# Patient Record
Sex: FEMALE | Race: BLACK OR AFRICAN AMERICAN | NOT HISPANIC OR LATINO | Employment: UNEMPLOYED | URBAN - METROPOLITAN AREA
[De-identification: names, ages, dates, MRNs, and addresses within clinical notes are randomized per-mention and may not be internally consistent; named-entity substitution may affect disease eponyms.]

---

## 2017-01-05 ENCOUNTER — DOCUMENTATION ONLY (OUTPATIENT)
Dept: TRANSPLANT | Facility: CLINIC | Age: 32
End: 2017-01-05

## 2017-01-05 NOTE — PROGRESS NOTES
Call from Jelly. Is ready to start with a new referral and get back on the waiting list. Things are going better with her life. I asked if she missed dialysis ever and she said this does happen when there are problems with her baby. Not often. Still at same unit and seeing Dr. Varma. I said I would pass along the referral to our administrative staff and they would contact her next week.

## 2017-01-06 ENCOUNTER — TELEPHONE (OUTPATIENT)
Dept: TRANSPLANT | Facility: CLINIC | Age: 32
End: 2017-01-06

## 2017-01-06 DIAGNOSIS — I25.10 CARDIOVASCULAR DISEASE: ICD-10-CM

## 2017-01-06 DIAGNOSIS — I10 ESSENTIAL HYPERTENSION: ICD-10-CM

## 2017-01-06 DIAGNOSIS — Z87.891 HISTORY OF TOBACCO USE: ICD-10-CM

## 2017-01-06 DIAGNOSIS — Z76.82 ORGAN TRANSPLANT CANDIDATE: ICD-10-CM

## 2017-01-06 DIAGNOSIS — N02.B9 IGA NEPHROPATHY: ICD-10-CM

## 2017-01-06 DIAGNOSIS — N18.6 END STAGE RENAL DISEASE (H): ICD-10-CM

## 2017-01-06 DIAGNOSIS — T86.91 TRANSPLANT FAILURE DUE TO REJECTION: ICD-10-CM

## 2017-01-06 NOTE — LETTER
February 28, 2017    Keith Dietz  [address]    [facility] [address]      Dear Ms. Dietz,    Attached is your Pre Kidney Evaluation schedule on March 8, 2017 and returning on May 31, 2017. Please feel free to contact me at [phone], if you have any questions.       Sincerely,   Carol LEMUS    CC:Jeanette HOOPER                                                [facility] [address]  [address]        EVALUATION SCHEDULE: KIDNEY TRANSPLANT SLOT 4 EVAL      Patient:   KEITH DIETZ   MR#:    [id number]  Coordinator:   Jeanette HOOPER     [phone]  :   Carol LEMUS     [phone]  Location[facility]   [facility]  Date:    March 8, 2017 & May 31, 2017      This is your evaluation schedule, please follow dates and times.  You  will receive reminder phone calls for other tests, but please follow this  schedule only!  If you have any questions about dates and times,  please call us on number listed above.  Thank you, Transplant Clinic.     NO FOOD or DRINK AFTER MIDNIGHT  (You may only have small amounts of water)      Day/Date:    Wednesday, March 8, 2017  Time Location Activity   6:30a.m. [facility]  Imaging and Lab Testing  1st floor Blood tests (fasting, PLEASE)   7:00a.m. [facility]  Transplant Services  3rd floor; Clinic 3A Check in & go thru evaluation schedule for the day, get vitals & medication records   8:00 - 9:00a.m. [facility]  Transplant Services  3rd floor; Clinic 3A Pre-transplant class   9:00a.m. [facility]  Transplant Services  3rd floor; Clinic 3A; Consult Room Appointment with either Micaela or Makenzie,  Transplant    10:00a.m. [facility]  Transplant Services  3rd floor; Clinic 3A Appointment with Dr. Younger,   Transplant Surgeon   10:30a.m.  [facility]  Transplant Services  3rd floor; Clinic 3A Meet with Jeanette HOOPER  Transplant Coordinator   11:15a.m. [facility]  Transplant Services  3rd floor; Clinic 3B Appointment with Dr. Anglin,   Transplant Nephrologist   12:00Noon-  12:30p.m. [facility]  Transplant Services  3rd floor; Clinic 3A; Consult Room Appointment with Marya Roland  Registered Dietitian   12:30-  1:30p.m. LUNCH    1:45p.m. [facility]  Imaging and Lab Testing  1st floor 2nd ABO blood draw - confirmation of 1st draw   2:00p.m. [facility]  Pulmonary Function Lab  3rd floor Pulmonary Function Tests   3:00p.m. [facility]  Imaging and Lab Testing  1st floor Chest X-ray    3:30p.m. [facility]  Cardiology/Heart Care Clinic  3rd floor; Clinic 3L Echocardiogram   4:30p.m. [facility]  Cardiology/Heart Care Clinic  3rd floor; Clinic 3L EKG       Day/Date:   Wednesday, May 31, 2017   Time Location Activity   10:00a.m. [facility]  Cardiology/Heart Care Clinic  3rd floor; Clinic 3L Appointment with Dr. Deras,   Cardiology

## 2017-01-06 NOTE — Clinical Note
January 17, 2017      Jelly Dietz  [address]   [facility] [address]      Dear Jelly,       You have recently expressed interest in our Solid Organ Transplant Program and have requested to begin the evaluation process.  We have made several attempts to get in touch with you but have been unable to reach you.    If you are still interested and/or have any questions, please contact us at  [phone] or [phone]   Monday - Friday, between the hours of 8:30am and 5:00pm central time.    We look forward to hearing from you.      Regards,     Solid Organ Transplant Intake   [facility]'s [address]  [address], [facility] [address]  [place] [address]

## 2017-01-17 NOTE — TELEPHONE ENCOUNTER
"I attempted to contact Jelly again and her phone has been disconnected. I sent a \"Trying To Reach You\" letter  "

## 2017-01-24 ENCOUNTER — APPOINTMENT (OUTPATIENT)
Dept: GENERAL RADIOLOGY | Facility: CLINIC | Age: 32
End: 2017-01-24
Attending: EMERGENCY MEDICINE
Payer: MEDICARE

## 2017-01-24 ENCOUNTER — HOSPITAL ENCOUNTER (OUTPATIENT)
Facility: CLINIC | Age: 32
Setting detail: OBSERVATION
Discharge: HOME | End: 2017-01-25
Attending: EMERGENCY MEDICINE | Admitting: INTERNAL MEDICINE
Payer: MEDICARE

## 2017-01-24 DIAGNOSIS — N18.6 ESRD (END STAGE RENAL DISEASE) ON DIALYSIS (H): ICD-10-CM

## 2017-01-24 DIAGNOSIS — Z99.2 ESRD (END STAGE RENAL DISEASE) ON DIALYSIS (H): ICD-10-CM

## 2017-01-24 DIAGNOSIS — R06.00 DYSPNEA, UNSPECIFIED TYPE: ICD-10-CM

## 2017-01-24 DIAGNOSIS — F41.9 ANXIETY: ICD-10-CM

## 2017-01-24 DIAGNOSIS — I15.9 SECONDARY HYPERTENSION: ICD-10-CM

## 2017-01-24 DIAGNOSIS — H66.91 RIGHT OTITIS MEDIA, UNSPECIFIED CHRONICITY, UNSPECIFIED OTITIS MEDIA TYPE: Primary | ICD-10-CM

## 2017-01-24 LAB
ANION GAP SERPL CALCULATED.3IONS-SCNC: 14 MMOL/L (ref 3–14)
BASOPHILS # BLD AUTO: 0 10E9/L (ref 0–0.2)
BASOPHILS NFR BLD AUTO: 1.4 %
BUN SERPL-MCNC: 89 MG/DL (ref 7–30)
CALCIUM SERPL-MCNC: 8.6 MG/DL (ref 8.5–10.1)
CHLORIDE SERPL-SCNC: 100 MMOL/L (ref 94–109)
CO2 SERPL-SCNC: 21 MMOL/L (ref 20–32)
CREAT SERPL-MCNC: 13.3 MG/DL (ref 0.52–1.04)
DIFFERENTIAL METHOD BLD: ABNORMAL
EOSINOPHIL # BLD AUTO: 0.2 10E9/L (ref 0–0.7)
EOSINOPHIL NFR BLD AUTO: 6.2 %
ERYTHROCYTE [DISTWIDTH] IN BLOOD BY AUTOMATED COUNT: 12.5 % (ref 10–15)
GFR SERPL CREATININE-BSD FRML MDRD: 3 ML/MIN/1.7M2
GLUCOSE SERPL-MCNC: 107 MG/DL (ref 70–99)
HCT VFR BLD AUTO: 24.8 % (ref 35–47)
HGB BLD-MCNC: 8.1 G/DL (ref 11.7–15.7)
IMM GRANULOCYTES # BLD: 0 10E9/L (ref 0–0.4)
IMM GRANULOCYTES NFR BLD: 0.3 %
LYMPHOCYTES # BLD AUTO: 0.7 10E9/L (ref 0.8–5.3)
LYMPHOCYTES NFR BLD AUTO: 25.2 %
MCH RBC QN AUTO: 32.7 PG (ref 26.5–33)
MCHC RBC AUTO-ENTMCNC: 32.7 G/DL (ref 31.5–36.5)
MCV RBC AUTO: 100 FL (ref 78–100)
MONOCYTES # BLD AUTO: 0.1 10E9/L (ref 0–1.3)
MONOCYTES NFR BLD AUTO: 4.8 %
NEUTROPHILS # BLD AUTO: 1.8 10E9/L (ref 1.6–8.3)
NEUTROPHILS NFR BLD AUTO: 62.1 %
NRBC # BLD AUTO: 0 10*3/UL
NRBC BLD AUTO-RTO: 0 /100
NT-PROBNP SERPL-MCNC: ABNORMAL PG/ML (ref 0–450)
PHOSPHATE SERPL-MCNC: 8 MG/DL (ref 2.5–4.5)
PLATELET # BLD AUTO: 131 10E9/L (ref 150–450)
POTASSIUM SERPL-SCNC: 5.3 MMOL/L (ref 3.4–5.3)
RBC # BLD AUTO: 2.48 10E12/L (ref 3.8–5.2)
SODIUM SERPL-SCNC: 135 MMOL/L (ref 133–144)
TROPONIN I SERPL-MCNC: NORMAL UG/L (ref 0–0.04)
WBC # BLD AUTO: 2.9 10E9/L (ref 4–11)

## 2017-01-24 PROCEDURE — 80048 BASIC METABOLIC PNL TOTAL CA: CPT | Performed by: EMERGENCY MEDICINE

## 2017-01-24 PROCEDURE — 83880 ASSAY OF NATRIURETIC PEPTIDE: CPT | Performed by: EMERGENCY MEDICINE

## 2017-01-24 PROCEDURE — 84100 ASSAY OF PHOSPHORUS: CPT | Performed by: EMERGENCY MEDICINE

## 2017-01-24 PROCEDURE — 84484 ASSAY OF TROPONIN QUANT: CPT | Performed by: EMERGENCY MEDICINE

## 2017-01-24 PROCEDURE — 85025 COMPLETE CBC W/AUTO DIFF WBC: CPT | Performed by: EMERGENCY MEDICINE

## 2017-01-24 PROCEDURE — 71020 XR CHEST 2 VW: CPT

## 2017-01-24 PROCEDURE — 93005 ELECTROCARDIOGRAM TRACING: CPT

## 2017-01-24 PROCEDURE — 99285 EMERGENCY DEPT VISIT HI MDM: CPT | Mod: 25

## 2017-01-24 RX ORDER — LIDOCAINE 40 MG/G
CREAM TOPICAL
Status: DISCONTINUED | OUTPATIENT
Start: 2017-01-24 | End: 2017-01-25

## 2017-01-24 ASSESSMENT — ENCOUNTER SYMPTOMS
SHORTNESS OF BREATH: 1
FREQUENCY: 1

## 2017-01-24 NOTE — IP AVS SNAPSHOT
MRN:[id number]                      After Visit Summary   1/24/2017    Jelly iDetz    MRN: [id number]           Thank you!     Thank you for choosing [facility] for your care. Our goal is always to provide you with excellent care. Hearing back from our patients is one way we can continue to improve our services. Please take a few minutes to complete the written survey that you may receive in the mail after you visit. If you would like to speak to someone directly about your visit please contact Patient Relations at [phone]. Thank you!          Patient Information     Date Of Birth          1985        About your hospital stay     You were admitted on:  January 25, 2017 You last received care in the:  [place] [address] [facility]    You were discharged on:  January 25, 2017        Reason for your hospital stay       SOB related to dialysis need, elevated BP                  Who to Call     For medical emergencies, please call 911.  For non-urgent questions about your medical care, please call your primary care provider or clinic, [phone]          Attending Provider     Provider    Parker Resendez MD Parpia, Abdul K, MD       Primary Care Provider Office Phone # Fax #    Sailaja Oh [phone]       [facility] [address] [address] [place] [address]         Notify       Call if questions.  Notify provider if new medical concerns.                  After Care Instructions     Activity       Your activity upon discharge: activity as tolerated            Diet       Follow this diet upon discharge: dialysis                  Follow-up Appointments     Follow-up and recommended labs and tests        Follow-up with dialysis on your normal schedule                  Pending Results     No orders found for last 2 day(s).            Statement of Approval     Ordered          01/25/17 1815  I have reviewed and agree with all the recommendations  "and orders detailed in this document.   EFFECTIVE NOW     Approved and electronically signed by:  Dakota Bailey DO             Admission Information        Department Dept Phone    2017 Rh [facility] [phone]      Your Vitals Were     Blood Pressure Pulse Temperature Respirations Weight Pulse Oximetry    127/79 mmHg 83 98.2  F (36.8  C) (Oral) 16 53.797 kg (118 lb 9.6 oz) 98%      MyChart Information     [employer] lets you send messages to your doctor, view your test results, renew your prescriptions, schedule appointments and more. To sign up, go to [email or web address] . Click on \"Log in\" on the left side of the screen, which will take you to the Welcome page. Then click on \"Sign up Now\" on the right side of the page.     You will be asked to enter the access code listed below, as well as some personal information. Please follow the directions to create your username and password.     Your access code is: [email or web address]  Expires: 2017  1:30 AM     Your access code will  in 90 days. If you need help or a new code, please call your [place] clinic or [phone].        Care EveryWhere ID     This is your Care EveryWhere ID. This could be used by other organizations to access your [place] medical records  [id number]           Review of your medicines      START taking        Dose / Directions    cefdinir 300 MG capsule   Commonly known as:  OMNICEF   Used for:  Right otitis media, unspecified chronicity, unspecified otitis media type        Dose:  300 mg   Take 1 capsule (300 mg) by mouth every other day for 10 days (take after dialysis on dialysis days)   Quantity:  5 capsule   Refills:  0       diazepam 2 MG tablet   Commonly known as:  VALIUM   Used for:  Anxiety        Dose:  2 mg   Take 1 tablet (2 mg) by mouth nightly as needed for anxiety or sleep   Quantity:  7 tablet   Refills:  0         CONTINUE these medicines which have NOT CHANGED        Dose / Directions    " acetaminophen 325 MG tablet   Commonly known as:  TYLENOL   Used for:   delivery delivered        Dose:  325-650 mg   Take 1-2 tablets by mouth every 4 hours as needed.   Quantity:  60 tablet   Refills:  0       amLODIPine 10 MG tablet   Commonly known as:  NORVASC   Used for:  ESRD (end stage renal disease) on dialysis (H), Malignant essential hypertension        Dose:  10 mg   Take 1 tablet (10 mg) by mouth daily   Quantity:  30 tablet   Refills:  1       carvedilol 25 MG tablet   Commonly known as:  COREG   Used for:  Benign essential hypertension, SOB (shortness of breath)        Dose:  25 mg   Take 1 tablet (25 mg) by mouth 2 times daily (with meals)   Quantity:  60 tablet   Refills:  11       cloNIDine 0.1 MG tablet   Commonly known as:  CATAPRES   Used for:  Benign essential hypertension, SOB (shortness of breath)        Dose:  0.1 mg   Take 1 tablet (0.1 mg) by mouth 3 times daily as needed   Quantity:  60 tablet   Refills:  3       HYDROcodone-acetaminophen 5-325 MG per tablet   Commonly known as:  NORCO        Dose:  1 tablet   Take 1 tablet by mouth every 6 hours as needed for moderate to severe pain   Quantity:  7 tablet   Refills:  0       oxyCODONE 5 MG IR tablet   Commonly known as:  ROXICODONE        Dose:  5 mg   Take 1 tablet (5 mg) by mouth every 6 hours as needed for pain   Quantity:  15 tablet   Refills:  0         STOP taking     azithromycin 250 MG tablet   Commonly known as:  ZITHROMAX Z-HERMINIA                Where to get your medicines      These medications were sent to [facility] [address] [place] [address] AT [facility] [address]  [address] [place] [address], [facility] [address]     Phone:  [phone]    - cefdinir 300 MG capsule      Some of these will need a paper prescription and others can be bought over the counter. Ask your nurse if you have questions.     Bring a paper prescription for each of these medications    - diazepam 2 MG tablet              Protect others around you: Learn how to safely use, store and throw away your medicines at www.disposemymeds.org.             Medication List: This is a list of all your medications and when to take them. Check marks below indicate your daily home schedule. Keep this list as a reference.      Medications           Morning Afternoon Evening Bedtime As Needed    acetaminophen 325 MG tablet   Commonly known as:  TYLENOL   Take 1-2 tablets by mouth every 4 hours as needed.                                   amLODIPine 10 MG tablet   Commonly known as:  NORVASC   Take 1 tablet (10 mg) by mouth daily   Last time this was given:  10 mg on 1/25/2017  8:11 AM                                   carvedilol 25 MG tablet   Commonly known as:  COREG   Take 1 tablet (25 mg) by mouth 2 times daily (with meals)   Last time this was given:  25 mg on 1/25/2017  5:25 PM            With breakfast           With dinner               cefdinir 300 MG capsule   Commonly known as:  OMNICEF   Take 1 capsule (300 mg) by mouth every other day for 10 days (take after dialysis on dialysis days)                    Start TONIGHT                 cloNIDine 0.1 MG tablet   Commonly known as:  CATAPRES   Take 1 tablet (0.1 mg) by mouth 3 times daily as needed   Last time this was given:  0.1 mg on 1/25/2017  5:25 PM                                   diazepam 2 MG tablet   Commonly known as:  VALIUM   Take 1 tablet (2 mg) by mouth nightly as needed for anxiety or sleep   Last time this was given:  10 mg on 1/25/2017 12:53 AM                                   HYDROcodone-acetaminophen 5-325 MG per tablet   Commonly known as:  NORCO   Take 1 tablet by mouth every 6 hours as needed for moderate to severe pain                                   oxyCODONE 5 MG IR tablet   Commonly known as:  ROXICODONE   Take 1 tablet (5 mg) by mouth every 6 hours as needed for pain

## 2017-01-24 NOTE — IP AVS SNAPSHOT
[place] [address] [facility]    [address]    [facility] [address]    Phone:  [phone]    Fax:  [phone]                                       After Visit Summary   1/24/2017    Jelly Dietz    MRN: [id number]           After Visit Summary Signature Page     I have received my discharge instructions, and my questions have been answered. I have discussed any challenges I see with this plan with the nurse or doctor.    ..........................................................................................................................................  Patient/Patient Representative Signature      ..........................................................................................................................................  Patient Representative Print Name and Relationship to Patient    ..................................................               ................................................  Date                                            Time    ..........................................................................................................................................  Reviewed by Signature/Title    ...................................................              ..............................................  Date                                                            Time

## 2017-01-25 VITALS
RESPIRATION RATE: 16 BRPM | HEART RATE: 83 BPM | BODY MASS INDEX: 19.15 KG/M2 | WEIGHT: 118.6 LBS | DIASTOLIC BLOOD PRESSURE: 79 MMHG | OXYGEN SATURATION: 98 % | TEMPERATURE: 98.2 F | SYSTOLIC BLOOD PRESSURE: 127 MMHG

## 2017-01-25 PROBLEM — Z99.2 ESRD (END STAGE RENAL DISEASE) ON DIALYSIS (H): Status: ACTIVE | Noted: 2017-01-25

## 2017-01-25 PROBLEM — N18.6 ESRD (END STAGE RENAL DISEASE) ON DIALYSIS (H): Status: ACTIVE | Noted: 2017-01-25

## 2017-01-25 LAB
ALBUMIN UR-MCNC: 30 MG/DL
APPEARANCE UR: ABNORMAL
BACTERIA #/AREA URNS HPF: ABNORMAL /HPF
BILIRUB UR QL STRIP: NEGATIVE
COLOR UR AUTO: ABNORMAL
GLUCOSE UR STRIP-MCNC: 70 MG/DL
HGB UR QL STRIP: NEGATIVE
INTERPRETATION ECG - MUSE: NORMAL
KETONES UR STRIP-MCNC: NEGATIVE MG/DL
LEUKOCYTE ESTERASE UR QL STRIP: NEGATIVE
MUCOUS THREADS #/AREA URNS LPF: PRESENT /LPF
NITRATE UR QL: NEGATIVE
PH UR STRIP: 7.5 PH (ref 5–7)
POTASSIUM SERPL-SCNC: 5.7 MMOL/L (ref 3.4–5.3)
RBC #/AREA URNS AUTO: 1 /HPF (ref 0–2)
SP GR UR STRIP: 1.01 (ref 1–1.03)
SQUAMOUS #/AREA URNS AUTO: 31 /HPF (ref 0–1)
URN SPEC COLLECT METH UR: ABNORMAL
UROBILINOGEN UR STRIP-MCNC: NORMAL MG/DL (ref 0–2)
WBC #/AREA URNS AUTO: 2 /HPF (ref 0–2)

## 2017-01-25 PROCEDURE — G0378 HOSPITAL OBSERVATION PER HR: HCPCS

## 2017-01-25 PROCEDURE — 81001 URINALYSIS AUTO W/SCOPE: CPT | Performed by: INTERNAL MEDICINE

## 2017-01-25 PROCEDURE — 25000125 ZZHC RX 250: Performed by: INTERNAL MEDICINE

## 2017-01-25 PROCEDURE — 90937 HEMODIALYSIS REPEATED EVAL: CPT

## 2017-01-25 PROCEDURE — 96375 TX/PRO/DX INJ NEW DRUG ADDON: CPT

## 2017-01-25 PROCEDURE — A9270 NON-COVERED ITEM OR SERVICE: HCPCS | Mod: GY | Performed by: INTERNAL MEDICINE

## 2017-01-25 PROCEDURE — 99235 HOSP IP/OBS SAME DATE MOD 70: CPT | Performed by: INTERNAL MEDICINE

## 2017-01-25 PROCEDURE — 96376 TX/PRO/DX INJ SAME DRUG ADON: CPT

## 2017-01-25 PROCEDURE — 25000132 ZZH RX MED GY IP 250 OP 250 PS 637: Mod: GY | Performed by: INTERNAL MEDICINE

## 2017-01-25 PROCEDURE — G0257 UNSCHED DIALYSIS ESRD PT HOS: HCPCS

## 2017-01-25 PROCEDURE — 36415 COLL VENOUS BLD VENIPUNCTURE: CPT | Performed by: INTERNAL MEDICINE

## 2017-01-25 PROCEDURE — 96374 THER/PROPH/DIAG INJ IV PUSH: CPT

## 2017-01-25 PROCEDURE — A9270 NON-COVERED ITEM OR SERVICE: HCPCS | Mod: GY | Performed by: EMERGENCY MEDICINE

## 2017-01-25 PROCEDURE — 25000132 ZZH RX MED GY IP 250 OP 250 PS 637: Mod: GY | Performed by: EMERGENCY MEDICINE

## 2017-01-25 PROCEDURE — 84132 ASSAY OF SERUM POTASSIUM: CPT | Performed by: INTERNAL MEDICINE

## 2017-01-25 RX ORDER — DIAZEPAM 2 MG
2 TABLET ORAL
Qty: 7 TABLET | Refills: 0 | Status: SHIPPED | OUTPATIENT
Start: 2017-01-25 | End: 2017-04-30

## 2017-01-25 RX ORDER — CLONIDINE HYDROCHLORIDE 0.1 MG/1
0.3 TABLET ORAL ONCE
Status: COMPLETED | OUTPATIENT
Start: 2017-01-25 | End: 2017-01-25

## 2017-01-25 RX ORDER — HYDRALAZINE HYDROCHLORIDE 20 MG/ML
10 INJECTION INTRAMUSCULAR; INTRAVENOUS EVERY 4 HOURS PRN
Status: DISCONTINUED | OUTPATIENT
Start: 2017-01-25 | End: 2017-01-25

## 2017-01-25 RX ORDER — HYDRALAZINE HYDROCHLORIDE 20 MG/ML
10 INJECTION INTRAMUSCULAR; INTRAVENOUS EVERY 4 HOURS PRN
Status: DISCONTINUED | OUTPATIENT
Start: 2017-01-25 | End: 2017-01-25 | Stop reason: HOSPADM

## 2017-01-25 RX ORDER — HYDROMORPHONE HYDROCHLORIDE 1 MG/ML
.3-.5 INJECTION, SOLUTION INTRAMUSCULAR; INTRAVENOUS; SUBCUTANEOUS
Status: DISCONTINUED | OUTPATIENT
Start: 2017-01-25 | End: 2017-01-25 | Stop reason: HOSPADM

## 2017-01-25 RX ORDER — HEPARIN SODIUM 1000 [USP'U]/ML
500 INJECTION, SOLUTION INTRAVENOUS; SUBCUTANEOUS CONTINUOUS
Status: DISCONTINUED | OUTPATIENT
Start: 2017-01-25 | End: 2017-01-25

## 2017-01-25 RX ORDER — CARVEDILOL 25 MG/1
25 TABLET ORAL 2 TIMES DAILY WITH MEALS
Status: DISCONTINUED | OUTPATIENT
Start: 2017-01-25 | End: 2017-01-25 | Stop reason: HOSPADM

## 2017-01-25 RX ORDER — ONDANSETRON 4 MG/1
4 TABLET, ORALLY DISINTEGRATING ORAL EVERY 6 HOURS PRN
Status: DISCONTINUED | OUTPATIENT
Start: 2017-01-25 | End: 2017-01-25 | Stop reason: HOSPADM

## 2017-01-25 RX ORDER — ONDANSETRON 2 MG/ML
4 INJECTION INTRAMUSCULAR; INTRAVENOUS EVERY 6 HOURS PRN
Status: DISCONTINUED | OUTPATIENT
Start: 2017-01-25 | End: 2017-01-25 | Stop reason: HOSPADM

## 2017-01-25 RX ORDER — ALBUMIN, HUMAN INJ 5% 5 %
250 SOLUTION INTRAVENOUS
Status: DISCONTINUED | OUTPATIENT
Start: 2017-01-25 | End: 2017-01-25

## 2017-01-25 RX ORDER — AMOXICILLIN 500 MG/1
1000 CAPSULE ORAL ONCE
Status: COMPLETED | OUTPATIENT
Start: 2017-01-25 | End: 2017-01-25

## 2017-01-25 RX ORDER — NALOXONE HYDROCHLORIDE 0.4 MG/ML
.1-.4 INJECTION, SOLUTION INTRAMUSCULAR; INTRAVENOUS; SUBCUTANEOUS
Status: DISCONTINUED | OUTPATIENT
Start: 2017-01-25 | End: 2017-01-25 | Stop reason: HOSPADM

## 2017-01-25 RX ORDER — CARVEDILOL 25 MG/1
25 TABLET ORAL 2 TIMES DAILY WITH MEALS
Status: DISCONTINUED | OUTPATIENT
Start: 2017-01-25 | End: 2017-01-25

## 2017-01-25 RX ORDER — DIAZEPAM 5 MG
10 TABLET ORAL ONCE
Status: COMPLETED | OUTPATIENT
Start: 2017-01-25 | End: 2017-01-25

## 2017-01-25 RX ORDER — SODIUM POLYSTYRENE SULFONATE 15 G/60ML
15 SUSPENSION ORAL; RECTAL ONCE
Status: COMPLETED | OUTPATIENT
Start: 2017-01-25 | End: 2017-01-25

## 2017-01-25 RX ORDER — CLONIDINE HYDROCHLORIDE 0.1 MG/1
0.1 TABLET ORAL 3 TIMES DAILY
Status: DISCONTINUED | OUTPATIENT
Start: 2017-01-25 | End: 2017-01-25 | Stop reason: HOSPADM

## 2017-01-25 RX ORDER — AMLODIPINE BESYLATE 10 MG/1
10 TABLET ORAL DAILY
Status: DISCONTINUED | OUTPATIENT
Start: 2017-01-25 | End: 2017-01-25 | Stop reason: HOSPADM

## 2017-01-25 RX ORDER — AMOXICILLIN AND CLAVULANATE POTASSIUM 250; 125 MG/1; MG/1
1 TABLET, FILM COATED ORAL
Status: DISCONTINUED | OUTPATIENT
Start: 2017-01-25 | End: 2017-01-25 | Stop reason: HOSPADM

## 2017-01-25 RX ORDER — ACETAMINOPHEN 325 MG/1
650 TABLET ORAL EVERY 4 HOURS PRN
Status: DISCONTINUED | OUTPATIENT
Start: 2017-01-25 | End: 2017-01-25 | Stop reason: HOSPADM

## 2017-01-25 RX ORDER — HEPARIN SODIUM 1000 [USP'U]/ML
500 INJECTION, SOLUTION INTRAVENOUS; SUBCUTANEOUS
Status: DISCONTINUED | OUTPATIENT
Start: 2017-01-25 | End: 2017-01-25

## 2017-01-25 RX ORDER — CEFDINIR 300 MG/1
300 CAPSULE ORAL EVERY OTHER DAY
Qty: 5 CAPSULE | Refills: 0 | Status: SHIPPED | OUTPATIENT
Start: 2017-01-25 | End: 2017-02-04

## 2017-01-25 RX ORDER — ALBUMIN (HUMAN) 12.5 G/50ML
50 SOLUTION INTRAVENOUS
Status: DISCONTINUED | OUTPATIENT
Start: 2017-01-25 | End: 2017-01-25

## 2017-01-25 RX ORDER — CLONIDINE HYDROCHLORIDE 0.1 MG/1
0.1 TABLET ORAL 3 TIMES DAILY
Status: DISCONTINUED | OUTPATIENT
Start: 2017-01-25 | End: 2017-01-25

## 2017-01-25 RX ADMIN — AMOXICILLIN 1000 MG: 500 CAPSULE ORAL at 01:43

## 2017-01-25 RX ADMIN — SODIUM POLYSTYRENE SULFONATE 15 G: 15 SUSPENSION ORAL; RECTAL at 06:57

## 2017-01-25 RX ADMIN — CLONIDINE HYDROCHLORIDE 0.1 MG: 0.1 TABLET ORAL at 17:25

## 2017-01-25 RX ADMIN — HYDROMORPHONE HYDROCHLORIDE 0.3 MG: 10 INJECTION, SOLUTION INTRAMUSCULAR; INTRAVENOUS; SUBCUTANEOUS at 17:31

## 2017-01-25 RX ADMIN — CLONIDINE HYDROCHLORIDE 0.1 MG: 0.1 TABLET ORAL at 08:11

## 2017-01-25 RX ADMIN — CARVEDILOL 25 MG: 25 TABLET, FILM COATED ORAL at 08:11

## 2017-01-25 RX ADMIN — DIAZEPAM 10 MG: 5 TABLET ORAL at 00:53

## 2017-01-25 RX ADMIN — HYDRALAZINE HYDROCHLORIDE 10 MG: 20 INJECTION INTRAMUSCULAR; INTRAVENOUS at 09:23

## 2017-01-25 RX ADMIN — HYDROMORPHONE HYDROCHLORIDE 0.3 MG: 10 INJECTION, SOLUTION INTRAMUSCULAR; INTRAVENOUS; SUBCUTANEOUS at 13:36

## 2017-01-25 RX ADMIN — AMLODIPINE BESYLATE 10 MG: 10 TABLET ORAL at 08:11

## 2017-01-25 RX ADMIN — CARVEDILOL 25 MG: 25 TABLET, FILM COATED ORAL at 17:25

## 2017-01-25 RX ADMIN — HYDROMORPHONE HYDROCHLORIDE 0.3 MG: 10 INJECTION, SOLUTION INTRAMUSCULAR; INTRAVENOUS; SUBCUTANEOUS at 09:24

## 2017-01-25 RX ADMIN — CLONIDINE HYDROCHLORIDE 0.3 MG: 0.1 TABLET ORAL at 00:23

## 2017-01-25 RX ADMIN — ONDANSETRON 4 MG: 4 TABLET, ORALLY DISINTEGRATING ORAL at 17:43

## 2017-01-25 RX ADMIN — ONDANSETRON 4 MG: 4 TABLET, ORALLY DISINTEGRATING ORAL at 09:58

## 2017-01-25 ASSESSMENT — ENCOUNTER SYMPTOMS: FEVER: 0

## 2017-01-25 NOTE — PROGRESS NOTES
I discussed pt's current status with Dr. Resendez.  Though she feels short of breath, she is not hypoxic and has a clear CXR.  Electrolytes are satisfactory.  She does not need urgent dialysis tonight.  She has a history of difficult to control HBP and is hypertensive tonight.      I cannot access her current dialysis orders because she does not receive Nephrology care from my group at [facility].  I have recommended admission now and anti-hypertensive medication tonight.  I will request early AM dialysis.  The dialysis team should secure pt's dialysis orders from the [place] unit when they arrive in the morning.  I have left a voice message for the [place] unit staff regarding the pt's hospital admission.    Song Renteria MD  Nephrology; [facility]  [phone]

## 2017-01-25 NOTE — ED NOTES
Patient presents today with complaints of SOB. She states that she was supposed to have dialysis today but did not go. She called her dialysis nurse who told her to come to ED for dialysis.

## 2017-01-25 NOTE — PROVIDER NOTIFICATION
"Paged Dr. Bailey at 0743 \"c/o headache, refusing tylenol and req. IV pain medication. Please consider ordering PO and IV. K 5.7 Kayexalate given at 0700, would you like K recheck? Please advise. Thanks!\"    Spoke with Dr. Bailey, he would like for pt to receive AM pills now and give PRN dilaudid for severe pain while being dialyzed. No need for K recheck as pt will have dialysis today.     Pt at dialysis, no improvement to BP with AM medications, PRN hydralazine given for /119, improved to 164/93. Dilaudid 0.3mg given for 10/10 back pain. Pt reports she gets this back pain when she sleeps in hospital beds.     Paged Dr. Bailey at 1010 \"At dialysis, emesis x1, Zofran given. C/o n/v from being NPO & taking AM pills. Would like diet ordered. Please advise, thank you!\"    Received order for dialysis diet.     Pt returned from Dialysis at 1300, VSS. Up with SBA. Admission profile completed. Complains of headache and back pain, IV dilaudid 0.3mg given with good relief. IV is saline locked. On tele, SR. L arm fistula CDI. Plan to d/c this afternoon and have regular dialysis run tomorrow. Bell Landis RN    "

## 2017-01-25 NOTE — PROGRESS NOTES
Infection Prevention:    Patient requires Contact Precautions because of MRSA: In[place], 2012. Please contact Infection Prevention with any questions/concerns at *[phone].    Celia Bowman, ICP

## 2017-01-25 NOTE — PLAN OF CARE
Problem: Goal Outcome Summary  Goal: Goal Outcome Summary  Outcome: No Change  BP elevated.  Hydralazine ordered for systolic greater than 180.  Has been sleeping for much of the time since arriving on the floor.  Plan is for Nephrology consult and dialysis today.  Continue with POC.

## 2017-01-25 NOTE — CONSULTS
"[facility]    RENAL CONSULTATION NOTE    REFERRING MD:  Dr. Pastrana    REASON FOR CONSULTATION:  ESKD, HTN, sob/cp    DATE OF CONSULTATION: 01/25/2017    SHORTHAND KEY FOR MY NOTES:  c = with, s = without, p = after, a = before, x = except, asx = asymptomatic, tx = transplant or treatment, sx = symptoms or symptomatic, cx = canceled or culture, rxn = reaction, yday = yesterday, nl = normal, abx = antibiotics, fxn = function, dx = diagnosis, dz = disease, m/h = melena/hematochezia, c/d/l/ha = cramping/dizziness/lightheadedness/headache, d/c = discharge or diarrhea/constipation, f/c/n/v = fevers/chills/nausea/vomiting, cp/sob = chest pain/shortness of breath.    HPI: Jelly Dietz is a 32 year old female c ESKD 2 IgA s/p failed tx c tx nephrectomy who dialyses TRS via a LFAF at the [facility] under the care of Dr. Varma and was admitted on 1/24/2017 c sob/HTN.     Pt has been sick lately and has been having URI sx.  She missed a run last wk and then again on Tuesday bc of childcare issues.  As a result, she has been above her EDW and was feeling quite sob c cp, so she presented to the ER for further eval. In the ER, she had a CXR that didn't show any pulm edema, her chemistries were ok, but her BP was elevated.  She was tx c clonidine.    Today, the pt is dialysing and feels better.  She was feeling \"heavy\" in her legs, chest and abd, but that has improved.  She hasn't walked yet to see if she feels better since she's been on HD.  No f/c now, but she did have n/v at the beginning of the run when she rec'd dilaudid.      She has to go home today p HD bc of childcare issues. Her dtr is quite sick.  She is currently being cared for by the pt's mother.     ROS:  A complete review of systems was performed and is negative x as noted above.    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 History " of 1B kidney rejection      Hypertension      resolved after transplant     Anemia      MRSA (methicillin resistant Staphylococcus aureus)      IgA nephropathy      Heart murmur      ACS (acute coronary syndrome) (H) 2014     Chest pain 2014     [facility]     Allergic state      seasonal allergies     PSH:    Past Surgical History   Procedure Laterality Date     Transplant kidney recipient living unrelated  Dec 2007     (Adult male in [place])      section  2012     Procedure:  SECTION;;  Surgeon: Chelsea Cross MD;  Location: [facility] L+D     Sergio/dialysis catheter       Nephrectomy  3/29/13     Transplant nephrectomy      Explant transplanted kidney  3/29/2013     Procedure: EXPLANT TRANSPLANTED KIDNEY;  Explant Transplanted right Kidney (from patients right iliac fossa);  Surgeon: Nory Morgan MD;  Location: [facility] OR     Cesearean section       Cerclage cervical N/A 2015     Procedure: CERCLAGE CERVICAL;  Surgeon: Norbert Chopra MD;  Location: [facility] L+D      section N/A 2015     Procedure:  SECTION;  Surgeon: Chelsea Cross MD;  Location: [facility] OR     MEDICATIONS:      amLODIPine  10 mg Oral Daily     amoxicillin-clavulanate  1 tablet Oral Q24H BING     cloNIDine  0.1 mg Oral TID     carvedilol  25 mg Oral BID w/meals     ALLERGIES:    Allergies as of 2017     (No Known Allergies)     FH:    Family History   Problem Relation Age of Onset     DIABETES Paternal Grandfather      Breast Cancer Maternal Grandmother 55     CANCER No family hx of      No known family hx of skin cancer     SH:    Social History     Social History     Marital Status: Single     Spouse Name: N/A     Number of Children: N/A     Years of Education: N/A     Occupational History     Not on file.     Social History Main Topics     Smoking status: Never Smoker      Smokeless tobacco: Never  Used     Alcohol Use: No     Drug Use: No     Sexual Activity:     Partners: Male     Other Topics Concern     Blood Transfusions Yes     Multiple in [place] none in [place]     Caffeine Concern No     1s/d     Occupational Exposure No     Hobby Hazards No     Sleep Concern No     Stress Concern No     Weight Concern No     Special Diet No     Back Care No     Exercise No     walk 20' daily     Bike Helmet No     Seat Belt No     Social History Narrative    Currently unemployed.  Lives in [place].   with one son.       PHYSICAL EXAM:    /83 mmHg  Pulse 83  Temp(Src) 97.8  F (36.6  C) (Oral)  Resp 16  Wt 53.797 kg (118 lb 9.6 oz)  SpO2 98%    GENERAL: awake, alert, NAD, tearful earlier  HEENT:  Normocephalic. No gross abnormalities.  MMM.  Dentition is ok.  Pupils equal.  EOMI.  No scleral icterus.  CV: RRR c 3/6 murmurs, no clicks, gallops, or rubs, tr ble edema.  RESP: Clear bilaterally with good efforts  GI: Abdomen s/nt/nd, BS present. No masses, organomegaly  MUSCULOSKELETAL: extremities nl - no gross deformities noted  SKIN: no suspicious lesions or rashes, dry to touch  NEURO:  Strength normal and symmetric.   PSYCH: mood good, affect appropriate  LYMPH: No palpable ant/post cervical and supraclavicular adenopathy  OTHER:  Access - LFAF    Pt seen on HD at end.  Stable run.  - 3.5L uf goal.  Good BFR via LFAF.    LABS:      CBC RESULTS:     Recent Labs  Lab 01/24/17  2245   WBC 2.9*   RBC 2.48*   HGB 8.1*   HCT 24.8*   *     BMP RESULTS:    Recent Labs  Lab 01/25/17  0510 01/24/17  2245   NA  --  135   POTASSIUM 5.7* 5.3   CHLORIDE  --  100   CO2  --  21   BUN  --  89*   CR  --  13.30*   GLC  --  107*   CASIE  --  8.6     INRNo lab results found in last 7 days.   DIAGNOSTICS:  Personally reviewed  CXR - clear lungs, cardiomegaly    A/P:  Jelly Dietz is a 32 year old female c ESKD who has sob, cp and HTN.    1.  ESKD c hyperkalemia.  Pt feels volume up and is definitely above her EDW.  " K is high bc she missed HD.  She is dialysing now and this should improve.  She is prob not going to get to EDW today, but is due to run tmrw at [facility].  A.  HD today and then again tmrw at [place].  B.  Will push to see if we can get her to the EDW tmrw.    2.  SOB/CP.  This has improved since taking off fluid.  Her CXR was clear of pulm edema.  A.  Follow clinically.    3.  HTN.  Pt's BP was high bc she is volume up.  Her BPs are quite variable based on adherence to her HD tx and meds.  She rec'd all of her meds today and she pulled 3.5L.  A.  Continue same meds/doses.  B.  Will keep pushing EDW.    4.  Anemia.  Pt's hb is in the low 8s.  No signs of bleeding.  She gets EPO c HD.  A.  Will check fe studies and follow hb as outpt.  B.  Continue EPO c HD as outpt.  She is on obs, so can't receive it here today.    5.  Back pain.  Pt has a lot of back pain that is \"worse in the hospital bed.\"  She is on dilaudid now.  She states that she has \"been on hard core meds for so long that others don't work\" when aceta was suggested.  A.  Pain control prn.    6.  Dispo.  Ok to dc today if she eats and does ok.    Thank you for this consultation. We will follow c you.  Please call if any questions.    Attestation:   I have reviewed today's relevant vital signs, notes, medications, labs and imaging.    Kobe Mckeon MD  [facility] - Nephrology  [phone]    "

## 2017-01-25 NOTE — H&P
CHIEF COMPLAINT:  Shortness of breath.      HISTORY OF PRESENT ILLNESS:  Jelly Dietz is a 32-year-old [place] female with a history of IgA nephropathy, with end-stage renal disease, on hemodialysis ,  and .  The patient missed her dialysis yesterday, and missed it once last week, as she has been taking care of her children and has not been able to get a care sitter.  She also complains of chills which have been going on for some time now.  She complains of URI symptoms which have been going on for the past month, as well as rhinorrhea which is green in color.  She endorses mild shortness of breath.  However, she denies any productive cough.  She does have some burning pain in the suprapubic region on urination.      Over here in the ER, the patient was seen by Dr. Parker Resendez.  A chest x-ray did not show any acute abnormality.  I am asked to admit her.  They did talk to Dr. Renteria who recommended that the patient be admitted and that he will arrange for dialysis in the morning.      PAST MEDICAL HISTORY:   1.  Kidney transplant, which was rejected in the past, in .  It was a living donor kidney transplant.   2.  Hypertension.   3.  Anemia.   4.  IgA nephropathy.   6.  Acute coronary syndrome.   7.  Allergies.      PAST SURGICAL HISTORY:   1.  Prior renal transplant.   2.  .   3.  Nephrectomy.      FAMILY HISTORY:  Significant for diabetes in her paternal grandfather.      SOCIAL HISTORY:  She does not smoke or drink alcohol.      ALLERGIES:  No known drug allergies.      HOME MEDICATIONS:  Awaiting reconciliation, but include Coreg, Catapres, Norvasc, Tylenol.      REVIEW OF SYSTEMS:  As mentioned in the HPI.  She complains of some right earache as well.  She has had some chills.  She denies any productive cough.  She denies any nausea or vomiting or productive cough.  All other systems were extensively reviewed and deemed unremarkable and negative.      PHYSICAL  EXAMINATION:   VITAL SIGNS:  Temperature is 97.7.  Pulse 72.  Blood pressure is 183/128.  Respiratory rate 20.  O2 sat is 100% on room air.   GENERAL:  She is alert, awake, oriented, coherent, nontoxic, in no acute distress.   HEENT:  Pupils are equal, round, reactive to light.  Pharynx has no exudate noted.  She has a bulging tympanic membrane on the right, with evidence of otitis media.   NECK:  There is no tender anterior cervical lymphadenopathy.   LUNGS:  Clear to auscultation bilaterally.   HEART:  Regular rate, S1-S2 normal.  She has a 2/6 systolic murmur.   ABDOMEN:  Soft, nontender.  Mild suprapubic tenderness.  No guarding, no rigidity.  She has good bowel sounds.   EXTREMITIES:  There is no edema.   SKIN:  There is no rash.  She has an AV fistula on the left upper extremity which has a good thrill.      LABS:  Lab work obtained here shows the following:  Sodium is 135, potassium 5.3, chloride 100, bicarbonate 21, BUN 89, creatinine 13.3, GFR of 3, calcium 8.6, anion gap 14, phosphorus 8.0.  Her BNP is 12,445.  Troponin is undetectable.  Glucose level is 107.  On CBC, her white cell count is 2.9, hemoglobin 8.1, hematocrit 24.8, platelet count 131,000; her differential is grossly unremarkable.      She had a chest x-ray here which showed no acute abnormalities.      An EKG obtained over here shows normal sinus rhythm at 67 beats per minute with LVH.  She does have somewhat peaked T waves in her precordial leads.      ASSESSMENT AND PLAN:   1.  End-stage renal disease, on hemodialysis, with missed dialysis:  We will admit her under observation status.  We will keep her n.p.o. for now.  We will consult Nephrology for dialysis in the morning.  She missed dialysis once last week and this Tuesday.   2.  Hypertension:  We will place her on the clonidine that she normally takes.   3.  Otitis media:  She has been initiated on amoxicillin over here which I will continue.   4.  CODE STATUS:  FULL CODE.   5.  She  will be admitted under observation status.         ANGEL RICH MD             D: 2017 00:49   T: 2017 02:02   MT: OPAL#[id number]      Name:     KEITH COLE   MRN:      [id number]        Account:      [id number]   :      1985           Admitted:     [id number]      Document: [id number]       cc: Song Landin MD

## 2017-01-25 NOTE — PROGRESS NOTES
POTASSIUM   Date Value Ref Range Status   01/25/2017 5.7* 3.4 - 5.3 mmol/L Final     HGB      8.1   1/24/2017  Weight: 53.797 kg (118 lb 9.6 oz) (standing scale)  POST WT50.7 kg  DIALYSIS PROCEDURE NOTE    Patient dialyzed for 4 hrs. on a 2 K bath with a net fluid removal of  3.5 L.  A BFR of 350 ml/min was obtained via a left lower arm AV fistula using 16 gauge needles.    The patient was seen by Dr. Mckeon during the treatment.  Total heparin received during the treatment: 2100 units. Sites held x 6 min then  pressure drsgs applied.  Meds  Given: None. Complications: None.  Procedure and ESRD teaching done and questions answered. See flowsheet in EPIC for further details and post assessment.  Machine water alarm in place and functioning.  Consent for dialysis verified.  Pt returned via wheelchair.  Vascular Access: Aseptic prep done for both on/off.  Report received from:  Report given to:  HEPATITIS B SURFACE ANTIGEN: unknown  HEPATITIS B SURFACE ANTIBODY: immune DATE: 4/7/16  Chlorine/Chloramine water system checked every 4 hours.  Outpatient Dialysis at [facility] T. . Ethan Garner, RN  [facility]

## 2017-01-25 NOTE — ED PROVIDER NOTES
History     Chief Complaint:  Shortness of Breath      HPI   Jelly Dietz is a 32 year old female with history of HTN, Iga Nephropathy, and ESRD status post kidney transplant in  followed by nephrectomy of the transplanted kidney in  who dialyzes Tuesday, Thursday, Saturday at [facility] who presents with shortness of breath.  The patient reports she last dialyzed on Saturday, , and skipped her dialysis today because she was unable to get anyone to babysit her children.  She states today around 1 PM she began to feel short of breath which progressively worsened throughout the day.  She reports she usually feels short off breath every Tuesday prior to dialysis but resolves after dialyzing.  She reports  she called the [facility] clinic telling them she was unable to dialyze today and was subsequently told she could come to the ED to dialyze.  The patient reports she still makes a very small amount of urine and over the past few days has been urinating minimal amounts frequently.  She denies leg swelling.  Of note, she reports her kids are currently with her neighbor and her mother will watch them tomorrow.     Additionally, the patient reports she has had a cold with congestion for the last month and has recently had right ear pain.   She is afebrile.     Allergies:  NKDA      Medications:    Roxicodone  Coreg  Catapres  [place]  Z-Leroy  Norvasc  Tylenol      Past Medical History:    Status post Kidney Transplant  Kidney Transplant Nephrectomy   HTN  Acute Kidney Injury  End Stage Renal Disease  Hypermagnesemia  Pyelonephritis  Cervical Insufficiency   Preeclampsia  Bacteremia   Anemia  MRSA  Iga Nephropathy  Heart Murmur  ACS  Chest Pain  Spontaneous  x 2    Past Surgical History:    Kidney Transplant Recipient (Living Unrelated, adult male in [place]) 2007  Explant Transplanted Kidney - 2013    Sergio/Dialysis Catheter  Cerclage Cervical     Family History:  Grandfather:  Diabetes  Grandmother: Breast Cancer    Social History:  Relationship status: Single  The patient denies smoking.   The patient denies alcohol use.   The patient presents alone.  The patient is followed by [facility]     Review of Systems   Constitutional: Negative for fever.   HENT: Positive for congestion and ear pain.    Respiratory: Positive for shortness of breath.    Cardiovascular: Negative for leg swelling.   Genitourinary: Positive for frequency.   All other systems reviewed and are negative.      Physical Exam     Patient Vitals for the past 24 hrs:   BP Temp Pulse Heart Rate Resp SpO2   01/25/17 0111 (!) 178/113 mmHg - - - - -   01/24/17 2300 - - - 72 - 100 %   01/24/17 2240 - - - 67 - 100 %   01/24/17 2239 (!) 183/128 mmHg - - - - -   01/24/17 2211 (!) 195/118 mmHg 97.7  F (36.5  C) 67 67 20 95 %       Physical Exam  Constitutional:  Appears well-developed and well-nourished. Alert. Conversant. Non toxic.  HENT:   Head: Atraumatic.   Nose: Nose normal.  Mouth/Throat: Oral mucosa is clear and moist. no trismus. Pharynx normal. Tonsils symmetric. No tonsillar enlargement, erythema, or exudate.  Eyes: Conjunctivae normal. EOM normal. Pupils equal, round, and reactive to light. No scleral icterus.   Neck: Normal range of motion. Neck supple. No tracheal deviation present.   Cardiovascular: Normal rate, regular rhythm. No gallop. No friction rub. Soft systolic murmur heard. Symmetric radial artery pulses . Good thrill in fistula L forearm  Pulmonary/Chest: Effort normal. No stridor. No respiratory distress. No wheezes. Scant bibasilar rales and bilaterally diminished. No rhonchi . No tenderness.   Abdominal: Soft. Bowel sounds normal. No distension. No mass. No tenderness. No rebound. No guarding.   Musculoskeletal: Normal range of motion. No edema. No tenderness. No deformity   Lymph: No cervical adenopathy.   Neurological: Alert and oriented to person, place, and  time. Normal strength. CN II-VII intact. No sensory deficit. GCS eye subscore is 4. GCS verbal subscore is 5. GCS motor subscore is 6. Normal coordination   Skin: Skin is warm and dry. No rash noted. No pallor. Normal capillary refill.  Psychiatric:  Normal mood. Normal affect.       Emergency Department Course     ECG @ 2156  Indication: Shortness of Breath  Rate 67 bpm.   KY interval 200 ms.   QRS duration 98 ms.   QT/QTc 406/429 ms.   P-R-T axes 55.  Notes: Normal sinus rhythm.  Minimal voltage criteria for LVH, may be normal variant.  New peaked T-waves in V3-V6 compared to ECG dated 1/24/15.  Time read 2314    Imaging:  Radiographic findings were communicated with the patient who voiced understanding of the findings.    Chest XR per radiology:   IMPRESSION: No acute abnormality.    Laboratory:  CBC: WBC 2.9 (L) HGB 8.1 (L)  (L)   BMP: Cr 13.30 (H) Glucose 107 (H) BUN 89 (H) GFR 4 (L) Rest WNL  BNP: 27890 (H)  Phosphorus: 8.0 (H)   2245: Troponin I: <0.015 (WNL)    Interventions:  0023 Clonidine, 0.3 mg, PO  0053 Valium, 10 mg, PO  0143 Amoxicillin, 1000 mg, PO    ED Course:  Nursing notes and past medical history reviewed.   I performed a physical examination of the patient as documented above.  I explained the plan with the patient who consents to this.   The patient underwent the workup as described above.   0015 Discussed the patient with Dr. Renteria from Nephrology.  0029 Recheck and update.   0035 Discussed the patient with Dr. Pastrana from the Hospitalist Service here in the ED.  I personally reviewed the laboratory and imaging results with the Patient and answered all related questions prior to admission.   Findings and plan explained to the Patient who consents to admission. Discussed the patient with Dr. Pastrana, who will admit the patient for further monitoring, evaluation, and treatment with dialysis in the morning.      Impression & Plan      Medical Decision Making:  Jelly Dietz is a 32  year old female with IgA nephropathy and ESRD.  She normally dialyzes Tuesday, Thursday, and Saturday and had her last scheduled run on dialysis on Saturday.  She missed her run on dialysis yesterday, Tuesday 1/24, because of family problems and lack of supervision for her children.  She began to feel short of breath this evening and came to the ED on the advice of her phone triage nurse to get dialyzed through the ED.  At this point, although she feels short of breath, she is not hypoxic.  Her chest XR is negative for pulmonary edema.  Her potassium level is normal at 5.3.  Clearly her creatinine level is elevated as is her BNP, likely reflecting her ESRD and need for routine dialysis.  I discussed the patient with Dr. Renteria from Nephrology.  He does not identify an indication for emergent dialysis tonight.     She is quite hypertensive, so we treated her with extra doses of her home blood pressure medications here.  She will be admitted to the Hospitalist Service for blood pressure management and monitoring overnight with plans to dialyze in about 6-7 hours first thing this morning.       She also complained of ear pain and does have evidence for an otitis media in that right ear but no evidence for mastoiditis, otitis externa, foreign body, or perforation.  She also has a small amount of fluid but no otitis of the left ear.  We will start her on amoxicillin for the otitis and the first dose was given here in the ED.  Her antibiotics will have to be dosed as per her dialysis.       She was also complaining of body aches from sitting in our uncomfortable ED bed and requested a muscle relaxer for that, so this was given.     Diagnosis:    ICD-10-CM   1. Dyspnea, unspecified type R06.00   2. ESRD (end stage renal disease) on dialysis (H) N18.6    Z99.2   3. Secondary hypertension I15.9         Disposition:   Admit under the care of Dr. Pastrana.       YASH, Donald Geller, am serving as a scribe on 1/24/2017 at 10:37 PM  to personally document services performed by Parker Resendez MD, based on my observations and the provider's statements to me.        Parker Resendez MD  01/27/17 8925

## 2017-01-26 ENCOUNTER — TELEPHONE (OUTPATIENT)
Dept: FAMILY MEDICINE | Facility: CLINIC | Age: 32
End: 2017-01-26

## 2017-01-26 NOTE — DISCHARGE SUMMARY
[facility]    Discharge Summary  Hospitalist    Date of Admission:  1/24/2017  Date of Discharge:  1/25/2017  7:51 PM  Provider:  Dakota Bailey DO, FHM  Date of Service (when I last saw the patient): 01/25/2017    Discharge Diagnoses  1.  Shortness of breath and hypertensive urgency related to volume overload in the setting of ESRD on HD  2.  Right otitis media    Other medical issues:  Past Medical History   Diagnosis Date     S/P kidney transplant      ESKD 2/2 IgA nephropathy s/p LDKT in 12/2007 in [place]. History of 1B kidney rejection      Hypertension      resolved after transplant     Anemia      MRSA (methicillin resistant Staphylococcus aureus)      IgA nephropathy      Heart murmur      ACS (acute coronary syndrome) (H) 11/11/2014     Chest pain 11/11/2014     Patient is [facility] in the [facility] 7/16/2015     Allergic state      seasonal allergies       History of Present Illness  Jelly Dietz is an 32 year old female who presented with shortness of breath.  Please see the admission history and physical for full details.    Hospital Course  Jelly Dietz was admitted on 1/24/2017.  The following problems were addressed during her hospitalization:    The patient presented with shortness of breath.  She also had a markedly elevated blood pressure with a headache and nausea..  This was felt related to volume overload in the setting of her end stage renal disease.  She was given some additional blood pressure medication and setup for dialysis this AM.   Following dialysis her blood pressure markedly improved and she felt much better.  She was tolerating oral intake well prior to discharge.  She did not have any new focal neurologic change during her stay.   In the ER she was noted to have a right otitis media.  Initially she was given some augmentin but given her hemodialysis I changed her treatment to a somewhat easier to dose cephalosporin at  discharge.  She should follow-up with nephrology for her usual dialysis schedule after discharge.  If her ear worsens or continues to bother her following the treatment she should see her primary care provider.  Recently with the ear pain she has also had trouble sleeping.  She received some valium last PM which really helped but she felt the dose was too much for her.  She requested a small supply at discharge and I provided her with a lower PRN bedtime 2 mg dose.  She strongly desired discharge and felt much better.       Significant Results and Procedures  See below    Pending Results    Unresulted Labs Ordered in the Past 30 Days of this Admission     No orders found for last 60 day(s).          Code Status  Full Code       Primary Care Physician  ASH WARNER    Blood pressure 127/79, pulse 83, temperature 98.2  F (36.8  C), temperature source Oral, resp. rate 16, weight 53.797 kg (118 lb 9.6 oz), SpO2 98 %, not currently breastfeeding.  Heart regular with soft murmur, lungs clear, abdomen non-tender.    Discharge Disposition  Discharged to home    Consultations This Hospital Stay  NEPHROLOGY IP CONSULT    Time Spent on This Encounter  I, Dakota Bailey, personally saw the patient today and spent greater than 30 minutes discharging this patient.    Discharge Orders  No discharge procedures on file.  Discharge Medications  Current Discharge Medication List      START taking these medications    Details   cefdinir (OMNICEF) 300 MG capsule Take 1 capsule (300 mg) by mouth every other day for 10 days (take after dialysis on dialysis days)  Qty: 5 capsule, Refills: 0    Associated Diagnoses: Right otitis media, unspecified chronicity, unspecified otitis media type      diazepam (VALIUM) 2 MG tablet Take 1 tablet (2 mg) by mouth nightly as needed for anxiety or sleep  Qty: 7 tablet, Refills: 0    Associated Diagnoses: Anxiety         CONTINUE these medications which have NOT CHANGED    Details   oxyCODONE (ROXICODONE) 5  MG immediate release tablet Take 1 tablet (5 mg) by mouth every 6 hours as needed for pain  Qty: 15 tablet, Refills: 0      carvedilol (COREG) 25 MG tablet Take 1 tablet (25 mg) by mouth 2 times daily (with meals)  Qty: 60 tablet, Refills: 11    Associated Diagnoses: Benign essential hypertension; SOB (shortness of breath)      cloNIDine (CATAPRES) 0.1 MG tablet Take 1 tablet (0.1 mg) by mouth 3 times daily as needed  Qty: 60 tablet, Refills: 3    Comments: For BP >170/100  Associated Diagnoses: Benign essential hypertension; SOB (shortness of breath)      HYDROcodone-acetaminophen (NORCO) 5-325 MG per tablet Take 1 tablet by mouth every 6 hours as needed for moderate to severe pain  Qty: 7 tablet, Refills: 0      amLODIPine (NORVASC) 10 MG tablet Take 1 tablet (10 mg) by mouth daily  Qty: 30 tablet, Refills: 1    Associated Diagnoses: ESRD (end stage renal disease) on dialysis (H); Malignant essential hypertension      acetaminophen (TYLENOL) 325 MG tablet Take 1-2 tablets by mouth every 4 hours as needed.  Qty: 60 tablet, Refills: 0    Associated Diagnoses:  delivery delivered         STOP taking these medications       azithromycin (ZITHROMAX Z-HERMINIA) 250 MG tablet Comments:   Reason for Stopping:             Allergies  No Known Allergies  Data    Recent Labs  Lab 17  2245   WBC 2.9*   HGB 8.1*   HCT 24.8*      *       Recent Labs  Lab 17  0510 17  2245   NA  --  135   POTASSIUM 5.7* 5.3   CHLORIDE  --  100   CO2  --  21   ANIONGAP  --  14   GLC  --  107*   BUN  --  89*   CR  --  13.30*   GFRESTIMATED  --  3*   GFRESTBLACK  --  4*   CASIE  --  8.6   PHOS  --  8.0*     Results for orders placed or performed during the hospital encounter of 17   XR Chest 2 Views    Narrative    XR CHEST 2 VW  2017 11:28 PM      HISTORY: Dyspnea, missed dialysis.     COMPARISON: 2015.    FINDINGS: The heart is enlarged without pulmonary edema. The lungs are  clear. No pneumothorax  or pleural effusion.      Impression    IMPRESSION: No acute abnormality.    TEJ NEWTON MD

## 2017-01-26 NOTE — PLAN OF CARE
Problem: Discharge Planning  Goal: Discharge Planning (Adult, OB, Behavioral, Peds)  Outcome: Adequate for Discharge Date Met:  01/25/17  IV and tele removed. Went over new meds, original valium Rx given to pt, will take antibiotic every other day on PMs. Verbalized understanding. Calling for an UBER d/t recent IV dilaudid. Pt did have an emesis tonight after dilaudid and some food. Still wants to go home d/t daughter with tube feed and mother unable to care for her. Refused WC out. Sending urine sample tonight.    OBSERVATION patient END time: 1925

## 2017-01-27 NOTE — TELEPHONE ENCOUNTER
ED / Discharge Outreach Protocol    Patient Contact    Attempt # 1    Was call answered?  No.  Left message on voicemail with information to call me back.    Leonardo Stephen RN

## 2017-01-31 NOTE — TELEPHONE ENCOUNTER
Telephone call for IP outreach. Unable to leave message, phone not in service.    Salome Aguilar RN  [facility]

## 2017-02-03 ENCOUNTER — TELEPHONE (OUTPATIENT)
Dept: TRANSPLANT | Facility: CLINIC | Age: 32
End: 2017-02-03

## 2017-02-10 ENCOUNTER — DOCUMENTATION ONLY (OUTPATIENT)
Dept: TRANSPLANT | Facility: CLINIC | Age: 32
End: 2017-02-10

## 2017-02-17 NOTE — TELEPHONE ENCOUNTER
"Intake Progress Note  Organ:  kidney  Referral Came Via (Fax from/phone call from):  self    Referring Physician:  Kojo    Assigned Coordinator:  Jeanette Valdovinos    Reported Diagnosis that caused the kidney failure ( not CKD)  Graft failure    Best time patient can be reached:  Early afternoon    Type of packet sent:  kidney    Records:  "[employer]" requested from: [facility], [facility]     Insurance information:  Current in EPIC    History of diabetes:  No            History of a kidney biopsy:  Yes         If Yes,when and where:  [facility]    Past Medical and Surgical History (updated in Epic medical / surgical):             History of  cancer personally:  No,                              History of cardiac events:  Yes              When and where was it was it treated? [facility]             History abdominal surgeries: Yes What type? Kidney transplant and 2               Where and when? 15 months ago, [facility]              History of previous transplant: Yes             If Yes where and estimated date:   in [place], but has had post transplant care here             Listed or in eval at another transplant center?  No             History of hospitalization in last 12 months:  Yes               If Yes:  [facility]                History of blood transfusion:  Yes               Smoking history:  No     Current smoker:  No        On dialysis: Yes  Where: [facility]                 Type of Dialysis: hemo   Start date: 2012       Dialysis Days:  [facility] @ Mid day       If NYOD, estimated GFR:  4  Height:  66\"  Weight:  110lbs  BMI:  18    Health Maintenance:  PAP:  2 years ago  Mammo:  never  Colon:  never    Dental: 4 months ago, [facility]  Vaccines up to date  Special Needs (ie wheelchair, assistance, guardian, interpretor):  no      Informed patient on the need to arrange age appropriate cancer screening, vaccines up to date and dental clearance    Reviewed evaluation process and reminded patient to " complete questionnaire, complete medical records release, and review packet prior to evaluation visit     Informed patient that coordinator will review their chart and insurance coverage and if no concerns they will receive a call from a  to schedule evaluation

## 2017-02-17 NOTE — TELEPHONE ENCOUNTER
Reviewed patient's medical records to date in EPIC. 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 On HD since 11/30/2012. Had received a kidney transplant in 2007 in [place]. Has transplant nephrectomy in 3/2013. History also includes HTN, anemia, ACS with flash pulmonary edema. Has received blood transfusions. Past surgeries also include 2 Csections. Did have severe preeclampia and child was born prematurely. She was listed her and then delisted because of inability to manage the appointments and commitments with transplant and the needs of her premature child. She states her life is now settling down and she would like to be reconsidered for transplant. I did check with her dialysis unit and she still has a few issues of not staying the entire run because of her child but she is making a huge effort and they are seeing improvements. Jelly says she is trying really hard and does faithfully take her medications. She is making an appointment for a physical next week at [facility] and will get a PAP done. Dental is UTD. Has not seen dermatology for a number of years but she is no longer on immunosuppressants. She did admit to smoking hooka so will get pft's. All records acceptable to proceed with pre kidney evaluation.    Reviewed the 2 day evaluation process. Asking to come in fasting for labs and would be allowed to eat once labs are done. Encouraged to bring food or money to purchase. Reviewed the list of providers she will see and their roles. Encouraged her to bring someone with her. Next contact will be from scheduling and she has the phone number. She also has my contact information.    Smart set orders placed in [facility] and routed to scheduling.

## 2017-02-21 ENCOUNTER — MEDICAL CORRESPONDENCE (OUTPATIENT)
Dept: TRANSPLANT | Facility: CLINIC | Age: 32
End: 2017-02-21

## 2017-02-27 ENCOUNTER — MEDICAL CORRESPONDENCE (OUTPATIENT)
Dept: TRANSPLANT | Facility: CLINIC | Age: 32
End: 2017-02-27

## 2017-03-08 ENCOUNTER — ALLIED HEALTH/NURSE VISIT (OUTPATIENT)
Dept: TRANSPLANT | Facility: CLINIC | Age: 32
End: 2017-03-08
Attending: INTERNAL MEDICINE
Payer: MEDICARE

## 2017-03-08 ENCOUNTER — ALLIED HEALTH/NURSE VISIT (OUTPATIENT)
Dept: TRANSPLANT | Facility: CLINIC | Age: 32
End: 2017-03-08
Attending: DIETITIAN, REGISTERED
Payer: MEDICARE

## 2017-03-08 ENCOUNTER — RESULTS ONLY (OUTPATIENT)
Dept: OTHER | Facility: CLINIC | Age: 32
End: 2017-03-08

## 2017-03-08 ENCOUNTER — RADIANT APPOINTMENT (OUTPATIENT)
Dept: CARDIOLOGY | Facility: CLINIC | Age: 32
End: 2017-03-08
Attending: PHYSICIAN ASSISTANT

## 2017-03-08 VITALS
TEMPERATURE: 98.8 F | HEART RATE: 74 BPM | SYSTOLIC BLOOD PRESSURE: 180 MMHG | HEIGHT: 67 IN | DIASTOLIC BLOOD PRESSURE: 130 MMHG | BODY MASS INDEX: 17.42 KG/M2 | WEIGHT: 111 LBS | OXYGEN SATURATION: 99 %

## 2017-03-08 VITALS
BODY MASS INDEX: 17.42 KG/M2 | DIASTOLIC BLOOD PRESSURE: 130 MMHG | SYSTOLIC BLOOD PRESSURE: 180 MMHG | HEART RATE: 74 BPM | WEIGHT: 111 LBS | OXYGEN SATURATION: 99 % | TEMPERATURE: 98.8 F | HEIGHT: 67 IN

## 2017-03-08 DIAGNOSIS — Z76.82 ORGAN TRANSPLANT CANDIDATE: Primary | ICD-10-CM

## 2017-03-08 DIAGNOSIS — N18.6 END STAGE RENAL DISEASE (H): ICD-10-CM

## 2017-03-08 DIAGNOSIS — Z76.82 ORGAN TRANSPLANT CANDIDATE: ICD-10-CM

## 2017-03-08 DIAGNOSIS — N02.B9 IGA NEPHROPATHY: ICD-10-CM

## 2017-03-08 DIAGNOSIS — Z01.818 PRE-TRANSPLANT EVALUATION FOR KIDNEY TRANSPLANT: Primary | ICD-10-CM

## 2017-03-08 DIAGNOSIS — T86.91 TRANSPLANT FAILURE DUE TO REJECTION: ICD-10-CM

## 2017-03-08 DIAGNOSIS — I25.10 CARDIOVASCULAR DISEASE: ICD-10-CM

## 2017-03-08 DIAGNOSIS — N18.6 ESRD (END STAGE RENAL DISEASE) (H): Primary | ICD-10-CM

## 2017-03-08 DIAGNOSIS — Z87.891 HISTORY OF TOBACCO USE: ICD-10-CM

## 2017-03-08 DIAGNOSIS — T86.11 CHRONIC RENAL TRANSPLANT REJECTION: ICD-10-CM

## 2017-03-08 DIAGNOSIS — I10 ESSENTIAL HYPERTENSION: ICD-10-CM

## 2017-03-08 DIAGNOSIS — Z76.82 AWAITING ORGAN TRANSPLANT: Primary | ICD-10-CM

## 2017-03-08 DIAGNOSIS — Z94.0 S/P KIDNEY TRANSPLANT: Primary | ICD-10-CM

## 2017-03-08 DIAGNOSIS — N18.6 END STAGE KIDNEY DISEASE (H): ICD-10-CM

## 2017-03-08 DIAGNOSIS — Z76.82 AWAITING ORGAN TRANSPLANT STATUS: Primary | ICD-10-CM

## 2017-03-08 LAB
ABO + RH BLD: NORMAL
ALBUMIN SERPL-MCNC: 4.1 G/DL (ref 3.4–5)
ALBUMIN UR-MCNC: 100 MG/DL
ALP SERPL-CCNC: 43 U/L (ref 40–150)
ALT SERPL W P-5'-P-CCNC: 12 U/L (ref 0–50)
ANION GAP SERPL CALCULATED.3IONS-SCNC: 12 MMOL/L (ref 3–14)
APPEARANCE UR: ABNORMAL
APTT PPP: 30 SEC (ref 22–37)
AST SERPL W P-5'-P-CCNC: 14 U/L (ref 0–45)
BACTERIA #/AREA URNS HPF: ABNORMAL /HPF
BASOPHILS # BLD AUTO: 0.1 10E9/L (ref 0–0.2)
BASOPHILS NFR BLD AUTO: 1.2 %
BILIRUB SERPL-MCNC: 0.7 MG/DL (ref 0.2–1.3)
BILIRUB UR QL STRIP: NEGATIVE
BLD GP AB SCN SERPL QL: NORMAL
BLD GP AB SCN TITR SERPL: NORMAL {TITER}
BLOOD BANK CMNT PATIENT-IMP: NORMAL
BLOOD BANK CMNT PATIENT-IMP: NORMAL
BUN SERPL-MCNC: 37 MG/DL (ref 7–30)
CALCIUM SERPL-MCNC: 9.1 MG/DL (ref 8.5–10.1)
CARDIOLIPIN ANTIBODY IGG: NORMAL GPL-U/ML (ref 0–19.9)
CARDIOLIPIN ANTIBODY IGM: NORMAL MPL-U/ML (ref 0–19.9)
CHLORIDE SERPL-SCNC: 99 MMOL/L (ref 94–109)
CHOLEST SERPL-MCNC: 152 MG/DL
CMV IGG SERPL QL IA: ABNORMAL AI (ref 0–0.8)
CO2 SERPL-SCNC: 26 MMOL/L (ref 20–32)
COLOR UR AUTO: ABNORMAL
CREAT SERPL-MCNC: 7.59 MG/DL (ref 0.52–1.04)
DIFFERENTIAL METHOD BLD: ABNORMAL
EBV VCA IGG SER QL IA: ABNORMAL AI (ref 0–0.8)
EOSINOPHIL # BLD AUTO: 0.2 10E9/L (ref 0–0.7)
EOSINOPHIL NFR BLD AUTO: 3 %
ERYTHROCYTE [DISTWIDTH] IN BLOOD BY AUTOMATED COUNT: 14.3 % (ref 10–15)
GFR SERPL CREATININE-BSD FRML MDRD: 6 ML/MIN/1.7M2
GLUCOSE SERPL-MCNC: 89 MG/DL (ref 70–99)
GLUCOSE UR STRIP-MCNC: 250 MG/DL
HBV CORE AB SERPL QL IA: NONREACTIVE
HBV SURFACE AB SERPL IA-ACNC: ABNORMAL M[IU]/ML
HBV SURFACE AG SERPL QL IA: NONREACTIVE
HCG SERPL QL: NEGATIVE
HCT VFR BLD AUTO: 36 % (ref 35–47)
HCV AB SERPL QL IA: NORMAL
HDLC SERPL-MCNC: 72 MG/DL
HGB BLD-MCNC: 11.7 G/DL (ref 11.7–15.7)
HGB UR QL STRIP: ABNORMAL
HIV 1+2 AB+HIV1 P24 AG SERPL QL IA: NORMAL
IMM GRANULOCYTES # BLD: 0 10E9/L (ref 0–0.4)
IMM GRANULOCYTES NFR BLD: 0.6 %
INR PPP: 1.2 (ref 0.86–1.14)
KETONES UR STRIP-MCNC: NEGATIVE MG/DL
LDLC SERPL CALC-MCNC: 71 MG/DL
LEUKOCYTE ESTERASE UR QL STRIP: ABNORMAL
LYMPHOCYTES # BLD AUTO: 0.8 10E9/L (ref 0.8–5.3)
LYMPHOCYTES NFR BLD AUTO: 16.9 %
MCH RBC QN AUTO: 33 PG (ref 26.5–33)
MCHC RBC AUTO-ENTMCNC: 32.5 G/DL (ref 31.5–36.5)
MCV RBC AUTO: 101 FL (ref 78–100)
MONOCYTES # BLD AUTO: 0.3 10E9/L (ref 0–1.3)
MONOCYTES NFR BLD AUTO: 5.8 %
NEUTROPHILS # BLD AUTO: 3.6 10E9/L (ref 1.6–8.3)
NEUTROPHILS NFR BLD AUTO: 72.5 %
NITRATE UR QL: NEGATIVE
NONHDLC SERPL-MCNC: 80 MG/DL
NRBC # BLD AUTO: 0 10*3/UL
NRBC BLD AUTO-RTO: 0 /100
PH UR STRIP: 8.5 PH (ref 5–7)
PLATELET # BLD AUTO: 218 10E9/L (ref 150–450)
POTASSIUM SERPL-SCNC: 4.2 MMOL/L (ref 3.4–5.3)
PROT SERPL-MCNC: 7.4 G/DL (ref 6.8–8.8)
RBC # BLD AUTO: 3.55 10E12/L (ref 3.8–5.2)
RBC #/AREA URNS AUTO: 3 /HPF (ref 0–2)
SODIUM SERPL-SCNC: 137 MMOL/L (ref 133–144)
SP GR UR STRIP: 1.01 (ref 1–1.03)
SPECIMEN EXP DATE BLD: NORMAL
SPECIMEN EXP DATE BLD: NORMAL
SQUAMOUS #/AREA URNS AUTO: >50 /HPF (ref 0–1)
T PALLIDUM IGG+IGM SER QL: NEGATIVE
THROMBIN TIME: 17.2 SEC (ref 13–19)
TRIGL SERPL-MCNC: 48 MG/DL
URN SPEC COLLECT METH UR: ABNORMAL
UROBILINOGEN UR STRIP-MCNC: 0.2 MG/DL (ref 0–2)
VZV IGG SER QL IA: 2.4 AI (ref 0–0.8)
WBC # BLD AUTO: 5 10E9/L (ref 4–11)
WBC #/AREA URNS AUTO: 4 /HPF (ref 0–2)

## 2017-03-08 PROCEDURE — 40000866 ZZHCL STATISTIC HIV 1/2 ANTIGEN/ANTIBODY PRETRANSPLANT ONLY: Performed by: PHYSICIAN ASSISTANT

## 2017-03-08 PROCEDURE — 80053 COMPREHEN METABOLIC PANEL: CPT | Performed by: PHYSICIAN ASSISTANT

## 2017-03-08 PROCEDURE — 86803 HEPATITIS C AB TEST: CPT | Performed by: PHYSICIAN ASSISTANT

## 2017-03-08 PROCEDURE — 86704 HEP B CORE ANTIBODY TOTAL: CPT | Performed by: PHYSICIAN ASSISTANT

## 2017-03-08 PROCEDURE — 84703 CHORIONIC GONADOTROPIN ASSAY: CPT | Performed by: PHYSICIAN ASSISTANT

## 2017-03-08 PROCEDURE — 86147 CARDIOLIPIN ANTIBODY EA IG: CPT | Performed by: PHYSICIAN ASSISTANT

## 2017-03-08 PROCEDURE — 85730 THROMBOPLASTIN TIME PARTIAL: CPT | Mod: 91 | Performed by: PHYSICIAN ASSISTANT

## 2017-03-08 PROCEDURE — 85730 THROMBOPLASTIN TIME PARTIAL: CPT | Performed by: PHYSICIAN ASSISTANT

## 2017-03-08 PROCEDURE — 86706 HEP B SURFACE ANTIBODY: CPT | Performed by: PHYSICIAN ASSISTANT

## 2017-03-08 PROCEDURE — 86832 HLA CLASS I HIGH DEFIN QUAL: CPT | Performed by: PHYSICIAN ASSISTANT

## 2017-03-08 PROCEDURE — 86665 EPSTEIN-BARR CAPSID VCA: CPT | Performed by: PHYSICIAN ASSISTANT

## 2017-03-08 PROCEDURE — 85613 RUSSELL VIPER VENOM DILUTED: CPT | Performed by: PHYSICIAN ASSISTANT

## 2017-03-08 PROCEDURE — 86644 CMV ANTIBODY: CPT | Performed by: PHYSICIAN ASSISTANT

## 2017-03-08 PROCEDURE — 86787 VARICELLA-ZOSTER ANTIBODY: CPT | Performed by: PHYSICIAN ASSISTANT

## 2017-03-08 PROCEDURE — 86905 BLOOD TYPING RBC ANTIGENS: CPT | Performed by: PHYSICIAN ASSISTANT

## 2017-03-08 PROCEDURE — 81001 URINALYSIS AUTO W/SCOPE: CPT | Performed by: PHYSICIAN ASSISTANT

## 2017-03-08 PROCEDURE — 87799 DETECT AGENT NOS DNA QUANT: CPT | Performed by: PHYSICIAN ASSISTANT

## 2017-03-08 PROCEDURE — 86780 TREPONEMA PALLIDUM: CPT | Performed by: PHYSICIAN ASSISTANT

## 2017-03-08 PROCEDURE — 85025 COMPLETE CBC W/AUTO DIFF WBC: CPT | Performed by: PHYSICIAN ASSISTANT

## 2017-03-08 PROCEDURE — 87340 HEPATITIS B SURFACE AG IA: CPT | Performed by: PHYSICIAN ASSISTANT

## 2017-03-08 PROCEDURE — 80061 LIPID PANEL: CPT | Performed by: PHYSICIAN ASSISTANT

## 2017-03-08 PROCEDURE — 86901 BLOOD TYPING SEROLOGIC RH(D): CPT | Performed by: PHYSICIAN ASSISTANT

## 2017-03-08 PROCEDURE — 86833 HLA CLASS II HIGH DEFIN QUAL: CPT | Performed by: PHYSICIAN ASSISTANT

## 2017-03-08 PROCEDURE — 36415 COLL VENOUS BLD VENIPUNCTURE: CPT | Performed by: PHYSICIAN ASSISTANT

## 2017-03-08 PROCEDURE — 86480 TB TEST CELL IMMUN MEASURE: CPT | Performed by: PHYSICIAN ASSISTANT

## 2017-03-08 PROCEDURE — 86886 COOMBS TEST INDIRECT TITER: CPT | Performed by: PHYSICIAN ASSISTANT

## 2017-03-08 PROCEDURE — 85670 THROMBIN TIME PLASMA: CPT | Performed by: PHYSICIAN ASSISTANT

## 2017-03-08 PROCEDURE — 86900 BLOOD TYPING SEROLOGIC ABO: CPT | Performed by: PHYSICIAN ASSISTANT

## 2017-03-08 PROCEDURE — 86850 RBC ANTIBODY SCREEN: CPT | Performed by: PHYSICIAN ASSISTANT

## 2017-03-08 PROCEDURE — 85610 PROTHROMBIN TIME: CPT | Performed by: PHYSICIAN ASSISTANT

## 2017-03-08 RX ORDER — CALCIUM CARBONATE 500 MG/1
1 TABLET, CHEWABLE ORAL DAILY
COMMUNITY
End: 2017-04-30

## 2017-03-08 ASSESSMENT — PAIN SCALES - GENERAL
PAINLEVEL: NO PAIN (0)
PAINLEVEL: NO PAIN (0)

## 2017-03-08 NOTE — LETTER
"3/8/2017       RE: Jelly Dietz  [address]    [facility] [address]     Dear Colleague,    Thank you for referring your patient, Jelly Dietz, to the [facility] SOLID ORGAN TRANSPLANT at [facility]. Please see a copy of my visit note below.             RE: Jelly Dietz    [facility]# [id number]        I saw your patient, Jelly Dietz, in consultation in our pretransplant clinic.  She was at clinic to discuss care for her end-stage renal disease. Prior to clinic, she attended our pretransplant class.  Of note, she had a previous transplant that was doing well until a critical episode during her first delivery.      We talked about the differences in immunosuppression since her first transplant. We also talked about the fact that she would be on prednisone-free immunosuppression (her first transplant was done in [place] and she was treated with high-dose steroids and remembers the \"moon facies\").     We talked about the pros and cons of transplantation vs. dialysis.  We discussed the fact that it was important that she think about the pros and cons of each treatment option and make an active decision.  We also discussed the fact that the two were interconnected and she may need to go on dialysis before transplant (if she chose to have a transplant) and that if the transplant failed, she might need dialysis before another transplant.       We also discussed the fact that if she chose to have a transplant, she would need to decide between going on the wait list for a  donor transplant vs. having a living donor transplant.  We talked about the pros and cons of each option.  Although I didn't express an opinion regarding transplantation or dialysis, I suggested that if she chose to have a transplant, a living donor transplant would be preferable in that the surgery is the same, the immunosuppressive drugs and the risks are the same, but the transplant " could be done sooner and the results are better.  I told her that the wait for  donor kidney was approximately five years for patients who are newly put on the waiting list.  I also discussed the new ( donor) kidney (KDPI) scoring system with her. In addition, we talked about the fact that the disadvantage of a living donor transplant was the risk to the donor.       Because she was interested in living donation, we spent some time discussing donor risks, including the risks of mortality, morbidity, and long-term risks with a single kidney. She says she has a brother who is potentially interested in donating. I provided her with our donor video to share with her family members.      I attempted to answer any remaining questions.  I also told her that should she have any questions, she should feel free to contact us.  We would be glad to answer any questions either over the phone or at another clinic visit.  Her transplant coordinator is Jeanette Valdovinos and may be reached at [phone].  Thank you for the opportunity to see her.     I spent 30 minutes with this patient.  Over 90% of that time was spent in counseling and coordination of care.             Yours truly,               Leonid Younger MD         Professor of Surgery         ([phone])    INGA/

## 2017-03-08 NOTE — LETTER
3/8/2017       RE: Jelly Dietz  [address]    [facility] [address]     Dear Colleague,    Thank you for referring your patient, Jelly Dietz, to the [facility] SOLID ORGAN TRANSPLANT at [facility]. Please see a copy of my visit note below.    Patient attended the Pre Kidney/Pancreas Teaching Class today alone. Patient very attentive and engaged throughout the class and asked few questions. I introduced the [facility] website and encouraged them to access it for additional education.  In addition to the stard video content, I reviewed living donation, paired exchange, KDPI, typical length of stay and the need to stay locally post transplant discharge.    Again, thank you for allowing me to participate in the care of your patient.      Sincerely,    Transplant Class

## 2017-03-08 NOTE — MR AVS SNAPSHOT
After Visit Summary   3/8/2017    Jelly Dietz    MRN: [id number]           Patient Information     Date Of Birth          1985        Visit Information        Provider Department      3/8/2017 10:30 AM Yasemin Valdovinos RN [facility] Solid Organ Transplant        Today's Diagnoses     Awaiting organ transplant    -  1       Follow-ups after your visit        Your next 10 appointments already scheduled     Mar 08, 2017  1:45 PM CST   Lab with  LAB   [facility] Lab ([facility])    [address] [place] [address] [phone]            Mar 08, 2017  2:00 PM CST   FULL PULMONARY FUNCTION with  PFL D   [facility] Pulmonary Function Testing ([facility])    [address] [place] [address] [phone]            Mar 08, 2017  3:00 PM CST   (Arrive by 2:45 PM)   XR CHEST 2 VIEWS with XR1   [facility] Xray ([facility])    [address] [place] [address] [phone]           Please bring a list of your current medicines to your exam. (Include vitamins, minerals and over-thecounter medicines.) Leave your valuables at home.  Tell your doctor if there is a chance you may be pregnant.  You do not need to do anything special for this exam.            Mar 08, 2017  3:30 PM CST   Ech Complete with ECHCR1    [facility] ([facility])    [address] [place] [address] [phone]           1.  Please bring or wear a comfortable two-piece outfit. 2.  You may eat, drink and take your normal medicines. 3.  For any questions that cannot be answered, please contact the ordering physician            Mar 08, 2017  4:30 PM CST   (Arrive by 4:15 PM)   ecg with  CV EKG   [facility] Heart Care ([facility])    [address] [place] [address]  [address]                "May 31, 2017 10:00 AM CDT   (Arrive by 9:45 AM)   NEW PANCREAS/KIDNEY TRANSPLANT WORK-UP with Ashutosh Deras MD   [facility] ([facility])    [address] [address] [phone]              Who to contact     If you have questions or need follow up information about today's clinic visit or your schedule please contact [facility] SOLID ORGAN TRANSPLANT directly at [phone].  Normal or non-critical lab and imaging results will be communicated to you by MyChart, letter or phone within 4 business days after the clinic has received the results. If you do not hear from us within 7 days, please contact the clinic through Atlas5Dhart or phone. If you have a critical or abnormal lab result, we will notify you by phone as soon as possible.  Submit refill requests through 3[employer] Planet or call your pharmacy and they will forward the refill request to us. Please allow 3 business days for your refill to be completed.          Additional Information About Your Visit        3rd Planet Information     3rd Planet lets you send messages to your doctor, view your test results, renew your prescriptions, schedule appointments and more. To sign up, go to [email or web address] . Click on \"Log in\" on the left side of the screen, which will take you to the Welcome page. Then click on \"Sign up Now\" on the right side of the page.     You will be asked to enter the access code listed below, as well as some personal information. Please follow the directions to create your username and password.     Your access code is: [email or web address]  Expires: 2017  6:30 AM     Your access code will  in 90 days. If you need help or a new code, please call your [place] clinic or [phone].        Care EveryWhere ID     This is your Care EveryWhere ID. This could be used by other organizations to access your [place] medical records  [id number]         Blood Pressure from Last 3 " Encounters:   03/08/17 (!) 180/130   03/08/17 (!) 180/130   01/25/17 127/79    Weight from Last 3 Encounters:   03/08/17 50.3 kg (111 lb)   03/08/17 50.3 kg (111 lb)   01/25/17 53.8 kg (118 lb 9.6 oz)              Today, you had the following     No orders found for display       Primary Care Provider Office Phone # Fax #    Sailaja Oh [phone]       [facility] [address] [place] [address]        Thank you!     Thank you for choosing [facility] SOLID ORGAN TRANSPLANT  for your care. Our goal is always to provide you with excellent care. Hearing back from our patients is one way we can continue to improve our services. Please take a few minutes to complete the written survey that you may receive in the mail after your visit with us. Thank you!             Your Updated Medication List - Protect others around you: Learn how to safely use, store and throw away your medicines at www.disposemymeds.org.          This list is accurate as of: 3/8/17 12:09 PM.  Always use your most recent med list.                   Brand Name Dispense Instructions for use    acetaminophen 325 MG tablet    TYLENOL    60 tablet    Take 1-2 tablets by mouth every 4 hours as needed.       amLODIPine 10 MG tablet    NORVASC    30 tablet    Take 1 tablet (10 mg) by mouth daily       calcium carbonate 500 MG chewable tablet    TUMS     Take 1 chew tab by mouth daily       carvedilol 25 MG tablet    COREG    60 tablet    Take 1 tablet (25 mg) by mouth 2 times daily (with meals)       cloNIDine 0.1 MG tablet    CATAPRES    60 tablet    Take 1 tablet (0.1 mg) by mouth 3 times daily as needed       diazepam 2 MG tablet    VALIUM    7 tablet    Take 1 tablet (2 mg) by mouth nightly as needed for anxiety or sleep       HYDROcodone-acetaminophen 5-325 MG per tablet    NORCO    7 tablet    Take 1 tablet by mouth every 6 hours as needed for moderate to severe pain       oxyCODONE 5 MG IR tablet    ROXICODONE    15 tablet     Take 1 tablet (5 mg) by mouth every 6 hours as needed for pain

## 2017-03-08 NOTE — MR AVS SNAPSHOT
After Visit Summary   3/8/2017    Jelly Dietz    MRN: [id number]           Patient Information     Date Of Birth          1985        Visit Information        Provider Department      3/8/2017 12:00 PM Nathalia Roland RD [facility] Solid Organ Transplant        Today's Diagnoses     Organ transplant candidate    -  1       Follow-ups after your visit        Your next 10 appointments already scheduled     Mar 08, 2017 12:00 PM CST   NUTRITION VISIT with Nathalia Roland RD   [facility] Solid Organ Transplant ([facility])    [address] [place] [id number] [phone]            Mar 08, 2017  1:45 PM CST   Lab with  LAB   [facility] ([facility])    [address] [place] [id number] [phone]            Mar 08, 2017  2:00 PM CST   FULL PULMONARY FUNCTION with  PFL D   [facility] Pulmonary Function Testing ([facility])    [address] [place] [id number] [phone]            Mar 08, 2017  3:00 PM CST   (Arrive by 2:45 PM)   XR CHEST 2 VIEWS with XR1   [facility] Xray ([facility])    [address] [place] [id number] [phone]           Please bring a list of your current medicines to your exam. (Include vitamins, minerals and over-thecounter medicines.) Leave your valuables at home.  Tell your doctor if there is a chance you may be pregnant.  You do not need to do anything special for this exam.            Mar 08, 2017  3:30 PM CST   Ech Complete with ECHCR1    [facility] ([facility])    [address] [place] [id number] [phone]           1.  Please bring or wear a comfortable two-piece outfit. 2.  You may eat, drink and take your normal medicines. 3.  For any questions that cannot be answered,  "please contact the ordering physician            Mar 08, 2017  4:30 PM CST   (Arrive by 4:15 PM)   ecg with  CV EKG   [facility] ([facility])    [address]  [address] [facility]  [address]               May 31, 2017 10:00 AM CDT   (Arrive by 9:45 AM)   NEW PANCREAS/KIDNEY TRANSPLANT WORK-UP with Ashutosh Deras MD   [facility] ([facility])    [address]  3rd [address]  [facility] [address] [phone]              Who to contact     If you have questions or need follow up information about today's clinic visit or your schedule please contact [facility] SOLID ORGAN TRANSPLANT directly at [phone].  Normal or non-critical lab and imaging results will be communicated to you by Smartpics Mediahart, letter or phone within 4 business days after the clinic has received the results. If you do not hear from us within 7 days, please contact the clinic through Smartp[employer] Mediahart or phone. If you have a critical or abnormal lab result, we will notify you by phone as soon as possible.  Submit refill requests through [employer] or call your pharmacy and they will forward the refill request to us. Please allow 3 business days for your refill to be completed.          Additional Information About Your Visit        [employer] Information     [employer] lets you send messages to your doctor, view your test results, renew your prescriptions, schedule appointments and more. To sign up, go to [email or web address] . Click on \"Log in\" on the left side of the screen, which will take you to the Welcome page. Then click on \"Sign up Now\" on the right side of the page.     You will be asked to enter the access code listed below, as well as some personal information. Please follow the directions to create your username and password.     Your access code is: [email or web address]  Expires: 2017  6:30 AM     Your access code will  in 90 days. If you need help or a new code, please call " your [place] clinic or [phone].        Care EveryWhere ID     This is your Care EveryWhere ID. This could be used by other organizations to access your [place] medical records  [id number]         Blood Pressure from Last 3 Encounters:   03/08/17 (!) 180/130   03/08/17 (!) 180/130   01/25/17 127/79    Weight from Last 3 Encounters:   03/08/17 50.3 kg (111 lb)   03/08/17 50.3 kg (111 lb)   01/25/17 53.8 kg (118 lb 9.6 oz)              Today, you had the following     No orders found for display       Primary Care Provider Office Phone # Fax #    Sailaja Oh [phone]       [facility] [address] [place] [address]        Thank you!     Thank you for choosing [facility] SOLID ORGAN TRANSPLANT  for your care. Our goal is always to provide you with excellent care. Hearing back from our patients is one way we can continue to improve our services. Please take a few minutes to complete the written survey that you may receive in the mail after your visit with us. Thank you!             Your Updated Medication List - Protect others around you: Learn how to safely use, store and throw away your medicines at www.disposemymeds.org.          This list is accurate as of: 3/8/17 11:31 AM.  Always use your most recent med list.                   Brand Name Dispense Instructions for use    acetaminophen 325 MG tablet    TYLENOL    60 tablet    Take 1-2 tablets by mouth every 4 hours as needed.       amLODIPine 10 MG tablet    NORVASC    30 tablet    Take 1 tablet (10 mg) by mouth daily       calcium carbonate 500 MG chewable tablet    TUMS     Take 1 chew tab by mouth daily       carvedilol 25 MG tablet    COREG    60 tablet    Take 1 tablet (25 mg) by mouth 2 times daily (with meals)       cloNIDine 0.1 MG tablet    CATAPRES    60 tablet    Take 1 tablet (0.1 mg) by mouth 3 times daily as needed       diazepam 2 MG tablet    VALIUM    7 tablet    Take 1 tablet (2 mg) by mouth nightly as needed  for anxiety or sleep       HYDROcodone-acetaminophen 5-325 MG per tablet    NORCO    7 tablet    Take 1 tablet by mouth every 6 hours as needed for moderate to severe pain       oxyCODONE 5 MG IR tablet    ROXICODONE    15 tablet    Take 1 tablet (5 mg) by mouth every 6 hours as needed for pain

## 2017-03-08 NOTE — MR AVS SNAPSHOT
"              After Visit Summary   3/8/2017    Jelly Dietz    MRN: [id number]           Patient Information     Date Of Birth          1985        Visit Information        Provider Department      3/8/2017 10:00 AM [facility] PATIENT 4 [facility] Solid Organ Transplant        Today's Diagnoses     ESRD (end stage renal disease) (H)    -  1       Follow-ups after your visit        Your next 10 appointments already scheduled     May 31, 2017 10:00 AM CDT   (Arrive by 9:45 AM)   NEW PANCREAS/KIDNEY TRANSPLANT WORK-UP with Ashutosh Deras MD   [facility] ([facility])    [address] [place] [address]  [address] [facility] [address] [phone]              Who to contact     If you have questions or need follow up information about today's clinic visit or your schedule please contact [facility] SOLID ORGAN TRANSPLANT directly at [phone].  Normal or non-critical lab and imaging results will be communicated to you by AirSense Wirelesshart, letter or phone within 4 business days after the clinic has received the results. If you do not hear from us within 7 days, please contact the clinic through AirSense Wirelesshart or phone. If you have a critical or abnormal lab result, we will notify you by phone as soon as possible.  Submit refill requests through [employer] or call your pharmacy and they will forward the refill request to us. Please allow 3 business days for your refill to be completed.          Additional Information About Your Visit        MyChart Information     Ridango lets you send messages to your doctor, view your test results, renew your prescriptions, schedule appointments and more. To sign up, go to [email or web address] . Click on \"Log in\" on the left side of the screen, which will take you to the Welcome page. Then click on \"Sign up Now\" on the right side of the page.     You will be asked to enter the access code listed below, as well as some personal information. Please follow the " "directions to create your username and password.     Your access code is: [email or web address]  Expires: 2017  7:30 AM     Your access code will  in 90 days. If you need help or a new code, please call your [place] clinic or [phone].        Care EveryWhere ID     This is your Care EveryWhere ID. This could be used by other organizations to access your [place] medical records  [id number]        Your Vitals Were     Pulse Temperature Height Pulse Oximetry BMI (Body Mass Index)       74 98.8  F (37.1  C) (Oral) 1.689 m (5' 6.5\") 99% 17.65 kg/m2        Blood Pressure from Last 3 Encounters:   No data found for BP    Weight from Last 3 Encounters:   No data found for Wt              Today, you had the following     No orders found for display       Primary Care Provider Office Phone # Fax #    Sailaja Landin [phone]       [facility] [address] [place] [address]        Thank you!     Thank you for choosing [facility] SOLID ORGAN TRANSPLANT  for your care. Our goal is always to provide you with excellent care. Hearing back from our patients is one way we can continue to improve our services. Please take a few minutes to complete the written survey that you may receive in the mail after your visit with us. Thank you!             Your Updated Medication List - Protect others around you: Learn how to safely use, store and throw away your medicines at www.disposemymeds.org.          This list is accurate as of: 3/8/17 11:59 PM.  Always use your most recent med list.                   Brand Name Dispense Instructions for use    acetaminophen 325 MG tablet    TYLENOL    60 tablet    Take 1-2 tablets by mouth every 4 hours as needed.       amLODIPine 10 MG tablet    NORVASC    30 tablet    Take 1 tablet (10 mg) by mouth daily       calcium carbonate 500 MG chewable tablet    TUMS     Take 1 chew tab by mouth daily       carvedilol 25 MG tablet    COREG    60 tablet    Take 1 tablet (25 mg) " by mouth 2 times daily (with meals)       cloNIDine 0.1 MG tablet    CATAPRES    60 tablet    Take 1 tablet (0.1 mg) by mouth 3 times daily as needed       diazepam 2 MG tablet    VALIUM    7 tablet    Take 1 tablet (2 mg) by mouth nightly as needed for anxiety or sleep       HYDROcodone-acetaminophen 5-325 MG per tablet    NORCO    7 tablet    Take 1 tablet by mouth every 6 hours as needed for moderate to severe pain       oxyCODONE 5 MG IR tablet    ROXICODONE    15 tablet    Take 1 tablet (5 mg) by mouth every 6 hours as needed for pain

## 2017-03-08 NOTE — LETTER
3/8/2017     RE: Jelly Dietz  [address]    [facility] [id number]     Dear Colleague,    Thank you for referring your patient, Jelly Dietz, to the [facility] SOLID ORGAN TRANSPLANT at [facility]. Please see a copy of my visit note below.      Assessment and Plan:  1. Kidney transplant evaluation - patient is a good candidate overall. Benefits of a living donor transplant were discussed.  2. 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 After her transplant she had acute cellular rejection (1/2010) secondary to non-compliance and AMR (11/2012) with her first pregnancy in the setting of non-compliance. She required hospitalization for the last 11 weeks of pregnancy and delivered at 30 weeks in the settings of accelerated hypertension and proteinuria thought to be secondary to pre-eclampsia. A few months later she required hemodialysis (11/2012). She then had a graft nephrectomy in 3/2013 after presenting with gross hematuria. Would benefit from a kidney transplant.   3. Cardiac Risk - history of acute coronary syndrome. She was admitted in 2014 with atypical chest pain with significant hypertension. Her troponin was mildly elevated, but nuclear stress test was negative for ischemia. She is currently taking Carvedilol, Clonidine, Amlodipine for her HTN. She checks her BPs at home and they usually range 160s-190s/100s-200s. Will need cardiology evaluation due to multiple risk factors.   4. History of Non-compliance - patient states this was related to the stress of her divorce. She now has a 4 year-old and a 2 year old. She states she is dedicated to following post transplant compliance for herself and for her children. Will need 6 month compliance contract. Would appreciate social work input.   5. History of panic attacks/anxiety - patient states her anxiety is  under control. Not currently on any medications. Would appreciate social work input.   6. Anemia - Hgb stable. Receiving EPO at dialysis. Will continue to monitor.   7. Health Maintenance - last pap negative (3/9/2012), will need an up-to-date pap.     Discussed the risks and benefits of a transplant, including the risk of surgery and immunosuppression medications.  Patients overall evaluation will be discussed in the Transplant Program's regular meeting with a final recommendation on the patients suitability for transplant to be made at that time.  Patient was seen in conjunction with Mahesh Armando as part of a shared visit.      Evaluation:  Jelly Dietz was seen in consultation at the request of Dr. Leonid Younger for evaluation as a potential kidney transplant recipient.    Reason for Visit:  Jelly Dietz is a 32 year old female with ESKD from rejection of LDKT (primary disease: IgA), who presents for kidney transplant evaluation.    HPI:  Ms. 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 She was diagnosed with IgA at age 18 and required dialysis in 2007 (age 22). After her transplant she had acute cellular rejection (1/2010) secondary to non-compliance and AMR (11/2012) with her first pregnancy in the setting of non-compliance. She required hospitalization for the last 11 weeks of pregnancy and delivered at 30 weeks in the settings of accelerated hypertension and proteinuria thought to be secondary to pre-eclampsia. A few months later she required hemodialysis (11/2012). She had a graft nephrectomy in 3/2013 after presenting with gross hematuria. She had another pregnancy in 2015 and delivered early at 28 weeks in the setting of pre-eclampsia. She states her history of non-compliance was related to stress of her divorce.  She now has a 4 year-old and a 2 year old. She states she is dedicated to following post transplant compliance for  herself and for her children. She is currently being followed by Dr. Varma. Her brother is a potential donor.     Other significant history includes acute coronary syndrome. She was admitted in 2014 with atypical chest pain with significant hypertension. Her troponin was mildly elevated, but nuclear stress test was negative for ischemia. She is currently taking Carvedilol, Clonidine, Amlodipine for her HTN. She checks her BPs at home and they usually range 160s-190s/100s-200s. Her ECHO today was normal with an EF of 60-65%. Other PMH includes MRSA, bacteremia, anxiety/panic attacks, thrombocytopenia, and anemia secondary to kidney disease. She does not get regular exercise outside of taking care of her children. She states she can shop at the mall for hours without chest pain, shortness of breath or claudication symptoms. She complains of poor appetite and fatigue, but denies nausea, vomiting, and  diarrhea. She is voiding about half a cup of urine per day. She denies dsyuria, hematuria or trouble emptying her bladder. She denies fevers, chills or sweats.          Kidney Disease Hx:        Kidney Disease Dx:  rejection of LDKT (primary disease: IgA)       Biopsy Proven: Yes;          On Dialysis: Yes, Date initiated: 11/2012       Primary Nephrologist: Dr. ISAAC            Medical Hx:       h/o HTN: Yes         h/o DM:  No       h/o Protein in Urine: Yes        h/o Blood in Urine:  Yes        h/o Kidney Stones:  No       h/o UTI: No       h/o Chronic NSAID Use: No         Previous Transplant Hx:        Yes; LDKT 2007         Transplant Sensitization Hx:       Previous Tx: Yes       Blood Transfusion: Yes       Pregnancy: Yes         Uremic Symptoms:       Fatigue: Yes; Poor Appetite: Yes;         Cardiovascular Hx:       h/o Cardiac Issues: Yes; ACS       Exercise Tolerance: no chest pain or shortness of breath with exertion.         Health Maintenance:       PAP: Not up to date         Potential Donor(s):  Yes; brother    ROS:  A comprehensive review of systems was obtained and negative, except as noted in the HPI or PMH.    PMH:   Medical record was reviewed and PMH was discussed with patient and noted below.  Past Medical History   Diagnosis Date     ACS (acute coronary syndrome) (H) 2014     Allergic state      seasonal allergies     Anemia in chronic renal disease      Cervical cerclage suture present in second trimester      Chest pain 2014     Chronic renal transplant rejection      End stage kidney disease (H)      Heart murmur      History of  delivery ,      30 wks, 28 wks      Hypertension      resolved after transplant     IgA nephropathy      MRSA (methicillin resistant Staphylococcus aureus)      [facility]     Pre-eclampsia      Renal failure affecting pregnancy in second trimester      S/P kidney transplant      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 History of 1B kidney rejection      SAB (spontaneous )      2nd trimester        PSH:   Past Surgical History   Procedure Laterality Date     Transplant kidney recipient living unrelated  Dec 2007     (Adult male in [place])      section  2012     Procedure:  SECTION;;  Surgeon: Chelsea Cross MD;  Location: [facility] L[facility]     Sergio/dialysis catheter       Nephrectomy  3/29/13     Transplant nephrectomy      Explant transplanted kidney  3/29/2013     Procedure: EXPLANT TRANSPLANTED KIDNEY;  Explant Transplanted right Kidney (from patients right iliac fossa);  Surgeon: Nory Morgan MD;  Location: [facility] OR     Cesearean section       Cerclage cervical N/A 2015     Procedure: CERCLAGE CERVICAL;  Surgeon: Norbert Chopra MD;  Location: [facility]      section N/A 2015     Procedure:  SECTION;  Surgeon: Chelsea Cross MD;  Location: [facility] OR     Personal or family history of  bleeding or anesthesia problems: No    Family Hx:  Family History   Problem Relation Age of Onset     DIABETES Paternal Grandfather      Breast Cancer Maternal Grandmother 55     CANCER No family hx of      No known family hx of skin cancer       Personal Hx:   Social History     Social History     Marital status: Single     Spouse name: N/A     Number of children: N/A     Years of education: N/A     Occupational History     Not on file.     Social History Main Topics     Smoking status: Never Smoker     Smokeless tobacco: Never Used     Alcohol use No     Drug use: No     Sexual activity: Yes     Partners: Male     Other Topics Concern     Blood Transfusions Yes     Multiple in [place] none in [place]     Caffeine Concern No     1s/d     Occupational Exposure No     Hobby Hazards No     Sleep Concern No     Stress Concern No     Weight Concern No     Special Diet No     Back Care No     Exercise No     walk 20' daily     Bike Helmet No     Seat Belt No     Social History Narrative    Currently unemployed.  Lives in [place].   with one son.         Allergies:  No Known Allergies    Medications:  Prior to Admission medications    Medication Sig Start Date End Date Taking? Authorizing Provider   calcium carbonate (TUMS) 500 MG chewable tablet Take 1 chew tab by mouth daily   Yes Reported, Patient   carvedilol (COREG) 25 MG tablet Take 1 tablet (25 mg) by mouth 2 times daily (with meals) 10/27/16  Yes Katherine Varma MD   cloNIDine (CATAPRES) 0.1 MG tablet Take 1 tablet (0.1 mg) by mouth 3 times daily as needed 10/27/16  Yes Katherine Varma MD   amLODIPine (NORVASC) 10 MG tablet Take 1 tablet (10 mg) by mouth daily 7/5/16  Yes Katherine Varma MD   acetaminophen (TYLENOL) 325 MG tablet Take 1-2 tablets by mouth every 4 hours as needed. 7/20/12  Yes Marni Arteaga DO   diazepam (VALIUM) 2 MG tablet Take 1 tablet (2 mg) by mouth nightly as needed for anxiety or sleep  Patient not  "taking: Reported on 3/8/2017 1/25/17   Dakota Bailey DO   oxyCODONE (ROXICODONE) 5 MG immediate release tablet Take 1 tablet (5 mg) by mouth every 6 hours as needed for pain  Patient not taking: Reported on 3/8/2017 11/4/16   Adela Roger MD   HYDROcodone-acetaminophen (NORCO) 5-325 MG per tablet Take 1 tablet by mouth every 6 hours as needed for moderate to severe pain  Patient not taking: Reported on 3/8/2017 9/27/16   Kota Leon MD       Vitals:  BP (!) 180/130 (BP Location: Right arm, Patient Position: Chair, Cuff Size: Adult Small)  Pulse 74  Temp 98.8  F (37.1  C)  Ht 1.689 m (5' 6.5\")  Wt 50.3 kg (111 lb)  SpO2 99%  BMI 17.65 kg/m2    Exam:  GENERAL APPEARANCE: alert and no distress  HENT: mouth without ulcers or lesions. Good dentition.   LYMPHATICS: no cervical or supraclavicular nodes  RESP: lungs clear to auscultation - no rales, rhonchi or wheezes  CV: regular rhythm, normal rate, no rub, no murmur  FEMORAL PULSES: +2 bilaterally   EDEMA: no LE edema bilaterally  ABDOMEN: soft, nondistended, nontender, bowel sounds normal  MS: extremities normal - no gross deformities noted, no evidence of inflammation in joints, no muscle tenderness  SKIN: no rash    Results:   Recent Results (from the past 336 hour(s))   ABO/Rh type and screen [MEV447]    Collection Time: 03/08/17  7:51 AM   Result Value Ref Range    ABO A     RH(D)  Pos     Antibody Screen Neg     Test Valid Only At       [facility]    Specimen Expires 03/11/2017    Antibody titer red cell [[id number]]    Collection Time: 03/08/17  7:52 AM   Result Value Ref Range    Antibody Titer ANTI B: IgM 64, IgG 128    ABO Subtyping [[id number]]    Collection Time: 03/08/17  7:52 AM   Result Value Ref Range    Antigen Type A1 Positive    M Tuberculosis by Quantiferon [[id number]]    Collection Time: 03/08/17  7:52 AM   Result Value Ref Range    M Tuberculosis Result Negative NEG    M Tuberculosis Antigen " Value 0.00 IU/mL   PRA Single Antigen IgG Antibody    Collection Time: 03/08/17  7:53 AM   Result Value Ref Range    PRA Single Antigen IgG Antibody       Specimen received - Immunology report to follow upon completion.   Lipid Profile [LAB18]    Collection Time: 03/08/17  7:53 AM   Result Value Ref Range    Cholesterol 152 <200 mg/dL    Triglycerides 48 <150 mg/dL    HDL Cholesterol 72 >49 mg/dL    LDL Cholesterol Calculated 71 <100 mg/dL    Non HDL Cholesterol 80 <130 mg/dL   Comprehensive metabolic panel [LAB17]    Collection Time: 03/08/17  7:53 AM   Result Value Ref Range    Sodium 137 133 - 144 mmol/L    Potassium 4.2 3.4 - 5.3 mmol/L    Chloride 99 94 - 109 mmol/L    Carbon Dioxide 26 20 - 32 mmol/L    Anion Gap 12 3 - 14 mmol/L    Glucose 89 70 - 99 mg/dL    Urea Nitrogen 37 (H) 7 - 30 mg/dL    Creatinine 7.59 (H) 0.52 - 1.04 mg/dL    GFR Estimate 6 (L) >60 mL/min/1.7m2    GFR Estimate If Black 8 (L) >60 mL/min/1.7m2    Calcium 9.1 8.5 - 10.1 mg/dL    Bilirubin Total 0.7 0.2 - 1.3 mg/dL    Albumin 4.1 3.4 - 5.0 g/dL    Protein Total 7.4 6.8 - 8.8 g/dL    Alkaline Phosphatase 43 40 - 150 U/L    ALT 12 0 - 50 U/L    AST 14 0 - 45 U/L   Cardiolipin Jocelyn IgG and IgM [[id number]]    Collection Time: 03/08/17  7:53 AM   Result Value Ref Range    Cardiolipin Antibody IgG  0.0 - 19.9 GPL-U/mL     <1.6  Negative   New method in use November 28, 2016.      Cardiolipin Antibody IgM  0.0 - 19.9 MPL-U/mL     <0.2  Negative   New method in use November 28, 2016.     HCG qualitative [[id number]]    Collection Time: 03/08/17  7:53 AM   Result Value Ref Range    HCG Qualitative Serum Negative NEG   INR [[id number]]    Collection Time: 03/08/17  7:53 AM   Result Value Ref Range    INR 1.20 (H) 0.86 - 1.14   Partial thromboplastin time [LAB56]    Collection Time: 03/08/17  7:53 AM   Result Value Ref Range    PTT 30 22 - 37 sec   Thrombin time [[id number]]    Collection Time: 03/08/17  7:53 AM   Result Value Ref Range    Thrombin Time 17.2  13.0 - 19.0 sec   Lupus panel [[id number]]    Collection Time: 03/08/17  7:53 AM   Result Value Ref Range    Lupus Result  NEG     Negative  (Note)  COMMENTS:  The INR is elevated.  APTT ratio is normal.  DRVVT Screen ratio is normal.  Thrombin time is normal.  NEGATIVE TEST; A LUPUS ANTICOAGULANT WAS NOT DETECTED IN THIS  SPECIMEN WITHIN THE LIMITS OF THE TESTING REPERTOIRE.  If the clinical picture is strongly suggestive of an antiphospholipid  syndrome, recommend anticardiolipin and beta-2-glycoprotein (IgG and  IgM) antibody tests.  Aleksander Thomas M.D. [phone]  3/9/2017    APTT:       Ratio  Patient  =  1.03  1:2 Mix  =  N/A  Reference:  Negative: Less than or equal to 1.16  Positive: Greater than or equal to 1.17     DILUTE DERECK VIPER VENOM TEST:  Screen Ratio = 0.87   Normal is less than 1.21       CBC with platelets differential [BLA500]    Collection Time: 03/08/17  7:53 AM   Result Value Ref Range    WBC 5.0 4.0 - 11.0 10e9/L    RBC Count 3.55 (L) 3.8 - 5.2 10e12/L    Hemoglobin 11.7 11.7 - 15.7 g/dL    Hematocrit 36.0 35.0 - 47.0 %     (H) 78 - 100 fl    MCH 33.0 26.5 - 33.0 pg    MCHC 32.5 31.5 - 36.5 g/dL    RDW 14.3 10.0 - 15.0 %    Platelet Count 218 150 - 450 10e9/L    Diff Method Automated Method     % Neutrophils 72.5 %    % Lymphocytes 16.9 %    % Monocytes 5.8 %    % Eosinophils 3.0 %    % Basophils 1.2 %    % Immature Granulocytes 0.6 %    Nucleated RBCs 0 0 /100    Absolute Neutrophil 3.6 1.6 - 8.3 10e9/L    Absolute Lymphocytes 0.8 0.8 - 5.3 10e9/L    Absolute Monocytes 0.3 0.0 - 1.3 10e9/L    Absolute Eosinophils 0.2 0.0 - 0.7 10e9/L    Absolute Basophils 0.1 0.0 - 0.2 10e9/L    Abs Immature Granulocytes 0.0 0 - 0.4 10e9/L    Absolute Nucleated RBC 0.0    HLA Typing Complete SOT Recipient    Collection Time: 03/08/17  7:53 AM   Result Value Ref Range    HLA Typing Complete SOT Recipient Duplicate request    BK virus PCR quantitative [[id number]]    Collection Time: 03/08/17  7:53 AM    Result Value Ref Range    BK Virus Specimen Plasma     BK Virus Result BK Virus DNA Not Detected BKNEG copies/mL    BK Virus Log  <2.7 Log copies/mL     Not Calculated   The Real-Time quantitative BK Virus assay was developed and its performance   characteristics determined by the [facility] at   the [facility] in [place], [place]. The   primers and probes for each analyte are Analyte Specific Reagents (ASRs)   manufactured by [employer].   ASRs are used in many laboratory tests necessary for standard medical care and   generally do not require U.S. Food and Drug Administration approval. The FDA   has determined that such clearance or approval is not necessary.   This test is used for clinical purposes. It should not be regarded as   investigational or for research. This laboratory is certified under the   Clinical Laboratory Improvement Amendments of 1988 (CLIA-88) as qualified to   perform high complexity clinical laboratory testing.     CMV Antibody IgG [[id number]]    Collection Time: 03/08/17  7:53 AM   Result Value Ref Range    CMV Antibody IgG (H) 0.0 - 0.8 AI     >8.0  Positive   Antibody index (AI) values reflect qualitative changes in antibody   concentration that cannot be directly associated with clinical condition or   disease state.     EBV Capsid Antibody IgG [[id number]]    Collection Time: 03/08/17  7:53 AM   Result Value Ref Range    EBV Capsid Antibody IgG (H) 0.0 - 0.8 AI     >8.0  Positive, suggests recent or past exposure   Antibody index (AI) values reflect qualitative changes in antibody   concentration that cannot be directly associated with clinical condition or   disease state.     Hepatitis B core antibody [[id number]]    Collection Time: 03/08/17  7:53 AM   Result Value Ref Range    Hepatitis B Core Jocelyn Nonreactive NR   Hepatitis B Surface Antibody [[id number]]    Collection Time: 03/08/17  7:53 AM   Result Value Ref Range    Hepatitis B  Surface Antibody (H) <8.00 m[IU]/mL     >1000.00  Reactive, Patient is considered to be immune to infection with hepatitis B when   the value is greater than or equal to 12.0 m[IU]/mL.     Hepatitis B surface antigen [[id number]]    Collection Time: 03/08/17  7:53 AM   Result Value Ref Range    Hep B Surface Agn Nonreactive NR   Hepatitis C antibody [[id number]]    Collection Time: 03/08/17  7:53 AM   Result Value Ref Range    Hepatitis C Antibody  NR     Nonreactive   Assay performance characteristics have not been established for newborns,   infants, and children     HIV Antigen Antibody Combo Pretransplant    Collection Time: 03/08/17  7:53 AM   Result Value Ref Range    HIV Antigen Antibody Combo Pretransplant  NR     Nonreactive   HIV-1 p24 Ag & HIV-1/HIV-2 Ab Not Detected     Anti Treponema [[id number]]    Collection Time: 03/08/17  7:53 AM   Result Value Ref Range    Treponema pallidum Antibody Negative NEG   Varicella Zoster Virus Antibody IgG [[id number]]    Collection Time: 03/08/17  7:53 AM   Result Value Ref Range    Varicella Zoster Virus Antibody IgG 2.4 (H) 0.0 - 0.8 AI   Antibody titer red cell [[id number]]    Collection Time: 03/08/17  1:47 PM   Result Value Ref Range    Antibody Titer CANCELLED, DUPLICATE REQUEST    ABO type [[id number]]    Collection Time: 03/08/17  1:47 PM   Result Value Ref Range    ABO A     RH(D)  Pos     Specimen Expires 03/11/2017    General PFT Lab (Please always keep checked)    Collection Time: 03/08/17  2:01 PM   Result Value Ref Range    FVC-Pred 3.75 L    FVC-Pre 2.40 L    FVC-%Pred-Pre 64 %    FEV1-Pre 2.20 L    FEV1-%Pred-Pre 69 %    FEV1FVC-Pred 86 %    FEV1FVC-Pre 92 %    FEFMax-Pred 7.31 L/sec    FEFMax-Pre 5.25 L/sec    FEFMax-%Pred-Pre 71 %    FEF2575-Pred 3.54 L/sec    FEF2575-Pre 3.25 L/sec    AWL5434-%Pred-Pre 91 %    ExpTime-Pre 7.01 sec    FIFMax-Pre 4.05 L/sec    MVV-Pred 113 L/min    MVV-Pre 80 L/min    MVV-%Pred-Pre 70 %    VC-Pred 3.75 L    VC-Pre 2.28 L    VC-%Pred-Pre 60  %    IC-Pred 2.55 L    IC-Pre 1.64 L    IC-%Pred-Pre 64 %    ERV-Pred 1.20 L    ERV-Pre 0.64 L    ERV-%Pred-Pre 53 %    FEV1FEV6-Pred 85 %    FEV1FEV6-Pre 92 %    DLCOunc-Pred 25.75 ml/min/mmHg    DLCOunc-Pre 17.69 ml/min/mmHg    DLCOunc-%Pred-Pre 68 %    DLCOcor-Pre 18.74 ml/min/mmHg    DLCOcor-%Pred-Pre 72 %    VA-Pre 2.99 L    VA-%Pred-Pre 55 %    FEV1SVC-Pred 85 %    FEV1SVC-Pre 96 %   Routine UA with microscopic [[id number]]    Collection Time: 03/08/17  4:32 PM   Result Value Ref Range    Color Urine Straw     Appearance Urine Slightly Cloudy     Glucose Urine 250 (A) NEG mg/dL    Bilirubin Urine Negative NEG    Ketones Urine Negative NEG mg/dL    Specific Gravity Urine 1.010 1.003 - 1.035    Blood Urine Small (A) NEG    pH Urine 8.5 (H) 5.0 - 7.0 pH    Protein Albumin Urine 100 (A) NEG mg/dL    Urobilinogen mg/dL 0.2 0.0 - 2.0 mg/dL    Nitrite Urine Negative NEG    Leukocyte Esterase Urine Small (A) NEG    Source Midstream Urine     WBC Urine 4 (H) 0 - 2 /HPF    RBC Urine 3 (H) 0 - 2 /HPF    Bacteria Urine Few (A) NEG /HPF    Squamous Epithelial /HPF Urine >50 (H) 0 - 1 /HPF   EKG 12-lead, tracing only [EKG1]    Collection Time: 03/08/17  4:34 PM   Result Value Ref Range    Interpretation ECG Click View Image link to view waveform and result        Patient was seen by myself, Dr. Mahesh Armando, in conjunction with Jayden Sheikh NP as part of a shared visit.    I personally reviewed past medical and surgical history, vital signs, medications and labs.  Present and past medical history, along with significant physical exam findings were all reviewed with CONNIE.    My bueno findings:  Jelly Dietz is 32 year old, who presents for Kidney transplant evaluation.    Key management decisions made by me and discussed with CONNIE:  Reasonable candidate pending the resolution of the above     Again, thank you for allowing me to participate in the care of your patient.      Sincerely,    JORDYN

## 2017-03-08 NOTE — PROGRESS NOTES
Psychosocial Assessment  Patient Name/ Age: Jelly Dietz/32 year old   Medical Record #: [id number]  Duration of Interview:  30  min  Process:   Face-to-Face Interview                (counseling < 50%)   Present at Appointment: Jelly        : MARYJANE Morris, LICSW Date:  March 8, 2017        Type of transplant: Kidney    Donor type:   Jelly indicated her brother has expressed interest in being a donor.   Cadaver   Prior Transplants:    Yes    2007 LUT Kidney Status of Transplant:  2007 Kidney started to fail in 2012       Current Living Situation    Location:   [address] [place] [address]  With Whom: her two children: Isabella (4) and Angel (1 1/2)       Family/ Social Support:    Jelly's mother lives in [place], [place] and her father in [place], [place].  She has three sisters ([place], [place], [place], [place] and [place], [place]) and three brothers (2-[place], [place] and 1-incarcerated - will be released on 4/19/17).   Available, helpful   Committed relationship:  Jelly's indicated she is  and does consider her ex- to be somewhat a part of her support system.   Available, occasionally   Other supports:   Friends Available, helpful       Activities/ Functional Ability    Current level: Ambulatory and independent with ADL's     Transportation Drives self       Vocational/Employment/Financial     Employment  [employer]   Part time and disabled   Job Description  [employer]      Income   Salary/wages and SSDI   Insurance  Medicare Parts A and B, [employer] through [place] and Part D through [employer].  Will be changing to [facility] in April.    At this time, patient can afford medication costs:  Yes  Medicare and [place]       Medical Status    Current mode of treatment for ESRD Dialysis   Complications - Non diabetic        Behavioral    Tobacco Use Yes  Chemical Dependency No   Jelly indicated she uses Hooka four times a month and is working on quitting.         Psychiatric Impairment No    Reading ability Good  Education level: One year of college Recent Legal History No    Coping Style/Strategies: Jelly indicated when under stress she will be with her children, go for a walk, watch a movie or talk to a friend/mother.   Ability to Adhere to Complex Medical Regime: Yes     Adherence History:  Jelly indicated she will usually follow her physician's recommendations.        Education  _X_ Medicare  _X_ Rehabilitation  _X_ Donor issues  _X_ Community resources  _X_ Post discharge housing  _X_ Financial resources  _X_ Medical insurance options  _X_ Psych adjustment  _X_ Family adjustment  _X_ Health Care Directive - Provided a copy and explained the purpose of the Health Care Directive. Psychosocial Risks of Transplant Reviewed and Discussed:  _X_ Increased stress related to emotional,            family, social, employment or financial           situation  _X_ Affect on work and/or disability benefits  _X_ Affect on future life insurance  _X_ Transplant outcome expectations may           not be met  _X_ Mental Health Risks: anxiety,           depression, PTSD, guilt, grief and           chronic fatigue     Notable Items:   Jelly indicated her sister will be moving in with her to assist with Jelly's children.        Final Evaluation/Assessment   Patient seemed to process information well. Appeared well informed, motivated and able to follow post transplant requirements. Behavior was appropriate during interview. Has adequate income and insurance coverage. Adequate social support. No major contraindications noted for transplant.  At this time patient appears to understand the risks and benefits of transplant.      Recommendation  Acceptable    Selection Criteria Met:  Plan for support Yes   Chemical Dependence Yes   Smoking Yes   Mental Health Yes   Adequate Finances Yes    Signature: MARYJANE Morris, [facility] HospitalSW   Title: Clinical

## 2017-03-08 NOTE — PROGRESS NOTES
Pre Abdominal Organ Transplant Coordinator Evaluation Note:    MD present: Dr. Younger    Attendees: self    Type of transplant: kidney    Required Topic(s) Discussed: Evaluation notification document, SRTR data, Multiple wait list brochure, KDPI, Evaluation/approval process, Selection committee process, Wait list process and Living donor process    Teaching: Instruct patient on living donor process/provided contact info, Provided my business card and To call me with any questions    Assessment/Plan: I was not present for the provider visits. Met with Jelly alone today. We discussed the overall evaluation/approval process, living donor, PEP and wait listing. All of her evaluation results will be compiled and discussed next week at our Multidisciplinary Selection Team Meeting on 3/15/2017 and she will be notified by me as to the outcome. She has a potential living donor that is exploring the possibility, although living donation is not a common occurrence in her culture. She stated that the [place]Jewish community just does not understand the whole idea of donating a kidney. She has had a previous living non related donor kidney transplant in [place] so her family is more familiar with kidney donation than most other [place] families.She signed the KDPI>85% form as not wishing to hear of offers >85% and the Receipt of Information for Organ Transplant Recipient. Documents sent to scanning.She has never had a mammogram and will schedule a PAP soon. Her dental is UTD 10/2016. She has never had a thorough skin assessment by a Dermatologist. She would prefer to be seen here. She admitted she was quite tired today as she had a total deep body massage yesterday and is still feeling the effects. She apologized for her tiredness. She had no further questions at this point.    Time spent with patient: 20 minutes

## 2017-03-08 NOTE — MR AVS SNAPSHOT
"              After Visit Summary   3/8/2017    Jelly Dietz    MRN: [id number]           Patient Information     Date Of Birth          1985        Visit Information        Provider Department      3/8/2017 9:00 AM Marianne Gutierrez MSW [facility] Solid Organ Transplant        Today's Diagnoses     Awaiting organ transplant status    -  1       Follow-ups after your visit        Your next 10 appointments already scheduled     May 31, 2017 10:00 AM CDT   (Arrive by 9:45 AM)   NEW PANCREAS/KIDNEY TRANSPLANT WORK-UP with Ashutosh Deras MD   [facility] ([facility])    [address] [place] [address]  3rd [facility] [address] [phone]              Who to contact     If you have questions or need follow up information about today's clinic visit or your schedule please contact [facility] SOLID ORGAN TRANSPLANT directly at [phone].  Normal or non-critical lab and imaging results will be communicated to you by Taquahart, letter or phone within 4 business days after the clinic has received the results. If you do not hear from us within 7 days, please contact the clinic through Taquahart or phone. If you have a critical or abnormal lab result, we will notify you by phone as soon as possible.  Submit refill requests through [employer] or call your pharmacy and they will forward the refill request to us. Please allow 3 business days for your refill to be completed.          Additional Information About Your Visit        MyChart Information     [employer] lets you send messages to your doctor, view your test results, renew your prescriptions, schedule appointments and more. To sign up, go to [email or web address] . Click on \"Log in\" on the left side of the screen, which will take you to the Welcome page. Then click on \"Sign up Now\" on the right side of the page.     You will be asked to enter the access code listed below, as well as some personal information. Please follow " the directions to create your username and password.     Your access code is: [email or web address]  Expires: 2017  7:30 AM     Your access code will  in 90 days. If you need help or a new code, please call your [place] clinic or [phone].        Care EveryWhere ID     This is your Care EveryWhere ID. This could be used by other organizations to access your [place] medical records  [id number]         Blood Pressure from Last 3 Encounters:   17 (!) 180/130   17 (!) 180/130   17 127/79    Weight from Last 3 Encounters:   17 50.3 kg (111 lb)   17 50.3 kg (111 lb)   17 53.8 kg (118 lb 9.6 oz)              Today, you had the following     No orders found for display       Primary Care Provider Office Phone # Fax #    Sailaja Oh [phone]       [facility] [address] [place] [address]        Thank you!     Thank you for choosing [facility] SOLID ORGAN TRANSPLANT  for your care. Our goal is always to provide you with excellent care. Hearing back from our patients is one way we can continue to improve our services. Please take a few minutes to complete the written survey that you may receive in the mail after your visit with us. Thank you!             Your Updated Medication List - Protect others around you: Learn how to safely use, store and throw away your medicines at www.disposemymeds.org.          This list is accurate as of: 3/8/17 11:59 PM.  Always use your most recent med list.                   Brand Name Dispense Instructions for use    acetaminophen 325 MG tablet    TYLENOL    60 tablet    Take 1-2 tablets by mouth every 4 hours as needed.       amLODIPine 10 MG tablet    NORVASC    30 tablet    Take 1 tablet (10 mg) by mouth daily       calcium carbonate 500 MG chewable tablet    TUMS     Take 1 chew tab by mouth daily       carvedilol 25 MG tablet    COREG    60 tablet    Take 1 tablet (25 mg) by mouth 2 times daily (with meals)        cloNIDine 0.1 MG tablet    CATAPRES    60 tablet    Take 1 tablet (0.1 mg) by mouth 3 times daily as needed       diazepam 2 MG tablet    VALIUM    7 tablet    Take 1 tablet (2 mg) by mouth nightly as needed for anxiety or sleep       HYDROcodone-acetaminophen 5-325 MG per tablet    NORCO    7 tablet    Take 1 tablet by mouth every 6 hours as needed for moderate to severe pain       oxyCODONE 5 MG IR tablet    ROXICODONE    15 tablet    Take 1 tablet (5 mg) by mouth every 6 hours as needed for pain

## 2017-03-08 NOTE — MR AVS SNAPSHOT
After Visit Summary   3/8/2017    Jelly Dietz    MRN: [id number]           Patient Information     Date Of Birth          1985        Visit Information        Provider Department      3/8/2017 8:00 AM UC TRANSPLANT CLASS [facility] Solid Organ Transplant        Today's Diagnoses     Pre-transplant evaluation for kidney transplant    -  1       Follow-ups after your visit        Your next 10 appointments already scheduled     Mar 08, 2017 10:00 AM CST   (Arrive by 9:45 AM)   Surgery Consult with  PKE PATIENT 4   [facility] Solid Organ Transplant ([facility])    [address] [place] [address]   [phone]            Mar 08, 2017 10:30 AM CST   (Arrive by 10:15 AM)   SOT CARE COORDINATOR EVAL with Yasemin Valdovinos RN   [facility] Solid Organ Transplant ([facility])    [address] [place] [address]   [phone]            Mar 08, 2017 11:15 AM CST   Nephrology Consult with  PKE PATIENT 4   [facility] Solid Organ Transplant ([facility])    [address] [place] [address]   [phone]            Mar 08, 2017 12:00 PM CST   NUTRITION VISIT with Nathalia Roland RD   [facility] Solid Organ Transplant ([facility])    [address] [place] [address]   [phone]            Mar 08, 2017  1:45 PM CST   Lab with  LAB   [facility] Lab ([facility])    [address] [place] [address]   [phone]            Mar 08, 2017  2:00 PM CST   FULL PULMONARY FUNCTION with  PFL D   [facility] Pulmonary Function Testing ([facility])    [address] [place] [address]   [phone]            Mar 08, 2017  3:00 PM CST   (Arrive by 2:45 PM)   XR CHEST 2 VIEWS with [email or web address]   [facility] [place] [facility] (  [facility])    [address] [place] [address] [phone]           Please bring a list of your current medicines to your exam. (Include vitamins, minerals and over-thecounter medicines.) Leave your valuables at home.  Tell your doctor if there is a chance you may be pregnant.  You do not need to do anything special for this exam.            Mar 08, 2017  3:30 PM CST   Ech Complete with [email or web address]   [facility] Echo ([facility])    [address] [place] [address] [phone]           1.  Please bring or wear a comfortable two-piece outfit. 2.  You may eat, drink and take your normal medicines. 3.  For any questions that cannot be answered, please contact the ordering physician            Mar 08, 2017  4:30 PM CST   (Arrive by 4:15 PM)   ecg with  CV EKG   [facility] ([facility])    [address] [place] [address]  [phone]               May 31, 2017 10:00 AM CDT   (Arrive by 9:45 AM)   NEW PANCREAS/KIDNEY TRANSPLANT WORK-UP with Ashutosh Deras MD   [facility] ([facility])    [address] [place] [address] [phone]              Who to contact     If you have questions or need follow up information about today's clinic visit or your schedule please contact [facility] SOLID ORGAN TRANSPLANT directly at [phone].  Normal or non-critical lab and imaging results will be communicated to you by MyChart, letter or phone within 4 business days after the clinic has received the results. If you do not hear from us within 7 days, please contact the clinic through MyChart or phone. If you have a critical or abnormal lab result, we will notify you by phone as soon as possible.  Submit refill requests through [facility] [employer] or call your pharmacy and they will forward the refill request to us. Please allow 3 business days for  "your refill to be completed.          Additional Information About Your Visit        [employer] Information     [employer] lets you send messages to your doctor, view your test results, renew your prescriptions, schedule appointments and more. To sign up, go to [email or web address] . Click on \"Log in\" on the left side of the screen, which will take you to the Welcome page. Then click on \"Sign up Now\" on the right side of the page.     You will be asked to enter the access code listed below, as well as some personal information. Please follow the directions to create your username and password.     Your access code is: [email or web address]  Expires: 2017  6:30 AM     Your access code will  in 90 days. If you need help or a new code, please call your [place] clinic or [phone].        Care EveryWhere ID     This is your Care EveryWhere ID. This could be used by other organizations to access your [place] medical records  [id number]         Blood Pressure from Last 3 Encounters:   17 127/79   16 140/86   16 (!) 157/97    Weight from Last 3 Encounters:   17 53.8 kg (118 lb 9.6 oz)   16 47.6 kg (105 lb)   16 49 kg (108 lb 0.4 oz)              Today, you had the following     No orders found for display       Primary Care Provider Office Phone # Fax #    Sailaja Oh [phone]       [facility] [address] [place] [address]        Thank you!     Thank you for choosing [facility] SOLID ORGAN TRANSPLANT  for your care. Our goal is always to provide you with excellent care. Hearing back from our patients is one way we can continue to improve our services. Please take a few minutes to complete the written survey that you may receive in the mail after your visit with us. Thank you!             Your Updated Medication List - Protect others around you: Learn how to safely use, store and throw away your medicines at www.disposemymeds.org.          This list is " accurate as of: 3/8/17  9:05 AM.  Always use your most recent med list.                   Brand Name Dispense Instructions for use    acetaminophen 325 MG tablet    TYLENOL    60 tablet    Take 1-2 tablets by mouth every 4 hours as needed.       amLODIPine 10 MG tablet    NORVASC    30 tablet    Take 1 tablet (10 mg) by mouth daily       calcium carbonate 500 MG chewable tablet    TUMS     Take 1 chew tab by mouth daily       carvedilol 25 MG tablet    COREG    60 tablet    Take 1 tablet (25 mg) by mouth 2 times daily (with meals)       cloNIDine 0.1 MG tablet    CATAPRES    60 tablet    Take 1 tablet (0.1 mg) by mouth 3 times daily as needed       diazepam 2 MG tablet    VALIUM    7 tablet    Take 1 tablet (2 mg) by mouth nightly as needed for anxiety or sleep       HYDROcodone-acetaminophen 5-325 MG per tablet    NORCO    7 tablet    Take 1 tablet by mouth every 6 hours as needed for moderate to severe pain       oxyCODONE 5 MG IR tablet    ROXICODONE    15 tablet    Take 1 tablet (5 mg) by mouth every 6 hours as needed for pain

## 2017-03-08 NOTE — MR AVS SNAPSHOT
"              After Visit Summary   3/8/2017    Jelly Dietz    MRN: [id number]           Patient Information     Date Of Birth          1985        Visit Information        Provider Department      3/8/2017 11:15 AM EDDA PKE PATIENT 4 [facility] Solid Organ Transplant        Today's Diagnoses     Pre-transplant evaluation for kidney transplant    -  1    IgA nephropathy        Chronic renal transplant rejection        End stage kidney disease (H)           Follow-ups after your visit        Your next 10 appointments already scheduled     May 31, 2017 10:00 AM CDT   (Arrive by 9:45 AM)   NEW PANCREAS/KIDNEY TRANSPLANT WORK-UP with Ashutosh Deras MD   [facility] ([facility])    [address] [facility]  3rd Floor  [facility] [address] [phone]              Who to contact     If you have questions or need follow up information about today's clinic visit or your schedule please contact [facility] SOLID ORGAN TRANSPLANT directly at [phone].  Normal or non-critical lab and imaging results will be communicated to you by [employer] Appshart, letter or phone within 4 business days after the clinic has received the results. If you do not hear from us within 7 days, please contact the clinic through [employer] App[employer] or phone. If you have a critical or abnormal lab result, we will notify you by phone as soon as possible.  Submit refill requests through [employer] or call your pharmacy and they will forward the refill request to us. Please allow 3 business days for your refill to be completed.          Additional Information About Your Visit        [employer] Information     [employer] lets you send messages to your doctor, view your test results, renew your prescriptions, schedule appointments and more. To sign up, go to [email or web address] . Click on \"Log in\" on the left side of the screen, which will take you to the Welcome page. Then click on \"Sign up Now\" on the right side of the page.     You " "will be asked to enter the access code listed below, as well as some personal information. Please follow the directions to create your username and password.     Your access code is: [email or web address]  Expires: 2017  7:30 AM     Your access code will  in 90 days. If you need help or a new code, please call your [place] clinic or [phone].        Care EveryWhere ID     This is your Care EveryWhere ID. This could be used by other organizations to access your [place] medical records  [id number]        Your Vitals Were     Pulse Temperature Height Pulse Oximetry BMI (Body Mass Index)       74 98.8  F (37.1  C) 1.689 m (5' 6.5\") 99% 17.65 kg/m2        Blood Pressure from Last 3 Encounters:   17 (!) 180/130   17 (!) 180/130   17 127/79    Weight from Last 3 Encounters:   17 50.3 kg (111 lb)   17 50.3 kg (111 lb)   17 53.8 kg (118 lb 9.6 oz)              Today, you had the following     No orders found for display       Primary Care Provider Office Phone # Fax #    Sailaja Oh [phone]       [facility] [address] [place] [address]        Thank you!     Thank you for choosing [facility] SOLID ORGAN TRANSPLANT  for your care. Our goal is always to provide you with excellent care. Hearing back from our patients is one way we can continue to improve our services. Please take a few minutes to complete the written survey that you may receive in the mail after your visit with us. Thank you!             Your Updated Medication List - Protect others around you: Learn how to safely use, store and throw away your medicines at www.disposemymeds.org.          This list is accurate as of: 3/8/17 11:59 PM.  Always use your most recent med list.                   Brand Name Dispense Instructions for use    acetaminophen 325 MG tablet    TYLENOL    60 tablet    Take 1-2 tablets by mouth every 4 hours as needed.       amLODIPine 10 MG tablet    NORVASC    30 " tablet    Take 1 tablet (10 mg) by mouth daily       calcium carbonate 500 MG chewable tablet    TUMS     Take 1 chew tab by mouth daily       carvedilol 25 MG tablet    COREG    60 tablet    Take 1 tablet (25 mg) by mouth 2 times daily (with meals)       cloNIDine 0.1 MG tablet    CATAPRES    60 tablet    Take 1 tablet (0.1 mg) by mouth 3 times daily as needed       diazepam 2 MG tablet    VALIUM    7 tablet    Take 1 tablet (2 mg) by mouth nightly as needed for anxiety or sleep       HYDROcodone-acetaminophen 5-325 MG per tablet    NORCO    7 tablet    Take 1 tablet by mouth every 6 hours as needed for moderate to severe pain       oxyCODONE 5 MG IR tablet    ROXICODONE    15 tablet    Take 1 tablet (5 mg) by mouth every 6 hours as needed for pain

## 2017-03-08 NOTE — PROGRESS NOTES
Patient attended the Pre Kidney/Pancreas Teaching Class today alone. Patient very attentive and engaged throughout the class and asked few questions. I introduced the [facility] website and encouraged them to access it for additional education.  In addition to the stard video content, I reviewed living donation, paired exchange, KDPI, typical length of stay and the need to stay locally post transplant discharge.

## 2017-03-08 NOTE — PROGRESS NOTES
"         RE: Jelly Dietz    [facility]# [id number]        I saw your patient, Jelly Dietz, in consultation in our pretransplant clinic.  She was at clinic to discuss care for her end-stage renal disease. Prior to clinic, she attended our pretransplant class.  Of note, she had a previous transplant that was doing well until a critical episode during her first delivery.      We talked about the differences in immunosuppression since her first transplant. We also talked about the fact that she would be on prednisone-free immunosuppression (her first transplant was done in [place] and she was treated with high-dose steroids and remembers the \"moon facies\").     We talked about the pros and cons of transplantation vs. dialysis.  We discussed the fact that it was important that she think about the pros and cons of each treatment option and make an active decision.  We also discussed the fact that the two were interconnected and she may need to go on dialysis before transplant (if she chose to have a transplant) and that if the transplant failed, she might need dialysis before another transplant.       We also discussed the fact that if she chose to have a transplant, she would need to decide between going on the wait list for a  donor transplant vs. having a living donor transplant.  We talked about the pros and cons of each option.  Although I didn't express an opinion regarding transplantation or dialysis, I suggested that if she chose to have a transplant, a living donor transplant would be preferable in that the surgery is the same, the immunosuppressive drugs and the risks are the same, but the transplant could be done sooner and the results are better.  I told her that the wait for  donor kidney was approximately five years for patients who are newly put on the waiting list.  I also discussed the new ( donor) kidney (KDPI) scoring system with her. In addition, we talked about the fact that " the disadvantage of a living donor transplant was the risk to the donor.       Because she was interested in living donation, we spent some time discussing donor risks, including the risks of mortality, morbidity, and long-term risks with a single kidney. She says she has a brother who is potentially interested in donating. I provided her with our donor video to share with her family members.      I attempted to answer any remaining questions.  I also told her that should she have any questions, she should feel free to contact us.  We would be glad to answer any questions either over the phone or at another clinic visit.  Her transplant coordinator is Jeanette Valdovinos and may be reached at [phone].  Thank you for the opportunity to see her.     I spent 30 minutes with this patient.  Over 90% of that time was spent in counseling and coordination of care.             Yours truly,               Leonid Younger MD         Professor of Surgery         ([phone])    ING[facility]/

## 2017-03-08 NOTE — NURSING NOTE
Elevated BP of 180/130 reported to GERMAN Taylor in nephrology. Text page regarding findings sent to steve Mota nephrologist. Will retake BP in 1/2- 1 hour. Patient is asymptomatic and states that this BP is normal for her. Will continue to monitor.     Fawad Su RN

## 2017-03-09 LAB
BLD GP AB SCN TITR SERPL: NORMAL {TITER}
HLA TYPING COMPLETE SOT RECIPIENT: NORMAL
INTERPRETATION ECG - MUSE: NORMAL
M TB TUBERC IFN-G BLD QL: NEGATIVE
M TB TUBERC IFN-G/MITOGEN IGNF BLD: 0 IU/ML
PRA SINGLE ANTIGEN IGG ANTIBODY: NORMAL

## 2017-03-10 LAB
BKV DNA # SPEC NAA+PROBE: NORMAL COPIES/ML
BKV DNA SPEC NAA+PROBE-LOG#: NORMAL LOG COPIES/ML
LA PPP-IMP: NORMAL
SPECIMEN SOURCE: NORMAL

## 2017-03-10 NOTE — PROGRESS NOTES
Assessment and Plan:  1. Kidney transplant evaluation - patient is a good candidate overall. Benefits of a living donor transplant were discussed.  2. 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 After her transplant she had acute cellular rejection (1/2010) secondary to non-compliance and AMR (11/2012) with her first pregnancy in the setting of non-compliance. She required hospitalization for the last 11 weeks of pregnancy and delivered at 30 weeks in the settings of accelerated hypertension and proteinuria thought to be secondary to pre-eclampsia. A few months later she required hemodialysis (11/2012). She then had a graft nephrectomy in 3/2013 after presenting with gross hematuria. Would benefit from a kidney transplant.   3. Cardiac Risk - history of acute coronary syndrome. She was admitted in 2014 with atypical chest pain with significant hypertension. Her troponin was mildly elevated, but nuclear stress test was negative for ischemia. She is currently taking Carvedilol, Clonidine, Amlodipine for her HTN. She checks her BPs at home and they usually range 160s-190s/100s-200s. Will need cardiology evaluation due to multiple risk factors.   4. History of Non-compliance - patient states this was related to the stress of her divorce. She now has a 4 year-old and a 2 year old. She states she is dedicated to following post transplant compliance for herself and for her children. Will need 6 month compliance contract. Would appreciate social work input.   5. History of panic attacks/anxiety - patient states her anxiety is under control. Not currently on any medications. Would appreciate social work input.   6. Anemia - Hgb stable. Receiving EPO at dialysis. Will continue to monitor.   7. Health Maintenance - last pap negative (3/9/2012), will need an up-to-date pap.     Discussed the risks and benefits of a  transplant, including the risk of surgery and immunosuppression medications.  Patients overall evaluation will be discussed in the Transplant Program's regular meeting with a final recommendation on the patients suitability for transplant to be made at that time.  Patient was seen in conjunction with Mahesh Armando as part of a shared visit.      Evaluation:  Jelly Dietz was seen in consultation at the request of Dr. Leonid Younger for evaluation as a potential kidney transplant recipient.    Reason for Visit:  Jelly Dietz is a 32 year old female with ESKD from rejection of LDKT (primary disease: IgA), who presents for kidney transplant evaluation.    HPI:  Ms. 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 She was diagnosed with IgA at age 18 and required dialysis in 2007 (age 22). After her transplant she had acute cellular rejection (1/2010) secondary to non-compliance and AMR (11/2012) with her first pregnancy in the setting of non-compliance. She required hospitalization for the last 11 weeks of pregnancy and delivered at 30 weeks in the settings of accelerated hypertension and proteinuria thought to be secondary to pre-eclampsia. A few months later she required hemodialysis (11/2012). She had a graft nephrectomy in 3/2013 after presenting with gross hematuria. She had another pregnancy in 2015 and delivered early at 28 weeks in the setting of pre-eclampsia. She states her history of non-compliance was related to stress of her divorce.  She now has a 4 year-old and a 2 year old. She states she is dedicated to following post transplant compliance for herself and for her children. She is currently being followed by Dr. Varma. Her brother is a potential donor.     Other significant history includes acute coronary syndrome. She was admitted in 2014 with atypical chest pain with significant hypertension. Her troponin was mildly elevated, but  nuclear stress test was negative for ischemia. She is currently taking Carvedilol, Clonidine, Amlodipine for her HTN. She checks her BPs at home and they usually range 160s-190s/100s-200s. Her ECHO today was normal with an EF of 60-65%. Other PMH includes MRSA, bacteremia, anxiety/panic attacks, thrombocytopenia, and anemia secondary to kidney disease. She does not get regular exercise outside of taking care of her children. She states she can shop at the mall for hours without chest pain, shortness of breath or claudication symptoms. She complains of poor appetite and fatigue, but denies nausea, vomiting, and  diarrhea. She is voiding about half a cup of urine per day. She denies dsyuria, hematuria or trouble emptying her bladder. She denies fevers, chills or sweats.          Kidney Disease Hx:        Kidney Disease Dx:  rejection of LDKT (primary disease: IgA)       Biopsy Proven: Yes;          On Dialysis: Yes, Date initiated: 11/2012       Primary Nephrologist: Dr. ISAAC            [facility] Hx:       h/o HTN: Yes         h/o DM:  No       h/o Protein in Urine: Yes        h/o Blood in Urine:  Yes        h/o Kidney Stones:  No       h/o UTI: No       h/o Chronic NSAID Use: No         Previous Transplant Hx:        Yes; LDKT 2007         Transplant Sensitization Hx:       Previous Tx: Yes       Blood Transfusion: Yes       Pregnancy: Yes         Uremic Symptoms:       Fatigue: Yes; Poor Appetite: Yes;         Cardiovascular Hx:       h/o Cardiac Issues: Yes; ACS       Exercise Tolerance: no chest pain or shortness of breath with exertion.         Health Maintenance:       PAP: Not up to date         Potential Donor(s): Yes; brother    ROS:  A comprehensive review of systems was obtained and negative, except as noted in the HPI or PMH.    PMH:   Medical record was reviewed and PMH was discussed with patient and noted below.  Past Medical History   Diagnosis Date     ACS (acute coronary syndrome) (H) 11/11/2014      Allergic state      seasonal allergies     Anemia in chronic renal disease      Cervical cerclage suture present in second trimester      Chest pain 2014     Chronic renal transplant rejection      End stage kidney disease (H)      Heart murmur      History of  delivery 2015     30 wks, 28 wks      Hypertension      resolved after transplant     IgA nephropathy      MRSA (methicillin resistant Staphylococcus aureus)      [facility]     Pre-eclampsia      Renal failure affecting pregnancy in second trimester      S/P kidney transplant      ESKD 2/2 IgA nephropathy s/p LDKT in 2007 in [facility]. History of 1B kidney rejection      SAB (spontaneous )      2nd trimester        PSH:   Past Surgical History   Procedure Laterality Date     Transplant kidney recipient living unrelated  Dec 2007     (Adult male in [place])      section  2012     Procedure:  SECTION;;  Surgeon: Chelsea Cross MD;  Location: [facility]     Sergio/dialysis catheter       Nephrectomy  3/29/13     Transplant nephrectomy      Explant transplanted kidney  3/29/2013     Procedure: EXPLANT TRANSPLANTED KIDNEY;  Explant Transplanted right Kidney (from patients right iliac fossa);  Surgeon: Nory Morgan MD;  Location: [facility] OR     Cesearean section       Cerclage cervical N/A 2015     Procedure: CERCLAGE CERVICAL;  Surgeon: Norbert Chopra MD;  Location: [facility]      section N/A 2015     Procedure:  SECTION;  Surgeon: Chelsea Cross MD;  Location: [facility] OR     Personal or family history of bleeding or anesthesia problems: No    Family Hx:  Family History   Problem Relation Age of Onset     DIABETES Paternal Grandfather      Breast Cancer Maternal Grandmother 55     CANCER No family hx of      No known family hx of skin cancer       Personal Hx:   Social History     Social History      Marital status: Single     Spouse name: N/A     Number of children: N/A     Years of education: N/A     Occupational History     Not on file.     Social History Main Topics     Smoking status: Never Smoker     Smokeless tobacco: Never Used     Alcohol use No     Drug use: No     Sexual activity: Yes     Partners: Male     Other Topics Concern     Blood Transfusions Yes     Multiple in [place] none in [place]     Caffeine Concern No     1s/d     Occupational Exposure No     Hobby Hazards No     Sleep Concern No     Stress Concern No     Weight Concern No     Special Diet No     Back Care No     Exercise No     walk 20' daily     Bike Helmet No     Seat Belt No     Social History Narrative    Currently unemployed.  Lives in [place].   with one son.         Allergies:  No Known Allergies    Medications:  Prior to Admission medications    Medication Sig Start Date End Date Taking? Authorizing Provider   calcium carbonate (TUMS) 500 MG chewable tablet Take 1 chew tab by mouth daily   Yes Reported, Patient   carvedilol (COREG) 25 MG tablet Take 1 tablet (25 mg) by mouth 2 times daily (with meals) 10/27/16  Yes Katherine Varma MD   cloNIDine (CATAPRES) 0.1 MG tablet Take 1 tablet (0.1 mg) by mouth 3 times daily as needed 10/27/16  Yes Katherine Varma MD   amLODIPine (NORVASC) 10 MG tablet Take 1 tablet (10 mg) by mouth daily 7/5/16  Yes Katherine Varma MD   acetaminophen (TYLENOL) 325 MG tablet Take 1-2 tablets by mouth every 4 hours as needed. 7/20/12  Yes Marni Arteaga DO   diazepam (VALIUM) 2 MG tablet Take 1 tablet (2 mg) by mouth nightly as needed for anxiety or sleep  Patient not taking: Reported on 3/8/2017 1/25/17   Dakota Bailey DO   oxyCODONE (ROXICODONE) 5 MG immediate release tablet Take 1 tablet (5 mg) by mouth every 6 hours as needed for pain  Patient not taking: Reported on 3/8/2017 11/4/16   Adela Roger MD   HYDROcodone-acetaminophen (NORCO) 5-325 MG per tablet  "Take 1 tablet by mouth every 6 hours as needed for moderate to severe pain  Patient not taking: Reported on 3/8/2017 9/27/16   Kota Leon MD       Vitals:  BP (!) 180/130 (BP Location: Right arm, Patient Position: Chair, Cuff Size: Adult Small)  Pulse 74  Temp 98.8  F (37.1  C)  Ht 1.689 m (5' 6.5\")  Wt 50.3 kg (111 lb)  SpO2 99%  BMI 17.65 kg/m2    Exam:  GENERAL APPEARANCE: alert and no distress  HENT: mouth without ulcers or lesions. Good dentition.   LYMPHATICS: no cervical or supraclavicular nodes  RESP: lungs clear to auscultation - no rales, rhonchi or wheezes  CV: regular rhythm, normal rate, no rub, no murmur  FEMORAL PULSES: +2 bilaterally   EDEMA: no LE edema bilaterally  ABDOMEN: soft, nondistended, nontender, bowel sounds normal  MS: extremities normal - no gross deformities noted, no evidence of inflammation in joints, no muscle tenderness  SKIN: no rash    Results:   Recent Results (from the past 336 hour(s))   ABO/Rh type and screen [DOQ886]    Collection Time: 03/08/17  7:51 AM   Result Value Ref Range    ABO A     RH(D)  Pos     Antibody Screen Neg     Test Valid Only At       [facility]    Specimen Expires 03/11/2017    Antibody titer red cell [[id number]]    Collection Time: 03/08/17  7:52 AM   Result Value Ref Range    Antibody Titer ANTI B: IgM 64, IgG 128    ABO Subtyping [[id number]]    Collection Time: 03/08/17  7:52 AM   Result Value Ref Range    Antigen Type A1 Positive    M Tuberculosis by Quantiferon [[id number]]    Collection Time: 03/08/17  7:52 AM   Result Value Ref Range    M Tuberculosis Result Negative NEG    M Tuberculosis Antigen Value 0.00 IU/mL   PRA Single Antigen IgG Antibody    Collection Time: 03/08/17  7:53 AM   Result Value Ref Range    PRA Single Antigen IgG Antibody       Specimen received - Immunology report to follow upon completion.   Lipid Profile [LAB18]    Collection Time: 03/08/17  7:53 AM   Result Value Ref Range "    Cholesterol 152 <200 mg/dL    Triglycerides 48 <150 mg/dL    HDL Cholesterol 72 >49 mg/dL    LDL Cholesterol Calculated 71 <100 mg/dL    Non HDL Cholesterol 80 <130 mg/dL   Comprehensive metabolic panel [LAB17]    Collection Time: 03/08/17  7:53 AM   Result Value Ref Range    Sodium 137 133 - 144 mmol/L    Potassium 4.2 3.4 - 5.3 mmol/L    Chloride 99 94 - 109 mmol/L    Carbon Dioxide 26 20 - 32 mmol/L    Anion Gap 12 3 - 14 mmol/L    Glucose 89 70 - 99 mg/dL    Urea Nitrogen 37 (H) 7 - 30 mg/dL    Creatinine 7.59 (H) 0.52 - 1.04 mg/dL    GFR Estimate 6 (L) >60 mL/min/1.7m2    GFR Estimate If Black 8 (L) >60 mL/min/1.7m2    Calcium 9.1 8.5 - 10.1 mg/dL    Bilirubin Total 0.7 0.2 - 1.3 mg/dL    Albumin 4.1 3.4 - 5.0 g/dL    Protein Total 7.4 6.8 - 8.8 g/dL    Alkaline Phosphatase 43 40 - 150 U/L    ALT 12 0 - 50 U/L    AST 14 0 - 45 U/L   Cardiolipin Jocelyn IgG and IgM [[id number]]    Collection Time: 03/08/17  7:53 AM   Result Value Ref Range    Cardiolipin Antibody IgG  0.0 - 19.9 GPL-U/mL     <1.6  Negative   New method in use November 28, 2016.      Cardiolipin Antibody IgM  0.0 - 19.9 MPL-U/mL     <0.2  Negative   New method in use November 28, 2016.     HCG qualitative [[id number]]    Collection Time: 03/08/17  7:53 AM   Result Value Ref Range    HCG Qualitative Serum Negative NEG   INR [[id number]]    Collection Time: 03/08/17  7:53 AM   Result Value Ref Range    INR 1.20 (H) 0.86 - 1.14   Partial thromboplastin time [LAB56]    Collection Time: 03/08/17  7:53 AM   Result Value Ref Range    PTT 30 22 - 37 sec   Thrombin time [WWA019]    Collection Time: 03/08/17  7:53 AM   Result Value Ref Range    Thrombin Time 17.2 13.0 - 19.0 sec   Lupus panel [[id number]]    Collection Time: 03/08/17  7:53 AM   Result Value Ref Range    Lupus Result  NEG     Negative  (Note)  COMMENTS:  The INR is elevated.  APTT ratio is normal.  DRVVT Screen ratio is normal.  Thrombin time is normal.  NEGATIVE TEST; A LUPUS ANTICOAGULANT WAS NOT  DETECTED IN THIS  SPECIMEN WITHIN THE LIMITS OF THE TESTING REPERTOIRE.  If the clinical picture is strongly suggestive of an antiphospholipid  syndrome, recommend anticardiolipin and beta-2-glycoprotein (IgG and  IgM) antibody tests.  Aleksander Thomas M.D. [phone]  3/9/2017    APTT:       Ratio  Patient  =  1.03  1:2 Mix  =  N/A  Reference:  Negative: Less than or equal to 1.16  Positive: Greater than or equal to 1.17     DILUTE DERECK VIPER VENOM TEST:  Screen Ratio = 0.87   Normal is less than 1.21       CBC with platelets differential [[id number]]    Collection Time: 03/08/17  7:53 AM   Result Value Ref Range    WBC 5.0 4.0 - 11.0 10e9/L    RBC Count 3.55 (L) 3.8 - 5.2 10e12/L    Hemoglobin 11.7 11.7 - 15.7 g/dL    Hematocrit 36.0 35.0 - 47.0 %     (H) 78 - 100 fl    MCH 33.0 26.5 - 33.0 pg    MCHC 32.5 31.5 - 36.5 g/dL    RDW 14.3 10.0 - 15.0 %    Platelet Count 218 150 - 450 10e9/L    Diff Method Automated Method     % Neutrophils 72.5 %    % Lymphocytes 16.9 %    % Monocytes 5.8 %    % Eosinophils 3.0 %    % Basophils 1.2 %    % Immature Granulocytes 0.6 %    Nucleated RBCs 0 0 /100    Absolute Neutrophil 3.6 1.6 - 8.3 10e9/L    Absolute Lymphocytes 0.8 0.8 - 5.3 10e9/L    Absolute Monocytes 0.3 0.0 - 1.3 10e9/L    Absolute Eosinophils 0.2 0.0 - 0.7 10e9/L    Absolute Basophils 0.1 0.0 - 0.2 10e9/L    Abs Immature Granulocytes 0.0 0 - 0.4 10e9/L    Absolute Nucleated RBC 0.0    HLA Typing Complete SOT Recipient    Collection Time: 03/08/17  7:53 AM   Result Value Ref Range    HLA Typing Complete SOT Recipient Duplicate request    BK virus PCR quantitative [[id number]]    Collection Time: 03/08/17  7:53 AM   Result Value Ref Range    BK Virus Specimen Plasma     BK Virus Result BK Virus DNA Not Detected BKNEG copies/mL    BK Virus Log  <2.7 Log copies/mL     Not Calculated   The Real-Time quantitative BK Virus assay was developed and its performance   characteristics determined by the Infectious Diseases  Diagnostic Laboratory at   the [facility] in [place], [place]. The   primers and probes for each analyte are Analyte Specific Reagents (ASRs)   manufactured by [employer].   ASRs are used in many laboratory tests necessary for standard medical care and   generally do not require U.S. Food and Drug Administration approval. The FDA   has determined that such clearance or approval is not necessary.   This test is used for clinical purposes. It should not be regarded as   investigational or for research. This laboratory is certified under the   Clinical Laboratory Improvement Amendments of 1988 (CLIA-88) as qualified to   perform high complexity clinical laboratory testing.     CMV Antibody IgG [[id number]]    Collection Time: 03/08/17  7:53 AM   Result Value Ref Range    CMV Antibody IgG (H) 0.0 - 0.8 AI     >8.0  Positive   Antibody index (AI) values reflect qualitative changes in antibody   concentration that cannot be directly associated with clinical condition or   disease state.     EBV Capsid Antibody IgG [[id number]]    Collection Time: 03/08/17  7:53 AM   Result Value Ref Range    EBV Capsid Antibody IgG (H) 0.0 - 0.8 AI     >8.0  Positive, suggests recent or past exposure   Antibody index (AI) values reflect qualitative changes in antibody   concentration that cannot be directly associated with clinical condition or   disease state.     Hepatitis B core antibody [[id number]]    Collection Time: 03/08/17  7:53 AM   Result Value Ref Range    Hepatitis B Core Jocelyn Nonreactive NR   Hepatitis B Surface Antibody [[id number]]    Collection Time: 03/08/17  7:53 AM   Result Value Ref Range    Hepatitis B Surface Antibody (H) <8.00 m[IU]/mL     >1000.00  Reactive, Patient is considered to be immune to infection with hepatitis B when   the value is greater than or equal to 12.0 m[IU]/mL.     Hepatitis B surface antigen [RZR887]    Collection Time: 03/08/17  7:53 AM   Result Value Ref Range    Hep B  Surface Agn Nonreactive NR   Hepatitis C antibody [HBI524]    Collection Time: 03/08/17  7:53 AM   Result Value Ref Range    Hepatitis C Antibody  NR     Nonreactive   Assay performance characteristics have not been established for newborns,   infants, and children     HIV Antigen Antibody Combo Pretransplant    Collection Time: 03/08/17  7:53 AM   Result Value Ref Range    HIV Antigen Antibody Combo Pretransplant  NR     Nonreactive   HIV-1 p24 Ag & HIV-1/HIV-2 Ab Not Detected     Anti Treponema [[id number]]    Collection Time: 03/08/17  7:53 AM   Result Value Ref Range    Treponema pallidum Antibody Negative NEG   Varicella Zoster Virus Antibody IgG [[id number]]    Collection Time: 03/08/17  7:53 AM   Result Value Ref Range    Varicella Zoster Virus Antibody IgG 2.4 (H) 0.0 - 0.8 AI   Antibody titer red cell [[id number]]    Collection Time: 03/08/17  1:47 PM   Result Value Ref Range    Antibody Titer CANCELLED, DUPLICATE REQUEST    ABO type [[id number]]    Collection Time: 03/08/17  1:47 PM   Result Value Ref Range    ABO A     RH(D)  Pos     Specimen Expires 03/11/2017    General PFT Lab (Please always keep checked)    Collection Time: 03/08/17  2:01 PM   Result Value Ref Range    FVC-Pred 3.75 L    FVC-Pre 2.40 L    FVC-%Pred-Pre 64 %    FEV1-Pre 2.20 L    FEV1-%Pred-Pre 69 %    FEV1FVC-Pred 86 %    FEV1FVC-Pre 92 %    FEFMax-Pred 7.31 L/sec    FEFMax-Pre 5.25 L/sec    FEFMax-%Pred-Pre 71 %    FEF2575-Pred 3.54 L/sec    FEF2575-Pre 3.25 L/sec    EXD0852-%Pred-Pre 91 %    ExpTime-Pre 7.01 sec    FIFMax-Pre 4.05 L/sec    MVV-Pred 113 L/min    MVV-Pre 80 L/min    MVV-%Pred-Pre 70 %    VC-Pred 3.75 L    VC-Pre 2.28 L    VC-%Pred-Pre 60 %    IC-Pred 2.55 L    IC-Pre 1.64 L    IC-%Pred-Pre 64 %    ERV-Pred 1.20 L    ERV-Pre 0.64 L    ERV-%Pred-Pre 53 %    FEV1FEV6-Pred 85 %    FEV1FEV6-Pre 92 %    DLCOunc-Pred 25.75 ml/min/mmHg    DLCOunc-Pre 17.69 ml/min/mmHg    DLCOunc-%Pred-Pre 68 %    DLCOcor-Pre 18.74 ml/min/mmHg     DLCOcor-%Pred-Pre 72 %    VA-Pre 2.99 L    VA-%Pred-Pre 55 %    FEV1SVC-Pred 85 %    FEV1SVC-Pre 96 %   Routine UA with microscopic [[id number]]    Collection Time: 03/08/17  4:32 PM   Result Value Ref Range    Color Urine Straw     Appearance Urine Slightly Cloudy     Glucose Urine 250 (A) NEG mg/dL    Bilirubin Urine Negative NEG    Ketones Urine Negative NEG mg/dL    Specific Gravity Urine 1.010 1.003 - 1.035    Blood Urine Small (A) NEG    pH Urine 8.5 (H) 5.0 - 7.0 pH    Protein Albumin Urine 100 (A) NEG mg/dL    Urobilinogen mg/dL 0.2 0.0 - 2.0 mg/dL    Nitrite Urine Negative NEG    Leukocyte Esterase Urine Small (A) NEG    Source Midstream Urine     WBC Urine 4 (H) 0 - 2 /HPF    RBC Urine 3 (H) 0 - 2 /HPF    Bacteria Urine Few (A) NEG /HPF    Squamous Epithelial /HPF Urine >50 (H) 0 - 1 /HPF   EKG 12-lead, tracing only [EKG1]    Collection Time: 03/08/17  4:34 PM   Result Value Ref Range    Interpretation ECG Click View Image link to view waveform and result        Patient was seen by myself, Dr. Mahesh Armando, in conjunction with Jayden Sheikh NP as part of a shared visit.    I personally reviewed past medical and surgical history, vital signs, medications and labs.  Present and past medical history, along with significant physical exam findings were all reviewed with CONNIE.    My bueno findings:  Jelly Dietz is 32 year old, who presents for Kidney transplant evaluation.    Key management decisions made by me and discussed with CONNIE:  Reasonable candidate pending the resolution of the above

## 2017-03-14 LAB — COPATH REPORT: NORMAL

## 2017-03-15 ENCOUNTER — COMMITTEE REVIEW (OUTPATIENT)
Dept: TRANSPLANT | Facility: CLINIC | Age: 32
End: 2017-03-15

## 2017-03-15 ENCOUNTER — TELEPHONE (OUTPATIENT)
Dept: TRANSPLANT | Facility: CLINIC | Age: 32
End: 2017-03-15

## 2017-03-15 LAB — UNOS CPRA: 81

## 2017-03-15 NOTE — LETTER
March 15, 2017    Jelly PALACIOS Mayemike  [address]    [place] [place] [place] [place] [address]      Dear Jelly,    It was a pleasure to see you here recently for consideration of a kidney transplant. You began your evaluation on March 8,2017. Your evaluation results were reviewed and discussed at our Multidisciplinary Selection Team Meeting on March 15,2017. You have been approved as a kidney transplant candidate pending the successful completion of your evaluation. The Committee is recommending several items to be completed before we can list you.    1. Compliance contract for a period of 6 months. This compliance contract will be sent to you to be signed and returned in the postage paid envelope provided.  We are asking you to attend all  dialysis sessions and not cut any runs short. Keep all medical appointments and take all prescribed medications. Your nephrologist will need to submit a note to our office at the end of the 6 month time frame documenting your compliance and his/her opinion of you being able to be compliant post transplant,  2. Have a PAP smear.Please call me when complete.    When you have completed the above you can be added to the [facility] wait list and your waiting time will start from the start time of your dialysis. If you have any living donors please have them register online at [email or web address] or call the living donor line at [phone] to begin the evaluation process.    Please feel free to call me with any questions at [phone].        Sincerely,     Jeanette Valdovinos RN BSN Pre Kidney and Pancreas Transplant Coordinator    CC:Sailaja Landin, Katherine Varma, [facility]

## 2017-03-15 NOTE — COMMITTEE REVIEW
Abdominal Committee Review Note     Evaluation Date: 3/8/2017  Committee Review Date: 3/15/2017    Organ being evaluated for: Kidney    Transplant Phase: Evaluation  Transplant Status: Active    Transplant Coordinator: Yasemin Valdovinos  Transplant Surgeon:       Referring Physician:     Primary Diagnosis: IgA Nephropathy  Secondary Diagnosis:     Committee Review Members:  Chandan, Mimi Alegre RD   Nurse Practitioner Jayden Sheikh, APRN CNP, Mahesh Armando MD   Pharmacist Nika Silverman    - Clinical Makenzie Desai, MSW, Marianne Gutierrez, MSW   Transplant Amanda Caballero, PERI, Keesha Lang LPN, Michelle Murray, PERI, Mulu Cardenas, PERI, Kieran Anglin MD, Iglesia Mayer MD       Transplant Eligibility: Irreversible chronic kidney disease treated w/dialysis or expected need for dialysis    Committee Review Decision: Approved    Relative Contraindications: Other, history of noncompliance    Absolute Contraindications: None    Committee Chair Iglesia Mayer MD verbally attested to the committee's decision.    Committee Discussion Details: Reviewed patient's medical records and evaluation results. Patient is on dialysis. She is approved as a candidate pending the successful completion of a 6 month compliance contract of not missing or cutting dialysis runs short and keeping all medical appointments and taking all of her medications. She has a potential donor in a brother that is currently in long term. Once he is released and  that is sorted out she needs to visit with a transplant surgeon to have a halina discussion centering around absolute compliance with post transplant expectations. At that visit, it will be decided if she does need to see cardiology. She does not need to see dermatology at this point as a previous transplant patient. She does need a PAP. Patient will be called, compliance contract will be sent as well as UNOS letter.

## 2017-03-15 NOTE — TELEPHONE ENCOUNTER
Spoke to Jelly today to let her know the outcome of the Multidisciplinary Selection Team Meeting. She is approved as a candidate but will not be listed until she completes her health maintenance and a 6 month compliance contract. She is due to have a PAP. When we discussed her compliance contract she had a few comments, She said she would sign it after she moves. She is planning on moving closer to the [place] and has 60 days to find another apartment. She has 2 special needs children and she is a single parent. Her former partner does not help in anyway with the children, She does have family in [facility] but they are not available to help her during the week as they have jobs and are in school. She felt she could not sign a compliance contract until she moves and is settled. She felt her story was incorrect. She said she did have an episode of rejection in 2009 that was successfully treated. She gave birth to her son 2012 and had no problem. She was given the go ahead to another child by her nephrologist and delivered a premature baby by emergency C section. She tells me of an overdose of magnesium and benadryl and ended up on emergent dialysis and lost her kidney. She is asking that this information be re presented to the Committee as she is sincerely trying the best she can to take care of her children and herself. She desperately wants a transplant. Her brother is a potential donor and currently is due to get out of residential on April 24,2017 and will register online. I told Jelly that I must follow the guidelines from the Committee but I will ask at next week's meeting to hear of her concerns. She understood and had no further questions. She feels once she has moved she will be better able to get to dialysis and complete the runs. She states she does take all of her medications.

## 2017-03-16 ENCOUNTER — DOCUMENTATION ONLY (OUTPATIENT)
Dept: TRANSPLANT | Facility: CLINIC | Age: 32
End: 2017-03-16

## 2017-03-16 LAB
SA1 CELL: NORMAL
SA1 COMMENTS: NORMAL
SA1 HI RISK ABY: NORMAL
SA1 MOD RISK ABY: NORMAL
SA1 TEST METHOD: NORMAL
SA2 CELL: NORMAL
SA2 COMMENTS: NORMAL
SA2 HI RISK ABY UA: NORMAL
SA2 MOD RISK ABY: NORMAL
SA2 TEST METHOD: NORMAL

## 2017-03-25 PROBLEM — Z01.818 PRE-TRANSPLANT EVALUATION FOR KIDNEY TRANSPLANT: Status: ACTIVE | Noted: 2017-03-25

## 2017-04-03 LAB
DLCOCOR-%PRED-PRE: 72 %
DLCOCOR-PRE: 18.74 ML/MIN/MMHG
DLCOUNC-%PRED-PRE: 68 %
DLCOUNC-PRE: 17.69 ML/MIN/MMHG
DLCOUNC-PRED: 25.75 ML/MIN/MMHG
ERV-%PRED-PRE: 53 %
ERV-PRE: 0.64 L
ERV-PRED: 1.2 L
EXPTIME-PRE: 7.01 SEC
FEF2575-%PRED-PRE: 91 %
FEF2575-PRE: 3.25 L/SEC
FEF2575-PRED: 3.54 L/SEC
FEFMAX-%PRED-PRE: 71 %
FEFMAX-PRE: 5.25 L/SEC
FEFMAX-PRED: 7.31 L/SEC
FEV1-%PRED-PRE: 69 %
FEV1-PRE: 2.2 L
FEV1FEV6-PRE: 92 %
FEV1FEV6-PRED: 85 %
FEV1FVC-PRE: 92 %
FEV1FVC-PRED: 86 %
FEV1SVC-PRE: 96 %
FEV1SVC-PRED: 85 %
FIFMAX-PRE: 4.05 L/SEC
FVC-%PRED-PRE: 64 %
FVC-PRE: 2.4 L
FVC-PRED: 3.75 L
IC-%PRED-PRE: 64 %
IC-PRE: 1.64 L
IC-PRED: 2.55 L
MVV-%PRED-PRE: 70 %
MVV-PRE: 80 L/MIN
MVV-PRED: 113 L/MIN
VA-%PRED-PRE: 55 %
VA-PRE: 2.99 L
VC-%PRED-PRE: 60 %
VC-PRE: 2.28 L
VC-PRED: 3.75 L

## 2017-04-30 ENCOUNTER — TELEPHONE (OUTPATIENT)
Dept: OTHER | Facility: CLINIC | Age: 32
End: 2017-04-30

## 2017-04-30 ENCOUNTER — TELEPHONE (OUTPATIENT)
Dept: NURSING | Facility: CLINIC | Age: 32
End: 2017-04-30

## 2017-04-30 ENCOUNTER — HOSPITAL ENCOUNTER (OUTPATIENT)
Facility: CLINIC | Age: 32
Setting detail: OBSERVATION
Discharge: HOME | End: 2017-04-30
Attending: FAMILY MEDICINE | Admitting: ORTHOPAEDIC SURGERY
Payer: MEDICARE

## 2017-04-30 VITALS
DIASTOLIC BLOOD PRESSURE: 100 MMHG | SYSTOLIC BLOOD PRESSURE: 149 MMHG | RESPIRATION RATE: 16 BRPM | OXYGEN SATURATION: 100 % | HEART RATE: 72 BPM | TEMPERATURE: 98.2 F

## 2017-04-30 DIAGNOSIS — Z99.2 ESRD (END STAGE RENAL DISEASE) ON DIALYSIS (H): ICD-10-CM

## 2017-04-30 DIAGNOSIS — R06.02 SOB (SHORTNESS OF BREATH): ICD-10-CM

## 2017-04-30 DIAGNOSIS — E87.79 OTHER HYPERVOLEMIA: ICD-10-CM

## 2017-04-30 DIAGNOSIS — E87.5 HYPERKALEMIA: ICD-10-CM

## 2017-04-30 DIAGNOSIS — N18.6 ESRD (END STAGE RENAL DISEASE) ON DIALYSIS (H): ICD-10-CM

## 2017-04-30 LAB
ANION GAP SERPL CALCULATED.3IONS-SCNC: 13 MMOL/L (ref 3–14)
BASOPHILS # BLD AUTO: 0 10E9/L (ref 0–0.2)
BASOPHILS NFR BLD AUTO: 0.4 %
BUN SERPL-MCNC: 87 MG/DL (ref 7–30)
CALCIUM SERPL-MCNC: 9.4 MG/DL (ref 8.5–10.1)
CHLORIDE SERPL-SCNC: 100 MMOL/L (ref 94–109)
CO2 SERPL-SCNC: 23 MMOL/L (ref 20–32)
CREAT SERPL-MCNC: 13.9 MG/DL (ref 0.52–1.04)
DIFFERENTIAL METHOD BLD: ABNORMAL
EOSINOPHIL # BLD AUTO: 0.2 10E9/L (ref 0–0.7)
EOSINOPHIL NFR BLD AUTO: 3.9 %
ERYTHROCYTE [DISTWIDTH] IN BLOOD BY AUTOMATED COUNT: 14.8 % (ref 10–15)
GFR SERPL CREATININE-BSD FRML MDRD: 3 ML/MIN/1.7M2
GLUCOSE SERPL-MCNC: 105 MG/DL (ref 70–99)
HCT VFR BLD AUTO: 33.8 % (ref 35–47)
HGB BLD-MCNC: 10.8 G/DL (ref 11.7–15.7)
IMM GRANULOCYTES # BLD: 0 10E9/L (ref 0–0.4)
IMM GRANULOCYTES NFR BLD: 0.4 %
LYMPHOCYTES # BLD AUTO: 1.1 10E9/L (ref 0.8–5.3)
LYMPHOCYTES NFR BLD AUTO: 21.2 %
MCH RBC QN AUTO: 32 PG (ref 26.5–33)
MCHC RBC AUTO-ENTMCNC: 32 G/DL (ref 31.5–36.5)
MCV RBC AUTO: 100 FL (ref 78–100)
MONOCYTES # BLD AUTO: 0.2 10E9/L (ref 0–1.3)
MONOCYTES NFR BLD AUTO: 3.5 %
NEUTROPHILS # BLD AUTO: 3.7 10E9/L (ref 1.6–8.3)
NEUTROPHILS NFR BLD AUTO: 70.6 %
NRBC # BLD AUTO: 0 10*3/UL
NRBC BLD AUTO-RTO: 0 /100
PLATELET # BLD AUTO: 117 10E9/L (ref 150–450)
POTASSIUM SERPL-SCNC: 5.6 MMOL/L (ref 3.4–5.3)
RBC # BLD AUTO: 3.38 10E12/L (ref 3.8–5.2)
SODIUM SERPL-SCNC: 136 MMOL/L (ref 133–144)
WBC # BLD AUTO: 5.2 10E9/L (ref 4–11)

## 2017-04-30 PROCEDURE — 25000128 H RX IP 250 OP 636: Performed by: HOSPITALIST

## 2017-04-30 PROCEDURE — 36415 COLL VENOUS BLD VENIPUNCTURE: CPT | Performed by: FAMILY MEDICINE

## 2017-04-30 PROCEDURE — 99207 ZZC APP CREDIT; MD BILLING SHARED VISIT: CPT | Performed by: PHYSICIAN ASSISTANT

## 2017-04-30 PROCEDURE — 99285 EMERGENCY DEPT VISIT HI MDM: CPT | Mod: 25 | Performed by: FAMILY MEDICINE

## 2017-04-30 PROCEDURE — 90937 HEMODIALYSIS REPEATED EVAL: CPT

## 2017-04-30 PROCEDURE — 80048 BASIC METABOLIC PNL TOTAL CA: CPT | Performed by: FAMILY MEDICINE

## 2017-04-30 PROCEDURE — 99236 HOSP IP/OBS SAME DATE HI 85: CPT | Performed by: ORTHOPAEDIC SURGERY

## 2017-04-30 PROCEDURE — 99283 EMERGENCY DEPT VISIT LOW MDM: CPT | Mod: 25 | Performed by: FAMILY MEDICINE

## 2017-04-30 PROCEDURE — 93005 ELECTROCARDIOGRAM TRACING: CPT | Performed by: FAMILY MEDICINE

## 2017-04-30 PROCEDURE — 85025 COMPLETE CBC W/AUTO DIFF WBC: CPT | Performed by: FAMILY MEDICINE

## 2017-04-30 PROCEDURE — 93010 ELECTROCARDIOGRAM REPORT: CPT | Mod: Z6 | Performed by: FAMILY MEDICINE

## 2017-04-30 RX ORDER — ONDANSETRON 4 MG/1
4 TABLET, ORALLY DISINTEGRATING ORAL EVERY 6 HOURS PRN
Status: DISCONTINUED | OUTPATIENT
Start: 2017-04-30 | End: 2017-04-30 | Stop reason: HOSPADM

## 2017-04-30 RX ORDER — AMLODIPINE BESYLATE 10 MG/1
10 TABLET ORAL DAILY
Status: DISCONTINUED | OUTPATIENT
Start: 2017-05-01 | End: 2017-04-30 | Stop reason: HOSPADM

## 2017-04-30 RX ORDER — ACETAMINOPHEN 325 MG/1
325-650 TABLET ORAL EVERY 6 HOURS PRN
Status: DISCONTINUED | OUTPATIENT
Start: 2017-04-30 | End: 2017-04-30 | Stop reason: HOSPADM

## 2017-04-30 RX ORDER — CALCIUM CARBONATE 500 MG/1
1 TABLET, CHEWABLE ORAL 3 TIMES DAILY PRN
Status: ON HOLD | COMMUNITY
End: 2018-08-24

## 2017-04-30 RX ORDER — NALOXONE HYDROCHLORIDE 0.4 MG/ML
.1-.4 INJECTION, SOLUTION INTRAMUSCULAR; INTRAVENOUS; SUBCUTANEOUS
Status: DISCONTINUED | OUTPATIENT
Start: 2017-04-30 | End: 2017-04-30 | Stop reason: HOSPADM

## 2017-04-30 RX ORDER — LIDOCAINE 40 MG/G
CREAM TOPICAL
Status: DISCONTINUED | OUTPATIENT
Start: 2017-04-30 | End: 2017-04-30 | Stop reason: HOSPADM

## 2017-04-30 RX ORDER — POLYETHYLENE GLYCOL 3350 17 G/17G
17 POWDER, FOR SOLUTION ORAL DAILY PRN
Status: DISCONTINUED | OUTPATIENT
Start: 2017-04-30 | End: 2017-04-30 | Stop reason: HOSPADM

## 2017-04-30 RX ORDER — CLONIDINE HYDROCHLORIDE 0.1 MG/1
0.1 TABLET ORAL 3 TIMES DAILY PRN
Status: DISCONTINUED | OUTPATIENT
Start: 2017-04-30 | End: 2017-04-30 | Stop reason: HOSPADM

## 2017-04-30 RX ORDER — CETIRIZINE HYDROCHLORIDE 10 MG/1
10 TABLET ORAL AT BEDTIME
Status: DISCONTINUED | OUTPATIENT
Start: 2017-04-30 | End: 2017-04-30 | Stop reason: HOSPADM

## 2017-04-30 RX ORDER — CARVEDILOL 25 MG/1
25 TABLET ORAL 2 TIMES DAILY WITH MEALS
Status: DISCONTINUED | OUTPATIENT
Start: 2017-04-30 | End: 2017-04-30 | Stop reason: HOSPADM

## 2017-04-30 RX ORDER — ONDANSETRON 2 MG/ML
4 INJECTION INTRAMUSCULAR; INTRAVENOUS EVERY 6 HOURS PRN
Status: DISCONTINUED | OUTPATIENT
Start: 2017-04-30 | End: 2017-04-30 | Stop reason: HOSPADM

## 2017-04-30 RX ORDER — CETIRIZINE HYDROCHLORIDE 10 MG/1
10 TABLET ORAL
COMMUNITY
End: 2020-08-07

## 2017-04-30 RX ADMIN — SODIUM CHLORIDE 1000 ML: 9 INJECTION, SOLUTION INTRAVENOUS at 14:45

## 2017-04-30 RX ADMIN — SODIUM CHLORIDE 250 ML: 9 INJECTION, SOLUTION INTRAVENOUS at 14:45

## 2017-04-30 ASSESSMENT — ENCOUNTER SYMPTOMS
EYE REDNESS: 0
DIFFICULTY URINATING: 0
FEVER: 0
CONFUSION: 0
ABDOMINAL PAIN: 0
NECK STIFFNESS: 0
ARTHRALGIAS: 0
COLOR CHANGE: 0
HEADACHES: 0

## 2017-04-30 NOTE — TELEPHONE ENCOUNTER
Call Type: Triage Call    Presenting Problem: Diaylsis Patient , due for it yesterday   but  missed it , and wants to speak to on-call provider about getting  diaylasis today  . [facility] Transplant Patient -DR Eric Varma  .  Page alma العلي   to armando Sanchez transplant coordinator to  Patient cell phone [phone].  Triage Note:  Guideline Title: No Guideline - Advice Per Reference (Adult)  Recommended Disposition: Call Provider Immediately  Original Inclination: Did not know what to do  Override Disposition:  Intended Action: Call PCP/HCP  Physician Contacted: No  CALL PROVIDER IMMEDIATELY ?  YES  SEE ED IMMEDIATELY ? NO  ACTIVATE  ? NO  Physician Instructions:  Care Advice:

## 2017-04-30 NOTE — CONSULTS
Nephrology Initial Consult  April 30, 2017      Jelly Dietz MRN:[id number] YOB: 1985  Date of Admission:4/30/2017  Primary care provider: Sailaja Landin  Requesting physician: Katlin att. 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     Recommendations :-    - ESRD on HD . Received dialysis at [facility] under care of Dr Varma. Follows TThS schedule. Missed HD yesterday.   - HD today for 3 Hrs with 3-4 L as BP tolerates maintaining MAP >65.  - Serum K 5.6. HD with 2K bath.   - access LUE AVF.   - Patient to resume HD at her outpatient dialysis unit as scheduled upon discharge.     To be staffed in AM if patient still inpatient.     Recommendations were communicated to primary team via note.       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      Patient reported SOB. Lower extremities swollen. Patient missed HD yesterday. No nausea, vomiting , diarrhea or other complaints.     PAST MEDICAL HISTORY:  Reviewed with patient on 04/30/2017     Past Medical History:   Diagnosis Date     ACS (acute coronary syndrome) (H) 11/11/2014     Allergic state     seasonal allergies     Anemia in chronic renal disease      Cervical cerclage suture present in second trimester      Chest pain  2014     Chronic renal transplant rejection      End stage kidney disease (H)      Heart murmur      History of  delivery ,     30 wks, 28 wks      Hypertension     resolved after transplant     IgA nephropathy      MRSA (methicillin resistant Staphylococcus aureus)      [facility]     Pre-eclampsia      Renal failure affecting pregnancy in second trimester      S/P kidney transplant     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 History of 1B kidney rejection      SAB (spontaneous )     2nd trimester        Past Surgical History:   Procedure Laterality Date     CERCLAGE CERVICAL N/A 2015    Procedure: CERCLAGE CERVICAL;  Surgeon: Norbert Chopra MD;  [facility]      SECTION  2012    Procedure:  SECTION;;  Surgeon: Chelsea Cross MD;  [facility] L[facility]      SECTION N/A 2015    Procedure:  SECTION;  Surgeon: Chelsea Cross MD;  Location: [facility] OR     cesearean section       EXPLANT TRANSPLANTED KIDNEY  3/29/2013    Procedure: EXPLANT TRANSPLANTED KIDNEY;  Explant Transplanted right Kidney (from patients right iliac fossa);  Surgeon: Nory Morgan MD;  [facility] OR     NEPHRECTOMY  3/29/13    Transplant nephrectomy      WILIAN/DIALYSIS CATHETER       TRANSPLANT KIDNEY RECIPIENT LIVING UNRELATED  Dec 2007    (Adult male in [place])        MEDICATIONS:  PTA Meds  Prior to Admission medications    Medication Sig Last Dose Taking? Auth Provider   cetirizine (ZYRTEC) 10 MG tablet Take 10 mg by mouth At Bedtime 2017 Yes Unknown, Entered By History   calcium carbonate (TUMS) 500 MG chewable tablet Take 1 chew tab by mouth 3 times daily as needed for heartburn  Yes Unknown, Entered By History   carvedilol (COREG) 25 MG tablet Take 1 tablet (25 mg) by mouth 2 times daily (with meals) 2017 Yes Katherine Varma MD    cloNIDine (CATAPRES) 0.1 MG tablet Take 1 tablet (0.1 mg) by mouth 3 times daily as needed 2017 Yes Katherine Varma MD   amLODIPine (NORVASC) 10 MG tablet Take 1 tablet (10 mg) by mouth daily 2017 Yes Katherine Varma MD   acetaminophen (TYLENOL) 325 MG tablet Take 1-2 tablets by mouth every 4 hours as needed.  Yes Marni Arteaga, DO      Current Meds    gelatin absorbable  1 each Topical During Hemodialysis (from stock)     - MEDICATION INSTRUCTIONS -   Does not apply Once            ALLERGIES:    No Known Allergies    REVIEW OF SYSTEMS:  A 10 point review of systems was negative except as noted above.    SOCIAL HISTORY:   Social History     Social History     Marital status: Single     Spouse name: N/A     Number of children: N/A     Years of education: N/A     Occupational History     Not on file.     Social History Main Topics     Smoking status: Never Smoker     Smokeless tobacco: Never Used     Alcohol use No     Drug use: No     Sexual activity: Yes     Partners: Male     Other Topics Concern     Blood Transfusions Yes     Multiple in [place] none in [place]     Caffeine Concern No     1s/d     Occupational Exposure No     Hobby Hazards No     Sleep Concern No     Stress Concern No     Weight Concern No     Special Diet No     Back Care No     Exercise No     walk 20' daily     Bike Helmet No     Seat Belt No     Social History Narrative    Currently unemployed.  Lives in [place].   with one son.       Reviewed with patient   FAMILY MEDICAL HISTORY:   Family History   Problem Relation Age of Onset     DIABETES Paternal Grandfather      Breast Cancer Maternal Grandmother 55     CANCER No family hx of      No known family hx of skin cancer     Reviewed with patient     PHYSICAL EXAM:   Temp  Av.2  F (36.8  C)  Min: 98  F (36.7  C)  Max: 98.4  F (36.9  C)      Pulse  Av.3  Min: 88  Max: 90 Resp  Av.3  Min: 16  Max: 18  SpO2  Av %  Min: 98 %  Max: 100 %        BP (!) 152/112  Pulse 90  Temp 98.4  F (36.9  C) (Oral)  Resp 16  LMP 03/14/2017  SpO2 98%       Admit       GENERAL APPEARANCE: no acute  distress,  awake  EYES: no  scleral icterus, pupils equal  HENT: NC/AT,  mouth  without ulcers or lesions  Lymphatics: no cervical or supraclavicular LAD  Pulmonary: lungs clear to auscultation with equal breath sounds bilaterally, no clubbing  CV: regular rhythm, normal rate, no rub    - Edema 2+  GI: soft, nontender, normal bowel sounds, no HSM   MS: no evidence of inflammation in joints, no muscle tenderness  : no Bauman  SKIN: no rash, warm, dry, no cyanosis  NEURO: mentation intact and speech normal    LABS:   CMP  Recent Labs  Lab 04/30/17  1214      POTASSIUM 5.6*   CHLORIDE 100   CO2 23   ANIONGAP 13   *   BUN 87*   CR 13.90*   GFRESTIMATED 3*   GFRESTBLACK 4*   CASIE 9.4     CBC  Recent Labs  Lab 04/30/17  1214   HGB 10.8*   WBC 5.2   RBC 3.38*   HCT 33.8*      MCH 32.0   MCHC 32.0   RDW 14.8   *       URINE STUDIES  Recent Labs   Lab Test  03/08/17   1632  01/25/17   1920  10/19/15   0550  07/07/15   1815   COLOR  Straw  Straw  Yellow  Light Yellow   APPEARANCE  Slightly Cloudy  Slightly Cloudy  Slightly Cloudy  Slightly Cloudy   URINEGLC  250*  70*  Negative  70*   URINEBILI  Negative  Negative  Negative  Negative   URINEKETONE  Negative  Negative  Negative  Negative   SG  1.010  1.006  1.005  1.006   UBLD  Small*  Negative  Moderate*  Negative   URINEPH  8.5*  7.5*  8.5*  8.5*   PROTEIN  100*  30*  300*  30*   NITRITE  Negative  Negative  Negative  Negative   LEUKEST  Small*  Negative  Small*  Negative   RBCU  3*  1  2  3*   WBCU  4*  2  12*  3*     Recent Labs   Lab Test  07/16/12   1400  07/02/12   1130  06/25/12   0615  06/17/12   0930  06/13/12   1120  06/07/12   0643  06/01/12   0950  05/25/12   1830  05/25/12   1110  05/22/12   1536  02/20/12   0800  04/29/10   1736  01/28/10   1903   UTPG  1.53*  0.86*  0.69*  0.82*  0.80*   0.88*  0.58*  0.47*  0.45*  0.56*  0.51*  0.42*  0.10  0.48*     PTH  Recent Labs   Lab Test  11/30/12   2030  11/30/12   1030  03/09/12   1304  04/29/10   1749   PTHI  500*  794*  198*  211*     IRON STUDIES  Recent Labs   Lab Test  11/30/12   1950  03/09/12   1304  04/29/10   1749  01/28/10   1824   IRON  125  33*  96  23*   FEB  155*  196*  207*  211*   IRONSAT  81*  17  46  11*   SHAHBAZ  401*  87  823*  129*       IMAGING:  Reviewed.     Kati Oconnell MD

## 2017-04-30 NOTE — PROGRESS NOTES
Pt received full HD run, removing 4 kg. VSS, remains hypertensive but improved since admission; MD aware. MD DC'd pt to home. Security notified of transportation needs to [place] where pt's car is.  DC summary reviewed and signed with pt, no further questions at this time. Pt accompanied with staff to lobby.

## 2017-04-30 NOTE — PHARMACY-ADMISSION MEDICATION HISTORY
Admission Medication History status for the 4/30/2017 admission is complete.  See EPIC admission navigator for Prior to Admission medications.    Medication history sources:  Patient    Medication history source reliability: Good; the patient knew her medications and doses well    Medication adherence:  Good; the patient reports she takes her medications regularly    Changes made to PTA medication list (reason)  Added:  - Cetirizine 10 mg po qHS per patient  - Calcium carbonate 500 mg po TID PRN heartburn per patient  Deleted: None  Changed: None    Additional medication history information (including reliability of information, actions taken by pharmacist): None    Time spent in this activity: 10 minutes    Medication history completed by:  Mellisa Linares, PharmD    Prior to Admission medications    Medication Sig Last Dose Taking? Auth Provider   cetirizine (ZYRTEC) 10 MG tablet Take 10 mg by mouth At Bedtime 4/29/2017 Yes Unknown, Entered By History   calcium carbonate (TUMS) 500 MG chewable tablet Take 1 chew tab by mouth 3 times daily as needed for heartburn  Yes Unknown, Entered By History   carvedilol (COREG) 25 MG tablet Take 1 tablet (25 mg) by mouth 2 times daily (with meals) 4/29/2017 Yes Katherine Varma MD   cloNIDine (CATAPRES) 0.1 MG tablet Take 1 tablet (0.1 mg) by mouth 3 times daily as needed 4/29/2017 Yes Katherine Varma MD   amLODIPine (NORVASC) 10 MG tablet Take 1 tablet (10 mg) by mouth daily 4/29/2017 Yes Katherine Varma MD   acetaminophen (TYLENOL) 325 MG tablet Take 1-2 tablets by mouth every 4 hours as needed.  Yes Marni Arteaga, DO

## 2017-04-30 NOTE — IP AVS SNAPSHOT
[address] [address]    Phone:  [phone]                                       After Visit Summary   4/30/2017    Jelly Dietz    MRN: [id number]           After Visit Summary Signature Page     I have received my discharge instructions, and my questions have been answered. I have discussed any challenges I see with this plan with the nurse or doctor.    ..........................................................................................................................................  Patient/Patient Representative Signature      ..........................................................................................................................................  Patient Representative Print Name and Relationship to Patient    ..................................................               ................................................  Date                                            Time    ..........................................................................................................................................  Reviewed by Signature/Title    ...................................................              ..............................................  Date                                                            Time

## 2017-04-30 NOTE — H&P
[facility] [place]    Internal Medicine History and Physical - Gold Service       Date of Admission:  4/30/2017    Assessment & Plan   Jelly Dietz is a 32 year old female with a past medical history of HTN, anemia, anxiety, IgA nephropathy s/p LDKT c/b acute cellular rejection, kidney explanted 2013 and now w/ ESKD on HD TuThSat admitted on 4/30/2017 for HTN and hyperkalemia after missed dialysis run.     #ESKD, hyperkalemia: 2/2 IgA nephropathy s/p LDKT in 2007 w/ rejection. Dialyzes TuThSat, missed run yesterday as  canceled. K 5.6, BUN 87, CO2 23 w/ normal AG. Hypertensive w/ BP in 140s/100s.  -Nephrology consulted  -HD run per Nephrology  -Will need to FU w/ [facility] as scheduled on Tuesday    #HTN: On coreg 25 mg BID, amlodipine 10 mg qday, clonidine 0.1 mg TID PRN. BP elevated.  -Continue PTA medications  -Volume removal per HD  -Monitor BP    #Normocytic anemia: Hemoglobin of 10.8 w/ MCV of 100.   -Epo and venofer per nephrology    #Thrombocytopenia: Platelet count of 117, intermittently low since 11/2015.  -Recheck in AM  -FU w/ nephrology and PCP as OP for further work up    #GERD: On Tums PRN. Continue.   #Seasonal allergies: On Zyrtec qhs. Continue.     Diet:  Renal diet  Fluids: None, PO  DVT Prophylaxis: Low Risk/Ambulatory with no VTE prophylaxis indicated  Code Status: Prior    Disposition Plan   Expected discharge: Today vs. Tomorrow; recommended to prior living arrangement once medically stable after HD.     Entered: Claudia Dominguez 04/30/2017, 3:12 PM   Information in the above section will display in the discharge planner report.    The patient's care was discussed with the Attending Physician, Dr. Borges.    GEMRAN Talley  Internal Medicine [facility] Hospitalist Service  [facility]  Pager: [phone]  Please see sticky note for cross cover information    10 point ROS is negative except as mentioned in the HPI  PMH, PSH, medications,  "SH, and FMH were reviewed and confirmed by myself    Labs and VS reviewed    BP (!) 149/100  Pulse 72  Temp 98.2  F (36.8  C) (Oral)  Resp 16  LMP 2017  SpO2 100%  Well appearing, on dialysis    I saw and evaluated this patient with GERMAN Orellana. I agree with his/her assessment of Jelly. Plan for today is to run her on dalysis, plan for discharge afterwards.     Rob    ______________________________________________________________________    Chief Complaint   \"I feel fine\"    History is obtained from the patient    History of Present Illness   Jelly Dietz is a 32 year old female with a past medical history of HTN, anemia, anxiety, IgA nephropathy s/p LDKT c/b acute cellular rejection and now w/ ESKD on HD TuThSat admitted on 2017 for HTN and hyperkalemia after missed dialysis run.     The patient states that currently she feels fine. She notes she was a bit short of breath earlier, but now that she is dialyzing this has resolved. She denies any cough, chest pain, abdominal pain, nausea/vomiting, diarrhea. She is eager to go home to her children after HD if possible.     Review of Systems   The 10 point Review of Systems is negative other than noted in the HPI or here.     Past Medical History    I have reviewed this patient's medical history and updated it with pertinent information if needed.   Past Medical History:   Diagnosis Date     ACS (acute coronary syndrome) (H) 2014     Allergic state     seasonal allergies     Anemia in chronic renal disease      Cervical cerclage suture present in second trimester      Chest pain 2014     Chronic renal transplant rejection      End stage kidney disease (H)      Heart murmur      History of  delivery ,     30 wks, 28 wks      Hypertension     resolved after transplant     IgA nephropathy      MRSA (methicillin resistant Staphylococcus aureus)      [facility]     Pre-eclampsia      Renal failure affecting pregnancy in second trimester      S/P kidney transplant     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 History of 1B kidney rejection      SAB (spontaneous )     2nd trimester       Past Surgical History   I have reviewed this patient's surgical history and updated it with pertinent information if needed.  Past Surgical History:   Procedure Laterality Date     CERCLAGE CERVICAL N/A 2015    Procedure: CERCLAGE CERVICAL;  Surgeon: Norbert Chopra MD;  [facility]      SECTION  2012    Procedure:  SECTION;;  Surgeon: Chelsea Cross MD;  [facility]      SECTION N/A 2015    Procedure:  SECTION;  Surgeon: Chelsea Corss MD;  [facility]     cesearean section       EXPLANT TRANSPLANTED KIDNEY  3/29/2013    Procedure: EXPLANT TRANSPLANTED KIDNEY;  Explant Transplanted right Kidney (from patients right iliac fossa);  Surgeon: Nory Morgan MD;  [facility]     NEPHRECTOMY  3/29/13    Transplant nephrectomy      WILIAN/DIALYSIS CATHETER       TRANSPLANT KIDNEY RECIPIENT LIVING UNRELATED  Dec 2007    (Adult male in [place])       Social History   Social History   Substance Use Topics     Smoking status: Never Smoker     Smokeless tobacco: Never Used     Alcohol use No   Lives in a home w/ her 2 children ages 4.5 and 1.5.    Family History   I have reviewed this patient's family history and updated it with pertinent information if needed.   Family History   Problem Relation Age of Onset     DIABETES Paternal Grandfather      Breast Cancer Maternal Grandmother 55     CANCER No family hx of      No known family hx of skin cancer       Prior to Admission Medications   Prior to Admission Medications   Prescriptions Last Dose Informant Patient Reported? Taking?   acetaminophen (TYLENOL) 325 MG tablet  Self No Yes   Sig: Take 1-2 tablets by mouth every 4 hours as  needed.   amLODIPine (NORVASC) 10 MG tablet 4/29/2017 Self No Yes   Sig: Take 1 tablet (10 mg) by mouth daily   calcium carbonate (TUMS) 500 MG chewable tablet  Self Yes Yes   Sig: Take 1 chew tab by mouth 3 times daily as needed for heartburn   carvedilol (COREG) 25 MG tablet 4/29/2017 Self No Yes   Sig: Take 1 tablet (25 mg) by mouth 2 times daily (with meals)   cetirizine (ZYRTEC) 10 MG tablet 4/29/2017 Self Yes Yes   Sig: Take 10 mg by mouth At Bedtime   cloNIDine (CATAPRES) 0.1 MG tablet 4/29/2017 Self No Yes   Sig: Take 1 tablet (0.1 mg) by mouth 3 times daily as needed      Facility-Administered Medications: None     Allergies   No Known Allergies    Physical Exam   Vital Signs: Temp: 98.4  F (36.9  C) Temp src: Oral BP: (!) 152/112 Pulse: 90   Resp: 16 SpO2: 98 % O2 Device: None (Room air)    Weight: 0 lbs 0 oz  GENERAL: Alert and oriented x 3. Sitting comfortably in bed running on HD.  HEENT: Anicteric sclera. Mucous membranes moist and without lesions.   CV: RRR. S1, S2. 3/6 systolic murmurs appreciated.   RESPIRATORY: Effort normal on RA. Lungs with crackles in bases, with no wheezing, rhonchi.   GI: Abdomen soft and non distended, bowel sounds present. No tenderness, rebound, guarding.   MUSCULOSKELETAL: No joint swelling or tenderness. Moves all extremities.   NEUROLOGICAL: No focal deficits.   EXTREMITIES: No peripheral edema. Intact bilateral pedal pulses.   SKIN: No jaundice. No rashes. Francy tattoo on bilateral le.     Data   Data reviewed today: I reviewed all medications, new labs and imaging results over the last 24 hours. I personally reviewed no images or EKG's today.    Reviewed BMP, glucose, CBC

## 2017-04-30 NOTE — ED NOTES
Patient has dialysis three days a weeks (Tue/Thu/ Sat). Unable to have dialysis yesterday as her  was involved in MVA. Called claude CAM who advised her to come to the emergency room in order to get dialysis.

## 2017-04-30 NOTE — DISCHARGE SUMMARY
[facility], [place]    Internal Medicine Discharge Summary- Gold Service    Date of Admission:  4/30/2017  Date of Discharge:  4/30/2017  Discharging Provider: GERMAN Talley and Dr. Borges  Discharge Team: Gold Night    Discharge Diagnoses    #ESKD, hyperkalemia  #HTN  #Normocytic anemia  #Thrombocytopenia  #GERD  #Seasonal allergies    Follow-ups Needed After Discharge   Patient should follow up with her [facility] as scheduled on Tuesday with blood pressure monitoring and for further hemodialysis runs. She should follow up with her Nephrologist within 1 month.     Patient should follow up with Transplant team for further consideration of renal transplant.    Hospital Course   Jelly Dietz is a 32 year old female with a past medical history of HTN, anemia, anxiety, IgA nephropathy s/p LDKT c/b acute cellular rejection, kidney explanted 2013 and now w/ ESKD on HD TuTh[facility] admitted on 4/30/2017 for HTN and hyperkalemia after missed dialysis run. 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 Dialyzes TuThSat, missed run yesterday as  canceled. K 5.6, BUN 87, CO2 23 w/ normal AG. Hypertensive w/ BP in 150s/100s. Nephrology consulted, ran on K2 bath w/ 4 kg of volume removed. BP elevated after run w/ systolics in 150s, diastolics in low 100s. She should follow up in [facility] dialysis center as scheduled Tuesday for next HD run, BP monitoring and see nephrology w/ in 1 month.      #HTN: On coreg 25 mg BID, amlodipine 10 mg qday, clonidine 0.1 mg TID PRN. BP elevated on initial presentation, improved but still elevated post run. Instructed to take BP when arrives home, if still elevated use PRN clonidine as instructed. She should notify her nephrologist if BP is consistently >140/90.      #Normocytic anemia: Hemoglobin of 10.8 w/ MCV of 100. Epo and venofer per nephrology as  OP.     #Thrombocytopenia: Platelet count of 117, intermittently low since 11/2015. Patient should FU w/ nephrology and PCP as OP for further work up.     #GERD: On Tums PRN. Continue on discharge.  #Seasonal allergies: On Zyrtec qhs. Continue on discharge.     Consultations This Hospital Stay   MEDICATION HISTORY IP PHARMACY CONSULT  NEPHROLOGY ESRD ADULT IP CONSULT     Code Status   Full Code    Time Spent on this Encounter   I, Claudia Dominguez, personally saw the patient today and spent less than or equal to 30 minutes discharging this patient.       Claudia Dominguez  Internal Medicine Staff Hospitalist Service  [facility]  Pager: [phone]    I saw and evaluated this patient with Claudia Dominguez. I agree with the above assessment and plan. Patient admitted and discharged same day for missed dialysis session    Rob    ______________________________________________________________________    Physical Exam   Vital Signs: Temp: 98.4  F (36.9  C) Temp src: Oral BP: (!) 149/100 Pulse: 90   Resp: 16 SpO2: 98 % O2 Device: None (Room air)    Weight: 0 lbs 0 oz    GENERAL: Alert and oriented x 3. Sitting comfortably in bed running on HD via LUE fistula graft.  HEENT: Anicteric sclera. Mucous membranes moist and without lesions.   CV: RRR. S1, S2. 3/6 systolic murmurs appreciated.   RESPIRATORY: Effort normal on RA. Lungs with crackles in bases, with no wheezing, rhonchi.   GI: Abdomen soft and non distended, bowel sounds present. No tenderness, rebound, guarding.   MUSCULOSKELETAL: No joint swelling or tenderness. Moves all extremities.   NEUROLOGICAL: No focal deficits.   EXTREMITIES: No peripheral edema. Intact bilateral pedal pulses.   SKIN: No jaundice. No rashes. Francy tattoo on bilateral le.     Significant Results and Procedures   K 5.6  Creat 13.90  BUN 87  CO2 23  AG 13  Platelet count 117  Hemoglobin 10.8    Pending Results   These results will be followed up by Transplant, Nephrology or PCP (not  ordered during this admission)  Unresulted Labs Ordered in the Past 30 Days of this Admission     Date and Time Order Name Status Description    3/8/2017 0706 FACTOR 5 LEIDEN MUTATION ANALYSIS In process     3/8/2017 0706 FACTOR 2 PROTHROMBIN 61886H MUT ANAL In process              Primary Care Physician   ASH WARNER    Discharge Disposition   Discharged to home  Condition at discharge: Stable    Discharge Orders     Reason for your hospital stay   You were admitted for high blood pressure and high potassium after missing dialysis. You had a dialysis run to treat this.     Adult [facility] Follow-up and recommended labs and tests   Follow up with [facility] in [place] as scheduled for dialysis on Tuesday.  Follow up with primary care provider, ASH WARNER, within 1 month for routine follow up.  Follow up with nephrology w/ in 1 month for continued management of hypertension and end stage renal disease.   Follow up with Dr. Deras, at (location with clinic name or city) [facility] Cardiology as scheduled 05/31/2017 for follow up of cardiology issues.     Appointments on [place] and/or [facility] (with [facility] or [facility] provider or service). Call [phone] if you haven't heard regarding these appointments within 7 days of discharge.     Activity   Your activity upon discharge: activity as tolerated     Monitor and record   blood pressure Tuesday, Thursday, Saturday w/ HD. Have RN call nephrology if BP is >140/90  fluid intake and output daily  Limit intake to 2 L per day     When to contact your care team   Call your primary doctor if you have any of the following: temperature greater than 100.4 or less than 96,  increased shortness of breath, increased swelling or increased pain.     Full Code     Diet   Follow this diet upon discharge: Orders Placed This Encounter     Renal diet       Discharge Medications   Current Discharge Medication List      CONTINUE these medications which have NOT CHANGED    Details   cetirizine  (ZYRTEC) 10 MG tablet Take 10 mg by mouth At Bedtime      calcium carbonate (TUMS) 500 MG chewable tablet Take 1 chew tab by mouth 3 times daily as needed for heartburn      carvedilol (COREG) 25 MG tablet Take 1 tablet (25 mg) by mouth 2 times daily (with meals)  Qty: 60 tablet, Refills: 11    Associated Diagnoses: Benign essential hypertension; SOB (shortness of breath)      cloNIDine (CATAPRES) 0.1 MG tablet Take 1 tablet (0.1 mg) by mouth 3 times daily as needed  Qty: 60 tablet, Refills: 3    Comments: For BP >170/100  Associated Diagnoses: Benign essential hypertension; SOB (shortness of breath)      amLODIPine (NORVASC) 10 MG tablet Take 1 tablet (10 mg) by mouth daily  Qty: 30 tablet, Refills: 1    Associated Diagnoses: ESRD (end stage renal disease) on dialysis (H); Malignant essential hypertension      acetaminophen (TYLENOL) 325 MG tablet Take 1-2 tablets by mouth every 4 hours as needed.  Qty: 60 tablet, Refills: 0    Associated Diagnoses:  delivery delivered           Allergies   No Known Allergies
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
## 2017-04-30 NOTE — PROGRESS NOTES
Pt arrived from [place] ED with EMS transport at ~1430. Pt alert & oriented, independent with cares. Hemodynamically stable, 100% on RA. Only complaint is having more edema than usual due to missing HD run yesterday. Will get HD run and transfer to the Observation Unit after run.

## 2017-04-30 NOTE — IP AVS SNAPSHOT
MRN:[id number]                      After Visit Summary   4/30/2017    Jelly Dietz    MRN: [id number]           Thank you!     Thank you for choosing [facility] for your care. Our goal is always to provide you with excellent care. Hearing back from our patients is one way we can continue to improve our services. Please take a few minutes to complete the written survey that you may receive in the mail after you visit with us. Thank you!        Patient Information     Date Of Birth          1985        About your hospital stay     You were admitted on:  April 30, 2017 You last received care in the:  [address] [facility]    You were discharged on:  April 30, 2017        Reason for your hospital stay       You were admitted for high blood pressure and high potassium after missing dialysis. You had a dialysis run to treat this.                  Who to Call     For medical emergencies, please call 911.  For non-urgent questions about your medical care, please call your primary care provider or clinic, [phone]          Attending Provider     Provider Specialty    Fox Andrade MD Emergency Medicine    [facility], [place] David MD Pediatrics       Primary Care Provider Office Phone # Fax #    Sailaja Oh [phone]       [facility] [address] [address] [place] [address]         When to contact your care team       Call your primary doctor if you have any of the following: temperature greater than 100.4 or less than 96,  increased shortness of breath, increased swelling or increased pain.                  After Care Instructions     Activity       Your activity upon discharge: activity as tolerated            Diet       Follow this diet upon discharge: Orders Placed This Encounter      Renal diet            Monitor and record       blood pressure Tuesday, Thursday, Saturday w/ HD. Have RN call nephrology if BP is >140/90  fluid intake and output daily  Limit intake  "to 2 L per day                  Follow-up Appointments     Adult [email or web address] Follow-up and recommended labs and tests       Follow up with [facility] in [place] as scheduled for dialysis on Tuesday.  Follow up with primary care provider, ASH WARNER, within 1 month for routine follow up.  Follow up with nephrology w/ in 1 month for continued management of hypertension and end stage renal disease.   Follow up with Dr. Deras, at (location with clinic name or city) [facility] [[email or web address] Cardiology as scheduled 05/31/2017 for follow up of cardiology issues.     Appointments on [place] and/or [facility] (with [facility] [[email or web address]] or [facility] provider or service). Call [phone] if you haven't heard regarding these appointments within 7 days of discharge.                  Your next 10 appointments already scheduled     May 31, 2017 10:00 AM CDT   (Arrive by 9:45 AM)   NEW PANCREAS/KIDNEY TRANSPLANT WORK-UP with Ashutosh Deras MD   [facility] ([facility] [place])    [address] [place] [address]  3rd [facility] [address] [phone]              Pending Results     No orders found from 4/28/2017 to 5/1/2017.            Statement of Approval     Ordered          04/30/17 1831  I have reviewed and agree with all the recommendations and orders detailed in this document.  EFFECTIVE NOW     Approved and electronically signed by:  Claudia Dominguez PA-C             Admission Information     Date & Time Provider Department Dept. Phone    4/30/2017 Mendoza Borges MD [address] [facility] [phone]      Your Vitals Were     Blood Pressure Pulse Temperature Respirations Last Period Pulse Oximetry    149/100 72 98.2  F (36.8  C) (Oral) 16 03/14/2017 100%      MyChart Information     [employer] lets you send messages to your doctor, view your test results, renew your prescriptions, schedule appointments and more. To sign up, go to [email or web address] . Click on \"Log in\" on the left side of the screen, which will " "take you to the Welcome page. Then click on \"Sign up Now\" on the right side of the page.     You will be asked to enter the access code listed below, as well as some personal information. Please follow the directions to create your username and password.     Your access code is: [email or web address]  Expires: 2017  7:30 AM     Your access code will  in 90 days. If you need help or a new code, please call your [facility] clinic or [phone].        Care EveryWhere ID     This is your Care EveryWhere ID. This could be used by other organizations to access your [facility] medical records  [id number]           Review of your medicines      CONTINUE these medicines which have NOT CHANGED        Dose / Directions    acetaminophen 325 MG tablet   Commonly known as:  TYLENOL   Used for:   delivery delivered        Dose:  325-650 mg   Take 1-2 tablets by mouth every 4 hours as needed.   Quantity:  60 tablet   Refills:  0       amLODIPine 10 MG tablet   Commonly known as:  NORVASC   Used for:  ESRD (end stage renal disease) on dialysis (H), Malignant essential hypertension        Dose:  10 mg   Take 1 tablet (10 mg) by mouth daily   Quantity:  30 tablet   Refills:  1       calcium carbonate 500 MG chewable tablet   Commonly known as:  TUMS        Dose:  1 chew tab   Take 1 chew tab by mouth 3 times daily as needed for heartburn   Refills:  0       carvedilol 25 MG tablet   Commonly known as:  COREG   Used for:  Benign essential hypertension, SOB (shortness of breath)        Dose:  25 mg   Take 1 tablet (25 mg) by mouth 2 times daily (with meals)   Quantity:  60 tablet   Refills:  11       cetirizine 10 MG tablet   Commonly known as:  zyrTEC        Dose:  10 mg   Take 10 mg by mouth At Bedtime   Refills:  0       cloNIDine 0.1 MG tablet   Commonly known as:  CATAPRES   Used for:  Benign essential hypertension, SOB (shortness of breath)        Dose:  0.1 mg   Take 1 tablet (0.1 mg) by mouth 3 times daily as needed "   Quantity:  60 tablet   Refills:  3                Protect others around you: Learn how to safely use, store and throw away your medicines at www.disposemymeds.org.             Medication List: This is a list of all your medications and when to take them. Check marks below indicate your daily home schedule. Keep this list as a reference.      Medications           Morning Afternoon Evening Bedtime As Needed    acetaminophen 325 MG tablet   Commonly known as:  TYLENOL   Take 1-2 tablets by mouth every 4 hours as needed.                                amLODIPine 10 MG tablet   Commonly known as:  NORVASC   Take 1 tablet (10 mg) by mouth daily                                calcium carbonate 500 MG chewable tablet   Commonly known as:  TUMS   Take 1 chew tab by mouth 3 times daily as needed for heartburn                                carvedilol 25 MG tablet   Commonly known as:  COREG   Take 1 tablet (25 mg) by mouth 2 times daily (with meals)                                cetirizine 10 MG tablet   Commonly known as:  zyrTEC   Take 10 mg by mouth At Bedtime                                cloNIDine 0.1 MG tablet   Commonly known as:  CATAPRES   Take 1 tablet (0.1 mg) by mouth 3 times daily as needed

## 2017-04-30 NOTE — PROGRESS NOTES
HEMODIALYSIS TREATMENT NOTE    Date: 4/30/2017  Time: 6:22 PM    Data:   Pre Wt:   51 kg   Desired Wt:  47 kg   Post Wt:   Approximate (Net) Weight Removed: 4 Kg   Vascular Access Status: Access/lines visible and secure, Prescribed BFR achieved   Dialyzer Rinse: Light, Streaked   Total Blood Volume Processed:  66.6 L   Total Dialysis (Treatment) Time: 3 hrs    Assessment/Interventions:  Patient came from ED on [facility] with SOB and increasing edema after missing her regularly scheduled dialysis Saturday. 3 hours of HD via left forearm AVF. 15 gauge needles. 400 BFR. 4 kg removed without complications. HTN throughout treatment - patient took BP medications from home mid-run. Otherwise VS stable. See MAR for medication details. Report given to RN on 4C.         Plan:    Patient to be discharged today and to run Tuesday at [facility] in [place] per her regular schedule.

## 2017-05-01 ASSESSMENT — ENCOUNTER SYMPTOMS
ACTIVITY CHANGE: 1
HALLUCINATIONS: 0
NAUSEA: 0
DECREASED CONCENTRATION: 1
FATIGUE: 1
VOMITING: 0
DYSPHORIC MOOD: 1
SHORTNESS OF BREATH: 1

## 2017-05-02 LAB — INTERPRETATION ECG - MUSE: NORMAL

## 2017-05-30 ENCOUNTER — PRE VISIT (OUTPATIENT)
Dept: CARDIOLOGY | Facility: CLINIC | Age: 32
End: 2017-05-30

## 2017-05-31 ENCOUNTER — TRANSFERRED RECORDS (OUTPATIENT)
Dept: HEALTH INFORMATION MANAGEMENT | Facility: CLINIC | Age: 32
End: 2017-05-31

## 2017-06-01 ENCOUNTER — TRANSFERRED RECORDS (OUTPATIENT)
Dept: HEALTH INFORMATION MANAGEMENT | Facility: CLINIC | Age: 32
End: 2017-06-01

## 2017-06-01 ENCOUNTER — APPOINTMENT (OUTPATIENT)
Dept: GENERAL RADIOLOGY | Facility: CLINIC | Age: 32
End: 2017-06-01
Attending: EMERGENCY MEDICINE
Payer: MEDICARE

## 2017-06-01 ENCOUNTER — HOSPITAL ENCOUNTER (EMERGENCY)
Facility: CLINIC | Age: 32
Discharge: HOME | End: 2017-06-02
Attending: EMERGENCY MEDICINE | Admitting: EMERGENCY MEDICINE
Payer: MEDICARE

## 2017-06-01 DIAGNOSIS — I10 ESSENTIAL HYPERTENSION: ICD-10-CM

## 2017-06-01 DIAGNOSIS — M54.6 ACUTE THORACIC BACK PAIN, UNSPECIFIED BACK PAIN LATERALITY: ICD-10-CM

## 2017-06-01 LAB
ALBUMIN SERPL-MCNC: 3.8 G/DL (ref 3.4–5)
ALP SERPL-CCNC: 49 U/L (ref 40–150)
ALT SERPL W P-5'-P-CCNC: 14 U/L (ref 0–50)
ANION GAP SERPL CALCULATED.3IONS-SCNC: 6 MMOL/L (ref 3–14)
AST SERPL W P-5'-P-CCNC: 11 U/L (ref 0–45)
BASOPHILS # BLD AUTO: 0 10E9/L (ref 0–0.2)
BASOPHILS NFR BLD AUTO: 1 %
BILIRUB SERPL-MCNC: 0.6 MG/DL (ref 0.2–1.3)
BUN SERPL-MCNC: 23 MG/DL (ref 7–30)
CALCIUM SERPL-MCNC: 8.7 MG/DL (ref 8.5–10.1)
CHLORIDE SERPL-SCNC: 97 MMOL/L (ref 94–109)
CO2 SERPL-SCNC: 30 MMOL/L (ref 20–32)
CREAT SERPL-MCNC: 5.37 MG/DL (ref 0.52–1.04)
DIFFERENTIAL METHOD BLD: ABNORMAL
EOSINOPHIL # BLD AUTO: 0.2 10E9/L (ref 0–0.7)
EOSINOPHIL NFR BLD AUTO: 5.4 %
ERYTHROCYTE [DISTWIDTH] IN BLOOD BY AUTOMATED COUNT: 13.6 % (ref 10–15)
GFR SERPL CREATININE-BSD FRML MDRD: 9 ML/MIN/1.7M2
GLUCOSE SERPL-MCNC: 141 MG/DL (ref 70–99)
HCT VFR BLD AUTO: 31.7 % (ref 35–47)
HGB BLD-MCNC: 10.2 G/DL (ref 11.7–15.7)
IMM GRANULOCYTES # BLD: 0 10E9/L (ref 0–0.4)
IMM GRANULOCYTES NFR BLD: 0.3 %
LYMPHOCYTES # BLD AUTO: 0.7 10E9/L (ref 0.8–5.3)
LYMPHOCYTES NFR BLD AUTO: 23.6 %
MCH RBC QN AUTO: 31.4 PG (ref 26.5–33)
MCHC RBC AUTO-ENTMCNC: 32.2 G/DL (ref 31.5–36.5)
MCV RBC AUTO: 98 FL (ref 78–100)
MONOCYTES # BLD AUTO: 0.2 10E9/L (ref 0–1.3)
MONOCYTES NFR BLD AUTO: 6.4 %
NEUTROPHILS # BLD AUTO: 2 10E9/L (ref 1.6–8.3)
NEUTROPHILS NFR BLD AUTO: 63.3 %
NRBC # BLD AUTO: 0 10*3/UL
NRBC BLD AUTO-RTO: 0 /100
PLATELET # BLD AUTO: 102 10E9/L (ref 150–450)
POTASSIUM SERPL-SCNC: 3.5 MMOL/L (ref 3.4–5.3)
PROT SERPL-MCNC: 7.4 G/DL (ref 6.8–8.8)
RBC # BLD AUTO: 3.25 10E12/L (ref 3.8–5.2)
SODIUM SERPL-SCNC: 133 MMOL/L (ref 133–144)
TROPONIN I SERPL-MCNC: NORMAL UG/L (ref 0–0.04)
WBC # BLD AUTO: 3.1 10E9/L (ref 4–11)

## 2017-06-01 PROCEDURE — 96375 TX/PRO/DX INJ NEW DRUG ADDON: CPT

## 2017-06-01 PROCEDURE — 80053 COMPREHEN METABOLIC PANEL: CPT | Performed by: EMERGENCY MEDICINE

## 2017-06-01 PROCEDURE — 99285 EMERGENCY DEPT VISIT HI MDM: CPT | Mod: 25

## 2017-06-01 PROCEDURE — 71020 XR CHEST 2 VW: CPT

## 2017-06-01 PROCEDURE — 25000132 ZZH RX MED GY IP 250 OP 250 PS 637: Performed by: EMERGENCY MEDICINE

## 2017-06-01 PROCEDURE — 25000128 H RX IP 250 OP 636: Performed by: EMERGENCY MEDICINE

## 2017-06-01 PROCEDURE — 25000125 ZZHC RX 250: Performed by: EMERGENCY MEDICINE

## 2017-06-01 PROCEDURE — 96374 THER/PROPH/DIAG INJ IV PUSH: CPT

## 2017-06-01 PROCEDURE — 25000132 ZZH RX MED GY IP 250 OP 250 PS 637: Mod: GY | Performed by: EMERGENCY MEDICINE

## 2017-06-01 PROCEDURE — 93005 ELECTROCARDIOGRAM TRACING: CPT

## 2017-06-01 PROCEDURE — 85025 COMPLETE CBC W/AUTO DIFF WBC: CPT | Performed by: EMERGENCY MEDICINE

## 2017-06-01 PROCEDURE — A9270 NON-COVERED ITEM OR SERVICE: HCPCS | Mod: GY | Performed by: EMERGENCY MEDICINE

## 2017-06-01 PROCEDURE — 84484 ASSAY OF TROPONIN QUANT: CPT | Performed by: EMERGENCY MEDICINE

## 2017-06-01 RX ORDER — NITROGLYCERIN 0.4 MG/1
0.4 TABLET SUBLINGUAL EVERY 5 MIN PRN
Status: DISCONTINUED | OUTPATIENT
Start: 2017-06-01 | End: 2017-06-01

## 2017-06-01 RX ORDER — NITROGLYCERIN 0.4 MG/1
0.4 TABLET SUBLINGUAL ONCE
Status: DISCONTINUED | OUTPATIENT
Start: 2017-06-01 | End: 2017-06-01

## 2017-06-01 RX ORDER — HYDRALAZINE HYDROCHLORIDE 20 MG/ML
10 INJECTION INTRAMUSCULAR; INTRAVENOUS ONCE
Status: COMPLETED | OUTPATIENT
Start: 2017-06-01 | End: 2017-06-01

## 2017-06-01 RX ORDER — TRAMADOL HYDROCHLORIDE 50 MG/1
50 TABLET ORAL ONCE
Status: COMPLETED | OUTPATIENT
Start: 2017-06-01 | End: 2017-06-01

## 2017-06-01 RX ORDER — DIAZEPAM 10 MG/2ML
5 INJECTION, SOLUTION INTRAMUSCULAR; INTRAVENOUS ONCE
Status: COMPLETED | OUTPATIENT
Start: 2017-06-01 | End: 2017-06-01

## 2017-06-01 RX ORDER — ACETAMINOPHEN 325 MG/1
975 TABLET ORAL ONCE
Status: COMPLETED | OUTPATIENT
Start: 2017-06-01 | End: 2017-06-01

## 2017-06-01 RX ADMIN — TRAMADOL HYDROCHLORIDE 50 MG: 50 TABLET, COATED ORAL at 23:38

## 2017-06-01 RX ADMIN — HYDRALAZINE HYDROCHLORIDE 10 MG: 20 INJECTION INTRAMUSCULAR; INTRAVENOUS at 22:29

## 2017-06-01 RX ADMIN — ACETAMINOPHEN 975 MG: 325 TABLET, FILM COATED ORAL at 21:56

## 2017-06-01 RX ADMIN — DIAZEPAM 5 MG: 5 INJECTION, SOLUTION INTRAMUSCULAR; INTRAVENOUS at 23:39

## 2017-06-01 NOTE — ED AVS SNAPSHOT
[facility]    [address] [place] [address]    Phone:  [phone]    Fax:  [phone]                                       Jelly Dietz   MRN: [id number]    Department:  [facility]   Date of Visit:  6/1/2017           Patient Information     Date Of Birth          1985        Your diagnoses for this visit were:     Essential hypertension        You were seen by Ky Morejon DO.      Follow-up Information     Follow up with [facility], [place]. Call in 2 days.    Specialty:  Family Practice    Why:  As needed    Contact information:    [address]  [place] [address]  [phone]          Follow up with [facility].    Specialty:  EMERGENCY MEDICINE    Why:  If symptoms worsen    Contact information:    [address] [place] [address]  [phone]        Discharge Instructions          * HEADACHE [unspecified]    The cause of your headache today is not clear, but it does not appear to be the sign of any serious illness.  Under stress, some people tense the muscles of their shoulder, neck and scalp without knowing it. If this condition lasts long enough, a TENSION HEADACHE can occur.  A MIGRAINE HEADACHE is caused by changes in blood flow to the brain. It can be mild or severe. A migraine attack may be triggered by emotional stress, hormone changes during the menstrual cycle, oral contraceptives, alcohol use, certain foods containing tyramine, eye strain, weather changes, missing meals, lack of sleep or oversleeping.  Other causes of headache include a viral illness, sinus, ear or throat infection, dental pain and TMJ (jaw joint) pain.  HOME CARE:    If you were given pain medicine for this headache, do not drive yourself home. Arrange for a ride, instead. When you get home, try to sleep. You should feel much better when you wake up.    If  you are having nausea or vomiting, follow a light diet until your headache is relieved.    If you have a migraine type headache, use sunglasses when in the daylight or around bright indoor lighting until symptoms improve. Bright glaring light can worsen this kind of headache.  FOLLOW UP with your doctor if the headache is not better within the next 24 hours. If you have frequent headaches you should discuss a treatment plan with your primary care doctor. By being aware of the earliest signs of headache, and starting treatment right away, you may be able to stop the pain yourself.  GET PROMPT MEDICAL ATTENTION if any of the following occur:    Worsening of your head pain or no improvement within 24 hours    Repeated vomiting (unable to keep liquids down)    Fever over 101 F (38.3 C)    Stiff neck    Extreme drowsiness, confusion or fainting    Weakness of an arm or leg or one side of the face    Difficulty with speech or vision    7927-3670 [facility], [address] [place] [address]. All rights reserved. This information is not intended as a substitute for professional medical care. Always follow your healthcare professional's instructions.    Discharge Instructions for High Blood Pressure (Hypertension)  You have been diagnosed with high blood pressure (also called hypertension). This means the force of blood against your artery walls is too strong. It also means your heart is working hard to move blood. High blood pressure usually has no symptoms, but over time, it can damage your heart, blood vessels, eyes, kidneys, and other organs. With help from your doctor, you can manage your blood pressure and protect your health.  Taking medications    Learn to take your own blood pressure. Keep a record of your results. Ask your doctor which readings mean that you need medical attention.    Take your blood pressure medication exactly as directed. Don t skip doses. Missing doses can cause your blood pressure  to get out of control.    Avoid medications that contain heart stimulants, including over-the-counter drugs. Check for warnings about high blood pressure on the label.    Check with your doctor before taking a decongestant. Some decongestants can worsen high blood pressure.  Lifestyle changes    Maintain a healthy weight. Get help to lose any extra pounds.    Cut back on salt.    Limit canned, dried, packaged, and fast foods.    Don t add salt to your food at the table.    Season foods with herbs instead of salt when you cook.    Follow the DASH (Dietary Approaches to Stop Hypertension) eating plan. This plan recommends vegetables, fruits, whole gains, and other heart healthy foods.    Begin an exercise program. Ask your doctor how to get started. The American Heart Association recommends aerobic exercise 3 to 4 times a week for an average of 40 minutes at a time, with your doctor's approval. Simple activities like walking or gardening can help.    Break the smoking habit. Enroll in a stop-smoking program to improve your chances of success. Ask your health care provider about programs and medications to help you stop smoking.    Limit drinks that contain caffeine (coffee, black or green tea, cola) to 2 per day.    Never take stimulants such as amphetamines or cocaine; these drugs can be deadly for someone with high blood pressure.    Control your stress. Learn stress-management techniques.    Limit alcohol to no more than 1 drink a day for women and 2 drinks a day for men.  Follow-up care  Make a follow-up appointment as directed by our staff.     When to seek medical care  Call your doctor immediately if you have any of the following:    Chest pain or shortness of breath (call 911)    Moderate to severe headache    Weakness in the muscles of your face, arms, or legs    Trouble speaking    Extreme drowsiness    Confusion    Fainting or dizziness    Pulsating or rushing sound in your  ears    Unexplained nosebleed    Weakness, tingling, or numbness of your face, arms, or legs    Change in vision    Blood pressure measured at home that is greater than 180/110     6627-9980 [employer]. [address] [place] [address], [place] [address]. All rights reserved. This information is not intended as a substitute for professional medical care. Always follow your healthcare professional's instructions.          24 Hour Appointment Hotline       To make an appointment at any [facility], call [phone] ([phone]). If you don't have a family doctor or clinic, we will help you find one. [facility] clinics are conveniently located to serve the needs of you and your family.             Review of your medicines      Our records show that you are taking the medicines listed below. If these are incorrect, please call your family doctor or clinic.        Dose / Directions Last dose taken    acetaminophen 325 MG tablet   Commonly known as:  TYLENOL   Dose:  325-650 mg   Quantity:  60 tablet        Take 1-2 tablets by mouth every 4 hours as needed.   Refills:  0        amLODIPine 10 MG tablet   Commonly known as:  NORVASC   Dose:  10 mg   Quantity:  30 tablet        Take 1 tablet (10 mg) by mouth daily   Refills:  1        calcium carbonate 500 MG chewable tablet   Commonly known as:  TUMS   Dose:  1 chew tab        Take 1 chew tab by mouth 3 times daily as needed for heartburn   Refills:  0        carvedilol 25 MG tablet   Commonly known as:  COREG   Dose:  25 mg   Quantity:  60 tablet        Take 1 tablet (25 mg) by mouth 2 times daily (with meals)   Refills:  11        cetirizine 10 MG tablet   Commonly known as:  zyrTEC   Dose:  10 mg        Take 10 mg by mouth At Bedtime   Refills:  0        cloNIDine 0.1 MG tablet   Commonly known as:  CATAPRES   Dose:  0.1 mg   Quantity:  60 tablet        Take 1 tablet (0.1 mg) by mouth 3 times daily as needed   Refills:  3                Procedures and tests  performed during your visit     CBC + differential    Comprehensive metabolic panel    EKG 12-lead, tracing only    Head CT w/o contrast    Troponin I    XR Chest 2 Views      Orders Needing Specimen Collection     None      Pending Results     Date and Time Order Name Status Description    6/1/2017 2215 EKG 12-lead, tracing only Preliminary             Pending Culture Results     No orders found for last 3 day(s).            Pending Results Instructions     If you had any lab results that were not finalized at the time of your Discharge, you can call the ED Lab Result RN at [phone]. You will be contacted by this team for any positive Lab results or changes in treatment. The nurses are available 7 days a week from 10A to 6:30P.  You can leave a message 24 hours per day and they will return your call.        Test Results From Your Hospital Stay        6/1/2017 10:13 PM      Component Results     Component Value Ref Range & Units Status    WBC 3.1 (L) 4.0 - 11.0 10e9/L Final    RBC Count 3.25 (L) 3.8 - 5.2 10e12/L Final    Hemoglobin 10.2 (L) 11.7 - 15.7 g/dL Final    Hematocrit 31.7 (L) 35.0 - 47.0 % Final    MCV 98 78 - 100 fl Final    MCH 31.4 26.5 - 33.0 pg Final    MCHC 32.2 31.5 - 36.5 g/dL Final    RDW 13.6 10.0 - 15.0 % Final    Platelet Count 102 (L) 150 - 450 10e9/L Final    Diff Method Automated Method  Final    % Neutrophils 63.3 % Final    % Lymphocytes 23.6 % Final    % Monocytes 6.4 % Final    % Eosinophils 5.4 % Final    % Basophils 1.0 % Final    % Immature Granulocytes 0.3 % Final    Nucleated RBCs 0 0 /100 Final    Absolute Neutrophil 2.0 1.6 - 8.3 10e9/L Final    Absolute Lymphocytes 0.7 (L) 0.8 - 5.3 10e9/L Final    Absolute Monocytes 0.2 0.0 - 1.3 10e9/L Final    Absolute Eosinophils 0.2 0.0 - 0.7 10e9/L Final    Absolute Basophils 0.0 0.0 - 0.2 10e9/L Final    Abs Immature Granulocytes 0.0 0 - 0.4 10e9/L Final    Absolute Nucleated RBC 0.0  Final         6/1/2017 10:33 PM      Component  Results     Component Value Ref Range & Units Status    Sodium 133 133 - 144 mmol/L Final    Potassium 3.5 3.4 - 5.3 mmol/L Final    Reviewed, acceptable    Chloride 97 94 - 109 mmol/L Final    Carbon Dioxide 30 20 - 32 mmol/L Final    Anion Gap 6 3 - 14 mmol/L Final    Glucose 141 (H) 70 - 99 mg/dL Final    Urea Nitrogen 23 7 - 30 mg/dL Final    Reviewed, acceptable    Creatinine 5.37 (H) 0.52 - 1.04 mg/dL Final    GFR Estimate 9 (L) >60 mL/min/1.7m2 Final    Non  GFR Calc    GFR Estimate If Black 11 (L) >60 mL/min/1.7m2 Final    African American GFR Calc    Calcium 8.7 8.5 - 10.1 mg/dL Final    Bilirubin Total 0.6 0.2 - 1.3 mg/dL Final    Albumin 3.8 3.4 - 5.0 g/dL Final    Protein Total 7.4 6.8 - 8.8 g/dL Final    Alkaline Phosphatase 49 40 - 150 U/L Final    ALT 14 0 - 50 U/L Final    AST 11 0 - 45 U/L Final         6/2/2017 12:34 AM      Narrative     XR CHEST 2 VW   6/1/2017 11:00 PM     INDICATION: Hypertension. Lightheadedness..    COMPARISON: 3/8/2017.        Impression     IMPRESSION: No acute infiltrates or significant change. Heart size is  near the upper limits of normal, stable.    ANN RIVERA MD         6/1/2017 10:48 PM      Component Results     Component Value Ref Range & Units Status    Troponin I ES  0.000 - 0.045 ug/L Final    <0.015  The 99th percentile for upper reference range is 0.045 ug/L.  Troponin values in   the range of 0.045 - 0.120 ug/L may be associated with risks of adverse   clinical events.           6/2/2017 12:49 AM      Narrative     CT HEAD W/O CONTRAST  6/2/2017 12:45 AM      HISTORY: Headache.    TECHNIQUE: Routine noncontrast head CT. Radiation dose for this scan  was reduced using automated exposure control, adjustment of the mA  and/or kV according to patient size, or iterative reconstruction  technique.    COMPARISON: 11/18/2015.    FINDINGS: Brain volume is normal for age. No mass, mass effect or  intracranial hemorrhage. No evidence of acute  infarct. The visualized  paranasal sinuses are clear.        Impression     IMPRESSION: No acute abnormality.    TEJ NEWTON MD                Clinical Quality Measure: Blood Pressure Screening     Your blood pressure was checked while you were in the emergency department today. The last reading we obtained was  BP: (!) 137/97 . Please read the guidelines below about what these numbers mean and what you should do about them.  If your systolic blood pressure (the top number) is less than 120 and your diastolic blood pressure (the bottom number) is less than 80, then your blood pressure is normal. There is nothing more that you need to do about it.  If your systolic blood pressure (the top number) is 120-139 or your diastolic blood pressure (the bottom number) is 80-89, your blood pressure may be higher than it should be. You should have your blood pressure rechecked within a year by a primary care provider.  If your systolic blood pressure (the top number) is 140 or greater or your diastolic blood pressure (the bottom number) is 90 or greater, you may have high blood pressure. High blood pressure is treatable, but if left untreated over time it can put you at risk for heart attack, stroke, or kidney failure. You should have your blood pressure rechecked by a primary care provider within the next 4 weeks.  If your provider in the emergency department today gave you specific instructions to follow-up with your doctor or provider even sooner than that, you should follow that instruction and not wait for up to 4 weeks for your follow-up visit.        Thank you for choosing [facility]       Thank you for choosing [facility] for your care. Our goal is always to provide you with excellent care. Hearing back from our patients is one way we can continue to improve our services. Please take a few minutes to complete the written survey that you may receive in the mail after you visit with us. Thank you!        [facility]  "Information     [employer] lets you send messages to your doctor, view your test results, renew your prescriptions, schedule appointments and more. To sign up, go to [email or web address] . Click on \"Log in\" on the left side of the screen, which will take you to the Welcome page. Then click on \"Sign up Now\" on the right side of the page.     You will be asked to enter the access code listed below, as well as some personal information. Please follow the directions to create your username and password.     Your access code is: [email or web address]  Expires: 2017  2:33 AM     Your access code will  in 90 days. If you need help or a new code, please call your [facility] clinic or [phone].        Care EveryWhere ID     This is your Care EveryWhere ID. This could be used by other organizations to access your [facility] medical records  [id number]        After Visit Summary       This is your record. Keep this with you and show to your community pharmacist(s) and doctor(s) at your next visit.                  "

## 2017-06-01 NOTE — ED AVS SNAPSHOT
[facility]    [address]    [facility] [address]    Phone:  [phone]    Fax:  [phone]                                       Jelly Dietz   MRN: [id number]    Department:  [facility]   Date of Visit:  6/1/2017           After Visit Summary Signature Page     I have received my discharge instructions, and my questions have been answered. I have discussed any challenges I see with this plan with the nurse or doctor.    ..........................................................................................................................................  Patient/Patient Representative Signature      ..........................................................................................................................................  Patient Representative Print Name and Relationship to Patient    ..................................................               ................................................  Date                                            Time    ..........................................................................................................................................  Reviewed by Signature/Title    ...................................................              ..............................................  Date                                                            Time

## 2017-06-02 ENCOUNTER — APPOINTMENT (OUTPATIENT)
Dept: CT IMAGING | Facility: CLINIC | Age: 32
End: 2017-06-02
Attending: EMERGENCY MEDICINE
Payer: MEDICARE

## 2017-06-02 VITALS
HEIGHT: 66 IN | RESPIRATION RATE: 16 BRPM | OXYGEN SATURATION: 96 % | HEART RATE: 65 BPM | BODY MASS INDEX: 18.78 KG/M2 | TEMPERATURE: 98.7 F | DIASTOLIC BLOOD PRESSURE: 97 MMHG | SYSTOLIC BLOOD PRESSURE: 137 MMHG | WEIGHT: 116.84 LBS

## 2017-06-02 LAB — INTERPRETATION ECG - MUSE: NORMAL

## 2017-06-02 PROCEDURE — 25000132 ZZH RX MED GY IP 250 OP 250 PS 637: Mod: GY | Performed by: EMERGENCY MEDICINE

## 2017-06-02 PROCEDURE — 25000128 H RX IP 250 OP 636: Performed by: EMERGENCY MEDICINE

## 2017-06-02 PROCEDURE — 70450 CT HEAD/BRAIN W/O DYE: CPT

## 2017-06-02 PROCEDURE — A9270 NON-COVERED ITEM OR SERVICE: HCPCS | Mod: GY | Performed by: EMERGENCY MEDICINE

## 2017-06-02 RX ORDER — METOCLOPRAMIDE HYDROCHLORIDE 5 MG/ML
5 INJECTION INTRAMUSCULAR; INTRAVENOUS ONCE
Status: COMPLETED | OUTPATIENT
Start: 2017-06-02 | End: 2017-06-02

## 2017-06-02 RX ORDER — METHOCARBAMOL 750 MG/1
TABLET, FILM COATED ORAL
Status: DISCONTINUED
Start: 2017-06-02 | End: 2017-06-02 | Stop reason: HOSPADM

## 2017-06-02 RX ORDER — DIPHENHYDRAMINE HYDROCHLORIDE 50 MG/ML
25 INJECTION INTRAMUSCULAR; INTRAVENOUS ONCE
Status: COMPLETED | OUTPATIENT
Start: 2017-06-02 | End: 2017-06-02

## 2017-06-02 RX ORDER — NITROGLYCERIN 0.4 MG/1
0.4 TABLET SUBLINGUAL ONCE
Status: COMPLETED | OUTPATIENT
Start: 2017-06-02 | End: 2017-06-02

## 2017-06-02 RX ORDER — METHOCARBAMOL 750 MG/1
750 TABLET, FILM COATED ORAL 3 TIMES DAILY PRN
Qty: 15 TABLET | Refills: 0 | Status: SHIPPED | OUTPATIENT
Start: 2017-06-02 | End: 2017-06-06

## 2017-06-02 RX ORDER — METHOCARBAMOL 750 MG/1
750 TABLET, FILM COATED ORAL ONCE
Status: COMPLETED | OUTPATIENT
Start: 2017-06-02 | End: 2017-06-02

## 2017-06-02 RX ADMIN — METOCLOPRAMIDE 5 MG: 5 INJECTION, SOLUTION INTRAMUSCULAR; INTRAVENOUS at 00:21

## 2017-06-02 RX ADMIN — NITROGLYCERIN 0.4 MG: 0.4 TABLET SUBLINGUAL at 02:01

## 2017-06-02 RX ADMIN — DIPHENHYDRAMINE HYDROCHLORIDE 25 MG: 50 INJECTION, SOLUTION INTRAMUSCULAR; INTRAVENOUS at 00:20

## 2017-06-02 RX ADMIN — METHOCARBAMOL 750 MG: 750 TABLET ORAL at 02:53

## 2017-06-02 ASSESSMENT — ENCOUNTER SYMPTOMS
BACK PAIN: 1
CHILLS: 0
SHORTNESS OF BREATH: 0
FEVER: 0
HEADACHES: 1
LIGHT-HEADEDNESS: 1

## 2017-06-02 NOTE — ED NOTES
Patient states she has felt ill and tired all day.  Dialyzed today and BP wouldn't come down.     ABCs intact.  Alert and oriented x 3.

## 2017-06-02 NOTE — ED PROVIDER NOTES
History     Chief Complaint:  Hypertension    The history is provided by the patient.      Jelly Dietz is a 32 year old female who presents with a chief complaint of hypertension.  The patient states she is a  dialysis patient and occasionally does get episodes where her blood pressure was difficult to control.  She noted that she was a little lightheaded today during dialysis. She also noted that her back was sore which she thinks is from the dialysis chairs.       She has a history of IgA nephropathy leading to end-stage renal disease.  She states that in  she did have a renal transplant and did well however when she had her son for years later she had what sounds like preeclampsia and additional stressors from social issues leading to not taking her rejection medicines appropriately.  This was eventually straightened out and the patient did get back on those medicines, but around the birth of her son she was apparently given a very high dose of magnesium which then let her back into end-stage renal disease needing dialysis.    Allergies:  The patient has no known drug allergies.     Medications:    Zyrtec  Coreg  Catapres  Norvasc  Tylenol    Past Medical History:    ACS  Anemia  Chronic renal disease  ESRD  Heart murmur  MRSA  HTN  Pre-eclampsia    Past Surgical History:    Cervical cerclage    Kidney transplant    Family History:    DM  Breast cancer    Social History:  Relationship status: Single  Tobacco use: Negative  Alcohol use: Negative     Review of Systems   Constitutional: Negative for chills and fever.   Respiratory: Negative for shortness of breath.    Cardiovascular: Negative for chest pain.   Musculoskeletal: Positive for back pain (spasms).   Neurological: Positive for light-headedness and headaches.   All other systems reviewed and are negative.    Physical Exam   First Vitals:  BP: (!) 209/129  Pulse: 65  Temp: 98.7  F (37.1  C)  Resp: 16  Height:  "167.6 cm (5' 6\")  Weight: 53 kg (116 lb 13.5 oz)  SpO2: 91 %  (During the rest of the ED stay she was % on RA)    Physical Exam  Constitutional: Patient appears well-developed and well-nourished. There is mild distress.   Head: No external signs of trauma noted.  Neck: No JVD noted  Eyes: Pupils are equal, round, and reactive to light.   Cardiovascular: Normal rate, regular rhythm and normal heart sounds.  Exam reveals no gallop and no friction rub.  No murmur heard. Normal peripheral pulses.  Pulmonary/Chest: Effort normal and breath sounds normal. No respiratory distress. Patient has no wheezes. Patient has no rales.   Abdominal: Soft. There is no tenderness.   Extremities: No edema noted. The AV fistula on the left distal forearm has a good thrill.  Back: Mild thoracic muscular soreness.  Neurological: Patient is alert and oriented to person, place, and time.   Skin: Skin is warm and dry. There is no diaphoresis noted. No erythema, warmth, or rash over the AV fistula site. No palpable nodules.      Emergency Department Course   ECG:  Sinus Bradycardia  Voltage Criteria for LVH  Abnormal EKG  Rate: 59. OK: 182. QRS: 94. QTc: 421.  No change noted from 4/30/2017  Interpreted by me at 2227 on 6/1/2017    Imaging:  Radiographic findings were communicated with the patient who voiced understanding of the findings.    Head CT, without contrast, per radiology:   No acute abnormality.    Chest XR, per radiology:  No acute infiltrates or significant change. Heart size is near the upper limits of normal, stable.     Laboratory:  CBC: WBC 3.1 (L), HGB 10.2 (L),  (L)  CMP: Glucose 141 (H), GFR 9 (L), Creatinine 5.37 (H) o/w WNL  2142: Troponin I: <0.015     Interventions:  2156: Tylenol, 975 mg, PO  2229: Apresoline, 10 mg,IV  77650: Ultram, 50 mg, PO  86343: Valium, 5 mg, IV  0020: Benadryl, 25 mg, IV injection  0021: Reglan, 5 mg, IV injection   0201: Nitroglycerin 0.4 mg SL  0253: Robaxin 750 mg " PO    Emergency Department Course:  The patient was brought to the ED and had the above history and physical performed.  She had blood work drawn and laboratory studies ordered.  She had radiological imaging ordered as well.  The results of all these studies were communicated to the patient.    2335: D/W Dr. Basilio (nephrology) - If HTN controlled, can DC    The patient's had fluctuating blood pressures during her ED stay but they ultimately did decrease after medication administration.      Impression & Plan      Medical Decision Making:  This 32-year-old female patient presented to the ER from dialysis secondary to hypertension.  She is a Tuesday, Thursday, Saturday dialysis patient secondary to IgA nephropathy and failed renal transplant.  She also complained of a little bit of lightheadedness and headache.  There is no associated numbness, paresthesia or confusion and I doubt stroke or CNS tumor. The patient was treated symptomatically and pain has improved with medication interventions. The patient received neuroimaging that did not demonstrate a mass, aneurysm, or stroke. She has a normal neurological exam.  She also had some muscular upper back pain that improved with Valium.  She should follow-up in the outpatient setting with her primary care physician as well as her other specialists.  Anticipatory guidance given prior to discharge.    Diagnosis:    ICD-10-CM    1. Essential hypertension I10    2. Acute thoracic back pain, unspecified back pain laterality M54.6        Disposition:  discharged to home    Discharge Medications:  Michi Morejon  6/1/2017   [facility] EMERGENCY DEPARTMENT       Ky Morejon DO  06/02/17 0605

## 2017-06-02 NOTE — DISCHARGE INSTRUCTIONS
* HEADACHE [unspecified]    The cause of your headache today is not clear, but it does not appear to be the sign of any serious illness.  Under stress, some people tense the muscles of their shoulder, neck and scalp without knowing it. If this condition lasts long enough, a TENSION HEADACHE can occur.  A MIGRAINE HEADACHE is caused by changes in blood flow to the brain. It can be mild or severe. A migraine attack may be triggered by emotional stress, hormone changes during the menstrual cycle, oral contraceptives, alcohol use, certain foods containing tyramine, eye strain, weather changes, missing meals, lack of sleep or oversleeping.  Other causes of headache include a viral illness, sinus, ear or throat infection, dental pain and TMJ (jaw joint) pain.  HOME CARE:    If you were given pain medicine for this headache, do not drive yourself home. Arrange for a ride, instead. When you get home, try to sleep. You should feel much better when you wake up.    If you are having nausea or vomiting, follow a light diet until your headache is relieved.    If you have a migraine type headache, use sunglasses when in the daylight or around bright indoor lighting until symptoms improve. Bright glaring light can worsen this kind of headache.  FOLLOW UP with your doctor if the headache is not better within the next 24 hours. If you have frequent headaches you should discuss a treatment plan with your primary care doctor. By being aware of the earliest signs of headache, and starting treatment right away, you may be able to stop the pain yourself.  GET PROMPT MEDICAL ATTENTION if any of the following occur:    Worsening of your head pain or no improvement within 24 hours    Repeated vomiting (unable to keep liquids down)    Fever over 101 F (38.3 C)    Stiff neck    Extreme drowsiness, confusion or fainting    Weakness of an arm or leg or one side of the face    Difficulty with speech or vision    [id number] Eliel Sylvester, [address]  [place] [address]. All rights reserved. This information is not intended as a substitute for professional medical care. Always follow your healthcare professional's instructions.    Discharge Instructions for High Blood Pressure (Hypertension)  You have been diagnosed with high blood pressure (also called hypertension). This means the force of blood against your artery walls is too strong. It also means your heart is working hard to move blood. High blood pressure usually has no symptoms, but over time, it can damage your heart, blood vessels, eyes, kidneys, and other organs. With help from your doctor, you can manage your blood pressure and protect your health.  Taking medications    Learn to take your own blood pressure. Keep a record of your results. Ask your doctor which readings mean that you need medical attention.    Take your blood pressure medication exactly as directed. Don t skip doses. Missing doses can cause your blood pressure to get out of control.    Avoid medications that contain heart stimulants, including over-the-counter drugs. Check for warnings about high blood pressure on the label.    Check with your doctor before taking a decongestant. Some decongestants can worsen high blood pressure.  Lifestyle changes    Maintain a healthy weight. Get help to lose any extra pounds.    Cut back on salt.    Limit canned, dried, packaged, and fast foods.    Don t add salt to your food at the table.    Season foods with herbs instead of salt when you cook.    Follow the DASH (Dietary Approaches to Stop Hypertension) eating plan. This plan recommends vegetables, fruits, whole gains, and other heart healthy foods.    Begin an exercise program. Ask your doctor how to get started. The American Heart Association recommends aerobic exercise 3 to 4 times a week for an average of 40 minutes at a time, with your doctor's approval. Simple activities like walking or gardening can help.    Break the  smoking habit. Enroll in a stop-smoking program to improve your chances of success. Ask your health care provider about programs and medications to help you stop smoking.    Limit drinks that contain caffeine (coffee, black or green tea, cola) to 2 per day.    Never take stimulants such as amphetamines or cocaine; these drugs can be deadly for someone with high blood pressure.    Control your stress. Learn stress-management techniques.    Limit alcohol to no more than 1 drink a day for women and 2 drinks a day for men.  Follow-up care  Make a follow-up appointment as directed by our staff.     When to seek medical care  Call your doctor immediately if you have any of the following:    Chest pain or shortness of breath (call 911)    Moderate to severe headache    Weakness in the muscles of your face, arms, or legs    Trouble speaking    Extreme drowsiness    Confusion    Fainting or dizziness    Pulsating or rushing sound in your ears    Unexplained nosebleed    Weakness, tingling, or numbness of your face, arms, or legs    Change in vision    Blood pressure measured at home that is greater than 180/110     [id number] [employer]. [address] [place] [address], [place] [address]. All rights reserved. This information is not intended as a substitute for professional medical care. Always follow your healthcare professional's instructions.

## 2017-06-06 ENCOUNTER — APPOINTMENT (OUTPATIENT)
Dept: MRI IMAGING | Facility: CLINIC | Age: 32
End: 2017-06-06
Attending: PHYSICIAN ASSISTANT
Payer: MEDICARE

## 2017-06-06 ENCOUNTER — HOSPITAL ENCOUNTER (EMERGENCY)
Facility: CLINIC | Age: 32
Discharge: HOME | End: 2017-06-07
Attending: PHYSICIAN ASSISTANT | Admitting: PHYSICIAN ASSISTANT
Payer: MEDICARE

## 2017-06-06 VITALS
OXYGEN SATURATION: 100 % | TEMPERATURE: 99 F | DIASTOLIC BLOOD PRESSURE: 91 MMHG | HEIGHT: 66 IN | BODY MASS INDEX: 18.78 KG/M2 | RESPIRATION RATE: 18 BRPM | HEART RATE: 74 BPM | SYSTOLIC BLOOD PRESSURE: 137 MMHG | WEIGHT: 116.84 LBS

## 2017-06-06 DIAGNOSIS — I10 BENIGN ESSENTIAL HYPERTENSION: ICD-10-CM

## 2017-06-06 DIAGNOSIS — R51.9 NONINTRACTABLE HEADACHE, UNSPECIFIED CHRONICITY PATTERN, UNSPECIFIED HEADACHE TYPE: ICD-10-CM

## 2017-06-06 DIAGNOSIS — D64.9 ANEMIA, UNSPECIFIED TYPE: ICD-10-CM

## 2017-06-06 DIAGNOSIS — N18.5 CHRONIC KIDNEY DISEASE, STAGE V (H): ICD-10-CM

## 2017-06-06 LAB
ALBUMIN SERPL-MCNC: 3.2 G/DL (ref 3.4–5)
ALP SERPL-CCNC: 40 U/L (ref 40–150)
ALT SERPL W P-5'-P-CCNC: 16 U/L (ref 0–50)
ANION GAP SERPL CALCULATED.3IONS-SCNC: 10 MMOL/L (ref 3–14)
AST SERPL W P-5'-P-CCNC: 30 U/L (ref 0–45)
BASOPHILS # BLD AUTO: 0 10E9/L (ref 0–0.2)
BASOPHILS NFR BLD AUTO: 0.7 %
BILIRUB SERPL-MCNC: 0.5 MG/DL (ref 0.2–1.3)
BUN SERPL-MCNC: 37 MG/DL (ref 7–30)
CALCIUM SERPL-MCNC: 8.1 MG/DL (ref 8.5–10.1)
CHLORIDE SERPL-SCNC: 98 MMOL/L (ref 94–109)
CO2 SERPL-SCNC: 23 MMOL/L (ref 20–32)
CREAT SERPL-MCNC: 7.35 MG/DL (ref 0.52–1.04)
DIFFERENTIAL METHOD BLD: ABNORMAL
EOSINOPHIL # BLD AUTO: 0.1 10E9/L (ref 0–0.7)
EOSINOPHIL NFR BLD AUTO: 4.3 %
ERYTHROCYTE [DISTWIDTH] IN BLOOD BY AUTOMATED COUNT: 13.7 % (ref 10–15)
GFR SERPL CREATININE-BSD FRML MDRD: 7 ML/MIN/1.7M2
GLUCOSE SERPL-MCNC: 90 MG/DL (ref 70–99)
HCT VFR BLD AUTO: 25.4 % (ref 35–47)
HGB BLD-MCNC: 8.2 G/DL (ref 11.7–15.7)
IMM GRANULOCYTES # BLD: 0 10E9/L (ref 0–0.4)
IMM GRANULOCYTES NFR BLD: 0.4 %
LYMPHOCYTES # BLD AUTO: 0.6 10E9/L (ref 0.8–5.3)
LYMPHOCYTES NFR BLD AUTO: 22.4 %
MCH RBC QN AUTO: 31.7 PG (ref 26.5–33)
MCHC RBC AUTO-ENTMCNC: 32.3 G/DL (ref 31.5–36.5)
MCV RBC AUTO: 98 FL (ref 78–100)
MONOCYTES # BLD AUTO: 0.2 10E9/L (ref 0–1.3)
MONOCYTES NFR BLD AUTO: 6.1 %
NEUTROPHILS # BLD AUTO: 1.8 10E9/L (ref 1.6–8.3)
NEUTROPHILS NFR BLD AUTO: 66.1 %
NRBC # BLD AUTO: 0 10*3/UL
NRBC BLD AUTO-RTO: 0 /100
PLATELET # BLD AUTO: 103 10E9/L (ref 150–450)
POTASSIUM SERPL-SCNC: 4.2 MMOL/L (ref 3.4–5.3)
PROT SERPL-MCNC: 6.5 G/DL (ref 6.8–8.8)
RBC # BLD AUTO: 2.59 10E12/L (ref 3.8–5.2)
SODIUM SERPL-SCNC: 131 MMOL/L (ref 133–144)
TROPONIN I SERPL-MCNC: NORMAL UG/L (ref 0–0.04)
WBC # BLD AUTO: 2.8 10E9/L (ref 4–11)

## 2017-06-06 PROCEDURE — 25000128 H RX IP 250 OP 636: Performed by: PHYSICIAN ASSISTANT

## 2017-06-06 PROCEDURE — 70544 MR ANGIOGRAPHY HEAD W/O DYE: CPT

## 2017-06-06 PROCEDURE — 25000125 ZZHC RX 250: Performed by: PHYSICIAN ASSISTANT

## 2017-06-06 PROCEDURE — 99285 EMERGENCY DEPT VISIT HI MDM: CPT | Mod: 25

## 2017-06-06 PROCEDURE — 93005 ELECTROCARDIOGRAM TRACING: CPT

## 2017-06-06 PROCEDURE — 96375 TX/PRO/DX INJ NEW DRUG ADDON: CPT

## 2017-06-06 PROCEDURE — 96374 THER/PROPH/DIAG INJ IV PUSH: CPT

## 2017-06-06 PROCEDURE — 80053 COMPREHEN METABOLIC PANEL: CPT | Performed by: PHYSICIAN ASSISTANT

## 2017-06-06 PROCEDURE — 85025 COMPLETE CBC W/AUTO DIFF WBC: CPT | Performed by: PHYSICIAN ASSISTANT

## 2017-06-06 PROCEDURE — 84484 ASSAY OF TROPONIN QUANT: CPT | Performed by: PHYSICIAN ASSISTANT

## 2017-06-06 PROCEDURE — 25000132 ZZH RX MED GY IP 250 OP 250 PS 637: Mod: GY | Performed by: PHYSICIAN ASSISTANT

## 2017-06-06 PROCEDURE — 70547 MR ANGIOGRAPHY NECK W/O DYE: CPT

## 2017-06-06 PROCEDURE — A9270 NON-COVERED ITEM OR SERVICE: HCPCS | Mod: GY | Performed by: PHYSICIAN ASSISTANT

## 2017-06-06 PROCEDURE — 70551 MRI BRAIN STEM W/O DYE: CPT

## 2017-06-06 RX ORDER — MORPHINE SULFATE 2 MG/ML
2 INJECTION, SOLUTION INTRAMUSCULAR; INTRAVENOUS ONCE
Status: COMPLETED | OUTPATIENT
Start: 2017-06-06 | End: 2017-06-06

## 2017-06-06 RX ORDER — HYDRALAZINE HYDROCHLORIDE 20 MG/ML
10 INJECTION INTRAMUSCULAR; INTRAVENOUS ONCE
Status: COMPLETED | OUTPATIENT
Start: 2017-06-06 | End: 2017-06-06

## 2017-06-06 RX ORDER — ACETAMINOPHEN 500 MG
1000 TABLET ORAL EVERY 4 HOURS PRN
Status: DISCONTINUED | OUTPATIENT
Start: 2017-06-06 | End: 2017-06-07 | Stop reason: HOSPADM

## 2017-06-06 RX ORDER — METHOCARBAMOL 750 MG/1
750 TABLET, FILM COATED ORAL ONCE
Status: COMPLETED | OUTPATIENT
Start: 2017-06-06 | End: 2017-06-06

## 2017-06-06 RX ADMIN — MORPHINE SULFATE 2 MG: 2 INJECTION, SOLUTION INTRAMUSCULAR; INTRAVENOUS at 21:38

## 2017-06-06 RX ADMIN — METHOCARBAMOL 750 MG: 750 TABLET ORAL at 23:18

## 2017-06-06 RX ADMIN — ACETAMINOPHEN 1000 MG: 500 TABLET, FILM COATED ORAL at 23:24

## 2017-06-06 RX ADMIN — HYDRALAZINE HYDROCHLORIDE 10 MG: 20 INJECTION INTRAMUSCULAR; INTRAVENOUS at 21:38

## 2017-06-06 ASSESSMENT — ENCOUNTER SYMPTOMS
NAUSEA: 1
CHILLS: 0
NECK PAIN: 1
SPEECH DIFFICULTY: 0
FEVER: 0
ABDOMINAL PAIN: 0
HEADACHES: 1
NUMBNESS: 0
WEAKNESS: 0
SHORTNESS OF BREATH: 0
VOMITING: 0

## 2017-06-06 NOTE — ED AVS SNAPSHOT
[facility] Emergency Department    [address]    [facility] [address]    Phone:  [phone]    Fax:  [phone]                                       Jelly Dietz   MRN: [id number]    Department:  [facility] Emergency Department   Date of Visit:  6/6/2017           Patient Information     Date Of Birth          1985        Your diagnoses for this visit were:     Benign essential hypertension     Nonintractable headache, unspecified chronicity pattern, unspecified headache type     Chronic kidney disease, stage V (H) on dialysis     Anemia, unspecified type        You were seen by Cara Hightower PA-C.      Follow-up Information     Follow up with [facility] Emergency Department.    Specialty:  EMERGENCY MEDICINE    Why:  If symptoms worsen    Contact information:    [address]  [facility] [id number] [phone]        Follow up with dialysis In 2 days.        Follow up with Katherine Varma MD In 2 days.    Specialty:  Nephrology    Contact information:     [facility]  [address] [facility] [place] [address]  [facility] [address]  [phone]          Discharge Instructions           Discharge Instructions  Headache    You were seen today for a headache. Headaches may be caused by many different things such as muscle tension, sinus inflammation, anxiety and stress, having too little sleep, too much alcohol, some medical conditions or injury. You may have a migraine, which is caused by changes in the blood vessels in your head.  At this time your doctor does not find that your headache is a sign of anything dangerous or life-threatening.  However, sometimes the signs of serious illness do not show up right away.  If you have new or worse symptoms, you may need to be seen again in the emergency department or by your primary doctor.      Return to the Emergency Department if:    You get a fever of 101 F or higher.    Your  headache gets much worse.    You get a stiff neck with your headache.    You get a new headache that is different or worse than headaches you have had before.    You are vomiting and can t keep food or water down.    You have blurry or double vision or other problems with your eyes.    You have a new weakness on one side of your body.    You have difficulty with balance which is new.    You or your family thinks you are confused.    You have a seizure or convulsion.    What can I do to help myself?    Pain medications - You may take a pain medication such as Tylenol  (acetaminophen), Advil , Nuprin  (ibuprofen) or Aleve  (naproxen).  If you have been given a narcotic such as Vicodin  (hydrocodone with acetaminophen), Percocet  (oxycodone with acetaminophen), codeine, or a muscle relaxant such as Flexeril  (cyclobenzaprine) or Soma  (carisoprodol), do not drive for four hours after you have taken it. If the narcotic contains Tylenol  (acetaminophen), do not take Tylenol  with it. All narcotics will cause constipation, so eat a high fiber diet.        Take a pain reliever as soon as you notice symptoms.  Starting medications as soon as you start to have symptoms may lessen the amount of pain you have.    Relaxing in a quiet, dark room may help.    Get enough sleep and eat meals regularly.    Schedule an appointment with your primary physician as instructed, or at least within 1 week.    You may need to watch for certain foods or other things which may trigger your headaches.  Keeping a journal of your headaches and possible triggers may help you and your primary doctor to identify things which you should avoid which may be causing your headaches.  If you were given a prescription for medicine here today, be sure to read all of the information (including the package insert) that comes with your prescription.  This will include important information about the medicine, its side effects, and any warnings that you need to  know about.  The pharmacist who fills the prescription can provide more information and answer questions you may have about the medicine.  If you have questions or concerns that the pharmacist cannot address, please call or return to the Emergency Department.     Remember that you can always come back to the Emergency Department if you are not able to see your regular doctor in the amount of time listed above, if you get any new symptoms, or if there is anything that worries you.    Discharge Instructions  Hypertension - High Blood Pressure    During you visit to the Emergency Department, your blood pressure was higher than the recommended blood pressure.  This may be related to stress, pain, medication or other temporary conditions. In these cases, your blood pressure may return to normal on its own. If you have a history of high blood pressure, you may need to have your doctor adjust your medications. Sometimes, your high measurement here may indicate that you have developed high blood pressure that will stay high unless it is treated. Sudden very high blood pressure can cause problems, but usually high blood pressure causes problems over months to years.      Blood pressure is almost never lowered in the Emergency Department, because studies have shown that lowering blood pressure too quickly is much more dangerous than leaving it alone.    You need to follow up with your doctor in 1-3 days to get your blood pressure rechecked.     Return to the Emergency Department if you start to have:    A severe headache.    Chest pain.    Shortness of breath.    Weakness or numbness that affects one part of the body.    Confusion.    Vision changes.    Significant swelling of legs and/or eyes.    A reaction to any medication started in the Emergency Department.    What can I do to help myself?    Avoid alcohol.    Take any blood pressure medicine that you are prescribed.    Get a good night s sleep.    Lower your salt  intake.    Exercise.    Lose weight.    Manage stress.    If blood pressure medication was started in the Emergency Department:    The medicine may not have an immediate effect. The body and brain determine what blood pressure you have. The medicine s job is to retrain the body s  thermostat  to a lower blood pressure.    You will need to follow up with your doctor to see how this medicine is working for you.  If you were given a prescription for medicine here today, be sure to read all of the information (including the package insert) that comes with your prescription.  This will include important information about the medicine, its side effects, and any warnings that you need to know about.  The pharmacist who fills the prescription can provide more information and answer questions you may have about the medicine.  If you have questions or concerns that the pharmacist cannot address, please call or return to the Emergency Department.   Remember that you can always come back to the Emergency Department if you are not able to see your regular doctor in the amount of time listed above, if you get any new symptoms, or if there is anything that worries you.          24 Hour Appointment Hotline       To make an appointment at any [facility], call [phone] ([phone]). If you don't have a family doctor or clinic, we will help you find one. [facility] clinics are conveniently located to serve the needs of you and your family.             Review of your medicines      Our records show that you are taking the medicines listed below. If these are incorrect, please call your family doctor or clinic.        Dose / Directions Last dose taken    acetaminophen 325 MG tablet   Commonly known as:  TYLENOL   Dose:  325-650 mg   Quantity:  60 tablet        Take 1-2 tablets by mouth every 4 hours as needed.   Refills:  0        amLODIPine 10 MG tablet   Commonly known as:  NORVASC   Dose:  10 mg   Quantity:  30 tablet         Take 1 tablet (10 mg) by mouth daily   Refills:  1        calcium carbonate 500 MG chewable tablet   Commonly known as:  TUMS   Dose:  1 chew tab        Take 1 chew tab by mouth 3 times daily as needed for heartburn   Refills:  0        carvedilol 25 MG tablet   Commonly known as:  COREG   Dose:  25 mg   Quantity:  60 tablet        Take 1 tablet (25 mg) by mouth 2 times daily (with meals)   Refills:  11        cetirizine 10 MG tablet   Commonly known as:  zyrTEC   Dose:  10 mg        Take 10 mg by mouth At Bedtime   Refills:  0        cloNIDine 0.1 MG tablet   Commonly known as:  CATAPRES   Dose:  0.1 mg   Quantity:  60 tablet        Take 1 tablet (0.1 mg) by mouth 3 times daily as needed   Refills:  3                Procedures and tests performed during your visit     CBC + differential    Comprehensive metabolic panel    EKG 12 lead    MR Brain w/o Contrast    MR Head w/o Contrast Angiogram    MRA Angiogram Neck w/o Contrast    Troponin I (now)      Orders Needing Specimen Collection     None      Pending Results     Date and Time Order Name Status Description    6/6/2017 2052 EKG 12 lead Preliminary             Pending Culture Results     No orders found from 6/4/2017 to 6/7/2017.            Pending Results Instructions     If you had any lab results that were not finalized at the time of your Discharge, you can call the ED Lab Result RN at [phone]. You will be contacted by this team for any positive Lab results or changes in treatment. The nurses are available 7 days a week from 10A to 6:30P.  You can leave a message 24 hours per day and they will return your call.        Test Results From Your Hospital Stay        6/6/2017  9:29 PM      Component Results     Component Value Ref Range & Units Status    WBC 2.8 (L) 4.0 - 11.0 10e9/L Final    RBC Count 2.59 (L) 3.8 - 5.2 10e12/L Final    Hemoglobin 8.2 (L) 11.7 - 15.7 g/dL Final    Hematocrit 25.4 (L) 35.0 - 47.0 % Final    MCV 98 78 - 100 fl Final    MCH  31.7 26.5 - 33.0 pg Final    MCHC 32.3 31.5 - 36.5 g/dL Final    RDW 13.7 10.0 - 15.0 % Final    Platelet Count 103 (L) 150 - 450 10e9/L Final    Diff Method Automated Method  Final    % Neutrophils 66.1 % Final    % Lymphocytes 22.4 % Final    % Monocytes 6.1 % Final    % Eosinophils 4.3 % Final    % Basophils 0.7 % Final    % Immature Granulocytes 0.4 % Final    Nucleated RBCs 0 0 /100 Final    Absolute Neutrophil 1.8 1.6 - 8.3 10e9/L Final    Absolute Lymphocytes 0.6 (L) 0.8 - 5.3 10e9/L Final    Absolute Monocytes 0.2 0.0 - 1.3 10e9/L Final    Absolute Eosinophils 0.1 0.0 - 0.7 10e9/L Final    Absolute Basophils 0.0 0.0 - 0.2 10e9/L Final    Abs Immature Granulocytes 0.0 0 - 0.4 10e9/L Final    Absolute Nucleated RBC 0.0  Final         6/6/2017 10:01 PM      Component Results     Component Value Ref Range & Units Status    Sodium 131 (L) 133 - 144 mmol/L Final    Potassium 4.2 3.4 - 5.3 mmol/L Final    Specimen slightly hemolyzed, potassium may be falsely elevated    Chloride 98 94 - 109 mmol/L Final    Carbon Dioxide 23 20 - 32 mmol/L Final    Anion Gap 10 3 - 14 mmol/L Final    Glucose 90 70 - 99 mg/dL Final    Urea Nitrogen 37 (H) 7 - 30 mg/dL Final    Creatinine 7.35 (H) 0.52 - 1.04 mg/dL Final    GFR Estimate 7 (L) >60 mL/min/1.7m2 Final    Non  GFR Calc    GFR Estimate If Black 8 (L) >60 mL/min/1.7m2 Final    African American GFR Calc    Calcium 8.1 (L) 8.5 - 10.1 mg/dL Final    Bilirubin Total 0.5 0.2 - 1.3 mg/dL Final    Albumin 3.2 (L) 3.4 - 5.0 g/dL Final    Protein Total 6.5 (L) 6.8 - 8.8 g/dL Final    Alkaline Phosphatase 40 40 - 150 U/L Final    ALT 16 0 - 50 U/L Final    AST 30 0 - 45 U/L Final    Specimen is hemolyzed which can falsely elevate AST. Analysis of a   non-hemolyzed   specimen may result in a lower value.           6/6/2017 10:48 PM      Narrative     MRA ANGIOGRAM HEAD W/O CONTRAST   6/6/2017 10:33 PM     HISTORY:  Headache, right sided neck pain    TECHNIQUE:  3D  time-of-flight MR angiogram of the head without  contrast.    COMPARISON: None.    FINDINGS: The visualized portions of the distal internal carotid and  vertebral arteries, the basilar artery, [place], and the  proximal anterior, middle and posterior cerebral arteries all appear  normal. There is no evidence of aneurysm or vascular stenosis or  occlusion.        Impression     IMPRESSION:  Negative MR angiogram of the head.    RACHEL ACEVEDO MD         6/6/2017 10:01 PM      Component Results     Component Value Ref Range & Units Status    Troponin I ES  0.000 - 0.045 ug/L Final    <0.015  The 99th percentile for upper reference range is 0.045 ug/L.  Troponin values in   the range of 0.045 - 0.120 ug/L may be associated with risks of adverse   clinical events.           6/6/2017 10:48 PM      Narrative     MRA ANGIOGRAM NECK W/O CONTRAST 6/6/2017 10:33 PM     HISTORY:  Headache, right sided neck pain    TECHNIQUE:  Sequential axial images of the neck were obtained using  2-dimensional time-of-flight before contrast and 3-dimensional without  contrast.    COMPARISON:  None.    FINDINGS: Stenosis is relative to the distal internal carotid  diameter.    Right Carotid:  No significant stenosis is seen at the bifurcation  relative to the distal internal carotid diameter.    Left Carotid:  No significant stenosis is seen at the bifurcation  relative to the distal internal carotid diameter.    Vertebrals: Antegrade flow is seen in both vertebral arteries.    No arterial dissection is identified.        Impression     IMPRESSION: Negative MR angiogram of the neck. No stenosis is seen at  either carotid bifurcation. No arterial dissection is identified.    RACHEL ACEVEDO MD         6/6/2017 10:48 PM      Narrative     MR BRAIN WITHOUT CONTRAST  6/6/2017 10:43 PM     HISTORY:  Elevated blood pressure, right-sided neck pain. History of  kidney transplant.    TECHNIQUE: Multiplanar, multisequence MRI of the brain  without  gadolinium IV contrast material.    COMPARISON: None.    FINDINGS:  The brain parenchyma, ventricles and subarachnoid spaces  appear normal for age.  There is no evidence of hemorrhage, mass,  acute infarct, or anomaly. The facial structures appear normal.  The  arteries at the base of the brain and the dural venous sinuses appear  patent.        Impression     IMPRESSION:   1. No acute pathology. No bleed, mass, or infarcts.  2. There are nonspecific hyperintensities in the white matter of both  hemispheres. These could be due to gliosis secondary to previous  infectious or inflammatory process. Hypertensive patients or diabetic  patients are also more likely to have these type of changes. Patient's  with migraine-type headaches can also have these type of changes.    RACHEL ACEVEDO MD                Clinical Quality Measure: Blood Pressure Screening     Your blood pressure was checked while you were in the emergency department today. The last reading we obtained was  BP: 133/89 . Please read the guidelines below about what these numbers mean and what you should do about them.  If your systolic blood pressure (the top number) is less than 120 and your diastolic blood pressure (the bottom number) is less than 80, then your blood pressure is normal. There is nothing more that you need to do about it.  If your systolic blood pressure (the top number) is 120-139 or your diastolic blood pressure (the bottom number) is 80-89, your blood pressure may be higher than it should be. You should have your blood pressure rechecked within a year by a primary care provider.  If your systolic blood pressure (the top number) is 140 or greater or your diastolic blood pressure (the bottom number) is 90 or greater, you may have high blood pressure. High blood pressure is treatable, but if left untreated over time it can put you at risk for heart attack, stroke, or kidney failure. You should have your blood pressure rechecked by a  "primary care provider within the next 4 weeks.  If your provider in the emergency department today gave you specific instructions to follow-up with your doctor or provider even sooner than that, you should follow that instruction and not wait for up to 4 weeks for your follow-up visit.        Thank you for choosing [facility]       Thank you for choosing [facility] for your care. Our goal is always to provide you with excellent care. Hearing back from our patients is one way we can continue to improve our services. Please take a few minutes to complete the written survey that you may receive in the mail after you visit with us. Thank you!        [employer] Information     [employer] lets you send messages to your doctor, view your test results, renew your prescriptions, schedule appointments and more. To sign up, go to [email or web address] . Click on \"Log in\" on the left side of the screen, which will take you to the Welcome page. Then click on \"Sign up Now\" on the right side of the page.     You will be asked to enter the access code listed below, as well as some personal information. Please follow the directions to create your username and password.     Your access code is: [email or web address]  Expires: 2017  2:33 AM     Your access code will  in 90 days. If you need help or a new code, please call your [facility] clinic or [phone].        Care EveryWhere ID     This is your Care EveryWhere ID. This could be used by other organizations to access your [facility] medical records  [id number]        After Visit Summary       This is your record. Keep this with you and show to your community pharmacist(s) and doctor(s) at your next visit.                  "

## 2017-06-06 NOTE — ED AVS SNAPSHOT
[facility] Emergency Department    [address]    [facility] [address]    Phone:  [phone]    Fax:  [phone]                                       Jelly Dietz   MRN: [id number]    Department:  [facility] Emergency Department   Date of Visit:  6/6/2017           After Visit Summary Signature Page     I have received my discharge instructions, and my questions have been answered. I have discussed any challenges I see with this plan with the nurse or doctor.    ..........................................................................................................................................  Patient/Patient Representative Signature      ..........................................................................................................................................  Patient Representative Print Name and Relationship to Patient    ..................................................               ................................................  Date                                            Time    ..........................................................................................................................................  Reviewed by Signature/Title    ...................................................              ..............................................  Date                                                            Time

## 2017-06-07 LAB — INTERPRETATION ECG - MUSE: NORMAL

## 2017-06-07 NOTE — ED PROVIDER NOTES
History     Chief Complaint:  Hypertension, Neck Pain    HPI   Jelly Dietz is a 32 year old female with a complex medical history including end stage renal disease due to IGA nephropathy s/p kidney transplant, on dialysis ([facility], Sat) who presents to the ED for evaluation of elevated blood pressure and neck pain.  The patient states during her dialysis session they noted her blood pressure to be elevated.  She took two doses of clonidine however her blood pressure remained elevated and she subsequently developed a gradual onset headache that progressively worsened.  She reports associated right-sided neck pain.  She also notes she initially had blurred vision in both eyes however this is since resolved.  The patient denies any chest pain, shortness of breath, leg swelling or calf pain.  She denies any focal numbness, weakness or speech changes.  She has not had any fevers.    Of note, she was recently seen here in the ED a few days on  ago due to elevated blood pressure in addition to a headache.  At that time she had a negative head CT and was discharged to home after blood pressure was controlled.     Allergies:  NKDA     Medications:   cloNIDine (CATAPRES) 0.1 MG tablet   amLODIPine (NORVASC) 10 MG tablet   acetaminophen (TYLENOL) 325 MG tablet   cetirizine (ZYRTEC) 10 MG tablet   calcium carbonate (TUMS) 500 MG chewable tablet   carvedilol (COREG) 25 MG tablet      Past Medical History:    ACS (acute coronary syndrome) (H) 2014   Allergic state    Anemia in chronic renal disease    Cervical cerclage suture present in second trimester    Chest pain 2014   Chronic renal transplant rejection    End stage kidney disease (H)    Heart murmur    History of  delivery ,    Hypertension    IgA nephropathy    MRSA (methicillin resistant Staphylococcus aureus)    [facility]   Pre-eclampsia    Renal failure  affecting pregnancy in second trimester    S/P kidney transplant    SAB (spontaneous )      Past Surgical History:    CERCLAGE CERVICAL N/A 2015   Procedure: CERCLAGE CERVICAL;  Surgeon: Norbert Chopra MD;  Location: [facility] L[facility]    SECTION  2012   Procedure:  SECTION;;  Surgeon: Chelsea Cross MD;  Location: [facility] L[facility]    SECTION N/A 2015   Procedure:  SECTION;  Surgeon: Chelsea Cross MD;  Location: [facility] OR   cesearean section     EXPLANT TRANSPLANTED KIDNEY  3/29/2013   Procedure: EXPLANT TRANSPLANTED KIDNEY;  Explant Transplanted right Kidney (from patients right iliac fossa);  Surgeon: Nory Morgan MD;  Location: [facility] OR   NEPHRECTOMY  3/29/13   Transplant nephrectomy    WILIAN/DIALYSIS CATHETER     TRANSPLANT KIDNEY RECIPIENT LIVING UNRELATED  Dec 2007   (Adult male in [place])     Family History:    DIABETES Paternal Grandfather    Breast Cancer Maternal Grandmother 55   CANCER No family hx of     No known family hx of skin cancer     Social History:  Marital Status:  Single [1]  Never smoker.   No alcohol use.        Review of Systems   Constitutional: Negative for chills and fever.   Eyes: Positive for visual disturbance (resolved).   Respiratory: Negative for shortness of breath.    Cardiovascular: Negative for chest pain.   Gastrointestinal: Positive for nausea. Negative for abdominal pain and vomiting.   Musculoskeletal: Positive for neck pain (right sided).   Neurological: Positive for headaches. Negative for syncope, speech difficulty, weakness and numbness.   All other systems reviewed and are negative.      Physical Exam     Patient Vitals for the past 24 hrs:   BP Temp Temp src Pulse Heart Rate Resp SpO2 Height Weight   17 2345 (!) 137/91 - - - - - - - -   17 2330 137/90 - - - - - 100 % - -   17 2315 (!) 136/92 - - - 84 18 99 % - -   17 2145 (!) 165/112 - - - - - 100 % - -   17 2138 (!) 203/130 - - - - -  "- - -   06/06/17 2130 - - - - - - 100 % - -   06/06/17 2115 - - - - - - 100 % - -   06/06/17 2100 (!) 206/130 - - - - - 99 % - -   06/06/17 2051 - - - - - - 100 % - -   06/06/17 2050 (!) 216/133 - - - - - - - -   06/06/17 2033 (!) 220/136 99  F (37.2  C) Temporal 74 - 16 100 % 1.676 m (5' 6\") 53 kg (116 lb 13.5 oz)      Physical Exam  Nursing note and vitals reviewed.     GENERAL: Alert, mild distress, non toxic appearing.    HEENT:   Head: No facial asymmetry.   Eyes: Normal conjunctiva. No scleral icterus. PERRLA. EOMI. No nystagmus.    NECK: Supple, no nuchal rigidity. Tenderness over right side of neck.   CARDIAC: Normal rate and regular rhythm. Normal heart sounds. No murmurs, rubs, or gallops appreciated. Intact distal radial pulses 2+. Cap refill < 2 seconds.    PULMONARY: CTA bilaterally. Normal breath sounds. No wheezing, crackles, or rhonchi appreciated.   ABDOMEN: Soft, non-tender, non-distended. No rebound or guarding.  NEURO: Alert and oriented. GCS 15. Normal speech. CN II-XII intact. Negative pronator drift. Finger to nose intact bilaterally. Sensation intact throughout. Normal strength of bilateral upper and lower extremities.   MUSCULOSKELETAL: Normal range of motion. No peripheral edema. Fistula in left distal forearm.   SKIN: Skin is warm and dry. No rashes. No pallor or jaundice.   PSYCH: Normal affect and mood.        Emergency Department Course   ECG:  Indication: Hypertension  Completed at 2102.  Read at 2104.   Rate 69 bpm. CA interval 194. QRS duration 94. QT/QTc 412/441. P-R-T axes 53 53 67.   Normal sinus rhythm.   Moderate voltage criteria for LVH, may be normal variant.   Borderline ECG.   No significant change from prior on 6/1/17.     Imaging:  Radiographic findings were communicated with the patient who voiced understanding of the findings.    MRI Brain w/o contrast:   IMPRESSION:   1. No acute pathology. No bleed, mass, or infarcts.  2. There are nonspecific hyperintensities in the " white matter of both  hemispheres. These could be due to gliosis secondary to previous  infectious or inflammatory process. Hypertensive patients or diabetic  patients are also more likely to have these type of changes. Patient's  with migraine-type headaches can also have these type of changes.  Per radiology.     MRA Angiogram Neck w/o contrast:  IMPRESSION: Negative MR angiogram of the neck. No stenosis is seen at  either carotid bifurcation. No arterial dissection is identified.  Per radiology.     MR Head w/o contrast angiogram:   Negative.   Per radiology.     Laboratory:  CBC: WBC 2.87 (L), HGB 8.2 (L),  (L), o/w WNL   CMP: Na 131 (L), Ca 8.1 (L), BUN 37 (H), Cr 7.35 (H), Albumin 3.2 (L), GFR 8 (L), o/w WNL   Troponin: <0.015     Interventions:  Hydralazine 10 mg IV   Morphine 2 mg IV   Robaxin 750 mg PO     Emergency Department Course:  The patient arrived in triage where vitals were measured and recorded.   The patient was then escorted back to the emergency department.   The patient's medical records were reviewed.  Nursing notes and vitals were reviewed.    I performed an exam of the patient as documented above. The patient is in agreement with my plan of care.     Blood drawn. This was sent to the lab for further testing, results above.     The patient was taken for imaging studies as above, see results above.      I spoke with Dr. Hill of nephrology in regards to patient.     Patient felt improved following above interventions.    Patient was discharged in satisfactory condition with all questions answered. Discharge instructions provided.     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  On arrival patient was noted to be quite  hypertensive at 220/136.  She was otherwise hemodynamically stable.  No fever and nontoxic-appearing.  She has a complete normal neurologic exam without any focal findings however given her reported neck pain in conjunction with a headache I did feel further advanced imaging was indicated.  MR of the brain revealed some nonspecific hyperintensities in the white matter of both hemispheres that could be due to gliosis secondary to a prior infectious or inflammatory process however can also be seen in hypertensive or diabetic patient's.  This is not an acute finding and no evidence of any acute bleed, mass or infarct.  MRA of the neck and head were negative for any signs of significant stenosis, vertebral dissection, aneurysm, or any other acute abnormalities.  Laboratory workup revealed a slightly decreased hemoglobin at 8.2 from 10 during previous ED visit.  No clinical history or signs on exam of acute blood loss.  Sodium slightly decreased at 131.  Creatinine elevated at 7.35 consistent with someone with end-stage renal disease.  Potassium within normal limits.  No other acute metabolic derangement.  EKG shows no signs of acute ischemic changes and troponin is unremarkable.  Given this and no chest pain or shortness of breath I have low suspicion for acute coronary syndrome.  I considered subarachnoid hemorrhage however she has no meningeal signs on exam and her onset of headache is not clinically consistent with this.  Given this and the fact that she has had a negative MRI for aneurysm or bleeding within six hours of onset of headache I have very low suspicion for subarachnoid hemorrhage and do not feel lumbar puncture is indicated.  No fevers concerning for meningitis.  Her blood pressure significantly improved after a dose of IV hydralazine here.  She has had recurrent issues with elevated blood pressure and was actually recently seen here on 6/1 for hypertension.  At that time she had a negative head CT and  was discharged home after this improved.    I spoke with Dr. Hill of nephrology who felt given blood pressure is controlled and no changes in potassium, no indication for emergent dialysis particularly given she had most of her session today (reports only doing two versus the full three hours).  She had improvement of her symptoms following above interventions and given that her blood pressure has normalized and her lab work is reassuring I do feel she can safely discharged home.  Actually scheduled to see her nephrologist in two days during her dialysis session.  Closely reviewed reasons to return to the ED, including worsening headache, vomiting, numbness or focal weakness, speech changes, vision loss, chest pain, shortness of breath or any other new concerns. The patient was in agreement with plan and discharged in satisfactory condition with all questions answered.      Diagnosis:    ICD-10-CM    1. Benign essential hypertension I10    2. Nonintractable headache, unspecified chronicity pattern, unspecified headache type R51    3. Chronic kidney disease, stage V (H) on dialysis N18.5    4. Anemia, unspecified type D64.9      Disposition:  Discharge to home        Cara Hightower  6/6/2017   [facility] EMERGENCY DEPARTMENT       Cara Hightower PA-C  06/07/17 0235

## 2017-06-07 NOTE — ED NOTES
Pt finished dialysis only did 2 1/2 d/t sitter problems  Noted high b/p in run and was given to doses of clonidine for pressure  Still high   Pt feeling awful  Nauseated neck and heak pain weak  Rates pain 20/10  Abc intact

## 2017-06-07 NOTE — DISCHARGE INSTRUCTIONS
Discharge Instructions  Headache    You were seen today for a headache. Headaches may be caused by many different things such as muscle tension, sinus inflammation, anxiety and stress, having too little sleep, too much alcohol, some medical conditions or injury. You may have a migraine, which is caused by changes in the blood vessels in your head.  At this time your doctor does not find that your headache is a sign of anything dangerous or life-threatening.  However, sometimes the signs of serious illness do not show up right away.  If you have new or worse symptoms, you may need to be seen again in the emergency department or by your primary doctor.      Return to the Emergency Department if:    You get a fever of 101 F or higher.    Your headache gets much worse.    You get a stiff neck with your headache.    You get a new headache that is different or worse than headaches you have had before.    You are vomiting and can t keep food or water down.    You have blurry or double vision or other problems with your eyes.    You have a new weakness on one side of your body.    You have difficulty with balance which is new.    You or your family thinks you are confused.    You have a seizure or convulsion.    What can I do to help myself?    Pain medications - You may take a pain medication such as Tylenol  (acetaminophen), Advil , Nuprin  (ibuprofen) or Aleve  (naproxen).  If you have been given a narcotic such as Vicodin  (hydrocodone with acetaminophen), Percocet  (oxycodone with acetaminophen), codeine, or a muscle relaxant such as Flexeril  (cyclobenzaprine) or Soma  (carisoprodol), do not drive for four hours after you have taken it. If the narcotic contains Tylenol  (acetaminophen), do not take Tylenol  with it. All narcotics will cause constipation, so eat a high fiber diet.        Take a pain reliever as soon as you notice symptoms.  Starting medications as soon as you start to have symptoms may lessen the  amount of pain you have.    Relaxing in a quiet, dark room may help.    Get enough sleep and eat meals regularly.    Schedule an appointment with your primary physician as instructed, or at least within 1 week.    You may need to watch for certain foods or other things which may trigger your headaches.  Keeping a journal of your headaches and possible triggers may help you and your primary doctor to identify things which you should avoid which may be causing your headaches.  If you were given a prescription for medicine here today, be sure to read all of the information (including the package insert) that comes with your prescription.  This will include important information about the medicine, its side effects, and any warnings that you need to know about.  The pharmacist who fills the prescription can provide more information and answer questions you may have about the medicine.  If you have questions or concerns that the pharmacist cannot address, please call or return to the Emergency Department.     Remember that you can always come back to the Emergency Department if you are not able to see your regular doctor in the amount of time listed above, if you get any new symptoms, or if there is anything that worries you.    Discharge Instructions  Hypertension - High Blood Pressure    During you visit to the Emergency Department, your blood pressure was higher than the recommended blood pressure.  This may be related to stress, pain, medication or other temporary conditions. In these cases, your blood pressure may return to normal on its own. If you have a history of high blood pressure, you may need to have your doctor adjust your medications. Sometimes, your high measurement here may indicate that you have developed high blood pressure that will stay high unless it is treated. Sudden very high blood pressure can cause problems, but usually high blood pressure causes problems over months to years.      Blood  pressure is almost never lowered in the Emergency Department, because studies have shown that lowering blood pressure too quickly is much more dangerous than leaving it alone.    You need to follow up with your doctor in 1-3 days to get your blood pressure rechecked.     Return to the Emergency Department if you start to have:    A severe headache.    Chest pain.    Shortness of breath.    Weakness or numbness that affects one part of the body.    Confusion.    Vision changes.    Significant swelling of legs and/or eyes.    A reaction to any medication started in the Emergency Department.    What can I do to help myself?    Avoid alcohol.    Take any blood pressure medicine that you are prescribed.    Get a good night s sleep.    Lower your salt intake.    Exercise.    Lose weight.    Manage stress.    If blood pressure medication was started in the Emergency Department:    The medicine may not have an immediate effect. The body and brain determine what blood pressure you have. The medicine s job is to retrain the body s  thermostat  to a lower blood pressure.    You will need to follow up with your doctor to see how this medicine is working for you.  If you were given a prescription for medicine here today, be sure to read all of the information (including the package insert) that comes with your prescription.  This will include important information about the medicine, its side effects, and any warnings that you need to know about.  The pharmacist who fills the prescription can provide more information and answer questions you may have about the medicine.  If you have questions or concerns that the pharmacist cannot address, please call or return to the Emergency Department.   Remember that you can always come back to the Emergency Department if you are not able to see your regular doctor in the amount of time listed above, if you get any new symptoms, or if there is anything that worries you.

## 2017-06-07 NOTE — ED NOTES
"Pt requesting \"muscle relaxer\" that was prescribed by ED but she does not have with her at this time. Provider advised  "

## 2017-06-20 ENCOUNTER — TELEPHONE (OUTPATIENT)
Dept: TRANSPLANT | Facility: CLINIC | Age: 32
End: 2017-06-20

## 2017-06-20 DIAGNOSIS — F51.01 PRIMARY INSOMNIA: Primary | ICD-10-CM

## 2017-06-20 NOTE — TELEPHONE ENCOUNTER
Called Jelly to see how she is doing. She was unable to move closer to the [place] due to financial issues with Section 8 housing. She was reminded she needs to send her compliance contract back to me. She did discuss the contract with Dr Varma that she is doing the best she can as a single parent on dialysis with 2 special needs kids and little social support. Her brother has been released from alf and claims he will be her living donor. He has not registered yet. I did provide her with the donor phone number. She was reminded to get her PAP up to date. She did miss a cardiology appointment on 5/31/2017. This will be readdressed when she comes back for a discussion on compliance with Dr Mayer once she completes the compliance contract. She can get her missed PFT's done at that time as well. Asked Jelly to keep me up to date and she agreed.

## 2017-07-26 ENCOUNTER — HOSPITAL ENCOUNTER (EMERGENCY)
Facility: CLINIC | Age: 32
Discharge: HOME | End: 2017-07-26
Attending: PHYSICIAN ASSISTANT | Admitting: PHYSICIAN ASSISTANT
Payer: MEDICARE

## 2017-07-26 VITALS
TEMPERATURE: 98.5 F | DIASTOLIC BLOOD PRESSURE: 97 MMHG | SYSTOLIC BLOOD PRESSURE: 145 MMHG | WEIGHT: 112.43 LBS | BODY MASS INDEX: 18.07 KG/M2 | OXYGEN SATURATION: 99 % | HEIGHT: 66 IN | RESPIRATION RATE: 16 BRPM | HEART RATE: 72 BPM

## 2017-07-26 DIAGNOSIS — M54.9 UPPER BACK PAIN ON LEFT SIDE: ICD-10-CM

## 2017-07-26 DIAGNOSIS — R03.0 ELEVATED BLOOD PRESSURE READING: ICD-10-CM

## 2017-07-26 LAB
ALBUMIN SERPL-MCNC: 3.7 G/DL (ref 3.4–5)
ALP SERPL-CCNC: 49 U/L (ref 40–150)
ALT SERPL W P-5'-P-CCNC: 12 U/L (ref 0–50)
ANION GAP SERPL CALCULATED.3IONS-SCNC: 10 MMOL/L (ref 3–14)
AST SERPL W P-5'-P-CCNC: 10 U/L (ref 0–45)
BASOPHILS # BLD AUTO: 0.1 10E9/L (ref 0–0.2)
BASOPHILS NFR BLD AUTO: 1.3 %
BILIRUB SERPL-MCNC: 0.5 MG/DL (ref 0.2–1.3)
BUN SERPL-MCNC: 12 MG/DL (ref 7–30)
CALCIUM SERPL-MCNC: 8.5 MG/DL (ref 8.5–10.1)
CHLORIDE SERPL-SCNC: 99 MMOL/L (ref 94–109)
CO2 SERPL-SCNC: 27 MMOL/L (ref 20–32)
CREAT SERPL-MCNC: 4.16 MG/DL (ref 0.52–1.04)
DIFFERENTIAL METHOD BLD: ABNORMAL
EOSINOPHIL # BLD AUTO: 0.2 10E9/L (ref 0–0.7)
EOSINOPHIL NFR BLD AUTO: 5 %
ERYTHROCYTE [DISTWIDTH] IN BLOOD BY AUTOMATED COUNT: 14.3 % (ref 10–15)
GFR SERPL CREATININE-BSD FRML MDRD: 13 ML/MIN/1.7M2
GLUCOSE SERPL-MCNC: 88 MG/DL (ref 70–99)
HCT VFR BLD AUTO: 30.3 % (ref 35–47)
HGB BLD-MCNC: 9.9 G/DL (ref 11.7–15.7)
IMM GRANULOCYTES # BLD: 0 10E9/L (ref 0–0.4)
IMM GRANULOCYTES NFR BLD: 0.5 %
LYMPHOCYTES # BLD AUTO: 0.5 10E9/L (ref 0.8–5.3)
LYMPHOCYTES NFR BLD AUTO: 13.5 %
MCH RBC QN AUTO: 32.1 PG (ref 26.5–33)
MCHC RBC AUTO-ENTMCNC: 32.7 G/DL (ref 31.5–36.5)
MCV RBC AUTO: 98 FL (ref 78–100)
MONOCYTES # BLD AUTO: 0.3 10E9/L (ref 0–1.3)
MONOCYTES NFR BLD AUTO: 7.7 %
NEUTROPHILS # BLD AUTO: 2.7 10E9/L (ref 1.6–8.3)
NEUTROPHILS NFR BLD AUTO: 72 %
NRBC # BLD AUTO: 0 10*3/UL
NRBC BLD AUTO-RTO: 0 /100
PLATELET # BLD AUTO: 111 10E9/L (ref 150–450)
POTASSIUM SERPL-SCNC: 3.5 MMOL/L (ref 3.4–5.3)
PROT SERPL-MCNC: 7 G/DL (ref 6.8–8.8)
RBC # BLD AUTO: 3.08 10E12/L (ref 3.8–5.2)
SODIUM SERPL-SCNC: 136 MMOL/L (ref 133–144)
WBC # BLD AUTO: 3.8 10E9/L (ref 4–11)

## 2017-07-26 PROCEDURE — 80053 COMPREHEN METABOLIC PANEL: CPT | Performed by: PHYSICIAN ASSISTANT

## 2017-07-26 PROCEDURE — 25000132 ZZH RX MED GY IP 250 OP 250 PS 637: Mod: GY | Performed by: PHYSICIAN ASSISTANT

## 2017-07-26 PROCEDURE — 85025 COMPLETE CBC W/AUTO DIFF WBC: CPT | Performed by: PHYSICIAN ASSISTANT

## 2017-07-26 PROCEDURE — 96374 THER/PROPH/DIAG INJ IV PUSH: CPT

## 2017-07-26 PROCEDURE — 96375 TX/PRO/DX INJ NEW DRUG ADDON: CPT

## 2017-07-26 PROCEDURE — A9270 NON-COVERED ITEM OR SERVICE: HCPCS | Mod: GY | Performed by: PHYSICIAN ASSISTANT

## 2017-07-26 PROCEDURE — 25000128 H RX IP 250 OP 636: Performed by: PHYSICIAN ASSISTANT

## 2017-07-26 PROCEDURE — 93005 ELECTROCARDIOGRAM TRACING: CPT

## 2017-07-26 PROCEDURE — 99285 EMERGENCY DEPT VISIT HI MDM: CPT | Mod: 25

## 2017-07-26 RX ORDER — METHOCARBAMOL 500 MG/1
1000 TABLET, FILM COATED ORAL 3 TIMES DAILY PRN
Qty: 20 TABLET | Refills: 0 | Status: SHIPPED | OUTPATIENT
Start: 2017-07-26 | End: 2018-01-02

## 2017-07-26 RX ORDER — METHOCARBAMOL 750 MG/1
750 TABLET, FILM COATED ORAL ONCE
Status: COMPLETED | OUTPATIENT
Start: 2017-07-26 | End: 2017-07-26

## 2017-07-26 RX ORDER — MORPHINE SULFATE 4 MG/ML
4 INJECTION, SOLUTION INTRAMUSCULAR; INTRAVENOUS ONCE
Status: COMPLETED | OUTPATIENT
Start: 2017-07-26 | End: 2017-07-26

## 2017-07-26 RX ORDER — HYDRALAZINE HYDROCHLORIDE 20 MG/ML
10 INJECTION INTRAMUSCULAR; INTRAVENOUS ONCE
Status: COMPLETED | OUTPATIENT
Start: 2017-07-26 | End: 2017-07-26

## 2017-07-26 RX ORDER — CYCLOBENZAPRINE HCL 10 MG
10 TABLET ORAL 3 TIMES DAILY PRN
Qty: 20 TABLET | Refills: 0 | Status: SHIPPED | OUTPATIENT
Start: 2017-07-26 | End: 2017-07-26

## 2017-07-26 RX ORDER — OXYCODONE HYDROCHLORIDE 5 MG/1
5 TABLET ORAL EVERY 6 HOURS PRN
Qty: 8 TABLET | Refills: 0 | Status: SHIPPED | OUTPATIENT
Start: 2017-07-26 | End: 2018-01-02

## 2017-07-26 RX ADMIN — METHOCARBAMOL 750 MG: 750 TABLET ORAL at 19:38

## 2017-07-26 RX ADMIN — HYDRALAZINE HYDROCHLORIDE 10 MG: 20 INJECTION INTRAMUSCULAR; INTRAVENOUS at 19:39

## 2017-07-26 RX ADMIN — MORPHINE SULFATE 4 MG: 4 INJECTION, SOLUTION INTRAMUSCULAR; INTRAVENOUS at 19:12

## 2017-07-26 ASSESSMENT — ENCOUNTER SYMPTOMS
NUMBNESS: 0
MYALGIAS: 1
NECK PAIN: 1
HEADACHES: 1
FEVER: 0

## 2017-07-26 NOTE — ED NOTES
"Pt c/o left shoulder pain for \"a while\". States recently had a \"cupping\" procedure which took her pain away. Now yesterday and today has increased pain, unable to sleep, describing it as a \"knot\". Took Tylenol without relief.    ABCs intact. AOx4  "

## 2017-07-26 NOTE — DISCHARGE INSTRUCTIONS
Rest, ice or heat, tylenol for pain, oxycodone for severe pain.   You can try flexeril for muscle stiffness.   Follow up with primary for recheck to ensure improving, also have blood pressure rechecked.   Return if worse.

## 2017-07-26 NOTE — ED PROVIDER NOTES
History     Chief Complaint:  Upper back pain    HPI   Jelly Dietz is a 32 year old female who presents to the ED for evaluation of left upper back pain. The patient reports left upper back pain radiating into her shoulder and neck. She has had similar pain in the past. She has tried cupping about a week ago with significant relief, however her pain was recurrent over the past few days. The patient was unable to sleep last night, prompting the visit to the ED. The patient took Tylenol last night, but this did not alleviate the pain. The patient also notes having a headache from her dialysis today which she typically has. The patient denies fevers, nausea, vomiting or abdominal pain as well as any chest pain, shortness of breath, numbness, weakness or vision changes.     Allergies:  No known drug allergies    Medications:    melatonin 1 MG CAPS  cetirizine (ZYRTEC) 10 MG tablet  calcium carbonate (TUMS) 500 MG chewable tablet  carvedilol (COREG) 25 MG tablet  cloNIDine (CATAPRES) 0.1 MG tablet  amLODIPine (NORVASC) 10 MG tablet  acetaminophen (TYLENOL) 325 MG tablet    Past Medical History:    Panic attacks  Hyperkalemia  ACS  Anemia in chronic renal disease  Chronic renal transplant rejection  End stage kidney disease  Heart murmur  Hypertension  IgA nephropathy  MRSA  Pre-eclampsia    Past Surgical History:    S/P kidney transplant  Cerclage cervical    Nephrectomy  Sergio/dialysis catheter    Family History:    History reviewed. No pertinent family history.     Social History:  Smoking status: Never smoker  Alcohol use: No  Marital Status:  Single [1]     Review of Systems   Constitutional: Negative for fever.   Musculoskeletal: Positive for myalgias and neck pain.   Neurological: Positive for headaches. Negative for numbness.   All other systems reviewed and are negative.    Physical Exam     Patient Vitals for the past 24 hrs:   BP Temp Temp src Pulse Heart Rate Resp SpO2 Height Weight   17  "1955 (!) 145/97 - - 72 72 16 99 % - -   07/26/17 1950 (!) 147/103 - - - - - - - -   07/26/17 1930 (!) 202/123 - - - - - - - -   07/26/17 1915 (!) 195/133 - - - - - - - -   07/26/17 1900 - - - 71 71 16 99 % - -   07/26/17 1842 (!) 157/117 98.5  F (36.9  C) Oral - 69 16 99 % - -   07/26/17 1640 - - - - - - - 1.676 m (5' 6\") 51 kg (112 lb 7 oz)     Physical Exam  Nursing note and vitals reviewed.     GENERAL: Alert, mild distress, non toxic appearing.   HEENT: Normal conjunctiva. No scleral icterus. MMM.   NECK: Supple.  CARDIAC: Normal rate and regular rhythm. Normal heart sounds. No murmurs, rubs, or gallops appreciated. Intact distal radial pulses 2+. Cap refill < 2 seconds.   PULMONARY: CTA bilaterally. Normal breath sounds. No wheezing, crackles, or rhonchi appreciated.  ABDOMEN: Soft, non distended abdomen. Non-tender. No rebound or guarding.   NEURO: Alert and oriented. Non-focal. CN II-XII intact. Sensation intact throughout. Normal and symmetric strength of bilateral upper extremities.   MUSCULOSKELETAL: Normal range of motion. No peripheral edema. Tenderness over left cervical and thoracic paraspinous musculature.   SKIN: Skin is warm and dry. No rashes. No pallor or jaundice.   PSYCH: Normal affect and mood.     Emergency Department Course     ECG (19:18:20):  Rate 72 bpm. OR interval 188. QRS duration 98. QT/QTc 410/448. P-R-T axes 48 47 69. Normal sinus rhythm. Possible left atrial enlargement. Left ventricular hypertrophy. Abnormal ECG.   No significant change compared to EKG dated 6/6/2017. Interpreted at 1920.     Laboratory:  CBC: WBC 3.8(L), HGB 9.9(L), (L)  CMP: Creatinine 4.16 (H), GFR Estimate 15 (L)    Interventions:  1912: Morphine 4mg IV  1938: Robaxin 750mg Oral  1939: Apresoline 10mg IV    Emergency Department Course:  Past medical records, nursing notes, and vitals reviewed.  1845: I performed an exam of the patient and obtained history, as documented above.   IV inserted and blood " drawn.    1903: I rechecked the patient. Findings and plan explained to the Patient. Patient discharged home with instructions regarding supportive care, medications, and reasons to return. The importance of close follow-up was reviewed.     Impression & Plan      Medical Decision Making:  Jelly Dietz is a 32 year old female with a history of chronic renal disease on dialysis and hypertension who presented to the ED with left upper back and neck pain. She has had similar pain in the past intermittently over the past year. No new injuries thus doubt acute fracture. She is neurovascularly intact on exam. She has reproducible tenderness in this area and given this is similar to pain she has had in the past, very low suspicion for any more serious life threatening process. ECG shows no acute ischemic changes and no chest pain concerning for acute coronary syndrome. She had some improvement following above interventions.     She was hypertensive here and has known history of hypertension. Creatinine elevated at 4.16 consistent with her chronic disease. Potassium normal. No signs of any other acute electrolyte abnormality. No focal findings on neuro exam concerning for acute intracranial process. Hemoglobin low at 9.9, but stable from prior actually improved. Was given a dose of IV hydralazine and blood pressure improved. Advised her to have rechecked with PCP.     Will send home with pain medication and muscle relaxer. RICE treatment reviewed. Recommended follow up with PCP if not improving. Reviewed reasons to return to ED, including worsening symptoms or any new concerns. The patient was in agreement with plan and discharged in satisfactory condition with all questions answered.      Diagnosis:    ICD-10-CM    1. Upper back pain on left side M54.9 CBC + differential     Comprehensive metabolic panel   2. Elevated blood pressure reading R03.0      Disposition: Patient discharged to home    Discharge Medications:    Details   oxyCODONE (ROXICODONE) 5 MG IR tablet Take 1 tablet (5 mg) by mouth every 6 hours as needed for pain, Disp-8 tablet, R-0, Local Print      cyclobenzaprine (FLEXERIL) 10 MG tablet Take 1 tablet (10 mg) by mouth 3 times daily as needed for muscle spasms, Disp-20 tablet, R-0, Local Print     Mary Licona  7/26/2017   [facility] EMERGENCY DEPARTMENT    I, Mary Licona, am serving as a scribe at 6:45 PM on 7/26/2017 to document services personally performed by Cara Hightower PA-C based on my observations and the provider's statements to me.        Cara Hightower PA-C  07/28/17 0847

## 2017-07-26 NOTE — ED AVS SNAPSHOT
[facility] Emergency Department    [address]    [facility] [address]    Phone:  [phone]    Fax:  [phone]                                       Jelly Dietz   MRN: [id number]    Department:  [facility] Emergency Department   Date of Visit:  7/26/2017           After Visit Summary Signature Page     I have received my discharge instructions, and my questions have been answered. I have discussed any challenges I see with this plan with the nurse or doctor.    ..........................................................................................................................................  Patient/Patient Representative Signature      ..........................................................................................................................................  Patient Representative Print Name and Relationship to Patient    ..................................................               ................................................  Date                                            Time    ..........................................................................................................................................  Reviewed by Signature/Title    ...................................................              ..............................................  Date                                                            Time

## 2017-07-26 NOTE — ED AVS SNAPSHOT
[facility] Emergency Department    [address] [place] [address]    Phone:  [phone]    Fax:  [phone]                                       Jelly Dietz   MRN: [id number]    Department:  [facility] Emergency Department   Date of Visit:  7/26/2017           Patient Information     Date Of Birth          1985        Your diagnoses for this visit were:     Upper back pain on left side     Elevated blood pressure reading        You were seen by Cara Hightower PA-C.      Follow-up Information     Follow up with [facility] Emergency Department.    Specialty:  EMERGENCY MEDICINE    Why:  If symptoms worsen    Contact information:    [address] [place] [address] [phone]        Follow up with [facility] [place] In 2 days.    Specialty:  Family Practice    Contact information:    [address] [place] [address]  [phone]          Discharge Instructions       Rest, ice or heat, tylenol for pain, oxycodone for severe pain.   You can try flexeril for muscle stiffness.   Follow up with primary for recheck to ensure improving, also have blood pressure rechecked.   Return if worse.         Discharge References/Attachments     BACK SPRAIN/STRAIN (ENGLISH)      24 Hour Appointment Hotline       To make an appointment at any [facility], call [phone] ([phone]). If you don't have a family doctor or clinic, we will help you find one. [facility] clinics are conveniently located to serve the needs of you and your family.             Review of your medicines      START taking        Dose / Directions Last dose taken    cyclobenzaprine 10 MG tablet   Commonly known as:  FLEXERIL   Dose:  10 mg   Quantity:  20 tablet        Take 1 tablet (10 mg) by mouth 3 times daily as needed for muscle spasms   Refills:  0        oxyCODONE 5 MG IR tablet   Commonly known as:  ROXICODONE   Dose:  5 mg    Quantity:  8 tablet        Take 1 tablet (5 mg) by mouth every 6 hours as needed for pain   Refills:  0          Our records show that you are taking the medicines listed below. If these are incorrect, please call your family doctor or clinic.        Dose / Directions Last dose taken    acetaminophen 325 MG tablet   Commonly known as:  TYLENOL   Dose:  325-650 mg   Quantity:  60 tablet        Take 1-2 tablets by mouth every 4 hours as needed.   Refills:  0        amLODIPine 10 MG tablet   Commonly known as:  NORVASC   Dose:  10 mg   Quantity:  30 tablet        Take 1 tablet (10 mg) by mouth daily   Refills:  1        calcium carbonate 500 MG chewable tablet   Commonly known as:  TUMS   Dose:  1 chew tab        Take 1 chew tab by mouth 3 times daily as needed for heartburn   Refills:  0        carvedilol 25 MG tablet   Commonly known as:  COREG   Dose:  25 mg   Quantity:  60 tablet        Take 1 tablet (25 mg) by mouth 2 times daily (with meals)   Refills:  11        cetirizine 10 MG tablet   Commonly known as:  zyrTEC   Dose:  10 mg        Take 10 mg by mouth At Bedtime   Refills:  0        cloNIDine 0.1 MG tablet   Commonly known as:  CATAPRES   Dose:  0.1 mg   Quantity:  60 tablet        Take 1 tablet (0.1 mg) by mouth 3 times daily as needed   Refills:  3        melatonin 1 MG Caps   Dose:  1 mg   Quantity:  30 capsule        Take 1 mg by mouth nightly as needed   Refills:  11                Prescriptions were sent or printed at these locations (2 Prescriptions)                   Other Prescriptions                Printed at Department/Unit printer (2 of 2)         cyclobenzaprine (FLEXERIL) 10 MG tablet               oxyCODONE (ROXICODONE) 5 MG IR tablet                Orders Needing Specimen Collection     None      Pending Results     No orders found from 7/24/2017 to 7/27/2017.            Pending Culture Results     No orders found from 7/24/2017 to 7/27/2017.            Pending Results Instructions     If  you had any lab results that were not finalized at the time of your Discharge, you can call the ED Lab Result RN at [phone]. You will be contacted by this team for any positive Lab results or changes in treatment. The nurses are available 7 days a week from 10A to 6:30P.  You can leave a message 24 hours per day and they will return your call.        Test Results From Your Hospital Stay               Clinical Quality Measure: Blood Pressure Screening     Your blood pressure was checked while you were in the emergency department today. The last reading we obtained was  BP: (!) 157/117 . Please read the guidelines below about what these numbers mean and what you should do about them.  If your systolic blood pressure (the top number) is less than 120 and your diastolic blood pressure (the bottom number) is less than 80, then your blood pressure is normal. There is nothing more that you need to do about it.  If your systolic blood pressure (the top number) is 120-139 or your diastolic blood pressure (the bottom number) is 80-89, your blood pressure may be higher than it should be. You should have your blood pressure rechecked within a year by a primary care provider.  If your systolic blood pressure (the top number) is 140 or greater or your diastolic blood pressure (the bottom number) is 90 or greater, you may have high blood pressure. High blood pressure is treatable, but if left untreated over time it can put you at risk for heart attack, stroke, or kidney failure. You should have your blood pressure rechecked by a primary care provider within the next 4 weeks.  If your provider in the emergency department today gave you specific instructions to follow-up with your doctor or provider even sooner than that, you should follow that instruction and not wait for up to 4 weeks for your follow-up visit.        Thank you for choosing [facility]       Thank you for choosing [facility] for your care. Our goal is always to  "provide you with excellent care. Hearing back from our patients is one way we can continue to improve our services. Please take a few minutes to complete the written survey that you may receive in the mail after you visit with us. Thank you!        [employer] Information     [employer] lets you send messages to your doctor, view your test results, renew your prescriptions, schedule appointments and more. To sign up, go to [email or web address] . Click on \"Log in\" on the left side of the screen, which will take you to the Welcome page. Then click on \"Sign up Now\" on the right side of the page.     You will be asked to enter the access code listed below, as well as some personal information. Please follow the directions to create your username and password.     Your access code is: [email or web address]  Expires: 2017  2:33 AM     Your access code will  in 90 days. If you need help or a new code, please call your [facility] clinic or [phone].        Care EveryWhere ID     This is your Care EveryWhere ID. This could be used by other organizations to access your [facility] medical records  [id number]        Equal Access to Services     SKY GREGORY AH: Hadii wil Guo, wamargi bianchi, qameagan kaalbeth vickers, lazara bernstein. So [facility] [phone].    ATENCIÓN: Si habla español, tiene a kan disposición servicios gratuitos de asistencia lingüística. Mian al [phone].    We comply with applicable federal civil rights laws and [place] laws. We do not discriminate on the basis of race, color, national origin, age, disability sex, sexual orientation or gender identity.            After Visit Summary       This is your record. Keep this with you and show to your community pharmacist(s) and doctor(s) at your next visit.                  "

## 2017-07-27 LAB — INTERPRETATION ECG - MUSE: NORMAL

## 2017-08-08 DIAGNOSIS — N18.6 ESRD (END STAGE RENAL DISEASE) ON DIALYSIS (H): Primary | ICD-10-CM

## 2017-08-08 DIAGNOSIS — I15.9 SECONDARY HYPERTENSION: ICD-10-CM

## 2017-08-08 DIAGNOSIS — Z99.2 ESRD (END STAGE RENAL DISEASE) ON DIALYSIS (H): Primary | ICD-10-CM

## 2017-08-08 DIAGNOSIS — I10 MALIGNANT ESSENTIAL HYPERTENSION: ICD-10-CM

## 2017-08-08 RX ORDER — AMLODIPINE BESYLATE 10 MG/1
10 TABLET ORAL DAILY
Qty: 30 TABLET | Refills: 3 | Status: SHIPPED | OUTPATIENT
Start: 2017-08-08 | End: 2017-12-12

## 2017-08-21 ENCOUNTER — NURSE TRIAGE (OUTPATIENT)
Dept: NURSING | Facility: CLINIC | Age: 32
End: 2017-08-21

## 2017-08-21 NOTE — TELEPHONE ENCOUNTER
"\"I have gained too much weight and was told by my dialysis clinic that my doctor said I should go to the ED so they can dialyze me there.  The clinic is closing soon.\"  Advised patient to follow the instructions of her provider and go to the ED now.     Nu Harman RN/FNA    "

## 2017-12-06 ENCOUNTER — MEDICAL CORRESPONDENCE (OUTPATIENT)
Dept: HEALTH INFORMATION MANAGEMENT | Facility: CLINIC | Age: 32
End: 2017-12-06

## 2017-12-08 ENCOUNTER — TELEPHONE (OUTPATIENT)
Dept: TRANSPLANT | Facility: CLINIC | Age: 32
End: 2017-12-08

## 2017-12-08 NOTE — TELEPHONE ENCOUNTER
Called Jelly to see where she is at. She was to sign and return the compliance contract I mailed her last March. She states she has but I do not have a copy of it. I asked her if I were to check with her dialysis unit what would they say? She said she has not always gone to dialysis. I asked her if that would mean she failed the compliance contract and she said yes. I asked her if she had her PAP done and she has not. She said she has realized she cannot do this herself especially with 2 special needs kids. She is planning on moving home with her parents February 28,2018 for more help with her kids and so she can concentrate on her health. She is asking for a new compliance contract starting December 1, 2017 through June 1, 2018 to demonstrate her seriousness about transplant. I told her another good step forward would be to get her PAP done. She said she would call me by next week Friday. I told her I would re-discuss at the Selection Committee next week.

## 2017-12-12 DIAGNOSIS — N18.6 ESRD (END STAGE RENAL DISEASE) ON DIALYSIS (H): ICD-10-CM

## 2017-12-12 DIAGNOSIS — I10 MALIGNANT ESSENTIAL HYPERTENSION: ICD-10-CM

## 2017-12-12 DIAGNOSIS — N25.81 SECONDARY RENAL HYPERPARATHYROIDISM (H): Primary | ICD-10-CM

## 2017-12-12 DIAGNOSIS — Z99.2 ESRD (END STAGE RENAL DISEASE) ON DIALYSIS (H): ICD-10-CM

## 2017-12-12 DIAGNOSIS — E83.39 HYPERPHOSPHATEMIA: ICD-10-CM

## 2017-12-12 RX ORDER — ASCORBIC ACID, THIAMINE, RIBOFLAVIN, NIACINAMIDE, PYRIDOXINE, FOLIC ACID, COBALAMIN, BIOTIN, PANTOTHENIC ACID 100; 1.5; 1.7; 20; 10; 1; 6; 300; 1 MG/1; MG/1; MG/1; MG/1; MG/1; MG/1; UG/1; UG/1; MG/1
TABLET, COATED ORAL
Qty: 30 TABLET | Refills: 11 | Status: ON HOLD | OUTPATIENT
Start: 2017-12-12 | End: 2020-06-19

## 2017-12-12 RX ORDER — AMLODIPINE BESYLATE 10 MG/1
10 TABLET ORAL DAILY
Qty: 30 TABLET | Refills: 11 | Status: ON HOLD | OUTPATIENT
Start: 2017-12-12 | End: 2020-06-24

## 2017-12-12 RX ORDER — CALCIUM CARBONATE 500 MG/1
2 TABLET, CHEWABLE ORAL 3 TIMES DAILY
Qty: 180 TABLET | Refills: 11 | Status: ON HOLD | OUTPATIENT
Start: 2017-12-12 | End: 2018-08-24

## 2017-12-13 ENCOUNTER — COMMITTEE REVIEW (OUTPATIENT)
Dept: TRANSPLANT | Facility: CLINIC | Age: 32
End: 2017-12-13

## 2017-12-13 NOTE — LETTER
2017    Jelly PALACIOS Mayemike  [address]   [facility] [address]   1985    Dear Jelly,    The purpose of this letter is to let you know your case was re-discussed at our Multidisciplinary Selection Committee on . The discussion was in regards to your request to have another chance with a compliance contract. The Committee was happy to hear that you are planning to move closer to your family for assistance and that you are focusing on your health. We are approving an additional 6 months to your compliance contract. The contract basically is asking you to attend all dialysis runs and not cut any short, to attend all medical appointments and to follow all medical advise. There will be periodic checking on your progress. The contract is in effect from 2017 through 2018. The contract is enclosed with a pre-addressed postage paid envelope. We are asking for your signature and to return the contract.    Also, you need to have an up to date PAP. Please let me know when this is complete so I can request those records.    If you have any questions please call me at [phone].      Sincerely,       Jeanette Valdovinos, RN, BSN Transplant Coordinator  [facility] [address] [address]  [address] [place] [address], [address] [address]  Phone [phone]  Fax [phone]  [email or web address]    CC:[facility] [facility]

## 2017-12-13 NOTE — COMMITTEE REVIEW
Abdominal Committee Review Note     Evaluation Date: 3/8/2017  Committee Review Date: 12/13/2017    Organ being evaluated for: Kidney    Transplant Phase: Evaluation  Transplant Status: Active    Transplant Coordinator: Yasemin Valdovinos  Transplant Surgeon:       Referring Physician:     Primary Diagnosis: IgA Nephropathy  Secondary Diagnosis:     Committee Review Members:  Nephrology Don Peraza MD, Jayden Sheikh, APRN CNP, Mahesh Armando MD   Nutrition Nathalia Roland,    Pharmacy Keisha Nielson, [facility]    - Clinical Makenzie Desai, [facility], Marianne Gutierrez, [facility]   Transplant Claudia Angela PA-C, Amanda Caballero, PERI, Gino Altman MD, Keesha Lang LPN, Michelle Murray, PERI, Mulu Cardenas, RN, Nory Morgan MD, Kieran Anglin MD, Yasemin Valdovinos, RN       Transplant Eligibility: Irreversible chronic kidney disease treated w/dialysis or expected need for dialysis    Committee Review Decision: Needs Re-presentation    Relative Contraindications: Other, compliance contract     Absolute Contraindications: None    Committee Chair Nory Morgan MD verbally attested to the committee's decision.    Committee Discussion Details: Reviewed medical records and evaluation results to date. Jelly has not been successful in her initial compliance contract. She is now realizing she cannot take care of her 2 special needs children and focus on her health and attend dialysis faithfully. She is moving home to her parents for more assistance so she can focus on her health and being accepted as a transplant candidate. She is approved for a compliance contract extension starting December 1, 2017 through June 1, 2018. If she is successful she will be asked to return to see a surgeon and have a cardiology evaluation. Patient will be called, compliance contract and transplant summary letter will be sent.

## 2017-12-14 ENCOUNTER — TELEPHONE (OUTPATIENT)
Dept: TRANSPLANT | Facility: CLINIC | Age: 32
End: 2017-12-14

## 2017-12-14 NOTE — TELEPHONE ENCOUNTER
Called Jelly to discuss the outcome of the Selection Committee yesterday. Was only able to leave a message asking her to return my call.

## 2017-12-15 ENCOUNTER — TELEPHONE (OUTPATIENT)
Dept: TRANSPLANT | Facility: CLINIC | Age: 32
End: 2017-12-15

## 2017-12-15 NOTE — TELEPHONE ENCOUNTER
Talked to Jelly to let her know the Committee approved her 6 month extension to her compliance contract. I wanted to make sure I had her correct address since she is planning on moving closer to her parents. She will be actually moving the end of February and will call me with her new address. She was reminded she needs a PAP.

## 2017-12-22 ENCOUNTER — HOSPITAL ENCOUNTER (OUTPATIENT)
Dept: CARDIOLOGY | Facility: CLINIC | Age: 32
Discharge: HOME | End: 2017-12-22
Attending: INTERNAL MEDICINE | Admitting: INTERNAL MEDICINE
Payer: MEDICARE

## 2017-12-22 DIAGNOSIS — R01.1 MURMUR: ICD-10-CM

## 2017-12-22 PROCEDURE — 93306 TTE W/DOPPLER COMPLETE: CPT

## 2017-12-22 PROCEDURE — 93306 TTE W/DOPPLER COMPLETE: CPT | Mod: 26 | Performed by: INTERNAL MEDICINE

## 2018-01-02 ENCOUNTER — HOSPITAL ENCOUNTER (EMERGENCY)
Facility: CLINIC | Age: 33
Discharge: HOME | End: 2018-01-02
Attending: EMERGENCY MEDICINE | Admitting: EMERGENCY MEDICINE
Payer: COMMERCIAL

## 2018-01-02 ENCOUNTER — APPOINTMENT (OUTPATIENT)
Dept: GENERAL RADIOLOGY | Facility: CLINIC | Age: 33
End: 2018-01-02
Attending: EMERGENCY MEDICINE
Payer: COMMERCIAL

## 2018-01-02 VITALS
BODY MASS INDEX: 19.37 KG/M2 | HEART RATE: 75 BPM | TEMPERATURE: 98 F | SYSTOLIC BLOOD PRESSURE: 183 MMHG | WEIGHT: 120 LBS | RESPIRATION RATE: 18 BRPM | OXYGEN SATURATION: 99 % | DIASTOLIC BLOOD PRESSURE: 127 MMHG

## 2018-01-02 DIAGNOSIS — M54.50 ACUTE BILATERAL LOW BACK PAIN WITHOUT SCIATICA: ICD-10-CM

## 2018-01-02 DIAGNOSIS — S83.502A SPRAIN OF CRUCIATE LIGAMENT OF LEFT KNEE, INITIAL ENCOUNTER: ICD-10-CM

## 2018-01-02 DIAGNOSIS — S13.9XXA NECK SPRAIN, INITIAL ENCOUNTER: ICD-10-CM

## 2018-01-02 LAB
ALBUMIN UR-MCNC: 30 MG/DL
APPEARANCE UR: ABNORMAL
BACTERIA #/AREA URNS HPF: ABNORMAL /HPF
BILIRUB UR QL STRIP: NEGATIVE
COLOR UR AUTO: YELLOW
GLUCOSE UR STRIP-MCNC: 150 MG/DL
HCG UR QL: NEGATIVE
HGB UR QL STRIP: ABNORMAL
KETONES UR STRIP-MCNC: NEGATIVE MG/DL
LEUKOCYTE ESTERASE UR QL STRIP: ABNORMAL
MUCOUS THREADS #/AREA URNS LPF: PRESENT /LPF
NITRATE UR QL: NEGATIVE
PH UR STRIP: 8 PH (ref 5–7)
RBC #/AREA URNS AUTO: 3 /HPF (ref 0–2)
SOURCE: ABNORMAL
SP GR UR STRIP: 1.01 (ref 1–1.03)
SQUAMOUS #/AREA URNS AUTO: 86 /HPF (ref 0–1)
TRANS CELLS #/AREA URNS HPF: 4 /HPF (ref 0–1)
UROBILINOGEN UR STRIP-MCNC: 0 MG/DL (ref 0–2)
WBC #/AREA URNS AUTO: 8 /HPF (ref 0–2)

## 2018-01-02 PROCEDURE — 72100 X-RAY EXAM L-S SPINE 2/3 VWS: CPT

## 2018-01-02 PROCEDURE — 81001 URINALYSIS AUTO W/SCOPE: CPT | Performed by: EMERGENCY MEDICINE

## 2018-01-02 PROCEDURE — 81025 URINE PREGNANCY TEST: CPT | Performed by: EMERGENCY MEDICINE

## 2018-01-02 PROCEDURE — 99285 EMERGENCY DEPT VISIT HI MDM: CPT

## 2018-01-02 PROCEDURE — 73562 X-RAY EXAM OF KNEE 3: CPT | Mod: LT

## 2018-01-02 RX ORDER — HYDROCODONE BITARTRATE AND ACETAMINOPHEN 5; 325 MG/1; MG/1
1-2 TABLET ORAL EVERY 6 HOURS PRN
Qty: 15 TABLET | Refills: 0 | Status: ON HOLD | OUTPATIENT
Start: 2018-01-02 | End: 2018-08-24

## 2018-01-02 ASSESSMENT — ENCOUNTER SYMPTOMS
BACK PAIN: 1
MYALGIAS: 1
ARTHRALGIAS: 1
NECK PAIN: 1
HEADACHES: 1

## 2018-01-02 NOTE — ED AVS SNAPSHOT
[facility] Emergency Department    [address]    [facility] [address]    Phone:  [phone]    Fax:  [phone]                                       Jelly Dietz   MRN: [id number]    Department:  [facility] Emergency Department   Date of Visit:  1/2/2018           After Visit Summary Signature Page     I have received my discharge instructions, and my questions have been answered. I have discussed any challenges I see with this plan with the nurse or doctor.    ..........................................................................................................................................  Patient/Patient Representative Signature      ..........................................................................................................................................  Patient Representative Print Name and Relationship to Patient    ..................................................               ................................................  Date                                            Time    ..........................................................................................................................................  Reviewed by Signature/Title    ...................................................              ..............................................  Date                                                            Time

## 2018-01-02 NOTE — LETTER
[facility] EMERGENCY DEPARTMENT  [address]  [facility] [address]  [phone]    Jelly Dietz  [address]   [facility] [address]  [phone] (home) none (work)    : 1985      To Whom it may concern:    Jelly Dietz was seen in our Emergency Department today, 2018 for an injury.  Off work the next 1-2 days.    Sincerely,    Judi Flores MD
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
## 2018-01-10 ENCOUNTER — HOSPITAL ENCOUNTER (EMERGENCY)
Facility: CLINIC | Age: 33
Discharge: HOME | End: 2018-01-10
Attending: EMERGENCY MEDICINE | Admitting: EMERGENCY MEDICINE
Payer: COMMERCIAL

## 2018-01-10 VITALS
DIASTOLIC BLOOD PRESSURE: 105 MMHG | TEMPERATURE: 99 F | OXYGEN SATURATION: 99 % | SYSTOLIC BLOOD PRESSURE: 172 MMHG | RESPIRATION RATE: 16 BRPM | HEART RATE: 76 BPM

## 2018-01-10 DIAGNOSIS — S39.012A STRAIN OF LUMBAR REGION, INITIAL ENCOUNTER: ICD-10-CM

## 2018-01-10 PROCEDURE — 25000132 ZZH RX MED GY IP 250 OP 250 PS 637: Performed by: EMERGENCY MEDICINE

## 2018-01-10 PROCEDURE — 99283 EMERGENCY DEPT VISIT LOW MDM: CPT

## 2018-01-10 RX ORDER — CYCLOBENZAPRINE HCL 5 MG
5 TABLET ORAL
Qty: 15 TABLET | Refills: 0 | Status: SHIPPED | OUTPATIENT
Start: 2018-01-10 | End: 2018-10-30

## 2018-01-10 RX ORDER — CYCLOBENZAPRINE HCL 10 MG
10 TABLET ORAL ONCE
Status: COMPLETED | OUTPATIENT
Start: 2018-01-10 | End: 2018-01-10

## 2018-01-10 RX ORDER — CYCLOBENZAPRINE HCL 10 MG
5-10 TABLET ORAL 3 TIMES DAILY PRN
Qty: 30 TABLET | Refills: 1 | Status: SHIPPED | OUTPATIENT
Start: 2018-01-10 | End: 2018-01-10

## 2018-01-10 RX ADMIN — CYCLOBENZAPRINE HYDROCHLORIDE 10 MG: 10 TABLET, FILM COATED ORAL at 22:28

## 2018-01-10 NOTE — ED AVS SNAPSHOT
[facility] Emergency Department    [address] [place] [address]    Phone:  [phone]    Fax:  [phone]                                       Jelly Dietz   MRN: [id number]    Department:  [facility] Emergency Department   Date of Visit:  1/10/2018           Patient Information     Date Of Birth          1985        Your diagnoses for this visit were:     Strain of lumbar region, initial encounter        You were seen by Maegan Wagner MD.      Follow-up Information     Follow up with [facility], [place] In 1 week.    Specialty:  Family Practice    Why:  for recheck     Contact information:    [address] [facility]   [place] [address]  [phone]          Follow up with [facility] Emergency Department.    Specialty:  EMERGENCY MEDICINE    Why:  If symptoms worsen    Contact information:    [address] [place] [address]  [phone]        Discharge Instructions       Discharge Instructions  Back Pain  You were seen today for back pain. Back pain can have many causes, but most will get better without surgery or other specific treatment. Sometimes there is a herniated ( slipped ) disc. We do not usually do MRI scans to look for these right away, since most herniated discs will get better on their own with time.  Today, we did not find any evidence that your back pain was caused by a serious condition. However, sometimes symptoms develop over time and cannot be found during an emergency visit, so it is very important that you follow up with your primary provider.  Generally, every Emergency Department visit should have a follow-up clinic visit with either a primary or a specialty clinic/provider. Please follow-up as instructed by your emergency provider today.    Return to the Emergency Department if:    You develop a fever with your back pain.     You have weakness or change in sensation in one or both  legs.    You lose control of your bowels or bladder, or cannot empty your bladder (cannot pee).    Your pain gets much worse.     Follow-up with your provider:    Unless your pain has completely gone away, please make an appointment with your provider within one week. Most of the routine care for back pain is available in a clinic and not the Emergency Department. You may need further management of your back pain, such as more pain medication, imaging such as an X-ray or MRI, or physical therapy.    What can I do to help myself?    Remain Active -- People are often afraid that they will hurt their back further or delay recovery by remaining active, but this is one of the best things you can do for your back. In fact, staying in bed for a long time to rest is not recommended. Studies have shown that people with low back pain recover faster when they remain active. Movement helps to bring blood flow to the muscles and relieve muscle spasms as well as preventing loss of muscle strength.    Heat -- Using a heating pad can help with low back pain during the first few weeks. Do not sleep with a heating pad, as you can be burned.     Pain medications - You may take a pain medication such as Tylenol  (acetaminophen), Advil , Motrin  (ibuprofen) or Aleve  (naproxen).  If you were given a prescription for medicine here today, be sure to read all of the information (including the package insert) that comes with your prescription.  This will include important information about the medicine, its side effects, and any warnings that you need to know about.  The pharmacist who fills the prescription can provide more information and answer questions you may have about the medicine.  If you have questions or concerns that the pharmacist cannot address, please call or return to the Emergency Department.   Remember that you can always come back to the Emergency Department if you are not able to see your regular provider in the amount  of time listed above, if you get any new symptoms, or if there is anything that worries you.      24 Hour Appointment Hotline       To make an appointment at any [facility] clinic, call [phone] ([phone]). If you don't have a family doctor or clinic, we will help you find one. [facility] clinics are conveniently located to serve the needs of you and your family.             Review of your medicines      START taking        Dose / Directions Last dose taken    cyclobenzaprine 5 MG tablet   Commonly known as:  FLEXERIL   Dose:  5 mg   Quantity:  15 tablet        Take 1 tablet (5 mg) by mouth nightly as needed for muscle spasms   Refills:  0          Our records show that you are taking the medicines listed below. If these are incorrect, please call your family doctor or clinic.        Dose / Directions Last dose taken    acetaminophen 325 MG tablet   Commonly known as:  TYLENOL   Dose:  325-650 mg   Quantity:  60 tablet        Take 1-2 tablets by mouth every 4 hours as needed.   Refills:  0        amLODIPine 10 MG tablet   Commonly known as:  NORVASC   Dose:  10 mg   Quantity:  30 tablet        Take 1 tablet (10 mg) by mouth daily   Refills:  11        * calcium carbonate 500 MG chewable tablet   Commonly known as:  TUMS   Dose:  1 chew tab        Take 1 chew tab by mouth 3 times daily as needed for heartburn   Refills:  0        * calcium carbonate 500 MG chewable tablet   Commonly known as:  TUMS   Dose:  2 chew tab   Quantity:  180 tablet        Take 2 tablets (1,000 mg) by mouth 3 times daily   Refills:  11        carvedilol 25 MG tablet   Commonly known as:  COREG   Dose:  25 mg   Quantity:  60 tablet        Take 1 tablet (25 mg) by mouth 2 times daily (with meals)   Refills:  11        cetirizine 10 MG tablet   Commonly known as:  zyrTEC   Dose:  10 mg        Take 10 mg by mouth At Bedtime   Refills:  0        cloNIDine 0.1 MG tablet   Commonly known as:  CATAPRES   Dose:  0.1 mg   Quantity:  60 tablet         Take 1 tablet (0.1 mg) by mouth 3 times daily as needed   Refills:  3        DIALYVITE Tabs   Quantity:  30 tablet        1 tablet by mouth daily   Refills:  11        HYDROcodone-acetaminophen 5-325 MG per tablet   Commonly known as:  NORCO   Dose:  1-2 tablet   Quantity:  15 tablet        Take 1-2 tablets by mouth every 6 hours as needed for moderate to severe pain   Refills:  0        melatonin 1 MG Caps   Dose:  1 mg   Quantity:  30 capsule        Take 1 mg by mouth nightly as needed   Refills:  11        * Notice:  This list has 2 medication(s) that are the same as other medications prescribed for you. Read the directions carefully, and ask your doctor or other care provider to review them with you.            Prescriptions were sent or printed at these locations (1 Prescription)                   Other Prescriptions                Printed at Department/Unit printer (1 of 1)         cyclobenzaprine (FLEXERIL) 5 MG tablet                Orders Needing Specimen Collection     None      Pending Results     No orders found from 1/8/2018 to 1/11/2018.            Pending Culture Results     No orders found from 1/8/2018 to 1/11/2018.            Pending Results Instructions     If you had any lab results that were not finalized at the time of your Discharge, you can call the ED Lab Result RN at [phone]. You will be contacted by this team for any positive Lab results or changes in treatment. The nurses are available 7 days a week from 10A to 6:30P.  You can leave a message 24 hours per day and they will return your call.        Test Results From Your Hospital Stay               Clinical Quality Measure: Blood Pressure Screening     Your blood pressure was checked while you were in the emergency department today. The last reading we obtained was  BP: (!) 162/107 . Please read the guidelines below about what these numbers mean and what you should do about them.  If your systolic blood pressure (the top  "number) is less than 120 and your diastolic blood pressure (the bottom number) is less than 80, then your blood pressure is normal. There is nothing more that you need to do about it.  If your systolic blood pressure (the top number) is 120-139 or your diastolic blood pressure (the bottom number) is 80-89, your blood pressure may be higher than it should be. You should have your blood pressure rechecked within a year by a primary care provider.  If your systolic blood pressure (the top number) is 140 or greater or your diastolic blood pressure (the bottom number) is 90 or greater, you may have high blood pressure. High blood pressure is treatable, but if left untreated over time it can put you at risk for heart attack, stroke, or kidney failure. You should have your blood pressure rechecked by a primary care provider within the next 4 weeks.  If your provider in the emergency department today gave you specific instructions to follow-up with your doctor or provider even sooner than that, you should follow that instruction and not wait for up to 4 weeks for your follow-up visit.        Thank you for choosing [facility]       Thank you for choosing [facility] for your care. Our goal is always to provide you with excellent care. Hearing back from our patients is one way we can continue to improve our services. Please take a few minutes to complete the written survey that you may receive in the mail after you visit with us. Thank you!        [employer] Information     [employer] lets you send messages to your doctor, view your test results, renew your prescriptions, schedule appointments and more. To sign up, go to [email or web address] . Click on \"Log in\" on the left side of the screen, which will take you to the Welcome page. Then click on \"Sign up Now\" on the right side of the page.     You will be asked to enter the access code listed below, as well as some personal information. Please follow the directions to create your " username and password.     Your access code is: [email or web address]  Expires: 2018  9:54 PM     Your access code will  in 90 days. If you need help or a new code, please call your [facility] clinic or [phone].        Care EveryWhere ID     This is your Care EveryWhere ID. This could be used by other organizations to access your [facility] medical records  [id number]        Equal Access to Services     [facility] CenterFERNANDA : Hadii wil jackson hadasho Soomaali, waaxda luqadaha, qaybta kaalmada adeegyada, lazara shen . So [facility] [phone].    ATENCIÓN: Si habla español, tiene a kan disposición servicios gratuitos de asistencia lingüística. Llame al [phone].    We comply with applicable federal civil rights laws and [place] laws. We do not discriminate on the basis of race, color, national origin, age, disability, sex, sexual orientation, or gender identity.            After Visit Summary       This is your record. Keep this with you and show to your community pharmacist(s) and doctor(s) at your next visit.

## 2018-01-10 NOTE — ED AVS SNAPSHOT
[facility] Emergency Department    [address]    [facility] [address]    Phone:  [phone]    Fax:  [phone]                                       Jelly Dietz   MRN: [id number]    Department:  [facility] Emergency Department   Date of Visit:  1/10/2018           After Visit Summary Signature Page     I have received my discharge instructions, and my questions have been answered. I have discussed any challenges I see with this plan with the nurse or doctor.    ..........................................................................................................................................  Patient/Patient Representative Signature      ..........................................................................................................................................  Patient Representative Print Name and Relationship to Patient    ..................................................               ................................................  Date                                            Time    ..........................................................................................................................................  Reviewed by Signature/Title    ...................................................              ..............................................  Date                                                            Time

## 2018-01-11 ASSESSMENT — ENCOUNTER SYMPTOMS
SHORTNESS OF BREATH: 0
BACK PAIN: 1
NEUROLOGICAL NEGATIVE: 1
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
## 2018-02-06 ENCOUNTER — HOSPITAL ENCOUNTER (EMERGENCY)
Facility: CLINIC | Age: 33
Discharge: HOME | End: 2018-02-06
Attending: EMERGENCY MEDICINE | Admitting: EMERGENCY MEDICINE
Payer: MEDICARE

## 2018-02-06 VITALS
TEMPERATURE: 98.3 F | RESPIRATION RATE: 18 BRPM | SYSTOLIC BLOOD PRESSURE: 166 MMHG | DIASTOLIC BLOOD PRESSURE: 113 MMHG | OXYGEN SATURATION: 98 %

## 2018-02-06 DIAGNOSIS — G44.219 EPISODIC TENSION-TYPE HEADACHE, NOT INTRACTABLE: ICD-10-CM

## 2018-02-06 DIAGNOSIS — I10 ESSENTIAL HYPERTENSION: ICD-10-CM

## 2018-02-06 LAB
ANION GAP SERPL CALCULATED.3IONS-SCNC: 11 MMOL/L (ref 3–14)
BASOPHILS # BLD AUTO: 0 10E9/L (ref 0–0.2)
BASOPHILS NFR BLD AUTO: 1 %
BUN SERPL-MCNC: 57 MG/DL (ref 7–30)
CALCIUM SERPL-MCNC: 8 MG/DL (ref 8.5–10.1)
CHLORIDE SERPL-SCNC: 97 MMOL/L (ref 94–109)
CO2 SERPL-SCNC: 25 MMOL/L (ref 20–32)
CREAT SERPL-MCNC: 12.5 MG/DL (ref 0.52–1.04)
DIFFERENTIAL METHOD BLD: ABNORMAL
EOSINOPHIL # BLD AUTO: 0.1 10E9/L (ref 0–0.7)
EOSINOPHIL NFR BLD AUTO: 3.2 %
ERYTHROCYTE [DISTWIDTH] IN BLOOD BY AUTOMATED COUNT: 14.5 % (ref 10–15)
GFR SERPL CREATININE-BSD FRML MDRD: 4 ML/MIN/1.7M2
GLUCOSE SERPL-MCNC: 160 MG/DL (ref 70–99)
HCT VFR BLD AUTO: 34.1 % (ref 35–47)
HGB BLD-MCNC: 11.1 G/DL (ref 11.7–15.7)
IMM GRANULOCYTES # BLD: 0 10E9/L (ref 0–0.4)
IMM GRANULOCYTES NFR BLD: 0.2 %
LYMPHOCYTES # BLD AUTO: 0.8 10E9/L (ref 0.8–5.3)
LYMPHOCYTES NFR BLD AUTO: 19.7 %
MCH RBC QN AUTO: 32.6 PG (ref 26.5–33)
MCHC RBC AUTO-ENTMCNC: 32.6 G/DL (ref 31.5–36.5)
MCV RBC AUTO: 100 FL (ref 78–100)
MONOCYTES # BLD AUTO: 0.2 10E9/L (ref 0–1.3)
MONOCYTES NFR BLD AUTO: 5.7 %
NEUTROPHILS # BLD AUTO: 2.9 10E9/L (ref 1.6–8.3)
NEUTROPHILS NFR BLD AUTO: 70.2 %
NRBC # BLD AUTO: 0 10*3/UL
NRBC BLD AUTO-RTO: 0 /100
PLATELET # BLD AUTO: 122 10E9/L (ref 150–450)
POTASSIUM SERPL-SCNC: 4 MMOL/L (ref 3.4–5.3)
RBC # BLD AUTO: 3.41 10E12/L (ref 3.8–5.2)
SODIUM SERPL-SCNC: 133 MMOL/L (ref 133–144)
TROPONIN I SERPL-MCNC: <0.015 UG/L (ref 0–0.04)
WBC # BLD AUTO: 4.1 10E9/L (ref 4–11)

## 2018-02-06 PROCEDURE — 96374 THER/PROPH/DIAG INJ IV PUSH: CPT

## 2018-02-06 PROCEDURE — 85025 COMPLETE CBC W/AUTO DIFF WBC: CPT | Performed by: EMERGENCY MEDICINE

## 2018-02-06 PROCEDURE — 25000132 ZZH RX MED GY IP 250 OP 250 PS 637: Performed by: EMERGENCY MEDICINE

## 2018-02-06 PROCEDURE — 96375 TX/PRO/DX INJ NEW DRUG ADDON: CPT

## 2018-02-06 PROCEDURE — 25000128 H RX IP 250 OP 636: Performed by: EMERGENCY MEDICINE

## 2018-02-06 PROCEDURE — 93005 ELECTROCARDIOGRAM TRACING: CPT

## 2018-02-06 PROCEDURE — 99284 EMERGENCY DEPT VISIT MOD MDM: CPT | Mod: 25

## 2018-02-06 PROCEDURE — 80048 BASIC METABOLIC PNL TOTAL CA: CPT | Performed by: EMERGENCY MEDICINE

## 2018-02-06 PROCEDURE — 84484 ASSAY OF TROPONIN QUANT: CPT | Performed by: EMERGENCY MEDICINE

## 2018-02-06 RX ORDER — DIPHENHYDRAMINE HYDROCHLORIDE 50 MG/ML
25 INJECTION INTRAMUSCULAR; INTRAVENOUS ONCE
Status: COMPLETED | OUTPATIENT
Start: 2018-02-06 | End: 2018-02-06

## 2018-02-06 RX ORDER — HYDRALAZINE HYDROCHLORIDE 20 MG/ML
10 INJECTION INTRAMUSCULAR; INTRAVENOUS ONCE
Status: COMPLETED | OUTPATIENT
Start: 2018-02-06 | End: 2018-02-06

## 2018-02-06 RX ORDER — CLONIDINE HYDROCHLORIDE 0.1 MG/1
0.1 TABLET ORAL ONCE
Status: COMPLETED | OUTPATIENT
Start: 2018-02-06 | End: 2018-02-06

## 2018-02-06 RX ORDER — METOCLOPRAMIDE HYDROCHLORIDE 5 MG/ML
2.5 INJECTION INTRAMUSCULAR; INTRAVENOUS ONCE
Status: COMPLETED | OUTPATIENT
Start: 2018-02-06 | End: 2018-02-06

## 2018-02-06 RX ADMIN — DIPHENHYDRAMINE HYDROCHLORIDE 25 MG: 50 INJECTION INTRAMUSCULAR; INTRAVENOUS at 21:42

## 2018-02-06 RX ADMIN — CLONIDINE HYDROCHLORIDE 0.1 MG: 0.1 TABLET ORAL at 20:27

## 2018-02-06 RX ADMIN — HYDRALAZINE HYDROCHLORIDE 10 MG: 20 INJECTION INTRAMUSCULAR; INTRAVENOUS at 20:26

## 2018-02-06 RX ADMIN — METOCLOPRAMIDE 2.5 MG: 5 INJECTION, SOLUTION INTRAMUSCULAR; INTRAVENOUS at 21:43

## 2018-02-06 ASSESSMENT — ENCOUNTER SYMPTOMS
WEAKNESS: 0
HEADACHES: 1
NUMBNESS: 0

## 2018-02-06 NOTE — ED AVS SNAPSHOT
[facility] Emergency Department    [address]    [facility] [address]    Phone:  [phone]    Fax:  [phone]                                       Jelly Dietz   MRN: [id number]    Department:  [facility] Emergency Department   Date of Visit:  2/6/2018           After Visit Summary Signature Page     I have received my discharge instructions, and my questions have been answered. I have discussed any challenges I see with this plan with the nurse or doctor.    ..........................................................................................................................................  Patient/Patient Representative Signature      ..........................................................................................................................................  Patient Representative Print Name and Relationship to Patient    ..................................................               ................................................  Date                                            Time    ..........................................................................................................................................  Reviewed by Signature/Title    ...................................................              ..............................................  Date                                                            Time

## 2018-02-06 NOTE — ED AVS SNAPSHOT
[facility] Emergency Department    [address]    [place] [address]    Phone:  [phone]    Fax:  [phone]                                       Jelly Dietz   MRN: [id number]    Department:  [facility] Emergency Department   Date of Visit:  2/6/2018           Patient Information     Date Of Birth          1985        Your diagnoses for this visit were:     Essential hypertension     Episodic tension-type headache, not intractable        You were seen by Kota Leon MD.      Follow-up Information     Follow up with [facility], [place] In 3 days.    Specialty:  Family Practice    Why:  to discuss whether     Contact information:    [address] [place] [address]  [phone]          Discharge Instructions       Discharge Instructions  Hypertension - High Blood Pressure    During you visit to the Emergency Department, your blood pressure was higher than the recommended blood pressure.  This may be related to stress, pain, medication or other temporary conditions. In these cases, your blood pressure may return to normal on its own. If you have a history of high blood pressure, you may need to have your provider adjust your medications. Sometimes, your high measurement here may indicate that you have developed high blood pressure that will stay high unless it is treated. As a general rule, high blood pressure causes problems over years rather than days, weeks, or months. So, while it is important to treat blood pressure, it is rarely important to treat blood pressure immediately. Occasionally we will begin a medication in the Emergency Department; more often we will recommend close follow-up for medications with a primary doctor/clinic.    Generally, every Emergency Department visit should have a follow-up clinic visit with either a primary or a specialty clinic/provider. Please follow-up as instructed by your emergency provider  today.    Return to the Emergency Department if you start to have:    A severe headache.    Chest pain.    Shortness of breath.    Weakness or numbness that affects one part of the body.    Confusion.    Vision changes.    Significant swelling of legs and/or eyes.    A reaction to any medication started in the Emergency Department.    What can I do to help myself?    Avoid alcohol.    Take any blood pressure medicine that you are prescribed.    Get a good night s sleep.    Lower your salt intake.    Exercise.    Lose weight.    Manage stress.    See your doctor regularly    If blood pressure medication was started in the Emergency Department:    The medicine may not have an immediate effect. The body and brain determine what blood pressure you have. The medicine s job is to retrain the body s  thermostat  to a lower blood pressure.    You will need to follow up with your provider to see how this medicine is working for you.  If you were given a prescription for medicine here today, be sure to read all of the information (including the package insert) that comes with your prescription.  This will include important information about the medicine, its side effects, and any warnings that you need to know about.  The pharmacist who fills the prescription can provide more information and answer questions you may have about the medicine.  If you have questions or concerns that the pharmacist cannot address, please call or return to the Emergency Department.   Remember that you can always come back to the Emergency Department if you are not able to see your regular provider in the amount of time listed above, if you get any new symptoms, or if there is anything that worries you.      24 Hour Appointment Hotline       To make an appointment at any [facility], call [phone] ([phone]). If you don't have a family doctor or clinic, we will help you find one. [facility] are conveniently located to serve  the needs of you and your family.             Review of your medicines      Our records show that you are taking the medicines listed below. If these are incorrect, please call your family doctor or clinic.        Dose / Directions Last dose taken    acetaminophen 325 MG tablet   Commonly known as:  TYLENOL   Dose:  325-650 mg   Quantity:  60 tablet        Take 1-2 tablets by mouth every 4 hours as needed.   Refills:  0        amLODIPine 10 MG tablet   Commonly known as:  NORVASC   Dose:  10 mg   Quantity:  30 tablet        Take 1 tablet (10 mg) by mouth daily   Refills:  11        * calcium carbonate 500 MG chewable tablet   Commonly known as:  TUMS   Dose:  1 chew tab        Take 1 chew tab by mouth 3 times daily as needed for heartburn   Refills:  0        * calcium carbonate 500 MG chewable tablet   Commonly known as:  TUMS   Dose:  2 chew tab   Quantity:  180 tablet        Take 2 tablets (1,000 mg) by mouth 3 times daily   Refills:  11        carvedilol 25 MG tablet   Commonly known as:  COREG   Dose:  25 mg   Quantity:  60 tablet        Take 1 tablet (25 mg) by mouth 2 times daily (with meals)   Refills:  11        cetirizine 10 MG tablet   Commonly known as:  zyrTEC   Dose:  10 mg        Take 10 mg by mouth At Bedtime   Refills:  0        cloNIDine 0.1 MG tablet   Commonly known as:  CATAPRES   Dose:  0.1 mg   Quantity:  60 tablet        Take 1 tablet (0.1 mg) by mouth 3 times daily as needed   Refills:  3        cyclobenzaprine 5 MG tablet   Commonly known as:  FLEXERIL   Dose:  5 mg   Quantity:  15 tablet        Take 1 tablet (5 mg) by mouth nightly as needed for muscle spasms   Refills:  0        DIALYVITE Tabs   Quantity:  30 tablet        1 tablet by mouth daily   Refills:  11        HYDROcodone-acetaminophen 5-325 MG per tablet   Commonly known as:  NORCO   Dose:  1-2 tablet   Quantity:  15 tablet        Take 1-2 tablets by mouth every 6 hours as needed for moderate to severe pain   Refills:  0         melatonin 1 MG Caps   Dose:  1 mg   Quantity:  30 capsule        Take 1 mg by mouth nightly as needed   Refills:  11        * Notice:  This list has 2 medication(s) that are the same as other medications prescribed for you. Read the directions carefully, and ask your doctor or other care provider to review them with you.            Procedures and tests performed during your visit     Basic metabolic panel    CBC with platelets differential    EKG 12 lead    Peripheral IV catheter    Troponin I      Orders Needing Specimen Collection     None      Pending Results     Date and Time Order Name Status Description    2/6/2018 1937 EKG 12 lead Preliminary             Pending Culture Results     No orders found from 2/4/2018 to 2/7/2018.            Pending Results Instructions     If you had any lab results that were not finalized at the time of your Discharge, you can call the ED Lab Result RN at [phone]. You will be contacted by this team for any positive Lab results or changes in treatment. The nurses are available 7 days a week from 10A to 6:30P.  You can leave a message 24 hours per day and they will return your call.        Test Results From Your Hospital Stay        2/6/2018  8:36 PM      Component Results     Component Value Ref Range & Units Status    WBC 4.1 4.0 - 11.0 10e9/L Final    RBC Count 3.41 (L) 3.8 - 5.2 10e12/L Final    Hemoglobin 11.1 (L) 11.7 - 15.7 g/dL Final    Hematocrit 34.1 (L) 35.0 - 47.0 % Final     78 - 100 fl Final    MCH 32.6 26.5 - 33.0 pg Final    MCHC 32.6 31.5 - 36.5 g/dL Final    RDW 14.5 10.0 - 15.0 % Final    Platelet Count 122 (L) 150 - 450 10e9/L Final    Diff Method Automated Method  Final    % Neutrophils 70.2 % Final    % Lymphocytes 19.7 % Final    % Monocytes 5.7 % Final    % Eosinophils 3.2 % Final    % Basophils 1.0 % Final    % Immature Granulocytes 0.2 % Final    Nucleated RBCs 0 0 /100 Final    Absolute Neutrophil 2.9 1.6 - 8.3 10e9/L Final    Absolute  Lymphocytes 0.8 0.8 - 5.3 10e9/L Final    Absolute Monocytes 0.2 0.0 - 1.3 10e9/L Final    Absolute Eosinophils 0.1 0.0 - 0.7 10e9/L Final    Absolute Basophils 0.0 0.0 - 0.2 10e9/L Final    Abs Immature Granulocytes 0.0 0 - 0.4 10e9/L Final    Absolute Nucleated RBC 0.0  Final         2/6/2018  8:51 PM      Component Results     Component Value Ref Range & Units Status    Sodium 133 133 - 144 mmol/L Final    Potassium 4.0 3.4 - 5.3 mmol/L Final    Chloride 97 94 - 109 mmol/L Final    Carbon Dioxide 25 20 - 32 mmol/L Final    Anion Gap 11 3 - 14 mmol/L Final    Glucose 160 (H) 70 - 99 mg/dL Final    Urea Nitrogen 57 (H) 7 - 30 mg/dL Final    Creatinine 12.50 (H) 0.52 - 1.04 mg/dL Final    GFR Estimate 4 (L) >60 mL/min/1.7m2 Final    Non  GFR Calc    GFR Estimate If Black 4 (L) >60 mL/min/1.7m2 Final    African American GFR Calc    Calcium 8.0 (L) 8.5 - 10.1 mg/dL Final         2/6/2018  8:51 PM      Component Results     Component Value Ref Range & Units Status    Troponin I ES <0.015 0.000 - 0.045 ug/L Final    The 99th percentile for upper reference range is 0.045 ug/L.  Troponin values   in the range of 0.045 - 0.120 ug/L may be associated with risks of adverse   clinical events.                  Clinical Quality Measure: Blood Pressure Screening     Your blood pressure was checked while you were in the emergency department today. The last reading we obtained was  BP: (!) 166/113 . Please read the guidelines below about what these numbers mean and what you should do about them.  If your systolic blood pressure (the top number) is less than 120 and your diastolic blood pressure (the bottom number) is less than 80, then your blood pressure is normal. There is nothing more that you need to do about it.  If your systolic blood pressure (the top number) is 120-139 or your diastolic blood pressure (the bottom number) is 80-89, your blood pressure may be higher than it should be. You should have your  "blood pressure rechecked within a year by a primary care provider.  If your systolic blood pressure (the top number) is 140 or greater or your diastolic blood pressure (the bottom number) is 90 or greater, you may have high blood pressure. High blood pressure is treatable, but if left untreated over time it can put you at risk for heart attack, stroke, or kidney failure. You should have your blood pressure rechecked by a primary care provider within the next 4 weeks.  If your provider in the emergency department today gave you specific instructions to follow-up with your doctor or provider even sooner than that, you should follow that instruction and not wait for up to 4 weeks for your follow-up visit.        Thank you for choosing [facility]       Thank you for choosing [facility] for your care. Our goal is always to provide you with excellent care. Hearing back from our patients is one way we can continue to improve our services. Please take a few minutes to complete the written survey that you may receive in the mail after you visit with us. Thank you!        [employer] Information     [employer] lets you send messages to your doctor, view your test results, renew your prescriptions, schedule appointments and more. To sign up, go to [email or web address] . Click on \"Log in\" on the left side of the screen, which will take you to the Welcome page. Then click on \"Sign up Now\" on the right side of the page.     You will be asked to enter the access code listed below, as well as some personal information. Please follow the directions to create your username and password.     Your access code is: [email or web address]  Expires: 2018  9:54 PM     Your access code will  in 90 days. If you need help or a new code, please call your [facility] clinic or [phone].        Care EveryWhere ID     This is your Care EveryWhere ID. This could be used by other organizations to access your [facility] medical records  [id number]      "   Equal Access to Services     SKY GREGORY : Genesis Guo, gabriella bianchi, lazara adams. So [facility] [phone].    ATENCIÓN: Si habla español, tiene a kan disposición servicios gratuitos de asistencia lingüística. Llame al [phone].    We comply with applicable federal civil rights laws and [place] laws. We do not discriminate on the basis of race, color, national origin, age, disability, sex, sexual orientation, or gender identity.            After Visit Summary       This is your record. Keep this with you and show to your community pharmacist(s) and doctor(s) at your next visit.

## 2018-02-07 LAB — INTERPRETATION ECG - MUSE: NORMAL

## 2018-02-07 NOTE — ED NOTES
Patient presents to ER for high blood pressure all day with head pain. Patient had chest pressure and reports got better with oxygen. Patient had dialysis today. ABC's intact.

## 2018-02-07 NOTE — DISCHARGE INSTRUCTIONS
Discharge Instructions  Hypertension - High Blood Pressure    During you visit to the Emergency Department, your blood pressure was higher than the recommended blood pressure.  This may be related to stress, pain, medication or other temporary conditions. In these cases, your blood pressure may return to normal on its own. If you have a history of high blood pressure, you may need to have your provider adjust your medications. Sometimes, your high measurement here may indicate that you have developed high blood pressure that will stay high unless it is treated. As a general rule, high blood pressure causes problems over years rather than days, weeks, or months. So, while it is important to treat blood pressure, it is rarely important to treat blood pressure immediately. Occasionally we will begin a medication in the Emergency Department; more often we will recommend close follow-up for medications with a primary doctor/clinic.    Generally, every Emergency Department visit should have a follow-up clinic visit with either a primary or a specialty clinic/provider. Please follow-up as instructed by your emergency provider today.    Return to the Emergency Department if you start to have:    A severe headache.    Chest pain.    Shortness of breath.    Weakness or numbness that affects one part of the body.    Confusion.    Vision changes.    Significant swelling of legs and/or eyes.    A reaction to any medication started in the Emergency Department.    What can I do to help myself?    Avoid alcohol.    Take any blood pressure medicine that you are prescribed.    Get a good night s sleep.    Lower your salt intake.    Exercise.    Lose weight.    Manage stress.    See your doctor regularly    If blood pressure medication was started in the Emergency Department:    The medicine may not have an immediate effect. The body and brain determine what blood pressure you have. The medicine s job is to retrain the body s   thermostat  to a lower blood pressure.    You will need to follow up with your provider to see how this medicine is working for you.  If you were given a prescription for medicine here today, be sure to read all of the information (including the package insert) that comes with your prescription.  This will include important information about the medicine, its side effects, and any warnings that you need to know about.  The pharmacist who fills the prescription can provide more information and answer questions you may have about the medicine.  If you have questions or concerns that the pharmacist cannot address, please call or return to the Emergency Department.   Remember that you can always come back to the Emergency Department if you are not able to see your regular provider in the amount of time listed above, if you get any new symptoms, or if there is anything that worries you.

## 2018-02-07 NOTE — ED PROVIDER NOTES
History     Chief Complaint:  Hypertension    The history is provided by the patient.      Jelly Dietz is a 33 year old female with ESKD on dialysis (T/TH/SAT) who presents with hypertension. Patient has been hypertensive with headache all day today. She took an extra 0.1 mg Clonidine at 1730 while she was at dialysis this evening. Also notes oxygen provided there helped to relieve feeling of chest pressure that had occurred around 1850. Here in emergency department patient only has headache which is typical when her blood pressure is elevated like this. Patient did a 2.5 hour run at dialysis today. She denies numbness/tingling or weakness or other complaint.      Allergies:  No known drug allergies     Medications:    Dialyvite   Tums  Zyrtec  Coreg  Clonidine   Amlodipine     Past Medical History:    ACS  Allergic state  Anemia in chronic renal disease  Chronic renal transplant rejection  End stage kidney disease on dialysis   Heart murmur   HTN  IgA nephropathy   MRSA  S/p kidney transplant     Past Surgical History:    Cerclage cervical   x2  Explant transplanted kidney  Nephrectomy   Sergio/dialysis catheter   Transplant kidney recipient living unrelated (adult male in [place])    Family History:    Diabetes   Breast cancer     Social History:  Presents alone   Tobacco use: Never  Alcohol use: Negative   PCP: [place] [facility]    Marital Status:  Single    Review of Systems   Cardiovascular: Positive for chest pain (resolved).        (+) hypertension   Neurological: Positive for headaches. Negative for weakness and numbness.   All other systems reviewed and are negative.    Physical Exam     Patient Vitals for the past 24 hrs:   BP Temp Temp src Heart Rate Resp SpO2   18 2230 (!) 166/113 - - - - 98 %   18 2215 (!) 170/116 - - - - 98 %   18 2200 (!) 169/116 - - - - 98 %   18 2130 (!) 154/102 - - - - 99 %   18 2115 (!) 148/104 - - - - 99 %   18 2100 (!)  145/96 - - - - 98 %   02/06/18 2045 (!) 147/98 - - - - 98 %   02/06/18 2030 (!) 182/113 - - - - 99 %   02/06/18 2015 (!) 202/129 - - - - 100 %   02/06/18 2000 (!) 204/125 - - - - 100 %   02/06/18 1932 (!) 210/117 98.3  F (36.8  C) Oral 68 18 100 %      Physical Exam  Nursing note and vitals reviewed.  Constitutional: Cooperative.   HENT:   Mouth/Throat: Moist mucous membranes.   Eyes:  nonicteric sclera  Cardiovascular: Normal rate, regular rhythm, no murmurs, rubs, or gallops  Pulmonary/Chest: Effort normal and breath sounds normal. No respiratory distress. No wheezes. No rales.   Abdominal: Soft. Nontender, nondistended, no guarding or rigidity. BS present.   Musculoskeletal: Normal range of motion.   Neurological: Alert. Moves all extremities spontaneously.     CN's II-XII intact. 5/5 BUE and BLE strength. PERRL    EOMI without nystagmus.      Sensation intact to light touch. Negative pronator drift.    Normal gait.   Skin: Skin is warm and dry. No rash noted.   Psychiatric: Normal mood and affect.       Emergency Department Course   ECG (19:33:36):  Rate 64 bpm. WI interval 182. QRS duration 96. QT/QTc 426/439. P-R-T axes 37 52 70. Normal sinus rhythm. Moderate voltage criteria for LVH, may be normal variant. Borderline ECG. Agree with computer interpretation. No significant change when compared to EKG dated 7/26/17. Interpreted at 2200 by Kota Leon MD.     Laboratory:  CBC: HGB 11.1 (L),  (L) ow WNL (WBC 4.1)   BMP: glucose 160 (H), BUN 57 (H), Creatinine 12.50 (H), GFR 4 (L), Calcium 8.0 (L) ow WNL   1905: Troponin: <0.015      Interventions:  2026: Hydralazine 10 mg IV   2027: Clonidine 0.1 mg PO    2142: Benadryl 25 mg IV   2143: Reglan 2.5 mg IV     Emergency Department Course:  Past medical records, nursing notes, and vitals reviewed.  1959: I performed an exam of the patient and obtained history, as documented above.  IV inserted and blood drawn. The patient was placed on continuous  cardiac monitoring and pulse oximetry.   Above interventions provided.   I rechecked the patient. Explained findings to patient. Continues to endorse headache.  Additional interventions provided.   I personally reviewed the laboratory results with the Patient and answered all related questions prior to discharge.    2236: I rechecked the patient. Findings and plan explained to the Patient. Patient discharged home with instructions regarding supportive care, medications, and reasons to return. The importance of close follow-up was reviewed.      Impression & Plan    Medical Decision Making:  Jelly Dietz is a 33 year old female presenting with chief complaint of high blood pressure and headache. She notes that she has had this happen to her multiple times in the past which has necessitated ER evaluation. She also notes that she had some mild chest pain earlier in the day but that has since resolved. EKG/troponin nonconcerning. Blood pressure is elevated above 200 here. After a single dose of hydralazine it did come down 20% which is appropriate treatment for high blood pressure. She was still having a mild headache therefore she asked for something for this therefore she was given Reglan and Benadryl with complete resolution of her symptoms. Patient has had negative head CT's and MRI's with similar symptoms in the past therefore I would not repeat any imaging at this time given normal neurologic exam. Instead, I think she can be followed clinically on an outpatient basis. I encouraged her to follow up with her doctor to discuss any changes to her hypertension regimen as she is frequently hypertensive in the emergency department. She is in stable condition at the time of discharge, indications for return to the ED were discussed as well as follow up. All questions were answered and she is in agreement with the plan.     Diagnosis:    ICD-10-CM    1. Essential hypertension I10    2. Episodic tension-type headache,  not intractable G44.219        Disposition:  Discharged to home with plan as outlined.      I, Antonio Evans, am serving as a scribe at 7:59 PM on 2/6/2018 to document services personally performed by Kota Leon MD based on my observations and the provider's statements to me.    2/6/2018   [facility] EMERGENCY DEPARTMENT       Kota Leon MD  02/08/18 0832

## 2018-02-13 DIAGNOSIS — F51.01 PRIMARY INSOMNIA: ICD-10-CM

## 2018-04-13 ENCOUNTER — NURSE TRIAGE (OUTPATIENT)
Dept: NURSING | Facility: CLINIC | Age: 33
End: 2018-04-13

## 2018-04-13 NOTE — TELEPHONE ENCOUNTER
Caller requests confidential health information  Was advised per  Previous knowledge and then advised to see PCP  .[email or web address]    Additional Information    Health Information question, no triage required and triager able to answer question    Protocols used: INFORMATION ONLY CALL-ADULT-AH

## 2018-05-18 ENCOUNTER — TRANSFERRED RECORDS (OUTPATIENT)
Dept: HEALTH INFORMATION MANAGEMENT | Facility: CLINIC | Age: 33
End: 2018-05-18

## 2018-05-18 LAB
HPV ABSTRACT: NORMAL
PAP-ABSTRACT: NORMAL

## 2018-07-06 ENCOUNTER — TELEPHONE (OUTPATIENT)
Dept: TRANSPLANT | Facility: CLINIC | Age: 33
End: 2018-07-06

## 2018-07-06 NOTE — TELEPHONE ENCOUNTER
Called [facility] [place] [facility] to check on Jelly's compliance. I was told she moved about 6 months ago. I called Jelly and she indeed moved to [facility]. I got her new address and the new dialysis unit information from her. I called the [facility] dialysis unit to check on Jelly's compliance and the  was not available so I left my name and phone number. I did tell Jelly I would be checking on her compliance and she said she is doing so much better now that she is living close to her family and they can help her. She did tell me she had an abnormal PAP recently at [facility] and has to schedule a follow up with them. I told her once I have confirmation about her compliance then she would be asked to return here for a visit with a surgeon and cardiology. She thanked me for my patience with her struggles.

## 2018-07-09 ENCOUNTER — TELEPHONE (OUTPATIENT)
Dept: TRANSPLANT | Facility: CLINIC | Age: 33
End: 2018-07-09

## 2018-07-09 ENCOUNTER — DOCUMENTATION ONLY (OUTPATIENT)
Dept: TRANSPLANT | Facility: CLINIC | Age: 33
End: 2018-07-09

## 2018-07-09 NOTE — PROGRESS NOTES
Spoke to Armando the  from Jelly's dialysis unit. She happily reported that Jelly has been working very hard to be compliant. She shared with her new dialysis unit that she was on a second compliance contract and was determined to succeed. Jelly moved closer to her parents so there is more help with her 2 small children. She is a single parent. She has been faithful in attendance at dialysis. There were 2 occasions that she had to reschedule due to weather and never cuts her runs short. She has been working with her dietician and always has her binders with her if she snacks at dialysis. Has had no hospitalizations for HTN and appears to be taking her medications. Will call Jelly and offer her appointments with a surgeon and cardiology to complete her evaluation.

## 2018-07-09 NOTE — TELEPHONE ENCOUNTER
Left message for Jelly and asked for a return call. I got a very positive report from her dialysis unit in reference to her compliance. She will need to see a surgeon and cardiology to complete her evaluation.

## 2018-07-13 ENCOUNTER — TELEPHONE (OUTPATIENT)
Dept: TRANSPLANT | Facility: CLINIC | Age: 33
End: 2018-07-13

## 2018-07-13 NOTE — TELEPHONE ENCOUNTER
Tried to reach Jelly but her mail box is full. Wanting to talk to her about coming in to see transplant surgeon and cardiology to complete her evaluation.

## 2018-07-17 ENCOUNTER — TELEPHONE (OUTPATIENT)
Dept: TRANSPLANT | Facility: CLINIC | Age: 33
End: 2018-07-17

## 2018-07-17 DIAGNOSIS — Z91.199 PERSONAL HISTORY OF NONCOMPLIANCE WITH MEDICAL TREATMENT, PRESENTING HAZARDS TO HEALTH: ICD-10-CM

## 2018-07-17 DIAGNOSIS — T86.91 TRANSPLANT FAILURE DUE TO REJECTION: ICD-10-CM

## 2018-07-17 DIAGNOSIS — N02.B9 IGA NEPHROPATHY: ICD-10-CM

## 2018-07-17 DIAGNOSIS — Z76.82 ORGAN TRANSPLANT CANDIDATE: ICD-10-CM

## 2018-07-17 DIAGNOSIS — I10 ESSENTIAL HYPERTENSION: ICD-10-CM

## 2018-07-17 DIAGNOSIS — N18.6 END STAGE RENAL DISEASE (H): ICD-10-CM

## 2018-07-17 NOTE — Clinical Note
See smart set orders. Only need a waitlist appointment for surgeon to talk to her about compliance but she needs a regular cardiology visit. Thanks

## 2018-07-17 NOTE — TELEPHONE ENCOUNTER
Called Jelly today to let her know we need to have her see a transplant surgeon and cardiologist to complete her evaluation. She is in agreement. Scheduling will call her.

## 2018-08-23 ENCOUNTER — NURSE TRIAGE (OUTPATIENT)
Dept: NURSING | Facility: CLINIC | Age: 33
End: 2018-08-23

## 2018-08-23 NOTE — TELEPHONE ENCOUNTER
"  Additional Information    Health Information question, no triage required and triager able to answer question     \"I am a transplant patient and I am over due for dialysis. I was told to go to the ER to get it, I've done it before. So today I went to [place] ER, they do not do it there. So if I go to the [place] or [facility] ER [facility] can they do it. I do not want to drive 2 hours if they can't.\" I gave patient ER numbers for both hospitals, to speak with someone directly in the ER.    Protocols used: INFORMATION ONLY CALL-ADULT-    "

## 2018-08-24 ENCOUNTER — HOSPITAL ENCOUNTER (INPATIENT)
Facility: CLINIC | Age: 33
LOS: 1 days | Discharge: HOME | DRG: 640 | End: 2018-08-25
Attending: EMERGENCY MEDICINE | Admitting: INTERNAL MEDICINE
Payer: MEDICARE

## 2018-08-24 DIAGNOSIS — M25.512 CHRONIC LEFT SHOULDER PAIN: Primary | ICD-10-CM

## 2018-08-24 DIAGNOSIS — Z99.2 DEPENDENCE ON HEMODIALYSIS (H): ICD-10-CM

## 2018-08-24 DIAGNOSIS — Z94.0 KIDNEY REPLACED BY TRANSPLANT: ICD-10-CM

## 2018-08-24 DIAGNOSIS — I12.0 BENIGN HYPERTENSION WITH END-STAGE RENAL DISEASE (H): ICD-10-CM

## 2018-08-24 DIAGNOSIS — G89.29 CHRONIC LEFT SHOULDER PAIN: Primary | ICD-10-CM

## 2018-08-24 DIAGNOSIS — E87.5 HYPERKALEMIA: ICD-10-CM

## 2018-08-24 DIAGNOSIS — N18.6 BENIGN HYPERTENSION WITH END-STAGE RENAL DISEASE (H): ICD-10-CM

## 2018-08-24 LAB
ANION GAP SERPL CALCULATED.3IONS-SCNC: 16 MMOL/L (ref 3–14)
BASE DEFICIT BLDV-SCNC: 7.3 MMOL/L
BASOPHILS # BLD AUTO: 0.1 10E9/L (ref 0–0.2)
BASOPHILS NFR BLD AUTO: 2.2 %
BUN SERPL-MCNC: 110 MG/DL (ref 7–30)
CALCIUM SERPL-MCNC: 8.3 MG/DL (ref 8.5–10.1)
CHLORIDE SERPL-SCNC: 96 MMOL/L (ref 94–109)
CO2 SERPL-SCNC: 19 MMOL/L (ref 20–32)
CREAT SERPL-MCNC: 19.8 MG/DL (ref 0.52–1.04)
DIFFERENTIAL METHOD BLD: ABNORMAL
EOSINOPHIL # BLD AUTO: 0.3 10E9/L (ref 0–0.7)
EOSINOPHIL NFR BLD AUTO: 12.6 %
ERYTHROCYTE [DISTWIDTH] IN BLOOD BY AUTOMATED COUNT: 13.7 % (ref 10–15)
GFR SERPL CREATININE-BSD FRML MDRD: 2 ML/MIN/1.7M2
GLUCOSE BLDC GLUCOMTR-MCNC: 115 MG/DL (ref 70–99)
GLUCOSE BLDC GLUCOMTR-MCNC: 127 MG/DL (ref 70–99)
GLUCOSE BLDC GLUCOMTR-MCNC: 142 MG/DL (ref 70–99)
GLUCOSE BLDC GLUCOMTR-MCNC: 143 MG/DL (ref 70–99)
GLUCOSE BLDC GLUCOMTR-MCNC: 145 MG/DL (ref 70–99)
GLUCOSE BLDC GLUCOMTR-MCNC: 153 MG/DL (ref 70–99)
GLUCOSE BLDC GLUCOMTR-MCNC: 73 MG/DL (ref 70–99)
GLUCOSE BLDC GLUCOMTR-MCNC: 92 MG/DL (ref 70–99)
GLUCOSE SERPL-MCNC: 95 MG/DL (ref 70–99)
HBV SURFACE AB SERPL IA-ACNC: >1000 M[IU]/ML
HBV SURFACE AG SERPL QL IA: NONREACTIVE
HCO3 BLDV-SCNC: 18 MMOL/L (ref 21–28)
HCT VFR BLD AUTO: 36.9 % (ref 35–47)
HGB BLD-MCNC: 12.4 G/DL (ref 11.7–15.7)
IMM GRANULOCYTES # BLD: 0 10E9/L (ref 0–0.4)
IMM GRANULOCYTES NFR BLD: 0 %
INTERPRETATION ECG - MUSE: NORMAL
LYMPHOCYTES # BLD AUTO: 0.9 10E9/L (ref 0.8–5.3)
LYMPHOCYTES NFR BLD AUTO: 37.7 %
MCH RBC QN AUTO: 33.3 PG (ref 26.5–33)
MCHC RBC AUTO-ENTMCNC: 33.6 G/DL (ref 31.5–36.5)
MCV RBC AUTO: 99 FL (ref 78–100)
MONOCYTES # BLD AUTO: 0.1 10E9/L (ref 0–1.3)
MONOCYTES NFR BLD AUTO: 4.8 %
NEUTROPHILS # BLD AUTO: 1 10E9/L (ref 1.6–8.3)
NEUTROPHILS NFR BLD AUTO: 42.7 %
NRBC # BLD AUTO: 0 10*3/UL
NRBC BLD AUTO-RTO: 0 /100
O2/TOTAL GAS SETTING VFR VENT: 21 %
PCO2 BLDV: 37 MM HG (ref 40–50)
PH BLDV: 7.31 PH (ref 7.32–7.43)
PLATELET # BLD AUTO: 173 10E9/L (ref 150–450)
PO2 BLDV: 41 MM HG (ref 25–47)
POTASSIUM SERPL-SCNC: 3.9 MMOL/L (ref 3.4–5.3)
POTASSIUM SERPL-SCNC: 5.9 MMOL/L (ref 3.4–5.3)
POTASSIUM SERPL-SCNC: 7.3 MMOL/L (ref 3.4–5.3)
RBC # BLD AUTO: 3.72 10E12/L (ref 3.8–5.2)
SODIUM SERPL-SCNC: 131 MMOL/L (ref 133–144)
WBC # BLD AUTO: 2.3 10E9/L (ref 4–11)

## 2018-08-24 PROCEDURE — 96366 THER/PROPH/DIAG IV INF ADDON: CPT | Performed by: EMERGENCY MEDICINE

## 2018-08-24 PROCEDURE — 99223 1ST HOSP IP/OBS HIGH 75: CPT | Mod: AI | Performed by: INTERNAL MEDICINE

## 2018-08-24 PROCEDURE — 99285 EMERGENCY DEPT VISIT HI MDM: CPT | Mod: 25 | Performed by: EMERGENCY MEDICINE

## 2018-08-24 PROCEDURE — 96365 THER/PROPH/DIAG IV INF INIT: CPT | Performed by: EMERGENCY MEDICINE

## 2018-08-24 PROCEDURE — 85025 COMPLETE CBC W/AUTO DIFF WBC: CPT | Performed by: EMERGENCY MEDICINE

## 2018-08-24 PROCEDURE — 00000146 ZZHCL STATISTIC GLUCOSE BY METER IP

## 2018-08-24 PROCEDURE — 90937 HEMODIALYSIS REPEATED EVAL: CPT

## 2018-08-24 PROCEDURE — 25000132 ZZH RX MED GY IP 250 OP 250 PS 637: Mod: GY | Performed by: STUDENT IN AN ORGANIZED HEALTH CARE EDUCATION/TRAINING PROGRAM

## 2018-08-24 PROCEDURE — 12000008 ZZH R&B INTERMEDIATE UMMC

## 2018-08-24 PROCEDURE — 25000128 H RX IP 250 OP 636: Performed by: INTERNAL MEDICINE

## 2018-08-24 PROCEDURE — 86706 HEP B SURFACE ANTIBODY: CPT | Performed by: INTERNAL MEDICINE

## 2018-08-24 PROCEDURE — 25000132 ZZH RX MED GY IP 250 OP 250 PS 637: Mod: GY | Performed by: EMERGENCY MEDICINE

## 2018-08-24 PROCEDURE — 93005 ELECTROCARDIOGRAM TRACING: CPT | Performed by: EMERGENCY MEDICINE

## 2018-08-24 PROCEDURE — 96376 TX/PRO/DX INJ SAME DRUG ADON: CPT | Performed by: EMERGENCY MEDICINE

## 2018-08-24 PROCEDURE — 80048 BASIC METABOLIC PNL TOTAL CA: CPT | Performed by: EMERGENCY MEDICINE

## 2018-08-24 PROCEDURE — 36415 COLL VENOUS BLD VENIPUNCTURE: CPT | Performed by: INTERNAL MEDICINE

## 2018-08-24 PROCEDURE — 84132 ASSAY OF SERUM POTASSIUM: CPT | Performed by: INTERNAL MEDICINE

## 2018-08-24 PROCEDURE — 99284 EMERGENCY DEPT VISIT MOD MDM: CPT | Mod: 25 | Performed by: EMERGENCY MEDICINE

## 2018-08-24 PROCEDURE — 96375 TX/PRO/DX INJ NEW DRUG ADDON: CPT | Performed by: EMERGENCY MEDICINE

## 2018-08-24 PROCEDURE — 5A1D70Z PERFORMANCE OF URINARY FILTRATION, INTERMITTENT, LESS THAN 6 HOURS PER DAY: ICD-10-PCS | Performed by: INTERNAL MEDICINE

## 2018-08-24 PROCEDURE — 25800025 ZZH RX 258: Performed by: EMERGENCY MEDICINE

## 2018-08-24 PROCEDURE — 82803 BLOOD GASES ANY COMBINATION: CPT | Performed by: EMERGENCY MEDICINE

## 2018-08-24 PROCEDURE — 94640 AIRWAY INHALATION TREATMENT: CPT | Performed by: EMERGENCY MEDICINE

## 2018-08-24 PROCEDURE — A9270 NON-COVERED ITEM OR SERVICE: HCPCS | Mod: GY | Performed by: STUDENT IN AN ORGANIZED HEALTH CARE EDUCATION/TRAINING PROGRAM

## 2018-08-24 PROCEDURE — 25000125 ZZHC RX 250: Performed by: EMERGENCY MEDICINE

## 2018-08-24 PROCEDURE — 96367 TX/PROPH/DG ADDL SEQ IV INF: CPT | Performed by: EMERGENCY MEDICINE

## 2018-08-24 PROCEDURE — 93010 ELECTROCARDIOGRAM REPORT: CPT | Mod: Z6 | Performed by: EMERGENCY MEDICINE

## 2018-08-24 PROCEDURE — 84132 ASSAY OF SERUM POTASSIUM: CPT | Performed by: EMERGENCY MEDICINE

## 2018-08-24 PROCEDURE — 25000128 H RX IP 250 OP 636: Performed by: EMERGENCY MEDICINE

## 2018-08-24 PROCEDURE — A9270 NON-COVERED ITEM OR SERVICE: HCPCS | Mod: GY | Performed by: EMERGENCY MEDICINE

## 2018-08-24 PROCEDURE — G0499 HEPB SCREEN HIGH RISK INDIV: HCPCS | Performed by: INTERNAL MEDICINE

## 2018-08-24 RX ORDER — DOXYCYCLINE HYCLATE 100 MG
1 TABLET ORAL 2 TIMES DAILY WITH MEALS
COMMUNITY
Start: 2018-06-27 | End: 2018-10-30

## 2018-08-24 RX ORDER — AMOXICILLIN 250 MG
2 CAPSULE ORAL 2 TIMES DAILY
Status: DISCONTINUED | OUTPATIENT
Start: 2018-08-24 | End: 2018-08-25 | Stop reason: HOSPADM

## 2018-08-24 RX ORDER — DIPHENHYDRAMINE HCL 50 MG
50 CAPSULE ORAL ONCE
Status: COMPLETED | OUTPATIENT
Start: 2018-08-24 | End: 2018-08-24

## 2018-08-24 RX ORDER — DEXTROSE MONOHYDRATE 25 G/50ML
25 INJECTION, SOLUTION INTRAVENOUS ONCE
Status: COMPLETED | OUTPATIENT
Start: 2018-08-24 | End: 2018-08-24

## 2018-08-24 RX ORDER — ONDANSETRON 2 MG/ML
4 INJECTION INTRAMUSCULAR; INTRAVENOUS EVERY 30 MIN PRN
Status: DISCONTINUED | OUTPATIENT
Start: 2018-08-24 | End: 2018-08-25 | Stop reason: HOSPADM

## 2018-08-24 RX ORDER — LIDOCAINE 4 G/G
1 PATCH TOPICAL
Status: DISCONTINUED | OUTPATIENT
Start: 2018-08-24 | End: 2018-08-25 | Stop reason: HOSPADM

## 2018-08-24 RX ORDER — ACETAMINOPHEN 325 MG/1
325-650 TABLET ORAL EVERY 4 HOURS PRN
Status: DISCONTINUED | OUTPATIENT
Start: 2018-08-24 | End: 2018-08-25 | Stop reason: HOSPADM

## 2018-08-24 RX ORDER — HYDROXYZINE HYDROCHLORIDE 25 MG/1
25 TABLET, FILM COATED ORAL ONCE
Status: DISCONTINUED | OUTPATIENT
Start: 2018-08-24 | End: 2018-08-25 | Stop reason: HOSPADM

## 2018-08-24 RX ORDER — CLONIDINE HYDROCHLORIDE 0.1 MG/1
0.1 TABLET ORAL 3 TIMES DAILY PRN
Status: DISCONTINUED | OUTPATIENT
Start: 2018-08-24 | End: 2018-08-25 | Stop reason: HOSPADM

## 2018-08-24 RX ORDER — DIPHENHYDRAMINE HCL 25 MG
25 CAPSULE ORAL ONCE
Status: COMPLETED | OUTPATIENT
Start: 2018-08-24 | End: 2018-08-24

## 2018-08-24 RX ORDER — CALCIUM CARBONATE 500 MG/1
500 TABLET, CHEWABLE ORAL 3 TIMES DAILY PRN
Status: DISCONTINUED | OUTPATIENT
Start: 2018-08-24 | End: 2018-08-25 | Stop reason: HOSPADM

## 2018-08-24 RX ORDER — SODIUM POLYSTYRENE SULFONATE 15 G/60ML
30 SUSPENSION ORAL; RECTAL ONCE
Status: COMPLETED | OUTPATIENT
Start: 2018-08-24 | End: 2018-08-24

## 2018-08-24 RX ORDER — AMOXICILLIN 250 MG
1 CAPSULE ORAL 2 TIMES DAILY
Status: DISCONTINUED | OUTPATIENT
Start: 2018-08-24 | End: 2018-08-25 | Stop reason: HOSPADM

## 2018-08-24 RX ORDER — NALOXONE HYDROCHLORIDE 0.4 MG/ML
.1-.4 INJECTION, SOLUTION INTRAMUSCULAR; INTRAVENOUS; SUBCUTANEOUS
Status: DISCONTINUED | OUTPATIENT
Start: 2018-08-24 | End: 2018-08-25 | Stop reason: HOSPADM

## 2018-08-24 RX ORDER — CYCLOBENZAPRINE HCL 5 MG
5 TABLET ORAL
Status: DISCONTINUED | OUTPATIENT
Start: 2018-08-24 | End: 2018-08-25 | Stop reason: HOSPADM

## 2018-08-24 RX ORDER — CARVEDILOL 25 MG/1
25 TABLET ORAL 2 TIMES DAILY WITH MEALS
Status: DISCONTINUED | OUTPATIENT
Start: 2018-08-24 | End: 2018-08-25 | Stop reason: HOSPADM

## 2018-08-24 RX ORDER — CALCIUM CARBONATE 500 MG/1
1000 TABLET, CHEWABLE ORAL 3 TIMES DAILY
Status: DISCONTINUED | OUTPATIENT
Start: 2018-08-24 | End: 2018-08-25 | Stop reason: HOSPADM

## 2018-08-24 RX ORDER — DEXTROSE MONOHYDRATE 25 G/50ML
50 INJECTION, SOLUTION INTRAVENOUS ONCE
Status: COMPLETED | OUTPATIENT
Start: 2018-08-24 | End: 2018-08-24

## 2018-08-24 RX ORDER — CETIRIZINE HYDROCHLORIDE 10 MG/1
10 TABLET ORAL AT BEDTIME
Status: DISCONTINUED | OUTPATIENT
Start: 2018-08-24 | End: 2018-08-25 | Stop reason: HOSPADM

## 2018-08-24 RX ORDER — DEXTROSE MONOHYDRATE 100 MG/ML
INJECTION, SOLUTION INTRAVENOUS CONTINUOUS
Status: DISPENSED | OUTPATIENT
Start: 2018-08-24 | End: 2018-08-24

## 2018-08-24 RX ORDER — ACETAMINOPHEN 325 MG/1
975 TABLET ORAL ONCE
Status: COMPLETED | OUTPATIENT
Start: 2018-08-24 | End: 2018-08-24

## 2018-08-24 RX ORDER — SEVELAMER CARBONATE 800 MG/1
1 TABLET, FILM COATED ORAL
Status: ON HOLD | COMMUNITY
Start: 2018-04-20 | End: 2020-06-19

## 2018-08-24 RX ORDER — DIPHENHYDRAMINE HYDROCHLORIDE 50 MG/ML
25 INJECTION INTRAMUSCULAR; INTRAVENOUS ONCE
Status: COMPLETED | OUTPATIENT
Start: 2018-08-24 | End: 2018-08-24

## 2018-08-24 RX ORDER — ALBUTEROL SULFATE 5 MG/ML
10 SOLUTION RESPIRATORY (INHALATION) ONCE
Status: COMPLETED | OUTPATIENT
Start: 2018-08-24 | End: 2018-08-24

## 2018-08-24 RX ORDER — AMLODIPINE BESYLATE 10 MG/1
10 TABLET ORAL DAILY
Status: DISCONTINUED | OUTPATIENT
Start: 2018-08-24 | End: 2018-08-25 | Stop reason: HOSPADM

## 2018-08-24 RX ADMIN — Medication 1 MG: at 15:26

## 2018-08-24 RX ADMIN — CALCIUM CARBONATE (ANTACID) CHEW TAB 500 MG 1000 MG: 500 CHEW TAB at 20:12

## 2018-08-24 RX ADMIN — DEXTROSE MONOHYDRATE: 100 INJECTION, SOLUTION INTRAVENOUS at 05:53

## 2018-08-24 RX ADMIN — Medication: at 08:01

## 2018-08-24 RX ADMIN — ACETAMINOPHEN 975 MG: 325 TABLET, FILM COATED ORAL at 04:06

## 2018-08-24 RX ADMIN — ONDANSETRON 4 MG: 2 INJECTION INTRAMUSCULAR; INTRAVENOUS at 05:37

## 2018-08-24 RX ADMIN — SODIUM CHLORIDE 300 ML: 9 INJECTION, SOLUTION INTRAVENOUS at 08:00

## 2018-08-24 RX ADMIN — CALCIUM CARBONATE (ANTACID) CHEW TAB 500 MG 1000 MG: 500 CHEW TAB at 15:27

## 2018-08-24 RX ADMIN — SODIUM BICARBONATE 50 MEQ: 84 INJECTION, SOLUTION INTRAVENOUS at 05:38

## 2018-08-24 RX ADMIN — ACETAMINOPHEN 650 MG: 325 TABLET, FILM COATED ORAL at 20:19

## 2018-08-24 RX ADMIN — HUMAN INSULIN 5 UNITS: 100 INJECTION, SOLUTION SUBCUTANEOUS at 05:49

## 2018-08-24 RX ADMIN — DIPHENHYDRAMINE HYDROCHLORIDE 25 MG: 25 CAPSULE ORAL at 21:50

## 2018-08-24 RX ADMIN — CALCIUM GLUCONATE 2 G: 98 INJECTION, SOLUTION INTRAVENOUS at 05:07

## 2018-08-24 RX ADMIN — DIPHENHYDRAMINE HYDROCHLORIDE 50 MG: 50 CAPSULE ORAL at 04:06

## 2018-08-24 RX ADMIN — Medication 1 MG: at 23:25

## 2018-08-24 RX ADMIN — SODIUM CHLORIDE 250 ML: 9 INJECTION, SOLUTION INTRAVENOUS at 08:00

## 2018-08-24 RX ADMIN — ALBUTEROL SULFATE 10 MG: 2.5 SOLUTION RESPIRATORY (INHALATION) at 05:06

## 2018-08-24 RX ADMIN — AMLODIPINE BESYLATE 10 MG: 10 TABLET ORAL at 15:27

## 2018-08-24 RX ADMIN — SODIUM POLYSTYRENE SULFONATE 30 G: 15 SUSPENSION ORAL; RECTAL at 05:12

## 2018-08-24 RX ADMIN — DIPHENHYDRAMINE HYDROCHLORIDE 25 MG: 50 INJECTION, SOLUTION INTRAMUSCULAR; INTRAVENOUS at 05:53

## 2018-08-24 RX ADMIN — CETIRIZINE HYDROCHLORIDE 10 MG: 10 TABLET, FILM COATED ORAL at 21:50

## 2018-08-24 RX ADMIN — DEXTROSE MONOHYDRATE 50 ML: 25 INJECTION, SOLUTION INTRAVENOUS at 06:54

## 2018-08-24 RX ADMIN — CARVEDILOL 25 MG: 25 TABLET, FILM COATED ORAL at 17:56

## 2018-08-24 RX ADMIN — DEXTROSE MONOHYDRATE 25 G: 25 INJECTION, SOLUTION INTRAVENOUS at 05:45

## 2018-08-24 RX ADMIN — LIDOCAINE 1 PATCH: 560 PATCH PERCUTANEOUS; TOPICAL; TRANSDERMAL at 20:12

## 2018-08-24 ASSESSMENT — ENCOUNTER SYMPTOMS
DIAPHORESIS: 0
DIFFICULTY URINATING: 0
CHILLS: 0
SHORTNESS OF BREATH: 1
DIZZINESS: 0
DIARRHEA: 0
NAUSEA: 0
ABDOMINAL PAIN: 0
FEVER: 0
DYSURIA: 0
VOMITING: 0
PALPITATIONS: 0
COUGH: 0
CHEST TIGHTNESS: 0

## 2018-08-24 ASSESSMENT — ACTIVITIES OF DAILY LIVING (ADL)
BATHING: 0-->INDEPENDENT
RETIRED_COMMUNICATION: 0-->UNDERSTANDS/COMMUNICATES WITHOUT DIFFICULTY
ADLS_ACUITY_SCORE: 11
TOILETING: 0-->INDEPENDENT
FALL_HISTORY_WITHIN_LAST_SIX_MONTHS: NO
SWALLOWING: 0-->SWALLOWS FOODS/LIQUIDS WITHOUT DIFFICULTY
DRESS: 0-->INDEPENDENT
AMBULATION: 0-->INDEPENDENT
TRANSFERRING: 0-->INDEPENDENT
RETIRED_EATING: 0-->INDEPENDENT
COGNITION: 0 - NO COGNITION ISSUES REPORTED
ADLS_ACUITY_SCORE: 11

## 2018-08-24 ASSESSMENT — PAIN DESCRIPTION - DESCRIPTORS: DESCRIPTORS: HEADACHE

## 2018-08-24 NOTE — PLAN OF CARE
Admission          8/24/2018  2:49 AM  -----------------------------------------------------------  Reason for admission:  Primary team notified of pt arrival.  Admitted from: ED/Dialysis  Via: stretcher  Accompanied by: Self  Belongings: Placed in closet; valuables sent home with family  Admission Profile: complete  Teaching: orientation to unit and call light- call light within reach, call don't fall, use of console, meal times, when to call for the RN, and enforced importance of safety   Access: PIV  Telemetry:Placed on pt  Ht./Wt.: complete  2 RN Skin Assessment Completed By: Domenica BAI   Pt status:    Temp:  [97.9  F (36.6  C)-98.8  F (37.1  C)] 98.8  F (37.1  C)  Pulse:  [70] 70  Heart Rate:  [66-90] 74  Resp:  [12-26] 18  BP: (119-167)/() 155/100  SpO2:  [91 %-100 %] 99 %

## 2018-08-24 NOTE — PROGRESS NOTES
HEMODIALYSIS TREATMENT NOTE    Date: 8/24/2018  Time: 1215    Data:  Pre Wt:   53 kg  Desired Wt: 52 kg   Post Wt:    Weight gain:   kg   Weight change:   kg  Ultrafiltration - Post Run Net Total Removed (mL): 1000 mL  Ultrafiltration - Post Run Net Total Gain (mL): 0 mL  Vascular Access Status: Yes, secured and visible  Dialyzer Rinse: Light  Total Blood Volume Processed: 88.1 L  Total Dialysis (Treatment) Time:  3.5 Hours    Lab: Hep B    Assessment/Interventions: 3.5 hour HD run.  Blood flow 450 mL/min. 1 kg removed.  Glucose level stable: pt on D10 @ 25 mL/hr.     Plan:  Continue with plan of care.  Next run per Renal Team.

## 2018-08-24 NOTE — IP AVS SNAPSHOT
MRN:[id number]                      After Visit Summary   8/24/2018    Jelly Dietz    MRN: [id number]           Thank you!     Thank you for choosing [facility] for your care. Our goal is always to provide you with excellent care. Hearing back from our patients is one way we can continue to improve our services. Please take a few minutes to complete the written survey that you may receive in the mail after you visit with us. Thank you!        Patient Information     Date Of Birth          1985        Designated Caregiver       Most Recent Value    Caregiver    Will someone help with your care after discharge? no      About your hospital stay     You were admitted on:  August 24, 2018 You last received care in the:  [address] [facility]    You were discharged on:  August 25, 2018        Reason for your hospital stay       Missing dialysis led to high potassium levels which is life threatening.                  Who to Call     For medical emergencies, please call 911.  For non-urgent questions about your medical care, please call your primary care provider or clinic, [phone]          Attending Provider     Provider Specialty    Erik Samano,  Emergency Medicine    Lynn Bethea MD Internal Medicine    Erik Landeros MD Internal Medicine    Alba Bradshaw DO Internal Medicine       Primary Care Provider Office Phone # Fax #    [facility] [phone]      After Care Instructions     Activity       Your activity upon discharge: activity as tolerated            Diet       Follow this diet upon discharge: Orders Placed This Encounter      Combination Diet Regular Diet Adult; Dialysis Diet            Discharge Instructions       Do NOT miss your regularly scheduled dialysis sessions.                  Your next 10 appointments already scheduled     Sep 10, 2018  2:00 PM CDT   (Arrive by 1:45 PM)   Pre-Transplant Kidney Evaluation with Nory  "MD Eddie   [facility] Solid Organ Transplant ([facility])    [address]  [address]  [facility] [place] [address] [phone]            Sep 19, 2018 12:30 PM CDT   (Arrive by 12:15 PM)   Return Visit with Ashutosh Deras MD   [facility] Heart Care ([facility])    [address]  [address]  [place] [id number] [phone]              Pending Results     No orders found for last 3 day(s).            Statement of Approval     Ordered          18 1208  I have reviewed and agree with all the recommendations and orders detailed in this document.  EFFECTIVE NOW     Approved and electronically signed by:  Alba Bradshaw DO             Admission Information     Date & Time Provider Department Dept. Phone    2018 Alba Bradshaw DO [address] [facility] [place] [phone]      Your Vitals Were     Blood Pressure Pulse Temperature Respirations Height Weight    145/92 70 98.7  F (37.1  C) (Oral) 16 1.676 m (5' 6\") 58.7 kg (129 lb 8 oz)    Pulse Oximetry BMI (Body Mass Index)                99% 20.9 kg/m2          MyChart Information     [facility] lets you send messages to your doctor, view your test results, renew your prescriptions, schedule appointments and more. To sign up, go to [email or web address] . Click on \"Log in\" on the left side of the screen, which will take you to the Welcome page. Then click on \"Sign up Now\" on the right side of the page.     You will be asked to enter the access code listed below, as well as some personal information. Please follow the directions to create your username and password.     Your access code is: [email or web address]  Expires: 2018 12:38 PM     Your access code will  in 90 days. If you need help or a new code, please call your [place] clinic or [phone].        Care EveryWhere ID     This is your Care EveryWhere ID. This could be used by other organizations to access your [place] medical " records  [id number]        Equal Access to Services     CASEYROBER COLT : Hadii aad ku hadjooestefani Guo, waaxda luqadaha, qaybta galinaevayohannes vickers, lazara reecenellielisette bernstein. So [facility] [phone].    ATENCIÓN: Si habla español, tiene a kan disposición servicios gratuitos de asistencia lingüística. Llame al [phone].    We comply with applicable federal civil rights laws and [place] laws. We do not discriminate on the basis of race, color, national origin, age, disability, sex, sexual orientation, or gender identity.               Review of your medicines      START taking        Dose / Directions    Lidocaine 4 % Patch   Commonly known as:  LIDOCARE   Used for:  Chronic left shoulder pain        Dose:  1 patch   Place 1 patch onto the skin every 24 hours   Quantity:  30 patch   Refills:  1         CONTINUE these medicines which have NOT CHANGED        Dose / Directions    acetaminophen 325 MG tablet   Commonly known as:  TYLENOL   Used for:   delivery delivered        Dose:  325-650 mg   Take 1-2 tablets by mouth every 4 hours as needed.   Quantity:  60 tablet   Refills:  0       amLODIPine 10 MG tablet   Commonly known as:  NORVASC   Used for:  ESRD (end stage renal disease) on dialysis (H), Malignant essential hypertension        Dose:  10 mg   Take 1 tablet (10 mg) by mouth daily   Quantity:  30 tablet   Refills:  11       carvedilol 25 MG tablet   Commonly known as:  COREG   Used for:  Benign essential hypertension, SOB (shortness of breath)        Dose:  25 mg   Take 1 tablet (25 mg) by mouth 2 times daily (with meals)   Quantity:  60 tablet   Refills:  11       cetirizine 10 MG tablet   Commonly known as:  zyrTEC        Dose:  10 mg   Take 10 mg by mouth At Bedtime   Refills:  0       cloNIDine 0.1 MG tablet   Commonly known as:  CATAPRES   Used for:  Benign essential hypertension, SOB (shortness of breath)        Dose:  0.1 mg   Take 1 tablet (0.1 mg) by mouth 3 times daily as  needed   Quantity:  60 tablet   Refills:  3       cyclobenzaprine 5 MG tablet   Commonly known as:  FLEXERIL        Dose:  5 mg   Take 1 tablet (5 mg) by mouth nightly as needed for muscle spasms   Quantity:  15 tablet   Refills:  0       DIALYVITE Tabs   Used for:  Secondary renal hyperparathyroidism (H), Hyperphosphatemia, ESRD (end stage renal disease) on dialysis (H)        1 tablet by mouth daily   Quantity:  30 tablet   Refills:  11       doxycycline 100 MG tablet   Commonly known as:  VIBRA-TABS        Dose:  1 tablet   Take 1 tablet by mouth 2 times daily (with meals)   Refills:  0       RENVELA 800 MG tablet   Generic drug:  sevelamer        Dose:  1 tablet   Take 1 tablet by mouth 3 times daily (with meals)   Refills:  0            Where to get your medicines      These medications were sent to [facility] [address] [facility] [place] - [address] AT [facility] [address]  [address], [facility] [address]     Phone:  [phone]     Lidocaine 4 % Patch                Protect others around you: Learn how to safely use, store and throw away your medicines at www.disposemymeds.org.        ANTIBIOTIC INSTRUCTION     You've Been Prescribed an Antibiotic - Now What?  Your healthcare team thinks that you or your loved one might have an infection. Some infections can be treated with antibiotics, which are powerful, life-saving drugs. Like all medications, antibiotics have side effects and should only be used when necessary. There are some important things you should know about your antibiotic treatment.      Your healthcare team may run tests before you start taking an antibiotic.    Your team may take samples (e.g., from your blood, urine or other areas) to run tests to look for bacteria. These test can be important to determine if you need an antibiotic at all and, if you do, which antibiotic will work best.      Within a few days, your healthcare team might change or even stop your  antibiotic.    Your team may start you on an antibiotic while they are working to find out what is making you sick.    Your team might change your antibiotic because test results show that a different antibiotic would be better to treat your infection.    In some cases, once your team has more information, they learn that you do not need an antibiotic at all. They may find out that you don't have an infection, or that the antibiotic you're taking won't work against your infection. For example, an infection caused by a virus can't be treated with antibiotics. Staying on an antibiotic when you don't need it is more likely to be harmful than helpful.      You may experience side effects from your antibiotic.    Like all medications, antibiotics have side effects. Some of these can be serious.    Let you healthcare team know if you have any known allergies when you are admitted to the hospital.    One significant side effect of nearly all antibiotics is the risk of severe and sometimes deadly diarrhea caused by Clostridium difficile (C. Difficile). This occurs when a person takes antibiotics because some good germs are destroyed. Antibiotic use allows C. diificile to take over, putting patients at high risk for this serious infection.    As a patient or caregiver, it is important to understand your or your loved one's antibiotic treatment. It is especially important for caregivers to speak up when patients can't speak for themselves. Here are some important questions to ask your healthcare team.    What infection is this antibiotic treating and how do you know I have that infection?    What side effects might occur from this antibiotic?    How long will I need to take this antibiotic?    Is it safe to take this antibiotic with other medications or supplements (e.g., vitamins) that I am taking?     Are there any special directions I need to know about taking this antibiotic? For example, should I take it with  food?    How will I be monitored to know whether my infection is responding to the antibiotic?    What tests may help to make sure the right antibiotic is prescribed for me?      Information provided by:  www.cdc.gov/getsmart  U.S. Department of Health and Human Services  Centers for disease Control and Prevention  National Center for Emerging and Zoonotic Infectious Diseases  Division of Healthcare Quality Promotion             Medication List: This is a list of all your medications and when to take them. Check marks below indicate your daily home schedule. Keep this list as a reference.      Medications           Morning Afternoon Evening Bedtime As Needed    acetaminophen 325 MG tablet   Commonly known as:  TYLENOL   Take 1-2 tablets by mouth every 4 hours as needed.   Last time this was given:  650 mg on 8/24/2018  8:19 PM                                amLODIPine 10 MG tablet   Commonly known as:  NORVASC   Take 1 tablet (10 mg) by mouth daily   Last time this was given:  10 mg on 8/25/2018 11:00 AM                                carvedilol 25 MG tablet   Commonly known as:  COREG   Take 1 tablet (25 mg) by mouth 2 times daily (with meals)   Last time this was given:  25 mg on 8/25/2018 11:00 AM                                cetirizine 10 MG tablet   Commonly known as:  zyrTEC   Take 10 mg by mouth At Bedtime   Last time this was given:  10 mg on 8/24/2018  9:50 PM                                cloNIDine 0.1 MG tablet   Commonly known as:  CATAPRES   Take 1 tablet (0.1 mg) by mouth 3 times daily as needed                                cyclobenzaprine 5 MG tablet   Commonly known as:  FLEXERIL   Take 1 tablet (5 mg) by mouth nightly as needed for muscle spasms   Last time this was given:  5 mg on 8/25/2018  5:51 AM                                DIALYVITE Tabs   1 tablet by mouth daily                                doxycycline 100 MG tablet   Commonly known as:  VIBRA-TABS   Take 1 tablet by mouth 2 times  daily (with meals)                                Lidocaine 4 % Patch   Commonly known as:  LIDOCARE   Place 1 patch onto the skin every 24 hours   Last time this was given:  1 patch on 8/25/2018 10:56 AM                                RENVELA 800 MG tablet   Take 1 tablet by mouth 3 times daily (with meals)   Generic drug:  sevelamer

## 2018-08-24 NOTE — ED TRIAGE NOTES
Hx of kidney disease and receives dialysis. Missed a couple of dialysis appointments and feels like she needs one now.

## 2018-08-24 NOTE — H&P
"    Medicine H&P   Jelly Dietz ([id number]) admitted on 2018  Primary care provider: [facility], [place]         Chief Complaint:     Hyperkalemia from missing HD         History of Present Illness     Jelly Dietz is a 33 year old female w/ ESRD 2/2 IgA nephropathy on HD (//Sat), HTN, anemia who presents shortness of breath and feeling she needs dialysis.  She missed her last 2 dialysis appointments because of a holiday and a  she had to go to.  She makes a minimal amount of urine.  She has her kidneys explanted in  due to IgA nephropathy.     She usually has \"swellings here and there but never bad\". Missed the previous /u HD and now has numbness and tingling in her fingers. She had a full run of HD last Saturday.   Today, She c/o chest pain in the center of the chest, worse when taking deep breath, no pain radiating to jaw/arm. Also c/o SOB, which feels similar to the past when she has missed HD. Denies vomiting/nausea/constipation/abd pain. Had some diarrhea. C/o swollen arms. Notes metallic taste in mouth. She is concerned about her high BP, which is what usually happens when she misses her HD.      ED course : Calcium gluconate 2g, kayexalate, insulin + dextrose    Nephrology called, plan to due run of HD this AM     ROS (+) SOB, pleuritic chest pain, numbness/tingling in fingers, swelling in arms  ROS (-) GRAYSON, fever, chills, night sweats, lumps, bumps, rashes   Abdominal pain, Nausea, vomitting, constipation   Remainder of 12pt-ROS negative except mentioned above         Other Hx   PMHx  Patient Active Problem List   Diagnosis     CARDIOVASCULAR SCREENING; LDL GOAL LESS THAN 160     Panic attacks     S/P kidney transplant     Hypertension     Chronic renal transplant rejection     ACS (acute coronary syndrome) (H)     Anemia in chronic renal disease     End stage kidney disease (H)     IgA nephropathy     Pre-transplant evaluation for kidney transplant     Hyperkalemia "       PSx  Past Surgical History:   Procedure Laterality Date     CERCLAGE CERVICAL N/A 2015    Procedure: CERCLAGE CERVICAL;  Surgeon: Norbert Chopra MD;  Location: [facility] L[facility]      SECTION  2012    Procedure:  SECTION;;  Surgeon: Chelsea Cross MD;  Location: [facility] L[facility]      SECTION N/A 2015    Procedure:  SECTION;  Surgeon: Chelsea Cross MD;  Location: [facility] OR     cesearean section       EXPLANT TRANSPLANTED KIDNEY  3/29/2013    Procedure: EXPLANT TRANSPLANTED KIDNEY;  Explant Transplanted right Kidney (from patients right iliac fossa);  Surgeon: Nory Morgan MD;  Location: [facility] OR     NEPHRECTOMY  3/29/13    Transplant nephrectomy      WILIAN/DIALYSIS CATHETER       TRANSPLANT KIDNEY RECIPIENT LIVING UNRELATED  Dec 2007    (Adult male in [place])       Medications    No current facility-administered medications on file prior to encounter.   Current Outpatient Prescriptions on File Prior to Encounter:  acetaminophen (TYLENOL) 325 MG tablet Take 1-2 tablets by mouth every 4 hours as needed.   amLODIPine (NORVASC) 10 MG tablet Take 1 tablet (10 mg) by mouth daily   B Complex-C-Folic Acid (DIALYVITE) TABS 1 tablet by mouth daily   calcium carbonate (TUMS) 500 MG chewable tablet Take 2 tablets (1,000 mg) by mouth 3 times daily   calcium carbonate (TUMS) 500 MG chewable tablet Take 1 chew tab by mouth 3 times daily as needed for heartburn   carvedilol (COREG) 25 MG tablet Take 1 tablet (25 mg) by mouth 2 times daily (with meals)   cetirizine (ZYRTEC) 10 MG tablet Take 10 mg by mouth At Bedtime   cloNIDine (CATAPRES) 0.1 MG tablet Take 1 tablet (0.1 mg) by mouth 3 times daily as needed   cyclobenzaprine (FLEXERIL) 5 MG tablet Take 1 tablet (5 mg) by mouth nightly as needed for muscle spasms   HYDROcodone-acetaminophen (NORCO) 5-325 MG per tablet Take 1-2 tablets by mouth every 6 hours as needed for moderate to severe pain   melatonin 1 MG CAPS Take 1 mg by  "mouth nightly as needed       Allergy  No Known Allergies    Family Hx  Family History   Problem Relation Age of Onset     Diabetes Paternal Grandfather      Breast Cancer Maternal Grandmother 55     Cancer No family hx of      No known family hx of skin cancer       Social Hx  Social History     Social History     Marital status: Single     Spouse name: N/A     Number of children: N/A     Years of education: N/A     Occupational History     Not on file.     Social History Main Topics     Smoking status: Never Smoker     Smokeless tobacco: Never Used     Alcohol use No     Drug use: No     Sexual activity: Yes     Partners: Male     Other Topics Concern     Blood Transfusions Yes     Multiple in [place] none in [place]     Caffeine Concern No     1s/d     Occupational Exposure No     Hobby Hazards No     Sleep Concern No     Stress Concern No     Weight Concern No     Special Diet No     Back Care No     Exercise No     walk 20' daily     Bike Helmet No     Seat Belt No     Social History Narrative    Currently unemployed.  Lives in [place].   with one son.         Lives: [facility] with family  Smoke: none  Alcohol: none  Drug of abuse: none         Vitals and Exam/ Objectives     Physical exam:  BP (!) 129/93  Temp 98  F (36.7  C) (Oral)  Resp 24  Ht 1.676 m (5' 6\")  Wt 53 kg (116 lb 13.5 oz)  SpO2 100%  BMI 18.86 kg/m2  Wt Readings from Last 2 Encounters:   08/24/18 53 kg (116 lb 13.5 oz)   01/02/18 54.4 kg (120 lb)     No intake or output data in the 24 hours ending 08/24/18 0518    General: AAOx3, no clubbing/cyanosis, no edema,  JVD to jaw   HEENT:  PERRL, EOMI, MMM with out pharyngeal erythema.   Cardiac: RRR. + rub, no murmurs. Normal S1, S2. DP2+ bilaterally, fistula to right forearm with palpable thrill   Pulm: CTAB, no wheezes,  No crackles.  Abd: +BS, soft, non distended, non tender. No hepatosplenomegaly.  Skin: No rash  MSK: No deformities, no joint swelling  Neuro: CN grossly intact, no " focal deficits  Psych: pleasant mood, full affect    In dwelling lines at admission: none, fistula to right forearm     Imaging/procedure results:  No results found for this or any previous visit (from the past 48 hour(s)).    EKG: sinus rhythm, first-degree AV block, peaked T waves (similar to previous EKG)             Assessment and Plans     Jelly Dietz is a 32 year old female with a past medical history of HTN, anemia, anxiety, IgA nephropathy s/p LDKT c/b acute cellular rejection, kidney explanted  and now w/ ESKD on HD //Sat admitted with hyperkalemia.      #s/p renal transplant in , now failed 2/2 rejection   # Hyperkalemia  2/2 IgA nephropathy s/p LDKT in  w/ rejection. Dialyzes TuThSat, missed last 2 runs due to Dania and . K 7.3, , CO2 19, AG (16).   -Nephrology consulted  -HD run per Nephrology thisAM  -s/p calcium gluconate, kayexalate, insulin/dextrose in ED   -patient no longer on immunosuppressants      #HTN  On coreg 25 mg BID, amlodipine 10 mg qday, clonidine 0.1 mg TID PRN.  -Continue PTA medications  -Volume removal per HD  -Monitor BP     #Normocytic anemia  Hemoglobin of 10.8 w/ MCV of 100.   -Epo and venofer per nephrology     #Thrombocytopenia  Platelet count of 117, intermittently low since 2015.  -Recheck in AM  -FU w/ nephrology and PCP as OP for further work up     Chronic Conditions:   #GERD: On Tums PRN. Continue.   #Seasonal allergies: On Zyrtec qhs. Continue.      Diet:  Renal diet  Fluids: None, PO  DVT Prophylaxis: Low Risk/Ambulatory with no VTE prophylaxis indicated  Code Status: Full    Will be staffed in am  Sheila Resendez MD   Internal Medicine, PGY2  P: [id number]            Other Labs   Data   Data     Recent Labs  Lab 18  0347   WBC 2.3*   HGB 12.4   MCV 99      *   POTASSIUM 7.3*   CHLORIDE 96   CO2 19*   *   CR 19.80*   ANIONGAP 16*   CASIE 8.3*   GLC 95

## 2018-08-24 NOTE — CONSULTS
Nephrology Initial Consult  August 24, 2018      Jelly Dietz MRN:[id number] YOB: 1985  Date of Admission:8/24/2018  Primary care provider: [facility], [place]  Requesting physician: Alba Bradshaw DO    ASSESSMENT AND RECOMMENDATIONS:   33-year-old female with IgA nephropathy, currently on hemodialysis, admitted with hyperkalemia.    ESRD on HD (IgA Neph)   -s/p Failed Kidney Transplant  Unit: [facility]  Neph: Sees [facility] for Eval - unsure of [facility]  Access: LLE Fistula  At this point in time, patient with 2 missed dialysis runs, has not run since this past Saturday.  Given hyperkalemia, will run emergently with standard orders for hyperkalemia.  Will follow up with her rounding report which I will received today to get a sense of her chronic regimen.    PLAN  Emergent hemodialysis this morning  Will evaluate tomorrow for consideration for dialysis run again to get her back on TTS schedule    BP/Volume  Her BP is elevated but her weight is documented at her dry weight.  Doubt she is at her dry weight given she has not run since this past Saturday.  Although she does not drink much fluid and does not have significant edema on exam.  Shoot for 1 L off for today.  During her blood pressure, it is quite elevated.  Speaking with her previous nephrologist, she has a history of nonadherence.  She does say that she has missed several doses of her blood pressure medication. We will see if her blood pressure responds once she is back on her medication regimen    Electrolytes  Severe hyperkalemia.  Most likely related to missed dialysis. Dialysis emergently.      Acid-base  Bicarbonate low at 19.  In chronic patients the goal is at least 23 or higher.  Should improve with hemodialysis.    Anemia  Hemoglobin is normal at 12.4.  Not a candidate for EPO at this time.    Recommendations were communicated to primary team via this phone.    Seen and discussed with   Kojo.    Lobito Gillespie MD   [phone]    REASON FOR CONSULT: Hyperkalemia    HISTORY OF PRESENT ILLNESS:  Jelly Dietz is a 33 year old woman with past medical history significant for IgA nephropathy, failed kidney transplant, who is currently been on hemodialysis for the past 6 years.  Transplant failed during pregnancy possibly due to rejection/eclampsia.  She presents due to missed dialysis.  She states that this past Tuesday she was unable to find a  for her children and thus missed her regular Tuesday dialysis.  She then had a  to attend on Thursday and thus missed her Thursday dialysis.  At this point time, her chief complaint is itching.  She states otherwise she is not have any fevers or chills.  She denies any chest pain.  She does not have any shortness of breath.  He does not feel swollen, she says she does not drink very much in general.  She states that she is for the most part adherent with her medications, but does occasionally miss her blood pressure pills.  Note, she recently change dialysis units.  She currently now dialyzes at [facility].    PAST MEDICAL HISTORY:  Reviewed with patient on 2018   Past Medical History:   Diagnosis Date     ACS (acute coronary syndrome) (H) 2014     Allergic state     seasonal allergies     Anemia in chronic renal disease      Cervical cerclage suture present in second trimester      Chest pain 2014     Chronic renal transplant rejection      End stage kidney disease (H)      Heart murmur      History of  delivery ,     30 wks, 28 wks      Hypertension     resolved after transplant     IgA nephropathy      MRSA (methicillin resistant Staphylococcus aureus)      [facility]     Pre-eclampsia      Renal failure affecting pregnancy in second trimester      S/P kidney transplant     ESKD 2/2 IgA nephropathy s/p LDKT in 2007 in  [place]. History of 1B kidney rejection      SAB (spontaneous )     2nd trimester        Past Surgical History:   Procedure Laterality Date     CERCLAGE CERVICAL N/A 2015    Procedure: CERCLAGE CERVICAL;  Surgeon: Norbert Chopra MD;  Location: [facility] L[facility]D      SECTION  2012    Procedure:  SECTION;;  Surgeon: Chelsea Cross MD;  Location: [facility] L+D      SECTION N/A 2015    Procedure:  SECTION;  Surgeon: Chelsea Cross MD;  Location: [facility] OR     cesearean section       EXPLANT TRANSPLANTED KIDNEY  3/29/2013    Procedure: EXPLANT TRANSPLANTED KIDNEY;  Explant Transplanted right Kidney (from patients right iliac fossa);  Surgeon: Nory Morgan MD;  Location: [facility] OR     NEPHRECTOMY  3/29/13    Transplant nephrectomy      WILIAN/DIALYSIS CATHETER       TRANSPLANT KIDNEY RECIPIENT LIVING UNRELATED  Dec 2007    (Adult male in [place])        MEDICATIONS:  PTA Meds  Prior to Admission medications    Medication Sig Last Dose Taking? Auth Provider   acetaminophen (TYLENOL) 325 MG tablet Take 1-2 tablets by mouth every 4 hours as needed. 2018 Yes Marni Arteaga,    amLODIPine (NORVASC) 10 MG tablet Take 1 tablet (10 mg) by mouth daily 2018 Yes Katherine Varma MD   B Complex-C-Folic Acid (DIALYVITE) TABS 1 tablet by mouth daily 2018 Yes Katherine Varma MD   calcium carbonate (TUMS) 500 MG chewable tablet Take 2 tablets (1,000 mg) by mouth 3 times daily 2018 Yes Katherine Varma MD   calcium carbonate (TUMS) 500 MG chewable tablet Take 1 chew tab by mouth 3 times daily as needed for heartburn 2018 Yes Unknown, Entered By History   carvedilol (COREG) 25 MG tablet Take 1 tablet (25 mg) by mouth 2 times daily (with meals) 2018 Yes Katherine Varma MD   cetirizine (ZYRTEC) 10 MG tablet Take 10 mg by mouth At Bedtime 2018 Yes Unknown, Entered By History   cloNIDine (CATAPRES) 0.1 MG tablet Take  1 tablet (0.1 mg) by mouth 3 times daily as needed 2018 at Unknown time Yes Katherine Varma MD   cyclobenzaprine (FLEXERIL) 5 MG tablet Take 1 tablet (5 mg) by mouth nightly as needed for muscle spasms 2018 Yes Maegan Wagner MD   HYDROcodone-acetaminophen (NORCO) 5-325 MG per tablet Take 1-2 tablets by mouth every 6 hours as needed for moderate to severe pain 2018 Yes Judi Flores MD   melatonin 1 MG CAPS Take 1 mg by mouth nightly as needed 2018 Yes Katherine Varma MD      Current Meds    gelatin absorbable  1 each Topical During Hemodialysis (from stock)     hydrOXYzine  25 mg Oral Once     Infusion Meds    dextrose 75 mL/hr at 18 0553     - MEDICATION INSTRUCTIONS -         ALLERGIES:    No Known Allergies    REVIEW OF SYSTEMS:  A comprehensive of systems was negative except as noted above.    SOCIAL HISTORY:   Social History     Social History     Marital status: Single     Spouse name: N/A     Number of children: N/A     Years of education: N/A     Occupational History     Not on file.     Social History Main Topics     Smoking status: Never Smoker     Smokeless tobacco: Never Used     Alcohol use No     Drug use: No     Sexual activity: Yes     Partners: Male     Other Topics Concern     Blood Transfusions Yes     Multiple in [place] none in [place]     Caffeine Concern No     1s/d     Occupational Exposure No     Hobby Hazards No     Sleep Concern No     Stress Concern No     Weight Concern No     Special Diet No     Back Care No     Exercise No     walk 20' daily     Bike Helmet No     Seat Belt No     Social History Narrative    Currently unemployed.  Lives in [place].   with one son.       FAMILY MEDICAL HISTORY:   Family History   Problem Relation Age of Onset     Diabetes Paternal Grandfather      Breast Cancer Maternal Grandmother 55     Cancer No family hx of      No known family hx of skin cancer     PHYSICAL EXAM:   Temp  Av  F  "(36.7  C)  Min: 98  F (36.7  C)  Max: 98  F (36.7  C)      No Data Recorded Resp  Av.7  Min: 17  Max: 26  SpO2  Av.3 %  Min: 91 %  Max: 100 %       /87  Temp 98  F (36.7  C) (Oral)  Resp 19  Ht 1.676 m (5' 6\")  Wt 53 kg (116 lb 13.5 oz)  SpO2 (P) 99%  BMI 18.86 kg/m2       Admit Weight: 53 kg (116 lb 13.5 oz)     GENERAL APPEARANCE: No distress, alert and awake  EYES: no scleral icterus, pupils equal  Endo: no goiter  Lymphatics: no cervical  LAD  Pulmonary: lungs clear to auscultation with equal breath sounds bilaterally  CV: regular rhythm, normal rate, no rub   - Edema - Trace peripheral  GI: soft, nontender, normal bowel sounds  MS: no evidence of inflammation in joints, LLE Fistula w palpable thrill  : No polanco  SKIN: no rash, warm, dry, no cyanosis  NEURO: AO x 3, negative asterixis     LABS:   CMP  Recent Labs  Lab 18  0719 18  0347   NA  --  131*   POTASSIUM 5.9* 7.3*   CHLORIDE  --  96   CO2  --  19*   ANIONGAP  --  16*   GLC  --  95   BUN  --  110*   CR  --  19.80*   GFRESTIMATED  --  2*   GFRESTBLACK  --  2*   CASIE  --  8.3*     CBC  Recent Labs  Lab 18  0347   HGB 12.4   WBC 2.3*   RBC 3.72*   HCT 36.9   MCV 99   MCH 33.3*   MCHC 33.6   RDW 13.7        INRNo lab results found in last 7 days.  ABG  Recent Labs  Lab 18  0347   O2PER 21.0      URINE STUDIES  Recent Labs   Lab Test  18   2048  17   1632  17   1920  10/19/15   0550   COLOR  Yellow  Straw  Straw  Yellow   APPEARANCE  Cloudy  Slightly Cloudy  Slightly Cloudy  Slightly Cloudy   URINEGLC  150*  250*  70*  Negative   URINEBILI  Negative  Negative  Negative  Negative   URINEKETONE  Negative  Negative  Negative  Negative   SG  1.008  1.010  1.006  1.005   UBLD  Small*  Small*  Negative  Moderate*   URINEPH  8.0*  8.5*  7.5*  8.5*   PROTEIN  30*  100*  30*  300*   NITRITE  Negative  Negative  Negative  Negative   LEUKEST  Trace*  Small*  Negative  Small*   RBCU  3*  3*  1  2 "   WBCU  8*  4*  2  12*     Recent Labs   Lab Test  07/16/12   1400  07/02/12   1130  06/25/12   0615  06/17/12   0930  06/13/12   1120  06/07/12   0643  06/01/12   0950  05/25/12   1830  05/25/12   1110  05/22/12   1536  02/20/12   0800   UTPG  1.53*  0.86*  0.69*  0.82*  0.80*  0.88*  0.58*  0.47*  0.45*  0.56*  0.51*  0.42*     PTH  Recent Labs   Lab Test  11/30/12   2030  11/30/12   1030  03/09/12   1304   PTHI  500*  794*  198*     IRON STUDIES  Recent Labs   Lab Test  11/30/12   1950  03/09/12   1304   IRON  125  33*   FEB  155*  196*   IRONSAT  81*  17   SHAHBAZ  401*  87     IMAGING:  None    Lobito Gillespie MD    I was present with the fellow during the history and exam.  I discussed the case with the fellow and agree with the findings as documented in the assessment and plan.  Katherine Varma

## 2018-08-24 NOTE — ED NOTES
[facility], [place]   ED Nurse to Floor Handoff     Jelly Dietz is a 33 year old female who speaks English and lives with family members,  in a home  They arrived in the ED by car from home    ED Chief Complaint: Shortness of Breath    ED Dx;   Final diagnoses:   Hyperkalemia         Needed?: No    Allergies: No Known Allergies.  Past Medical Hx:   Past Medical History:   Diagnosis Date     ACS (acute coronary syndrome) (H) 2014     Allergic state     seasonal allergies     Anemia in chronic renal disease      Cervical cerclage suture present in second trimester      Chest pain 2014     Chronic renal transplant rejection      End stage kidney disease (H)      Heart murmur      History of  delivery ,     30 wks, 28 wks      Hypertension     resolved after transplant     IgA nephropathy      MRSA (methicillin resistant Staphylococcus aureus)      [facility]     Pre-eclampsia      Renal failure affecting pregnancy in second trimester      S/P kidney transplant     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 History of 1B kidney rejection      SAB (spontaneous )     2nd trimester       Baseline Mental status: WDL  Current Mental Status changes: at basesline    Infection present or suspected this encounter: yes skin/wound/contact  Sepsis suspected: No  Isolation type: Contact     Activity level - Baseline/Home:  Independent  Activity Level - Current:   Independent    Bariatric equipment needed?: No    In the ED these meds were given:   Medications   dextrose 10% infusion ( Intravenous New Bag 18 0959)   ondansetron (ZOFRAN) injection 4 mg (4 mg Intravenous Given 18 6507)   acetaminophen (TYLENOL) tablet 975 mg (975 mg Oral Given 18 0936)   diphenhydrAMINE (BENADRYL) capsule 50 mg (50 mg Oral Given 18 3476)   calcium gluconate 2 g in  D5W 100 mL intermittent infusion (0 g Intravenous Stopped 8/24/18 0540)   sodium bicarbonate 8.4 % injection 50 mEq (50 mEq Intravenous Given 8/24/18 0538)   dextrose 50 % injection 25 g (25 g Intravenous Given 8/24/18 0545)   insulin (regular) (HumuLIN R/NovoLIN R) injection 5 Units (5 Units Intravenous Given 8/24/18 0549)   albuterol (PROVENTIL) neb solution 10 mg (10 mg Nebulization Given 8/24/18 0506)   sodium polystyrene (KAYEXALATE) suspension 30 g (30 g Oral Given 8/24/18 0512)   diphenhydrAMINE (BENADRYL) injection 25 mg (25 mg Intravenous Given 8/24/18 0553)       Drips running?  No    Home pump  No    Current LDAs  Peripheral IV 08/24/18 Right Upper forearm (Active)   Site Assessment WDL 8/24/2018  6:28 AM   Line Status Infusing 8/24/2018  6:28 AM   Phlebitis Scale 0-->no symptoms 8/24/2018  6:28 AM   Number of days:0       Hemodialysis Vascular Access Arteriovenous fistula Left Wrist (Active)   Number of days:       Labs results:   Labs Ordered and Resulted from Time of ED Arrival Up to the Time of Departure from the ED   BASIC METABOLIC PANEL - Abnormal; Notable for the following:        Result Value    Sodium 131 (*)     Potassium 7.3 (*)     Carbon Dioxide 19 (*)     Anion Gap 16 (*)     Urea Nitrogen 110 (*)     Creatinine 19.80 (*)     GFR Estimate 2 (*)     GFR Estimate If Black 2 (*)     Calcium 8.3 (*)     All other components within normal limits   CBC WITH PLATELETS DIFFERENTIAL - Abnormal; Notable for the following:     WBC 2.3 (*)     RBC Count 3.72 (*)     MCH 33.3 (*)     Absolute Neutrophil 1.0 (*)     All other components within normal limits   BLOOD GAS VENOUS - Abnormal; Notable for the following:     Ph Venous 7.31 (*)     PCO2 Venous 37 (*)     Bicarbonate Venous 18 (*)     All other components within normal limits   GLUCOSE BY METER - Abnormal; Notable for the following:     Glucose 143 (*)     All other components within normal limits   GLUCOSE BY METER   POTASSIUM   CARDIAC  "CONTINUOUS MONITORING       Imaging Studies: No results found for this or any previous visit (from the past 24 hour(s)).    Recent vital signs:   BP (!) 136/94  Temp 98  F (36.7  C) (Oral)  Resp 17  Ht 1.676 m (5' 6\")  Wt 53 kg (116 lb 13.5 oz)  SpO2 95%  BMI 18.86 kg/m2    Cardiac Rhythm: Normal Sinus  Pt needs tele? Yes  Skin/wound Issues: None    Code Status: Full Code    Pain control: pt had none    Nausea control: good    Abnormal labs/tests/findings requiring intervention: K and Creatinine     Family present during ED course? No   Family Comments/Social Situation comments: Dialysis T/Th/Sat, missed T/Th this week.     Tasks needing completion: None    Suleman Sauer, RN  [phone] [facility] ED      "

## 2018-08-24 NOTE — PLAN OF CARE
"Problem: Patient Care Overview  Goal: Plan of Care/Patient Progress Review  Outcome: Improving  BP (!) 155/100 (BP Location: Right arm)  Pulse 70  Temp 98.8  F (37.1  C) (Oral)  Resp 18  Ht 1.676 m (5' 6\")  Wt 53 kg (116 lb 13.5 oz)  SpO2 99%  BMI 18.86 kg/m2    Neuro: A&Ox4. Drowsy  Cardiac: SR. VSS.   Respiratory: Sating 100% on RA.  GI/: Pt oliguric, baseline.   Diet/appetite: Tolerating Renal diet. Eating well.  Activity:  Up independently in room   Pain: At acceptable level on current regimen. Denies  Skin: No new deficits noted.  LDA's: PIV saline locked.     Plan: Continue with POC. Potassium WNL following dialysis this morning. Will continue to monitor and follow plan of care. Notify primary team with changes.        "

## 2018-08-24 NOTE — IP AVS SNAPSHOT
[address] [address]    Phone:  [phone]                                       After Visit Summary   8/24/2018    Jelly Dietz    MRN: [id number]           After Visit Summary Signature Page     I have received my discharge instructions, and my questions have been answered. I have discussed any challenges I see with this plan with the nurse or doctor.    ..........................................................................................................................................  Patient/Patient Representative Signature      ..........................................................................................................................................  Patient Representative Print Name and Relationship to Patient    ..................................................               ................................................  Date                                            Time    ..........................................................................................................................................  Reviewed by Signature/Title    ...................................................              ..............................................  Date                                                            Time          22EPIC Rev 08/18

## 2018-08-24 NOTE — PHARMACY-ADMISSION MEDICATION HISTORY
Admission medication history interview status for the 8/24/2018 admission is complete. See Epic admission navigator for allergy information, pharmacy, prior to admission medications and immunization status.     Medication history interview sources:  Patient, [employer]    Changes made to PTA medication list (reason)  Added: Doxycycline, Sevelamer (Pt taking per [employer], confirmed with pt)  Deleted: Norco, melatonin, calcium carbonate (Pt reports not taking)  Changed: None    Additional medication history information (including reliability of information, actions taken by pharmacist):  -Pt reports stopping taking tums and taking sevelamer instead  -Pt reports taking sevelamer per directions below. Per [employer], she is prescribed to take three 800mg tabs three times daily with meals but is currently taking one 800mg tab three times daily with meals  -Per [facility], pt got a 60 day supply of sevelamer (quantity 765 tabs) on 4/20  -Per [facility], pt got a 60 day supply of doxycyline on 6/27, and got a 30 day supply before than on 5/24 though it is unclear what pt is taking it for (unable to find in pt's profile in epic via chart review or care everywhere or search). Pt reports taking it last night and taking it regularly, likely for acne      Prior to Admission medications    Medication Sig Last Dose Taking? Auth Provider   amLODIPine (NORVASC) 10 MG tablet Take 1 tablet (10 mg) by mouth daily 8/23/2018 at Unknown time Yes Katherine Varma MD   B Complex-C-Folic Acid (DIALYVITE) TABS 1 tablet by mouth daily Past Month at Unknown time Yes Katherine Varma MD   carvedilol (COREG) 25 MG tablet Take 1 tablet (25 mg) by mouth 2 times daily (with meals) 8/23/2018 at Unknown time Yes Katherine Varma MD   cetirizine (ZYRTEC) 10 MG tablet Take 10 mg by mouth At Bedtime Past Week at Unknown time Yes Unknown, Entered By History   cloNIDine (CATAPRES) 0.1 MG tablet Take 1 tablet (0.1 mg) by  mouth 3 times daily as needed 8/23/2018 at Unknown time Yes Katherine Varma MD   cyclobenzaprine (FLEXERIL) 5 MG tablet Take 1 tablet (5 mg) by mouth nightly as needed for muscle spasms 8/23/2018 at Unknown time Yes Maegan Wagner MD   doxycycline (VIBRA-TABS) 100 MG tablet Take 1 tablet by mouth 2 times daily (with meals) 8/23/2018 at AM Yes Unknown, Entered By History   RENVELA 800 MG tablet Take 1 tablet by mouth 3 times daily (with meals) 8/23/2018 at PM Yes Unknown, Entered By History   acetaminophen (TYLENOL) 325 MG tablet Take 1-2 tablets by mouth every 4 hours as needed. More than a month at Unknown time  Marni Arteaga,          Medication history completed by:   Colton Gonzalez  Pharmacy Student  [employer]

## 2018-08-24 NOTE — PROGRESS NOTES
[facility], [place]    Internal Medicine Progress Note - [facility] Service    Main Plans for Today   -HD on  and   -lidocaine patch PRN  -trend K    Assessment & Plan   Jelly Dietz is a 33 year old female admitted on 2018. She has past medical history of HTN, anemia, anxiety, IgA nephropathy s/p LDKT c/b acute cellular rejection, kidney explanted  and now w/ ESRD on HD //Sat admitted with hyperkalemia.       #s/p renal transplant in , now failed 2/2 rejection   # Hyperkalemia  2/2 IgA nephropathy s/p LDKT in  w/ rejection. Dialyzes TuThSat, missed last 2 runs due to Dania and . K 7.3, , CO2 19, AG (16).   -Nephrology consult  -HD on  and   -s/p calcium gluconate, kayexalate, insulin/dextrose in ED   -patient no longer on immunosuppressants       #HTN  On coreg 25 mg BID, amlodipine 10 mg qday, clonidine 0.1 mg TID PRN.  -Continue PTA coreg,   -Volume removal per HD  -Monitor BP    #Normocytic anemia  Hemoglobin of 10.8 w/ MCV of 100.   -Epo and venofer per nephrology      #Thrombocytopenia  Platelet count of 117, intermittently low since 2015.  -Recheck in AM  -FU w/ nephrology and PCP as OP for further work up      #chronic back pain  -lidocaine patch PRN    Chronic Conditions:   #GERD: On Tums PRN. Continue.   #Seasonal allergies: On Zyrtec qhs. Continue.     # Pain Assessment:  Current Pain Score 2018   Patient currently in pain? denies   Pain score (0-10) -   Pain location -   Pain descriptors -   Jelly ho pain level was assessed and she currently denies pain.      Diet: Combination Diet Regular Diet Adult; Dialysis Diet  Fluids: PO  DVT Prophylaxis: Low Risk/Ambulatory with no VTE prophylaxis indicated  Code Status: Full Code    Disposition Plan   Expected discharge: Tomorrow, recommended to prior living arrangement once normal potassium.     Entered: Uyen Brown 2018, 8:25 AM   Information in the above section  will display in the discharge planner report.      The patient's care was discussed with the Attending Physician, Dr. Bradshaw.    Uyen Brown  [facility]  Maroon: 4  Pager: [phone]  Please see sticky note for cross cover information    Interval History   No acute events overnight.  She didn't sleep well and is tired today.  Dialysis planned for today and tomorrow.  She denies any CP, palpitations, SOB, nausea, vomiting, or diarrhea.  Patient was told how serious it is to skip dialysis and that she could have  with a potassium as high as she had.      Physical Exam   Vital Signs: Temp: 97.9  F (36.6  C) Temp src: Oral BP: 123/88   Heart Rate: 70 Resp: 18 SpO2: 99 % O2 Device: Nasal cannula    Weight: 116 lbs 13.5 oz  General Appearance: Sleeping in bed, no acute distress  Respiratory: CTAB; no increased WOB  Cardiovascular: RRR: no murmur appreciated  GI: soft, nontender, nondistended; normal bowel sounds  Skin: no rash, jessica on hand  Other: Alert and oriented      Data   Data     Recent Labs  Lab 18  1544 18  0719 18  0347   WBC  --   --  2.3*   HGB  --   --  12.4   MCV  --   --  99   PLT  --   --  173   NA  --   --  131*   POTASSIUM 3.9 5.9* 7.3*   CHLORIDE  --   --  96   CO2  --   --  19*   BUN  --   --  110*   CR  --   --  19.80*   ANIONGAP  --   --  16*   CASIE  --   --  8.3*   GLC  --   --  95     No results found for this or any previous visit (from the past 24 hour(s)).

## 2018-08-25 VITALS
DIASTOLIC BLOOD PRESSURE: 92 MMHG | HEIGHT: 66 IN | OXYGEN SATURATION: 99 % | HEART RATE: 70 BPM | WEIGHT: 129.5 LBS | TEMPERATURE: 98.7 F | BODY MASS INDEX: 20.81 KG/M2 | SYSTOLIC BLOOD PRESSURE: 145 MMHG | RESPIRATION RATE: 16 BRPM

## 2018-08-25 LAB
ANION GAP SERPL CALCULATED.3IONS-SCNC: 13 MMOL/L (ref 3–14)
BUN SERPL-MCNC: 30 MG/DL (ref 7–30)
CALCIUM SERPL-MCNC: 8.2 MG/DL (ref 8.5–10.1)
CHLORIDE SERPL-SCNC: 99 MMOL/L (ref 94–109)
CO2 SERPL-SCNC: 27 MMOL/L (ref 20–32)
CREAT SERPL-MCNC: 9.9 MG/DL (ref 0.52–1.04)
ERYTHROCYTE [DISTWIDTH] IN BLOOD BY AUTOMATED COUNT: 13.5 % (ref 10–15)
GFR SERPL CREATININE-BSD FRML MDRD: 5 ML/MIN/1.7M2
GLUCOSE SERPL-MCNC: 113 MG/DL (ref 70–99)
HCT VFR BLD AUTO: 36.3 % (ref 35–47)
HGB BLD-MCNC: 11.8 G/DL (ref 11.7–15.7)
MCH RBC QN AUTO: 32.6 PG (ref 26.5–33)
MCHC RBC AUTO-ENTMCNC: 32.5 G/DL (ref 31.5–36.5)
MCV RBC AUTO: 100 FL (ref 78–100)
PLATELET # BLD AUTO: 152 10E9/L (ref 150–450)
POTASSIUM SERPL-SCNC: 4.1 MMOL/L (ref 3.4–5.3)
RBC # BLD AUTO: 3.62 10E12/L (ref 3.8–5.2)
SODIUM SERPL-SCNC: 140 MMOL/L (ref 133–144)
WBC # BLD AUTO: 1.9 10E9/L (ref 4–11)

## 2018-08-25 PROCEDURE — 25000128 H RX IP 250 OP 636: Performed by: INTERNAL MEDICINE

## 2018-08-25 PROCEDURE — 85027 COMPLETE CBC AUTOMATED: CPT | Performed by: STUDENT IN AN ORGANIZED HEALTH CARE EDUCATION/TRAINING PROGRAM

## 2018-08-25 PROCEDURE — 25000132 ZZH RX MED GY IP 250 OP 250 PS 637: Mod: GY | Performed by: STUDENT IN AN ORGANIZED HEALTH CARE EDUCATION/TRAINING PROGRAM

## 2018-08-25 PROCEDURE — 80048 BASIC METABOLIC PNL TOTAL CA: CPT | Performed by: STUDENT IN AN ORGANIZED HEALTH CARE EDUCATION/TRAINING PROGRAM

## 2018-08-25 PROCEDURE — 36415 COLL VENOUS BLD VENIPUNCTURE: CPT | Performed by: STUDENT IN AN ORGANIZED HEALTH CARE EDUCATION/TRAINING PROGRAM

## 2018-08-25 PROCEDURE — A9270 NON-COVERED ITEM OR SERVICE: HCPCS | Mod: GY | Performed by: STUDENT IN AN ORGANIZED HEALTH CARE EDUCATION/TRAINING PROGRAM

## 2018-08-25 PROCEDURE — 90937 HEMODIALYSIS REPEATED EVAL: CPT

## 2018-08-25 PROCEDURE — 99238 HOSP IP/OBS DSCHRG MGMT 30/<: CPT | Mod: GC | Performed by: INTERNAL MEDICINE

## 2018-08-25 RX ORDER — LIDOCAINE 4 G/G
1 PATCH TOPICAL EVERY 24 HOURS
Qty: 30 PATCH | Refills: 1 | Status: SHIPPED | OUTPATIENT
Start: 2018-08-25 | End: 2018-10-30

## 2018-08-25 RX ADMIN — CYCLOBENZAPRINE HYDROCHLORIDE 5 MG: 5 TABLET, FILM COATED ORAL at 05:51

## 2018-08-25 RX ADMIN — LIDOCAINE 1 PATCH: 560 PATCH PERCUTANEOUS; TOPICAL; TRANSDERMAL at 10:56

## 2018-08-25 RX ADMIN — Medication: at 09:23

## 2018-08-25 RX ADMIN — CARVEDILOL 25 MG: 25 TABLET, FILM COATED ORAL at 11:00

## 2018-08-25 RX ADMIN — SODIUM CHLORIDE 250 ML: 9 INJECTION, SOLUTION INTRAVENOUS at 09:22

## 2018-08-25 RX ADMIN — AMLODIPINE BESYLATE 10 MG: 10 TABLET ORAL at 11:00

## 2018-08-25 RX ADMIN — SODIUM CHLORIDE 300 ML: 9 INJECTION, SOLUTION INTRAVENOUS at 09:22

## 2018-08-25 ASSESSMENT — ACTIVITIES OF DAILY LIVING (ADL)
ADLS_ACUITY_SCORE: 11

## 2018-08-25 NOTE — PLAN OF CARE
Problem: Patient Care Overview  Goal: Plan of Care/Patient Progress Review  Outcome: Improving  Pt transferred to unit 5B from 6B. Arrived 2140 via WC. Denied pain. Ambulates independently. PIV S.L. AV fistula with dressing C/d/I. Telemetry NSR. VSS on RA. Pt reports feeling tired but having difficulty sleeping. Continue to monitor. Follow K level.  Pt reports possible discharge tomorrow.

## 2018-08-25 NOTE — PROGRESS NOTES
"  Nephrology Progress Note  08/25/2018         Assessment & Recommendations:   33-year-old female with IgA nephropathy, currently on hemodialysis, admitted with hyperkalemia.     ESRD on HD (IgA Neph)                          -s/p Failed Kidney Transplant  Unit: [facility] - \[facility] [place] [facility]\""  Neph: Sees [facility] for Eval - unsure of [place] Neph  Access: LLE Fistula  Tolerated HD yesterday. Emphasized importance of adherence to HD. She acknowledged understanding. Will run dialysis per chronic schedule today, pull 3L UF.  PLAN  HD today per TTS schedule  Okay to discharge after HD     BP/Volume  BP improved yesterday with HD and meds. She is vol up today. Plan to pull 3L and continue home BP meds.      Electrolytes  Normal Na and Potassium.     Acid-base  Bicarbonate now normal after HD.      Anemia  Hemoglobin stable 11.8.  Not a candidate for EPO at this time.     Recommendations were communicated to primary team via this phone.     Seen and discussed with Dr. Varma.     Lobito Gillespie MD   [phone]    Interval History :   NAEON. Feeling better this AM. Less itchy. No CP or SOB. Taking PO. No emesis. No diarrhea.    Review of Systems:   I reviewed the following systems:  GI: Good appetite.   Neuro:  No confusion  Constitutional:  No fever or chills  CV: No dyspnea or edema.     Physical Exam:   I/O last 3 completed shifts:  In: 0   Out: 1000 [Other:1000]   BP (!) 136/92 (BP Location: Right arm)  Pulse 70  Temp 98  F (36.7  C) (Oral)  Resp 18  Ht 1.676 m (5' 6\")  Wt 58.7 kg (129 lb 8 oz)  SpO2 99%  BMI 20.9 kg/m2     GENERAL APPEARANCE: No distress, alert and awake  EYES: no scleral icterus, pupils equal  Lymphatics: no cervical  LAD  Pulmonary: lungs clear to auscultation with equal breath sounds bilaterally  CV: regular rhythm, normal rate, no rub   - Edema - None  GI: soft, nontender, normal bowel sounds  MS: LLE Fistula w palpable thrill  SKIN: no rash, warm, dry, no cyanosis  NEURO: AO x 3, negative " [place]     Labs:   All labs reviewed by me  Electrolytes/Renal -   Recent Labs   Lab Test  08/25/18   0655  08/24/18   1544  08/24/18   0719  08/24/18 0347 02/06/18   1905   01/24/17   2245   10/19/15   0414  10/01/15   0550  09/30/15   1400   09/22/15   0746  09/20/15   0625   NA  140   --    --   131*  133   < >  135   < >  136  137   --    < >  136  135   POTASSIUM  4.1  3.9  5.9*  7.3*  4.0   < >  5.3   < >  4.5  4.2   --    < >  3.7  4.3   CHLORIDE  99   --    --   96  97   < >  100   < >  103  102   --    < >  98  100   CO2  27   --    --   19*  25   < >  21   < >  24  34*   --    < >  33*  30   BUN  30   --    --   110*  57*   < >  89*   < >  29  10   --    < >  12  8   CR  9.90*   --    --   19.80*  12.50*   < >  13.30*   < >  7.93*  3.45*   --    < >  3.51*  3.10*   GLC  113*   --    --   95  160*   < >  107*   < >  93  102*   --    < >  82  78   CASIE  8.2*   --    --   8.3*  8.0*   < >  8.6   < >  8.6  7.7*   --    < >  8.0*  7.9*   MAG   --    --    --    --    --    --    --    --   1.5*  2.0  2.4*   < >   --   1.4*   PHOS   --    --    --    --    --    --   8.0*   --    --    --    --    --   2.2*  2.0*    < > = values in this interval not displayed.       CBC -   Recent Labs   Lab Test  08/25/18 0655 08/24/18 0347 02/06/18 1905   WBC  1.9*  2.3*  4.1   HGB  11.8  12.4  11.1*   PLT  152  173  122*       LFTs -   Recent Labs   Lab Test  07/26/17   1915  06/06/17   2111  06/01/17   2142   ALKPHOS  49  40  49   BILITOTAL  0.5  0.5  0.6   ALT  12  16  14   AST  10  30  11   PROTTOTAL  7.0  6.5*  7.4   ALBUMIN  3.7  3.2*  3.8       Iron Panel -   Recent Labs   Lab Test  11/30/12   1950  03/09/12   1304   IRON  125  33*   IRONSAT  81*  17   SHAHBAZ  401*  87       Imaging:  No new    Current Medications:    sodium chloride 0.9%  250 mL Intravenous Once in dialysis     sodium chloride 0.9%  300 mL Hemodialysis Machine Once     amLODIPine  10 mg Oral Daily     calcium carbonate  1,000 mg Oral TID      carvedilol  25 mg Oral BID w/meals     cetirizine  10 mg Oral At Bedtime     gelatin absorbable  1 each Topical During Hemodialysis (from stock)     hydrOXYzine  25 mg Oral Once     lidocaine  1 patch Transdermal Q24h    And     lidocaine   Transdermal Q24H    And     lidocaine   Transdermal Q8H     NEPHROCAPS  1 mg Oral Daily     - MEDICATION INSTRUCTIONS -   Does not apply Once     senna-docusate  1 tablet Oral BID    Or     senna-docusate  2 tablet Oral BID       - MEDICATION INSTRUCTIONS -       Lobito Gillespie MD     I was present with the fellow during the history and exam.  I discussed the case with the fellow and agree with the findings as documented in the assessment and plan.  Katherine Varma

## 2018-08-25 NOTE — PROGRESS NOTES
Transfer  Transferred to: 5B  Via: wheelchair  Reason for transfer:Pt no longer appropriate for 6B- improved patient condition  Family: Pt to notify family  Belongings: Packed and sent with pt  Chart: Delivered with pt to next unit  Medications: No medications to send  Report given to: Simran WHITT  Pt status: A/O x4. VSS, SR. Up independently in room.

## 2018-08-25 NOTE — PLAN OF CARE
Problem: Patient Care Overview  Goal: Plan of Care/Patient Progress Review  Patient A/O, VSS. No acute incidences during shift, pt awake much of shift stating she was having difficulty falling asleep. Pt had requested from previous nurse and IV dose of benadryl in addition to PO benadryl, but pt willing to try melatonin. Pt requested flexeril approx 0600 for shoulder/joint pain/spasms. No further requests made, will continue to monitor and alert MDs to any changes.

## 2018-08-25 NOTE — PLAN OF CARE
Problem: Patient Care Overview  Goal: Plan of Care/Patient Progress Review  Outcome: Improving  D: Patient left unit with all belongings discharged to home. She is aware of dialysis to be done Tues and has spoken to the staff at her home dialysis. She is also aware of prescription to be picked up in pharmacy.

## 2018-08-25 NOTE — PLAN OF CARE
Problem: Patient Care Overview  Goal: Plan of Care/Patient Progress Review  Outcome: Improving  D: Potassium level stable, returned from hemodialysis stable, up herself in room. IV out, review of discharge plans done with patient, M.D. Has spoken with patient also.Discharge soon.

## 2018-08-25 NOTE — PROGRESS NOTES
HEMODIALYSIS TREATMENT NOTE    Date: 8/25/2018  Time: 1145    Data:  Pre Wt:   58.7 kg  Desired Wt: 55.7 kg   Post Wt:    Weight gain:     Weight change:     Ultrafiltration - Post Run Net Total Removed (mL): 3000 mL  Ultrafiltration - Post Run Net Total Gain (mL): 0 mL  Vascular Access Status: Yes, secured and visible  Dialyzer Rinse: Light  Total Blood Volume Processed: 57.2 L  Total Dialysis (Treatment) Time:  3 hours    Lab: No    Assessment/Interventions: 3 hour HD run.  Blood flow 300-350 mL/min.  3 kg removed.  BP elevated through out run; improved when given scheduled BP meds at end of run.  Report called.       Plan:  Continue with plan of care.  Next run per Renal Team.

## 2018-08-25 NOTE — DISCHARGE SUMMARY
"  [facility] - Internal Medicine Discharge Summary   Date of Service: 8/25/2018    Jelly Dietz MRN# [id number]   YOB: 1985 Age: 33 year old      Date of Admission:  8/24/2018  Date of Discharge:  8/25/2018  3:46 PM  Admitting Physician:  Erik Landeros MD  Discharge Physician:  Alba Bradshaw DO  Discharging Service:  Internal Medicine,  4     Primary Provider: [facility], [place]         Reason for Admission:   Hyperkalemia  Hypertension          Discharge Diagnosis:   Hyperkalemia  Hypertension            Consultations:   Nephrology         Hospital Course by Problem:      Hyperkalemia  Hypertension  ESRD on HD   Patient admitted with hyperkalemia and hypertension after missing 2 dialysis runs outpatient.  She was dialyzed two days in a row with improvement in her potassium level and BP.  Discharged with instruction to not miss outpatient dialysis sessions as this could be life threatening.      Physical Exam on day of Discharge:  Blood pressure (!) 145/92, pulse 70, temperature 98.7  F (37.1  C), temperature source Oral, resp. rate 16, height 1.676 m (5' 6\"), weight 58.7 kg (129 lb 8 oz), SpO2 99 %, not currently breastfeeding.    Constitutional:   Well nourished, well developed, resting comfortably   Head: Normocephalic and atraumatic.    Oral:  , mucous membranes are moist  Cardiovascular: Regular rate and rhythm without murmurs or gallops  Pulmonary/Chest: Clear to auscultation bilaterally, with no wheezes or retractions. No respiratory distress.  GI: Soft with good bowel sounds.  Non-tender, non-distended   Musculoskeletal:  No edema   Neurological: Alert and oriented to person, place, and time.   Psychiatric:  Normal mood and affect.    Lines/Tubes/Drains:   Peripheral IV 08/24/18 Right Upper forearm (Active)   Site Assessment WDL 8/25/2018  7:30 AM   Line Status Saline locked 8/25/2018  7:30 AM   Phlebitis Scale 0-->no symptoms 8/25/2018  7:30 AM   Infiltration Scale 0 " 8/25/2018  7:30 AM   Infiltration Site Treatment Method  None 8/25/2018  7:30 AM   Extravasation? No 8/25/2018  7:30 AM   Number of days:1              Pending Results:   None         Discharge Medications:     Discharge Medication List as of 8/25/2018 12:40 PM      START taking these medications    Details   Lidocaine (LIDOCARE) 4 % Patch Place 1 patch onto the skin every 24 hoursDisp-30 patch, [id number]         CONTINUE these medications which have NOT CHANGED    Details   amLODIPine (NORVASC) 10 MG tablet Take 1 tablet (10 mg) by mouth daily, Disp-30 tablet, R-11, E-Prescribe      B Complex-C-Folic Acid (DIALYVITE) TABS 1 tablet by mouth daily, Disp-30 tablet, R-11, E-Prescribe      carvedilol (COREG) 25 MG tablet Take 1 tablet (25 mg) by mouth 2 times daily (with meals), Disp-60 tablet, R-11, E-Prescribe      cetirizine (ZYRTEC) 10 MG tablet Take 10 mg by mouth At Bedtime, Historical      cloNIDine (CATAPRES) 0.1 MG tablet Take 1 tablet (0.1 mg) by mouth 3 times daily as needed, Disp-60 tablet, R-3, E-PrescribeFor BP >170/100      cyclobenzaprine (FLEXERIL) 5 MG tablet Take 1 tablet (5 mg) by mouth nightly as needed for muscle spasms, Disp-15 tablet, R-0, Local Print      doxycycline (VIBRA-TABS) 100 MG tablet Take 1 tablet by mouth 2 times daily (with meals), Historical      RENVELA 800 MG tablet Take 1 tablet by mouth 3 times daily (with meals), [place], Historical      acetaminophen (TYLENOL) 325 MG tablet Take 1-2 tablets by mouth every 4 hours as needed., Disp-60 tablet, R-0, E-Prescribe         STOP taking these medications       calcium carbonate (TUMS) 500 MG chewable tablet Comments:   Reason for Stopping:         calcium carbonate (TUMS) 500 MG chewable tablet Comments:   Reason for Stopping:                    Discharge Instructions and Follow-Up:     Discharge Procedure Orders  Reason for your hospital stay   Order Comments: Missing dialysis led to high potassium levels which is life threatening.      Activity   Order Comments: Your activity upon discharge: activity as tolerated   Order Specific Question Answer Comments   Is discharge order? Yes      Discharge Instructions   Order Comments: Do NOT miss your regularly scheduled dialysis sessions.     Full Code     Diet   Order Comments: Follow this diet upon discharge: Orders Placed This Encounter     Combination Diet Regular Diet Adult; Dialysis Diet   Order Specific Question Answer Comments   Is discharge order? Yes                  Discharge Disposition:   Home         Condition on Discharge:   Discharge condition: Stable   Code status on discharge: Full Code        Date of service: 8/25/2018     < 30 minutes spent in discharge, including >50% in counseling and coordination of care, medication review and plan of care recommended on follow up. Questions were answered.    Alba Bradshaw  Internal Medicine Hospitalist & Staff Physician  [facility]

## 2018-08-26 ENCOUNTER — PATIENT OUTREACH (OUTPATIENT)
Dept: CARE COORDINATION | Facility: CLINIC | Age: 33
End: 2018-08-26

## 2018-08-27 NOTE — PROGRESS NOTES
[facility]: Post-Discharge Note  SITUATION                                                      Admission:    Admission Date: 08/24/18   Reason for Admission: High potassium levels  Discharge:    Discharge Date: 08/25/18   Discharge Diagnosis: High potassium levels   Discharge Service: Internal Medicine    Discharge Plan: Continue dialysis     BACKGROUND                                                      Jelly Dietz is a 33 year old female admitted on 8/24/2018. 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     ASSESSMENT      Patient reports symptoms are: Unchanged  Does the patient have all of their medications?: Yes  Does patient know what their new medications are for?: Yes  Does paient have a follow-up appointment scheduled?: Yes  Does patient have any other questions or concerns?: No       PLAN                                                      Future Appointments  Date Time Provider Department Center   9/10/2018 2:00 PM Nory Morgan MD [facility]   9/19/2018 12:30 PM Ashutosh Deras MD [facility]           Lillie Roth, [facility]

## 2018-09-10 ENCOUNTER — OFFICE VISIT (OUTPATIENT)
Dept: TRANSPLANT | Facility: CLINIC | Age: 33
End: 2018-09-10
Attending: SURGERY
Payer: MEDICARE

## 2018-09-10 VITALS
TEMPERATURE: 98.1 F | HEIGHT: 67 IN | SYSTOLIC BLOOD PRESSURE: 146 MMHG | HEART RATE: 83 BPM | WEIGHT: 127.1 LBS | OXYGEN SATURATION: 97 % | DIASTOLIC BLOOD PRESSURE: 101 MMHG | BODY MASS INDEX: 19.95 KG/M2

## 2018-09-10 DIAGNOSIS — Z76.82 ORGAN TRANSPLANT CANDIDATE: Primary | ICD-10-CM

## 2018-09-10 PROCEDURE — G0463 HOSPITAL OUTPT CLINIC VISIT: HCPCS | Mod: ZF

## 2018-09-10 ASSESSMENT — PAIN SCALES - GENERAL: PAINLEVEL: NO PAIN (0)

## 2018-09-10 NOTE — LETTER
9/10/2018      RE: Jelly Dietz  [address]  [address]  [facility] [address]       Transplant Surgery H&P:                           HPI:      Ms. Dietz is a 33 year old Female who comes to clinic today for assessment for potential kidney transplant. She has a complex transplant history. She was diagnosed with IgA nephropathy at age 12, and started on hemodialysis at age 22. She is originally from [facility]. She underwent living donor kidney transplantation at age 27 (2007) in [place]. She had good graft function for five years, but, in 2012, she had issues with noncompliance surrounding her first pregancy. Her course at that time was further complicated by what sounds like pre-eclampsia. She was able to birth her first child successfully with renal function. She did lose her graft function following this and had it explanted in 2013. She has been in dialysis via left forearm AV fistula since that time. She did have a second successful pregnancy while on dialysis.     She has accrued six years of waiting time and presents for evaluation for re-listing. cPRA 81. She had previously removed herself from the transplant waitlist due to ongoing social issues with the health of her children and with divorce. These issues have stabilized and she would like consideration of transplant.     She does have two brothers who would be potential living donor options, though they are somewhat uncertain. They are both young and in good health.     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 She is doing well on dialysis but does have fatigue following sessions. She did have issues with significant hypertension and hypercalcemia but is controlled on sensipar, amlodipine, coreg, and clonidine. She has good exercise tolerance as she is primary caregiver for her children (now ages 6 and 3) and spends much of each day chasing them around without chest pain, anginal symptoms, or fatigue.       The patient is on  dialysis.      If on dialysis, modality: HD, via L forearm AVF at [facility]  Dialysis days: Monday, Wednesday, Friday    Chronic anticoagulation  [x]      [] Indication:   Recurrent infections  [x]      []  Type:                  Bladder dysfunction  [x]      [] Cause:  Claudication   [x]      [] Distance:    Previous Amputation  [x]      [] Cause:       Health events since transplant evaluation: recently hospitalized for hyperkalemia. No other events.     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 History of 1B kidney rejection        Hypertension      Priority: Medium     resolved after transplant       Panic attacks 2001     Priority: Medium     CARDIOVASCULAR SCREENING; LDL GOAL LESS THAN 160 10/31/2010     Priority: Low      Past Medical History:   Diagnosis Date     ACS (acute coronary syndrome) (H) 2014     Allergic state     seasonal allergies     Anemia in chronic renal disease      Cervical cerclage suture present in second trimester      Chest pain 2014     Chronic renal transplant rejection      End stage kidney disease (H)      Heart murmur      History of  delivery ,     30 wks, 28 wks      Hypertension     resolved after transplant     IgA nephropathy      MRSA (methicillin resistant Staphylococcus aureus)      [facility]     Pre-eclampsia      Renal failure affecting pregnancy in second trimester      S/P kidney transplant     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 History of 1B kidney rejection      SAB (spontaneous )     2nd trimester      Past Surgical History:   Procedure Laterality Date     CERCLAGE CERVICAL N/A 2015    Procedure: CERCLAGE CERVICAL;  Surgeon: Norbert Chopra MD;  [facility]      SECTION  2012    Procedure:  SECTION;;  Surgeon: Chelsea Cross MD;  [facility]      SECTION N/A 2015    Procedure:  SECTION;  Surgeon: Chelsea Cross MD;  Location: [facility] OR     cesearean section       EXPLANT TRANSPLANTED KIDNEY  3/29/2013    Procedure: EXPLANT TRANSPLANTED KIDNEY;  Explant Transplanted right Kidney (from patients right iliac fossa);  Surgeon: Nory Morgan MD;  [facility]     NEPHRECTOMY  3/29/13    Transplant nephrectomy      WILIAN/DIALYSIS CATHETER       TRANSPLANT KIDNEY RECIPIENT LIVING UNRELATED  Dec 2007    (Adult male in [place])     Current Outpatient Prescriptions   Medication Sig Dispense Refill     acetaminophen (TYLENOL) 325 MG tablet Take 1-2 tablets by mouth every 4 hours as needed. 60 tablet 0     amLODIPine (NORVASC) 10 MG tablet Take 1 tablet (10 mg) by mouth daily 30 tablet 11     B Complex-C-Folic Acid (DIALYVITE) TABS 1 tablet by mouth daily 30 tablet 11     carvedilol (COREG) 25 MG tablet Take 1 tablet (25 mg) by mouth 2 times daily (with meals) 60 tablet 11     cetirizine (ZYRTEC) 10 MG tablet Take 10 mg by mouth At Bedtime       cloNIDine (CATAPRES) 0.1 MG tablet Take 1 tablet (0.1 mg) by mouth 3 times daily as needed 60 tablet 3     cyclobenzaprine (FLEXERIL) 5 MG tablet Take 1 tablet (5 mg) by mouth nightly as needed for muscle spasms 15 tablet 0     doxycycline (VIBRA-TABS) 100 MG tablet Take 1 tablet by mouth 2 times daily (with meals)        Lidocaine (LIDOCARE) 4 % Patch Place 1 patch onto the skin every 24 hours 30 patch 1     RENVELA 800 MG tablet Take 1 tablet by mouth 3 times daily (with meals)       OTC products: None, except as noted above  No Known Allergies   Social History   Substance Use Topics     Smoking status: Never Smoker     Smokeless tobacco: Never Used     Alcohol use No     OR  Social History     Social History     Marital status: Single     Spouse name: N/A     Number of children: N/A     Years of education: N/A     Occupational History     Not on file.     Social History Main Topics     Smoking status: Never Smoker     Smokeless tobacco: Never Used     Alcohol use No     Drug use: No     Sexual activity: Yes     Partners: Male     Other Topics Concern     Blood Transfusions Yes     Multiple in [place] none in [place]     Caffeine Concern No     1s/d     Occupational Exposure No     Hobby Hazards No     Sleep Concern No     Stress Concern No     Weight Concern No     Special Diet No     Back Care No     Exercise No     walk 20' daily     Bike Helmet No     Seat Belt No     Social History Narrative    Currently unemployed.  Lives in [place].   with one son.       History   Drug Use No     Family History   Problem Relation Age of Onset     Diabetes Paternal Grandfather      Breast Cancer Maternal Grandmother 55     Cancer No family hx of      No known family hx of skin cancer       REVIEW OF SYSTEMS:        CONSTITUTIONAL: NEGATIVE for fever, chills, change in weight  INTEGUMENTARY/SKIN: NEGATIVE for worrisome rashes, moles or lesions  EYES: NEGATIVE for vision changes or irritation  ENT/MOUTH: NEGATIVE for ear, mouth and throat problems  RESP: NEGATIVE for significant cough or SOB  BREAST: NEGATIVE for masses, tenderness or discharge  CV: NEGATIVE for chest pain, palpitations or peripheral edema  GI: NEGATIVE for nausea, abdominal pain, heartburn, or change in bowel habits  : NEGATIVE for frequency, dysuria, or  hematuria  MUSCULOSKELETAL: NEGATIVE for significant arthralgias or myalgia  NEURO: NEGATIVE for weakness, dizziness or paresthesias  ENDOCRINE: NEGATIVE for temperature intolerance, skin/hair changes  HEME: NEGATIVE for bleeding problems  PSYCHIATRIC: NEGATIVE for changes in mood or affect    EXAM:                       Temp:  [98.1  F (36.7  C)] 98.1  F (36.7  C)  Pulse:  [83] 83  BP: (146)/(101) 146/101  SpO2:  [97 %] 97 %    GENERAL APPEARANCE: healthy, alert and no distress     EYES: EOMI, PERRL     HENT: ear canals and TM's normal and nose and mouth without ulcers or lesions     NECK: no adenopathy, no asymmetry, masses, or scars and thyroid normal to palpation     RESP: lungs clear to auscultation - no rales, rhonchi or wheezes     CV: regular rates and rhythm, normal S1 S2, no S3 or S4 and no murmur, click or rub     ABDOMEN:  soft, nontender, no HSM or masses and bowel sounds normal. RLQ transplant incision examined. Well-healed. No evidence of hernia. Palpable femoral pulses bilaterally.      MS: extremities normal- no gross deformities noted, no evidence of inflammation in joints, FROM in all extremities.     SKIN: no suspicious lesions or rashes     NEURO: Normal strength and tone, sensory exam grossly normal, mentation intact and speech normal     PSYCH: mentation appears normal. and affect normal/bright     LYMPHATICS: No cervical adenopathy      DIAGNOSTICS:                Cardiac workup completed at [facility]- pending records. Per report, had echo, EKG, stress test  Recent Labs   Lab Test  08/25/18   0655  08/24/18   1544   08/24/18   0347   03/08/17   0753   09/15/14   2107   07/15/12   0517   HGB  11.8   --    --   12.4   < >  11.7   < >  11.1*   < >  7.4*   PLT  152   --    --   173   < >  218   < >  205   < >  183   INR   --    --    --    --    --   1.20*   --   1.20*   < >   --    NA  140   --    --   131*   < >  137   < >  137   < >  133   POTASSIUM  4.1  3.9   < >  7.3*   < >  4.2   < >   4.5   < >  4.5   CR  9.90*   --    --   19.80*   < >  7.59*   < >  6.15*   < >  2.30*   A1C   --    --    --    --    --    --    --    --    --   6.3*    < > = values in this interval not displayed.     [facility] cPRA   Date Value Ref Range Status   03/15/2017 81  Final     A    ASSESSMENT:                 Ms. Dietz is a 33 year old Female who is appropriate for renal transplantation with the following issues:    1. Labs reviewed. Findings requiring additional evaluation before surgery: No  2. EKG from recent hospitalization reviewed, no acute concerns  3. Will need to obtain cardiac clearance records from cardiology eval in [place], [place]. Also has another appointment upcoming for PAP smear- will f/u these records  4. ABO= A  5. Paired Exchange case: No  6. Outstanding issues: f/u cardiology records    PLAN:                 1. Good kidney transplant candidate. Pending review of cardiology records, anticipate activation in near future. Will list for  donor kidney but have also advised to begin discussion with her brothers for possible live donation. Have counseled the patient that, due to her redo status and her young age and overall health, she should not accept  donor offers unless they are considered excellent in quality, and that a live donor would be a preferred choice for her if available due to improved graft survival.     Signed Electronically by: Anibal Bartlett    I have reviewed history, examined patient and discussed plan with the fellow/resident/CONNIE.  I concur with the findings in this note.    Risks of the surgical procedure including but not limited to the rare risk of mortality discussed in detail. Patient verbalized good understanding and had several pertinent questions which were answered satisfactorily.     Immunosuppressive regimen, management and long term risks discussed in detail.     Total time: 45 min  Counseling time: 25 min

## 2018-09-10 NOTE — PROGRESS NOTES
Transplant Surgery H&P:                           HPI:      Ms. Dietz is a 33 year old Female who comes to clinic today for assessment for potential kidney transplant. She has a complex transplant history. She was diagnosed with IgA nephropathy at age 12, and started on hemodialysis at age 22. She is originally from [facility]. She underwent living donor kidney transplantation at age 27 (2007) in [place]. She had good graft function for five years, but, in 2012, she had issues with noncompliance surrounding her first pregancy. Her course at that time was further complicated by what sounds like pre-eclampsia. She was able to birth her first child successfully with renal function. She did lose her graft function following this and had it explanted in 2013. She has been in dialysis via left forearm AV fistula since that time. She did have a second successful pregnancy while on dialysis.     She has accrued six years of waiting time and presents for evaluation for re-listing. cPRA 81. She had previously removed herself from the transplant waitlist due to ongoing social issues with the health of her children and with divorce. These issues have stabilized and she would like consideration of transplant.     She does have two brothers who would be potential living donor options, though they are somewhat uncertain. They are both young and in good health.     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 She is doing well on dialysis but does have fatigue following sessions. She did have issues with significant hypertension and hypercalcemia but is controlled on sensipar, amlodipine, coreg, and clonidine. She has good exercise tolerance as she is primary caregiver for her children (now ages 6 and 3) and spends much of each day chasing them around without chest pain, anginal symptoms, or fatigue.       The patient is on dialysis.      If on dialysis, modality: HD, via L forearm AVF at [facility]  Dialysis days:  Monday, Wednesday, Friday    Chronic anticoagulation  [x]      [] Indication:   Recurrent infections  [x]      []  Type:                  Bladder dysfunction  [x]      [] Cause:  Claudication   [x]      [] Distance:    Previous Amputation  [x]      [] Cause:       Health events since transplant evaluation: recently hospitalized for hyperkalemia. No other events.     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 History of 1B kidney rejection        Hypertension      Priority: Medium     resolved after transplant       Panic attacks 2001     Priority: Medium     CARDIOVASCULAR SCREENING; LDL GOAL LESS THAN 160 10/31/2010     Priority: Low      Past Medical History:   Diagnosis Date     ACS (acute coronary syndrome) (H) 2014     Allergic state     seasonal allergies     Anemia in chronic renal disease      Cervical cerclage suture present in second trimester      Chest pain 2014     Chronic renal transplant rejection      End stage kidney disease (H)      Heart murmur      History of  delivery ,     30 wks, 28 wks      Hypertension     resolved after transplant     IgA nephropathy      MRSA (methicillin resistant Staphylococcus aureus)      [facility]     Pre-eclampsia      Renal failure  affecting pregnancy in second trimester      S/P kidney transplant     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 History of 1B kidney rejection      SAB (spontaneous )     2nd trimester      Past Surgical History:   Procedure Laterality Date     CERCLAGE CERVICAL N/A 2015    Procedure: CERCLAGE CERVICAL;  Surgeon: Norbert Chopra MD;  [facility]      SECTION  2012    Procedure:  SECTION;;  Surgeon: Chelsea Cross MD;  [facility]      SECTION N/A 2015    Procedure:  SECTION;  Surgeon: Chelsea Cross MD;  Location: [facility] OR     cesearean section       EXPLANT TRANSPLANTED KIDNEY  3/29/2013    Procedure: EXPLANT TRANSPLANTED KIDNEY;  Explant Transplanted right Kidney (from patients right iliac fossa);  Surgeon: Nory Morgan MD;  Location: [facility] OR     NEPHRECTOMY  3/29/13    Transplant nephrectomy      WILIAN/DIALYSIS CATHETER       TRANSPLANT KIDNEY RECIPIENT LIVING UNRELATED  Dec 2007    (Adult male in [place])     Current Outpatient Prescriptions   Medication Sig Dispense Refill     acetaminophen (TYLENOL) 325 MG tablet Take 1-2 tablets by mouth every 4 hours as needed. 60 tablet 0     amLODIPine (NORVASC) 10 MG tablet Take 1 tablet (10 mg) by mouth daily 30 tablet 11     B Complex-C-Folic Acid (DIALYVITE) TABS 1 tablet by mouth daily 30 tablet 11     carvedilol (COREG) 25 MG tablet Take 1 tablet (25 mg) by mouth 2 times daily (with meals) 60 tablet 11     cetirizine (ZYRTEC) 10 MG tablet Take 10 mg by mouth At Bedtime       cloNIDine (CATAPRES) 0.1 MG tablet Take 1 tablet (0.1 mg) by mouth 3 times daily as needed 60 tablet 3     cyclobenzaprine (FLEXERIL) 5 MG tablet Take 1 tablet (5 mg) by mouth nightly as needed for muscle spasms 15 tablet 0     doxycycline (VIBRA-TABS) 100 MG tablet Take 1 tablet by mouth 2 times daily (with meals)       Lidocaine (LIDOCARE) 4 % Patch Place 1 patch onto the skin every 24 hours 30  patch 1     RENVELA 800 MG tablet Take 1 tablet by mouth 3 times daily (with meals)       OTC products: None, except as noted above  No Known Allergies   Social History   Substance Use Topics     Smoking status: Never Smoker     Smokeless tobacco: Never Used     Alcohol use No     OR  Social History     Social History     Marital status: Single     Spouse name: N/A     Number of children: N/A     Years of education: N/A     Occupational History     Not on file.     Social History Main Topics     Smoking status: Never Smoker     Smokeless tobacco: Never Used     Alcohol use No     Drug use: No     Sexual activity: Yes     Partners: Male     Other Topics Concern     Blood Transfusions Yes     [place] in [place] none in [place]     Caffeine Concern No     1s/d     Occupational Exposure No     Hobby Hazards No     Sleep Concern No     Stress Concern No     Weight Concern No     Special Diet No     Back Care No     Exercise No     walk 20' daily     Bike Helmet No     Seat Belt No     Social History Narrative    Currently unemployed.  Lives in [place].   with one son.       History   Drug Use No     Family History   Problem Relation Age of Onset     Diabetes Paternal Grandfather      Breast Cancer Maternal Grandmother 55     Cancer No family hx of      No known family hx of skin cancer       REVIEW OF SYSTEMS:        CONSTITUTIONAL: NEGATIVE for fever, chills, change in weight  INTEGUMENTARY/SKIN: NEGATIVE for worrisome rashes, moles or lesions  EYES: NEGATIVE for vision changes or irritation  ENT/MOUTH: NEGATIVE for ear, mouth and throat problems  RESP: NEGATIVE for significant cough or SOB  BREAST: NEGATIVE for masses, tenderness or discharge  CV: NEGATIVE for chest pain, palpitations or peripheral edema  GI: NEGATIVE for nausea, abdominal pain, heartburn, or change in bowel habits  : NEGATIVE for frequency, dysuria, or hematuria  MUSCULOSKELETAL: NEGATIVE for significant arthralgias or myalgia  NEURO:  NEGATIVE for weakness, dizziness or paresthesias  ENDOCRINE: NEGATIVE for temperature intolerance, skin/hair changes  HEME: NEGATIVE for bleeding problems  PSYCHIATRIC: NEGATIVE for changes in mood or affect    EXAM:                       Temp:  [98.1  F (36.7  C)] 98.1  F (36.7  C)  Pulse:  [83] 83  BP: (146)/(101) 146/101  SpO2:  [97 %] 97 %    GENERAL APPEARANCE: healthy, alert and no distress     EYES: EOMI, PERRL     HENT: ear canals and TM's normal and nose and mouth without ulcers or lesions     NECK: no adenopathy, no asymmetry, masses, or scars and thyroid normal to palpation     RESP: lungs clear to auscultation - no rales, rhonchi or wheezes     CV: regular rates and rhythm, normal S1 S2, no S3 or S4 and no murmur, click or rub     ABDOMEN:  soft, nontender, no HSM or masses and bowel sounds normal. RLQ transplant incision examined. Well-healed. No evidence of hernia. Palpable femoral pulses bilaterally.      MS: extremities normal- no gross deformities noted, no evidence of inflammation in joints, FROM in all extremities.     SKIN: no suspicious lesions or rashes     NEURO: Normal strength and tone, sensory exam grossly normal, mentation intact and speech normal     PSYCH: mentation appears normal. and affect normal/bright     LYMPHATICS: No cervical adenopathy      DIAGNOSTICS:                Cardiac workup completed at [facility]- pending records. Per report, had echo, EKG, stress test  Recent Labs   Lab Test  08/25/18   0655  08/24/18   1544   08/24/18   0347   03/08/17   0753   09/15/14   2107   07/15/12   0517   HGB  11.8   --    --   12.4   < >  11.7   < >  11.1*   < >  7.4*   PLT  152   --    --   173   < >  218   < >  205   < >  183   INR   --    --    --    --    --   1.20*   --   1.20*   < >   --    NA  140   --    --   131*   < >  137   < >  137   < >  133   POTASSIUM  4.1  3.9   < >  7.3*   < >  4.2   < >  4.5   < >  4.5   CR  9.90*   --    --   19.80*   < >  7.59*   < >  6.15*   < >   2.30*   A1C   --    --    --    --    --    --    --    --    --   6.3*    < > = values in this interval not displayed.     [facility] cPRA   Date Value Ref Range Status   03/15/2017 81  Final     A    ASSESSMENT:                 Ms. Dietz is a 33 year old Female who is appropriate for renal transplantation with the following issues:    1. Labs reviewed. Findings requiring additional evaluation before surgery: No  2. EKG from recent hospitalization reviewed, no acute concerns  3. Will need to obtain cardiac clearance records from cardiology [facility] in [place], [place]. Also has another appointment upcoming for PAP smear- will f/u these records  4. ABO= A  5. Paired Exchange case: No  6. Outstanding issues: f/u cardiology records    PLAN:                 1. Good kidney transplant candidate. Pending review of cardiology records, anticipate activation in near future. Will list for  donor kidney but have also advised to begin discussion with her brothers for possible live donation. Have counseled the patient that, due to her redo status and her young age and overall health, she should not accept  donor offers unless they are considered excellent in quality, and that a live donor would be a preferred choice for her if available due to improved graft survival.     Signed Electronically by: Anibal Bartlett    I have reviewed history, examined patient and discussed plan with the fellow/resident/CONNIE.  I concur with the findings in this note.    Risks of the surgical procedure including but not limited to the rare risk of mortality discussed in detail. Patient verbalized good understanding and had several pertinent questions which were answered satisfactorily.     Immunosuppressive regimen, management and long term risks discussed in detail.     Total time: 45 min  Counseling time: 25 min

## 2018-09-10 NOTE — NURSING NOTE
"Chief Complaint   Patient presents with     Transplant Evaluation     Blood pressure (!) 146/101, pulse 83, temperature 98.1  F (36.7  C), height 1.689 m (5' 6.5\"), weight 57.7 kg (127 lb 1.6 oz), SpO2 97 %, not currently breastfeeding.    Daniel Wheeler CMA    "

## 2018-09-10 NOTE — MR AVS SNAPSHOT
"              After Visit Summary   9/10/2018    Jelly Dietz    MRN: [id number]           Patient Information     Date Of Birth          1985        Visit Information        Provider Department      9/10/2018 2:00 PM Nory Morgan MD [facility] Solid Organ Transplant        Today's Diagnoses     Organ transplant candidate    -  1       Follow-ups after your visit        Your next 10 appointments already scheduled     Sep 19, 2018 12:30 PM CDT   (Arrive by 12:15 PM)   Return Visit with Ashutosh Deras MD   [facility] ([facility])    [address] [place] [address]  [address] [place] [id number] [phone]              Who to contact     If you have questions or need follow up information about today's clinic visit or your schedule please contact [facility] ORGAN TRANSPL[facility] directly at [phone].  Normal or non-critical lab and imaging results will be communicated to you by MyChart, letter or phone within 4 business days after the clinic has received the results. If you do not hear from us within 7 days, please contact the clinic through MyChart or phone. If you have a critical or abnormal lab result, we will notify you by phone as soon as possible.  Submit refill requests through Mahalo or call your pharmacy and they will forward the refill request to us. Please allow 3 business days for your refill to be completed.          Additional Information About Your Visit        MyChart Information     Mahalo lets you send messages to your doctor, view your test results, renew your prescriptions, schedule appointments and more. To sign up, go to [email or web address] . Click on \"Log in\" on the left side of the screen, which will take you to the Welcome page. Then click on \"Sign up Now\" on the right side of the page.     You will be asked to enter the access code listed below, as well as some personal information. Please follow the directions to create your username " "and password.     Your access code is: [email or web address]  Expires: 2018 12:38 PM     Your access code will  in 90 days. If you need help or a new code, please call your [place] clinic or [phone].        Care EveryWhere ID     This is your Care EveryWhere ID. This could be used by other organizations to access your [place] medical records  [id number]        Your Vitals Were     Pulse Temperature Height Pulse Oximetry BMI (Body Mass Index)       83 98.1  F (36.7  C) 1.689 m (5' 6.5\") 97% 20.21 kg/m2        Blood Pressure from Last 3 Encounters:   09/10/18 (!) 146/101   18 (!) 145/92   18 (!) 166/113    Weight from Last 3 Encounters:   09/10/18 57.7 kg (127 lb 1.6 oz)   18 58.7 kg (129 lb 8 oz)   18 54.4 kg (120 lb)              Today, you had the following     No orders found for display       Primary Care Provider Office Phone # Fax #    Megha Reynallet [phone] [address] [place] [address]        Equal Access to Services     SKY GREGORY AH: Hadii wil ku hadasho Soomaali, waaxda luqadaha, qaybta kaalmada adeegyada, waxay idiin haymemen nhan elizabeth laselene bernstein. So [facility] [phone].    ATENCIÓN: Si habla español, tiene a kan disposición servicios gratuitos de asistencia lingüística. Llame al [phone].    We comply with applicable federal civil rights laws and [place] laws. We do not discriminate on the basis of race, color, national origin, age, disability, sex, sexual orientation, or gender identity.            Thank you!     Thank you for choosing [facility] SOLID ORGAN TRANSPLANT  for your care. Our goal is always to provide you with excellent care. Hearing back from our patients is one way we can continue to improve our services. Please take a few minutes to complete the written survey that you may receive in the mail after your visit with us. Thank you!             Your Updated Medication List - Protect others around you: Learn how to " safely use, store and throw away your medicines at www.disposemymeds.org.          This list is accurate as of 9/10/18 11:59 PM.  Always use your most recent med list.                   Brand Name Dispense Instructions for use Diagnosis    acetaminophen 325 MG tablet    TYLENOL    60 tablet    Take 1-2 tablets by mouth every 4 hours as needed.     delivery delivered       amLODIPine 10 MG tablet    NORVASC    30 tablet    Take 1 tablet (10 mg) by mouth daily    ESRD (end stage renal disease) on dialysis (H), Malignant essential hypertension       carvedilol 25 MG tablet    COREG    60 tablet    Take 1 tablet (25 mg) by mouth 2 times daily (with meals)    Benign essential hypertension, SOB (shortness of breath)       cetirizine 10 MG tablet    zyrTEC     Take 10 mg by mouth At Bedtime        cloNIDine 0.1 MG tablet    CATAPRES    60 tablet    Take 1 tablet (0.1 mg) by mouth 3 times daily as needed    Benign essential hypertension, SOB (shortness of breath)       cyclobenzaprine 5 MG tablet    FLEXERIL    15 tablet    Take 1 tablet (5 mg) by mouth nightly as needed for muscle spasms        DIALYVITE Tabs     30 tablet    1 tablet by mouth daily    Secondary renal hyperparathyroidism (H), Hyperphosphatemia, ESRD (end stage renal disease) on dialysis (H)       doxycycline 100 MG tablet    VIBRA-TABS     Take 1 tablet by mouth 2 times daily (with meals)        Lidocaine 4 % Patch    LIDOCARE    30 patch    Place 1 patch onto the skin every 24 hours    Chronic left shoulder pain       RENVELA 800 MG tablet   Generic drug:  sevelamer      Take 1 tablet by mouth 3 times daily (with meals)

## 2018-09-26 ENCOUNTER — OFFICE VISIT (OUTPATIENT)
Dept: CARDIOLOGY | Facility: CLINIC | Age: 33
End: 2018-09-26
Attending: INTERNAL MEDICINE
Payer: MEDICARE

## 2018-09-26 VITALS
HEIGHT: 67 IN | BODY MASS INDEX: 20.09 KG/M2 | OXYGEN SATURATION: 100 % | WEIGHT: 128 LBS | HEART RATE: 68 BPM | SYSTOLIC BLOOD PRESSURE: 142 MMHG | DIASTOLIC BLOOD PRESSURE: 99 MMHG

## 2018-09-26 DIAGNOSIS — R06.09 DOE (DYSPNEA ON EXERTION): ICD-10-CM

## 2018-09-26 DIAGNOSIS — I77.819 AORTIC DILATATION (H): Primary | ICD-10-CM

## 2018-09-26 PROCEDURE — 99214 OFFICE O/P EST MOD 30 MIN: CPT | Mod: ZP | Performed by: INTERNAL MEDICINE

## 2018-09-26 PROCEDURE — G0463 HOSPITAL OUTPT CLINIC VISIT: HCPCS | Mod: ZF

## 2018-09-26 ASSESSMENT — PAIN SCALES - GENERAL: PAINLEVEL: NO PAIN (0)

## 2018-09-26 NOTE — LETTER
9/26/2018      RE: Jelly Dietz  [address]  [address]  [place] [address]       Dear Colleague,    Thank you for the opportunity to participate in the care of your patient, Jelly Dietz, at the [facility] HEART [facility]. Please see a copy of my visit note below.    [id number]      Please do not hesitate to contact me if you have any questions/concerns.     Sincerely,     Ashutosh Dersa MD

## 2018-09-26 NOTE — MR AVS SNAPSHOT
After Visit Summary   9/26/2018    Jelly Dietz    MRN: [id number]           Patient Information     Date Of Birth          1985        Visit Information        Provider Department      9/26/2018 3:30 PM [facility] [place] [facility]        Today's Diagnoses     Aortic dilatation (H)    -  1    LORENZO (dyspnea on exertion)          Care Instructions    Patient Instructions:  It was a pleasure to see you in the cardiology clinic today.      If you have any questions, you can reach my nurse, Myla Posey LPN, at [phone].  Press Option #1 for the [facility], and then press Option #3 for nursing.    We are encouraging the use of [facility] to communicate with your HealthCare Provider    Medication Changes: None.    Studies Ordered: Echocardiogram and Nuclear Medicine Lexiscan Stress Test within 1-2 weeks at your convenience.    Prep for millie-nuc stress test: Report to the  [facility] Waiting Room at the [facility]. The hospital is located at [address] [place] [address] on the [place] of the [place].    This test can take up to 3 hours.    NPO 3 hours prior, Water is ok and encouraged  NO alcohol, smoking, caffeine, or decaf products 12 hours prior  TAKE ALL medications as prescribed, hold the following medications if they pertain to you:   If you take Aggrenox or dipyridamole (Persantine, Permole), stop taking it 48 hours before your test.   If you take Viagra, Cialis or Levitra, stop taking it 48 hours before your test.   If you take theophylline or aminophylline, stop taking it 12 hours before your test.   For patients with diabetes:If you take insulin, call your diabetes care team. Ask if you should take a 1/2 dose the morning of your test.   If you take diabetes medicine by mouth, don t take it on the morning of your test. Bring it with you to take after the test. (If you have questions, call your diabetes care team.)      Do not take nitrates on the day of your  "test. Do not wear your Nitro-Patch    If you have questions regarding your appointment date and time, please contact scheduling at [phone].  Any other questions regarding testing can be referred to Myla.    The results from today include: None.    Please follow up: With Dr. Deras as needed.    Sincerely,    Ashutosh Deras MD     If you have an urgent need after hours (8:00 am to 4:30 pm) please call [phone] and ask for the cardiology fellow on call.                    Follow-ups after your visit        Future tests that were ordered for you today     Open Future Orders        Priority Expected Expires Ordered    Echo Complete Routine 10/3/2018 9/26/2019 9/26/2018    NM Lexiscan stress test (nuc card) Routine 10/3/2018 9/26/2019 9/26/2018            Who to contact     If you have questions or need follow up information about today's clinic visit or your schedule please contact [facility] directly at [phone].  Normal or non-critical lab and imaging results will be communicated to you by [email or web address], letter or phone within 4 business days after the clinic has received the results. If you do not hear from us within 7 days, please contact the clinic through [email or web address] or phone. If you have a critical or abnormal lab result, we will notify you by phone as soon as possible.  Submit refill requests through Sofar Sounds or call your pharmacy and they will forward the refill request to us. Please allow 3 business days for your refill to be completed.          Additional Information About Your Visit        Sofar Sounds Information     Sofar Sounds lets you send messages to your doctor, view your test results, renew your prescriptions, schedule appointments and more. To sign up, go to [email or web address] . Click on \"Log in\" on the left side of the screen, which will take you to the Welcome page. Then click on \"Sign up Now\" on the right side of the page.     You will be asked to enter the access code listed below, as well " "as some personal information. Please follow the directions to create your username and password.     Your access code is: [email or web address]  Expires: 2018 12:38 PM     Your access code will  in 90 days. If you need help or a new code, please call your [place] clinic or [phone].        Care EveryWhere ID     This is your Care EveryWhere ID. This could be used by other organizations to access your [place] medical records  [id number]        Your Vitals Were     Pulse Height Pulse Oximetry BMI (Body Mass Index)          68 1.689 m (5' 6.5\") 100% 20.35 kg/m2         Blood Pressure from Last 3 Encounters:   18 (!) 142/99   09/10/18 (!) 146/101   18 (!) 145/92    Weight from Last 3 Encounters:   18 58.1 kg (128 lb)   09/10/18 57.7 kg (127 lb 1.6 oz)   18 58.7 kg (129 lb 8 oz)               Primary Care Provider Office Phone # Fax #    Megha [facility] [phone] [address] [place] [address]        Equal Access to Services     SKY GREGORY AH: Hadii wil jackson hadasho Soomaali, waaxda luqadaha, qaybta kaalmada adeegyada, lazara bernstein. So [facility] [phone].    ATENCIÓN: Si habla español, tiene a kan disposición servicios gratuitos de asistencia lingüística. Llame al [phone].    We comply with applicable federal civil rights laws and [place] laws. We do not discriminate on the basis of race, color, national origin, age, disability, sex, sexual orientation, or gender identity.            Thank you!     Thank you for choosing [facility]  for your care. Our goal is always to provide you with excellent care. Hearing back from our patients is one way we can continue to improve our services. Please take a few minutes to complete the written survey that you may receive in the mail after your visit with us. Thank you!             Your Updated Medication List - Protect others around you: Learn how to safely use, store " and throw away your medicines at www.disposemymeds.org.          This list is accurate as of 18  3:45 PM.  Always use your most recent med list.                   Brand Name Dispense Instructions for use Diagnosis    acetaminophen 325 MG tablet    TYLENOL    60 tablet    Take 1-2 tablets by mouth every 4 hours as needed.     delivery delivered       amLODIPine 10 MG tablet    NORVASC    30 tablet    Take 1 tablet (10 mg) by mouth daily    ESRD (end stage renal disease) on dialysis (H), Malignant essential hypertension       carvedilol 25 MG tablet    COREG    60 tablet    Take 1 tablet (25 mg) by mouth 2 times daily (with meals)    Benign essential hypertension, SOB (shortness of breath)       cetirizine 10 MG tablet    zyrTEC     Take 10 mg by mouth At Bedtime        cloNIDine 0.1 MG tablet    CATAPRES    60 tablet    Take 1 tablet (0.1 mg) by mouth 3 times daily as needed    Benign essential hypertension, SOB (shortness of breath)       cyclobenzaprine 5 MG tablet    FLEXERIL    15 tablet    Take 1 tablet (5 mg) by mouth nightly as needed for muscle spasms        DIALYVITE Tabs     30 tablet    1 tablet by mouth daily    Secondary renal hyperparathyroidism (H), Hyperphosphatemia, ESRD (end stage renal disease) on dialysis (H)       doxycycline 100 MG tablet    VIBRA-TABS     Take 1 tablet by mouth 2 times daily (with meals)        Lidocaine 4 % Patch    LIDOCARE    30 patch    Place 1 patch onto the skin every 24 hours    Chronic left shoulder pain       melatonin 1 MG Tabs tablet      Take 1 mg by mouth        RENVELA 800 MG tablet   Generic drug:  sevelamer      Take 1 tablet by mouth 3 times daily (with meals)

## 2018-09-26 NOTE — NURSING NOTE
Chief Complaint   Patient presents with     Follow Up For     RTN Pt Visit kidney clearance      Medications reviewed and vitals performed.   Catrina Silva CMA

## 2018-09-26 NOTE — PATIENT INSTRUCTIONS
Patient Instructions:  It was a pleasure to see you in the cardiology clinic today.      If you have any questions, you can reach my nurse, Myla Posey LPN, at [phone].  Press Option #1 for the [facility], and then press Option #3 for nursing.    We are encouraging the use of Veoh to communicate with your HealthCare Provider    Medication Changes: None.    Studies Ordered: Echocardiogram and Nuclear Medicine Lexiscan Stress Test within 1-2 weeks at your convenience.    Prep for millie-nuc stress test: Report to the  [facility] Waiting Room at the [facility]. The hospital is located at [address] [place] [address] on the [place] the [place].    This test can take up to 3 hours.    NPO 3 hours prior, Water is ok and encouraged  NO alcohol, smoking, caffeine, or decaf products 12 hours prior  TAKE ALL medications as prescribed, hold the following medications if they pertain to you:   If you take Aggrenox or dipyridamole (Persantine, Permole), stop taking it 48 hours before your test.   If you take Viagra, Cialis or Levitra, stop taking it 48 hours before your test.   If you take theophylline or aminophylline, stop taking it 12 hours before your test.   For patients with diabetes:If you take insulin, call your diabetes care team. Ask if you should take a 1/2 dose the morning of your test.   If you take diabetes medicine by mouth, don t take it on the morning of your test. Bring it with you to take after the test. (If you have questions, call your diabetes care team.)      Do not take nitrates on the day of your test. Do not wear your Nitro-Patch    If you have questions regarding your appointment date and time, please contact scheduling at [phone].  Any other questions regarding testing can be referred to Myla.    The results from today include: None.    Please follow up: With Dr. Deras as needed.    Sincerely,    Ashutosh Deras MD     If you have an urgent need after hours (8:00 am to  4:30 pm) please call [phone] and ask for the cardiology fellow on call.

## 2018-09-26 NOTE — LETTER
"2018      RE: Jelly Dietz  [address]  [address]  [place] [address]       [id number]      Service Date: 2018      RE: Jelly Dietz   MRN: [id number]   : 1985      To Whom It May Concern:        It was a pleasure participating in the care of your patient Ms. Jelly Dietz.  As you know, she is a 33-year-old lady whom I see today in preoperative evaluation prior to a second kidney transplant.      PAST MEDICAL HISTORY:  Significant for the followin.  Hypertension.   2.  Chronic renal insufficiency secondary to IgA nephropathy, status post kidney transplant in  with subsequent preeclampsia and failure of the transplant kidney.  The transplant kidney was explanted in 2012, and she has been on dialysis since then.   3.  Panic attacks.   4.  MRSA positive.      She denies having a significant history of cardiac disease.      In terms of her present symptom complex, she has 2 children at home, one who is autistic mildly and the other who is 3 years old, and she is busy running around and taking care of them.  She can climb up and down the stairs at home without gross symptoms, but if she runs up the stairs, she will get quite short of breath.  She otherwise denies PND, orthopnea, edema, palpitations, syncope or near-syncope.  She denies halina chest pain.      In terms of her cardiac risk factors, no history of diabetes.  She does have a history of hypertension.  No history of smoking, no history of drinking.  She tried hookah more recently.  She denies a family history of heart disease.  She says her cholesterol is \"okay.\"        She works for a [employer].      CURRENT MEDICATIONS:   1.  Amlodipine 10 mg a day.   2.  Carvedilol 25 twice daily.   3.  Clonidine 0.1 three times a day.      PHYSICAL EXAMINATION:   VITAL SIGNS:  Her blood pressure without her medicines is 140/90 with a pulse of 68.  Her weight is 128 pounds.   NECK:  Jugular venous pressure is approximately 8 cm of water " without gross hepatojugular reflux.   LUNGS:  Clear to auscultation.  Respiratory effort is normal.   CARDIAC:  Regular rate and rhythm.  There is a 2-3/6 systolic ejection murmur present.  No obvious diastolic murmur.   ABDOMEN:  Belly is soft and nontender.   EXTREMITIES:  Without gross edema.      IMAGING:  Echocardiogram 2017:  Ejection fraction 55%-60% with mild to moderate MR.  Trace to mild AI.  The aorta is mildly dilated.  No precise measurement given.       LABORATORY:  On 2018, potassium 4.1, GFR 5, hemoglobin 11.8.      IMPRESSION:  Jelly is a 33-year-old lady without a prior documented history of cardiac disease who presents for a preoperative evaluation prior to kidney transplant.      She gets exertional dyspnea when she runs up a flight of stairs.  It is unclear if this is simple deconditioning versus underlying cardiac pathology.  She does have risk factors for underlying coronary artery disease and has been on dialysis for quite some time and has had a transplanted kidney in the past as well along with hypertension.  Further noninvasive evaluation would be indicated.      PLAN:   1.  Echocardiogram to recheck aortic dimensions.   2.  Pharmacologic nuclear stress test in order to rule out significant inducible ischemia as a cause for symptoms.      If these 2 tests are unremarkable, then she would be approved for her procedure at reasonable perioperative risk for event; otherwise, further updates to follow.      Once again, it was a pleasure participating in the care of your patient Ms. Jelly Cole.  Please feel free to contact me at any time if you have any questions regarding her care in the future.       Sincerely,      MD DULCE Marquez MD             D: 2018   T: 2018   MT: theresa      Name:     JELLY COLE   MRN:      [id number]        Account:      [id number]   :      1985           Service Date: 2018      Document: [id number]

## 2018-09-27 NOTE — PROGRESS NOTES
"Service Date: 2018      RE: Jelly Dietz   MRN: [id number]   : 1985      To Whom It May Concern:        It was a pleasure participating in the care of your patient Ms. Jelly Dietz.  As you know, she is a 33-year-old lady whom I see today in preoperative evaluation prior to a second kidney transplant.      PAST MEDICAL HISTORY:      1.  Hypertension.   2.  Chronic renal insufficiency secondary to IgA nephropathy, status post kidney transplant in  with subsequent preeclampsia and failure of the transplant kidney.  The transplant kidney was explanted in 2012, and she has been on dialysis since then.   3.  Panic attacks.   4.  MRSA positive.      She denies having a significant history of cardiac disease.      In terms of her present symptom complex, she has 2 children at home, one who is autistic mildly and the other who is 3 years old, and she is busy running around and taking care of them.  She can climb up and down the stairs at home without gross symptoms, but if she runs up the stairs, she will get quite short of breath.  She otherwise denies PND, orthopnea, edema, palpitations, syncope or near-syncope.  She denies halina chest pain.      In terms of her cardiac risk factors, no history of diabetes.  She does have a history of hypertension.  No history of smoking, no history of drinking.  She tried hookah more recently.  She denies a family history of heart disease.  She says her cholesterol is \"okay.\"        She works for a [employer].      CURRENT MEDICATIONS:     1.  Amlodipine 10 mg a day.   2.  Carvedilol 25 twice daily.   3.  Clonidine 0.1 three times a day.      PHYSICAL EXAMINATION:     VITAL SIGNS:  Her blood pressure without her medicines is 140/90 with a pulse of 68.  Her weight is 128 pounds.   NECK:  Jugular venous pressure is approximately 8 cm of water without gross hepatojugular reflux.   LUNGS:  Clear to auscultation.  Respiratory effort is normal.   CARDIAC:  Regular rate " "and rhythm.  There is a 2-3/6 systolic ejection murmur present.  No obvious diastolic murmur.   ABDOMEN:  Belly is soft and nontender.   EXTREMITIES:  Without gross edema.      IMAGING:  Echocardiogram 12/22/2017:  Ejection fraction 55%-60% with mild to moderate MR.  Trace to mild AI.  The aorta is mildly dilated.  No precise measurement given.       LABORATORY:  On 08/25/2018, potassium 4.1, GFR 5, hemoglobin 11.8.        IMPRESSION:      Jelly is a 33-year-old lady without a prior documented history of cardiac disease who presents for a preoperative evaluation prior to kidney transplant.      She gets exertional dyspnea when she runs up a flight of stairs.  It is unclear if this is simple deconditioning versus underlying cardiac pathology.  She does have risk factors for underlying coronary artery disease and has been on dialysis for quite some time and has had a transplanted kidney in the past as well along with hypertension.  Further noninvasive evaluation would be indicated.        PLAN:     1.  Echocardiogram to recheck aortic dimensions.     2.  Pharmacologic nuclear stress test in order to rule out significant inducible ischemia as a cause for symptoms.      If these 2 tests are unremarkable, then she would be approved for her procedure at reasonable perioperative risk for event; otherwise, further updates to follow.      Once again, it was a pleasure participating in the care of your patient Ms. Jelly Dietz.  Please feel free to contact me at any time if you have any questions regarding her care in the future.       Sincerely,      Ashutosh Deras MD      Addendum 3/31/19:    Pharm nuc stress reveals no evidence for stress induced ischemia.  Report mentions \"intermittent RA abnormality\"    Echo reveals normal LV systolic function, no significant valvular pathology.  No abnormalities noted  In right atrium    Patient is approved for her procedure at reasonable perioperative risk for event.           D: " 2018   T: 2018   MT: theresa      Name:     KEITH COLE   MRN:      [id number]        Account:      [id number]   :      1985           Service Date: 2018      Document: [id number]

## 2018-10-28 ENCOUNTER — APPOINTMENT (OUTPATIENT)
Dept: GENERAL RADIOLOGY | Facility: CLINIC | Age: 33
End: 2018-10-28
Attending: EMERGENCY MEDICINE
Payer: MEDICARE

## 2018-10-28 ENCOUNTER — HOSPITAL ENCOUNTER (EMERGENCY)
Facility: CLINIC | Age: 33
Discharge: HOME | End: 2018-10-29
Attending: EMERGENCY MEDICINE | Admitting: EMERGENCY MEDICINE
Payer: MEDICARE

## 2018-10-28 DIAGNOSIS — Z13.9 ENCOUNTER FOR MEDICAL SCREENING EXAMINATION: ICD-10-CM

## 2018-10-28 LAB
ANION GAP SERPL CALCULATED.3IONS-SCNC: 16 MMOL/L (ref 3–14)
ANION GAP SERPL CALCULATED.3IONS-SCNC: 17 MMOL/L (ref 3–14)
BASOPHILS # BLD AUTO: 0 10E9/L (ref 0–0.2)
BASOPHILS NFR BLD AUTO: 0.6 %
BUN SERPL-MCNC: 85 MG/DL (ref 7–30)
BUN SERPL-MCNC: 91 MG/DL (ref 7–30)
CA-I BLD-SCNC: 3.6 MG/DL (ref 4.4–5.2)
CALCIUM SERPL-MCNC: 6.9 MG/DL (ref 8.5–10.1)
CALCIUM SERPL-MCNC: 7.2 MG/DL (ref 8.5–10.1)
CHLORIDE SERPL-SCNC: 100 MMOL/L (ref 94–109)
CHLORIDE SERPL-SCNC: 101 MMOL/L (ref 94–109)
CO2 BLDCOV-SCNC: 18 MMOL/L (ref 21–28)
CO2 SERPL-SCNC: 18 MMOL/L (ref 20–32)
CO2 SERPL-SCNC: 18 MMOL/L (ref 20–32)
CREAT SERPL-MCNC: 18.2 MG/DL (ref 0.52–1.04)
CREAT SERPL-MCNC: 18.9 MG/DL (ref 0.52–1.04)
DIFFERENTIAL METHOD BLD: ABNORMAL
EOSINOPHIL # BLD AUTO: 0.2 10E9/L (ref 0–0.7)
EOSINOPHIL NFR BLD AUTO: 4.5 %
ERYTHROCYTE [DISTWIDTH] IN BLOOD BY AUTOMATED COUNT: 13.8 % (ref 10–15)
GFR SERPL CREATININE-BSD FRML MDRD: 2 ML/MIN/1.7M2
GFR SERPL CREATININE-BSD FRML MDRD: 2 ML/MIN/1.7M2
GLUCOSE BLD-MCNC: 98 MG/DL (ref 70–99)
GLUCOSE SERPL-MCNC: 102 MG/DL (ref 70–99)
GLUCOSE SERPL-MCNC: 87 MG/DL (ref 70–99)
HCT VFR BLD AUTO: 26.1 % (ref 35–47)
HCT VFR BLD CALC: 23 %PCV (ref 35–47)
HGB BLD CALC-MCNC: 7.8 G/DL (ref 11.7–15.7)
HGB BLD-MCNC: 8.3 G/DL (ref 11.7–15.7)
IMM GRANULOCYTES # BLD: 0 10E9/L (ref 0–0.4)
IMM GRANULOCYTES NFR BLD: 0 %
LYMPHOCYTES # BLD AUTO: 0.8 10E9/L (ref 0.8–5.3)
LYMPHOCYTES NFR BLD AUTO: 22.9 %
MCH RBC QN AUTO: 32.3 PG (ref 26.5–33)
MCHC RBC AUTO-ENTMCNC: 31.8 G/DL (ref 31.5–36.5)
MCV RBC AUTO: 102 FL (ref 78–100)
MONOCYTES # BLD AUTO: 0.1 10E9/L (ref 0–1.3)
MONOCYTES NFR BLD AUTO: 3.6 %
NEUTROPHILS # BLD AUTO: 2.5 10E9/L (ref 1.6–8.3)
NEUTROPHILS NFR BLD AUTO: 68.4 %
NRBC # BLD AUTO: 0 10*3/UL
NRBC BLD AUTO-RTO: 0 /100
PCO2 BLDV: 33 MM HG (ref 40–50)
PH BLDV: 7.35 PH (ref 7.32–7.43)
PLATELET # BLD AUTO: 166 10E9/L (ref 150–450)
PO2 BLDV: 42 MM HG (ref 25–47)
POTASSIUM BLD-SCNC: 4.4 MMOL/L (ref 3.4–5.3)
POTASSIUM SERPL-SCNC: 4.5 MMOL/L (ref 3.4–5.3)
POTASSIUM SERPL-SCNC: 5 MMOL/L (ref 3.4–5.3)
RBC # BLD AUTO: 2.57 10E12/L (ref 3.8–5.2)
SAO2 % BLDV FROM PO2: 75 %
SODIUM BLD-SCNC: 136 MMOL/L (ref 133–144)
SODIUM SERPL-SCNC: 134 MMOL/L (ref 133–144)
SODIUM SERPL-SCNC: 136 MMOL/L (ref 133–144)
WBC # BLD AUTO: 3.6 10E9/L (ref 4–11)

## 2018-10-28 PROCEDURE — 93005 ELECTROCARDIOGRAM TRACING: CPT | Performed by: EMERGENCY MEDICINE

## 2018-10-28 PROCEDURE — 82330 ASSAY OF CALCIUM: CPT

## 2018-10-28 PROCEDURE — 71046 X-RAY EXAM CHEST 2 VIEWS: CPT

## 2018-10-28 PROCEDURE — 40000502 ZZHCL STATISTIC GLUCOSE ED POCT

## 2018-10-28 PROCEDURE — 82803 BLOOD GASES ANY COMBINATION: CPT

## 2018-10-28 PROCEDURE — 80048 BASIC METABOLIC PNL TOTAL CA: CPT | Performed by: EMERGENCY MEDICINE

## 2018-10-28 PROCEDURE — 99285 EMERGENCY DEPT VISIT HI MDM: CPT | Mod: 25 | Performed by: EMERGENCY MEDICINE

## 2018-10-28 PROCEDURE — 99284 EMERGENCY DEPT VISIT MOD MDM: CPT | Mod: Z6 | Performed by: EMERGENCY MEDICINE

## 2018-10-28 PROCEDURE — 40000501 ZZHCL STATISTIC HEMATOCRIT ED POCT

## 2018-10-28 PROCEDURE — 85025 COMPLETE CBC W/AUTO DIFF WBC: CPT | Performed by: EMERGENCY MEDICINE

## 2018-10-28 PROCEDURE — 40000498 ZZHCL STATISTIC POTASSIUM ED POCT

## 2018-10-28 PROCEDURE — 40000497 ZZHCL STATISTIC SODIUM ED POCT

## 2018-10-28 RX ORDER — CYCLOBENZAPRINE HCL 10 MG
5 TABLET ORAL 2 TIMES DAILY PRN
Qty: 20 TABLET | Refills: 0 | Status: SHIPPED | OUTPATIENT
Start: 2018-10-28 | End: 2018-11-03

## 2018-10-28 ASSESSMENT — ENCOUNTER SYMPTOMS: ABDOMINAL DISTENTION: 1

## 2018-10-28 NOTE — ED AVS SNAPSHOT
[facility], Emergency Department    [address] [address]    Phone:  [phone]                                       Jelly Dietz   MRN: [id number]    Department:  [facility], Emergency Department   Date of Visit:  10/28/2018           Patient Information     Date Of Birth          1985        Your diagnoses for this visit were:     Encounter for medical screening examination        You were seen by Wilber Beltran MD.        Discharge Instructions       Call your Nephrologist tomorrow and ask if you should go to dialysis asap.    24 Hour Appointment Hotline       To make an appointment at any [facility], call [phone] ([phone]). If you don't have a family doctor or clinic, we will help you find one. [facility] clinics are conveniently located to serve the needs of you and your family.             Review of your medicines      CONTINUE these medicines which may have CHANGED, or have new prescriptions. If we are uncertain of the size of tablets/capsules you have at home, strength may be listed as something that might have changed.        Dose / Directions Last dose taken    * cyclobenzaprine 5 MG tablet   Commonly known as:  FLEXERIL   Dose:  5 mg   What changed:  Another medication with the same name was added. Make sure you understand how and when to take each.   Quantity:  15 tablet        Take 1 tablet (5 mg) by mouth nightly as needed for muscle spasms   Refills:  0        * cyclobenzaprine 10 MG tablet   Commonly known as:  FLEXERIL   Dose:  5 mg   What changed:  You were already taking a medication with the same name, and this prescription was added. Make sure you understand how and when to take each.   Quantity:  20 tablet        Take 0.5 tablets (5 mg) by mouth 2 times daily as needed for muscle spasms   Refills:  0        * Notice:  This list has 2 medication(s) that are the same as other medications prescribed for you. Read the directions carefully, and ask  your doctor or other care provider to review them with you.      Our records show that you are taking the medicines listed below. If these are incorrect, please call your family doctor or clinic.        Dose / Directions Last dose taken    acetaminophen 325 MG tablet   Commonly known as:  TYLENOL   Dose:  325-650 mg   Quantity:  60 tablet        Take 1-2 tablets by mouth every 4 hours as needed.   Refills:  0        amLODIPine 10 MG tablet   Commonly known as:  NORVASC   Dose:  10 mg   Quantity:  30 tablet        Take 1 tablet (10 mg) by mouth daily   Refills:  11        carvedilol 25 MG tablet   Commonly known as:  COREG   Dose:  25 mg   Quantity:  60 tablet        Take 1 tablet (25 mg) by mouth 2 times daily (with meals)   Refills:  11        cetirizine 10 MG tablet   Commonly known as:  zyrTEC   Dose:  10 mg        Take 10 mg by mouth At Bedtime   Refills:  0        cloNIDine 0.1 MG tablet   Commonly known as:  CATAPRES   Dose:  0.1 mg   Quantity:  60 tablet        Take 1 tablet (0.1 mg) by mouth 3 times daily as needed   Refills:  3        DIALYVITE Tabs   Quantity:  30 tablet        1 tablet by mouth daily   Refills:  11        doxycycline 100 MG tablet   Commonly known as:  VIBRA-TABS   Dose:  1 tablet        Take 1 tablet by mouth 2 times daily (with meals)   Refills:  0        Lidocaine 4 % Patch   Commonly known as:  LIDOCARE   Dose:  1 patch   Quantity:  30 patch        Place 1 patch onto the skin every 24 hours   Refills:  1        melatonin 1 MG Tabs tablet   Dose:  1 mg        Take 1 mg by mouth   Refills:  0        RENVELA 800 MG tablet   Dose:  1 tablet   Generic drug:  sevelamer        Take 1 tablet by mouth 3 times daily (with meals)   Refills:  0                Prescriptions were sent or printed at these locations (1 Prescription)                   Other Prescriptions                Printed at Department/Unit printer (1 of 1)         cyclobenzaprine (FLEXERIL) 10 MG tablet                Procedures  "and tests performed during your visit     Procedure/Test Number of Times Performed    Basic metabolic panel 2    CBC with platelets differential 1    EKG 12 lead 1    EKG 12-lead, tracing only 1    ISTAT CG8 gas elec iCA gluc sheila nurse POCT 1    ISTAT gases elec ica gluc sheila POCT 1    XR Chest 2 Views 1      Orders Needing Specimen Collection     None      Pending Results     Date and Time Order Name Status Description    10/28/2018 2151 XR Chest 2 Views Preliminary     10/28/2018 2144 EKG 12 lead Preliminary             Pending Culture Results     No orders found from 10/26/2018 to 10/29/2018.            Pending Results Instructions     If you had any lab results that were not finalized at the time of your Discharge, you can call the ED Lab Result RN at [phone]. You will be contacted by this team for any positive Lab results or changes in treatment. The nurses are available 7 days a week from 10A to 6:30P.  You can leave a message 24 hours per day and they will return your call.        Thank you for choosing [facility]       Thank you for choosing [facility] for your care. Our goal is always to provide you with excellent care. Hearing back from our patients is one way we can continue to improve our services. Please take a few minutes to complete the written survey that you may receive in the mail after you visit with us. Thank you!        [employer] Information     [employer] lets you send messages to your doctor, view your test results, renew your prescriptions, schedule appointments and more. To sign up, go to [email or web address] . Click on \"Log in\" on the left side of the screen, which will take you to the Welcome page. Then click on \"Sign up Now\" on the right side of the page.     You will be asked to enter the access code listed below, as well as some personal information. Please follow the directions to create your username and password.     Your access code is: [email or web address]  Expires: 11/23/2018 12:38 PM   "   Your access code will  in 90 days. If you need help or a new code, please call your [facility] clinic or [phone].        Care EveryWhere ID     This is your Care EveryWhere ID. This could be used by other organizations to access your [facility] medical records  [id number]        Equal Access to Services     SKY GREGORY : Genesis henriquezo [place], waaxda luqadaha, qaybta kaalmayohannes vickers, lazara bernstein. So [facility] [phone].    ATENCIÓN: Si habla español, tiene a kan disposición servicios gratuitos de asistencia lingüística. Llame al [phone].    We comply with applicable federal civil rights laws and [place] laws. We do not discriminate on the basis of race, color, national origin, age, disability, sex, sexual orientation, or gender identity.            After Visit Summary       This is your record. Keep this with you and show to your community pharmacist(s) and doctor(s) at your next visit.

## 2018-10-28 NOTE — ED AVS SNAPSHOT
[facility], [place], Emergency Department    [address] [place] [address]    Phone:  [phone]                                       Jelly Dietz   MRN: [id number]    Department:  [facility], Emergency Department   Date of Visit:  10/28/2018           After Visit Summary Signature Page     I have received my discharge instructions, and my questions have been answered. I have discussed any challenges I see with this plan with the nurse or doctor.    ..........................................................................................................................................  Patient/Patient Representative Signature      ..........................................................................................................................................  Patient Representative Print Name and Relationship to Patient    ..................................................               ................................................  Date                                   Time    ..........................................................................................................................................  Reviewed by Signature/Title    ...................................................              ..............................................  Date                                               Time          22EPIC Rev 08/18

## 2018-10-29 ENCOUNTER — HOSPITAL ENCOUNTER (OUTPATIENT)
Facility: CLINIC | Age: 33
Setting detail: OBSERVATION
Discharge: HOME | End: 2018-10-30
Attending: EMERGENCY MEDICINE | Admitting: FAMILY MEDICINE
Payer: MEDICARE

## 2018-10-29 VITALS
HEART RATE: 79 BPM | BODY MASS INDEX: 21.74 KG/M2 | OXYGEN SATURATION: 100 % | HEIGHT: 67 IN | SYSTOLIC BLOOD PRESSURE: 149 MMHG | RESPIRATION RATE: 16 BRPM | TEMPERATURE: 98.7 F | WEIGHT: 138.5 LBS | DIASTOLIC BLOOD PRESSURE: 106 MMHG

## 2018-10-29 DIAGNOSIS — Z99.2 DEPENDENCE ON HEMODIALYSIS (H): ICD-10-CM

## 2018-10-29 DIAGNOSIS — I10 ESSENTIAL HYPERTENSION, MALIGNANT: ICD-10-CM

## 2018-10-29 DIAGNOSIS — E87.70 HYPERVOLEMIA, UNSPECIFIED HYPERVOLEMIA TYPE: ICD-10-CM

## 2018-10-29 DIAGNOSIS — Z91.158 NONCOMPLIANCE WITH RENAL DIALYSIS (H): ICD-10-CM

## 2018-10-29 DIAGNOSIS — N18.6 ESRD (END STAGE RENAL DISEASE) ON DIALYSIS (H): ICD-10-CM

## 2018-10-29 DIAGNOSIS — E83.51 HYPOCALCEMIA: ICD-10-CM

## 2018-10-29 DIAGNOSIS — Z99.2 ESRD (END STAGE RENAL DISEASE) ON DIALYSIS (H): ICD-10-CM

## 2018-10-29 DIAGNOSIS — N18.6 END STAGE RENAL DISEASE (H): ICD-10-CM

## 2018-10-29 LAB
BASOPHILS # BLD AUTO: 0 10E9/L (ref 0–0.2)
BASOPHILS NFR BLD AUTO: 1 %
CA-I BLD-SCNC: 3.5 MG/DL (ref 4.4–5.2)
CO2 BLDCOV-SCNC: 18 MMOL/L (ref 21–28)
DIFFERENTIAL METHOD BLD: ABNORMAL
EOSINOPHIL # BLD AUTO: 0.2 10E9/L (ref 0–0.7)
EOSINOPHIL NFR BLD AUTO: 6 %
ERYTHROCYTE [DISTWIDTH] IN BLOOD BY AUTOMATED COUNT: 13.5 % (ref 10–15)
GLUCOSE BLD-MCNC: 92 MG/DL (ref 70–99)
HCT VFR BLD AUTO: 25.7 % (ref 35–47)
HCT VFR BLD CALC: 30 %PCV (ref 35–47)
HGB BLD CALC-MCNC: 10.2 G/DL (ref 11.7–15.7)
HGB BLD-MCNC: 8.4 G/DL (ref 11.7–15.7)
IMM GRANULOCYTES # BLD: 0 10E9/L (ref 0–0.4)
IMM GRANULOCYTES NFR BLD: 0 %
INTERPRETATION ECG - MUSE: NORMAL
LYMPHOCYTES # BLD AUTO: 1 10E9/L (ref 0.8–5.3)
LYMPHOCYTES NFR BLD AUTO: 25.7 %
MCH RBC QN AUTO: 32.8 PG (ref 26.5–33)
MCHC RBC AUTO-ENTMCNC: 32.7 G/DL (ref 31.5–36.5)
MCV RBC AUTO: 100 FL (ref 78–100)
MONOCYTES # BLD AUTO: 0.1 10E9/L (ref 0–1.3)
MONOCYTES NFR BLD AUTO: 2.1 %
NEUTROPHILS # BLD AUTO: 2.5 10E9/L (ref 1.6–8.3)
NEUTROPHILS NFR BLD AUTO: 65.2 %
NRBC # BLD AUTO: 0 10*3/UL
NRBC BLD AUTO-RTO: 0 /100
PCO2 BLDV: 36 MM HG (ref 40–50)
PH BLDV: 7.31 PH (ref 7.32–7.43)
PLATELET # BLD AUTO: 173 10E9/L (ref 150–450)
PO2 BLDV: 42 MM HG (ref 25–47)
POTASSIUM BLD-SCNC: 4.6 MMOL/L (ref 3.4–5.3)
RBC # BLD AUTO: 2.56 10E12/L (ref 3.8–5.2)
SAO2 % BLDV FROM PO2: 73 %
SODIUM BLD-SCNC: 137 MMOL/L (ref 133–144)
WBC # BLD AUTO: 3.9 10E9/L (ref 4–11)

## 2018-10-29 PROCEDURE — 82330 ASSAY OF CALCIUM: CPT

## 2018-10-29 PROCEDURE — 93010 ELECTROCARDIOGRAM REPORT: CPT | Mod: Z6 | Performed by: EMERGENCY MEDICINE

## 2018-10-29 PROCEDURE — 40000497 ZZHCL STATISTIC SODIUM ED POCT

## 2018-10-29 PROCEDURE — 80048 BASIC METABOLIC PNL TOTAL CA: CPT | Performed by: EMERGENCY MEDICINE

## 2018-10-29 PROCEDURE — 85025 COMPLETE CBC W/AUTO DIFF WBC: CPT | Performed by: EMERGENCY MEDICINE

## 2018-10-29 PROCEDURE — 40000502 ZZHCL STATISTIC GLUCOSE ED POCT

## 2018-10-29 PROCEDURE — 40000501 ZZHCL STATISTIC HEMATOCRIT ED POCT

## 2018-10-29 PROCEDURE — 93005 ELECTROCARDIOGRAM TRACING: CPT | Performed by: EMERGENCY MEDICINE

## 2018-10-29 PROCEDURE — 99285 EMERGENCY DEPT VISIT HI MDM: CPT | Mod: 25 | Performed by: EMERGENCY MEDICINE

## 2018-10-29 PROCEDURE — 82803 BLOOD GASES ANY COMBINATION: CPT

## 2018-10-29 PROCEDURE — 40000498 ZZHCL STATISTIC POTASSIUM ED POCT

## 2018-10-29 PROCEDURE — 84703 CHORIONIC GONADOTROPIN ASSAY: CPT | Performed by: EMERGENCY MEDICINE

## 2018-10-29 ASSESSMENT — ENCOUNTER SYMPTOMS
VOMITING: 0
FEVER: 0
ANAL BLEEDING: 0
BLOOD IN STOOL: 0
SHORTNESS OF BREATH: 1

## 2018-10-29 NOTE — IP AVS SNAPSHOT
"` `     UNIT 6D OBSERVATION [facility]: [phone]                                              INTERAGENCY TRANSFER FORM - NURSING   10/29/2018                    Hospital Admission Date: 10/29/2018  KEITH COLE   : 1985  Sex: Female        Attending Provider: (none)    Allergies:  No Known Allergies    Infection:  MRSA   Service:  EMERGENCY ME    Ht:  1.689 m (5' 6.5\")   Wt:  63.5 kg (139 lb 14.4 oz)   Admission Wt:  63.6 kg (140 lb 3.2 oz)    BMI:  22.24 kg/m 2   BSA:  1.73 m 2            Patient PCP Information     Provider PCP Type    [facility]      Current Code Status     Date Active Code Status Order ID Comments User Context       10/30/2018  1:47 AM Full Code [id number]  Eva Early PA ED       Questions for Current Code Status     Question Answer Comment    Code status determined by: Discussion with patient/legal decision maker       Code Status History     Date Active Date Inactive Code Status Order ID Comments User Context    2018  8:36 AM 10/30/2018  1:47 AM Full Code [id number]  Alba Bradshaw DO Outpatient    2018  1:23 PM 2018  8:36 AM Full Code [id number]  Sheila Resendez MD Inpatient    2017  4:02 PM 2018  1:23 PM Full Code [id number]  Claudia Dominguez PA-C Outpatient    2017  3:57 PM 2017  4:02 PM Full Code [id number]  Claudia Dominguez PA-C Inpatient    2017  6:15 PM 2017  3:57 PM Full Code [id number]  Dakota Bailey DO Outpatient    2017  2:44 AM 2017  6:15 PM Full Code [id number]  Michael Pastrana MD Inpatient    2015  2:08 PM 2017  2:44 AM Full Code [id number]  Martín Saeed PA Outpatient    2015 12:11 AM 2015  2:08 PM Full Code [id number]  Mendoza Ridley DO ED    2015 11:49 AM 2015 12:11 AM Full Code [id number]  Ulises Prescott MD [facility]    2015  9:21 PM 2015 11:49 AM Full Code [id number]  Jessica Han " ROCAEL Krishnan Inpatient    11/15/2015 11:33 AM 11/18/2015  9:21 PM Full Code [id number]  Kaycee Meyers MD Outpatient    11/14/2015 10:14 AM 11/15/2015 11:33 AM Full Code [id number]  Kaycee Meyers MD Inpatient    10/1/2015 11:28 AM 11/14/2015 10:14 AM Full Code [id number]  Raji Webb MD Outpatient    9/29/2015  3:09 PM 10/1/2015 11:28 AM Full Code [id number]  Raji Webb MD Inpatient    9/22/2015 11:11 AM 9/29/2015  3:09 PM Full Code [id number]  Isabelle Poon MD Outpatient    9/17/2015  8:50 PM 9/22/2015 11:11 AM Full Code [id number]  Winston Arguello MD Inpatient    7/8/2015 11:25 AM 9/17/2015  8:50 PM Full Code [id number]  Rae Shoemaker MD Outpatient    11/12/2014 12:50 PM 7/8/2015 11:25 AM Full Code [id number]  Michael Pastrana MD Outpatient    11/11/2014  8:18 AM 11/12/2014 12:50 PM Full Code [id number]  Carlos Ross DO Inpatient    6/24/2014 10:46 AM 11/11/2014  8:18 AM Full Code [id number]  Vance Reynaga MD Outpatient    6/24/2014  5:22 AM 6/24/2014 10:46 AM Full Code [id number]  Lisandra Callaway MD Inpatient    3/29/2013  1:46 PM 3/29/2013  5:14 PM Full Code [id number]  Myla Hopkins RN Inpatient    3/21/2013  2:39 PM 3/29/2013  1:46 PM Full Code [id number]  Alba Ribera PA-C Outpatient    3/20/2013  6:15 PM 3/21/2013  2:39 PM Full Code [id number]  Theresa Prince PA-C Inpatient    1/19/2013 10:05 AM 3/20/2013  6:15 PM Full Code [id number]  Nika Ram MD Outpatient    1/17/2013 12:15 AM 1/19/2013 10:05 AM Full Code [id number]  Nathalia Funez MD Inpatient    12/3/2012 11:21 AM 1/17/2013 12:15 AM Full Code [id number]  Rome Cardenas MD Outpatient    11/30/2012 12:36 AM 12/3/2012 11:21 AM Full Code [id number]  Song Ricardo MD Inpatient    7/14/2012  8:28 PM 7/22/2012  5:55 PM Full Code [id number]  Lew Reed MD Inpatient      Advance Directives        Scanned docmt in ACP Activity?           No scanned doc         Hospital Problems as of 10/30/2018              Priority Class Noted POA    ESRD (end stage renal disease) (H) Medium  10/30/2018 Yes      Non-Hospital Problems as of 10/30/2018              Priority Class Noted    Panic attacks Medium  6/13/2001    CARDIOVASCULAR SCREENING; LDL GOAL LESS THAN 160 Low  10/31/2010    S/P kidney transplant Medium  Unknown    Hypertension Medium  Unknown    Chronic renal transplant rejection Medium  3/29/2013    ACS (acute coronary syndrome) (H) Medium  11/11/2014    Anemia in chronic renal disease Medium  Unknown    End stage kidney disease (H) Medium  Unknown    IgA nephropathy Medium  Unknown    Pre-transplant evaluation for kidney transplant Medium  3/25/2017    Hyperkalemia Medium  4/30/2017      Immunizations     Name Date      Influenza (IIV3) PF 10/15/12     Influenza Vaccine IM 3yrs+ 4 Valent IIV4 10/01/15     Mantoux Tuberculin Skin Test 11/30/12     Pneumococcal 23 valent 10/01/15          END      ASSESSMENT     Discharge Profile Flowsheet     EXPECTED DISCHARGE     Patient's communication style  spoken language (English or Bilingual) 10/30/18 0235    Expected Discharge Date  01/26/17 (DRG 1.0> OBS> admit 1/25> single/apt) 01/25/17 0829   FINAL RESOURCES      DISCHARGE NEEDS ASSESSMENT     Resources List  Outpatient Dialysis 10/30/18 0752    Patient/family verbalizes understanding of discharge plan recommendations?  Yes 01/25/17 1922   Outpatient Dialysis  -- ([facility]-) 10/30/18 0752    Medical Team notified of plan?  yes 01/25/17 1922   SKIN      Transportation Available  taxi 01/25/17 1922   Inspection of bony prominences  Full except (identify areas not inspected) 10/30/18 0942    # of Referrals Placed by CTS  Scheduled Follow-up appointments 10/01/15 1051   Full except areas not inspected   Hip, left;Hip, right;Buttock, left;Buttock, right;Sacrum;Coccyx 10/30/18 0942    Does Patient Need a Referral for Clinic CC  No 10/01/15 1051   Skin  "Color/Characteristics  without discoloration 10/30/18 0942    Primary Care Clinic Name  [facility] 10/01/15 1051   Skin Temperature  warm 10/30/18 0942    Primary Care MD Name  Dr. Sailaja WARNER 10/01/15 1051   Skin Moisture  dry 10/30/18 0942    GASTROINTESTINAL (ADULT,PEDIATRIC,OB)     Skin Elasticity  quick return to original state 10/30/18 0942    GI WDL  WDL 10/30/18 1023   SAFETY      Last Bowel Movement  10/29/18 (At home) 10/30/18 0642   Safety WDL  WDL 10/30/18 1023    Passing flatus  yes 10/30/18 0642   All Alarms  alarm(s) activated and audible 10/30/18 1023    COMMUNICATION ASSESSMENT                        Assessment WDL (Within Defined Limits) Definitions           Safety WDL     Effective: 09/28/15    Row Information: <b>WDL Definition:</b> Bed in low position, wheels locked; call light in reach; upper side rails up x 2; ID band on<br> <font color=\"gray\"><i>Item=AS safety wdl>>List=AS safety wdl>>Version=F14</i></font>      Skin WDL     Effective: 09/28/15    Row Information: <b>WDL Definition:</b> Warm; dry; intact; elastic; without discoloration; pressure points without redness<br> <font color=\"gray\"><i>Item=AS skin wdl>>List=AS skin wdl>>Version=F14</i></font>      Vitals     Vital Signs Flowsheet     VITAL SIGNS     Height Method  Actual 10/30/18 0734    Temp  98.7  F (37.1  C) 10/30/18 1504   Weight  63.5 kg (139 lb 14.4 oz) 10/30/18 0734    Temp src  Oral 10/30/18 1504   Weight Method  Standing scale 10/30/18 0734    Resp  20 10/30/18 1504   POSITIONING      Pulse  85 10/30/18 0127   Body Position  independently positioning 10/30/18 1023    Heart Rate  98 10/30/18 1504   Head of Bed (HOB)  HOB at 30-45 degrees 10/30/18 1023    Pulse/Heart Rate Source  Monitor 10/30/18 0734   Chair  Upright in chair 08/25/18 0934    BP  (!)  151/103 10/30/18 1504   DAILY CARE      BP Location  Right arm 10/30/18 0734   Activity Management  activity adjusted per tolerance 10/30/18 0942    Pain Score  0 (None) (no chest pain or " "SOB ) 09/26/18 1506   Activity Assistance Provided  independent 10/30/18 0942    OXYGEN THERAPY     ECG      SpO2  98 % 10/30/18 1504   ECG Rhythm  Sinus rhythm 10/30/18 1441    O2 Device  None (Room air) 10/30/18 1504   Ectopy  None 10/30/18 1002    PAIN/COMFORT     EKG MONITORING      Patient Currently in Pain  denies 10/30/18 1431   Cardiac Regularity  Regular 08/24/18 0723    Preferred Pain Scale  CAPA (Clinically Aligned Pain Assessment) ([facility] Adults Only) 10/30/18 1002   Cardiac Rhythm  NSR 08/24/18 0723    Pain Intervention(s)  Medication (See eMAR);Repositioned 08/25/18 1228   ANALGESIA SIDE EFFECTS MONITORING      Response to Interventions  Decrease in pain 08/24/18 2142   Side Effects Monitoring: Respiratory Quality  R 08/24/18 2142    CLINICALLY ALIGNED PAIN ASSESSMENT (CAPA) ([facility] ADULTS ONLY)     Side Effects Monitoring: Respiratory Depth  N 08/24/18 2142    Comfort  comfortably manageable 10/29/18 2345   Side Effects Monitoring: Sedation Level  1 08/24/18 2142    Change in Pain  about the same 10/30/18 0137   LATONIA COMA SCALE      Pain Control  partially effective 08/24/18 2142   Best Eye Response  4-->(E4) spontaneous 10/30/18 0642    Functioning  can do everything I need to 08/24/18 2142   Best Motor Response  6-->(M6) obeys commands 10/30/18 0642    Sleep  normal sleep 10/30/18 0349   Best Verbal Response  5-->(V5) oriented 10/30/18 0642    HEIGHT AND WEIGHT     [place] Coma Scale Score  15 10/30/18 0642    Height  1.689 m (5' 6.5\") 10/28/18 2140                 Patient Lines/Drains/Airways Status    Active LINES/DRAINS/AIRWAYS     Name: Placement date: Placement time: Site: Days: Last dressing change:    Hemodialysis Vascular Access Arteriovenous graft Left Arm       Arm        Peripheral IV 10/29/18 Right Lower forearm 10/29/18   2336   Lower forearm   less than 1             Patient Lines/Drains/Airways Status    Active PICC/CVC     None          "   Intake/Output Detail Report     Date Intake   Output Net    Shift Other IV Piggyback Total Other Total       Leticia 10/29/18 0700 - 10/29/18 1459 -- -- -- -- -- 0    Noc 10/29/18 1500 - 10/29/18 2359 -- -- -- -- -- 0    Day 10/30/18 0000 - 10/30/18 0659 -- -- -- -- -- 0    Leticia 10/30/18 0700 - 10/30/18 1459 0 -- -- 4000 4000 -4000    Noc 10/30/18 1500 - 10/30/18 2359 -- -- -- -- -- 0      Last Void/BM       Most Recent Value    Urine Occurrence 1 at 10/30/2018 0917    Stool Occurrence       Case Management/Discharge Planning     Case Management/Discharge Planning Flowsheet     REFERRAL INFORMATION     Patient/family verbalizes understanding of discharge plan recommendations?  Yes 01/25/17 1922    Arrived From  home or self-care 01/25/17 1414   Medical Team notified of plan?  yes 01/25/17 1922    # of Referrals Placed by CTS  Scheduled Follow-up appointments 10/01/15 1051   Transportation Available  taxi 01/25/17 1922    Primary Care Clinic Name  [facility] 10/01/15 1051   Does Patient Need a Referral for Clinic CC  No 10/01/15 1051    Primary Care MD Name  Dr. Sailaja WARNER 10/01/15 1051   FINAL RESOURCES      LIVING ENVIRONMENT     Resources List  Outpatient Dialysis 10/30/18 0752    Lives With  child(césar), dependent 10/30/18 0235   Outpatient Dialysis  -- ([facility]- 507-) 10/30/18 0752    Living Arrangements  apartment 09/09/15 1605   ABUSE RISK SCREEN      Provides Primary Care For  child(césar) (son and dtr at home) 01/25/17 1414   QUESTION TO PATIENT:  Has a member of your family or a partner(now or in the past) intimidated, hurt, manipulated, or controlled you in any way?  no 10/30/18 0235    COPING/STRESS     QUESTION TO PATIENT: Do you feel safe going back to the place where you are living?  yes 10/30/18 0235    Major Change/Loss/Stressor  none 08/24/18 1331   OBSERVATION: Is there reason to believe there has been maltreatment of a vulnerable adult (ie. Physical/Sexual/Emotional abuse, self neglect, lack of adequate  food, shelter, medical care, or financial exploitation)?  no 10/30/18 0235    Patient Personal Strengths  expressive of needs;positive attitude;strong support system;successful coping history 09/09/15 1604   OTHER      EXPECTED DISCHARGE     Are you depressed or being treated for depression?  No 10/30/18 0235    Expected Discharge Date  01/26/17 (DRG 1.0> OBS> admit 1/25> single/apt) 01/25/17 0829   HOMICIDE RISK      DISCHARGE PLANNING     Feels Like Hurting Others  no 10/30/18 0235

## 2018-10-29 NOTE — IP AVS SNAPSHOT
MRN:[id number]                      After Visit Summary   10/29/2018    Jelly Dietz    MRN: [id number]           Thank you!     Thank you for choosing [facility] for your care. Our goal is always to provide you with excellent care. Hearing back from our patients is one way we can continue to improve our services. Please take a few minutes to complete the written survey that you may receive in the mail after you visit with us. Thank you!        Patient Information     Date Of Birth          1985        Designated Caregiver       Most Recent Value    Caregiver    Will someone help with your care after discharge? yes    Name of designated caregiver [place]    Phone number of caregiver [phone]    Caregiver address [address]      About your hospital stay     You were admitted on:  October 30, 2018 You last received care in the:  Unit 6D Observation [facility]    You were discharged on:  October 30, 2018        Reason for your hospital stay       dialysis                  Who to Call     For medical emergencies, please call 911.  For non-urgent questions about your medical care, please call your primary care provider or clinic, [phone]          Attending Provider     Provider Specialty    Edvin Hillman MD Emergency Medicine    [facility]       Primary Care Provider Office Phone # Fax #    [facility] [phone]       When to contact your care team       after treatment:    Chest pain    Bleeding from the needle site    Shortness of breath    Fever or chills    Headache or lightheadedness    Nausea or vomiting    Itching    Muscle cramps    Pain, warm or redness at your access site    Inability to feel your blood flow (called a thrill) in your AV fistula or graft                  After Care Instructions     Activity       Your activity upon discharge: As tolerated            Diet       Follow this diet upon  discharge: Dialysis diet                  Follow-up Appointments     Adult [facility] Follow-up and recommended labs and tests       dialyze at [facility] Wed 10/31 and [place] Thursday 11/1.     Appointments on [place] and/or [facility] [place] [facility] (with [facility] provider or service). Call [phone] if you haven't heard regarding these appointments within 7 days of discharge.                  Further instructions from your care team       Dcing RN please fax clinicals, AVS, HD run info to [facility] at the time of Discharge-  [place] [address] [address]  Phone: [phone]  Fax: [phone]    [facility]- [place] [address] [place] [address]  Phone: [phone]  Fax: [phone]   You are able to have a dialysis run tomorrow at the [facility] that you have visited in the past  We have arranged a arrival time and chair time for you,  Please arrive @ 0930 with chair time for 10 am 10/31/18 .  They are unable to accommodate a run for Thursday nov 1st, therefor the [place] unit is still expecting you for your usual run.      Pending Results     No orders found for last 3 day(s).            Statement of Approval     Ordered          10/30/18 4179  I have reviewed and agree with all the recommendations and orders detailed in this document.  EFFECTIVE NOW     Approved and electronically signed by:  Beti Kendall APRN CNP             Admission Information     Date & Time Department Dept. Phone    10/29/2018 Unit 6D Observation [facility] [place] [phone]      Your Vitals Were     Blood Pressure Pulse Temperature Respirations Weight Last Period    151/103 85 98.7  F (37.1  C) (Oral) 20 63.5 kg (139 lb 14.4 oz) 10/29/2018 (Exact Date)    Pulse Oximetry BMI (Body Mass Index)                98% 22.24 kg/m2          MyChart Information     [email or web address] lets you send messages to your doctor, view your test results, renew your  "prescriptions, schedule appointments and more. To sign up, go to [email or web address] . Click on \"Log in\" on the left side of the screen, which will take you to the Welcome page. Then click on \"Sign up Now\" on the right side of the page.     You will be asked to enter the access code listed below, as well as some personal information. Please follow the directions to create your username and password.     Your access code is: [email or web address]  Expires: 2018 12:38 PM     Your access code will  in 90 days. If you need help or a new code, please call your [facility] clinic or [phone].        Care EveryWhere ID     This is your Care EveryWhere ID. This could be used by other organizations to access your [facility] medical records  [id number]        Equal Access to Services     SKY GREGORY : Genesis Guo, gabriella bianchi, mary jo vickers, lazara shen . So [place] [phone].    ATENCIÓN: Si habla español, tiene a kan disposición servicios gratuitos de asistencia lingüística. Mian al [phone].    We comply with applicable federal civil rights laws and [place] laws. We do not discriminate on the basis of race, color, national origin, age, disability, sex, sexual orientation, or gender identity.               Review of your medicines      CONTINUE these medicines which may have CHANGED, or have new prescriptions. If we are uncertain of the size of tablets/capsules you have at home, strength may be listed as something that might have changed.        Dose / Directions    cyclobenzaprine 10 MG tablet   Commonly known as:  FLEXERIL   This may have changed:  how much to take        Dose:  5 mg   Take 0.5 tablets (5 mg) by mouth 2 times daily as needed for muscle spasms   Quantity:  20 tablet   Refills:  0         CONTINUE these medicines which have NOT CHANGED        Dose / Directions    acetaminophen 325 MG tablet   Commonly known as:  TYLENOL   Used " for:   delivery delivered        Dose:  325-650 mg   Take 1-2 tablets by mouth every 4 hours as needed.   Quantity:  60 tablet   Refills:  0       amLODIPine 10 MG tablet   Commonly known as:  NORVASC   Used for:  ESRD (end stage renal disease) on dialysis (H), Malignant essential hypertension        Dose:  10 mg   Take 1 tablet (10 mg) by mouth daily   Quantity:  30 tablet   Refills:  11       carvedilol 25 MG tablet   Commonly known as:  COREG   Used for:  Benign essential hypertension, SOB (shortness of breath)        Dose:  25 mg   Take 1 tablet (25 mg) by mouth 2 times daily (with meals)   Quantity:  60 tablet   Refills:  11       cetirizine 10 MG tablet   Commonly known as:  zyrTEC        Dose:  10 mg   Take 10 mg by mouth At Bedtime   Refills:  0       cloNIDine 0.1 MG tablet   Commonly known as:  CATAPRES   Used for:  Benign essential hypertension, SOB (shortness of breath)        Dose:  0.1 mg   Take 1 tablet (0.1 mg) by mouth 3 times daily as needed   Quantity:  60 tablet   Refills:  3       DIALYVITE Tabs   Used for:  Secondary renal hyperparathyroidism (H), Hyperphosphatemia, ESRD (end stage renal disease) on dialysis (H)        1 tablet by mouth daily   Quantity:  30 tablet   Refills:  11       RENVELA 800 MG tablet   Generic drug:  sevelamer        Dose:  1 tablet   Take 1 tablet by mouth 3 times daily (with meals)   Refills:  0                Protect others around you: Learn how to safely use, store and throw away your medicines at www.disposemymeds.org.             Medication List: This is a list of all your medications and when to take them. Check marks below indicate your daily home schedule. Keep this list as a reference.      Medications           Morning Afternoon Evening Bedtime As Needed    acetaminophen 325 MG tablet   Commonly known as:  TYLENOL   Take 1-2 tablets by mouth every 4 hours as needed.                                amLODIPine 10 MG tablet   Commonly known as:  NORVASC    Take 1 tablet (10 mg) by mouth daily   Last time this was given:  10 mg on 10/30/2018  9:15 AM                                carvedilol 25 MG tablet   Commonly known as:  COREG   Take 1 tablet (25 mg) by mouth 2 times daily (with meals)   Last time this was given:  25 mg on 10/30/2018  9:16 AM                                cetirizine 10 MG tablet   Commonly known as:  zyrTEC   Take 10 mg by mouth At Bedtime   Last time this was given:  10 mg on 10/30/2018  2:44 AM                                cloNIDine 0.1 MG tablet   Commonly known as:  CATAPRES   Take 1 tablet (0.1 mg) by mouth 3 times daily as needed                                cyclobenzaprine 10 MG tablet   Commonly known as:  FLEXERIL   Take 0.5 tablets (5 mg) by mouth 2 times daily as needed for muscle spasms   Last time this was given:  5 mg on 10/30/2018  2:44 AM                                DIALYVITE Tabs   1 tablet by mouth daily                                RENVELA 800 MG tablet   Take 1 tablet by mouth 3 times daily (with meals)   Last time this was given:  800 mg on 10/30/2018  9:16 AM   Generic drug:  sevelamer

## 2018-10-29 NOTE — IP AVS SNAPSHOT
"    UNIT 6D OBSERVATION [facility]: [phone]                                              INTERAGENCY TRANSFER FORM - PHYSICIAN ORDERS   10/29/2018                    Hospital Admission Date: 10/29/2018  KEITH COLE   : 1985  Sex: Female        Attending Provider: (none)    Allergies:  No Known Allergies    Infection:  MRSA   Service:  EMERGENCY ME    Ht:  1.689 m (5' 6.5\")   Wt:  63.5 kg (139 lb 14.4 oz)   Admission Wt:  63.6 kg (140 lb 3.2 oz)    BMI:  22.24 kg/m 2   BSA:  1.73 m 2            Patient PCP Information     Provider PCP Type    [facility]      ED Clinical Impression     Diagnosis Description Comment Added By Time Added    ESRD (end stage renal disease) on dialysis (H) [N18.6, Z99.2] ESRD (end stage renal disease) on dialysis (H) [N18.6, Z99.2] with mild fluid overload Edvin Hillman MD 10/30/2018 12:33 AM      Hospital Problems as of 10/30/2018              Priority Class Noted POA    ESRD (end stage renal disease) (H) Medium  10/30/2018 Yes      Non-Hospital Problems as of 10/30/2018              Priority Class Noted    Panic attacks Medium  2001    CARDIOVASCULAR SCREENING; LDL GOAL LESS THAN 160 Low  10/31/2010    S/P kidney transplant Medium  Unknown    Hypertension Medium  Unknown    Chronic renal transplant rejection Medium  3/29/2013    ACS (acute coronary syndrome) (H) Medium  2014    Anemia in chronic renal disease Medium  Unknown    End stage kidney disease (H) Medium  Unknown    IgA nephropathy Medium  Unknown    Pre-transplant evaluation for kidney transplant Medium  3/25/2017    Hyperkalemia Medium  2017      Code Status History     Date Active Date Inactive Code Status Order ID Comments User Context    2018  8:36 AM 10/30/2018  1:47 AM Full Code [id number]  Alba Bradshaw DO Outpatient    2018  1:23 PM 2018  8:36 AM Full Code [id number]  Sheila Resendez MD Inpatient    2017  4:02 PM 2018  " 1:23 PM Full Code [id number]  Claudia Dominguez PA-C Outpatient    4/30/2017  3:57 PM 4/30/2017  4:02 PM Full Code [id number]  Claudia Dominguez PA-C Inpatient    1/25/2017  6:15 PM 4/30/2017  3:57 PM Full Code [id number]  Dakota Bailey DO Outpatient    1/25/2017  2:44 AM 1/25/2017  6:15 PM Full Code [id number]  Michael Pastrana MD Inpatient    11/24/2015  2:08 PM 1/25/2017  2:44 AM Full Code [id number]  Martín Saeed PA Outpatient    11/24/2015 12:11 AM 11/24/2015  2:08 PM Full Code [id number]  Mendoza Ridley,  ED    11/19/2015 11:49 AM 11/24/2015 12:11 AM Full Code [id number]  Ulises Prescott MD Outpatient    11/18/2015  9:21 PM 11/19/2015 11:49 AM Full Code [id number]  Jessica Han PA-C Inpatient    11/15/2015 11:33 AM 11/18/2015  9:21 PM Full Code [id number]  Kaycee Meyers MD Outpatient    11/14/2015 10:14 AM 11/15/2015 11:33 AM Full Code [id number]  Kaycee Meyers MD Inpatient    10/1/2015 11:28 AM 11/14/2015 10:14 AM Full Code [id number]  Raji Webb MD Outpatient    9/29/2015  3:09 PM 10/1/2015 11:28 AM Full Code [id number]  Raji Webb MD Inpatient    9/22/2015 11:11 AM 9/29/2015  3:09 PM Full Code [id number]  Isabelle Poon MD Outpatient    9/17/2015  8:50 PM 9/22/2015 11:11 AM Full Code [id number]  Winston Arguello MD Inpatient    7/8/2015 11:25 AM 9/17/2015  8:50 PM Full Code [id number]  Rae Shoemaker MD Outpatient    11/12/2014 12:50 PM 7/8/2015 11:25 AM Full Code [id number]  Michael Pastrana MD Outpatient    11/11/2014  8:18 AM 11/12/2014 12:50 PM Full Code [id number]  Carlos Ross DO Inpatient    6/24/2014 10:46 AM 11/11/2014  8:18 AM Full Code [id number]  Vance Reynaga MD Outpatient    6/24/2014  5:22 AM 6/24/2014 10:46 AM Full Code [id number]  Lisandra Callaway MD Inpatient    3/29/2013  1:46 PM 3/29/2013  5:14 PM Full Code [id number]  Myla Hopkins RN Inpatient    3/21/2013  2:39 PM 3/29/2013  1:46 PM  Full Code [id number]  Alba Ribera PA-C Outpatient    3/20/2013  6:15 PM 3/21/2013  2:39 PM Full Code [id number]  Theresa Prince PA-C Inpatient    2013 10:05 AM 3/20/2013  6:15 PM Full Code [id number]  Nika Ram MD Outpatient    2013 12:15 AM 2013 10:05 AM Full Code [id number]  Nathalia Funez MD Inpatient    12/3/2012 11:21 AM 2013 12:15 AM Full Code [id number]  Rome Cardenas MD Outpatient    2012 12:36 AM 12/3/2012 11:21 AM Full Code [id number]  Song Ricardo MD Inpatient    2012  8:28 PM 2012  5:55 PM Full Code [id number]  Lew Reed MD Inpatient         Medication Review      CONTINUE these medications which may have CHANGED, or have new prescriptions. If we are uncertain of the size of tablets/capsules you have at home, strength may be listed as something that might have changed.        Dose / Directions Comments    cyclobenzaprine 10 MG tablet   Commonly known as:  FLEXERIL   This may have changed:  how much to take        Dose:  5 mg   Take 0.5 tablets (5 mg) by mouth 2 times daily as needed for muscle spasms   Quantity:  20 tablet   Refills:  0          CONTINUE these medications which have NOT CHANGED        Dose / Directions Comments    acetaminophen 325 MG tablet   Commonly known as:  TYLENOL   Used for:   delivery delivered        Dose:  325-650 mg   Take 1-2 tablets by mouth every 4 hours as needed.   Quantity:  60 tablet   Refills:  0        amLODIPine 10 MG tablet   Commonly known as:  NORVASC   Used for:  ESRD (end stage renal disease) on dialysis (H), Malignant essential hypertension        Dose:  10 mg   Take 1 tablet (10 mg) by mouth daily   Quantity:  30 tablet   Refills:  11        carvedilol 25 MG tablet   Commonly known as:  COREG   Used for:  Benign essential hypertension, SOB (shortness of breath)        Dose:  25 mg   Take 1 tablet (25 mg) by mouth 2 times daily (with meals)   Quantity:  60 tablet   Refills:   11        cetirizine 10 MG tablet   Commonly known as:  zyrTEC        Dose:  10 mg   Take 10 mg by mouth At Bedtime   Refills:  0        cloNIDine 0.1 MG tablet   Commonly known as:  CATAPRES   Used for:  Benign essential hypertension, SOB (shortness of breath)        Dose:  0.1 mg   Take 1 tablet (0.1 mg) by mouth 3 times daily as needed   Quantity:  60 tablet   Refills:  3    For BP >170/100       DIALYVITE Tabs   Used for:  Secondary renal hyperparathyroidism (H), Hyperphosphatemia, ESRD (end stage renal disease) on dialysis (H)        1 tablet by mouth daily   Quantity:  30 tablet   Refills:  11        RENVELA 800 MG tablet   Generic drug:  sevelamer        Dose:  1 tablet   Take 1 tablet by mouth 3 times daily (with meals)   Refills:  0                  Further instructions from your care team       Dcing RN please fax clinicals, AVS, HD run info to [facility] at the time of Discharge-  [address] [address]  Phone: [phone]  Fax: [phone]    [facility] [address] [place] [address]  Phone: [phone]  Fax: [phone]   You are able to have a dialysis run tomorrow at the [facility] that you have visited in the past  We have arranged a arrival time and chair time for you,  Please arrive @ 0930 with chair time for 10 am 10/31/18 .  They are unable to accommodate a run for Thursday nov 1st, therefor the [facility] is still expecting you for your usual run.      Summary of Visit     Reason for your hospital stay       dialysis             After Care     Activity       Your activity upon discharge: As tolerated       Diet       Follow this diet upon discharge: Dialysis diet             Follow-Up Appointment Instructions     Future Labs/Procedures    Adult [facility] Follow-up and recommended labs and tests     Comments:    dialyze at [facility] Wed 10/31 and [place] Thursday 11/1.      Appointments on [place] and[facility]or [facility] (with [facility] or [facility] provider or service). Call [phone] if you haven't heard regarding these appointments within 7 days of discharge.      Follow-Up Appointment Instructions     Adult [facility] Follow-up and recommended labs and tests       dialyze at [facility] Wed 10/31 and [place] Thursday 11/1.     Appointments on [place] and/or [facility] (with [facility] or [facility] provider or service). Call [phone] if you haven't heard regarding these appointments within 7 days of discharge.             Statement of Approval     Ordered          10/30/18 0830  I have reviewed and agree with all the recommendations and orders detailed in this document.  EFFECTIVE NOW     Approved and electronically signed by:  Beti Kendall APRN CNP

## 2018-10-29 NOTE — IP AVS SNAPSHOT
` `           UNIT 6D OBSERVATION [facility]: [phone]                 INTERAGENCY TRANSFER FORM - NOTES (H&P, Discharge Summary, Consults, Procedures, Therapies)   10/29/2018                    Hospital Admission Date: 10/29/2018  KEITH DIETZ   : 1985  Sex: Female        Patient PCP Information     Provider PCP Type    [facility]         History & Physicals      H&P by Eva Early PA at 10/30/2018  1:30 AM     Author:  Eva Early PA Service:  Observation Author Type:  Physician Assistant - C    Filed:  10/30/2018  6:23 AM Date of Service:  10/30/2018  1:30 AM Creation Time:  10/30/2018  1:18 AM    Status:  Attested :  Eva Early PA (Physician Assistant - C)    Cosigner:  Lito Rivero MD at 10/30/2018  8:12 AM        Attestation signed by Lito Rivero MD at 10/30/2018  8:12 AM        Attestation:  Physician Attestation   I, Lito Rivero, saw and evaluated Keith Dietz as part of a shared visit.  I have reviewed and discussed with the advanced practice provider their history, physical and plan.    I personally reviewed the vital signs, medications, labs and imaging.    My key history or physical exam findings: /89 (BP Location: Right arm)  Pulse 85  Temp 98.3  F (36.8  C) (Oral)  Resp 16  Wt 63.5 kg (139 lb 14.4 oz)  LMP 10/29/2018 (Exact Date)  SpO2 100%  BMI 22.24 kg/m2     EXAM:  HEENT: Normal.  Oropharynx clear and moist.  Neck: Supple, trachea midline, normal voice  Chest:  No respiratory distress, speaks in complete sentences, chest wall nontender, lungs clear in all fields  CV: Regular rate and rhythm, 2 out of 6 systolic murmur (patient informs me she has a baseline heart murmur), normal pulse, no jugular venous distention  Abdomen: Nondistended, soft nontender.  No hepatosplenomegaly.  Extremities: Trace bilateral edema at the ankles, no tenderness      Key management decisions made by me: Patient with a  "history of IgA nephropathy who is maintained on dialysis 3 times per week.  She missed her last 2 dialysis runs because she was trying \"an herbal remedy\", also she is from [place] where she normally receives her dialysis but is currently staying in the [facility].  This morning she states she feels \"bloated\" but denies other symptoms.  Chest x-ray obtained yesterday in the ED suggest possible interstitial inflammation, however the patient has no clinical signs of pneumonia or atypical infection.  Also notably, her chest x-ray the day previous was interpreted as normal.  Clinically my suspicion is that these chest x-ray findings are due to increased volume overload due to missed dialysis run.  She was admitted for dialysis.  We will consult nephrology, presumably she can be dialyzed here and then discharged with plans to continue with her outpatient dialysis regimen.    Lito Rivero  Date of Service (when I saw the patient): 10/30/18                               [facility] ED Observation Admission Note    Chief Complaint   Patient presents with     Shortness of Breath     Missed dialysis for approximately two sessions       Assessment[SB1.1]:[SB1.2] Jelly Dietz is a 33 year old female with a history of IgA Nephropathy s/p LDKT (2007) c/b acute cellular rejection (2012), kidney explanted 2013 and now with ESRD on HD (T/Th/Sa at [facility][SB1.1] [place][SB1.3]), ACD who presented to the ED due to missng her 2 last dialysis runs[SB1.1] as she is in the [facility] visiting[SB1.4].     Plan:  1. ESRD[SB1.1]: O[SB1.4]n HD[SB1.1] on[SB1.4] T/Th/Sa at [facility][SB1.1] [place][SB1.3]. Per patient transplant listed. Missed[SB1.4] 2 last dialysis runs[SB1.1]. L[SB1.4]ast dialyzed 7 days ago[SB1.1] on 10/23/18[SB1.4]. Reports generalized malaise,[SB1.1] chest pain,[SB1.4] SOB,[SB1.1] headache,[SB1.4] increased weight[SB1.1], increased abdominal girth and LE edema in the last 1-2 days.[SB1.4] In ED, HR 70's, 's-140's/80's-100's, " SaO2 % on RA, Temp 98.4. Labs show BMP with BUN/Cr 108/20.3, GFR 2, Ca 6.8, iCa 3.5 otherwise normal. VBG with pH 7.31, pCO2 36, bicarb 18. CBC with WBC 3.9, H/H 8.4/25.7, RBC 2.56. CXR negative.[SB1.1] Nephrology was consulted with plan to dialyze her in the morning.   - Nephrology consult  - Continue Renvela[SB1.4]    2.[SB1.1] Hypocalcemia:[SB1.4] Ca 6.8, iCa 3.5[SB1.1]  - Calcium Gluconate 2g IV x 1 now  - Repeat labs in AM[SB1.4]    3.[SB1.1] HTN: - Continue with PTA Norvasc, Coreg, Norvasc.   - Continue with Clonidine prn.[SB1.4]     HPI:    Jelly Dietz is a 33 year old female with a history of IgA Nephropathy s/p LDKT (2007) c/b acute cellular rejection (2012), kidney explanted 2013 and now with ESRD on HD (T/Th/Sa at [facility][SB1.1] [place][SB1.3]), ACD who presented to the ED due to missng her 2 last dialysis runs[SB1.1] as [place]SB1.4]. She states she was last dialyzed 7 days ago[SB1.1] on 10/23/18[SB1.4]. Reports generalized malaise,[SB1.1] chest pain,[SB1.4] SOB,[SB1.1] headache,[SB1.4] increased weight[SB1.1], increased abdominal girth and LE edema in the last 1-2 days. Generally reports medication adherance. Per chart review she was seen in the ED here on 10/28/18 as well for concerns of hyperkalemia and similar complaints as this time. Her labs seem ok and she was discharged.[SB1.4]    In the ED, HR 70's, 's-140's/80's-100's, SaO2 % on RA, Temp 98.4. Labs show BMP with BUN/Cr 108/20.3, GFR 2, Ca 6.8, iCa 3.5 otherwise normal. VBG with pH 7.31, pCO2 36, bicarb 18. CBC with WBC 3.9, H/H 8.4/25.7, RBC 2.56. CXR negative.[SB1.1] Nephrology was consulted with plan to dialyze her in the morning.[SB1.4]     On admission to the observation unit the patient was stable[SB1.1].[SB1.4]     History:    Past Medical History:   Diagnosis Date     ACS (acute coronary syndrome) (H) 11/11/2014     Allergic state     seasonal allergies     Anemia in chronic renal disease       Cervical cerclage suture present in second trimester      Chest pain 2014     Chronic renal transplant rejection      End stage kidney disease (H)      Heart murmur      History of  delivery ,     30 wks, 28 wks      Hypertension     resolved after transplant     IgA nephropathy      MRSA (methicillin resistant Staphylococcus aureus)      [facility]     Pre-eclampsia      Renal failure affecting pregnancy in second trimester      S/P kidney transplant     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 History of 1B kidney rejection      SAB (spontaneous )     2nd trimester        Past Surgical History:   Procedure Laterality Date     CERCLAGE CERVICAL N/A 2015    Procedure: CERCLAGE CERVICAL;  Surgeon: Norbert Chopra MD;  [facility]: [facility] L+D      SECTION  2012    Procedure:  SECTION;;  Surgeon: Chelsea Cross MD;  Location: [facility] L+[facility]      SECTION N/A 2015    Procedure:  SECTION;  Surgeon: Chelsea Cross MD;  Location: [facility] OR     cesearean section       EXPLANT TRANSPLANTED KIDNEY  3/29/2013    Procedure: EXPLANT TRANSPLANTED KIDNEY;  Explant Transplanted right Kidney (from patients right iliac fossa);  Surgeon: Nory Morgan MD;  Location: [facility] OR     NEPHRECTOMY  3/29/13    Transplant nephrectomy      WILIAN/DIALYSIS CATHETER       TRANSPLANT KIDNEY RECIPIENT LIVING UNRELATED  Dec 2007    (Adult male in [place])       Family History   Problem Relation Age of Onset     Diabetes Paternal Grandfather      Breast Cancer Maternal Grandmother 55     Cancer No family hx of      No known family hx of skin cancer       Social History     Social History     Marital status: Single     Spouse name: N/A     Number of children: N/A     Years of education: N/A     Occupational History     Not on file.     Social History Main Topics     Smoking status:  Never Smoker     Smokeless tobacco: Never Used     Alcohol use No     Drug use: No     Sexual activity: Yes     Partners: Male     Other Topics Concern     Blood Transfusions Yes     Multiple in [place] none in [place]     Caffeine Concern No     1s/d     Occupational Exposure No     Hobby Hazards No     Sleep Concern No     Stress Concern No     Weight Concern No     Special Diet No     Back Care No     Exercise No     walk 20' daily     Bike Helmet No     Seat Belt No     Social History Narrative    Currently unemployed.  Lives in [place].   with one son.           No current facility-administered medications on file prior to encounter.   Current Outpatient Prescriptions on File Prior to Encounter:  acetaminophen (TYLENOL) 325 MG tablet Take 1-2 tablets by mouth every 4 hours as needed.   amLODIPine (NORVASC) 10 MG tablet Take 1 tablet (10 mg) by mouth daily   B Complex-C-Folic Acid (DIALYVITE) TABS 1 tablet by mouth daily   carvedilol (COREG) 25 MG tablet Take 1 tablet (25 mg) by mouth 2 times daily (with meals)   cetirizine (ZYRTEC) 10 MG tablet Take 10 mg by mouth At Bedtime   cloNIDine (CATAPRES) 0.1 MG tablet Take 1 tablet (0.1 mg) by mouth 3 times daily as needed   cyclobenzaprine (FLEXERIL) 10 MG tablet Take 0.5 tablets (5 mg) by mouth 2 times daily as needed for muscle spasms   cyclobenzaprine (FLEXERIL) 5 MG tablet Take 1 tablet (5 mg) by mouth nightly as needed for muscle spasms (Patient not taking: Reported on 9/26/2018)   doxycycline (VIBRA-TABS) 100 MG tablet Take 1 tablet by mouth 2 times daily (with meals)   Lidocaine (LIDOCARE) 4 % Patch Place 1 patch onto the skin every 24 hours (Patient not taking: Reported on 9/26/2018)   melatonin 1 MG TABS tablet Take 1 mg by mouth   RENVELA 800 MG tablet Take 1 tablet by mouth 3 times daily (with meals)       Data:    Results for orders placed or performed during the hospital encounter of 10/29/18   Chest XR,  PA & LAT    Narrative    XR CHEST 2 VW  10/30/2018 12:10 AM    Comparison: 10/28/2018    History: SOB;     Findings: PA and lateral views of the chest. Cardiac silhouette is  within normal limits. Pulmonary vasculature is distinct. No pleural  effusion. No pneumothorax. No acute airspace disease. The upper  abdomen is unremarkable.      Impression    Impression: No acute airspace disease.   CBC with platelets differential   Result Value Ref Range    WBC 3.9 (L) 4.0 - 11.0 10e9/L    RBC Count 2.56 (L) 3.8 - 5.2 10e12/L    Hemoglobin 8.4 (L) 11.7 - 15.7 g/dL    Hematocrit 25.7 (L) 35.0 - 47.0 %     78 - 100 fl    MCH 32.8 26.5 - 33.0 pg    MCHC 32.7 31.5 - 36.5 g/dL    RDW 13.5 10.0 - 15.0 %    Platelet Count 173 150 - 450 10e9/L    Diff Method Automated Method     % Neutrophils 65.2 %    % Lymphocytes 25.7 %    % Monocytes 2.1 %    % Eosinophils 6.0 %    % Basophils 1.0 %    % Immature Granulocytes 0.0 %    Nucleated RBCs 0 0 /100    Absolute Neutrophil 2.5 1.6 - 8.3 10e9/L    Absolute Lymphocytes 1.0 0.8 - 5.3 10e9/L    Absolute Monocytes 0.1 0.0 - 1.3 10e9/L    Absolute Eosinophils 0.2 0.0 - 0.7 10e9/L    Absolute Basophils 0.0 0.0 - 0.2 10e9/L    Abs Immature Granulocytes 0.0 0 - 0.4 10e9/L    Absolute Nucleated RBC 0.0    Basic metabolic panel   Result Value Ref Range    Sodium 137 133 - 144 mmol/L    Potassium 4.6 3.4 - 5.3 mmol/L    Chloride 101 94 - 109 mmol/L    Carbon Dioxide 17 (L) 20 - 32 mmol/L    Anion Gap 18 (H) 3 - 14 mmol/L    Glucose 96 70 - 99 mg/dL    Urea Nitrogen 108 (H) 7 - 30 mg/dL    Creatinine 20.30 (H) 0.52 - 1.04 mg/dL    GFR Estimate 2 (L) >60 mL/min/1.7m2    GFR Estimate If Black 2 (L) >60 mL/min/1.7m2    Calcium 6.8 (L) 8.5 - 10.1 mg/dL   HCG qualitative pregnancy (blood)   Result Value Ref Range    HCG Qualitative Serum Negative NEG^Negative   EKG 12 lead   Result Value Ref Range    Interpretation ECG Click View Image link to view waveform and result    ISTAT gases elec ica gluc sheila POCT   Result Value Ref Range    Ph  Venous 7.31 (L) 7.32 - 7.43 pH    PCO2 Venous 36 (L) 40 - 50 mm Hg    PO2 Venous 42 25 - 47 mm Hg    Bicarbonate Venous 18 (L) 21 - 28 mmol/L    O2 Sat Venous 73 %    Sodium 137 133 - 144 mmol/L    Potassium 4.6 3.4 - 5.3 mmol/L    Glucose 92 70 - 99 mg/dL    Calcium Ionized 3.5 (L) 4.4 - 5.2 mg/dL    Hemoglobin 10.2 (L) 11.7 - 15.7 g/dL    Hematocrit - POCT 30 (L) 35.0 - 47.0 %PCV             EKG Interpretation:      EKG Number:[SB1.1] 1[SB1.4]  Interpreted by Eva Early PA-C   Symptoms at time of EKG:[SB1.1] chest pain, SOB[SB1.4]   Rhythm:[SB1.1] Normal sinus[SB1.4]   Rate:[SB1.1] Normal[SB1.4]  Axis:[SB1.1] Normal[SB1.4]  Ectopy:[SB1.1] None[SB1.4]  Conduction:[SB1.1] Normal[SB1.4]  ST Segments/ T Waves:[SB1.1] No ST-T wave changes and No acute ischemic changes[SB1.4]  Q Waves:[SB1.1] None[SB1.4]  Comparison to prior:[SB1.1] Unchanged[SB1.4]    Clinical Impression:[SB1.1] no acute changes[SB1.4]    ROS:    Review Of Systems  Skin:[SB1.1] negative[SB1.4]  Eyes:[SB1.1] negative[SB1.4]  Ears/Nose/Throat:[SB1.1] negative[SB1.4]  Respiratory:[SB1.1] Shortness of breath-, No cough and No hemoptysis[SB1.4]  Cardiovascular:[SB1.1] positive for chest pain and lower extremity edema, negative for, palpitations and exertional chest pain or pressure[SB1.4]  Gastrointestinal:[SB1.1] negative for, nausea, vomiting, heartburn, melena, hematochezia, constipation and diarrhea[SB1.4]  Genitourinary:[SB1.1] positive for low urine output, negative for, dysuria, frequency, urgency and hematuria[SB1.4]  Musculoskeletal:[SB1.1] generalized body aches[SB1.4]  Neurologic:[SB1.1] headaches[SB1.4]  Psychiatric:[SB1.1] negative[SB1.4]  Hematologic/Lymphatic/Immunologic:[SB1.1] negative[SB1.4]  Endocrine:[SB1.1] negative[SB1.4]  PCP:[SB1.1] [facility][SB1.4]    10 point ROS negative other than the symptoms noted above.      Exam:  Vitals:  B/P: 117/85, T: 98.4, P: 72, R: Data Unavailable    Constitutional:[SB1.1] healthy,  alert, no distress and cooperative[SB1.4]  Head:[SB1.1] Normocephalic. No masses, lesions, tenderness or abnormalities[SB1.4]  Neck:[SB1.1] Neck supple. No adenopathy. Thyroid symmetric, normal size,, Carotids without bruits.[SB1.4]  ENT:[SB1.1] ENT exam normal, no neck nodes or sinus tenderness[SB1.4]  Cardiovascular:[SB1.1]  PMI normal. No lifts, heaves, or thrills. RRR. No murmurs, clicks gallops or rub[SB1.4]  Respiratory:[SB1.1]  Lungs clear[SB1.4]  Gastrointestinal:[SB1.1] Abdomen soft, non-tender, slightly distended. BS normal. No masses, organomegaly[SB1.4]  :[SB1.1] Deferred[SB1.4]  Musculoskeletal:[SB1.1] extremities normal- no gross deformities noted, gait normal and normal muscle tone  Ext: mild BLE edema[SB1.4]  Skin:[SB1.1] no suspicious lesions or rashes[SB1.4]  Neurologic:[SB1.1] Gait normal. Reflexes normal and symmetric. Sensation grossly WNL.[SB1.4]  Psychiatric:[SB1.1] mentation appears normal and affect normal/bright[SB1.4]  Hematologic/Lymphatic/Immunologic:[SB1.1] normal ant/post cervical, axillary, supraclavicular and inguinal nodes[SB1.4]    Consults: Nephrology  FEN: Dialysis diet  DVT prophylaxis: encourage ambulation; expect short stay  GI prophylaxis:[SB1.1] none[SB1.5]  BM prophylaxis:[SB1.1] none[SB1.5]  Code Status:[SB1.1] full code[SB1.5]  Disposition:[SB1.1] home after dialysis completed with stable vitals, nephrology consultation completed with dispo plan.[SB1.5]     Signed:  Eva Early PA-C   October 30, 2018 at 1:18 AM[SB1.1]       Revision History        User Key Date/Time User Provider Type Action    > SB1.2 10/30/2018  6:23 AM Eva Early PA Physician Assistant - C Sign     SB1.4 10/30/2018  2:21 AM Eva Early, PA Physician Assistant - C      SB1.5 10/30/2018  1:48 AM Eva Ealry, PA Physician Assistant - C      SB1.3 10/30/2018  1:38 AM Eva Early PA Physician Assistant - C      SB1.1 10/30/2018  1:18 AM Eva Early  "GERMAN Banegas Physician Assistant - C             H&P by Sheila Resendez MD at 2018  5:18 AM     Author:  Sheila Resendez MD Service:  General Medicine Author Type:  Resident    Filed:  2018  5:48 AM Date of Service:  2018  5:18 AM Creation Time:  2018  5:18 AM    Status:  Attested :  Sheila Resendez MD (Resident)    Cosigner:  Alba Bradshaw DO at 2018  1:17 PM        Attestation signed by Alba Bradshaw DO at 2018  1:17 PM        Attestation:  Physician Attestation   I, Alba Bradshaw, saw this patient with the resident and agree with the resident/fellow's findings and plan of care as documented in the note.      I personally reviewed vital signs, medications and labs.    ESRD   Hyperkalemia  - secondary to missed dialysis.  Already shifted and given kayexalate.  Will get urgent dialysis this morning.    Alba Bradshaw DO  Date of Service (when I saw the patient): 18                                   Medicine H&P   Jelly Dietz ([id number]) admitted on 2018  Primary care provider: [facility], [place]         Chief Complaint:     Hyperkalemia from missing HD         History of Present Illness     Jelly Dietz is a 33 year old female w/ ESRD 2/2 IgA nephropathy on HD (//Sat), HTN, anemia who presents shortness of breath and feeling she needs dialysis.  She missed her last 2 dialysis appointments because of a holiday and a  she had to go to.  She makes a minimal amount of urine.  She has her kidneys explanted in  due to[RF1.1] IgA nephropathy.     She usually has \"swellings here and there but never bad\". Missed the previous T/Thu HD and now has numbness and tingling in her fingers. She had a full run of HD last Saturday.   Today, She c/o chest pain in the center of the chest, worse when taking deep breath, no pain radiating to jaw/arm. Also c/o SOB, which feels similar to the past when she has missed HD. Denies " vomiting/nausea/constipation/abd pain. Had some diarrhea. C/o swollen arms.[RF1.2] Notes[AP1.1] metallic taste in mouth. She is concerned about her high BP, which is what usually happens when she misses her HD.[RF1.2]      ED course : Calcium gluconate 2[RF1.1]g[RF1.2], kayexalate, insulin + dextrose    Nephrology called, plan to due run of HD this AM[AP1.1]     ROS (+)[RF1.1] SOB, pleuritic chest pain, numbness/tingling in fingers, swelling in arms[RF1.2]  ROS (-) HA, fever, chills, night sweats, lumps, bumps, rashes   Abdominal pain, Nausea, vomitting[RF1.1],[RF1.2] constipation   Remainder of 12pt-ROS negative except mentioned above         Other Hx   PMHx  Patient Active Problem List   Diagnosis     CARDIOVASCULAR SCREENING; LDL GOAL LESS THAN 160     Panic attacks     S/P kidney transplant     Hypertension     Chronic renal transplant rejection     ACS (acute coronary syndrome) (H)     Anemia in chronic renal disease     End stage kidney disease (H)     IgA nephropathy     Pre-transplant evaluation for kidney transplant     Hyperkalemia       PSx  Past Surgical History:   Procedure Laterality Date     CERCLAGE CERVICAL N/A 2015    Procedure: CERCLAGE CERVICAL;  Surgeon: Norbert Chopra MD;  [facility]      SECTION  2012    Procedure:  SECTION;;  Surgeon: Chelsea Cross MD;  [facility]      SECTION N/A 2015    Procedure:  SECTION;  Surgeon: Chelsea Cross MD;  Location: [facility] OR     cesearean section       EXPLANT TRANSPLANTED KIDNEY  3/29/2013    Procedure: EXPLANT TRANSPLANTED KIDNEY;  Explant Transplanted right Kidney (from patients right iliac fossa);  Surgeon: Nory Morgan MD;  Location[facility] OR     NEPHRECTOMY  3/29/13    Transplant nephrectomy      WILIAN/DIALYSIS CATHETER       TRANSPLANT KIDNEY RECIPIENT LIVING UNRELATED  Dec 2007    (Adult male in [place])       Medications    No current facility-administered medications on  file prior to encounter.   Current Outpatient Prescriptions on File Prior to Encounter:  acetaminophen (TYLENOL) 325 MG tablet Take 1-2 tablets by mouth every 4 hours as needed.   amLODIPine (NORVASC) 10 MG tablet Take 1 tablet (10 mg) by mouth daily   B Complex-C-Folic Acid (DIALYVITE) TABS 1 tablet by mouth daily   calcium carbonate (TUMS) 500 MG chewable tablet Take 2 tablets (1,000 mg) by mouth 3 times daily   calcium carbonate (TUMS) 500 MG chewable tablet Take 1 chew tab by mouth 3 times daily as needed for heartburn   carvedilol (COREG) 25 MG tablet Take 1 tablet (25 mg) by mouth 2 times daily (with meals)   cetirizine (ZYRTEC) 10 MG tablet Take 10 mg by mouth At Bedtime   cloNIDine (CATAPRES) 0.1 MG tablet Take 1 tablet (0.1 mg) by mouth 3 times daily as needed   cyclobenzaprine (FLEXERIL) 5 MG tablet Take 1 tablet (5 mg) by mouth nightly as needed for muscle spasms   HYDROcodone-acetaminophen (NORCO) 5-325 MG per tablet Take 1-2 tablets by mouth every 6 hours as needed for moderate to severe pain   melatonin 1 MG CAPS Take 1 mg by mouth nightly as needed       Allergy  No Known Allergies    Family Hx  Family History   Problem Relation Age of Onset     Diabetes Paternal Grandfather      Breast Cancer Maternal Grandmother 55     Cancer No family hx of      No known family hx of skin cancer       Social Hx  Social History     Social History     Marital status: Single     Spouse name: N/A     Number of children: N/A     Years of education: N/A     Occupational History     Not on file.     Social History Main Topics     Smoking status: Never Smoker     Smokeless tobacco: Never Used     Alcohol use No     Drug use: No     Sexual activity: Yes     Partners: Male     Other Topics Concern     Blood Transfusions Yes     Multiple in [place] none in [place]     Caffeine Concern No     1s/d     Occupational Exposure No     Hobby Hazards No     Sleep Concern No     Stress Concern No     Weight Concern No     Special Diet No  "    Back Care No     Exercise No     walk 20' daily     Bike Helmet No     Seat Belt No     Social History Narrative    Currently unemployed.  Lives in [place].   with one son.         Lives:[RF1.1] [place] with family[RF1.2]  Smoke:[RF1.1] none[RF1.2]  Alcohol:[RF1.1] none[RF1.2]  Drug of abuse:[RF1.1] none[RF1.2]         Vitals and Exam/ Objectives     Physical exam:  BP (!) 129/93  Temp 98  F (36.7  C) (Oral)  Resp 24  Ht 1.676 m (5' 6\")  Wt 53 kg (116 lb 13.5 oz)  SpO2 100%  BMI 18.86 kg/m2  Wt Readings from Last 2 Encounters:   08/24/18 53 kg (116 lb 13.5 oz)   01/02/18 54.4 kg (120 lb)     No intake or output data in the 24 hours ending 08/24/18 0518    General: AAOx3, no clubbing/cyanosis, no edema,  JVD[RF1.1] to jaw[AP1.1]   HEENT:  PERRL, EOMI, MMM with out pharyngeal erythema.   Cardiac: RRR.[RF1.1] + rub, no murmurs[AP1.1]. Normal S1, S2. DP2+ bilaterally[RF1.1], fistula to right forearm with palpable thrill[AP1.1]   Pulm: CTAB,[RF1.1] no[AP1.1] wheezes,[RF1.1]  No[AP1.1] crackles.  Abd: +BS, soft, non distended, non tender. No hepatosplenomegaly.  Skin: No rash  MSK: No deformities, no joint swelling  Neuro: CN grossly intact, no focal deficits  Psych:[RF1.1] pleasant[AP1.1] mood, full affect    In dwelling lines at admission: none[RF1.1], fistula to right forearm[AP1.1]     Imaging/procedure results:  No results found for this or any previous visit (from the past 48 hour(s)).    EKG: sinus rhythm, first-degree AV block, peaked T waves[RF1.1] (similar to previous EKG)[AP1.1]             Assessment and Plans     Jelly Dietz is a 32 year old female with a past medical history of HTN, anemia, anxiety, IgA nephropathy s/p LDKT c/b acute cellular rejection, kidney explanted 2013 and now w/ ESKD on HD Tu/Th/Sat admitted[RF1.1] with hyperkalemia.[AP1.1]    [RF1.1]  #s/p renal transplant in 2007, now failed 2/2 rejection[AP1.1]   # Hyperkalemia  2/2 IgA nephropathy s/p LDKT in 2007 w/ " rejection. Dialyzes TuThSat, missed[RF1.1] last 2 runs due to [place]ia and [AP1.1]. K[RF1.1] 7.3[RF1.2], BUN[RF1.1] 110[AP1.1], CO2[RF1.1] 19[AP1.1], AG ([RF1.2]16[AP1.1]).[RF1.2]   -Nephrology consulted  -HD run per Nephrology[RF1.1] thisAM[AP1.1]  -[RF1.1]s/p calcium gluconate, kayexalate, insulin/dextrose in ED   -patient no longer on immunosuppressants[AP1.1]      #HTN  On coreg 25 mg BID, amlodipine 10 mg qday, clonidine 0.1 mg TID PRN.  -Continue PTA medications  -Volume removal per HD  -Monitor BP     #Normocytic anemia  Hemoglobin of 10.8 w/ MCV of 100.   -Epo and venofer per nephrology     #Thrombocytopenia  Platelet count of 117, intermittently low since 2015.  -Recheck in AM  -FU w/ nephrology and PCP as OP for further work up     Chronic Conditions:   #GERD: On Tums PRN. Continue.   #Seasonal allergies: On Zyrtec qhs. Continue.      Diet:  Renal diet  Fluids: None, PO  DVT Prophylaxis: Low Risk/Ambulatory with no VTE prophylaxis indicated  Code Status:[RF1.1] Full[RF1.2]    Will be staffed in am[RF1.1]  Sheila Resendez MD   Internal Medicine, PGY2  P: [id number][AP1.1]            Other Labs   Data[RF1.1]   Data     Recent Labs  Lab 18  0347   WBC 2.3*   HGB 12.4   MCV 99      *   POTASSIUM 7.3*   CHLORIDE 96   CO2 19*   *   CR 19.80*   ANIONGAP 16*   CASIE 8.3*   GLC 95[RF1.3]          Revision History        User Key Date/Time User Provider Type Action    > AP1.1 2018  5:48 AM Sheila Resendez MD Resident Sign     RF1.2 2018  5:35 AM Aly Tomlinson MD Resident Share     RF1.3 2018  5:25 AM Aly Tomlinson MD Resident Share     RF1.1 2018  5:18 AM Aly Tomlinson MD Resident             H&P by Mendoza Borges MD at 2017  3:11 PM     Author:  Mendoza Borges MD Service:  Hospitalist Author Type:  Physician    Filed:  2017  8:16 PM Date of Service:  2017  3:11 PM Creation Time:  2017  3:11 PM    Status:  Signed  :  Mendoza Borges MD (Physician)         [facility], [place]    Internal Medicine History and Physical - Gold Service       Date of Admission:  4/30/2017[AB1.1]    Assessment & Plan[AB1.2]   Jelly Dietz is a 32 year old female with a past medical history of HTN, anemia, anxiety, IgA nephropathy s/p LDKT c/b acute cellular rejection[AB1.1], kidney explanted 2013 and n[AB1.3]ow w/ ESKD on HD TuThSat admitted on 4/30/2017 for HTN and hyperkalemia after missed dialysis run.     #ESKD, hyperkalemia: 2/2 IgA nephropathy s/p LDKT in 2007 w/ rejection. Dialyzes TuThSat, missed run yesterday as  canceled. K 5.6, BUN 87, CO2 23 w/ normal AG. Hypertensive[AB1.1] w/ BP in 140s/100s.[AB1.3]  -Nephrology consulted  -HD run per Nephrology[AB1.1]  -Will need to FU w/ [facility] as scheduled on Tuesday[AB1.3]    #HTN: On coreg 25 mg BID, amlodipine 10 mg qday, clonidine 0.1 mg TID PRN.[AB1.1] BP elevated.[AB1.3]  -Continue PTA medications  -Volume removal per HD  -Monitor BP    #Normocytic anemia: Hemoglobin of 10.8 w/ MCV of 100.   -Epo and venofer per nephrology    #Thrombocytopenia: Platelet count of 117, intermittently low since 11/2015.  -Recheck in AM  -FU w/ nephrology and PCP as OP for further work up    #GERD: On Tums PRN. Continue.   #Seasonal allergies: On Zyrtec qhs. Continue.     Diet:[AB1.1]  Renal diet[AB1.3]  Fluids: None, PO  DVT Prophylaxis: Low Risk/Ambulatory with no VTE prophylaxis indicated  Code Status:[AB1.1] Prior    Disposition Plan[AB1.2]   Expected discharge:[AB1.1] Today vs.[AB1.3] Tomorrow; recommended to prior living arrangement once medically stable after HD.     Entered:[AB1.1] Claudia Dominguez 04/30/2017[AB1.2],[AB1.1] 3:12 PM[AB1.2]   Information in the above section will display in the discharge planner report.    The patient's care was discussed with the Attending Physician, Dr. Borges.[AB1.1]    Claudia Dominguez[AB1.2], PA  Internal  "Medicine CONNIE Hospitalist Service  [facility]  Pager: [phone]  Please see sticky note for cross cover information[AB1.1]    10 point ROS is negative except as mentioned in the HPI  PMH, PSH, medications, SH, and FMH were reviewed and confirmed by myself    Labs and VS reviewed    BP (!) 149/100  Pulse 72  Temp 98.2  F (36.8  C) (Oral)  Resp 16  LMP 03/14/2017  SpO2 100%  Well appearing, on dialysis    I saw and evaluated this patient with PA Allision. I agree with his/her assessment of Jelly. Plan for today is to run her on dalysis, plan for discharge afterwards.     Rob[ZK1.1]    ______________________________________________________________________[AB1.1]    Chief Complaint[AB1.2]   \"I feel fine\"[AB1.3]    History is obtained from the patient[AB1.1]    History of Present Illness[AB1.2]   Jelly Dietz is a 32 year old female with a past medical history of HTN, anemia, anxiety, IgA nephropathy s/p LDKT c/b acute cellular rejection and now w/ ESKD on HD TuThSat admitted on 4/30/2017 for HTN and hyperkalemia after missed dialysis run.[AB1.1]     The patient states that currently she feels fine. She notes she was a bit short of breath earlier, but now that she is dialyzing this has resolved. She denies any cough, chest pain, abdominal pain, nausea/vomiting, diarrhea. She is eager to go home to her children after HD if possible.[AB1.3]     Review of Systems[AB1.2]   The 10 point Review of Systems is negative other than noted in the HPI or here.[AB1.1]     Past Medical History[AB1.2]    I have reviewed this patient's medical history and updated it with pertinent information if needed.[AB1.1]   Past Medical History:   Diagnosis Date     ACS (acute coronary syndrome) (H) 11/11/2014     Allergic state     seasonal allergies     Anemia in chronic renal disease      Cervical cerclage suture present in second trimester      Chest pain 11/11/2014     Chronic renal transplant rejection      End stage " kidney disease (H)      Heart murmur      History of  delivery ,     30 wks, 28 wks      Hypertension     resolved after transplant     IgA nephropathy      MRSA (methicillin resistant Staphylococcus aureus)      [facility]     Pre-eclampsia      Renal failure affecting pregnancy in second trimester      S/P kidney transplant     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 History of 1B kidney rejection      SAB (spontaneous )     2nd trimester[AB1.4]       Past Surgical History[AB1.2]   I have reviewed this patient's surgical history and updated it with pertinent information if needed.[AB1.1]  Past Surgical History:   Procedure Laterality Date     CERCLAGE CERVICAL N/A 2015    Procedure: CERCLAGE CERVICAL;  Surgeon: Norbert Chopra MD;  Location: [facility] L[facility]D      SECTION  2012    Procedure:  SECTION;;  Surgeon: Chelsea Cross MD;  Location: [facility] L+D      SECTION N/A 2015    Procedure:  SECTION;  Surgeon: Chelsea Cross MD;  Location: [facility] OR     cesearean section       EXPLANT TRANSPLANTED KIDNEY  3/29/2013    Procedure: EXPLANT TRANSPLANTED KIDNEY;  Explant Transplanted right Kidney (from patients right iliac fossa);  Surgeon: Nory Morgan MD;  Location: [facility] OR     NEPHRECTOMY  3/29/13    Transplant nephrectomy      WILIAN/DIALYSIS CATHETER       TRANSPLANT KIDNEY RECIPIENT LIVING UNRELATED  Dec 2007    (Adult male in [place])[AB1.4]       Social History[AB1.2]   Social History   Substance Use Topics     Smoking status: Never Smoker     Smokeless tobacco: Never Used     Alcohol use No[AB1.4]   Lives in a home w/ her 2 children ages 4.5 and 1.5.[AB1.3]    Family History[AB1.2]   I have reviewed this patient's family history and updated it with pertinent information if needed.[AB1.1]   Family History   Problem Relation Age of Onset      DIABETES Paternal Grandfather      Breast Cancer Maternal Grandmother 55     CANCER No family hx of      No known family hx of skin cancer[AB1.5]       Prior to Admission Medications   Prior to Admission Medications   Prescriptions Last Dose Informant Patient Reported? Taking?   acetaminophen (TYLENOL) 325 MG tablet  Self No Yes   Sig: Take 1-2 tablets by mouth every 4 hours as needed.   amLODIPine (NORVASC) 10 MG tablet 4/29/2017 Self No Yes   Sig: Take 1 tablet (10 mg) by mouth daily   calcium carbonate (TUMS) 500 MG chewable tablet  Self Yes Yes   Sig: Take 1 chew tab by mouth 3 times daily as needed for heartburn   carvedilol (COREG) 25 MG tablet 4/29/2017 Self No Yes   Sig: Take 1 tablet (25 mg) by mouth 2 times daily (with meals)   cetirizine (ZYRTEC) 10 MG tablet 4/29/2017 Self Yes Yes   Sig: Take 10 mg by mouth At Bedtime   cloNIDine (CATAPRES) 0.1 MG tablet 4/29/2017 Self No Yes   Sig: Take 1 tablet (0.1 mg) by mouth 3 times daily as needed      Facility-Administered Medications: None     Allergies   No Known Allergies    Physical Exam[AB1.2]   Vital Signs:[AB1.1] Temp: 98.4  F (36.9  C) Temp src: Oral[AB1.2] BP: (!) 152/112[AB1.6] Pulse: 90   Resp: 16 SpO2: 98 % O2 Device: None (Room air)[AB1.2]    Weight:[AB1.1] 0 lbs 0 oz[AB1.2]  GENERAL: Alert and oriented x 3.[AB1.1] Sitting comfortably in bed running on HD.[AB1.3]  HEENT: Anicteric sclera. Mucous membranes moist and without lesions.   CV: RRR. S1, S2.[AB1.1] 3/6 systolic[AB1.3] murmurs appreciated.   RESPIRATORY: Effort normal on[AB1.1] RA[AB1.3]. Lungs[AB1.1] with crackles in bases,[AB1.3] with no wheezing, rhonchi.   GI: Abdomen soft and non distended, bowel sounds present. No tenderness, rebound, guarding.   MUSCULOSKELETAL: No joint swelling or tenderness. Moves all extremities.   NEUROLOGICAL: No focal deficits.   EXTREMITIES: No peripheral edema. Intact bilateral pedal pulses.   SKIN: No jaundice. No rashes.[AB1.1] Francy tattoo on bilateral  le.[AB1.3]     Data[AB1.2]   Data reviewed today: I reviewed all medications, new labs and imaging results over the last 24 hours. I personally reviewed no images or EKG's today.    Reviewed BMP, glucose, CBC[AB1.1]       Revision History        User Key Date/Time User Provider Type Action    > ZK1.1 4/30/2017  8:16 PM Mendoza Borges MD Physician Sign     AB1.6 4/30/2017  4:07 PM Claudia Dominguez PA-C Physician Assistant Sign     AB1.3 4/30/2017  4:03 PM Claudia Dominguez PA-C Physician Assistant      AB1.5 4/30/2017  3:25 PM Claudia Dominguez PA-C Physician Assistant      AB1.4 4/30/2017  3:24 PM Claudia Dominguez PA-C Physician Assistant      AB1.2 4/30/2017  3:12 PM Claudia Dominguez PA-C Physician Assistant      AB1.1 4/30/2017  3:11 PM Claudia Dominguez PA-C Physician Assistant             H&P signed by Michael Pastrana MD at 1/26/2017 12:12 AM      Author:  Michael Pastrana MD Service:  Hospitalist Author Type:  Physician    Filed:  1/26/2017 12:12 AM Note Time:  1/25/2017 12:49 AM Creation Time:  1/25/2017  2:03 AM    Status:  Signed :  Michael Pastrnaa MD (Physician)         CHIEF COMPLAINT:  Shortness of breath.      HISTORY OF PRESENT ILLNESS:  Jelly Dietz is a 32-year-old [place] female with a history of IgA nephropathy, with end-stage renal disease, on hemodialysis Tuesdays, Thursdays and Saturdays.  The patient missed her dialysis yesterday, and missed it once last week, as she has been taking care of her children and has not been able to get a care sitter.  She also complains of chills which have been going on for some time now.  She complains of URI symptoms which have been going on for the past month, as well as rhinorrhea which is green in color.  She endorses mild shortness of breath.  However, she denies any productive cough.  She does have some burning pain in the suprapubic region on urination.      Over here in the ER, the patient was seen by Dr. Parker Resendez.  A  chest x-ray did not show any acute abnormality.  I am asked to admit her.  They did talk to Dr. Renteria who recommended that the patient be admitted and that he will arrange for dialysis in the morning.      PAST MEDICAL HISTORY:   1.  Kidney transplant, which was rejected in the past, in .  It was a living donor kidney transplant.   2.  Hypertension.   3.  Anemia.   4.  IgA nephropathy.   6.  Acute coronary syndrome.   7.  Allergies.      PAST SURGICAL HISTORY:   1.  Prior renal transplant.   2.  .   3.  Nephrectomy.      FAMILY HISTORY:  Significant for diabetes in her paternal grandfather.      SOCIAL HISTORY:  She does not smoke or drink alcohol.      ALLERGIES:  No known drug allergies.      HOME MEDICATIONS:  Awaiting reconciliation, but include Coreg, Catapres, Norvasc, Tylenol.      REVIEW OF SYSTEMS:  As mentioned in the HPI.  She complains of some right earache as well.  She has had some chills.  She denies any productive cough.  She denies any nausea or vomiting or productive cough.  All other systems were extensively reviewed and deemed unremarkable and negative.      PHYSICAL EXAMINATION:   VITAL SIGNS:  Temperature is 97.7.  Pulse 72.  Blood pressure is 183/128.  Respiratory rate 20.  O2 sat is 100% on room air.   GENERAL:  She is alert, awake, oriented, coherent, nontoxic, in no acute distress.   HEENT:  Pupils are equal, round, reactive to light.  Pharynx has no exudate noted.  She has a bulging tympanic membrane on the right, with evidence of otitis media.   NECK:  There is no tender anterior cervical lymphadenopathy.   LUNGS:  Clear to auscultation bilaterally.   HEART:  Regular rate, S1-S2 normal.  She has a 2/6 systolic murmur.   ABDOMEN:  Soft, nontender.  Mild suprapubic tenderness.  No guarding, no rigidity.  She has good bowel sounds.   EXTREMITIES:  There is no edema.   SKIN:  There is no rash.  She has an AV fistula on the left upper extremity which has a good thrill.       LABS:  Lab work obtained here shows the following:  Sodium is 135, potassium 5.3, chloride 100, bicarbonate 21, BUN 89, creatinine 13.3, GFR of 3, calcium 8.6, anion gap 14, phosphorus 8.0.  Her BNP is 12,445.  Troponin is undetectable.  Glucose level is 107.  On CBC, her white cell count is 2.9, hemoglobin 8.1, hematocrit 24.8, platelet count 131,000; her differential is grossly unremarkable.      She had a chest x-ray here which showed no acute abnormalities.      An EKG obtained over here shows normal sinus rhythm at 67 beats per minute with LVH.  She does have somewhat peaked T waves in her precordial leads.      ASSESSMENT AND PLAN:   1.  End-stage renal disease, on hemodialysis, with missed dialysis:  We will admit her under observation status.  We will keep her n.p.o. for now.  We will consult Nephrology for dialysis in the morning.  She missed dialysis once last week and this Tuesday.   2.  Hypertension:  We will place her on the clonidine that she normally takes.   3.  Otitis media:  She has been initiated on amoxicillin over here which I will continue.   4.  CODE STATUS:  FULL CODE.   5.  She will be admitted under observation status.         ANGEL PASTRANA MD             D: 2017 00:49   T: 2017 02:02   MT: #[id number]      Name:     KEITH COLE   MRN:      [id number]        Account:      [id number]   :      1985           Admitted:     [id number]      Document: [id number]       cc: Song Landin MD      Revision History        Date/Time User Provider Type Action    > 2017 12:12 AM Angel Pastrana MD Physician Sign    Attribution information within the note text is not available.                     Discharge Summaries      Discharge Summaries by Beti Kendall APRN CNP at 10/30/2018  4:57 PM     Author:  Beti Kendall APRN CNP Service:  General Medicine Author Type:  Nurse Practitioner    Filed:  10/30/2018  5:07 PM Date of Service:  10/30/2018   4:57 PM Creation Time:  10/30/2018  4:57 PM    Status:  Cosign Needed :  Beti Kendall APRN CNP (Nurse Practitioner)    Cosign Required:  Yes             Discharge Summary    Jelly Dietz MRN# [id number]   YOB: 1985 Age: 33 year old     Date of Admission:  10/29/2018  Date of Discharge:  10/30/2018  Admitting Physician:  Lito Rivero MD  Discharge Physician:  Lito Rivero MD  Discharging Service:  Emergency Department Observation Unit     Primary Provider: [facility], [place]          Discharge Diagnosis:     ESRD (end stage renal disease) (H)    * No resolved hospital problems. *               Discharge Disposition:   Discharged to home           Condition on Discharge:   Discharge condition: Stable               Procedures:   Dialysis, Chest xray.          Discharge Medications:     Current Discharge Medication List      CONTINUE these medications which have NOT CHANGED    Details   cyclobenzaprine (FLEXERIL) 10 MG tablet Take 0.5 tablets (5 mg) by mouth 2 times daily as needed for muscle spasms  Qty: 20 tablet, Refills: 0      acetaminophen (TYLENOL) 325 MG tablet Take 1-2 tablets by mouth every 4 hours as needed.  Qty: 60 tablet, Refills: 0    Associated Diagnoses:  delivery delivered      amLODIPine (NORVASC) 10 MG tablet Take 1 tablet (10 mg) by mouth daily  Qty: 30 tablet, Refills: 11    Associated Diagnoses: ESRD (end stage renal disease) on dialysis (H); Malignant essential hypertension      B Complex-C-Folic Acid (DIALYVITE) TABS 1 tablet by mouth daily  Qty: 30 tablet, Refills: 11    Associated Diagnoses: Secondary renal hyperparathyroidism (H); Hyperphosphatemia; ESRD (end stage renal disease) on dialysis (H)      carvedilol (COREG) 25 MG tablet Take 1 tablet (25 mg) by mouth 2 times daily (with meals)  Qty: 60 tablet, Refills: 11    Associated Diagnoses: Benign essential hypertension; SOB (shortness of breath)      cetirizine (ZYRTEC) 10 MG tablet Take 10 mg  by mouth At Bedtime      cloNIDine (CATAPRES) 0.1 MG tablet Take 1 tablet (0.1 mg) by mouth 3 times daily as needed  Qty: 60 tablet, Refills: 3    Comments: For BP >170/100  Associated Diagnoses: Benign essential hypertension; SOB (shortness of breath)      RENVELA 800 MG tablet Take 1 tablet by mouth 3 times daily (with meals)                   Consultations:   Consultation during this admission received from nephrology             Brief History of Illness:   Jelly Dietz is a 33 yr old female with PMH of ESRD 2/2 IgA nephropathy s/p LDKT (2007 in [place]) with subsequent failure and dialysis dependence since 11/2012, admitted with shortness of breath, volume overload, and metabolic acidosis in the setting of ~ one week of missed dialysis.          Hospital Course:   ESKD: due to IgA nephropathy s/p failed LDKT and dialysis dep since 11/2012, dialyzes TTS at [facility] (ph [phone], fax [phone]) under the care of Dr. Whalen, EDW: 55.2 kg. Access: LUE AVF. Run time: 3.5 hrs. Last outpt HD run was 10/23. She is active on the transplant list.  - Note: pt had wanted to try some type of herbal supplement that she was told would heal her kidney; nephrologist had long discussion and she agrees that it hasn't worked and she will be fully compliant with dialysis. She very much wants another transplant.  - Dialysis today 10/30; will dialyze at [facility] Wed 10/31 and [facility] Thursday 11/1.       Acid/base: metabolic acidosis in setting of missed dialysis  - Will correct with bicarb via dialysate       Volume: EDW 55.2 kg (came off at 56.7 kg on 10/23, today's weight 63.5 kg), appears mildly hypervolemic, CXR with mild pulm edema. O2 99% on RA; minimal UOP  - UF goal 3-4 kg today      BP: 130-150's       Anemia: 10.2 g/dL; recent labs with ferritin 650, IS 34, hgb 10's. On epogen 2000 units per HD         BMD: iCa 3.6, phos 10.4; recent , phos 9's; PTA sensipar 30 mg 5x/week, renvela 3 tabs tid  "WM, hectorol 1.5 mcg per HD   - Consider holding sensipar until hypocalcemia improves ( patient will discuss with primary nephrologist  - On sevelamer (would consider switching to Ca based binder such as Phoslo but will defer to primary neph for this)               Final Day of Progress before Discharge:       Physical Exam:  Blood pressure (!) 151/103, pulse 85, temperature 98.7  F (37.1  C), temperature source Oral, resp. rate 20, weight 63.5 kg (139 lb 14.4 oz), last menstrual period 10/29/2018, SpO2 98 %, not currently breastfeeding.    EXAM:  Constitutional: healthy, alert, no distress and cooperative  Head: Normocephalic. No masses, lesions, tenderness or abnormalities  Neck: Neck supple. No adenopathy. Thyroid symmetric, normal size,, Carotids without bruits.  ENT: ENT exam normal, no neck nodes or sinus tenderness  Cardiovascular:  PMI normal. No lifts, heaves, or thrills. RRR. No murmurs, clicks gallops or rub  Respiratory:  Lungs clear  Gastrointestinal: Abdomen soft, non-tender, slightly distended. BS normal. No masses, organomegaly  : Deferred  Musculoskeletal: extremities normal- no gross deformities noted, gait normal and normal muscle tone  Ext: mild BLE edema  Skin: no suspicious lesions or rashes  Neurologic: Gait normal. Reflexes normal and symmetric. Sensation grossly WNL.  Psychiatric: mentation appears normal and affect normal/bright         Data:  All laboratory data reviewed             Significant Results:   None[SB1.1]  Results for orders placed or performed during the hospital encounter of 10/29/18   Chest XR,  PA & LAT   Result Value Ref Range    Radiologist flags (Urgent)      Consider noncontrast CT limited for determination of    Narrative    XR CHEST 2 VW 10/30/2018 12:10 AM    Comparison: 10/28/2018    History: SOB;     Findings: PA and lateral views of the chest. Cardiac silhouette is  within normal limits. The lungs have a subtle increase \"dirtiness\" to  them which in an " immunosuppressed patient could possibly represent  subtle inflammatory disease. Pulmonary vasculature is distinct. No  pleural effusion. No pneumothorax.  The upper abdomen is unremarkable.      Impression    Impression: Subtle increased interstitial markings of the lungs,  although this could be a normal finding in an immunosuppressed patient  is any chance of the could be an atypical infection considered limited  noncontrast CT of the chest for further evaluation.      [Access Center: Consider noncontrast CT limited for determination of  the interstitial pattern within the lung for possible atypical  infection.]    This report will be copied to the [facility] to ensure a  provider acknowledges the finding. Access Center is available Monday  through Friday 8am-3:30 pm.     I have personally reviewed the examination and initial interpretation  and I agree with the findings.    MICHELLE PATEL MD   CBC with platelets differential   Result Value Ref Range    WBC 3.9 (L) 4.0 - 11.0 10e9/L    RBC Count 2.56 (L) 3.8 - 5.2 10e12/L    Hemoglobin 8.4 (L) 11.7 - 15.7 g/dL    Hematocrit 25.7 (L) 35.0 - 47.0 %     78 - 100 fl    MCH 32.8 26.5 - 33.0 pg    MCHC 32.7 31.5 - 36.5 g/dL    RDW 13.5 10.0 - 15.0 %    Platelet Count 173 150 - 450 10e9/L    Diff Method Automated Method     % Neutrophils 65.2 %    % Lymphocytes 25.7 %    % Monocytes 2.1 %    % Eosinophils 6.0 %    % Basophils 1.0 %    % Immature Granulocytes 0.0 %    Nucleated RBCs 0 0 /100    Absolute Neutrophil 2.5 1.6 - 8.3 10e9/L    Absolute Lymphocytes 1.0 0.8 - 5.3 10e9/L    Absolute Monocytes 0.1 0.0 - 1.3 10e9/L    Absolute Eosinophils 0.2 0.0 - 0.7 10e9/L    Absolute Basophils 0.0 0.0 - 0.2 10e9/L    Abs Immature Granulocytes 0.0 0 - 0.4 10e9/L    Absolute Nucleated RBC 0.0    Basic metabolic panel   Result Value Ref Range    Sodium 137 133 - 144 mmol/L    Potassium 4.6 3.4 - 5.3 mmol/L    Chloride 101 94 - 109 mmol/L    Carbon Dioxide 17 (L)  20 - 32 mmol/L    Anion Gap 18 (H) 3 - 14 mmol/L    Glucose 96 70 - 99 mg/dL    Urea Nitrogen 108 (H) 7 - 30 mg/dL    Creatinine 20.30 (H) 0.52 - 1.04 mg/dL    GFR Estimate 2 (L) >60 mL/min/1.7m2    GFR Estimate If Black 2 (L) >60 mL/min/1.7m2    Calcium 6.8 (L) 8.5 - 10.1 mg/dL   HCG qualitative pregnancy (blood)   Result Value Ref Range    HCG Qualitative Serum Negative NEG^Negative   Basic metabolic panel   Result Value Ref Range    Sodium 135 133 - 144 mmol/L    Potassium 4.8 3.4 - 5.3 mmol/L    Chloride 102 94 - 109 mmol/L    Carbon Dioxide 16 (L) 20 - 32 mmol/L    Anion Gap 16 (H) 3 - 14 mmol/L    Glucose 93 70 - 99 mg/dL    Urea Nitrogen 108 (H) 7 - 30 mg/dL    Creatinine 20.50 (H) 0.52 - 1.04 mg/dL    GFR Estimate 2 (L) >60 mL/min/1.7m2    GFR Estimate If Black 2 (L) >60 mL/min/1.7m2    Calcium 6.8 (L) 8.5 - 10.1 mg/dL   Magnesium   Result Value Ref Range    Magnesium 1.9 1.6 - 2.3 mg/dL   Phosphorus   Result Value Ref Range    Phosphorus 10.4 (H) 2.5 - 4.5 mg/dL   Calcium ionized   Result Value Ref Range    Calcium Ionized 3.6 (L) 4.4 - 5.2 mg/dL   EKG 12 lead   Result Value Ref Range    Interpretation ECG Click View Image link to view waveform and result    Nephrology ESRD Adult IP Consult: needs dialysis non-urgently; Consultant may enter orders: Yes    Narrative    Nina Mcwilliams PA     10/30/2018 10:30 AM    Nephrology Initial Consult  October 30, 2018      Jelly Dietz MRN:[id number] YOB: 1985  Date of Admission:10/29/2018  Primary care provider: [facility], [place]  Requesting physician: Katlin att. providers found    ASSESSMENT AND RECOMMENDATIONS:   Jelly Dietz is a 33 yr old female with PMH of ESRD 2/2 IgA   nephropathy s/p LDKT (2007 in [place]) with subsequent failure   and dialysis dependence since 11/2012, admitted with shortness of   breath, volume overload, and metabolic acidosis in the setting of   ~ one week of missed dialysis.    ESKD: due to IgA nephropathy s/p  failed LDKT and dialysis dep   since 11/2012, dialyzes TTS at [facility] (ph [phone],   fax [phone]) under the care of Dr. Whalen, EDW: 55.2 kg.   Access: LUE AVF. Run time: 3.5 hrs. Last outpt HD run was 10/23.   She is active on the transplant list.  - Note: pt had wanted to try some type of herbal supplement that   she was told would heal her kidney; had long discussion and she   agrees that it hasn't worked and she will be fully compliant with   dialysis. She very much wants another transplant.  - Dialysis today as well as tomorrow and Thursday (whether here   or at outpatient unit, outpt can be arranged at [facility]).    Acid/base: metabolic acidosis in setting of missed dialysis  - Will correct with bicarb via dialysate today    Volume: EDW 55.2 kg (came off at 56.7 kg on 10/23, today's weight   63.5 kg), appears mildly hypervolemic, CXR with mild pulm edema.   O2 99% on RA; minimal UOP  - Daily weights  - UF goal 3-4 kg today    BP: 130-150's, PTA meds per dialysis rounding report is clonidine   0.1 mg tid  - Currently on amlodipine 10 mg qday, coreg 25 mg bid    Anemia: 10.2 g/dL; recent labs with ferritin 650, IS 34, hgb   10's. On epogen 2000 units per HD  - Hold ENZO, hgb > 10.0    BMD: iCa 3.6, phos 10.4; recent , phos 9's; PTA sensipar   30 mg 5x/week, renvela 3 tabs tid WM, hectorol 1.5 mcg per HD  - Dialyzing on high Ca bath today  - Would hold sensipar for now given hypocalcemia  - Will increase hectorol to 4 mcg via HD  - On sevelamer (would consider switching to Ca based binder such   as Phoslo but will defer to primary neph for this)    Recommendations were communicated to primary team via this note.      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 Per discussion with Haley Gonzalez, she   frequently misses dialysis runs due to wanting to move to   [place] but not able to move until May 2019 due to a housing   situation. Currently, she was told by a [employer] person that she   may not need dialysis if she takes a herbal supplement. However   after long discussion, she now understand that she needs to stop   the supplement and continue with dialysis and not miss by coming   to the [facility]. She has a 3 yr old daughter at home and very much   hopes to get another transplant. Outpatient dialysis records were   obtained and reviewed. She currently denies CP, n/v, chills. She   is short of breath.    PAST MEDICAL HISTORY:  Reviewed with patient on 10/30/2018     Past Medical History:   Diagnosis Date     ACS (acute coronary syndrome) (H) 2014     Allergic state     seasonal allergies     Anemia in chronic renal disease      Cervical cerclage suture present in second trimester      Chest pain 2014     Chronic renal transplant rejection      End stage kidney disease (H)      Heart murmur      History of  delivery ,     30 wks, 28 wks      Hypertension     resolved after transplant     IgA nephropathy      MRSA (methicillin resistant Staphylococcus aureus)      [facility]     Pre-eclampsia      Renal failure affecting pregnancy in second trimester      S/P kidney transplant     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   History of 1B kidney rejection      SAB (spontaneous )     2nd trimester        Past Surgical History:   Procedure Laterality Date     CERCLAGE CERVICAL N/A 2015    Procedure: CERCLAGE CERVICAL;  Surgeon: Norbert Chopra MD;    Location:  L+D       SECTION  2012    Procedure:  SECTION;;  Surgeon: Chelsea Cross MD;    Location: [facility] L+D      SECTION N/A 2015    Procedure:  SECTION;  Surgeon: Chelsea Cross MD;    Location: [facility] OR     cesearean section       EXPLANT TRANSPLANTED KIDNEY  3/29/2013    Procedure: EXPLANT TRANSPLANTED KIDNEY;  Explant Transplanted   right Kidney (from patients right iliac fossa);  Surgeon:   Nory Morgan MD;  Location: [facility] OR     NEPHRECTOMY  3/29/13    Transplant nephrectomy      WILIAN/DIALYSIS CATHETER       TRANSPLANT KIDNEY RECIPIENT LIVING UNRELATED  Dec 2007    (Adult male in [place])        MEDICATIONS:  PTA Meds  Prior to Admission medications    Medication Sig Last Dose Taking? Auth Provider   cyclobenzaprine (FLEXERIL) 10 MG tablet Take 0.5 tablets (5 mg)   by mouth 2 times daily as needed for muscle spasms  Patient taking differently: Take 5-10 mg by mouth 2 times daily   as needed for muscle spasms  10/30/2018 at Unknown time Yes   Wilber Beltran MD   acetaminophen (TYLENOL) 325 MG tablet Take 1-2 tablets by mouth   every 4 hours as needed.   Marni Arteaga, DO   amLODIPine (NORVASC) 10 MG tablet Take 1 tablet (10 mg) by mouth   daily   Katherine Varma MD   B Complex-C-Folic Acid (DIALYVITE) TABS 1 tablet by mouth daily     Katherine Varma MD   carvedilol (COREG) 25 MG tablet Take 1 tablet (25 mg) by mouth 2   times daily (with meals)   Katherine Varma MD   cetirizine (ZYRTEC) 10 MG tablet Take 10 mg by mouth At Bedtime     Unknown, Entered By History   cloNIDine (CATAPRES) 0.1 MG tablet Take 1 tablet (0.1 mg) by   mouth 3 times daily as needed   Katherine Varma MD   RENVELA 800 MG tablet Take 1 tablet by mouth 3 times daily (with   meals)   Unknown, Entered By History      Current Meds    sodium chloride 0.9%  250 mL Intravenous Once in dialysis     sodium chloride 0.9%  300 mL Hemodialysis Machine Once     amLODIPine   10 mg Oral Daily     carvedilol  25 mg Oral BID w/meals     cetirizine  10 mg Oral At Bedtime     gelatin absorbable  1 each Topical During Hemodialysis (from   stock)     - MEDICATION INSTRUCTIONS -   Does not apply Once     sevelamer  800 mg Oral TID w/meals     sodium chloride (PF)  3 mL Intracatheter Q8H     Infusion Meds    - MEDICATION INSTRUCTIONS -         ALLERGIES:    No Known Allergies    REVIEW OF SYSTEMS:  A comprehensive of systems was negative except as noted above.    SOCIAL HISTORY:   Social History     Social History     Marital status: Single     Spouse name: N/A     Number of children: N/A     Years of education: N/A     Occupational History     Not on file.     Social History Main Topics     Smoking status: Never Smoker     Smokeless tobacco: Never Used     Alcohol use No     Drug use: No     Sexual activity: Yes     Partners: Male     Other Topics Concern     Blood Transfusions Yes     Multiple in [place] none in [place]     Caffeine Concern No     1s/d     Occupational Exposure No     Hobby Hazards No     Sleep Concern No     Stress Concern No     Weight Concern No     Special Diet No     Back Care No     Exercise No     walk 20' daily     Bike Helmet No     Seat Belt No     Social History Narrative    Currently unemployed.  Lives in [place].   with one   son.     She currently lives in [place] but is wanting to move to the [facility].  Reviewed with patient     FAMILY MEDICAL HISTORY:   Family History   Problem Relation Age of Onset     Diabetes Paternal Grandfather      Breast Cancer Maternal Grandmother 55     Cancer No family hx of      No known family hx of skin cancer     Reviewed with patient     PHYSICAL EXAM:   Temp  Av.2  F (36.8  C)  Min: 97.2  F (36.2  C)  Max: 99  F   (37.2  C)      Pulse  Av.8  Min: 72  Max: 85 Resp  Av  Min: 16  Max:   16  SpO2  Av.3 %  Min: 98 %  Max: 100 %       /89 (BP Location: Right arm)  Pulse 85  Temp 98.3  F   (36.8   C) (Oral)  Resp 16  Wt 63.5 kg (139 lb 14.4 oz)  LMP   10/29/2018 (Exact Date)  SpO2 100%  BMI 22.24 kg/m2       Admit Weight: 63.6 kg (140 lb 3.2 oz)     GENERAL APPEARANCE: alert, NAD  EYES: no scleral icterus, pupils equal  Pulmonary: crackles in bases  CV: regular rhythm, normal rate   - Edema trace LE, does have facial edema  GI: soft, nontender, normal bowel sounds  MS: no evidence of inflammation in joints, no muscle tenderness  : no polanco  SKIN: no rash, warm, dry, no cyanosis  NEURO: speech and mentation intact   Access: [facility]    LABS:   CMP  Recent Labs  Lab 10/30/18  0706 10/29/18  2346 10/29/18  2341 10/28/18  2244 10/28/18  2242 10/28/18  2200    137 137 136 136 134   POTASSIUM 4.8 4.6 4.6 4.4 4.5 5.0   CHLORIDE 102  --  101  --  101 100   CO2 16*  --  17*  --  18* 18*   ANIONGAP 16*  --  18*  --  17* 16*   GLC 93 92 96 98 102* 87   *  --  108*  --  91* 85*   CR 20.50*  --  20.30*  --  18.90* 18.20*   GFRESTIMATED 2*  --  2*  --  2* 2*   GFRESTBLACK 2*  --  2*  --  3* 3*   CASIE 6.8*  --  6.8*  --  6.9* 7.2*   MAG 1.9  --   --   --   --   --    PHOS 10.4*  --   --   --   --   --      CBC  Recent Labs  Lab 10/29/18  2346 10/29/18  2341 10/28/18  2244 10/28/18  2242   HGB 10.2* 8.4* 7.8* 8.3*   WBC  --  3.9*  --  3.6*   RBC  --  2.56*  --  2.57*   HCT  --  25.7*  --  26.1*   MCV  --  100  --  102*   MCH  --  32.8  --  32.3   MCHC  --  32.7  --  31.8   RDW  --  13.5  --  13.8   PLT  --  173  --  166     INRNo lab results found in last 7 days.  ABGNo lab results found in last 7 days.   URINE STUDIES  Recent Labs   Lab Test  01/02/18   2048  03/08/17   1632  01/25/17   1920  10/19/15   0550   COLOR  Yellow  Straw  Straw  Yellow   APPEARANCE  Cloudy  Slightly Cloudy  Slightly Cloudy  Slightly   Cloudy   URINEGLC  150*  250*  70*  Negative   URINEBILI  Negative  Negative  Negative  Negative   URINEKETONE  Negative  Negative  Negative  Negative   SG  1.008  1.010  1.006  1.005   UBLD   Small*  Small*  Negative  Moderate*   URINEPH  8.0*  8.5*  7.5*  8.5*   PROTEIN  30*  100*  30*  300*   NITRITE  Negative  Negative  Negative  Negative   LEUKEST  Trace*  Small*  Negative  Small*   RBCU  3*  3*  1  2   WBCU  8*  4*  2  12*     Recent Labs   Lab Test  07/16/12   1400  07/02/12   1130  06/25/12   0615  06/17/12   0930  06/13/12   1120  06/07/12   0643  06/01/12   0950  05/25/12   1830  05/25/12   1110  05/22/12   1536  02/20/12   0800   UTPG  1.53*  0.86*  0.69*  0.82*  0.80*  0.88*  0.58*  0.47*    0.45*  0.56*  0.51*  0.42*     PTH  Recent Labs   Lab Test  11/30/12   2030  11/30/12   1030  03/09/12   1304   PTHI  500*  794*  198*     IRON STUDIES  Recent Labs   Lab Test  11/30/12   1950  03/09/12   1304   IRON  125  33*   FEB  155*  196*   IRONSAT  81*  17   SHAHBAZ  401*  87       IMAGING:  Reviewed    Nina Mcwilliams PA-C     ISTAT gases elec ica gluc sheila POCT   Result Value Ref Range    Ph Venous 7.31 (L) 7.32 - 7.43 pH    PCO2 Venous 36 (L) 40 - 50 mm Hg    PO2 Venous 42 25 - 47 mm Hg    Bicarbonate Venous 18 (L) 21 - 28 mmol/L    O2 Sat Venous 73 %    Sodium 137 133 - 144 mmol/L    Potassium 4.6 3.4 - 5.3 mmol/L    Glucose 92 70 - 99 mg/dL    Calcium Ionized 3.5 (L) 4.4 - 5.2 mg/dL    Hemoglobin 10.2 (L) 11.7 - 15.7 g/dL    Hematocrit - POCT 30 (L) 35.0 - 47.0 %PCV[SB1.2]      Recent Results (from the past 48 hour(s))   XR Chest 2 Views    Narrative    XR CHEST 2 VW 10/28/2018 10:18 PM    Comparison: 6/1/2017    History: missed HD;     Findings: 2 views of the chest. Heart and major vasculature are within  normal limits. No pleural effusion. No pneumothorax. No acute airspace  disease. The upper abdomen is unremarkable.      Impression    Impression: No acute airspace disease.    I have personally reviewed the examination and initial interpretation  and I agree with the findings.    LIANNE BOWMAN MD   Chest XR,  PA & LAT   Result Value    Radiologist flags (Urgent)     Consider  "noncontrast CT limited for determination of    Narrative    XR CHEST 2 VW 10/30/2018 12:10 AM    Comparison: 10/28/2018    History: SOB;     Findings: PA and lateral views of the chest. Cardiac silhouette is  within normal limits. The lungs have a subtle increase \"dirtiness\" to  them which in an immunosuppressed patient could possibly represent  subtle inflammatory disease. Pulmonary vasculature is distinct. No  pleural effusion. No pneumothorax.  The upper abdomen is unremarkable.      Impression    Impression: Subtle increased interstitial markings of the lungs,  although this could be a normal finding in an immunosuppressed patient  is any chance of the could be an atypical infection considered limited  noncontrast CT of the chest for further evaluation.      [Access Center: Consider noncontrast CT limited for determination of  the interstitial pattern within the lung for possible atypical  infection.]    This report will be copied to the [facility] to ensure a  provider acknowledges the finding. Access Center is available Monday  through Friday 8am-3:30 pm.     I have personally reviewed the examination and initial interpretation  and I agree with the findings.    MICHELLE PATEL MD                Pending Results:   Unresulted Labs Ordered in the Past 30 Days of this Admission     No orders found for last 61 day(s).                  Discharge Instructions and Follow-Up:     Discharge Procedure Orders  Reason for your hospital stay   Order Comments: dialysis     Adult [facility] Follow-up and recommended labs and tests   Order Comments: dialyze at [facility] Wed 10/31 and [place] Thursday 11/1.     Appointments on [place] and/or [facility] (with [facility] or [facility] provider or service). Call [phone] if you haven't heard regarding these appointments within 7 days of discharge.     Activity   Order Comments: Your activity upon discharge: As tolerated   Order Specific Question Answer Comments "   Is discharge order? Yes      When to contact your care team   Order Comments: after treatment:    Chest pain    Bleeding from the needle site    Shortness of breath    Fever or chills    Headache or lightheadedness    Nausea or vomiting    Itching    Muscle cramps    Pain, warm or redness at your access site    Inability to feel your blood flow (called a thrill) in your AV fistula or graft     Diet   Order Comments: Follow this diet upon discharge: Dialysis diet   Order Specific Question Answer Comments   Is discharge order? Yes             Attestation:  Beti Kendall.[SB1.1]                 Revision History        User Key Date/Time User Provider Type Action    > SB1.2 10/30/2018  5:07 PM Beti Kendall APRN CNP Nurse Practitioner Sign     SB1.1 10/30/2018  4:57 PM Beti Kendall APRN CNP Nurse Practitioner             Discharge Summaries by Nina Mcwilliams PA at 10/30/2018  2:36 PM     Author:  Nina Mcwilliams PA Service:  Nephrology Author Type:  Physician Assistant    Filed:  10/30/2018  2:44 PM Date of Service:  10/30/2018  2:36 PM Creation Time:  10/30/2018  2:36 PM    Status:  Signed :  Nina Mcwilliams PA (Physician Assistant)         Dialysis Discharge Summary Brief    [facility]  Division of Nephrology  Nephrology Discharge Dialysis Orders  Ph: [phone]  Fax: [phone]    Jelly Dietz  MRN: [id number]  YOB: 1985    [facility] Dialysis Unit: [place] (and [place] on occasion)  Primary Nephrologist: Dr. Whalen    Date of Admission: 10/29/2018  Date of Discharge: 10/30/2018    Jelly Dietz is a 33 yr old female with PMH of ESRD 2/2 IgA nephropathy s/p LDKT (2007 in [place]) with subsequent failure and dialysis dependence since 11/2012, admitted with shortness of breath, volume overload, and metabolic acidosis in the setting of ~ one week of missed dialysis.     ESKD: due to IgA nephropathy s/p failed LDKT and dialysis dep  since 11/2012, dialyzes TTS at [facility] (ph [phone], fax [phone]) under the care of Dr. Whalen, EDW: 55.2 kg. Access: LUE AVF. Run time: 3.5 hrs. Last outpt HD run was 10/23. She is active on the transplant list.  - Note: pt had wanted to try some type of herbal supplement that she was told would heal her kidney; had long discussion and she agrees that it hasn't worked and she will be fully compliant with dialysis. She very much wants another transplant.  - Dialysis today 10/30; will dialyze at [facility] Wed 10/31 and [place] Thursday 11/1.      Acid/base: metabolic acidosis in setting of missed dialysis  - Will correct with bicarb via dialysate      Volume: EDW 55.2 kg (came off at 56.7 kg on 10/23, today's weight 63.5 kg), appears mildly hypervolemic, CXR with mild pulm edema. O2 99% on RA; minimal UOP  - UF goal 3-4 kg today     BP: 130-150's      Anemia: 10.2 g/dL; recent labs with ferritin 650, IS 34, hgb 10's. On epogen 2000 units per HD       BMD: iCa 3.6, phos 10.4; recent , phos 9's; PTA sensipar 30 mg 5x/week, renvela 3 tabs tid WM, hectorol 1.5 mcg per HD  - Dialyzing on high Ca bath   - Would consider holding sensipar until hypocalcemia improves  - Would increase hectorol to 4 mcg via HD  - On sevelamer (would consider switching to Ca based binder such as Phoslo but will defer to primary neph for this)      [x] Resume all previous dialysis orders with exception as noted below    New Orders (if not applicable put NA):  Estimated Dry Weight No change   Dialysis Duration    Dialysis Access    Antibiotics (dose per dialysis, end date)            Labs to be drawn at dialysis    Other major changes to dialysis prescription (e.g. Dialysate bath, heparin, blood flow rate, etc)   Ca 3.0 for now   Medication changes (also fax the unit a copy of the discharge summary)         Recommend increasing hectorol to 4 mcg per run  Recommend holding sensipar for now  Consider switching to  "Phoslo given hypocalcemia     Name of physician completing this form:[DF1.1] GERMAN Floyd[DF1.2]-C[DF1.1]     Revision History        User Key Date/Time User Provider Type Action    > DF1.2 10/30/2018  2:44 PM Nina Mcwilliams PA Physician Assistant Sign     DF1.1 10/30/2018  2:36 PM Nina Mcwilliams PA Physician Assistant             Discharge Summaries by Alba Bradshaw DO at 8/25/2018 12:09 PM     Author:  Alba Bradshaw DO Service:  General Medicine Author Type:  Physician    Filed:  8/28/2018  9:49 AM Date of Service:  8/25/2018 12:09 PM Creation Time:  8/25/2018 12:08 PM    Status:  Signed :  Alba Bradshaw DO (Physician)           [facility] - Internal Medicine Discharge Summary   Date of Service: 8/25/2018    Jelly Dietz MRN# [id number]   YOB: 1985 Age: 33 year old      Date of Admission:  8/24/2018  Date of Discharge:[LK1.1]  8/25/2018  3:46 PM[LK1.2]  Admitting Physician:  Erik Landeros MD  Discharge Physician:[LK1.1]  Alba Bradshaw DO[LK1.3]  Discharging Service:  Internal Medicine,[LK1.1] M 4[LK1.3]     Primary Provider: [facility], [place]         Reason for Admission:[LK1.1]   Hyperkalemia  Hypertension[LK1.3]          Discharge Diagnosis:[LK1.1]   Hyperkalemia  Hypertension[LK1.3]            Consultations:[LK1.1]   Nephrology[LK1.3]         Hospital Course by Problem:[LK1.1]      Hyperkalemia  Hypertension  ESRD on HD   Patient admitted with hyperkalemia and hypertension after missing 2 dialysis runs outpatient.  She was dialyzed two days in a row with improvement in her potassium level and BP.  Discharged with instruction to not miss outpatient dialysis sessions as this could be life threatening.[LK1.3]      Physical Exam on day of Discharge:[LK1.1]  Blood pressure (!) 145/92, pulse 70, temperature 98.7  F (37.1  C), temperature source Oral, resp. rate 16, height 1.676 m (5' 6\"), weight 58.7 kg (129 lb 8 oz), SpO2 99 %, not currently " breastfeeding.[LK1.4]    Constitutional:   Well nourished, well developed, resting comfortably   Head: Normocephalic and atraumatic.    Oral:  , mucous membranes are moist  Cardiovascular: Regular rate and rhythm without murmurs or gallops  Pulmonary/Chest: Clear to auscultation bilaterally, with no wheezes or retractions. No respiratory distress.  GI: Soft with good bowel sounds.  Non-tender, non-distended   Musculoskeletal:  No edema   Neurological: Alert and oriented to person, place, and time.   Psychiatric:  Normal mood and affect.    Lines/Tubes/Drains:   Peripheral IV 08/24/18 Right Upper forearm (Active)   Site Assessment WDL 8/25/2018  7:30 AM   Line Status Saline locked 8/25/2018  7:30 AM   Phlebitis Scale 0-->no symptoms 8/25/2018  7:30 AM   Infiltration Scale 0 8/25/2018  7:30 AM   Infiltration Site Treatment Method  None 8/25/2018  7:30 AM   Extravasation? No 8/25/2018  7:30 AM   Number of days:1              Pending Results:[LK1.1]   None[LK1.3]         Discharge Medications:[LK1.1]     Discharge Medication List as of 8/25/2018 12:40 PM      START taking these medications    Details   Lidocaine (LIDOCARE) 4 % Patch Place 1 patch onto the skin every 24 hoursDisp-30 patch, [id number]         CONTINUE these medications which have NOT CHANGED    Details   amLODIPine (NORVASC) 10 MG tablet Take 1 tablet (10 mg) by mouth daily, Disp-30 tablet, R-11, E-Prescribe      B Complex-C-Folic Acid (DIALYVITE) TABS 1 tablet by mouth daily, Disp-30 tablet, R-11, E-Prescribe      carvedilol (COREG) 25 MG tablet Take 1 tablet (25 mg) by mouth 2 times daily (with meals), Disp-60 tablet, R-11, E-Prescribe      cetirizine (ZYRTEC) 10 MG tablet Take 10 mg by mouth At Bedtime, Historical      cloNIDine (CATAPRES) 0.1 MG tablet Take 1 tablet (0.1 mg) by mouth 3 times daily as needed, Disp-60 tablet, R-3, E-PrescribeFor BP >170/100      cyclobenzaprine (FLEXERIL) 5 MG tablet Take 1 tablet (5 mg) by mouth nightly as needed  for muscle spasms, Disp-15 tablet, R-0, Local Print      doxycycline (VIBRA-TABS) 100 MG tablet Take 1 tablet by mouth 2 times daily (with meals), Historical      RENVELA 800 MG tablet Take 1 tablet by mouth 3 times daily (with meals), [place], Historical      acetaminophen (TYLENOL) 325 MG tablet Take 1-2 tablets by mouth every 4 hours as needed., Disp-60 tablet, R-0, E-Prescribe         STOP taking these medications       calcium carbonate (TUMS) 500 MG chewable tablet Comments:   Reason for Stopping:         calcium carbonate (TUMS) 500 MG chewable tablet Comments:   Reason for Stopping:[LK1.5]                    Discharge Instructions and Follow-Up:[LK1.1]     Discharge Procedure Orders  Reason for your hospital stay   Order Comments: Missing dialysis led to high potassium levels which is life threatening.     Activity   Order Comments: Your activity upon discharge: activity as tolerated   Order Specific Question Answer Comments   Is discharge order? Yes      Discharge Instructions   Order Comments: Do NOT miss your regularly scheduled dialysis sessions.     Full Code     Diet   Order Comments: Follow this diet upon discharge: Orders Placed This Encounter     Combination Diet Regular Diet Adult; Dialysis Diet   Order Specific Question Answer Comments   Is discharge order? Yes[LK1.5]                  Discharge Disposition:[LK1.1]   Home[LK1.3]         Condition on Discharge:   Discharge condition:[LK1.1] Stable[LK1.3]   Code status on discharge:[LK1.1] Full Code[LK1.3]        Date of service: 8/25/2018[LK1.1]     < 30[LK1.3] minutes spent in discharge, including >50% in counseling and coordination of care, medication review and plan of care recommended on follow up. Questions were answered[LK1.1].[LK1.3]    Alba Bradshaw  Internal Medicine Hospitalist & Staff Physician  [facility][LK1.1]                Revision History        User Key Date/Time User Provider Type Action    > LK1.5 8/28/2018  9:49  AM Alba Bradshaw DO Physician Sign     LK1.2 8/28/2018  9:46 AM Alba Bradshaw DO Physician      LK1.3 8/28/2018  9:45 AM Alba Bradshaw DO Physician      LK1.4 8/25/2018 12:09 PM Alba Bradshaw DO Physician      LK1.1 8/25/2018 12:08 PM Alba Bradshaw DO Physician             Discharge Summaries by Mendoza Borges MD at 4/30/2017  4:08 PM     Author:  Mendoza Borges MD Service:  Hospitalist Author Type:  Physician    Filed:  4/30/2017  8:17 PM Date of Service:  4/30/2017  4:08 PM Creation Time:  4/30/2017  4:07 PM    Status:  Signed :  Mendoza Borges MD (Physician)         [facility]    Internal Medicine Discharge Summary- [facility]    Date of Admission:  4/30/2017  Date of Discharge:[AB1.1]  4/30/2017[AB1.2]  Discharging Provider:[AB1.1] Claudia Dominguez[AB1.2], PA and Dr. Borges  Discharge Team: [facility][AB1.1]    Discharge Diagnoses[AB1.2]    #ESKD, hyperkalemia  #HTN  #Normocytic anemia  #Thrombocytopenia  #GERD  #Seasonal allergies[AB1.1]    Follow-ups Needed After Discharge[AB1.2]   Patient should follow up with her [facility] as scheduled on Tuesday with blood pressure monitoring and for further hemodialysis runs. She should follow up with her Nephrologist within 1 month.     Patient should follow up with Transplant team for further consideration of renal transplant.[AB1.1]    Hospital Course[AB1.2]   Jelly Dietz is a 32 year old female with a past medical history of HTN, anemia, anxiety, IgA nephropathy s/p LDKT c/b acute cellular rejection, kidney explanted 2013 and now w/ ESKD on HD TuThSat admitted on 4/30/2017 for HTN and hyperkalemia after missed dialysis run. 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 Dialyzes TuThSat, missed run yesterday as  canceled. K 5.6, BUN 87, CO2 23 w/ normal AG.  Hypertensive w/ BP in 1[AB1.1]5[AB1.3]0s/100s. Nephrology consulted, ran on K2 bath w/[AB1.1] 4 kg[AB1.3] of volume removed. BP[AB1.1] elevated after run w/ systolics in 150s, diastolics in low 100s[AB1.3]. She should follow up in [facility] dialysis center as scheduled Tuesday for next HD run, BP monitoring and see nephrology w/ in 1 month.      #HTN: On coreg 25 mg BID, amlodipine 10 mg qday, clonidine 0.1 mg TID PRN. BP elevated on initial presentation,[AB1.1] improved but still elevated post run[AB1.3].[AB1.1] Instructed to take BP when arrives home, if still elevated use PRN clonidine as instructed. She should notify her nephrologist if BP is consistently >140/90.[AB1.3]      #Normocytic anemia: Hemoglobin of 10.8 w/ MCV of 100. Epo and venofer per nephrology as OP.     #Thrombocytopenia: Platelet count of 117, intermittently low since 11/2015. Patient should FU w/ nephrology and PCP as OP for further work up.     #GERD: On Tums PRN. Continue on discharge.  #Seasonal allergies: On Zyrtec qhs. Continue on discharge.[AB1.1]     Consultations This Hospital Stay[AB1.2]   MEDICATION HISTORY IP PHARMACY CONSULT  NEPHROLOGY ESRD ADULT IP CONSULT[AB1.4]     Code Status[AB1.2]   Full Code[AB1.1]    Time Spent on this Encounter[AB1.2]   I, Claudia Dominguez, personally saw the patient today and spent less than or equal to 30 minutes discharging this patient.[AB1.1]       Claudia Dominguez[AB1.2]  Internal Medicine Staff Hospitalist Service  [facility]  Pager: [phone][AB1.1]    I saw and evaluated this patient with Claudia Dominguez. I agree with the above assessment and plan. Patient admitted and discharged same day for missed dialysis session    Rob[ZK1.1]    ______________________________________________________________________[AB1.1]    Physical Exam[AB1.2]   Vital Signs:[AB1.1] Temp: 98.4  F (36.9  C) Temp src: Oral[AB1.2] BP: (!) 149/100[AB1.5] Pulse: 90   Resp: 16 SpO2: 98 % O2 Device: None (Room air)[AB1.2]     Weight:[AB1.1] 0 lbs 0 oz[AB1.2]    GENERAL: Alert and oriented x 3. Sitting comfortably in bed running on HD via LUE fistula graft.  HEENT: Anicteric sclera. Mucous membranes moist and without lesions.   CV: RRR. S1, S2. 3/6 systolic murmurs appreciated.   RESPIRATORY: Effort normal on RA. Lungs with crackles in bases, with no wheezing, rhonchi.   GI: Abdomen soft and non distended, bowel sounds present. No tenderness, rebound, guarding.   MUSCULOSKELETAL: No joint swelling or tenderness. Moves all extremities.   NEUROLOGICAL: No focal deficits.   EXTREMITIES: No peripheral edema. Intact bilateral pedal pulses.   SKIN: No jaundice. No rashes. Francy tattoo on bilateral le.[AB1.1]     Significant Results and Procedures[AB1.2]   K 5.6  Creat 13.90  BUN 87  CO2 23  AG 13  Platelet count 117  Hemoglobin 10.8[AB1.1]    Pending Results[AB1.2]   These results will be followed up by Transplant, Nephrology or PCP (not ordered during this admission)[AB1.1]  Unresulted Labs Ordered in the Past 30 Days of this Admission     Date and Time Order Name Status Description    3/8/2017 0706 FACTOR 5 LEIDEN MUTATION ANALYSIS In process     3/8/2017 0706 FACTOR 2 PROTHROMBIN [id number] MUT ANAL In process              Primary Care Physician   ASH WARNER    Discharge Disposition[AB1.2]   Discharged to home  Condition at discharge: Stable[AB1.1]    Discharge Orders     Reason for your hospital stay   You were admitted for high blood pressure and high potassium after missing dialysis. You had a dialysis run to treat this.     Adult [facility] Follow-up and recommended labs and tests   Follow up with [facility] in [place] as scheduled for dialysis on Tuesday.  Follow up with primary care provider, ASH WARNER, within 1 month for routine follow up.  Follow up with nephrology w/ in 1 month for continued management of hypertension and end stage renal disease.   Follow up with Dr. Deras, at (location with clinic name or city) [facility] as scheduled  2017 for follow up of cardiology issues.     Appointments on [place] and/or [facility] (with [facility] or [facility] provider or service). Call [phone] if you haven't heard regarding these appointments within 7 days of discharge.     Activity   Your activity upon discharge: activity as tolerated     Monitor and record   blood pressure Tuesday, Thursday, Saturday w/ HD. Have RN call nephrology if BP is >140/90  fluid intake and output daily  Limit intake to 2 L per day     When to contact your care team   Call your primary doctor if you have any of the following: temperature greater than 100.4 or less than 96,  increased shortness of breath, increased swelling or increased pain.     Full Code     Diet   Follow this diet upon discharge: Orders Placed This Encounter     Renal diet       Discharge Medications   Current Discharge Medication List      CONTINUE these medications which have NOT CHANGED    Details   cetirizine (ZYRTEC) 10 MG tablet Take 10 mg by mouth At Bedtime      calcium carbonate (TUMS) 500 MG chewable tablet Take 1 chew tab by mouth 3 times daily as needed for heartburn      carvedilol (COREG) 25 MG tablet Take 1 tablet (25 mg) by mouth 2 times daily (with meals)  Qty: 60 tablet, Refills: 11    Associated Diagnoses: Benign essential hypertension; SOB (shortness of breath)      cloNIDine (CATAPRES) 0.1 MG tablet Take 1 tablet (0.1 mg) by mouth 3 times daily as needed  Qty: 60 tablet, Refills: 3    Comments: For BP >170/100  Associated Diagnoses: Benign essential hypertension; SOB (shortness of breath)      amLODIPine (NORVASC) 10 MG tablet Take 1 tablet (10 mg) by mouth daily  Qty: 30 tablet, Refills: 1    Associated Diagnoses: ESRD (end stage renal disease) on dialysis (H); Malignant essential hypertension      acetaminophen (TYLENOL) 325 MG tablet Take 1-2 tablets by mouth every 4 hours as needed.  Qty: 60 tablet, Refills: 0    Associated Diagnoses:  delivery delivered            Allergies   No Known Allergies[AB1.2]     Revision History        User Key Date/Time User Provider Type Action    > ZK1.1 4/30/2017  8:17 PM Mendoza Borges MD Physician Sign     [N/A] 4/30/2017  6:34 PM Claudia Dominguez PA-C Physician Assistant Sign     AB1.5 4/30/2017  6:34 PM Claudia Dominguez PA-C Physician Assistant Sign     AB1.3 4/30/2017  6:32 PM Claudia Dominguez PA-C Physician Assistant      AB1.4 4/30/2017  4:10 PM Claudia Dominguez PA-C Physician Assistant      AB1.2 4/30/2017  4:08 PM Claudia Dominguez PA-C Physician Assistant      AB1.1 4/30/2017  4:07 PM Claudia Dominguez PA-C Physician Assistant             Discharge Summaries by Dakota Bailey DO at 1/25/2017  6:13 PM     Author:  Dakota Bailey DO Service:  Hospitalist Author Type:  Physician    Filed:  1/25/2017 10:52 PM Note Time:  1/25/2017  6:13 PM Creation Time:  1/25/2017  6:13 PM    Status:  Signed :  Dakota Bailey DO (Physician)         [facility]    Discharge Summary  Hospitalist    Date of Admission:  1/24/2017  Date of Discharge:  1/25/2017  7:51 PM  Provider:  Dakota Bailey DO [facility]  Date of Service (when I last saw the patient): 01/25/2017    Discharge Diagnoses  1.  Shortness of breath and hypertensive urgency related to volume overload in the setting of ESRD on HD  2.  Right otitis media    Other medical issues:  Past Medical History   Diagnosis Date     S/P kidney transplant      ESKD 2/2 IgA nephropathy s/p LDKT in 12/2007 in [place]. History of 1B kidney rejection      Hypertension      resolved after transplant     Anemia      MRSA (methicillin resistant Staphylococcus aureus)      IgA nephropathy      Heart murmur      ACS (acute coronary syndrome) (H) 11/11/2014     Chest pain 11/11/2014     [facility] 7/16/2015     Allergic state      seasonal allergies       History of Present Illness  Jelly Dietz is an  32 year old female who presented with shortness of breath.  Please see the admission history and physical for full details.    Hospital Course  Jelly Dietz was admitted on 1/24/2017.  The following problems were addressed during her hospitalization:    The patient presented with shortness of breath.  She also had a markedly elevated blood pressure with a headache and nausea..  This was felt related to volume overload in the setting of her end stage renal disease.  She was given some additional blood pressure medication and setup for dialysis this AM.   Following dialysis her blood pressure markedly improved and she felt much better.  She was tolerating oral intake well prior to discharge.  She did not have any new focal neurologic change during her stay.   In the ER she was noted to have a right otitis media.  Initially she was given some augmentin but given her hemodialysis I changed her treatment to a somewhat easier to dose cephalosporin at discharge.  She should follow-up with nephrology for her usual dialysis schedule after discharge.  If her ear worsens or continues to bother her following the treatment she should see her primary care provider.  Recently with the ear pain she has also had trouble sleeping.  She received some valium last PM which really helped but she felt the dose was too much for her.  She requested a small supply at discharge and I provided her with a lower PRN bedtime 2 mg dose.  She strongly desired discharge and felt much better.       Significant Results and Procedures  See below    Pending Results    Unresulted Labs Ordered in the Past 30 Days of this Admission     No orders found for last 60 day(s).          Code Status  Full Code       Primary Care Physician  ASH WARNER    Blood pressure 127/79, pulse 83, temperature 98.2  F (36.8  C), temperature source Oral, resp. rate 16, weight 53.797 kg (118 lb 9.6 oz), SpO2 98 %, not currently breastfeeding.  Heart regular with soft murmur,  lungs clear, abdomen non-tender.    Discharge Disposition  Discharged to home    Consultations This Hospital Stay  NEPHROLOGY IP CONSULT    Time Spent on This Encounter  I, Dakota Bailey, personally saw the patient today and spent greater than 30 minutes discharging this patient.    Discharge Orders  No discharge procedures on file.  Discharge Medications  Current Discharge Medication List      START taking these medications    Details   cefdinir (OMNICEF) 300 MG capsule Take 1 capsule (300 mg) by mouth every other day for 10 days (take after dialysis on dialysis days)  Qty: 5 capsule, Refills: 0    Associated Diagnoses: Right otitis media, unspecified chronicity, unspecified otitis media type      diazepam (VALIUM) 2 MG tablet Take 1 tablet (2 mg) by mouth nightly as needed for anxiety or sleep  Qty: 7 tablet, Refills: 0    Associated Diagnoses: Anxiety         CONTINUE these medications which have NOT CHANGED    Details   oxyCODONE (ROXICODONE) 5 MG immediate release tablet Take 1 tablet (5 mg) by mouth every 6 hours as needed for pain  Qty: 15 tablet, Refills: 0      carvedilol (COREG) 25 MG tablet Take 1 tablet (25 mg) by mouth 2 times daily (with meals)  Qty: 60 tablet, Refills: 11    Associated Diagnoses: Benign essential hypertension; SOB (shortness of breath)      cloNIDine (CATAPRES) 0.1 MG tablet Take 1 tablet (0.1 mg) by mouth 3 times daily as needed  Qty: 60 tablet, Refills: 3    Comments: For BP >170/100  Associated Diagnoses: Benign essential hypertension; SOB (shortness of breath)      HYDROcodone-acetaminophen (NORCO) 5-325 MG per tablet Take 1 tablet by mouth every 6 hours as needed for moderate to severe pain  Qty: 7 tablet, Refills: 0      amLODIPine (NORVASC) 10 MG tablet Take 1 tablet (10 mg) by mouth daily  Qty: 30 tablet, Refills: 1    Associated Diagnoses: ESRD (end stage renal disease) on dialysis (H); Malignant essential hypertension      acetaminophen (TYLENOL) 325 MG tablet Take 1-2  tablets by mouth every 4 hours as needed.  Qty: 60 tablet, Refills: 0    Associated Diagnoses:  delivery delivered         STOP taking these medications       azithromycin (ZITHROMAX Z-HERMINIA) 250 MG tablet Comments:   Reason for Stopping:             Allergies  No Known Allergies  Data    Recent Labs  Lab 17  2245   WBC 2.9*   HGB 8.1*   HCT 24.8*      *       Recent Labs  Lab 17  0510 17  2245   NA  --  135   POTASSIUM 5.7* 5.3   CHLORIDE  --  100   CO2  --  21   ANIONGAP  --  14   GLC  --  107*   BUN  --  89*   CR  --  13.30*   GFRESTIMATED  --  3*   GFRESTBLACK  --  4*   CASIE  --  8.6   PHOS  --  8.0*     Results for orders placed or performed during the hospital encounter of 17   XR Chest 2 Views    Narrative    XR CHEST 2 VW  2017 11:28 PM      HISTORY: Dyspnea, missed dialysis.     COMPARISON: 2015.    FINDINGS: The heart is enlarged without pulmonary edema. The lungs are  clear. No pneumothorax or pleural effusion.      Impression    IMPRESSION: No acute abnormality.    TEJ NEWTON MD              Revision History        Date/Time User Provider Type Action    > 2017 10:52 PM Dakota Bailey DO Physician Sign    Attribution information within the note text is not available.                     Consult Notes      Consults by Nina Mcwilliams PA at 10/30/2018  9:20 AM     Author:  Nina Mcwilliams PA Service:  Nephrology Author Type:  Physician Assistant    Filed:  10/30/2018 10:30 AM Date of Service:  10/30/2018  9:20 AM Creation Time:  10/30/2018  9:20 AM    Status:  Signed :  Nina Mcwilliams PA (Physician Assistant)     Consult Orders:    1. Nephrology ESRD Adult IP Consult: needs dialysis non-urgently; Consultant may enter orders: Yes [[id number]] ordered by Edvin Hillman MD at 10/30/18 0037                  Nephrology Initial Consult  2018      Jelly Dietz MRN:[id number] YOB: 1985  Date of  Admission:10/29/2018  Primary care provider: [facility], [place]  Requesting physician: Katlin att. providers found    ASSESSMENT AND RECOMMENDATIONS:   Jelly Dietz is a 33 yr old female with PMH of ESRD 2/2 IgA nephropathy s/p LDKT (2007 in [place]) with subsequent failure and dialysis dependence since 11/2012, admitted with shortness of breath, volume overload, and metabolic acidosis in the setting of ~ one week of missed dialysis.    ESKD: due to IgA nephropathy s/p failed LDKT and dialysis dep since 11/2012, dialyzes TTS at [facility] (ph [phone], fax [phone]) under the care of [DF1.1] Koby[DF1.2], EDW:[DF1.1] 55.2 kg[DF1.2]. Access:[DF1.1] LUE AVF[DF1.2]. Run time:[DF1.1] 3.5 hrs[DF1.2].[DF1.1] Last outpt HD run was 10/23.[DF1.2] She is active on the transplant list.[DF1.1]  - Note: pt had wanted to try some type of herbal supplement that she was told would heal her kidney; had long discussion and she agrees that it hasn't worked and she will be fully compliant with dialysis. She very much wants another transplant.[DF1.3]  - Dialysis today as well as tomorrow and Thursday (whether here or at outpatient unit[DF1.1], outpt can be arranged at [facility][DF1.2]).    Acid/base: metabolic acidosis in setting of missed dialysis  - Will correct with bicarb via dialysate today    Volume: EDW[DF1.1] 55.2 kg (came off at 56.7 kg on 10/23[DF1.2], today's weight 63.5 kg[DF1.3])[DF1.2], appears[DF1.1] mildly hypervolemic[DF1.3], CXR with mild pulm edema.[DF1.1] O2 99% on RA[DF1.4]; minimal UOP[DF1.3]  - Daily weights  - UF goal 3[DF1.1]-4[DF1.2] kg today    BP: 130-150's,[DF1.1] PTA meds per dialysis rounding report is clonidine 0.1 mg tid  - Currently on[DF1.2] amlodipine 10 mg qday, coreg 25 mg bid    Anemia: 10.2 g/dL;[DF1.1] recent labs with ferritin 650, IS 34, hgb 10's. On epogen 2000 units per HD  - Hold ENZO, hgb > 10.0[DF1.2]    BMD: iCa 3.6, phos 10.4[DF1.1]; recent , phos 9's;  PTA sensipar 30 mg 5x/week, renvela 3 tabs tid WM, hectorol 1.5 mcg per HD[DF1.2]  - Dialyzing on high Ca bath today[DF1.1]  - Would hold sensipar for now given hypocalcemia[DF1.2]  -[DF1.1] Will increase[DF1.2] hectorol[DF1.1] to 4 mcg[DF1.2] via HD  - On sevelamer (would consider switching to Ca based binder such as Phoslo[DF1.1] but will defer to primary neph for this[DF1.2])    Recommendations were communicated to primary team via this note.      Nina Mcwilliams PA-C  Division of Kidney Disease   [phone]      REASON FOR CONSULT: ESKD and management of dialysis    HISTORY OF PRESENT ILLNESS:  Jelly Dietz is a 33 yr old female with PMH of ESRD 2/2 IgA nephropathy s/p LDKT (2007) with subsequent failure and dialysis dependence since 11/2012, admitted with shortness of breath, volume overload, and metabolic acidosis in the setting of ~ one week of missed dialysis. Per discussion with [facility] Gonzalez, she frequently misses dialysis runs due to wanting to move to [place] but not able to[DF1.1] move until May 2019[DF1.3] due to a housing situation. Currently, she was told by a Religion person that she may not need dialysis[DF1.1] if she takes a herbal supplement. However after long discussion, she now understand that she needs to stop the supplement and continue with dialysis and not miss by coming to the [facility]. She has a 3 yr old daughter at home and very much hopes to get another transplant. Outpatient dialysis records were obtained and reviewed. She currently denies CP, n/v, chills. She is short of breath.[DF1.3]    PAST MEDICAL HISTORY:  Reviewed with patient on 10/30/2018     Past Medical History:   Diagnosis Date     ACS (acute coronary syndrome) (H) 11/11/2014     Allergic state     seasonal allergies     Anemia in chronic renal disease      Cervical cerclage suture present in second trimester      Chest pain 11/11/2014     Chronic renal transplant rejection      End stage kidney disease (H)      Heart  murmur      History of  delivery 2015    30 wks, 28 wks      Hypertension     resolved after transplant     IgA nephropathy      MRSA (methicillin resistant Staphylococcus aureus)      [facility]     Pre-eclampsia      Renal failure affecting pregnancy in second trimester      S/P kidney transplant     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 History of 1B kidney rejection      SAB (spontaneous )     2nd trimester        Past Surgical History:   Procedure Laterality Date     CERCLAGE CERVICAL N/A 2015    Procedure: CERCLAGE CERVICAL;  Surgeon: Norbert Chopra MD;  Location: [facility] L+D      SECTION  2012    Procedure:  SECTION;;  Surgeon: Chelsea Cross MD;  Location: [facility] L+D      SECTION N/A 2015    Procedure:  SECTION;  Surgeon: Chelsea Cross MD;  Location: [facility] OR     cesearean section       EXPLANT TRANSPLANTED KIDNEY  3/29/2013    Procedure: EXPLANT TRANSPLANTED KIDNEY;  Explant Transplanted right Kidney (from patients right iliac fossa);  Surgeon: Nory Morgan MD;  Location: [facility] OR     NEPHRECTOMY  3/29/13    Transplant nephrectomy      WILIAN/DIALYSIS CATHETER       TRANSPLANT KIDNEY RECIPIENT LIVING UNRELATED  Dec 2007    (Adult male in [place])        MEDICATIONS:  PTA Meds  Prior to Admission medications    Medication Sig Last Dose Taking? Auth Provider   cyclobenzaprine (FLEXERIL) 10 MG tablet Take 0.5 tablets (5 mg) by mouth 2 times daily as needed for muscle spasms  Patient taking differently: Take 5-10 mg by mouth 2 times daily as needed for muscle spasms  10/30/2018 at Unknown time Yes Wilber Beltran MD   acetaminophen (TYLENOL) 325 MG tablet Take 1-2 tablets by mouth every 4 hours as needed.   Marni Arteaga,    amLODIPine (NORVASC) 10 MG tablet Take 1 tablet (10 mg) by mouth daily   Katherine Varma MD B  Complex-C-Folic Acid (DIALYVITE) TABS 1 tablet by mouth daily   Katherine Varma MD   carvedilol (COREG) 25 MG tablet Take 1 tablet (25 mg) by mouth 2 times daily (with meals)   Katherine Varma MD   cetirizine (ZYRTEC) 10 MG tablet Take 10 mg by mouth At Bedtime   Unknown, Entered By History   cloNIDine (CATAPRES) 0.1 MG tablet Take 1 tablet (0.1 mg) by mouth 3 times daily as needed   Katherine Varma MD   RENVELA 800 MG tablet Take 1 tablet by mouth 3 times daily (with meals)   Unknown, Entered By History      Current Meds    sodium chloride 0.9%  250 mL Intravenous Once in dialysis     sodium chloride 0.9%  300 mL Hemodialysis Machine Once     amLODIPine  10 mg Oral Daily     carvedilol  25 mg Oral BID w/meals     cetirizine  10 mg Oral At Bedtime     gelatin absorbable  1 each Topical During Hemodialysis (from stock)     - MEDICATION INSTRUCTIONS -   Does not apply Once     sevelamer  800 mg Oral TID w/meals     sodium chloride (PF)  3 mL Intracatheter Q8H     Infusion Meds    - MEDICATION INSTRUCTIONS -         ALLERGIES:    No Known Allergies    REVIEW OF SYSTEMS:  A comprehensive of systems was negative except as noted above.    SOCIAL HISTORY:   Social History     Social History     Marital status: Single     Spouse name: N/A     Number of children: N/A     Years of education: N/A     Occupational History     Not on file.     Social History Main Topics     Smoking status: Never Smoker     Smokeless tobacco: Never Used     Alcohol use No     Drug use: No     Sexual activity: Yes     Partners: Male     Other Topics Concern     Blood Transfusions Yes     Multiple in [place] none in [place]     Caffeine Concern No     1s/d     Occupational Exposure No     Hobby Hazards No     Sleep Concern No     Stress Concern No     Weight Concern No     Special Diet No     Back Care No     Exercise No     walk 20' daily     Bike Helmet No     Seat Belt No     Social History Narrative    Currently  unemployed.  Lives in [place].   with one son.     She currently lives in [place] but is wanting to move to the [facility].  Reviewed with patient     FAMILY MEDICAL HISTORY:   Family History   Problem Relation Age of Onset     Diabetes Paternal Grandfather      Breast Cancer Maternal Grandmother 55     Cancer No family hx of      No known family hx of skin cancer     Reviewed with patient     PHYSICAL EXAM:   Temp  Av.2  F (36.8  C)  Min: 97.2  F (36.2  C)  Max: 99  F (37.2  C)      Pulse  Av.8  Min: 72  Max: 85 Resp  Av  Min: 16  Max: 16  SpO2  Av.3 %  Min: 98 %  Max: 100 %       /89 (BP Location: Right arm)  Pulse 85  Temp 98.3  F (36.8  C) (Oral)  Resp 16  Wt 63.5 kg (139 lb 14.4 oz)  LMP 10/29/2018 (Exact Date)  SpO2 100%  BMI 22.24 kg/m2       Admit Weight: 63.6 kg (140 lb 3.2 oz)     GENERAL APPEARANCE:[DF1.1] alert, NAD[DF1.3]  EYES:[DF1.1] no[DF1.2] scleral icterus, pupils equal  Pulmonary:[DF1.1] crackles in bases[DF1.3]  CV: regular rhythm, normal rate   - Edema[DF1.1] trace LE, does have facial edema[DF1.3]  GI: soft, nontender, normal bowel sounds  MS: no evidence of inflammation in joints, no muscle tenderness  :[DF1.1] no[DF1.2] polanco  SKIN: no rash, warm, dry, no cyanosis  NEURO:[DF1.1] speech and mentation intact[DF1.3]   Access:[DF1.1] LUE AVF[DF1.2]    LABS:   CMP  Recent Labs  Lab 10/30/18  0706 10/29/18  2346 10/29/18  2341 10/28/18  2244 10/28/18  2242 10/28/18  2200    137 137 136 136 134   POTASSIUM 4.8 4.6 4.6 4.4 4.5 5.0   CHLORIDE 102  --  101  --  101 100   CO2 16*  --  17*  --  18* 18*   ANIONGAP 16*  --  18*  --  17* 16*   GLC 93 92 96 98 102* 87   *  --  108*  --  91* 85*   CR 20.50*  --  20.30*  --  18.90* 18.20*   GFRESTIMATED 2*  --  2*  --  2* 2*   GFRESTBLACK 2*  --  2*  --  3* 3*   CASIE 6.8*  --  6.8*  --  6.9* 7.2*   MAG 1.9  --   --   --   --   --    PHOS 10.4*  --   --   --   --   --      CBC  Recent Labs  Lab  10/29/18  2346 10/29/18  2341 10/28/18  2244 10/28/18  2242   HGB 10.2* 8.4* 7.8* 8.3*   WBC  --  3.9*  --  3.6*   RBC  --  2.56*  --  2.57*   HCT  --  25.7*  --  26.1*   MCV  --  100  --  102*   MCH  --  32.8  --  32.3   MCHC  --  32.7  --  31.8   RDW  --  13.5  --  13.8   PLT  --  173  --  166     INRNo lab results found in last 7 days.  ABGNo lab results found in last 7 days.   URINE STUDIES  Recent Labs   Lab Test  01/02/18   2048  03/08/17   1632  01/25/17   1920  10/19/15   0550   COLOR  Yellow  Straw  Straw  Yellow   APPEARANCE  Cloudy  Slightly Cloudy  Slightly Cloudy  Slightly Cloudy   URINEGLC  150*  250*  70*  Negative   URINEBILI  Negative  Negative  Negative  Negative   URINEKETONE  Negative  Negative  Negative  Negative   SG  1.008  1.010  1.006  1.005   UBLD  Small*  Small*  Negative  Moderate*   URINEPH  8.0*  8.5*  7.5*  8.5*   PROTEIN  30*  100*  30*  300*   NITRITE  Negative  Negative  Negative  Negative   LEUKEST  Trace*  Small*  Negative  Small*   RBCU  3*  3*  1  2   WBCU  8*  4*  2  12*     Recent Labs   Lab Test  07/16/12   1400  07/02/12   1130  06/25/12   0615  06/17/12   0930  06/13/12   1120  06/07/12   0643  06/01/12   0950  05/25/12   1830  05/25/12   1110  05/22/12   1536  02/20/12   0800   UTPG  1.53*  0.86*  0.69*  0.82*  0.80*  0.88*  0.58*  0.47*  0.45*  0.56*  0.51*  0.42*     PTH  Recent Labs   Lab Test  11/30/12   2030  11/30/12   1030  03/09/12   1304   PTHI  500*  794*  198*     IRON STUDIES  Recent Labs   Lab Test  11/30/12   1950  03/09/12   1304   IRON  125  33*   FEB  155*  196*   IRONSAT  81*  17   SHAHBAZ  401*  87       IMAGING:  Reviewed    Nina Mcwilliams PA-C[DF1.1]       Revision History        User Key Date/Time User Provider Type Action    > DF1.3 10/30/2018 10:30 AM Nina Mcwilliams PA Physician Assistant Sign     DF1.2 10/30/2018  9:58 AM Nina Mcwilliams PA Physician Assistant      DF1.4 10/30/2018  9:42 AM Nina Mcwilliams PA Physician Assistant       DF1.1 10/30/2018  9:20 AM Nina Mcwilliams PA Physician Assistant             Consults by Katherine Varma MD at 8/24/2018  8:09 AM     Author:  Katherine Varma MD Service:  Nephrology Author Type:  Physician    Filed:  9/5/2018 10:56 AM Date of Service:  8/24/2018  8:09 AM Creation Time:  8/24/2018  8:08 AM    Status:  Signed :  Katherine Varma MD (Physician)           Nephrology Initial Consult  August 24, 2018      Jelly Dietz MRN:[id number] YOB: 1985  Date of Admission:8/24/2018  Primary care provider: [facility], [place]  Requesting physician: Alba Bradshaw DO    ASSESSMENT AND RECOMMENDATIONS:[PR1.1]   33-year-old female with IgA nephropathy, currently on hemodialysis, admitted with hyperkalemia.[PR1.2]    ESRD on HD (IgA Neph)   -s/p Failed Kidney Transplant  Unit: [facility][PR1.1] - TTS[PR1.2]  Neph: Sees [facility] Transplant for Eval - unsure of [place] [facility]  Access: LLE Fistula[PR1.1]  At this point in time, patient with 2 missed dialysis runs, has not run since this past Saturday.  Given hyperkalemia, will run emergently with standard orders for hyperkalemia.  Will follow up with her rounding report which I will received today to get a sense of her chronic regimen.    PLAN  Emergent hemodialysis this morning  Will evaluate tomorrow for consideration for dialysis run again to get her back on TTS schedule[PR1.2]    BP/Volume  Her BP is elevated but her weight[PR1.1] is documented at her dry weight.  Doubt she is at her dry weight given she has not run since this past Saturday.  Although she does not drink much fluid and does not have significant edema on exam.  Shoot for 1 L off for today.  During her blood pressure, it is quite elevated.  Speaking with her previous nephrologist, she has a history of nonadherence.  She does say that she has missed several doses of her blood pressure medication. We will see if her blood pressure responds once she is  back on her medication regimen    Electrolytes  Severe hyperkalemia.  Most likely related to missed dialysis. Dialysis emergently.      Acid-base  Bicarbonate low at 19.  In chronic patients the goal is at least 23 or higher.  Should improve with hemodialysis.    Anemia  Hemoglobin is normal at 12.4.  Not a candidate for EPO at this time.[PR1.2]    Recommendations were communicated to primary team via this[PR1.1] phone[PR1.2].    Seen and discussed with Dr. Varma.[PR1.1]    Lobito Gillespie MD[PR1.3]   [phone]    REASON FOR CONSULT:[PR1.1] Hyperkalemia[PR1.2]    HISTORY OF PRESENT ILLNESS:  Jelly Dietz is a 33 year old[PR1.1] woman with past medical history significant for IgA nephropathy, failed kidney transplant, who is currently been on hemodialysis for the past 6 years.  Transplant failed during pregnancy possibly due to rejection/eclampsia.  She presents due to missed dialysis.  She states that this past Tuesday she was unable to find a  for her children and thus missed her regular Tuesday dialysis.  She then had a  to attend on Thursday and thus missed her Thursday dialysis.  At this point time, her chief complaint is itching.  She states otherwise she is not have any fevers or chills.  She denies any chest pain.  She does not have any shortness of breath.  He does not feel swollen, she says she does not drink very much in general.  She states that she is for the most part adherent with her medications, but does occasionally miss her blood pressure pills.  Note, she recently change dialysis units.  She currently now dialyzes at [facility].[PR1.2]    PAST MEDICAL HISTORY:  Reviewed with patient on[PR1.1] 2018   Past Medical History:   Diagnosis Date     ACS (acute coronary syndrome) (H) 2014     Allergic state     seasonal allergies     Anemia in chronic renal disease      Cervical cerclage suture present in second trimester      Chest pain 2014     Chronic renal  transplant rejection      End stage kidney disease (H)      Heart murmur      History of  delivery ,     30 wks, 28 wks      Hypertension     resolved after transplant     IgA nephropathy      MRSA (methicillin resistant Staphylococcus aureus)      [facility]     Pre-eclampsia      Renal failure affecting pregnancy in second trimester      S/P kidney transplant     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 History of 1B kidney rejection      SAB (spontaneous )     2nd trimester        Past Surgical History:   Procedure Laterality Date     CERCLAGE CERVICAL N/A 2015    Procedure: CERCLAGE CERVICAL;  Surgeon: Norbert Chopra MD;  [facility] L[facility]      SECTION  2012    Procedure:  SECTION;;  Surgeon: Chelsea Cross MD;  Location: [facility] L[facility]      SECTION N/A 2015    Procedure:  SECTION;  Surgeon: Chelsea Cross MD;  Location: [facility] OR     cesearean section       EXPLANT TRANSPLANTED KIDNEY  3/29/2013    Procedure: EXPLANT TRANSPLANTED KIDNEY;  Explant Transplanted right Kidney (from patients right iliac fossa);  Surgeon: Nory Morgan MD;  Location: [facility] OR     NEPHRECTOMY  3/29/13    Transplant nephrectomy      WILIAN/DIALYSIS CATHETER       TRANSPLANT KIDNEY RECIPIENT LIVING UNRELATED  Dec 2007    (Adult male in [place])[PR1.3]        MEDICATIONS:  PTA Meds  Prior to Admission medications    Medication Sig Last Dose Taking? Auth Provider   acetaminophen (TYLENOL) 325 MG tablet Take 1-2 tablets by mouth every 4 hours as needed. 2018 Yes Marni Arteaga, DO   amLODIPine (NORVASC) 10 MG tablet Take 1 tablet (10 mg) by mouth daily 2018 Yes Katherine Varma MD   B Complex-C-Folic Acid (DIALYVITE) TABS 1 tablet by mouth daily 2018 Yes Katherine Varma MD   calcium carbonate (TUMS) 500 MG chewable tablet Take 2  tablets (1,000 mg) by mouth 3 times daily 8/24/2018 Yes Katherine Varma MD   calcium carbonate (TUMS) 500 MG chewable tablet Take 1 chew tab by mouth 3 times daily as needed for heartburn 8/24/2018 Yes Unknown, Entered By History   carvedilol (COREG) 25 MG tablet Take 1 tablet (25 mg) by mouth 2 times daily (with meals) 8/24/2018 Yes Katherine Varma MD   cetirizine (ZYRTEC) 10 MG tablet Take 10 mg by mouth At Bedtime 8/24/2018 Yes Unknown, Entered By History   cloNIDine (CATAPRES) 0.1 MG tablet Take 1 tablet (0.1 mg) by mouth 3 times daily as needed 8/24/2018 at Unknown time Yes Katherine Varma MD   cyclobenzaprine (FLEXERIL) 5 MG tablet Take 1 tablet (5 mg) by mouth nightly as needed for muscle spasms 8/24/2018 Yes Maegan Wagner MD   HYDROcodone-acetaminophen (NORCO) 5-325 MG per tablet Take 1-2 tablets by mouth every 6 hours as needed for moderate to severe pain 8/24/2018 Yes Judi Flores MD   melatonin 1 MG CAPS Take 1 mg by mouth nightly as needed 8/24/2018 Yes Katherine Varma MD      Current Meds[PR1.1]    gelatin absorbable  1 each Topical During Hemodialysis (from stock)     hydrOXYzine  25 mg Oral Once[PR1.3]     Infusion Meds[PR1.1]    dextrose 75 mL/hr at 08/24/18 0553     - MEDICATION INSTRUCTIONS -[PR1.3]         ALLERGIES:[PR1.1]    No Known Allergies[PR1.3]    REVIEW OF SYSTEMS:  A comprehensive of systems was negative except as noted above.    SOCIAL HISTORY:[PR1.1]   Social History     Social History     Marital status: Single     Spouse name: N/A     Number of children: N/A     Years of education: N/A     Occupational History     Not on file.     Social History Main Topics     Smoking status: Never Smoker     Smokeless tobacco: Never Used     Alcohol use No     Drug use: No     Sexual activity: Yes     Partners: Male     Other Topics Concern     Blood Transfusions Yes     Multiple in [place] none in [place]     Caffeine Concern No     1s/d      "Occupational Exposure No     Hobby Hazards No     Sleep Concern No     Stress Concern No     Weight Concern No     Special Diet No     Back Care No     Exercise No     walk 20' daily     Bike Helmet No     Seat Belt No     Social History Narrative    Currently unemployed.  Lives in [place].   with one son.[PR1.3]       FAMILY MEDICAL HISTORY:[PR1.1]   Family History   Problem Relation Age of Onset     Diabetes Paternal Grandfather      Breast Cancer Maternal Grandmother 55     Cancer No family hx of      No known family hx of skin cancer[PR1.3]     PHYSICAL EXAM:   Temp  Av  F (36.7  C)  Min: 98  F (36.7  C)  Max: 98  F (36.7  C)      No Data Recorded Resp  Av.7  Min: 17  Max: 26  SpO2  Av.3 %  Min: 91 %  Max: 100 %[PR1.1]       /87  Temp 98  F (36.7  C) (Oral)  Resp 19  Ht 1.676 m (5' 6\")  Wt 53 kg (116 lb 13.5 oz)  SpO2 (P) 99%  BMI 18.86 kg/m2[PR1.3]       Admit Weight: 53 kg (116 lb 13.5 oz)     GENERAL APPEARANCE:[PR1.1] No[PR1.2] distress,[PR1.1] alert and[PR1.2] awake  EYES:[PR1.1] no[PR1.2] scleral icterus, pupils equal  Endo:[PR1.1] no[PR1.2] goiter  Lymphatics: no cervical  LAD  Pulmonary: lungs clear to auscultation with equal breath sounds bilaterally  CV: regular rhythm, normal rate, no rub   - Edema[PR1.1] - Trace peripheral[PR1.2]  GI: soft, nontender, normal bowel sounds  MS: no evidence of inflammation in joint[PR1.1]s, LLE Fistula w palpable thrill[PR1.2]  :[PR1.1] No[PR1.2] polanco  SKIN: no rash, warm, dry, no cyanosis  NEURO:[PR1.1] AO x 3[PR1.2],[PR1.1] negative[PR1.2] asterixis     LABS:   CMP[PR1.1]  Recent Labs  Lab 18  0719 18  0347   NA  --  131*   POTASSIUM 5.9* 7.3*   CHLORIDE  --  96   CO2  --  19*   ANIONGAP  --  16*   GLC  --  95   BUN  --  110*   CR  --  19.80*   GFRESTIMATED  --  2*   GFRESTBLACK  --  2*   CASIE  --  8.3*[PR1.3]     CBC[PR1.1]  Recent Labs  Lab 18  0347   HGB 12.4   WBC 2.3*   RBC 3.72*   HCT 36.9   MCV 99   MCH " 33.3*   MCHC 33.6   RDW 13.7   [PR1.3]     INR[PR1.1]No lab results found in last 7 days.[PR1.3]  ABG[PR1.1]  Recent Labs  Lab 08/24/18  0347   O2PER 21.0[PR1.3]      URINE STUDIES[PR1.1]  Recent Labs   Lab Test  01/02/18   2048  03/08/17   1632  01/25/17   1920  10/19/15   0550   COLOR  Yellow  Straw  Straw  Yellow   APPEARANCE  Cloudy  Slightly Cloudy  Slightly Cloudy  Slightly Cloudy   URINEGLC  150*  250*  70*  Negative   URINEBILI  Negative  Negative  Negative  Negative   URINEKETONE  Negative  Negative  Negative  Negative   SG  1.008  1.010  1.006  1.005   UBLD  Small*  Small*  Negative  Moderate*   URINEPH  8.0*  8.5*  7.5*  8.5*   PROTEIN  30*  100*  30*  300*   NITRITE  Negative  Negative  Negative  Negative   LEUKEST  Trace*  Small*  Negative  Small*   RBCU  3*  3*  1  2   WBCU  8*  4*  2  12*     Recent Labs   Lab Test  07/16/12   1400  07/02/12   1130  06/25/12   0615  06/17/12   0930  06/13/12   1120  06/07/12   0643  06/01/12   0950  05/25/12   1830  05/25/12   1110  05/22/12   1536  02/20/12   0800   UTPG  1.53*  0.86*  0.69*  0.82*  0.80*  0.88*  0.58*  0.47*  0.45*  0.56*  0.51*  0.42*[PR1.3]     PTH[PR1.1]  Recent Labs   Lab Test  11/30/12   2030  11/30/12   1030  03/09/12   1304   PTHI  500*  794*  198*[PR1.3]     IRON STUDIES[PR1.1]  Recent Labs   Lab Test  11/30/12   1950  03/09/12   1304   IRON  125  33*   FEB  155*  196*   IRONSAT  81*  17   SHAHBAZ  401*  87[PR1.3]     IMAGING:[PR1.1]  None[PR1.2]    Lobito Gillespie MD[PR1.3]    I was present with the fellow during the history and exam.  I discussed the case with the fellow and agree with the findings as documented in the assessment and plan.  Katherine Varma[MB1.1]       Revision History        User Key Date/Time User Provider Type Action    > MB1.1 9/5/2018 10:56 AM Katherine Varma MD Physician Sign     PR1.2 8/24/2018  2:07 PM Lobito Gillespie MD Fellow Sign     PR1.3 8/24/2018  8:09 AM Lobito Gillespie MD  Fellow      PR1.1 8/24/2018  8:08 AM Lobito Gillespie MD Fellow             Consults by Felice Jimenez MD at 4/30/2017  3:21 PM     Author:  Felice Jimenez MD Service:  Nephrology Author Type:  Resident    Filed:  4/30/2017  3:57 PM Date of Service:  4/30/2017  3:21 PM Creation Time:  4/30/2017  3:19 PM    Status:  Signed :  Felice Jimenez MD (Resident)    Cosigner:  Rodri Garcia MD at 5/26/2017  1:19 PM               Nephrology Initial Consult  April 30, 2017      Jelly Dietz MRN:[id number] YOB: 1985  Date of Admission:4/30/2017  Primary care provider: Sailaja Landin  Requesting physician: Katlin att. 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     Recommendations :-    - ESRD on HD . Received dialysis at [facility][FM1.1] [place] under care of Dr Varma. Follows TT schedule. Missed HD yesterday.   - HD today for 3 Hrs with 3-4 L as BP tolerates maintaining MAP >65.  - Serum K 5.6. HD with 2K bath.   - access LUE AVF.   - Patient to resume HD at her outpatient dialysis unit as scheduled upon discharge.     To be staffed in AM if patient still inpatient.[FM1.2]     Recommendations were communicated to primary team via[FM1.1] note.[FM1.2]       Kati Oconnell MD[FM1.3]   436-[FM1.1]3344[FM1.2]      REASON FOR CONSULT:[FM1.1] ESRD on HD[FM1.2]    HISTORY OF PRESENT ILLNESS:      32 year old female with PMH of HTN, ACS, mitral and aortic valve insufficiency, preeclampsia, ESRD secondary to IgA nephropathy status post LDKT (2007) with rejection status post explant 3/2013 now dialysis dependent  , cervical insufficiency status post cerclage admitted with  SOB after missing HD yesterday due to  issues.[FM1.1]      Patient reported SOB. Lower extremities swollen. Patient missed HD yesterday. No nausea, vomiting , diarrhea or other complaints.[FM1.2]     PAST MEDICAL HISTORY:  Reviewed with patient on[FM1.1] 2017     Past Medical History:   Diagnosis Date     ACS (acute coronary syndrome) (H) 2014     Allergic state     seasonal allergies     Anemia in chronic renal disease      Cervical cerclage suture present in second trimester      Chest pain 2014     Chronic renal transplant rejection      End stage kidney disease (H)      Heart murmur      History of  delivery ,     30 wks, 28 wks      Hypertension     resolved after transplant     IgA nephropathy      MRSA (methicillin resistant Staphylococcus aureus)      [facility]     Pre-eclampsia      Renal failure affecting pregnancy in second trimester      S/P kidney transplant     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 History of 1B kidney rejection      SAB (spontaneous )     2nd trimester        Past Surgical History:   Procedure Laterality Date     CERCLAGE CERVICAL N/A 2015    Procedure: CERCLAGE CERVICAL;  Surgeon: Norbert Chopra MD;  [facility]      SECTION  2012    Procedure:  SECTION;;  Surgeon: Chelsea Cross MD;  [facility]      SECTION N/A 2015    Procedure:  SECTION;  Surgeon: Chelsea Cross MD;  Location: [facility] OR     cesearean section       EXPLANT TRANSPLANTED KIDNEY  3/29/2013    Procedure: EXPLANT TRANSPLANTED KIDNEY;  Explant Transplanted right Kidney (from patients right iliac fossa);  Surgeon: Nory Morgan MD;  [facility] OR     NEPHRECTOMY  3/29/13    Transplant nephrectomy      WILIAN/DIALYSIS CATHETER       TRANSPLANT KIDNEY RECIPIENT LIVING UNRELATED  Dec 2007    (Adult  male in [place])[FM1.3]        MEDICATIONS:  PTA Meds  Prior to Admission medications    Medication Sig Last Dose Taking? Auth Provider   cetirizine (ZYRTEC) 10 MG tablet Take 10 mg by mouth At Bedtime 4/29/2017 Yes Unknown, Entered By History   calcium carbonate (TUMS) 500 MG chewable tablet Take 1 chew tab by mouth 3 times daily as needed for heartburn  Yes Unknown, Entered By History   carvedilol (COREG) 25 MG tablet Take 1 tablet (25 mg) by mouth 2 times daily (with meals) 4/29/2017 Yes Katherine Varma MD   cloNIDine (CATAPRES) 0.1 MG tablet Take 1 tablet (0.1 mg) by mouth 3 times daily as needed 4/29/2017 Yes Katherine Varma MD   amLODIPine (NORVASC) 10 MG tablet Take 1 tablet (10 mg) by mouth daily 4/29/2017 Yes Katherine Varma MD   acetaminophen (TYLENOL) 325 MG tablet Take 1-2 tablets by mouth every 4 hours as needed.  Yes Marni Arteaga, DO      Current Meds[FM1.1]    gelatin absorbable  1 each Topical During Hemodialysis (from stock)     - MEDICATION INSTRUCTIONS -   Does not apply Once[FM1.3]            ALLERGIES:[FM1.1]    No Known Allergies[FM1.3]    REVIEW OF SYSTEMS:  A 10 point review of systems was negative except as noted above.    SOCIAL HISTORY:[FM1.1]   Social History     Social History     Marital status: Single     Spouse name: N/A     Number of children: N/A     Years of education: N/A     Occupational History     Not on file.     Social History Main Topics     Smoking status: Never Smoker     Smokeless tobacco: Never Used     Alcohol use No     Drug use: No     Sexual activity: Yes     Partners: Male     Other Topics Concern     Blood Transfusions Yes     Multiple in [place] none in [place]     Caffeine Concern No     1s/d     Occupational Exposure No     Hobby Hazards No     Sleep Concern No     Stress Concern No     Weight Concern No     Special Diet No     Back Care No     Exercise No     walk 20' daily     Bike Helmet No     Seat Belt No     Social History  Narrative    Currently unemployed.  Lives in [place].   with one son.[FM1.3]       Reviewed with patient   FAMILY MEDICAL HISTORY:[FM1.1]   Family History   Problem Relation Age of Onset     DIABETES Paternal Grandfather      Breast Cancer Maternal Grandmother 55     CANCER No family hx of      No known family hx of skin cancer[FM1.3]     Reviewed with patient     PHYSICAL EXAM:   Temp  Av.2  F (36.8  C)  Min: 98  F (36.7  C)  Max: 98.4  F (36.9  C)      Pulse  Av.3  Min: 88  Max: 90 Resp  Av.3  Min: 16  Max: 18  SpO2  Av %  Min: 98 %  Max: 100 %[FM1.1]       BP (!) 152/112  Pulse 90  Temp 98.4  F (36.9  C) (Oral)  Resp 16  LMP 2017  SpO2 98%[FM1.3]       Admit       GENERAL APPEARANCE:[FM1.1] no acute[FM1.2]  distress,  awake  EYES:[FM1.1] no[FM1.2]  scleral icterus, pupils equal  HENT: NC/AT,  mouth  without ulcers or lesions  Lymphatics: no cervical or supraclavicular LAD  Pulmonary: lungs clear to auscultation with equal breath sounds bilaterally, no clubbing  CV: regular rhythm, normal rate, no rub    - Edema[FM1.1] 2+[FM1.2]  GI: soft, nontender, normal bowel sounds, no HSM   MS: no evidence of inflammation in joints, no muscle tenderness  : no Bauman  SKIN: no rash, warm, dry, no cyanosis  NEURO: mentation intact and speech normal    LABS:   CMP[FM1.1]  Recent Labs  Lab 17  1214      POTASSIUM 5.6*   CHLORIDE 100   CO2 23   ANIONGAP 13   *   BUN 87*   CR 13.90*   GFRESTIMATED 3*   GFRESTBLACK 4*   CASIE 9.4[FM1.3]     CBC[FM1.1]  Recent Labs  Lab 17  1214   HGB 10.8*   WBC 5.2   RBC 3.38*   HCT 33.8*      MCH 32.0   MCHC 32.0   RDW 14.8   *[FM1.3]       URINE STUDIES[FM1.1]  Recent Labs   Lab Test  17   1632  17   1920  10/19/15   0550  07/07/15   1815   COLOR  Straw  Straw  Yellow  Light Yellow   APPEARANCE  Slightly Cloudy  Slightly Cloudy  Slightly Cloudy  Slightly Cloudy   URINEGLC  250*  70*  Negative  70*    URINEBILI  Negative  Negative  Negative  Negative   URINEKETONE  Negative  Negative  Negative  Negative   SG  1.010  1.006  1.005  1.006   UBLD  Small*  Negative  Moderate*  Negative   URINEPH  8.5*  7.5*  8.5*  8.5*   PROTEIN  100*  30*  300*  30*   NITRITE  Negative  Negative  Negative  Negative   LEUKEST  Small*  Negative  Small*  Negative   RBCU  3*  1  2  3*   WBCU  4*  2  12*  3*     Recent Labs   Lab Test  07/16/12   1400  07/02/12   1130  06/25/12   0615  06/17/12   0930  06/13/12   1120  06/07/12   0643  06/01/12   0950  05/25/12   1830  05/25/12   1110  05/22/12   1536  02/20/12   0800  04/29/10   1736  01/28/10   1903   UTPG  1.53*  0.86*  0.69*  0.82*  0.80*  0.88*  0.58*  0.47*  0.45*  0.56*  0.51*  0.42*  0.10  0.48*[FM1.3]     PTH[FM1.1]  Recent Labs   Lab Test  11/30/12   2030  11/30/12   1030  03/09/12   1304  04/29/10   1749   PTHI  500*  794*  198*  211*[FM1.3]     IRON STUDIES[FM1.1]  Recent Labs   Lab Test  11/30/12   1950  03/09/12   1304  04/29/10   1749  01/28/10   1824   IRON  125  33*  96  23*   FEB  155*  196*  207*  211*   IRONSAT  81*  17  46  11*   SHAHBAZ  401*  87  823*  129*[FM1.3]       IMAGING:[FM1.1]  Reviewed.[FM1.2]     Kati Oconnell MD[FM1.3]       Revision History        User Key Date/Time User Provider Type Action    > FM1.2 4/30/2017  3:57 PM Felice Jimenez MD Resident Sign     FM1.3 4/30/2017  3:21 PM Felice Jimenez MD Resident      FM1.1 4/30/2017  3:19 PM Felice Jimenez MD Resident             Consults by Kobe Mckeon MD at 1/25/2017  1:18 PM     Author:  Kobe Mckeon MD Service:  Nephrology Author Type:  Physician    Filed:  1/25/2017  1:40 PM Note Time:  1/25/2017  1:18 PM Creation Time:  1/25/2017  1:18 PM    Status:  Signed :  Kobe Mckeon MD (Physician)     Consult Orders:    1. Nephrology IP Consult: Patient to be seen: Routine - within 24 hours; ESRD on HD.; Consultant may enter orders: Yes [[id number]] ordered by Michael Pastrana MD at 01/25/17 0054   "              [facility]    RENAL CONSULTATION NOTE    REFERRING MD:  Dr. Pastrana    REASON FOR CONSULTATION:  ESKD, HTN, sob/cp    DATE OF CONSULTATION: 01/25/2017    SHORTHAND KEY FOR MY NOTES:  c = with, s = without, p = after, a = before, x = except, asx = asymptomatic, tx = transplant or treatment, sx = symptoms or symptomatic, cx = canceled or culture, rxn = reaction, yday = yesterday, nl = normal, abx = antibiotics, fxn = function, dx = diagnosis, dz = disease, m/h = melena/hematochezia, c/d/l/ha = cramping/dizziness/lightheadedness/headache, d/c = discharge or diarrhea/constipation, f/c/n/v = fevers/chills/nausea/vomiting, cp/sob = chest pain/shortness of breath.    HPI: Jelly Dietz is a 32 year old female c ESKD 2 IgA s/p failed tx c tx nephrectomy who dialyses TRS via a LFAF at the [facility] under the care of Dr. Varma and was admitted on 1/24/2017 c sob/HTN.     Pt has been sick lately and has been having URI sx.  She missed a run last wk and then again on Tuesday bc of childcare issues.  As a result, she has been above her EDW and was feeling quite sob c cp, so she presented to the ER for further eval. In the ER, she had a CXR that didn't show any pulm edema, her chemistries were ok, but her BP was elevated.  She was tx c clonidine.    Today, the pt is dialysing and feels better.  She was feeling \"heavy\" in her legs, chest and abd, but that has improved.  She hasn't walked yet to see if she feels better since she's been on HD.  No f/c now, but she did have n/v at the beginning of the run when she rec'd dilaudid.      She has to go home today p HD bc of childcare issues. Her dtr is quite sick.  She is currently being cared for by the pt's mother.     ROS:  A complete review of systems was performed and is negative x as noted above.    PMH:    Past Medical History   Diagnosis Date     S/P kidney transplant      ESKD 2/2 IgA nephropathy s/p LDKT in 12/2007 in " [place]. History of 1B kidney rejection      Hypertension      resolved after transplant     Anemia      MRSA (methicillin resistant Staphylococcus aureus)      IgA nephropathy      Heart murmur      ACS (acute coronary syndrome) (H) 2014     Chest pain 2014     [facility]     Allergic state      seasonal allergies     PSH:    Past Surgical History   Procedure Laterality Date     Transplant kidney recipient living unrelated  Dec 2007     (Adult male in [place])      section  2012     Procedure:  SECTION;;  Surgeon: Chelsea Cross MD;  [facility]: [facility]     Sergio/dialysis catheter       Nephrectomy  3/29/13     Transplant nephrectomy      Explant transplanted kidney  3/29/2013     Procedure: EXPLANT TRANSPLANTED KIDNEY;  Explant Transplanted right Kidney (from patients right iliac fossa);  Surgeon: Nory Morgan MD;  Location: [facility] OR     Cesearean section       Cerclage cervical N/A 2015     Procedure: CERCLAGE CERVICAL;  Surgeon: Norbert Chopra MD;  Location: [facility] L[facility]      section N/A 2015     Procedure:  SECTION;  Surgeon: Chelsea Cross MD;  Location: [facility] OR     MEDICATIONS:      amLODIPine  10 mg Oral Daily     amoxicillin-clavulanate  1 tablet Oral Q24H BING     cloNIDine  0.1 mg Oral TID     carvedilol  25 mg Oral BID w/meals     ALLERGIES:    Allergies as of 2017     (No Known Allergies)     FH:    Family History   Problem Relation Age of Onset     DIABETES Paternal Grandfather      Breast Cancer Maternal Grandmother 55     CANCER No family hx of      No known family hx of skin cancer     SH:    Social History     Social History     Marital Status: Single     Spouse Name: N/A     Number of Children: N/A     Years of Education: N/A     Occupational History     Not on file.     Social History Main Topics     Smoking status: Never Smoker      Smokeless  tobacco: Never Used     Alcohol Use: No     Drug Use: No     Sexual Activity:     Partners: Male     Other Topics Concern     Blood Transfusions Yes     Multiple in [place] none in [place]     Caffeine Concern No     1s/d     Occupational Exposure No     Hobby Hazards No     Sleep Concern No     Stress Concern No     Weight Concern No     Special Diet No     Back Care No     Exercise No     walk 20' daily     Bike Helmet No     Seat Belt No     Social History Narrative    Currently unemployed.  Lives in [place].   with one son.       PHYSICAL EXAM:    /83 mmHg  Pulse 83  Temp(Src) 97.8  F (36.6  C) (Oral)  Resp 16  Wt 53.797 kg (118 lb 9.6 oz)  SpO2 98%    GENERAL: awake, alert, NAD, tearful earlier  HEENT:  Normocephalic. No gross abnormalities.  MMM.  Dentition is ok.  Pupils equal.  EOMI.  No scleral icterus.  CV: RRR c 3/6 murmurs, no clicks, gallops, or rubs, tr ble edema.  RESP: Clear bilaterally with good efforts  GI: Abdomen s/nt/nd, BS present. No masses, organomegaly  MUSCULOSKELETAL: extremities nl - no gross deformities noted  SKIN: no suspicious lesions or rashes, dry to touch  NEURO:  Strength normal and symmetric.   PSYCH: mood good, affect appropriate  LYMPH: No palpable ant/post cervical and supraclavicular adenopathy  OTHER:  Access - LFAF    Pt seen on HD at end.  Stable run.  - 3.5L uf goal.  Good BFR via LFAF.    LABS:      CBC RESULTS:     Recent Labs  Lab 01/24/17  2245   WBC 2.9*   RBC 2.48*   HGB 8.1*   HCT 24.8*   *     BMP RESULTS:    Recent Labs  Lab 01/25/17  0510 01/24/17  2245   NA  --  135   POTASSIUM 5.7* 5.3   CHLORIDE  --  100   CO2  --  21   BUN  --  89*   CR  --  13.30*   GLC  --  107*   CASIE  --  8.6     INRNo lab results found in last 7 days.   DIAGNOSTICS:  Personally reviewed  CXR - clear lungs, cardiomegaly    A/P:  Jelly Dietz is a 32 year old female c ESKD who has sob, cp and HTN.    1.  ESKD c hyperkalemia.  Pt feels volume up and is definitely  "above her EDW.  K is high bc she missed HD.  She is dialysing now and this should improve.  She is prob not going to get to EDW today, but is due to run tmrw at [facility].  A.  HD today and then again tmrw at [place].  B.  Will push to see if we can get her to the EDW tmrw.    2.  SOB/CP.  This has improved since taking off fluid.  Her CXR was clear of pulm edema.  A.  Follow clinically.    3.  HTN.  Pt's BP was high bc she is volume up.  Her BPs are quite variable based on adherence to her HD tx and meds.  She rec'd all of her meds today and she pulled 3.5L.  A.  Continue same meds/doses.  B.  Will keep pushing EDW.    4.  Anemia.  Pt's hb is in the low 8s.  No signs of bleeding.  She gets EPO c HD.  A.  Will check fe studies and follow hb as outpt.  B.  Continue EPO c HD as outpt.  She is on obs, so can't receive it here today.    5.  Back pain.  Pt has a lot of back pain that is \"worse in the hospital bed.\"  She is on dilaudid now.  She states that she has \"been on hard core meds for so long that others don't work\" when aceta was suggested.  A.  Pain control prn.    6.  Dispo.  Ok to dc today if she eats and does ok.    Thank you for this consultation. We will follow c you.  Please call if any questions.    Attestation:   I have reviewed today's relevant vital signs, notes, medications, labs and imaging.    Kobe Mckeon MD  [facility] - Nephrology  [phone]       Revision History        Date/Time User Provider Type Action    > 1/25/2017  1:40 PM Kobe Mckeon MD Physician Sign    Attribution information within the note text is not available.                     Progress Notes - Physician (Notes from 10/27/18 through 10/30/18)      Progress Notes by Misa Jimenez RN at 10/30/2018  2:46 PM     Author:  Misa Jimenez RN Service:  Hemodialysis Nurse Author Type:  Registered Nurse    Filed:  10/30/2018  2:48 PM Date of Service:  10/30/2018  2:46 PM Creation Time:  10/30/2018  2:46 " PM    Status:  Signed :  Misa Jimenez RN (Registered Nurse)         HEMODIALYSIS TREATMENT NOTE    Date: 10/30/2018  Time: 2:46 PM    Data:  Ultrafiltration - Post Run Net Total Removed (mL): 4000 mL  Ultrafiltration - Post Run Net Total Gain (mL): 0 mL  Vascular Access Status: Yes, secured and visible  Dialyzer Rinse: Light  Total Blood Volume Processed: 86.9  Total Dialysis (Treatment) Time:  3.5 hr HD; 4 hr UF    Lab:   No    Interventions:  STable HD tx completed.  Total time 4 hours, with 3.5hr of HD and last 30 mins in SEQ for UF only.  VSS throughout tx.  15g needles used, .  Slept duration of tx.  Tolerated HD and UF well.  Hand off given to floor RN.    Assessment:  Stable, calm and cooperative with cares.     Plan:    Per renal team.  Current plan for HD OP in [place] tomorrow and in home OP clinic in [place] on Thursday.[KG1.1]       Revision History        User Key Date/Time User Provider Type Action    > KG1.1 10/30/2018  2:48 PM Misa Jimenez RN Registered Nurse Sign            Progress Notes by Yovana Petersen RN at 10/30/2018  7:59 AM     Author:  Yovana Petersen RN Service:  Care Coordinator Author Type:  Care Coordinator    Filed:  10/30/2018  2:21 PM Date of Service:  10/30/2018  7:59 AM Creation Time:  10/30/2018  7:59 AM    Status:  Addendum :  Yovana Petersen RN (Care Coordinator)           Care Coordinator Progress Note    Admission Date/Time:  10/29/2018  Attending MD:  No att. providers found    Data  Chart reviewed, discussed with interdisciplinary team.   Patient was admitted for: ESRD (end stage renal disease) on dialysis (H).     Assessment  Pt is a known HD run pt,   RNCC contacted [place] [facility] center.  RN stated that pt stopped HD runs last week and said she was told by her  that this was ok and it was ok to start alternative medicine.  RN stated that pt is on some type of herbal supplement.  When the HD RN was speaking with pt pt told her that  she was finally feeling great and that she didn't need to return to the HD center.  RN attempted, to no avail, to convince pt to return to HD center.  [facility] would have availability to take pt for an extra run tomorrow, MD team to update this RNCC on need and I will arrange this appointment and add it to AVS.      [facility]  [address] [place] [address]  Phone: [phone]  Fax: [phone][TP1.1]      Per team pt did not plan to return to [facility] for her run on Thursday.  RNCC reached out to [place], they are willing to arrange a run at the [facility] location for the time that pt plans on staying locally.  RNCC will update [place] on when pt will require next hd runs as soon as team updates this writer on plan.[TP1.2]      10/30/2018 2:19 PM RNCC updated AVS with info on going to [facility] tomorrow for out pt HD run.  Pt cannot run there on Thursday, rNCC reached out to [place] with Nephrology who stated that the will need a run Thursday and needs to be informed that she will have to follow up in [place] for that run.  RNCC updated AVS with appointments and instructions to go to [place] for her normal run time on Thursday.  RNCC also updated [place] on discontinue plan.  RNCC met with pt while she is in HD center at [facility] to discuss plan.[TP1.3]       Plan  Anticipated Discharge Date:  TBD pending HD runs  Anticipated Discharge Plan:  Discontinue today vs tomorrow after HD runs     Yovana Petersen, ALANNAH, BSN    [facility]  [address] [place] [address]    [email or web address]  [email or web address]    Office: [phone] Pager: [phone]  To contact weekend RNCC, dial [phone] and enter pager number [phone] at prompt. This pager can not be contacted by text page or outside line.[TP1.1]         Revision History        User Key Date/Time User Provider Type Action    > TP1.3 10/30/2018  2:21 PM  Perttula, Yovana, RN Care Coordinator Addend     TP1.2 10/30/2018  8:19 AM Yovana Petersen RN Care Coordinator Addend     TP1.1 10/30/2018  8:03 AM Yovana Petersen RN Care Coordinator Sign            Progress Notes by Laury Byrd MD at 10/30/2018  5:49 AM     Author:  Laury Byrd MD Service:  Nephrology Author Type:  Resident    Filed:  10/30/2018  5:53 AM Date of Service:  10/30/2018  5:49 AM Creation Time:  10/30/2018  5:49 AM    Status:  Attested :  Laury Byrd MD (Resident)    Cosigner:  Faustina Lovelace MD at 10/30/2018  1:48 PM        Attestation signed by Faustina Lovelace MD at 10/30/2018  1:48 PM        Attestation:  Note reviewed. Findings confirmed as above. HD plan in morning.     Faustina Lovelace MD  Nephrology Staff  [phone]                                 Short Note:    I got paged regarding patient Mrs. Jelly Dietz.  Patient is a 33 year old female who presented to the ER due to missing her last 2 dialysis runs, last dialyzed 6 days ago, missing last Thursday and Saturday dialysis runs.  Patient feels some generalized malaise, some mild shortness of breath. weight is up by 12 kg.   K: 4.6, patient has mild SOB, no need for emergent hemodialysis, will run HD early in the morning, HD order plan to remove 3-4 liters as tolerated, 3.5 hours run.     Laury Guzman M.D.  Nephrology Fellow  Pager: [phone][ER1.1]       Revision History        User Key Date/Time User Provider Type Action    > ER1.1 10/30/2018  5:53 AM Laury Byrd MD Resident Sign            Progress Notes by Gigi Sandoval RN at 10/30/2018  1:55 AM     Author:  Gigi Sandoval RN Service:  (none) Author Type:  Registered Nurse    Filed:  10/30/2018  1:55 AM Date of Service:  10/30/2018  1:55 AM Creation Time:  10/30/2018  1:55 AM    Status:  Signed :  Gigi Sandoval, RN (Registered Nurse)         Pt arrived to unit via cart.[EB1.1]      Revision History        User Key Date/Time  User Provider Type Action    > EB1.1 10/30/2018  1:55 AM Gigi Sandoval, RN Registered Nurse Sign            ED Notes by Daisy Burroughs RN at 10/30/2018 12:42 AM     Author:  Daisy Burroughs RN Service:  (none) Author Type:  Registered Nurse    Filed:  10/30/2018  1:31 AM Date of Service:  10/30/2018 12:42 AM Creation Time:  10/30/2018 12:44 AM    Status:  Addendum :  Daisy Burroughs, RN (Registered Nurse)         [facility]   ED Nurse to Floor Handoff     Jelly Dietz is a 33 year old female who speaks[ZK1.1] English[ZK1.2] and lives with family members,  in a home  They arrived in the ED by car from home    ED Chief Complaint:[ZK1.1] Shortness of Breath (Missed dialysis for approximately two sessions)[ZK1.2]    ED Dx;[ZK1.1]   Final diagnoses:   ESRD (end stage renal disease) on dialysis (H) - with mild fluid overload[ZK1.2]         Needed?: No    Allergies:[ZK1.1] No Known Allergies[ZK1.2].  Past Medical Hx:[ZK1.1]   Past Medical History:   Diagnosis Date     ACS (acute coronary syndrome) (H) 2014     Allergic state     seasonal allergies     Anemia in chronic renal disease      Cervical cerclage suture present in second trimester      Chest pain 2014     Chronic renal transplant rejection      End stage kidney disease (H)      Heart murmur      History of  delivery ,     30 wks, 28 wks      Hypertension     resolved after transplant     IgA nephropathy      MRSA (methicillin resistant Staphylococcus aureus)      [facility]     Pre-eclampsia      Renal failure affecting pregnancy in second trimester      S/P kidney transplant     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 History of 1B kidney rejection      SAB (spontaneous )     2nd trimester[ZK1.2]       Baseline Mental status: WDL  Current Mental Status changes: at  basesline    Infection present or suspected this encounter: no  Sepsis suspected: No  Isolation type: Contact     Activity level - Baseline/Home:  Independent  Activity Level - Current:   Independent    Bariatric equipment needed?: No    In the ED these meds were given:[ZK1.1]   Medications   sodium chloride (PF) 0.9% PF flush 3 mL (not administered)   sodium chloride (PF) 0.9% PF flush 3 mL ( Intracatheter Canceled Entry 10/29/18 2829)[ZK1.2]       Drips running?  No    Home pump  No    Current LDAs[ZK1.1]  Peripheral IV 10/29/18 Right Lower forearm (Active)   Site Assessment WDL 10/29/2018 11:46 PM   Line Status Saline locked 10/29/2018 11:46 PM   Phlebitis Scale 0-->no symptoms 10/29/2018 11:46 PM   Infiltration Scale 0 10/29/2018 11:46 PM   Infiltration Site Treatment Method  None 10/29/2018 11:46 PM   Extravasation? No 10/29/2018 11:46 PM   Dressing Intervention New dressing  10/29/2018 11:46 PM   Number of days:1       Hemodialysis Vascular Access Arteriovenous graft Left Arm (Active)   Number of days:[ZK1.2]       Labs results:[ZK1.1]   Labs Ordered and Resulted from Time of ED Arrival Up to the Time of Departure from the ED   CBC WITH PLATELETS DIFFERENTIAL - Abnormal; Notable for the following:        Result Value    WBC 3.9 (*)     RBC Count 2.56 (*)     Hemoglobin 8.4 (*)     Hematocrit 25.7 (*)     All other components within normal limits   BASIC METABOLIC PANEL - Abnormal; Notable for the following:     Carbon Dioxide 17 (*)     Anion Gap 18 (*)     Urea Nitrogen 108 (*)     Creatinine 20.30 (*)     GFR Estimate 2 (*)     GFR Estimate If Black 2 (*)     Calcium 6.8 (*)     All other components within normal limits   ISTAT GASES ELEC ICA GLUC CINDY POCT - Abnormal; Notable for the following:     Ph Venous 7.31 (*)     PCO2 Venous 36 (*)     Bicarbonate Venous 18 (*)     Calcium Ionized 3.5 (*)     Hemoglobin 10.2 (*)     Hematocrit - POCT 30 (*)     All other components within normal limits   HCG  QUALITATIVE   PERIPHERAL IV CATHETER   ISTAT CG8 GAS ELEC ICA GLUC CINDY NURSE POCT[ZK1.2]       Imaging Studies:[ZK1.1]   Recent Results (from the past 24 hour(s))   Chest XR,  PA & LAT    Narrative    XR CHEST 2 VW 10/30/2018 12:10 AM    Comparison: 10/28/2018    History: SOB;     Findings: PA and lateral views of the chest. Cardiac silhouette is  within normal limits. Pulmonary vasculature is distinct. No pleural  effusion. No pneumothorax. No acute airspace disease. The upper  abdomen is unremarkable.      Impression    Impression: No acute airspace disease.[ZK1.2]       Recent vital signs:[ZK1.1]   /85  Pulse 72  Temp 98.4  F (36.9  C) (Oral)  Wt 63.6 kg (140 lb 3.2 oz)  LMP 10/29/2018 (Exact Date)  SpO2 100%  BMI 22.29 kg/m2[ZK1.2]    Cardiac Rhythm:[ZK1.1] Normal sinus rhythm.[ZK1.3]  Pt needs tele? No  Skin/wound Issues: None    Code Status: Full Code    Pain control: good    Nausea control: pt had none    Abnormal labs/tests/findings requiring intervention: None    Family present during ED course? No   Family Comments/Social Situation comments: None    Tasks needing completion: None[ZK1.1]    Daisy Burroughs RN[ZK1.2]  ascom-- [phone] [facility]  [phone] [facility] ED[ZK1.1]         Revision History        User Key Date/Time User Provider Type Action    > ZK1.3 10/30/2018  1:31 AM Daisy Burroughs RN Registered Nurse Addend     ZK1.2 10/30/2018 12:44 AM Daisy Burroughs RN Registered Nurse Sign     ZK1.1 10/30/2018 12:42 AM Daisy Burroughs RN Registered Nurse             ED Provider Notes by Edvin Hillman MD at 10/29/2018  9:41 PM     Author:  Edvin Hillman MD Service:  Emergency Medicine Author Type:  Physician    Filed:  10/30/2018 12:42 AM Date of Service:  10/29/2018  9:41 PM Creation Time:  10/29/2018 11:06 PM    Status:  Signed :  Edvin Hillman MD (Physician)           History[HW1.1]     Chief Complaint   Patient presents with     Shortness of Breath     Missed  dialysis for approximately two sessions[FE1.1]     HPI  Jelly Dietz is a 33 year old female who presents to the ER due to missing her last 2 dialysis runs.  Patient is a hemodialysis patient and states that she does make a little bit of urine occasionally but states that she last dialyzed 6 days ago, missing last Thursday and Saturday dialysis runs.  Patient states that she started feeling some generalized malaise, some mild shortness of breath and states that her weight is up by 12 kg.  Patient states that she called [facility] today to see if she could run but they did not have room for her and told her to come to the ER for evaluation.  Patient denies any fevers, vomiting, melena, or bright blood per rectum.    This part of the document was transcribed by Dani Almodovar, Medical Scribe.[HW1.1]     Past Medical History:   Diagnosis Date     ACS (acute coronary syndrome) (H) 2014     Allergic state     seasonal allergies     Anemia in chronic renal disease      Cervical cerclage suture present in second trimester      Chest pain 2014     Chronic renal transplant rejection      End stage kidney disease (H)      Heart murmur      History of  delivery ,     30 wks, 28 wks      Hypertension     resolved after transplant     IgA nephropathy      MRSA (methicillin resistant Staphylococcus aureus)      [facility]     Pre-eclampsia      Renal failure affecting pregnancy in second trimester      S/P kidney transplant     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 History of 1B kidney rejection      SAB (spontaneous )     2nd trimester        Past Surgical History:   Procedure Laterality Date     CERCLAGE CERVICAL N/A 2015    Procedure: CERCLAGE CERVICAL;  Surgeon: Norbert Chopra MD;  [facility]      SECTION  2012    Procedure:  SECTION;;  Surgeon: Chelsea Cross  MD Angélica;  [facility]      SECTION N/A 2015    Procedure:  SECTION;  Surgeon: Chelsea Cross MD;  [facility]: [facility] OR     cesearean section       EXPLANT TRANSPLANTED KIDNEY  3/29/2013    Procedure: EXPLANT TRANSPLANTED KIDNEY;  Explant Transplanted right Kidney (from patients right iliac fossa);  Surgeon: Nory Morgan MD;  [facility]     NEPHRECTOMY  3/29/13    Transplant nephrectomy      WILIAN/DIALYSIS CATHETER       TRANSPLANT KIDNEY RECIPIENT LIVING UNRELATED  Dec 2007    (Adult male in [place])       Family History   Problem Relation Age of Onset     Diabetes Paternal Grandfather      Breast Cancer Maternal Grandmother 55     Cancer No family hx of      No known family hx of skin cancer       Social History   Substance Use Topics     Smoking status: Never Smoker     Smokeless tobacco: Never Used     Alcohol use No      No Known Allergies     Previous Medications    ACETAMINOPHEN (TYLENOL) 325 MG TABLET    Take 1-2 tablets by mouth every 4 hours as needed.    AMLODIPINE (NORVASC) 10 MG TABLET    Take 1 tablet (10 mg) by mouth daily    B COMPLEX-C-FOLIC ACID (DIALYVITE) TABS    1 tablet by mouth daily    CARVEDILOL (COREG) 25 MG TABLET    Take 1 tablet (25 mg) by mouth 2 times daily (with meals)    CETIRIZINE (ZYRTEC) 10 MG TABLET    Take 10 mg by mouth At Bedtime    CLONIDINE (CATAPRES) 0.1 MG TABLET    Take 1 tablet (0.1 mg) by mouth 3 times daily as needed    CYCLOBENZAPRINE (FLEXERIL) 10 MG TABLET    Take 0.5 tablets (5 mg) by mouth 2 times daily as needed for muscle spasms    CYCLOBENZAPRINE (FLEXERIL) 5 MG TABLET    Take 1 tablet (5 mg) by mouth nightly as needed for muscle spasms    DOXYCYCLINE (VIBRA-TABS) 100 MG TABLET    Take 1 tablet by mouth 2 times daily (with meals)    LIDOCAINE (LIDOCARE) 4 % PATCH    Place 1 patch onto the skin every 24 hours    MELATONIN 1 MG TABS TABLET    Take 1 mg by mouth    RENVELA 800 MG TABLET    Take 1 tablet by mouth 3 times  daily (with meals)[FE1.1]         I have reviewed the Medications, Allergies, Past Medical and Surgical History, and Social History in the Epic system.    Review of Systems   Constitutional: Negative for fever.   Respiratory: Positive for shortness of breath.    Gastrointestinal: Negative for anal bleeding, blood in stool and vomiting.   All other systems reviewed and are negative.      Physical Exam[HW1.1]   BP: (!) 149/101  Pulse: 75  Temp: 98.4  F (36.9  C)  Weight: 63.6 kg (140 lb 3.2 oz)  SpO2: 100 %  Patient states her dry weight is 51 kg[FE1.1]      Physical Exam   Constitutional: She is[HW1.1] oriented to person, place, and time[FE1.1].[HW1.1]   Alert conversant comfortable[FE1.1]   HENT:   Head:[HW1.1] Atraumatic[FE1.1].   Eyes:[HW1.1] EOM[FE1.1] are normal.[HW1.1] Pupils are equal, round, and reactive to light[FE1.1].   Neck:[HW1.1] Neck supple[FE1.1].[HW1.1] No JVD[FE1.1] present.   Cardiovascular: Exam reveals[HW1.1] no friction rub[FE1.1].[HW1.1]    Murmur[FE1.1] heard.  Pulmonary/Chest: No[HW1.1] respiratory distress[FE1.1].[HW1.1]   Slight crackles in the bases bilaterally[FE1.1]   Abdominal:[HW1.1] Soft[FE1.1]. There is[HW1.1] no tenderness[FE1.1].   Musculoskeletal: She exhibits no[HW1.1] deformity[FE1.1].[HW1.1]   Trace edema bilaterally[FE1.1]   Neurological: She is[HW1.1] alert[FE1.1] and[HW1.1] oriented to person, place, and time[FE1.1].[HW1.1]   Grossly intact and symmetric[FE1.1]   Skin: Skin is[HW1.1] warm[FE1.1].   Psychiatric: She has a[HW1.1] normal mood and affect[FE1.1].       ED Course[HW1.1]     ED Course[FE1.1]     Procedures[HW1.1]          EKG revealed a normal sinus rhythm at a rate of 77 with a DC interval 0.196 and a QRS duration of 0.092.  The patient had a normal axis with no acute ST or T wave changes significant for ischemia and no T wave changes significant for hyperkalemia.  This is read by me personally[FE1.1]    Results for orders placed or performed during the hospital  encounter of 10/29/18 (from the past 24 hour(s))   EKG 12 lead   Result Value Ref Range    Interpretation ECG Click View Image link to view waveform and result    CBC with platelets differential   Result Value Ref Range    WBC 3.9 (L) 4.0 - 11.0 10e9/L    RBC Count 2.56 (L) 3.8 - 5.2 10e12/L    Hemoglobin 8.4 (L) 11.7 - 15.7 g/dL    Hematocrit 25.7 (L) 35.0 - 47.0 %     78 - 100 fl    MCH 32.8 26.5 - 33.0 pg    MCHC 32.7 31.5 - 36.5 g/dL    RDW 13.5 10.0 - 15.0 %    Platelet Count 173 150 - 450 10e9/L    Diff Method Automated Method     % Neutrophils 65.2 %    % Lymphocytes 25.7 %    % Monocytes 2.1 %    % Eosinophils 6.0 %    % Basophils 1.0 %    % Immature Granulocytes 0.0 %    Nucleated RBCs 0 0 /100    Absolute Neutrophil 2.5 1.6 - 8.3 10e9/L    Absolute Lymphocytes 1.0 0.8 - 5.3 10e9/L    Absolute Monocytes 0.1 0.0 - 1.3 10e9/L    Absolute Eosinophils 0.2 0.0 - 0.7 10e9/L    Absolute Basophils 0.0 0.0 - 0.2 10e9/L    Abs Immature Granulocytes 0.0 0 - 0.4 10e9/L    Absolute Nucleated RBC 0.0    Basic metabolic panel   Result Value Ref Range    Sodium 137 133 - 144 mmol/L    Potassium 4.6 3.4 - 5.3 mmol/L    Chloride 101 94 - 109 mmol/L    Carbon Dioxide 17 (L) 20 - 32 mmol/L    Anion Gap 18 (H) 3 - 14 mmol/L    Glucose 96 70 - 99 mg/dL    Urea Nitrogen 108 (H) 7 - 30 mg/dL    Creatinine 20.30 (H) 0.52 - 1.04 mg/dL    GFR Estimate 2 (L) >60 mL/min/1.7m2    GFR Estimate If Black 2 (L) >60 mL/min/1.7m2    Calcium 6.8 (L) 8.5 - 10.1 mg/dL   HCG qualitative pregnancy (blood)   Result Value Ref Range    HCG Qualitative Serum Negative NEG^Negative   ISTAT gases elec ica gluc sheila POCT   Result Value Ref Range    Ph Venous 7.31 (L) 7.32 - 7.43 pH    PCO2 Venous 36 (L) 40 - 50 mm Hg    PO2 Venous 42 25 - 47 mm Hg    Bicarbonate Venous 18 (L) 21 - 28 mmol/L    O2 Sat Venous 73 %    Sodium 137 133 - 144 mmol/L    Potassium 4.6 3.4 - 5.3 mmol/L    Glucose 92 70 - 99 mg/dL    Calcium Ionized 3.5 (L) 4.4 - 5.2 mg/dL     Hemoglobin 10.2 (L) 11.7 - 15.7 g/dL    Hematocrit - POCT 30 (L) 35.0 - 47.0 %PCV   Chest XR,  PA & LAT    Narrative    XR CHEST 2 VW 10/30/2018 12:10 AM    Comparison: 10/28/2018    History: SOB;     Findings: PA and lateral views of the chest. Cardiac silhouette is  within normal limits. Pulmonary vasculature is distinct. No pleural  effusion. No pneumothorax. No acute airspace disease. The upper  abdomen is unremarkable.      Impression    Impression: No acute airspace disease.[FE1.2]       Labs Ordered and Resulted from Time of ED Arrival Up to the Time of Departure from the ED   CBC WITH PLATELETS DIFFERENTIAL - Abnormal; Notable for the following:        Result Value    WBC 3.9 (*)     RBC Count 2.56 (*)     Hemoglobin 8.4 (*)     Hematocrit 25.7 (*)     All other components within normal limits   BASIC METABOLIC PANEL - Abnormal; Notable for the following:     Carbon Dioxide 17 (*)     Anion Gap 18 (*)     Urea Nitrogen 108 (*)     Creatinine 20.30 (*)     GFR Estimate 2 (*)     GFR Estimate If Black 2 (*)     Calcium 6.8 (*)     All other components within normal limits   ISTAT GASES ELEC ICA GLUC CINDY POCT - Abnormal; Notable for the following:     Ph Venous 7.31 (*)     PCO2 Venous 36 (*)     Bicarbonate Venous 18 (*)     Calcium Ionized 3.5 (*)     Hemoglobin 10.2 (*)     Hematocrit - POCT 30 (*)     All other components within normal limits   HCG QUALITATIVE   PERIPHERAL IV CATHETER   ISTAT CG8 GAS ELEC ICA GLUC CINDY NURSE POCT[FE1.1]       Assessments & Plan (with Medical Decision Making)     I have reviewed the nursing notes.[HW1.1]    Based on the patient's evaluation above, the patient does not need emergent dialysis tonight and it can wait till morning.  Patient is unable to get back to [place] tomorrow to have dialysis so at this point the patient will be placed on our observation unit for dialysis at the [place] in the morning.  Case was discussed with nephrology on-call and consult placed so  that dialysis can be done in the morning.[FE1.1]    I have reviewed the findings, diagnosis,[HW1.1] and[FE1.1] plan with the patient.[HW1.1]    Final diagnoses:   ESRD (end stage renal disease) on dialysis (H) - with mild fluid overload[FE1.1]     Edvin Hillman MD[FE1.3]    10/29/2018   [facility], [place], EMERGENCY DEPARTMENT[HW1.1]     Edvin Hillman MD  10/30/18 0042  [FE1.3]     Revision History        User Key Date/Time User Provider Type Action    > FE1.3 10/30/2018 12:42 AM Edvin Hillman MD Physician Sign     FE1.2 10/30/2018 12:40 AM Edvin Hillman MD Physician      FE1.1 10/30/2018 12:38 AM Edvin Hillman MD Physician      HW1.1 10/29/2018 11:08 PM Dani Almodovar            ED Notes by Jesus Harley RN at 10/28/2018 11:40 PM     Author:  Jesus Harley RN Service:  (none) Author Type:  Registered Nurse    Filed:  10/29/2018 12:09 AM Date of Service:  10/28/2018 11:40 PM Creation Time:  10/29/2018 12:09 AM    Status:  Signed :  Jesus Harley RN (Registered Nurse)          notified of elevated /121 prior to discharge.  to bedside[LB1.1]      Revision History        User Key Date/Time User Provider Type Action    > LB1.1 10/29/2018 12:09 AM Jesus Harley RN Registered Nurse Sign            ED Notes by Jesus Harley RN at 10/29/2018 12:06 AM     Author:  Jesus Harley RN Service:  (none) Author Type:  Registered Nurse    Filed:  10/29/2018 12:08 AM Date of Service:  10/29/2018 12:06 AM Creation Time:  10/29/2018 12:08 AM    Status:  Signed :  Jesus Harley RN (Registered Nurse)         Patient given 0.1mg of clonidine of home medication supply per  for elevated blood pressure. Blood pressure is now 153/107 and per , patient is okay to be discharged.[LB1.1]      Revision History        User Key Date/Time User Provider Type Action    > LB1.1 10/29/2018 12:08 AM Jesus Harley  K, RN Registered Nurse Sign                  Procedure Notes     No notes of this type exist for this encounter.      Progress Notes - Therapies (Notes from 10/27/18 through 10/30/18)     No notes of this type exist for this encounter.

## 2018-10-29 NOTE — IP AVS SNAPSHOT
"    UNIT 6D OBSERVATION [facility]: [phone]                                              INTERAGENCY TRANSFER FORM - LAB / IMAGING / EKG / EMG RESULTS   10/29/2018                    Hospital Admission Date: 10/29/2018  KEITH COLE   : 1985  Sex: Female        Attending Provider: (none)    Allergies:  No Known Allergies    Infection:  MRSA   Service:  EMERGENCY ME    Ht:  1.689 m (5' 6.5\")   Wt:  63.5 kg (139 lb 14.4 oz)   Admission Wt:  63.6 kg (140 lb 3.2 oz)    BMI:  22.24 kg/m 2   BSA:  1.73 m 2            Patient PCP Information     Provider PCP Type    [facility]         Lab Results - 3 Days      Phosphorus [[id number]] (Abnormal)  Resulted: 10/30/18 0757, Result status: Final result    Ordering provider: Eva Early PA  10/30/18 0618 Resulting lab: [facility]    Specimen Information    Type Source Collected On   Blood  10/30/18 0706          Components       Value Reference Range Flag Lab   Phosphorus 10.4 2.5 - 4.5 mg/dL H 51            Basic metabolic panel [[id number]] (Abnormal)  Resulted: 10/30/18 0745, Result status: Final result    Ordering provider: Eva Early PA  10/30/18 0618 Resulting lab: [facility]    Specimen Information    Type Source Collected On   Blood  10/30/18 0706          Components       Value Reference Range Flag Lab   Sodium 135 133 - 144 mmol/L  51   Potassium 4.8 3.4 - 5.3 mmol/L  51   Chloride 102 94 - 109 mmol/L  51   Carbon Dioxide 16 20 - 32 mmol/L L 51   Anion Gap 16 3 - 14 mmol/L H 51   Glucose 93 70 - 99 mg/dL  51   Urea Nitrogen 108 7 - 30 mg/dL H 51   Creatinine 20.50 0.52 - 1.04 mg/dL H 51   GFR Estimate 2 >60 mL/min/1.7m2 L 51   Comment:  Non  GFR Calc   GFR Estimate If Black 2 >60 mL/min/1.7m2 L 51   Comment:  African American GFR Calc   Calcium 6.8 8.5 - 10.1 mg/dL L 51            Magnesium [[id number]]  Resulted: 10/30/18 07, " Result status: Final result    Ordering provider: Eva Early PA  10/30/18 0618 Resulting lab: [facility]    Specimen Information    Type Source Collected On   Blood  10/30/18 0706          Components       Value Reference Range Flag Lab   Magnesium 1.9 1.6 - 2.3 mg/dL  51            Calcium ionized [[id number]] (Abnormal)  Resulted: 10/30/18 0721, Result status: Final result    Ordering provider: Eva Early PA  10/30/18 0622 Resulting lab: [facility]    Specimen Information    Type Source Collected On   Blood  10/30/18 0706          Components       Value Reference Range Flag Lab   Calcium Ionized 3.6 4.4 - 5.2 mg/dL L 51            Basic metabolic panel [[id number]] (Abnormal)  Resulted: 10/30/18 0021, Result status: Final result    Ordering provider: Edvin Hillman MD  10/29/18 7127 Resulting lab: [facility]    Specimen Information    Type Source Collected On   Blood  10/29/18 2341          Components       Value Reference Range Flag Lab   Sodium 137 133 - 144 mmol/L  51   Potassium 4.6 3.4 - 5.3 mmol/L  51   Chloride 101 94 - 109 mmol/L  51   Carbon Dioxide 17 20 - 32 mmol/L L 51   Anion Gap 18 3 - 14 mmol/L H 51   Glucose 96 70 - 99 mg/dL  51   Urea Nitrogen 108 7 - 30 mg/dL H 51   Creatinine 20.30 0.52 - 1.04 mg/dL H 51   GFR Estimate 2 >60 mL/min/1.7m2 L 51   Comment:  Non  GFR Calc   GFR Estimate If Black 2 >60 mL/min/1.7m2 L 51   Comment:  African American GFR Calc   Calcium 6.8 8.5 - 10.1 mg/dL L 51            HCG qualitative pregnancy (blood) [[id number]]  Resulted: 10/30/18 0009, Result status: Edited Result - FINAL    Ordering provider: Edvin Hillman MD  10/29/18 6346 Resulting lab: [facility]    Specimen Information    Type Source Collected On   Blood  10/29/18 2341          Components       Value Reference Range  Flag Lab   HCG Qualitative Serum Negative NEG^Negative  51   Comment:         This test is for screening purposes.  Results should be interpreted along with   the clinical picture.  Confirmation testing is available if warranted by   ordering OBI059, HCG Quantitative Pregnancy.              CBC with platelets differential [[id number]] (Abnormal)  Resulted: 10/29/18 2356, Result status: Final result    Ordering provider: Edvin Hillman MD  10/29/18 2259 Resulting lab: [facility]    Specimen Information    Type Source Collected On   Blood  10/29/18 2341          Components       Value Reference Range Flag Lab   WBC 3.9 4.0 - 11.0 10e9/L L 51   RBC Count 2.56 3.8 - 5.2 10e12/L L 51   Hemoglobin 8.4 11.7 - 15.7 g/dL L 51   Hematocrit 25.7 35.0 - 47.0 % L 51    78 - 100 fl  51   MCH 32.8 26.5 - 33.0 pg  51   MCHC 32.7 31.5 - 36.5 g/dL  51   RDW 13.5 10.0 - 15.0 %  51   Platelet Count 173 150 - 450 10e9/L  51   Diff Method Automated Method   51   % Neutrophils 65.2 %  51   % Lymphocytes 25.7 %  51   % Monocytes 2.1 %  51   % Eosinophils 6.0 %  51   % Basophils 1.0 %  51   % Immature Granulocytes 0.0 %  51   Nucleated RBCs 0 0 /100  51   Absolute Neutrophil 2.5 1.6 - 8.3 10e9/L  51   Absolute Lymphocytes 1.0 0.8 - 5.3 10e9/L  51   Absolute Monocytes 0.1 0.0 - 1.3 10e9/L  51   Absolute Eosinophils 0.2 0.0 - 0.7 10e9/L  51   Absolute Basophils 0.0 0.0 - 0.2 10e9/L  51   Abs Immature Granulocytes 0.0 0 - 0.4 10e9/L  51   Absolute Nucleated RBC 0.0   51            Basic metabolic panel [[id number]] (Abnormal)  Resulted: 10/28/18 2314, Result status: Final result    Ordering provider: Wilber Beltran MD  10/28/18 2231 Resulting lab: [facility]    Specimen Information    Type Source Collected On   Blood  10/28/18 2242          Components       Value Reference Range Flag Lab   Sodium 136 133 - 144 mmol/L  51   Potassium 4.5 3.4 - 5.3 mmol/L  51    Chloride 101 94 - 109 mmol/L  51   Carbon Dioxide 18 20 - 32 mmol/L L 51   Anion Gap 17 3 - 14 mmol/L H 51   Glucose 102 70 - 99 mg/dL H 51   Urea Nitrogen 91 7 - 30 mg/dL H 51   Creatinine 18.90 0.52 - 1.04 mg/dL H 51   GFR Estimate 2 >60 mL/min/1.7m2 L 51   Comment:  Non  GFR Calc   GFR Estimate If Black 3 >60 mL/min/1.7m2 L 51   Comment:  African American GFR Calc   Calcium 6.9 8.5 - 10.1 mg/dL L 51            CBC with platelets differential [[id number]] (Abnormal)  Resulted: 10/28/18 2252, Result status: Final result    Ordering provider: Wilber Beltran MD  10/28/18 2231 Resulting lab: [facility]    Specimen Information    Type Source Collected On   Blood  10/28/18 2242          Components       Value Reference Range Flag Lab   WBC 3.6 4.0 - 11.0 10e9/L L 51   RBC Count 2.57 3.8 - 5.2 10e12/L L 51   Hemoglobin 8.3 11.7 - 15.7 g/dL L 51   Hematocrit 26.1 35.0 - 47.0 % L 51    78 - 100 fl H 51   MCH 32.3 26.5 - 33.0 pg  51   MCHC 31.8 31.5 - 36.5 g/dL  51   RDW 13.8 10.0 - 15.0 %  51   Platelet Count 166 150 - 450 10e9/L  51   Diff Method Automated Method   51   % Neutrophils 68.4 %  51   % Lymphocytes 22.9 %  51   % Monocytes 3.6 %  51   % Eosinophils 4.5 %  51   % Basophils 0.6 %  51   % Immature Granulocytes 0.0 %  51   Nucleated RBCs 0 0 /100  51   Absolute Neutrophil 2.5 1.6 - 8.3 10e9/L  51   Absolute Lymphocytes 0.8 0.8 - 5.3 10e9/L  51   Absolute Monocytes 0.1 0.0 - 1.3 10e9/L  51   Absolute Eosinophils 0.2 0.0 - 0.7 10e9/L  51   Absolute Basophils 0.0 0.0 - 0.2 10e9/L  51   Abs Immature Granulocytes 0.0 0 - 0.4 10e9/L  51   Absolute Nucleated RBC 0.0   51            Basic metabolic panel [[id number]] (Abnormal)  Resulted: 10/28/18 2223, Result status: Final result    Ordering provider: Wilber Beltran MD  10/28/18 2150 Resulting lab: [facility]    Specimen Information    Type Source Collected On   Blood  10/28/18  "2200          Components       Value Reference Range Flag Lab   Sodium 134 133 - 144 mmol/L  51   Potassium 5.0 3.4 - 5.3 mmol/L  51   Comment:  Specimen slightly hemolyzed, potassium may be falsely elevated   Chloride 100 94 - 109 mmol/L  51   Carbon Dioxide 18 20 - 32 mmol/L L 51   Anion Gap 16 3 - 14 mmol/L H 51   Glucose 87 70 - 99 mg/dL  51   Urea Nitrogen 85 7 - 30 mg/dL H 51   Creatinine 18.20 0.52 - 1.04 mg/dL H 51   GFR Estimate 2 >60 mL/min/1.7m2 L 51   Comment:  Non  GFR Calc   GFR Estimate If Black 3 >60 mL/min/1.7m2 L 51   Comment:  African American GFR Calc   Calcium 7.2 8.5 - 10.1 mg/dL L 51            CBC with platelets differential [[id number]]  Resulted: 10/28/18 2205, Result status: In process    Ordering provider: Wilber Beltran MD  10/28/18 2150 Resulting lab: [facility]    Specimen Information    Type Source Collected On   Blood  10/28/18 2200            Testing Performed By     Lab - Abbreviation Name Director Address Valid Date Range    45 - [id number] [facility] Unknown Unknown 01/28/02 0000 - Present    51 - Unknown [facility] Unknown [address] [facility] [address] 12/31/14 1010 - Present            Unresulted Labs     None         Imaging Results - 3 Days      Chest XR,  PA & LAT [[id number]] (Abnormal)  Resulted: 10/30/18 0710, Result status: Final result    Ordering provider: Edvin Hillman MD  10/29/18 2350 Resulted by: Poncho Field MD  Self, Frankie Chavez MD    Performed: 10/29/18 2357 - 10/30/18 0010 Resulting lab: RADIOLOGY RESULTS    Narrative:       XR CHEST 2 VW 10/30/2018 12:10 AM    Comparison: 10/28/2018    History: SOB;     Findings: PA and lateral views of the chest. Cardiac silhouette is  within normal limits. The lungs have a subtle increase \"dirtiness\" to  them which in an immunosuppressed patient could possibly represent  subtle inflammatory disease. Pulmonary vasculature is distinct. No  pleural effusion. No " pneumothorax.  The upper abdomen is unremarkable.      Impression:       Impression: Subtle increased interstitial markings of the lungs,  although this could be a normal finding in an immunosuppressed patient  is any chance of the could be an atypical infection considered limited  noncontrast CT of the chest for further evaluation.      [Access Center: Consider noncontrast CT limited for determination of  the interstitial pattern within the lung for possible atypical  infection.]    This report will be copied to the [facility] to ensure a  provider acknowledges the finding. Access Center is available Monday  through Friday 8am-3:30 pm.     I have personally reviewed the examination and initial interpretation  and I agree with the findings.    MICHELLE PATEL MD    Components       Value Reference Range Flag Lab   Radiologist flags Consider noncontrast CT limited for determination of  Urgent             XR Chest 2 Views [[id number]]  Resulted: 10/29/18 1810, Result status: Final result    Ordering provider: Wilber Beltran MD  10/28/18 2151 Resulted by: Lianne Mosqueda MD  Self, Frankie Chavez MD    Performed: 10/28/18 2208 - 10/28/18 2218 Resulting lab: RADIOLOGY RESULTS    Narrative:       XR CHEST 2 VW 10/28/2018 10:18 PM    Comparison: 6/1/2017    History: missed HD;     Findings: 2 views of the chest. Heart and major vasculature are within  normal limits. No pleural effusion. No pneumothorax. No acute airspace  disease. The upper abdomen is unremarkable.      Impression:       Impression: No acute airspace disease.    I have personally reviewed the examination and initial interpretation  and I agree with the findings.    LIANNE MOSQUEDA MD      Testing Performed By     Lab - Abbreviation Name Director Address Valid Date Range    104 - Rad Rslts RADIOLOGY RESULTS Unknown Unknown 02/16/05 1553 - Present               ECG/EMG Results      Echocardiogram [[id number]]  Resulted: 03/08/17  1547, Result status: Edited Result - FINAL    Ordering provider: Claudia Barron PA-C  17 1432 Resulted by: Garret Perrin MD    Performed: 17 1614 - 17 1615 Resulting lab: RADIOLOGY RESULTS    Narrative:       [id number]  [id number]  [id number]  [id number]^BEATRICE^CLAUDIA^HELEN           [facility]  Diagnostic and [facility]-3rd Floor  [address] [place] [address]     Name: KEITH COLE  MRN: [id number]  : 1985  Study Date: 2017 03:47 PM  Age: 32 yrs  Gender: Female  Patient Location: [facility]  Reason For Study: Chronic kidney disease, HTN  Ordering Physician: CLAUDIA BARRON  Referring Physician: CLAUDIA BARRON  Performed By: Argentina Flowers RDCS     BSA: 1.6 m2  Height: 67 in  Weight: 111 lb  BP: 140/99 mmHg  _____________________________________________________________________________  __        Procedure  Echocardiogram with two-dimensional, color and spectral Doppler performed.  _____________________________________________________________________________  __        Interpretation Summary  Global and regional left ventricular function is normal with an EF of 60-65%.  Right ventricular function, chamber size, wall motion, and thickness are  normal.     No change from prior.  _____________________________________________________________________________  __        Left Ventricle  Global and regional left ventricular function is normal with an EF of 60-65%.  Left ventricular size is normal. Moderate concentric wall thickening  consistent with left ventricular hypertrophy is present. Normal left  ventricular filling for age.     Right Ventricle  Right ventricular function, chamber size, wall motion, and thickness are  normal.     Atria  The right atria appears normal. Severe left atrial enlargement is present.     Mitral Valve  Trace mitral insufficiency is present.        Aortic Valve  The aortic valve is tricuspid.  Trace aortic insufficiency is present.     Tricuspid Valve  Trace tricuspid insufficiency is present. The right ventricular systolic  pressure is approximated at 22.2 mmHg plus the right atrial pressure.     Pulmonic Valve  Trace pulmonic insufficiency is present.     Vessels  The thoracic aorta is normal. The inferior vena cava was normal in size with  preserved respiratory variability.     Pericardium  No pericardial effusion is present.     _____________________________________________________________________________  __  MMode/2D Measurements & Calculations  IVSd: 1.2 cm  LVIDd: 4.7 cm  LVIDs: 3.2 cm  LVPWd: 1.4 cm  FS: 31.4 %  EDV(Teich): 101.2 ml  ESV(Teich): 41.2 ml     LV mass(C)d: 238.0 grams  asc Aorta Diam: 3.3 cm  LVOT diam: 1.9 cm  LVOT area: 2.9 cm2  LA Volume (BP): 86.8 ml  LA Volume Index (BP): 55.3 ml/m2        Doppler Measurements & Calculations  MV E max qing: 127.2 cm/sec  MV A max qing: 44.7 cm/sec  MV E/A: 2.8  MV dec time: 0.21 sec  TR max qing: 235.6 cm/sec  TR max P.2 mmHg     Lateral E/e': 9.8  Medial E/e': 18.5           _____________________________________________________________________________  __           Report approved by: Malick Heath 2017 04:58 PM       1    Type Source Collected On     17 1547          View Image (below)              Encounter-Level Documents:     There are no encounter-level documents.      Order-Level Documents:     There are no order-level documents.

## 2018-10-29 NOTE — IP AVS SNAPSHOT
` `     UNIT 6D OBSERVATION [facility] BANK: [phone]            Medication Administration Report for Jelly Dietz as of 10/30/18 1959   Legend:    Given Hold Not Given Due Canceled Entry Other Actions    Time Time (Time) Time  Time-Action       Inactive    Active    Linked        Medications 10/24/18 10/25/18 10/26/18 10/27/18 10/28/18 10/29/18 10/30/18    acetaminophen (TYLENOL) tablet 650 mg  Dose: 650 mg  Freq: EVERY 4 HOURS PRN Route: PO  PRN Reasons: mild pain,fever  Start: 10/30/18 0145   Admin Instructions: Maximum acetaminophen dose from all sources = 75 mg/kg/day not to exceed 4 grams/day.    Admin. Amount: 2 tablet (2 × 325 mg tablet)  Dispense Loc: [facility] ADS 6DO               amLODIPine (NORVASC) tablet 10 mg  Dose: 10 mg  Freq: DAILY Route: PO  Start: 10/30/18 0800   Admin. Amount: 2 tablet (2 × 5 mg tablet)  Last Admin: 10/30/18 0915  Dispense Loc: [facility]           0915 (10 mg)-Given           carvedilol (COREG) tablet 25 mg  Dose: 25 mg  Freq: 2 TIMES DAILY WITH MEALS Route: PO  Start: 10/30/18 0800   Admin. Amount: 1 tablet (1 × 25 mg tablet)  Last Admin: 10/30/18 0916  Dispense Loc: [facility]           0916 (25 mg)-Given       [ ] 1800           cetirizine (zyrTEC) tablet 10 mg  Dose: 10 mg  Freq: AT BEDTIME Route: PO  Start: 10/30/18 0146   Admin. Amount: 1 tablet (1 × 10 mg tablet)  Last Admin: 10/30/18 0244  Dispense Loc: [facility] ADS 6DO           0244 (10 mg)-Given       [ ] 2200           cloNIDine (CATAPRES) tablet 0.1 mg  Dose: 0.1 mg  Freq: 3 TIMES DAILY PRN Route: PO  PRN Comment: bp >170/100  Start: 10/30/18 0145   Admin. Amount: 1 tablet (1 × 0.1 mg tablet)  Dispense Loc: [facility]               cyclobenzaprine (FLEXERIL) tablet 5-10 mg  Dose: 5-10 mg  Freq: 2 TIMES DAILY PRN Route: PO  PRN Reason: muscle spasms  Start: 10/30/18 0145   Admin. Amount: 1-2 tablet (1-2 × 5 mg tablet)  Last Admin: 10/30/18 0244  Dispense Loc: [facility] ADS 6DO            0244 (5 mg)-Given           melatonin tablet 1 mg  Dose: 1 mg  Freq: AT BEDTIME PRN Route: PO  PRN Reason: sleep  Start: 10/30/18 0145   Admin Instructions: Do not give unless at least 6 hours of uninterrupted sleep is expected.    Admin. Amount: 1 tablet (1 × 1 mg tablet)  Last Admin: 10/30/18 0244  Dispense Loc: [facility] ADS 6DO           0244 (1 mg)-Given           naloxone (NARCAN) injection 0.1-0.4 mg  Dose: 0.1-0.4 mg  Freq: EVERY 2 MIN PRN Route: IV  PRN Reason: opioid reversal  Start: 10/30/18 0145   Admin Instructions: For respiratory rate LESS than or EQUAL to 8.  Partial reversal dose:  0.1 mg titrated q 2 minutes for Analgesia Side Effects Monitoring Sedation Level of 3 (frequently drowsy, arousable, drifts to sleep during conversation).Full reversal dose:  0.4 mg bolus for Analgesia Side Effects Monitoring Sedation Level of 4 (somnolent, minimal or no response to stimulation).  For ordered IV doses 0.1-2mg give IVP. Give each 0.4mg over 15 seconds in emergency situations. For non-emergent situations further dilute in 9mL of NS to facilitate titration of response.    Admin. Amount: 0.1-0.4 mg = 0.25-1 mL Conc: 0.4 mg/mL  Dispense Loc: [facility] ADS 6DO  Volume: 1 mL               ondansetron (ZOFRAN-ODT) ODT tab 4 mg  Dose: 4 mg  Freq: EVERY 6 HOURS PRN Route: PO  PRN Reasons: nausea,vomiting  Start: 10/30/18 0145   Admin Instructions: This is Step 1 of nausea and vomiting management.  If nausea not resolved in 15 minutes, go to Step 2 prochlorperazine (COMPAZINE). Do not push through foil backing. Peel back foil and gently remove. Place on tongue immediately. Administration with liquid unnecessary  With dry hands, peel back foil backing and gently remove tablet; do not push oral disintegrating tablet through foil backing; administer immediately on tongue and oral disintegrating tablet dissolves in seconds; then swallow with saliva; liquid not required.    Admin. Amount: 1 tablet (1 × 4 mg  tablet)  Dispense Loc: [facility] ADS 6DO              Or  ondansetron (ZOFRAN) injection 4 mg  Dose: 4 mg  Freq: EVERY 6 HOURS PRN Route: IV  PRN Reasons: nausea,vomiting  Start: 10/30/18 0145   Admin Instructions: This is Step 1 of nausea and vomiting management.  If nausea not resolved in 15 minutes, go to Step 2 prochlorperazine (COMPAZINE).  Irritant. For ordered IV doses 0.1-4 mg, give IV Push undiluted over 2-5 minutes.    Admin. Amount: 4 mg = 2 mL Conc: 4 mg/2 mL  Dispense Loc: [facility] ADS 6DO  Infused Over: 2-5 Minutes  Volume: 2 mL               sevelamer (RENVELA) tablet 2,400 mg  Dose: 2,400 mg  Freq: 3 TIMES DAILY WITH MEALS Route: PO  Start: 10/30/18 1200   Admin Instructions: Do not crush. Take with meals. Should not be given if patient is NPO.    Admin. Amount: 3 tablet (3 × 800 mg tablet)  Dispense Loc: [facility]           (5350)-Not Given       [ ] 1800           sodium chloride (PF) 0.9% PF flush 3 mL  Dose: 3 mL  Freq: EVERY 8 HOURS Route: IK  Start: 10/29/18 2300   Admin Instructions: And Q1H PRN, to lock peripheral IV dormant line.    Admin. Amount: 3 mL  Last Admin: 10/30/18 1505  Dispense Loc: [facility] Floor Stock  Volume: 3 mL   Current Line: Peripheral IV 10/29/18 Right Lower forearm                 0917 (3 mL)-Given       1505 (3 mL)-Given       [ ] 2300           sodium chloride (PF) 0.9% PF flush 3 mL  Dose: 3 mL  Freq: EVERY 1 HOUR PRN Route: IK  PRN Reason: line flush  PRN Comment: for peripheral IV flush post IV meds  Start: 10/29/18 2257   Admin. Amount: 3 mL  Dispense Loc: [facility] Floor Stock  Volume: 3 mL              Completed Medications  Medications 10/24/18 10/25/18 10/26/18 10/27/18 10/28/18 10/29/18 10/30/18         Dose: 300 mL  Freq: ONCE Route: HM Machine  Last Dose: Stopped (10/30/18 1502)  Start: 10/30/18 0700   End: 10/30/18 1502   Admin Instructions: For Dialyzer Prime. (In Dialyzer)    Admin. Amount: 300 mL  Last Admin: 10/30/18 1027  Dispense Loc: [facility]  Floor Stock  Administrations Remainin  Volume: 300 mL  POC: Dialysis           1027 (300 mL)-New Bag [C]       1502-Stopped [C]             Dose: 250 mL  Freq: ONCE IN DIALYSIS Route: IV  Last Dose: Stopped (10/30/18 1502)  Start: 10/30/18 0700   End: 10/30/18 1502   Admin Instructions: For patient prime during dialysis    Admin. Amount: 250 mL  Last Admin: 10/30/18 1028  Dispense Loc: [facility] Floor Stock  Administrations Remainin  Volume: 250 mL  POC: Dialysis           1028 (250 mL)-New Bag       1502-Stopped [C]             Dose: 2 g  Freq: ONCE Route: IV  Last Dose: Stopped (10/30/18 1012)  Start: 10/30/18 0132   End: 10/30/18 101   Admin. Amount: 2 g  Last Admin: 10/30/18 0244  Dispense Loc: [facility]  Infused Over: 60 Minutes  Administrations Remainin  Volume: 100 mL   Mixture Administration Information:   Medication Type Amount   calcium gluconate 10 % SOLN Medications 2 g   D5W 5 % SOLN Base 100 mL                   0244 (2 g)-New Bag       1012-Stopped [C]          Discontinued Medications  Medications 10/24/18 10/25/18 10/26/18 10/27/18 10/28/18 10/29/18 10/30/18         Dose: 100-150 mL  Freq: EVERY 15 MIN PRN Route: IV  PRN Comment: Until hypotensive symptoms resolve  Start: 10/30/18 0652   End: 10/30/18 1449   Admin Instructions: Up to 1000 mL maximum total dose.  Give if needed to manage Systolic Blood Pressure as indicated in Hemodialysis Single Treatment set up.  Notify provider if patient blood pressure is not responsive with the bolus.    Admin. Amount: 100-150 mL  Dispense Loc: [facility] Floor Stock  Administrations Remaining: 10  Volume: 150 mL  POC: Dialysis           1449-Med Discontinued         Dose: 1 each  Freq: DURING HEMODIALYSIS (FROM STOCK) Route: Top  Start: 10/30/18 0652   End: 10/30/18 1449   Admin Instructions: Apply to site of bleeding.  For dialysis access bleeding greater than 15 minutes.    Admin. Amount: 1 each  Dispense Loc: [facility] ADS  DIALYSIS  Administrations Remainin  POC: Dialysis           1449-Med Discontinued         Dose: 0.5 mL  Freq: ONCE PRN Route: ID  PRN Comment: For local anesthesia at ARTERIAL fistula/graft site.  Start: 10/30/18 0652   End: 10/30/18 1449   Admin Instructions: Give once, if needed, at the dialysis treatment.    Admin. Amount: 0.5 mL  Dispense Loc: [facility] ADS DIALYSIS  Administrations Remainin  Volume: 2 mL  POC: Dialysis           1449-Med Discontinued         Dose: 0.5 mL  Freq: ONCE PRN Route: ID  PRN Comment: WITHIN 24 HOURS For local anesthesia at fistula/graft site  Start: 10/30/18 0652   End: 10/30/18 1449   Admin Instructions: For local anesthesia at VENOUS fistula/graft site. Give once, if needed, at the dialysis treatment.    Admin. Amount: 0.5 mL  Dispense Loc: [facility] ADS DIALYSIS  Administrations Remainin  Volume: 2 mL  POC: Dialysis           1449-Med Discontinued         Freq: ONCE Route: XX  Start: 10/30/18 0700   End: 10/30/18 1449   Admin Instructions: Check ACT during first 30 minutes of dialysis. Normal saline flushes may be used to maintain patency of circuit.    Dispense Loc: [facility]  Administrations Remainin  POC: Dialysis           (1028)-Not Given       1449-Med Discontinued         Dose: 800 mg  Freq: 3 TIMES DAILY WITH MEALS Route: PO  Start: 10/30/18 0800   End: 10/30/18 1006   Admin Instructions: Do not crush. Take with meals. Should not be given if patient is NPO.    Admin. Amount: 1 tablet (1 × 800 mg tablet)  Last Admin: 10/30/18 0916  Dispense Loc: [facility]           0916 (800 mg)-Given       1006-Med Discontinued         Freq: CONTINUOUS PRN Route: XX  Start: 10/30/18 0652   End: 10/30/18 1449   Admin Instructions: Stop Heparin 60 minutes before end of treatment    Dispense Loc: [facility]  POC: Dialysis           1449-Med Discontinued    Medications 10/24/18 10/25/18 10/26/18 10/27/18 10/28/18 10/29/18 10/30/18

## 2018-10-29 NOTE — ED TRIAGE NOTES
Patient presents ambulatory to triage with concern for hyperkalemia. Patient states she missed her dialysis yesterday and is concerned that her potassium is elevated. Reports feeling slight leg swelling and abdominal bloating.

## 2018-10-29 NOTE — IP AVS SNAPSHOT
Unit 6D Observation [address] [address]    Phone:  [phone]    Fax:  [phone]                                       After Visit Summary   10/29/2018    Jelly Dietz    MRN: [id number]           After Visit Summary Signature Page     I have received my discharge instructions, and my questions have been answered. I have discussed any challenges I see with this plan with the nurse or doctor.    ..........................................................................................................................................  Patient/Patient Representative Signature      ..........................................................................................................................................  Patient Representative Print Name and Relationship to Patient    ..................................................               ................................................  Date                                   Time    ..........................................................................................................................................  Reviewed by Signature/Title    ...................................................              ..............................................  Date                                               Time          22EPIC Rev 08/18

## 2018-10-29 NOTE — ED PROVIDER NOTES
"    [place] EMERGENCY DEPARTMENT ([facility])  10/28/18  ED 11 11:15 PM   History     Chief Complaint   Patient presents with     Abnormal Labs     Concern for hyperkalemia     The history is provided by the patient and medical records.     Jelly Dietz is a 33 year old female with history of IgA nephropathy resulting in kidney failure status post kidney transplant in  with subsequent transplant failure and explantation currently on dialysis who presents with concerns for hyperkalemia. Patient dialyzes Tues/Thurs/Sat. She last dialyzed on Tuesday, but missed Thursday and Saturday due to family issues. Last night she noticed feeling slight leg swelling and abdominal bloating. Today she developed chest pressure, swollen thighs and abdomen. She has mild headache as well. She has subjective shortness of breath.   She is concerned for hyperkalemia and so presents for evaluation.  Dry weight is roughly 116 pounds or 53 kg. No history of heart disease. She also asks if she could have some muscle relaxers for her back, she did some \"cupping\" and has soreness in the cup sites.     I have reviewed the Medications, Allergies, Past Medical and Surgical History, and Social History in the [employer] system.  PAST MEDICAL HISTORY:   Past Medical History:   Diagnosis Date     ACS (acute coronary syndrome) (H) 2014     Allergic state     seasonal allergies     Anemia in chronic renal disease      Cervical cerclage suture present in second trimester      Chest pain 2014     Chronic renal transplant rejection      End stage kidney disease (H)      Heart murmur      History of  delivery ,     30 wks, 28 wks      Hypertension     resolved after transplant     IgA nephropathy      MRSA (methicillin resistant Staphylococcus aureus)      [facility]     Pre-eclampsia      Renal failure affecting pregnancy in second trimester  "     S/P kidney transplant     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 History of 1B kidney rejection      SAB (spontaneous )     2nd trimester        PAST SURGICAL HISTORY:   Past Surgical History:   Procedure Laterality Date     CERCLAGE CERVICAL N/A 2015    Procedure: CERCLAGE CERVICAL;  Surgeon: Norbert Chopra MD;  Location: [facility] L+D      SECTION  2012    Procedure:  SECTION;;  Surgeon: Chelsea Cross MD;  Location: [facility] L+D      SECTION N/A 2015    Procedure:  SECTION;  Surgeon: Chelsea Cross MD;  Location: [facility] OR     cesearean section       EXPLANT TRANSPLANTED KIDNEY  3/29/2013    Procedure: EXPLANT TRANSPLANTED KIDNEY;  Explant Transplanted right Kidney (from patients right iliac fossa);  Surgeon: Nory Morgan MD;  Location: [facility] OR     NEPHRECTOMY  3/29/13    Transplant nephrectomy      WILIAN/DIALYSIS CATHETER       TRANSPLANT KIDNEY RECIPIENT LIVING UNRELATED  Dec 2007    (Adult male in [place])       FAMILY HISTORY:   Family History   Problem Relation Age of Onset     Diabetes Paternal Grandfather      Breast Cancer Maternal Grandmother 55     Cancer No family hx of      No known family hx of skin cancer       SOCIAL HISTORY:   Social History   Substance Use Topics     Smoking status: Never Smoker     Smokeless tobacco: Never Used     Alcohol use No       Patient's Medications   New Prescriptions    No medications on file   Previous Medications    ACETAMINOPHEN (TYLENOL) 325 MG TABLET    Take 1-2 tablets by mouth every 4 hours as needed.    AMLODIPINE (NORVASC) 10 MG TABLET    Take 1 tablet (10 mg) by mouth daily    B COMPLEX-C-FOLIC ACID (DIALYVITE) TABS    1 tablet by mouth daily    CARVEDILOL (COREG) 25 MG TABLET    Take 1 tablet (25 mg) by mouth 2 times daily (with meals)    CETIRIZINE (ZYRTEC) 10 MG TABLET    Take 10 mg by mouth At Bedtime    CLONIDINE (CATAPRES) 0.1 MG TABLET    Take 1 tablet (0.1 mg) by  "mouth 3 times daily as needed    CYCLOBENZAPRINE (FLEXERIL) 5 MG TABLET    Take 1 tablet (5 mg) by mouth nightly as needed for muscle spasms    DOXYCYCLINE (VIBRA-TABS) 100 MG TABLET    Take 1 tablet by mouth 2 times daily (with meals)    LIDOCAINE (LIDOCARE) 4 % PATCH    Place 1 patch onto the skin every 24 hours    MELATONIN 1 MG TABS TABLET    Take 1 mg by mouth    RENVELA 800 MG TABLET    Take 1 tablet by mouth 3 times daily (with meals)   Modified Medications    No medications on file   Discontinued Medications    No medications on file        No Known Allergies   Review of Systems   Cardiovascular: Positive for leg swelling.   Gastrointestinal: Positive for abdominal distention.       Physical Exam   BP: (!) 180/117  Pulse: 79  Temp: 99  F (37.2  C)  Resp: 16  Height: 168.9 cm (5' 6.5\")  Weight: 62.8 kg (138 lb 8 oz)  SpO2: 100 %      Physical Exam   Constitutional: No distress.   HENT:   Head: Atraumatic.   Mouth/Throat: Oropharynx is clear and moist. No oropharyngeal exudate.   Eyes: Pupils are equal, round, and reactive to light. No scleral icterus.   Cardiovascular: Normal heart sounds and intact distal pulses.    Pulmonary/Chest: Breath sounds normal. No respiratory distress.   Abdominal: Soft. Bowel sounds are normal. There is no tenderness.   Musculoskeletal: She exhibits no edema or tenderness.   Skin: Skin is warm. No rash noted. She is not diaphoretic.   Nursing note and vitals reviewed.      ED Course     ED Course     Procedures             Critical Care time:  none             Labs Ordered and Resulted from Time of ED Arrival Up to the Time of Departure from the ED   BASIC METABOLIC PANEL   CBC WITH PLATELETS DIFFERENTIAL   ISTAT CG8 GAS ELEC ICA GLUC CINDY NURSE POCT            Assessments & Plan (with Medical Decision Making)     Jelly Dietz is a 33 year old female with history of IgA nephropathy resulting in kidney failure status post kidney transplant in 2007 with subsequent transplant " failure and explantation currently on dialysis who presents with concerns for hyperkalemia after missing two rounds of HD earlier same week.  IV is established labs are drawn sent reviewed and documented in epic and remarkable for leukopenia with a white count of 3.6 anemia of hemoglobin 8.3, BUN/creatinine of 91 and 18.9 respectively which is consistent with her end-stage renal disease, and a normal potassium of 4.5.  Patient was sent to x-ray for imaging of the chest which revealed no acute process.  Upon repeat assessment patient's exam remained unchanged.  Plan will be for patient to be discharged home and call her dialysis center first thing in the morning in order to be dialyzed as soon as possible.    I have reviewed the nursing notes.    I have reviewed the findings, diagnosis, plan and need for follow up with the patient.    New Prescriptions    No medications on file       Final diagnoses:   Encounter for medical screening examination   Janeth BERRIOS, am serving as a trained medical scribe to document services personally performed by Wilber Beltran MD based on the provider's statements to me on October 28, 2018.  This document has been checked and approved by the attending provider.    Wilber BERRIOS MD, was physically present and have reviewed and verified the accuracy of this note documented by Janeth Licona medical scribe.       10/28/2018   [facility], [place], EMERGENCY DEPARTMENT     Wilber Beltran MD  11/09/18 1539

## 2018-10-29 NOTE — ED NOTES
Patient given 0.1mg of clonidine of home medication supply per  for elevated blood pressure. Blood pressure is now 153/107 and per , patient is okay to be discharged.

## 2018-10-30 ENCOUNTER — APPOINTMENT (OUTPATIENT)
Dept: GENERAL RADIOLOGY | Facility: CLINIC | Age: 33
End: 2018-10-30
Attending: EMERGENCY MEDICINE
Payer: MEDICARE

## 2018-10-30 VITALS
SYSTOLIC BLOOD PRESSURE: 151 MMHG | RESPIRATION RATE: 20 BRPM | BODY MASS INDEX: 22.24 KG/M2 | HEART RATE: 85 BPM | WEIGHT: 139.9 LBS | OXYGEN SATURATION: 98 % | TEMPERATURE: 98.7 F | DIASTOLIC BLOOD PRESSURE: 103 MMHG

## 2018-10-30 PROBLEM — N18.6 ESRD (END STAGE RENAL DISEASE) (H): Status: ACTIVE | Noted: 2018-10-30

## 2018-10-30 LAB
ANION GAP SERPL CALCULATED.3IONS-SCNC: 16 MMOL/L (ref 3–14)
ANION GAP SERPL CALCULATED.3IONS-SCNC: 18 MMOL/L (ref 3–14)
BUN SERPL-MCNC: 108 MG/DL (ref 7–30)
BUN SERPL-MCNC: 108 MG/DL (ref 7–30)
CA-I SERPL ISE-MCNC: 3.6 MG/DL (ref 4.4–5.2)
CALCIUM SERPL-MCNC: 6.8 MG/DL (ref 8.5–10.1)
CALCIUM SERPL-MCNC: 6.8 MG/DL (ref 8.5–10.1)
CHLORIDE SERPL-SCNC: 101 MMOL/L (ref 94–109)
CHLORIDE SERPL-SCNC: 102 MMOL/L (ref 94–109)
CO2 SERPL-SCNC: 16 MMOL/L (ref 20–32)
CO2 SERPL-SCNC: 17 MMOL/L (ref 20–32)
CREAT SERPL-MCNC: 20.3 MG/DL (ref 0.52–1.04)
CREAT SERPL-MCNC: 20.5 MG/DL (ref 0.52–1.04)
GFR SERPL CREATININE-BSD FRML MDRD: 2 ML/MIN/1.7M2
GFR SERPL CREATININE-BSD FRML MDRD: 2 ML/MIN/1.7M2
GLUCOSE SERPL-MCNC: 93 MG/DL (ref 70–99)
GLUCOSE SERPL-MCNC: 96 MG/DL (ref 70–99)
HCG SERPL QL: NEGATIVE
INTERPRETATION ECG - MUSE: NORMAL
MAGNESIUM SERPL-MCNC: 1.9 MG/DL (ref 1.6–2.3)
PHOSPHATE SERPL-MCNC: 10.4 MG/DL (ref 2.5–4.5)
POTASSIUM SERPL-SCNC: 4.6 MMOL/L (ref 3.4–5.3)
POTASSIUM SERPL-SCNC: 4.8 MMOL/L (ref 3.4–5.3)
RADIOLOGIST FLAGS: ABNORMAL
SODIUM SERPL-SCNC: 135 MMOL/L (ref 133–144)
SODIUM SERPL-SCNC: 137 MMOL/L (ref 133–144)

## 2018-10-30 PROCEDURE — 25000132 ZZH RX MED GY IP 250 OP 250 PS 637: Mod: GY | Performed by: PHYSICIAN ASSISTANT

## 2018-10-30 PROCEDURE — G0378 HOSPITAL OBSERVATION PER HR: HCPCS

## 2018-10-30 PROCEDURE — 25000128 H RX IP 250 OP 636: Performed by: PHYSICIAN ASSISTANT

## 2018-10-30 PROCEDURE — 25000128 H RX IP 250 OP 636: Performed by: GENERAL ACUTE CARE HOSPITAL

## 2018-10-30 PROCEDURE — 36415 COLL VENOUS BLD VENIPUNCTURE: CPT | Performed by: PHYSICIAN ASSISTANT

## 2018-10-30 PROCEDURE — A9270 NON-COVERED ITEM OR SERVICE: HCPCS | Mod: GY | Performed by: PHYSICIAN ASSISTANT

## 2018-10-30 PROCEDURE — 93010 ELECTROCARDIOGRAM REPORT: CPT | Mod: Z6 | Performed by: PHYSICIAN ASSISTANT

## 2018-10-30 PROCEDURE — 80048 BASIC METABOLIC PNL TOTAL CA: CPT | Performed by: PHYSICIAN ASSISTANT

## 2018-10-30 PROCEDURE — 71046 X-RAY EXAM CHEST 2 VIEWS: CPT

## 2018-10-30 PROCEDURE — 90937 HEMODIALYSIS REPEATED EVAL: CPT

## 2018-10-30 PROCEDURE — 84100 ASSAY OF PHOSPHORUS: CPT | Performed by: PHYSICIAN ASSISTANT

## 2018-10-30 PROCEDURE — 82330 ASSAY OF CALCIUM: CPT | Performed by: PHYSICIAN ASSISTANT

## 2018-10-30 PROCEDURE — 83735 ASSAY OF MAGNESIUM: CPT | Performed by: PHYSICIAN ASSISTANT

## 2018-10-30 PROCEDURE — 99236 HOSP IP/OBS SAME DATE HI 85: CPT | Mod: 25 | Performed by: FAMILY MEDICINE

## 2018-10-30 RX ORDER — AMLODIPINE BESYLATE 5 MG/1
10 TABLET ORAL DAILY
Status: DISCONTINUED | OUTPATIENT
Start: 2018-10-30 | End: 2018-10-30 | Stop reason: HOSPADM

## 2018-10-30 RX ORDER — SEVELAMER CARBONATE 800 MG/1
800 TABLET, FILM COATED ORAL
Status: DISCONTINUED | OUTPATIENT
Start: 2018-10-30 | End: 2018-10-30

## 2018-10-30 RX ORDER — ONDANSETRON 2 MG/ML
4 INJECTION INTRAMUSCULAR; INTRAVENOUS EVERY 6 HOURS PRN
Status: DISCONTINUED | OUTPATIENT
Start: 2018-10-30 | End: 2018-10-30 | Stop reason: HOSPADM

## 2018-10-30 RX ORDER — SEVELAMER CARBONATE 800 MG/1
2400 TABLET, FILM COATED ORAL
Status: DISCONTINUED | OUTPATIENT
Start: 2018-10-30 | End: 2018-10-30 | Stop reason: HOSPADM

## 2018-10-30 RX ORDER — ACETAMINOPHEN 325 MG/1
650 TABLET ORAL EVERY 4 HOURS PRN
Status: DISCONTINUED | OUTPATIENT
Start: 2018-10-30 | End: 2018-10-30 | Stop reason: HOSPADM

## 2018-10-30 RX ORDER — CARVEDILOL 25 MG/1
25 TABLET ORAL 2 TIMES DAILY WITH MEALS
Status: DISCONTINUED | OUTPATIENT
Start: 2018-10-30 | End: 2018-10-30 | Stop reason: HOSPADM

## 2018-10-30 RX ORDER — CLONIDINE HYDROCHLORIDE 0.1 MG/1
0.1 TABLET ORAL 3 TIMES DAILY PRN
Status: DISCONTINUED | OUTPATIENT
Start: 2018-10-30 | End: 2018-10-30 | Stop reason: HOSPADM

## 2018-10-30 RX ORDER — NALOXONE HYDROCHLORIDE 0.4 MG/ML
.1-.4 INJECTION, SOLUTION INTRAMUSCULAR; INTRAVENOUS; SUBCUTANEOUS
Status: DISCONTINUED | OUTPATIENT
Start: 2018-10-30 | End: 2018-10-30 | Stop reason: HOSPADM

## 2018-10-30 RX ORDER — ONDANSETRON 4 MG/1
4 TABLET, ORALLY DISINTEGRATING ORAL EVERY 6 HOURS PRN
Status: DISCONTINUED | OUTPATIENT
Start: 2018-10-30 | End: 2018-10-30 | Stop reason: HOSPADM

## 2018-10-30 RX ORDER — CETIRIZINE HYDROCHLORIDE 10 MG/1
10 TABLET ORAL AT BEDTIME
Status: DISCONTINUED | OUTPATIENT
Start: 2018-10-30 | End: 2018-10-30 | Stop reason: HOSPADM

## 2018-10-30 RX ORDER — CYCLOBENZAPRINE HCL 5 MG
5-10 TABLET ORAL 2 TIMES DAILY PRN
Status: DISCONTINUED | OUTPATIENT
Start: 2018-10-30 | End: 2018-10-30 | Stop reason: HOSPADM

## 2018-10-30 RX ADMIN — SODIUM CHLORIDE 250 ML: 9 INJECTION, SOLUTION INTRAVENOUS at 10:28

## 2018-10-30 RX ADMIN — CARVEDILOL 25 MG: 25 TABLET, FILM COATED ORAL at 09:16

## 2018-10-30 RX ADMIN — CALCIUM GLUCONATE 2 G: 98 INJECTION, SOLUTION INTRAVENOUS at 02:44

## 2018-10-30 RX ADMIN — SODIUM CHLORIDE 300 ML: 9 INJECTION, SOLUTION INTRAVENOUS at 10:27

## 2018-10-30 RX ADMIN — Medication 1 MG: at 02:44

## 2018-10-30 RX ADMIN — CYCLOBENZAPRINE HYDROCHLORIDE 5 MG: 5 TABLET, FILM COATED ORAL at 02:44

## 2018-10-30 RX ADMIN — AMLODIPINE BESYLATE 10 MG: 5 TABLET ORAL at 09:15

## 2018-10-30 RX ADMIN — CETIRIZINE HYDROCHLORIDE 10 MG: 10 TABLET, FILM COATED ORAL at 02:44

## 2018-10-30 RX ADMIN — SEVELAMER CARBONATE 800 MG: 800 TABLET, FILM COATED ORAL at 09:16

## 2018-10-30 NOTE — PROGRESS NOTES
Care Coordinator Progress Note    Admission Date/Time:  10/29/2018  Attending MD:  Katlin att. providers found    Data  Chart reviewed, discussed with interdisciplinary team.   Patient was admitted for: ESRD (end stage renal disease) on dialysis (H).     Assessment  Pt is a known HD run pt,   RNCC contacted [facility].  RN stated that pt stopped HD runs last week and said she was told by her  that this was ok and it was ok to start alternative medicine.  RN stated that pt is on some type of herbal supplement.  When the HD RN was speaking with pt pt told her that she was finally feeling great and that she didn't need to return to the HD center.  RN attempted, to no avail, to convince pt to return to HD center.  [facility] would have availability to take pt for an extra run tomorrow, MD team to update this RNCC on need and I will arrange this appointment and add it to AVS.      [facility]  [address] [place] [address]  Phone: [phone]  Fax: [phone]      Per team pt did not plan to return to [facility] for her run on Thursday.  RNCC reached out to [place], they are willing to arrange a run at the [facility] location for the time that pt plans on staying locally.  RNCC will update [place] on when pt will require next hd runs as soon as team updates this writer on plan.      10/30/2018 2:19 PM RNCC updated AVS with info on going to [facility] tomorrow for out pt HD run.  Pt cannot run there on Thursday, rNCC reached out to [place] with Nephrology who stated that the will need a run Thursday and needs to be informed that she will have to follow up in [place] for that run.  RNCC updated AVS with appointments and instructions to go to [place] for her normal run time on Thursday.  RNCC also updated [place] on discontinue plan.  RNCC met with pt while she is in HD center at [facility] to discuss plan.       Plan  Anticipated Discharge Date:  TBD pending  HD runs  Anticipated Discharge Plan:  Discontinue today vs tomorrow after HD runs     Yovana Petersen, ALANNAH, BSN    [facility]  [address] [place] [id number]    [email or web address]    Office: [phone] Pager: [phone]  To contact weekend RNCC, dial [phone] and enter pager number [phone] at prompt. This pager can not be contacted by text page or outside line.

## 2018-10-30 NOTE — ED PROVIDER NOTES
History     Chief Complaint   Patient presents with     Shortness of Breath     Missed dialysis for approximately two sessions     HPI  Jelly Dietz is a 33 year old female who presents to the ER due to missing her last 2 dialysis runs.  Patient is a hemodialysis patient and states that she does make a little bit of urine occasionally but states that she last dialyzed 6 days ago, missing last Thursday and Saturday dialysis runs.  Patient states that she started feeling some generalized malaise, some mild shortness of breath and states that her weight is up by 12 kg.  Patient states that she called [facility] today to see if she could run but they did not have room for her and told her to come to the ER for evaluation.  Patient denies any fevers, vomiting, melena, or bright blood per rectum.    This part of the document was transcribed by aDni Almodovar, Medical Scribe.     Past Medical History:   Diagnosis Date     ACS (acute coronary syndrome) (H) 2014     Allergic state     seasonal allergies     Anemia in chronic renal disease      Cervical cerclage suture present in second trimester      Chest pain 2014     Chronic renal transplant rejection      End stage kidney disease (H)      Heart murmur      History of  delivery ,     30 wks, 28 wks      Hypertension     resolved after transplant     IgA nephropathy      MRSA (methicillin resistant Staphylococcus aureus)      [facility]     Pre-eclampsia      Renal failure affecting pregnancy in second trimester      S/P kidney transplant     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 History of 1B kidney rejection      SAB (spontaneous )     2nd trimester        Past Surgical History:   Procedure Laterality Date     CERCLAGE CERVICAL N/A 2015    Procedure: CERCLAGE CERVICAL;  Surgeon: Norbert Chopra MD;  Location: [facility] L[facility]       SECTION  2012    Procedure:  SECTION;;  Surgeon: Chelsea Cross MD;  Location: [facility] L[facility]      SECTION N/A 2015    Procedure:  SECTION;  Surgeon: Chelsea Cross MD;  Location: [facility] OR     cesearean section       EXPLANT TRANSPLANTED KIDNEY  3/29/2013    Procedure: EXPLANT TRANSPLANTED KIDNEY;  Explant Transplanted right Kidney (from patients right iliac fossa);  Surgeon: Nory Morgan MD;  Location: [facility] OR     NEPHRECTOMY  3/29/13    Transplant nephrectomy      WILIAN/DIALYSIS CATHETER       TRANSPLANT KIDNEY RECIPIENT LIVING UNRELATED  Dec 2007    (Adult male in [place])       Family History   Problem Relation Age of Onset     Diabetes Paternal Grandfather      Breast Cancer Maternal Grandmother 55     Cancer No family hx of      No known family hx of skin cancer       Social History   Substance Use Topics     Smoking status: Never Smoker     Smokeless tobacco: Never Used     Alcohol use No      No Known Allergies     Previous Medications    ACETAMINOPHEN (TYLENOL) 325 MG TABLET    Take 1-2 tablets by mouth every 4 hours as needed.    AMLODIPINE (NORVASC) 10 MG TABLET    Take 1 tablet (10 mg) by mouth daily    B COMPLEX-C-FOLIC ACID (DIALYVITE) TABS    1 tablet by mouth daily    CARVEDILOL (COREG) 25 MG TABLET    Take 1 tablet (25 mg) by mouth 2 times daily (with meals)    CETIRIZINE (ZYRTEC) 10 MG TABLET    Take 10 mg by mouth At Bedtime    CLONIDINE (CATAPRES) 0.1 MG TABLET    Take 1 tablet (0.1 mg) by mouth 3 times daily as needed    CYCLOBENZAPRINE (FLEXERIL) 10 MG TABLET    Take 0.5 tablets (5 mg) by mouth 2 times daily as needed for muscle spasms    CYCLOBENZAPRINE (FLEXERIL) 5 MG TABLET    Take 1 tablet (5 mg) by mouth nightly as needed for muscle spasms    DOXYCYCLINE (VIBRA-TABS) 100 MG TABLET    Take 1 tablet by mouth 2 times daily (with meals)    LIDOCAINE (LIDOCARE) 4 % PATCH    Place 1 patch onto the skin every 24 hours    MELATONIN 1 MG TABS TABLET     Take 1 mg by mouth    RENVELA 800 MG TABLET    Take 1 tablet by mouth 3 times daily (with meals)         I have reviewed the Medications, Allergies, Past Medical and Surgical History, and Social History in the Epic system.    Review of Systems   Constitutional: Negative for fever.   Respiratory: Positive for shortness of breath.    Gastrointestinal: Negative for anal bleeding, blood in stool and vomiting.   All other systems reviewed and are negative.      Physical Exam   BP: (!) 149/101  Pulse: 75  Temp: 98.4  F (36.9  C)  Weight: 63.6 kg (140 lb 3.2 oz)  SpO2: 100 %  Patient states her dry weight is 51 kg      Physical Exam   Constitutional: She is oriented to person, place, and time.   Alert conversant comfortable   HENT:   Head: Atraumatic.   Eyes: EOM are normal. Pupils are equal, round, and reactive to light.   Neck: Neck supple. No JVD present.   Cardiovascular: Exam reveals no friction rub.    Murmur heard.  Pulmonary/Chest: No respiratory distress.   Slight crackles in the bases bilaterally   Abdominal: Soft. There is no tenderness.   Musculoskeletal: She exhibits no deformity.   Trace edema bilaterally   Neurological: She is alert and oriented to person, place, and time.   Grossly intact and symmetric   Skin: Skin is warm.   Psychiatric: She has a normal mood and affect.       ED Course     ED Course     Procedures          EKG revealed a normal sinus rhythm at a rate of 77 with a NV interval 0.196 and a QRS duration of 0.092.  The patient had a normal axis with no acute ST or T wave changes significant for ischemia and no T wave changes significant for hyperkalemia.  This is read by me personally    Results for orders placed or performed during the hospital encounter of 10/29/18 (from the past 24 hour(s))   EKG 12 lead   Result Value Ref Range    Interpretation ECG Click View Image link to view waveform and result    CBC with platelets differential   Result Value Ref Range    WBC 3.9 (L) 4.0 - 11.0 10e9/L     RBC Count 2.56 (L) 3.8 - 5.2 10e12/L    Hemoglobin 8.4 (L) 11.7 - 15.7 g/dL    Hematocrit 25.7 (L) 35.0 - 47.0 %     78 - 100 fl    MCH 32.8 26.5 - 33.0 pg    MCHC 32.7 31.5 - 36.5 g/dL    RDW 13.5 10.0 - 15.0 %    Platelet Count 173 150 - 450 10e9/L    Diff Method Automated Method     % Neutrophils 65.2 %    % Lymphocytes 25.7 %    % Monocytes 2.1 %    % Eosinophils 6.0 %    % Basophils 1.0 %    % Immature Granulocytes 0.0 %    Nucleated RBCs 0 0 /100    Absolute Neutrophil 2.5 1.6 - 8.3 10e9/L    Absolute Lymphocytes 1.0 0.8 - 5.3 10e9/L    Absolute Monocytes 0.1 0.0 - 1.3 10e9/L    Absolute Eosinophils 0.2 0.0 - 0.7 10e9/L    Absolute Basophils 0.0 0.0 - 0.2 10e9/L    Abs Immature Granulocytes 0.0 0 - 0.4 10e9/L    Absolute Nucleated RBC 0.0    Basic metabolic panel   Result Value Ref Range    Sodium 137 133 - 144 mmol/L    Potassium 4.6 3.4 - 5.3 mmol/L    Chloride 101 94 - 109 mmol/L    Carbon Dioxide 17 (L) 20 - 32 mmol/L    Anion Gap 18 (H) 3 - 14 mmol/L    Glucose 96 70 - 99 mg/dL    Urea Nitrogen 108 (H) 7 - 30 mg/dL    Creatinine 20.30 (H) 0.52 - 1.04 mg/dL    GFR Estimate 2 (L) >60 mL/min/1.7m2    GFR Estimate If Black 2 (L) >60 mL/min/1.7m2    Calcium 6.8 (L) 8.5 - 10.1 mg/dL   HCG qualitative pregnancy (blood)   Result Value Ref Range    HCG Qualitative Serum Negative NEG^Negative   ISTAT gases elec ica gluc sheila POCT   Result Value Ref Range    Ph Venous 7.31 (L) 7.32 - 7.43 pH    PCO2 Venous 36 (L) 40 - 50 mm Hg    PO2 Venous 42 25 - 47 mm Hg    Bicarbonate Venous 18 (L) 21 - 28 mmol/L    O2 Sat Venous 73 %    Sodium 137 133 - 144 mmol/L    Potassium 4.6 3.4 - 5.3 mmol/L    Glucose 92 70 - 99 mg/dL    Calcium Ionized 3.5 (L) 4.4 - 5.2 mg/dL    Hemoglobin 10.2 (L) 11.7 - 15.7 g/dL    Hematocrit - POCT 30 (L) 35.0 - 47.0 %PCV   Chest XR,  PA & LAT    Narrative    XR CHEST 2 VW 10/30/2018 12:10 AM    Comparison: 10/28/2018    History: SOB;     Findings: PA and lateral views of the chest. Cardiac  silhouette is  within normal limits. Pulmonary vasculature is distinct. No pleural  effusion. No pneumothorax. No acute airspace disease. The upper  abdomen is unremarkable.      Impression    Impression: No acute airspace disease.       Labs Ordered and Resulted from Time of ED Arrival Up to the Time of Departure from the ED   CBC WITH PLATELETS DIFFERENTIAL - Abnormal; Notable for the following:        Result Value    WBC 3.9 (*)     RBC Count 2.56 (*)     Hemoglobin 8.4 (*)     Hematocrit 25.7 (*)     All other components within normal limits   BASIC METABOLIC PANEL - Abnormal; Notable for the following:     Carbon Dioxide 17 (*)     Anion Gap 18 (*)     Urea Nitrogen 108 (*)     Creatinine 20.30 (*)     GFR Estimate 2 (*)     GFR Estimate If Black 2 (*)     Calcium 6.8 (*)     All other components within normal limits   ISTAT GASES ELEC ICA GLUC CINDY POCT - Abnormal; Notable for the following:     Ph Venous 7.31 (*)     PCO2 Venous 36 (*)     Bicarbonate Venous 18 (*)     Calcium Ionized 3.5 (*)     Hemoglobin 10.2 (*)     Hematocrit - POCT 30 (*)     All other components within normal limits   HCG QUALITATIVE   PERIPHERAL IV CATHETER   ISTAT CG8 GAS ELEC ICA GLUC CINDY NURSE POCT       Assessments & Plan (with Medical Decision Making)     I have reviewed the nursing notes.    Based on the patient's evaluation above, the patient does not need emergent dialysis tonight and it can wait till morning.  Patient is unable to get back to [place] tomorrow to have dialysis so at this point the patient will be placed on our observation unit for dialysis at the [facility] in the morning.  Case was discussed with nephrology on-call and consult placed so that dialysis can be done in the morning.    I have reviewed the findings, diagnosis, and plan with the patient.    Final diagnoses:   ESRD (end stage renal disease) on dialysis (H) - with mild fluid overload     Edvin Hillman MD    10/29/2018   [facility], [place],  EMERGENCY DEPARTMENT     Edvin Hillman MD  10/30/18 0042

## 2018-10-30 NOTE — ED NOTES
[facility], [place]   ED Nurse to Floor Handoff     Jelly Dietz is a 33 year old female who speaks English and lives with family members,  in a home  They arrived in the ED by car from home    ED Chief Complaint: Shortness of Breath (Missed dialysis for approximately two sessions)    ED Dx;   Final diagnoses:   ESRD (end stage renal disease) on dialysis (H) - with mild fluid overload         Needed?: No    Allergies: No Known Allergies.  Past Medical Hx:   Past Medical History:   Diagnosis Date     ACS (acute coronary syndrome) (H) 2014     Allergic state     seasonal allergies     Anemia in chronic renal disease      Cervical cerclage suture present in second trimester      Chest pain 2014     Chronic renal transplant rejection      End stage kidney disease (H)      Heart murmur      History of  delivery ,     30 wks, 28 wks      Hypertension     resolved after transplant     IgA nephropathy      MRSA (methicillin resistant Staphylococcus aureus)      [facility]     Pre-eclampsia      Renal failure affecting pregnancy in second trimester      S/P kidney transplant     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 History of 1B kidney rejection      SAB (spontaneous )     2nd trimester       Baseline Mental status: WDL  Current Mental Status changes: at basesline    Infection present or suspected this encounter: no  Sepsis suspected: No  Isolation type: Contact     Activity level - Baseline/Home:  Independent  Activity Level - Current:   Independent    Bariatric equipment needed?: No    In the ED these meds were given:   Medications   sodium chloride (PF) 0.9% PF flush 3 mL (not administered)   sodium chloride (PF) 0.9% PF flush 3 mL ( Intracatheter Canceled Entry 10/29/18 3969)       Drips running?  No    Home pump  No    Current LDAs  Peripheral IV  10/29/18 Right Lower forearm (Active)   Site Assessment WDL 10/29/2018 11:46 PM   Line Status Saline locked 10/29/2018 11:46 PM   Phlebitis Scale 0-->no symptoms 10/29/2018 11:46 PM   Infiltration Scale 0 10/29/2018 11:46 PM   Infiltration Site Treatment Method  None 10/29/2018 11:46 PM   Extravasation? No 10/29/2018 11:46 PM   Dressing Intervention New dressing  10/29/2018 11:46 PM   Number of days:1       Hemodialysis Vascular Access Arteriovenous graft Left Arm (Active)   Number of days:       Labs results:   Labs Ordered and Resulted from Time of ED Arrival Up to the Time of Departure from the ED   CBC WITH PLATELETS DIFFERENTIAL - Abnormal; Notable for the following:        Result Value    WBC 3.9 (*)     RBC Count 2.56 (*)     Hemoglobin 8.4 (*)     Hematocrit 25.7 (*)     All other components within normal limits   BASIC METABOLIC PANEL - Abnormal; Notable for the following:     Carbon Dioxide 17 (*)     Anion Gap 18 (*)     Urea Nitrogen 108 (*)     Creatinine 20.30 (*)     GFR Estimate 2 (*)     GFR Estimate If Black 2 (*)     Calcium 6.8 (*)     All other components within normal limits   ISTAT GASES ELEC ICA GLUC CINDY POCT - Abnormal; Notable for the following:     Ph Venous 7.31 (*)     PCO2 Venous 36 (*)     Bicarbonate Venous 18 (*)     Calcium Ionized 3.5 (*)     Hemoglobin 10.2 (*)     Hematocrit - POCT 30 (*)     All other components within normal limits   HCG QUALITATIVE   PERIPHERAL IV CATHETER   ISTAT CG8 GAS ELEC ICA GLUC CINDY NURSE POCT       Imaging Studies:   Recent Results (from the past 24 hour(s))   Chest XR,  PA & LAT    Narrative    XR CHEST 2 VW 10/30/2018 12:10 AM    Comparison: 10/28/2018    History: SOB;     Findings: PA and lateral views of the chest. Cardiac silhouette is  within normal limits. Pulmonary vasculature is distinct. No pleural  effusion. No pneumothorax. No acute airspace disease. The upper  abdomen is unremarkable.      Impression    Impression: No acute airspace  disease.       Recent vital signs:   /85  Pulse 72  Temp 98.4  F (36.9  C) (Oral)  Wt 63.6 kg (140 lb 3.2 oz)  LMP 10/29/2018 (Exact Date)  SpO2 100%  BMI 22.29 kg/m2    Cardiac Rhythm: Normal sinus rhythm.  Pt needs tele? No  Skin/wound Issues: None    Code Status: Full Code    Pain control: good    Nausea control: pt had none    Abnormal labs/tests/findings requiring intervention: None    Family present during ED course? No   Family Comments/Social Situation comments: None    Tasks needing completion: None    Daisy Burroughs, RN  [facility]-- [phone] [facility]  [phone] [facility] ED

## 2018-10-30 NOTE — CONSULTS
Nephrology Initial Consult  October 30, 2018      Jelly Dietz MRN:[id number] YOB: 1985  Date of Admission:10/29/2018  Primary care provider: [facility], [place]  Requesting physician: Lito Wing MD    ASSESSMENT AND RECOMMENDATIONS:   Jelly Dietz is a 33 yr old female with PMH of ESRD 2/2 IgA nephropathy s/p LDKT (2007 in [place]) with subsequent failure and dialysis dependence since 11/2012, admitted with shortness of breath, volume overload, and metabolic acidosis in the setting of ~ one week of missed dialysis.    ESKD: due to IgA nephropathy s/p failed LDKT and dialysis dep since 11/2012, dialyzes TTS at [facility] (ph [phone], fax [phone]) under the care of Dr. Whalen, EDW: 55.2 kg. Access: LUE AVF. Run time: 3.5 hrs. Last outpt HD run was 10/23. She is active on the transplant list.  - Note: pt had wanted to try some type of herbal supplement that she was told would heal her kidney; had long discussion and she agrees that it hasn't worked and she will be fully compliant with dialysis. She very much wants another transplant.  - Dialysis today as well as tomorrow and Thursday (whether here or at outpatient unit, outpt can be arranged at [facility]).    Acid/base: metabolic acidosis in setting of missed dialysis  - Will correct with bicarb via dialysate today    Volume: EDW 55.2 kg (came off at 56.7 kg on 10/23, today's weight 63.5 kg), appears mildly hypervolemic, CXR with mild pulm edema. O2 99% on RA; minimal UOP  - Daily weights  - UF goal 3-4 kg today    BP: 130-150's, PTA meds per dialysis rounding report is clonidine 0.1 mg tid  - Currently on amlodipine 10 mg qday, coreg 25 mg bid    Anemia: 10.2 g/dL; recent labs with ferritin 650, IS 34, hgb 10's. On epogen 2000 units per HD  - Hold ENZO, hgb > 10.0    BMD: iCa 3.6, phos 10.4; recent , phos 9's; PTA sensipar 30 mg 5x/week, renvela 3 tabs tid WM, hectorol 1.5 mcg per HD  - Dialyzing on high Ca  bath today  - Would hold sensipar for now given hypocalcemia  - Will increase hectorol to 4 mcg via HD  - On sevelamer (would consider switching to Ca based binder such as Phoslo but will defer to primary neph for this)    Recommendations were communicated to primary team via this note.      Nina Mcwilliams PA-C  Division of Kidney Disease   [phone]      REASON FOR CONSULT: ESKD and management of dialysis    HISTORY OF PRESENT ILLNESS:  Jelly Dietz is a 33 yr old female with PMH of ESRD 2/2 IgA nephropathy s/p LDKT () with subsequent failure and dialysis dependence since 2012, admitted with shortness of breath, volume overload, and metabolic acidosis in the setting of ~ one week of missed dialysis. Per discussion with Haley Gonzalez, she frequently misses dialysis runs due to wanting to move to [place] but not able to move until May 2019 due to a housing situation. Currently, she was told by a [employer] person that she may not need dialysis if she takes a herbal supplement. However after long discussion, she now understand that she needs to stop the supplement and continue with dialysis and not miss by coming to the [facility]. She has a 3 yr old daughter at home and very much hopes to get another transplant. Outpatient dialysis records were obtained and reviewed. She currently denies CP, n/v, chills. She is short of breath.    PAST MEDICAL HISTORY:  Reviewed with patient on 10/30/2018     Past Medical History:   Diagnosis Date     ACS (acute coronary syndrome) (H) 2014     Allergic state     seasonal allergies     Anemia in chronic renal disease      Cervical cerclage suture present in second trimester      Chest pain 2014     Chronic renal transplant rejection      End stage kidney disease (H)      Heart murmur      History of  delivery ,     30 wks, 28 wks      Hypertension     resolved after transplant     IgA nephropathy      MRSA (methicillin resistant Staphylococcus  aureus)      [facility]     Pre-eclampsia      Renal failure affecting pregnancy in second trimester      S/P kidney transplant     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 History of 1B kidney rejection      SAB (spontaneous )     2nd trimester        Past Surgical History:   Procedure Laterality Date     CERCLAGE CERVICAL N/A 2015    Procedure: CERCLAGE CERVICAL;  Surgeon: Norbert Chopra MD;  Location: [facility] L[facility]D      SECTION  2012    Procedure:  SECTION;;  Surgeon: Chelsea Cross MD;  Location: [facility] L+D      SECTION N/A 2015    Procedure:  SECTION;  Surgeon: Chelsea Cross MD;  Location: [facility] OR     cesearean section       EXPLANT TRANSPLANTED KIDNEY  3/29/2013    Procedure: EXPLANT TRANSPLANTED KIDNEY;  Explant Transplanted right Kidney (from patients right iliac fossa);  Surgeon: Nory Morgan MD;  Location: [facility] OR     NEPHRECTOMY  3/29/13    Transplant nephrectomy      WILIAN/DIALYSIS CATHETER       TRANSPLANT KIDNEY RECIPIENT LIVING UNRELATED  Dec 2007    (Adult [facility])        MEDICATIONS:  PTA Meds  Prior to Admission medications    Medication Sig Last Dose Taking? Auth Provider   cyclobenzaprine (FLEXERIL) 10 MG tablet Take 0.5 tablets (5 mg) by mouth 2 times daily as needed for muscle spasms  Patient taking differently: Take 5-10 mg by mouth 2 times daily as needed for muscle spasms  10/30/2018 at Unknown time Yes Wilber Beltran MD   acetaminophen (TYLENOL) 325 MG tablet Take 1-2 tablets by mouth every 4 hours as needed.   Marni Arteaga, DO   amLODIPine (NORVASC) 10 MG tablet Take 1 tablet (10 mg) by mouth daily   Katherine Varma MD   B Complex-C-Folic Acid (DIALYVITE) TABS 1 tablet by mouth daily   Katherine Varma MD   carvedilol (COREG) 25 MG tablet Take 1 tablet (25 mg) by mouth 2 times daily (with meals)    Katherine Varma MD   cetirizine (ZYRTEC) 10 MG tablet Take 10 mg by mouth At Bedtime   Unknown, Entered By History   cloNIDine (CATAPRES) 0.1 MG tablet Take 1 tablet (0.1 mg) by mouth 3 times daily as needed   Katherine Varma MD   RENVELA 800 MG tablet Take 1 tablet by mouth 3 times daily (with meals)   Unknown, Entered By History      Current Meds    sodium chloride 0.9%  250 mL Intravenous Once in dialysis     sodium chloride 0.9%  300 mL Hemodialysis Machine Once     amLODIPine  10 mg Oral Daily     carvedilol  25 mg Oral BID w/meals     cetirizine  10 mg Oral At Bedtime     gelatin absorbable  1 each Topical During Hemodialysis (from stock)     - MEDICATION INSTRUCTIONS -   Does not apply Once     sevelamer  800 mg Oral TID w/meals     sodium chloride (PF)  3 mL Intracatheter Q8H     Infusion Meds    - MEDICATION INSTRUCTIONS -         ALLERGIES:    No Known Allergies    REVIEW OF SYSTEMS:  A comprehensive of systems was negative except as noted above.    SOCIAL HISTORY:   Social History     Social History     Marital status: Single     Spouse name: N/A     Number of children: N/A     Years of education: N/A     Occupational History     Not on file.     Social History Main Topics     Smoking status: Never Smoker     Smokeless tobacco: Never Used     Alcohol use No     Drug use: No     Sexual activity: Yes     Partners: Male     Other Topics Concern     Blood Transfusions Yes     Multiple in [place] none in [place]     Caffeine Concern No     1s/d     Occupational Exposure No     Hobby Hazards No     Sleep Concern No     Stress Concern No     Weight Concern No     Special Diet No     Back Care No     Exercise No     walk 20' daily     Bike Helmet No     Seat Belt No     Social History Narrative    Currently unemployed.  Lives in [place].   with one son.     She currently lives in [place] but is wanting to move to the [facility].  Reviewed with patient     FAMILY MEDICAL HISTORY:   Family  History   Problem Relation Age of Onset     Diabetes Paternal Grandfather      Breast Cancer Maternal Grandmother 55     Cancer No family hx of      No known family hx of skin cancer     Reviewed with patient     PHYSICAL EXAM:   Temp  Av.2  F (36.8  C)  Min: 97.2  F (36.2  C)  Max: 99  F (37.2  C)      Pulse  Av.8  Min: 72  Max: 85 Resp  Av  Min: 16  Max: 16  SpO2  Av.3 %  Min: 98 %  Max: 100 %       /89 (BP Location: Right arm)  Pulse 85  Temp 98.3  F (36.8  C) (Oral)  Resp 16  Wt 63.5 kg (139 lb 14.4 oz)  LMP 10/29/2018 (Exact Date)  SpO2 100%  BMI 22.24 kg/m2       Admit Weight: 63.6 kg (140 lb 3.2 oz)     GENERAL APPEARANCE: alert, NAD  EYES: no scleral icterus, pupils equal  Pulmonary: crackles in bases  CV: regular rhythm, normal rate   - Edema trace LE, does have facial edema  GI: soft, nontender, normal bowel sounds  MS: no evidence of inflammation in joints, no muscle tenderness  : no polanco  SKIN: no rash, warm, dry, no cyanosis  NEURO: speech and mentation intact   Access: LUE AVF    LABS:   CMP  Recent Labs  Lab 10/30/18  0706 10/29/18  2346 10/29/18  2341 10/28/18  2244 10/28/18  2242 10/28/18  2200    137 137 136 136 134   POTASSIUM 4.8 4.6 4.6 4.4 4.5 5.0   CHLORIDE 102  --  101  --  101 100   CO2 16*  --  17*  --  18* 18*   ANIONGAP 16*  --  18*  --  17* 16*   GLC 93 92 96 98 102* 87   *  --  108*  --  91* 85*   CR 20.50*  --  20.30*  --  18.90* 18.20*   GFRESTIMATED 2*  --  2*  --  2* 2*   GFRESTBLACK 2*  --  2*  --  3* 3*   CASIE 6.8*  --  6.8*  --  6.9* 7.2*   MAG 1.9  --   --   --   --   --    PHOS 10.4*  --   --   --   --   --      CBC  Recent Labs  Lab 10/29/18  2346 10/29/18  2341 10/28/18  2244 10/28/18  2242   HGB 10.2* 8.4* 7.8* 8.3*   WBC  --  3.9*  --  3.6*   RBC  --  2.56*  --  2.57*   HCT  --  25.7*  --  26.1*   MCV  --  100  --  102*   MCH  --  32.8  --  32.3   MCHC  --  32.7  --  31.8   RDW  --  13.5  --  13.8   PLT  --  173  --  166      INRNo lab results found in last 7 days.  ABGNo lab results found in last 7 days.   URINE STUDIES  Recent Labs   Lab Test  01/02/18   2048  03/08/17   1632  01/25/17   1920  10/19/15   0550   COLOR  Yellow  Straw  Straw  Yellow   APPEARANCE  Cloudy  Slightly Cloudy  Slightly Cloudy  Slightly Cloudy   URINEGLC  150*  250*  70*  Negative   URINEBILI  Negative  Negative  Negative  Negative   URINEKETONE  Negative  Negative  Negative  Negative   SG  1.008  1.010  1.006  1.005   UBLD  Small*  Small*  Negative  Moderate*   URINEPH  8.0*  8.5*  7.5*  8.5*   PROTEIN  30*  100*  30*  300*   NITRITE  Negative  Negative  Negative  Negative   LEUKEST  Trace*  Small*  Negative  Small*   RBCU  3*  3*  1  2   WBCU  8*  4*  2  12*     Recent Labs   Lab Test  07/16/12   1400  07/02/12   1130  06/25/12   0615  06/17/12   0930  06/13/12   1120  06/07/12   0643  06/01/12   0950  05/25/12   1830  05/25/12   1110  05/22/12   1536  02/20/12   0800   UTPG  1.53*  0.86*  0.69*  0.82*  0.80*  0.88*  0.58*  0.47*  0.45*  0.56*  0.51*  0.42*     PTH  Recent Labs   Lab Test  11/30/12   2030  11/30/12   1030  03/09/12   1304   PTHI  500*  794*  198*     IRON STUDIES  Recent Labs   Lab Test  11/30/12   1950  03/09/12   1304   IRON  125  33*   FEB  155*  196*   IRONSAT  81*  17   SHAHBAZ  401*  87       IMAGING:  Reviewed    Nina Mcwilliams PA-C

## 2018-10-30 NOTE — DISCHARGE SUMMARY
Dialysis Discharge Summary Brief    [facility]  Division of Nephrology  Nephrology Discharge Dialysis Orders  Ph: [phone]  Fax: [phone]    Jelly Dietz  MRN: [id number]  YOB: 1985    [facility] Dialysis Unit: [place] (and [place] on occasion)  Primary Nephrologist: Dr. Whalen    Date of Admission: 10/29/2018  Date of Discharge: 10/30/2018    Jelly Dietz is a 33 yr old female with PMH of ESRD 2/2 IgA nephropathy s/p LDKT (2007 in [place]) with subsequent failure and dialysis dependence since 11/2012, admitted with shortness of breath, volume overload, and metabolic acidosis in the setting of ~ one week of missed dialysis.     ESKD: due to IgA nephropathy s/p failed LDKT and dialysis dep since 11/2012, dialyzes TTS at [facility] (ph [phone], fax [phone]) under the care of Dr. Whalen, EDW: 55.2 kg. Access: LUE AVF. Run time: 3.5 hrs. Last outpt HD run was 10/23. She is active on the transplant list.  - Note: pt had wanted to try some type of herbal supplement that she was told would heal her kidney; had long discussion and she agrees that it hasn't worked and she will be fully compliant with dialysis. She very much wants another transplant.  - Dialysis today 10/30; will dialyze at [facility] Wed 10/31 and [place] Thursday 11/1.      Acid/base: metabolic acidosis in setting of missed dialysis  - Will correct with bicarb via dialysate      Volume: EDW 55.2 kg (came off at 56.7 kg on 10/23, today's weight 63.5 kg), appears mildly hypervolemic, CXR with mild pulm edema. O2 99% on RA; minimal UOP  - UF goal 3-4 kg today     BP: 130-150's      Anemia: 10.2 g/dL; recent labs with ferritin 650, IS 34, hgb 10's. On epogen 2000 units per HD       BMD: iCa 3.6, phos 10.4; recent , phos 9's; PTA sensipar 30 mg 5x/week, renvela 3 tabs tid WM, hectorol 1.5 mcg per HD  - Dialyzing on high Ca bath   - Would consider holding sensipar until  hypocalcemia improves  - Would increase hectorol to 4 mcg via HD  - On sevelamer (would consider switching to Ca based binder such as Phoslo but will defer to primary neph for this)      [x] Resume all previous dialysis orders with exception as noted below    New Orders (if not applicable put NA):  Estimated Dry Weight No change   Dialysis Duration    Dialysis Access    Antibiotics (dose per dialysis, end date)            Labs to be drawn at dialysis    Other major changes to dialysis prescription (e.g. Dialysate bath, heparin, blood flow rate, etc)   Ca 3.0 for now   Medication changes (also fax the unit a copy of the discharge summary)         Recommend increasing hectorol to 4 mcg per run  Recommend holding sensipar for now  Consider switching to Phoslo given hypocalcemia     Name of physician completing this form: Nina Mcwilliams PA-C

## 2018-10-30 NOTE — PROGRESS NOTES
Short Note:    I got paged regarding patient Mrs. Jelly Dietz.  Patient is a 33 year old female who presented to the ER due to missing her last 2 dialysis runs, last dialyzed 6 days ago, missing last Thursday and Saturday dialysis runs.  Patient feels some generalized malaise, some mild shortness of breath. weight is up by 12 kg.   K: 4.6, patient has mild SOB, no need for emergent hemodialysis, will run HD early in the morning, HD order plan to remove 3-4 liters as tolerated, 3.5 hours run.     Laury Guzman M.D.  Nephrology Fellow  Pager: [phone]

## 2018-10-30 NOTE — ED TRIAGE NOTES
"Triage Assessment and Note    Vitals:    10/29/18 2148 10/29/18 2151 10/29/18 2153   BP:   (!) 149/101   Pulse:  75    Temp:  98.4  F (36.9  C)    TempSrc: Oral Oral    SpO2:  100%    Weight: 63.6 kg (140 lb 3.2 oz)           Vitals:    10/29/18 2148   TempSrc: Oral   Weight: 63.6 kg (140 lb 3.2 oz)     Reason for visit: Shortness of breath, missed dialysis appointment      Background Information: Pt was seen here last night because shew wanted to check her potassium because she missed 2 days of dialysis and was told to call [facility] today to get dialysis. They were unable to take her because they were full. She is also feeling \"kind of short of breath today\".     She goes to dialysis T, Thursday, SAT. She was last dialyzed on Tuesday of last week (missed Thursday and Saturday).     Home remedies/Treatments: none        Jayne Acosta RN   October 29, 2018         "

## 2018-10-30 NOTE — PROGRESS NOTES
Patient discharged home. PIV removed. Patient has all belongings, understands and agrees with discharge instructions. AVS and other discharge paper work faxed to [facility] in [place].

## 2018-10-30 NOTE — PLAN OF CARE
Problem: Patient Care Overview  Goal: Discharge Needs Assessment  Observation goals PRIOR TO DISCHARGE     Comments: -diagnostic tests and consults completed and resulted: No Dialysis this morning    -vital signs normal or at patient baseline : /88 (BP Location: Right arm)  Pulse 85  Temp 97.7  F (36.5  C) (Oral)  Resp 16  Wt 63.6 kg (140 lb 3.2 oz)  LMP 10/29/2018 (Exact Date)  SpO2 98%  BMI 22.29 kg/m2    -tolerating oral intake to maintain hydration; Yes    -returns to baseline functional status: Pending    -safe disposition plan has been identified; pending    -nephrology consult completed with dispo plan: Per Nephrology to dialyze this morning     -dialysis completed with stable vitals; Dialysis this morning    Nurse to notify provider when observation goals have been met and patient is ready for discharge.

## 2018-10-30 NOTE — DISCHARGE SUMMARY
Discharge Summary    Jelly Dietz MRN# [id number]   YOB: 1985 Age: 33 year old     Date of Admission:  10/29/2018  Date of Discharge:  10/30/2018  Admitting Physician:  Lito Rivero MD  Discharge Physician:  Lito Rivero MD  Discharging Service:  Emergency Department Observation Unit     Primary Provider: [facility], [place]          Discharge Diagnosis:     ESRD (end stage renal disease) (H)    * No resolved hospital problems. *               Discharge Disposition:   Discharged to home           Condition on Discharge:   Discharge condition: Stable               Procedures:   Dialysis, Chest xray.          Discharge Medications:     Current Discharge Medication List      CONTINUE these medications which have NOT CHANGED    Details   cyclobenzaprine (FLEXERIL) 10 MG tablet Take 0.5 tablets (5 mg) by mouth 2 times daily as needed for muscle spasms  Qty: 20 tablet, Refills: 0      acetaminophen (TYLENOL) 325 MG tablet Take 1-2 tablets by mouth every 4 hours as needed.  Qty: 60 tablet, Refills: 0    Associated Diagnoses:  delivery delivered      amLODIPine (NORVASC) 10 MG tablet Take 1 tablet (10 mg) by mouth daily  Qty: 30 tablet, Refills: 11    Associated Diagnoses: ESRD (end stage renal disease) on dialysis (H); Malignant essential hypertension      B Complex-C-Folic Acid (DIALYVITE) TABS 1 tablet by mouth daily  Qty: 30 tablet, Refills: 11    Associated Diagnoses: Secondary renal hyperparathyroidism (H); Hyperphosphatemia; ESRD (end stage renal disease) on dialysis (H)      carvedilol (COREG) 25 MG tablet Take 1 tablet (25 mg) by mouth 2 times daily (with meals)  Qty: 60 tablet, Refills: 11    Associated Diagnoses: Benign essential hypertension; SOB (shortness of breath)      cetirizine (ZYRTEC) 10 MG tablet Take 10 mg by mouth At Bedtime      cloNIDine (CATAPRES) 0.1 MG tablet Take 1 tablet (0.1 mg) by mouth 3 times daily as needed  Qty: 60 tablet, Refills: 3    Comments: For BP  >170/100  Associated Diagnoses: Benign essential hypertension; SOB (shortness of breath)      RENVELA 800 MG tablet Take 1 tablet by mouth 3 times daily (with meals)                   Consultations:   Consultation during this admission received from nephrology             Brief History of Illness:   Jelly Dietz is a 33 yr old female with PMH of ESRD 2/2 IgA nephropathy s/p LDKT (2007 in [place]) with subsequent failure and dialysis dependence since 11/2012, admitted with shortness of breath, volume overload, and metabolic acidosis in the setting of ~ one week of missed dialysis.          Hospital Course:   ESKD: due to IgA nephropathy s/p failed LDKT and dialysis dep since 11/2012, dialyzes TTS at [facility] (ph [phone], fax [phone]) under the care of Dr. Whalen, EDW: 55.2 kg. Access: LUE AVF. Run time: 3.5 hrs. Last outpt HD run was 10/23. She is active on the transplant list.  - Note: pt had wanted to try some type of herbal supplement that she was told would heal her kidney; nephrologist had long discussion and she agrees that it hasn't worked and she will be fully compliant with dialysis. She very much wants another transplant.  - Dialysis today 10/30; will dialyze at [facility] Wed 10/31 and [place] Thursday 11/1.       Acid/base: metabolic acidosis in setting of missed dialysis  - Will correct with bicarb via dialysate       Volume: EDW 55.2 kg (came off at 56.7 kg on 10/23, today's weight 63.5 kg), appears mildly hypervolemic, CXR with mild pulm edema. O2 99% on RA; minimal UOP  - UF goal 3-4 kg today      BP: 130-150's       Anemia: 10.2 g/dL; recent labs with ferritin 650, IS 34, hgb 10's. On epogen 2000 units per HD         BMD: iCa 3.6, phos 10.4; recent , phos 9's; PTA sensipar 30 mg 5x/week, renvela 3 tabs tid WM, hectorol 1.5 mcg per HD   - Consider holding sensipar until hypocalcemia improves ( patient will discuss with primary nephrologist  - On sevelamer (would consider  "switching to Ca based binder such as Phoslo but will defer to primary neph for this)               Final Day of Progress before Discharge:       Physical Exam:  Blood pressure (!) 151/103, pulse 85, temperature 98.7  F (37.1  C), temperature source Oral, resp. rate 20, weight 63.5 kg (139 lb 14.4 oz), last menstrual period 10/29/2018, SpO2 98 %, not currently breastfeeding.    EXAM:  Constitutional: healthy, alert, no distress and cooperative  Head: Normocephalic. No masses, lesions, tenderness or abnormalities  Neck: Neck supple. No adenopathy. Thyroid symmetric, normal size,, Carotids without bruits.  ENT: ENT exam normal, no neck nodes or sinus tenderness  Cardiovascular:  PMI normal. No lifts, heaves, or thrills. RRR. No murmurs, clicks gallops or rub  Respiratory:  Lungs clear  Gastrointestinal: Abdomen soft, non-tender, slightly distended. BS normal. No masses, organomegaly  : Deferred  Musculoskeletal: extremities normal- no gross deformities noted, gait normal and normal muscle tone  Ext: mild BLE edema  Skin: no suspicious lesions or rashes  Neurologic: Gait normal. Reflexes normal and symmetric. Sensation grossly WNL.  Psychiatric: mentation appears normal and affect normal/bright         Data:  All laboratory data reviewed             Significant Results:   None  Results for orders placed or performed during the hospital encounter of 10/29/18   Chest XR,  PA & LAT   Result Value Ref Range    Radiologist flags (Urgent)      Consider noncontrast CT limited for determination of    Narrative    XR CHEST 2 VW 10/30/2018 12:10 AM    Comparison: 10/28/2018    History: SOB;     Findings: PA and lateral views of the chest. Cardiac silhouette is  within normal limits. The lungs have a subtle increase \"dirtiness\" to  them which in an immunosuppressed patient could possibly represent  subtle inflammatory disease. Pulmonary vasculature is distinct. No  pleural effusion. No pneumothorax.  The upper abdomen is " unremarkable.      Impression    Impression: Subtle increased interstitial markings of the lungs,  although this could be a normal finding in an immunosuppressed patient  is any chance of the could be an atypical infection considered limited  noncontrast CT of the chest for further evaluation.      [Access Center: Consider noncontrast CT limited for determination of  the interstitial pattern within the lung for possible atypical  infection.]    This report will be copied to the [facility] to ensure a  provider acknowledges the finding. [facility] is available Monday  through Friday 8am-3:30 pm.     I have personally reviewed the examination and initial interpretation  and I agree with the findings.    MICHELLE PATEL MD   CBC with platelets differential   Result Value Ref Range    WBC 3.9 (L) 4.0 - 11.0 10e9/L    RBC Count 2.56 (L) 3.8 - 5.2 10e12/L    Hemoglobin 8.4 (L) 11.7 - 15.7 g/dL    Hematocrit 25.7 (L) 35.0 - 47.0 %     78 - 100 fl    MCH 32.8 26.5 - 33.0 pg    MCHC 32.7 31.5 - 36.5 g/dL    RDW 13.5 10.0 - 15.0 %    Platelet Count 173 150 - 450 10e9/L    Diff Method Automated Method     % Neutrophils 65.2 %    % Lymphocytes 25.7 %    % Monocytes 2.1 %    % Eosinophils 6.0 %    % Basophils 1.0 %    % Immature Granulocytes 0.0 %    Nucleated RBCs 0 0 /100    Absolute Neutrophil 2.5 1.6 - 8.3 10e9/L    Absolute Lymphocytes 1.0 0.8 - 5.3 10e9/L    Absolute Monocytes 0.1 0.0 - 1.3 10e9/L    Absolute Eosinophils 0.2 0.0 - 0.7 10e9/L    Absolute Basophils 0.0 0.0 - 0.2 10e9/L    Abs Immature Granulocytes 0.0 0 - 0.4 10e9/L    Absolute Nucleated RBC 0.0    Basic metabolic panel   Result Value Ref Range    Sodium 137 133 - 144 mmol/L    Potassium 4.6 3.4 - 5.3 mmol/L    Chloride 101 94 - 109 mmol/L    Carbon Dioxide 17 (L) 20 - 32 mmol/L    Anion Gap 18 (H) 3 - 14 mmol/L    Glucose 96 70 - 99 mg/dL    Urea Nitrogen 108 (H) 7 - 30 mg/dL    Creatinine 20.30 (H) 0.52 - 1.04 mg/dL    GFR Estimate 2  (L) >60 mL/min/1.7m2    GFR Estimate If Black 2 (L) >60 mL/min/1.7m2    Calcium 6.8 (L) 8.5 - 10.1 mg/dL   HCG qualitative pregnancy (blood)   Result Value Ref Range    HCG Qualitative Serum Negative NEG^Negative   Basic metabolic panel   Result Value Ref Range    Sodium 135 133 - 144 mmol/L    Potassium 4.8 3.4 - 5.3 mmol/L    Chloride 102 94 - 109 mmol/L    Carbon Dioxide 16 (L) 20 - 32 mmol/L    Anion Gap 16 (H) 3 - 14 mmol/L    Glucose 93 70 - 99 mg/dL    Urea Nitrogen 108 (H) 7 - 30 mg/dL    Creatinine 20.50 (H) 0.52 - 1.04 mg/dL    GFR Estimate 2 (L) >60 mL/min/1.7m2    GFR Estimate If Black 2 (L) >60 mL/min/1.7m2    Calcium 6.8 (L) 8.5 - 10.1 mg/dL   Magnesium   Result Value Ref Range    Magnesium 1.9 1.6 - 2.3 mg/dL   Phosphorus   Result Value Ref Range    Phosphorus 10.4 (H) 2.5 - 4.5 mg/dL   Calcium ionized   Result Value Ref Range    Calcium Ionized 3.6 (L) 4.4 - 5.2 mg/dL   EKG 12 lead   Result Value Ref Range    Interpretation ECG Click View Image link to view waveform and result    Nephrology ESRD Adult IP Consult: needs dialysis non-urgently; Consultant may enter orders: Yes    Narrative    Nina Mcwilliams PA     10/30/2018 10:30 AM    Nephrology Initial Consult  October 30, 2018      Jelly Dietz MRN:[id number] YOB: 1985  Date of Admission:10/29/2018  Primary care provider: [facility], [place]  Requesting physician: No att. providers found    ASSESSMENT AND RECOMMENDATIONS:   Jelly Dietz is a 33 yr old female with PMH of ESRD 2/2 IgA   nephropathy s/p LDKT (2007 in [place]) with subsequent failure   and dialysis dependence since 11/2012, admitted with shortness of   breath, volume overload, and metabolic acidosis in the setting of   ~ one week of missed dialysis.    ESKD: due to IgA nephropathy s/p failed LDKT and dialysis dep   since 11/2012, dialyzes TTS at [facility] (ph [phone],   fax [phone]) under the care of Dr. Whalen, EDW: 55.2 kg.   Access: AZUL  AVF. Run time: 3.5 hrs. Last outpt HD run was 10/23.   She is active on the transplant list.  - Note: pt had wanted to try some type of herbal supplement that   she was told would heal her kidney; had long discussion and she   agrees that it hasn't worked and she will be fully compliant with   dialysis. She very much wants another transplant.  - Dialysis today as well as tomorrow and Thursday (whether here   or at outpatient unit, outpt can be arranged at [facility]).    Acid/base: metabolic acidosis in setting of missed dialysis  - Will correct with bicarb via dialysate today    Volume: EDW 55.2 kg (came off at 56.7 kg on 10/23, today's weight   63.5 kg), appears mildly hypervolemic, CXR with mild pulm edema.   O2 99% on RA; minimal UOP  - Daily weights  - UF goal 3-4 kg today    BP: 130-150's, PTA meds per dialysis rounding report is clonidine   0.1 mg tid  - Currently on amlodipine 10 mg qday, coreg 25 mg bid    Anemia: 10.2 g/dL; recent labs with ferritin 650, IS 34, hgb   10's. On epogen 2000 units per HD  - Hold ENZO, hgb > 10.0    BMD: iCa 3.6, phos 10.4; recent , phos 9's; PTA sensipar   30 mg 5x/week, renvela 3 tabs tid WM, hectorol 1.5 mcg per HD  - Dialyzing on high Ca bath today  - Would hold sensipar for now given hypocalcemia  - Will increase hectorol to 4 mcg via HD  - On sevelamer (would consider switching to Ca based binder such   as Phoslo but will defer to primary neph for this)    Recommendations were communicated to primary team via this note.      Nina Mcwilliams PA-C  Division of Kidney Disease   [phone]      REASON FOR CONSULT: ESKD and management of dialysis    HISTORY OF PRESENT ILLNESS:  Jelly Dietz is a 33 yr old female with PMH of ESRD 2/2 IgA   nephropathy s/p LDKT (2007) with subsequent failure and dialysis   dependence since 11/2012, admitted with shortness of breath,   volume overload, and metabolic acidosis in the setting of ~ one   week of missed dialysis. Per  discussion with Haley Gonzalez, she   frequently misses dialysis runs due to wanting to move to   [place] but not able to move until May 2019 due to a housing   situation. Currently, she was told by a [employer] person that she   may not need dialysis if she takes a herbal supplement. However   after long discussion, she now understand that she needs to stop   the supplement and continue with dialysis and not miss by coming   to [facility]. She has a 3 yr old daughter at home and very much   hopes to get another transplant. Outpatient dialysis records were   obtained and reviewed. She currently denies CP, n/v, chills. She   is short of breath.    PAST MEDICAL HISTORY:  Reviewed with patient on 10/30/2018     Past Medical History:   Diagnosis Date     ACS (acute coronary syndrome) (H) 2014     Allergic state     seasonal allergies     Anemia in chronic renal disease      Cervical cerclage suture present in second trimester      Chest pain 2014     Chronic renal transplant rejection      End stage kidney disease (H)      Heart murmur      History of  delivery ,     30 wks, 28 wks      Hypertension     resolved after transplant     IgA nephropathy      MRSA (methicillin resistant Staphylococcus aureus)      [facility]     Pre-eclampsia      Renal failure affecting pregnancy in second trimester      S/P kidney transplant     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   History of 1B kidney rejection      SAB (spontaneous )     2nd trimester        Past Surgical History:   Procedure Laterality Date     CERCLAGE CERVICAL N/A 2015    Procedure: CERCLAGE CERVICAL;  Surgeon: Norbert Chopra MD;    Location: [facility] L[facility]D      SECTION  2012    Procedure:  SECTION;;  Surgeon: Chelsea Cross MD;    Location: [facility] L+D      SECTION N/A 2015    Procedure:   SECTION;  Surgeon: Chelsea Cross MD;    Location: [facility] OR     [facility] section  2012     EXPLANT TRANSPLANTED KIDNEY  3/29/2013    Procedure: EXPLANT TRANSPLANTED KIDNEY;  Explant Transplanted   right Kidney (from patients right iliac fossa);  Surgeon:   Nory Morgan MD;  [facility] OR     NEPHRECTOMY  3/29/13    Transplant nephrectomy      WILIAN/DIALYSIS CATHETER       TRANSPLANT KIDNEY RECIPIENT LIVING UNRELATED  Dec 2007    (Adult male in [place])        MEDICATIONS:  PTA Meds  Prior to Admission medications    Medication Sig Last Dose Taking? Auth Provider   cyclobenzaprine (FLEXERIL) 10 MG tablet Take 0.5 tablets (5 mg)   by mouth 2 times daily as needed for muscle spasms  Patient taking differently: Take 5-10 mg by mouth 2 times daily   as needed for muscle spasms  10/30/2018 at Unknown time Yes   Wilber Beltran MD   acetaminophen (TYLENOL) 325 MG tablet Take 1-2 tablets by mouth   every 4 hours as needed.   Marni Arteaga, DO   amLODIPine (NORVASC) 10 MG tablet Take 1 tablet (10 mg) by mouth   daily   Katherine Varma MD   B Complex-C-Folic Acid (DIALYVITE) TABS 1 tablet by mouth daily     Katherine Varma MD   carvedilol (COREG) 25 MG tablet Take 1 tablet (25 mg) by mouth 2   times daily (with meals)   Katherine Varma MD   cetirizine (ZYRTEC) 10 MG tablet Take 10 mg by mouth At Bedtime     Unknown, Entered By History   cloNIDine (CATAPRES) 0.1 MG tablet Take 1 tablet (0.1 mg) by   mouth 3 times daily as needed   Katherine Varma MD   RENVELA 800 MG tablet Take 1 tablet by mouth 3 times daily (with   meals)   Unknown, Entered By History      Current Meds    sodium chloride 0.9%  250 mL Intravenous Once in dialysis     sodium chloride 0.9%  300 mL Hemodialysis Machine Once     amLODIPine  10 mg Oral Daily     carvedilol  25 mg Oral BID w/meals     cetirizine  10 mg Oral At Bedtime     gelatin absorbable  1 each Topical During Hemodialysis (from   stock)      - MEDICATION INSTRUCTIONS -   Does not apply Once     sevelamer  800 mg Oral TID w/meals     sodium chloride (PF)  3 mL Intracatheter Q8H     Infusion Meds    - MEDICATION INSTRUCTIONS -         ALLERGIES:    No Known Allergies    REVIEW OF SYSTEMS:  A comprehensive of systems was negative except as noted above.    SOCIAL HISTORY:   Social History     Social History     Marital status: Single     Spouse name: N/A     Number of children: N/A     Years of education: N/A     Occupational History     Not on file.     Social History Main Topics     Smoking status: Never Smoker     Smokeless tobacco: Never Used     Alcohol use No     Drug use: No     Sexual activity: Yes     Partners: Male     Other Topics Concern     Blood Transfusions Yes     Multiple in [place] none in [place]     Caffeine Concern No     1s/d     Occupational Exposure No     Hobby Hazards No     Sleep Concern No     Stress Concern No     Weight Concern No     Special Diet No     Back Care No     Exercise No     walk 20' daily     Bike Helmet No     Seat Belt No     Social History Narrative    Currently unemployed.  Lives in [place].   with one   son.     She currently lives in [place] but is wanting to move to the [facility].  Reviewed with patient     FAMILY MEDICAL HISTORY:   Family History   Problem Relation Age of Onset     Diabetes Paternal Grandfather      Breast Cancer Maternal Grandmother 55     Cancer No family hx of      No known family hx of skin cancer     Reviewed with patient     PHYSICAL EXAM:   Temp  Av.2  F (36.8  C)  Min: 97.2  F (36.2  C)  Max: 99  F   (37.2  C)      Pulse  Av.8  Min: 72  Max: 85 Resp  Av  Min: 16  Max:   16  SpO2  Av.3 %  Min: 98 %  Max: 100 %       /89 (BP Location: Right arm)  Pulse 85  Temp 98.3  F   (36.8  C) (Oral)  Resp 16  Wt 63.5 kg (139 lb 14.4 oz)  LMP   10/29/2018 (Exact Date)  SpO2 100%  BMI 22.24 kg/m2       Admit Weight: 63.6 kg (140 lb 3.2 oz)     GENERAL  APPEARANCE: alert, NAD  EYES: no scleral icterus, pupils equal  Pulmonary: crackles in bases  CV: regular rhythm, normal rate   - Edema trace LE, does have facial edema  GI: soft, nontender, normal bowel sounds  MS: no evidence of inflammation in joints, no muscle tenderness  : no polanco  SKIN: no rash, warm, dry, no cyanosis  NEURO: speech and mentation intact   Access: LUE AVF    LABS:   CMP  Recent Labs  Lab 10/30/18  0706 10/29/18  2346 10/29/18  2341 10/28/18  2244 10/28/18  2242 10/28/18  2200    137 137 136 136 134   POTASSIUM 4.8 4.6 4.6 4.4 4.5 5.0   CHLORIDE 102  --  101  --  101 100   CO2 16*  --  17*  --  18* 18*   ANIONGAP 16*  --  18*  --  17* 16*   GLC 93 92 96 98 102* 87   *  --  108*  --  91* 85*   CR 20.50*  --  20.30*  --  18.90* 18.20*   GFRESTIMATED 2*  --  2*  --  2* 2*   GFRESTBLACK 2*  --  2*  --  3* 3*   CASIE 6.8*  --  6.8*  --  6.9* 7.2*   MAG 1.9  --   --   --   --   --    PHOS 10.4*  --   --   --   --   --      CBC  Recent Labs  Lab 10/29/18  2346 10/29/18  2341 10/28/18  2244 10/28/18  2242   HGB 10.2* 8.4* 7.8* 8.3*   WBC  --  3.9*  --  3.6*   RBC  --  2.56*  --  2.57*   HCT  --  25.7*  --  26.1*   MCV  --  100  --  102*   MCH  --  32.8  --  32.3   MCHC  --  32.7  --  31.8   RDW  --  13.5  --  13.8   PLT  --  173  --  166     INRNo lab results found in last 7 days.  ABGNo lab results found in last 7 days.   URINE STUDIES  Recent Labs   Lab Test  01/02/18   2048  03/08/17   1632  01/25/17   1920  10/19/15   0550   COLOR  Yellow  Straw  Straw  Yellow   APPEARANCE  Cloudy  Slightly Cloudy  Slightly Cloudy  Slightly   Cloudy   URINEGLC  150*  250*  70*  Negative   URINEBILI  Negative  Negative  Negative  Negative   URINEKETONE  Negative  Negative  Negative  Negative   SG  1.008  1.010  1.006  1.005   UBLD  Small*  Small*  Negative  Moderate*   URINEPH  8.0*  8.5*  7.5*  8.5*   PROTEIN  30*  100*  30*  300*   NITRITE  Negative  Negative  Negative  Negative   LEUKEST  Trace*   Small*  Negative  Small*   RBCU  3*  3*  1  2   WBCU  8*  4*  2  12*     Recent Labs   Lab Test  07/16/12   1400  07/02/12   1130  06/25/12   0615  06/17/12   0930  06/13/12   1120  06/07/12   0643  06/01/12   0950  05/25/12   1830  05/25/12   1110  05/22/12   1536  02/20/12   0800   UTPG  1.53*  0.86*  0.69*  0.82*  0.80*  0.88*  0.58*  0.47*    0.45*  0.56*  0.51*  0.42*     PTH  Recent Labs   Lab Test  11/30/12   2030  11/30/12   1030  03/09/12   1304   PTHI  500*  794*  198*     IRON STUDIES  Recent Labs   Lab Test  11/30/12   1950  03/09/12   1304   IRON  125  33*   FEB  155*  196*   IRONSAT  81*  17   SHAHBAZ  401*  87       IMAGING:  Reviewed    Nina Mcwilliams PA-C     ISTAT gases elec ica gluc sheila POCT   Result Value Ref Range    Ph Venous 7.31 (L) 7.32 - 7.43 pH    PCO2 Venous 36 (L) 40 - 50 mm Hg    PO2 Venous 42 25 - 47 mm Hg    Bicarbonate Venous 18 (L) 21 - 28 mmol/L    O2 Sat Venous 73 %    Sodium 137 133 - 144 mmol/L    Potassium 4.6 3.4 - 5.3 mmol/L    Glucose 92 70 - 99 mg/dL    Calcium Ionized 3.5 (L) 4.4 - 5.2 mg/dL    Hemoglobin 10.2 (L) 11.7 - 15.7 g/dL    Hematocrit - POCT 30 (L) 35.0 - 47.0 %PCV      Recent Results (from the past 48 hour(s))   XR Chest 2 Views    Narrative    XR CHEST 2 VW 10/28/2018 10:18 PM    Comparison: 6/1/2017    History: missed HD;     Findings: 2 views of the chest. Heart and major vasculature are within  normal limits. No pleural effusion. No pneumothorax. No acute airspace  disease. The upper abdomen is unremarkable.      Impression    Impression: No acute airspace disease.    I have personally reviewed the examination and initial interpretation  and I agree with the findings.    LIANNE BOWMAN MD   Chest XR,  PA & LAT   Result Value    Radiologist flags (Urgent)     Consider noncontrast CT limited for determination of    Narrative    XR CHEST 2 VW 10/30/2018 12:10 AM    Comparison: 10/28/2018    History: SOB;     Findings: PA and lateral views of the chest.  "Cardiac silhouette is  within normal limits. The lungs have a subtle increase \"dirtiness\" to  them which in an immunosuppressed patient could possibly represent  subtle inflammatory disease. Pulmonary vasculature is distinct. No  pleural effusion. No pneumothorax.  The upper abdomen is unremarkable.      Impression    Impression: Subtle increased interstitial markings of the lungs,  although this could be a normal finding in an immunosuppressed patient  is any chance of the could be an atypical infection considered limited  noncontrast CT of the chest for further evaluation.      [Access Center: Consider noncontrast CT limited for determination of  the interstitial pattern within the lung for possible atypical  infection.]    This report will be copied to the [facility] to ensure a  provider acknowledges the finding. Access Center is available Monday  through Friday 8am-3:30 pm.     I have personally reviewed the examination and initial interpretation  and I agree with the findings.    MICHELLE PATEL MD                Pending Results:   Unresulted Labs Ordered in the Past 30 Days of this Admission     No orders found for last 61 day(s).                  Discharge Instructions and Follow-Up:     Discharge Procedure Orders  Reason for your hospital stay   Order Comments: dialysis     Adult [facility] Follow-up and recommended labs and tests   Order Comments: dialyze at [facility] Wed 10/31 and [place] Thursday 11/1.     Appointments on [place] and/or [facility] (with [facility] or [facility] provider or service). Call [phone] if you haven't heard regarding these appointments within 7 days of discharge.     Activity   Order Comments: Your activity upon discharge: As tolerated   Order Specific Question Answer Comments   Is discharge order? Yes      When to contact your care team   Order Comments: after treatment:    Chest pain    Bleeding from the needle site    Shortness of breath    Fever or " chills    Headache or lightheadedness    Nausea or vomiting    Itching    Muscle cramps    Pain, warm or redness at your access site    Inability to feel your blood flow (called a thrill) in your AV fistula or graft     Diet   Order Comments: Follow this diet upon discharge: Dialysis diet   Order Specific Question Answer Comments   Is discharge order? Yes             Attestation:  Beti Kendall.

## 2018-10-30 NOTE — PLAN OF CARE
Problem: Patient Care Overview  Goal: Plan of Care/Patient Progress Review  Outcome: No Change  Outpatient/Observation goals to be met before discharge home:      -diagnostic tests and consults completed and resulted: No  -vital signs normal or at patient baseline: Yes  -tolerating oral intake to maintain hydration: Yes  -returns to baseline functional status:Yes  -safe disposition plan has been identified: No  -nephrology consult completed with dispo plan: No   -dialysis completed with stable vitals: In progress

## 2018-10-30 NOTE — H&P
[facility] ED Observation Admission Note    Chief Complaint   Patient presents with     Shortness of Breath     Missed dialysis for approximately two sessions       Assessment: Jelly Dietz is a 33 year old female with a history of IgA Nephropathy s/p LDKT (2007) c/b acute cellular rejection (2012), kidney explanted 2013 and now with ESRD on HD (T/Th/Sa at [facility]), ACD who presented to the ED due to missng her 2 last dialysis runs as she is in the [facility] visiting.     Plan:  1. ESRD: On HD on T/Th/Sa at [facility]. Per patient transplant listed. Missed 2 last dialysis runs. Last dialyzed 7 days ago on 10/23/18. Reports generalized malaise, chest pain, SOB, headache, increased weight, increased abdominal girth and LE edema in the last 1-2 days. In ED, HR 70's, 's-140's/80's-100's, SaO2 % on RA, Temp 98.4. Labs show BMP with BUN/Cr 108/20.3, GFR 2, Ca 6.8, iCa 3.5 otherwise normal. VBG with pH 7.31, pCO2 36, bicarb 18. CBC with WBC 3.9, H/H 8.4/25.7, RBC 2.56. CXR negative. Nephrology was consulted with plan to dialyze her in the morning.   - Nephrology consult  - Continue Renvela    2. Hypocalcemia: Ca 6.8, iCa 3.5  - Calcium Gluconate 2g IV x 1 now  - Repeat labs in AM    3. HTN: - Continue with PTA Norvasc, Coreg, Norvasc.   - Continue with Clonidine prn.     HPI:    Jelly Dietz is a 33 year old female with a history of IgA Nephropathy s/p LDKT (2007) c/b acute cellular rejection (2012), kidney explanted 2013 and now with ESRD on HD (T/Th/Sa at [facility]), ACD who presented to the ED due to missng her 2 last dialysis runs as she is in the [facility] visiting. She states she was last dialyzed 7 days ago on 10/23/18. Reports generalized malaise, chest pain, SOB, headache, increased weight, increased abdominal girth and LE edema in the last 1-2 days. Generally reports medication adherance. Per chart review she was seen in the ED here on 10/28/18 as well for concerns of hyperkalemia and  similar complaints as this time. Her labs seem ok and she was discharged.    In the ED, HR 70's, 's-140's/80's-100's, SaO2 % on RA, Temp 98.4. Labs show BMP with BUN/Cr 108/20.3, GFR 2, Ca 6.8, iCa 3.5 otherwise normal. VBG with pH 7.31, pCO2 36, bicarb 18. CBC with WBC 3.9, H/H 8.4/25.7, RBC 2.56. CXR negative. Nephrology was consulted with plan to dialyze her in the morning.     On admission to the observation unit the patient was stable.     History:    Past Medical History:   Diagnosis Date     ACS (acute coronary syndrome) (H) 2014     Allergic state     seasonal allergies     Anemia in chronic renal disease      Cervical cerclage suture present in second trimester      Chest pain 2014     Chronic renal transplant rejection      End stage kidney disease (H)      Heart murmur      History of  delivery ,     30 wks, 28 wks      Hypertension     resolved after transplant     IgA nephropathy      MRSA (methicillin resistant Staphylococcus aureus)      [facility]     Pre-eclampsia      Renal failure affecting pregnancy in second trimester      S/P kidney transplant     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 History of 1B kidney rejection      SAB (spontaneous )     2nd trimester        Past Surgical History:   Procedure Laterality Date     CERCLAGE CERVICAL N/A 2015    Procedure: CERCLAGE CERVICAL;  Surgeon: Norbert Chopra MD;  Location: [facility] L+D      SECTION  2012    Procedure:  SECTION;;  Surgeon: Chelsea Cross MD;  Location: [facility] L+D      SECTION N/A 2015    Procedure:  SECTION;  Surgeon: Chelsea Cross MD;  Location: [facility] OR     cesearean section       EXPLANT TRANSPLANTED KIDNEY  3/29/2013    Procedure: EXPLANT TRANSPLANTED KIDNEY;  Explant Transplanted right Kidney (from patients right iliac fossa);  Surgeon:  Nory Morgan MD;  Location: [facility] OR     NEPHRECTOMY  3/29/13    Transplant nephrectomy      WILIAN/DIALYSIS CATHETER       TRANSPLANT KIDNEY RECIPIENT LIVING UNRELATED  Dec 2007    (Adult male in [place])       Family History   Problem Relation Age of Onset     Diabetes Paternal Grandfather      Breast Cancer Maternal Grandmother 55     Cancer No family hx of      No known family hx of skin cancer       Social History     Social History     Marital status: Single     Spouse name: N/A     Number of children: N/A     Years of education: N/A     Occupational History     Not on file.     Social History Main Topics     Smoking status: Never Smoker     Smokeless tobacco: Never Used     Alcohol use No     Drug use: No     Sexual activity: Yes     Partners: Male     Other Topics Concern     Blood Transfusions Yes     Multiple in [place] none in [place]     Caffeine Concern No     1s/d     Occupational Exposure No     Hobby Hazards No     Sleep Concern No     Stress Concern No     Weight Concern No     Special Diet No     Back Care No     Exercise No     walk 20' daily     Bike Helmet No     Seat Belt No     Social History Narrative    Currently unemployed.  Lives in [place].   with one son.           No current facility-administered medications on file prior to encounter.   Current Outpatient Prescriptions on File Prior to Encounter:  acetaminophen (TYLENOL) 325 MG tablet Take 1-2 tablets by mouth every 4 hours as needed.   amLODIPine (NORVASC) 10 MG tablet Take 1 tablet (10 mg) by mouth daily   B Complex-C-Folic Acid (DIALYVITE) TABS 1 tablet by mouth daily   carvedilol (COREG) 25 MG tablet Take 1 tablet (25 mg) by mouth 2 times daily (with meals)   cetirizine (ZYRTEC) 10 MG tablet Take 10 mg by mouth At Bedtime   cloNIDine (CATAPRES) 0.1 MG tablet Take 1 tablet (0.1 mg) by mouth 3 times daily as needed   cyclobenzaprine (FLEXERIL) 10 MG tablet Take 0.5 tablets (5 mg) by mouth 2 times daily as needed for muscle  spasms   cyclobenzaprine (FLEXERIL) 5 MG tablet Take 1 tablet (5 mg) by mouth nightly as needed for muscle spasms (Patient not taking: Reported on 9/26/2018)   doxycycline (VIBRA-TABS) 100 MG tablet Take 1 tablet by mouth 2 times daily (with meals)   Lidocaine (LIDOCARE) 4 % Patch Place 1 patch onto the skin every 24 hours (Patient not taking: Reported on 9/26/2018)   melatonin 1 MG TABS tablet Take 1 mg by mouth   RENVELA 800 MG tablet Take 1 tablet by mouth 3 times daily (with meals)       Data:    Results for orders placed or performed during the hospital encounter of 10/29/18   Chest XR,  PA & LAT    Narrative    XR CHEST 2 VW 10/30/2018 12:10 AM    Comparison: 10/28/2018    History: SOB;     Findings: PA and lateral views of the chest. Cardiac silhouette is  within normal limits. Pulmonary vasculature is distinct. No pleural  effusion. No pneumothorax. No acute airspace disease. The upper  abdomen is unremarkable.      Impression    Impression: No acute airspace disease.   CBC with platelets differential   Result Value Ref Range    WBC 3.9 (L) 4.0 - 11.0 10e9/L    RBC Count 2.56 (L) 3.8 - 5.2 10e12/L    Hemoglobin 8.4 (L) 11.7 - 15.7 g/dL    Hematocrit 25.7 (L) 35.0 - 47.0 %     78 - 100 fl    MCH 32.8 26.5 - 33.0 pg    MCHC 32.7 31.5 - 36.5 g/dL    RDW 13.5 10.0 - 15.0 %    Platelet Count 173 150 - 450 10e9/L    Diff Method Automated Method     % Neutrophils 65.2 %    % Lymphocytes 25.7 %    % Monocytes 2.1 %    % Eosinophils 6.0 %    % Basophils 1.0 %    % Immature Granulocytes 0.0 %    Nucleated RBCs 0 0 /100    Absolute Neutrophil 2.5 1.6 - 8.3 10e9/L    Absolute Lymphocytes 1.0 0.8 - 5.3 10e9/L    Absolute Monocytes 0.1 0.0 - 1.3 10e9/L    Absolute Eosinophils 0.2 0.0 - 0.7 10e9/L    Absolute Basophils 0.0 0.0 - 0.2 10e9/L    Abs Immature Granulocytes 0.0 0 - 0.4 10e9/L    Absolute Nucleated RBC 0.0    Basic metabolic panel   Result Value Ref Range    Sodium 137 133 - 144 mmol/L    Potassium 4.6 3.4  - 5.3 mmol/L    Chloride 101 94 - 109 mmol/L    Carbon Dioxide 17 (L) 20 - 32 mmol/L    Anion Gap 18 (H) 3 - 14 mmol/L    Glucose 96 70 - 99 mg/dL    Urea Nitrogen 108 (H) 7 - 30 mg/dL    Creatinine 20.30 (H) 0.52 - 1.04 mg/dL    GFR Estimate 2 (L) >60 mL/min/1.7m2    GFR Estimate If Black 2 (L) >60 mL/min/1.7m2    Calcium 6.8 (L) 8.5 - 10.1 mg/dL   HCG qualitative pregnancy (blood)   Result Value Ref Range    HCG Qualitative Serum Negative NEG^Negative   EKG 12 lead   Result Value Ref Range    Interpretation ECG Click View Image link to view waveform and result    ISTAT gases elec ica gluc sheila POCT   Result Value Ref Range    Ph Venous 7.31 (L) 7.32 - 7.43 pH    PCO2 Venous 36 (L) 40 - 50 mm Hg    PO2 Venous 42 25 - 47 mm Hg    Bicarbonate Venous 18 (L) 21 - 28 mmol/L    O2 Sat Venous 73 %    Sodium 137 133 - 144 mmol/L    Potassium 4.6 3.4 - 5.3 mmol/L    Glucose 92 70 - 99 mg/dL    Calcium Ionized 3.5 (L) 4.4 - 5.2 mg/dL    Hemoglobin 10.2 (L) 11.7 - 15.7 g/dL    Hematocrit - POCT 30 (L) 35.0 - 47.0 %PCV             EKG Interpretation:      EKG Number: 1  Interpreted by Eva Early PA-C   Symptoms at time of EKG: chest pain, SOB   Rhythm: Normal sinus   Rate: Normal  Axis: Normal  Ectopy: None  Conduction: Normal  ST Segments/ T Waves: No ST-T wave changes and No acute ischemic changes  Q Waves: None  Comparison to prior: Unchanged    Clinical Impression: no acute changes    ROS:    Review Of Systems  Skin: negative  Eyes: negative  Ears/Nose/Throat: negative  Respiratory: Shortness of breath-, No cough and No hemoptysis  Cardiovascular: positive for chest pain and lower extremity edema, negative for, palpitations and exertional chest pain or pressure  Gastrointestinal: negative for, nausea, vomiting, heartburn, melena, hematochezia, constipation and diarrhea  Genitourinary: positive for low urine output, negative for, dysuria, frequency, urgency and hematuria  Musculoskeletal: generalized body  aches  Neurologic: headaches  Psychiatric: negative  Hematologic/Lymphatic/Immunologic: negative  Endocrine: negative  PCP: [facility]    10 point ROS negative other than the symptoms noted above.      Exam:  Vitals:  B/P: 117/85, T: 98.4, P: 72, R: Data Unavailable    Constitutional: healthy, alert, no distress and cooperative  Head: Normocephalic. No masses, lesions, tenderness or abnormalities  Neck: Neck supple. No adenopathy. Thyroid symmetric, normal size,, Carotids without bruits.  ENT: ENT exam normal, no neck nodes or sinus tenderness  Cardiovascular:  PMI normal. No lifts, heaves, or thrills. RRR. No murmurs, clicks gallops or rub  Respiratory:  Lungs clear  Gastrointestinal: Abdomen soft, non-tender, slightly distended. BS normal. No masses, organomegaly  : Deferred  Musculoskeletal: extremities normal- no gross deformities noted, gait normal and normal muscle tone  Ext: mild BLE edema  Skin: no suspicious lesions or rashes  Neurologic: Gait normal. Reflexes normal and symmetric. Sensation grossly WNL.  Psychiatric: mentation appears normal and affect normal/bright  Hematologic/Lymphatic/Immunologic: normal ant/post cervical, axillary, supraclavicular and inguinal nodes    Consults: Nephrology  FEN: Dialysis diet  DVT prophylaxis: encourage ambulation; expect short stay  GI prophylaxis: none  BM prophylaxis: none  Code Status: full code  Disposition: home after dialysis completed with stable vitals, nephrology consultation completed with dispo plan.     Signed:  Eva Early PA-C   October 30, 2018 at 1:18 AM

## 2018-10-30 NOTE — PROGRESS NOTES
HEMODIALYSIS TREATMENT NOTE    Date: 10/30/2018  Time: 2:46 PM    Data:  Ultrafiltration - Post Run Net Total Removed (mL): 4000 mL  Ultrafiltration - Post Run Net Total Gain (mL): 0 mL  Vascular Access Status: Yes, secured and visible  Dialyzer Rinse: Light  Total Blood Volume Processed: 86.9  Total Dialysis (Treatment) Time:  3.5 hr HD; 4 hr UF    Lab:   No    Interventions:  STable HD tx completed.  Total time 4 hours, with 3.5hr of HD and last 30 mins in SEQ for UF only.  VSS throughout tx.  15g needles used, .  Slept duration of tx.  Tolerated HD and UF well.  Hand off given to floor RN.    Assessment:  Stable, calm and cooperative with cares.     Plan:    Per renal team.  Current plan for HD OP in [place] tomorrow and in home OP clinic in [place] on Thursday.

## 2018-10-30 NOTE — DISCHARGE INSTRUCTIONS
Riching RN please fax clinicals, AVS, HD run info to [facility] at the time of Discharge-  [id number] [place] [address]  Phone: [phone]  Fax: [phone]    [facility] [place]  [address] [place] [address]  Phone: [phone]  Fax: [phone]   You are able to have a dialysis run tomorrow at the [facility] that you have visited in the past  We have arranged a arrival time and chair time for you,  Please arrive @ 0930 with chair time for 10 am 10/31/18 .  They are unable to accommodate a run for Thursday nov 1st, therefor the [facility] unit is still expecting you for your usual run.

## 2018-10-31 ENCOUNTER — TELEPHONE (OUTPATIENT)
Dept: TRANSPLANT | Facility: CLINIC | Age: 33
End: 2018-10-31

## 2018-11-15 NOTE — TELEPHONE ENCOUNTER
Called pt lvm that I was scheduling her for Wed, Jan 30, 2019 for Alejandra alvarez and Dr Braeden vega, mailing letter too.

## 2019-03-25 ENCOUNTER — TELEPHONE (OUTPATIENT)
Dept: TRANSPLANT | Facility: CLINIC | Age: 34
End: 2019-03-25

## 2019-03-25 ENCOUNTER — TELEPHONE (OUTPATIENT)
Dept: CARDIOLOGY | Facility: CLINIC | Age: 34
End: 2019-03-25

## 2019-03-25 NOTE — TELEPHONE ENCOUNTER
Initially called Jelly's dialysis unit to get an update on her attendance and compliance. She is frequently late for her runs and occasionally does miss her appointments. She happened to be there when I was speaking to the dialysis staff so I called Jelly. She is aware she missed her cardiology appointment in January and had not attempted to re-schedule. She also is out of date with dental. I asked her what she wanted to do. She has been on dialysis since 2012 and I pointed out to her that is a long time and it is taking a toll on her body. She said her body is getting tired and she knows she needs a transplant. I told her she must call and get her appointments re-scheduled and get her dental done. She agreed to call me with her appointment schedule.

## 2019-03-25 NOTE — TELEPHONE ENCOUNTER
[facility] Call Center    Phone Message    May a detailed message be left on voicemail: yes    Reason for Call: Other: Pt calling asking for a call back from Myla to discuss medication and her Lexiscan.     Action Taken: Message routed to:  [facility]

## 2019-03-26 NOTE — TELEPHONE ENCOUNTER
[facility] Call Center    Phone Message    May a detailed message be left on voicemail: yes    Reason for Call: Other: Pt retunred call to Myla. Pt just needs to know if it is ok to take her blood pressure medication prior to her scans this thursday. Pt stated it is ok to leave the answer on her voicemail in case she misses the call again and to avoid playing phone tag     Action Taken: Message routed to:  [facility] cardio

## 2019-03-26 NOTE — TELEPHONE ENCOUNTER
Called and left  for Pt requesting a return call to clinic.  Clinic telephone provided.    Myla Posey LPN

## 2019-03-26 NOTE — TELEPHONE ENCOUNTER
Called and spoke with Pt.  Discussed prep for Lexiscan and Echo as well as confirmation of appointment date and time.  Pt verbalized understanding, agreed to current plan and denied any further questions.    Myla Posey LPN

## 2019-03-28 ENCOUNTER — HOSPITAL ENCOUNTER (OUTPATIENT)
Dept: NUCLEAR MEDICINE | Facility: CLINIC | Age: 34
Setting detail: NUCLEAR MEDICINE
End: 2019-03-28
Attending: INTERNAL MEDICINE
Payer: MEDICARE

## 2019-03-28 ENCOUNTER — HOSPITAL ENCOUNTER (OUTPATIENT)
Dept: CARDIOLOGY | Facility: CLINIC | Age: 34
End: 2019-03-28
Attending: INTERNAL MEDICINE
Payer: MEDICARE

## 2019-03-28 ENCOUNTER — HOSPITAL ENCOUNTER (OUTPATIENT)
Dept: CARDIOLOGY | Facility: CLINIC | Age: 34
Discharge: HOME | End: 2019-03-28
Attending: INTERNAL MEDICINE | Admitting: INTERNAL MEDICINE
Payer: MEDICARE

## 2019-03-28 DIAGNOSIS — R06.09 DOE (DYSPNEA ON EXERTION): ICD-10-CM

## 2019-03-28 DIAGNOSIS — I77.819 AORTIC DILATATION (H): ICD-10-CM

## 2019-03-28 PROCEDURE — A9502 TC99M TETROFOSMIN: HCPCS | Performed by: INTERNAL MEDICINE

## 2019-03-28 PROCEDURE — 93017 CV STRESS TEST TRACING ONLY: CPT

## 2019-03-28 PROCEDURE — 93018 CV STRESS TEST I&R ONLY: CPT | Performed by: INTERNAL MEDICINE

## 2019-03-28 PROCEDURE — 34300033 ZZH RX 343: Performed by: INTERNAL MEDICINE

## 2019-03-28 PROCEDURE — 25000128 H RX IP 250 OP 636: Performed by: INTERNAL MEDICINE

## 2019-03-28 PROCEDURE — 93306 TTE W/DOPPLER COMPLETE: CPT | Mod: 26 | Performed by: INTERNAL MEDICINE

## 2019-03-28 PROCEDURE — 93306 TTE W/DOPPLER COMPLETE: CPT

## 2019-03-28 PROCEDURE — 40000141 ZZH STATISTIC PERIPHERAL IV START W/O US GUIDANCE

## 2019-03-28 PROCEDURE — 78452 HT MUSCLE IMAGE SPECT MULT: CPT

## 2019-03-28 RX ORDER — REGADENOSON 0.08 MG/ML
0.4 INJECTION, SOLUTION INTRAVENOUS ONCE
Status: COMPLETED | OUTPATIENT
Start: 2019-03-28 | End: 2019-03-28

## 2019-03-28 RX ORDER — ACYCLOVIR 200 MG/1
0-1 CAPSULE ORAL
Status: DISCONTINUED | OUTPATIENT
Start: 2019-03-28 | End: 2019-03-29 | Stop reason: HOSPADM

## 2019-03-28 RX ORDER — ALBUTEROL SULFATE 90 UG/1
2 AEROSOL, METERED RESPIRATORY (INHALATION) EVERY 5 MIN PRN
Status: DISCONTINUED | OUTPATIENT
Start: 2019-03-28 | End: 2019-03-29 | Stop reason: HOSPADM

## 2019-03-28 RX ORDER — REGADENOSON 0.08 MG/ML
0.4 INJECTION, SOLUTION INTRAVENOUS ONCE
Status: DISCONTINUED | OUTPATIENT
Start: 2019-03-28 | End: 2019-03-29 | Stop reason: HOSPADM

## 2019-03-28 RX ORDER — AMINOPHYLLINE 25 MG/ML
50-100 INJECTION, SOLUTION INTRAVENOUS
Status: DISCONTINUED | OUTPATIENT
Start: 2019-03-28 | End: 2019-03-29 | Stop reason: HOSPADM

## 2019-03-28 RX ADMIN — TETROFOSMIN 9.3 MCI.: 1.38 INJECTION, POWDER, LYOPHILIZED, FOR SOLUTION INTRAVENOUS at 08:30

## 2019-03-28 RX ADMIN — TETROFOSMIN 36 MCI.: 1.38 INJECTION, POWDER, LYOPHILIZED, FOR SOLUTION INTRAVENOUS at 10:15

## 2019-03-28 RX ADMIN — REGADENOSON 0.4 MG: 0.08 INJECTION, SOLUTION INTRAVENOUS at 09:50

## 2019-03-28 NOTE — PROGRESS NOTES
Pt here for Lexiscan. Test, medication and side effects reviewed with patient. Lung sounds clear. Denied caffeine use. Tolerated Lexiscan dose with complaints of chest pressure and headache. Symptoms slowly resolved. Monitored post injection and then taken back to nuclear medicine for follow up imaging.

## 2019-07-12 NOTE — PROGRESS NOTES
NUTRITION KIDNEY TRANSPLANT EVALUATION  Medical Nutrition Therapy    Weight and BMI:  Current BMI: 17.65    Malnutrition Status:    Patient does not meet two of the criteria necessary for diagnosing malnutrition     Additional Concerns:  Pt's weight has been relatively stable, despite being underweight. Recommend pt maintain her weight and avoid any weight loss.        Visit Type: F/U Assessment    Jelly Dietz referred by Dr. Younger for MNT related to kidney transplant evaluation    Patient accompanied by self    H/o previous txp: kidney 12/2007    Nutrition Assessment:  Anthropometrics  Height:   66.5 in   BMI:    17.65    Weight Status:Underweight BMI <18.5   Weight:  111 lbs            IBW (lb): 133  % IBW: 83    Wt Hx: Wt stable for a few years. Per chart review, wt ~130 lbs 9/2015. She reports this was her pregnancy weight and ever since giving birth, her weight has been lower. Prior to having babies her stable body wt was in the 120's. Pt expresses some desire to gain weight.     Adj/dosing BW: 111 lbs/50 kg       Recent Labs   Lab Test  03/08/17   0753  11/05/13   0956   CHOL  152  158   HDL  72  60   LDL  71  85   TRIG  48  66   CHOLHDLRATIO   --   2.6     Lab Results   Component Value Date    A1C 6.3 07/15/2012     Potassium   Date Value Ref Range Status   03/08/2017 4.2 3.4 - 5.3 mmol/L Final   K+ prev 5.0, 4.7 (2/2017)  Phos high at 8.4 (2/2017)- pt acknowledges this is typically high       Vitamins, Supplements, Herbals, Pertinent Meds:   Tums (pt doesn't always take these/doesn't always have them with her when she's out), vit D    Dialysis Modality: HD  Days/Times: T/Th/S 2p-630p  Dialysis Start: 11/2012  Pt receives protein supplement with dialysis: they offer her one but pt declines d/t dislike     Nutrition History  Pt-reported special diets/eating habits: none    Dining out/food not made at home: >50% of meals   Appetite: doesn't get hungry; this has been her baseline since starting dialysis;  "tries to eat BID meals + snacks but may only eat 1 meal d/t lack of appetite    She reports she has little time to cook so they eat out often, sometimes at buffets. She does report eating goat meat, chix, shrimp, but not every day.     Recall:  12-1pm: Jr Geno muñoz from  (ate 100%)  330pm: pasta with tomato sauce and goat meat   8pm: 1/2 Jerica s/w from [facility]   Sn: protein bars (1-2x/month), cookies, fruit (oranges), some chocolate   Beverages: chews ice (for at least 1 year since having her baby - makes her \"feel stronger\"), Dr Pepper 1x/week but overall tries to avoid dark sodas, some Sunkist, johnson or unsweetened lime tea, coffee   ETOH (1 drink = 12 oz beer, 5 oz wine, 1.5 oz liquor): none     Physical Activity  Active (walking/running) with young children      MALNUTRITION  % Intake:  <75% for >/= 3 months (non-severe malnutrition)  % Weight Loss:  Weight loss does not meet criteria for malnutrition   Subcutaneous Fat Loss:  None vs Mild - likely baseline and does not meet criteria for malnutrition   Muscle Loss:  None vs Mild - likely baseline and does not meet criteria for malnutrition   Fluid Accumulation/Edema:  None noted  Malnutrition Diagnosis: Patient does not meet two of the criteria necessary for diagnosing malnutrition     Nutrition Prescription  Energy:  5068-4445       (30-35 kcal/kg for increased needs)   Protein:  60-70    (1.2-1.4 g/kg for HD/PD needs)         Fluid:  1 ml/kcal or per MD        Micronutrient:  Na+: <2000 mg/day  K+: 9861-1788 mg/day  Phos: 800-1000 mg/day          Nutrition Diagnosis:  Food and nutrition related knowledge deficit r/t pre kidney transplant eval AEB pt verbalized not hearing pre/post transplant diet guidelines.    Nutrition Intervention:  Nutrition education provided:  Discussed K+ intake, Phos intake, and protein intake. Reviewed foods high in K+ and Phos (Phos is higher than K+, so focused more on this). Pt's phos likely high d/t combination of high " Phos food intakes combined with inconsistency with taking binders. We reviewed that a lot of processed foods or food purchased outside the home (fast food, etc) may have added Phos for preservation. Encouraged her to buy an extra bottle of tums to keep with her when she's out, as she eats a fair amount of meals outside the home. Pt did not bring her tums for her meal today while in clinic. Reviewed need for increased protein intakes. Pt reports she eats goat, chix, fish every other day, but I stressed importance of needing to eat these things daily, if not 2-3x/day. She should really be eating protein at most/all meals. We talked about including a protein bar daily as something more mindless instead of having to prepare/think much about her meals. Emailed pt list of kidney friendly protein supplements, meal replacement smoothies, and post transplant USDA booklet per her request. We also reviewed trying to think of her food as medicine, eating even when she may not be hungry to maintain her weight.   Reviewed post txp diet guidelines in brief (will review in further detail post txp): med regimen and possible side effects, following a low sat/trans fat, low sodium, low simple CHO, high protein and calcium diet, K+ levels post-txp; avoiding excessive weight gain, adequate exercise, and review of proper food safety measures d/t immunosuppressant therapy post-op. Stressed importance of not taking any herbal/Chinese/alternative medicines or supplements post txp (d/t risk for rejection, unknown effects on the organs, potential interactions with immunosuppresants).  Patient Understanding: Pt verbalized understanding of education provided.  Expected Compliance: Fair/Good  Follow-Up Plans: PRN     Nutrition Goals:  1. Weight maintenance  2. Include 1 protein bar/day    Provided pt with contact info.   Time spent with patient: 30 minutes.  Nathalia Roland, RD, LD                           Family/Patient

## 2019-07-15 ENCOUNTER — TELEPHONE (OUTPATIENT)
Dept: TRANSPLANT | Facility: CLINIC | Age: 34
End: 2019-07-15

## 2019-07-15 NOTE — TELEPHONE ENCOUNTER
"I am assisting Jelly's coordinator, Jeanette Valdovinos RN.  Per Jeanette's request I contacted the dialysis unit and spoke with one of the nurses who is familiar with Jelly.  She reports that Jelly is good with taking her medications as prescribed.  She continues to intermittently miss dialysis treatments but tries to make them up.  Her reason for missing runs is usually because she is very busy and has childcare issues.  This nurse reports that a few weeks ago Jelly missed several runs as she was out of town at a [employer] meeting.  As a result of this she was hospitalized with hyperkalemia.  I asked this nurse if Jelly has seen the dentist yet and she says a dental appointment was made but she is not aware of whether Jelly has been there yet.  This nurse also reports that Jelly is working on relocating to the [facility] where she has friends and ?family and better support regarding her childcare, etc.  I attempted to call Jelly directly but her number says \"not in service\".  Message to Jeanette with this update.   "

## 2019-07-30 ENCOUNTER — TELEPHONE (OUTPATIENT)
Dept: TRANSPLANT | Facility: CLINIC | Age: 34
End: 2019-07-30

## 2019-07-30 NOTE — TELEPHONE ENCOUNTER
Left a message on Jelly's voice mail. I asked her to call me back so we can discuss where she is at and if she has completed her dental. She would need a wait list appointment. Reviewed [facility] records and recent hospitalization for hyperkalemia after missing dialysis. I did tell her either she gets her items complete or we close the evaluation. Provided my contact information.

## 2019-07-30 NOTE — LETTER
July 30, 2019    Jelly PALACIOS J Luis  [address]  [address]  [place] [address]      Dear Jelly,    This letter is being sent to try to reach you and get an update on your progress. You started your evaluation for kidney transplant on March 8, 2017. You were approved as a kidney transplant candidate but needed to complete your evaluation. You still need to see a dentist and have any recommended work done and see a dermatologist for a full body skin assessment. We have also asked you to complete a successful compliance contract and in fact, extended it with the understanding of your social situation of single parenting. It appears you are still having difficulty making all of your dialysis appointments and actually ended up in the hospital due to missing runs.     Jelly, this is serious. You have been on dialysis for a long time and it is taking its toll on your body. Please contact me with an update on your progress or I will take your situation back to the Committee to discuss closing your evaluation. The transplant team has not seen you in over 2 years and we would need you to come in to make sure you remain a candidate if you can complete your dental and dermatology. If I do not hear from you by August 16, 2019 we will close your evaluation.     Sincerely,     Jeanette Valdovinos, RN, BSN Transplant Coordinator  [facility] [address]  [address] [place] [address], [address] [address]  Phone [phone]  Fax [phone]  [email or web address]    CC:[facility] (PCP), [facility]

## 2019-08-12 ENCOUNTER — TRANSFERRED RECORDS (OUTPATIENT)
Dept: HEALTH INFORMATION MANAGEMENT | Facility: CLINIC | Age: 34
End: 2019-08-12

## 2019-08-14 ENCOUNTER — TELEPHONE (OUTPATIENT)
Dept: TRANSPLANT | Facility: CLINIC | Age: 34
End: 2019-08-14

## 2019-08-14 DIAGNOSIS — Z76.82 AWAITING ORGAN TRANSPLANT: Primary | ICD-10-CM

## 2019-08-14 NOTE — TELEPHONE ENCOUNTER
Jelly did return my call. We discussed the need for a wait list appointment since the transplant team has not seen her in over 2 years. She agreed. Orders sent to scheduling.

## 2019-09-03 ENCOUNTER — NURSE TRIAGE (OUTPATIENT)
Dept: NURSING | Facility: CLINIC | Age: 34
End: 2019-09-03

## 2019-09-03 NOTE — TELEPHONE ENCOUNTER
Patient reports she is a dialysis patient for 6 years. Recently moved from [place]. Last dialysis was on Friday (8/30). The plan when she moved up here was to start dialysis on 9/3. Patient went to the facility today and they said they weren't expecting her until 9/10. Was told to call her coordinator. Now the earliest dialysis possible is 9/5. Patient says she can't wait until then, is feeling swollen. Advised to head into the ED at [facility].  Patient wants to know when they would be able to do dialysis. Advised she would need to be evaluated to find that out.   Caller verbalized understanding and had no further questions.     Salome Lawson RN/[facility]    Reason for Disposition    General information question, no triage required and triager able to answer question    Protocols used: INFORMATION ONLY CALL-A-

## 2019-09-23 ENCOUNTER — HOSPITAL ENCOUNTER (EMERGENCY)
Facility: CLINIC | Age: 34
Discharge: HOME | End: 2019-09-23
Attending: EMERGENCY MEDICINE | Admitting: EMERGENCY MEDICINE
Payer: MEDICARE

## 2019-09-23 VITALS
DIASTOLIC BLOOD PRESSURE: 86 MMHG | HEART RATE: 69 BPM | HEIGHT: 66 IN | TEMPERATURE: 97.9 F | RESPIRATION RATE: 18 BRPM | OXYGEN SATURATION: 99 % | BODY MASS INDEX: 20.73 KG/M2 | WEIGHT: 129 LBS | SYSTOLIC BLOOD PRESSURE: 125 MMHG

## 2019-09-23 DIAGNOSIS — N89.8 VAGINAL DISCHARGE: ICD-10-CM

## 2019-09-23 LAB
HCG SERPL QL: NEGATIVE
SPECIMEN SOURCE: NORMAL
WET PREP SPEC: NORMAL

## 2019-09-23 PROCEDURE — 84703 CHORIONIC GONADOTROPIN ASSAY: CPT | Performed by: EMERGENCY MEDICINE

## 2019-09-23 PROCEDURE — 87210 SMEAR WET MOUNT SALINE/INK: CPT | Performed by: EMERGENCY MEDICINE

## 2019-09-23 PROCEDURE — 99284 EMERGENCY DEPT VISIT MOD MDM: CPT | Performed by: EMERGENCY MEDICINE

## 2019-09-23 PROCEDURE — 99283 EMERGENCY DEPT VISIT LOW MDM: CPT | Mod: Z6 | Performed by: EMERGENCY MEDICINE

## 2019-09-23 PROCEDURE — 87491 CHLMYD TRACH DNA AMP PROBE: CPT | Performed by: EMERGENCY MEDICINE

## 2019-09-23 PROCEDURE — 87591 N.GONORRHOEAE DNA AMP PROB: CPT | Performed by: EMERGENCY MEDICINE

## 2019-09-23 RX ORDER — NITROFURANTOIN 25; 75 MG/1; MG/1
100 CAPSULE ORAL 2 TIMES DAILY
Qty: 6 CAPSULE | Refills: 0 | Status: ON HOLD | OUTPATIENT
Start: 2019-09-23 | End: 2020-06-19

## 2019-09-23 ASSESSMENT — ENCOUNTER SYMPTOMS
FEVER: 0
DYSURIA: 1

## 2019-09-23 ASSESSMENT — MIFFLIN-ST. JEOR: SCORE: 1311.89

## 2019-09-23 NOTE — ED AVS SNAPSHOT
[facility], [place], Emergency Department  [address] [place] [id number]  Phone:  [phone]                                    Jelly Dietz   MRN: [id number]    Department:  [facility], Emergency Department   Date of Visit:  9/23/2019           After Visit Summary Signature Page    I have received my discharge instructions, and my questions have been answered. I have discussed any challenges I see with this plan with the nurse or doctor.    ..........................................................................................................................................  Patient/Patient Representative Signature      ..........................................................................................................................................  Patient Representative Print Name and Relationship to Patient    ..................................................               ................................................  Date                                   Time    ..........................................................................................................................................  Reviewed by Signature/Title    ...................................................              ..............................................  Date                                               Time          22EPIC Rev 08/18

## 2019-09-23 NOTE — ED TRIAGE NOTES
Pt presents to ED with c/o urinary retention, dysuria, and back pain. Also having clear/yellow discharge. Symptoms started 4 days ago. Has had UTIs before. Pt has IgA nephropathy and gets HD T,Th,sat. Normally makes some urine.

## 2019-09-23 NOTE — DISCHARGE INSTRUCTIONS
The wet prep was normal, no bacterial vaginosis, no yeast, no trichomonas.  The STD testing is in process and should return tomorrow, call your clinic for results  Please make an appointment to follow up with [facility] (phone: [phone] as soon as possible.

## 2019-09-24 ENCOUNTER — TELEPHONE (OUTPATIENT)
Dept: EMERGENCY MEDICINE | Facility: CLINIC | Age: 34
End: 2019-09-24

## 2019-09-24 ENCOUNTER — NURSE TRIAGE (OUTPATIENT)
Dept: NURSING | Facility: CLINIC | Age: 34
End: 2019-09-24

## 2019-09-24 ENCOUNTER — TELEPHONE (OUTPATIENT)
Dept: TRANSPLANT | Facility: CLINIC | Age: 34
End: 2019-09-24

## 2019-09-24 LAB
C TRACH DNA SPEC QL NAA+PROBE: POSITIVE
N GONORRHOEA DNA SPEC QL NAA+PROBE: NEGATIVE
SPECIMEN SOURCE: ABNORMAL
SPECIMEN SOURCE: NORMAL

## 2019-09-24 RX ORDER — AZITHROMYCIN 500 MG/1
1000 TABLET, FILM COATED ORAL ONCE
Qty: 2 TABLET | Refills: 0 | Status: SHIPPED | OUTPATIENT
Start: 2019-09-24 | End: 2019-09-24

## 2019-09-24 RX ORDER — ONDANSETRON 4 MG/1
4 TABLET, ORALLY DISINTEGRATING ORAL ONCE
Qty: 1 TABLET | Refills: 0 | Status: SHIPPED | OUTPATIENT
Start: 2019-09-24 | End: 2019-09-24

## 2019-09-24 NOTE — TELEPHONE ENCOUNTER
[facility] Emergency Department Lab result notification:    [place] ED lab result protocol used  Chlamydia Protocol    Reason for call  Notify of lab results, assess symptoms,  review ED providers recommendations/discharge instructions (if necessary) and advise per ED lab result f/u protocol    Lab Result   Final N. Gonorrhoeae PCR is [NEGATIVE] AND Chlamydia T PCR is [POSITIVE]  Patient was treated for N. Gonorrhea and Chlamydia T in the ED  [Yes or No]:  No       If no treatment initiated in the [facility] ED, treat per [facility] ED Lab Result protocol.  Information table from ED Provider visit on 9/23/19  Symptoms reported at ED visit (Chief complaint, HPI) Jelly Dietz is a 34 year old female with a history of acute coronary syndrome, HTN, end stage kidney disease (dialysis T, Th, Sat) s/p kidney transplant (2007 and 2013) with chronic renal transplant rejection, s/p nephrectomy (2013), and anemia who presents to the ED for evaluation of dysuria. She only makes a small amount of urine. She also complains of what she says is a large volume of yellow vaginal discharge. No recent antibiotic use. She is not pregnant, and does not feel she is at risk for sexually transmitted infections. She is open to having a pelvic exam and wet prep and GC chlamydia done. She does not have abdominal pain. No fevers, no pelvic pain. She says she has been with the same partner for years.     ED providers Impression and Plan (applicable information) Jelly Dietz is a 34 year old female presents complaining of vaginal discharge.  She said she recently had a menstrual period and does not think she is pregnant. We sent a serum test, it has not returned, she did not want to wait for the results.  She has minimal abdominal discomfort.  The wet prep was negative for yeast clue cells or trichomonas.  Gonorrhea and Chlamydia testing are pending and for which she needs to follow-up with her clinic.  She just moved here from  "[place] and does not have a primary clinic, I will provide her with the phone number to the [facility] care [place] as she is a dialysis patient. Her abdominal exam was not concerning and no imaging don. She was unable to provide urine for a UA.   Miscellaneous information N/A     RN Assessment (Patient s current Symptoms), include time called.  [Insert Left message here if message left]  Jelly feeling \"fine\".  Reviewed results, advised to continue with Macrobid for UTI.  RN Recommendations/Instructions per [place] ED lab result protocol  Azithromycin 1,000 mg PO x 1 ordered to requested [facility], advised to take Zofran 1 hour prior to abx.  STD Patient Instructions:    We recommend that you contact any recent sexual partners within the last 2 months and have them evaluated by a physician.    Avoid sexual activity for 7 to 10 days or until both your and your partner(s) have completed all antibiotic medications.    We advise that you consider following up with your PCP at approximately 3 months for retesting to be sure the infection has cleared.    Please Contact your PCP clinic or return to the Emergency department if your:    Symptoms return.    Symptoms do not improve after 3 days on antibiotic.    Symptoms do not resolve after completing antibiotic.    Symptoms worsen or other concerning symptom's.    PCP follow-up Questions asked: YES       Faustina Gonsalez RN    [employer] [place]   Lung Nodule and ED Lab Results F/U RN  Epic pool (ED late result f/u RN) : P [id number]   # [phone]    Copy of Lab result   Order   Chlamydia trachomatis PCR [[id number]] (Order [id number])   Order Requisition     Chlamydia trachomatis PCR (Order #[id number]) on 9/23/19   Exam Information     Exam Date Exam Time Accession # Results    9/23/19 11:57 AM [id number]    Specimen Information: Cervix        Component Value Flag Ref Range Units Status Collected Lab   Specimen Description Cervix     Final 09/23/2019 11:57 AM " 51   Chlamydia Trachomatis PCR Positive  Abnormal   NEG^Negative  Final 09/23/2019 11:57

## 2019-09-25 NOTE — TELEPHONE ENCOUNTER
Seen in ER yesterday, vaginal discharge. +Chlamydia, has not picked up Rx yet. States she had a pregnancy test and she forgot to ask if it was positive or negative. States she is having some vaginal bleeding today. Given pregnancy test result (negative)  Zakia Gandhi RN  [facility]

## 2019-10-08 NOTE — TELEPHONE ENCOUNTER
Called pt she confirmed for appts on Mon 11/4 w/Eddie & KENDRA and Thurs 12/12 at 4pm w/Kori, mailing letter also

## 2019-11-04 ENCOUNTER — OFFICE VISIT (OUTPATIENT)
Dept: TRANSPLANT | Facility: CLINIC | Age: 34
End: 2019-11-04
Attending: NURSE PRACTITIONER
Payer: MEDICARE

## 2019-11-04 ENCOUNTER — APPOINTMENT (OUTPATIENT)
Dept: LAB | Facility: CLINIC | Age: 34
End: 2019-11-04
Payer: MEDICARE

## 2019-11-04 DIAGNOSIS — N18.6 ESRD (END STAGE RENAL DISEASE) (H): Primary | ICD-10-CM

## 2019-11-04 DIAGNOSIS — Z76.82 ORGAN TRANSPLANT CANDIDATE: Primary | ICD-10-CM

## 2019-11-04 PROCEDURE — 36415 COLL VENOUS BLD VENIPUNCTURE: CPT | Performed by: SURGERY

## 2019-11-04 PROCEDURE — 86828 HLA CLASS I&II ANTIBODY QUAL: CPT | Performed by: NURSE PRACTITIONER

## 2019-11-04 ASSESSMENT — MIFFLIN-ST. JEOR: SCORE: 1365.19

## 2019-11-04 NOTE — PROGRESS NOTES
Transplant Surgery H&P:                           HPI:      Ms. Dietz is a 33 year old Female who comes to clinic today for assessment for potential kidney transplant. She has a complex transplant history. She was diagnosed with IgA nephropathy at age 12, and started on hemodialysis at age 22. She is originally from [facility]. She underwent living donor kidney transplantation at age 27 (2007) in [place]. She had good graft function for five years, but, in 2012, she had issues with noncompliance surrounding her first pregancy. Her course at that time was further complicated by what sounds like pre-eclampsia. She was able to birth her first child successfully with renal function. She did lose her graft function following this and had it explanted in 2013. She has been on hemodialysis via left forearm AV fistula since that time. She did have a second successful pregnancy while on dialysis.     She has accrued seven years of waiting time and presents for evaluation for re-listing. cPRA 81 back in 2017. She had previously removed herself from the transplant waitlist due to ongoing social issues with the health of her children and with divorce. These issues have stabilized and she would like consideration of transplant.     She does have two brothers who would be potential living donor options but are not medically eligible.    She had prior issues with compliance with hemodialysis due to . She is in the process of qualifying for  assistance at a facility. She would like to be listed for kidney transplant      The patient is on dialysis.      If on dialysis, modality: HD, via L forearm AVF at [facility]  Dialysis days: Tues/Thus/Sat        MEDICAL HISTORY:      Patient Active Problem List    Diagnosis Date Noted     ESRD (end stage renal disease) (H) 10/30/2018     Priority: Medium     Hyperkalemia 04/30/2017     Priority: Medium     Pre-transplant evaluation for kidney transplant 03/25/2017     Priority:  Medium     Anemia in chronic renal disease      Priority: Medium     End stage kidney disease (H)      Priority: Medium     IgA nephropathy      Priority: Medium     ACS (acute coronary syndrome) (H) 2014     Priority: Medium     Chronic renal transplant rejection 2013     Priority: Medium     S/P kidney transplant      Priority: Medium     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 History of 1B kidney rejection        Hypertension      Priority: Medium     resolved after transplant       Panic attacks 2001     Priority: Medium     CARDIOVASCULAR SCREENING; LDL GOAL LESS THAN 160 10/31/2010     Priority: Low      Past Medical History:   Diagnosis Date     ACS (acute coronary syndrome) (H) 2014     Allergic state     seasonal allergies     Anemia in chronic renal disease      Cervical cerclage suture present in second trimester      Chest pain 2014     Chronic renal transplant rejection      End stage kidney disease (H)      Heart murmur      History of  delivery ,     30 wks, 28 wks      Hypertension     resolved after transplant     IgA nephropathy      MRSA (methicillin resistant Staphylococcus aureus)      [facility]     Pre-eclampsia      Renal failure affecting pregnancy in second trimester      S/P kidney transplant     ESKD 2/2 IgA nephropathy s/p LDKT in 2007 in [facility]. History of 1B kidney rejection      SAB (spontaneous )     2nd trimester      Past Surgical History:   Procedure Laterality Date     CERCLAGE CERVICAL N/A 2015    Procedure: CERCLAGE CERVICAL;  Surgeon: Norbert Chopra MD;  Location: [facility]      SECTION  2012    Procedure:  SECTION;;  Surgeon: Chelsea Cross MD;  Location: [facility]      SECTION N/A 2015    Procedure:  SECTION;  Surgeon: Chelsea Cross MD;  Location: [facility]  section  2012     EXPLANT TRANSPLANTED KIDNEY  3/29/2013    Procedure: EXPLANT TRANSPLANTED KIDNEY;  Explant Transplanted right Kidney (from patients right iliac fossa);  Surgeon: Nory Morgan MD;  Location: [facility] OR     NEPHRECTOMY  3/29/13    Transplant nephrectomy      WILIAN/DIALYSIS CATHETER       TRANSPLANT KIDNEY RECIPIENT LIVING UNRELATED  Dec 2007    (Adult male in [place])     Current Outpatient Medications   Medication Sig Dispense Refill     acetaminophen (TYLENOL) 325 MG tablet Take 1-2 tablets by mouth every 4 hours as needed. 60 tablet 0     amLODIPine (NORVASC) 10 MG tablet Take 1 tablet (10 mg) by mouth daily 30 tablet 11     B Complex-C-Folic Acid (DIALYVITE) TABS 1 tablet by mouth daily 30 tablet 11     carvedilol (COREG) 25 MG tablet Take 1 tablet (25 mg) by mouth 2 times daily (with meals) 60 tablet 11     cetirizine (ZYRTEC) 10 MG tablet Take 10 mg by mouth At Bedtime       cloNIDine (CATAPRES) 0.1 MG tablet Take 1 tablet (0.1 mg) by mouth 3 times daily as needed 60 tablet 3     RENVELA 800 MG tablet Take 1 tablet by mouth 3 times daily (with meals)       nitroFURantoin macrocrystal-monohydrate (MACROBID) 100 MG capsule Take 1 capsule (100 mg) by mouth 2 times daily 6 capsule 0     OTC products: None, except as noted above  No Known Allergies   Social History     Tobacco Use     Smoking status: Never Smoker     Smokeless tobacco: Never Used   Substance Use Topics     Alcohol use: No     OR  Social History     Socioeconomic History     Marital status: Single     Spouse name: Not on file     Number of children: Not on file     Years of education: Not on file     Highest education level: Not on file   Occupational History     Not on file   Social Needs     Financial resource strain: Not on file     Food insecurity:     Worry: Not on file     Inability: Not on file     Transportation needs:     Medical: Not on file     Non-medical: Not on file   Tobacco Use     Smoking status: Never Smoker      Smokeless tobacco: Never Used   Substance and Sexual Activity     Alcohol use: No     Drug use: No     Sexual activity: Yes     Partners: Male   Lifestyle     Physical activity:     Days per week: Not on file     Minutes per session: Not on file     Stress: Not on file   Relationships     Social connections:     Talks on phone: Not on file     Gets together: Not on file     Attends Latter day service: Not on file     Active member of club or organization: Not on file     Attends meetings of clubs or organizations: Not on file     Relationship status: Not on file     Intimate partner violence:     Fear of current or ex partner: Not on file     Emotionally abused: Not on file     Physically abused: Not on file     Forced sexual activity: Not on file   Other Topics Concern      Service Not Asked     Blood Transfusions Yes     Comment: Multiple in [place] none in [place]     Caffeine Concern No     Comment: 1s/d     Occupational Exposure No     Hobby Hazards No     Sleep Concern No     Stress Concern No     Weight Concern No     Special Diet No     Back Care No     Exercise No     Comment: walk 20' daily     Bike Helmet No     Seat Belt No     Self-Exams Not Asked     Parent/sibling w/ CABG, MI or angioplasty before 65F 55M? Not Asked   Social History Narrative    Currently unemployed.  Lives in [place].   with one son.       History   Drug Use No     Family History   Problem Relation Age of Onset     Diabetes Paternal Grandfather      Breast Cancer Maternal Grandmother 55     Cancer No family hx of         No known family hx of skin cancer       REVIEW OF SYSTEMS:        CONSTITUTIONAL: NEGATIVE for fever, chills, change in weight  INTEGUMENTARY/SKIN: NEGATIVE for worrisome rashes, moles or lesions  EYES: NEGATIVE for vision changes or irritation  ENT/MOUTH: NEGATIVE for ear, mouth and throat problems  RESP: NEGATIVE for significant cough or SOB  BREAST: NEGATIVE for masses, tenderness or discharge  CV:  NEGATIVE for chest pain, palpitations or peripheral edema  GI: NEGATIVE for nausea, abdominal pain, heartburn, or change in bowel habits  : NEGATIVE for frequency, dysuria, or hematuria  MUSCULOSKELETAL: NEGATIVE for significant arthralgias or myalgia  NEURO: NEGATIVE for weakness, dizziness or paresthesias  ENDOCRINE: NEGATIVE for temperature intolerance, skin/hair changes  HEME: NEGATIVE for bleeding problems  PSYCHIATRIC: NEGATIVE for changes in mood or affect    EXAM:                       Pulse:  [76] (P) 76  BP: (P) 167/113  SpO2:  [100 %] (P) 100 %    GENERAL APPEARANCE: healthy, alert and no distress     EYES: EOMI, PERRL     HENT: ear canals and TM's normal and nose and mouth without ulcers or lesions     NECK: no adenopathy, no asymmetry, masses, or scars and thyroid normal to palpation     RESP: lungs clear to auscultation - no rales, rhonchi or wheezes     CV: regular rates and rhythm, normal S1 S2, no S3 or S4 and no murmur, click or rub     ABDOMEN:  soft, nontender, no HSM or masses and bowel sounds normal. RLQ transplant incision examined. Well-healed. No evidence of hernia. Palpable femoral pulses bilaterally.      MS: extremities normal- no gross deformities noted, no evidence of inflammation in joints, FROM in all extremities.     SKIN: no suspicious lesions or rashes     NEURO: Normal strength and tone, sensory exam grossly normal, mentation intact and speech normal     PSYCH: mentation appears normal. and affect normal/bright     LYMPHATICS: No cervical adenopathy      DIAGNOSTICS:                Card[facility]up completed at [facility]- pending records. Per report, had echo, EKG, stress test  Recent Labs   Lab Test  08/25/18   0655  08/24/18   1544   08/24/18   0347   03/08/17   0753   09/15/14   2107   07/15/12   0517   HGB  11.8   --    --   12.4   < >  11.7   < >  11.1*   < >  7.4*   PLT  152   --    --   173   < >  218   < >  205   < >  183   INR   --    --    --    --    --   1.20*    --   1.20*   < >   --    NA  140   --    --   131*   < >  137   < >  137   < >  133   POTASSIUM  4.1  3.9   < >  7.3*   < >  4.2   < >  4.5   < >  4.5   CR  9.90*   --    --   19.80*   < >  7.59*   < >  6.15*   < >  2.30*   A1C   --    --    --    --    --    --    --    --    --   6.3*    < > = values in this interval not displayed.     [facility] cPRA   Date Value Ref Range Status   03/15/2017 81  Final     A    ASSESSMENT:                 Ms. Deitz is a 33 year old Female who is appropriate for renal transplantation with the following issues:    -Labs reviewed. Will order cPRA  -ABO= A+  -Paired Exchange case: No  -Outstanding issues: Patient needs to have  assistance in order to remain complaint with hemodialysis (patient currently on a waitlist for ). Once patient has , we feel she should be listed as she would no longer have any reasons to be non-compliant. She has applied and should have it in the next couple of months.    PLAN:                 -Good kidney transplant candidate. Patient should be listed once she has . She currently has no live donors (her brothers were deemed medically unsuitable)    Patient seen and discussed with Dr. Morgan who agrees with the above    Marvin Oliveira MD  Transplant Surgery Fellow    I have reviewed history, examined patient and discussed plan with the fellow/resident/CONNIE.  I concur with the findings in this note.    Risks of the surgical procedure including but not limited to the rare risk of mortality discussed in detail. Patient verbalized good understanding and had several pertinent questions which were answered satisfactorily.       Total time: 40 min  Counseling time: 25 min

## 2019-11-04 NOTE — LETTER
11/4/2019       RE: Jelly Dietz  [address]  [facility] [address]     Dear Colleague,    Thank you for referring your patient, Jelly Dietz, to the [facility] [place] SOLID ORGAN TRANSPLANT at [facility]. Please see a copy of my visit note below.    Transplant Surgery H&P:                           HPI:      Ms. Dietz is a 33 year old Female who comes to clinic today for assessment for potential kidney transplant. She has a complex transplant history. She was diagnosed with IgA nephropathy at age 12, and started on hemodialysis at age 22. She is originally from [facility]. She underwent living donor kidney transplantation at age 27 (2007) in [place]. She had good graft function for five years, but, in 2012, she had issues with noncompliance surrounding her first pregancy. Her course at that time was further complicated by what sounds like pre-eclampsia. She was able to birth her first child successfully with renal function. She did lose her graft function following this and had it explanted in 2013. She has been on hemodialysis via left forearm AV fistula since that time. She did have a second successful pregnancy while on dialysis.     She has accrued seven years of waiting time and presents for evaluation for re-listing. cPRA 81 back in 2017. She had previously removed herself from the transplant waitlist due to ongoing social issues with the health of her children and with divorce. These issues have stabilized and she would like consideration of transplant.     She does have two brothers who would be potential living donor options but are not medically eligible.    She had prior issues with compliance with hemodialysis due to . She is in the process of qualifying for  assistance at a facility. She would like to be listed for kidney transplant      The patient is on dialysis.      If on dialysis, modality: HD, via L forearm AVF at [facility]  Dialysis days:  Tues/Thus/Sat        MEDICAL HISTORY:      Patient Active Problem List    Diagnosis Date Noted     ESRD (end stage renal disease) (H) 10/30/2018     Priority: Medium     Hyperkalemia 2017     Priority: Medium     Pre-transplant evaluation for kidney transplant 2017     Priority: Medium     Anemia in chronic renal disease      Priority: Medium     End stage kidney disease (H)      Priority: Medium     IgA nephropathy      Priority: Medium     ACS (acute coronary syndrome) (H) 2014     Priority: Medium     Chronic renal transplant rejection 2013     Priority: Medium     S/P kidney transplant      Priority: Medium     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 History of 1B kidney rejection        Hypertension      Priority: Medium     resolved after transplant       Panic attacks 2001     Priority: Medium     CARDIOVASCULAR SCREENING; LDL GOAL LESS THAN 160 10/31/2010     Priority: Low      Past Medical History:   Diagnosis Date     ACS (acute coronary syndrome) (H) 2014     Allergic state     seasonal allergies     Anemia in chronic renal disease      Cervical cerclage suture present in second trimester      Chest pain 2014     Chronic renal transplant rejection      End stage kidney disease (H)      Heart murmur      History of  delivery ,     30 wks, 28 wks      Hypertension     resolved after transplant     IgA nephropathy      MRSA (methicillin resistant Staphylococcus aureus)      [facility]     Pre-eclampsia      Renal failure affecting pregnancy in second trimester      S/P kidney transplant     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 History of 1B kidney rejection      SAB (spontaneous )     2nd trimester      Past Surgical History:   Procedure Laterality Date     CERCLAGE CERVICAL N/A 2015    Procedure: CERCLAGE CERVICAL;  Surgeon:  Norbert Chopra MD;  Location: [facility] L[facility]      SECTION  2012    Procedure:  SECTION;;  Surgeon: Chelsea Cross MD;  Location: [facility] L[facility]      SECTION N/A 2015    Procedure:  SECTION;  Surgeon: Chelsea Cross MD;  Location: [facility] OR     cesearean section       EXPLANT TRANSPLANTED KIDNEY  3/29/2013    Procedure: EXPLANT TRANSPLANTED KIDNEY;  Explant Transplanted right Kidney (from patients right iliac fossa);  Surgeon: Nory Morgan MD;  Location: [facility] OR     NEPHRECTOMY  3/29/13    Transplant nephrectomy      WILIAN/DIALYSIS CATHETER       TRANSPLANT KIDNEY RECIPIENT LIVING UNRELATED  Dec 2007    (Adult male in [place])     Current Outpatient Medications   Medication Sig Dispense Refill     acetaminophen (TYLENOL) 325 MG tablet Take 1-2 tablets by mouth every 4 hours as needed. 60 tablet 0     amLODIPine (NORVASC) 10 MG tablet Take 1 tablet (10 mg) by mouth daily 30 tablet 11     B Complex-C-Folic Acid (DIALYVITE) TABS 1 tablet by mouth daily 30 tablet 11     carvedilol (COREG) 25 MG tablet Take 1 tablet (25 mg) by mouth 2 times daily (with meals) 60 tablet 11     cetirizine (ZYRTEC) 10 MG tablet Take 10 mg by mouth At Bedtime       cloNIDine (CATAPRES) 0.1 MG tablet Take 1 tablet (0.1 mg) by mouth 3 times daily as needed 60 tablet 3     RENVELA 800 MG tablet Take 1 tablet by mouth 3 times daily (with meals)       nitroFURantoin macrocrystal-monohydrate (MACROBID) 100 MG capsule Take 1 capsule (100 mg) by mouth 2 times daily 6 capsule 0     OTC products: None, except as noted above  No Known Allergies   Social History     Tobacco Use     Smoking status: Never Smoker     Smokeless tobacco: Never Used   Substance Use Topics     Alcohol use: No     OR  Social History     Socioeconomic History     Marital status: Single     Spouse name: Not on file     Number of children: Not on file     Years of education: Not on file     Highest education level: Not on file    Occupational History     Not on file   Social Needs     Financial resource strain: Not on file     Food insecurity:     Worry: Not on file     Inability: Not on file     Transportation needs:     Medical: Not on file     Non-medical: Not on file   Tobacco Use     Smoking status: Never Smoker     Smokeless tobacco: Never Used   Substance and Sexual Activity     Alcohol use: No     Drug use: No     Sexual activity: Yes     Partners: Male   Lifestyle     Physical activity:     Days per week: Not on file     Minutes per session: Not on file     Stress: Not on file   Relationships     Social connections:     Talks on phone: Not on file     Gets together: Not on file     Attends Buddhist service: Not on file     Active member of club or organization: Not on file     Attends meetings of clubs or organizations: Not on file     Relationship status: Not on file     Intimate partner violence:     Fear of current or ex partner: Not on file     Emotionally abused: Not on file     Physically abused: Not on file     Forced sexual activity: Not on file   Other Topics Concern      Service Not Asked     Blood Transfusions Yes     Comment: Multiple in [place] none in [place]     Caffeine Concern No     Comment: 1s/d     Occupational Exposure No     Hobby Hazards No     Sleep Concern No     Stress Concern No     Weight Concern No     Special Diet No     Back Care No     Exercise No     Comment: walk 20' daily     Bike Helmet No     Seat Belt No     Self-Exams Not Asked     Parent/sibling w/ CABG, MI or angioplasty before 65F 55M? Not Asked   Social History Narrative    Currently unemployed.  Lives in [place].   with one son.       History   Drug Use No     Family History   Problem Relation Age of Onset     Diabetes Paternal Grandfather      Breast Cancer Maternal Grandmother 55     Cancer No family hx of         No known family hx of skin cancer       REVIEW OF SYSTEMS:        CONSTITUTIONAL: NEGATIVE for fever,  chills, change in weight  INTEGUMENTARY/SKIN: NEGATIVE for worrisome rashes, moles or lesions  EYES: NEGATIVE for vision changes or irritation  ENT/MOUTH: NEGATIVE for ear, mouth and throat problems  RESP: NEGATIVE for significant cough or SOB  BREAST: NEGATIVE for masses, tenderness or discharge  CV: NEGATIVE for chest pain, palpitations or peripheral edema  GI: NEGATIVE for nausea, abdominal pain, heartburn, or change in bowel habits  : NEGATIVE for frequency, dysuria, or hematuria  MUSCULOSKELETAL: NEGATIVE for significant arthralgias or myalgia  NEURO: NEGATIVE for weakness, dizziness or paresthesias  ENDOCRINE: NEGATIVE for temperature intolerance, skin/hair changes  HEME: NEGATIVE for bleeding problems  PSYCHIATRIC: NEGATIVE for changes in mood or affect    EXAM:                       Pulse:  [76] (P) 76  BP: (P) 167/113  SpO2:  [100 %] (P) 100 %    GENERAL APPEARANCE: healthy, alert and no distress     EYES: EOMI, PERRL     HENT: ear canals and TM's normal and nose and mouth without ulcers or lesions     NECK: no adenopathy, no asymmetry, masses, or scars and thyroid normal to palpation     RESP: lungs clear to auscultation - no rales, rhonchi or wheezes     CV: regular rates and rhythm, normal S1 S2, no S3 or S4 and no murmur, click or rub     ABDOMEN:  soft, nontender, no HSM or masses and bowel sounds normal. RLQ transplant incision examined. Well-healed. No evidence of hernia. Palpable femoral pulses bilaterally.      MS: extremities normal- no gross deformities noted, no evidence of inflammation in joints, FROM in all extremities.     SKIN: no suspicious lesions or rashes     NEURO: Normal strength and tone, sensory exam grossly normal, mentation intact and speech normal     PSYCH: mentation appears normal. and affect normal/bright     LYMPHATICS: No cervical adenopathy      DIAGNOSTICS:                Cardiac workup completed at [facility]- pending records. Per report, had echo, EKG, stress  test  Recent Labs   Lab Test  08/25/18   0655  08/24/18   1544   08/24/18   0347   03/08/17   0753   09/15/14   2107   07/15/12   0517   HGB  11.8   --    --   12.4   < >  11.7   < >  11.1*   < >  7.4*   PLT  152   --    --   173   < >  218   < >  205   < >  183   INR   --    --    --    --    --   1.20*   --   1.20*   < >   --    NA  140   --    --   131*   < >  137   < >  137   < >  133   POTASSIUM  4.1  3.9   < >  7.3*   < >  4.2   < >  4.5   < >  4.5   CR  9.90*   --    --   19.80*   < >  7.59*   < >  6.15*   < >  2.30*   A1C   --    --    --    --    --    --    --    --    --   6.3*    < > = values in this interval not displayed.     [facility] cPRA   Date Value Ref Range Status   03/15/2017 81  Final     A    ASSESSMENT:                 Ms. Dietz is a 33 year old Female who is appropriate for renal transplantation with the following issues:    -Labs reviewed. Will order cPRA  -ABO= A+  -Paired Exchange case: No  -Outstanding issues: Patient needs to have  assistance in order to remain complaint with hemodialysis (patient currently on a waitlist for ). Once patient has , we feel she should be listed as she would no longer have any reasons to be non-compliant. She has applied and should have it in the next couple of months.    PLAN:                 -Good kidney transplant candidate. Patient should be listed once she has . She currently has no live donors (her brothers were deemed medically unsuitable)    Patient seen and discussed with Dr. Morgan who agrees with the above    Marvin Oliveira MD  Transplant Surgery Fellow    I have reviewed history, examined patient and discussed plan with the fellow/resident/CONNIE.  I concur with the findings in this note.    Risks of the surgical procedure including but not limited to the rare risk of mortality discussed in detail. Patient verbalized good understanding and had several pertinent questions which were answered satisfactorily.        Total time: 40 min  Counseling time: 25 min                     Again, thank you for allowing me to participate in the care of your patient.      Sincerely,    Nory Morgan MD

## 2019-11-04 NOTE — PROGRESS NOTES
Patient Name: Jelly Dietz  : 1985  Age: 34 year old  MRN: [id number]  Date of Initial Social Work Evaluation: 2017    Patient currently in evaluation for kidney transplant.  Saw today to update psychosocial assessment.      Presenting Information   Living Situation: Patient resides in an apartment in [place] with her two children Isabella (7) and Laura (4). She recently moved from [place], [place].  If not local, plans for short term stay:  n/a  Previous Functional Status: Independent with ADL's  Cultural/Language/Spiritual Considerations: Patient is a [place] female.    Support System  Primary Support Person Mother Annamarie  Other support:  Father, sister, aunt and friend in [place], two sisters and three brothers in [place], [place], boyfriend of a year  Plan for support in immediate post-transplant period: Mother    Health Care Directive  Decision Maker: Self  Alternate Decision Maker: Parents are legal NOK  Health Care Directive: Provided education    Mental Health/Coping:   History of Mental Health: Denied  History of Chemical Health: Previously smoked Hookah, denied any alcohol or substance use  Current status: Patient reported no concerns at this time  Coping: Patient appears to be coping well  Services Needed/Recommended: None identified at this time    Financial   Income: SSDI and works as a PCA part time  Impact of transplant on income: Will have to take 6-8 weeks off of part time job  Insurance and medication coverage: Medicare and [place] through [facility]  Financial concerns:None identified at this time  Resources needed: None identified at this time    Assessment and recommendations and plan: Patient had a kidney transplant in  in [place]. She has been on dialysis since . Patient previously was listed for a transplant but then removed from list due to lack of follow through/non-compliance. Patient completed a 6 month compliance contract, however, patient reported she continues to miss  "dialysis 1-2 times a month due to childcare issues and the time of her dialysis runs. Patient reported since she moved to [place], she is on the second shift for dialysis, which interferes with getting her son off the bus if there is no one to help. She reported she is currently on the list to transfer to the first shift. Patient reported her aunt is also now helping watch her children while she is on dialysis. Discussed importance of compliance. Informed patient team may put patient back on a compliance contract if she is not attending dialysis regularly. Patient asked if the transplant team give her 3-4 weeks to find a first shift dialysis run so she is able to \"get on her feet\".  Informed patient this writer would inform transplant team of her request but there is no guarantee. Collaborated with transplant surgeon. Patient reported her mother and sisters will stay with her post-transplant to care for her children while she is recovering. She reported her ex- is supportive but he works as a  and is not always available.  Reviewed transplant education (Medicare, rehabilitation, donor issues, community/financial resources, and psych/family adjustment) as well as psychosocial risks of transplant. Patient seemed to process information well. Appeared well informed, motivated, and able to follow post transplant requirements however continues to have issues being compliant with dialysis. Behavior was appropriate during interview. Has adequate income and insurance coverage. Adequate social support. Compliance with dialysis continues to be a concern. At this time, patient appears to understand the risks and benefits of transplant.     Makenzie Desai [facility]    Kidney/Pancreas/Auto Islet Transplant Programs    "

## 2019-11-05 DIAGNOSIS — Z76.82 AWAITING ORGAN TRANSPLANT: Primary | ICD-10-CM

## 2019-11-06 LAB
PROTOCOL CUTOFF: NORMAL
SA1 CELL: NORMAL
SA1 COMMENTS: NORMAL
SA1 HI RISK ABY: NORMAL
SA1 MOD RISK ABY: NORMAL
SA1 TEST METHOD: NORMAL
SA2 CELL: NORMAL
SA2 COMMENTS: NORMAL
SA2 HI RISK ABY UA: NORMAL
SA2 MOD RISK ABY: NORMAL
SA2 TEST METHOD: NORMAL
UNACCEPTABLE ANTIGEN: NORMAL
UNOS CPRA: 9

## 2019-11-13 ENCOUNTER — COMMITTEE REVIEW (OUTPATIENT)
Dept: TRANSPLANT | Facility: CLINIC | Age: 34
End: 2019-11-13

## 2019-11-13 NOTE — COMMITTEE REVIEW
Abdominal Committee Review Note     Evaluation Date: 3/8/2017  Committee Review Date: 11/13/2019    Organ being evaluated for: Kidney    Transplant Phase: Evaluation  Transplant Status: Active    Transplant Coordinator: Yasemin Valdovinos  Transplant Surgeon:       Referring Physician:     Primary Diagnosis: IgA Nephropathy  Secondary Diagnosis:     Committee Review Members:  Nephrology Jayden Sheikh, APRN CNP, Mahesh Armando MD   Nutrition Sharon Roland, RD   Pharmacist Jake Marte, [facility]    - Clinical Makenzie Desai, [facility]   Transplant Claudia Angela PA-C, Berenice Bishop, RN, Laura Cervantes, RN, Amanda Caballero, RN, Matilda Huber, RN, Kailey Santos, EVA, Michelle Murray, PERI, Flor King, RN, Nory Morgan MD, Kieran Anglin MD, Yasemin Valdovinos, RN       Transplant Eligibility: Irreversible chronic kidney disease treated w/dialysis or expected need for dialysis    Committee Review Decision: Approved    Relative Contraindications: Other, special consideration for special need children    Absolute Contraindications: None    Committee Chair Nory Morgan MD verbally attested to the committee's decision.    Committee Discussion Details: Reviewed social issues that impede Jelly's compliance. She has special needs kids that some times cause her to miss dialysis due to  needs. She saw Dr Morgan and Makenzie and the plan is for her to wait until she qualifies for  assistance at a facility and a day time slot at dialysis. Once these issues are met she should call her coordinator and will re-present for active status. Patient will be called and transplant summary letter will be sent.

## 2019-11-15 ENCOUNTER — DOCUMENTATION ONLY (OUTPATIENT)
Dept: TRANSPLANT | Facility: CLINIC | Age: 34
End: 2019-11-15

## 2019-11-15 NOTE — PROGRESS NOTES
"I am in office covering work for coordinator, Jeanette Valdovinos.  I have attempted to reach Jelly yesterday and today to try to give her feedback and the plan from the recent candidate selection committee meeting.  Message on her cell phone \"this phone unable to accept calls at this time...\"  "

## 2019-11-22 ENCOUNTER — TELEPHONE (OUTPATIENT)
Dept: TRANSPLANT | Facility: CLINIC | Age: 34
End: 2019-11-22

## 2019-11-22 NOTE — LETTER
November 22, 2019    Jelly PALACIOS Mayemike  [address]  [place] [address]        Dear Jelly,    The purpose of this letter is to communicate with you about the discussion of your case at our Multidisciplinary Selection Committee on 11/13/2019. You saw Dr Morgan and Makenzie Desai on 11/4/2019. They both agree you are a good kidney transplant candidate but the issue of compliance with dialysis needs to be corrected. The plan is you will soon be eligible for  assistance and changing to a first shift dialysis. Once these two factors are in place I want you to call me. We will discuss placing you on ACTIVE status.      You have worked hard Jelly, and we would like to see you get a transplant. Please attend all dialysis sessions to demonstrate your commitment and compliance.    If you have any questions please feel free to call me at [phone]    I do not have a primary care provider listed for you. If you have switched dialysis units I need that information too. Please call me with the information.    Sincerely,     Jeanette Valdovinos, RN, BSN Transplant Coordinator  Solid Organ Transplant [address]  [place], [place] [address]  Phone [phone]  Fax [phone]  [email or web address]    CC: [facility]

## 2019-11-22 NOTE — TELEPHONE ENCOUNTER
Attempted to reach Jelly and was only able to leave a voice message. I asked her to call me back so we can talk about the discussion from the Selection Committee from 11/13/2019. Transplant summary letter will be sent.

## 2019-12-09 ENCOUNTER — TELEPHONE (OUTPATIENT)
Dept: TRANSPLANT | Facility: CLINIC | Age: 34
End: 2019-12-09

## 2019-12-09 NOTE — TELEPHONE ENCOUNTER
Jelly called to update me. She is on the wait list for day care and also for a morning slot at dialysis. She said she heard the surgeon to say she could be active status once she has the day care lined up and I confirmed that is true. She said it may be another 2 months before she has a spot for her kids. I reminded her that her ideal situation would be a living donor and she should use this time to put the word out. She also confirmed her appointment with Nuvia Sheikh for a wait list appointment on 12/12/2019. She will keep me up to date.

## 2019-12-11 ENCOUNTER — TELEPHONE (OUTPATIENT)
Dept: NEPHROLOGY | Facility: CLINIC | Age: 34
End: 2019-12-11

## 2019-12-11 NOTE — TELEPHONE ENCOUNTER
Called pt and mailbox is full. Unable to leave a detail message. Moses Murray/ISAC  December 11, 2019 4:59 PM

## 2019-12-12 ENCOUNTER — OFFICE VISIT (OUTPATIENT)
Dept: NEPHROLOGY | Facility: CLINIC | Age: 34
End: 2019-12-12
Attending: NURSE PRACTITIONER
Payer: MEDICARE

## 2019-12-12 VITALS
BODY MASS INDEX: 20.85 KG/M2 | DIASTOLIC BLOOD PRESSURE: 88 MMHG | SYSTOLIC BLOOD PRESSURE: 124 MMHG | WEIGHT: 129.19 LBS | OXYGEN SATURATION: 99 % | HEART RATE: 85 BPM

## 2019-12-12 DIAGNOSIS — N18.6 END STAGE KIDNEY DISEASE (H): Primary | ICD-10-CM

## 2019-12-12 DIAGNOSIS — T86.11 CHRONIC RENAL TRANSPLANT REJECTION: ICD-10-CM

## 2019-12-12 DIAGNOSIS — F41.9 ANXIETY: ICD-10-CM

## 2019-12-12 DIAGNOSIS — N02.B9 IGA NEPHROPATHY: ICD-10-CM

## 2019-12-12 DIAGNOSIS — Z94.0 S/P KIDNEY TRANSPLANT: ICD-10-CM

## 2019-12-12 ASSESSMENT — PAIN SCALES - GENERAL: PAINLEVEL: NO PAIN (0)

## 2019-12-12 NOTE — PROGRESS NOTES
Assessment and Plan:  #  Kidney transplant wait list evaluation - Mrs. Dietz is a good candidate overall. She should not active on the wait list until she has  established.     # ESKD s/p LDKT (, [place]): that failed in the setting of noncompliance during her pregnancy. S/p transplant nephrectomy in  for hematuria. Although doing ok on hemodialysis since 2012, she would likely benefit from another kidney transplant.  She has had issues with missing some dialysis due to childcare issues.  She is currently working with the [facility] for childcare assistance and this should be resolved in 1-2 months.    # Cardiac risk -   ECHO showed normal ventricular function and mild valvular abnormalities. Negative stress test in 3/2019 and was cleared by Dr. Deras.    # Compliance: good outside of missing some dialysis due to  issues. Seen by social work last month.     # Anxiety: appears stable. Not following with mental health therapist. Caring for her children is her best coping skill.          # Health maintenance: 2018 pap smear (NIL, HPV negative) and dental UTD.     Discussed the risks and benefits of a transplant, including the risk of surgery and immunosuppression medications.    KDPI: We discussed approximate remaining wait time and how that is influenced by issues such as blood type and sensitization (PRA) and access to a living donor. I contrasted potential waiting time for living vs  donor kidneys from  normal (0-85%) or higher (%) kidney donor profile index (KDPI) donors and their associated outcomes. I would recommend Ms. Dietz to consider kidneys from high KDPI donors. The reason for this decision is best summarized as: improved long term graft survival.    Patient s overall re-evaluation may require further discussion in the Transplant Program s multidisciplinary selection committee for a final recommendation on the patient s suitability for transplant.     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            Interim events/issues/events  She was hospitalized in 6/2019 due to needing dialysis after missing a run earlier in the week. Presented to the ED in 8/2019 with chest pain after she did not complete full run at dialysis with unremarkable findings.  She reports today her missed dialysis has been due to childcare issues.  She is currently working with the [facility] for childcare assistance.  This should be resolved in 1 to 2 months.  She otherwise has been doing well and denies any other hospitalizations or requiring blood transfusions.  She was treated for chlamydia 3 months ago.  She is not participating in any regular exercise but has no limitations with walking or going up and down the stairs at home.  With exertion she denies chest pain, shortness of breath or claudication symptoms. She had negative stress test in 3/2019.  Her last ECHO in 2017 showed normal ventricular function and mild valvular abnormalities. Cleared by Dr. Deras in 3/2019.  Her anxiety appears stable.  She states that taking care of her kids makes her happy and helps her cope.           Kidney Disease Hx       Kidney Disease Dx: IgA       Biopsy Proven: Yes        On Dialysis: Yes, Date initiated: 11/2012 and  Dialysis Type: [facility];       Primary Nephrologist: Dr. Almodovar           Uremic Symptoms: Fatigue: No; Nausea: No; Poor Appetite: No;          Potential Donor(s): No          Cardiac history:       Last cardiac risk assessment: 3/2019       Last stress test/coronary angiogram: 3/2019       Exertional symptoms: none        Recent cardiac events: none      Pertinent issues:   Blood transfusion: No  Jehovah s Witness: No  Pregnancies: No  Previous transplant: No  Urine output: No  Bladder dysfunction: No  Claudication: No  Previous amputation: No  Cancer: No  Recurrent infection: No  Chronic anticoagulation: No      ROS:  A comprehensive review of systems was obtained and negative, except as noted in the HPI or PMH.     PMH:  Medical records were obtained and reviewed.  Past Medical History:   Diagnosis Date     ACS (acute coronary syndrome) (H) 2014     Allergic state     seasonal allergies     Anemia in chronic renal disease      Anxiety      Cervical cerclage suture present in second trimester      Chest pain 2014     Chronic renal transplant rejection      End stage kidney disease (H)      Heart murmur      History of  delivery 2015    30 wks, 28 wks      Hypertension     resolved after transplant     IgA nephropathy      MRSA (methicillin resistant Staphylococcus aureus)      [facility]     Pre-eclampsia      Renal failure affecting pregnancy in second trimester      S/P kidney transplant     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 History of 1B kidney rejection      SAB (spontaneous )     2nd trimester         PSH:  Past Surgical History:   Procedure Laterality Date     CERCLAGE CERVICAL N/A 2015    Procedure: CERCLAGE CERVICAL;  Surgeon: Norbert Chopra MD;  [facility]      SECTION  2012    Procedure:  SECTION;;  Surgeon: Chelsea Cross MD;   [facility]      SECTION N/A 2015    Procedure:  SECTION;  Surgeon: Chelsea Cross MD;  Location: [facility] OR     cesearean section       EXPLANT TRANSPLANTED KIDNEY  3/29/2013    Procedure: EXPLANT TRANSPLANTED KIDNEY;  Explant Transplanted right Kidney (from patients right iliac fossa);  Surgeon: Nory Morgan MD;  [facility]     NEPHRECTOMY  3/29/13    Transplant nephrectomy      WILIAN/DIALYSIS CATHETER       TRANSPLANT KIDNEY RECIPIENT LIVING UNRELATED  Dec 2007    (Adult male in [place])        Personal Hx:  Social History     Socioeconomic History     Marital status: Single     Spouse name: Not on file     Number of children: Not on file     Years of education: Not on file     Highest education level: Not on file   Occupational History     Not on file   Social Needs     Financial resource strain: Not on file     Food insecurity:     Worry: Not on file     Inability: Not on file     Transportation needs:     Medical: Not on file     Non-medical: Not on file   Tobacco Use     Smoking status: Never Smoker     Smokeless tobacco: Never Used   Substance and Sexual Activity     Alcohol use: No     Drug use: No     Sexual activity: Yes     Partners: Male   Lifestyle     Physical activity:     Days per week: Not on file     Minutes per session: Not on file     Stress: Not on file   Relationships     Social connections:     Talks on phone: Not on file     Gets together: Not on file     Attends Hindu service: Not on file     Active member of club or organization: Not on file     Attends meetings of clubs or organizations: Not on file     Relationship status: Not on file     Intimate partner violence:     Fear of current or ex partner: Not on file     Emotionally abused: Not on file     Physically abused: Not on file     Forced sexual activity: Not on file   Other Topics Concern      Service Not Asked     Blood Transfusions Yes     Comment: [place] in [place] none in  [place]     Caffeine Concern No     Comment: 1s/d     Occupational Exposure No     Hobby Hazards No     Sleep Concern No     Stress Concern No     Weight Concern No     Special Diet No     Back Care No     Exercise No     Comment: walk 20' daily     Bike Helmet No     Seat Belt No     Self-Exams Not Asked     Parent/sibling w/ CABG, MI or angioplasty before 65F 55M? Not Asked   Social History Narrative    Currently unemployed.  Lives in [place].   with one son.          Allergies:  No Known Allergies     Medications:  Prior to Admission medications    Medication Sig Start Date End Date Taking? Authorizing Provider   acetaminophen (TYLENOL) 325 MG tablet Take 1-2 tablets by mouth every 4 hours as needed. 7/20/12   Marni Arteaga,    amLODIPine (NORVASC) 10 MG tablet Take 1 tablet (10 mg) by mouth daily 12/12/17   Katherine Varma MD   azithromycin (ZITHROMAX) 500 MG tablet Take 2 tablets (1,000 mg) by mouth once for 1 dose 9/24/19 9/24/19  Marvin Jean Baptiste MD   B Complex-C-Folic Acid (DIALYVITE) TABS 1 tablet by mouth daily 12/12/17   Katherine Varma MD   carvedilol (COREG) 25 MG tablet Take 1 tablet (25 mg) by mouth 2 times daily (with meals) 10/27/16   Katherine Varma MD   cetirizine (ZYRTEC) 10 MG tablet Take 10 mg by mouth At Bedtime    Unknown, Entered By History   cloNIDine (CATAPRES) 0.1 MG tablet Take 1 tablet (0.1 mg) by mouth 3 times daily as needed 10/27/16   Katherine Varma MD   nitroFURantoin macrocrystal-monohydrate (MACROBID) 100 MG capsule Take 1 capsule (100 mg) by mouth 2 times daily 9/23/19   To Gutierrez MD   ondansetron (ZOFRAN ODT) 4 MG ODT tab Take 1 tablet (4 mg) by mouth once for 1 dose Take this medication 1 hour before taking the antibiotic, Azithromycin. 9/24/19 9/24/19  Marvin Jean Baptiste MD   RENVELA 800 MG tablet Take 1 tablet by mouth 3 times daily (with meals) 4/20/18   Unknown, Entered By History         Vitals:  /88   Pulse 85   Wt 58.6 kg (129 lb 3 oz)   SpO2 99%   BMI (P) 20.54 kg/m       Exam:  GENERAL APPEARANCE: alert and no distress  HENT: mouth without ulcers or lesions. Good dentition.   LYMPHATICS: no cervical or supraclavicular nodes  RESP: lungs clear to auscultation - no rales, rhonchi or wheezes  CV: regular rhythm, normal rate, no rub, no murmur  FEMORAL PULSES: 2+ equal bilaterally.   EDEMA: no LE edema bilaterally  ABDOMEN: soft, nondistended, nontender, bowel sounds normal  MS: extremities normal - no gross deformities noted, no evidence of inflammation in joints, no muscle tenderness  SKIN: no rash

## 2019-12-12 NOTE — LETTER
2019       RE: Jelly Dietz  [address]  [facility] [address]     Dear Colleague,    Thank you for referring your patient, Jelly Dietz, to the [facility] NEPHROLOGY at [facility]. Please see a copy of my visit note below.    Assessment and Plan:  #  Kidney transplant wait list evaluation - Mrs. Dietz is a good candidate overall. She should not active on the wait list until she has her  established.     # ESKD s/p LDKT (, [place]): that failed in the setting of noncompliance during her pregnancy. S/p transplant nephrectomy in  for hematuria. Although doing ok on hemodialysis since 2012, she would likely benefit from another kidney transplant.  She has had issues with missing some dialysis due to childcare issues.  She is currently working with the [facility] for childcare assistance and this should be resolved in 1-2 months.    # Cardiac risk -   ECHO showed normal ventricular function and mild valvular abnormalities. Negative stress test in 3/2019 and was cleared by Dr. Deras.    # Compliance: good outside of missing some dialysis due to  issues. Seen by social work last month.     # Anxiety: appears stable. Not following with mental health therapist. Caring for her children helps her cope.         # Health maintenance: 2018 pap smear (NIL, HPV negative) and dental UTD.     Discussed the risks and benefits of a transplant, including the risk of surgery and immunosuppression medications.    KDPI: We discussed approximate remaining wait time and how that is influenced by issues such as blood type and sensitization (PRA) and access to a living donor. I contrasted potential waiting time for living vs  donor kidneys from  normal (0-85%) or higher (%) kidney donor profile index (KDPI) donors and their associated outcomes. I would recommend Ms. Dietz to consider kidneys from high KDPI donors. The reason for this  decision is best summarized as: improved long term graft survival.    Patient s overall re-evaluation may require further discussion in the Transplant Program s multidisciplinary selection committee for a final recommendation on the patient s suitability for transplant.     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            Interim events/issues/events  She was hospitalized in 6/2019 due to needing dialysis after missing a run earlier in the week. Presented to the ED in 8/2019 with chest pain after she did not complete full run at dialysis with unremarkable findings.  She reports today her missed dialysis has been due to childcare issues.  She is currently working with the [facility] for childcare assistance.  This should be resolved in 1 to 2 months.  She otherwise has been doing well and denies any other hospitalizations or requiring blood transfusions.  She was treated for chlamydia 3 months ago.  She is not participating in any regular exercise but has no limitations with walking or going up and down the stairs at home.  With exertion she denies chest pain, shortness of breath or claudication symptoms. She had negative stress test in 3/2019.  Her last ECHO in 2017 showed normal ventricular function and mild valvular  abnormalities. Cleared by Dr. Deras in 3/2019.  Her anxiety appears stable.  She states that taking care of her kids makes her happy and helps her cope.           Kidney Disease Hx       Kidney Disease Dx: IgA       Biopsy Proven: Yes        On Dialysis: Yes, Date initiated: 2012 and Dialysis Type: [facility] HD;       Primary Nephrologist: ***          Uremic Symptoms: Fatigue: No; Nausea: No; Poor Appetite: No;          Potential Donor(s): No          Cardiac history:       Last cardiac risk assessment: 3/2019       Last stress test/coronary angiogram: 3/2019       Exertional symptoms: none        Recent cardiac events: none      Pertinent issues:   Blood transfusion: No  Jehovah s Witness: No  Pregnancies: No  Previous transplant: No  Urine output: No  Bladder dysfunction: No  Claudication: No  Previous amputation: No  Cancer: No  Recurrent infection: No  Chronic anticoagulation: No      ROS:  A comprehensive review of systems was obtained and negative, except as noted in the HPI or PMH.     PMH:  Medical records were obtained and reviewed.  Past Medical History:   Diagnosis Date     ACS (acute coronary syndrome) (H) 2014     Allergic state     seasonal allergies     Anemia in chronic renal disease      Cervical cerclage suture present in second trimester      Chest pain 2014     Chronic renal transplant rejection      End stage kidney disease (H)      Heart murmur      History of  delivery 2015    30 wks, 28 wks      Hypertension     resolved after transplant     IgA nephropathy      MRSA (methicillin resistant Staphylococcus aureus)      [facility]     Pre-eclampsia      Renal failure affecting pregnancy in second trimester      S/P kidney transplant     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 History of 1B kidney rejection      SAB (spontaneous )     2nd trimester         PSH:  Past  Surgical History:   Procedure Laterality Date     CERCLAGE CERVICAL N/A 2015    Procedure: CERCLAGE CERVICAL;  Surgeon: Norbert Chopra MD;  [facility]      SECTION  2012    Procedure:  SECTION;;  Surgeon: Chelsea Cross MD;  [facility]      SECTION N/A 2015    Procedure:  SECTION;  Surgeon: Chelsea Cross MD;  [facility] OR     cesearean section       EXPLANT TRANSPLANTED KIDNEY  3/29/2013    Procedure: EXPLANT TRANSPLANTED KIDNEY;  Explant Transplanted right Kidney (from patients right iliac fossa);  Surgeon: Nory Morgan MD;  [facility] OR     NEPHRECTOMY  3/29/13    Transplant nephrectomy      WILIAN/DIALYSIS CATHETER       TRANSPLANT KIDNEY RECIPIENT LIVING UNRELATED  Dec 2007    (Adult male in [place])        Personal Hx:  Social History     Socioeconomic History     Marital status: Single     Spouse name: Not on file     Number of children: Not on file     Years of education: Not on file     Highest education level: Not on file   Occupational History     Not on file   Social Needs     Financial resource strain: Not on file     Food insecurity:     Worry: Not on file     Inability: Not on file     Transportation needs:     Medical: Not on file     Non-medical: Not on file   Tobacco Use     Smoking status: Never Smoker     Smokeless tobacco: Never Used   Substance and Sexual Activity     Alcohol use: No     Drug use: No     Sexual activity: Yes     Partners: Male   Lifestyle     Physical activity:     Days per week: Not on file     Minutes per session: Not on file     Stress: Not on file   Relationships     Social connections:     Talks on phone: Not on file     Gets together: Not on file     Attends Episcopal service: Not on file     Active member of club or organization: Not on file     Attends meetings of clubs or organizations: Not on file     Relationship status: Not on file     Intimate partner violence:     Fear of  current or ex partner: Not on file     Emotionally abused: Not on file     Physically abused: Not on file     Forced sexual activity: Not on file   Other Topics Concern      Service Not Asked     Blood Transfusions Yes     Comment: [place] in [place] in [place]     Caffeine Concern No     Comment: 1s/d     Occupational Exposure No     Hobby Hazards No     Sleep Concern No     Stress Concern No     Weight Concern No     Special Diet No     Back Care No     Exercise No     Comment: walk 20' daily     Bike Helmet No     Seat Belt No     Self-Exams Not Asked     Parent/sibling w/ CABG, MI or angioplasty before 65F 55M? Not Asked   Social History Narrative    Currently unemployed.  Lives in [place].   with one son.          Allergies:  No Known Allergies     Medications:  Prior to Admission medications    Medication Sig Start Date End Date Taking? Authorizing Provider   acetaminophen (TYLENOL) 325 MG tablet Take 1-2 tablets by mouth every 4 hours as needed. 7/20/12   Marni Arteaga, DO   amLODIPine (NORVASC) 10 MG tablet Take 1 tablet (10 mg) by mouth daily 12/12/17   Katherine Varma MD   azithromycin (ZITHROMAX) 500 MG tablet Take 2 tablets (1,000 mg) by mouth once for 1 dose 9/24/19 9/24/19  Marvin Jean Baptiste MD   B Complex-C-Folic Acid (DIALYVITE) TABS 1 tablet by mouth daily 12/12/17   Katherine Varma MD   carvedilol (COREG) 25 MG tablet Take 1 tablet (25 mg) by mouth 2 times daily (with meals) 10/27/16   Katherine Varma MD   cetirizine (ZYRTEC) 10 MG tablet Take 10 mg by mouth At Bedtime    Unknown, Entered By History   cloNIDine (CATAPRES) 0.1 MG tablet Take 1 tablet (0.1 mg) by mouth 3 times daily as needed 10/27/16   Katherine Varma MD   nitroFURantoin macrocrystal-monohydrate (MACROBID) 100 MG capsule Take 1 capsule (100 mg) by mouth 2 times daily 9/23/19   To Gutierrez MD   ondansetron (ZOFRAN ODT) 4 MG ODT tab Take 1 tablet (4 mg)  by mouth once for 1 dose Take this medication 1 hour before taking the antibiotic, Azithromycin. 9/24/19 9/24/19  Marvin Jean Baptiste MD   RENVELA 800 MG tablet Take 1 tablet by mouth 3 times daily (with meals) 4/20/18   Unknown, Entered By History        Vitals:  /88   Pulse 85   Wt 58.6 kg (129 lb 3 oz)   SpO2 99%   BMI (P) 20.54 kg/m        Exam:  GENERAL APPEARANCE: alert and no distress  HENT: mouth without ulcers or lesions. Good dentition.   LYMPHATICS: no cervical or supraclavicular nodes  RESP: lungs clear to auscultation - no rales, rhonchi or wheezes  CV: regular rhythm, normal rate, no rub, no murmur  FEMORAL PULSES: 2+ equal bilaterally.   EDEMA: no LE edema bilaterally  ABDOMEN: soft, nondistended, nontender, bowel sounds normal  MS: extremities normal - no gross deformities noted, no evidence of inflammation in joints, no muscle tenderness  SKIN: no rash           Again, thank you for allowing me to participate in the care of your patient.      Sincerely,    CHAU Zavala CNP

## 2019-12-12 NOTE — NURSING NOTE
Chief Complaint   Patient presents with     RECHECK     Wait list     Blood pressure 124/88, pulse 85, weight 58.6 kg (129 lb 3 oz), SpO2 99 %, not currently breastfeeding.    [facility]  December 12, 2019 3:57 PM

## 2019-12-12 NOTE — LETTER
Date:December 16, 2019      Patient was self referred, no letter generated. Do not send.        [facility] Information

## 2020-02-14 ENCOUNTER — TELEPHONE (OUTPATIENT)
Dept: TRANSPLANT | Facility: CLINIC | Age: 35
End: 2020-02-14

## 2020-02-14 NOTE — TELEPHONE ENCOUNTER
Jelly left me a voice message yesterday that she has gotten  and is now dialyzing on the early shift. I returned her call today to get the fine details so I can re-present her at Selection Committee for active status. Asked her to call me back.

## 2020-02-17 ENCOUNTER — TELEPHONE (OUTPATIENT)
Dept: TRANSPLANT | Facility: CLINIC | Age: 35
End: 2020-02-17

## 2020-02-17 NOTE — TELEPHONE ENCOUNTER
Called Jelly and she has gotten both children into a school program from 3140-6874 daily. Her dialysis scheduled is 4813-1131 so she should be able to get the kids from the bus. Her aunt will watch the kids on days they are not in school. Went Jelly is called for transplant her mother will take the children. I told Jelly I will put her on the agenda for this week with the goal of placing her on ACTIVE status.

## 2020-02-20 ENCOUNTER — TELEPHONE (OUTPATIENT)
Dept: TRANSPLANT | Facility: CLINIC | Age: 35
End: 2020-02-20

## 2020-02-20 ENCOUNTER — HOSPITAL ENCOUNTER (EMERGENCY)
Facility: CLINIC | Age: 35
Discharge: HOME | End: 2020-02-20
Attending: EMERGENCY MEDICINE | Admitting: EMERGENCY MEDICINE
Payer: MEDICARE

## 2020-02-20 VITALS
RESPIRATION RATE: 18 BRPM | SYSTOLIC BLOOD PRESSURE: 135 MMHG | TEMPERATURE: 97.9 F | DIASTOLIC BLOOD PRESSURE: 97 MMHG | OXYGEN SATURATION: 100 % | HEART RATE: 73 BPM

## 2020-02-20 DIAGNOSIS — R10.84 ABDOMINAL PAIN, GENERALIZED: ICD-10-CM

## 2020-02-20 DIAGNOSIS — K59.00 CONSTIPATION, UNSPECIFIED CONSTIPATION TYPE: ICD-10-CM

## 2020-02-20 DIAGNOSIS — Z76.82 AWAITING ORGAN TRANSPLANT: Primary | ICD-10-CM

## 2020-02-20 LAB
ANION GAP SERPL CALCULATED.3IONS-SCNC: 8 MMOL/L (ref 3–14)
BUN SERPL-MCNC: 39 MG/DL (ref 7–30)
CALCIUM SERPL-MCNC: 9.2 MG/DL (ref 8.5–10.1)
CHLORIDE SERPL-SCNC: 102 MMOL/L (ref 94–109)
CO2 SERPL-SCNC: 27 MMOL/L (ref 20–32)
CREAT SERPL-MCNC: 7.89 MG/DL (ref 0.52–1.04)
GFR SERPL CREATININE-BSD FRML MDRD: 6 ML/MIN/{1.73_M2}
GLUCOSE SERPL-MCNC: 100 MG/DL (ref 70–99)
HCG SERPL QL: NEGATIVE
POTASSIUM SERPL-SCNC: 3.7 MMOL/L (ref 3.4–5.3)
SODIUM SERPL-SCNC: 137 MMOL/L (ref 133–144)

## 2020-02-20 PROCEDURE — 84703 CHORIONIC GONADOTROPIN ASSAY: CPT | Performed by: EMERGENCY MEDICINE

## 2020-02-20 PROCEDURE — 25000132 ZZH RX MED GY IP 250 OP 250 PS 637: Mod: GY | Performed by: EMERGENCY MEDICINE

## 2020-02-20 PROCEDURE — 36415 COLL VENOUS BLD VENIPUNCTURE: CPT | Performed by: EMERGENCY MEDICINE

## 2020-02-20 PROCEDURE — 80048 BASIC METABOLIC PNL TOTAL CA: CPT | Performed by: EMERGENCY MEDICINE

## 2020-02-20 PROCEDURE — 99283 EMERGENCY DEPT VISIT LOW MDM: CPT

## 2020-02-20 RX ORDER — POLYETHYLENE GLYCOL 3350 17 G/17G
1 POWDER, FOR SOLUTION ORAL DAILY
Qty: 527 G | Refills: 0 | Status: ON HOLD | OUTPATIENT
Start: 2020-02-20 | End: 2020-06-19

## 2020-02-20 RX ADMIN — MAGNESIUM CITRATE 286 ML: 1.75 LIQUID ORAL at 19:54

## 2020-02-20 ASSESSMENT — ENCOUNTER SYMPTOMS
SHORTNESS OF BREATH: 0
ABDOMINAL PAIN: 1
ABDOMINAL DISTENTION: 1
CONSTIPATION: 1

## 2020-02-20 NOTE — LETTER
PHYSICIAN ORDER   ALA/PRA BLOOD    DATE & TIME ISSUED: 2020 3:19 PM  PATIENT NAME: Jelly Dietz   : 1985     [facility] MR#  [id number]     DIAGNOSIS/ICD-10 CODE: Awaiting Organ transplant [Z76.82}   EXPIRES: (1 YEAR AFTER DATE ISSUED)  EVERY 12 weeks / 3 months  Please draw and send in asap  1. Please draw 20ml of blood in red top (plain) tube for Antileukocyte Antibody (ALA or PRA).   2. Label tubes with the patient s name, complete lab slip.         3. Mailers, lab slips with instructions are sent to patient separately.      4. Call the Outreach Lab at [phone] to reorder mailers.       5. Mail blood to (this address is also on the mailers):    IMMUNOLOGY LABORATORY  [facility]  Room [phone]  [address]  [address]    .

## 2020-02-20 NOTE — TELEPHONE ENCOUNTER
Called Jelly to to let her know I am placing her on the ACTIVE transplant wait list today 2/20/2020. She has been on dialysis since 2012. She understands she must keep her phone on and charged at all times, answer all incoming calls even if she does not recognize the incoming phone number. If you miss a call return the call as soon as possible. The on call coordinator will not leave a message and you have one hour to return the call before the organ is offered to the next recipient. You will be given all the information that is known about the donor and you may decline and organ offer for any reason without penalty or consequence. You will be told when to arrive at the hospital , when to stop eating and drinking and what medications to take. If you are called to come in after 2000 the front doors will be locked and you will need to enter via the ED. Reviewed the admission process, periop, surgery, PACU, intermediate care and finally 7A routines. Reminded her of the importance of faithfully taking her medications and getting her labs done. She said she is so ready for this transplant to happen. ACTIVE listing letter, PRA orders and mailers will be sent.

## 2020-02-20 NOTE — LETTER
2020    Jelly Dietz  [address]  [facility] [address]    Dear Jelly,    This letter is sent to confirm that you have completed your transplant work-up and you are a candidate in the kidney transplant program at the [facility].  You were placed on the kidney active waitlist on 2020.      When you are active on the waitlist and an organ becomes available, a coordinator will need to speak to you immediately.  You could be contacted at any time during the day or night as an organ could become available at any time.  Please make certain our office always has your current telephone numbers and address. Always keep your phone on and charged. Answer all incoming calls even if you do not recognize the incoming number. Return any missed calls as soon as possible. Remember, the on call coordinator will not leave a message and you have one hour to return a missed call before the organ is offered to the next person on the list. You will be given all the information that is known about the donor and you may decline an organ offer for any reason without consequence or penalty. If you decline an offer the organ is simply offered to the next person in line. You will be told when to arrive at the hospital, when to stop eating and drinking and what medications to take.    Items we will need from you:      We have received approval from your insurance company for the transplant procedure.  It is critical that you notify us if there is any change in your insurance.  It is also important that you familiarize yourself with the details of your specific insurance policy.  Our patient  is available to assist you if you should have any questions regarding your coverage.      An ALA or PRA blood sample will need to be sent here every 3 months to match you with  donors or any potential living donors.  If you need this testing, special mailing  boxes (called mailers) will be sent to you directly from the Outreach Department. You should take the physician order form and the  to your home laboratory when it is time to for this testing to be done.  Additional mailers can be obtained by calling the Transplant Office and asking to speak to a kidney .      During this waiting period, we may request additional periodic laboratory tests with your primary physician.  It will be your responsibility to remind your physician to forward your results to the Transplant Office.      We need to be kept informed of any changes in your medical condition such as:    o changes in your medications,   o significant changes in your health  o significant infections (such as pneumonia or abscesses)  o blood transfusions  o any condition which requires hospitalization  o any surgery      Remember to complete any routine cancer screening tests required before your transplant.  This includes colonoscopy; prostate screening for men, and mammogram and gynecologic testing for women, as well as dental work.  Your primary care clinic can assist you with arranging for these exams.  Remind your caregivers to forward copies of the records and final reports.    We want you to know that our program has physician and surgeon coverage 24 hours a day, 365 days a year. If this coverage changes or there are substantial program changes, you will be notified in writing by letter. You will now have a new set of coordinators while on the wait list. You new coordinator is Matilda Huber [phone] and her LPN is Faustina Carrizales [phone].Please call Matilda with the information when you find a new primary care provider.    Attached is a letter from the [employer] for Organ Sharing (UNOS). It describes the services and information offered to patients by UNOS and the Organ Procurement and Transplantation Network.    We appreciate having had the opportunity to participate in  your care.  If you have questions, please feel free to call the Transplant Office at [phone] or [phone].      Sincerely,      Solid Organ Transplant  [facility]    Enclosures: ALA/RASHIDA Physician Order, Telephone Contact List, Travel Resources, UNOS Letter, Waitlist Information Update and While You Are Waiting  cc: Haley Newell Copake Dailysis              The Organ Procurement and Transplantation Network  Toll-free patient services line:     Your resource for organ transplant information    If you have a question regarding your own medical care, you always should call your transplant hospital first. However, for general organ transplant-related information, you can call the Organ Procurement and Transplantation Network (OPTN) toll-free patient services line at [phone]- [phone]. Anyone, including potential transplant candidates, candidates, recipients, family members, friends, living donors, and donor family members, can call this number to:          Talk about organ donation, living donation, the transplant process, the donation process, and transplant policies.    Get a free patient information kit with helpful booklets, waiting list and transplant information, and a list of all transplant hospitals.    Ask questions about the OPTN website (https://optn.transplant.hrsa.gov/), the [employer] Network for Organ Sharing s (UNOS) website (https://unos.org/), or the UNOS website for living donors and transplant recipients. (https://www.transplantliving.org/).    Learn how the OPTN can help you.    Talk about any concerns that you may have with a transplant hospital.    The [facility] s transplant system, the OPTN, is managed under federal contract by the [employer] for Organ Sharing (UNOS), which is a non-profit charitable organization. The OPTN helps create and define organ sharing policies that make the best use of donated  organs. This process continuously evaluating new advances and discoveries so policies can be adapted to best serve patients waiting for transplants. To do so, the OPTN works closely with transplant professionals, transplant patients, transplant candidates, donor families, living donors, and the public. All transplant programs and organ procurement organizations throughout the country are OPTN members and are obligated to follow the policies the OPTN creates for allocating organs.    The OPTN also is responsible for:      Providing educational material for patients, the public, and professionals.    Raising awareness of the need for donated organs and tissue.    Coordinating organ procurement, matching, and placement.    Collecting information about every organ transplant and donation that occurs in the [place].    Remember, you should contact your transplant hospital directly if you have questions or concerns about your own medical care including medical records, work-up progress, and test results.    We are not your transplant hospital, and our staff will not be able to answer questions about your case, so please keep your transplant hospital s phone number handy.    However, while you research your transplant needs and learn as much as you can about transplantation and donation, we welcome your call to our toll-free patient services line at [phone].          Updated 4/1/2019

## 2020-02-20 NOTE — ED AVS SNAPSHOT
[facility] Emergency Department  [address]  [facility] [address]  Phone:  [phone]  Fax:  [phone]                                    Jelly Dietz   MRN: [id number]    Department:  [facility] Emergency Department   Date of Visit:  2/20/2020           After Visit Summary Signature Page    I have received my discharge instructions, and my questions have been answered. I have discussed any challenges I see with this plan with the nurse or doctor.    ..........................................................................................................................................  Patient/Patient Representative Signature      ..........................................................................................................................................  Patient Representative Print Name and Relationship to Patient    ..................................................               ................................................  Date                                   Time    ..........................................................................................................................................  Reviewed by Signature/Title    ...................................................              ..............................................  Date                                               Time          22EPIC Rev 08/18

## 2020-02-21 ENCOUNTER — DOCUMENTATION ONLY (OUTPATIENT)
Dept: TRANSPLANT | Facility: CLINIC | Age: 35
End: 2020-02-21

## 2020-02-21 NOTE — ED NOTES
Attempt to straight cath patient, no UOP. Patient is on dialysis and wasn't sure she would have any urine.

## 2020-02-21 NOTE — ED TRIAGE NOTES
Pt comes in for abd pain and constipation x 2 weeks. Pt feel bloated as well. Pt tried a tea but it didn't help. Pt denies nausea or vomiting.

## 2020-02-21 NOTE — PROGRESS NOTES
Provider Call: Transplant Provider  Route to RN  Facility Name: [facility]   Facility Location: [place]  Reason for Call: Need ALA/PRA mailers with Labels          I sent ALA/PRA mailers with order and labels via [employer] to be delivered Monday, February 24th, 2020 around 10:30AM  Callback needed? No,

## 2020-02-21 NOTE — ED PROVIDER NOTES
History     Chief Complaint:  Abdominal Pain    HPI   Jelly Dietz is a 35 year old female with ESRD on dialysis (Tuesday, Thursday, Saturday) s/p kidney transplant with chronic renal rejection, on the transplant list as of today, who presents with abdominal pain. The patient notes abdominal distention for the past month and feeling constipated for about the past 2 weeks, with her last bowel movement being 2.5 days ago. She has not tried any medications for this. The patient had her last menstrual period 2 to 3 weeks ago and is concerned for pregnancy due to these symptoms, but is not urinating enough to take an at-home pregnancy test. She was fully dialyzed today. The patient denies chest pain or shortness of breath.     Allergies:  No Known Allergies     Medications:    amlodipine  carvedilol   clonidine  Renvela    Past Medical History:    History of acute coronary syndrome  Anemia in chronic renal disease   Anxiety   Chronic renal transplant rejection   End stage kidney disease   Heart murmur   History of  delivery   Hypertension   IgA nephropathy   MRSA   Pre-eclampsia   Renal failure affecting pregnancy in second trimester   Spontaneous    History of IgA nephropathy    Past Surgical History:    Cervical cerclage  C section x 2  Transplant nephrectomy  Sergio/ dialysis catheter    Family History:    Sister - alcohol abuse, mental illness    Social History:  Tobacco use: negative   Alcohol use: negative   PCP: No Ref-Primary, Physician     Review of Systems   Respiratory: Negative for shortness of breath.    Cardiovascular: Negative for chest pain.   Gastrointestinal: Positive for abdominal distention, abdominal pain and constipation.   All other systems reviewed and are negative.        Physical Exam     Patient Vitals for the past 24 hrs:   BP Temp Temp src Pulse Resp SpO2   20 (!) 135/97 -- -- 73 -- 100 %   20 1846 (!) 153/113 97.9  F (36.6  C) Oral 72 18 100 %         Physical Exam  Gen: well appearing, in no acute distress  HEENT:  mmm, no rhinorrhea  Neck: supple, no abnormal swelling  Lungs:  no resp distress  CV: rrr, ppi  Abd: soft, nontender, nondistended, no rebound/masses/guarding/hsm  Ext: no peripheral edema  Skin: warm, dry, well perfused, no rashes/bruising/lesions on exposed skin  Neuro: alert, MAEE, no gross motor or sensory deficits, gait stable  Psych: Normal mood, normal affect        Emergency Department Course     Laboratory:  Laboratory findings were communicated with the patient who voiced understanding of the findings.    BMP: glucose 100 (H), BUN 39 (H), creatinine 7.89 (H), GFR 6 (L) o/w WNL     HCG qualitative pregnancy: negative      Interventions:  1954 Pink lady enema 286 mL Rectal     Emergency Department Course:  Past medical records, nursing notes, and vitals reviewed.    1854 I performed an exam of the patient as documented above.     IV was inserted and blood was drawn for laboratory testing, results above.    Patient rechecked and updated.      I personally reviewed the laboratory  results with the Patient and answered all related questions prior to discharge. I prescribed Miralax.     Impression & Plan     Medical Decision Making:  Jelly Dietz is a 35 year old female here with abdominal pain, bloating, and poor stool output over the last couple of weeks. She is a dialysis patient and has been compliant. She had a normal run yesterday. No electrolyte abnormalities here. No abdominal rigidity. I have a low suspicion for intraabdominal emergency. She only makes urine twice daily. We attempted a straight cath here without obtaining urine. We discussed if she continues or develops dysuria then she would need to come back for repeat attempted urinalysis. No evidence of vulvovaginitis when the nurse attempted straight cath. She is better after a bowel movement here with an enema. She will go home with miralax and return for new or worsening  symptoms. pregnancy test was negative.       Discharge Diagnosis:    ICD-10-CM    1. Abdominal pain, generalized R10.84 HCG QUALitative pregnancy (blood)   2. Constipation, unspecified constipation type K59.00      Disposition:  Discharged to home     Discharge Medications:  Discharge Medication List as of 2/20/2020  8:54 PM      START taking these medications    Details   polyethylene glycol (MIRALAX) PO powder Take 17 g (1 capful) by mouth daily, Disp-527 g, R-0, Local Print           Scribe Disclosure:  I, Margarita George, am serving as a scribe at 6:54 PM on 2/20/2020 to document services personally performed by Morales Mccormack MD based on my observations and the provider's statements to me.       Morales Mccormack MD  02/20/20 9961

## 2020-02-26 ENCOUNTER — DOCUMENTATION ONLY (OUTPATIENT)
Dept: TRANSPLANT | Facility: CLINIC | Age: 35
End: 2020-02-26

## 2020-02-26 DIAGNOSIS — Z76.82 AWAITING ORGAN TRANSPLANT: Primary | ICD-10-CM

## 2020-02-26 DIAGNOSIS — Z76.82 AWAITING ORGAN TRANSPLANT: ICD-10-CM

## 2020-02-26 NOTE — PROGRESS NOTES
Attestation:    I, Vale Magana MD, have reviewed the laboratory and clinical data relevant for this case and have confirmed Dr. Thibodeaux's interpretation of the virtual crossmatch. The probability of an incompatible B cell flow crossmatch is approximately up to 97% due to antibodies against HLA molecules on the donor organ. I would recommend Jelly Dietz receive a prospective flow crossmatch prior to transplantation.    NB: This patient has produced anti-DP antibodies detected in sera tested in the past directed against two public epitopes, 84DEAV and 57D, that are present on this kidney's DPB1*03:01 allele.  These antibodies are not present in current serum.    Vale Magana MD        PATHOLOGY HLA CROSSMATCH CONSULTATION: DONOR/RECIPIENT VIRTUAL CROSSMATCH - Kidney  Consultation Date: 2020  Consultation Requested by: Dr. Anibal Bartlett  Regarding: Compatibility of  donor organ UNOS #AHBX [id number]  from OPO: MNOP with patient Jelly Dietz  Findings: Regarding a virtual crossmatch between Jelly Dietz and  donor listed above (match ID [id number]):  The most recent and peak patient sera were analyzed.  The patient has four (4) donor-specific antibody(ies)  (DSA) as listed in table below. No other antibodies listed with specificity against donor organ.      ANTIBODY MOST RECENT SERUM (mfi) 19 Peak Serum #1 (mfi)  2/1/10   Peak Serum #2 (mfi)  10/4/13    A3 None None 3995   A11 None None 4884   B35 None 1536 None   DP3 None None 00117           Record Review Indicates: I personally reviewed the most recent serum and the  peak serum/sera.  In addition, I analyzed eight (8) more sera:  The patient has antibodies against the donor organ; however, there are no DSA in the most recent serum dated 19.   Based on historical data from this hospital's histocompatibility lab, using the sum mfi of the patient's DSA with specificity against this donor organ, the probability of a positive B  cell crossmatch is  97% [UCL, 100%, LCL, 81%] for the peak serum.  DSA to C-locus antigens were not considered in deriving the probability of positive crossmatch because DSA to C-locus antigens rarely contribute to a positive lymphocyte crossmatch test.    The results of this virtual XM are:   -most recent serum: Compatible  -peak #2:  Incompatible    NB: This patient has produced anti-DP antibodies detected in sera tested in the past directed against two public epitopes, 84DEAV and 57D, that are present on this kidney's DPB1*03:01 allele.  These antibodies are not present in current serum.    Disclaimer: Clinical judgement must take into account other factors, such as non-HLA antibodies not detected in the assay.   The VXM gives probabilities only.  The probability does not account for the potential for auto-antibodies that may be present in the patient's serum.  These autoantibodies may render the physical crossmatch falsely positive, and would be detected by an autologous crossmatch.  When possible, confirm findings with a prospective allogeneic and autologous flow crossmatches before going to transplant.    Lito Thibodeaux, PhD PhD, [facility]  Medical Director, Immunology/Histocompatibility Laboratory  Pager: [phone]

## 2020-02-27 ENCOUNTER — DOCUMENTATION ONLY (OUTPATIENT)
Dept: TRANSPLANT | Facility: CLINIC | Age: 35
End: 2020-02-27

## 2020-02-27 DIAGNOSIS — Z76.82 AWAITING ORGAN TRANSPLANT: Primary | ICD-10-CM

## 2020-02-27 NOTE — PROGRESS NOTES
PATHOLOGY HLA CROSSMATCH CONSULTATION: DONOR/RECIPIENT VIRTUAL CROSSMATCH - Kidney  Consultation Date: 2020  Consultation Requested by: Dr. Anibal Bartlett  Regarding: Compatibility of  donor organ UNOS #AHBY [id number]  from OPO: MNOP with patient Jelly Dietz  Findings: Regarding a virtual crossmatch between Jelly Dietz and  donor listed above (match ID [id number]):  The most recent and peak patient sera were analyzed.  The patient has three (3) donor-specific antibody(ies)  (DSA) as listed in table below. No other antibodies listed with specificity against donor organ.      ANTIBODY MOST RECENT SERUM (mfi) 19 Peak Serum #1 (mfi)  12/7/15    Peak Serum #2 (mfi)  10/4/13  Peak Serum #3 (mfi)  2/1/10    B51 None None None 1381   Cw15 None 651 None None   DP3 None 2245 46711 Not determined              Record Review Indicates: I personally reviewed the most recent serum and the  peak serum/sera.  In addition, I analyzed eight (8) more sera:  The patient has antibodies against the donor organ; however, there are no DSA in the most recent serum dated 19.  Based on historical data from this [facility]'s histocompatibility lab, using the sum mfi of the patient's DSA with specificity against this donor organ, the probability of a positive B cell crossmatch is 97% [UCL, 100%, LCL, 81%] for the peak serum.  DSA to C-locus antigens were not considered in deriving the probability of positive crossmatch because DSA to C-locus antigens rarely contribute to a positive lymphocyte crossmatch test.    The results of this virtual XM are:   -most recent serum: Compatible  -peak #2:  Incompatible  -peak #3:  Possibly incompatible    NB: This patient has produced anti-DP antibodies detected in past peak PRA sera that are directed against two public epitopes, 84DEAV and 57D, present on this kidney's DPB1*03:01 allele.  These antibodies are not present in current serum.    Disclaimer: Clinical  judgement must take into account other factors, such as non-HLA antibodies not detected in the assay.   The VXM gives probabilities only.  The probability does not account for the potential for auto-antibodies that may be present in the patient's serum.  These autoantibodies may render the physical crossmatch falsely positive, and would be detected by an autologous crossmatch.  When possible, confirm findings with a prospective allogeneic and autologous flow crossmatches before going to transplant.    Lito Thibodeaux, PhD PhD, [facility]  Medical Director, Immunology/Histocompatibility Laboratory  Pager: [phone]

## 2020-02-29 LAB
PROTOCOL CUTOFF: NORMAL
SA1 CELL: NORMAL
SA1 COMMENTS: NORMAL
SA1 HI RISK ABY: NORMAL
SA1 MOD RISK ABY: NORMAL
SA1 TEST METHOD: NORMAL
SA2 CELL: NORMAL
SA2 COMMENTS: NORMAL
SA2 HI RISK ABY UA: NORMAL
SA2 MOD RISK ABY: NORMAL
SA2 TEST METHOD: NORMAL
UNACCEPTABLE ANTIGEN: NORMAL
UNOS CPRA: 95

## 2020-06-17 ENCOUNTER — DOCUMENTATION ONLY (OUTPATIENT)
Dept: TRANSPLANT | Facility: CLINIC | Age: 35
End: 2020-06-17

## 2020-06-17 DIAGNOSIS — Z76.82 AWAITING ORGAN TRANSPLANT: Primary | ICD-10-CM

## 2020-06-18 ENCOUNTER — ORGAN (OUTPATIENT)
Dept: TRANSPLANT | Facility: CLINIC | Age: 35
End: 2020-06-18
Payer: MEDICARE

## 2020-06-18 ENCOUNTER — RESULTS ONLY (OUTPATIENT)
Dept: OTHER | Facility: CLINIC | Age: 35
End: 2020-06-18

## 2020-06-18 DIAGNOSIS — Z76.82 AWAITING ORGAN TRANSPLANT: ICD-10-CM

## 2020-06-18 DIAGNOSIS — Z76.82 AWAITING ORGAN TRANSPLANT: Primary | ICD-10-CM

## 2020-06-18 NOTE — TELEPHONE ENCOUNTER
Donors with risk factors or behaviors that increase their chance of having an infection are called a PHS Increased Risk donors.     This donor meets increased risk guidelines due to:       Even without the  increased risk label; infection or cancer can be transmitted from donors through transplant, but it is rare.  The risk of getting a donor transmitted infection from this donor is low, but higher than a donor without this label, so that is why we discuss this with you.  Donors undergo extensive testing for infections like HIV, Hepatitis B or C.   The test results for HIV and hepatitis in this donor were negative. Even with negative test results, there is still a small chance (less than 1%) that this donor could have an infection that could be transmitted with the organ.    Our doctor has reviewed the information about this donor. She/he recommends that you consider this offer. In his/her opinion, the potential benefits of accepting the offer outweigh the risks of getting an infection from this donor.   Everyone has a different level of how much risk they are willing to accept for themselves.  The decision to accept this organ is yours.  If you decide NOT to accept the organ, you will not lose your place on the waiting list.

## 2020-06-19 ENCOUNTER — ANESTHESIA EVENT (OUTPATIENT)
Dept: SURGERY | Facility: CLINIC | Age: 35
DRG: 652 | End: 2020-06-19
Payer: MEDICARE

## 2020-06-19 ENCOUNTER — APPOINTMENT (OUTPATIENT)
Dept: GENERAL RADIOLOGY | Facility: CLINIC | Age: 35
DRG: 652 | End: 2020-06-19
Attending: SURGERY
Payer: MEDICARE

## 2020-06-19 ENCOUNTER — APPOINTMENT (OUTPATIENT)
Dept: GENERAL RADIOLOGY | Facility: CLINIC | Age: 35
DRG: 652 | End: 2020-06-19
Attending: STUDENT IN AN ORGANIZED HEALTH CARE EDUCATION/TRAINING PROGRAM
Payer: MEDICARE

## 2020-06-19 ENCOUNTER — HOSPITAL ENCOUNTER (INPATIENT)
Facility: CLINIC | Age: 35
LOS: 5 days | Discharge: HOME HEALTH | DRG: 652 | End: 2020-06-24
Attending: SURGERY | Admitting: SURGERY
Payer: MEDICARE

## 2020-06-19 ENCOUNTER — APPOINTMENT (OUTPATIENT)
Dept: GENERAL RADIOLOGY | Facility: CLINIC | Age: 35
DRG: 652 | End: 2020-06-19
Attending: ANESTHESIOLOGY
Payer: MEDICARE

## 2020-06-19 ENCOUNTER — ANESTHESIA (OUTPATIENT)
Dept: SURGERY | Facility: CLINIC | Age: 35
DRG: 652 | End: 2020-06-19
Payer: MEDICARE

## 2020-06-19 ENCOUNTER — APPOINTMENT (OUTPATIENT)
Dept: ULTRASOUND IMAGING | Facility: CLINIC | Age: 35
DRG: 652 | End: 2020-06-19
Attending: SURGERY
Payer: MEDICARE

## 2020-06-19 ENCOUNTER — DOCUMENTATION ONLY (OUTPATIENT)
Dept: TRANSPLANT | Facility: CLINIC | Age: 35
End: 2020-06-19

## 2020-06-19 DIAGNOSIS — D84.9 IMMUNOSUPPRESSION (H): ICD-10-CM

## 2020-06-19 DIAGNOSIS — Z94.0 KIDNEY TRANSPLANTED: Primary | ICD-10-CM

## 2020-06-19 PROBLEM — Z76.82 KIDNEY TRANSPLANT CANDIDATE: Status: ACTIVE | Noted: 2020-06-19

## 2020-06-19 LAB
ABO + RH BLD: NORMAL
ABO + RH BLD: NORMAL
ALBUMIN SERPL-MCNC: 3.4 G/DL (ref 3.4–5)
ALP SERPL-CCNC: 54 U/L (ref 40–150)
ALT SERPL W P-5'-P-CCNC: 13 U/L (ref 0–50)
ANION GAP SERPL CALCULATED.3IONS-SCNC: 12 MMOL/L (ref 3–14)
ANION GAP SERPL CALCULATED.3IONS-SCNC: 12 MMOL/L (ref 3–14)
APTT PPP: 28 SEC (ref 22–37)
APTT PPP: 31 SEC (ref 22–37)
AST SERPL W P-5'-P-CCNC: 11 U/L (ref 0–45)
B-HCG SERPL-ACNC: <1 IU/L (ref 0–5)
BASE DEFICIT BLDV-SCNC: 1.8 MMOL/L
BASE DEFICIT BLDV-SCNC: 5.3 MMOL/L
BASOPHILS # BLD AUTO: 0 10E9/L (ref 0–0.2)
BASOPHILS NFR BLD AUTO: 1.3 %
BILIRUB SERPL-MCNC: 0.4 MG/DL (ref 0.2–1.3)
BLD GP AB SCN SERPL QL: NORMAL
BLD PROD TYP BPU: NORMAL
BLOOD BANK CMNT PATIENT-IMP: NORMAL
BUN SERPL-MCNC: 50 MG/DL (ref 7–30)
BUN SERPL-MCNC: 50 MG/DL (ref 7–30)
CA-I BLD-MCNC: 3.7 MG/DL (ref 4.4–5.2)
CA-I BLD-MCNC: 3.7 MG/DL (ref 4.4–5.2)
CALCIUM SERPL-MCNC: 6.7 MG/DL (ref 8.5–10.1)
CALCIUM SERPL-MCNC: 7.5 MG/DL (ref 8.5–10.1)
CHLORIDE SERPL-SCNC: 101 MMOL/L (ref 94–109)
CHLORIDE SERPL-SCNC: 105 MMOL/L (ref 94–109)
CHOLEST SERPL-MCNC: 129 MG/DL
CMV IGG SERPL QL IA: >8 AI (ref 0–0.8)
CMV IGM SERPL QL IA: <0.2 AI (ref 0–0.8)
CO2 SERPL-SCNC: 20 MMOL/L (ref 20–32)
CO2 SERPL-SCNC: 25 MMOL/L (ref 20–32)
CREAT SERPL-MCNC: 10.1 MG/DL (ref 0.52–1.04)
CREAT SERPL-MCNC: 9.09 MG/DL (ref 0.52–1.04)
DIFFERENTIAL METHOD BLD: ABNORMAL
EBV VCA IGG SER QL IA: >8 AI (ref 0–0.8)
EBV VCA IGM SER QL IA: <0.2 AI (ref 0–0.8)
EOSINOPHIL # BLD AUTO: 0.2 10E9/L (ref 0–0.7)
EOSINOPHIL NFR BLD AUTO: 7.7 %
ERYTHROCYTE [DISTWIDTH] IN BLOOD BY AUTOMATED COUNT: 14.1 % (ref 10–15)
ERYTHROCYTE [DISTWIDTH] IN BLOOD BY AUTOMATED COUNT: 14.1 % (ref 10–15)
GFR SERPL CREATININE-BSD FRML MDRD: 5 ML/MIN/{1.73_M2}
GFR SERPL CREATININE-BSD FRML MDRD: 5 ML/MIN/{1.73_M2}
GLUCOSE BLD-MCNC: 137 MG/DL (ref 70–99)
GLUCOSE BLD-MCNC: 85 MG/DL (ref 70–99)
GLUCOSE BLDC GLUCOMTR-MCNC: 140 MG/DL (ref 70–99)
GLUCOSE SERPL-MCNC: 160 MG/DL (ref 70–99)
GLUCOSE SERPL-MCNC: 90 MG/DL (ref 70–99)
HBA1C MFR BLD: 5.1 % (ref 0–5.6)
HBV CORE IGM SERPL QL IA: NONREACTIVE
HBV SURFACE AG SERPL QL IA: NONREACTIVE
HCO3 BLDV-SCNC: 20 MMOL/L (ref 21–28)
HCO3 BLDV-SCNC: 22 MMOL/L (ref 21–28)
HCT VFR BLD AUTO: 24.1 % (ref 35–47)
HCT VFR BLD AUTO: 28.5 % (ref 35–47)
HCV AB SERPL QL IA: NONREACTIVE
HDLC SERPL-MCNC: 53 MG/DL
HGB BLD-MCNC: 7 G/DL (ref 11.7–15.7)
HGB BLD-MCNC: 7.2 G/DL (ref 11.7–15.7)
HGB BLD-MCNC: 7.6 G/DL (ref 11.7–15.7)
HGB BLD-MCNC: 7.8 G/DL (ref 11.7–15.7)
HGB BLD-MCNC: 8.8 G/DL (ref 11.7–15.7)
HIV 1+2 AB+HIV1 P24 AG SERPL QL IA: NONREACTIVE
IMM GRANULOCYTES # BLD: 0 10E9/L (ref 0–0.4)
IMM GRANULOCYTES NFR BLD: 0 %
INR PPP: 1.23 (ref 0.86–1.14)
INR PPP: 1.44 (ref 0.86–1.14)
INTERPRETATION ECG - MUSE: NORMAL
LACTATE BLD-SCNC: 0.3 MMOL/L (ref 0.7–2)
LACTATE BLD-SCNC: 0.6 MMOL/L (ref 0.7–2)
LDLC SERPL CALC-MCNC: 63 MG/DL
LYMPHOCYTES # BLD AUTO: 0.7 10E9/L (ref 0.8–5.3)
LYMPHOCYTES NFR BLD AUTO: 24.8 %
MAGNESIUM SERPL-MCNC: 1.3 MG/DL (ref 1.6–2.3)
MCH RBC QN AUTO: 31.2 PG (ref 26.5–33)
MCH RBC QN AUTO: 31.3 PG (ref 26.5–33)
MCHC RBC AUTO-ENTMCNC: 30.9 G/DL (ref 31.5–36.5)
MCHC RBC AUTO-ENTMCNC: 31.5 G/DL (ref 31.5–36.5)
MCV RBC AUTO: 101 FL (ref 78–100)
MCV RBC AUTO: 99 FL (ref 78–100)
MONOCYTES # BLD AUTO: 0.2 10E9/L (ref 0–1.3)
MONOCYTES NFR BLD AUTO: 6.4 %
NEUTROPHILS # BLD AUTO: 1.8 10E9/L (ref 1.6–8.3)
NEUTROPHILS NFR BLD AUTO: 59.8 %
NONHDLC SERPL-MCNC: 77 MG/DL
NRBC # BLD AUTO: 0 10*3/UL
NRBC BLD AUTO-RTO: 0 /100
NUM BPU REQUESTED: 2
O2/TOTAL GAS SETTING VFR VENT: 50 %
O2/TOTAL GAS SETTING VFR VENT: 50 %
PCO2 BLDV: 32 MM HG (ref 40–50)
PCO2 BLDV: 38 MM HG (ref 40–50)
PH BLDV: 7.34 PH (ref 7.32–7.43)
PH BLDV: 7.45 PH (ref 7.32–7.43)
PHOSPHATE SERPL-MCNC: 5.8 MG/DL (ref 2.5–4.5)
PLATELET # BLD AUTO: 155 10E9/L (ref 150–450)
PLATELET # BLD AUTO: 156 10E9/L (ref 150–450)
PO2 BLDV: 56 MM HG (ref 25–47)
PO2 BLDV: 57 MM HG (ref 25–47)
POTASSIUM BLD-SCNC: 3.9 MMOL/L (ref 3.4–5.3)
POTASSIUM BLD-SCNC: 4 MMOL/L (ref 3.4–5.3)
POTASSIUM SERPL-SCNC: 3.6 MMOL/L (ref 3.4–5.3)
POTASSIUM SERPL-SCNC: 3.8 MMOL/L (ref 3.4–5.3)
POTASSIUM SERPL-SCNC: 3.9 MMOL/L (ref 3.4–5.3)
PROT SERPL-MCNC: 7 G/DL (ref 6.8–8.8)
RBC # BLD AUTO: 2.43 10E12/L (ref 3.8–5.2)
RBC # BLD AUTO: 2.82 10E12/L (ref 3.8–5.2)
SARS-COV-2 PCR COMMENT: NORMAL
SARS-COV-2 RNA SPEC QL NAA+PROBE: NEGATIVE
SARS-COV-2 RNA SPEC QL NAA+PROBE: NORMAL
SODIUM BLD-SCNC: 138 MMOL/L (ref 133–144)
SODIUM BLD-SCNC: 141 MMOL/L (ref 133–144)
SODIUM SERPL-SCNC: 138 MMOL/L (ref 133–144)
SODIUM SERPL-SCNC: 138 MMOL/L (ref 133–144)
SPECIMEN EXP DATE BLD: NORMAL
SPECIMEN SOURCE: NORMAL
SPECIMEN SOURCE: NORMAL
TRIGL SERPL-MCNC: 66 MG/DL
WBC # BLD AUTO: 3 10E9/L (ref 4–11)
WBC # BLD AUTO: 7 10E9/L (ref 4–11)

## 2020-06-19 PROCEDURE — C2617 STENT, NON-COR, TEM W/O DEL: HCPCS | Performed by: SURGERY

## 2020-06-19 PROCEDURE — 87389 HIV-1 AG W/HIV-1&-2 AB AG IA: CPT | Performed by: STUDENT IN AN ORGANIZED HEALTH CARE EDUCATION/TRAINING PROGRAM

## 2020-06-19 PROCEDURE — U0003 INFECTIOUS AGENT DETECTION BY NUCLEIC ACID (DNA OR RNA); SEVERE ACUTE RESPIRATORY SYNDROME CORONAVIRUS 2 (SARS-COV-2) (CORONAVIRUS DISEASE [COVID-19]), AMPLIFIED PROBE TECHNIQUE, MAKING USE OF HIGH THROUGHPUT TECHNOLOGIES AS DESCRIBED BY CMS-2020-01-R: HCPCS | Performed by: STUDENT IN AN ORGANIZED HEALTH CARE EDUCATION/TRAINING PROGRAM

## 2020-06-19 PROCEDURE — 0TY00Z0 TRANSPLANTATION OF RIGHT KIDNEY, ALLOGENEIC, OPEN APPROACH: ICD-10-PCS | Performed by: SURGERY

## 2020-06-19 PROCEDURE — 83605 ASSAY OF LACTIC ACID: CPT

## 2020-06-19 PROCEDURE — 40000196 ZZH STATISTIC RAPCV CVP MONITORING

## 2020-06-19 PROCEDURE — 25000128 H RX IP 250 OP 636: Performed by: STUDENT IN AN ORGANIZED HEALTH CARE EDUCATION/TRAINING PROGRAM

## 2020-06-19 PROCEDURE — 84132 ASSAY OF SERUM POTASSIUM: CPT

## 2020-06-19 PROCEDURE — 85610 PROTHROMBIN TIME: CPT | Performed by: SURGERY

## 2020-06-19 PROCEDURE — 80048 BASIC METABOLIC PNL TOTAL CA: CPT | Performed by: SURGERY

## 2020-06-19 PROCEDURE — 71000017 ZZH RECOVERY PHASE 1 LEVEL 3 EA ADDTL HR: Performed by: SURGERY

## 2020-06-19 PROCEDURE — 71000016 ZZH RECOVERY PHASE 1 LEVEL 3 FIRST HR: Performed by: SURGERY

## 2020-06-19 PROCEDURE — 37000009 ZZH ANESTHESIA TECHNICAL FEE, EACH ADDTL 15 MIN: Performed by: SURGERY

## 2020-06-19 PROCEDURE — 85610 PROTHROMBIN TIME: CPT | Performed by: STUDENT IN AN ORGANIZED HEALTH CARE EDUCATION/TRAINING PROGRAM

## 2020-06-19 PROCEDURE — 25800030 ZZH RX IP 258 OP 636: Performed by: NURSE ANESTHETIST, CERTIFIED REGISTERED

## 2020-06-19 PROCEDURE — 25000125 ZZHC RX 250: Performed by: SURGERY

## 2020-06-19 PROCEDURE — 83036 HEMOGLOBIN GLYCOSYLATED A1C: CPT | Performed by: STUDENT IN AN ORGANIZED HEALTH CARE EDUCATION/TRAINING PROGRAM

## 2020-06-19 PROCEDURE — 25000132 ZZH RX MED GY IP 250 OP 250 PS 637: Mod: GY | Performed by: SURGERY

## 2020-06-19 PROCEDURE — 27210794 ZZH OR GENERAL SUPPLY STERILE: Performed by: SURGERY

## 2020-06-19 PROCEDURE — 85730 THROMBOPLASTIN TIME PARTIAL: CPT | Performed by: SURGERY

## 2020-06-19 PROCEDURE — 00000146 ZZHCL STATISTIC GLUCOSE BY METER IP

## 2020-06-19 PROCEDURE — 86901 BLOOD TYPING SEROLOGIC RH(D): CPT | Performed by: STUDENT IN AN ORGANIZED HEALTH CARE EDUCATION/TRAINING PROGRAM

## 2020-06-19 PROCEDURE — 86850 RBC ANTIBODY SCREEN: CPT | Performed by: STUDENT IN AN ORGANIZED HEALTH CARE EDUCATION/TRAINING PROGRAM

## 2020-06-19 PROCEDURE — 25000131 ZZH RX MED GY IP 250 OP 636 PS 637: Mod: GY | Performed by: SURGERY

## 2020-06-19 PROCEDURE — 25800030 ZZH RX IP 258 OP 636: Performed by: SURGERY

## 2020-06-19 PROCEDURE — 86705 HEP B CORE ANTIBODY IGM: CPT | Performed by: STUDENT IN AN ORGANIZED HEALTH CARE EDUCATION/TRAINING PROGRAM

## 2020-06-19 PROCEDURE — 82803 BLOOD GASES ANY COMBINATION: CPT

## 2020-06-19 PROCEDURE — 85027 COMPLETE CBC AUTOMATED: CPT | Performed by: SURGERY

## 2020-06-19 PROCEDURE — 25000128 H RX IP 250 OP 636: Performed by: SURGERY

## 2020-06-19 PROCEDURE — 93010 ELECTROCARDIOGRAM REPORT: CPT | Performed by: INTERNAL MEDICINE

## 2020-06-19 PROCEDURE — 86803 HEPATITIS C AB TEST: CPT | Performed by: STUDENT IN AN ORGANIZED HEALTH CARE EDUCATION/TRAINING PROGRAM

## 2020-06-19 PROCEDURE — 85025 COMPLETE CBC W/AUTO DIFF WBC: CPT | Performed by: STUDENT IN AN ORGANIZED HEALTH CARE EDUCATION/TRAINING PROGRAM

## 2020-06-19 PROCEDURE — 86923 COMPATIBILITY TEST ELECTRIC: CPT | Performed by: STUDENT IN AN ORGANIZED HEALTH CARE EDUCATION/TRAINING PROGRAM

## 2020-06-19 PROCEDURE — G0499 HEPB SCREEN HIGH RISK INDIV: HCPCS | Performed by: STUDENT IN AN ORGANIZED HEALTH CARE EDUCATION/TRAINING PROGRAM

## 2020-06-19 PROCEDURE — 25800030 ZZH RX IP 258 OP 636: Performed by: STUDENT IN AN ORGANIZED HEALTH CARE EDUCATION/TRAINING PROGRAM

## 2020-06-19 PROCEDURE — 36000062 ZZH SURGERY LEVEL 4 1ST 30 MIN - UMMC: Performed by: SURGERY

## 2020-06-19 PROCEDURE — 25800030 ZZH RX IP 258 OP 636: Performed by: ANESTHESIOLOGY

## 2020-06-19 PROCEDURE — 25000125 ZZHC RX 250: Performed by: NURSE ANESTHETIST, CERTIFIED REGISTERED

## 2020-06-19 PROCEDURE — 40000275 ZZH STATISTIC RCP TIME EA 10 MIN

## 2020-06-19 PROCEDURE — 86645 CMV ANTIBODY IGM: CPT | Performed by: STUDENT IN AN ORGANIZED HEALTH CARE EDUCATION/TRAINING PROGRAM

## 2020-06-19 PROCEDURE — 86665 EPSTEIN-BARR CAPSID VCA: CPT | Performed by: STUDENT IN AN ORGANIZED HEALTH CARE EDUCATION/TRAINING PROGRAM

## 2020-06-19 PROCEDURE — 80053 COMPREHEN METABOLIC PANEL: CPT | Performed by: STUDENT IN AN ORGANIZED HEALTH CARE EDUCATION/TRAINING PROGRAM

## 2020-06-19 PROCEDURE — 93005 ELECTROCARDIOGRAM TRACING: CPT

## 2020-06-19 PROCEDURE — 36415 COLL VENOUS BLD VENIPUNCTURE: CPT | Performed by: STUDENT IN AN ORGANIZED HEALTH CARE EDUCATION/TRAINING PROGRAM

## 2020-06-19 PROCEDURE — 25000565 ZZH ISOFLURANE, EA 15 MIN: Performed by: SURGERY

## 2020-06-19 PROCEDURE — 81200002 ZZH ACQUISITION KIDNEY CADAVER

## 2020-06-19 PROCEDURE — 37000008 ZZH ANESTHESIA TECHNICAL FEE, 1ST 30 MIN: Performed by: SURGERY

## 2020-06-19 PROCEDURE — 25000128 H RX IP 250 OP 636: Performed by: ANESTHESIOLOGY

## 2020-06-19 PROCEDURE — 25000125 ZZHC RX 250: Performed by: STUDENT IN AN ORGANIZED HEALTH CARE EDUCATION/TRAINING PROGRAM

## 2020-06-19 PROCEDURE — 84132 ASSAY OF SERUM POTASSIUM: CPT | Performed by: SURGERY

## 2020-06-19 PROCEDURE — 12000004 ZZH R&B IMCU UMMC

## 2020-06-19 PROCEDURE — 25000128 H RX IP 250 OP 636: Performed by: NURSE ANESTHETIST, CERTIFIED REGISTERED

## 2020-06-19 PROCEDURE — 80061 LIPID PANEL: CPT | Performed by: STUDENT IN AN ORGANIZED HEALTH CARE EDUCATION/TRAINING PROGRAM

## 2020-06-19 PROCEDURE — 40000556 ZZH STATISTIC PERIPHERAL IV START W US GUIDANCE

## 2020-06-19 PROCEDURE — 82330 ASSAY OF CALCIUM: CPT

## 2020-06-19 PROCEDURE — 0T760DZ DILATION OF RIGHT URETER WITH INTRALUMINAL DEVICE, OPEN APPROACH: ICD-10-PCS | Performed by: SURGERY

## 2020-06-19 PROCEDURE — 86644 CMV ANTIBODY: CPT | Performed by: STUDENT IN AN ORGANIZED HEALTH CARE EDUCATION/TRAINING PROGRAM

## 2020-06-19 PROCEDURE — 84295 ASSAY OF SERUM SODIUM: CPT

## 2020-06-19 PROCEDURE — 86900 BLOOD TYPING SEROLOGIC ABO: CPT | Performed by: STUDENT IN AN ORGANIZED HEALTH CARE EDUCATION/TRAINING PROGRAM

## 2020-06-19 PROCEDURE — 84100 ASSAY OF PHOSPHORUS: CPT | Performed by: SURGERY

## 2020-06-19 PROCEDURE — 36415 COLL VENOUS BLD VENIPUNCTURE: CPT | Performed by: SURGERY

## 2020-06-19 PROCEDURE — 83735 ASSAY OF MAGNESIUM: CPT | Performed by: SURGERY

## 2020-06-19 PROCEDURE — 84702 CHORIONIC GONADOTROPIN TEST: CPT | Performed by: STUDENT IN AN ORGANIZED HEALTH CARE EDUCATION/TRAINING PROGRAM

## 2020-06-19 PROCEDURE — 40000986 XR CHEST PORT 1 VW

## 2020-06-19 PROCEDURE — 40000170 ZZH STATISTIC PRE-PROCEDURE ASSESSMENT II: Performed by: SURGERY

## 2020-06-19 PROCEDURE — 36000064 ZZH SURGERY LEVEL 4 EA 15 ADDTL MIN - UMMC: Performed by: SURGERY

## 2020-06-19 PROCEDURE — 85730 THROMBOPLASTIN TIME PARTIAL: CPT | Performed by: STUDENT IN AN ORGANIZED HEALTH CARE EDUCATION/TRAINING PROGRAM

## 2020-06-19 PROCEDURE — 71046 X-RAY EXAM CHEST 2 VIEWS: CPT

## 2020-06-19 PROCEDURE — 85018 HEMOGLOBIN: CPT | Performed by: SURGERY

## 2020-06-19 PROCEDURE — 82947 ASSAY GLUCOSE BLOOD QUANT: CPT

## 2020-06-19 PROCEDURE — 25800029 ZZH RX IP 258 OP 250: Performed by: SURGERY

## 2020-06-19 PROCEDURE — 76776 US EXAM K TRANSPL W/DOPPLER: CPT

## 2020-06-19 PROCEDURE — 25000132 ZZH RX MED GY IP 250 OP 250 PS 637: Mod: GY | Performed by: STUDENT IN AN ORGANIZED HEALTH CARE EDUCATION/TRAINING PROGRAM

## 2020-06-19 DEVICE — STENT URETERAL DBL PIGTAIL INLAY 6FRX16CM G14865
Type: IMPLANTABLE DEVICE | Site: URETER | Status: NON-FUNCTIONAL
Removed: 2020-07-08

## 2020-06-19 RX ORDER — FUROSEMIDE 10 MG/ML
INJECTION INTRAMUSCULAR; INTRAVENOUS PRN
Status: DISCONTINUED | OUTPATIENT
Start: 2020-06-19 | End: 2020-06-19

## 2020-06-19 RX ORDER — ONDANSETRON 2 MG/ML
4 INJECTION INTRAMUSCULAR; INTRAVENOUS EVERY 30 MIN PRN
Status: DISCONTINUED | OUTPATIENT
Start: 2020-06-19 | End: 2020-06-19 | Stop reason: HOSPADM

## 2020-06-19 RX ORDER — HYDROMORPHONE HYDROCHLORIDE 1 MG/ML
.3-.5 INJECTION, SOLUTION INTRAMUSCULAR; INTRAVENOUS; SUBCUTANEOUS
Status: DISCONTINUED | OUTPATIENT
Start: 2020-06-19 | End: 2020-06-19

## 2020-06-19 RX ORDER — PROPOFOL 10 MG/ML
INJECTION, EMULSION INTRAVENOUS PRN
Status: DISCONTINUED | OUTPATIENT
Start: 2020-06-19 | End: 2020-06-19

## 2020-06-19 RX ORDER — ONDANSETRON 2 MG/ML
4 INJECTION INTRAMUSCULAR; INTRAVENOUS EVERY 6 HOURS PRN
Status: DISCONTINUED | OUTPATIENT
Start: 2020-06-19 | End: 2020-06-24 | Stop reason: HOSPADM

## 2020-06-19 RX ORDER — ACETAMINOPHEN 325 MG/1
650 TABLET ORAL EVERY 4 HOURS PRN
Status: DISCONTINUED | OUTPATIENT
Start: 2020-06-22 | End: 2020-06-24 | Stop reason: HOSPADM

## 2020-06-19 RX ORDER — ACETAMINOPHEN 325 MG/1
975 TABLET ORAL ONCE
Status: COMPLETED | OUTPATIENT
Start: 2020-06-19 | End: 2020-06-19

## 2020-06-19 RX ORDER — SODIUM CHLORIDE 450 MG/100ML
INJECTION, SOLUTION INTRAVENOUS CONTINUOUS PRN
Status: DISCONTINUED | OUTPATIENT
Start: 2020-06-19 | End: 2020-06-20

## 2020-06-19 RX ORDER — AMOXICILLIN 250 MG
2 CAPSULE ORAL 2 TIMES DAILY
Status: DISCONTINUED | OUTPATIENT
Start: 2020-06-19 | End: 2020-06-24 | Stop reason: HOSPADM

## 2020-06-19 RX ORDER — CEFUROXIME SODIUM 1.5 G/16ML
1.5 INJECTION, POWDER, FOR SOLUTION INTRAVENOUS ONCE
Status: COMPLETED | OUTPATIENT
Start: 2020-06-19 | End: 2020-06-19

## 2020-06-19 RX ORDER — ONDANSETRON 4 MG/1
4 TABLET, ORALLY DISINTEGRATING ORAL EVERY 6 HOURS PRN
Status: DISCONTINUED | OUTPATIENT
Start: 2020-06-19 | End: 2020-06-24 | Stop reason: HOSPADM

## 2020-06-19 RX ORDER — VALGANCICLOVIR 450 MG/1
450 TABLET, FILM COATED ORAL EVERY OTHER DAY
Status: DISCONTINUED | OUTPATIENT
Start: 2020-06-20 | End: 2020-06-21

## 2020-06-19 RX ORDER — ONDANSETRON 2 MG/ML
INJECTION INTRAMUSCULAR; INTRAVENOUS PRN
Status: DISCONTINUED | OUTPATIENT
Start: 2020-06-19 | End: 2020-06-19

## 2020-06-19 RX ORDER — FENTANYL CITRATE 50 UG/ML
INJECTION, SOLUTION INTRAMUSCULAR; INTRAVENOUS PRN
Status: DISCONTINUED | OUTPATIENT
Start: 2020-06-19 | End: 2020-06-19

## 2020-06-19 RX ORDER — VERAPAMIL HYDROCHLORIDE 2.5 MG/ML
INJECTION, SOLUTION INTRAVENOUS PRN
Status: DISCONTINUED | OUTPATIENT
Start: 2020-06-19 | End: 2020-06-19 | Stop reason: HOSPADM

## 2020-06-19 RX ORDER — MANNITOL 20 G/100ML
INJECTION, SOLUTION INTRAVENOUS PRN
Status: DISCONTINUED | OUTPATIENT
Start: 2020-06-19 | End: 2020-06-19

## 2020-06-19 RX ORDER — ATORVASTATIN CALCIUM 10 MG/1
10 TABLET, FILM COATED ORAL DAILY
Status: DISCONTINUED | OUTPATIENT
Start: 2020-06-20 | End: 2020-06-24 | Stop reason: HOSPADM

## 2020-06-19 RX ORDER — AMOXICILLIN 250 MG
1 CAPSULE ORAL 2 TIMES DAILY
Status: DISCONTINUED | OUTPATIENT
Start: 2020-06-19 | End: 2020-06-24 | Stop reason: HOSPADM

## 2020-06-19 RX ORDER — MAGNESIUM OXIDE 400 MG/1
400 TABLET ORAL
Status: DISCONTINUED | OUTPATIENT
Start: 2020-06-21 | End: 2020-06-24 | Stop reason: HOSPADM

## 2020-06-19 RX ORDER — SODIUM CHLORIDE, SODIUM LACTATE, POTASSIUM CHLORIDE, CALCIUM CHLORIDE 600; 310; 30; 20 MG/100ML; MG/100ML; MG/100ML; MG/100ML
INJECTION, SOLUTION INTRAVENOUS CONTINUOUS
Status: DISCONTINUED | OUTPATIENT
Start: 2020-06-19 | End: 2020-06-19 | Stop reason: HOSPADM

## 2020-06-19 RX ORDER — OXYCODONE HYDROCHLORIDE 5 MG/1
5-10 TABLET ORAL
Status: DISCONTINUED | OUTPATIENT
Start: 2020-06-19 | End: 2020-06-20

## 2020-06-19 RX ORDER — HYDROMORPHONE HYDROCHLORIDE 1 MG/ML
.3-.5 INJECTION, SOLUTION INTRAMUSCULAR; INTRAVENOUS; SUBCUTANEOUS
Status: DISCONTINUED | OUTPATIENT
Start: 2020-06-19 | End: 2020-06-22

## 2020-06-19 RX ORDER — CARVEDILOL 25 MG/1
25 TABLET ORAL ONCE
Status: DISCONTINUED | OUTPATIENT
Start: 2020-06-19 | End: 2020-06-19 | Stop reason: HOSPADM

## 2020-06-19 RX ORDER — HYDROMORPHONE HYDROCHLORIDE 1 MG/ML
.3-.5 INJECTION, SOLUTION INTRAMUSCULAR; INTRAVENOUS; SUBCUTANEOUS EVERY 5 MIN PRN
Status: DISCONTINUED | OUTPATIENT
Start: 2020-06-19 | End: 2020-06-19 | Stop reason: HOSPADM

## 2020-06-19 RX ORDER — LIDOCAINE 4 G/G
3 PATCH TOPICAL
Status: DISCONTINUED | OUTPATIENT
Start: 2020-06-19 | End: 2020-06-24 | Stop reason: HOSPADM

## 2020-06-19 RX ORDER — CEFUROXIME SODIUM 1.5 G/16ML
1.5 INJECTION, POWDER, FOR SOLUTION INTRAVENOUS ONCE
Status: DISCONTINUED | OUTPATIENT
Start: 2020-06-19 | End: 2020-06-19

## 2020-06-19 RX ORDER — NALOXONE HYDROCHLORIDE 0.4 MG/ML
.1-.4 INJECTION, SOLUTION INTRAMUSCULAR; INTRAVENOUS; SUBCUTANEOUS
Status: DISCONTINUED | OUTPATIENT
Start: 2020-06-19 | End: 2020-06-19

## 2020-06-19 RX ORDER — HEPARIN SODIUM 1000 [USP'U]/ML
INJECTION, SOLUTION INTRAVENOUS; SUBCUTANEOUS PRN
Status: DISCONTINUED | OUTPATIENT
Start: 2020-06-19 | End: 2020-06-19

## 2020-06-19 RX ORDER — SULFAMETHOXAZOLE AND TRIMETHOPRIM 400; 80 MG/1; MG/1
1 TABLET ORAL DAILY
Status: DISCONTINUED | OUTPATIENT
Start: 2020-06-20 | End: 2020-06-24 | Stop reason: HOSPADM

## 2020-06-19 RX ORDER — SODIUM CHLORIDE 9 MG/ML
1000 INJECTION, SOLUTION INTRAVENOUS CONTINUOUS PRN
Status: DISCONTINUED | OUTPATIENT
Start: 2020-06-19 | End: 2020-06-20

## 2020-06-19 RX ORDER — PROCHLORPERAZINE MALEATE 5 MG
10 TABLET ORAL EVERY 6 HOURS PRN
Status: DISCONTINUED | OUTPATIENT
Start: 2020-06-19 | End: 2020-06-23

## 2020-06-19 RX ORDER — NALOXONE HYDROCHLORIDE 0.4 MG/ML
.1-.4 INJECTION, SOLUTION INTRAMUSCULAR; INTRAVENOUS; SUBCUTANEOUS
Status: ACTIVE | OUTPATIENT
Start: 2020-06-19 | End: 2020-06-20

## 2020-06-19 RX ORDER — OXYCODONE HYDROCHLORIDE 5 MG/1
5-10 TABLET ORAL EVERY 4 HOURS PRN
Status: DISCONTINUED | OUTPATIENT
Start: 2020-06-19 | End: 2020-06-19

## 2020-06-19 RX ORDER — FENTANYL CITRATE 50 UG/ML
25-50 INJECTION, SOLUTION INTRAMUSCULAR; INTRAVENOUS EVERY 5 MIN PRN
Status: DISCONTINUED | OUTPATIENT
Start: 2020-06-19 | End: 2020-06-19 | Stop reason: HOSPADM

## 2020-06-19 RX ORDER — ESMOLOL HYDROCHLORIDE 10 MG/ML
INJECTION INTRAVENOUS PRN
Status: DISCONTINUED | OUTPATIENT
Start: 2020-06-19 | End: 2020-06-19

## 2020-06-19 RX ORDER — MYCOPHENOLATE MOFETIL 250 MG/1
1000 CAPSULE ORAL
Status: DISCONTINUED | OUTPATIENT
Start: 2020-06-19 | End: 2020-06-24 | Stop reason: HOSPADM

## 2020-06-19 RX ORDER — TACROLIMUS 1 MG/1
2 CAPSULE ORAL
Status: DISCONTINUED | OUTPATIENT
Start: 2020-06-20 | End: 2020-06-21

## 2020-06-19 RX ORDER — ONDANSETRON 4 MG/1
4 TABLET, ORALLY DISINTEGRATING ORAL EVERY 30 MIN PRN
Status: DISCONTINUED | OUTPATIENT
Start: 2020-06-19 | End: 2020-06-19 | Stop reason: HOSPADM

## 2020-06-19 RX ORDER — ACETAMINOPHEN 325 MG/1
975 TABLET ORAL EVERY 8 HOURS
Status: DISCONTINUED | OUTPATIENT
Start: 2020-06-19 | End: 2020-06-20

## 2020-06-19 RX ORDER — SODIUM CHLORIDE 9 MG/ML
INJECTION, SOLUTION INTRAVENOUS CONTINUOUS PRN
Status: DISCONTINUED | OUTPATIENT
Start: 2020-06-19 | End: 2020-06-19

## 2020-06-19 RX ADMIN — ROCURONIUM BROMIDE 10 MG: 10 INJECTION INTRAVENOUS at 14:10

## 2020-06-19 RX ADMIN — ESMOLOL HYDROCHLORIDE 20 MG: 10 INJECTION, SOLUTION INTRAVENOUS at 15:04

## 2020-06-19 RX ADMIN — FENTANYL CITRATE 175 MCG: 50 INJECTION, SOLUTION INTRAMUSCULAR; INTRAVENOUS at 10:10

## 2020-06-19 RX ADMIN — MYCOPHENOLATE MOFETIL 1000 MG: 250 CAPSULE ORAL at 22:00

## 2020-06-19 RX ADMIN — SODIUM CHLORIDE: 9 INJECTION, SOLUTION INTRAVENOUS at 10:05

## 2020-06-19 RX ADMIN — MYCOPHENOLATE MOFETIL 1000 MG: 500 INJECTION, POWDER, LYOPHILIZED, FOR SOLUTION INTRAVENOUS at 11:31

## 2020-06-19 RX ADMIN — ROCURONIUM BROMIDE 20 MG: 10 INJECTION INTRAVENOUS at 14:55

## 2020-06-19 RX ADMIN — FENTANYL CITRATE 75 MCG: 50 INJECTION, SOLUTION INTRAMUSCULAR; INTRAVENOUS at 12:42

## 2020-06-19 RX ADMIN — FENTANYL CITRATE 25 MCG: 50 INJECTION, SOLUTION INTRAMUSCULAR; INTRAVENOUS at 18:05

## 2020-06-19 RX ADMIN — PROPOFOL 90 MG: 10 INJECTION, EMULSION INTRAVENOUS at 10:10

## 2020-06-19 RX ADMIN — HYDROMORPHONE HYDROCHLORIDE 0.3 MG: 1 INJECTION, SOLUTION INTRAMUSCULAR; INTRAVENOUS; SUBCUTANEOUS at 16:00

## 2020-06-19 RX ADMIN — ROCURONIUM BROMIDE 30 MG: 10 INJECTION INTRAVENOUS at 12:42

## 2020-06-19 RX ADMIN — DEXTROSE AND SODIUM CHLORIDE: 5; 900 INJECTION, SOLUTION INTRAVENOUS at 17:50

## 2020-06-19 RX ADMIN — SODIUM CHLORIDE 175 ML: 9 INJECTION, SOLUTION INTRAVENOUS at 18:00

## 2020-06-19 RX ADMIN — SODIUM CHLORIDE: 9 INJECTION, SOLUTION INTRAVENOUS at 14:46

## 2020-06-19 RX ADMIN — ROCURONIUM BROMIDE 10 MG: 10 INJECTION INTRAVENOUS at 16:20

## 2020-06-19 RX ADMIN — OXYCODONE HYDROCHLORIDE 10 MG: 5 TABLET ORAL at 22:00

## 2020-06-19 RX ADMIN — ACETAMINOPHEN 975 MG: 325 TABLET, FILM COATED ORAL at 09:16

## 2020-06-19 RX ADMIN — HYDROMORPHONE HYDROCHLORIDE 0.4 MG: 1 INJECTION, SOLUTION INTRAMUSCULAR; INTRAVENOUS; SUBCUTANEOUS at 18:10

## 2020-06-19 RX ADMIN — MIDAZOLAM 2 MG: 1 INJECTION INTRAMUSCULAR; INTRAVENOUS at 10:00

## 2020-06-19 RX ADMIN — FENTANYL CITRATE 50 MCG: 50 INJECTION, SOLUTION INTRAMUSCULAR; INTRAVENOUS at 16:46

## 2020-06-19 RX ADMIN — SODIUM CHLORIDE 38 ML: 4.5 INJECTION, SOLUTION INTRAVENOUS at 18:00

## 2020-06-19 RX ADMIN — ACETAMINOPHEN 975 MG: 325 TABLET, FILM COATED ORAL at 22:00

## 2020-06-19 RX ADMIN — ROCURONIUM BROMIDE 40 MG: 10 INJECTION INTRAVENOUS at 10:12

## 2020-06-19 RX ADMIN — FENTANYL CITRATE 100 MCG: 50 INJECTION, SOLUTION INTRAMUSCULAR; INTRAVENOUS at 10:15

## 2020-06-19 RX ADMIN — FENTANYL CITRATE 50 MCG: 50 INJECTION, SOLUTION INTRAMUSCULAR; INTRAVENOUS at 17:23

## 2020-06-19 RX ADMIN — HYDROMORPHONE HYDROCHLORIDE 0.5 MG: 1 INJECTION, SOLUTION INTRAMUSCULAR; INTRAVENOUS; SUBCUTANEOUS at 23:05

## 2020-06-19 RX ADMIN — SODIUM CHLORIDE 500 MG: 9 INJECTION, SOLUTION INTRAVENOUS at 11:00

## 2020-06-19 RX ADMIN — ONDANSETRON 4 MG: 2 INJECTION INTRAMUSCULAR; INTRAVENOUS at 16:21

## 2020-06-19 RX ADMIN — HYDROMORPHONE HYDROCHLORIDE 0.5 MG: 1 INJECTION, SOLUTION INTRAMUSCULAR; INTRAVENOUS; SUBCUTANEOUS at 21:22

## 2020-06-19 RX ADMIN — MANNITOL 25 G: 20 INJECTION, SOLUTION INTRAVENOUS at 14:05

## 2020-06-19 RX ADMIN — LIDOCAINE 3 PATCH: 560 PATCH PERCUTANEOUS; TOPICAL; TRANSDERMAL at 23:06

## 2020-06-19 RX ADMIN — HYDROMORPHONE HYDROCHLORIDE 0.5 MG: 1 INJECTION, SOLUTION INTRAMUSCULAR; INTRAVENOUS; SUBCUTANEOUS at 18:20

## 2020-06-19 RX ADMIN — ROCURONIUM BROMIDE 10 MG: 10 INJECTION INTRAVENOUS at 16:00

## 2020-06-19 RX ADMIN — SUGAMMADEX 200 MG: 100 INJECTION, SOLUTION INTRAVENOUS at 17:00

## 2020-06-19 RX ADMIN — FENTANYL CITRATE 50 MCG: 50 INJECTION, SOLUTION INTRAMUSCULAR; INTRAVENOUS at 17:50

## 2020-06-19 RX ADMIN — HYDROMORPHONE HYDROCHLORIDE 0.3 MG: 1 INJECTION, SOLUTION INTRAMUSCULAR; INTRAVENOUS; SUBCUTANEOUS at 20:00

## 2020-06-19 RX ADMIN — CEFUROXIME 1.5 G: 1.5 INJECTION, POWDER, FOR SOLUTION INTRAVENOUS at 11:15

## 2020-06-19 RX ADMIN — Medication 1000 UNITS: at 13:26

## 2020-06-19 RX ADMIN — FENTANYL CITRATE 25 MCG: 50 INJECTION, SOLUTION INTRAMUSCULAR; INTRAVENOUS at 17:45

## 2020-06-19 RX ADMIN — FENTANYL CITRATE 50 MCG: 50 INJECTION, SOLUTION INTRAMUSCULAR; INTRAVENOUS at 17:04

## 2020-06-19 RX ADMIN — FENTANYL CITRATE 50 MCG: 50 INJECTION, SOLUTION INTRAMUSCULAR; INTRAVENOUS at 16:44

## 2020-06-19 RX ADMIN — ANTI-THYMOCYTE GLOBULIN (RABBIT) 125 MG: 5 INJECTION, POWDER, LYOPHILIZED, FOR SOLUTION INTRAVENOUS at 11:29

## 2020-06-19 RX ADMIN — FUROSEMIDE 100 MG: 10 INJECTION, SOLUTION INTRAVENOUS at 14:07

## 2020-06-19 RX ADMIN — ESMOLOL HYDROCHLORIDE 20 MG: 10 INJECTION, SOLUTION INTRAVENOUS at 14:55

## 2020-06-19 RX ADMIN — ROCURONIUM BROMIDE 30 MG: 10 INJECTION INTRAVENOUS at 11:11

## 2020-06-19 RX ADMIN — FENTANYL CITRATE 50 MCG: 50 INJECTION, SOLUTION INTRAMUSCULAR; INTRAVENOUS at 13:09

## 2020-06-19 RX ADMIN — HYDROMORPHONE HYDROCHLORIDE 0.2 MG: 1 INJECTION, SOLUTION INTRAMUSCULAR; INTRAVENOUS; SUBCUTANEOUS at 16:20

## 2020-06-19 RX ADMIN — HYDROMORPHONE HYDROCHLORIDE 0.3 MG: 1 INJECTION, SOLUTION INTRAMUSCULAR; INTRAVENOUS; SUBCUTANEOUS at 17:55

## 2020-06-19 ASSESSMENT — ACTIVITIES OF DAILY LIVING (ADL)
RETIRED_COMMUNICATION: 0-->UNDERSTANDS/COMMUNICATES WITHOUT DIFFICULTY
FALL_HISTORY_WITHIN_LAST_SIX_MONTHS: NO
TRANSFERRING: 0-->INDEPENDENT
DRESS: 0-->INDEPENDENT
RETIRED_EATING: 0-->INDEPENDENT
TOILETING: 0-->INDEPENDENT
BATHING: 0-->INDEPENDENT
AMBULATION: 0-->INDEPENDENT
SWALLOWING: 0-->SWALLOWS FOODS/LIQUIDS WITHOUT DIFFICULTY
COGNITION: 0 - NO COGNITION ISSUES REPORTED

## 2020-06-19 ASSESSMENT — ENCOUNTER SYMPTOMS: APNEA: 0

## 2020-06-19 NOTE — PROGRESS NOTES
Patient removed from UNOS waitlist after  donor kidney transplant. UNOS ID [id number].   Donor PHS increased risk: Yes.   If Yes, MD documentation Done, Tommy

## 2020-06-19 NOTE — PROGRESS NOTES
PATHOLOGY HLA CROSSMATCH CONSULTATION: DONOR/RECIPIENT  VIRTUAL CROSSMATCH - Kidney  Consultation Date: 2020  Consultation Requested by: Dr. John Cornelius    Regarding: Compatibility of  donor organ UNOS #AHFP [id number] from OPO: MNOP with Jelly Dietz    Findings: Regarding a virtual crossmatch between Jelly Dietz and  donor listed above (match [id number]):  The most recent (20) and 10 additional patient serum/sera  were analyzed.  The patient has no antibodies listed with HLA specificity against the donor organ.      Record Review Indicates: I personally reviewed the most recent serum, the historic peak sera, and all other sera with solid-phase HLA Single Antigen test results:  The patient has no HLA antibodies against the donor organ.     The results of this virtual XM are:   -most recent serum: compatible   -peak #1: compatible  -peak #2: compatible    Disclaimer: Clinical judgement must take into account other factors, such as non-HLA antibodies not detected in the assay. The VXM gives probabilities only.  The probability does not account for the potential for auto-antibodies that may be present in the patient's serum.  These autoantibodies may render the physical crossmatch falsely positive, and would be detected by an autologous crossmatch.  When possible, confirm findings with prospective allogeneic and autologous flow crossmatches before going to transplant as clinically indicated.     Lito Thibodeaux, PhD    Lito Thibodeaux, PhD,[facility]  Medical Director, Immunology/Histocompatibility Laboratory  Pager [phone]

## 2020-06-19 NOTE — PHARMACY-ADMISSION MEDICATION HISTORY
Admission medication history interview status for the 6/19/2020 admission is complete. See Epic admission navigator for allergy information, pharmacy, prior to admission medications and immunization status.     Medication history interview sources:  Patient    Changes made to PTA medication list (reason)  Added: None  Deleted: vitamin B, miralax, nitrofurantoin, sevelamer (patient reported not taking)  Changed: None    Additional medication history information (including reliability of information, actions taken by pharmacist): None      Prior to Admission medications    Medication Sig Last Dose Taking? Auth Provider   cetirizine (ZYRTEC) 10 MG tablet Take 10 mg by mouth nightly as needed for allergies  Past Week at AM Yes Unknown, Entered By History   amLODIPine (NORVASC) 10 MG tablet Take 1 tablet (10 mg) by mouth daily 6/18/2020 at AM  Katherine Varma MD   carvedilol (COREG) 25 MG tablet Take 1 tablet (25 mg) by mouth 2 times daily (with meals) 6/18/2020 at AM  Katherine Varma MD   cloNIDine (CATAPRES) 0.1 MG tablet Take 1 tablet (0.1 mg) by mouth 3 times daily as needed 6/18/2020  Katherine Varma MD         Medication history completed by: Rob Parham, LisaD

## 2020-06-19 NOTE — LETTER
Transition Communication Hand-off for Care Transitions to Next Level of Care Provider    Name: Jelly Dietz  : 1985  MRN #: [id number]  Primary Care Provider: Physician No Ref-Primary     Primary Clinic: No address on file     Reason for Hospitalization:  ESRD  Kidney transplant candidate  Kidney transplanted  Admit Date/Time: 2020  3:20 AM  Discharge Date:   Payor Source: Payor: MEDICARE / Plan: MEDICARE / Product Type: Medicare /              Reason for Communication Hand-off Referral: Other KT    Discharge Plan:       Concern for non-adherence with plan of care:   Y/N N  Discharge Needs Assessment:  Needs      Most Recent Value   Anticipated Changes Related to Illness  none   Home Care  [facility] [phone], Fax: [phone]              Follow-up plan:    Future Appointments   Date Time Provider Department Center   2020  6:30 AM UC LAB [facility]   2020  7:00 AM UC SPEC INFUSION [facility]INPR [facility]   2020  8:00 AM Kieran Anglin MD INPR [facility]   2020  6:30 AM UC LAB [facility]   2020  7:00 AM UC SPEC INFUSION [facility]   2020  8:00 AM Kieran Anglin MD UCINPR [facility]   2020 11:00 AM Beatris Anderson, APRN [facility]   2020  9:30 AM Kailey Santos NP UCTXS [facility]   2020  9:30 AM UC LAB [facility]CLAB [facility]   2020 10:30 AM Transplant, Uc Early Post UCMRE [facility]   2020  9:00 AM Kailey Santos NP [facility]   2020  9:30 AM UC LAB [facility]   2020 10:30 AM Transplant, Uc Early Post [facility]RE [facility]   10/20/2020  9:00 AM UC LAB [facility]AB [facility]   10/20/2020 10:00 AM Transplant, Uc Early Post [facility]   10/20/2020  1:00 PM Anam Hermosillo, [facility]   2020  9:00 AM  LAB UCLAB [facility]   2020 10:00 AM [facility]   2020  8:00 AM Marianne Gutierrez, MARYJANE [facility]       Any outstanding tests or procedures:       "  Referrals     Future Labs/Procedures    Home care nursing referral     Comments:    [facility]  Ph: [phone]      RN skilled nursing visits, to begin after completion of ATC clinic ([phone]) visits--start approx : 6/27 or 6/28    +++Pt currently does not have a PCP---she wants to establish care at the [facility] in the [facility]--I have sent request to schedule--please use TRansplant MD: Irma Shannon for orders until then Ph: [phone] ++++  Pt is scheduled to have a video visit with NP Beatris Anderson on Wednesday, July 1st at 11:00 AM.        RN to assess vital signs and weight, respiratory and cardiac status, patients ability to take and record daily blood pressure, temp and weight, pain level and activity tolerance, incision for signs/symptoms of infection, hydration, nutrition and bowel status and home safety.    RN to teach medication management.   Assist / teach patient to obtain and record lab results in handbook     RN to provide morning lab draws, per MD orders, and report results to Outpatient Care Coordinator\" Matilda Huber  Ph: [phone]  Fax: [phone]        Your provider has ordered home care nursing services. If you have not been contacted within 2 days of your discharge please call the inpatient department phone number at [phone] .            Trent Recommendations:      Morena Alvarez RN    AVS/Discharge Summary is the source of truth; this is a helpful guide for improved communication of patient story          "

## 2020-06-19 NOTE — OR NURSING
"In report 7A nurse stated that Covid swab sent to lab on the night shift. Lab called for results and stated swab wasn't checked in \"until an hour ago\" (8:30am). Lab stated that results should be available in an hour.  "

## 2020-06-19 NOTE — OR NURSING
Dr. Shannon is at bedside. Covid test already resulted negative and doesn't want to patient to have a Coreg dose in preop.

## 2020-06-19 NOTE — H&P
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 Her PMH includes HTN, CAD.     Plan:  -[place] to outpatient for pre-op work-up  -Pre-op labs, including BMP, CBC, coag panel, viral serologies  -EKG, CXR      Tino Louis    Code Status   Full Code    Primary Care Physician   Physician No Ref-Primary    Chief Complaint   Kidney Transplant    History is obtained from the patient    History of Present Illness   Jelly Dietz is a 35 year old female with a past medical history significant for end stage kidney disease secondary to IGA nephropathy. She had a LDKT in  in [place] that failed d/t noncompliance during pregnancy. She has been on hemodialysis since . She underwent a transplant nephrectomy in  for hematuria.      The patient is on dialysis.  Modality: HD, via [facility].    Dialysis days: Monday, Wednesday, Friday.  Last dialysis run:   is not making any substantial urine prior to transplantation.    H/o blood transfusion: yes, 2 in lifetime    Other past medical history includes HTN    At the time of admission, the patient endorses some anxiety    Past Medical History    I have reviewed this patient's medical history and updated it with pertinent information if needed.   Past Medical History:   Diagnosis Date     ACS (acute coronary syndrome) (H) 2014     Allergic state     seasonal allergies     Anemia in chronic renal disease      Anxiety      Cervical cerclage suture present in second trimester      Chest pain 2014     Chronic renal transplant rejection      End stage kidney disease (H)      Heart murmur      History of  delivery ,      30 wks, 28 wks      Hypertension     resolved after transplant     IgA nephropathy      MRSA (methicillin resistant Staphylococcus aureus)      [facility]     Pre-eclampsia      Renal failure affecting pregnancy in second trimester      S/P kidney transplant     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 History of 1B kidney rejection      SAB (spontaneous )     2nd trimester        Past Surgical History   I have reviewed this patient's surgical history and updated it with pertinent information if needed.  Past Surgical History:   Procedure Laterality Date     CERCLAGE CERVICAL N/A 2015    Procedure: CERCLAGE CERVICAL;  Surgeon: Norbert Chopra MD;  Location: [facility] L[facility]      SECTION  2012    Procedure:  SECTION;;  Surgeon: Chelsea Cross MD;  Location: [facility] L[facility]      SECTION N/A 2015    Procedure:  SECTION;  Surgeon: Chelsea Cross MD;  Location: [facility] OR     cesearean section       EXPLANT TRANSPLANTED KIDNEY  3/29/2013    Procedure: EXPLANT TRANSPLANTED KIDNEY;  Explant Transplanted right Kidney (from patients right iliac fossa);  Surgeon: Nory Morgan MD;  [facility]: [facility]     NEPHRECTOMY  3/29/13    Transplant nephrectomy      WILIAN/DIALYSIS CATHETER       TRANSPLANT KIDNEY RECIPIENT LIVING UNRELATED  Dec 2007    (Adult male in [place])       Prior to Admission Medications   Prior to Admission Medications   Prescriptions Last Dose Informant Patient Reported? Taking?   B Complex-C-Folic Acid (DIALYVITE) TABS   No No   Si tablet by mouth daily   RENVELA 800 MG tablet   Yes No   Sig: Take 1 tablet by mouth 3 times daily (with meals)   acetaminophen (TYLENOL) 325 MG tablet  Self No No   Sig: Take 1-2 tablets by mouth every 4 hours as needed.   amLODIPine (NORVASC) 10 MG tablet   No No   Sig: Take 1 tablet (10 mg) by mouth daily   carvedilol  (COREG) 25 MG tablet  Self No No   Sig: Take 1 tablet (25 mg) by mouth 2 times daily (with meals)   cetirizine (ZYRTEC) 10 MG tablet  Self Yes No   Sig: Take 10 mg by mouth At Bedtime   cloNIDine (CATAPRES) 0.1 MG tablet  Self No No   Sig: Take 1 tablet (0.1 mg) by mouth 3 times daily as needed   nitroFURantoin macrocrystal-monohydrate (MACROBID) 100 MG capsule   No No   Sig: Take 1 capsule (100 mg) by mouth 2 times daily   polyethylene glycol (MIRALAX) PO powder   No No   Sig: Take 17 g (1 capful) by mouth daily      Facility-Administered Medications: None     Allergies   No Known Allergies    Social History   I have reviewed this patient's social history and updated it with pertinent information if needed. Jelly Dietz  reports that she has never smoked. She has never used smokeless tobacco. She reports that she does not drink alcohol or use drugs.    Family History   I have reviewed this patient's family history and updated it with pertinent information if needed.   Family History   Problem Relation Age of Onset     Diabetes Paternal Grandfather      Breast Cancer Maternal Grandmother 55     Cancer No family hx of         No known family hx of skin cancer       Review of Systems   The 10 point Review of Systems is negative other than noted in the HPI or here.     Physical Exam   Temp: 98.8  F (37.1  C) Temp src: Oral BP: (!) 144/107 Pulse: 83   Resp: 16 SpO2: 99 % O2 Device: None (Room air)    Vital Signs with Ranges  Temp:  [98.8  F (37.1  C)] 98.8  F (37.1  C)  Pulse:  [83] 83  Resp:  [16] 16  BP: (144)/(107) 144/107  SpO2:  [99 %] 99 %  136 lbs 14.49 oz    NAD  Anicteric  RRR  Normal work of breathing  Abdomen soft, nontender, nondistended. Well healed incision from previous transplant.   WWP. Trace edema.     Data   Results for orders placed or performed during the hospital encounter of 06/19/20 (from the past 24 hour(s))   XR Chest 2 Views    Narrative    XR CHEST 2 VW on 6/19/2020 3:50 AM.    INDICATION:  Pre kidney transplant.    COMPARISON: Radiograph dated 10/29/2018    FINDINGS:   PA and lateral upright radiographs of the chest. Trachea is clear.  Cardiomediastinal silhouette is within normal limits. Pulmonary  vasculature is distinct. No pneumothorax or pleural effusions. No  focal airspace opacity. The visualized upper abdomen is unremarkable.  No acute osseous abnormality.      Impression    IMPRESSION:   No acute findings in the chest   EKG 12-lead, tracing only   Result Value Ref Range    Interpretation ECG Click View Image link to view waveform and result    CBC with platelets differential   Result Value Ref Range    WBC 3.0 (L) 4.0 - 11.0 10e9/L    RBC Count 2.82 (L) 3.8 - 5.2 10e12/L    Hemoglobin 8.8 (L) 11.7 - 15.7 g/dL    Hematocrit 28.5 (L) 35.0 - 47.0 %     (H) 78 - 100 fl    MCH 31.2 26.5 - 33.0 pg    MCHC 30.9 (L) 31.5 - 36.5 g/dL    RDW 14.1 10.0 - 15.0 %    Platelet Count 156 150 - 450 10e9/L    Diff Method Automated Method     % Neutrophils 59.8 %    % Lymphocytes 24.8 %    % Monocytes 6.4 %    % Eosinophils 7.7 %    % Basophils 1.3 %    % Immature Granulocytes 0.0 %    Nucleated RBCs 0 0 /100    Absolute Neutrophil 1.8 1.6 - 8.3 10e9/L    Absolute Lymphocytes 0.7 (L) 0.8 - 5.3 10e9/L    Absolute Monocytes 0.2 0.0 - 1.3 10e9/L    Absolute Eosinophils 0.2 0.0 - 0.7 10e9/L    Absolute Basophils 0.0 0.0 - 0.2 10e9/L    Abs Immature Granulocytes 0.0 0 - 0.4 10e9/L    Absolute Nucleated RBC 0.0    ABO/Rh type and screen   Result Value Ref Range    ABO PENDING     Antibody Screen PENDING     Test Valid Only At          [facility]    Specimen Expires 06/22/2020

## 2020-06-19 NOTE — OP NOTE
Transplant Surgery  Operative Note     Procedure date:  06/19/20    Preoperative diagnosis:  End Stage renal failure due to IgA nephropathy    Postoperative diagnosis:  Same    Procedure:  1. Right kidney  Re- transplant,  Donation after Brain Death, Left iliac fossa, with venous reconstruction. A J-J ureteral stent was placed.  2. Kidney allograft preparation on Back Table       Surgeon:  YAZMIN MIRZA    Fellow/Assistant:  CHARLES Cornelius MD, fellow There was no qualified resident to assist with this procedure.    Anesthesia:  General    Specimen:  None    Drains:  no drain    Urine output:  200 mls    Estimated blood loss:  100    Fluids administered:       Indication: The patient has End Stage renal failure due to IgA nephropathy and received an organ offer for a Donation after Brain Death kidney allograft. After discussing the risks and benefits of proceeding, the patient agreed to proceed with surgery and provided informed consent.  Findings: Integrity of recipient artery: Normal   Triple artery on a common carrel patch. A large main renal artery and 2 small upper pole arteries.  Caval conduit and single ureter. Initially upon reperfusion kidney was soft, but after verapamil was administered kidney became turgid and normal appearing    Final ABO/Crossmatch verification: After the donor organ arrived to the operating room and prior to anastomosis, I participated in the transplant pre-verification upon organ receipt timeout by visually verifying the donor ID, organ and laterality, donor blood type, recipient unique identifier, recipient blood type, and that the donor and recipient are blood type compatible. The crossmatch was done prospectively; the T cell flow crossmatch result was negative and B cell flow crossmatch result was negative prior to anastomosis.  The patient received Thymoglobulin, Cellcept and Solumedrol on induction.    Donor Organ Information:   Donor UNOS ID:  [id number]    Donor arterial clamp  on:  6/19/2020  5:56 AM    Total ischemic time:  514 min    Cold ischemic time:  461 min    Warm ischemic time:  53 min    Preservation fluid:   UW     Back Table Details:   Procedure:  Bench preparation of the kidney allograft for transplantation with venous reconstruction    Surgeon:  YAZMIN MIRZA    Faculty Co-Surgeon:  YAZMIN MIRZA    Fellow/Assistant:  CHARLES Cornelius MD, fellow There was no qualified resident to assist with this procedure.    Donor arrival to recipient room:  6/19/2020 10:30 AM    Graft injury:  none    Graft biopsy:  none    Organ received on:  Ice    Pump resistance:      Pump flow:      Arterial anatomy:  Triple     Donor arterial quality:  Normal    Venous anatomy:  Single    Ureteral anatomy:  Single    Any reconstruction:      Artery:  none    Vein:  yes- caval conduit made     Complications: None.    Findings: Normal        None.    Back Table Preparation:  The donor kidney was received and inspected. It had been flushed with UW. The graft was prepared on the back table by removing perinephric fat and ligating venous tributaries and lymphatics. The ureter was also cleaned of excess tissue. If required, reconstruction was performed as detailed above. The kidney was stored in iced cold preservation solution until ready for transplantation. Faculty was present for the critical portions of the procedure.    Operative Procedure:   Arterial anastomosis start:  6/19/2020  1:37 PM    Arterial unclamp:  6/19/2020  2:30 PM    Extra vessels used:   no      The patient was brought to the operating room, placed in a supine position, and a time out was performed. Sequential compression devices were placed on both lower extremities and general endotracheal anesthesia was induced.  The patient was given IV antibiotics and a Bauman catheter. A central line was placed by Anesthesia service. The abdomen was then shaved, prepped, and draped in the usual sterile fashion.  An incision was made in  the left lower quadrant and carried down through the subcutaneous tissue and the abdominal wall fascia. If encountered, the epigastric vessels were ligated in continuity, divided and secured with surgical clips. The left iliac artery and vein were exposed. The retractor system was placed and the lymphatics overlying the vessels were serially ligated and divided.     The patient was heparinized. We applied atraumatic vascular clamps and the donor kidney was brought to the operative field. We made a venotomy and the caval conduit was anastomosed to the recipient left External Iliac vein in an end-to-side fashion. An arteriotomy was made and the donor renal artery was anastomosed to the recipient left external iliac artery  in an end to side fashion. The patient was simultaneously loaded with IV mannitol, Lasix and volume. The renal artery was protected and the clamps were removed. After several cardiac cycles, we opened the renal artery and the kidney had Good reperfusion and was firm, pink  and normal.    The transplant ureter was managed by creating a Liche (anterior multistitch) anastomosis with absorbable suture. A stent was placed across the anastomosis. The kidney made Yes urine prior to implantation.    Hemostasis was obtained, the anastomoses inspected, and the kidney placed in the iliac fossa. After placement, the vessel lay was inspected and found to be acceptable. The kidney position was Retroperitoneal. The field was irrigated with antibiotic solution. No drain was placed. The retractor was removed and the abdominal wall fascia reapproximated. Subcutaneous tissues were irrigated and hemostasis obtained.  The skin was reapproximated with running subcuticular stitch and dermabond was applied.   All needle, sponge and instrument counts were correct x 2. The patient was awakened, extubated, and transferred to PACU for post-op monitoring. Faculty was present for key portions of the procedure.

## 2020-06-19 NOTE — PROGRESS NOTES
0312 - [id number]    34 y/o F w/hx of end stage CKD secondary to IgA nephropathy, s/p previous kidney transplant (2007, failed) now here for repeat kidney transplant. PMH includes HTN, CAD.     Vitals: Temp: 98.8  F (37.1  C) Temp src: Oral BP: (!) 144/107 Pulse: 83   Resp: 16 SpO2: 99 % O2 Device: None (Room air)    Endocrine: bg 90 @ 0411  Labs: pre-op labs drawn @ 0411  Pain: denies  Diet: NPO  LDA: Lfistula, RPIV  GI: wdl  : anuric; hemodialysis   Skin: scarring, otherwise wdl  Neuro: alert&oriented x4.   Mobility: independent  Education: pre-op education  Plan: Patient to leave unit at 0729 w/OR time of 0900.

## 2020-06-19 NOTE — ANESTHESIA PREPROCEDURE EVALUATION
Anesthesia Pre-Procedure Evaluation    Patient: Jelly Dietz   MRN:     [id number] Gender:   female   Age:    35 year old :      1985        Preoperative Diagnosis: End stage renal disease (H) [N18.6]   Procedure(s):  TRANSPLANT, KIDNEY, RECIPIENT,  DONOR     LABS:  CBC:   Lab Results   Component Value Date    WBC 3.0 (L) 2020    WBC 3.9 (L) 10/29/2018    HGB 8.8 (L) 2020    HGB 10.2 (L) 10/29/2018    HCT 28.5 (L) 2020    HCT 25.7 (L) 10/29/2018     2020     10/29/2018     BMP:   Lab Results   Component Value Date     2020     2020    POTASSIUM 3.8 2020    POTASSIUM 3.7 2020    CHLORIDE 101 2020    CHLORIDE 102 2020    CO2 25 2020    CO2 27 2020    BUN 50 (H) 2020    BUN 39 (H) 2020    CR 10.10 (H) 2020    CR 7.89 (H) 2020    GLC 90 2020     (H) 2020     COAGS:   Lab Results   Component Value Date    PTT 31 2020    INR 1.23 (H) 2020    FIBR 634 (H) 07/15/2012     POC:   Lab Results   Component Value Date     (H) 2018    HCG Negative 2018    HCGS Negative 2020     OTHER:   Lab Results   Component Value Date    PH 7.31 (L) 2012    LACT 0.5 2015    A1C 5.1 2020    CASIE 7.5 (L) 2020    PHOS 10.4 (H) 10/30/2018    MAG 1.9 10/30/2018    ALBUMIN 3.4 2020    PROTTOTAL 7.0 2020    ALT 13 2020    AST 11 2020    ALKPHOS 54 2020    BILITOTAL 0.4 2020    LIPASE 126 2015    TSH 1.02 2012    T4 1.05 2012        Preop Vitals    BP Readings from Last 3 Encounters:   20 (!) 144/107   20 (!) 135/97   19 124/88    Pulse Readings from Last 3 Encounters:   20 83   20 73   19 85      Resp Readings from Last 3 Encounters:   20 16   20 18   19 18    SpO2 Readings from Last 3 Encounters:   20 99%   20 100%  "  19 99%      Temp Readings from Last 1 Encounters:   20 37.1  C (98.8  F) (Oral)    Ht Readings from Last 1 Encounters:   20 1.676 m (5' 6\")      Wt Readings from Last 1 Encounters:   20 62.1 kg (136 lb 14.5 oz)    Estimated body mass index is 22.1 kg/m  as calculated from the following:    Height as of this encounter: 1.676 m (5' 6\").    Weight as of this encounter: 62.1 kg (136 lb 14.5 oz).     LDA:  Peripheral IV 20 Right;Lateral Lower forearm (Active)   Site Assessment WDL 20 0706   Line Status Saline locked 20 0706   Number of days: 0       Hemodialysis Vascular Access Arteriovenous graft Left Arm (Active)   Site Assessment WDL;Bruit present;Thrill present 10/30/18 1019   Cannulation Needle Size 15 10/30/18 1019   Dressing Intervention New dressing 10/30/18 1440   Dressing Status Clean, dry, intact 10/30/18 1440   Hand Off Report Yes 10/30/18 1440   Number of days:        ETT (Active)   Number of days: 0        Past Medical History:   Diagnosis Date     ACS (acute coronary syndrome) (H) 2014     Allergic state     seasonal allergies     Anemia in chronic renal disease      Anxiety      Cervical cerclage suture present in second trimester      Chest pain 2014     Chronic renal transplant rejection      End stage kidney disease (H)      Heart murmur      History of  delivery ,     30 wks, 28 wks      Hypertension     resolved after transplant     IgA nephropathy      MRSA (methicillin resistant Staphylococcus aureus)      [facility]     Pre-eclampsia      Renal failure affecting pregnancy in second trimester      S/P kidney transplant     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 History of 1B kidney rejection      SAB (spontaneous )     2nd trimester       Past Surgical History:   Procedure Laterality Date     CERCLAGE CERVICAL N/A 2015    " Procedure: CERCLAGE CERVICAL;  Surgeon: Norbert Chopra MD;  Location: [facility]      SECTION  2012    Procedure:  SECTION;;  Surgeon: Chelsea Cross MD;  Location: [facility]      SECTION N/A 2015    Procedure:  SECTION;  Surgeon: Chelsea Cross MD;  Location: [facility] OR     cesearean section       EXPLANT TRANSPLANTED KIDNEY  3/29/2013    Procedure: EXPLANT TRANSPLANTED KIDNEY;  Explant Transplanted right Kidney (from patients right iliac fossa);  Surgeon: Nory Morgan MD;  Location: [facility] OR     NEPHRECTOMY  3/29/13    Transplant nephrectomy      WILIAN/DIALYSIS CATHETER       TRANSPLANT KIDNEY RECIPIENT LIVING UNRELATED  Dec 2007    (Adult male in [place])      No Known Allergies     Anesthesia Evaluation    ROS/Med Hx   Comments: Met with Jelly who has been NPO and  is scheduled for a cadaveric donor renal transplant for her ESRD - this is her 2nd renal transplant and has been on hemodialysis since . Last dialyzed on Wednesday; due for dialysis today via her left arm AV fistula.         Cardiovascular Findings   (+) hypertension,   Comments: TTE: 3/28/2019  Interpretation Summary  Global and regional left ventricular function is normal with an EF of 60-65%.  Right ventricular function, chamber size, wall motion, and thickness are  normal.  No significant valvular disease.  The thoracic aorta is normal.  Compared to prior TTE dated 17, no change.    EK20  NSR -     Neuro Findings - negative ROS    Pulmonary Findings   (-) asthma and apnea  Comments: Was a [place] smoker in the past.     HENT Findings - negative HENT ROS    Skin Findings - negative skin ROS      GI/Hepatic/Renal Findings   (+) renal disease  (-) GERD    Endocrine/Metabolic Findings       Comments: History of thyroid disease but says is treated.         Additional Notes  Allergies:  No Known Allergies    Medications Prior to Admission:  acetaminophen (TYLENOL) 325 MG tablet,  Take 1-2 tablets by mouth every 4 hours as needed., Disp: 60 tablet, Rfl: 0  amLODIPine (NORVASC) 10 MG tablet, Take 1 tablet (10 mg) by mouth daily, Disp: 30 tablet, Rfl: 11  B Complex-C-Folic Acid (DIALYVITE) TABS, 1 tablet by mouth daily, Disp: 30 tablet, Rfl: 11  carvedilol (COREG) 25 MG tablet, Take 1 tablet (25 mg) by mouth 2 times daily (with meals), Disp: 60 tablet, Rfl: 11  cetirizine (ZYRTEC) 10 MG tablet, Take 10 mg by mouth At Bedtime, Disp: , Rfl:   cloNIDine (CATAPRES) 0.1 MG tablet, Take 1 tablet (0.1 mg) by mouth 3 times daily as needed, Disp: 60 tablet, Rfl: 3  nitroFURantoin macrocrystal-monohydrate (MACROBID) 100 MG capsule, Take 1 capsule (100 mg) by mouth 2 times daily, Disp: 6 capsule, Rfl: 0  () polyethylene glycol (MIRALAX) PO powder, Take 17 g (1 capful) by mouth daily, Disp: 527 g, Rfl: 0  RENVELA 800 MG tablet, Take 1 tablet by mouth 3 times daily (with meals), Disp: , Rfl:             PHYSICAL EXAM:   Mental Status/Neuro: A/A/O   Airway: Facies: Feasible  Mallampati: II  Mouth/Opening: Full  TM distance: > 6 cm  Neck ROM: Full   Respiratory: Auscultation: CTAB     Resp. Rate: Normal     Resp. Effort: Normal      CV: Rhythm: Regular  Rate: Age appropriate  Heart: Murmur  Edema: None   Comments:      Dental: Details                  Assessment:   ASA SCORE: 3    H&P: History and physical reviewed and following examination; no interval change.    NPO Status: NPO Appropriate     Plan:   Anes. Type:  General   Pre-Medication: None   Induction:  IV (Standard)   Airway: ETT; Oral   Access/Monitoring: PIV; CVL; 2nd PIV   Maintenance: Balanced     Postop Plan:   Postop Pain: Opioids  Postop Sedation/Airway: Not planned  Disposition: Inpatient/Admit     PONV Management:   Adult Risk Factors: Female, Postop Opioids   Prevention: Ondansetron     CONSENT: Direct conversation   Plan and risks discussed with: Patient   Blood Products: Consented (ALL Blood Products) (verbal consent obtained)        Comments for Plan/Consent:  She requests GA. Procedures and risks including those of invasive lines explained. She understood and consented. Qs answered.            Jc Valencia MD

## 2020-06-19 NOTE — TELEPHONE ENCOUNTER
Organ Offer Encounter Information    Organ Offer Information  Organ offer date & time:  6/18/2020  7:23 AM  Coordinator/Fellow/Attending name:  Sophia Valdes RN   Organ(s):   Organ UNOS ID Match Run ID Comment Organ Laterality   Kidney [id number]  [id number] MNOP; Back Up       Recent infections?:  No    New medications?:  No Recent pregnancy?:  No   Angicoagulation medications?:  No Recent vaccinations?:  No   Recent blood transfusions?:  No Recent hospitalizations?:  No   Has your insurance changed in the last 6-12 months?:  Neg    Patient last dialyzed:  6/17/2020  7:28 AM  Dialysis type:  Hemo  Discussed organ offer with:  Patient  Patient/Caregiver name:  Jelly  Discussed risk category with Patient/Other:  [employer] Risk  Did not understand donor criteria, questions answered, verbalized understanding  Patient/Other asked to speak to a surgeon?:  No  Discussed program-specific outcomes:  Did not have questions regarding SRTR  Right to decline organ offer without penalty, Patient/Other:  Aware of option to decline without penalty  Organ offer decision status Patient/Other:  Accepted Offer  Organ disposition:  Transplanted  Additional Comments:  6/18/2020 7:25 AM  Kidney: Local, DBD, PHS Increased Risk, Back Up  MD: Ashli/Mirtha  OPO Contact: Nika MARTIN Results: Carlos Eduardo - Compatible, no DSA  Plan (XM, NPO, Donor OR): Spoke to patient about back up kidney offer, disclosed donor PHS increased risk status, patient verbalized understanding and acceptance.  [place] [facility] [place] this [place], orders placed.  Nika in immunology notified.  Spoke to Scott at [facility] to send blood over for XM.  Spoke to Walter for lab appt, can get in at 0930.  No donor OR time yet, no need for NPO.  Contact info provided.  Note-patient currently living with her mother in [place], dialyzing at [place].  Mom cell number [phone].   - - -   COVID Screening  In the past month, have you had:  Any close contact  with a suspect or laboratory-confirmed COVID-19 patient: No  Travel anywhere: No  Fever: No  Cough: No  Short of Breath: No  Rash: No  - - -   Zenaida Valdes RN   Transplant Coordinator    June 18, 2020 4:48 PM  FXM negative.  Donor OR set for 0300, patient remains back up (1 KI and KP ahead).  Called patient to update, instructed to be NPO after midnight.  Zenaida Valdes RN   Transplant Coordinator    June 19, 2020 12:54 AM  Spoke to Yash at , the  recipient is the same recipient as  recipient.  Discussed with Dr. Cornelius, will plan to admit now.  Called patient to give admission instructions, ETA 0300.  Admissions: 0054 - Patricia, ETA 0300 (per Julie on 7A for bed availability)  Unit: 0056 - Julie 7A notified, should have a bed around 0300  Update Provider Entering Orders (XM Plan & COVID Testing): 0106 -  AVNI ZIEGLER [ Msg Id [id number] ] - Does NOT need FXM, needs COVID testing  Immunology: 0103 - Claudia notified  Inpatient Lab (COVID Testing [phone]): 0114 - Sophia notified  Book OR: 0125 - Vonnie, booked for 0900 - - Need to update with laterality  Vessel Storage Confirmation: N/A  Blood Bank: 013 - Isabelle notified - - Need to update with laterality  Research: N/A - On hold d/t COVID   TransNet/ABO Verification: TBD  Add Organ: TBD  Zenaida Valdes RN   Transplant Coordinator    June 19, 2020 7:04 AM  Primary for the RKI, reviewed anatomy with Dr. Cornelius, will accept RKI - [facility] to [facility] no later than 1000.  Notified Jaxson in blood bank, updated Bekah in OR, organ added, OR ppw printed.  Zenaida Valdes RN   Transplant Coordinator                Attestation I have discussed all of the above with the Patient/Legal Guardian/Caregiver regarding this organ offer.:  Yes  Coordinator/Fellow/Attending name:  Sophia Valdes RN

## 2020-06-19 NOTE — PROGRESS NOTES
I discussed an organ offer with Ms. Jelly Dietz. The donor is a CDC high risk donor. The nature of the known risk behaviors was disclosed. TOREY testing is negative for HCV, HIV and other tests were reviewed. The risk of potential donor transmitted infection due to hi-risk status was discussed in detail, estimated overall as low, but higher than a donor without this designation. Ms. Jelly Dietz verbalized an understanding of this risk and wishes to proceed with transplantation with this donor.  John Cornelius MD  Transplant Fellow  Pager# [phone]

## 2020-06-19 NOTE — PROGRESS NOTES
Pt.was able to sign consent with MD and nurse as witness.  Pre-op checklist completed.  Report given to receiving nurse in pre-op.  *Pt. Verbalized her concerns about receiving adequate pain control while recovering.  *Pt. Contacts are her ruby English ([phone]   Mother  [phone]  Belongings labeled and sent to Unit [address]

## 2020-06-19 NOTE — ANESTHESIA PROCEDURE NOTES
Central Line Procedure Note  Staff -   Anesthesiologist:  Jc Valencia MD  Resident/Fellow: Riri Uribe MD    Performed By: resident  Procedure performed by resident/CRNA in presence of a teaching physician.        Location: In OR after induction  Procedure Start/Stop Times:     patient identified, IV checked, site marked, risks and benefits discussed, informed consent, monitors and equipment checked, pre-op evaluation and at physician/surgeon's request      Correct Patient: Yes      Correct Position: Yes      Correct Site: Yes      Correct Procedure: Yes      Correct Laterality:  Yes    Site Marked:  Yes  Line Placement:     Procedure:  Central Line    Insertion laterality:  Left    Insertion site:  Internal Jugular    Position:  Trendelenburg       Injection Technique:  Ultrasound guided    Sterile Ultrasound Technique:  Sterile probe cover and Sterile gel    Vein evaluated via U/S for patency/adequacy of catheter insertion and is adequate.  Using realtime U/S imaging the vein was punctured, and needle was observed entering vein on U/S      A permanent image is NOT entered into the patient's record.      Local skin infiltration:  None    Catheter size:  7 Fr, 3 lumen, 20 cm    Catheter length at skin (cm):  20    Cath secured with: suture      Dressing:  Tegaderm    Complications:  None obvious    Blood aspirated all lumens: Yes      All Lumens Flushed: Yes    Assessment/Narrative:      Right internal jugular vein was attempted first but there was distal obstruction (probably related to prior dialysis catheter placements); so CVP line placed aseptically in the left internal jugular vein; vein access was with use of ultrasound guidance; easy visualization and puncture; no complications; biopatch placed along with dressing; line placement done aseptically with gown and glove along with skin prep per protocol. {

## 2020-06-19 NOTE — ANESTHESIA CARE TRANSFER NOTE
Patient: Jelly Dietz    Procedure(s):  TRANSPLANT, KIDNEY, RECIPIENT,  DONOR, venous reconstruction, and back bench preparation of kidney    Diagnosis: End stage renal disease (H) [N18.6]  Diagnosis Additional Information: No value filed.    Anesthesia Type:   General     Note:  Airway :Nasal Cannula  Patient transferred to:PACU  Comments:   Spontaneous respirations, oral suctioned, bilateral eye opening and hand grasps.  Extubated to NC O2 3lpm.  VSS to PACU.Handoff Report: Identifed the Patient, Identified the Reponsible Provider, Reviewed the pertinent medical history, Discussed the surgical course, Reviewed Intra-OP anesthesia mangement and issues during anesthesia, Set expectations for post-procedure period and Allowed opportunity for questions and acknowledgement of understanding      Vitals: (Last set prior to Anesthesia Care Transfer)    CRNA VITALS  2020 1649 - 2020 1725      2020             Pulse:  101    Ht Rate:  96    SpO2:  100 %                Electronically Signed By: CHAU Perez CRNA  2020  5:25 PM

## 2020-06-19 NOTE — TELEPHONE ENCOUNTER
"TRANSPLANT OR REPORT    Organ: Kidney  Laterality (if known): TBD  Organ Location: ND    UNOS ID: [id number]   Donor OR Time: 0300  Expected/Actual Cross Clamp Time: 0500  Expected Organ Arrival Time: 0800    Surgeon: Mirtha  Time in OR: 0900  Time in 3C (N/A for LI): 0800    Recipient Details  Admission ETA: 0300  Unit: 7A  Isolation: Contact-MRSA  Latex Allergy: No  : No  Diagnosis: ESRD    Liver Transplants  Bypass: N/A  Hemodialysis: N/A  ~ \"RENAL STAFF TEACHING SERVICE MEDICINE\" : N/A  ~ CRRT Resource Nurse: N/A  (Telephone Number for CRRT [phone])    Kidney/Panc Transplants  XM Status (Need to wait for XM?): Done Already    Liver or KP/PA Recipients:  Can Vessels be Banked: N/A      Transplant Coordinator Contact Info: Zenaida Green      Vessel Bank Information  Transplant hospitals must not store a donor s extra vessels if the donor has tested positive for any of the following:   - HIV by antibody, antigen, or nucleic acid test (TOREY)   - Hepatitis B surface antigen (HBsAg)   - Hepatitis B (HBV) by TOREY   - Hepatitis C (HCV) by antibody or TOREY     Extra vessels from donors that do not test positive for HIV, HBV, or HCV as above may be stored      "

## 2020-06-19 NOTE — OR NURSING
Pt's pre-op /107. Pt normally takes Norvasc and Coreg. All meds held this morning. Dr. Valencia notified and plan to give Coreg 25mg po in preop. Pharmacy called. Waiting for medication.

## 2020-06-20 LAB
ANION GAP SERPL CALCULATED.3IONS-SCNC: 9 MMOL/L (ref 3–14)
BASOPHILS # BLD AUTO: 0 10E9/L (ref 0–0.2)
BASOPHILS NFR BLD AUTO: 0 %
BLD PROD TYP BPU: NORMAL
BLD UNIT ID BPU: 0
BLOOD PRODUCT CODE: NORMAL
BPU ID: NORMAL
BUN SERPL-MCNC: 36 MG/DL (ref 7–30)
CALCIUM SERPL-MCNC: 6.8 MG/DL (ref 8.5–10.1)
CHLORIDE SERPL-SCNC: 108 MMOL/L (ref 94–109)
CO2 SERPL-SCNC: 22 MMOL/L (ref 20–32)
CREAT SERPL-MCNC: 3.89 MG/DL (ref 0.52–1.04)
DIFFERENTIAL METHOD BLD: ABNORMAL
EOSINOPHIL # BLD AUTO: 0 10E9/L (ref 0–0.7)
EOSINOPHIL NFR BLD AUTO: 0 %
ERYTHROCYTE [DISTWIDTH] IN BLOOD BY AUTOMATED COUNT: 14.4 % (ref 10–15)
ERYTHROCYTE [DISTWIDTH] IN BLOOD BY AUTOMATED COUNT: 14.4 % (ref 10–15)
GFR SERPL CREATININE-BSD FRML MDRD: 14 ML/MIN/{1.73_M2}
GLUCOSE BLDC GLUCOMTR-MCNC: 140 MG/DL (ref 70–99)
GLUCOSE BLDC GLUCOMTR-MCNC: 145 MG/DL (ref 70–99)
GLUCOSE BLDC GLUCOMTR-MCNC: 150 MG/DL (ref 70–99)
GLUCOSE BLDC GLUCOMTR-MCNC: 178 MG/DL (ref 70–99)
GLUCOSE SERPL-MCNC: 151 MG/DL (ref 70–99)
HCT VFR BLD AUTO: 21.2 % (ref 35–47)
HCT VFR BLD AUTO: 22.8 % (ref 35–47)
HGB BLD-MCNC: 6.5 G/DL (ref 11.7–15.7)
HGB BLD-MCNC: 6.8 G/DL (ref 11.7–15.7)
HGB BLD-MCNC: 6.9 G/DL (ref 11.7–15.7)
HGB BLD-MCNC: 7.1 G/DL (ref 11.7–15.7)
IMM GRANULOCYTES # BLD: 0.1 10E9/L (ref 0–0.4)
IMM GRANULOCYTES NFR BLD: 0.7 %
LYMPHOCYTES # BLD AUTO: 0.1 10E9/L (ref 0.8–5.3)
LYMPHOCYTES NFR BLD AUTO: 1.1 %
MAGNESIUM SERPL-MCNC: 1.7 MG/DL (ref 1.6–2.3)
MCH RBC QN AUTO: 31.4 PG (ref 26.5–33)
MCH RBC QN AUTO: 31.7 PG (ref 26.5–33)
MCHC RBC AUTO-ENTMCNC: 30.7 G/DL (ref 31.5–36.5)
MCHC RBC AUTO-ENTMCNC: 31.1 G/DL (ref 31.5–36.5)
MCV RBC AUTO: 102 FL (ref 78–100)
MCV RBC AUTO: 102 FL (ref 78–100)
MONOCYTES # BLD AUTO: 0.1 10E9/L (ref 0–1.3)
MONOCYTES NFR BLD AUTO: 2 %
NEUTROPHILS # BLD AUTO: 6.7 10E9/L (ref 1.6–8.3)
NEUTROPHILS NFR BLD AUTO: 96.2 %
NRBC # BLD AUTO: 0 10*3/UL
NRBC BLD AUTO-RTO: 0 /100
PHOSPHATE SERPL-MCNC: 5.5 MG/DL (ref 2.5–4.5)
PLATELET # BLD AUTO: 133 10E9/L (ref 150–450)
PLATELET # BLD AUTO: 165 10E9/L (ref 150–450)
POTASSIUM SERPL-SCNC: 3.7 MMOL/L (ref 3.4–5.3)
POTASSIUM SERPL-SCNC: 3.9 MMOL/L (ref 3.4–5.3)
POTASSIUM SERPL-SCNC: 4.2 MMOL/L (ref 3.4–5.3)
RBC # BLD AUTO: 2.07 10E12/L (ref 3.8–5.2)
RBC # BLD AUTO: 2.24 10E12/L (ref 3.8–5.2)
SODIUM SERPL-SCNC: 140 MMOL/L (ref 133–144)
TRANSFUSION STATUS PATIENT QL: NORMAL
TRANSFUSION STATUS PATIENT QL: NORMAL
WBC # BLD AUTO: 3.9 10E9/L (ref 4–11)
WBC # BLD AUTO: 7 10E9/L (ref 4–11)

## 2020-06-20 PROCEDURE — 25000132 ZZH RX MED GY IP 250 OP 250 PS 637: Mod: GY | Performed by: SURGERY

## 2020-06-20 PROCEDURE — 85025 COMPLETE CBC W/AUTO DIFF WBC: CPT | Performed by: SURGERY

## 2020-06-20 PROCEDURE — 25000132 ZZH RX MED GY IP 250 OP 250 PS 637: Mod: GY | Performed by: STUDENT IN AN ORGANIZED HEALTH CARE EDUCATION/TRAINING PROGRAM

## 2020-06-20 PROCEDURE — 80048 BASIC METABOLIC PNL TOTAL CA: CPT | Performed by: SURGERY

## 2020-06-20 PROCEDURE — 12000026 ZZH R&B TRANSPLANT

## 2020-06-20 PROCEDURE — 25800030 ZZH RX IP 258 OP 636: Performed by: SURGERY

## 2020-06-20 PROCEDURE — 25000131 ZZH RX MED GY IP 250 OP 636 PS 637: Mod: GY | Performed by: SURGERY

## 2020-06-20 PROCEDURE — 25000128 H RX IP 250 OP 636: Performed by: SURGERY

## 2020-06-20 PROCEDURE — 00000146 ZZHCL STATISTIC GLUCOSE BY METER IP

## 2020-06-20 PROCEDURE — 83735 ASSAY OF MAGNESIUM: CPT | Performed by: SURGERY

## 2020-06-20 PROCEDURE — 36592 COLLECT BLOOD FROM PICC: CPT | Performed by: SURGERY

## 2020-06-20 PROCEDURE — 25000131 ZZH RX MED GY IP 250 OP 636 PS 637: Mod: GY | Performed by: NURSE PRACTITIONER

## 2020-06-20 PROCEDURE — 84100 ASSAY OF PHOSPHORUS: CPT | Performed by: SURGERY

## 2020-06-20 PROCEDURE — 84132 ASSAY OF SERUM POTASSIUM: CPT | Performed by: SURGERY

## 2020-06-20 PROCEDURE — 36415 COLL VENOUS BLD VENIPUNCTURE: CPT | Performed by: SURGERY

## 2020-06-20 PROCEDURE — 85027 COMPLETE CBC AUTOMATED: CPT | Performed by: SURGERY

## 2020-06-20 PROCEDURE — 85018 HEMOGLOBIN: CPT | Performed by: SURGERY

## 2020-06-20 PROCEDURE — P9016 RBC LEUKOCYTES REDUCED: HCPCS | Performed by: STUDENT IN AN ORGANIZED HEALTH CARE EDUCATION/TRAINING PROGRAM

## 2020-06-20 PROCEDURE — 25000132 ZZH RX MED GY IP 250 OP 250 PS 637: Mod: GY | Performed by: NURSE PRACTITIONER

## 2020-06-20 PROCEDURE — 25000128 H RX IP 250 OP 636: Performed by: STUDENT IN AN ORGANIZED HEALTH CARE EDUCATION/TRAINING PROGRAM

## 2020-06-20 RX ORDER — DIPHENHYDRAMINE HCL 25 MG
25-50 CAPSULE ORAL ONCE
Status: COMPLETED | OUTPATIENT
Start: 2020-06-20 | End: 2020-06-20

## 2020-06-20 RX ORDER — DIPHENHYDRAMINE HCL 12.5MG/5ML
25-50 LIQUID (ML) ORAL ONCE
Status: COMPLETED | OUTPATIENT
Start: 2020-06-20 | End: 2020-06-20

## 2020-06-20 RX ORDER — ACETAMINOPHEN 325 MG/1
650 TABLET ORAL ONCE
Status: COMPLETED | OUTPATIENT
Start: 2020-06-20 | End: 2020-06-20

## 2020-06-20 RX ORDER — DEXTROSE MONOHYDRATE 25 G/50ML
25-50 INJECTION, SOLUTION INTRAVENOUS
Status: DISCONTINUED | OUTPATIENT
Start: 2020-06-20 | End: 2020-06-22

## 2020-06-20 RX ORDER — LANOLIN ALCOHOL/MO/W.PET/CERES
3 CREAM (GRAM) TOPICAL
Status: DISCONTINUED | OUTPATIENT
Start: 2020-06-20 | End: 2020-06-24 | Stop reason: HOSPADM

## 2020-06-20 RX ORDER — HYDROMORPHONE HYDROCHLORIDE 2 MG/1
2 TABLET ORAL EVERY 4 HOURS PRN
Status: DISCONTINUED | OUTPATIENT
Start: 2020-06-20 | End: 2020-06-24 | Stop reason: HOSPADM

## 2020-06-20 RX ORDER — CYCLOBENZAPRINE HCL 5 MG
5 TABLET ORAL EVERY 8 HOURS PRN
Status: DISCONTINUED | OUTPATIENT
Start: 2020-06-20 | End: 2020-06-24 | Stop reason: HOSPADM

## 2020-06-20 RX ORDER — ACETAMINOPHEN 325 MG/1
650 TABLET ORAL EVERY 4 HOURS
Status: DISPENSED | OUTPATIENT
Start: 2020-06-20 | End: 2020-06-21

## 2020-06-20 RX ORDER — NICOTINE POLACRILEX 4 MG
15-30 LOZENGE BUCCAL
Status: DISCONTINUED | OUTPATIENT
Start: 2020-06-20 | End: 2020-06-22

## 2020-06-20 RX ORDER — CETIRIZINE HYDROCHLORIDE 5 MG/1
10 TABLET ORAL
Status: DISCONTINUED | OUTPATIENT
Start: 2020-06-20 | End: 2020-06-24 | Stop reason: HOSPADM

## 2020-06-20 RX ADMIN — VALGANCICLOVIR 450 MG: 450 TABLET, FILM COATED ORAL at 09:22

## 2020-06-20 RX ADMIN — HYDROMORPHONE HYDROCHLORIDE 0.3 MG: 1 INJECTION, SOLUTION INTRAMUSCULAR; INTRAVENOUS; SUBCUTANEOUS at 09:31

## 2020-06-20 RX ADMIN — ONDANSETRON 4 MG: 2 INJECTION INTRAMUSCULAR; INTRAVENOUS at 09:04

## 2020-06-20 RX ADMIN — INSULIN ASPART 1 UNITS: 100 INJECTION, SOLUTION INTRAVENOUS; SUBCUTANEOUS at 14:12

## 2020-06-20 RX ADMIN — MENTHOL 1 PATCH: 205.5 PATCH TOPICAL at 21:49

## 2020-06-20 RX ADMIN — DEXTROSE AND SODIUM CHLORIDE: 5; 900 INJECTION, SOLUTION INTRAVENOUS at 09:16

## 2020-06-20 RX ADMIN — SULFAMETHOXAZOLE AND TRIMETHOPRIM 1 TABLET: 400; 80 TABLET ORAL at 09:19

## 2020-06-20 RX ADMIN — DOCUSATE SODIUM AND SENNOSIDES 2 TABLET: 50; 8.6 TABLET ORAL at 21:45

## 2020-06-20 RX ADMIN — ANTI-THYMOCYTE GLOBULIN (RABBIT) 125 MG: 5 INJECTION, POWDER, LYOPHILIZED, FOR SOLUTION INTRAVENOUS at 11:44

## 2020-06-20 RX ADMIN — HYDROMORPHONE HYDROCHLORIDE 2 MG: 2 TABLET ORAL at 19:18

## 2020-06-20 RX ADMIN — ACETAMINOPHEN 650 MG: 325 TABLET, FILM COATED ORAL at 11:09

## 2020-06-20 RX ADMIN — HYDROMORPHONE HYDROCHLORIDE 2 MG: 2 TABLET ORAL at 14:15

## 2020-06-20 RX ADMIN — OXYCODONE HYDROCHLORIDE 10 MG: 5 TABLET ORAL at 02:10

## 2020-06-20 RX ADMIN — DOCUSATE SODIUM AND SENNOSIDES 2 TABLET: 50; 8.6 TABLET ORAL at 09:19

## 2020-06-20 RX ADMIN — LIDOCAINE 3 PATCH: 560 PATCH PERCUTANEOUS; TOPICAL; TRANSDERMAL at 21:36

## 2020-06-20 RX ADMIN — HYDROMORPHONE HYDROCHLORIDE 2 MG: 2 TABLET ORAL at 10:17

## 2020-06-20 RX ADMIN — HYDROMORPHONE HYDROCHLORIDE 0.5 MG: 1 INJECTION, SOLUTION INTRAMUSCULAR; INTRAVENOUS; SUBCUTANEOUS at 05:24

## 2020-06-20 RX ADMIN — CYCLOBENZAPRINE HYDROCHLORIDE 5 MG: 5 TABLET, FILM COATED ORAL at 17:38

## 2020-06-20 RX ADMIN — TACROLIMUS 2 MG: 1 CAPSULE ORAL at 09:20

## 2020-06-20 RX ADMIN — CYCLOBENZAPRINE HYDROCHLORIDE 5 MG: 5 TABLET, FILM COATED ORAL at 09:23

## 2020-06-20 RX ADMIN — ACETAMINOPHEN 650 MG: 325 TABLET, FILM COATED ORAL at 17:39

## 2020-06-20 RX ADMIN — ACETAMINOPHEN 650 MG: 325 TABLET, FILM COATED ORAL at 23:47

## 2020-06-20 RX ADMIN — ATORVASTATIN CALCIUM 10 MG: 10 TABLET, FILM COATED ORAL at 09:19

## 2020-06-20 RX ADMIN — HYDROMORPHONE HYDROCHLORIDE 0.5 MG: 1 INJECTION, SOLUTION INTRAMUSCULAR; INTRAVENOUS; SUBCUTANEOUS at 01:03

## 2020-06-20 RX ADMIN — HYDROMORPHONE HYDROCHLORIDE 2 MG: 2 TABLET ORAL at 23:46

## 2020-06-20 RX ADMIN — TACROLIMUS 2 MG: 1 CAPSULE ORAL at 17:38

## 2020-06-20 RX ADMIN — MENTHOL 1 PATCH: 205.5 PATCH TOPICAL at 13:03

## 2020-06-20 RX ADMIN — SODIUM CHLORIDE 250 MG: 9 INJECTION, SOLUTION INTRAVENOUS at 11:10

## 2020-06-20 RX ADMIN — DEXTROSE AND SODIUM CHLORIDE: 5; 900 INJECTION, SOLUTION INTRAVENOUS at 02:04

## 2020-06-20 RX ADMIN — DEXTROSE AND SODIUM CHLORIDE: 5; 900 INJECTION, SOLUTION INTRAVENOUS at 17:39

## 2020-06-20 RX ADMIN — MYCOPHENOLATE MOFETIL 1000 MG: 250 CAPSULE ORAL at 09:19

## 2020-06-20 RX ADMIN — HYDROMORPHONE HYDROCHLORIDE 0.3 MG: 1 INJECTION, SOLUTION INTRAMUSCULAR; INTRAVENOUS; SUBCUTANEOUS at 07:56

## 2020-06-20 RX ADMIN — INSULIN ASPART 1 UNITS: 100 INJECTION, SOLUTION INTRAVENOUS; SUBCUTANEOUS at 09:20

## 2020-06-20 RX ADMIN — HYDROMORPHONE HYDROCHLORIDE 0.5 MG: 1 INJECTION, SOLUTION INTRAMUSCULAR; INTRAVENOUS; SUBCUTANEOUS at 00:02

## 2020-06-20 RX ADMIN — DIPHENHYDRAMINE HYDROCHLORIDE 25 MG: 25 CAPSULE ORAL at 11:08

## 2020-06-20 RX ADMIN — MELATONIN TAB 3 MG 3 MG: 3 TAB at 23:46

## 2020-06-20 RX ADMIN — MYCOPHENOLATE MOFETIL 1000 MG: 250 CAPSULE ORAL at 17:39

## 2020-06-20 ASSESSMENT — ACTIVITIES OF DAILY LIVING (ADL)
ADLS_ACUITY_SCORE: 10
ADLS_ACUITY_SCORE: 11
ADLS_ACUITY_SCORE: 11
ADLS_ACUITY_SCORE: 10
ADLS_ACUITY_SCORE: 11
ADLS_ACUITY_SCORE: 10

## 2020-06-20 NOTE — PROGRESS NOTES
"POST OP CHECK     S: Pain poorly controlled. No n/v/f/c. denies CP/SOB. Making good urine.     O:  BP (!) 132/98   Pulse 106   Temp 98.9  F (37.2  C) (Oral)   Resp 18   Ht 1.676 m (5' 6\")   Wt 62.1 kg (136 lb 14.5 oz)   SpO2 99%   BMI 22.10 kg/m      I/O last 3 completed shifts:  In: 2553.83 [P.O.:120; I.V.:2433.83]  Out: 2165 [Urine:2065; Blood:100]      Alert and oriented  Non labored breathing   Non cyanotic   Soft abdomen, clean incision site, tender globally except over graft site.  Warm BLE, no edema     A/P: 35F s/p right kidney re-transplant, left iliac fossa. POD0.     - lidocaine patches  - dilaudid to q1h  - oxy to 5-10 q3      Tnio Louis MD, [facility]  Plastic Surgery PGY-1  Pager: [phone]    "

## 2020-06-20 NOTE — PROVIDER NOTIFICATION
-------------------CRITICAL LAB VALUE-------------------    Lab Value: Hgb 6.8  Time of notification: 10:39 AM  MD notified: MD Mirtha notified on rounds.     Patient status: Post-op kidney transplant. VSS; 75cc/hr UOP.       Orders received: No transfusion at this time. Will recheck around 1800 and if lower, team will reconsider transfusion. Continue to monitor closely.

## 2020-06-20 NOTE — PROGRESS NOTES
Transplant Surgery  Inpatient Daily Progress Note  2020    Assessment & Plan:   Jelly Dietz is a 35 year old female with a past medical history significant for end stage kidney disease secondary to IgA nephropathy, s/p previous kidney transplant (, failed d/t noncompliance during pregnancy). She is s/p  donor kidney transplant on 20.    Graft function: Good kidney function. Cr 3.84 down from 9.09. ultrasound on  revealed paten vessels and not abnormalities   Immunosuppression management:   Induction: High risk. Thymo x 3 doses for a total of 6 mg/kg   Solu-Medrol/pred: 500mg/250mg/100mg   MMF: 1000 mg BID  Tacro: Goal level 8-10.   Complexity of management:Medium. Contributing factors: induction   Neuro: Acute surgical pain. Scheduled tylenol. PRN oxycodone. Lidoderm patches LLQ and add flexeril  Hematology: Hgb 7.1, will recheck in PM and decide on transfusion  Cardiorespiratory: Stable. -160s. HR 70-120s. Continue to hold home anti-hypertensives. Patient to be OOB to chair, ambulate, and frequent IS 10x/hr.   GI/Nutrition: BERTIN, Regular diet. Senna BID.    Endocrine: Mild steroid hyperglycemia. FBG .   Fluid/Electrolytes: MIVF: stop 1:1 and D5  ml/hr.    : Bauman to remain due to new surgical anastomosis for 7 days  Infectious disease: afebrile, WBC 7   Prophylaxis: DVT (PCDs), viral (Valcyte), PJP (Bactrim).  Disposition: transfer to      Medical Decision Making: Medium  Admit 36021 (moderate level decision making)    CONNIE/Fellow/Resident Provider: Marvin Oliveira MD    Faculty: Irma Shannon Md  _________________________________________________________________  Transplant History:   2020 (Kidney), 2007 (Kidney), Postoperative day: 1     Interval History: History is obtained from the patient  Overnight events: No acute events overnight. Complains of intermittent pain.    ROS:   A 10-point review of systems was negative except as noted  "above.    Meds:    acetaminophen  975 mg Oral Q8H     atorvastatin  10 mg Oral Daily     insulin aspart  1-3 Units Subcutaneous TID AC     insulin aspart  1-3 Units Subcutaneous At Bedtime     lidocaine  3 patch Transdermal Q24h    And     lidocaine   Transdermal Q8H     [START ON 6/21/2020] magnesium oxide  400 mg Oral Daily with lunch     menthol   Transdermal Q8H     mycophenolate  1,000 mg Oral BID IS     senna-docusate  1 tablet Oral BID    Or     senna-docusate  2 tablet Oral BID     sodium chloride (PF)  10 mL Intracatheter Q8H     sulfamethoxazole-trimethoprim  1 tablet Oral Daily     tacrolimus  2 mg Oral BID IS     valGANciclovir  450 mg Oral Every Other Day       Physical Exam:     Admit Weight: 62.1 kg (136 lb 14.5 oz)    Current vitals:   BP (!) 140/90   Pulse 83   Temp 98.8  F (37.1  C) (Oral)   Resp 18   Ht 1.676 m (5' 6\")   Wt 62.1 kg (136 lb 14.5 oz)   SpO2 98%   BMI 22.10 kg/m      Vital sign ranges:    Temp:  [98  F (36.7  C)-99.3  F (37.4  C)] 98.8  F (37.1  C)  Pulse:  [] 83  Heart Rate:  [] 87  Resp:  [13-21] 18  BP: (120-158)/() 140/90  SpO2:  [93 %-100 %] 98 %    General Appearance: in no apparent distress.   Skin: Warm, perfused  Heart: RRR  Lungs: non-labored breathing on room air  Abdomen: The abdomen is soft, non-tender, mildly distended  : polanco is present.  Urine is clear.  Extremities: edema: minimal, strength normal  Neurologic: awake, alert and oriented. Tremor absent..     Data:   CMP  Recent Labs   Lab 06/20/20  0954 06/20/20  0606  06/19/20  1736 06/19/20  1622 06/19/20  1134 06/19/20  0411   NA  --  140  --  138 138 141 138   POTASSIUM 3.7 3.9   < > 3.9 3.9 4.0 3.8   CHLORIDE  --  108  --  105  --   --  101   CO2  --  22  --  20  --   --  25   GLC  --  151*  --  160* 137* 85 90   BUN  --  36*  --  50*  --   --  50*   CR  --  3.89*  --  9.09*  --   --  10.10*   GFRESTIMATED  --  14*  --  5*  --   --  5*   GFRESTBLACK  --  17*  --  6*  --   --  5*   CASIE  " --  6.8*  --  6.7*  --   --  7.5*   ICAW  --   --   --   --  3.7* 3.7*  --    MAG  --  1.7  --  1.3*  --   --   --    PHOS  --  5.5*  --  5.8*  --   --   --    ALBUMIN  --   --   --   --   --   --  3.4   BILITOTAL  --   --   --   --   --   --  0.4   ALKPHOS  --   --   --   --   --   --  54   AST  --   --   --   --   --   --  11   ALT  --   --   --   --   --   --  13    < > = values in this interval not displayed.     CBC  Recent Labs   Lab 06/20/20  0954 06/20/20  0606  06/19/20  1736  06/19/20  0411   HGB 6.8* 7.1*   < > 7.6*   < > 8.8*   WBC  --  7.0  --  7.0  --  3.0*   PLT  --  165  --  155  --  156   A1C  --   --   --   --   --  5.1    < > = values in this interval not displayed.

## 2020-06-20 NOTE — PLAN OF CARE
"Care Provided: 8080-3707 (7A transfer)    Temp: 98.5  F (36.9  C) Temp src: Oral BP: 123/83 Heart Rate: 91 Resp: 18 SpO2: 98 % O2 Device: None (Room air)     Neuro: Somnolent/lethargic, at times drifting off with conversation. Arouses to voice; orientated x4.   Cardiac: SR 70-80s at rest. BP stable; at times DBP >90. CVP 10-12.   Respiratory: Sating >95% on RA. RR 18-20. CAPNO on; IPI 9-10. Lungs clear/diminished; denies SOB.   GI: Not yet passing gas; abdomen bloated. 2 sennas given in AM. Advanced diet to regular; pt grazing, mostly fruit.   : Bauman intact; UOP 75-100cc. Yellow/pale. Pt has menses; scant bleeding.  Activity: Pt unmotivated for activity. Would agree to walk or sit in chair, and then right prior to planned movement would state she was too tired, wanted to rest, pain wasn't controlled yet. Pt agreeable to walk by evening. Lots of education provided that activity would help with muscle soreness, passing gas, and staying more alert; pt accepting of education, but continued to decline activity.   Pain: AM pt complaining of \"agonizing\" pain in left shoulder related to \"muscle knots\". Massage provided, heat applied, pt with only IV dilaudid available as stating couldn't have PO oxy d/t stomach upset. Pt was drifting off in conversation, but would wake up saying pain horribly uncontrolled. Flexeril ordered; provided - pt continued to report pain uncontrolled. PO oxy changed to 2mg PO dilaudid dose; given x2 on shift. Lidocaine patches had been near incision site overnight, removed 1100 and then patient began reporting pain to be at incision site rather than left shoulder; educated that there were no additional meds to add on at this time; encouraged repositioning; aquak heat pump made available. Order for icyhot patch then obtained; pt given choice between back or incision; placed on back left scapula and patient reported improvement.   Skin: Incision liquid bandaged; PARVIZ and CDI. No new deficits noted. "   Incision/Surgical Site 06/19/20 Left Abdomen (Active)   Incision Assessment WDL 06/20/20 1200   Closure Sutures;Liquid bandage 06/20/20 1200   Incision Drainage Amount None 06/20/20 1200   Dressing Intervention Open to air / No Dressing 06/20/20 1200     Lab:  Hgb 6.8 on shift; no transfusion ordered. Recheck at 1800 and assess at that time.     LDAs:    - internal jugular: MIVF d5NS@125cc/hr  - Thymo infused on shift.     Plan: Transfer to . Will continue to monitor pt closely and notify primary team with any changes.      Problem: Adult Inpatient Plan of Care  Goal: Plan of Care Review  6/20/2020 1717 by Kay Kapadia, RN  Outcome: Improving     Problem: Adult Inpatient Plan of Care  Goal: Readiness for Transition of Care  6/20/2020 1717 by Kay Kapadia, RN  Outcome: Improving     Problem: Adjustment to Transplant (Kidney Transplant)  Goal: Optimal Coping with Organ Transplant  6/20/2020 1717 by Kay Kapadia, RN  Outcome: Improving     Problem: Fluid Imbalance (Kidney Transplant)  Goal: Fluid Balance  6/20/2020 1717 by Kay Kapadia, RN  Outcome: Improving     Problem: Infection (Kidney Transplant)  Goal: Absence of Infection Signs/Symptoms  6/20/2020 1717 by Kay Kapadia, RN  Outcome: Improving     Problem: Postoperative Urinary Retention (Kidney Transplant)  Goal: Effective Urinary Elimination  6/20/2020 1717 by Kay Kapadia, RN  Outcome: Improving

## 2020-06-20 NOTE — PLAN OF CARE
"/83   Pulse 83   Temp 98.7  F (37.1  C) (Oral)   Resp 17   Ht 1.676 m (5' 6\")   Wt 62.1 kg (136 lb 14.5 oz)   SpO2 100%   BMI 22.10 kg/m      Neuro: A&Ox4. Pt is lethargic from pain meds. Is able to wake up to voice and answer questions.  Cardiac: SR. VSS.   Respiratory: Sating 100% on RA.  GI/: Hourly urine output has been between 50-75. MD is aware and is okay with this right now. Says the new kidney needs time to wake up.  Diet/appetite: Tolerating clears diet. Eating well.  Activity:  Assist of one up to chair and in halls.  Pain: At acceptable level on current regimen.   Skin: Left abdominal incision sutured and glued, mepilex on coccyx   LDA's: Triple lumen left internal jugular CVC, right hand PIV, right lower forearm PIV.    Plan: Continue with POC. Notify primary team with changes.    "

## 2020-06-20 NOTE — PHARMACY-TRANSPLANT NOTE
Adult Kidney Transplant Post Operative Note    35 year old female s/p  donor (DBD) kidney transplant on 20 after previously failed kidney transplant 2007 fro IgA nephropathy (failure 2/2 immunosuppression non-compliance during pregnancy)    Planned immunosuppression regimen per kidney transplant protocol:  INDUCTION: thymoglobulin to total ~ 6mg/kg and methylprednisolone IV daily x 3 doses used as pre-med for thymoglobulin.  MAINTENANCE:  mycophenolate and tacrolimus with goal trough levels of 8-10 mcg/L for first 6 months post-transplant.    Opportunistic pathogen prophylaxis includes: trimethoprim/sulfamethoxazole and valganciclovir.    Patient is not enrolled in medication study.    Pharmacy will monitor for medication interactions and immunosuppression levels in conjunction with the team. Medication therapy needs for discharge planning will continue to be addressed throughout the current admission via multidisciplinary rounds and order review.  Pharmacy will make recommendations as appropriate.    Ej Stauffer PharmD, [facility]  Inpatient clinical pharmacist

## 2020-06-20 NOTE — CONSULTS
[facility], [place]  Transplant Nephrology Consult  Date of Admission:  6/19/2020  Today's Date: 06/20/2020  Requesting physician: Irma Shannon MD    Recommendations:  - Stop the IVF     Assessment & Plan   # DDKT: Trend down   - Baseline Cr ~ TBD   - Proteinuria: Not checked post transplant   - Date DSA Last Checked: checked at alexus time of tx       Latest DSA: pending    - BK Viremia: Not checked post transplant   - Kidney Tx Biopsy: No    # Immunosuppression: Tacrolimus immediate release (goal 8-10), Mycophenolate mofetil (dose 1000 mg every 12 hours) and should complete full dose thymoglobulin 6 mg/kg    - Changes: No    # Infection Prophylaxis:   - PJP: Sulfa/TMP (Bactrim)  - CMV: Valcyte    # Hypertension: Controlled;  Goal BP: < 140/90   - Volume status: Mildly hypervolemic  EDW ~ 62 kg    - Changes: No    # Anemia in Chronic Renal Disease: Hgb: Trend down      ENZO: Yes   - Iron studies: not checked recently    - Transfusion per surgery team     # Mineral Bone Disorder:   - Secondary renal hyperparathyroidism; PTH level: Not checked recently        On treatment: None  - Vitamin D; level: Not checked recently        On supplement: Yes  - Calcium; level: Low        On supplement: consider IV calcium and check ionized level in the am   - Phosphorus; level: Normal        On supplement: No    # Electrolytes:   - Potassium; level: Normal        On supplement: No  - Magnesium; level: Normal        On supplement: Yes    # History of non-adherence: will reemphasize the need to adhere to isx schedule     # Transplant History:  Etiology of Kidney Failure: IgA nephropathy  Tx: LDKT and DDKT  Transplant: 6/19/2020 (Kidney), 12/1/2007 (Kidney)  Donor Type: Donation after Brain Death Donor Class:   Crossmatch at time of Tx: pending   Significant changes in immunosuppression: None  Significant transplant-related complications: None    Recommendations were communicated to the primary team  via this note.      Mahesh Armando MD  Pager: [phone]    REASON FOR CONSULT   Immunosuppression management     History of Present Illness   Jelly Dietz is a 35 year old female with history of IgA nephropathy status post living donor kidney transplant when she was 22 in [place].  This kidney was lost due to rejection in the settings of nonadherence.  Part of that was related to her pregnancies.  She was found to be a suitable candidate after she had fulfilled the work-up requirement for repeat kidney transplant.  She was in the usual state of health leading up to her transplantation.  She tolerated the procedure fairly well.  Her hemoglobin this morning was low but also was low at the time of transplantation.  She received thymol induction due to her elevated CPRA greater than 80.  She will complete a course of Thymoglobulin 6 mg/kg divided in 3 doses and will be maintained on dual regimen and steroid free fashion.  She was seen this afternoon and evaluated.  She feels bloated around the incision but otherwise feels fine.  Denied any chest pains or shortness of breath.    Review of Systems    The 10 point Review of Systems is negative other than noted in the HPI or here.     Past Medical History    I have reviewed this patient's medical history and updated it with pertinent information if needed.   Past Medical History:   Diagnosis Date     ACS (acute coronary syndrome) (H) 2014     Allergic state     seasonal allergies     Anemia in chronic renal disease      Anxiety      Cervical cerclage suture present in second trimester      Chest pain 2014     Chronic renal transplant rejection      End stage kidney disease (H)      Heart murmur      History of  delivery ,     30 wks, 28 wks      Hypertension     resolved after transplant     IgA nephropathy      MRSA (methicillin resistant Staphylococcus aureus)      [facility]     Pre-eclampsia      Renal failure affecting pregnancy in second trimester      S/P kidney transplant     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 History of 1B kidney rejection      SAB (spontaneous )     2nd trimester        Past Surgical History   I have reviewed this patient's surgical history and updated it with pertinent information if needed.  Past Surgical History:   Procedure Laterality Date     CERCLAGE CERVICAL N/A 2015    Procedure: CERCLAGE CERVICAL;  Surgeon: Norbert Chopra MD;  Location: [facility] L[facility]D      SECTION  2012    Procedure:  SECTION;;  Surgeon: Chelsea Cross MD;  Location: [facility] L[facility]      SECTION N/A 2015    Procedure:  SECTION;  Surgeon: Chelsea Cross MD;  Location: [facility] OR     cesearean section       EXPLANT TRANSPLANTED KIDNEY  3/29/2013    Procedure: EXPLANT TRANSPLANTED KIDNEY;  Explant Transplanted right Kidney (from patients right iliac fossa);  Surgeon: Nory Morgan MD;  Location: [facility] OR     NEPHRECTOMY  3/29/13    Transplant nephrectomy      WILIAN/DIALYSIS CATHETER       TRANSPLANT KIDNEY RECIPIENT LIVING UNRELATED  Dec 2007    (Adult male in [facility])       Family History   I have reviewed this patient's family history and updated it with pertinent information if needed.   Family History   Problem Relation Age of Onset     Diabetes Paternal Grandfather      Breast Cancer Maternal Grandmother 55     Cancer No family hx of         No known family hx of skin cancer       Social History   I have reviewed this patient's social history and updated it with pertinent information if needed. Jelly Dietz  reports that she has never smoked. She has never used smokeless tobacco. She reports that she does not drink alcohol or use drugs.    Allergies   No Known Allergies  Prior to Admission Medications     acetaminophen  650 mg Oral Once     acetaminophen  975 mg Oral Q8H     anti-thymocyte  "globulin  125 mg Intravenous Central line Once     atorvastatin  10 mg Oral Daily     diphenhydrAMINE  25-50 mg Oral Once    Or     diphenhydrAMINE  25-50 mg Per NG tube Once     insulin aspart  1-3 Units Subcutaneous TID AC     insulin aspart  1-3 Units Subcutaneous At Bedtime     lidocaine  3 patch Transdermal Q24h    And     lidocaine   Transdermal Q8H     [START ON 2020] magnesium oxide  400 mg Oral Daily with lunch     methylPREDNISolone  250 mg Intravenous Once     mycophenolate  1,000 mg Oral BID IS     senna-docusate  1 tablet Oral BID    Or     senna-docusate  2 tablet Oral BID     sodium chloride (PF)  10 mL Intracatheter Q8H     sulfamethoxazole-trimethoprim  1 tablet Oral Daily     tacrolimus  2 mg Oral BID IS     valGANciclovir  450 mg Oral Every Other Day       dextrose 5% and 0.9% NaCl 125 mL/hr at 20 0916     NaCl Stopped (20)     NaCl Stopped (20 0103)       Physical Exam   Temp  Av.5  F (36.9  C)  Min: 98  F (36.7  C)  Max: 98.9  F (37.2  C)      Pulse  Av.6  Min: 80  Max: 122 Resp  Av.5  Min: 13  Max: 21  SpO2  Av.5 %  Min: 93 %  Max: 100 %    CVP (mmHg): 10 mmHgBP 132/87 (BP Location: Right arm)   Pulse 83   Temp 98.6  F (37  C) (Oral)   Resp 20   Ht 1.676 m (5' 6\")   Wt 62.1 kg (136 lb 14.5 oz)   SpO2 99%   BMI 22.10 kg/m     Date 20 07 - 20 0659   Shift 5463-8802 3233-1377 4018-0484 24 Hour Total   INTAKE   I.V. 2092.5   2092.5   Shift Total(mL/kg) 2092.5(33.7)   2092.5(33.7)   OUTPUT   Urine 325   325   Shift Total(mL/kg) 325(5.23)   325(5.23)   Weight (kg) 62.1 62.1 62.1 62.1      Admit Weight: 62.1 kg (136 lb 14.5 oz)     GENERAL APPEARANCE: alert and no distress  HENT: mouth without ulcers or lesions  LYMPHATICS: no cervical or supraclavicular nodes  RESP: lungs clear to auscultation - no rales, rhonchi or wheezes  CV: regular rhythm, normal rate, no rub, no murmur  EDEMA: no LE edema bilaterally  ABDOMEN: soft, " nondistended, nontender, bowel sounds normal  MS: extremities normal - no gross deformities noted, no evidence of inflammation in joints, no muscle tenderness  SKIN: no rash    Data   CMP  Recent Labs   Lab 06/20/20  0954 06/20/20  0606 06/20/20 0149 06/19/20 2156 06/19/20 1736 06/19/20  1622 06/19/20  1134 06/19/20  0411   NA  --  140  --   --  138 138 141 138   POTASSIUM 3.7 3.9 3.9 3.6 3.9 3.9 4.0 3.8   CHLORIDE  --  108  --   --  105  --   --  101   CO2  --  22  --   --  20  --   --  25   ANIONGAP  --  9  --   --  12  --   --  12   GLC  --  151*  --   --  160* 137* 85 90   BUN  --  36*  --   --  50*  --   --  50*   CR  --  3.89*  --   --  9.09*  --   --  10.10*   GFRESTIMATED  --  14*  --   --  5*  --   --  5*   GFRESTBLACK  --  17*  --   --  6*  --   --  5*   CASIE  --  6.8*  --   --  6.7*  --   --  7.5*   MAG  --  1.7  --   --  1.3*  --   --   --    PHOS  --  5.5*  --   --  5.8*  --   --   --    PROTTOTAL  --   --   --   --   --   --   --  7.0   ALBUMIN  --   --   --   --   --   --   --  3.4   BILITOTAL  --   --   --   --   --   --   --  0.4   ALKPHOS  --   --   --   --   --   --   --  54   AST  --   --   --   --   --   --   --  11   ALT  --   --   --   --   --   --   --  13     CBC  Recent Labs   Lab 06/20/20  0954 06/20/20  0606 06/20/20 0149 06/19/20 2156 06/19/20 1736  06/19/20  0411   HGB 6.8* 7.1* 6.9* 7.0* 7.6*   < > 8.8*   WBC  --  7.0  --   --  7.0  --  3.0*   RBC  --  2.24*  --   --  2.43*  --  2.82*   HCT  --  22.8*  --   --  24.1*  --  28.5*   MCV  --  102*  --   --  99  --  101*   MCH  --  31.7  --   --  31.3  --  31.2   MCHC  --  31.1*  --   --  31.5  --  30.9*   RDW  --  14.4  --   --  14.1  --  14.1   PLT  --  165  --   --  155  --  156    < > = values in this interval not displayed.     INR  Recent Labs   Lab 06/19/20  1736 06/19/20  0411   INR 1.44* 1.23*   PTT 28 31     ABG  Recent Labs   Lab 06/19/20  1622 06/19/20  1134   O2PER 50.0 50.0      Urine Studies  Recent Labs   Lab Test  01/02/18  2048 03/08/17  1632 01/25/17  1920 10/19/15  0550   COLOR Yellow Straw Straw Yellow   APPEARANCE Cloudy Slightly Cloudy Slightly Cloudy Slightly Cloudy   URINEGLC 150* 250* 70* Negative   URINEBILI Negative Negative Negative Negative   URINEKETONE Negative Negative Negative Negative   SG 1.008 1.010 1.006 1.005   UBLD Small* Small* Negative Moderate*   URINEPH 8.0* 8.5* 7.5* 8.5*   PROTEIN 30* 100* 30* 300*   NITRITE Negative Negative Negative Negative   LEUKEST Trace* Small* Negative Small*   RBCU 3* 3* 1 2   WBCU 8* 4* 2 12*     Recent Labs   Lab Test 07/16/12  1400 07/02/12  1130 06/25/12  0615 06/17/12  0930 06/13/12  1120 06/07/12  0643 06/01/12  0950 05/25/12  1830 05/25/12  1110 05/22/12  1536   UTPG 1.53* 0.86* 0.69* 0.82* 0.80* 0.88* 0.58* 0.47*  0.45* 0.56* 0.51*     PTH  Recent Labs   Lab Test 11/30/12  2030 11/30/12  1030   PTHI 500* 794*     Iron Studies  Recent Labs   Lab Test 11/30/12  1950   IRON 125   *   IRONSAT 81*   SHAHBAZ 401*       IMAGING:  All imaging studies reviewed by me.

## 2020-06-20 NOTE — PROGRESS NOTES
"CLINICAL NUTRITION SERVICES - ASSESSMENT NOTE     Nutrition Prescription    RECOMMENDATIONS FOR MDs/PROVIDERS TO ORDER:  None at this time.     Malnutrition Status:    Unable to determine due to unable to obtain all parameters/NFPE d/t preserving PPE and social distancing.     Recommendations already ordered by Registered Dietitian (RD):  Discharge diet order written.       Future/Additional Recommendations:  If suspect eating poorly, would offer supplements and start on kcal cnts.     Provide verbal post transplant diet education (pt too tired today).        REASON FOR ASSESSMENT  Jelly Dietz is a 35 year old female seen by the dietitian for MD Order- Assess & Educate post-op SOT.    Chart Review:   PMH significant for anxiety, HTN, CAD, and ESRD 2/2 IgA nephropathy on HD since 2012 (M,W,F), s/p previous kidney transplant (2007, failed d/t noncompliance during pregnancy). Admitted 6/19/20 for repeat kidney transplant.     NUTRITION HISTORY  Pt known to Clinical Nutrition. Last assessed by RD on 3/8/17 for kidney transplant eval. At this time pt was underweight (BMI 17.65) and reported poor appetite with typical intake ~1-2 meals and some snacks. Pt reported dining out often. No known allergies per chart review.     Unable to obtain in-person nutrition history or nutrition focused physical assessment (NFPA) from patient as the number of staff going into rooms is restricted to limit exposure and to minimize use of PPE. Spoke with pt via phone. Pt was very tired and only able to provide brief information. She reports that PTA, her appetite and PO intake were \"okay\". Was eating 2 meals/day, typically skipping breakfast.     CURRENT NUTRITION ORDERS  Diet: Clear Liquid  Intake/Tolerance: Per RN note, tolerating clears well  GI: Per intake/output, no documented BM yet since admission yesterday. Abdomen noted to be soft, non-tender, and non-distended per H&P. Abdomen also noted to be soft on post op check.  " "    LABS  Labs reviewed  Na+ 140 (WNL), K+ 3.9 (WNL), Mg++ 1.7 (WNL), Phos 5.5 (H)  Glucose Trends 151 <- 160 <- 140 <- 137  BUN 36 (H) - trending down, Cr 3.89 (H) - trending down    MEDICATIONS  Medications reviewed  Insulin low resistance sliding scale  Mycophenolate  Bowel regimen  Tacrolimus  D5 + 0.9% NaCl @ 125 mL/hr (150 g dex, 510 kcal in 3000 mL/day)    ANTHROPOMETRICS  Height: 167.6 cm (5' 6\")  Most Recent Weight: 62.1 kg (136 lb 14.5 oz)    IBW: 59.1 kg (105% IBW)  BMI: Normal BMI  Weight History:   Wt Readings from Last 10 Encounters:   06/19/20 62.1 kg (136 lb 14.5 oz)   12/12/19 58.6 kg (129 lb 3 oz)   11/04/19 (P) 63 kg (139 lb)   09/23/19 58.5 kg (129 lb)   10/30/18 63.5 kg (139 lb 14.4 oz)   10/28/18 62.8 kg (138 lb 8 oz)   09/26/18 58.1 kg (128 lb)   09/10/18 57.7 kg (127 lb 1.6 oz)   08/25/18 58.7 kg (129 lb 8 oz)   01/02/18 54.4 kg (120 lb)   Limited documented weight history available. Additionally, difficult to interpret with likely fluctuations 2/2 fluid status on iHD. Pt reports that since last RD visit in 2017, she has been able to gain some weight.    Dosing Weight: 62 kg (actual) - based on lowest documented wt so far this adm (62.1 kg on 6/19) and IBW of 59.1 kg    ASSESSED NUTRITION NEEDS:  Estimated Energy Needs: 1860 - 2170 kcals (30-35 Kcal/Kg)  Justification: increased needs post-op SOT  Estimated Protein Needs: 80 - 125 grams protein (1.3-2 gm/Kg)  Justification: increased needs post op SOT  Estimated Fluid Needs: 1550 - 1860  mL (25-30 mL/Kg)  Justification: maintenance, or per MD pending fluid status and adequate UOP    PHYSICAL FINDINGS  See malnutrition section below.    MALNUTRITION  % Intake: No decreased intake noted  % Weight Loss: None noted  Subcutaneous Fat Loss: Unable to assess  Muscle Loss: Unable to assess  Fluid Accumulation/Edema: Does not meet criteria (trace to none per chart review)  Malnutrition Diagnosis: Unable to determine due to unable to obtain all " parameters/NFPE d/t preserving PPE and social distancing.     NUTRITION DIAGNOSIS:  Food and nutrition-related knowledge deficit r/t length of time since previous post-transplant education AEB patient verbal report, review of chart record, and MD consult for nutrition education.     INTERVENTIONS  Implementation  Nutrition Education:  RN to provide written post-transplant diet guidelines. Pt declined education today over phone d/t feeling too tired. RD to follow up.     Goals  PO intake >50% meals TID.    Monitoring/Evaluation  Progress toward goals will be monitored and evaluated per protocol.    Gil Boyd, MS, RD, LD  Weekend pager [phone]

## 2020-06-20 NOTE — PLAN OF CARE
"/83 (BP Location: Right arm)   Pulse 83   Temp 98.5  F (36.9  C) (Oral)   Resp 18   Ht 1.676 m (5' 6\")   Wt 62.1 kg (136 lb 14.5 oz)   SpO2 98%   BMI 22.10 kg/m   Pt transferred from 6B to  around 1715, AVSS on RA. Patient c/o abdominal and L shoulder pain, received scheduled Tylenol and PRN Flexeril w/ some relief. Patient denies nausea. . Urine Output - polanco w/ 200 ml UOP. Bowel Function - reports she is not passing gas and that her abdomen feels very distended. Nutrition - regular diet, encouraged to choose clears d/t bowel motility. MIVF infusing via internal jugular line. Activity - ambulated the unit several times independently, up in chair. Education - pt will need med card, lab book up to date. Thymo completed without incident. Will continue to monitor and update team w/ changes.      "

## 2020-06-20 NOTE — PROGRESS NOTES
-------------------CRITICAL LAB VALUE-------------------    Lab Value: Hemoglobin 6.9  Time of notification: 2:18 AM  MD notified: Heriberto CAM  Patient status:  Temp:  [98  F (36.7  C)-98.9  F (37.2  C)] 98.7  F (37.1  C)  Pulse:  [] 83  Heart Rate:  [] 83  Resp:  [13-21] 17  BP: (122-162)/() 123/83  SpO2:  [93 %-100 %] 100 %  Orders received: Awaiting orders from MD. MD at bedside.  Will wait until recheck at 6am.

## 2020-06-20 NOTE — PROGRESS NOTES
Transfer  Transferred from: PACU  Via:bed  Reason for transfer:Pt appropriate for 6B post OR for kidney tx  Family: Aware of transfer  Belongings: Received with pt  Chart: Received with pt  Medications: Meds received from old unit with pt  Code Status verified on armband: yes  2 RN Skin Assessment Completed By: Gabriel  Bed surface reassessed with algorithm and charted: yes  New bed surface ordered: no    Report received from: PACU nurse  Pt status: patient stable but with uncontrolled pain

## 2020-06-20 NOTE — OR NURSING
Dr Grace bedside to review central line placement and noted LIJ is too deep, will pull back and repeat chest film.    Pulled back and cxr verified placement. No longer sutured.

## 2020-06-21 LAB
ANION GAP SERPL CALCULATED.3IONS-SCNC: 6 MMOL/L (ref 3–14)
BASOPHILS # BLD AUTO: 0 10E9/L (ref 0–0.2)
BASOPHILS NFR BLD AUTO: 0.2 %
BUN SERPL-MCNC: 17 MG/DL (ref 7–30)
CA-I SERPL ISE-MCNC: 4.8 MG/DL (ref 4.4–5.2)
CALCIUM SERPL-MCNC: 8.2 MG/DL (ref 8.5–10.1)
CHLORIDE SERPL-SCNC: 112 MMOL/L (ref 94–109)
CO2 SERPL-SCNC: 24 MMOL/L (ref 20–32)
CREAT SERPL-MCNC: 0.97 MG/DL (ref 0.52–1.04)
DIFFERENTIAL METHOD BLD: ABNORMAL
EOSINOPHIL # BLD AUTO: 0 10E9/L (ref 0–0.7)
EOSINOPHIL NFR BLD AUTO: 0 %
ERYTHROCYTE [DISTWIDTH] IN BLOOD BY AUTOMATED COUNT: 15.3 % (ref 10–15)
GFR SERPL CREATININE-BSD FRML MDRD: 77 ML/MIN/{1.73_M2}
GLUCOSE BLDC GLUCOMTR-MCNC: 114 MG/DL (ref 70–99)
GLUCOSE BLDC GLUCOMTR-MCNC: 130 MG/DL (ref 70–99)
GLUCOSE BLDC GLUCOMTR-MCNC: 77 MG/DL (ref 70–99)
GLUCOSE SERPL-MCNC: 126 MG/DL (ref 70–99)
HCT VFR BLD AUTO: 23.9 % (ref 35–47)
HGB BLD-MCNC: 7.5 G/DL (ref 11.7–15.7)
HGB BLD-MCNC: 7.7 G/DL (ref 11.7–15.7)
IMM GRANULOCYTES # BLD: 0 10E9/L (ref 0–0.4)
IMM GRANULOCYTES NFR BLD: 0.4 %
LACTATE BLD-SCNC: 1 MMOL/L (ref 0.7–2)
LYMPHOCYTES # BLD AUTO: 0.1 10E9/L (ref 0.8–5.3)
LYMPHOCYTES NFR BLD AUTO: 2.4 %
MAGNESIUM SERPL-MCNC: 1.8 MG/DL (ref 1.6–2.3)
MCH RBC QN AUTO: 31 PG (ref 26.5–33)
MCHC RBC AUTO-ENTMCNC: 31.4 G/DL (ref 31.5–36.5)
MCV RBC AUTO: 99 FL (ref 78–100)
MONOCYTES # BLD AUTO: 0.2 10E9/L (ref 0–1.3)
MONOCYTES NFR BLD AUTO: 2.9 %
NEUTROPHILS # BLD AUTO: 5.1 10E9/L (ref 1.6–8.3)
NEUTROPHILS NFR BLD AUTO: 94.1 %
NRBC # BLD AUTO: 0 10*3/UL
NRBC BLD AUTO-RTO: 0 /100
PHOSPHATE SERPL-MCNC: 2 MG/DL (ref 2.5–4.5)
PLATELET # BLD AUTO: 122 10E9/L (ref 150–450)
POTASSIUM SERPL-SCNC: 3.9 MMOL/L (ref 3.4–5.3)
POTASSIUM SERPL-SCNC: 4.1 MMOL/L (ref 3.4–5.3)
RBC # BLD AUTO: 2.42 10E12/L (ref 3.8–5.2)
SODIUM SERPL-SCNC: 142 MMOL/L (ref 133–144)
TACROLIMUS BLD-MCNC: <3 UG/L (ref 5–15)
TME LAST DOSE: ABNORMAL H
WBC # BLD AUTO: 5.4 10E9/L (ref 4–11)

## 2020-06-21 PROCEDURE — 85025 COMPLETE CBC W/AUTO DIFF WBC: CPT | Performed by: SURGERY

## 2020-06-21 PROCEDURE — 25000132 ZZH RX MED GY IP 250 OP 250 PS 637: Mod: GY | Performed by: SURGERY

## 2020-06-21 PROCEDURE — 84132 ASSAY OF SERUM POTASSIUM: CPT | Performed by: SURGERY

## 2020-06-21 PROCEDURE — 80197 ASSAY OF TACROLIMUS: CPT | Performed by: SURGERY

## 2020-06-21 PROCEDURE — 25000132 ZZH RX MED GY IP 250 OP 250 PS 637: Mod: GY | Performed by: STUDENT IN AN ORGANIZED HEALTH CARE EDUCATION/TRAINING PROGRAM

## 2020-06-21 PROCEDURE — 25000132 ZZH RX MED GY IP 250 OP 250 PS 637: Mod: GY | Performed by: NURSE PRACTITIONER

## 2020-06-21 PROCEDURE — 85018 HEMOGLOBIN: CPT | Performed by: SURGERY

## 2020-06-21 PROCEDURE — 25000128 H RX IP 250 OP 636: Performed by: NURSE PRACTITIONER

## 2020-06-21 PROCEDURE — 80048 BASIC METABOLIC PNL TOTAL CA: CPT | Performed by: SURGERY

## 2020-06-21 PROCEDURE — 25000131 ZZH RX MED GY IP 250 OP 636 PS 637: Mod: GY | Performed by: SURGERY

## 2020-06-21 PROCEDURE — 84100 ASSAY OF PHOSPHORUS: CPT | Performed by: SURGERY

## 2020-06-21 PROCEDURE — 36592 COLLECT BLOOD FROM PICC: CPT | Performed by: NURSE PRACTITIONER

## 2020-06-21 PROCEDURE — 12000026 ZZH R&B TRANSPLANT

## 2020-06-21 PROCEDURE — 36592 COLLECT BLOOD FROM PICC: CPT | Performed by: SURGERY

## 2020-06-21 PROCEDURE — 25800030 ZZH RX IP 258 OP 636: Performed by: NURSE PRACTITIONER

## 2020-06-21 PROCEDURE — 82330 ASSAY OF CALCIUM: CPT | Performed by: NURSE PRACTITIONER

## 2020-06-21 PROCEDURE — 00000146 ZZHCL STATISTIC GLUCOSE BY METER IP

## 2020-06-21 PROCEDURE — 25000125 ZZHC RX 250: Performed by: NURSE PRACTITIONER

## 2020-06-21 PROCEDURE — 83605 ASSAY OF LACTIC ACID: CPT | Performed by: SURGERY

## 2020-06-21 PROCEDURE — 83735 ASSAY OF MAGNESIUM: CPT | Performed by: SURGERY

## 2020-06-21 RX ORDER — TACROLIMUS 1 MG/1
3 CAPSULE ORAL
Status: DISCONTINUED | OUTPATIENT
Start: 2020-06-21 | End: 2020-06-23

## 2020-06-21 RX ORDER — VITAMIN B COMPLEX
50 TABLET ORAL DAILY
Status: DISCONTINUED | OUTPATIENT
Start: 2020-06-21 | End: 2020-06-24 | Stop reason: HOSPADM

## 2020-06-21 RX ORDER — ASPIRIN 81 MG/1
81 TABLET, CHEWABLE ORAL DAILY
Status: DISCONTINUED | OUTPATIENT
Start: 2020-06-21 | End: 2020-06-24 | Stop reason: HOSPADM

## 2020-06-21 RX ORDER — ATROPA BELLADONNA AND OPIUM 16.2; 3 MG/1; MG/1
30 SUPPOSITORY RECTAL EVERY 6 HOURS PRN
Status: DISCONTINUED | OUTPATIENT
Start: 2020-06-21 | End: 2020-06-24 | Stop reason: HOSPADM

## 2020-06-21 RX ORDER — OXYBUTYNIN CHLORIDE 5 MG/1
5 TABLET ORAL 3 TIMES DAILY
Status: DISCONTINUED | OUTPATIENT
Start: 2020-06-21 | End: 2020-06-24 | Stop reason: HOSPADM

## 2020-06-21 RX ORDER — METHYLPREDNISOLONE SODIUM SUCCINATE 125 MG/2ML
100 INJECTION, POWDER, LYOPHILIZED, FOR SOLUTION INTRAMUSCULAR; INTRAVENOUS ONCE
Status: COMPLETED | OUTPATIENT
Start: 2020-06-21 | End: 2020-06-21

## 2020-06-21 RX ORDER — DIPHENHYDRAMINE HCL 12.5MG/5ML
25-50 LIQUID (ML) ORAL ONCE
Status: COMPLETED | OUTPATIENT
Start: 2020-06-21 | End: 2020-06-21

## 2020-06-21 RX ORDER — BISACODYL 10 MG
10 SUPPOSITORY, RECTAL RECTAL DAILY PRN
Status: DISCONTINUED | OUTPATIENT
Start: 2020-06-21 | End: 2020-06-24 | Stop reason: HOSPADM

## 2020-06-21 RX ORDER — CARVEDILOL 3.12 MG/1
3.12 TABLET ORAL 2 TIMES DAILY
Status: DISCONTINUED | OUTPATIENT
Start: 2020-06-21 | End: 2020-06-24

## 2020-06-21 RX ORDER — ACETAMINOPHEN 325 MG/1
650 TABLET ORAL ONCE
Status: COMPLETED | OUTPATIENT
Start: 2020-06-21 | End: 2020-06-21

## 2020-06-21 RX ORDER — DIPHENHYDRAMINE HCL 25 MG
25-50 CAPSULE ORAL ONCE
Status: COMPLETED | OUTPATIENT
Start: 2020-06-21 | End: 2020-06-21

## 2020-06-21 RX ORDER — VALGANCICLOVIR 450 MG/1
900 TABLET, FILM COATED ORAL DAILY
Status: DISCONTINUED | OUTPATIENT
Start: 2020-06-21 | End: 2020-06-24 | Stop reason: HOSPADM

## 2020-06-21 RX ORDER — POLYETHYLENE GLYCOL 3350 17 G/17G
17 POWDER, FOR SOLUTION ORAL DAILY
Status: DISCONTINUED | OUTPATIENT
Start: 2020-06-21 | End: 2020-06-24 | Stop reason: HOSPADM

## 2020-06-21 RX ORDER — VALGANCICLOVIR 450 MG/1
900 TABLET, FILM COATED ORAL DAILY
Status: DISCONTINUED | OUTPATIENT
Start: 2020-06-22 | End: 2020-06-21

## 2020-06-21 RX ADMIN — OXYBUTYNIN CHLORIDE 5 MG: 5 TABLET ORAL at 07:02

## 2020-06-21 RX ADMIN — ASPIRIN 81 MG CHEWABLE TABLET 81 MG: 81 TABLET CHEWABLE at 11:36

## 2020-06-21 RX ADMIN — CARVEDILOL 3.12 MG: 3.12 TABLET, FILM COATED ORAL at 20:44

## 2020-06-21 RX ADMIN — ACETAMINOPHEN 650 MG: 325 TABLET, FILM COATED ORAL at 03:43

## 2020-06-21 RX ADMIN — HYDROMORPHONE HYDROCHLORIDE 2 MG: 2 TABLET ORAL at 20:45

## 2020-06-21 RX ADMIN — MENTHOL 1 PATCH: 205.5 PATCH TOPICAL at 13:07

## 2020-06-21 RX ADMIN — ACETAMINOPHEN 650 MG: 325 TABLET, FILM COATED ORAL at 16:36

## 2020-06-21 RX ADMIN — DIPHENHYDRAMINE HYDROCHLORIDE 50 MG: 25 CAPSULE ORAL at 12:59

## 2020-06-21 RX ADMIN — POLYETHYLENE GLYCOL 3350 17 G: 17 POWDER, FOR SOLUTION ORAL at 16:36

## 2020-06-21 RX ADMIN — ANTI-THYMOCYTE GLOBULIN (RABBIT) 125 MG: 5 INJECTION, POWDER, LYOPHILIZED, FOR SOLUTION INTRAVENOUS at 13:36

## 2020-06-21 RX ADMIN — HYDROMORPHONE HYDROCHLORIDE 2 MG: 2 TABLET ORAL at 08:26

## 2020-06-21 RX ADMIN — DIBASIC SODIUM PHOSPHATE, MONOBASIC POTASSIUM PHOSPHATE AND MONOBASIC SODIUM PHOSPHATE 250 MG: 852; 155; 130 TABLET ORAL at 20:44

## 2020-06-21 RX ADMIN — CYCLOBENZAPRINE HYDROCHLORIDE 5 MG: 5 TABLET, FILM COATED ORAL at 07:02

## 2020-06-21 RX ADMIN — ACETAMINOPHEN 650 MG: 325 TABLET, FILM COATED ORAL at 08:25

## 2020-06-21 RX ADMIN — MELATONIN 50 MCG: at 16:35

## 2020-06-21 RX ADMIN — TACROLIMUS 2 MG: 1 CAPSULE ORAL at 08:25

## 2020-06-21 RX ADMIN — ACETAMINOPHEN 650 MG: 325 TABLET, FILM COATED ORAL at 20:44

## 2020-06-21 RX ADMIN — MYCOPHENOLATE MOFETIL 1000 MG: 250 CAPSULE ORAL at 08:25

## 2020-06-21 RX ADMIN — ATORVASTATIN CALCIUM 10 MG: 10 TABLET, FILM COATED ORAL at 08:26

## 2020-06-21 RX ADMIN — HYDROMORPHONE HYDROCHLORIDE 2 MG: 2 TABLET ORAL at 23:50

## 2020-06-21 RX ADMIN — LIDOCAINE 3 PATCH: 560 PATCH PERCUTANEOUS; TOPICAL; TRANSDERMAL at 20:45

## 2020-06-21 RX ADMIN — Medication 400 MG: at 11:36

## 2020-06-21 RX ADMIN — BISACODYL 10 MG: 10 SUPPOSITORY RECTAL at 08:32

## 2020-06-21 RX ADMIN — TACROLIMUS 3 MG: 1 CAPSULE ORAL at 18:44

## 2020-06-21 RX ADMIN — DOCUSATE SODIUM AND SENNOSIDES 2 TABLET: 50; 8.6 TABLET ORAL at 20:44

## 2020-06-21 RX ADMIN — DIBASIC SODIUM PHOSPHATE, MONOBASIC POTASSIUM PHOSPHATE AND MONOBASIC SODIUM PHOSPHATE 250 MG: 852; 155; 130 TABLET ORAL at 08:25

## 2020-06-21 RX ADMIN — HYDROMORPHONE HYDROCHLORIDE 2 MG: 2 TABLET ORAL at 03:43

## 2020-06-21 RX ADMIN — MENTHOL 1 PATCH: 205.5 PATCH TOPICAL at 20:55

## 2020-06-21 RX ADMIN — OXYBUTYNIN CHLORIDE 5 MG: 5 TABLET ORAL at 21:59

## 2020-06-21 RX ADMIN — HYDROMORPHONE HYDROCHLORIDE 2 MG: 2 TABLET ORAL at 16:36

## 2020-06-21 RX ADMIN — ATROPA BELLADONNA AND OPIUM 1 SUPPOSITORY: 16.2; 3 SUPPOSITORY RECTAL at 11:37

## 2020-06-21 RX ADMIN — HYDROMORPHONE HYDROCHLORIDE 2 MG: 2 TABLET ORAL at 12:59

## 2020-06-21 RX ADMIN — VALGANCICLOVIR 900 MG: 450 TABLET, FILM COATED ORAL at 11:36

## 2020-06-21 RX ADMIN — METHYLPREDNISOLONE SODIUM SUCCINATE 100 MG: 125 INJECTION, POWDER, FOR SOLUTION INTRAMUSCULAR; INTRAVENOUS at 12:59

## 2020-06-21 RX ADMIN — SULFAMETHOXAZOLE AND TRIMETHOPRIM 1 TABLET: 400; 80 TABLET ORAL at 08:26

## 2020-06-21 RX ADMIN — MYCOPHENOLATE MOFETIL 1000 MG: 250 CAPSULE ORAL at 18:44

## 2020-06-21 RX ADMIN — DOCUSATE SODIUM AND SENNOSIDES 2 TABLET: 50; 8.6 TABLET ORAL at 08:26

## 2020-06-21 RX ADMIN — ACETAMINOPHEN 650 MG: 325 TABLET, FILM COATED ORAL at 12:59

## 2020-06-21 RX ADMIN — OXYBUTYNIN CHLORIDE 5 MG: 5 TABLET ORAL at 13:00

## 2020-06-21 ASSESSMENT — MIFFLIN-ST. JEOR: SCORE: 1384

## 2020-06-21 ASSESSMENT — ACTIVITIES OF DAILY LIVING (ADL)
ADLS_ACUITY_SCORE: 11

## 2020-06-21 NOTE — PROGRESS NOTES
[facility], [place]   Transplant Nephrology Progress Note  Date of Admission:  6/19/2020  Today's Date: 06/21/2020    Recommendations:  - Stop the IVF   - Check Ionized calcium   - Start vitamin D     Assessment & Plan  # DDKT: Trend down              - Baseline Cr ~ TBD              - Proteinuria: Not checked post transplant              - Date DSA Last Checked: checked at a[facility]us time of tx       Latest DSA: pending               - BK Viremia: Not checked post transplant              - Kidney Tx Biopsy: No     # Immunosuppression: Tacrolimus immediate release (goal 8-10), Mycophenolate mofetil (dose 1000 mg every 12 hours) and should complete full dose thymoglobulin 6 mg/kg               - Changes: No     # Infection Prophylaxis:   - PJP: Sulfa/TMP (Bactrim)  - CMV: Valcyte     # Hypertension: Controlled;   Goal BP: < 140/90              - Volume status: Mildly hypervolemic             EDW ~ 62 kg               - Changes: No     # Anemia in Chronic Renal Disease: Hgb: Trend down      ENZO: Yes              - Iron studies: not checked recently               - Transfusion per surgery team      # Mineral Bone Disorder:   - Secondary renal hyperparathyroidism; PTH level: Not checked recently        On treatment: None  - Vitamin D; level: Not checked recently        On supplement: Yes  - Calcium; level: Low        On supplement: consider IV calcium and check ionized level in the am   - Phosphorus; level: Normal        On supplement: No     # Electrolytes:   - Potassium; level: Normal        On supplement: No  - Magnesium; level: Normal        On supplement: Yes     # History of non-adherence: will reemphasize the need to adhere to isx schedule      # Transplant History:  Etiology of Kidney Failure: IgA nephropathy  Tx: LDKT and DDKT  Transplant: 6/19/2020 (Kidney), 12/1/2007 (Kidney)  Donor 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  Mahesh Armando MD   Pager: [phone]    Interval History     Continues to feel bloated. No fevers or chills. No NVD     Review of Systems   4 point ROS was obtained and negative except as noted in the Interval History.    MEDICATIONS:    acetaminophen  650 mg Oral Once     acetaminophen  650 mg Oral Q4H     anti-thymocyte globulin  125 mg Intravenous Central line Once     atorvastatin  10 mg Oral Daily     diphenhydrAMINE  25-50 mg Oral Once    Or     diphenhydrAMINE  25-50 mg Per NG tube Once     insulin aspart  1-3 Units Subcutaneous TID AC     insulin aspart  1-3 Units Subcutaneous At Bedtime     lidocaine  3 patch Transdermal Q24h    And     lidocaine   Transdermal Q8H     magnesium oxide  400 mg Oral Daily with lunch     menthol   Transdermal Q8H     methylPREDNISolone  100 mg Intravenous Once     mycophenolate  1,000 mg Oral BID IS     oxybutynin  5 mg Oral TID     phosphorus tablet 250 mg  250 mg Oral BID     senna-docusate  1 tablet Oral BID    Or     senna-docusate  2 tablet Oral BID     sodium chloride (PF)  10 mL Intracatheter Q8H     sulfamethoxazole-trimethoprim  1 tablet Oral Daily     tacrolimus  2 mg Oral BID IS     valGANciclovir  900 mg Oral Daily         Physical Exam   Temp  Av.6  F (37  C)  Min: 97.7  F (36.5  C)  Max: 99.3  F (37.4  C)      Pulse  Av.5  Min: 77  Max: 122 Resp  Av.6  Min: 13  Max: 21  SpO2  Av.2 %  Min: 93 %  Max: 100 %    CVP (mmHg): 10 mmHgBP (!) 145/97   Pulse 77   Temp 98.9  F (37.2  C) (Oral)   Resp 16   Ht 1.676 m (5' 6\")   Wt 66.2 kg (146 lb)   SpO2 99%   BMI 23.57 kg/m     Date 20 0700 - 20 0659   Shift 2125-2404 5255-5997 6732-0206 24 Hour Total   INTAKE   Shift Total(mL/kg)       OUTPUT   Urine 175   175   Shift Total(mL/kg) 175(2.64)   175(2.64)   Weight (kg) 66.22 66.22 " 66.22 66.22      Admit Weight: 62.1 kg (136 lb 14.5 oz)     GENERAL APPEARANCE: alert and no distress  HENT: mouth without ulcers or lesions  LYMPHATICS: no cervical or supraclavicular nodes  RESP: lungs clear to auscultation - no rales, rhonchi or wheezes  CV: regular rhythm, normal rate, no rub, no murmur  EDEMA: no LE edema bilaterally  ABDOMEN: soft, nondistended, nontender, bowel sounds normal  MS: extremities normal - no gross deformities noted, no evidence of inflammation in joints, no muscle tenderness  SKIN: no rash    Data   All labs reviewed by me.  CMP  Recent Labs   Lab 06/21/20  0532 06/21/20  0042 06/20/20  1752 06/20/20  1340  06/20/20  0606  06/19/20  1736 06/19/20  1622  06/19/20  0411     --   --   --   --  140  --  138 138   < > 138   POTASSIUM 3.9 4.1 4.2 3.9   < > 3.9   < > 3.9 3.9   < > 3.8   CHLORIDE 112*  --   --   --   --  108  --  105  --   --  101   CO2 24  --   --   --   --  22  --  20  --   --  25   ANIONGAP 6  --   --   --   --  9  --  12  --   --  12   *  --   --   --   --  151*  --  160* 137*   < > 90   BUN 17  --   --   --   --  36*  --  50*  --   --  50*   CR 0.97  --   --   --   --  3.89*  --  9.09*  --   --  10.10*   GFRESTIMATED 77  --   --   --   --  14*  --  5*  --   --  5*   GFRESTBLACK 89  --   --   --   --  17*  --  6*  --   --  5*   CASIE 8.2*  --   --   --   --  6.8*  --  6.7*  --   --  7.5*   MAG 1.8  --   --   --   --  1.7  --  1.3*  --   --   --    PHOS 2.0*  --   --   --   --  5.5*  --  5.8*  --   --   --    PROTTOTAL  --   --   --   --   --   --   --   --   --   --  7.0   ALBUMIN  --   --   --   --   --   --   --   --   --   --  3.4   BILITOTAL  --   --   --   --   --   --   --   --   --   --  0.4   ALKPHOS  --   --   --   --   --   --   --   --   --   --  54   AST  --   --   --   --   --   --   --   --   --   --  11   ALT  --   --   --   --   --   --   --   --   --   --  13    < > = values in this interval not displayed.     CBC  Recent Labs   Lab  06/21/20  0532 06/21/20  0042 06/20/20  1752 06/20/20  0954 06/20/20  0606  06/19/20  1736   HGB 7.5* 7.7* 6.5* 6.8* 7.1*   < > 7.6*   WBC 5.4  --  3.9*  --  7.0  --  7.0   RBC 2.42*  --  2.07*  --  2.24*  --  2.43*   HCT 23.9*  --  21.2*  --  22.8*  --  24.1*   MCV 99  --  102*  --  102*  --  99   MCH 31.0  --  31.4  --  31.7  --  31.3   MCHC 31.4*  --  30.7*  --  31.1*  --  31.5   RDW 15.3*  --  14.4  --  14.4  --  14.1   *  --  133*  --  165  --  155    < > = values in this interval not displayed.     INR  Recent Labs   Lab 06/19/20  1736 06/19/20  0411   INR 1.44* 1.23*   PTT 28 31     ABG  Recent Labs   Lab 06/19/20  1622 06/19/20  1134   O2PER 50.0 50.0      Urine Studies  Recent Labs   Lab Test 01/02/18  2048 03/08/17  1632 01/25/17  1920 10/19/15  0550   COLOR Yellow Straw Straw Yellow   APPEARANCE Cloudy Slightly Cloudy Slightly Cloudy Slightly Cloudy   URINEGLC 150* 250* 70* Negative   URINEBILI Negative Negative Negative Negative   URINEKETONE Negative Negative Negative Negative   SG 1.008 1.010 1.006 1.005   UBLD Small* Small* Negative Moderate*   URINEPH 8.0* 8.5* 7.5* 8.5*   PROTEIN 30* 100* 30* 300*   NITRITE Negative Negative Negative Negative   LEUKEST Trace* Small* Negative Small*   RBCU 3* 3* 1 2   WBCU 8* 4* 2 12*     Recent Labs   Lab Test 07/16/12  1400 07/02/12  1130 06/25/12  0615 06/17/12  0930 06/13/12  1120 06/07/12  0643 06/01/12  0950 05/25/12  1830 05/25/12  1110 05/22/12  1536   UTPG 1.53* 0.86* 0.69* 0.82* 0.80* 0.88* 0.58* 0.47*  0.45* 0.56* 0.51*     PTH  Recent Labs   Lab Test 11/30/12  2030 11/30/12  1030   PTHI 500* 794*     Iron Studies  Recent Labs   Lab Test 11/30/12  1950   IRON 125   *   IRONSAT 81*   HSAHBAZ 401*       IMAGING:  All imaging studies reviewed by me.

## 2020-06-21 NOTE — PROGRESS NOTES
Transplant Surgery  Inpatient Daily Progress Note  2020    Assessment & Plan:   Jelly Dietz is a 35 year old female with a past medical history significant for end stage kidney disease secondary to IgA nephropathy, s/p previous kidney transplant (, failed d/t noncompliance during pregnancy). She is s/p  donor kidney transplant on 20.    Graft function: Good kidney function. Cr 0.97 down from 3.89. ultrasound on  revealed patent vessels and no abnormalities   Immunosuppression management:   Induction: High risk. Thymo x 3 doses for a total of 6 mg/kg   Solu-Medrol/pred: 500mg/250mg/100mg   MMF: 1000 mg BID  Tacro: Goal level 8-10.   Complexity of management:Medium. Contributing factors: induction   Neuro: Acute surgical pain. Scheduled tylenol. PRN oxycodone. Lidoderm patches LLQ and add flexeril  Hematology: Hgb 7.5, received a unit of blood on . Will start ASA 81 today  Cardiorespiratory: Stable. -150s. HR 70-100s. Continue to hold home anti-hypertensives. Patient to be OOB to chair, ambulate, and frequent IS 10x/hr.   GI/Nutrition: BERTIN, Regular diet. Senna BID.    Endocrine: Mild steroid hyperglycemia. Glucose 110s-170s.   Fluid/Electrolytes: MIVF: capped, replete electrolytes PRN  : Bauman to remain due to new surgical anastomosis for 4-7 days  Infectious disease: afebrile, WBC 5.4  Prophylaxis: DVT (PCDs), viral (Valcyte), PJP (Bactrim).  Disposition: 7A     Medical Decision Making: Medium  Admit 85669 (moderate level decision making)    CONNIE/Fellow/Resident Provider: Marvin Oliveira MD    Faculty: Irma Shannon Md  _________________________________________________________________  Transplant History:   2020 (Kidney), 2007 (Kidney), Postoperative day: 2     Interval History: History is obtained from the patient  Overnight events: No acute events overnight. Bauman was removed by patient this morning and was replaced. Ambulating, passing stool and  "gas    ROS:   A 10-point review of systems was negative except as noted above.    Meds:    acetaminophen  650 mg Oral Once     acetaminophen  650 mg Oral Q4H     anti-thymocyte globulin  125 mg Intravenous Central line Once     aspirin  81 mg Oral Daily     atorvastatin  10 mg Oral Daily     diphenhydrAMINE  25-50 mg Oral Once    Or     diphenhydrAMINE  25-50 mg Per NG tube Once     insulin aspart  1-3 Units Subcutaneous TID AC     insulin aspart  1-3 Units Subcutaneous At Bedtime     lidocaine  3 patch Transdermal Q24h    And     lidocaine   Transdermal Q8H     magnesium oxide  400 mg Oral Daily with lunch     menthol   Transdermal Q8H     methylPREDNISolone  100 mg Intravenous Once     mycophenolate  1,000 mg Oral BID IS     oxybutynin  5 mg Oral TID     phosphorus tablet 250 mg  250 mg Oral BID     senna-docusate  1 tablet Oral BID    Or     senna-docusate  2 tablet Oral BID     sodium chloride (PF)  10 mL Intracatheter Q8H     sulfamethoxazole-trimethoprim  1 tablet Oral Daily     tacrolimus  2 mg Oral BID IS     valGANciclovir  900 mg Oral Daily       Physical Exam:     Admit Weight: 62.1 kg (136 lb 14.5 oz)    Current vitals:   BP (!) 145/97   Pulse 77   Temp 98.9  F (37.2  C) (Oral)   Resp 16   Ht 1.676 m (5' 6\")   Wt 66.2 kg (146 lb)   SpO2 99%   BMI 23.57 kg/m      Vital sign ranges:    Temp:  [97.7  F (36.5  C)-99.3  F (37.4  C)] 98.9  F (37.2  C)  Pulse:  [77-83] 77  Heart Rate:  [] 79  Resp:  [15-20] 16  BP: (123-156)/() 145/97  SpO2:  [96 %-100 %] 99 %    General Appearance: in no apparent distress.   Skin: Warm, perfused  Heart: RRR  Lungs: non-labored breathing on room air  Abdomen: The abdomen is soft, non-tender, mildly distended  : polanco is present.  Urine is clear.  Extremities: edema: minimal, strength normal  Neurologic: awake, alert and oriented. Tremor absent..     Data:   CMP  Recent Labs   Lab 06/21/20  0532 06/21/20  0042  06/20/20  0606  06/19/20  1622 06/19/20  1134 " 06/19/20 0411     --   --  140   < > 138 141 138   POTASSIUM 3.9 4.1   < > 3.9   < > 3.9 4.0 3.8   CHLORIDE 112*  --   --  108   < >  --   --  101   CO2 24  --   --  22   < >  --   --  25   *  --   --  151*   < > 137* 85 90   BUN 17  --   --  36*   < >  --   --  50*   CR 0.97  --   --  3.89*   < >  --   --  10.10*   GFRESTIMATED 77  --   --  14*   < >  --   --  5*   GFRESTBLACK 89  --   --  17*   < >  --   --  5*   CASIE 8.2*  --   --  6.8*   < >  --   --  7.5*   ICAW  --   --   --   --   --  3.7* 3.7*  --    MAG 1.8  --   --  1.7   < >  --   --   --    PHOS 2.0*  --   --  5.5*   < >  --   --   --    ALBUMIN  --   --   --   --   --   --   --  3.4   BILITOTAL  --   --   --   --   --   --   --  0.4   ALKPHOS  --   --   --   --   --   --   --  54   AST  --   --   --   --   --   --   --  11   ALT  --   --   --   --   --   --   --  13    < > = values in this interval not displayed.     CBC  Recent Labs   Lab 06/21/20  0532 06/21/20  0042 06/20/20  1752 06/19/20 0411   HGB 7.5* 7.7* 6.5*   < > 8.8*   WBC 5.4  --  3.9*   < > 3.0*   *  --  133*   < > 156   A1C  --   --   --   --  5.1    < > = values in this interval not displayed.

## 2020-06-21 NOTE — PLAN OF CARE
"BP (!) 136/94 (BP Location: Right arm)   Pulse 77   Temp 98.1  F (36.7  C) (Oral)   Resp 16   Ht 1.676 m (5' 6\")   Wt 66.2 kg (146 lb)   SpO2 100%   BMI 23.57 kg/m   Hypertensive, OVSS on RA. Patient c/o gas pain, distention, bladder spasms, incisional and L shoulder pain. Pt received dulcolax suppository and reported small BM and is passing a lot of gas. Received PRN B&O suppository w/ good relief of bladder spasms, in addition to scheduled Ditropan. Icy hot patch placed on L shoulder w/ good relief. PRN dilaudid given q4 hrs for incisional pain. Pt denies nausea.  and 77. Urine Output - pt attempted to have a BM and removed polanco catheter, RN notified the team, 14F polanco replaced, pt seems to be more comfortable w/ new polanco. UOP is decreasing, pt did have two unmeasured voids during the time the polanco was out. NP was notified, pt was informed to increase oral intake of water. Pt requested MIVF but is fluid up so will need to hydrate orally. Bowel Function - small BM following suppository, passing gas. Nutrition - regular diet w/ poor appetite, reports she feels full and is struggling to eat. Activity - up ad kathy, ambulates the unit. Education - med card and lab book updated, pt needs MTP training. Plan of Care - currently receiving Thymo, tolerating thus far. Will continue to monitor and update team w/ changes.     "

## 2020-06-21 NOTE — PROVIDER NOTIFICATION
Notified oncall MD Small that pt triggered sepsis protocol with  and WBC 3.9; however lab unable to draw lactic (and scheduled potassium recheck) d/t pt getting blood transfusion now. MD okay with lactic and potassium being drawn with hemoglobin recheck later after transfusion finishes. Pt resting quietly/sleeping now; call light and belongings within reach. Will continue to monitor.     ADDENDUM: Hgb recheck 7.7; lactic level 1.0; and potassium recheck 4.1; will continue to monitor.

## 2020-06-21 NOTE — PLAN OF CARE
"  /82 (BP Location: Right arm)   Pulse 77   Temp 98.5  F (36.9  C) (Oral)   Resp 15   Ht 1.676 m (5' 6\")   Wt 62.1 kg (136 lb 14.5 oz)   SpO2 100%   BMI 22.10 kg/m      6182-5294  BPs slightly elevated 130s-150s/80-100s, but last recheck 128/82 this morning; intermittently tachycardic 80-100s, OVSS on RA, no s/s of distress. Pt triggered sepsis early in shift; see previous note.  A/Ox4, pleasant and cooperative with most cares, however intermittently anxious this shift over pain control/management. Pt endorsed generalized abdominal pain/incisional pain and \"bloating\"--adequately managed with prn PO dilaudid x2, lidocaine patches to abdomen, and scheduled tylenol. LLQ incision liquid bandaged, CDI/PARVIZ. Denied n/v--on regular diet with fair appetite though pt encouraged to have clears or full liquid diet because of her c/o bloating and pt not passing gas or having BMs at this time. Polanco patent with adequate anamika/yellow UOP; pt on menses. Pt did c/o feeling \"like I still have to pee\" while sitting on commode--pt very eager to have polanco removed since she feels \"it's uncomfortable and I don't like it in me.\" Educated pt on reason for polanco at this time; pt verbalized understanding but still anxious/wanting polanco removal--sticky note left for team to address with pt. RPIVs SLx2. LIJ SLx3; fluids discontinued and pt received one unit of blood this shift for Hbg of 6.5; recheck @1am was 7.7; recheck with morning labs. Pt spent first part of shift very anxious about pain and wanting to sleep--spent shift talking to family via phone and watching television; pt given melatonin to help with sleep and was able to sleep majority of shift after midnight without complaint; still sleeping now. Up SBA/x1, steady gait--mainly needing help with IV pole. Med card made and at bedside; did not start transplant education d/t pt anxiety. Continue pain management, encourage activity, and transplant education. Pt sleeping now; " "call light and belongings within reach. Will continue to monitor and continue POC/notify team as needed.     ADDENDUM: pt called out at 0615 stating that she \"needs it to come out!\" referring to polanco and that she needs to speak with an MD. Polanco patent--irrigated with 10cc of NS and pt felt \"some relief\" afterwards but still insistent on having polanco removed. Oncnohemi Small paged; ditropan ordered and per MD Small, team to address polanco with pt this morning. Pt currently on commode trying to have a bowel movement. Will continue to monitor.     ADDENDUM: @0645, pt calling out insistent that she speak with physician and stating she is \"in so much pain.\" Pt attributing pain to gas pain, incisional pain and possibly menstrual cramping. Cara Hansen NP made aware--dulcolax suppository and B&O suppositories ordered; pt given flexeril and ditropan PO @7am. Encouraged pt to ambulate to bathroom and while ambulating, pt passed gas--pt now on commode again and hoping to stool. Day RN to give dulcolax suppository and team aware/will address pt's polanco concerns. Will continue to monitor.   "

## 2020-06-22 ENCOUNTER — DOCUMENTATION ONLY (OUTPATIENT)
Dept: TRANSPLANT | Facility: CLINIC | Age: 35
End: 2020-06-22

## 2020-06-22 PROBLEM — N32.89 PAINFUL BLADDER SPASM: Status: ACTIVE | Noted: 2020-06-22

## 2020-06-22 PROBLEM — Z76.82 KIDNEY TRANSPLANT CANDIDATE: Status: RESOLVED | Noted: 2020-06-19 | Resolved: 2020-06-22

## 2020-06-22 PROBLEM — D84.9 IMMUNOSUPPRESSED STATUS (H): Status: ACTIVE | Noted: 2020-06-22

## 2020-06-22 PROBLEM — K59.03 CONSTIPATION DUE TO PAIN MEDICATION: Status: ACTIVE | Noted: 2020-06-22

## 2020-06-22 PROBLEM — Z01.818 PRE-TRANSPLANT EVALUATION FOR KIDNEY TRANSPLANT: Status: RESOLVED | Noted: 2017-03-25 | Resolved: 2020-06-22

## 2020-06-22 PROBLEM — D84.9 IMMUNOSUPPRESSED STATUS (H): Chronic | Status: ACTIVE | Noted: 2020-06-22

## 2020-06-22 PROBLEM — E87.5 HYPERKALEMIA: Status: RESOLVED | Noted: 2017-04-30 | Resolved: 2020-06-22

## 2020-06-22 PROBLEM — E83.39 HYPOPHOSPHATEMIA: Status: ACTIVE | Noted: 2020-06-22

## 2020-06-22 PROBLEM — Z94.0 KIDNEY TRANSPLANTED: Chronic | Status: ACTIVE | Noted: 2020-06-19

## 2020-06-22 PROBLEM — N18.6 ESRD (END STAGE RENAL DISEASE) (H): Status: RESOLVED | Noted: 2018-10-30 | Resolved: 2020-06-22

## 2020-06-22 PROBLEM — R30.1 PAINFUL BLADDER SPASM: Status: ACTIVE | Noted: 2020-06-22

## 2020-06-22 PROBLEM — R39.89 PAINFUL BLADDER SPASM: Status: ACTIVE | Noted: 2020-06-22

## 2020-06-22 LAB
ANION GAP SERPL CALCULATED.3IONS-SCNC: 5 MMOL/L (ref 3–14)
BASOPHILS # BLD AUTO: 0 10E9/L (ref 0–0.2)
BASOPHILS NFR BLD AUTO: 0 %
BUN SERPL-MCNC: 14 MG/DL (ref 7–30)
CALCIUM SERPL-MCNC: 8.5 MG/DL (ref 8.5–10.1)
CHLORIDE SERPL-SCNC: 112 MMOL/L (ref 94–109)
CO2 SERPL-SCNC: 23 MMOL/L (ref 20–32)
CREAT SERPL-MCNC: 0.7 MG/DL (ref 0.52–1.04)
DIFFERENTIAL METHOD BLD: ABNORMAL
EOSINOPHIL # BLD AUTO: 0 10E9/L (ref 0–0.7)
EOSINOPHIL NFR BLD AUTO: 0 %
ERYTHROCYTE [DISTWIDTH] IN BLOOD BY AUTOMATED COUNT: 15.1 % (ref 10–15)
GFR SERPL CREATININE-BSD FRML MDRD: >90 ML/MIN/{1.73_M2}
GLUCOSE SERPL-MCNC: 138 MG/DL (ref 70–99)
HCT VFR BLD AUTO: 22.9 % (ref 35–47)
HGB BLD-MCNC: 7.1 G/DL (ref 11.7–15.7)
IMM GRANULOCYTES # BLD: 0 10E9/L (ref 0–0.4)
IMM GRANULOCYTES NFR BLD: 0.4 %
LYMPHOCYTES # BLD AUTO: 0.1 10E9/L (ref 0.8–5.3)
LYMPHOCYTES NFR BLD AUTO: 5 %
MAGNESIUM SERPL-MCNC: 1.9 MG/DL (ref 1.6–2.3)
MCH RBC QN AUTO: 30.9 PG (ref 26.5–33)
MCHC RBC AUTO-ENTMCNC: 31 G/DL (ref 31.5–36.5)
MCV RBC AUTO: 100 FL (ref 78–100)
MONOCYTES # BLD AUTO: 0.1 10E9/L (ref 0–1.3)
MONOCYTES NFR BLD AUTO: 3.8 %
NEUTROPHILS # BLD AUTO: 2.2 10E9/L (ref 1.6–8.3)
NEUTROPHILS NFR BLD AUTO: 90.8 %
NRBC # BLD AUTO: 0 10*3/UL
NRBC BLD AUTO-RTO: 0 /100
PHOSPHATE SERPL-MCNC: 1 MG/DL (ref 2.5–4.5)
PLATELET # BLD AUTO: 123 10E9/L (ref 150–450)
POTASSIUM SERPL-SCNC: 3.9 MMOL/L (ref 3.4–5.3)
RBC # BLD AUTO: 2.3 10E12/L (ref 3.8–5.2)
SODIUM SERPL-SCNC: 140 MMOL/L (ref 133–144)
WBC # BLD AUTO: 2.4 10E9/L (ref 4–11)

## 2020-06-22 PROCEDURE — 25000132 ZZH RX MED GY IP 250 OP 250 PS 637: Mod: GY | Performed by: SURGERY

## 2020-06-22 PROCEDURE — 25000125 ZZHC RX 250: Performed by: NURSE PRACTITIONER

## 2020-06-22 PROCEDURE — 25000131 ZZH RX MED GY IP 250 OP 636 PS 637: Mod: GY | Performed by: SURGERY

## 2020-06-22 PROCEDURE — 25000132 ZZH RX MED GY IP 250 OP 250 PS 637: Mod: GY | Performed by: NURSE PRACTITIONER

## 2020-06-22 PROCEDURE — 25000132 ZZH RX MED GY IP 250 OP 250 PS 637: Mod: GY | Performed by: STUDENT IN AN ORGANIZED HEALTH CARE EDUCATION/TRAINING PROGRAM

## 2020-06-22 PROCEDURE — 84100 ASSAY OF PHOSPHORUS: CPT | Performed by: SURGERY

## 2020-06-22 PROCEDURE — 12000026 ZZH R&B TRANSPLANT

## 2020-06-22 PROCEDURE — 83735 ASSAY OF MAGNESIUM: CPT | Performed by: SURGERY

## 2020-06-22 PROCEDURE — 25800030 ZZH RX IP 258 OP 636: Performed by: NURSE PRACTITIONER

## 2020-06-22 PROCEDURE — 36592 COLLECT BLOOD FROM PICC: CPT | Performed by: SURGERY

## 2020-06-22 PROCEDURE — 85025 COMPLETE CBC W/AUTO DIFF WBC: CPT | Performed by: SURGERY

## 2020-06-22 PROCEDURE — 80048 BASIC METABOLIC PNL TOTAL CA: CPT | Performed by: SURGERY

## 2020-06-22 RX ORDER — HYDROMORPHONE HYDROCHLORIDE 2 MG/1
2 TABLET ORAL ONCE
Status: COMPLETED | OUTPATIENT
Start: 2020-06-22 | End: 2020-06-22

## 2020-06-22 RX ORDER — HYDROXYZINE HYDROCHLORIDE 25 MG/1
25 TABLET, FILM COATED ORAL EVERY 6 HOURS PRN
Status: DISCONTINUED | OUTPATIENT
Start: 2020-06-22 | End: 2020-06-24 | Stop reason: HOSPADM

## 2020-06-22 RX ORDER — HYDROXYZINE HYDROCHLORIDE 25 MG/1
50 TABLET, FILM COATED ORAL EVERY 6 HOURS PRN
Status: DISCONTINUED | OUTPATIENT
Start: 2020-06-22 | End: 2020-06-24 | Stop reason: HOSPADM

## 2020-06-22 RX ORDER — ACETAMINOPHEN 325 MG/1
975 TABLET ORAL EVERY 8 HOURS
Status: DISCONTINUED | OUTPATIENT
Start: 2020-06-22 | End: 2020-06-24 | Stop reason: HOSPADM

## 2020-06-22 RX ORDER — NALOXONE HYDROCHLORIDE 0.4 MG/ML
.1-.4 INJECTION, SOLUTION INTRAMUSCULAR; INTRAVENOUS; SUBCUTANEOUS
Status: DISCONTINUED | OUTPATIENT
Start: 2020-06-22 | End: 2020-06-24 | Stop reason: HOSPADM

## 2020-06-22 RX ADMIN — DIBASIC SODIUM PHOSPHATE, MONOBASIC POTASSIUM PHOSPHATE AND MONOBASIC SODIUM PHOSPHATE 500 MG: 852; 155; 130 TABLET ORAL at 13:46

## 2020-06-22 RX ADMIN — ACETAMINOPHEN 975 MG: 325 TABLET, FILM COATED ORAL at 13:46

## 2020-06-22 RX ADMIN — HYDROMORPHONE HYDROCHLORIDE 2 MG: 2 TABLET ORAL at 13:46

## 2020-06-22 RX ADMIN — MELATONIN 50 MCG: at 08:14

## 2020-06-22 RX ADMIN — HYDROMORPHONE HYDROCHLORIDE 2 MG: 2 TABLET ORAL at 08:12

## 2020-06-22 RX ADMIN — ATROPA BELLADONNA AND OPIUM 1 SUPPOSITORY: 16.2; 3 SUPPOSITORY RECTAL at 11:02

## 2020-06-22 RX ADMIN — Medication 400 MG: at 11:02

## 2020-06-22 RX ADMIN — CARVEDILOL 3.12 MG: 3.12 TABLET, FILM COATED ORAL at 20:26

## 2020-06-22 RX ADMIN — DIBASIC SODIUM PHOSPHATE, MONOBASIC POTASSIUM PHOSPHATE AND MONOBASIC SODIUM PHOSPHATE 500 MG: 852; 155; 130 TABLET ORAL at 08:14

## 2020-06-22 RX ADMIN — CYCLOBENZAPRINE HYDROCHLORIDE 5 MG: 5 TABLET, FILM COATED ORAL at 15:22

## 2020-06-22 RX ADMIN — MYCOPHENOLATE MOFETIL 1000 MG: 250 CAPSULE ORAL at 18:23

## 2020-06-22 RX ADMIN — CYCLOBENZAPRINE HYDROCHLORIDE 5 MG: 5 TABLET, FILM COATED ORAL at 23:31

## 2020-06-22 RX ADMIN — POLYETHYLENE GLYCOL 3350 17 G: 17 POWDER, FOR SOLUTION ORAL at 08:15

## 2020-06-22 RX ADMIN — ATROPA BELLADONNA AND OPIUM 1 SUPPOSITORY: 16.2; 3 SUPPOSITORY RECTAL at 20:56

## 2020-06-22 RX ADMIN — MENTHOL 1 PATCH: 205.5 PATCH TOPICAL at 15:22

## 2020-06-22 RX ADMIN — ATORVASTATIN CALCIUM 10 MG: 10 TABLET, FILM COATED ORAL at 08:14

## 2020-06-22 RX ADMIN — HYDROMORPHONE HYDROCHLORIDE 2 MG: 2 TABLET ORAL at 20:56

## 2020-06-22 RX ADMIN — SODIUM PHOSPHATE, MONOBASIC, MONOHYDRATE AND SODIUM PHOSPHATE, DIBASIC, ANHYDROUS 30 MMOL: 276; 142 INJECTION, SOLUTION INTRAVENOUS at 11:02

## 2020-06-22 RX ADMIN — DIBASIC SODIUM PHOSPHATE, MONOBASIC POTASSIUM PHOSPHATE AND MONOBASIC SODIUM PHOSPHATE 500 MG: 852; 155; 130 TABLET ORAL at 20:26

## 2020-06-22 RX ADMIN — TACROLIMUS 3 MG: 1 CAPSULE ORAL at 08:11

## 2020-06-22 RX ADMIN — DOCUSATE SODIUM AND SENNOSIDES 2 TABLET: 50; 8.6 TABLET ORAL at 08:15

## 2020-06-22 RX ADMIN — MYCOPHENOLATE MOFETIL 1000 MG: 250 CAPSULE ORAL at 08:11

## 2020-06-22 RX ADMIN — LIDOCAINE 3 PATCH: 560 PATCH PERCUTANEOUS; TOPICAL; TRANSDERMAL at 20:26

## 2020-06-22 RX ADMIN — SULFAMETHOXAZOLE AND TRIMETHOPRIM 1 TABLET: 400; 80 TABLET ORAL at 08:14

## 2020-06-22 RX ADMIN — HYDROMORPHONE HYDROCHLORIDE 2 MG: 2 TABLET ORAL at 18:23

## 2020-06-22 RX ADMIN — ACETAMINOPHEN 975 MG: 325 TABLET, FILM COATED ORAL at 22:10

## 2020-06-22 RX ADMIN — ASPIRIN 81 MG CHEWABLE TABLET 81 MG: 81 TABLET CHEWABLE at 08:14

## 2020-06-22 RX ADMIN — TACROLIMUS 3 MG: 1 CAPSULE ORAL at 18:23

## 2020-06-22 RX ADMIN — OXYBUTYNIN CHLORIDE 5 MG: 5 TABLET ORAL at 08:16

## 2020-06-22 RX ADMIN — CARVEDILOL 3.12 MG: 3.12 TABLET, FILM COATED ORAL at 08:14

## 2020-06-22 RX ADMIN — HYDROXYZINE HYDROCHLORIDE 50 MG: 25 TABLET ORAL at 20:56

## 2020-06-22 RX ADMIN — HYDROMORPHONE HYDROCHLORIDE 2 MG: 2 TABLET ORAL at 23:31

## 2020-06-22 RX ADMIN — OXYBUTYNIN CHLORIDE 5 MG: 5 TABLET ORAL at 22:10

## 2020-06-22 RX ADMIN — HYDROMORPHONE HYDROCHLORIDE 2 MG: 2 TABLET ORAL at 03:45

## 2020-06-22 RX ADMIN — OXYBUTYNIN CHLORIDE 5 MG: 5 TABLET ORAL at 13:46

## 2020-06-22 RX ADMIN — VALGANCICLOVIR 900 MG: 450 TABLET, FILM COATED ORAL at 08:15

## 2020-06-22 ASSESSMENT — ACTIVITIES OF DAILY LIVING (ADL)
ADLS_ACUITY_SCORE: 11
ADLS_ACUITY_SCORE: 11
ADLS_ACUITY_SCORE: 10
ADLS_ACUITY_SCORE: 11
ADLS_ACUITY_SCORE: 10
ADLS_ACUITY_SCORE: 10

## 2020-06-22 ASSESSMENT — MIFFLIN-ST. JEOR: SCORE: 1380.83

## 2020-06-22 NOTE — PROGRESS NOTES
"CLINICAL NUTRITION SERVICES - BRIEF/DISCHARGE NOTE     Nutrition Prescription      Future/Additional Recommendations:  Minimize diet restrictions as able d/t high calorie/protein needs post-transplant.  Oral supplements ( Boost Breeze) as needed to help meet nutritional needs.     High protein food choices with meals to help meet high needs post-transplant over the next 6-8 weeks.     Heart-healthy diet (low saturated fat, low sodium, high fiber) and food safety precautions long term due to immunosuppression regimen post-transplant         EVALUATION OF THE PROGRESS TOWARD GOALS   Diet: Regular    Intake: Patient reports she is eating \"okay\".  RN notes indicate poor appetite and patient feeling full. Patient reports dislike of hospital food as part of the reason as well.         INTERVENTIONS  Provided verbal education on post-transplant diet guidelines.  Reviewed protein needs and encouraged having multiple protein containing sources with each meal.  Encouraged protein supplements/smoothies to meet needs if appetite/fullness remain issues.  Discussed heart healthy diet guidelines and food safety precautions as well.  Patient verbalized understanding of information provided.  Has handout in room from previous RD visit.       Encouraged trial of oral supplement, however patient declining at this time.  Encouraged patient to request RD if decides she would like to try.      Patient s discharge needs assessed and discharge planning has been conducted with the multidisciplinary transplant care team including physicians, pharmacy, social work and transplant coordinator.     Monitoring/Evaluation  Progress toward goals will be monitored and evaluated per protocol.    Once discharged, place outpatient nutrition consult via the transplant team if nutrition concerns arise.    Chelsea Flores MS, RD, LD  Pager [phone]      "

## 2020-06-22 NOTE — PROGRESS NOTES
Final positive donor sputum culture results have been uploaded to [facility].  Donor ID [id number].  Dr. Cornelius notified of results.  Electronically filed by Malina Lucas RN 6/22/2020  4:15 PM

## 2020-06-22 NOTE — DISCHARGE INSTRUCTIONS
________________________________________________________  Discharge RN please fax discharge orders to home care agency: [facility]  ________________________________________________________      Diet recommendations post-transplant: High protein diet x 8 weeks.  Heart healthy dietary habits long term (low saturated/trans fat, low sodium). Practice food safety precautions. See nutrition handout and food safety booklet for more information.

## 2020-06-22 NOTE — PLAN OF CARE
"  BP (!) 149/97 (BP Location: Right arm)   Pulse 77   Temp 98.5  F (36.9  C) (Oral)   Resp 16   Ht 1.676 m (5' 6\")   Wt 66.2 kg (146 lb)   SpO2 98%   BMI 23.57 kg/m      [id number]  BPs 140s/90s; OVSS on RA, no s/s of distress. A/Ox4, pleasant and cooperative with cares; still slightly anxious this shift in regards to pain management, polanco catheter and BMs/gas discomfort. Incisional pain treated with prn PO dilaudid x2 this shift (pt taking every 4 hours) with adequate relief--lido patches to abdomen and icy hot patch on left shoulder. Denied n/v--on regular diet with poor appetite--however, pt appetite improved after pt had a few loose bowel movement this shift; pt awoke this morning and requested tea and toast. LIJ SLx3. RPIVs SLx2. Polanco patent with good UOP this shift--urine is anamika in color; pt encouraged to increase PO intake but only taking sips of water at this time. Up ad kathy in room--again, pt had multiple loose BMs/passing gas this shift and pt very happy and feeling less bloated. Med card and lab book at bedside; continue transplant education; pain management, encourage PO intake and activity. Pt slept most of shift between cares; awake this morning and talking on phone with significant other; sleeping now. Call light and belongings within reach. Will continue to monitor and continue POC/notify team as needed.   "

## 2020-06-22 NOTE — PROGRESS NOTES
Transplant Surgery  Inpatient Daily Progress Note  2020    Assessment & Plan:   Jelly Dietz is a 35 year old female with a past medical history significant for end stage kidney disease secondary to IgA nephropathy, s/p previous kidney transplant (, failed d/t noncompliance during pregnancy). She is s/p  donor kidney transplant with stent on 20.    Graft function: POD #3. Cr 1->0.7.  Immunosuppression management:   Induction: High risk. Thymo 375mg, 5.7mg/kg.  Solu-Medrol/pred: 500mg/250mg/100mg   MMF: 1000 mg BID  Tacro: Goal level 8-10.   Complexity of management: Medium. Contributing factors: induction   Neuro:   Acute surgical pain: Scheduled tylenol. PRN oxycodone. Lidoderm patches LLQ.  Hematology:   Anemia: Hgb 7.5->7.1, received a unit of blood on .   Vascular ppx: ASA 81mg daily.  Cardiorespiratory: Stable. -150s. HR 70-100s. Continue to hold home anti-hypertensives. Patient to be OOB to chair, ambulate, and frequent IS 10x/hr.   GI/Nutrition: Regular diet.   Constipation: Now having BMs, remains distended. Senna BID.    Endocrine:   Mild steroid hyperglycemia: Resolved. Stop sliding scale insulin.   Fluid/Electrolytes:   Hypophosphatemia: Phos 1. Replace with PO and IV today.  : Bauman to remain due to new surgical anastomosis for 4-7 days  Bladder spasms: On oxybutynin and B&O.  Infectious disease: afebrile, no leukocytosis  Prophylaxis: DVT (PCDs), viral (Valcyte), PJP (Bactrim).  Disposition: 7A     Medical Decision Making: Medium    CONNIE/Fellow/Resident Provider: Stephanie Ling NP [id number]    Faculty: Gino Altman MD   _________________________________________________________________  Transplant History:   2020 (Kidney), 2007 (Kidney), Postoperative day: 3     Interval History: History is obtained from the patient  BM x4, but still feels bloated. Continued bladder spasms. Tired from being up all night in bathroom.    ROS:   A 10-point review of systems was  "negative except as noted above.    Meds:    aspirin  81 mg Oral Daily     atorvastatin  10 mg Oral Daily     carvedilol  3.125 mg Oral BID     lidocaine  3 patch Transdermal Q24h    And     lidocaine   Transdermal Q8H     magnesium oxide  400 mg Oral Daily with lunch     menthol   Transdermal Q8H     mycophenolate  1,000 mg Oral BID IS     oxybutynin  5 mg Oral TID     phosphorus tablet 250 mg  500 mg Oral TID     polyethylene glycol  17 g Oral Daily     senna-docusate  1 tablet Oral BID    Or     senna-docusate  2 tablet Oral BID     sodium chloride (PF)  10 mL Intracatheter Q8H     sodium phosphate  30 mmol Intravenous Once     sulfamethoxazole-trimethoprim  1 tablet Oral Daily     tacrolimus  3 mg Oral BID IS     valGANciclovir  900 mg Oral Daily     cholecalciferol  50 mcg Oral Daily       Physical Exam:     Admit Weight: 62.1 kg (136 lb 14.5 oz)    Current vitals:   BP (!) 143/100 (BP Location: Right arm)   Pulse 77   Temp 98.4  F (36.9  C) (Oral)   Resp 16   Ht 1.676 m (5' 6\")   Wt 65.9 kg (145 lb 4.8 oz)   SpO2 100%   BMI 23.45 kg/m      Vital sign ranges:    Temp:  [97.9  F (36.6  C)-98.8  F (37.1  C)] 98.4  F (36.9  C)  Heart Rate:  [73-97] 84  Resp:  [16] 16  BP: (130-149)/() 143/100  SpO2:  [98 %-100 %] 100 %    General Appearance: in no apparent distress.   Skin: Warm, perfused  Heart: Perfused  Lungs: non-labored breathing on room air  Abdomen: The abdomen is soft, non-tender, mildly distended  : polanco is present.  Urine is clear.  Extremities: edema: none  Neurologic: awake, alert and oriented x4. Tremor absent..     Data:   CMP  Recent Labs   Lab 06/22/20  0523 06/21/20  0532  06/19/20  1622 06/19/20  1134 06/19/20  0411    142   < > 138 141 138   POTASSIUM 3.9 3.9   < > 3.9 4.0 3.8   CHLORIDE 112* 112*   < >  --   --  101   CO2 23 24   < >  --   --  25   * 126*   < > 137* 85 90   BUN 14 17   < >  --   --  50*   CR 0.70 0.97   < >  --   --  10.10*   GFRESTIMATED >90 77   < " >  --   --  5*   GFRESTBLACK >90 89   < >  --   --  5*   CASIE 8.5 8.2*   < >  --   --  7.5*   ICAW  --   --   --  3.7* 3.7*  --    MAG 1.9 1.8   < >  --   --   --    PHOS 1.0* 2.0*   < >  --   --   --    ALBUMIN  --   --   --   --   --  3.4   BILITOTAL  --   --   --   --   --  0.4   ALKPHOS  --   --   --   --   --  54   AST  --   --   --   --   --  11   ALT  --   --   --   --   --  13    < > = values in this interval not displayed.     CBC  Recent Labs   Lab 06/22/20  0523 06/21/20  0532  06/19/20  0411   HGB 7.1* 7.5*   < > 8.8*   WBC 2.4* 5.4   < > 3.0*   * 122*   < > 156   A1C  --   --   --  5.1    < > = values in this interval not displayed.

## 2020-06-22 NOTE — PROGRESS NOTES
Care Coordinator    Care Coordinator - Discharge Planning    Admission Date/Time:  2020  Attending MD:  Irma Shannon MD     Data  Date of initial CC assessment:  2020  Chart reviewed, discussed with interdisciplinary team.   Patient was admitted for:   1. Kidney transplanted    2. Immunosuppression (H)    Jelly Dietz is a 35 year old female with a past medical history significant for end stage kidney disease secondary to IgA nephropathy, s/p previous kidney transplant (, failed d/t noncompliance during pregnancy). She is s/p  donor kidney transplant with stent on 20, not per Stephanie Ling NP, today--plan for discharge tomorrow.     Assessment   Concerns with insurance coverage for discharge needs: None.  Current Living Situation: Patient lives alone.Her fianceSriram will stay with her.  Support System: Supportive and Involved  Services Involved: Dialysis Services  Transportation at Discharge: [place] transportation,  [facility] [phone], Car and Family or friend will provide  Transportation to Medical Appointments:    - Transportation resources provided--call to arrange the day of discharge.  Barriers to Discharge: Pt does NOT have a PCP--wishes to establich care @ the [facility] in the [facility] I have sent the schedulers and inCokonnect message. Pt is scheduled to have a video visit with Dr Iram Banks on  @ 0800       Coordination of Care and Referrals: Provided patient/family with options for Home Care. I called the pt in her room, explained my role and she is very pleasant.  I gave her choices of HH agencies per Medicare.gov and she wants to stay in the  system--referral to [facility] placed and emailed.  Facesheet info is all correct.  I explained the ATC is for 2 days and to not take immunosuppression meds until her labs are drawn, and to bring meds with her to take(also lab draw days) and she understands.        Plan  Anticipated Discharge Date:    Anticipated Discharge  Plan:  ATC 6/24 and 6/25 then [facility]  Medicare very important message: I explained it to pt and she agrees with discharge plan and asked me to sign it for her--copy is in the chart.    CTS Handoff completed:  YES    Morena Alvarez RN

## 2020-06-22 NOTE — PLAN OF CARE
"BP (!) 139/97 (BP Location: Right arm)   Pulse 77   Temp 99.1  F (37.3  C) (Oral)   Resp 16   Ht 1.676 m (5' 6\")   Wt 65.9 kg (145 lb 4.8 oz)   SpO2 99%   BMI 23.45 kg/m   Hypertensive, OVSS on RA. Phos 1.0, received 30 mmol IV replacement. Patient c/o incisional pain and bladder spasms, received PRN dilaudid x3, PRN Flexeril x1, and B&O suppository x1.  Using Icy Hot patches on L shoulder. Patient denies nausea. BG checks discontinued. Urine Output - polanco w/ adequate UOP. Bowel Function - BMx1. Nutrition - regular diet w/ fair appetite, pt encouraged to increase oral intake of water. Activity - up ad kathy. Education - med card and lab book updated. Pt viewed medication, polanco teaching, and discharge videos on MTP. Plan of Care - possible discharge home tomorrow.     "

## 2020-06-22 NOTE — CONSULTS
Transplant Admission Psychosocial Assessment    Patient Name: Jelly Dietz  : 1985  Age: 35 year old  MRN: [id number]  Completed assessment with: Patient    Patient underwent a  donor kidney transplant on 2020.  Met with patient to update psychosocial assessment and provide brief education about SW role while inpatient, as well as expectations/requirements and follow up needs post-transplant. SW also provided education about need for compliance with transplant medications, and explained ESRD Medicare benefits and medication coverage under Medicare part B.  Medicare 2728 forms completed and signed by patient.  Presenting Information   Living Situation: Patient resides in an apartment in [place] with her two children Isabella and Laura.   If not local, plans for short term stay:  N/A  Previous Functional Status: Independent  Cultural/Language/Spiritual Considerations: Patient is a Maltese female, speaks English    Support System  Primary Support Person Mother and boyfriend  Other support:  Father, sister, aunt and friend in [place], two sisters and three brothers in [place]  Plan for support in immediate post-transplant period: Mother and boyfriend will provide patient support    Health Care Directive  Decision Maker: Patient   Alternate Decision Maker: NOK are her parents  Health Care Directive: Declined completing    Mental Health/Coping:   History of Mental Health: Denied any mental health symptoms  History of Chemical Health: Previously smoked Hookah, denied any alcohol or substance use  Current status: Appropriate, tired, oriented  Coping: Patient reports coping well despite her pain  Services Needed/Recommended: None    Financial   Income: SSDI and works as a PCA part time  Impact of transplant on income: Unable to work 6-8 weeks after transplant  Insurance and medication coverage: SOT*LIAISON JELLY IS A KIDNEY TRANSPLANT PATIENT (DATE OF TX: 20). PRIMARY HEALTH INSURANCE IS  THROUGH MEDICARE ID#[id number] GRP#OTHER (MEDICARE PART A EFFECTIVE 13 & B EFFECTIVE 14). BILL IMMUNOS AND PART B MEDICATIONS TO MEDICARE PART B PRIMARY THEN [facility] MEDICAID PREPAID WITH [facility] ID#[id number] (EFFECTIVE 18) AND PATIENT WILL PAY $0 AT TIME OF SERVICE. PART D PHARMACY BENEFITS ARE THROUGH [employer]. BILL PART D MEDICATIONS TO RX PROCESSOR SILVERSCRIPT PART D ID#[id number] GRP#[id number] PCN#[id number] BIN#[id number] (EFFECTIVE 02/01/15) . FOR PART D MEDICATIONS PATIENT HAS NO DEDUCTIBLE OR MAX OUT OF POCKET (DUE TO LOW INCOME COPAY SUBSIDY). PT WILL PAY $1.30 FOR GENERIC, $3.90 FOR BRAND, AND $3.90 FOR NON-FORMULARY MEDICATIONS. TESTCLAIMS: MYCOPHENOLATE 250MG=$0 AT TIME OF SERVICES, ENVARSUS XR 4MG=$0 AT TIME OF SERVICES, PROGRAF 1MG=$0 AT TIME OF SERVICES, TACROLIMUS 1MG=$0 AT TIME OF SERVICES, CYCLOSPORINE 100MG=$0 AT TIME OF SERVICES, VALGANCICLOVIR 450MG=$1.30  Financial concerns: None reported  Resources needed: None    Assessment, recommendations and discharge plan:   Patient is a 35-year-old, female, who underwent a  donor kidney transplant on 2020. Patient was on dialysis prior to transplant. Patient was very sleepy throughout meeting and willing to talk with . Patient has adequate insurance coverage and social support. No coping issues or psychosocial concerns. Patient was well informed of post-transplant expectations/follow through.    MARYJANE Sanderson, LGSW  7A   Ph: [phone]  Pager: [phone]

## 2020-06-22 NOTE — PLAN OF CARE
Problem: Adult Inpatient Plan of Care  Goal: Plan of Care Review  6/21/2020 2212 by Faby Garcia RN  Outcome: Improving       Temp: 97.9  F (36.6  C) Temp src: Oral BP: (!) 142/98 Pulse: 77 Heart Rate: 77 Resp: 16 SpO2: 99 % O2 Device: None (Room air)       -pain well controlled with PO Dilaudid, Lido patches and icy-hot  -polanco patent with adequate output  -had a small BM today @ 10:00  -up with SBA  -on Regular diet with poor appetite and only ate 25 %  -Creatinine improving 0.97 from 3.89  -Phos 2.0, on scheduled oral Phos replacement  -, no coverage given    Continue with plan of care and notify MDs with changes.

## 2020-06-23 ENCOUNTER — TELEPHONE (OUTPATIENT)
Dept: TRANSPLANT | Facility: CLINIC | Age: 35
End: 2020-06-23

## 2020-06-23 ENCOUNTER — APPOINTMENT (OUTPATIENT)
Dept: ULTRASOUND IMAGING | Facility: CLINIC | Age: 35
DRG: 652 | End: 2020-06-23
Attending: NURSE PRACTITIONER
Payer: MEDICARE

## 2020-06-23 DIAGNOSIS — Z79.899 LONG TERM USE OF DRUG: ICD-10-CM

## 2020-06-23 DIAGNOSIS — Z94.0 KIDNEY REPLACED BY TRANSPLANT: Primary | ICD-10-CM

## 2020-06-23 DIAGNOSIS — Z20.828 CONTACT WITH AND (SUSPECTED) EXPOSURE TO OTHER VIRAL COMMUNICABLE DISEASES: ICD-10-CM

## 2020-06-23 LAB
ABO + RH BLD: NORMAL
ABO + RH BLD: NORMAL
ALBUMIN UR-MCNC: 10 MG/DL
ANION GAP SERPL CALCULATED.3IONS-SCNC: 6 MMOL/L (ref 3–14)
APPEARANCE UR: CLEAR
BACTERIA #/AREA URNS HPF: ABNORMAL /HPF
BASOPHILS # BLD AUTO: 0 10E9/L (ref 0–0.2)
BASOPHILS NFR BLD AUTO: 0.6 %
BILIRUB UR QL STRIP: NEGATIVE
BLD GP AB SCN SERPL QL: NORMAL
BLD PROD TYP BPU: NORMAL
BLD UNIT ID BPU: 0
BLD UNIT ID BPU: 0
BLOOD BANK CMNT PATIENT-IMP: NORMAL
BLOOD PRODUCT CODE: NORMAL
BLOOD PRODUCT CODE: NORMAL
BPU ID: NORMAL
BPU ID: NORMAL
BUN SERPL-MCNC: 9 MG/DL (ref 7–30)
CALCIUM SERPL-MCNC: 8.1 MG/DL (ref 8.5–10.1)
CELL TYPE ALLO: NORMAL
CELL TYPE AUTO: NORMAL
CHANNELSHIFTALLOB1: -1
CHANNELSHIFTALLOB2: 16
CHANNELSHIFTALLOT1: 6
CHANNELSHIFTALLOT2: 11
CHANNELSHIFTAUTOB1: -17
CHANNELSHIFTAUTOB2: -16
CHANNELSHIFTAUTOT1: 20
CHANNELSHIFTAUTOT2: 23
CHLORIDE SERPL-SCNC: 110 MMOL/L (ref 94–109)
CO2 SERPL-SCNC: 24 MMOL/L (ref 20–32)
COLOR UR AUTO: YELLOW
COMMENT ALLOB2: NORMAL
CREAT SERPL-MCNC: 0.68 MG/DL (ref 0.52–1.04)
CROSSMATCHDATEALLO: NORMAL
CROSSMATCHDATEAUTO: NORMAL
DIFFERENTIAL METHOD BLD: ABNORMAL
DONOR ALLO: NORMAL
DONOR AUTO: NORMAL
DONOR IDENTIFICATION: NORMAL
DONORCELLDATE ALLO: NORMAL
DONORCELLDATE AUTO: NORMAL
DSA COMMENTS: NORMAL
DSA PRESENT: NO
DSA TEST METHOD: NORMAL
EOSINOPHIL # BLD AUTO: 0.1 10E9/L (ref 0–0.7)
EOSINOPHIL NFR BLD AUTO: 1.9 %
ERYTHROCYTE [DISTWIDTH] IN BLOOD BY AUTOMATED COUNT: 14.6 % (ref 10–15)
GFR SERPL CREATININE-BSD FRML MDRD: >90 ML/MIN/{1.73_M2}
GLUCOSE SERPL-MCNC: 107 MG/DL (ref 70–99)
GLUCOSE UR STRIP-MCNC: NEGATIVE MG/DL
HCT VFR BLD AUTO: 21.4 % (ref 35–47)
HGB BLD-MCNC: 6.6 G/DL (ref 11.7–15.7)
HGB BLD-MCNC: 6.8 G/DL (ref 11.7–15.7)
HGB UR QL STRIP: ABNORMAL
IMM GRANULOCYTES # BLD: 0 10E9/L (ref 0–0.4)
IMM GRANULOCYTES NFR BLD: 0.3 %
KETONES UR STRIP-MCNC: NEGATIVE MG/DL
LACTATE BLD-SCNC: 0.6 MMOL/L (ref 0.7–2)
LEUKOCYTE ESTERASE UR QL STRIP: ABNORMAL
LYMPHOCYTES # BLD AUTO: 0.1 10E9/L (ref 0.8–5.3)
LYMPHOCYTES NFR BLD AUTO: 3.4 %
MAGNESIUM SERPL-MCNC: 1.7 MG/DL (ref 1.6–2.3)
MCH RBC QN AUTO: 30.4 PG (ref 26.5–33)
MCHC RBC AUTO-ENTMCNC: 30.8 G/DL (ref 31.5–36.5)
MCV RBC AUTO: 99 FL (ref 78–100)
MONOCYTES # BLD AUTO: 0.1 10E9/L (ref 0–1.3)
MONOCYTES NFR BLD AUTO: 1.6 %
MUCOUS THREADS #/AREA URNS LPF: PRESENT /LPF
NEUTROPHILS # BLD AUTO: 3 10E9/L (ref 1.6–8.3)
NEUTROPHILS NFR BLD AUTO: 92.2 %
NITRATE UR QL: NEGATIVE
NRBC # BLD AUTO: 0 10*3/UL
NRBC BLD AUTO-RTO: 0 /100
NUM BPU REQUESTED: 1
ORGAN: NORMAL
PH UR STRIP: 6.5 PH (ref 5–7)
PHOSPHATE SERPL-MCNC: 1.7 MG/DL (ref 2.5–4.5)
PLATELET # BLD AUTO: 139 10E9/L (ref 150–450)
POS CUT OFF ALLO B: >93
POS CUT OFF ALLO T: >79
POS CUT OFF AUTO B: >93
POS CUT OFF AUTO T: >79
POTASSIUM SERPL-SCNC: 3.6 MMOL/L (ref 3.4–5.3)
RBC # BLD AUTO: 2.17 10E12/L (ref 3.8–5.2)
RBC #/AREA URNS AUTO: 144 /HPF (ref 0–2)
RESULT ALLO B1: NORMAL
RESULT ALLO B2: NORMAL
RESULT ALLO T1: NORMAL
RESULT ALLO T2: NORMAL
RESULT AUTO B1: NORMAL
RESULT AUTO B2: NORMAL
RESULT AUTO T1: NORMAL
RESULT AUTO T2: NORMAL
SA1 CELL: NORMAL
SA1 COMMENTS: NORMAL
SA1 HI RISK ABY: NORMAL
SA1 MOD RISK ABY: NORMAL
SA1 TEST METHOD: NORMAL
SA2 CELL: NORMAL
SA2 COMMENTS: NORMAL
SA2 HI RISK ABY UA: NORMAL
SA2 MOD RISK ABY: NORMAL
SA2 TEST METHOD: NORMAL
SERUM DATE ALLO B1: NORMAL
SERUM DATE ALLO B2: NORMAL
SERUM DATE ALLO T1: NORMAL
SERUM DATE ALLO T2: NORMAL
SERUM DATE AUTO B1: NORMAL
SERUM DATE AUTO B2: NORMAL
SERUM DATE AUTO T1: NORMAL
SERUM DATE AUTO T2: NORMAL
SODIUM SERPL-SCNC: 139 MMOL/L (ref 133–144)
SOURCE: ABNORMAL
SP GR UR STRIP: 1.01 (ref 1–1.03)
SPECIMEN EXP DATE BLD: NORMAL
SQUAMOUS #/AREA URNS AUTO: <1 /HPF (ref 0–1)
TACROLIMUS BLD-MCNC: <3 UG/L (ref 5–15)
TESTMETHODALLO: NORMAL
TESTMETHODAUTO: NORMAL
TME LAST DOSE: ABNORMAL H
TRANSFUSION STATUS PATIENT QL: NORMAL
TREATMENT ALLO B1: NORMAL
TREATMENT ALLO B2: NORMAL
TREATMENT ALLO T1: NORMAL
TREATMENT ALLO T2: NORMAL
TREATMENT AUTO B1: NORMAL
TREATMENT AUTO B2: NORMAL
TREATMENT AUTO T1: NORMAL
TREATMENT AUTO T2: NORMAL
UNACCEPTABLE ANTIGEN: NORMAL
UNOS CPRA: 95
UROBILINOGEN UR STRIP-MCNC: NORMAL MG/DL (ref 0–2)
WBC # BLD AUTO: 3.2 10E9/L (ref 4–11)
WBC #/AREA URNS AUTO: 4 /HPF (ref 0–5)

## 2020-06-23 PROCEDURE — 25000132 ZZH RX MED GY IP 250 OP 250 PS 637: Mod: GY | Performed by: SURGERY

## 2020-06-23 PROCEDURE — 86850 RBC ANTIBODY SCREEN: CPT | Performed by: SURGERY

## 2020-06-23 PROCEDURE — 83735 ASSAY OF MAGNESIUM: CPT | Performed by: SURGERY

## 2020-06-23 PROCEDURE — 12000026 ZZH R&B TRANSPLANT

## 2020-06-23 PROCEDURE — 86923 COMPATIBILITY TEST ELECTRIC: CPT | Performed by: SURGERY

## 2020-06-23 PROCEDURE — 80048 BASIC METABOLIC PNL TOTAL CA: CPT | Performed by: SURGERY

## 2020-06-23 PROCEDURE — 85025 COMPLETE CBC W/AUTO DIFF WBC: CPT | Performed by: SURGERY

## 2020-06-23 PROCEDURE — 25000131 ZZH RX MED GY IP 250 OP 636 PS 637: Mod: GY | Performed by: SURGERY

## 2020-06-23 PROCEDURE — 86900 BLOOD TYPING SEROLOGIC ABO: CPT | Performed by: SURGERY

## 2020-06-23 PROCEDURE — 76776 US EXAM K TRANSPL W/DOPPLER: CPT

## 2020-06-23 PROCEDURE — P9016 RBC LEUKOCYTES REDUCED: HCPCS | Performed by: SURGERY

## 2020-06-23 PROCEDURE — 25000132 ZZH RX MED GY IP 250 OP 250 PS 637: Mod: GY | Performed by: STUDENT IN AN ORGANIZED HEALTH CARE EDUCATION/TRAINING PROGRAM

## 2020-06-23 PROCEDURE — 81001 URINALYSIS AUTO W/SCOPE: CPT | Performed by: NURSE PRACTITIONER

## 2020-06-23 PROCEDURE — 25000132 ZZH RX MED GY IP 250 OP 250 PS 637: Mod: GY | Performed by: NURSE PRACTITIONER

## 2020-06-23 PROCEDURE — 83605 ASSAY OF LACTIC ACID: CPT | Performed by: SURGERY

## 2020-06-23 PROCEDURE — 36592 COLLECT BLOOD FROM PICC: CPT | Performed by: SURGERY

## 2020-06-23 PROCEDURE — 80197 ASSAY OF TACROLIMUS: CPT | Performed by: SURGERY

## 2020-06-23 PROCEDURE — 25000131 ZZH RX MED GY IP 250 OP 636 PS 637: Mod: GY | Performed by: NURSE PRACTITIONER

## 2020-06-23 PROCEDURE — 85018 HEMOGLOBIN: CPT | Performed by: STUDENT IN AN ORGANIZED HEALTH CARE EDUCATION/TRAINING PROGRAM

## 2020-06-23 PROCEDURE — 36415 COLL VENOUS BLD VENIPUNCTURE: CPT | Performed by: STUDENT IN AN ORGANIZED HEALTH CARE EDUCATION/TRAINING PROGRAM

## 2020-06-23 PROCEDURE — 86901 BLOOD TYPING SEROLOGIC RH(D): CPT | Performed by: SURGERY

## 2020-06-23 PROCEDURE — 84100 ASSAY OF PHOSPHORUS: CPT | Performed by: SURGERY

## 2020-06-23 RX ORDER — SIMETHICONE 80 MG
80 TABLET,CHEWABLE ORAL EVERY 6 HOURS PRN
Status: DISCONTINUED | OUTPATIENT
Start: 2020-06-23 | End: 2020-06-24 | Stop reason: HOSPADM

## 2020-06-23 RX ORDER — MICONAZOLE NITRATE 20 MG/G
CREAM TOPICAL 2 TIMES DAILY
Status: DISCONTINUED | OUTPATIENT
Start: 2020-06-23 | End: 2020-06-24 | Stop reason: HOSPADM

## 2020-06-23 RX ADMIN — OXYBUTYNIN CHLORIDE 5 MG: 5 TABLET ORAL at 16:15

## 2020-06-23 RX ADMIN — DOCUSATE SODIUM AND SENNOSIDES 2 TABLET: 50; 8.6 TABLET ORAL at 20:41

## 2020-06-23 RX ADMIN — Medication 400 MG: at 12:36

## 2020-06-23 RX ADMIN — SIMETHICONE 80 MG: 80 TABLET, CHEWABLE ORAL at 06:23

## 2020-06-23 RX ADMIN — POLYETHYLENE GLYCOL 3350 17 G: 17 POWDER, FOR SOLUTION ORAL at 08:13

## 2020-06-23 RX ADMIN — LIDOCAINE 3 PATCH: 560 PATCH PERCUTANEOUS; TOPICAL; TRANSDERMAL at 20:42

## 2020-06-23 RX ADMIN — OXYBUTYNIN CHLORIDE 5 MG: 5 TABLET ORAL at 20:41

## 2020-06-23 RX ADMIN — OXYBUTYNIN CHLORIDE 5 MG: 5 TABLET ORAL at 08:12

## 2020-06-23 RX ADMIN — MICONAZOLE NITRATE: 20 CREAM TOPICAL at 21:29

## 2020-06-23 RX ADMIN — MYCOPHENOLATE MOFETIL 1000 MG: 250 CAPSULE ORAL at 08:12

## 2020-06-23 RX ADMIN — MENTHOL 1 PATCH: 205.5 PATCH TOPICAL at 12:37

## 2020-06-23 RX ADMIN — ATORVASTATIN CALCIUM 10 MG: 10 TABLET, FILM COATED ORAL at 08:13

## 2020-06-23 RX ADMIN — DIBASIC SODIUM PHOSPHATE, MONOBASIC POTASSIUM PHOSPHATE AND MONOBASIC SODIUM PHOSPHATE 500 MG: 852; 155; 130 TABLET ORAL at 12:36

## 2020-06-23 RX ADMIN — MELATONIN 50 MCG: at 08:12

## 2020-06-23 RX ADMIN — HYDROMORPHONE HYDROCHLORIDE 2 MG: 2 TABLET ORAL at 08:12

## 2020-06-23 RX ADMIN — HYDROMORPHONE HYDROCHLORIDE 2 MG: 2 TABLET ORAL at 17:17

## 2020-06-23 RX ADMIN — ACETAMINOPHEN 975 MG: 325 TABLET, FILM COATED ORAL at 21:27

## 2020-06-23 RX ADMIN — CYCLOBENZAPRINE HYDROCHLORIDE 5 MG: 5 TABLET, FILM COATED ORAL at 16:15

## 2020-06-23 RX ADMIN — HYDROMORPHONE HYDROCHLORIDE 2 MG: 2 TABLET ORAL at 12:36

## 2020-06-23 RX ADMIN — VALGANCICLOVIR 900 MG: 450 TABLET, FILM COATED ORAL at 08:12

## 2020-06-23 RX ADMIN — DIBASIC SODIUM PHOSPHATE, MONOBASIC POTASSIUM PHOSPHATE AND MONOBASIC SODIUM PHOSPHATE 500 MG: 852; 155; 130 TABLET ORAL at 20:42

## 2020-06-23 RX ADMIN — ACETAMINOPHEN 975 MG: 325 TABLET, FILM COATED ORAL at 16:16

## 2020-06-23 RX ADMIN — DIBASIC SODIUM PHOSPHATE, MONOBASIC POTASSIUM PHOSPHATE AND MONOBASIC SODIUM PHOSPHATE 500 MG: 852; 155; 130 TABLET ORAL at 17:17

## 2020-06-23 RX ADMIN — HYDROMORPHONE HYDROCHLORIDE 2 MG: 2 TABLET ORAL at 23:45

## 2020-06-23 RX ADMIN — TACROLIMUS 3 MG: 1 CAPSULE ORAL at 08:13

## 2020-06-23 RX ADMIN — CARVEDILOL 3.12 MG: 3.12 TABLET, FILM COATED ORAL at 08:13

## 2020-06-23 RX ADMIN — DIBASIC SODIUM PHOSPHATE, MONOBASIC POTASSIUM PHOSPHATE AND MONOBASIC SODIUM PHOSPHATE 500 MG: 852; 155; 130 TABLET ORAL at 08:13

## 2020-06-23 RX ADMIN — DOCUSATE SODIUM AND SENNOSIDES 2 TABLET: 50; 8.6 TABLET ORAL at 08:12

## 2020-06-23 RX ADMIN — MICONAZOLE NITRATE: 20 CREAM TOPICAL at 12:37

## 2020-06-23 RX ADMIN — CARVEDILOL 3.12 MG: 3.12 TABLET, FILM COATED ORAL at 20:43

## 2020-06-23 RX ADMIN — MENTHOL 1 PATCH: 205.5 PATCH TOPICAL at 22:07

## 2020-06-23 RX ADMIN — ASPIRIN 81 MG CHEWABLE TABLET 81 MG: 81 TABLET CHEWABLE at 08:12

## 2020-06-23 RX ADMIN — BISACODYL 10 MG: 10 SUPPOSITORY RECTAL at 21:27

## 2020-06-23 RX ADMIN — MYCOPHENOLATE MOFETIL 1000 MG: 250 CAPSULE ORAL at 17:17

## 2020-06-23 RX ADMIN — TACROLIMUS 4.5 MG: 1 CAPSULE ORAL at 17:17

## 2020-06-23 RX ADMIN — HYDROMORPHONE HYDROCHLORIDE 2 MG: 2 TABLET ORAL at 03:27

## 2020-06-23 RX ADMIN — HYDROXYZINE HYDROCHLORIDE 50 MG: 25 TABLET ORAL at 03:27

## 2020-06-23 RX ADMIN — ACETAMINOPHEN 975 MG: 325 TABLET, FILM COATED ORAL at 06:23

## 2020-06-23 RX ADMIN — SULFAMETHOXAZOLE AND TRIMETHOPRIM 1 TABLET: 400; 80 TABLET ORAL at 08:13

## 2020-06-23 ASSESSMENT — ACTIVITIES OF DAILY LIVING (ADL)
ADLS_ACUITY_SCORE: 10

## 2020-06-23 ASSESSMENT — PAIN DESCRIPTION - DESCRIPTORS: DESCRIPTORS: ACHING

## 2020-06-23 NOTE — TELEPHONE ENCOUNTER
Pharmacist provided medication teaching for discharge with a focus on new medications/dose changes.  The discharge medication list was reviewed with the patient/family and the following points were discussed, as applicable: Name, description, purpose, dose/strength, duration of medications, common side effects, food/medications to avoid, action to be taken if dose is missed, when to call MD, safe disposal of unused medications and how to obtain refills.  Education completed via phone with patient per COVID-19 precautions.     Patient chooses to receive medications from  specialty pharmacy.     Patient will  bp monitor and med box at discharge pharmacy with medications.      Clinical Pharmacy Consult:                                                      Transplant Specific:   Date of Transplant: 06/19/2020  Type of Transplant: kidney  First Transplant: no  History of rejection: yes    Immunosuppression Regimen   TAC 3mg qAM & 3mg qPM and MMF 1000mg qAM & 1000mg qPM  Patient specific goal: tac: 8-10, mpa: 1-3.5  Most recent level: tac: <3, date 06/21/20  Immunosuppressant Levels:  Subtherapeutic  Pt adherent to lab draws: yes  Scr:   Lab Results   Component Value Date    CR 0.68 06/23/2020     Side effects: no side effects    Prophylactic Medications  Antibacterial:  Bactrim 400/80 once daily  Scheduled Discontinue Date: indefnitely    Antifungal: Not needed thus far  Scheduled Discontinue Date: N/A    Antiviral: CrCl >/=60 mL/minute: Valcyte 900mg daily   Scheduled Discontinue Date: 3 months    Acid Reducer: N/A  Scheduled Reviewed Date: N/A    Thrombosis Prevention: Aspirin 81 mg PO daily  Scheduled Discontinue Date: per MD    Blood Pressure Management  Frequency of home Blood Pressure checks: twice daily  Most recent home BP: 149/105  Patient Blood pressure goal: <140/90  Patient blood pressure at goal:  no  Hospitalizations/ER visits since last assessment: 0      Med rec/DUR performed: yes  Med Rec  Discrepancies: no    Reminders:    1.  Bring to first clinic appt: med box, med card, bp monitor, all medications being taken, and lab book.  2.    MTM pharmacist visit on first clinic appt and if ok, again in 3 to 4 months during follow up appt.  3.   Avoid Grapefruit and Grapefruit juice.   4.   Avoid herbal supplements. If wish to take other medications or supplements, call your coordinator.   5.   Keep lab appts.   6.  Can use apps on phone like [employer] to help manage medication lists and reminders.   7.  Make sure you are protecting your skin by wearing long sleeves and applying sunscreen to exposed skin, for any significant time in the sun.     Transplant Coordinator is Mulu YU     No further questions for this pharmacist    Jake Marte RP

## 2020-06-23 NOTE — PROGRESS NOTES
[facility], [place]   Transplant Nephrology Progress Note  Date of Admission:  6/19/2020  Today's Date: 06/22/2020    Recommendations:  - 30 mmol sodium phosphate x 1 now. Continue oral phos 500 mg po tid.   -monitor tac level on 3 mg po bid - anticipate discharge tomorrow.    Assessment & Plan  # DDKT: Trend down              - Baseline Cr ~ TBD              - Proteinuria: Not checked post transplant              - Date DSA Last Checked: checked at [facility] time of tx       Latest DSA: pending               - BK Viremia: Not checked post transplant              - Kidney Tx Biopsy: No    Creatinine down to 0.7 mg / dl.      # Immunosuppression: Tacrolimus immediate release (goal 8-10), Mycophenolate mofetil (dose 1000 mg every 12 hours) and should complete full dose thymoglobulin 6 mg/kg               - Changes: tac increased to 3 mg po bid yesterday.      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      # Hypertension: Controlled;   Goal BP: < 140/90              - Volume status: Mildly hypervolemic             EDW ~ 62 kg               - Changes:    Last bp 139/97    Currently on carvedilol 3.125 mg po bid, if elevated above 150 consistently, would increase to 6.25 mg po bid.      # Anemia in Chronic Renal Disease: Hgb: Trend down      ENZO: Yes              - Iron studies: not checked recently               hemoglobin 7.1 g / dl on most recent check.     # Mineral Bone Disorder:   - Secondary renal hyperparathyroidism; PTH level: check OP.  - Vitamin D; level: check OP  - Calcium; level: 4.8 mg / dl ionized.  - Phosphorus; level: 1 mg / dl: continue oral phos and IV sodium phos 30 mmol x 1 now.     # Electrolytes:   - Potassium; level: 3.9 mmol / L      On supplement: No  - Magnesium; level: 1.9 mg / dl       On supplement: Yes     # History of non-adherence: recommend adherence to all meds.      #  "Transplant History:  Etiology of Kidney Failure: IgA nephropathy  Tx: LDKT and DDKT  Transplant: 2020 (Kidney), 2007 (Kidney)  Donor Type: Donation after Brain Death        Donor Class:   Crossmatch at time of Tx: pending   Significant changes in immunosuppression: None  Significant transplant-related complications: None     Recommendations were communicated to the primary team via this note.    Don Peraza MD       Interval History     No cp, sob, no n/v, no f/s/c. Moving bowels now with 4 bm in last 12 hours. bp slightly up.     Review of Systems   4 point ROS was obtained and negative except as noted in the Interval History.    MEDICATIONS:    acetaminophen  975 mg Oral Q8H     aspirin  81 mg Oral Daily     atorvastatin  10 mg Oral Daily     carvedilol  3.125 mg Oral BID     lidocaine  3 patch Transdermal Q24h    And     lidocaine   Transdermal Q8H     magnesium oxide  400 mg Oral Daily with lunch     menthol   Transdermal Q8H     mycophenolate  1,000 mg Oral BID IS     oxybutynin  5 mg Oral TID     phosphorus tablet 250 mg  500 mg Oral TID     polyethylene glycol  17 g Oral Daily     senna-docusate  1 tablet Oral BID    Or     senna-docusate  2 tablet Oral BID     sodium chloride (PF)  10 mL Intracatheter Q8H     sulfamethoxazole-trimethoprim  1 tablet Oral Daily     tacrolimus  3 mg Oral BID IS     valGANciclovir  900 mg Oral Daily     cholecalciferol  50 mcg Oral Daily       Physical Exam   Temp  Av.6  F (37  C)  Min: 97.7  F (36.5  C)  Max: 99.3  F (37.4  C)      Pulse  Av.5  Min: 77  Max: 122 Resp  Av.6  Min: 13  Max: 21  SpO2  Av.2 %  Min: 93 %  Max: 100 %    CVP (mmHg): 10 mmHgBP (!) 139/97 (BP Location: Right arm)   Pulse 77   Temp 99.1  F (37.3  C) (Oral)   Resp 16   Ht 1.676 m (5' 6\")   Wt 65.9 kg (145 lb 4.8 oz)   SpO2 99%   BMI 23.45 kg/m     Date 20 0700 - 20 0659   Shift 1183-7882 4029-7317 2216-9274 24 Hour Total   INTAKE   Shift Total(mL/kg)     "   OUTPUT   Urine 175   175   Shift Total(mL/kg) 175(2.64)   175(2.64)   Weight (kg) 66.22 66.22 66.22 66.22      Admit Weight: 62.1 kg (136 lb 14.5 oz)     GENERAL APPEARANCE: alert and no distress  HENT: mouth without ulcers or lesions  LYMPHATICS: no cervical or supraclavicular nodes  RESP: lungs clear to auscultation - no rales, rhonchi or wheezes  CV: regular rhythm, normal rate, no rub, no murmur  EDEMA: no LE edema bilaterally  ABDOMEN: soft, nondistended, nontender, bowel sounds normal  MS: extremities normal - no gross deformities noted, no evidence of inflammation in joints, no muscle tenderness  SKIN: no rash    Incision c/d/i.  Data   All labs reviewed by me.  CMP  Recent Labs   Lab 06/22/20  0523 06/21/20  0532 06/21/20  0042 06/20/20  1752  06/20/20  0606  06/19/20  1736  06/19/20  0411    142  --   --   --  140  --  138   < > 138   POTASSIUM 3.9 3.9 4.1 4.2   < > 3.9   < > 3.9   < > 3.8   CHLORIDE 112* 112*  --   --   --  108  --  105  --  101   CO2 23 24  --   --   --  22  --  20  --  25   ANIONGAP 5 6  --   --   --  9  --  12  --  12   * 126*  --   --   --  151*  --  160*   < > 90   BUN 14 17  --   --   --  36*  --  50*  --  50*   CR 0.70 0.97  --   --   --  3.89*  --  9.09*  --  10.10*   GFRESTIMATED >90 77  --   --   --  14*  --  5*  --  5*   GFRESTBLACK >90 89  --   --   --  17*  --  6*  --  5*   CASIE 8.5 8.2*  --   --   --  6.8*  --  6.7*  --  7.5*   MAG 1.9 1.8  --   --   --  1.7  --  1.3*  --   --    PHOS 1.0* 2.0*  --   --   --  5.5*  --  5.8*  --   --    PROTTOTAL  --   --   --   --   --   --   --   --   --  7.0   ALBUMIN  --   --   --   --   --   --   --   --   --  3.4   BILITOTAL  --   --   --   --   --   --   --   --   --  0.4   ALKPHOS  --   --   --   --   --   --   --   --   --  54   AST  --   --   --   --   --   --   --   --   --  11   ALT  --   --   --   --   --   --   --   --   --  13    < > = values in this interval not displayed.     CBC  Recent Labs   Lab 06/22/20  2550  06/21/20  0532 06/21/20  0042 06/20/20  1752  06/20/20  0606   HGB 7.1* 7.5* 7.7* 6.5*   < > 7.1*   WBC 2.4* 5.4  --  3.9*  --  7.0   RBC 2.30* 2.42*  --  2.07*  --  2.24*   HCT 22.9* 23.9*  --  21.2*  --  22.8*    99  --  102*  --  102*   MCH 30.9 31.0  --  31.4  --  31.7   MCHC 31.0* 31.4*  --  30.7*  --  31.1*   RDW 15.1* 15.3*  --  14.4  --  14.4   * 122*  --  133*  --  165    < > = values in this interval not displayed.     INR  Recent Labs   Lab 06/19/20  1736 06/19/20  0411   INR 1.44* 1.23*   PTT 28 31     ABG  Recent Labs   Lab 06/19/20  1622 06/19/20  1134   O2PER 50.0 50.0      Urine Studies  Recent Labs   Lab Test 01/02/18  2048 03/08/17  1632 01/25/17  1920 10/19/15  0550   COLOR Yellow Straw Straw Yellow   APPEARANCE Cloudy Slightly Cloudy Slightly Cloudy Slightly Cloudy   URINEGLC 150* 250* 70* Negative   URINEBILI Negative Negative Negative Negative   URINEKETONE Negative Negative Negative Negative   SG 1.008 1.010 1.006 1.005   UBLD Small* Small* Negative Moderate*   URINEPH 8.0* 8.5* 7.5* 8.5*   PROTEIN 30* 100* 30* 300*   NITRITE Negative Negative Negative Negative   LEUKEST Trace* Small* Negative Small*   RBCU 3* 3* 1 2   WBCU 8* 4* 2 12*     Recent Labs   Lab Test 07/16/12  1400 07/02/12  1130 06/25/12  0615 06/17/12  0930 06/13/12  1120 06/07/12  0643 06/01/12  0950 05/25/12  1830 05/25/12  1110 05/22/12  1536   UTPG 1.53* 0.86* 0.69* 0.82* 0.80* 0.88* 0.58* 0.47*  0.45* 0.56* 0.51*     PTH  Recent Labs   Lab Test 11/30/12  2030 11/30/12  1030   PTHI 500* 794*     Iron Studies  Recent Labs   Lab Test 11/30/12  1950   IRON 125   *   IRONSAT 81*   SHAHBAZ 401*     IMAGING:  All imaging studies reviewed by me.

## 2020-06-23 NOTE — PHARMACY-TRANSPLANT NOTE
Solid Organ Transplant Recipient Prior to Discharge Note    35 year old female s/p DDKT transplant on 6/19/20.    Pharmacy has monitored for medication interactions and immunosuppression levels in conjunction with the multidisciplinary team. In anticipation for discharge, medication therapy needs have been addressed daily throughout the current admission via multidisciplinary rounds and/or discussions, order verification, daily clinical pharmacy review, and communication with prescribers.    Rob Parham, PharmD  June 23, 2020

## 2020-06-23 NOTE — PROGRESS NOTES
Transplant Social Work Service Discharge Note      Patient Name:  Jelly Dietz     Anticipated Discharge Date:  6/24/2020    Discharge Disposition: Home with assist    Plan for 24 hour care for immediate post transplant period: Patient will receive care from mother and boyfriend    If not local, plans for short term stay:  N/A    Additional Services/Equipment Arranged:  RNCC arranged appropriate ATC and home care. IMM given on 6/22     Persons notified of above discharge plan:  Patient, patient's family, treatment team, nursing staff, ATC, home care    Patient / Family response to discharge plan:  Agreeable    Education and resources provided by SW at discharge: role of transplant  in out patient setting and contact info for     Discussed anticipated pharmacy out of pocket costs: YES    Provided [employer] Donor Letter Writing packet : YES    Plan: Patient will discharge home into the care of her family and boyfriend in [place], [place]. Patient will attend ATC and then have home care through [facility].  No SW needs identified at discharge.    MARYJANE Sanderson, [place]  7A   Ph: [phone]  Pager: [phone]

## 2020-06-23 NOTE — PLAN OF CARE
"BP (!) 143/107 (BP Location: Right arm)   Pulse 74   Temp 98.8  F (37.1  C) (Oral)   Resp 16   Ht 1.676 m (5' 6\")   Wt 65.9 kg (145 lb 4.8 oz)   SpO2 99%   BMI 23.45 kg/m   Hypertensive, OVSS on RA. Hgb 6.8, received 1 unit red blood cells w/out incident. Patient reports tolerable incisional pain with PRN dilaudid x3, PRN Flexeril x1, scheduled Tylenol and icy hot patch for L shoulder pain. Patient denies nausea. Urine Output - poalnco w/ good UOP. UA sent. Bowel Function - . Nutrition - regular diet w/ fair appetite. Activity - up ad kathy. Education - med card and lab book updated, met with specialty pharmacy via phone. Renal US completed. Plan of Care - possible discharge tomorrow.     "

## 2020-06-23 NOTE — PLAN OF CARE
"1900 - 0730  /88 (BP Location: Right arm)   Pulse 77   Temp 98.8  F (37.1  C) (Oral)   Resp 16   Ht 1.676 m (5' 6\")   Wt 65.9 kg (145 lb 4.8 oz)   SpO2 97%   BMI 23.45 kg/m      VS: Intermittently tachycardic - triggered sepsis, OVSS on RA  Labs: Lactic 0.6, Hgb 6.6 - MD ordered recheck 6.8 - NP notified, 1 unit of blood ordered  Pain/Nausea: PRN dilaudid x 2, 1x dilaudid, atarax x 2, flexeril x 1, B&O suppository x 1, simethicone x 1 with scheduled oxybutynin, tylenol, lidocaine patches, and icy hot patches - pain seems to be under control. Denied nausea   Diet: Regular  LDA: PIV saline locked, I.J. saline locked, AV fistula  GI: No BM overnight  : Bauman 1225 mL   Skin: incision closed with liquid bandage - open to air  Mobility: up ad kathy  Plan: pt voiced concerns that she was not ready to discharge - NP notified, will address with day team, continue to monitor    "

## 2020-06-23 NOTE — PROGRESS NOTES
[facility], [place]   Transplant Nephrology Progress Note  Date of Admission:  6/19/2020  Today's Date: 06/23/2020    Recommendations:  Transfuse 1 pRBC for hemoglobin < 7 g / dl  Getting transplant ultrasound to assess for hematoma  Pain control.  Continue phos supplementation now orally.      Assessment & Plan  # DDKT: Trend down              - Baseline Cr ~ TBD              - Proteinuria: Not checked post transplant              - Date DSA Last Checked: checked at [facility] time of tx       Latest DSA: pending               - BK Viremia: Not checked post transplant              - Kidney Tx Biopsy: No    Creatinine down to 0.68 mg / dl.      # Immunosuppression: Tacrolimus immediate release (goal 8-10), Mycophenolate mofetil (dose 1000 mg every 12 hours) and should complete full dose thymoglobulin 6 mg/kg                 - Changes: tac increased to 3 mg po bid and level still < 3, increase to 5 mg po bid.      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      # Hypertension: Controlled;   Goal BP: < 140/90              - Volume status: Mildly hypervolemic             EDW ~ 62 kg               - Changes:    Last bp 147/108    Currently on carvedilol 3.125 mg po bid, if elevated above 150 consistently, would increase to 6.25 mg po bid.      # Anemia in Chronic Renal Disease: Hgb: Trend down      ENZO: Yes              - Iron studies: not checked recently               hemoglobin 6.6 g / dl on most recent check, no perinephric hematoma. Getting 1 unit pRBC today.      # Mineral Bone Disorder:   - Secondary renal hyperparathyroidism; PTH level: check OP.  - Vitamin D; level: check OP  - Calcium; level: 4.8 mg / dl ionized. 8.1 mg / dl.  - Phosphorus; level: 1.7 mg / dl: continue oral phos     # Electrolytes:   - Potassium; level: 3.6 mmol / L      On supplement: No  - Magnesium; level: 1.7 mg / dl       On  "supplement: Yes     # History of non-adherence: recommend adherence to all meds.      # Transplant History:  Etiology of Kidney Failure: IgA nephropathy  Tx: LDKT and DDKT  Transplant: 2020 (Kidney), 2007 (Kidney)  Donor Type: Donation after Brain Death        Donor Class:   Crossmatch at time of Tx: pending   Significant changes in immunosuppression: None  Significant transplant-related complications: None     Recommendations were communicated to the primary team via this note.    Don Peraza MD       Interval History     No cp, sob, no n/v, no f/s/c. Having some joint pains and dysuria. Hemoglobin low, getting transfusion today.     Review of Systems   4 point ROS was obtained and negative except as noted in the Interval History.    MEDICATIONS:    acetaminophen  975 mg Oral Q8H     aspirin  81 mg Oral Daily     atorvastatin  10 mg Oral Daily     carvedilol  3.125 mg Oral BID     lidocaine  3 patch Transdermal Q24h    And     lidocaine   Transdermal Q8H     magnesium oxide  400 mg Oral Daily with lunch     menthol   Transdermal Q8H     miconazole   Topical BID     mycophenolate  1,000 mg Oral BID IS     oxybutynin  5 mg Oral TID     phosphorus tablet 250 mg  500 mg Oral 4x Daily     polyethylene glycol  17 g Oral Daily     senna-docusate  1 tablet Oral BID    Or     senna-docusate  2 tablet Oral BID     sodium chloride (PF)  10 mL Intracatheter Q8H     sulfamethoxazole-trimethoprim  1 tablet Oral Daily     tacrolimus  4.5 mg Oral BID IS     valGANciclovir  900 mg Oral Daily     cholecalciferol  50 mcg Oral Daily       Physical Exam   Temp  Av.6  F (37  C)  Min: 97.7  F (36.5  C)  Max: 99.3  F (37.4  C)      Pulse  Av.5  Min: 77  Max: 122 Resp  Av.6  Min: 13  Max: 21  SpO2  Av.2 %  Min: 93 %  Max: 100 %    CVP (mmHg): 10 mmHgBP (!) 147/108   Pulse 74   Temp 98.5  F (36.9  C)   Resp 16   Ht 1.676 m (5' 6\")   Wt 65.9 kg (145 lb 4.8 oz)   SpO2 99%   BMI 23.45 kg/m     Date " 06/21/20 0700 - 06/22/20 0659   Shift 9208-9616 9580-1533 0248-8046 24 Hour Total   INTAKE   Shift Total(mL/kg)       OUTPUT   Urine 175   175   Shift Total(mL/kg) 175(2.64)   175(2.64)   Weight (kg) 66.22 66.22 66.22 66.22      Admit Weight: 62.1 kg (136 lb 14.5 oz)     GENERAL APPEARANCE: alert and no distress  HENT: mouth without ulcers or lesions  LYMPHATICS: no cervical or supraclavicular nodes  RESP: lungs clear to auscultation - no rales, rhonchi or wheezes  CV: regular rhythm, normal rate, no rub, no murmur  EDEMA: no LE edema bilaterally  ABDOMEN: soft, nondistended, nontender, bowel sounds normal  MS: extremities normal - no gross deformities noted, no evidence of inflammation in joints, no muscle tenderness  SKIN: no rash    Incision c/d/i.  Data   All labs reviewed by me.  CMP  Recent Labs   Lab 06/23/20  0402 06/22/20  0523 06/21/20  0532 06/21/20  0042  06/20/20  0606  06/19/20  0411    140 142  --   --  140   < > 138   POTASSIUM 3.6 3.9 3.9 4.1   < > 3.9   < > 3.8   CHLORIDE 110* 112* 112*  --   --  108   < > 101   CO2 24 23 24  --   --  22   < > 25   ANIONGAP 6 5 6  --   --  9   < > 12   * 138* 126*  --   --  151*   < > 90   BUN 9 14 17  --   --  36*   < > 50*   CR 0.68 0.70 0.97  --   --  3.89*   < > 10.10*   GFRESTIMATED >90 >90 77  --   --  14*   < > 5*   GFRESTBLACK >90 >90 89  --   --  17*   < > 5*   CASIE 8.1* 8.5 8.2*  --   --  6.8*   < > 7.5*   MAG 1.7 1.9 1.8  --   --  1.7   < >  --    PHOS 1.7* 1.0* 2.0*  --   --  5.5*   < >  --    PROTTOTAL  --   --   --   --   --   --   --  7.0   ALBUMIN  --   --   --   --   --   --   --  3.4   BILITOTAL  --   --   --   --   --   --   --  0.4   ALKPHOS  --   --   --   --   --   --   --  54   AST  --   --   --   --   --   --   --  11   ALT  --   --   --   --   --   --   --  13    < > = values in this interval not displayed.     CBC  Recent Labs   Lab 06/23/20  0525 06/23/20  0402 06/22/20  0523 06/21/20  0532  06/20/20  6872   HGB 6.8* 6.6* 7.1*  7.5*   < > 6.5*   WBC  --  3.2* 2.4* 5.4  --  3.9*   RBC  --  2.17* 2.30* 2.42*  --  2.07*   HCT  --  21.4* 22.9* 23.9*  --  21.2*   MCV  --  99 100 99  --  102*   MCH  --  30.4 30.9 31.0  --  31.4   MCHC  --  30.8* 31.0* 31.4*  --  30.7*   RDW  --  14.6 15.1* 15.3*  --  14.4   PLT  --  139* 123* 122*  --  133*    < > = values in this interval not displayed.     INR  Recent Labs   Lab 06/19/20  1736 06/19/20  0411   INR 1.44* 1.23*   PTT 28 31     ABG  Recent Labs   Lab 06/19/20  1622 06/19/20  1134   O2PER 50.0 50.0      Urine Studies  Recent Labs   Lab Test 06/23/20  0830 01/02/18  2048 03/08/17  1632 01/25/17  1920   COLOR Yellow Yellow Straw Straw   APPEARANCE Clear Cloudy Slightly Cloudy Slightly Cloudy   URINEGLC Negative 150* 250* 70*   URINEBILI Negative Negative Negative Negative   URINEKETONE Negative Negative Negative Negative   SG 1.012 1.008 1.010 1.006   UBLD Moderate* Small* Small* Negative   URINEPH 6.5 8.0* 8.5* 7.5*   PROTEIN 10* 30* 100* 30*   NITRITE Negative Negative Negative Negative   LEUKEST Small* Trace* Small* Negative   RBCU 144* 3* 3* 1   WBCU 4 8* 4* 2     Recent Labs   Lab Test 07/16/12  1400 07/02/12  1130 06/25/12  0615 06/17/12  0930 06/13/12  1120 06/07/12  0643 06/01/12  0950 05/25/12  1830 05/25/12  1110 05/22/12  1536   UTPG 1.53* 0.86* 0.69* 0.82* 0.80* 0.88* 0.58* 0.47*  0.45* 0.56* 0.51*     PTH  Recent Labs   Lab Test 11/30/12  2030 11/30/12  1030   PTHI 500* 794*     Iron Studies  Recent Labs   Lab Test 11/30/12  1950   IRON 125   *   IRONSAT 81*   SHAHBAZ 401*     IMAGING:  All imaging studies reviewed by me.

## 2020-06-23 NOTE — DISCHARGE SUMMARY
[facility], [place]    Discharge Summary  Transplant Surgery    Date of Admission:  2020  Date of Discharge:  2020  1:22 PM  Discharging Provider: NIEVES Peterson/ Gino Altman M.D., Ph.D.      Discharge Diagnoses   Principal Problem:    Kidney transplanted  Active Problems:    Anemia in chronic renal disease    Immunosuppressed status (H)    Painful bladder spasm    Hypophosphatemia    Constipation due to pain medication      History of Present Illness   Jelly Dietz is an 35 year old female with a past medical history significant for end stage kidney disease secondary to IgA nephropathy, s/p previous kidney transplant in  (failed d/t noncompliance during pregnancy), and HTN. She is s/p  donor kidney re-transplant with ureteral stent, and 3 arteries on a common patch, on 2020.    Hospital Course   Kidney transplant: Creatinine improved post-transplant, as expected. Bauman removed on POD 4. PVR 0.  Immunosuppression:  Induction immunosuppression with thymoglobulin 375mg (5.7mg/kg) and steroid taper. Maintenance immunosuppression with tacrolimus and mycophenolate 1000mg BID (African ethnicity).  Tacrolimus goal level 8-10 (12 hour trough).  Infectious prophylaxis with valganciclovir (x12 weeks) and bactrim (indefinitely).     Transplant coordinator Mulu Arguello  [phone]  Donor type:  DBD  DSA at time of transplant: No  Ureteral stent: YES  CMV:  Donor ? / Recipient +  EBV:  Donor ? / Recipient +  Thymoglobulin:  375mg, 5.7mg/kg    Anemia: Required transfusions on  and .    Acute post-operative pain: Will discharge on dilaudid 2 mg every 4 hours PRN (20# tablets) and acetaminophen 650 mg every 6 hours PRN.    Constipation secondary to pain medications: Will discharge on miralax daily and senna 2 tablets BID.    Mild steroid hyperglycemia: Early post-op. Resolved prior to discharge.     Hypophosphatemia: Phos dropped to 1 post-transplant. Required  oral and IV replacement.    Painful bladder spasms: Managed with oxybutynin and B&O suppositories. Resolved with removal of polanco.        Significant Results and Procedures    Procedure date:  06/19/20    Preoperative diagnosis:  End Stage renal failure due to IgA nephropathy    Postoperative diagnosis:  Same    Procedure:  1. Right kidney  Re- transplant,  Donation after Brain Death, Left iliac fossa, with venous reconstruction. A J-J ureteral stent was placed.  2. Kidney allograft preparation on Back Table       Surgeon:  YAZMIN MIRZA       Code Status   Full    Primary Care Physician   Physician No Ref-Primary    General Appearance: in no apparent distress.   Skin: warm, perfused  Heart: Perfused  Lungs: non-labored breathing on room air  Abdomen: The abdomen is soft, non-tender, mildly distended. Incision, clean,dry, glued  : polanco removed  Extremities: edema: none  Neurologic: awake, alert and oriented x4. Tremor absent.    Time Spent on this Encounter   Tory BERRIOS PA-C, personally saw the patient today and spent greater than 30 minutes discharging this patient.    Discharge Disposition   Discharged to home  Condition at discharge: Stable    Consultations This Hospital Stay   NEPHROLOGY KIDNEY/PANCREAS TRANSPLANT ADULT IP CONSULT  VASCULAR ACCESS CARE ADULT IP CONSULT  NEPHROLOGY KIDNEY/PANCREAS TRANSPLANT ADULT IP CONSULT  SOT MEDICATION HISTORY IP PHARMACY CONSULT  SOCIAL WORK IP CONSULT  PHARMACY IP CONSULT  NUTRITION SERVICES ADULT IP CONSULT    Discharge Orders       Home care nursing referral      MD face to face encounter    Documentation of Face to Face and Certification for Home Health Services    I certify that patient: Jelly Dietz is under my care and that I, or a nurse practitioner or physician's assistant working with me, had a face-to-face encounter that meets the physician face-to-face encounter requirements with this patient on: 6/22/2020.    This encounter with the  patient was in whole, or in part, for the following medical condition, which is the primary reason for home health care:   Jelly Dietz is a 35 year old female with a past medical history significant for end stage kidney disease secondary to IgA nephropathy, s/p previous kidney transplant (, failed d/t noncompliance during pregnancy). She is s/p  donor kidney transplant with stent on 20..    I certify that, based on my findings, the following services are medically necessary home health services: Nursing.    My clinical findings support the need for the above services because: Nurse is needed: To assess s/p KT after changes in medications or other medical regimen frequent assessments and lab draws.    Further, I certify that my clinical findings support that this patient is homebound (i.e. absences from home require considerable and taxing effort and are for medical reasons or [employer] services or infrequently or of short duration when for other reasons) because: Leaving home is medically contraindicated for the following reason(s): Infection risk / immunocompromised state where it is safer for them to receive services in the home...    Based on the above findings. I certify that this patient is confined to the home and needs intermittent skilled nursing care, physical therapy and/or speech therapy.  The patient is under my care, and I have initiated the establishment of the plan of care.  This patient will be followed by a physician who will periodically review the plan of care.  Physician/Provider to provide follow up care: No Ref-Primary, Physician    Attending hospital physician (the Medicare certified PECOS provider): Irma Shannon MD  Physician Signature: See electronic signature associated with these discharge orders.  Date: 2020     Reason for your hospital stay     donor kidney transplant with ureteral stent placement. Your surgeon was Dr. Irma Shannon.      [facility] Follow-up and recommended labs and tests    Over the next 2-5 days you will be seen in [facility] at 7 am (ph. [phone], option 7) .  Your labs will be drawn at the beginning of your appointment at 7:00 am.  DO NOT take your medications prior to having labs drawn. Please bring all your medications with you from home to take after labs are drawn.    LABS:  CBC, BMP, Mg, Phos to be drawn daily while in ATC, then every Monday, Thursday by home health care nurse if arranged, or at an outpatient lab.   Tacrolimus levels (12 hours after administration) daily while in ATC then 2 times weekly.     FOLLOW UP APPOINTMENTS:  Remember to always bring an updated medication list to all appointments.     See future appointments for appointment time and location.   Post surgical follow up appointment with Kailey Santos NP, on 7/6/2020  You will follow up with transplant nephrology in clinic as scheduled.   Follow up with primary care provider in 4-8 weeks. (Pt to schedule)  You have a ureteral stent in place which needs to be removed in 4-6 weeks.  You are scheduled for stent removal on 7/30/20 with Kailey Santos NP.   Call scheduling at [phone] if you have not heard about your appointments within 48 hours after discharge.  If you have staples in place, they will be removed in 3 weeks after operation.    WHEN TO CONTACT YOUR  COORDINATOR:  Transplant Coordinator [phone]  Notify your coordinator if you have pain over your kidney, increased redness or drainage from your incision, fever greater than 100.5F, or decreased urine output.  Notify your coordinator immediately if you are ever unable to take your immunosuppressive medications for any reason.  If it is outside of office hours, please call the hospital switchboard at [phone] and ask to have the kidney transplant surgery fellow paged for urgent medical questions, or present to the emergency department.    OTHER  DISCHARGE INSTRUCTIONS:  Monitor blood pressure and weight daily initially post transplant.  Walk at least four times a day, lift no greater than 10 pounds for 6-8 weeks from the time of surgery.  No driving while taking narcotics or 3 weeks after surgery.    Diet recommendations post-transplant: Heart healthy dietary habits long term (low saturated/trans fat, low sodium). High protein diet x 8 weeks. Practice food safety precautions.     Discharge Medications   Discharge Medication List as of 6/24/2020 11:44 AM      START taking these medications    Details   acetaminophen (TYLENOL) 325 MG tablet Take 2 tablets (650 mg) by mouth every 4 hours as needed for other (multimodal surgical pain management along with NSAIDS and opioid medication as indicated based on pain control and physical function.), Disp-100 tablet,R-0, Local Print      aspirin (ASA) 81 MG chewable tablet Take 1 tablet (81 mg) by mouth daily, Disp-30 tablet,R-5, E-Prescribe      atorvastatin (LIPITOR) 10 MG tablet Take 1 tablet (10 mg) by mouth daily, Disp-30 tablet,R-5, E-Prescribe      HYDROmorphone (DILAUDID) 2 MG tablet Take 1 tablet (2 mg) by mouth every 4 hours as needed for moderate to severe pain, Disp-20 tablet,R-0, E-Prescribe      Lidocaine (LIDOCARE) 4 % Patch Place 3 patches onto the skin every 24 hours To prevent lidocaine toxicity, patient should be patch free for 12 hrs daily.Disp-9 patch,[id number]      magnesium oxide (MAG-OX) 400 MG tablet Take 1 tablet (400 mg) by mouth daily (with lunch), Disp-30 tablet,R-0, E-Prescribe      menthol (ICY HOT) 5 % PTCH Apply 1 patch topically every 8 hours as needed for muscle soreness, Disp-6 each,R-0, E-Prescribe      miconazole (MICATIN) 2 % external cream Apply topically 2 times daily [facility] prescriptionDisp-14 g,[id number]      mycophenolate (GENERIC EQUIVALENT) 250 MG capsule Take 4 capsules (1,000 mg) by mouth 2 times daily, Disp-240 capsule,R-11, E-Prescribe       phosphorus tablet 250 mg (PHOSPHA 250 NEUTRAL) 250 MG per tablet Take 2 tablets (500 mg) by mouth 4 times daily, Disp-120 tablet,R-0, E-Prescribe      polyethylene glycol (MIRALAX) 17 g packet Take 17 g by mouth daily, Disp-30 packet,R-1, E-Prescribe      senna-docusate (SENOKOT-S/PERICOLACE) 8.6-50 MG tablet Take 1-2 tablets by mouth 2 times daily, Disp-120 tablet,R-1, E-Prescribe      sulfamethoxazole-trimethoprim (BACTRIM) 400-80 MG tablet Take 1 tablet by mouth daily, Disp-30 tablet,R-11, E-Prescribe      tacrolimus (GENERIC EQUIVALENT) 0.5 MG capsule 0.5 mg PO BID,  for dose changes per provider direction., Disp-60 capsule,R-11, E-Prescribe      tacrolimus (GENERIC EQUIVALENT) 1 MG capsule Take 5 capsules (5 mg) by mouth 2 times daily, Disp-300 capsule,R-11, E-Prescribe      valGANciclovir (VALCYTE) 450 MG tablet Take 2 tablets (900 mg) by mouth daily, Disp-60 tablet,R-2, E-Prescribe      Vitamin D3 (CHOLECALCIFEROL) 25 mcg (1000 units) tablet Take 2 tablets (50 mcg) by mouth daily, Disp-60 tablet,R-3, E-Prescribe         CONTINUE these medications which have CHANGED    Details   carvedilol (COREG) 6.25 MG tablet Take 1 tablet (6.25 mg) by mouth 2 times daily, Disp-30 tablet,R-3, E-Prescribe         CONTINUE these medications which have NOT CHANGED    Details   cetirizine (ZYRTEC) 10 MG tablet Take 10 mg by mouth nightly as needed for allergies , Historical         STOP taking these medications       amLODIPine (NORVASC) 10 MG tablet Comments:   Reason for Stopping:         cloNIDine (CATAPRES) 0.1 MG tablet Comments:   Reason for Stopping:             Allergies   No Known Allergies  Data   Most Recent 3 CBC's:  Recent Labs   Lab Test 06/24/20  0614 06/23/20  0525 06/23/20  0402 06/22/20  0523   WBC 3.1*  --  3.2* 2.4*   HGB 7.8* 6.8* 6.6* 7.1*   MCV 96  --  99 100     --  139* 123*      Most Recent 3 BMP's:  Recent Labs   Lab Test 06/24/20  0614 06/23/20  0402 06/22/20  0523    139 140    POTASSIUM 3.6 3.6 3.9   CHLORIDE 110* 110* 112*   CO2 24 24 23   BUN 11 9 14   CR 0.65 0.68 0.70   ANIONGAP 4 6 5   CASIE 8.5 8.1* 8.5   * 107* 138*     Most Recent 2 LFT's:  Recent Labs   Lab Test 06/19/20  0411 07/26/17  1915   AST 11 10   ALT 13 12   ALKPHOS 54 49   BILITOTAL 0.4 0.5     Most Recent INR's and Anticoagulation Dosing History:  Anticoagulation Dose History     Recent Dosing and Labs Latest Ref Rng & Units 3/27/2013 11/5/2013 6/23/2014 9/15/2014 3/8/2017 6/19/2020 6/19/2020    INR 0.86 - 1.14 1.28(H) 1.09 1.27(H) 1.20(H) 1.20(H) 1.23(H) 1.44(H)        Most Recent 3 Troponin's:  Recent Labs   Lab Test 02/06/18  1905 06/06/17  2111 06/01/17  2142  06/24/14  0320   TROPI <0.015 <0.015  The 99th percentile for upper reference range is 0.045 ug/L.  Troponin values in   the range of 0.045 - 0.120 ug/L may be associated with risks of adverse   clinical events.   <0.015  The 99th percentile for upper reference range is 0.045 ug/L.  Troponin values in   the range of 0.045 - 0.120 ug/L may be associated with risks of adverse   clinical events.     < >  --    TROPONIN  --   --   --   --  0.02    < > = values in this interval not displayed.     Most Recent Cholesterol Panel:  Recent Labs   Lab Test 06/19/20  0411   CHOL 129   LDL 63   HDL 53   TRIG 66     Most Recent 6 Bacteria Isolates From Any Culture (See EPIC Reports for Culture Details):  Recent Labs   Lab Test 12/31/15  0628 10/19/15  0550 09/09/15  1440 08/21/15  1200 06/10/15  2230 06/10/15  2211   CULT No Beta Streptococcus isolated No growth Niurka albicans / dubliniensis isolated Candida albicans and Candida   dubliniensis are not routinely speciated  Susceptibility testing requested by Tatiana Thomas, maternal fetal medicine   9/11/15 [facility]  * 10,000 to 50,000 colonies/mL mixed urogenital neda No growth No Beta Streptococcus isolated     Most Recent TSH, T4 and A1c Labs:  Recent Labs   Lab Test 06/19/20  0411 07/21/12  0639   TSH  --  1.02   A1C  5.1  --

## 2020-06-24 VITALS
HEIGHT: 66 IN | OXYGEN SATURATION: 98 % | RESPIRATION RATE: 16 BRPM | WEIGHT: 139.33 LBS | DIASTOLIC BLOOD PRESSURE: 108 MMHG | BODY MASS INDEX: 22.39 KG/M2 | HEART RATE: 74 BPM | TEMPERATURE: 97.7 F | SYSTOLIC BLOOD PRESSURE: 153 MMHG

## 2020-06-24 LAB
ANION GAP SERPL CALCULATED.3IONS-SCNC: 4 MMOL/L (ref 3–14)
BUN SERPL-MCNC: 11 MG/DL (ref 7–30)
CALCIUM SERPL-MCNC: 8.5 MG/DL (ref 8.5–10.1)
CHLORIDE SERPL-SCNC: 110 MMOL/L (ref 94–109)
CO2 SERPL-SCNC: 24 MMOL/L (ref 20–32)
CREAT SERPL-MCNC: 0.65 MG/DL (ref 0.52–1.04)
ERYTHROCYTE [DISTWIDTH] IN BLOOD BY AUTOMATED COUNT: 15.4 % (ref 10–15)
GFR SERPL CREATININE-BSD FRML MDRD: >90 ML/MIN/{1.73_M2}
GLUCOSE SERPL-MCNC: 100 MG/DL (ref 70–99)
HCT VFR BLD AUTO: 24.6 % (ref 35–47)
HGB BLD-MCNC: 7.8 G/DL (ref 11.7–15.7)
MAGNESIUM SERPL-MCNC: 1.7 MG/DL (ref 1.6–2.3)
MCH RBC QN AUTO: 30.4 PG (ref 26.5–33)
MCHC RBC AUTO-ENTMCNC: 31.7 G/DL (ref 31.5–36.5)
MCV RBC AUTO: 96 FL (ref 78–100)
PHOSPHATE SERPL-MCNC: 1.8 MG/DL (ref 2.5–4.5)
PLATELET # BLD AUTO: 162 10E9/L (ref 150–450)
POTASSIUM SERPL-SCNC: 3.6 MMOL/L (ref 3.4–5.3)
RBC # BLD AUTO: 2.57 10E12/L (ref 3.8–5.2)
SODIUM SERPL-SCNC: 139 MMOL/L (ref 133–144)
WBC # BLD AUTO: 3.1 10E9/L (ref 4–11)

## 2020-06-24 PROCEDURE — 25000132 ZZH RX MED GY IP 250 OP 250 PS 637: Mod: GY | Performed by: SURGERY

## 2020-06-24 PROCEDURE — 25000131 ZZH RX MED GY IP 250 OP 636 PS 637: Mod: GY | Performed by: NURSE PRACTITIONER

## 2020-06-24 PROCEDURE — 83735 ASSAY OF MAGNESIUM: CPT | Performed by: SURGERY

## 2020-06-24 PROCEDURE — 85027 COMPLETE CBC AUTOMATED: CPT | Performed by: SURGERY

## 2020-06-24 PROCEDURE — 84100 ASSAY OF PHOSPHORUS: CPT | Performed by: SURGERY

## 2020-06-24 PROCEDURE — 25000132 ZZH RX MED GY IP 250 OP 250 PS 637: Mod: GY | Performed by: NURSE PRACTITIONER

## 2020-06-24 PROCEDURE — 80048 BASIC METABOLIC PNL TOTAL CA: CPT | Performed by: SURGERY

## 2020-06-24 PROCEDURE — 25000131 ZZH RX MED GY IP 250 OP 636 PS 637: Mod: GY | Performed by: SURGERY

## 2020-06-24 PROCEDURE — 25000132 ZZH RX MED GY IP 250 OP 250 PS 637: Mod: GY | Performed by: STUDENT IN AN ORGANIZED HEALTH CARE EDUCATION/TRAINING PROGRAM

## 2020-06-24 PROCEDURE — 36592 COLLECT BLOOD FROM PICC: CPT | Performed by: SURGERY

## 2020-06-24 PROCEDURE — 25000132 ZZH RX MED GY IP 250 OP 250 PS 637: Mod: GY | Performed by: PHYSICIAN ASSISTANT

## 2020-06-24 RX ORDER — CARVEDILOL 3.12 MG/1
3.12 TABLET ORAL ONCE
Status: COMPLETED | OUTPATIENT
Start: 2020-06-24 | End: 2020-06-24

## 2020-06-24 RX ORDER — LIDOCAINE 4 G/G
3 PATCH TOPICAL EVERY 24 HOURS
Qty: 9 PATCH | Refills: 1 | Status: SHIPPED | OUTPATIENT
Start: 2020-06-24 | End: 2020-08-07

## 2020-06-24 RX ORDER — ATORVASTATIN CALCIUM 10 MG/1
10 TABLET, FILM COATED ORAL DAILY
Qty: 30 TABLET | Refills: 5 | Status: SHIPPED | OUTPATIENT
Start: 2020-06-25 | End: 2021-01-12

## 2020-06-24 RX ORDER — MAGNESIUM OXIDE 400 MG/1
400 TABLET ORAL
Qty: 30 TABLET | Refills: 0 | Status: SHIPPED | OUTPATIENT
Start: 2020-06-24 | End: 2020-06-26

## 2020-06-24 RX ORDER — MICONAZOLE NITRATE 20 MG/G
CREAM TOPICAL 2 TIMES DAILY
Qty: 14 G | Refills: 0 | Status: ON HOLD | OUTPATIENT
Start: 2020-06-24 | End: 2020-07-05

## 2020-06-24 RX ORDER — CARVEDILOL 6.25 MG/1
6.25 TABLET ORAL 2 TIMES DAILY
Qty: 30 TABLET | Refills: 3 | Status: SHIPPED | OUTPATIENT
Start: 2020-06-24 | End: 2020-06-25

## 2020-06-24 RX ORDER — ASPIRIN 81 MG/1
81 TABLET, CHEWABLE ORAL DAILY
Qty: 30 TABLET | Refills: 5 | Status: SHIPPED | OUTPATIENT
Start: 2020-06-25 | End: 2020-12-07

## 2020-06-24 RX ORDER — ACETAMINOPHEN 325 MG/1
650 TABLET ORAL EVERY 4 HOURS PRN
Qty: 100 TABLET | Refills: 0 | Status: SHIPPED | OUTPATIENT
Start: 2020-06-24 | End: 2021-11-09

## 2020-06-24 RX ORDER — SULFAMETHOXAZOLE AND TRIMETHOPRIM 400; 80 MG/1; MG/1
1 TABLET ORAL DAILY
Qty: 30 TABLET | Refills: 11 | Status: ON HOLD | OUTPATIENT
Start: 2020-06-25 | End: 2020-07-18

## 2020-06-24 RX ORDER — POLYETHYLENE GLYCOL 3350 17 G/17G
17 POWDER, FOR SOLUTION ORAL DAILY
Qty: 30 PACKET | Refills: 1 | Status: SHIPPED | OUTPATIENT
Start: 2020-06-25 | End: 2020-08-07

## 2020-06-24 RX ORDER — HYDROMORPHONE HYDROCHLORIDE 2 MG/1
2 TABLET ORAL EVERY 4 HOURS PRN
Qty: 20 TABLET | Refills: 0 | Status: ON HOLD | OUTPATIENT
Start: 2020-06-24 | End: 2020-07-18

## 2020-06-24 RX ORDER — AMOXICILLIN 250 MG
1-2 CAPSULE ORAL 2 TIMES DAILY
Qty: 120 TABLET | Refills: 1 | Status: SHIPPED | OUTPATIENT
Start: 2020-06-24 | End: 2020-08-07

## 2020-06-24 RX ORDER — MYCOPHENOLATE MOFETIL 250 MG/1
1000 CAPSULE ORAL 2 TIMES DAILY
Qty: 240 CAPSULE | Refills: 11 | Status: SHIPPED | OUTPATIENT
Start: 2020-06-24 | End: 2020-07-02

## 2020-06-24 RX ORDER — TACROLIMUS 0.5 MG/1
CAPSULE ORAL
Qty: 60 CAPSULE | Refills: 11 | Status: SHIPPED | OUTPATIENT
Start: 2020-06-24 | End: 2020-06-29

## 2020-06-24 RX ORDER — TACROLIMUS 1 MG/1
5 CAPSULE ORAL 2 TIMES DAILY
Qty: 300 CAPSULE | Refills: 11 | Status: SHIPPED | OUTPATIENT
Start: 2020-06-24 | End: 2020-06-29

## 2020-06-24 RX ORDER — CARVEDILOL 6.25 MG/1
6.25 TABLET ORAL 2 TIMES DAILY
Status: DISCONTINUED | OUTPATIENT
Start: 2020-06-24 | End: 2020-06-24 | Stop reason: HOSPADM

## 2020-06-24 RX ORDER — VALGANCICLOVIR 450 MG/1
900 TABLET, FILM COATED ORAL DAILY
Qty: 60 TABLET | Refills: 2 | Status: ON HOLD | OUTPATIENT
Start: 2020-06-25 | End: 2020-07-18

## 2020-06-24 RX ORDER — VITAMIN B COMPLEX
50 TABLET ORAL DAILY
Qty: 60 TABLET | Refills: 3 | Status: SHIPPED | OUTPATIENT
Start: 2020-06-25 | End: 2022-03-21

## 2020-06-24 RX ADMIN — TACROLIMUS 4.5 MG: 1 CAPSULE ORAL at 08:25

## 2020-06-24 RX ADMIN — HYDROXYZINE HYDROCHLORIDE 25 MG: 25 TABLET, FILM COATED ORAL at 03:00

## 2020-06-24 RX ADMIN — CARVEDILOL 3.12 MG: 3.12 TABLET, FILM COATED ORAL at 08:28

## 2020-06-24 RX ADMIN — ATORVASTATIN CALCIUM 10 MG: 10 TABLET, FILM COATED ORAL at 08:26

## 2020-06-24 RX ADMIN — MICONAZOLE NITRATE: 20 CREAM TOPICAL at 08:30

## 2020-06-24 RX ADMIN — DOCUSATE SODIUM AND SENNOSIDES 2 TABLET: 50; 8.6 TABLET ORAL at 08:28

## 2020-06-24 RX ADMIN — HYDROMORPHONE HYDROCHLORIDE 2 MG: 2 TABLET ORAL at 07:25

## 2020-06-24 RX ADMIN — CARVEDILOL 3.12 MG: 3.12 TABLET, FILM COATED ORAL at 10:32

## 2020-06-24 RX ADMIN — MYCOPHENOLATE MOFETIL 1000 MG: 250 CAPSULE ORAL at 08:24

## 2020-06-24 RX ADMIN — MELATONIN 50 MCG: at 08:27

## 2020-06-24 RX ADMIN — VALGANCICLOVIR 900 MG: 450 TABLET, FILM COATED ORAL at 08:26

## 2020-06-24 RX ADMIN — DIBASIC SODIUM PHOSPHATE, MONOBASIC POTASSIUM PHOSPHATE AND MONOBASIC SODIUM PHOSPHATE 500 MG: 852; 155; 130 TABLET ORAL at 08:26

## 2020-06-24 RX ADMIN — ACETAMINOPHEN 650 MG: 325 TABLET, FILM COATED ORAL at 10:57

## 2020-06-24 RX ADMIN — DIBASIC SODIUM PHOSPHATE, MONOBASIC POTASSIUM PHOSPHATE AND MONOBASIC SODIUM PHOSPHATE 500 MG: 852; 155; 130 TABLET ORAL at 12:18

## 2020-06-24 RX ADMIN — POLYETHYLENE GLYCOL 3350 17 G: 17 POWDER, FOR SOLUTION ORAL at 09:27

## 2020-06-24 RX ADMIN — Medication 400 MG: at 12:18

## 2020-06-24 RX ADMIN — ASPIRIN 81 MG CHEWABLE TABLET 81 MG: 81 TABLET CHEWABLE at 08:27

## 2020-06-24 RX ADMIN — SULFAMETHOXAZOLE AND TRIMETHOPRIM 1 TABLET: 400; 80 TABLET ORAL at 08:27

## 2020-06-24 RX ADMIN — ACETAMINOPHEN 650 MG: 325 TABLET, FILM COATED ORAL at 07:24

## 2020-06-24 ASSESSMENT — ACTIVITIES OF DAILY LIVING (ADL)
ADLS_ACUITY_SCORE: 10

## 2020-06-24 ASSESSMENT — MIFFLIN-ST. JEOR: SCORE: 1353.75

## 2020-06-24 ASSESSMENT — PAIN DESCRIPTION - DESCRIPTORS: DESCRIPTORS: ACHING

## 2020-06-24 NOTE — PLAN OF CARE
DISCHARGE:  Patient with orders to discharge to home.     Education Provided:   Med Card - updated and discussed with patient  Lab Book - updated and given to patient  Specialty Pharmacy - medications filled and picked up by patient  LDAs - polanco catheter removed, internal jugular removed, and PIV removed    Discharge instructions, medications & follow ups reviewed with patient. Copy of discharge summary given to patient. Internal jugular and PIV removed. [facility] notified, report given.    Patient in stable condition. AVSS. Patient had no further questions regarding discharge instructions and medications. Patient transferred out by wheelchair & left with friend.  Plan for clinic visit with labs on 6/25/2020.

## 2020-06-24 NOTE — PROGRESS NOTES
[facility]   Spoke with patient to discuss plans for home care. Patient to be discharged home 6/24/20 and has agreed to have [facility] follow with services of RN. Patient care support center processing referral.  Patient verbalized understanding that initial visit is scheduled for after completion of ATC, approximately 6/27 or 6/28/20. Provided 24 hour phone number for [facility] for any questions or concerns.    Radha Cruz RN  [facility] Liaison  [phone]

## 2020-06-24 NOTE — PROGRESS NOTES
Care Coordinator  D/I: PCP appointment with Iram rubio for 6/25 was changed to:  EVA Anderson on Wednesday, July 1st at 11:00 AM.   P: pt to discharge to home today and begin ATC 6/25 and 6/26 then [facility].

## 2020-06-24 NOTE — PLAN OF CARE
"1900-0730  VS: Hypertensive.      Pain/Nausea: Pt complains of abdominal pain. Asking for pain meds as soon as they are available.  Encouraged her to go longer without the dilaudid. We talked about the pain being controlled enough so she can walk and eat and be comfortable but that it's not realistic to think she won't have any pain.  Scheduled tylenol, lidocaine patches, icy hot patches and PRN atarax given.    Denies nausea.      Diet: Regular    LDA: internal jugular saline locked.  PIV saline locked.     GI: dulcolax suppository last evening with good results.    1 BM this am as well.  Pt states stools are loose.      : Polanco with pink urine last evening, more anamika this am.  Pt complaining of lots of discomfort with polanco.  Educated on polanoc wipes and the importance of doing the wipes twice a day at home.   Skin: Incision dermabonded.     Mobility: Up walking in the halls with encouragement.    Plan: Possible discharge today however pt states she is not ready because she \"still has pain\".      "

## 2020-06-25 ENCOUNTER — OFFICE VISIT (OUTPATIENT)
Dept: INFUSION THERAPY | Facility: CLINIC | Age: 35
End: 2020-06-25
Payer: MEDICARE

## 2020-06-25 ENCOUNTER — TELEPHONE (OUTPATIENT)
Dept: TRANSPLANT | Facility: CLINIC | Age: 35
End: 2020-06-25

## 2020-06-25 ENCOUNTER — APPOINTMENT (OUTPATIENT)
Dept: LAB | Facility: CLINIC | Age: 35
End: 2020-06-25
Payer: MEDICARE

## 2020-06-25 ENCOUNTER — INFUSION THERAPY VISIT (OUTPATIENT)
Dept: INFUSION THERAPY | Facility: CLINIC | Age: 35
End: 2020-06-25
Payer: MEDICARE

## 2020-06-25 VITALS — SYSTOLIC BLOOD PRESSURE: 141 MMHG | DIASTOLIC BLOOD PRESSURE: 101 MMHG | RESPIRATION RATE: 16 BRPM

## 2020-06-25 DIAGNOSIS — E83.42 HYPOMAGNESEMIA: ICD-10-CM

## 2020-06-25 DIAGNOSIS — N18.30 ANEMIA IN STAGE 3 CHRONIC KIDNEY DISEASE (H): ICD-10-CM

## 2020-06-25 DIAGNOSIS — N25.81 SECONDARY RENAL HYPERPARATHYROIDISM (H): Primary | ICD-10-CM

## 2020-06-25 DIAGNOSIS — Z94.0 HTN, KIDNEY TRANSPLANT RELATED: ICD-10-CM

## 2020-06-25 DIAGNOSIS — E83.39 HYPOPHOSPHATEMIA: ICD-10-CM

## 2020-06-25 DIAGNOSIS — D63.1 ANEMIA IN STAGE 3 CHRONIC KIDNEY DISEASE (H): ICD-10-CM

## 2020-06-25 DIAGNOSIS — I15.1 HTN, KIDNEY TRANSPLANT RELATED: ICD-10-CM

## 2020-06-25 DIAGNOSIS — D64.9 ANEMIA, UNSPECIFIED TYPE: ICD-10-CM

## 2020-06-25 DIAGNOSIS — Z48.298 AFTERCARE FOLLOWING ORGAN TRANSPLANT: ICD-10-CM

## 2020-06-25 DIAGNOSIS — D84.9 IMMUNOSUPPRESSION (H): ICD-10-CM

## 2020-06-25 DIAGNOSIS — Z94.0 KIDNEY TRANSPLANTED: Primary | Chronic | ICD-10-CM

## 2020-06-25 DIAGNOSIS — Z94.0 KIDNEY REPLACED BY TRANSPLANT: ICD-10-CM

## 2020-06-25 LAB
ANION GAP SERPL CALCULATED.3IONS-SCNC: 5 MMOL/L (ref 3–14)
BASOPHILS # BLD AUTO: 0 10E9/L (ref 0–0.2)
BASOPHILS NFR BLD AUTO: 0.4 %
BUN SERPL-MCNC: 8 MG/DL (ref 7–30)
CALCIUM SERPL-MCNC: 8.7 MG/DL (ref 8.5–10.1)
CHLORIDE SERPL-SCNC: 110 MMOL/L (ref 94–109)
CO2 SERPL-SCNC: 23 MMOL/L (ref 20–32)
CREAT SERPL-MCNC: 0.66 MG/DL (ref 0.52–1.04)
DIFFERENTIAL METHOD BLD: ABNORMAL
EOSINOPHIL # BLD AUTO: 0.2 10E9/L (ref 0–0.7)
EOSINOPHIL NFR BLD AUTO: 4.5 %
ERYTHROCYTE [DISTWIDTH] IN BLOOD BY AUTOMATED COUNT: 14.7 % (ref 10–15)
FERRITIN SERPL-MCNC: 886 NG/ML (ref 12–150)
GFR SERPL CREATININE-BSD FRML MDRD: >90 ML/MIN/{1.73_M2}
GLUCOSE SERPL-MCNC: 96 MG/DL (ref 70–99)
HCT VFR BLD AUTO: 28 % (ref 35–47)
HGB BLD-MCNC: 9.4 G/DL (ref 11.7–15.7)
IMM GRANULOCYTES # BLD: 0.1 10E9/L (ref 0–0.4)
IMM GRANULOCYTES NFR BLD: 2.2 %
IRON SATN MFR SERPL: 35 % (ref 15–46)
IRON SERPL-MCNC: 57 UG/DL (ref 35–180)
LYMPHOCYTES # BLD AUTO: 0.2 10E9/L (ref 0.8–5.3)
LYMPHOCYTES NFR BLD AUTO: 3.1 %
MAGNESIUM SERPL-MCNC: 1.5 MG/DL (ref 1.6–2.3)
MCH RBC QN AUTO: 30.8 PG (ref 26.5–33)
MCHC RBC AUTO-ENTMCNC: 33.6 G/DL (ref 31.5–36.5)
MCV RBC AUTO: 92 FL (ref 78–100)
MONOCYTES # BLD AUTO: 0.2 10E9/L (ref 0–1.3)
MONOCYTES NFR BLD AUTO: 3.9 %
NEUTROPHILS # BLD AUTO: 4.2 10E9/L (ref 1.6–8.3)
NEUTROPHILS NFR BLD AUTO: 85.9 %
NRBC # BLD AUTO: 0 10*3/UL
NRBC BLD AUTO-RTO: 0 /100
PHOSPHATE SERPL-MCNC: 1.2 MG/DL (ref 2.5–4.5)
PLATELET # BLD AUTO: 231 10E9/L (ref 150–450)
POTASSIUM SERPL-SCNC: 3.8 MMOL/L (ref 3.4–5.3)
RBC # BLD AUTO: 3.05 10E12/L (ref 3.8–5.2)
SODIUM SERPL-SCNC: 137 MMOL/L (ref 133–144)
TACROLIMUS BLD-MCNC: <3 UG/L (ref 5–15)
TIBC SERPL-MCNC: 163 UG/DL (ref 240–430)
TME LAST DOSE: ABNORMAL H
WBC # BLD AUTO: 4.9 10E9/L (ref 4–11)

## 2020-06-25 PROCEDURE — 80048 BASIC METABOLIC PNL TOTAL CA: CPT | Performed by: INTERNAL MEDICINE

## 2020-06-25 PROCEDURE — 83540 ASSAY OF IRON: CPT | Performed by: INTERNAL MEDICINE

## 2020-06-25 PROCEDURE — 84100 ASSAY OF PHOSPHORUS: CPT | Performed by: INTERNAL MEDICINE

## 2020-06-25 PROCEDURE — 83735 ASSAY OF MAGNESIUM: CPT | Performed by: INTERNAL MEDICINE

## 2020-06-25 PROCEDURE — 36415 COLL VENOUS BLD VENIPUNCTURE: CPT | Performed by: INTERNAL MEDICINE

## 2020-06-25 PROCEDURE — 83550 IRON BINDING TEST: CPT | Performed by: INTERNAL MEDICINE

## 2020-06-25 PROCEDURE — G0463 HOSPITAL OUTPT CLINIC VISIT: HCPCS

## 2020-06-25 PROCEDURE — 82728 ASSAY OF FERRITIN: CPT | Performed by: INTERNAL MEDICINE

## 2020-06-25 PROCEDURE — 85025 COMPLETE CBC W/AUTO DIFF WBC: CPT | Performed by: INTERNAL MEDICINE

## 2020-06-25 PROCEDURE — 80197 ASSAY OF TACROLIMUS: CPT | Performed by: INTERNAL MEDICINE

## 2020-06-25 RX ORDER — CARVEDILOL 6.25 MG/1
12.5 TABLET ORAL 2 TIMES DAILY
Qty: 30 TABLET | Refills: 3 | COMMUNITY
Start: 2020-06-25 | End: 2020-07-02

## 2020-06-25 NOTE — PROGRESS NOTES
"Jelly Dietz came to [facility] today for a lab and assess following a kidney transplant on 6/19/20.      Discharge date: 6/24/2020  Transplant coordinator: Mulu Arguello RN  Phone number patient can be reached at: mobile [phone]    Physical Assessment:  See physical assessment located under \"Document Flowsheets\".  Incision site: abdomen dermabond, clean and dry, intact  Lines: NA  Bauman: NA  Urine clarity: light yellow, going frequently  Hydration: pt discharged yesterday, not drinking as much, did eduction regarding drinking 2-3 liters daily  Nutrition: appetite is returning slow   Last BM: this morning, loose but not bad, only taking 1 senna daily currently, taking miralax also once daily  Pain: taking dilaudid every 4 hours and using acetaminophen 650 mg every 4 hours, abdomen incsion pain   Labs drawn by [facility] staff No    Plan of care for today:   1.  Labs in 1st floor lab  2.  Assessment and nephrology visit  3.  Medication card was hard for patient to understand and due to this patient only took 0.5 mg tacrolimus instead of 5 mg, notified RN transplant coordinator    Medication changes:   1.  Increase coreg from 6.25 mg bid to 12.5 mg bid    Medications administered: none    Patient education: The following teaching topics were addressed: Importance of drinking 2L of non-caffeinated fluids daily, Incisional care, Signs/symptoms of infection, Good handwashing and Medications (purposes, doses and times of administration)   Patient verbalized understanding and all questions answered.    Drug level:  Prograf/tacrolimus level today will be reviewed with Dr Anglin by RN coordinator,  patient was updated with this information and verbalized understanding.    Face to face time: 60 minutes    Discharge Plan  Pt will follow up with labs and [facility] appointments on 6/26, patient aware  Discharge instructions reviewed with patient: YES  Patient/Representative verbalized understanding, all questions answered: " YES    Discharged from unit at 1000 with whom: self to home.    Zulma Reich, RN, BSN

## 2020-06-25 NOTE — PATIENT INSTRUCTIONS
Dear Jelly Dietz    Thank you for choosing [facility] ([facility]) for your transplant cares.  The following information is a summary of our appointment as well as important reminders.      Please make sure your phone is available today because the RN coordinator will call to update you with your anti-rejection drug levels and possibly make changes to your anti-rejection dosages., Please refer to your hospital discharge instructions for details on home care services, future appointments, phone numbers, and diet/activity levels.    We look forward in seeing you on your next appointment here at [facility] ([facility]).  Please don t hesitate to call us at [phone] to reschedule any of your appointments or to speak with one of the [facility] registered nurses.  It was a pleasure taking care of you today.    Sincerely,    [facility]  [facility]  [address] [place] [address]  [address]  Phone:  [phone]    1.  Return to lab and [facility] tomorrow morning at 645m labs then here at 7  2.  Please take your anti-rejection meds at 7pm tonight  3.  Drink 2-3 liters ( oz) water daily  4.  Take BP, temp and weight tonight

## 2020-06-25 NOTE — TELEPHONE ENCOUNTER
Patient discharged 6/24/2020 post Kidney transplant.      Patient chooses to receive medications from  specialty pharmacy.  Updated pharmacy patient call list. Notified pharmacy.      FERNANDA Gordon.Ph.  [facility]  [phone] pager

## 2020-06-25 NOTE — PROGRESS NOTES
ACUTE TRANSPLANT NEPHROLOGY VISIT    Assessment & Plan   # DDKT: Stable creatinine at 0.6 mg/dL, anticipate probable rise when Tac is appropriate              - Baseline Cr ~ TBD              - Proteinuria: Not checked post transplant              - Date DSA Last Checked: Jun/2020    Latest DSA: No              - BK Viremia: Not checked post transplant              - Kidney Tx Biopsy: No     # Immunosuppression: Tacrolimus immediate release (goal 8-10), Mycophenolate mofetil (dose 1000 mg every 12 hours)               - Changes: Discussed importance of clear understanding of meds, plan to continue Tac 5mg BID and recheck level tmrw AM     # Infection Prophylaxis:   - PJP: Sulfa/TMP (Bactrim) daily  - 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      # Hypertension: Inadequate control;   Goal BP: < 140/90              - Volume status: Mildly hypervolemic             EDW ~ 58 kg               - Changes: Will double Carvedilol for the time being to 12.5mg BID. Pending BPs, may add back Amlodipine.      # Anemia in Chronic Renal Disease: Hgb: Trend up      ENZO: Yes              - Iron studies: Replete     # Mineral Bone Disorder:   - Secondary renal hyperparathyroidism; PTH level: tmrw  - Vitamin D; level: check tmrw  - Calcium; level: Normal On Supplement: No  - Phosphorus; level: 1.2 mg / dl:  On Supplement: Yes, encouraged high Phos foods as well     # Electrolytes:   - Potassium; level: Normal      On supplement: No  - Magnesium; level: Low       On supplement: Yes  - Bicarbonate; level: Normal On supplement: No     # History of non-adherence: Recommend adherence to all meds and that she actively ask Qs if she has any concerns about medication dosage    # Incision Pain: incision and site appears well healed, initially patient was hoping for more dilaudid, but given things are improving, she was okay with monitoring for a few days to see if improvement. Recommend ice pack or heat pack as well to  augment analgesia.    # Transplant History:  Etiology of Kidney Failure: IgA nephropathy  Tx: LDKT and DDKT  Transplant: 6/19/2020 (Kidney), 12/1/2007 (Kidney)  Donor Type: Donation after Brain Death        Donor Class:   Crossmatch at time of Tx: pending   Significant changes in immunosuppression: None  Significant transplant-related complications: None  Transplant Office Phone Number: [phone]    Assessment and plan was discussed with the patient and she voiced her understanding and agreement.    Return visit: [facility] tmrw    Pt staffed with Dr. Derrek Gillespie MD   Fellow  [phone]    Attestation:  This patient has been seen and evaluated by me, Kieran Anglin MD.  I have reviewed the note and agree with plan of care as documented by the fellow.       Chief Complaint   Ms. Dietz is a 35 year old here for immunosuppression management and hospital follow up after kidney transplant.     35 year old female with history of IgA nephropathy status post living donor kidney transplant when she was 22 in [place].  This kidney was lost due to rejection in the settings of nonadherence.  Part of that was related to her pregnancies. She is now s/p DDKT with ureteral stent, and 3 arteries on a common patch, on 6/19/2020.    History of Present Illness   Since hospital discharge yesterday, she reports doing well, with the exception of ongoing incisional pain. She states the dilaudid is barely sufficient, but things are better than in the hospital, particularly with removal of the polanco. No drainage or redness over allograft, no allograft tenderness.     She is eating without nausea, tolerating medications (although some confusion). She took 0.5mg Tac last night instead of 5mg due to confusion re: bottle instructions vs discharge summary instruction. Having loose BMs. Ambulating. No fevers, chest pain, or SOB. Making large volume urine, no hematuria/dysuria.    Recent Hospitalizations:  [] No [x] Yes DDKT    New Medical Issues: [] No [x] Yes DDKT   Decreased energy: [] No [x] Yes Improving   Chest pain or SOB with exertion:  [x] No [] Yes    Appetite change or weight change: [x] No [] Yes    Nausea, vomiting or diarrhea:  [] No [x] Yes Nausea related to pain, improving   Fever, sweats or chills: [x] No [] Yes    Leg swelling: [x] No [] Yes      Home BP: Not checked yesterday evening    Review of Systems   A comprehensive review of systems was obtained and negative, except as noted in the HPI or PMH.    Problem List   Patient Active Problem List   Diagnosis     CARDIOVASCULAR SCREENING; LDL GOAL LESS THAN 160     Kidney replaced by transplant     HTN, kidney transplant related     ACS (acute coronary syndrome) (H)     Anemia in chronic renal disease     IgA nephropathy     Anxiety     Kidney transplanted     Immunosuppression (H)     Painful bladder spasm     Hypophosphatemia     Constipation due to pain medication     Aftercare following organ transplant     Hypomagnesemia       Social History   Social History     Tobacco Use     Smoking status: Never Smoker     Smokeless tobacco: Never Used   Substance Use Topics     Alcohol use: No     Drug use: No       Allergies   No Known Allergies    Medications   Current Outpatient Medications   Medication Sig     acetaminophen (TYLENOL) 325 MG tablet Take 2 tablets (650 mg) by mouth every 4 hours as needed for other (multimodal surgical pain management along with NSAIDS and opioid medication as indicated based on pain control and physical function.)     aspirin (ASA) 81 MG chewable tablet Take 1 tablet (81 mg) by mouth daily     atorvastatin (LIPITOR) 10 MG tablet Take 1 tablet (10 mg) by mouth daily     carvedilol (COREG) 6.25 MG tablet Take 1 tablet (6.25 mg) by mouth 2 times daily     cetirizine (ZYRTEC) 10 MG tablet Take 10 mg by mouth nightly as needed for allergies      HYDROmorphone (DILAUDID) 2 MG tablet Take 1 tablet (2 mg) by mouth every 4 hours as needed for  moderate to severe pain     Lidocaine (LIDOCARE) 4 % Patch Place 3 patches onto the skin every 24 hours To prevent lidocaine toxicity, patient should be patch free for 12 hrs daily.     magnesium oxide (MAG-OX) 400 MG tablet Take 1 tablet (400 mg) by mouth daily (with lunch)     menthol (ICY HOT) 5 % PTCH Apply 1 patch topically every 8 hours as needed for muscle soreness     miconazole (MICATIN) 2 % external cream Apply topically 2 times daily [facility] prescription     mycophenolate (GENERIC EQUIVALENT) 250 MG capsule Take 4 capsules (1,000 mg) by mouth 2 times daily     phosphorus tablet 250 mg (PHOSPHA 250 NEUTRAL) 250 MG per tablet Take 2 tablets (500 mg) by mouth 4 times daily     polyethylene glycol (MIRALAX) 17 g packet Take 17 g by mouth daily     senna-docusate (SENOKOT-S/PERICOLACE) 8.6-50 MG tablet Take 1-2 tablets by mouth 2 times daily     sulfamethoxazole-trimethoprim (BACTRIM) 400-80 MG tablet Take 1 tablet by mouth daily     tacrolimus (GENERIC EQUIVALENT) 0.5 MG capsule 0.5 mg PO BID,  for dose changes per provider direction.     tacrolimus (GENERIC EQUIVALENT) 1 MG capsule Take 5 capsules (5 mg) by mouth 2 times daily     valGANciclovir (VALCYTE) 450 MG tablet Take 2 tablets (900 mg) by mouth daily     Vitamin D3 (CHOLECALCIFEROL) 25 mcg (1000 units) tablet Take 2 tablets (50 mcg) by mouth daily     No current facility-administered medications for this visit.      There are no discontinued medications.    Physical Exam   Reviewed, /100,     GENERAL APPEARANCE: alert and no distress  HENT: mouth without ulcers or lesions  LYMPHATICS: no cervical nodes  RESP: lungs clear to auscultation - no wheezes  CV: regular rhythm, normal rate, no rub  EDEMA: no LE edema bilaterally  ABDOMEN: soft, bowel sounds normal  MS: extremities normal - no gross deformities noted  SKIN: no rash  TX KIDNEY: Well healed incision without drainage or TTP  ACCESS: LUE AVF with good thrill    Data     Renal  Latest Ref Rng & Units 6/25/2020 6/24/2020 6/23/2020   Na 133 - 144 mmol/L 137 139 139   K 3.4 - 5.3 mmol/L 3.8 3.6 3.6   Cl 94 - 109 mmol/L 110(H) 110(H) 110(H)   CO2 20 - 32 mmol/L 23 24 24   BUN 7 - 30 mg/dL 8 11 9   Cr 0.52 - 1.04 mg/dL 0.66 0.65 0.68   Glucose 70 - 99 mg/dL 96 100(H) 107(H)   Ca  8.5 - 10.1 mg/dL 8.7 8.5 8.1(L)   Mg 1.6 - 2.3 mg/dL 1.5(L) 1.7 1.7     Bone Health Latest Ref Rng & Units 6/25/2020 6/24/2020 6/23/2020   Phos 2.5 - 4.5 mg/dL 1.2(L) 1.8(L) 1.7(L)   PTHi 12 - 72 pg/mL - - -     Heme Latest Ref Rng & Units 6/25/2020 6/24/2020 6/23/2020   WBC 4.0 - 11.0 10e9/L 4.9 3.1(L) -   Hgb 11.7 - 15.7 g/dL 9.4(L) 7.8(L) 6.8(LL)   Plt 150 - 450 10e9/L 231 162 -   ABSOLUTE NEUTROPHIL 1.6 - 8.3 10e9/L 4.2 - -   ABSOLUTE LYMPHOCYTES 0.8 - 5.3 10e9/L 0.2(L) - -   ABSOLUTE MONOCYTES 0.0 - 1.3 10e9/L 0.2 - -   ABSOLUTE EOSINOPHILS 0.0 - 0.7 10e9/L 0.2 - -   ABSOLUTE BASOPHILS 0.0 - 0.2 10e9/L 0.0 - -   ABS IMMATURE GRANULOCYTES 0 - 0.4 10e9/L 0.1 - -   ABSOLUTE NUCLEATED RBC - 0.0 - -     Liver Latest Ref Rng & Units 6/19/2020 7/26/2017 6/6/2017   AP 40 - 150 U/L 54 49 40   TBili 0.2 - 1.3 mg/dL 0.4 0.5 0.5   DBili 0.0 - 0.2 mg/dL - - -   ALT 0 - 50 U/L 13 12 16   AST 0 - 45 U/L 11 10 30   Tot Protein 6.8 - 8.8 g/dL 7.0 7.0 6.5(L)   Albumin 3.4 - 5.0 g/dL 3.4 3.7 3.2(L)     Pancreas Latest Ref Rng & Units 6/19/2020 5/30/2015 10/6/2014   A1C 0 - 5.6 % 5.1 - -   Lipase 73 - 393 U/L - 126 100     Iron studies Latest Ref Rng & Units 11/30/2012 3/9/2012 4/29/2010   Iron 35 - 180 ug/dL 125 33(L) 96   Iron sat 15 - 46 % 81(H) 17 46   Ferritin 10 - 120 ng/mL 401(H) 87 823(H)     P Txp Virology Latest Ref Rng & Units 6/19/2020 3/8/2017 11/5/2013   CVM DNA Quant - - - -   CMV Quant <100 Copies/mL - - -   CMV QT Log <2.0 Log copies/mL - - -   BK Spec - - Plasma -   BK Res BKNEG copies/mL - BK Virus DNA Not Detected -   BK Log <2.7 Log copies/mL - Not Calculated   The Real-Time quantitative BK Virus assay was  developed and its performance   characteristics determined by the [facility] at   the [facility] in [place], [place]. The   primers and probes for each analyte are Analyte Specific Reagents (ASRs)   manufactured by [employer].   ASRs are used in many laboratory tests necessary for standard medical care and   generally do not require U.S. Food and Drug Administration approval. The FDA   has determined that such clearance or approval is not necessary.   This test is used for clinical purposes. It should not be regarded as   investigational or for research. This laboratory is certified under the   Clinical Laboratory Improvement Amendments of 1988 (CLIA-88) as qualified to   perform high complexity clinical laboratory testing.   -   EBV VCA IGG ANTIBODY U/mL - - >750.00  Positive, suggests immunologic exposure.   EBV CAPSID ANTIBODY IGG 0.0 - 0.8 AI >8.0(H) >8.0  Positive, suggests recent or past exposure   Antibody index (AI) values reflect qualitative changes in antibody   concentration that cannot be directly associated with clinical condition or   disease state.  (H) -   EBV DNA COPIES/ML <1000 Copies/mL - - -   EBV DNA LOG OF COPIES <3.0 Log copies/mL - - -   Hep B Core NR - Nonreactive -   Hep B Surf - - - -   HIV 1&2 NEG - - -        Recent Labs   Lab Test 06/21/20  0532 06/23/20  0525   DOSTAC Not Provided Not Provided   TACROL <3.0* <3.0*

## 2020-06-25 NOTE — LETTER
6/25/2020      RE: Jelly Dietz  [address]  [facility] [address]       ACUTE TRANSPLANT NEPHROLOGY VISIT    Assessment & Plan   # DDKT: Stable creatinine at 0.6 mg/dL, anticipate probable rise when Tac is appropriate              - Baseline Cr ~ TBD              - Proteinuria: Not checked post transplant              - Date DSA Last Checked: Jun/2020    Latest DSA: No              - BK Viremia: Not checked post transplant              - Kidney Tx Biopsy: No     # Immunosuppression: Tacrolimus immediate release (goal 8-10), Mycophenolate mofetil (dose 1000 mg every 12 hours)               - Changes: Discussed importance of clear understanding of meds, plan to continue Tac 5mg BID and recheck level tmrw AM     # Infection Prophylaxis:   - PJP: Sulfa/TMP (Bactrim) daily  - 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      # Hypertension: Inadequate control;   Goal BP: < 140/90              - Volume status: Mildly hypervolemic             EDW ~ 58 kg               - Changes: Will double Carvedilol for the time being to 12.5mg BID. Pending BPs, may add back Amlodipine.      # Anemia in Chronic Renal Disease: Hgb: Trend up      ENZO: Yes              - Iron studies: Replete     # Mineral Bone Disorder:   - Secondary renal hyperparathyroidism; PTH level: tmrw  - Vitamin D; level: check tmrw  - Calcium; level: Normal On Supplement: No  - Phosphorus; level: 1.2 mg / dl:  On Supplement: Yes, encouraged high Phos foods as well     # Electrolytes:   - Potassium; level: Normal      On supplement: No  - Magnesium; level: Low       On supplement: Yes  - Bicarbonate; level: Normal On supplement: No     # History of non-adherence: Recommend adherence to all meds and that she actively ask Qs if she has any concerns about medication dosage    # Incision Pain: incision and site appears well healed, initially patient was hoping for more dilaudid, but given things are improving, she was okay with  monitoring for a few days to see if improvement. Recommend ice pack or heat pack as well to augment analgesia.    # Transplant History:  Etiology of Kidney Failure: IgA nephropathy  Tx: LDKT and DDKT  Transplant: 6/19/2020 (Kidney), 12/1/2007 (Kidney)  Donor Type: Donation after Brain Death        Donor Class:   Crossmatch at time of Tx: pending   Significant changes in immunosuppression: None  Significant transplant-related complications: None  Transplant Office Phone Number: [phone]    Assessment and plan was discussed with the patient and she voiced her understanding and agreement.    [facility]    Pt staffed with Dr. Derrek Gillespie MD   Fellow  [phone]    Attestation:  This patient has been seen and evaluated by me, Kieran Anglin MD.  I have reviewed the note and agree with plan of care as documented by the fellow.       Chief Complaint   Ms. Dietz is a 35 year old here for immunosuppression management and hospital follow up after kidney transplant.     35 year old female with history of IgA nephropathy status post living donor kidney transplant when she was 22 in [place].  This kidney was lost due to rejection in the settings of nonadherence.  Part of that was related to her pregnancies. She is now s/p DDKT with ureteral stent, and 3 arteries on a common patch, on 6/19/2020.    History of Present Illness   Since hospital discharge yesterday, she reports doing well, with the exception of ongoing incisional pain. She states the dilaudid is barely sufficient, but things are better than in the hospital, particularly with removal of the polanco. No drainage or redness over allograft, no allograft tenderness.     She is eating without nausea, tolerating medications (although some confusion). She took 0.5mg Tac last night instead of 5mg due to confusion re: bottle instructions vs discharge summary instruction. Having loose BMs. Ambulating. No fevers, chest pain, or SOB. Making  large volume urine, no hematuria/dysuria.    Recent Hospitalizations:  [] No [x] Yes DDKT   New Medical Issues: [] No [x] Yes DDKT   Decreased energy: [] No [x] Yes Improving   Chest pain or SOB with exertion:  [x] No [] Yes    Appetite change or weight change: [x] No [] Yes    Nausea, vomiting or diarrhea:  [] No [x] Yes Nausea related to pain, improving   Fever, sweats or chills: [x] No [] Yes    Leg swelling: [x] No [] Yes      Home BP: Not checked yesterday evening    Review of Systems   A comprehensive review of systems was obtained and negative, except as noted in the HPI or PMH.    Problem List   Patient Active Problem List   Diagnosis     CARDIOVASCULAR SCREENING; LDL GOAL LESS THAN 160     Kidney replaced by transplant     HTN, kidney transplant related     ACS (acute coronary syndrome) (H)     Anemia in chronic renal disease     IgA nephropathy     Anxiety     Kidney transplanted     Immunosuppression (H)     Painful bladder spasm     Hypophosphatemia     Constipation due to pain medication     Aftercare following organ transplant     Hypomagnesemia       Social History   Social History     Tobacco Use     Smoking status: Never Smoker     Smokeless tobacco: Never Used   Substance Use Topics     Alcohol use: No     Drug use: No       Allergies   No Known Allergies    Medications   Current Outpatient Medications   Medication Sig     acetaminophen (TYLENOL) 325 MG tablet Take 2 tablets (650 mg) by mouth every 4 hours as needed for other (multimodal surgical pain management along with NSAIDS and opioid medication as indicated based on pain control and physical function.)     aspirin (ASA) 81 MG chewable tablet Take 1 tablet (81 mg) by mouth daily     atorvastatin (LIPITOR) 10 MG tablet Take 1 tablet (10 mg) by mouth daily     carvedilol (COREG) 6.25 MG tablet Take 1 tablet (6.25 mg) by mouth 2 times daily     cetirizine (ZYRTEC) 10 MG tablet Take 10 mg by mouth nightly as needed for allergies       HYDROmorphone (DILAUDID) 2 MG tablet Take 1 tablet (2 mg) by mouth every 4 hours as needed for moderate to severe pain     Lidocaine (LIDOCARE) 4 % Patch Place 3 patches onto the skin every 24 hours To prevent lidocaine toxicity, patient should be patch free for 12 hrs daily.     magnesium oxide (MAG-OX) 400 MG tablet Take 1 tablet (400 mg) by mouth daily (with lunch)     menthol (ICY HOT) 5 % PTCH Apply 1 patch topically every 8 hours as needed for muscle soreness     miconazole (MICATIN) 2 % external cream Apply topically 2 times daily [facility] prescription     mycophenolate (GENERIC EQUIVALENT) 250 MG capsule Take 4 capsules (1,000 mg) by mouth 2 times daily     phosphorus tablet 250 mg (PHOSPHA 250 NEUTRAL) 250 MG per tablet Take 2 tablets (500 mg) by mouth 4 times daily     polyethylene glycol (MIRALAX) 17 g packet Take 17 g by mouth daily     senna-docusate (SENOKOT-S/PERICOLACE) 8.6-50 MG tablet Take 1-2 tablets by mouth 2 times daily     sulfamethoxazole-trimethoprim (BACTRIM) 400-80 MG tablet Take 1 tablet by mouth daily     tacrolimus (GENERIC EQUIVALENT) 0.5 MG capsule 0.5 mg PO BID,  for dose changes per provider direction.     tacrolimus (GENERIC EQUIVALENT) 1 MG capsule Take 5 capsules (5 mg) by mouth 2 times daily     valGANciclovir (VALCYTE) 450 MG tablet Take 2 tablets (900 mg) by mouth daily     Vitamin D3 (CHOLECALCIFEROL) 25 mcg (1000 units) tablet Take 2 tablets (50 mcg) by mouth daily     No current facility-administered medications for this visit.      There are no discontinued medications.    Physical Exam   Reviewed, /100,     GENERAL APPEARANCE: alert and no distress  HENT: mouth without ulcers or lesions  LYMPHATICS: no cervical nodes  RESP: lungs clear to auscultation - no wheezes  CV: regular rhythm, normal rate, no rub  EDEMA: no LE edema bilaterally  ABDOMEN: soft, bowel sounds normal  MS: extremities normal - no gross deformities noted  SKIN: no rash  TX KIDNEY:  Well healed incision without drainage or TTP  ACCESS: LUE AVF with good thrill    Data     Renal Latest Ref Rng & Units 6/25/2020 6/24/2020 6/23/2020   Na 133 - 144 mmol/L 137 139 139   K 3.4 - 5.3 mmol/L 3.8 3.6 3.6   Cl 94 - 109 mmol/L 110(H) 110(H) 110(H)   CO2 20 - 32 mmol/L 23 24 24   BUN 7 - 30 mg/dL 8 11 9   Cr 0.52 - 1.04 mg/dL 0.66 0.65 0.68   Glucose 70 - 99 mg/dL 96 100(H) 107(H)   Ca  8.5 - 10.1 mg/dL 8.7 8.5 8.1(L)   Mg 1.6 - 2.3 mg/dL 1.5(L) 1.7 1.7     Bone Health Latest Ref Rng & Units 6/25/2020 6/24/2020 6/23/2020   Phos 2.5 - 4.5 mg/dL 1.2(L) 1.8(L) 1.7(L)   PTHi 12 - 72 pg/mL - - -     Heme Latest Ref Rng & Units 6/25/2020 6/24/2020 6/23/2020   WBC 4.0 - 11.0 10e9/L 4.9 3.1(L) -   Hgb 11.7 - 15.7 g/dL 9.4(L) 7.8(L) 6.8(LL)   Plt 150 - 450 10e9/L 231 162 -   ABSOLUTE NEUTROPHIL 1.6 - 8.3 10e9/L 4.2 - -   ABSOLUTE LYMPHOCYTES 0.8 - 5.3 10e9/L 0.2(L) - -   ABSOLUTE MONOCYTES 0.0 - 1.3 10e9/L 0.2 - -   ABSOLUTE EOSINOPHILS 0.0 - 0.7 10e9/L 0.2 - -   ABSOLUTE BASOPHILS 0.0 - 0.2 10e9/L 0.0 - -   ABS IMMATURE GRANULOCYTES 0 - 0.4 10e9/L 0.1 - -   ABSOLUTE NUCLEATED RBC - 0.0 - -     Liver Latest Ref Rng & Units 6/19/2020 7/26/2017 6/6/2017   AP 40 - 150 U/L 54 49 40   TBili 0.2 - 1.3 mg/dL 0.4 0.5 0.5   DBili 0.0 - 0.2 mg/dL - - -   ALT 0 - 50 U/L 13 12 16   AST 0 - 45 U/L 11 10 30   Tot Protein 6.8 - 8.8 g/dL 7.0 7.0 6.5(L)   Albumin 3.4 - 5.0 g/dL 3.4 3.7 3.2(L)     Pancreas Latest Ref Rng & Units 6/19/2020 5/30/2015 10/6/2014   A1C 0 - 5.6 % 5.1 - -   Lipase 73 - 393 U/L - 126 100     Iron studies Latest Ref Rng & Units 11/30/2012 3/9/2012 4/29/2010   Iron 35 - 180 ug/dL 125 33(L) 96   Iron sat 15 - 46 % 81(H) 17 46   Ferritin 10 - 120 ng/mL 401(H) 87 823(H)     [facility] Txp Virology Latest Ref Rng & Units 6/19/2020 3/8/2017 11/5/2013   CVM DNA Quant - - - -   CMV Quant <100 Copies/mL - - -   CMV QT Log <2.0 Log copies/mL - - -   BK Spec - - Plasma -   BK Res BKNEG copies/mL - BK Virus DNA Not Detected -    BK Log <2.7 Log copies/mL - Not Calculated   The Real-Time quantitative BK Virus assay was developed and its performance   characteristics determined by the Infectious Diseases Diagnostic Laboratory at   the [facility] in [place], [place]. The   primers and probes for each analyte are Analyte Specific Reagents (ASRs)   manufactured by [employer].   ASRs are used in many laboratory tests necessary for standard medical care and   generally do not require U.S. Food and Drug Administration approval. The FDA   has determined that such clearance or approval is not necessary.   This test is used for clinical purposes. It should not be regarded as   investigational or for research. This laboratory is certified under the   Clinical Laboratory Improvement Amendments of 1988 (CLIA-88) as qualified to   perform high complexity clinical laboratory testing.   -   EBV VCA IGG ANTIBODY U/mL - - >750.00  Positive, suggests immunologic exposure.   EBV CAPSID ANTIBODY IGG 0.0 - 0.8 AI >8.0(H) >8.0  Positive, suggests recent or past exposure   Antibody index (AI) values reflect qualitative changes in antibody   concentration that cannot be directly associated with clinical condition or   disease state.  (H) -   EBV DNA COPIES/ML <1000 Copies/mL - - -   EBV DNA LOG OF COPIES <3.0 Log copies/mL - - -   Hep B Core NR - Nonreactive -   Hep B Surf - - - -   HIV 1&2 NEG - - -        Recent Labs   Lab Test 06/21/20  0532 06/23/20  0525   DOSTAC Not Provided Not Provided   TACROL <3.0* <3.0*           MD Kieran Churchill MD

## 2020-06-25 NOTE — TELEPHONE ENCOUNTER
Tacrolimus undetected. Received message from ATC RN that patient only took 0.5 mg tacrolimus last night instead of the prescribed 5 mg due to confusion with her medication card.  RN fixed medication card and medication box and patient took correct dose of tacrolimus this morning.     Stay on the same dose of tacrolimus and repeat level tomorrow 6/26.

## 2020-06-26 ENCOUNTER — TELEPHONE (OUTPATIENT)
Dept: TRANSPLANT | Facility: CLINIC | Age: 35
End: 2020-06-26

## 2020-06-26 ENCOUNTER — INFUSION THERAPY VISIT (OUTPATIENT)
Dept: INFUSION THERAPY | Facility: CLINIC | Age: 35
End: 2020-06-26
Payer: MEDICARE

## 2020-06-26 ENCOUNTER — APPOINTMENT (OUTPATIENT)
Dept: LAB | Facility: CLINIC | Age: 35
End: 2020-06-26
Payer: MEDICARE

## 2020-06-26 VITALS — RESPIRATION RATE: 16 BRPM | TEMPERATURE: 98 F | DIASTOLIC BLOOD PRESSURE: 92 MMHG | SYSTOLIC BLOOD PRESSURE: 142 MMHG

## 2020-06-26 DIAGNOSIS — Z79.899 LONG TERM USE OF DRUG: ICD-10-CM

## 2020-06-26 DIAGNOSIS — N25.81 SECONDARY RENAL HYPERPARATHYROIDISM (H): ICD-10-CM

## 2020-06-26 DIAGNOSIS — R12 HEARTBURN: Primary | ICD-10-CM

## 2020-06-26 DIAGNOSIS — Z94.0 KIDNEY REPLACED BY TRANSPLANT: Primary | ICD-10-CM

## 2020-06-26 DIAGNOSIS — Z94.0 KIDNEY TRANSPLANTED: ICD-10-CM

## 2020-06-26 LAB
ANION GAP SERPL CALCULATED.3IONS-SCNC: 4 MMOL/L (ref 3–14)
BASOPHILS # BLD AUTO: 0 10E9/L (ref 0–0.2)
BASOPHILS NFR BLD AUTO: 0 %
BUN SERPL-MCNC: 7 MG/DL (ref 7–30)
CALCIUM SERPL-MCNC: 8.8 MG/DL (ref 8.5–10.1)
CHLORIDE SERPL-SCNC: 112 MMOL/L (ref 94–109)
CO2 SERPL-SCNC: 22 MMOL/L (ref 20–32)
CREAT SERPL-MCNC: 0.58 MG/DL (ref 0.52–1.04)
DEPRECATED CALCIDIOL+CALCIFEROL SERPL-MC: 20 UG/L (ref 20–75)
DIFFERENTIAL METHOD BLD: ABNORMAL
EOSINOPHIL # BLD AUTO: 0.1 10E9/L (ref 0–0.7)
EOSINOPHIL NFR BLD AUTO: 1.8 %
ERYTHROCYTE [DISTWIDTH] IN BLOOD BY AUTOMATED COUNT: 14.6 % (ref 10–15)
GFR SERPL CREATININE-BSD FRML MDRD: >90 ML/MIN/{1.73_M2}
GLUCOSE SERPL-MCNC: 102 MG/DL (ref 70–99)
HCT VFR BLD AUTO: 29.2 % (ref 35–47)
HGB BLD-MCNC: 9.8 G/DL (ref 11.7–15.7)
LYMPHOCYTES # BLD AUTO: 0.3 10E9/L (ref 0.8–5.3)
LYMPHOCYTES NFR BLD AUTO: 5.3 %
MAGNESIUM SERPL-MCNC: 1.5 MG/DL (ref 1.6–2.3)
MCH RBC QN AUTO: 31.1 PG (ref 26.5–33)
MCHC RBC AUTO-ENTMCNC: 33.6 G/DL (ref 31.5–36.5)
MCV RBC AUTO: 93 FL (ref 78–100)
MONOCYTES # BLD AUTO: 0.2 10E9/L (ref 0–1.3)
MONOCYTES NFR BLD AUTO: 4.4 %
NEUTROPHILS # BLD AUTO: 4.8 10E9/L (ref 1.6–8.3)
NEUTROPHILS NFR BLD AUTO: 88.5 %
PHOSPHATE SERPL-MCNC: 1.3 MG/DL (ref 2.5–4.5)
PLATELET # BLD AUTO: 279 10E9/L (ref 150–450)
PLATELET # BLD EST: ABNORMAL 10*3/UL
POTASSIUM SERPL-SCNC: 3.8 MMOL/L (ref 3.4–5.3)
PTH-INTACT SERPL-MCNC: 226 PG/ML (ref 18–80)
RBC # BLD AUTO: 3.15 10E12/L (ref 3.8–5.2)
RBC MORPH BLD: NORMAL
SODIUM SERPL-SCNC: 138 MMOL/L (ref 133–144)
TACROLIMUS BLD-MCNC: 6.8 UG/L (ref 5–15)
TME LAST DOSE: NORMAL H
WBC # BLD AUTO: 5.4 10E9/L (ref 4–11)

## 2020-06-26 PROCEDURE — 36415 COLL VENOUS BLD VENIPUNCTURE: CPT | Performed by: INTERNAL MEDICINE

## 2020-06-26 PROCEDURE — 85025 COMPLETE CBC W/AUTO DIFF WBC: CPT | Performed by: INTERNAL MEDICINE

## 2020-06-26 PROCEDURE — 80180 DRUG SCRN QUAN MYCOPHENOLATE: CPT | Performed by: INTERNAL MEDICINE

## 2020-06-26 PROCEDURE — 82306 VITAMIN D 25 HYDROXY: CPT | Performed by: INTERNAL MEDICINE

## 2020-06-26 PROCEDURE — 83735 ASSAY OF MAGNESIUM: CPT | Performed by: INTERNAL MEDICINE

## 2020-06-26 PROCEDURE — 80048 BASIC METABOLIC PNL TOTAL CA: CPT | Performed by: INTERNAL MEDICINE

## 2020-06-26 PROCEDURE — 84100 ASSAY OF PHOSPHORUS: CPT | Performed by: INTERNAL MEDICINE

## 2020-06-26 PROCEDURE — 83970 ASSAY OF PARATHORMONE: CPT | Performed by: INTERNAL MEDICINE

## 2020-06-26 PROCEDURE — 80197 ASSAY OF TACROLIMUS: CPT | Performed by: INTERNAL MEDICINE

## 2020-06-26 RX ORDER — FAMOTIDINE 20 MG/1
20 TABLET, FILM COATED ORAL 2 TIMES DAILY
Qty: 30 TABLET | Refills: 1 | Status: ON HOLD | OUTPATIENT
Start: 2020-06-26 | End: 2020-07-18

## 2020-06-26 RX ORDER — MAGNESIUM OXIDE 400 MG/1
400 TABLET ORAL
Qty: 30 TABLET | Refills: 0 | Status: ON HOLD | COMMUNITY
Start: 2020-06-26 | End: 2020-07-18

## 2020-06-26 NOTE — TELEPHONE ENCOUNTER
Email sent with welcome letter - who / when / how to contact SOT.  Jelly voiced understanding to review.    Jelly does have heartburn. RNCC gave okay for TUMS OTC as needed for breakthrough.  Rx for famotidine 20mg daily sent.    Home care contacted to ensure 0800 AM lab draw for 6/29.2020.

## 2020-06-26 NOTE — LETTER
Hello and congratulation on your newly transplanted organ(s)!    Please review the information below about how and when to contact your solid organ transplant (SOT) team members.      WHO to contact:    Mulu Arguello RN, BSN, [facility]  Transplant Coordinator    HOW to contact:    During the working business days, please reach your transplant coordinator at [phone].    If concerns arise after business hours (or holidays or weekends), please reach the on-call transplant coordinator.  [phone].  An  will answer, you will need to ask them for the on-call abdominal transplant coordinator.    WHEN to contact:  **Please record the parameters below on the front of your transplant lab record book.    Please check and record your weight every AM. This should be done on the same scale in about the same wardrobe each day.  Call your coordinator if:  Your weight INCREASES or DECREASES by more than 4 pounds in 1 day.    Please check and record your blood pressure (BP) 2 times daily and as needed.  Call your coordinator if:  Your BP is GREATER than 160 / 95.  Your BP is LESS than 105 / 65.    Please check and record your pulse (heart rate) 2 times daily.  Call your coordinator if:  Your heart rate is GREATER than 100.  Your heart rate is LESS than 60.    Please check your temperature 2 times daily and as needed (if you feel feverish or have chills).  Call your coordinator IF:  Your fever is GREATER than 100.5 F  **If this occurs over night or on the weekend, please go directly to the [facility] Emergency Room at [address] [place] [address].  If you are diabetic or had a pancreas transplant, please check and record your FASTING blood sugar daily.    If you have any new onset nausea, vomiting, or diarrhea, please notify your transplant coordinator.      We look forward to working with you as you continue your transplant journey!

## 2020-06-26 NOTE — PROGRESS NOTES
"Jelly Dietz came to [facility] today for a lab and assess following a kidney transplant on 6/19/20.      Discharge date: 6/24/2020  Transplant coordinator: Mulu Arguello RN  Phone number patient can be reached at: mobile [phone]    Physical Assessment:  See physical assessment located under \"Document Flowsheets\".  Incision site: CDI, dermabond, pea sized amount of blood on LLQ of abdominal where incision is dermabonded, gave patient ABD pads to cover and told to follow up with coordinator or home care nurse if bleeding or drainage increased, patient stated she understood  Urine clarity: yellow, going frequently  Hydration: 2 liters of water  Nutrition: ate small amounts, appetitite is low but eats    Last BM: yesterday, daily, small to medium  Pain: at incision site   Labs drawn by [facility] staff No, first floor lab    Plan of care for today:   1.  Labs and assessment  2.  Education see below    Medication changes:   1. Increase magnesium 400 mg tab from once daily to twice daily    Patient education:  The following teaching topics were addressed: Importance of drinking 2L of non-caffeinated fluids daily, Incisional care, Signs/symptoms of infection, Good handwashing and Medications (purposes, doses and times of administration)   Patient verbalized understanding and all questions answered.    Drug level:  Prograf/tacroliumus level today will be reviewed with Dr Anglin by RN coordinator.   Patient will be updated with this information and verbalized understanding.    Face to face time: 30 minutes    Discharge Plan : Pt will follow up with lab draw on 2nd floor lab on Saturday 6/27/20 at 815am, patient aware  Discharge instructions reviewed with patient: YES  Patient/Representative verbalized understanding, all questions answered: YES    Discharged from unit at 1100 with whom: self to home.    Zulma Reich, RN, BSN, CRNI    "

## 2020-06-26 NOTE — PATIENT INSTRUCTIONS
Dear Jelly Dietz    Thank you for choosing [facility] ([facility]) for your transplant cares.  The following information is a summary of our appointment as well as important reminders.      Please make sure your phone is available today because your RN coordinator call to update you with your anti-rejection drug levels and possibly make changes to your anti-rejection dosages., Please refer to your hospital discharge instructions for details on home care services, future appointments, phone numbers, and diet/activity levels.    We look forward in seeing you on your next appointment here at [facility] ([facility]).  Please don t hesitate to call us at [phone] to reschedule any of your appointments or to speak with one of the [facility] registered nurses.  It was a pleasure taking care of you today.    Sincerely,    [facility]  Specialty [facility]fusion [facility] Center  [address] [place] [address]  [address]  Phone:  [phone]    1.  Increase magnesium to 400 mg twice a day (lunch and dinner)  2.  Return to clinic tomorrow Saturday at 8am to have labs drawn  3.  Continue to drink 2-3 liters water/non caffeine liquids ( oz)  4.  Monitor BP, and temperature  5.  Continue phosphorus, ice cream, cheeses, whole grains, nuts and taking the supplement 4 times daily    Medisafe or Transplant Hero apps for phone meds    Call coordinator if    BP greater than >160 /90 or less than <105 / 65   Temp >100.5   Weight gain / loss of >4lbs in 24hours   HR greater than >100 or less than <60    Contact Information:   Transplant Coordinator: Mulu Arguello, RN coordinator  Transplant Office: [phone]   [facility]: [phone] Ask for physician on call for kidney transplant.   [facility]: [phone]

## 2020-06-26 NOTE — LETTER
"    6/26/2020         RE: Jelly Dietz  [address]  [facility] [address]        Dear Colleague,    Thank you for referring your patient, Jelly Dietz, to the [facility] [place] [facility]. Please see a copy of my visit note below.    Jelly Dietz came to [facility] today for a lab and assess following a kidney transplant on 6/19/20.      Discharge date: 6/24/2020  Transplant coordinator: Mulu Arguello RN  Phone number patient can be reached at: mobile [phone]    Physical Assessment:  See physical assessment located under \"Document Flowsheets\".  Incision site: CDI, dermabond, pea sized amount of blood on LLQ of abdominal where incision is dermabonded, gave patient ABD pads to cover and told to follow up with coordinator or home care nurse if bleeding or drainage increased, patient stated she understood  Urine clarity: yellow, going frequently  Hydration: 2 liters of water  Nutrition: ate small amounts, appetitite is low but eats    Last BM: yesterday, daily, small to medium  Pain: at incision site   Labs drawn by [facility] staff No, first floor lab    Plan of care for today:   1.  Labs and assessment  2.  Education see below    Medication changes:   1. Increase magnesium 400 mg tab from once daily to twice daily    Patient education:  The following teaching topics were addressed: Importance of drinking 2L of non-caffeinated fluids daily, Incisional care, Signs/symptoms of infection, Good handwashing and Medications (purposes, doses and times of administration)   Patient verbalized understanding and all questions answered.    Drug level:  Prograf/tacroliumus level today will be reviewed with Dr Anglin by RN coordinator.   Patient will be updated with this information and verbalized understanding.    Face to face time: 30 minutes    Discharge Plan : Pt will follow up with lab draw on 2nd floor lab on Saturday 6/27/20 at 815am, patient aware  Discharge instructions reviewed with " patient: YES  Patient/Representative verbalized understanding, all questions answered: YES    Discharged from unit at 1100 with whom: self to home.    Zulma Reich, RN, BSN, CRNI      Again, thank you for allowing me to participate in the care of your patient.        Sincerely,        [facility]

## 2020-06-27 ENCOUNTER — TELEPHONE (OUTPATIENT)
Dept: TRANSPLANT | Facility: CLINIC | Age: 35
End: 2020-06-27

## 2020-06-27 DIAGNOSIS — Z94.0 KIDNEY TRANSPLANTED: ICD-10-CM

## 2020-06-27 DIAGNOSIS — Z94.0 KIDNEY REPLACED BY TRANSPLANT: Primary | ICD-10-CM

## 2020-06-27 LAB
ANION GAP SERPL CALCULATED.3IONS-SCNC: 6 MMOL/L (ref 3–14)
BASOPHILS # BLD AUTO: 0.1 10E9/L (ref 0–0.2)
BASOPHILS NFR BLD AUTO: 0.9 %
BUN SERPL-MCNC: 10 MG/DL (ref 7–30)
CALCIUM SERPL-MCNC: 9 MG/DL (ref 8.5–10.1)
CHLORIDE SERPL-SCNC: 111 MMOL/L (ref 94–109)
CO2 SERPL-SCNC: 21 MMOL/L (ref 20–32)
CREAT SERPL-MCNC: 0.6 MG/DL (ref 0.52–1.04)
DIFFERENTIAL METHOD BLD: ABNORMAL
EOSINOPHIL # BLD AUTO: 0.1 10E9/L (ref 0–0.7)
EOSINOPHIL NFR BLD AUTO: 1.7 %
ERYTHROCYTE [DISTWIDTH] IN BLOOD BY AUTOMATED COUNT: 14.5 % (ref 10–15)
GFR SERPL CREATININE-BSD FRML MDRD: >90 ML/MIN/{1.73_M2}
GLUCOSE SERPL-MCNC: 112 MG/DL (ref 70–99)
HCT VFR BLD AUTO: 29 % (ref 35–47)
HGB BLD-MCNC: 9.8 G/DL (ref 11.7–15.7)
LYMPHOCYTES # BLD AUTO: 0.3 10E9/L (ref 0.8–5.3)
LYMPHOCYTES NFR BLD AUTO: 4.3 %
MAGNESIUM SERPL-MCNC: 1.5 MG/DL (ref 1.6–2.3)
MCH RBC QN AUTO: 30.6 PG (ref 26.5–33)
MCHC RBC AUTO-ENTMCNC: 33.8 G/DL (ref 31.5–36.5)
MCV RBC AUTO: 91 FL (ref 78–100)
MONOCYTES # BLD AUTO: 0.5 10E9/L (ref 0–1.3)
MONOCYTES NFR BLD AUTO: 6.1 %
MYELOCYTES # BLD: 0.2 10E9/L
MYELOCYTES NFR BLD MANUAL: 2.6 %
NEUTROPHILS # BLD AUTO: 6.2 10E9/L (ref 1.6–8.3)
NEUTROPHILS NFR BLD AUTO: 84.4 %
PHOSPHATE SERPL-MCNC: 1.4 MG/DL (ref 2.5–4.5)
PLATELET # BLD AUTO: 320 10E9/L (ref 150–450)
PLATELET # BLD EST: ABNORMAL 10*3/UL
POTASSIUM SERPL-SCNC: 3.9 MMOL/L (ref 3.4–5.3)
RBC # BLD AUTO: 3.2 10E12/L (ref 3.8–5.2)
RBC MORPH BLD: NORMAL
SODIUM SERPL-SCNC: 138 MMOL/L (ref 133–144)
TACROLIMUS BLD-MCNC: 7.5 UG/L (ref 5–15)
TME LAST DOSE: NORMAL H
WBC # BLD AUTO: 7.4 10E9/L (ref 4–11)

## 2020-06-27 PROCEDURE — 80197 ASSAY OF TACROLIMUS: CPT | Performed by: INTERNAL MEDICINE

## 2020-06-27 PROCEDURE — 84100 ASSAY OF PHOSPHORUS: CPT | Performed by: INTERNAL MEDICINE

## 2020-06-27 PROCEDURE — 83735 ASSAY OF MAGNESIUM: CPT | Performed by: INTERNAL MEDICINE

## 2020-06-27 PROCEDURE — 85025 COMPLETE CBC W/AUTO DIFF WBC: CPT | Performed by: INTERNAL MEDICINE

## 2020-06-27 PROCEDURE — 80048 BASIC METABOLIC PNL TOTAL CA: CPT | Performed by: INTERNAL MEDICINE

## 2020-06-27 NOTE — LETTER
OUTPATIENT LABORATORY TEST ORDER    Patient: Jelly Dietz     Transplant Date: 6/19/2020  YOB: 1985     Ordered By: Dr. Irma Shannon  MRN: [id number]     Issued Date/Time: 06/23/20  7:33 AM         Expiration Date: 6/19/2021    Diagnosis: Kidney Transplant (ICD-10 Z94.0)    Aftercare following organ transplant (ICD-10 Z48.288)    Long term use of medications (ICD-10 Z79.899)    Lab results to be available on the same day drawn    Patient should release information to the [facility].     Please fax all results to [phone]    First 8 weeks post-transplant (6/19/2020 - 8/19/2020)  Labs 2x/week (Monday and Thursday)    CBC with platelets    Basic Metabolic Panel (Sodium, Potassium, Chloride, Creatinine, CO2, Urea Nitrogen, glucose, Calcium)    Tacrolimus/Prograf/ drug level  Labs weekly (Monday)    Phosphorus, magnesium  Biweekly    Mycophenolic Acid  Monthly    BK PCR QT    Months 2-4 post-transplant (8/20/2020 - 10/20/2020)  Labs 1x weekly (Monday or Tuesday)    CBC with platelets    Basic Metabolic Panel (Sodium, Potassium, Chloride, Creatinine, CO2, Urea Nitrogen, glucose, Calcium)    Tacrolimus/Prograf/ drug level  Labs monthly     Phosphorus, magnesium    BK (Polyoma Virus) PCR Quantitative/Plasma  **At month 3 only (9/19/2020)    HIV, HCV, HBV    Months 4-7 post-transplant (10/21/2020 - 1/21/2021)  Labs every other week    CBC with platelets    Basic Metabolic Panel (Sodium, Potassium, Chloride, Creatinine, CO2, Urea Nitrogen, glucose, Calcium)    Tacrolimus/Prograf/ drug level  Monthly    Phosphorus, magnesium    BK (Polyoma Virus) PCR Quantitative/Plasma  **At month 7 only (1/19/2021)     DSA PRA (mailers provided by patient)     OUTPATIENT LABORATORY TEST ORDER    Patient: Jelly Dietz     Transplant Date: 6/19/2020  YOB: 1985     Ordered By: Dr. Irma Shannon  MRN: [id number]     Issued  Date/Time: 06/23/20         Expiration Date: 6/19/2021    Diagnosis: Kidney Transplant (ICD-10 Z94.0)    Aftercare following organ transplant (ICD-10 Z48.288)    Long term use of medications (ICD-10 Z79.899)    Months 7-12 post-transplant (1/22/2021 - 6/19/2021)  Monthly    CBC with platelets    Basic Metabolic Panel (Sodium, Potassium, Chloride, Creatinine, CO2, Urea Nitrogen, glucose, Calcium)    Tacrolimus/Prograf/ drug level    BK (Polyoma Virus) PCR Quantitative/Plasma    Labs to be drawn with [place] [facility]    1 month post-transplant clinic visit    DSA PRA    Urine protein/creatinine    2 months post-transplant clinic visit    Urine protein/creatinine    4 months post-transplant clinic visit    DSA PRA    PTH    Urine protein/creatinine    6 months post-transplant clinic visit    LFTs    Hemoglobin A1c    Uric Acid    Lipid panel    Urine protein/creatinine    9 months post-transplant clinic visit    Urine protein/creatinine    12 months post-transplant clinic visit (Fasting labs)    LFTs    Hemoglobin A1c    Uric Acid    Lipid panel    Urine protein/creatinine    DSA PRA    PTH    Vitamin D      If you have any questions please call the Transplant Center at [phone] option [phone]. All lab results should be faxed to [phone]        Irma Shannon MD FACS  Assistant Professor of Surgery  Director, Living Kidney Donor Program.

## 2020-06-27 NOTE — NURSING NOTE
Labs drawn via  in lab by RN. Patient tolerated well and discharged in stable condition. Last dose of prograf was last night at 2055.     Tonja Caldwell RN

## 2020-06-27 NOTE — TELEPHONE ENCOUNTER
ISSUE:   Tacrolimus IR level 7.5 on 6/27/2020, goal 8-10, dose 5 mg every 12 hours.    PLAN:   Please call patient and confirm this was an accurate 12-hour trough. Verify Tacrolimus IR dose 5 mg very 12 hours. Confirm no new medications or illness. Confirm no missed doses. If accurate trough and accurate dose, increase Tacrolimus IR dose to 5.5 mg every 12 hours and repeat labs on Monday.    OUTCOME:   Attempted to reach patient six times, but it went directly to  and unable to leave . Will attempt to call again. Pt does not have my chart or an active email in the chart to use.  It did allow to send SMS message, but patient did not return call.     6/28/2020 Attempted to reach patient, but mailbox is full. Left message for pt's mother, Annamarie, to have patient return call to [facility] transplant coordinator.

## 2020-06-27 NOTE — TELEPHONE ENCOUNTER
Spoke with Rena, homecare RN, to give verbal orders for start of care and lab orders for Monday. Rena would like the normal schedule of labs faxed to them on Monday at [phone].

## 2020-06-29 ENCOUNTER — ALLIED HEALTH/NURSE VISIT (OUTPATIENT)
Dept: PHARMACY | Facility: CLINIC | Age: 35
End: 2020-06-29
Payer: COMMERCIAL

## 2020-06-29 DIAGNOSIS — R52 PAIN: ICD-10-CM

## 2020-06-29 DIAGNOSIS — Z94.0 KIDNEY REPLACED BY TRANSPLANT: Primary | ICD-10-CM

## 2020-06-29 DIAGNOSIS — K21.9 GASTROESOPHAGEAL REFLUX DISEASE, ESOPHAGITIS PRESENCE NOT SPECIFIED: ICD-10-CM

## 2020-06-29 DIAGNOSIS — R63.0 LOSS OF APPETITE: ICD-10-CM

## 2020-06-29 PROBLEM — E83.42 HYPOMAGNESEMIA: Status: ACTIVE | Noted: 2020-06-29

## 2020-06-29 PROBLEM — Z48.298 AFTERCARE FOLLOWING ORGAN TRANSPLANT: Status: ACTIVE | Noted: 2020-06-29

## 2020-06-29 PROCEDURE — 99605 MTMS BY PHARM NP 15 MIN: CPT | Performed by: PHARMACIST

## 2020-06-29 PROCEDURE — 99607 MTMS BY PHARM ADDL 15 MIN: CPT | Performed by: PHARMACIST

## 2020-06-29 RX ORDER — MYCOPHENOLIC ACID 180 MG/1
720 TABLET, DELAYED RELEASE ORAL 2 TIMES DAILY
Qty: 240 TABLET | Refills: 0 | Status: SHIPPED | OUTPATIENT
Start: 2020-06-29 | End: 2020-06-29

## 2020-06-29 RX ORDER — MYCOPHENOLIC ACID 180 MG/1
720 TABLET, DELAYED RELEASE ORAL 2 TIMES DAILY
Qty: 240 TABLET | Refills: 3 | Status: SHIPPED | OUTPATIENT
Start: 2020-06-29 | End: 2020-11-19

## 2020-06-29 RX ORDER — TACROLIMUS 0.5 MG/1
0.5 CAPSULE ORAL 2 TIMES DAILY
Qty: 60 CAPSULE | Refills: 11 | Status: SHIPPED | OUTPATIENT
Start: 2020-06-29 | End: 2020-07-01

## 2020-06-29 RX ORDER — TACROLIMUS 1 MG/1
5 CAPSULE ORAL 2 TIMES DAILY
Qty: 300 CAPSULE | Refills: 11 | Status: SHIPPED | OUTPATIENT
Start: 2020-06-29 | End: 2020-07-01

## 2020-06-29 NOTE — PROGRESS NOTES
Kieran Anglin MD Griffin, Peter Alberto, [facility]; Mulu Arguello RN               Okay with me.  Go ahead and make the change.    Previous Messages     ----- Message -----   From: Anam Hermosillo [facility]   Sent: 6/29/2020  10:32 AM CDT   To: Kieran Anglin MD, Mulu Arguello RN     As you likely know patient has been struggling greatly with low appetite, intense GERD sx, and abdominal pain despite maximum pain med use.     Recommend switching from MMF to Myfortic to help reduce some of these symptoms if possible.     Let me know, thanks!     Anam Hermosillo, PharmD   [facility] Pharmacist     Phone: [phone]

## 2020-06-29 NOTE — TELEPHONE ENCOUNTER
ISSUE:   Coordinator was unable to reach pt this weekend regarding tacrolimus dose changes.     PLAN:   Call patient to discuss medication changes    OUTCOME:   I was able to reach this patient. Patient confirmed current tacrolimus dose is 5 mg BID. She verbalized understanding to increase tacrolimus to 5.5 mg BID. She confirmed she updated her medication card. We also discussed signing up for MyChart and being available to answer her phone when the SOT office calls. She stated she will complete the MyChart set up this afternoon when she wakes up from a nap.

## 2020-06-29 NOTE — PROGRESS NOTES
MTM ENCOUNTER  SUBJECTIVE/OBJECTIVE:                           Jelly Dietz is a 35 year old female called for a transitions of care visit. She was discharged from [facility] on 6/24/20 for renal txp.      Patient consented to a telehealth visit: yes  Telemedicine Visit Details  Type of service:  Telephone visit  Start Time: 9:42 AM  End Time: 10:20 AM  Originating Location (pt. Location): Home  Distant Location (provider location):  [facility] MEDICATION THERAPY MANAGEMENT  Mode of Communication:  Telephone    Chief Complaint: Initial post txp med review. Complains of heartburn    Allergies/ADRs: Reviewed in EHR  Tobacco:  reports that she has never smoked. She has never used smokeless tobacco.  Alcohol: not currently using  Caffeine: no caffeine  PMH: Reviewed in EHR    Medication Adherence/Access: Patient uses pill box(es) and uses reminders/alarms.  Patient takes medications 4 time(s) per day. 8-9am, 12pm, 6pm, 8pm  Per patient, misses medication 0 times per week.   Medication barriers: none.   The patient fills medications at [facility]: YES.  Refills: discussed  Stools: little watery, muddy/ soft 2 BMs. Using Senna 1 tablet once daily and Miralax 17g daily. If she doesn't take this, she feels like she pushes too hard.     Renal Transplant:  Current immunosuppressants include TAC 5mg BID and MMF 1000mg BID.  Pt reports heartburn, low appetite  Transplant date: 6/19/20, 2007  Estimated Creatinine Clearance: 133.1 mL/min (based on SCr of 0.6 mg/dL).  CMV prophylaxis: CrCl >/=60 mL/minute: Valcyte 900mg daily Treat 3 months post tx   PCP prophylaxis: Bactrim S S daily  Current supplements for electrolyte replacement: Mag Oxide 400mg once daily ( 2 hours from MMF), Phosphorus 500mg QID  Magnesium   Date Value Ref Range Status   06/27/2020 1.5 (L) 1.6 - 2.3 mg/dL Final   Tx Coordinator: Mulu Arguello Tx MD: Dr. Anglin, Using Med Card: Yes  Immunizations: annual flu shot unknown; [id number]:  2015; Prevnar 13:  unknown; TDaP:  2012; Shingrix: unknown    GERD: Current medications include: Pepcid (famotidine) 20mg BID. Pt c/o heartburn, 9/10 per patient.  Patient started Famotidine 20mg yesterday, unsure if it is working. Will decide today. Has had heartburn since getting home, especially after eating tomatos or hot sauce.     Appetite: Trying to eat meals, but does not finish them.     Incisional/ Abdominal/ back Pain: Pt is taking Hydromorphone 2 mg every 4 hours, APAP 650mg every 4-6 hours, using lidocaine patches on incision and menthol patches on back.   Believes her pain medications are ineffective. Gets sick from Oxycodone. Pain is 9/10.    Today's Vitals: There were no vitals taken for this visit.    ASSESSMENT:                            Medication Adherence: good, no issues identified    Renal Transplant:  Patient struggling greatly with abdominal pain, appetite, and heartburn symptoms. MMF may be contributing. Recommend switching to Myfortic to hopefully reduce these symptoms.     GERD: Pt to cut down on acidic and spicy foods.     Appetite: See Renal txp assessment.     Incisional/ Abdominal/ back Pain: See renal txp assessment.     PLAN:                          Post Discharge Medication Reconciliation Status: discharge medications reconciled and changed, per note/orders (see AVS).    Pt to...  1. Cut down on tomatoes and spicy foods to help with GERD sx.     Txp team consider...  1. Switching MMF to Myfortic. Patient struggling with low appetite, abdominal pain, and GERD sx.     I spent 30 minutes with this patient today. I offer these suggestions for consideration by txp team. A copy of the visit note was provided to the patient's referring provider.    Will follow up in 2 months.    The patient was given a summary of these recommendations.     Anam Hermosillo, PharmD  [facility] Pharmacist    Phone: [phone]

## 2020-06-29 NOTE — PATIENT INSTRUCTIONS
Recommendations from today's MTM visit:                                                      1. Avoid spicy and acidic foods to help with your heartburn for now.    2. I will talk to the team about changing Mycophenolate (cellcept) to a better tolerated medication.    3. [facility] mail order will call you three weeks post discharge. If you are running low on any medications before then, call the number on the back of the pill box. ([phone])    It was great to speak with you today.  I value your experience and would be very thankful for your time with providing feedback on our clinic survey. You may receive a survey via email or text message in the next few days.     Next MTM visit: 2 months    To schedule another MTM appointment, please call the clinic directly or you may call the MTM scheduling line at [phone] or toll-free at [phone].     My Clinical Pharmacist's contact information:                                                      It was a pleasure talking with you today!  Please feel free to contact me with any questions or concerns you have.      Anam Hermosillo, PharmD  MTM Pharmacist    Phone: [phone]

## 2020-06-30 ENCOUNTER — TELEPHONE (OUTPATIENT)
Dept: TRANSPLANT | Facility: CLINIC | Age: 35
End: 2020-06-30

## 2020-06-30 ENCOUNTER — MEDICAL CORRESPONDENCE (OUTPATIENT)
Dept: HEALTH INFORMATION MANAGEMENT | Facility: CLINIC | Age: 35
End: 2020-06-30

## 2020-06-30 LAB
ANION GAP SERPL CALCULATED.3IONS-SCNC: 9 MMOL/L (ref 3–14)
BUN SERPL-MCNC: 11 MG/DL (ref 7–30)
CALCIUM SERPL-MCNC: 8.8 MG/DL (ref 8.5–10.1)
CHLORIDE SERPL-SCNC: 107 MMOL/L (ref 94–109)
CO2 SERPL-SCNC: 22 MMOL/L (ref 20–32)
CREAT SERPL-MCNC: 0.58 MG/DL (ref 0.52–1.04)
ERYTHROCYTE [DISTWIDTH] IN BLOOD BY AUTOMATED COUNT: 14.5 % (ref 10–15)
GFR SERPL CREATININE-BSD FRML MDRD: >90 ML/MIN/{1.73_M2}
GLUCOSE SERPL-MCNC: 85 MG/DL (ref 70–99)
HCT VFR BLD AUTO: 28.9 % (ref 35–47)
HGB BLD-MCNC: 9.6 G/DL (ref 11.7–15.7)
MAGNESIUM SERPL-MCNC: 1.6 MG/DL (ref 1.6–2.3)
MCH RBC QN AUTO: 30.9 PG (ref 26.5–33)
MCHC RBC AUTO-ENTMCNC: 33.2 G/DL (ref 31.5–36.5)
MCV RBC AUTO: 93 FL (ref 78–100)
MYCOPHENOLATE SERPL LC/MS/MS-MCNC: 2 MG/L (ref 1–3.5)
MYCOPHENOLATE-G SERPL LC/MS/MS-MCNC: 32.2 MG/L (ref 30–95)
PHOSPHATE SERPL-MCNC: 2 MG/DL (ref 2.5–4.5)
PLATELET # BLD AUTO: 366 10E9/L (ref 150–450)
POTASSIUM SERPL-SCNC: 4.4 MMOL/L (ref 3.4–5.3)
RBC # BLD AUTO: 3.11 10E12/L (ref 3.8–5.2)
SODIUM SERPL-SCNC: 138 MMOL/L (ref 133–144)
TACROLIMUS BLD-MCNC: 6.9 UG/L (ref 5–15)
TME LAST DOSE: NORMAL H
TME LAST DOSE: NORMAL H
WBC # BLD AUTO: 9.1 10E9/L (ref 4–11)

## 2020-06-30 PROCEDURE — 84100 ASSAY OF PHOSPHORUS: CPT | Performed by: INTERNAL MEDICINE

## 2020-06-30 PROCEDURE — 83735 ASSAY OF MAGNESIUM: CPT | Performed by: INTERNAL MEDICINE

## 2020-06-30 PROCEDURE — 85027 COMPLETE CBC AUTOMATED: CPT | Performed by: INTERNAL MEDICINE

## 2020-06-30 PROCEDURE — 80197 ASSAY OF TACROLIMUS: CPT | Performed by: INTERNAL MEDICINE

## 2020-06-30 PROCEDURE — 80048 BASIC METABOLIC PNL TOTAL CA: CPT | Performed by: INTERNAL MEDICINE

## 2020-06-30 NOTE — TELEPHONE ENCOUNTER
Provider Call: General  Route to LPN    Reason for call: Home Care opened up pt to be followed yesterday  Needs Skilled Nursing visits 2x a wk x 5 wks and  3 PRN visits     Call back needed? Yes    Return Call Needed  Same as documented in contacts section  When to return call?: Greater than one day: Route standard priority

## 2020-07-01 ENCOUNTER — ANCILLARY PROCEDURE (OUTPATIENT)
Dept: ULTRASOUND IMAGING | Facility: CLINIC | Age: 35
End: 2020-07-01
Attending: NURSE PRACTITIONER
Payer: MEDICARE

## 2020-07-01 ENCOUNTER — TELEPHONE (OUTPATIENT)
Dept: TRANSPLANT | Facility: CLINIC | Age: 35
End: 2020-07-01

## 2020-07-01 ENCOUNTER — TELEPHONE (OUTPATIENT)
Dept: NEPHROLOGY | Facility: CLINIC | Age: 35
End: 2020-07-01

## 2020-07-01 ENCOUNTER — OFFICE VISIT (OUTPATIENT)
Dept: TRANSPLANT | Facility: CLINIC | Age: 35
DRG: 698 | End: 2020-07-01
Attending: INTERNAL MEDICINE
Payer: MEDICARE

## 2020-07-01 VITALS
HEART RATE: 96 BPM | DIASTOLIC BLOOD PRESSURE: 68 MMHG | BODY MASS INDEX: 20.11 KG/M2 | SYSTOLIC BLOOD PRESSURE: 120 MMHG | WEIGHT: 124.6 LBS | OXYGEN SATURATION: 100 %

## 2020-07-01 DIAGNOSIS — N39.0 URINARY TRACT INFECTION WITHOUT HEMATURIA, SITE UNSPECIFIED: Primary | ICD-10-CM

## 2020-07-01 DIAGNOSIS — R35.0 URINARY FREQUENCY: ICD-10-CM

## 2020-07-01 DIAGNOSIS — Z94.0 KIDNEY TRANSPLANTED: ICD-10-CM

## 2020-07-01 DIAGNOSIS — Z94.0 KIDNEY REPLACED BY TRANSPLANT: ICD-10-CM

## 2020-07-01 DIAGNOSIS — Z79.899 LONG TERM USE OF DRUG: ICD-10-CM

## 2020-07-01 DIAGNOSIS — Z94.0 KIDNEY TRANSPLANTED: Primary | ICD-10-CM

## 2020-07-01 LAB
ALBUMIN UR-MCNC: 100 MG/DL
APPEARANCE UR: ABNORMAL
BILIRUB UR QL STRIP: NEGATIVE
COLOR UR AUTO: ABNORMAL
GLUCOSE UR STRIP-MCNC: NEGATIVE MG/DL
HGB UR QL STRIP: ABNORMAL
KETONES UR STRIP-MCNC: NEGATIVE MG/DL
LEUKOCYTE ESTERASE UR QL STRIP: NEGATIVE
MUCOUS THREADS #/AREA URNS LPF: PRESENT /LPF
NITRATE UR QL: NEGATIVE
PH UR STRIP: 6 PH (ref 5–7)
RBC #/AREA URNS AUTO: 96 /HPF (ref 0–2)
SOURCE: ABNORMAL
SP GR UR STRIP: 1.02 (ref 1–1.03)
SQUAMOUS #/AREA URNS AUTO: 4 /HPF (ref 0–1)
UROBILINOGEN UR STRIP-MCNC: 2 MG/DL (ref 0–2)
WBC #/AREA URNS AUTO: 9 /HPF (ref 0–5)

## 2020-07-01 PROCEDURE — 87086 URINE CULTURE/COLONY COUNT: CPT | Performed by: NURSE PRACTITIONER

## 2020-07-01 PROCEDURE — 81001 URINALYSIS AUTO W/SCOPE: CPT | Performed by: NURSE PRACTITIONER

## 2020-07-01 RX ORDER — TACROLIMUS 0.5 MG/1
0.5 CAPSULE ORAL 2 TIMES DAILY
Qty: 60 CAPSULE | Refills: 11 | Status: ON HOLD | OUTPATIENT
Start: 2020-07-01 | End: 2020-07-18

## 2020-07-01 RX ORDER — TACROLIMUS 1 MG/1
6 CAPSULE ORAL 2 TIMES DAILY
Qty: 360 CAPSULE | Refills: 11 | Status: ON HOLD | OUTPATIENT
Start: 2020-07-01 | End: 2020-07-18

## 2020-07-01 RX ORDER — TACROLIMUS 1 MG/1
6 CAPSULE ORAL 2 TIMES DAILY
Qty: 300 CAPSULE | Refills: 11 | Status: SHIPPED | OUTPATIENT
Start: 2020-07-01 | End: 2020-07-01

## 2020-07-01 RX ORDER — CIPROFLOXACIN 500 MG/1
500 TABLET, FILM COATED ORAL 2 TIMES DAILY
Qty: 14 TABLET | Refills: 0 | Status: ON HOLD | OUTPATIENT
Start: 2020-07-01 | End: 2020-07-18

## 2020-07-01 NOTE — TELEPHONE ENCOUNTER
Contacted Jelly Dietz regarding follow up appointment today -  She had received a call to confirm appointments    Issue 2  Tacrolimus level 6.9 below goal level   Discussed the need to increase tacrolimus    Confirmed tacrolimus  5.5  Mg twice per day   Increased  Tacrolimus   6.5 mg twice per day   Jelly Dietz changed medication card during conversation

## 2020-07-01 NOTE — TELEPHONE ENCOUNTER
Danielle, Kailey M, NP  P Post Transplant Scheduling Pool; Mulu Arguello RN               Please schedule with me @ 230 today.  Needs US and lab prior     Thanks     Kailey VELASQUEZ

## 2020-07-01 NOTE — LETTER
2020         RE: Jelly Dietz  [address]  [place] [address]        Dear Colleague,    Thank you for referring your patient, Jelly Dietz, to the [facility] SOLID ORGAN TRANSPLANT. Please see a copy of my visit note below.    Transplant Surgery Progress Note    Transplants:  2020 (Kidney), 2007 (Kidney); Postoperative day:  12  S: Jelly Dietz is a 35 year old female with a past medical history significant for end stage kidney disease secondary to IgA nephropathy, s/p previous kidney transplant (, failed d/t noncompliance during pregnancy). She is s/p  donor kidney transplant with stent on 20.    Patient called coordinator today and said that she had pain over her incision site.  Patient states that she also has felt warm at home.  Patient does not have a thermometer.  Patient also states that when she does not have a scale.    Patient also states she has had some dysuria.  She denies any frequency or hematuria.    Patient denies any chest pain or shortness of breath.             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   Transplant Date:  2020 (Kidney), 2007 (Kidney)   Ureteral Stent:  Yes   Crossmatch:  negative   DSA at Tx:  No  Baseline Cr: 0.60  [place]ovo DSA: No    Acute Rejection Hx:  No    Present Maintenance Immunosuppression:  Tacrolimus and Myfortic    CMV IgG Ab Discordance:  No  EBV IgG Ab Discordance:  No    BK Viremia:  No  EBV Viremia:  No    Transplant Coordinator: Mulu Arguello     Transplant Office Phone Number: [phone]     Immunosuppressant Medications     Immunosuppressive Agents Disp Start End     mycophenolate (GENERIC EQUIVALENT) 250 MG capsule    240 capsule 2020     Sig - Route: Take 4 capsules (1,000 mg) by mouth 2 times daily - Oral    Class: E-Prescribe     mycophenolic acid (GENERIC EQUIVALENT) 180 MG EC tablet    240 tablet 2020     Sig - Route: Take 4 tablets (720  mg) by mouth 2 times daily - Oral    Class: E-Prescribe    Notes to Pharmacy: Put on hold     tacrolimus (GENERIC EQUIVALENT) 0.5 MG capsule    60 capsule 7/1/2020     Sig - Route: Take 1 capsule (0.5 mg) by mouth 2 times daily Total dose 6.5 mg - Oral    Class: E-Prescribe    Notes to Pharmacy: TXP DT 6/19/2020 (Kidney), 12/1/2007 (Kidney) TXP Dischg DT 6/24/2020 DX Kidney replaced by transplant Z94.0 [facility] ([place], [place])     tacrolimus (GENERIC EQUIVALENT) 1 MG capsule    360 capsule 7/1/2020     Sig - Route: Take 6 capsules (6 mg) by mouth 2 times daily Total dose 6.5 mg - Oral    Class: E-Prescribe    Notes to Pharmacy: TXP DT 6/19/2020 (Kidney), 12/1/2007 (Kidney) TXP Dischg DT 6/24/2020 DX Kidney replaced by transplant Z94.0 TX [facility] ([place], [place])          Possible Immunosuppression-related side effects:   []             headache  []             vivid dreams  []             irritability  []             cognitive difficuties  []             fine tremor  []             nausea  []             diarrhea  []             neuropathy      []             edema  []             renal calcineurin toxicity  []             hyperkalemia  []             post-transplant diabetes  []             decreased appetite  []             increased appetite  []             other:  []             none    Prescription Medications as of 7/1/2020       Rx Number Disp Refills Start End Last Dispensed Date Next Fill Date Owning Pharmacy    acetaminophen (TYLENOL) 325 MG tablet  100 tablet 0 6/24/2020    [facility] - [place], [place] - [address] [facility]    Sig: Take 2 tablets (650 mg) by mouth every 4 hours as needed for other (multimodal surgical pain management along with NSAIDS and opioid medication as indicated based on pain control and physical function.)    Class: Local Print    Route: Oral    aspirin (ASA) 81  MG chewable tablet  30 tablet 5 6/25/2020    [address]    Sig: Take 1 tablet (81 mg) by mouth daily    Class: E-Prescribe    Route: Oral    atorvastatin (LIPITOR) 10 MG tablet  30 tablet 5 6/25/2020    [address]    Sig: Take 1 tablet (10 mg) by mouth daily    Class: E-Prescribe    Route: Oral    carvedilol (COREG) 6.25 MG tablet  30 tablet 3 6/25/2020    [address]    Sig: Take 2 tablets (12.5 mg) by mouth 2 times daily    Class: Historical    Route: Oral    cetirizine (ZYRTEC) 10 MG tablet            Sig: Take 10 mg by mouth nightly as needed for allergies     Class: Historical    Route: Oral    famotidine (PEPCID) 20 MG tablet  30 tablet 1 6/26/2020    [id number] [place], [place] - [address] AT [facility] OF [address] [place] [address]    Sig: Take 1 tablet (20 mg) by mouth 2 times daily    Class: E-Prescribe    Route: Oral    HYDROmorphone (DILAUDID) 2 MG tablet  20 tablet 0 6/24/2020    [address]    Sig: Take 1 tablet (2 mg) by mouth every 4 hours as needed for moderate to severe pain    Class: E-Prescribe    Earliest Fill Date: 6/24/2020    Route: Oral    Lidocaine (LIDOCARE) 4 % Patch  9 patch 1 6/24/2020    [address]    Sig: Place 3 patches onto the skin every 24 hours To prevent lidocaine toxicity, patient should be patch free for 12 hrs daily.    Class: E-Prescribe    Route: Transdermal    magnesium oxide (MAG-OX) 400 MG tablet  30 tablet 0 6/26/2020    [address]    Sig: Take 1 tablet (400 mg) by mouth 2 times daily    Class: Historical    Route: Oral    menthol (ICY HOT) 5 % PTCH  6 each 0 6/24/2020    [facility]  [address]    Sig: Apply 1 patch topically every 8 hours as needed for muscle soreness    Class: E-Prescribe    Route: Topical    miconazole (MICATIN) 2 % external cream  14 g 0 6/24/2020    [address]    Sig: Apply topically 2 times daily [facility] prescription    Class: E-Prescribe    Route: Topical    Renewals     Renewal requests to authorizing provider (Tory Krishnamurthy PA-C) <b>prohibited</b>          mycophenolate (GENERIC EQUIVALENT) 250 MG capsule  240 capsule 11 6/24/2020    [address]    Sig: Take 4 capsules (1,000 mg) by mouth 2 times daily    Class: E-Prescribe    Route: Oral    mycophenolic acid (GENERIC EQUIVALENT) 180 MG EC tablet  240 tablet 3 6/29/2020    [facility] [place] [address] [address]    Sig: Take 4 tablets (720 mg) by mouth 2 times daily    Class: E-Prescribe    Notes to Pharmacy: Put on hold    Route: Oral    phosphorus tablet 250 mg (PHOSPHA 250 NEUTRAL) 250 MG per tablet  120 tablet 0 6/24/2020    [address]    Sig: Take 2 tablets (500 mg) by mouth 4 times daily    Class: E-Prescribe    Route: Oral    polyethylene glycol (MIRALAX) 17 g packet  30 packet 1 6/25/2020    [address]    Sig: Take 17 g by mouth daily    Class: E-Prescribe    Route: Oral    senna-docusate (SENOKOT-S/PERICOLACE) 8.6-50 MG tablet  120 tablet 1 6/24/2020    [address]    Sig: Take 1-2 tablets by mouth 2 times daily    Class: E-Prescribe    Route: Oral    sulfamethoxazole-trimethoprim (BACTRIM) 400-80 MG tablet  30 tablet 11 6/25/2020    [address]    Sig: Take 1 tablet by mouth daily    Class:  E-Prescribe    Route: Oral    tacrolimus (GENERIC EQUIVALENT) 0.5 MG capsule  60 capsule 11 7/1/2020    [facility] - [place], [place] - [address]    Sig: Take 1 capsule (0.5 mg) by mouth 2 times daily Total dose 6.5 mg    Class: E-Prescribe    Notes to Pharmacy: TXP DT 6/19/2020 (Kidney), 12/1/2007 (Kidney) TXP Dischg DT 6/24/2020 DX Kidney replaced by transplant Z94.0 TX [facility] ([place], [place])    Route: Oral    tacrolimus (GENERIC EQUIVALENT) 1 MG capsule  360 capsule 11 7/1/2020    [facility] - [place], [place] - [address]    Sig: Take 6 capsules (6 mg) by mouth 2 times daily Total dose 6.5 mg    Class: E-Prescribe    Notes to Pharmacy: TXP DT 6/19/2020 (Kidney), 12/1/2007 (Kidney) TXP Dischg DT 6/24/2020 DX Kidney replaced by transplant Z94.0 [facility] ([place])    Route: Oral    valGANciclovir (VALCYTE) 450 MG tablet  60 tablet 2 6/25/2020    [facility] [place], [place] - [address]    Sig: Take 2 tablets (900 mg) by mouth daily    Class: E-Prescribe    Route: Oral    Vitamin D3 (CHOLECALCIFEROL) 25 mcg (1000 units) tablet  60 tablet 3 6/25/2020    [facility] [place], [place] - [address]    Sig: Take 2 tablets (50 mcg) by mouth daily    Class: E-Prescribe    Route: Oral          O:   Pulse:  [96] 96  BP: (120)/(68) 120/68  SpO2:  [100 %] 100 %  General Appearance: in no apparent distress.   Skin: Normal, no rashes or jaundice  Heart: regular rate and rhythm, normal S1 and S2  Lungs: easy respirations, no audible wheezing.  Abdomen: flat, The wound is dry and intact, without hernia. The abdomen is non-tender. The kidney graft is not tender.  There is no ascites.  Extremities: Tremor absent.   Edema: absent.     Transplant Immunosuppression Labs Latest Ref Rng & Units 6/30/2020 6/27/2020  6/26/2020 6/25/2020 6/24/2020   Tacro Level 5.0 - 15.0 ug/L 6.9 7.5 6.8 <3.0(L) -   Tacro Level - LAST DOSE 6.29.20 2035 last dose 2055 8:00 pm 6/25/2020 06/24/20 8PM -   Cyclo Level 50 - 400 ug/L - - - - -   Cyclo Level - - - - - -   Creat 0.52 - 1.04 mg/dL 0.58 0.60 0.58 0.66 0.65   BUN 7 - 30 mg/dL 11 10 7 8 11   WBC 4.0 - 11.0 10e9/L 9.1 7.4 5.4 4.9 3.1(L)   Neutrophil % - 84.4 88.5 85.9 -   ANEU 1.6 - 8.3 10e9/L - 6.2 4.8 4.2 -       Chemistries:   Recent Labs   Lab Test 06/30/20  0828   BUN 11   CR 0.58   GFRESTIMATED >90   GLC 85     Lab Results   Component Value Date    A1C 5.1 06/19/2020     Recent Labs   Lab Test 06/19/20  0411   ALBUMIN 3.4   BILITOTAL 0.4   ALKPHOS 54   AST 11   ALT 13     Urine Studies:  Recent Labs   Lab Test 07/01/20  1430   COLOR Patricia   APPEARANCE Slightly Cloudy   URINEGLC Negative   URINEBILI Negative   URINEKETONE Negative   SG 1.025   UBLD Moderate*   URINEPH 6.0   PROTEIN 100*   NITRITE Negative   LEUKEST Negative   RBCU 96*   WBCU 9*     Recent Labs   Lab Test 07/16/12  1400 07/02/12  1130   UTPG 1.53* 0.86*     Hematology:   Recent Labs   Lab Test 06/30/20  0828 06/27/20  0848 06/26/20  0842   HGB 9.6* 9.8* 9.8*    320 279   WBC 9.1 7.4 5.4     Coags:   Recent Labs   Lab Test 06/19/20  1736 06/19/20  0411   INR 1.44* 1.23*     HLA antibodies:   SA1 Hi Risk Jocelyn   Date Value Ref Range Status   06/18/2020 None  Final     SA1 Mod Risk Jocelyn   Date Value Ref Range Status   06/18/2020 None  Final     SA2 Hi Risk Jocelyn   Date Value Ref Range Status   06/18/2020 DR:8  Final     SA2 Mod Risk Jocelyn   Date Value Ref Range Status   06/18/2020 DP:14  Final       Assessment: Jelly Dietz is doing fairly well s/p DDKT:  Issues we addressed during her visit include:    Plan:    1. Graft function: cr is stable  2. Immunosuppression Management: No change continue prograf and myfortic .  Complexity of management:Medium.  Contributing factors: UTI  3. UTI: Treated with cipro:   4. Patient  needs to purchase a monitor and scale.  Patient's medications were numbered and medication box reviewed  Followup: as directed    Total Time: 20 min,   Counselling Time: 10 min.          Again, thank you for allowing me to participate in the care of your patient.        Sincerely,        Kailey Santos NP

## 2020-07-01 NOTE — TELEPHONE ENCOUNTER
July 1, 2020 10:20 AM -  AIVERSE1: fidencio pt for us appt gwen douglas and labs called to confirm w pt left vm

## 2020-07-01 NOTE — TELEPHONE ENCOUNTER
Patient Call: Voicemail  Date/Time: 7/1 2:52  Reason for call: [facility] needs skilled nursing orders.

## 2020-07-01 NOTE — TELEPHONE ENCOUNTER
Called post visit  Jelly Dietz had question regarding home care and follow up visits   Reviewed  Follow up visits  (does not use MyChart )   Continued to reinforced teaching post transplant  Cares - the importance of anti rejection medications  Used to prevent rejection of transplant  Kidney

## 2020-07-01 NOTE — TELEPHONE ENCOUNTER
"Patient called via triage line to inform she is in a lot of pain.     Reports pain around her incision, her sides and her back. Informed it burns when she pees. When asked to clarify she stated that it doesn't burn but when she pees she feels \"like something is in there, like how she felt when she had a catheter in\"     Reports urine is clear and yellow. Afebrile, BP normal.     Patient states she has been hanging in there every night but is now having trouble sleeping and can't move.     States the pain patches work better than the dilaudid. Patient does not want oxycodone as that makes her vomit.     Informed I would make note for her coordinator to discuss with care team and call her today- she said if she was sleeping to have coordinator leave  and a number where she could reach her.   "

## 2020-07-01 NOTE — TELEPHONE ENCOUNTER
Discussed with Farshad Care Nurse   Reviewed follow up appointment today in clinic   Reviewed home care RN agreed - patient is   not independent with medication at this time    management will need to home care nurse farshad  For all medication changes     Awaiting to have home care   assigned to this patient

## 2020-07-01 NOTE — PROGRESS NOTES
Transplant Surgery Progress Note    Transplants:  2020 (Kidney), 2007 (Kidney); Postoperative day:  12  S: eJlly Dietz is a 35 year old female with a past medical history significant for end stage kidney disease secondary to IgA nephropathy, s/p previous kidney transplant (, failed d/t noncompliance during pregnancy). She is s/p  donor kidney transplant with stent on 20.    Patient called coordinator today and said that she had pain over her incision site.  Patient states that she also has felt warm at home.  Patient does not have a thermometer.  Patient also states that when she does not have a scale.    Patient also states she has had some dysuria.  She denies any frequency or hematuria.    Patient denies any chest pain or shortness of breath.             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   Transplant Date:  2020 (Kidney), 2007 (Kidney)   Ureteral Stent:  Yes   Crossmatch:  negative   DSA at Tx:  No  Baseline Cr: 0.60  DeNovo DSA: No    Acute Rejection Hx:  No    Present Maintenance Immunosuppression:  Tacrolimus and Myfortic    CMV IgG Ab Discordance:  No  EBV IgG Ab Discordance:  No    BK Viremia:  No  EBV Viremia:  No    Transplant Coordinator: Mulu Arguello     Transplant Office Phone Number: [phone]     Immunosuppressant Medications     Immunosuppressive Agents Disp Start End     mycophenolate (GENERIC EQUIVALENT) 250 MG capsule    240 capsule 2020     Sig - Route: Take 4 capsules (1,000 mg) by mouth 2 times daily - Oral    Class: E-Prescribe     mycophenolic acid (GENERIC EQUIVALENT) 180 MG EC tablet    240 tablet 2020     Sig - Route: Take 4 tablets (720 mg) by mouth 2 times daily - Oral    Class: E-Prescribe    Notes to Pharmacy: Put on hold     tacrolimus (GENERIC EQUIVALENT) 0.5 MG capsule    60 capsule 2020     Sig - Route: Take 1 capsule (0.5 mg) by mouth 2 times daily Total dose 6.5 mg -  Oral    Class: E-Prescribe    Notes to Pharmacy: TXP DT 6/19/2020 (Kidney), 12/1/2007 (Kidney) TXP Dischg DT 6/24/2020 DX Kidney replaced by transplant Z94.0 TX [facility] ([place], [place])     tacrolimus (GENERIC EQUIVALENT) 1 MG capsule    360 capsule 7/1/2020     Sig - Route: Take 6 capsules (6 mg) by mouth 2 times daily Total dose 6.5 mg - Oral    Class: E-Prescribe    Notes to Pharmacy: TXP DT 6/19/2020 (Kidney), 12/1/2007 (Kidney) TXP Dischg DT 6/24/2020 DX Kidney replaced by transplant Z94.0 TX [facility] ([place], [place])          Possible Immunosuppression-related side effects:   []             headache  []             vivid dreams  []             irritability  []             cognitive difficuties  []             fine tremor  []             nausea  []             diarrhea  []             neuropathy      []             edema  []             renal calcineurin toxicity  []             hyperkalemia  []             post-transplant diabetes  []             decreased appetite  []             increased appetite  []             other:  []             none    Prescription Medications as of 7/1/2020       Rx Number Disp Refills Start End Last Dispensed Date Next Fill Date Owning Pharmacy    acetaminophen (TYLENOL) 325 MG tablet  100 tablet 0 6/24/2020    [address]    Sig: Take 2 tablets (650 mg) by mouth every 4 hours as needed for other (multimodal surgical pain management along with NSAIDS and opioid medication as indicated based on pain control and physical function.)    Class: Local Print    Route: Oral    aspirin (ASA) 81 MG chewable tablet  30 tablet 5 6/25/2020    [facility] [place], [place] - [address] [place] [address]    Sig: Take 1 tablet (81 mg) by mouth daily    Class: E-Prescribe    Route: Oral    atorvastatin (LIPITOR) 10 MG tablet  30 tablet  5 6/25/2020    [address]    Sig: Take 1 tablet (10 mg) by mouth daily    Class: E-Prescribe    Route: Oral    carvedilol (COREG) 6.25 MG tablet  30 tablet 3 6/25/2020    [address]    Sig: Take 2 tablets (12.5 mg) by mouth 2 times daily    Class: Historical    Route: Oral    cetirizine (ZYRTEC) 10 MG tablet            Sig: Take 10 mg by mouth nightly as needed for allergies     Class: Historical    Route: Oral    famotidine (PEPCID) 20 MG tablet  30 tablet 1 6/26/2020    [id number] - [place], [place] - [address] [facility]    Sig: Take 1 tablet (20 mg) by mouth 2 times daily    Class: E-Prescribe    Route: Oral    HYDROmorphone (DILAUDID) 2 MG tablet  20 tablet 0 6/24/2020    [address]    Sig: Take 1 tablet (2 mg) by mouth every 4 hours as needed for moderate to severe pain    Class: E-Prescribe    Earliest Fill Date: 6/24/2020    Route: Oral    Lidocaine (LIDOCARE) 4 % Patch  9 patch 1 6/24/2020    [address]    Sig: Place 3 patches onto the skin every 24 hours To prevent lidocaine toxicity, patient should be patch free for 12 hrs daily.    Class: E-Prescribe    Route: Transdermal    magnesium oxide (MAG-OX) 400 MG tablet  30 tablet 0 6/26/2020    [address]    Sig: Take 1 tablet (400 mg) by mouth 2 times daily    Class: Historical    Route: Oral    menthol (ICY HOT) 5 % PTCH  6 each 0 6/24/2020    [address]    Sig: Apply 1 patch topically every 8 hours as needed for muscle soreness    Class: E-Prescribe    Route: Topical    miconazole (MICATIN) 2 % external cream  14 g 0 6/24/2020    [facility]  [address]    Sig: Apply topically 2 times daily [facility] prescription    Class: E-Prescribe    Route: Topical    Renewals     Renewal requests to authorizing provider (Tory Krishnamurthy PA-C) <b>prohibited</b>          mycophenolate (GENERIC EQUIVALENT) 250 MG capsule  240 capsule 11 6/24/2020    [address]    Sig: Take 4 capsules (1,000 mg) by mouth 2 times daily    Class: E-Prescribe    Route: Oral    mycophenolic acid (GENERIC EQUIVALENT) 180 MG EC tablet  240 tablet 3 6/29/2020    [facility] [place] [address] [address]     Sig: Take 4 tablets (720 mg) by mouth 2 times daily    Class: E-Prescribe    Notes to Pharmacy: Put on hold    Route: Oral    phosphorus tablet 250 mg (PHOSPHA 250 NEUTRAL) 250 MG per tablet  120 tablet 0 6/24/2020    [address]    Sig: Take 2 tablets (500 mg) by mouth 4 times daily    Class: E-Prescribe    Route: Oral    polyethylene glycol (MIRALAX) 17 g packet  30 packet 1 6/25/2020    [address]    Sig: Take 17 g by mouth daily    Class: E-Prescribe    Route: Oral    senna-docusate (SENOKOT-S/PERICOLACE) 8.6-50 MG tablet  120 tablet 1 6/24/2020    [address]    Sig: Take 1-2 tablets by mouth 2 times daily    Class: E-Prescribe    Route: Oral    sulfamethoxazole-trimethoprim (BACTRIM) 400-80 MG tablet  30 tablet 11 6/25/2020    [address]    Sig: Take 1 tablet by mouth daily    Class: E-Prescribe    Route: Oral    tacrolimus (GENERIC EQUIVALENT) 0.5 MG capsule  60 capsule 11 7/1/2020    [facility] [place] [address] [address]    Sig: Take 1 capsule (0.5 mg) by mouth 2 times daily Total dose 6.5 mg     Class: E-Prescribe    Notes to Pharmacy: TXP DT 6/19/2020 (Kidney), 12/1/2007 (Kidney) TXP Dischg DT 6/24/2020 DX Kidney replaced by transplant Z94.0 TX [facility] ([place], [place])    Route: Oral    tacrolimus (GENERIC EQUIVALENT) 1 MG capsule  360 capsule 11 7/1/2020    [facility] - [place], [place] - [address] [facility] [facility] [address]    Sig: Take 6 capsules (6 mg) by mouth 2 times daily Total dose 6.5 mg    Class: E-Prescribe    Notes to Pharmacy: TXP DT 6/19/2020 (Kidney), 12/1/2007 (Kidney) TXP Dischg DT 6/24/2020 DX Kidney replaced by transplant Z94.0 TX [facility] ([place], [place])    Route: Oral    valGANciclovir (VALCYTE) 450 MG tablet  60 tablet 2 6/25/2020    [facility] - [place], [place] - [address]    Sig: Take 2 tablets (900 mg) by mouth daily    Class: E-Prescribe    Route: Oral    Vitamin D3 (CHOLECALCIFEROL) 25 mcg (1000 units) tablet  60 tablet 3 6/25/2020    [facility] - [place], [place] - [address]    Sig: Take 2 tablets (50 mcg) by mouth daily    Class: E-Prescribe    Route: Oral          O:   Pulse:  [96] 96  BP: (120)/(68) 120/68  SpO2:  [100 %] 100 %  General Appearance: in no apparent distress.   Skin: Normal, no rashes or jaundice  Heart: regular rate and rhythm, normal S1 and S2  Lungs: easy respirations, no audible wheezing.  Abdomen: flat, The wound is dry and intact, without hernia. The abdomen is non-tender. The kidney graft is not tender.  There is no ascites.  Extremities: Tremor absent.   Edema: absent.     Transplant Immunosuppression Labs Latest Ref Rng & Units 6/30/2020 6/27/2020 6/26/2020 6/25/2020 6/24/2020   Tacro Level 5.0 - 15.0 ug/L 6.9 7.5 6.8 <3.0(L) -   Tacro Level - LAST DOSE 6.29.20 2035 last dose 2055 8:00 pm 6/25/2020 06/24/20 8PM -   Cyclo Level 50 - 400 ug/L - - - - -   Cyclo Level - - - - - -   Creat 0.52 - 1.04  mg/dL 0.58 0.60 0.58 0.66 0.65   BUN 7 - 30 mg/dL 11 10 7 8 11   WBC 4.0 - 11.0 10e9/L 9.1 7.4 5.4 4.9 3.1(L)   Neutrophil % - 84.4 88.5 85.9 -   ANEU 1.6 - 8.3 10e9/L - 6.2 4.8 4.2 -       Chemistries:   Recent Labs   Lab Test 06/30/20  0828   BUN 11   CR 0.58   GFRESTIMATED >90   GLC 85     Lab Results   Component Value Date    A1C 5.1 06/19/2020     Recent Labs   Lab Test 06/19/20  0411   ALBUMIN 3.4   BILITOTAL 0.4   ALKPHOS 54   AST 11   ALT 13     Urine Studies:  Recent Labs   Lab Test 07/01/20  1430   COLOR Patricia   APPEARANCE Slightly Cloudy   URINEGLC Negative   URINEBILI Negative   URINEKETONE Negative   SG 1.025   UBLD Moderate*   URINEPH 6.0   PROTEIN 100*   NITRITE Negative   LEUKEST Negative   RBCU 96*   WBCU 9*     Recent Labs   Lab Test 07/16/12  1400 07/02/12  1130   UTPG 1.53* 0.86*     Hematology:   Recent Labs   Lab Test 06/30/20  0828 06/27/20  0848 06/26/20  0842   HGB 9.6* 9.8* 9.8*    320 279   WBC 9.1 7.4 5.4     Coags:   Recent Labs   Lab Test 06/19/20  1736 06/19/20  0411   INR 1.44* 1.23*     HLA antibodies:   SA1 Hi Risk Jocelyn   Date Value Ref Range Status   06/18/2020 None  Final     SA1 Mod Risk Jocelyn   Date Value Ref Range Status   06/18/2020 None  Final     SA2 Hi Risk Jocelyn   Date Value Ref Range Status   06/18/2020 DR:8  Final     SA2 Mod Risk Jocelyn   Date Value Ref Range Status   06/18/2020 DP:14  Final       Assessment: Jelly Dietz is doing fairly well s/p DDKT:  Issues we addressed during her visit include:    Plan:    1. Graft function: cr is stable  2. Immunosuppression Management: No change continue prograf and myfortic .  Complexity of management:Medium.  Contributing factors: UTI  3. UTI: Treated with cipro:   4. Patient needs to purchase a monitor and scale.  Patient's medications were numbered and medication box reviewed  Followup: as directed    Total Time: 20 min,   Counselling Time: 10 min.

## 2020-07-01 NOTE — TELEPHONE ENCOUNTER
Called times 2 this morning   LVM     Issue low tacrolimus  Level     Issue post operative pain  - reviewed at acute meeting    Dr Morgan requesting a follow up appointment

## 2020-07-01 NOTE — RESULT ENCOUNTER NOTE
Issue tacrolimus  6.9  Below goal level   Increased tacrolimus  From 5.5 mg twice per day to 6.5 mg twice per day

## 2020-07-01 NOTE — TELEPHONE ENCOUNTER
Called spoke to home care nurse   Gave order for skilled nursing care for twice per week times 4 weeks

## 2020-07-02 ENCOUNTER — TELEPHONE (OUTPATIENT)
Dept: TRANSPLANT | Facility: CLINIC | Age: 35
End: 2020-07-02

## 2020-07-02 ENCOUNTER — MEDICAL CORRESPONDENCE (OUTPATIENT)
Dept: HEALTH INFORMATION MANAGEMENT | Facility: CLINIC | Age: 35
End: 2020-07-02

## 2020-07-02 DIAGNOSIS — E86.0 DEHYDRATION: ICD-10-CM

## 2020-07-02 DIAGNOSIS — Z94.0 KIDNEY TRANSPLANTED: Primary | Chronic | ICD-10-CM

## 2020-07-02 LAB
ANION GAP SERPL CALCULATED.3IONS-SCNC: 7 MMOL/L (ref 3–14)
BACTERIA SPEC CULT: NORMAL
BUN SERPL-MCNC: 15 MG/DL (ref 7–30)
CALCIUM SERPL-MCNC: 9.5 MG/DL (ref 8.5–10.1)
CHLORIDE SERPL-SCNC: 108 MMOL/L (ref 94–109)
CO2 SERPL-SCNC: 22 MMOL/L (ref 20–32)
CREAT SERPL-MCNC: 0.92 MG/DL (ref 0.52–1.04)
ERYTHROCYTE [DISTWIDTH] IN BLOOD BY AUTOMATED COUNT: 14.3 % (ref 10–15)
GFR SERPL CREATININE-BSD FRML MDRD: 82 ML/MIN/{1.73_M2}
GLUCOSE SERPL-MCNC: 97 MG/DL (ref 70–99)
HCT VFR BLD AUTO: 30 % (ref 35–47)
HGB BLD-MCNC: 9.8 G/DL (ref 11.7–15.7)
MAGNESIUM SERPL-MCNC: 1.6 MG/DL (ref 1.6–2.3)
MCH RBC QN AUTO: 30.5 PG (ref 26.5–33)
MCHC RBC AUTO-ENTMCNC: 32.7 G/DL (ref 31.5–36.5)
MCV RBC AUTO: 94 FL (ref 78–100)
PHOSPHATE SERPL-MCNC: 1.8 MG/DL (ref 2.5–4.5)
PLATELET # BLD AUTO: 365 10E9/L (ref 150–450)
POTASSIUM SERPL-SCNC: 4.2 MMOL/L (ref 3.4–5.3)
RBC # BLD AUTO: 3.21 10E12/L (ref 3.8–5.2)
SODIUM SERPL-SCNC: 137 MMOL/L (ref 133–144)
SPECIMEN SOURCE: NORMAL
TACROLIMUS BLD-MCNC: 9 UG/L (ref 5–15)
TME LAST DOSE: NORMAL H
WBC # BLD AUTO: 8.4 10E9/L (ref 4–11)

## 2020-07-02 PROCEDURE — 85027 COMPLETE CBC AUTOMATED: CPT | Performed by: SURGERY

## 2020-07-02 PROCEDURE — 83735 ASSAY OF MAGNESIUM: CPT | Performed by: SURGERY

## 2020-07-02 PROCEDURE — 80197 ASSAY OF TACROLIMUS: CPT | Performed by: SURGERY

## 2020-07-02 PROCEDURE — 80048 BASIC METABOLIC PNL TOTAL CA: CPT | Performed by: SURGERY

## 2020-07-02 PROCEDURE — 84100 ASSAY OF PHOSPHORUS: CPT | Performed by: SURGERY

## 2020-07-02 RX ORDER — HEPARIN SODIUM (PORCINE) LOCK FLUSH IV SOLN 100 UNIT/ML 100 UNIT/ML
5 SOLUTION INTRAVENOUS
Status: CANCELLED | OUTPATIENT
Start: 2020-07-02

## 2020-07-02 RX ORDER — HEPARIN SODIUM,PORCINE 10 UNIT/ML
5 VIAL (ML) INTRAVENOUS
Status: CANCELLED | OUTPATIENT
Start: 2020-07-02

## 2020-07-02 NOTE — RESULT ENCOUNTER NOTE
Ultrasound looks fine.  I attempted to call her and no answer.  Spoke with Jose and he agreed no more pain medicine

## 2020-07-02 NOTE — TELEPHONE ENCOUNTER
Spoke to home care nurse while at Jelly Dietz   She had both  Cellcept mycophenolate mofetil and Myfortic mycophenolic acid  at home  - both home care nurse and Jelly stating taking 6.5  of the above medication     tacrolimus  6.5 mg          Updated medication and med box with home care nurse   Myfortic mycophenolic acid  is 720 mg taking 180 mg    Cellcept mycophenolate mofetil still in medication box - DC'd      Updated med  box   Med card     #1 is Myfortic mycophenolic acid    # 2 is tacrolimus  1mg twice per day   #3 is tacrolimus  0.5 mg twice per day       Concern about Jelly Dietz ability to set up medication box at this time   All medication changes  Will need to called to Raeann Skilled RN case Manager  Bobo

## 2020-07-02 NOTE — TELEPHONE ENCOUNTER
Provider Call: Medication Refill  Route to LPN  [facility]  Pharmacy Location: [facility]  Name of Medication: carvedilol (COREG) 6.25 MG tablet  And Mag Oxide   When will the patient be out of this medication?: Less than 3 days (Route high priority)  Callback needed? Yes    Return Call Needed  Same as documented in contacts section  When to return call?: Greater than one day: Route standard priority

## 2020-07-02 NOTE — TELEPHONE ENCOUNTER
Kieran Anglin MD Huepfel, Mary K, RN; Kailey Santos NP               Agree on IV fluids.    Previous Messages     ----- Message -----   From: Mulu Arguello, PERI   Sent: 7/2/2020  11:07 AM CDT   To: Kieran Anglin MD, Kailey Santos NP   Subject: Increase creatinine 0.9  from 0.5 (doubled )     Dr Derrek Santos Transplant Surgery NP   Issue increase creatinine       EDW ~ 58 kg  per Dr Anglin clinic note   Yesterday weight for lower 56.5   Discussed at yesterday at the acute meeting  -   Kailey's clinic yesterday afternoon   With ultrasound UA     Increased tacrolimus  from 5.5 mg to 6.5 mg  due to low level   Does not have a scale at home  ? Or can not find a scale -     Please see my telephone encounter  from earlier this morning -

## 2020-07-03 ENCOUNTER — INFUSION THERAPY VISIT (OUTPATIENT)
Dept: INFUSION THERAPY | Facility: CLINIC | Age: 35
DRG: 698 | End: 2020-07-03
Attending: INTERNAL MEDICINE
Payer: MEDICARE

## 2020-07-03 VITALS
TEMPERATURE: 98.1 F | RESPIRATION RATE: 18 BRPM | HEART RATE: 96 BPM | OXYGEN SATURATION: 100 % | DIASTOLIC BLOOD PRESSURE: 80 MMHG | SYSTOLIC BLOOD PRESSURE: 115 MMHG

## 2020-07-03 DIAGNOSIS — E86.0 DEHYDRATION: Primary | ICD-10-CM

## 2020-07-03 DIAGNOSIS — Z94.0 KIDNEY TRANSPLANTED: ICD-10-CM

## 2020-07-03 PROCEDURE — 96361 HYDRATE IV INFUSION ADD-ON: CPT

## 2020-07-03 PROCEDURE — 96360 HYDRATION IV INFUSION INIT: CPT

## 2020-07-03 PROCEDURE — 25800030 ZZH RX IP 258 OP 636: Mod: ZF | Performed by: INTERNAL MEDICINE

## 2020-07-03 RX ORDER — CARVEDILOL 6.25 MG/1
12.5 TABLET ORAL 2 TIMES DAILY
Qty: 120 TABLET | Refills: 2 | Status: SHIPPED | OUTPATIENT
Start: 2020-07-03 | End: 2020-07-20

## 2020-07-03 RX ORDER — HEPARIN SODIUM,PORCINE 10 UNIT/ML
5 VIAL (ML) INTRAVENOUS
Status: CANCELLED | OUTPATIENT
Start: 2020-07-03

## 2020-07-03 RX ORDER — HEPARIN SODIUM (PORCINE) LOCK FLUSH IV SOLN 100 UNIT/ML 100 UNIT/ML
5 SOLUTION INTRAVENOUS
Status: CANCELLED | OUTPATIENT
Start: 2020-07-03

## 2020-07-03 RX ADMIN — SODIUM CHLORIDE 1000 ML: 9 INJECTION, SOLUTION INTRAVENOUS at 12:18

## 2020-07-03 RX ADMIN — SODIUM CHLORIDE 1000 ML: 9 INJECTION, SOLUTION INTRAVENOUS at 10:50

## 2020-07-03 NOTE — PROGRESS NOTES
Nursing Note  Jelly Dietz [facility] for:   Chief Complaint   Patient presents with     Infusion     2L IVF     During today's [facility] appointment, orders from Dr. Anglin were completed.  Frequency: once    Progress note:  Patient identification verified by name and date of birth.  Assessment completed.  Vitals recorded in Doc Flowsheets.  Patient was provided with education regarding medication/procedure and possible side effects.  Patient verbalized understanding.     present during visit today: Not Applicable.    Treatment Conditions: Non-applicable.    Premedications: were not ordered.    Drug Waste Record: No    Infusion length and rate:  infusion given over approximately 2 hours hours    Labs: were not ordered for this appointment.    Vascular access: peripheral IV placed today.    Post Infusion Assessment:  Patient tolerated infusion without incident.     Discharge Plan:   Follow up plan of care with: primary care provider,  Discharge instructions were reviewed with patient.  Patient/representative verbalized understanding of discharge instructions and all questions answered.  Patient discharged [facility] in stable condition.    Ariella Arredondo RN    Administrations This Visit     0.9% sodium chloride BOLUS     Admin Date  07/03/2020 Action  New Bag Dose  1000 mL Rate   Route  Intravenous Administered By  Ariella Arredondo RN           Admin Date  07/03/2020 Action  New Bag Dose  1000 mL Rate  1,000 mL/hr Route  Intravenous Administered By  Ariella Arredondo RN                /80   Pulse 96   Temp 98.1  F (36.7  C) (Oral)   Resp 18   SpO2 100%

## 2020-07-03 NOTE — LETTER
7/3/2020         RE: Jelly Dietz  [address]  [place] [address]        Dear Colleague,    Thank you for referring your patient, Jelly Dietz, to the [facility] [place] [facility]. Please see a copy of my visit note below.    Nursing Note  Jelly Dietz [facility] for:   Chief Complaint   Patient presents with     Infusion     2L IVF     During [facility]'s [facility] appointment, orders from Dr. Anglin were completed.  Frequency: once    Progress note:  Patient identification verified by name and date of birth.  Assessment completed.  Vitals recorded in Doc Flowsheets.  Patient was provided with education regarding medication/procedure and possible side effects.  Patient verbalized understanding.     present during visit today: Not Applicable.    Treatment Conditions: Non-applicable.    Premedications: were not ordered.    Drug Waste Record: No    Infusion length and rate:  infusion given over approximately 2 hours hours    Labs: were not ordered for this appointment.    Vascular access: peripheral IV placed today.    Post Infusion Assessment:  Patient tolerated infusion without incident.     Discharge Plan:   Follow up plan of care with: primary care provider,  Discharge instructions were reviewed with patient.  Patient/representative verbalized understanding of discharge instructions and all questions answered.  [facility] discharged [facility] in stable condition.    Ariella Arredondo RN    Administrations This Visit     0.9% sodium chloride BOLUS     Admin Date  07/03/2020 Action  New Bag Dose  1000 mL Rate   Route  Intravenous Administered By  Ariella Arredondo RN           Admin Date  07/03/2020 Action  New Bag Dose  1000 mL Rate  1,000 mL/hr Route  Intravenous Administered By  Ariella Arredondo RN                /80   Pulse 96   Temp  98.1  F (36.7  C) (Oral)   Resp 18   SpO2 100%       Again, thank you for allowing me to participate in the care of your patient.        Sincerely,        [facility]

## 2020-07-04 ENCOUNTER — TELEPHONE (OUTPATIENT)
Dept: TRANSPLANT | Facility: CLINIC | Age: 35
End: 2020-07-04

## 2020-07-04 ENCOUNTER — RESULTS ONLY (OUTPATIENT)
Dept: OTHER | Facility: CLINIC | Age: 35
End: 2020-07-04

## 2020-07-04 ENCOUNTER — HOSPITAL ENCOUNTER (INPATIENT)
Facility: CLINIC | Age: 35
LOS: 14 days | Discharge: HOME HEALTH | DRG: 698 | End: 2020-07-18
Attending: EMERGENCY MEDICINE | Admitting: SURGERY
Payer: MEDICARE

## 2020-07-04 ENCOUNTER — APPOINTMENT (OUTPATIENT)
Dept: ULTRASOUND IMAGING | Facility: CLINIC | Age: 35
DRG: 698 | End: 2020-07-04
Attending: EMERGENCY MEDICINE
Payer: MEDICARE

## 2020-07-04 DIAGNOSIS — R68.83 CHILLS: ICD-10-CM

## 2020-07-04 DIAGNOSIS — N17.9 ACUTE KIDNEY INJURY (H): ICD-10-CM

## 2020-07-04 DIAGNOSIS — Z94.0 KIDNEY TRANSPLANTED: Primary | Chronic | ICD-10-CM

## 2020-07-04 DIAGNOSIS — T86.10 COMPLICATION OF TRANSPLANTED KIDNEY, UNSPECIFIED COMPLICATION: ICD-10-CM

## 2020-07-04 DIAGNOSIS — Z94.0 KIDNEY REPLACED BY TRANSPLANT: ICD-10-CM

## 2020-07-04 DIAGNOSIS — N17.9 ACUTE RENAL FAILURE, UNSPECIFIED ACUTE RENAL FAILURE TYPE (H): ICD-10-CM

## 2020-07-04 DIAGNOSIS — Z20.822 2019-NCOV NOT DETECTED: ICD-10-CM

## 2020-07-04 LAB
ALBUMIN SERPL-MCNC: 2.9 G/DL (ref 3.4–5)
ALBUMIN UR-MCNC: >600 MG/DL
ALP SERPL-CCNC: 123 U/L (ref 40–150)
ALT SERPL W P-5'-P-CCNC: 31 U/L (ref 0–50)
ANION GAP SERPL CALCULATED.3IONS-SCNC: 6 MMOL/L (ref 3–14)
APPEARANCE UR: ABNORMAL
APTT PPP: 35 SEC (ref 22–37)
AST SERPL W P-5'-P-CCNC: 34 U/L (ref 0–45)
BACTERIA #/AREA URNS HPF: ABNORMAL /HPF
BASOPHILS # BLD AUTO: 0 10E9/L (ref 0–0.2)
BASOPHILS NFR BLD AUTO: 0.4 %
BILIRUB SERPL-MCNC: 0.7 MG/DL (ref 0.2–1.3)
BILIRUB UR QL STRIP: ABNORMAL
BUN SERPL-MCNC: 25 MG/DL (ref 7–30)
CALCIUM SERPL-MCNC: 7.9 MG/DL (ref 8.5–10.1)
CHLORIDE SERPL-SCNC: 107 MMOL/L (ref 94–109)
CO2 SERPL-SCNC: 22 MMOL/L (ref 20–32)
COLOR UR AUTO: YELLOW
CREAT BLD-MCNC: 4.5 MG/DL (ref 0.52–1.04)
CREAT SERPL-MCNC: 4.12 MG/DL (ref 0.52–1.04)
DIFFERENTIAL METHOD BLD: ABNORMAL
EOSINOPHIL # BLD AUTO: 0.1 10E9/L (ref 0–0.7)
EOSINOPHIL NFR BLD AUTO: 1.2 %
ERYTHROCYTE [DISTWIDTH] IN BLOOD BY AUTOMATED COUNT: 14.3 % (ref 10–15)
GFR SERPL CREATININE-BSD FRML MDRD: 11 ML/MIN/{1.73_M2}
GFR SERPL CREATININE-BSD FRML MDRD: 13 ML/MIN/{1.73_M2}
GLUCOSE SERPL-MCNC: 96 MG/DL (ref 70–99)
GLUCOSE UR STRIP-MCNC: 70 MG/DL
HCG SERPL QL: NEGATIVE
HCT VFR BLD AUTO: 26 % (ref 35–47)
HGB BLD-MCNC: 8.3 G/DL (ref 11.7–15.7)
HGB UR QL STRIP: ABNORMAL
IMM GRANULOCYTES # BLD: 0 10E9/L (ref 0–0.4)
IMM GRANULOCYTES NFR BLD: 0.6 %
INR PPP: 1.4 (ref 0.86–1.14)
KETONES UR STRIP-MCNC: NEGATIVE MG/DL
LACTATE BLD-SCNC: 0.4 MMOL/L (ref 0.7–2)
LACTATE BLD-SCNC: 0.6 MMOL/L (ref 0.7–2)
LEUKOCYTE ESTERASE UR QL STRIP: NEGATIVE
LYMPHOCYTES # BLD AUTO: 0.4 10E9/L (ref 0.8–5.3)
LYMPHOCYTES NFR BLD AUTO: 5.9 %
MAGNESIUM SERPL-MCNC: 1.4 MG/DL (ref 1.6–2.3)
MCH RBC QN AUTO: 30.1 PG (ref 26.5–33)
MCHC RBC AUTO-ENTMCNC: 31.9 G/DL (ref 31.5–36.5)
MCV RBC AUTO: 94 FL (ref 78–100)
MONOCYTES # BLD AUTO: 0.3 10E9/L (ref 0–1.3)
MONOCYTES NFR BLD AUTO: 4 %
MUCOUS THREADS #/AREA URNS LPF: PRESENT /LPF
NEUTROPHILS # BLD AUTO: 5.9 10E9/L (ref 1.6–8.3)
NEUTROPHILS NFR BLD AUTO: 87.9 %
NITRATE UR QL: NEGATIVE
NRBC # BLD AUTO: 0 10*3/UL
NRBC BLD AUTO-RTO: 0 /100
PH UR STRIP: 8 PH (ref 5–7)
PHOSPHATE SERPL-MCNC: 4.6 MG/DL (ref 2.5–4.5)
PLATELET # BLD AUTO: 231 10E9/L (ref 150–450)
POTASSIUM SERPL-SCNC: 4.1 MMOL/L (ref 3.4–5.3)
PROT SERPL-MCNC: 6.5 G/DL (ref 6.8–8.8)
RBC # BLD AUTO: 2.76 10E12/L (ref 3.8–5.2)
RBC #/AREA URNS AUTO: 21 /HPF (ref 0–2)
SODIUM SERPL-SCNC: 135 MMOL/L (ref 133–144)
SOURCE: ABNORMAL
SP GR UR STRIP: 1.03 (ref 1–1.03)
SQUAMOUS #/AREA URNS AUTO: 44 /HPF (ref 0–1)
TRANS CELLS #/AREA URNS HPF: 1 /HPF (ref 0–1)
UROBILINOGEN UR STRIP-MCNC: NORMAL MG/DL (ref 0–2)
WBC # BLD AUTO: 6.8 10E9/L (ref 4–11)
WBC #/AREA URNS AUTO: 2 /HPF (ref 0–5)

## 2020-07-04 PROCEDURE — C9803 HOPD COVID-19 SPEC COLLECT: HCPCS | Performed by: EMERGENCY MEDICINE

## 2020-07-04 PROCEDURE — 86832 HLA CLASS I HIGH DEFIN QUAL: CPT | Performed by: EMERGENCY MEDICINE

## 2020-07-04 PROCEDURE — 25800030 ZZH RX IP 258 OP 636: Performed by: STUDENT IN AN ORGANIZED HEALTH CARE EDUCATION/TRAINING PROGRAM

## 2020-07-04 PROCEDURE — 84703 CHORIONIC GONADOTROPIN ASSAY: CPT | Performed by: EMERGENCY MEDICINE

## 2020-07-04 PROCEDURE — 85610 PROTHROMBIN TIME: CPT | Performed by: EMERGENCY MEDICINE

## 2020-07-04 PROCEDURE — 30233S1 TRANSFUSION OF NONAUTOLOGOUS GLOBULIN INTO PERIPHERAL VEIN, PERCUTANEOUS APPROACH: ICD-10-PCS | Performed by: TRANSPLANT SURGERY

## 2020-07-04 PROCEDURE — 25000131 ZZH RX MED GY IP 250 OP 636 PS 637: Mod: GY | Performed by: STUDENT IN AN ORGANIZED HEALTH CARE EDUCATION/TRAINING PROGRAM

## 2020-07-04 PROCEDURE — 36415 COLL VENOUS BLD VENIPUNCTURE: CPT | Performed by: SURGERY

## 2020-07-04 PROCEDURE — 83605 ASSAY OF LACTIC ACID: CPT | Performed by: EMERGENCY MEDICINE

## 2020-07-04 PROCEDURE — 96375 TX/PRO/DX INJ NEW DRUG ADDON: CPT | Performed by: EMERGENCY MEDICINE

## 2020-07-04 PROCEDURE — 36415 COLL VENOUS BLD VENIPUNCTURE: CPT | Performed by: EMERGENCY MEDICINE

## 2020-07-04 PROCEDURE — U0003 INFECTIOUS AGENT DETECTION BY NUCLEIC ACID (DNA OR RNA); SEVERE ACUTE RESPIRATORY SYNDROME CORONAVIRUS 2 (SARS-COV-2) (CORONAVIRUS DISEASE [COVID-19]), AMPLIFIED PROBE TECHNIQUE, MAKING USE OF HIGH THROUGHPUT TECHNOLOGIES AS DESCRIBED BY CMS-2020-01-R: HCPCS | Performed by: STUDENT IN AN ORGANIZED HEALTH CARE EDUCATION/TRAINING PROGRAM

## 2020-07-04 PROCEDURE — 85730 THROMBOPLASTIN TIME PARTIAL: CPT | Performed by: EMERGENCY MEDICINE

## 2020-07-04 PROCEDURE — 25000128 H RX IP 250 OP 636: Performed by: EMERGENCY MEDICINE

## 2020-07-04 PROCEDURE — 99285 EMERGENCY DEPT VISIT HI MDM: CPT | Mod: 25 | Performed by: EMERGENCY MEDICINE

## 2020-07-04 PROCEDURE — 87086 URINE CULTURE/COLONY COUNT: CPT | Performed by: NURSE PRACTITIONER

## 2020-07-04 PROCEDURE — 81001 URINALYSIS AUTO W/SCOPE: CPT | Performed by: EMERGENCY MEDICINE

## 2020-07-04 PROCEDURE — 96365 THER/PROPH/DIAG IV INF INIT: CPT | Performed by: EMERGENCY MEDICINE

## 2020-07-04 PROCEDURE — 25000131 ZZH RX MED GY IP 250 OP 636 PS 637: Mod: GY | Performed by: EMERGENCY MEDICINE

## 2020-07-04 PROCEDURE — 83735 ASSAY OF MAGNESIUM: CPT | Performed by: EMERGENCY MEDICINE

## 2020-07-04 PROCEDURE — 12000026 ZZH R&B TRANSPLANT

## 2020-07-04 PROCEDURE — 40000694 ZZHCL STATISTIC STAT SERVICE TX TESTING: Performed by: EMERGENCY MEDICINE

## 2020-07-04 PROCEDURE — 76776 US EXAM K TRANSPL W/DOPPLER: CPT

## 2020-07-04 PROCEDURE — 85025 COMPLETE CBC W/AUTO DIFF WBC: CPT | Performed by: EMERGENCY MEDICINE

## 2020-07-04 PROCEDURE — 80053 COMPREHEN METABOLIC PANEL: CPT | Performed by: EMERGENCY MEDICINE

## 2020-07-04 PROCEDURE — 25000132 ZZH RX MED GY IP 250 OP 250 PS 637: Mod: GY | Performed by: STUDENT IN AN ORGANIZED HEALTH CARE EDUCATION/TRAINING PROGRAM

## 2020-07-04 PROCEDURE — 84100 ASSAY OF PHOSPHORUS: CPT | Performed by: EMERGENCY MEDICINE

## 2020-07-04 PROCEDURE — 99285 EMERGENCY DEPT VISIT HI MDM: CPT | Mod: Z6 | Performed by: EMERGENCY MEDICINE

## 2020-07-04 PROCEDURE — 83605 ASSAY OF LACTIC ACID: CPT | Performed by: SURGERY

## 2020-07-04 PROCEDURE — 25800030 ZZH RX IP 258 OP 636: Performed by: EMERGENCY MEDICINE

## 2020-07-04 PROCEDURE — 96366 THER/PROPH/DIAG IV INF ADDON: CPT | Performed by: EMERGENCY MEDICINE

## 2020-07-04 PROCEDURE — 86828 HLA CLASS I&II ANTIBODY QUAL: CPT | Performed by: EMERGENCY MEDICINE

## 2020-07-04 PROCEDURE — 87040 BLOOD CULTURE FOR BACTERIA: CPT | Performed by: EMERGENCY MEDICINE

## 2020-07-04 PROCEDURE — 82565 ASSAY OF CREATININE: CPT

## 2020-07-04 PROCEDURE — 96376 TX/PRO/DX INJ SAME DRUG ADON: CPT | Performed by: EMERGENCY MEDICINE

## 2020-07-04 PROCEDURE — 86833 HLA CLASS II HIGH DEFIN QUAL: CPT | Performed by: EMERGENCY MEDICINE

## 2020-07-04 RX ORDER — SULFAMETHOXAZOLE AND TRIMETHOPRIM 400; 80 MG/1; MG/1
1 TABLET ORAL DAILY
Status: DISCONTINUED | OUTPATIENT
Start: 2020-07-05 | End: 2020-07-05

## 2020-07-04 RX ORDER — LIDOCAINE 40 MG/G
CREAM TOPICAL
Status: DISCONTINUED | OUTPATIENT
Start: 2020-07-04 | End: 2020-07-18 | Stop reason: HOSPADM

## 2020-07-04 RX ORDER — HYDROMORPHONE HYDROCHLORIDE 1 MG/ML
0.5 INJECTION, SOLUTION INTRAMUSCULAR; INTRAVENOUS; SUBCUTANEOUS ONCE
Status: COMPLETED | OUTPATIENT
Start: 2020-07-04 | End: 2020-07-04

## 2020-07-04 RX ORDER — MYCOPHENOLIC ACID 360 MG/1
720 TABLET, DELAYED RELEASE ORAL
Status: DISCONTINUED | OUTPATIENT
Start: 2020-07-04 | End: 2020-07-05

## 2020-07-04 RX ORDER — NALOXONE HYDROCHLORIDE 0.4 MG/ML
.1-.4 INJECTION, SOLUTION INTRAMUSCULAR; INTRAVENOUS; SUBCUTANEOUS
Status: DISCONTINUED | OUTPATIENT
Start: 2020-07-04 | End: 2020-07-18 | Stop reason: HOSPADM

## 2020-07-04 RX ORDER — SODIUM CHLORIDE 9 MG/ML
INJECTION, SOLUTION INTRAVENOUS CONTINUOUS
Status: DISCONTINUED | OUTPATIENT
Start: 2020-07-04 | End: 2020-07-06

## 2020-07-04 RX ORDER — ONDANSETRON 2 MG/ML
4 INJECTION INTRAMUSCULAR; INTRAVENOUS ONCE
Status: COMPLETED | OUTPATIENT
Start: 2020-07-04 | End: 2020-07-04

## 2020-07-04 RX ORDER — ASPIRIN 81 MG/1
81 TABLET, CHEWABLE ORAL DAILY
Status: DISCONTINUED | OUTPATIENT
Start: 2020-07-05 | End: 2020-07-06

## 2020-07-04 RX ORDER — ACETAMINOPHEN 325 MG/1
650 TABLET ORAL EVERY 4 HOURS PRN
Status: DISCONTINUED | OUTPATIENT
Start: 2020-07-04 | End: 2020-07-11

## 2020-07-04 RX ORDER — ATORVASTATIN CALCIUM 10 MG/1
10 TABLET, FILM COATED ORAL DAILY
Status: DISCONTINUED | OUTPATIENT
Start: 2020-07-05 | End: 2020-07-18 | Stop reason: HOSPADM

## 2020-07-04 RX ORDER — CARVEDILOL 12.5 MG/1
12.5 TABLET ORAL 2 TIMES DAILY
Status: DISCONTINUED | OUTPATIENT
Start: 2020-07-04 | End: 2020-07-05

## 2020-07-04 RX ORDER — HYDROMORPHONE HYDROCHLORIDE 2 MG/1
2 TABLET ORAL EVERY 4 HOURS PRN
Status: DISCONTINUED | OUTPATIENT
Start: 2020-07-04 | End: 2020-07-05

## 2020-07-04 RX ORDER — VALGANCICLOVIR 450 MG/1
450 TABLET, FILM COATED ORAL
Status: DISCONTINUED | OUTPATIENT
Start: 2020-07-06 | End: 2020-07-05

## 2020-07-04 RX ORDER — CEFEPIME HYDROCHLORIDE 1 G/1
1 INJECTION, POWDER, FOR SOLUTION INTRAMUSCULAR; INTRAVENOUS EVERY 24 HOURS
Status: COMPLETED | OUTPATIENT
Start: 2020-07-05 | End: 2020-07-08

## 2020-07-04 RX ADMIN — HYDROMORPHONE HYDROCHLORIDE 0.5 MG: 1 INJECTION, SOLUTION INTRAMUSCULAR; INTRAVENOUS; SUBCUTANEOUS at 15:05

## 2020-07-04 RX ADMIN — SODIUM CHLORIDE: 9 INJECTION, SOLUTION INTRAVENOUS at 21:21

## 2020-07-04 RX ADMIN — HYDROMORPHONE HYDROCHLORIDE 0.5 MG: 1 INJECTION, SOLUTION INTRAMUSCULAR; INTRAVENOUS; SUBCUTANEOUS at 18:21

## 2020-07-04 RX ADMIN — MYCOPHENOLIC ACID 720 MG: 360 TABLET, DELAYED RELEASE ORAL at 21:02

## 2020-07-04 RX ADMIN — CARVEDILOL 12.5 MG: 12.5 TABLET, FILM COATED ORAL at 21:03

## 2020-07-04 RX ADMIN — CEFEPIME HYDROCHLORIDE 2 G: 2 INJECTION, POWDER, FOR SOLUTION INTRAVENOUS at 15:05

## 2020-07-04 RX ADMIN — TACROLIMUS 6.5 MG: 5 CAPSULE ORAL at 21:01

## 2020-07-04 RX ADMIN — ONDANSETRON 4 MG: 2 INJECTION INTRAMUSCULAR; INTRAVENOUS at 18:21

## 2020-07-04 RX ADMIN — HYDROMORPHONE HYDROCHLORIDE 2 MG: 2 TABLET ORAL at 21:01

## 2020-07-04 RX ADMIN — ACETAMINOPHEN 650 MG: 325 TABLET, FILM COATED ORAL at 20:00

## 2020-07-04 RX ADMIN — SODIUM CHLORIDE 1000 ML: 9 INJECTION, SOLUTION INTRAVENOUS at 15:05

## 2020-07-04 RX ADMIN — ONDANSETRON 4 MG: 2 INJECTION INTRAMUSCULAR; INTRAVENOUS at 15:05

## 2020-07-04 ASSESSMENT — ENCOUNTER SYMPTOMS
DYSURIA: 1
CHILLS: 1
FEVER: 1
SHORTNESS OF BREATH: 0
VOMITING: 1
MYALGIAS: 1
NAUSEA: 1
DIAPHORESIS: 1
ABDOMINAL PAIN: 1

## 2020-07-04 ASSESSMENT — ACTIVITIES OF DAILY LIVING (ADL): ADLS_ACUITY_SCORE: 11

## 2020-07-04 NOTE — ED NOTES
Bed: ED37  Expected date: 7/4/20  Expected time: 11:15 AM  Means of arrival: Car  Comments:  Jelly Dietz  MRN: [id number]  Recent kidney transplant 6/19/2020    Last night developed Fever of 102 F with chills and vomiting.

## 2020-07-04 NOTE — ED PROVIDER NOTES
ED Provider Note  [facility]      History     Chief Complaint   Patient presents with     Fever     The history is provided by the patient and medical records.     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  Patient also has a history of hypertension.  Per review of patient's chart, patient was recently hospitalized here from 6/19/2020 to 6/24/2024 her kidney transplant.  The patient followed up in her transplant clinic on 7/1/2020 and had a routine UA done that showed evidence of a UTI, patient was started on ciprofloxacin 500 mg twice daily for 7 days.  On 7/2/2020, the patient was switched from mycophenolate to tacrolimus.    Patient presents to the Emergency Department for evaluation of fevers, chills, dysuria and myalgias.  The patient reports that last night she began having shaking chills and was feeling subjectively warm, she took her temperature and she had a temperature in the 100s.  The patient has also had myalgias as well as pain over her incision site.  She took Dilaudid and Tylenol last night.  The patient woke up early this morning and was sweating, but she reports that the Tylenol helped with the fever.  The patient took her medications this morning at approximately 8:30 AM, and at 9:30 AM she became nauseous and started vomiting.  She vomited once but continues to feel nauseated. The patient called the nurse triage line and she was advised to come here to the Emergency Department for further evaluation and management.  The patient reports that she has been taking her antibiotics for the UTI as prescribed, but she continues to have dysuria.   Patient does not believe that she is pregnant.    Past Medical History  Past Medical History:   Diagnosis Date     ACS (acute coronary syndrome) (H)  2014     Allergic state     seasonal allergies     Anemia in chronic renal disease      Anxiety      Cervical cerclage suture present in second trimester      Chest pain 2014     Chronic renal transplant rejection      End stage kidney disease (H)      Heart murmur      History of  delivery ,     30 wks, 28 wks      Hypertension     resolved after transplant     IgA nephropathy      Kidney transplanted 2020    DDKT. Induction w/ thymo 5.7mg/kg.     MRSA (methicillin resistant Staphylococcus aureus)      [facility]     Pre-eclampsia      Renal failure affecting pregnancy in second trimester      S/P kidney transplant 2007    LDKT in 2007 in [place]. History of 1B kidney rejection. Failed .     SAB (spontaneous )     2nd trimester      Past Surgical History:   Procedure Laterality Date     CERCLAGE CERVICAL N/A 2015    Procedure: CERCLAGE CERVICAL;  Surgeon: Norbert Chopra MD;  Location: [facility] L[facility]D      SECTION  2012    Procedure:  SECTION;;  Surgeon: Chelsea Cross MD;  Location: [facility]      SECTION N/A 2015    Procedure:  SECTION;  Surgeon: Chelsea Cross MD;  Location: [facility] OR     cesearean section       EXPLANT TRANSPLANTED KIDNEY  3/29/2013    Procedure: EXPLANT TRANSPLANTED KIDNEY;  Explant Transplanted right Kidney (from patients right iliac fossa);  Surgeon: Nory Morgan MD;  [facility]     NEPHRECTOMY  3/29/13    Transplant nephrectomy      WILIAN/DIALYSIS CATHETER       TRANSPLANT KIDNEY RECIPIENT  DONOR N/A 2020    Procedure: TRANSPLANT, KIDNEY, RECIPIENT,  DONOR, venous reconstruction, and back bench preparation of kidney;  Surgeon: Irma Shannon MD;  [facility]     TRANSPLANT KIDNEY RECIPIENT LIVING UNRELATED  Dec 2007    (Adult male in [place])     acetaminophen (TYLENOL)  325 MG tablet  aspirin (ASA) 81 MG chewable tablet  atorvastatin (LIPITOR) 10 MG tablet  carvedilol (COREG) 6.25 MG tablet  cetirizine (ZYRTEC) 10 MG tablet  ciprofloxacin (CIPRO) 500 MG tablet  famotidine (PEPCID) 20 MG tablet  HYDROmorphone (DILAUDID) 2 MG tablet  Lidocaine (LIDOCARE) 4 % Patch  magnesium oxide (MAG-OX) 400 MG tablet  menthol (ICY HOT) 5 % PTCH  miconazole (MICATIN) 2 % external cream  mycophenolic acid (GENERIC EQUIVALENT) 180 MG EC tablet  phosphorus tablet 250 mg (PHOSPHA 250 NEUTRAL) 250 MG per tablet  polyethylene glycol (MIRALAX) 17 g packet  senna-docusate (SENOKOT-S/PERICOLACE) 8.6-50 MG tablet  sulfamethoxazole-trimethoprim (BACTRIM) 400-80 MG tablet  tacrolimus (GENERIC EQUIVALENT) 0.5 MG capsule  tacrolimus (GENERIC EQUIVALENT) 1 MG capsule  valGANciclovir (VALCYTE) 450 MG tablet  Vitamin D3 (CHOLECALCIFEROL) 25 mcg (1000 units) tablet      No Known Allergies  Past medical history, past surgical history, medications, and allergies were reviewed with the patient. Additional pertinent items: None    Family History  Family History   Problem Relation Age of Onset     Diabetes Paternal Grandfather      Breast Cancer Maternal Grandmother 55     Cancer No family hx of         No known family hx of skin cancer     Family history was reviewed with the patient. Additional pertinent items: None    Social History  Social History     Tobacco Use     Smoking status: Never Smoker     Smokeless tobacco: Never Used   Substance Use Topics     Alcohol use: No     Drug use: No      Social history was reviewed with the patient. Additional pertinent items: None    Review of Systems   Constitutional: Positive for chills, diaphoresis and fever.   Respiratory: Negative for shortness of breath.    Cardiovascular: Negative for chest pain.   Gastrointestinal: Positive for abdominal pain (over incision site), nausea and vomiting.   Genitourinary: Positive for dysuria.   Musculoskeletal: Positive for myalgias.   All  "other systems reviewed and are negative.        Physical Exam   BP: 109/79  Heart Rate: 101  Temp: 99.9  F (37.7  C)  Resp: 16  Height: 167.6 cm (5' 6\")  SpO2: 98 %  Physical Exam   Physical Exam   Constitutional:   well nourished, well developed, appears uncomfortable with chills, wrapped in a blanket  HENT:   Head: Normocephalic and atraumatic.   Eyes: Conjunctivae are normal. Pupils are equal, round, and reactive to light.   pharynx has no erythema or exudate, mucous membranes are dry  Neck:   no adenopathy, no bony tenderness  Cardiovascular: borderline tachycardia without murmurs or gallops  Pulmonary/Chest: Clear to auscultation bilaterally, with no wheezes or retractions. No respiratory distress.  GI: Soft with good bowel sounds.  Healing surgical scar, diffusely tender, non-distended, with no guarding, no rebound, no peritoneal signs.   Back:  No bony or CVA tenderness   Musculoskeletal:  no edema   Skin: Skin is warm and dry. No rash noted.   Neurological: alert and oriented to person, place, and time. Nonfocal exam  Psychiatric:  normal mood and affect.      ED Course      Procedures     12:42 PM  The patient was seen and examined by Annabella Oleary MD in Room ED37.                          Results for orders placed or performed during the hospital encounter of 07/04/20   CBC with platelets differential     Status: Abnormal   Result Value Ref Range    WBC 6.8 4.0 - 11.0 10e9/L    RBC Count 2.76 (L) 3.8 - 5.2 10e12/L    Hemoglobin 8.3 (L) 11.7 - 15.7 g/dL    Hematocrit 26.0 (L) 35.0 - 47.0 %    MCV 94 78 - 100 fl    MCH 30.1 26.5 - 33.0 pg    MCHC 31.9 31.5 - 36.5 g/dL    RDW 14.3 10.0 - 15.0 %    Platelet Count 231 150 - 450 10e9/L    Diff Method Automated Method     % Neutrophils 87.9 %    % Lymphocytes 5.9 %    % Monocytes 4.0 %    % Eosinophils 1.2 %    % Basophils 0.4 %    % Immature Granulocytes 0.6 %    Nucleated RBCs 0 0 /100    Absolute Neutrophil 5.9 1.6 - 8.3 10e9/L    Absolute Lymphocytes 0.4 (L) " 0.8 - 5.3 10e9/L    Absolute Monocytes 0.3 0.0 - 1.3 10e9/L    Absolute Eosinophils 0.1 0.0 - 0.7 10e9/L    Absolute Basophils 0.0 0.0 - 0.2 10e9/L    Abs Immature Granulocytes 0.0 0 - 0.4 10e9/L    Absolute Nucleated RBC 0.0    Comprehensive metabolic panel     Status: Abnormal   Result Value Ref Range    Sodium 135 133 - 144 mmol/L    Potassium 4.1 3.4 - 5.3 mmol/L    Chloride 107 94 - 109 mmol/L    Carbon Dioxide 22 20 - 32 mmol/L    Anion Gap 6 3 - 14 mmol/L    Glucose 96 70 - 99 mg/dL    Urea Nitrogen 25 7 - 30 mg/dL    Creatinine 4.12 (H) 0.52 - 1.04 mg/dL    GFR Estimate 13 (L) >60 mL/min/[1.73_m2]    GFR Estimate If Black 15 (L) >60 mL/min/[1.73_m2]    Calcium 7.9 (L) 8.5 - 10.1 mg/dL    Bilirubin Total 0.7 0.2 - 1.3 mg/dL    Albumin 2.9 (L) 3.4 - 5.0 g/dL    Protein Total 6.5 (L) 6.8 - 8.8 g/dL    Alkaline Phosphatase 123 40 - 150 U/L    ALT 31 0 - 50 U/L    AST 34 0 - 45 U/L   Lactic acid whole blood     Status: Abnormal   Result Value Ref Range    Lactic Acid 0.6 (L) 0.7 - 2.0 mmol/L   Creatinine POCT     Status: Abnormal   Result Value Ref Range    Creatinine 4.5 (H) 0.52 - 1.04 mg/dL    GFR Estimate 11 (L) >60 mL/min/[1.73_m2]    GFR Estimate If Black 14 (L) >60 mL/min/[1.73_m2]   HCG qualitative     Status: None   Result Value Ref Range    HCG Qualitative Serum Negative NEG^Negative   Blood culture     Status: None (Preliminary result)    Specimen: Arm, Right; Blood    Right Arm   Result Value Ref Range    Specimen Description Blood Right Arm     Culture Micro PENDING          Results for orders placed or performed during the hospital encounter of 07/04/20   CBC with platelets differential     Status: Abnormal   Result Value Ref Range    WBC 6.8 4.0 - 11.0 10e9/L    RBC Count 2.76 (L) 3.8 - 5.2 10e12/L    Hemoglobin 8.3 (L) 11.7 - 15.7 g/dL    Hematocrit 26.0 (L) 35.0 - 47.0 %    MCV 94 78 - 100 fl    MCH 30.1 26.5 - 33.0 pg    MCHC 31.9 31.5 - 36.5 g/dL    RDW 14.3 10.0 - 15.0 %    Platelet Count 231  150 - 450 10e9/L    Diff Method Automated Method     % Neutrophils 87.9 %    % Lymphocytes 5.9 %    % Monocytes 4.0 %    % Eosinophils 1.2 %    % Basophils 0.4 %    % Immature Granulocytes 0.6 %    Nucleated RBCs 0 0 /100    Absolute Neutrophil 5.9 1.6 - 8.3 10e9/L    Absolute Lymphocytes 0.4 (L) 0.8 - 5.3 10e9/L    Absolute Monocytes 0.3 0.0 - 1.3 10e9/L    Absolute Eosinophils 0.1 0.0 - 0.7 10e9/L    Absolute Basophils 0.0 0.0 - 0.2 10e9/L    Abs Immature Granulocytes 0.0 0 - 0.4 10e9/L    Absolute Nucleated RBC 0.0    Comprehensive metabolic panel     Status: Abnormal   Result Value Ref Range    Sodium 135 133 - 144 mmol/L    Potassium 4.1 3.4 - 5.3 mmol/L    Chloride 107 94 - 109 mmol/L    Carbon Dioxide 22 20 - 32 mmol/L    Anion Gap 6 3 - 14 mmol/L    Glucose 96 70 - 99 mg/dL    Urea Nitrogen 25 7 - 30 mg/dL    Creatinine 4.12 (H) 0.52 - 1.04 mg/dL    GFR Estimate 13 (L) >60 mL/min/[1.73_m2]    GFR Estimate If Black 15 (L) >60 mL/min/[1.73_m2]    Calcium 7.9 (L) 8.5 - 10.1 mg/dL    Bilirubin Total 0.7 0.2 - 1.3 mg/dL    Albumin 2.9 (L) 3.4 - 5.0 g/dL    Protein Total 6.5 (L) 6.8 - 8.8 g/dL    Alkaline Phosphatase 123 40 - 150 U/L    ALT 31 0 - 50 U/L    AST 34 0 - 45 U/L   Lactic acid whole blood     Status: Abnormal   Result Value Ref Range    Lactic Acid 0.6 (L) 0.7 - 2.0 mmol/L   Creatinine POCT     Status: Abnormal   Result Value Ref Range    Creatinine 4.5 (H) 0.52 - 1.04 mg/dL    GFR Estimate 11 (L) >60 mL/min/[1.73_m2]    GFR Estimate If Black 14 (L) >60 mL/min/[1.73_m2]   HCG qualitative     Status: None   Result Value Ref Range    HCG Qualitative Serum Negative NEG^Negative   Blood culture     Status: None (Preliminary result)    Specimen: Arm, Right; Blood    Right Arm   Result Value Ref Range    Specimen Description Blood Right Arm     Culture Micro PENDING      Medications   HYDROmorphone (PF) (DILAUDID) injection 0.5 mg (has no administration in time range)   0.9% sodium chloride BOLUS (has no  administration in time range)   ondansetron (ZOFRAN) injection 4 mg (has no administration in time range)   ceFEPIme (MAXIPIME) 2 g vial to attach to  ml bag for ADULTS or 50 ml bag for PEDS (has no administration in time range)        Assessments & Plan (with Medical Decision Making)       I have reviewed the nursing notes.  Emergency Department course:  The patient was seen and examined at 1242 pm.   I treated her with a normal saline bolus IV and Dilaudid IV shortly after arrival to the ED.     Laboratory studies are notable for acute kidney injury, with a creatinine of 4.12 and a GFR of 15.  Two days ago, on July 2, the patient's creatinine was 0.92 with a GFR of >90.     Additional laboratory studies show a low albumin of 2.9 and a low total protein of 6.5.  Lactate is also low at 0.6.  CBC shows a WBC of 6.8.  She is anemic, with a hemoglobin of 8.3.  Platelets are normal at 231.  hCG is negative.  UA is pending.   She had a negative Covid 19 test on 6/19/2020.     I consulted renal transplant. He recommended obtaining a renal transplant ultrasound and treating the patient with Cefepime IV.   She received this medication in the ED, as well as Zofran IV for nausea, Dilaudid IV for pain and normal saline IV.   The patient is a 35-year-old female who is status post kidney transplant on June 19 who presents with shaking chills, subjective fever, diaphoresis, nausea and vomiting.  Laboratory studies suggest that she has an acute kidney injury.  She will be evaluated by the renal transplant team for likely admission.  Transplant renal ultrasound is pending at the time of this dictation.  She was signed out to Dr. Beltran for disposition decision at about 1530 pm.      I have reviewed the findings, diagnosis, plan and need for follow up with the patient.    New Prescriptions    No medications on file       Final diagnoses:   Acute kidney injury (H)   Complication of transplanted kidney, unspecified complication    Jeromy       --  I, Quinton Coello, am serving as a trained medical scribe to document services personally performed by Annabella Oleary MD, based on the provider's statements to me.     I, Annabella Oleary MD, was physically present and have reviewed and verified the accuracy of this note documented by Quinton Coello.  This note was created in part by the use of Dragon voice recognition dictation system. Inadvertent grammatical errors and typographical errors may still exist.  Annabella Oleary MD    [facility], [place], EMERGENCY DEPARTMENT  7/4/2020     Annabella Oleary MD  07/04/20 1525

## 2020-07-04 NOTE — ED NOTES
Pt c/o fevers and torso pain. States she recently had a kidney tx a few weeks ago. Unable to eat or drink anything, nauseated. Unable to keep meds down.

## 2020-07-04 NOTE — LETTER
Transition Communication Hand-off for Care Transitions to Next Level of Care Provider    Name: Jelly Dietz  : 1985  MRN #: [id number]  Primary Care Provider: Physician No Ref-Primary     Primary Clinic: No address on file     Reason for Hospitalization:  Chills [R68.83]  Acute kidney injury (H) [N17.9]  Complication of transplanted kidney, unspecified complication [T86.10]  Admit Date/Time: 2020 12:26 PM  Discharge Date:   Payor Source: Payor: MEDICARE / Plan: MEDICARE / Product Type: Medicare /              Reason for Communication Hand-off Referral: Other ARF s/p KT mixed rej PP/Ivig/thymo and  with new HD    Discharge Plan: Tx clinic  with Prabha and then HD at [facility] intermittently       Concern for non-adherence with plan of care:   Y/N maybe  Discharge Needs Assessment:  Needs      Most Recent Value   Equipment Currently Used at Home  none   Interventions provided  -- [MD paged-pt dc today. Comm dialysis and HC already in place.]   Home Care  [facility] [phone], Fax: [phone]            Follow-up plan:    Future Appointments   Date Time Provider Department Center   2020  3:00 PM  [facility]   2020 12:15 PM [facility] DIALYSIS RM 1 [facility]   2020  9:30 AM  LAB [facility]   2020 10:30 AM Transplant, U[facility] Early Post [facility]   2020 12:15 PM [facility] DIALYSIS RM 1 [facility] O   2020  9:00 AM Kailey Santos NP [facility]   2020  9:30 AM [facility]   2020 10:30 AM Transplant, U[facility] Early Post [facility]   2020 12:00 PM Anam Hermosillo, [facility]   10/20/2020  9:00 AM UC LAB [facility]   10/20/2020 10:00 AM Transplant, U[facility] Early Post [facility]   10/20/2020  1:00 PM Anam Hermosillo [facility]   2020  9:00 AM  LAB [facility]   2020 10:00 AM [facility]   2020  8:00 AM Marianne Gutierrez [facility]  "      Any outstanding tests or procedures:        Referrals     Future Labs/Procedures    Home care nursing referral     Comments:    [facility]   Ph: [phone]       RN skilled nursing visits--please see the weekend of 7/18    +++Pt currently does not have a PCP---she wants to establish care at the [facility] in the [facility]--I have sent request to schedule--please use TRansplant Nephrologist: Mahesh Armando for orders until then Ph: [phone] and Fax: [phone]   ++++   Pt is scheduled to have a video visit with NP Beatris Anderson on Wednesday, July 1st at 11:00 AM--was canceled  7/14  Need to make a PCP appointment for pt @ [facility]: PCC with Beatris Anderson (she had a 7/1 appointment and did not go as she was in the Tx clinic with uti sx)    ---pt to return to Transplant Clinic (3rd floor [facility]) on Monday, 7/20 @ 0900  ---then has Hemodialysis at 3pm       RN to assess vital signs and weight, respiratory and cardiac status, patients ability to take and record daily blood pressure, temp and weight, pain level and activity tolerance, incision for signs/symptoms of infection, hydration, nutrition and bowel status and home safety.     RN to teach medication management.    Assist / teach patient to obtain and record lab results in handbook       RN to provide morning lab draws, per MD orders, and report results to Outpatient Care Coordinator\" Matilda Huber   Ph: [phone]   Fax: [phone]         Your provider has ordered home care nursing services. If you have not been contacted within 2 days of your discharge please call the inpatient department phone number at [phone] . .            Trent Recommendations:      Morena Alvarez RN    AVS/Discharge Summary is the source of truth; this is a helpful guide for improved communication of patient story          "

## 2020-07-04 NOTE — TELEPHONE ENCOUNTER
Patient paged and left number to return, but it goes right to voicemail. Attempted six times. Left two voicemail to return call.     Left message for patient's mother, Annamarie, to request Jelly to call back.     Spoke with Jelly. She had her phone on do not disturb. Since last night she has had a fever . Jelly reports that she is shaking and vomiting. This morning she took her medicine and vomited an hour later. She reports that she feels terrible. Patient instructed to go to the ED.patient's aunt will bring her. Arrival time 40 mins.     Report called to Radha, Charge nurse. Updated Kailey TODD and Dr Anglin.

## 2020-07-05 ENCOUNTER — RESULTS ONLY (OUTPATIENT)
Dept: OTHER | Facility: CLINIC | Age: 35
End: 2020-07-05

## 2020-07-05 ENCOUNTER — APPOINTMENT (OUTPATIENT)
Dept: INTERVENTIONAL RADIOLOGY/VASCULAR | Facility: CLINIC | Age: 35
DRG: 698 | End: 2020-07-05
Payer: MEDICARE

## 2020-07-05 LAB
ABO + RH BLD: NORMAL
ABO + RH BLD: NORMAL
ANION GAP SERPL CALCULATED.3IONS-SCNC: 11 MMOL/L (ref 3–14)
APPEARANCE FLD: NORMAL
BASOPHILS # BLD AUTO: 0 10E9/L (ref 0–0.2)
BASOPHILS NFR BLD AUTO: 0.5 %
BASOPHILS NFR FLD MANUAL: 2 %
BLD GP AB SCN SERPL QL: NORMAL
BLD PROD TYP BPU: NORMAL
BLD PROD TYP BPU: NORMAL
BLD UNIT ID BPU: 0
BLOOD BANK CMNT PATIENT-IMP: NORMAL
BLOOD PRODUCT CODE: NORMAL
BPU ID: NORMAL
BUN SERPL-MCNC: 32 MG/DL (ref 7–30)
CALCIUM SERPL-MCNC: 6.9 MG/DL (ref 8.5–10.1)
CHLORIDE SERPL-SCNC: 110 MMOL/L (ref 94–109)
CO2 SERPL-SCNC: 14 MMOL/L (ref 20–32)
COLOR FLD: NORMAL
CREAT FLD-MCNC: 4.7 MG/DL
CREAT SERPL-MCNC: 6.13 MG/DL (ref 0.52–1.04)
DIFFERENTIAL METHOD BLD: ABNORMAL
EOSINOPHIL # BLD AUTO: 0.1 10E9/L (ref 0–0.7)
EOSINOPHIL NFR BLD AUTO: 1.1 %
EOSINOPHIL NFR FLD MANUAL: 1 %
ERYTHROCYTE [DISTWIDTH] IN BLOOD BY AUTOMATED COUNT: 14.3 % (ref 10–15)
ERYTHROCYTE [DISTWIDTH] IN BLOOD BY AUTOMATED COUNT: 14.5 % (ref 10–15)
GFR SERPL CREATININE-BSD FRML MDRD: 8 ML/MIN/{1.73_M2}
GLUCOSE SERPL-MCNC: 108 MG/DL (ref 70–99)
GRAM STN SPEC: NORMAL
HCT VFR BLD AUTO: 20.3 % (ref 35–47)
HCT VFR BLD AUTO: 21.1 % (ref 35–47)
HGB BLD-MCNC: 6.4 G/DL (ref 11.7–15.7)
HGB BLD-MCNC: 6.6 G/DL (ref 11.7–15.7)
IMM GRANULOCYTES # BLD: 0 10E9/L (ref 0–0.4)
IMM GRANULOCYTES NFR BLD: 0.5 %
IRON SATN MFR SERPL: 9 % (ref 15–46)
IRON SERPL-MCNC: 10 UG/DL (ref 35–180)
LDH FLD L TO P-CCNC: 776 U/L
LYMPHOCYTES # BLD AUTO: 0.4 10E9/L (ref 0.8–5.3)
LYMPHOCYTES NFR BLD AUTO: 6.3 %
LYMPHOCYTES NFR FLD MANUAL: 7 %
Lab: NORMAL
Lab: NORMAL
MAGNESIUM SERPL-MCNC: 1.2 MG/DL (ref 1.6–2.3)
MCH RBC QN AUTO: 29.7 PG (ref 26.5–33)
MCH RBC QN AUTO: 30 PG (ref 26.5–33)
MCHC RBC AUTO-ENTMCNC: 31.3 G/DL (ref 31.5–36.5)
MCHC RBC AUTO-ENTMCNC: 31.5 G/DL (ref 31.5–36.5)
MCV RBC AUTO: 95 FL (ref 78–100)
MCV RBC AUTO: 95 FL (ref 78–100)
MONOCYTES # BLD AUTO: 0.3 10E9/L (ref 0–1.3)
MONOCYTES NFR BLD AUTO: 6 %
MONOS+MACROS NFR FLD MANUAL: 74 %
NEUTROPHILS # BLD AUTO: 4.9 10E9/L (ref 1.6–8.3)
NEUTROPHILS NFR BLD AUTO: 85.6 %
NEUTS BAND NFR FLD MANUAL: 16 %
NRBC # BLD AUTO: 0 10*3/UL
NRBC BLD AUTO-RTO: 0 /100
NUM BPU REQUESTED: 1
PHOSPHATE SERPL-MCNC: 4.2 MG/DL (ref 2.5–4.5)
PLATELET # BLD AUTO: 160 10E9/L (ref 150–450)
PLATELET # BLD AUTO: 165 10E9/L (ref 150–450)
POTASSIUM SERPL-SCNC: 4.1 MMOL/L (ref 3.4–5.3)
RBC # BLD AUTO: 2.13 10E12/L (ref 3.8–5.2)
RBC # BLD AUTO: 2.22 10E12/L (ref 3.8–5.2)
RETICS # AUTO: 31.2 10E9/L (ref 25–95)
RETICS/RBC NFR AUTO: 1.5 % (ref 0.5–2)
SARS-COV-2 RNA SPEC QL NAA+PROBE: NOT DETECTED
SODIUM SERPL-SCNC: 136 MMOL/L (ref 133–144)
SPECIMEN EXP DATE BLD: NORMAL
SPECIMEN SOURCE FLD: NORMAL
SPECIMEN SOURCE: NORMAL
TACROLIMUS BLD-MCNC: 8.6 UG/L (ref 5–15)
TIBC SERPL-MCNC: 111 UG/DL (ref 240–430)
TME LAST DOSE: NORMAL H
TRANSFUSION STATUS PATIENT QL: NORMAL
TRANSFUSION STATUS PATIENT QL: NORMAL
WBC # BLD AUTO: 5.7 10E9/L (ref 4–11)
WBC # BLD AUTO: 6 10E9/L (ref 4–11)
WBC # FLD AUTO: 214 /UL

## 2020-07-05 PROCEDURE — 25800030 ZZH RX IP 258 OP 636: Performed by: STUDENT IN AN ORGANIZED HEALTH CARE EDUCATION/TRAINING PROGRAM

## 2020-07-05 PROCEDURE — 25000132 ZZH RX MED GY IP 250 OP 250 PS 637: Mod: GY | Performed by: NURSE PRACTITIONER

## 2020-07-05 PROCEDURE — 88313 SPECIAL STAINS GROUP 2: CPT | Mod: 26 | Performed by: NURSE PRACTITIONER

## 2020-07-05 PROCEDURE — 25000128 H RX IP 250 OP 636: Performed by: SURGERY

## 2020-07-05 PROCEDURE — 0T913ZX DRAINAGE OF LEFT KIDNEY, PERCUTANEOUS APPROACH, DIAGNOSTIC: ICD-10-PCS | Performed by: RADIOLOGY

## 2020-07-05 PROCEDURE — 85045 AUTOMATED RETICULOCYTE COUNT: CPT | Performed by: STUDENT IN AN ORGANIZED HEALTH CARE EDUCATION/TRAINING PROGRAM

## 2020-07-05 PROCEDURE — 99152 MOD SED SAME PHYS/QHP 5/>YRS: CPT

## 2020-07-05 PROCEDURE — 80197 ASSAY OF TACROLIMUS: CPT | Performed by: STUDENT IN AN ORGANIZED HEALTH CARE EDUCATION/TRAINING PROGRAM

## 2020-07-05 PROCEDURE — 25000128 H RX IP 250 OP 636: Performed by: NURSE PRACTITIONER

## 2020-07-05 PROCEDURE — 83540 ASSAY OF IRON: CPT | Performed by: STUDENT IN AN ORGANIZED HEALTH CARE EDUCATION/TRAINING PROGRAM

## 2020-07-05 PROCEDURE — 50200 RENAL BIOPSY PERQ: CPT | Mod: LT

## 2020-07-05 PROCEDURE — 88313 SPECIAL STAINS GROUP 2: CPT | Performed by: NURSE PRACTITIONER

## 2020-07-05 PROCEDURE — 83735 ASSAY OF MAGNESIUM: CPT | Performed by: STUDENT IN AN ORGANIZED HEALTH CARE EDUCATION/TRAINING PROGRAM

## 2020-07-05 PROCEDURE — 86901 BLOOD TYPING SEROLOGIC RH(D): CPT | Performed by: STUDENT IN AN ORGANIZED HEALTH CARE EDUCATION/TRAINING PROGRAM

## 2020-07-05 PROCEDURE — 85025 COMPLETE CBC W/AUTO DIFF WBC: CPT | Performed by: STUDENT IN AN ORGANIZED HEALTH CARE EDUCATION/TRAINING PROGRAM

## 2020-07-05 PROCEDURE — 25800030 ZZH RX IP 258 OP 636: Performed by: SURGERY

## 2020-07-05 PROCEDURE — 87075 CULTR BACTERIA EXCEPT BLOOD: CPT | Performed by: NURSE PRACTITIONER

## 2020-07-05 PROCEDURE — 99153 MOD SED SAME PHYS/QHP EA: CPT

## 2020-07-05 PROCEDURE — 86832 HLA CLASS I HIGH DEFIN QUAL: CPT | Performed by: EMERGENCY MEDICINE

## 2020-07-05 PROCEDURE — 87070 CULTURE OTHR SPECIMN AEROBIC: CPT | Performed by: NURSE PRACTITIONER

## 2020-07-05 PROCEDURE — 40000694 ZZHCL STATISTIC STAT SERVICE TX TESTING: Performed by: EMERGENCY MEDICINE

## 2020-07-05 PROCEDURE — 88348 ELECTRON MICROSCOPY DX: CPT | Performed by: NURSE PRACTITIONER

## 2020-07-05 PROCEDURE — 86900 BLOOD TYPING SEROLOGIC ABO: CPT | Performed by: STUDENT IN AN ORGANIZED HEALTH CARE EDUCATION/TRAINING PROGRAM

## 2020-07-05 PROCEDURE — 86833 HLA CLASS II HIGH DEFIN QUAL: CPT | Performed by: EMERGENCY MEDICINE

## 2020-07-05 PROCEDURE — 88305 TISSUE EXAM BY PATHOLOGIST: CPT | Mod: 26 | Performed by: NURSE PRACTITIONER

## 2020-07-05 PROCEDURE — 86923 COMPATIBILITY TEST ELECTRIC: CPT | Performed by: STUDENT IN AN ORGANIZED HEALTH CARE EDUCATION/TRAINING PROGRAM

## 2020-07-05 PROCEDURE — 80048 BASIC METABOLIC PNL TOTAL CA: CPT | Performed by: STUDENT IN AN ORGANIZED HEALTH CARE EDUCATION/TRAINING PROGRAM

## 2020-07-05 PROCEDURE — 88305 TISSUE EXAM BY PATHOLOGIST: CPT | Performed by: NURSE PRACTITIONER

## 2020-07-05 PROCEDURE — 40000428 ZZHCL STATISTIC R-RUSH PROCESSING: Performed by: NURSE PRACTITIONER

## 2020-07-05 PROCEDURE — 89051 BODY FLUID CELL COUNT: CPT | Performed by: NURSE PRACTITIONER

## 2020-07-05 PROCEDURE — 83550 IRON BINDING TEST: CPT | Performed by: STUDENT IN AN ORGANIZED HEALTH CARE EDUCATION/TRAINING PROGRAM

## 2020-07-05 PROCEDURE — 5A1D70Z PERFORMANCE OF URINARY FILTRATION, INTERMITTENT, LESS THAN 6 HOURS PER DAY: ICD-10-PCS | Performed by: INTERNAL MEDICINE

## 2020-07-05 PROCEDURE — 25000125 ZZHC RX 250: Performed by: NURSE PRACTITIONER

## 2020-07-05 PROCEDURE — 25000132 ZZH RX MED GY IP 250 OP 250 PS 637: Mod: GY | Performed by: STUDENT IN AN ORGANIZED HEALTH CARE EDUCATION/TRAINING PROGRAM

## 2020-07-05 PROCEDURE — 36415 COLL VENOUS BLD VENIPUNCTURE: CPT | Performed by: STUDENT IN AN ORGANIZED HEALTH CARE EDUCATION/TRAINING PROGRAM

## 2020-07-05 PROCEDURE — 12000026 ZZH R&B TRANSPLANT

## 2020-07-05 PROCEDURE — 86850 RBC ANTIBODY SCREEN: CPT | Performed by: STUDENT IN AN ORGANIZED HEALTH CARE EDUCATION/TRAINING PROGRAM

## 2020-07-05 PROCEDURE — 86825 HLA X-MATH NON-CYTOTOXIC: CPT | Performed by: EMERGENCY MEDICINE

## 2020-07-05 PROCEDURE — 25000125 ZZHC RX 250: Performed by: STUDENT IN AN ORGANIZED HEALTH CARE EDUCATION/TRAINING PROGRAM

## 2020-07-05 PROCEDURE — P9016 RBC LEUKOCYTES REDUCED: HCPCS | Performed by: STUDENT IN AN ORGANIZED HEALTH CARE EDUCATION/TRAINING PROGRAM

## 2020-07-05 PROCEDURE — 88346 IMFLUOR 1ST 1ANTB STAIN PX: CPT | Performed by: NURSE PRACTITIONER

## 2020-07-05 PROCEDURE — 83615 LACTATE (LD) (LDH) ENZYME: CPT | Performed by: NURSE PRACTITIONER

## 2020-07-05 PROCEDURE — 40000477 ZZHCL STATISTIC IP IF DIRECT UMP PF 88346: Performed by: NURSE PRACTITIONER

## 2020-07-05 PROCEDURE — 88348 ELECTRON MICROSCOPY DX: CPT | Mod: 26 | Performed by: NURSE PRACTITIONER

## 2020-07-05 PROCEDURE — 86828 HLA CLASS I&II ANTIBODY QUAL: CPT | Performed by: EMERGENCY MEDICINE

## 2020-07-05 PROCEDURE — 25000128 H RX IP 250 OP 636: Performed by: STUDENT IN AN ORGANIZED HEALTH CARE EDUCATION/TRAINING PROGRAM

## 2020-07-05 PROCEDURE — 84100 ASSAY OF PHOSPHORUS: CPT | Performed by: STUDENT IN AN ORGANIZED HEALTH CARE EDUCATION/TRAINING PROGRAM

## 2020-07-05 PROCEDURE — 25000131 ZZH RX MED GY IP 250 OP 636 PS 637: Mod: GY | Performed by: STUDENT IN AN ORGANIZED HEALTH CARE EDUCATION/TRAINING PROGRAM

## 2020-07-05 PROCEDURE — 27210909 IR FINE NEEDLE ASPIRATION W ULTRASOUND

## 2020-07-05 PROCEDURE — 87102 FUNGUS ISOLATION CULTURE: CPT | Performed by: NURSE PRACTITIONER

## 2020-07-05 PROCEDURE — 25000131 ZZH RX MED GY IP 250 OP 636 PS 637: Mod: GY | Performed by: EMERGENCY MEDICINE

## 2020-07-05 PROCEDURE — 76942 ECHO GUIDE FOR BIOPSY: CPT

## 2020-07-05 PROCEDURE — 25800030 ZZH RX IP 258 OP 636: Performed by: NURSE PRACTITIONER

## 2020-07-05 PROCEDURE — 87205 SMEAR GRAM STAIN: CPT | Performed by: NURSE PRACTITIONER

## 2020-07-05 PROCEDURE — 0TB13ZX EXCISION OF LEFT KIDNEY, PERCUTANEOUS APPROACH, DIAGNOSTIC: ICD-10-PCS | Performed by: RADIOLOGY

## 2020-07-05 PROCEDURE — 85027 COMPLETE CBC AUTOMATED: CPT | Performed by: STUDENT IN AN ORGANIZED HEALTH CARE EDUCATION/TRAINING PROGRAM

## 2020-07-05 PROCEDURE — 10160 PNXR ASPIR ABSC HMTMA BULLA: CPT

## 2020-07-05 PROCEDURE — 88350 IMFLUOR EA ADDL 1ANTB STN PX: CPT | Performed by: NURSE PRACTITIONER

## 2020-07-05 PROCEDURE — 36415 COLL VENOUS BLD VENIPUNCTURE: CPT | Performed by: NURSE PRACTITIONER

## 2020-07-05 PROCEDURE — 25000128 H RX IP 250 OP 636

## 2020-07-05 PROCEDURE — 82570 ASSAY OF URINE CREATININE: CPT | Performed by: NURSE PRACTITIONER

## 2020-07-05 RX ORDER — MAGNESIUM SULFATE HEPTAHYDRATE 40 MG/ML
2 INJECTION, SOLUTION INTRAVENOUS ONCE
Status: COMPLETED | OUTPATIENT
Start: 2020-07-05 | End: 2020-07-05

## 2020-07-05 RX ORDER — NALOXONE HYDROCHLORIDE 0.4 MG/ML
.1-.4 INJECTION, SOLUTION INTRAMUSCULAR; INTRAVENOUS; SUBCUTANEOUS
Status: DISCONTINUED | OUTPATIENT
Start: 2020-07-05 | End: 2020-07-05 | Stop reason: HOSPADM

## 2020-07-05 RX ORDER — ONDANSETRON 2 MG/ML
4 INJECTION INTRAMUSCULAR; INTRAVENOUS ONCE
Status: COMPLETED | OUTPATIENT
Start: 2020-07-05 | End: 2020-07-05

## 2020-07-05 RX ORDER — ONDANSETRON 2 MG/ML
4 INJECTION INTRAMUSCULAR; INTRAVENOUS EVERY 6 HOURS PRN
Status: DISCONTINUED | OUTPATIENT
Start: 2020-07-05 | End: 2020-07-16

## 2020-07-05 RX ORDER — FLUMAZENIL 0.1 MG/ML
0.2 INJECTION, SOLUTION INTRAVENOUS
Status: DISCONTINUED | OUTPATIENT
Start: 2020-07-05 | End: 2020-07-05 | Stop reason: HOSPADM

## 2020-07-05 RX ORDER — FENTANYL CITRATE 50 UG/ML
25-50 INJECTION, SOLUTION INTRAMUSCULAR; INTRAVENOUS EVERY 5 MIN PRN
Status: DISCONTINUED | OUTPATIENT
Start: 2020-07-05 | End: 2020-07-05 | Stop reason: HOSPADM

## 2020-07-05 RX ORDER — VALGANCICLOVIR 450 MG/1
450 TABLET, FILM COATED ORAL
Status: DISCONTINUED | OUTPATIENT
Start: 2020-07-06 | End: 2020-07-18 | Stop reason: HOSPADM

## 2020-07-05 RX ORDER — SULFAMETHOXAZOLE AND TRIMETHOPRIM 400; 80 MG/1; MG/1
1 TABLET ORAL
Status: DISCONTINUED | OUTPATIENT
Start: 2020-07-06 | End: 2020-07-18 | Stop reason: HOSPADM

## 2020-07-05 RX ORDER — HYDROMORPHONE HYDROCHLORIDE 1 MG/ML
0.4 INJECTION, SOLUTION INTRAMUSCULAR; INTRAVENOUS; SUBCUTANEOUS ONCE
Status: COMPLETED | OUTPATIENT
Start: 2020-07-05 | End: 2020-07-05

## 2020-07-05 RX ORDER — CARVEDILOL 6.25 MG/1
6.25 TABLET ORAL 2 TIMES DAILY
Status: DISCONTINUED | OUTPATIENT
Start: 2020-07-05 | End: 2020-07-13

## 2020-07-05 RX ORDER — HYDROMORPHONE HYDROCHLORIDE 1 MG/ML
0.5 INJECTION, SOLUTION INTRAMUSCULAR; INTRAVENOUS; SUBCUTANEOUS
Status: DISCONTINUED | OUTPATIENT
Start: 2020-07-05 | End: 2020-07-07

## 2020-07-05 RX ADMIN — MIDAZOLAM 0.5 MG: 1 INJECTION INTRAMUSCULAR; INTRAVENOUS at 13:35

## 2020-07-05 RX ADMIN — HYDROMORPHONE HYDROCHLORIDE 0.5 MG: 1 INJECTION, SOLUTION INTRAMUSCULAR; INTRAVENOUS; SUBCUTANEOUS at 11:04

## 2020-07-05 RX ADMIN — HYDROMORPHONE HYDROCHLORIDE 0.5 MG: 1 INJECTION, SOLUTION INTRAMUSCULAR; INTRAVENOUS; SUBCUTANEOUS at 20:46

## 2020-07-05 RX ADMIN — MIDAZOLAM 1 MG: 1 INJECTION INTRAMUSCULAR; INTRAVENOUS at 13:15

## 2020-07-05 RX ADMIN — ONDANSETRON 4 MG: 2 INJECTION INTRAMUSCULAR; INTRAVENOUS at 09:26

## 2020-07-05 RX ADMIN — HYDROMORPHONE HYDROCHLORIDE 0.4 MG: 1 INJECTION, SOLUTION INTRAMUSCULAR; INTRAVENOUS; SUBCUTANEOUS at 01:29

## 2020-07-05 RX ADMIN — HYDROMORPHONE HYDROCHLORIDE 0.5 MG: 1 INJECTION, SOLUTION INTRAMUSCULAR; INTRAVENOUS; SUBCUTANEOUS at 16:19

## 2020-07-05 RX ADMIN — ONDANSETRON 4 MG: 2 INJECTION INTRAMUSCULAR; INTRAVENOUS at 13:12

## 2020-07-05 RX ADMIN — LIDOCAINE HYDROCHLORIDE 7 ML: 10 INJECTION, SOLUTION EPIDURAL; INFILTRATION; INTRACAUDAL; PERINEURAL at 13:38

## 2020-07-05 RX ADMIN — CEFEPIME HYDROCHLORIDE 1 G: 1 INJECTION, POWDER, FOR SOLUTION INTRAMUSCULAR; INTRAVENOUS at 14:48

## 2020-07-05 RX ADMIN — SODIUM CHLORIDE: 9 INJECTION, SOLUTION INTRAVENOUS at 23:01

## 2020-07-05 RX ADMIN — FENTANYL CITRATE 50 MCG: 50 INJECTION, SOLUTION INTRAMUSCULAR; INTRAVENOUS at 13:15

## 2020-07-05 RX ADMIN — HYDROMORPHONE HYDROCHLORIDE 0.5 MG: 1 INJECTION, SOLUTION INTRAMUSCULAR; INTRAVENOUS; SUBCUTANEOUS at 23:33

## 2020-07-05 RX ADMIN — ACETAMINOPHEN 650 MG: 325 TABLET, FILM COATED ORAL at 04:25

## 2020-07-05 RX ADMIN — ONDANSETRON 4 MG: 2 INJECTION INTRAMUSCULAR; INTRAVENOUS at 17:59

## 2020-07-05 RX ADMIN — FENTANYL CITRATE 25 MCG: 50 INJECTION, SOLUTION INTRAMUSCULAR; INTRAVENOUS at 13:26

## 2020-07-05 RX ADMIN — SODIUM CHLORIDE 500 MG: 9 INJECTION, SOLUTION INTRAVENOUS at 16:19

## 2020-07-05 RX ADMIN — FENTANYL CITRATE 25 MCG: 50 INJECTION, SOLUTION INTRAMUSCULAR; INTRAVENOUS at 13:35

## 2020-07-05 RX ADMIN — MAGNESIUM SULFATE IN WATER 2 G: 40 INJECTION, SOLUTION INTRAVENOUS at 08:58

## 2020-07-05 RX ADMIN — MIDAZOLAM 0.5 MG: 1 INJECTION INTRAMUSCULAR; INTRAVENOUS at 13:27

## 2020-07-05 RX ADMIN — MYCOPHENOLATE MOFETIL 1000 MG: 500 INJECTION, POWDER, LYOPHILIZED, FOR SOLUTION INTRAVENOUS at 17:46

## 2020-07-05 RX ADMIN — HYDROMORPHONE HYDROCHLORIDE 2 MG: 2 TABLET ORAL at 04:25

## 2020-07-05 RX ADMIN — TACROLIMUS 6.5 MG: 5 CAPSULE ORAL at 20:51

## 2020-07-05 ASSESSMENT — ACTIVITIES OF DAILY LIVING (ADL)
ADLS_ACUITY_SCORE: 11
ADLS_ACUITY_SCORE: 10

## 2020-07-05 ASSESSMENT — MIFFLIN-ST. JEOR: SCORE: 1293.75

## 2020-07-05 NOTE — PROGRESS NOTES
Interventional Radiology Brief Post Procedure Note    Procedure:     Proceduralist: Gustavo Martin MD    Assistant: Radiology Resident Physician, Ayaz and None    Time Out: Prior to the start of the procedure and with procedural staff participation, I verbally confirmed the patient s identity using two indicators, relevant allergies, that the procedure was appropriate and matched the consent or emergent situation, and that the correct equipment/implants were available. Immediately prior to starting the procedure I conducted the Time Out with the procedural staff and re-confirmed the patient s name, procedure, and site/side. (The Joint Commission universal protocol was followed.)  Yes        Sedation: IR Nurse Monitored Care   Post Procedure Summary:  Prior to the start of the procedure and with procedural staff participation, I verbally confirmed the patient s identity using two indicators, relevant allergies, that the procedure was appropriate and matched the consent or emergent situation, and that the correct equipment/implants were available. Immediately prior to starting the procedure I conducted the Time Out with the procedural staff and re-confirmed the patient s name, procedure, and site/side. (The Joint Commission universal protocol was followed.)  Yes       Sedatives: Fentanyl and Midazolam (Versed)    Vital signs, airway and pulse oximetry were monitored and remained stable throughout the procedure and sedation was maintained until the procedure was complete.  The patient was monitored by staff until sedation discharge criteria were met.    Patient tolerance: Patient tolerated the procedure well with no immediate complications.    Time of sedation in minutes: 30 Minutes minutes from beginning to end of physician one to one monitoring.          Findings: 3 ml clear yellow fluid aspirated from superior pole LLQ transplant kidney. 15cc serosanguinous from complex ariella-incisional collection. 3 18ga  cores of tranplant cortex.     Estimated Blood Loss: Minimal    Fluoroscopy Time:  minute(s)    SPECIMENS: Core needle biopsy specimens sent for pathological analysis. Fluid from both collections sent to lab.     Complications: 1. None     Condition: Stable    Plan: Patient return to modi.     Comments: See dictated procedure note for full details.    Quinton Jacome DO

## 2020-07-05 NOTE — PROVIDER NOTIFICATION
Provider Notification:  Jess Huang NP notified of patients hemoglobin recheck this morning of 6.4.     Will continue to monitor and inform MD abut changes in the patients condition.

## 2020-07-05 NOTE — IR NOTE
"Interventional Radiology Intra-procedural Nursing Note    Patient Name: Jelly Dietz  Medical Record Number: [id number]  Today's Date: July 5, 2020    Procedure: left transplant kidney biopsy, aspiration of perinephric fluid collection    Attending MD in room during timeout: Dr Martin  Proceduralists: Dr Martin, Dr Jacome    Procedure Start Time: 1323  Procedure Puncture time: 1323  Procedure End Time: 1340    Sedation start time: 1315  Sedation end time: 1340  Sedation medications given: versed 2 mg, fentanyl 100 mcg IV    Sedation notes: Tolerated conscious sedation without adverse effect    Report given to: Angelique WHITT 7A  : not needed    D: Patient brought to IR room 6 at 1253. Verified Patient's ID using two identifiers. Informed consent obtained by Dr Jacome. Patient's questions were answered. She stated feeling \"fightened of having more pain\" during the procedure. Reassurances were given and comfort measures done.  A: Patient was positioned supine and was monitored per IR conscious sedation protocol. Patient tolerated the procedure without apparent incident. The dressing over the left lower quadrant puncture sites has small amount of blood that was outlined in ink. Fluid collection 1 was obtained from the left lower pole perinephric area. 3 mL of yellow fluid sent for cultures. Fluid collection 2 was obtained from the ariella-incisional area. 20 mL pink fluid sent for ordered lab tests and cultures. 3 left transplant kidney cores were sent separately in formalin, jacki and EM containers.   P: Patient returned to 7A for post procedure recovery and continued cares.     Dorita Berrios RN  [phone]    "

## 2020-07-05 NOTE — CONSULTS
INTERVENTIONAL RADIOLOGY CONSULT    Patient is on IR schedule today for LLQ renal transplant bx and fluid aspiration. Labs WNL for procedure. Please keep patient NPO. Consent will be done prior to procedure.    Lab Results   Component Value Date    INR 1.40 07/04/2020    INR 1.44 06/19/2020     Lab Results   Component Value Date     07/05/2020     07/05/2020       Please contact the IR charge RN at [phone] for estimated time of procedure.     Discussed with Dr. Veronica MD.    Quinton Jacome DO  Interventional Radiology Resident  Pager [phone]  Call Pager for After Hours: [phone]

## 2020-07-05 NOTE — PLAN OF CARE
Patient returned from IR A&O, ambulatory, reporting improvement in nausea since second dose of IV Zofran given. Provider notified refusal of polanco, agreeable to getting up to void in commode for strict I/O/sample collection. 1 unit PRBC given without reaction for hgb 6.4. IV dilaudid given for incisional pain.

## 2020-07-05 NOTE — PHARMACY-ADMISSION MEDICATION HISTORY
Admission medication history interview status for the 7/4/2020 admission is complete. See Epic admission navigator for allergy information, pharmacy, prior to admission medications and immunization status.     Medication history interview sources:  pt's med list, Epic chart review    Changes made to PTA medication list (reason)  Added: none  Deleted: miconazole cream - not on med list  Changed: magnesium oxide 400 mg PO BID > 400 mg PO once daily w/ lunch    Additional medication history information (including reliability of information, actions taken by pharmacist):  -did not speak w/ pt directly, as recent [facility] pharmacy notes and electronic encounters w/ txp coordinator were sufficient  -pt was prescribed ciprofloxacin x7 days and began therapy on 7/1 for UTI  -tac dosing was recently increased from 5.5 mg BID to 6.5 mg BID based on low drug levels  -pt was recently changed from mycophenolate mofetil 1000 mg PO BID to mycophenolic acid to 720 mg PO BID for GERD-like symptoms, low appetite, abd pain per [facility] pharmacist      Prior to Admission medications    Medication Sig Last Dose Taking? Auth Provider   acetaminophen (TYLENOL) 325 MG tablet Take 2 tablets (650 mg) by mouth every 4 hours as needed for other (multimodal surgical pain management along with NSAIDS and opioid medication as indicated based on pain control and physical function.) Past Week at Unknown time Yes Tory Krishnamurthy PA-C   aspirin (ASA) 81 MG chewable tablet Take 1 tablet (81 mg) by mouth daily Past Week at Unknown time Yes Tory Krishnamurthy PA-C   atorvastatin (LIPITOR) 10 MG tablet Take 1 tablet (10 mg) by mouth daily Past Week at Unknown time Yes Tory Krishnamurthy PA-C   carvedilol (COREG) 6.25 MG tablet Take 2 tablets (12.5 mg) by mouth 2 times daily Past Week at Unknown time Yes Kieran Anglin MD   cetirizine (ZYRTEC) 10 MG tablet Take 10 mg by mouth nightly as needed for allergies  Past Month at Unknown time Yes Unknown,  Entered By History   ciprofloxacin (CIPRO) 500 MG tablet Take 1 tablet (500 mg) by mouth 2 times daily for 7 days Past Week at Unknown time Yes Kailey Santos NP   famotidine (PEPCID) 20 MG tablet Take 1 tablet (20 mg) by mouth 2 times daily Past Week at Unknown time Yes Kieran Anglin MD   HYDROmorphone (DILAUDID) 2 MG tablet Take 1 tablet (2 mg) by mouth every 4 hours as needed for moderate to severe pain Past Week at Unknown time Yes Tory Krishnamurthy PA-C   Lidocaine (LIDOCARE) 4 % Patch Place 3 patches onto the skin every 24 hours To prevent lidocaine toxicity, patient should be patch free for 12 hrs daily. Past Week at Unknown time Yes Tory Krishnamurthy PA-C   magnesium oxide (MAG-OX) 400 MG tablet Take 400 mg by mouth daily (before lunch)  Past Week at Unknown time Yes Kieran Anglin MD   menthol (ICY HOT) 5 % PTCH Apply 1 patch topically every 8 hours as needed for muscle soreness Past Week at Unknown time Yes Tory Krishnamurthy PA-C   mycophenolic acid (GENERIC EQUIVALENT) 180 MG EC tablet Take 4 tablets (720 mg) by mouth 2 times daily Past Week at Unknown time Yes Kieran Anglin MD   phosphorus tablet 250 mg (PHOSPHA 250 NEUTRAL) 250 MG per tablet Take 2 tablets (500 mg) by mouth 4 times daily Past Week at Unknown time Yes Tory Krishnamurthy PA-C   polyethylene glycol (MIRALAX) 17 g packet Take 17 g by mouth daily Past Week at Unknown time Yes Tory Krishnamurthy PA-C   senna-docusate (SENOKOT-S/PERICOLACE) 8.6-50 MG tablet Take 1-2 tablets by mouth 2 times daily Past Week at Unknown time Yes Tory Krishnamurthy PA-C   sulfamethoxazole-trimethoprim (BACTRIM) 400-80 MG tablet Take 1 tablet by mouth daily Past Week at Unknown time Yes Tory Krishnamurthy PA-C   tacrolimus (GENERIC EQUIVALENT) 0.5 MG capsule Take 1 capsule (0.5 mg) by mouth 2 times daily Total dose 6.5 mg Past Week at Unknown time Yes iKeran Anglin MD   tacrolimus (GENERIC EQUIVALENT) 1 MG capsule Take 6  capsules (6 mg) by mouth 2 times daily Total dose 6.5 mg Past Week at Unknown time Yes Kieran Anglin MD   valGANciclovir (VALCYTE) 450 MG tablet Take 2 tablets (900 mg) by mouth daily Past Week at Unknown time Yes Tory Krishnamurthy PA-C   Vitamin D3 (CHOLECALCIFEROL) 25 mcg (1000 units) tablet Take 2 tablets (50 mcg) by mouth daily Past Week at Unknown time Yes Tory Krishnamurthy PA-C       Medication history completed by:   Micha Carreno, PharmD, BCPS

## 2020-07-05 NOTE — PROGRESS NOTES
Transplant Surgery  Inpatient Daily Progress Note  2020    Assessment & Plan: Jelly Dietz is a 35 year old female with PMH significant for ESRD due to IgA Nephropathy s/p two kidney transplants. Her first transplant was in  ( which failed due to non-compliance during pregnancy), and HTN. She underwent a re-transplant of a  donor kidney with a ureteral stent, with 3 arteries on a common patch on 2020. She presents through the ED with c/o fever and abdominal pain found to have CANDI    Graft function:Kidney: DDKTx 20 with stent: POD #16 with CANDI. Baseline SCr 0.6, presents with SCr 4.12 , increased to 6.13 today.  US with Left sided kidney with stent-no hydro, measuring 14cm (previously 11.6 on POD # 0)  -perinephric fluid collections-2 new, largest 5.9cm along inferior margin of kidney tx vessels.  In setting of fever, concern for pyelonephritis or abscess with fluid collections. Send fluid for culture, g.stain, and creatinine. With increase in kidney size, concern for rejection. Will go to IR today for kidney biopsy and aspiration of fluid collections. Give SM 500mg x1 today.     Immunosuppression management:    Tac: 6.5mg BID. (goal 8-10) monitor QMWF.  Myfortic 720 mg BID. In setting of vomiting, transition to IV MMF   Complexity of management:Medium. Contributing factors: anemia, fever, organ dysfunction, nausea and active infection  Hematology: ANTHONY/ACD: Hgb 6.4, transfuse 1 U PRBC  PSAT 9, consider venofer if no infection   Cardiorespiratory: HTN: Currently hypotensive, -130s/ Decrease coreg to 6.25mg BID.  RA   GI/Nutrition: NPO for IR. Advance to Regular diet as tolerated  Nausea/Vomiting: zofran PRN   Endocrine:JUAN JOSE's  Fluid/Electrolytes: MIVF:NS @ 100  : Incontinent, will place polanco for strict I&Os if continues  Infectious disease: Febrile, 103.7-possible UTI/pyelonephritis. UA 7/ with 9 WBC. No leuko/nitrite. Started on Cipro as OP.  Persistent fever, broadened to  "Cefepime. Repeat UA on admit, neg-will send for cx  COVID r/o negative  Prophylaxis: Bactrim (PCP ppx) three times per week  Valcyte (CMV PPx) two times weekly  -renally adjusted  Disposition: 7A    Medical Decision Making: Medium  Subsequent visit 98886 (moderate level decision making)    CONNIE/Fellow/Resident Provider: Jess Huang NP    Faculty: Nory Morgan M.D.  _________________________________________________________________  Transplant History: Admitted 7/4/2020 for Chills [R68.83]  Acute kidney injury (H) [N17.9]  Complication of transplanted kidney, unspecified complication [T86.10]  .  6/19/2020 (Kidney), 12/1/2007 (Kidney), Postoperative day: 16     Interval History: History is obtained from the patient  Overnight events: Reporting abdominal pain that is across the abdomen and radiating to the back. Nausea with vomiting this am. Unable to keep am meds down. Incontinent of urine but reports is voiding. Denies diarrhea. Denies sick contacts.     ROS:   A 10-point review of systems was negative except as noted above.    Meds:    aspirin  81 mg Oral Daily     atorvastatin  10 mg Oral Daily     carvedilol  12.5 mg Oral BID     ceFEPIme (MAXIPIME) IV  1 g Intravenous Q24H     methylPREDNISolone  500 mg Intravenous Once     mycophenolic acid  720 mg Oral BID IS     sodium chloride (PF)  3 mL Intracatheter Q8H     [START ON 7/6/2020] sulfamethoxazole-trimethoprim  1 tablet Oral Once per day on Mon Wed Fri     tacrolimus  6.5 mg Oral BID     [START ON 7/6/2020] valGANciclovir  450 mg Oral Once per day on Mon Thu       Physical Exam:     Admit Weight: 57.2 kg (126 lb 1.7 oz)    Current vitals:   /72 (BP Location: Right arm)   Pulse 82   Temp 99.2  F (37.3  C) (Oral)   Resp 27   Ht 1.676 m (5' 6\")   Wt 57.2 kg (126 lb 1.7 oz)   SpO2 100%   BMI 20.35 kg/m           Vital sign ranges:    Temp:  [98.2  F (36.8  C)-103.7  F (39.8  C)] 99.2  F (37.3  C)  Pulse:  [] 82  Heart Rate:  [] " 82  Resp:  [16-28] 27  BP: (102-133)/(60-88) 111/72  SpO2:  [97 %-100 %] 100 %  Patient Vitals for the past 24 hrs:   BP Temp Temp src Pulse Heart Rate Resp SpO2 Weight   07/05/20 1345 111/72 99.2  F (37.3  C) Oral 82 82 27 100 % --   07/05/20 1340 107/70 -- -- 86 89 20 100 % --   07/05/20 1335 107/63 -- -- 88 89 26 100 % --   07/05/20 1330 113/73 -- -- 96 96 25 99 % --   07/05/20 1325 113/74 -- -- 93 90 21 100 % --   07/05/20 1320 115/70 -- -- 87 85 27 100 % --   07/05/20 1315 114/65 -- -- 87 90 26 99 % --   07/05/20 1310 113/73 -- -- 90 90 28 100 % --   07/05/20 1305 118/74 99.2  F (37.3  C) Oral 92 90 26 100 % --   07/05/20 1300 119/86 -- -- -- 95 24 100 % --   07/05/20 1135 105/68 98.3  F (36.8  C) -- 83 -- 16 100 % --   07/05/20 1115 104/63 98.2  F (36.8  C) -- 82 -- 16 100 % --   07/05/20 0719 102/60 98.8  F (37.1  C) -- 92 -- 18 98 % --   07/05/20 0400 104/62 103.2  F (39.6  C) Oral -- 116 20 99 % 57.2 kg (126 lb 1.7 oz)   07/04/20 2353 117/73 98.9  F (37.2  C) Oral -- 94 16 99 % --   07/04/20 2146 107/63 100.3  F (37.9  C) Oral -- -- 20 99 % --   07/04/20 1949 133/74 103.7  F (39.8  C) Oral 104 104 24 99 % --   07/04/20 1919 -- -- -- -- -- -- 98 % --   07/04/20 1918 -- -- -- -- -- -- 99 % --   07/04/20 1917 -- -- -- -- -- -- 97 % --   07/04/20 1905 -- -- -- -- -- -- 99 % --   07/04/20 1904 -- -- -- -- -- -- 99 % --   07/04/20 1903 -- -- -- -- -- -- 99 % --   07/04/20 1828 119/88 99.9  F (37.7  C) Oral -- 101 16 99 % --   07/04/20 1825 -- -- -- -- -- -- 99 % --   07/04/20 1801 -- -- -- -- -- -- 99 % --   07/04/20 1800 -- -- -- -- -- -- 98 % --   07/04/20 1759 119/88 -- -- 107 -- -- 98 % --   07/04/20 1740 -- -- -- -- -- -- 98 % --   07/04/20 1739 -- -- -- -- -- -- 99 % --   07/04/20 1738 -- -- -- -- -- -- 99 % --   07/04/20 1641 -- -- -- -- -- -- 99 % --   07/04/20 1640 -- -- -- -- -- -- 100 % --   07/04/20 1639 -- -- -- -- -- -- 99 % --   07/04/20 1638 121/71 -- -- 107 -- -- 97 % --   07/04/20 1540 -- -- --  -- -- -- 100 % --   07/04/20 1539 -- -- -- -- -- -- 99 % --   07/04/20 1524 -- -- -- -- -- -- 100 % --   07/04/20 1523 -- -- -- -- -- -- 99 % --   07/04/20 1500 -- -- -- -- -- -- 100 % --     General Appearance: in moderate distress and moderate pain.   Skin: warm, dry, no rashes  Heart: regular rate and rhythm  Lungs: clear to auscultation, without wheezes  Abdomen: The abdomen is slightly distended, tender to bilateral lower quadrants, greatest to LLQ, over the kidney graft. The wound is well approximated, without signs of infection and no drainage.  : polanco is not present.   Extremities: edema: absent.   Neurologic: awake, alert and oriented. Tremor absent..     Data:   CMP  Recent Labs   Lab 07/05/20  0553 07/04/20  1335 07/04/20  1334     --  135   POTASSIUM 4.1  --  4.1   CHLORIDE 110*  --  107   CO2 14*  --  22   *  --  96   BUN 32*  --  25   CR 6.13*  --  4.12*   GFRESTIMATED 8* 11* 13*   GFRESTBLACK 10* 14* 15*   CASIE 6.9*  --  7.9*   MAG 1.2*  --  1.4*   PHOS 4.2  --  4.6*   ALBUMIN  --   --  2.9*   BILITOTAL  --   --  0.7   ALKPHOS  --   --  123   AST  --   --  34   ALT  --   --  31     CBC  Recent Labs   Lab 07/05/20  0651 07/05/20  0553   HGB 6.4* 6.6*   WBC 6.0 5.7    160     COAGS  Recent Labs   Lab 07/04/20  1953 07/04/20  1334   INR  --  1.40*   PTT 35  --       Urinalysis  Recent Labs   Lab Test 07/04/20  1346 07/01/20  1430  07/16/12  1400 07/02/12  1130   COLOR Yellow Patricia   < > Light Yellow  --    APPEARANCE Slightly Cloudy Slightly Cloudy   < > Clear  --    URINEGLC 70* Negative   < > Negative  --    URINEBILI Small* Negative   < > Negative  --    URINEKETONE Negative Negative   < > Negative  --    SG 1.027 1.025   < > 1.004  --    UBLD Small* Moderate*   < > Small*  --    URINEPH 8.0* 6.0   < > 7.5*  --    PROTEIN >600* 100*   < > Negative  --    NITRITE Negative Negative   < > Negative  --    LEUKEST Negative Negative   < > Negative  --    RBCU 21* 96*   < > 6*  --     WBCU 2 9*   < > <1  --    UTPG  --   --   --  1.53* 0.86*    < > = values in this interval not displayed.     Virology:  CMV DNA Quantitation Specimen   Date Value Ref Range Status   03/20/2013 Whole Blood  Final     CMV Quantitative   Date Value Ref Range Status   03/20/2013 <100  No CMV DNA detected. <100 Copies/mL Final   01/17/2013 <100  No CMV DNA detected. <100 Copies/mL Final   02/10/2010 <100 <100 Copies/mL Final     Comment:     No CMV DNA detected.     CMV IgG Antibody   Date Value Ref Range Status   11/05/2013 >10.00  Positive for anti-CMV IgG U/mL Final     EBV VCA IgG Antibody   Date Value Ref Range Status   11/05/2013 >750.00  Positive, suggests immunologic exposure. U/mL Final     Hepatitis C Antibody   Date Value Ref Range Status   06/19/2020 Nonreactive NR^Nonreactive Final     Comment:     Assay performance characteristics have not been established for newborns,   infants, and children       Hep B Surface Jocelyn   Date Value Ref Range Status   12/01/2012   Final    >1000.0  Positive, Patient is considered to be immune to infection with hepatitis B when   the value is greater than or equal to 12.0 mlU/mL.     BK Virus Result   Date Value Ref Range Status   03/08/2017 BK Virus DNA Not Detected BKNEG copies/mL Final   03/20/2013 <1000 <1000 copies/mL Final   01/17/2013 <1000 <1000 copies/mL Final   02/10/2010 <1000 <1000 copies/mL Final

## 2020-07-05 NOTE — PROGRESS NOTES
Patient arrived at about 2130 from ED. Patient is S/P Kidney Tx. 06/19/20 2/2 IgA nephropathy being readmitted for fevers, abdominal/generalized pain and  N/V. Admission profile and assessment done.      VS: Tmax.103.2(O), Tylenol given, -110's/60-70's, HR 's, O2 sat 99% on room air. With  and temp 103.2(O) triggered Sepsis SIRS, Lactic acid 0.4, MD on call notified, no intervention ordered.  LDA: Right PIV with NS@100cc/hr.  Neuro: A&Ox4.  GI/: Patient wearing pullup, per patient, she changed pullup x2.  Diet/appetite: Renal diet, poor PO intake due to nausea, emesis 150cc of clear water.   Activity: Encourage ambulation.  Pain/Nausea/Vomiting: PRN Tylenol and Dilaudid PO given for abdominal/generalized pain with minimal relief, Dilaudid IV given x1 with relief.  Skin: Intact.  Mobility: SBA.  Test/Procedure: N/A.  Plan: Continue POC.

## 2020-07-05 NOTE — PROGRESS NOTES
[facility] Procedure Note        Sedation:      Performed by: Quinton Jacome DO  Authorized by: Quinton Jacome DO    Pre-Procedure Assessment done at 1300.    Expected Level:  Moderate Sedation    Indication:  Sedation is required to allow for LLQ renal tx biopsy and fluid aspiration    Consent obtained from patient after discussing the risks, benefits and alternatives.    PO Intake:  Appropriately NPO for procedure    ASA Class:  Class 3 - SEVERE SYSTEMIC DISEASE, DEFINITE FUNCTIONAL LIMITATIONS.    Mallampati:  Grade 1:  Soft palate, uvula, tonsillar pillars, and posterior pharyngeal wall visible    Lungs: Lungs Clear with good breath sounds bilaterally.     Heart: 2/6 GLORIA. Regular Rhythm    History and physical reviewed and no updates needed. I have reviewed the lab findings, diagnostic data, medications, and the plan for sedation. I have determined this patient to be an appropriate candidate for the planned sedation and procedure and have reassessed the patient IMMEDIATELY PRIOR to sedation and procedure.

## 2020-07-05 NOTE — PROGRESS NOTES
INTERVENTIONAL RADIOLOGY CONSULT    Patient is on IR schedule today for a LLQ renal transplant biopsy and fluid aspiration. Labs WNL for procedure. Please keep patient NPO.  Consent will be done prior to procedure.    Lab Results   Component Value Date    INR 1.40 07/04/2020    INR 1.44 06/19/2020     Lab Results   Component Value Date     07/05/2020     07/05/2020       Please contact the IR charge RN at [phone] for estimated time of procedure.     Discussed with Dr. Martin, staff.    Quinton Jacome DO  Interventional Radiology Resident  Pager [phone]  Call Pager for After Hours: [phone]

## 2020-07-05 NOTE — H&P
General acute hospital, [place]        TRANSPLANT SURGERY HISTORY & PHYSICAL    Patient: Jelly Dietz   MRN: [id number]     Date of Admission: 2020  Attending Physician: Nory Morgan MD    CC: Fever, Chills, Sweats.    HPI: Jelly Dietz is a 35 year old female who has a significant past history of ESRD due to IgA Nephropathy s/p two kidney transplants. Her first transplant was in  ( which failed due to non-compliance during pregnancy), and HTN. She underwent a re-transplant of a  donor kidney with a ureteral stent, with 3 arteries on a common patch on 2020. She was hospitalized till  and was discharged home. Today she is POD 15 from her kidney transplant. She followed up at her transplant clinic on  for routine follow-up and was noted to have a UTI on UA. She was started on ciproflox 500 BID x 7 days. She was asymptomatic till yesterday. She started to develop a fever with chills, myalgias, sweats, generalized fatigue, and vomiting. She took elieser tylenol and Dilaudid for the pain and she says the provided some temporary relief. Today, she continued to spike fevers, was progressively feeling worse, increased pain over her abdomen/ incision site, generalized pain and myalgias with increased chills and sweats and dysuria. She vomited once this morning. She note her temperature to be in the 100's and called the nurse triage line and she was advised to come into the ED.         PMH:  Past Medical History:   Diagnosis Date     ACS (acute coronary syndrome) (H) 2014     Allergic state     seasonal allergies     Anemia in chronic renal disease      Anxiety      Cervical cerclage suture present in second trimester      Chest pain 2014     Chronic renal transplant rejection      End stage kidney disease (H)      Heart murmur      History of  delivery ,     30 wks, 28 wks      Hypertension     resolved after transplant     IgA nephropathy       Kidney transplanted 2020    DDKT. Induction w/ thymo 5.7mg/kg.     MRSA (methicillin resistant Staphylococcus aureus)      [facility]     Pre-eclampsia      Renal failure affecting pregnancy in second trimester      S/P kidney transplant 2007    LDKT in 2007 in [place]. History of 1B kidney rejection. Failed .     SAB (spontaneous )     2nd trimester        PSH:  Past Surgical History:   Procedure Laterality Date     CERCLAGE CERVICAL N/A 2015    Procedure: CERCLAGE CERVICAL;  Surgeon: Norbert Chopra MD;  Location: [facility] L[facility]D      SECTION  2012    Procedure:  SECTION;;  Surgeon: Chelsea Cross MD;  Location: [facility] L[facility]      SECTION N/A 2015    Procedure:  SECTION;  Surgeon: Chelsea Cross MD;  Location: [facility] OR     cesearean section       EXPLANT TRANSPLANTED KIDNEY  3/29/2013    Procedure: EXPLANT TRANSPLANTED KIDNEY;  Explant Transplanted right Kidney (from patients right iliac fossa);  Surgeon: Nory Morgan MD;  Location: [facility] OR     NEPHRECTOMY  3/29/13    Transplant nephrectomy      WILIAN/DIALYSIS CATHETER       TRANSPLANT KIDNEY RECIPIENT  DONOR N/A 2020    Procedure: TRANSPLANT, KIDNEY, RECIPIENT,  DONOR, venous reconstruction, and back bench preparation of kidney;  Surgeon: Irma Shannon MD;  Location: [facility] OR     TRANSPLANT KIDNEY RECIPIENT LIVING UNRELATED  Dec 2007    (Adult male in [place])       FH:  Family History   Problem Relation Age of Onset     Diabetes Paternal Grandfather      Breast Cancer Maternal Grandmother 55     Cancer No family hx of         No known family hx of skin cancer         Home Meds:  Tylenol 325 pRn. ASA 81 right eye,  MG BID, Tacrolimus 6.5mg BID.     ROS:   A 10-point review of systems was performed and otherwise negative except as reported in HPI.    Physical Exam:  Temp:  [99.9   F (37.7  C)-103.7  F (39.8  C)] 100.3  F (37.9  C)  Pulse:  [101-107] 104  Heart Rate:  [101-104] 104  Resp:  [16-24] 20  BP: (107-133)/(63-88) 107/63  SpO2:  [97 %-100 %] 99 %    Head: Normocephalic and atraumatic.   Eyes: Conjunctivae are normal. Pupils are equal, round, and reactive to light.   pharynx has no erythema or exudate, mucous membranes are dry  Neck:   no adenopathy, no bony tenderness  Cardiovascular: borderline tachycardia without murmurs or gallops  Pulmonary/Chest: Clear to auscultation bilaterally, with no wheezes or retractions. No respiratory distress.  GI: Soft with good bowel sounds.  Healing surgical scar, diffusely tender, non-distended, with no guarding, no rebound, no peritoneal signs. Increased tenderness over thr surgical incision.   Back:  No bony or CVA tenderness   Musculoskeletal:  no edema   Skin: Skin is warm and dry. No rash noted.   Neurological: alert and oriented to person, place, and time. Nonfocal exam  Psychiatric:  normal mood and affect.    Labs:  ABG No lab results found in last 7 days.  CBC   Recent Labs   Lab 07/04/20  1334 07/02/20  0830 06/30/20  0828   WBC 6.8 8.4 9.1   HGB 8.3* 9.8* 9.6*    365 366     BMP   Recent Labs   Lab 07/04/20  1334 07/02/20  0830 06/30/20  0828    137 138   POTASSIUM 4.1 4.2 4.4   CHLORIDE 107 108 107   CO2 22 22 22   BUN 25 15 11   CR 4.12* 0.92 0.58   GLC 96 97 85     LFTs   Recent Labs   Lab 07/04/20  1334   AST 34   ALT 31   ALKPHOS 123   BILITOTAL 0.7   ALBUMIN 2.9*   INR 1.40*     Pancreas No lab results found in last 7 days.    Imaging:  US Renal Transplant   Final Result   IMPRESSION:    Interval development of several perinephric fluid collections,   measuring as large as 5.9 cm along the inferior margin of the   transplant kidney. Nonspecific newly elevated arcuate artery resistive   indices.      I have personally reviewed the examination and initial interpretation   and I agree with the findings.      LUCIANO PALACIOS  NERIST, DO          ASSESSMENT/PLAN:  Jelly Dietz is a 35 year old female who s/p  donor kidney transplant. She is Post-operative day 15 and presents with fever with concerns for UTI/ Pyelonephritis/ Kierra-renal abscess/ CANDI.    Graft function: CANDI, Creat increased acutely form 0.92 on  to 4.12 on . Renal U/S to r/o fluid/ kierra-renal abscess. Evaluate Tac levels. IVF 0.9NS 100ml/hr.  Nephrology consult.     ID: CBC, UA with culture, Blood cultures. Broad spec coverage with Cefepime, will adjust based on culture results.     Heme: Chronic Anemia, Hb 8.3, will continue to monitor.     GI: Zofran IV for nausea, Diet: Regular as tolerated.     Patient and plan discussed with fellow Dr. Nadir Carlos, who will discuss with attending.      ALVINA Yip, MS  Surgery PGY 1  I have reviewed history, examined patient and discussed plan with the fellow/resident/CONNIE.    I concur with the findings in this note.    Time spent on admission activities: 45 minutes.

## 2020-07-05 NOTE — CONSULTS
[facility], [place]  Transplant Nephrology Consult  Date of Admission:  7/4/2020  Today's Date: 07/05/2020  Requesting physician: Nory Morgan MD    Recommendations:  - Send fluid collection for cr.    -recommend giving solu-medrol   - agree with 1 Unit of PRBC  - replace magnesium     Assessment & Plan   # DDKT: Trend up to 6.13              - Baseline Cr ~ 0.97              - Proteinuria: Not checked post transplant              - Date DSA Last Checked: checked at 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     # Hypertension: Controlled;  Goal BP: < 130/80   - Volume status: Hypovolemic  EDW ~ 62 kg   - Changes: Not at this time    # Elevated Blood Glucose: Glucose generally running ~    - Management as per primary team.    # Anemia in Chronic Renal Disease: Hgb: Trend down      ENZO: Yes  Hgb: 6.4 . Give 1 Unit of PRBC   - Iron studies: Not checked recently    # Mineral Bone Disorder:   - Secondary renal hyperparathyroidism; PTH level: Moderately elevated (301-600 pg/ml)        On treatment: None  - Vitamin D; level: Normal        On supplement: No  - Calcium; level: Low normal        On supplement: No  - Phosphorus; level: Normal        On supplement: No    # Electrolytes:   - Potassium; level: Normal        On supplement: No  - Magnesium; level: Low        On supplement: No recommend replacement.  Hypomagnesia in setting of  Repeat episodes of emesis  - Bicarbonate; level: Normal        On supplement: No  - Sodium; level: Normal    # perinephric fluid collections:  Drained in IR.  Send for culture:    #  Transplant History:  Etiology of Kidney Failure: IgA nephropathy  Tx: LDKT and DDKT  Transplant: 6/19/2020 (Kidney), 12/1/2007 (Kidney)  Donor Type: Donation after Brain Death        Donor Class:   Crossmatch at time of Tx: pending   Significant changes in immunosuppression: None  Significant transplant-related complications: None     Recommendations were communicated to primary team verbally.    Seen and discussed with Dr. Derrek Santos, NP  Pager: [phone]    Physician Attestation   I, Kieran Anglin, saw and evaluated Jelly Dietz as part of a shared visit.  I have reviewed and discussed with the advanced practice provider their history, physical and plan.    I personally reviewed the vital signs, medications and labs.    My key history or physical exam findings: Marked CANDI early post transplant.  Perinephric fluid collections and increased size of kidney allograft from ~ 11.8 to 14.0 cm.  Patient with ongoing abdominal pain.  Did have fairly therapeutic drug levels, but concern for accuracy of taking medications.    Key management decisions made by me: Concern for acute rejection, cellular and/or antibody-mediated.  Would recommend Solumedrol 500 mg IV tonight.  Agree with plan for kidney allograft biopsy and draining perinephric fluid collections.  Would send fluid for creatinine to rule out urine leak.    Kieran Anglin  Date of Service (when I saw the patient): 07/05/20    REASON FOR CONSULT   Immunosuppressive management     History of Present Illness   Jelly Dietz is a 35 year old female status postop second kidney transplant.    Patient was noted to have a fever at home and subsequently was brought into the ED.    Patient was noted to have an elevated creatinine and several perinephric fluid collections.     Patient has been febrile since being in the hospital.  She is also having a significant amount of abdominal pain.    Patient has been having frequent episodes of  vomiting and has not be able to tolerate anything PO.    She also states she has some dysuria.  Denies any frequency or hematuria.      Review of Systems    The 10 point Review of Systems is negative other than noted in the HPI or here.     Past Medical History    I have reviewed this patient's medical history and updated it with pertinent information if needed.   Past Medical History:   Diagnosis Date     ACS (acute coronary syndrome) (H) 2014     Allergic state     seasonal allergies     Anemia in chronic renal disease      Anxiety      Cervical cerclage suture present in second trimester      Chest pain 2014     Chronic renal transplant rejection      End stage kidney disease (H)      Heart murmur      History of  delivery ,     30 wks, 28 wks      Hypertension     resolved after transplant     IgA nephropathy      Kidney transplanted 2020    DDKT. Induction w/ thymo 5.7mg/kg.     MRSA (methicillin resistant Staphylococcus aureus)      [facility]     Pre-eclampsia      Renal failure affecting pregnancy in second trimester      S/P kidney transplant 2007    LDKT in 2007 in [facility]. History of 1B kidney rejection. Failed .     SAB (spontaneous )     2nd trimester        Past Surgical History   I have reviewed this patient's surgical history and updated it with pertinent information if needed.  Past Surgical History:   Procedure Laterality Date     CERCLAGE CERVICAL N/A 2015    Procedure: CERCLAGE CERVICAL;  Surgeon: Norbert Chopra MD;  Location: [facility] L+D      SECTION  2012    Procedure:  SECTION;;  Surgeon: Chelsea Cross MD;  Location: [facility] L+D      SECTION N/A 2015    Procedure:  SECTION;  Surgeon: Chelsea Cross MD;  Location: [facility] OR     cesearean section       EXPLANT TRANSPLANTED KIDNEY  3/29/2013    Procedure: EXPLANT  TRANSPLANTED KIDNEY;  Explant Transplanted right Kidney (from patients right iliac fossa);  Surgeon: Nory Morgan MD;  Location: [facility] OR     NEPHRECTOMY  3/29/13    Transplant nephrectomy      WILIAN/DIALYSIS CATHETER       TRANSPLANT KIDNEY RECIPIENT  DONOR N/A 2020    Procedure: TRANSPLANT, KIDNEY, RECIPIENT,  DONOR, venous reconstruction, and back bench preparation of kidney;  Surgeon: Irma Shannon MD;  Location: [facility] OR     TRANSPLANT KIDNEY RECIPIENT LIVING UNRELATED  Dec 2007    (Adult male in [place])       Family History   I have reviewed this patient's family history and updated it with pertinent information if needed.   Family History   Problem Relation Age of Onset     Diabetes Paternal Grandfather      Breast Cancer Maternal Grandmother 55     Cancer No family hx of         No known family hx of skin cancer       Social History   I have reviewed this patient's social history and updated it with pertinent information if needed. Jelly Dietz  reports that she has never smoked. She has never used smokeless tobacco. She reports that she does not drink alcohol or use drugs.    Allergies   No Known Allergies  Prior to Admission Medications     aspirin  81 mg Oral Daily     atorvastatin  10 mg Oral Daily     carvedilol  12.5 mg Oral BID     ceFEPIme (MAXIPIME) IV  1 g Intravenous Q24H     mycophenolic acid  720 mg Oral BID IS     sodium chloride (PF)  3 mL Intracatheter Q8H     [START ON 2020] sulfamethoxazole-trimethoprim  1 tablet Oral Once per day on      tacrolimus  6.5 mg Oral BID     [START ON 2020] valGANciclovir  450 mg Oral Once per day on        sodium chloride 100 mL/hr at 201       Physical Exam   Temp  Av.6  F (37  C)  Min: 97.2  F (36.2  C)  Max: 103.7  F (39.8  C)      Pulse  Av.9  Min: 65  Max: 122 Resp  Av.5  Min: 12  Max: 26  SpO2  Av %  Min: 91 %  Max: 100 %     /68   Pulse 83   Temp 98.3  F  "(36.8  C)   Resp 16   Ht 1.676 m (5' 6\")   Wt 57.2 kg (126 lb 1.7 oz)   SpO2 100%   BMI 20.35 kg/m     Date 07/05/20 0700 - 07/06/20 0659   Shift 6061-4996 0938-6194 9957-1381 24 Hour Total   INTAKE   I.V. 400   400   Shift Total(mL/kg) 400(6.99)   400(6.99)   OUTPUT   Emesis/NG output 100   100   Shift Total(mL/kg) 100(1.75)   100(1.75)   Weight (kg) 57.2 57.2 57.2 57.2      Admit Weight: 57.2 kg (126 lb 1.7 oz)     GENERAL APPEARANCE: alert and no distress  HENT: mouth without ulcers or lesions  LYMPHATICS: no cervical or supraclavicular nodes  RESP: lungs clear to auscultation - no rales, rhonchi or wheezes  CV: regular rhythm, normal rate, no rub, no murmur  EDEMA: no LE edema bilaterally  ABDOMEN: soft, nondistended, nontender, bowel sounds normal  MS: extremities normal - no gross deformities noted, no evidence of inflammation in joints, no muscle tenderness  SKIN: no rash    Data   CMP  Recent Labs   Lab 07/05/20  0553 07/04/20  1335 07/04/20  1334 07/02/20  0830 06/30/20  0828     --  135 137 138   POTASSIUM 4.1  --  4.1 4.2 4.4   CHLORIDE 110*  --  107 108 107   CO2 14*  --  22 22 22   ANIONGAP 11  --  6 7 9   *  --  96 97 85   BUN 32*  --  25 15 11   CR 6.13*  --  4.12* 0.92 0.58   GFRESTIMATED 8* 11* 13* 82 >90   GFRESTBLACK 10* 14* 15* >90 >90   CASIE 6.9*  --  7.9* 9.5 8.8   MAG 1.2*  --  1.4* 1.6 1.6   PHOS 4.2  --  4.6* 1.8* 2.0*   PROTTOTAL  --   --  6.5*  --   --    ALBUMIN  --   --  2.9*  --   --    BILITOTAL  --   --  0.7  --   --    ALKPHOS  --   --  123  --   --    AST  --   --  34  --   --    ALT  --   --  31  --   --      CBC  Recent Labs   Lab 07/05/20  0651 07/05/20  0553 07/04/20  1334 07/02/20  0830   HGB 6.4* 6.6* 8.3* 9.8*   WBC 6.0 5.7 6.8 8.4   RBC 2.13* 2.22* 2.76* 3.21*   HCT 20.3* 21.1* 26.0* 30.0*   MCV 95 95 94 94   MCH 30.0 29.7 30.1 30.5   MCHC 31.5 31.3* 31.9 32.7   RDW 14.5 14.3 14.3 14.3    160 231 365     INR  Recent Labs   Lab 07/04/20 1953 " 07/04/20  1334   INR  --  1.40*   PTT 35  --      ABGNo lab results found in last 7 days.   Urine Studies  Recent Labs   Lab Test 07/04/20  1346 07/01/20  1430 06/23/20  0830 01/02/18  2048   COLOR Yellow Patricia Yellow Yellow   APPEARANCE Slightly Cloudy Slightly Cloudy Clear Cloudy   URINEGLC 70* Negative Negative 150*   URINEBILI Small* Negative Negative Negative   URINEKETONE Negative Negative Negative Negative   SG 1.027 1.025 1.012 1.008   UBLD Small* Moderate* Moderate* Small*   URINEPH 8.0* 6.0 6.5 8.0*   PROTEIN >600* 100* 10* 30*   NITRITE Negative Negative Negative Negative   LEUKEST Negative Negative Small* Trace*   RBCU 21* 96* 144* 3*   WBCU 2 9* 4 8*     Recent Labs   Lab Test 07/16/12  1400 07/02/12  1130 06/25/12  0615 06/17/12  0930 06/13/12  1120 06/07/12  0643 06/01/12  0950 05/25/12  1830 05/25/12  1110 05/22/12  1536   UTPG 1.53* 0.86* 0.69* 0.82* 0.80* 0.88* 0.58* 0.47*  0.45* 0.56* 0.51*     PTH  Recent Labs   Lab Test 06/26/20  0842 11/30/12  2030 11/30/12  1030   PTHI 226* 500* 794*     Iron Studies  Recent Labs   Lab Test 07/05/20  0553 06/25/20  0749 11/30/12  1950   IRON 10* 57 125   * 163* 155*   IRONSAT 9* 35 81*   SHAHBAZ  --  886* 401*       IMAGING:  All imaging studies reviewed by me.

## 2020-07-06 ENCOUNTER — TELEPHONE (OUTPATIENT)
Dept: TRANSPLANT | Facility: CLINIC | Age: 35
End: 2020-07-06

## 2020-07-06 LAB
ALBUMIN UR-MCNC: >600 MG/DL
ANION GAP SERPL CALCULATED.3IONS-SCNC: 15 MMOL/L (ref 3–14)
ANION GAP SERPL CALCULATED.3IONS-SCNC: 17 MMOL/L (ref 3–14)
APPEARANCE UR: ABNORMAL
BACTERIA SPEC CULT: NO GROWTH
BASOPHILS # BLD AUTO: 0 10E9/L (ref 0–0.2)
BASOPHILS NFR BLD AUTO: 0 %
BILIRUB UR QL STRIP: NEGATIVE
BUN SERPL-MCNC: 48 MG/DL (ref 7–30)
BUN SERPL-MCNC: 57 MG/DL (ref 7–30)
C COLI+JEJUNI+LARI FUSA STL QL NAA+PROBE: NOT DETECTED
C DIFF TOX B STL QL: NEGATIVE
CALCIUM SERPL-MCNC: 6.6 MG/DL (ref 8.5–10.1)
CALCIUM SERPL-MCNC: 7.2 MG/DL (ref 8.5–10.1)
CHLORIDE SERPL-SCNC: 104 MMOL/L (ref 94–109)
CHLORIDE SERPL-SCNC: 107 MMOL/L (ref 94–109)
CO2 SERPL-SCNC: 11 MMOL/L (ref 20–32)
CO2 SERPL-SCNC: 15 MMOL/L (ref 20–32)
COLOR UR AUTO: YELLOW
CREAT SERPL-MCNC: 7.92 MG/DL (ref 0.52–1.04)
CREAT SERPL-MCNC: 8.68 MG/DL (ref 0.52–1.04)
DIFFERENTIAL METHOD BLD: ABNORMAL
DONOR IDENTIFICATION: NORMAL
DONOR IDENTIFICATION: NORMAL
DSA COMMENTS: NORMAL
DSA COMMENTS: NORMAL
DSA PRESENT: NO
DSA PRESENT: NO
DSA TEST METHOD: NORMAL
DSA TEST METHOD: NORMAL
EC STX1 GENE STL QL NAA+PROBE: NOT DETECTED
EC STX2 GENE STL QL NAA+PROBE: NOT DETECTED
ENTERIC PATHOGEN COMMENT: NORMAL
EOSINOPHIL # BLD AUTO: 0 10E9/L (ref 0–0.7)
EOSINOPHIL NFR BLD AUTO: 0 %
ERYTHROCYTE [DISTWIDTH] IN BLOOD BY AUTOMATED COUNT: 14.4 % (ref 10–15)
GFR SERPL CREATININE-BSD FRML MDRD: 5 ML/MIN/{1.73_M2}
GFR SERPL CREATININE-BSD FRML MDRD: 6 ML/MIN/{1.73_M2}
GLUCOSE SERPL-MCNC: 221 MG/DL (ref 70–99)
GLUCOSE SERPL-MCNC: 241 MG/DL (ref 70–99)
GLUCOSE UR STRIP-MCNC: 150 MG/DL
HCT VFR BLD AUTO: 26.3 % (ref 35–47)
HGB BLD-MCNC: 8.5 G/DL (ref 11.7–15.7)
HGB UR QL STRIP: ABNORMAL
HYALINE CASTS #/AREA URNS LPF: 1 /LPF (ref 0–2)
IMM GRANULOCYTES # BLD: 0 10E9/L (ref 0–0.4)
IMM GRANULOCYTES NFR BLD: 0.4 %
INTERFERING SUBST TEST METHOD: NORMAL
INTERFERING SUBST TEST METHOD: NORMAL
INTERFERING SUBSTANCE COMMENT: NORMAL
INTERFERING SUBSTANCE COMMENT: NORMAL
INTERFERING SUBSTANCE RESULT: NORMAL
INTERFERING SUBSTANCE RESULT: NORMAL
INTERFERING SUBSTANCE: NORMAL
INTERFERING SUBSTANCE: NORMAL
KETONES UR STRIP-MCNC: 10 MG/DL
LEUKOCYTE ESTERASE UR QL STRIP: NEGATIVE
LYMPHOCYTES # BLD AUTO: 0.2 10E9/L (ref 0.8–5.3)
LYMPHOCYTES NFR BLD AUTO: 3.1 %
MAGNESIUM SERPL-MCNC: 2.3 MG/DL (ref 1.6–2.3)
MCH RBC QN AUTO: 29.7 PG (ref 26.5–33)
MCHC RBC AUTO-ENTMCNC: 32.3 G/DL (ref 31.5–36.5)
MCV RBC AUTO: 92 FL (ref 78–100)
MONOCYTES # BLD AUTO: 0 10E9/L (ref 0–1.3)
MONOCYTES NFR BLD AUTO: 0.8 %
MUCOUS THREADS #/AREA URNS LPF: PRESENT /LPF
NEUTROPHILS # BLD AUTO: 4.7 10E9/L (ref 1.6–8.3)
NEUTROPHILS NFR BLD AUTO: 95.7 %
NITRATE UR QL: NEGATIVE
NOROV GI+II ORF1-ORF2 JNC STL QL NAA+PR: NOT DETECTED
NRBC # BLD AUTO: 0 10*3/UL
NRBC BLD AUTO-RTO: 0 /100
ORGAN: NORMAL
ORGAN: NORMAL
PH UR STRIP: 8 PH (ref 5–7)
PHOSPHATE SERPL-MCNC: 7 MG/DL (ref 2.5–4.5)
PLATELET # BLD AUTO: 168 10E9/L (ref 150–450)
POTASSIUM SERPL-SCNC: 4 MMOL/L (ref 3.4–5.3)
POTASSIUM SERPL-SCNC: 4.3 MMOL/L (ref 3.4–5.3)
RBC # BLD AUTO: 2.86 10E12/L (ref 3.8–5.2)
RBC #/AREA URNS AUTO: 78 /HPF (ref 0–2)
RVA NSP5 STL QL NAA+PROBE: NOT DETECTED
SA1 CELL: NORMAL
SA1 CELL: NORMAL
SA1 COMMENTS: NORMAL
SA1 COMMENTS: NORMAL
SA1 HI RISK ABY: NORMAL
SA1 HI RISK ABY: NORMAL
SA1 MOD RISK ABY: NORMAL
SA1 MOD RISK ABY: NORMAL
SA1 TEST METHOD: NORMAL
SA1 TEST METHOD: NORMAL
SA2 CELL: NORMAL
SA2 CELL: NORMAL
SA2 COMMENTS: NORMAL
SA2 COMMENTS: NORMAL
SA2 HI RISK ABY UA: NORMAL
SA2 HI RISK ABY UA: NORMAL
SA2 MOD RISK ABY: NORMAL
SA2 MOD RISK ABY: NORMAL
SA2 TEST METHOD: NORMAL
SA2 TEST METHOD: NORMAL
SALMONELLA SP RPOD STL QL NAA+PROBE: NOT DETECTED
SHIGELLA SP+EIEC IPAH STL QL NAA+PROBE: NOT DETECTED
SODIUM SERPL-SCNC: 133 MMOL/L (ref 133–144)
SODIUM SERPL-SCNC: 135 MMOL/L (ref 133–144)
SOURCE: ABNORMAL
SP GR UR STRIP: 1.02 (ref 1–1.03)
SPECIMEN SOURCE: NORMAL
SPECIMEN SOURCE: NORMAL
SQUAMOUS #/AREA URNS AUTO: 3 /HPF (ref 0–1)
TACROLIMUS BLD-MCNC: 11.4 UG/L (ref 5–15)
TME LAST DOSE: NORMAL H
TRANS CELLS #/AREA URNS HPF: <1 /HPF (ref 0–1)
UNACCEPTABLE ANTIGEN: NORMAL
UNACCEPTABLE ANTIGEN: NORMAL
UNOS CPRA: 95
UNOS CPRA: 95
UROBILINOGEN UR STRIP-MCNC: NORMAL MG/DL (ref 0–2)
V CHOL+PARA RFBL+TRKH+TNAA STL QL NAA+PR: NOT DETECTED
WBC # BLD AUTO: 4.9 10E9/L (ref 4–11)
WBC #/AREA URNS AUTO: 21 /HPF (ref 0–5)
Y ENTERO RECN STL QL NAA+PROBE: NOT DETECTED

## 2020-07-06 PROCEDURE — 87493 C DIFF AMPLIFIED PROBE: CPT | Performed by: STUDENT IN AN ORGANIZED HEALTH CARE EDUCATION/TRAINING PROGRAM

## 2020-07-06 PROCEDURE — 25000132 ZZH RX MED GY IP 250 OP 250 PS 637: Mod: GY | Performed by: STUDENT IN AN ORGANIZED HEALTH CARE EDUCATION/TRAINING PROGRAM

## 2020-07-06 PROCEDURE — 80197 ASSAY OF TACROLIMUS: CPT | Performed by: NURSE PRACTITIONER

## 2020-07-06 PROCEDURE — 25000132 ZZH RX MED GY IP 250 OP 250 PS 637: Mod: GY | Performed by: NURSE PRACTITIONER

## 2020-07-06 PROCEDURE — 12000026 ZZH R&B TRANSPLANT

## 2020-07-06 PROCEDURE — 81001 URINALYSIS AUTO W/SCOPE: CPT

## 2020-07-06 PROCEDURE — 25000125 ZZHC RX 250: Performed by: NURSE PRACTITIONER

## 2020-07-06 PROCEDURE — 36415 COLL VENOUS BLD VENIPUNCTURE: CPT | Performed by: NURSE PRACTITIONER

## 2020-07-06 PROCEDURE — 25000131 ZZH RX MED GY IP 250 OP 636 PS 637: Mod: GY | Performed by: EMERGENCY MEDICINE

## 2020-07-06 PROCEDURE — 80048 BASIC METABOLIC PNL TOTAL CA: CPT | Performed by: STUDENT IN AN ORGANIZED HEALTH CARE EDUCATION/TRAINING PROGRAM

## 2020-07-06 PROCEDURE — 40000556 ZZH STATISTIC PERIPHERAL IV START W US GUIDANCE

## 2020-07-06 PROCEDURE — 87086 URINE CULTURE/COLONY COUNT: CPT

## 2020-07-06 PROCEDURE — 25000128 H RX IP 250 OP 636: Performed by: NURSE PRACTITIONER

## 2020-07-06 PROCEDURE — 83735 ASSAY OF MAGNESIUM: CPT | Performed by: STUDENT IN AN ORGANIZED HEALTH CARE EDUCATION/TRAINING PROGRAM

## 2020-07-06 PROCEDURE — 85025 COMPLETE CBC W/AUTO DIFF WBC: CPT | Performed by: STUDENT IN AN ORGANIZED HEALTH CARE EDUCATION/TRAINING PROGRAM

## 2020-07-06 PROCEDURE — 87506 IADNA-DNA/RNA PROBE TQ 6-11: CPT | Performed by: STUDENT IN AN ORGANIZED HEALTH CARE EDUCATION/TRAINING PROGRAM

## 2020-07-06 PROCEDURE — 25800030 ZZH RX IP 258 OP 636: Performed by: NURSE PRACTITIONER

## 2020-07-06 PROCEDURE — 25000128 H RX IP 250 OP 636: Performed by: STUDENT IN AN ORGANIZED HEALTH CARE EDUCATION/TRAINING PROGRAM

## 2020-07-06 PROCEDURE — 36415 COLL VENOUS BLD VENIPUNCTURE: CPT | Performed by: STUDENT IN AN ORGANIZED HEALTH CARE EDUCATION/TRAINING PROGRAM

## 2020-07-06 PROCEDURE — 25000125 ZZHC RX 250: Performed by: STUDENT IN AN ORGANIZED HEALTH CARE EDUCATION/TRAINING PROGRAM

## 2020-07-06 PROCEDURE — 25800030 ZZH RX IP 258 OP 636: Performed by: STUDENT IN AN ORGANIZED HEALTH CARE EDUCATION/TRAINING PROGRAM

## 2020-07-06 PROCEDURE — 25000132 ZZH RX MED GY IP 250 OP 250 PS 637: Mod: GY | Performed by: SURGERY

## 2020-07-06 PROCEDURE — 84100 ASSAY OF PHOSPHORUS: CPT | Performed by: STUDENT IN AN ORGANIZED HEALTH CARE EDUCATION/TRAINING PROGRAM

## 2020-07-06 RX ORDER — DIPHENHYDRAMINE HCL 12.5MG/5ML
25-50 LIQUID (ML) ORAL ONCE
Status: COMPLETED | OUTPATIENT
Start: 2020-07-06 | End: 2020-07-06

## 2020-07-06 RX ORDER — ASPIRIN 81 MG/1
81 TABLET, CHEWABLE ORAL
Status: DISCONTINUED | OUTPATIENT
Start: 2020-07-06 | End: 2020-07-18 | Stop reason: HOSPADM

## 2020-07-06 RX ORDER — DEXTROSE MONOHYDRATE 25 G/50ML
25-50 INJECTION, SOLUTION INTRAVENOUS
Status: DISCONTINUED | OUTPATIENT
Start: 2020-07-06 | End: 2020-07-11

## 2020-07-06 RX ORDER — NICOTINE POLACRILEX 4 MG
15-30 LOZENGE BUCCAL
Status: DISCONTINUED | OUTPATIENT
Start: 2020-07-06 | End: 2020-07-11

## 2020-07-06 RX ORDER — DIPHENHYDRAMINE HCL 25 MG
25-50 CAPSULE ORAL ONCE
Status: COMPLETED | OUTPATIENT
Start: 2020-07-06 | End: 2020-07-06

## 2020-07-06 RX ORDER — PROCHLORPERAZINE MALEATE 5 MG
10 TABLET ORAL EVERY 6 HOURS PRN
Status: DISCONTINUED | OUTPATIENT
Start: 2020-07-06 | End: 2020-07-18 | Stop reason: HOSPADM

## 2020-07-06 RX ORDER — PANTOPRAZOLE SODIUM 40 MG/1
40 TABLET, DELAYED RELEASE ORAL
Status: DISCONTINUED | OUTPATIENT
Start: 2020-07-06 | End: 2020-07-10

## 2020-07-06 RX ORDER — ACETAMINOPHEN 325 MG/1
650 TABLET ORAL ONCE
Status: COMPLETED | OUTPATIENT
Start: 2020-07-06 | End: 2020-07-06

## 2020-07-06 RX ORDER — SODIUM CHLORIDE 9 MG/ML
INJECTION, SOLUTION INTRAVENOUS
Status: DISCONTINUED
Start: 2020-07-06 | End: 2020-07-07 | Stop reason: HOSPADM

## 2020-07-06 RX ORDER — ONDANSETRON 4 MG/1
4 TABLET, ORALLY DISINTEGRATING ORAL
Status: DISCONTINUED | OUTPATIENT
Start: 2020-07-06 | End: 2020-07-18 | Stop reason: HOSPADM

## 2020-07-06 RX ORDER — SODIUM BICARBONATE 650 MG/1
1300 TABLET ORAL 2 TIMES DAILY
Status: DISCONTINUED | OUTPATIENT
Start: 2020-07-06 | End: 2020-07-18 | Stop reason: HOSPADM

## 2020-07-06 RX ADMIN — SODIUM BICARBONATE 650 MG TABLET 1300 MG: at 20:12

## 2020-07-06 RX ADMIN — HYDROMORPHONE HYDROCHLORIDE 0.5 MG: 1 INJECTION, SOLUTION INTRAMUSCULAR; INTRAVENOUS; SUBCUTANEOUS at 19:23

## 2020-07-06 RX ADMIN — DIPHENHYDRAMINE HYDROCHLORIDE 50 MG: 25 CAPSULE ORAL at 21:37

## 2020-07-06 RX ADMIN — PANTOPRAZOLE SODIUM 40 MG: 40 TABLET, DELAYED RELEASE ORAL at 10:37

## 2020-07-06 RX ADMIN — HYDROMORPHONE HYDROCHLORIDE 0.5 MG: 1 INJECTION, SOLUTION INTRAMUSCULAR; INTRAVENOUS; SUBCUTANEOUS at 14:03

## 2020-07-06 RX ADMIN — ONDANSETRON 4 MG: 2 INJECTION INTRAMUSCULAR; INTRAVENOUS at 01:35

## 2020-07-06 RX ADMIN — PROCHLORPERAZINE EDISYLATE 10 MG: 5 INJECTION INTRAMUSCULAR; INTRAVENOUS at 18:40

## 2020-07-06 RX ADMIN — ONDANSETRON 4 MG: 4 TABLET, ORALLY DISINTEGRATING ORAL at 14:03

## 2020-07-06 RX ADMIN — CEFEPIME HYDROCHLORIDE 1 G: 1 INJECTION, POWDER, FOR SOLUTION INTRAMUSCULAR; INTRAVENOUS at 16:27

## 2020-07-06 RX ADMIN — PROCHLORPERAZINE EDISYLATE 10 MG: 5 INJECTION INTRAMUSCULAR; INTRAVENOUS at 02:43

## 2020-07-06 RX ADMIN — CARVEDILOL 6.25 MG: 6.25 TABLET, FILM COATED ORAL at 09:08

## 2020-07-06 RX ADMIN — VALGANCICLOVIR 450 MG: 450 TABLET, FILM COATED ORAL at 09:14

## 2020-07-06 RX ADMIN — TACROLIMUS 6.5 MG: 5 CAPSULE ORAL at 18:38

## 2020-07-06 RX ADMIN — ATORVASTATIN CALCIUM 10 MG: 10 TABLET, FILM COATED ORAL at 09:08

## 2020-07-06 RX ADMIN — PROCHLORPERAZINE EDISYLATE 10 MG: 5 INJECTION INTRAMUSCULAR; INTRAVENOUS at 09:15

## 2020-07-06 RX ADMIN — SODIUM BICARBONATE: 84 INJECTION, SOLUTION INTRAVENOUS at 11:39

## 2020-07-06 RX ADMIN — SODIUM BICARBONATE 650 MG TABLET 1300 MG: at 09:08

## 2020-07-06 RX ADMIN — ACETAMINOPHEN 650 MG: 325 TABLET, FILM COATED ORAL at 21:37

## 2020-07-06 RX ADMIN — MYCOPHENOLATE MOFETIL 1000 MG: 500 INJECTION, POWDER, LYOPHILIZED, FOR SOLUTION INTRAVENOUS at 03:41

## 2020-07-06 RX ADMIN — HYDROMORPHONE HYDROCHLORIDE 0.5 MG: 1 INJECTION, SOLUTION INTRAMUSCULAR; INTRAVENOUS; SUBCUTANEOUS at 04:43

## 2020-07-06 RX ADMIN — HYDROMORPHONE HYDROCHLORIDE 0.5 MG: 1 INJECTION, SOLUTION INTRAMUSCULAR; INTRAVENOUS; SUBCUTANEOUS at 16:27

## 2020-07-06 RX ADMIN — SODIUM CHLORIDE 500 MG: 9 INJECTION, SOLUTION INTRAVENOUS at 20:55

## 2020-07-06 RX ADMIN — HYDROMORPHONE HYDROCHLORIDE 0.5 MG: 1 INJECTION, SOLUTION INTRAMUSCULAR; INTRAVENOUS; SUBCUTANEOUS at 01:48

## 2020-07-06 RX ADMIN — MYCOPHENOLATE MOFETIL 1000 MG: 500 INJECTION, POWDER, LYOPHILIZED, FOR SOLUTION INTRAVENOUS at 18:38

## 2020-07-06 RX ADMIN — HEPARIN SODIUM 125 MG: 1000 INJECTION INTRAVENOUS; SUBCUTANEOUS at 22:06

## 2020-07-06 RX ADMIN — SULFAMETHOXAZOLE AND TRIMETHOPRIM 1 TABLET: 400; 80 TABLET ORAL at 09:08

## 2020-07-06 RX ADMIN — LIDOCAINE HYDROCHLORIDE 30 ML: 20 SOLUTION ORAL; TOPICAL at 10:37

## 2020-07-06 RX ADMIN — TACROLIMUS 6.5 MG: 5 CAPSULE ORAL at 09:08

## 2020-07-06 ASSESSMENT — ACTIVITIES OF DAILY LIVING (ADL)
ADLS_ACUITY_SCORE: 10
ADLS_ACUITY_SCORE: 11
ADLS_ACUITY_SCORE: 10

## 2020-07-06 NOTE — TELEPHONE ENCOUNTER
"Pt is paging on call transplant coordinator stating \"I NEED medications now for my throat and heart burn.\" reminded patient to use call light as she is in patient. She reports nobody is listening to her and she is frustrated. Spoke with nurse, Bhargav from floor to inform that she paged. He is aware and the MD is aware.   "

## 2020-07-06 NOTE — PROGRESS NOTES
[facility]  Patient is currently open to home care services with [facility]. The patient is currently receiving skilled nursing services. [facility]  and team have been notified of patient admission. [facility] liaison will continue to follow patient during stay. If appropriate provide orders to resume home care at time of discharge.    Chloe Sinha RN   [facility] Care Liaison   [phone]

## 2020-07-06 NOTE — PROGRESS NOTES
VS: Afebrile, 's/60-70's, HR 70-80's, O2 sat '%.  LDA: Left arm AV fistula intact, right PIV with NS@100cc/hr.  Neuro: A&Ox4.  GI/: Oliguric, still need urine sample, patient aware. No BM during the shift.  Diet/appetite: Regular diet, poor PO intake due to nausea.  Activity: Encourage ambulation 4 times a day.  Pain/Nausea/Vomiting: Dilaudid IV given x3 for generalized/abdominal pain with relief. Emesis x3, Zofran and Compazine given with minimal relief.  Skin: Biopsy site dressing c/d/i.  Mobility: SBA.  Test/Procedure: N/A.  Plan: Continue POC.

## 2020-07-06 NOTE — PROGRESS NOTES
Transplant Surgery  Inpatient Daily Progress Note  07/06/2020    Assessment & Plan: 34 yo with PMH significant for ESRD due to IgA Nephropathy s/p KT in 2007 (failed due to non-compliance during pregnancy) and 6/19/2020 (3 arteries, + stent), and HTN. She presented on 7/4/2020 with fever and ARF of transplanted kidney.    Graft function: POD #17  ARF of transplant kidney: Cr 7.9, baseline Cr 0.6. 7/4 US: several perinephric fluid collections, kidney 14cm (previously 11.6 on POD # 0), no hydro. 7/5 biopsy pending. Received steroids as below. DSA pending.  Perinephric fluid collections: Noted on US. 7/5 IR: 50ml cloudy serosang fluid aspirated, gram stain negative, culture pending. Not suggestive of urine leak.  Immunosuppression management:    Methylpred: 500mg on 7/5, empiric for rejection.   Tacro: Goal 8-10  MMF: 1000 IV BID d/t vomiting. Was on Myfortic on admission.  Complexity of management:Medium. Contributing factors: anemia, fever, organ dysfunction, nausea and active infection  Hematology:   ANTHONY/ACD: Hgb 8.5. Received 1u RBC on 7/5 fore hgb 6.4. No sign of bleeding.  PSAT 9, consider venofer if no infection.  Cardiorespiratory:   HTN: Coreg reduced this admission. SBP 100s-120s.  GI/Nutrition:   Diet: Limited by vomiting.  Nausea/Vomiting: Schedule zofran. PPI added. GI cocktail.  Diarrhea: Since transplant. Check enteric panel and cdiff.   Endocrine: No acute issues.   Fluid/Electrolytes: MIVF:NS @ 100  Metabolic acidosis: Sodium bicarb added. Will discuss with Neph.  : Incontinent at times.  Infectious disease: Tmax 99.2F, WBC 4.9  Fever: Fever 103.7F on admission. Infection vs rejection. Cipro started outpatient, broadened to Cefepine on 7/4. COVID neg, 7/4 UC neg, 7/4 blood cx NGTD, 7/5 perinephric fluid cultures pending (gram stain neg), enteric panel ordered, C-diff ordered.  Prophylaxis: Bactrim (PCP ppx) three times per week  Valcyte (CMV PPx)   Disposition: 7A    Medical Decision Making:  "Medium  Subsequent visit 31043 (moderate level decision making)    CONNIE/Fellow/Resident Provider: Stephanie Ling NP [id number]    Faculty: Nory Morgan M.D.  _________________________________________________________________    Interval History: History is obtained from the patient  Chills overnight, persistent nausea, vomiting with pain in throat (denies pain while drinking), diarrhea, pain LLQ over kidney, pain bilateral lower ribs, pain in low back    ROS:   A 10-point review of systems was negative except as noted above.    Meds:    atorvastatin  10 mg Oral Daily     carvedilol  6.25 mg Oral BID     ceFEPIme (MAXIPIME) IV  1 g Intravenous Q24H     mycophenolate mofetil  1,000 mg Intravenous Q12H     sodium chloride (PF)  3 mL Intracatheter Q8H     sulfamethoxazole-trimethoprim  1 tablet Oral Once per day on Mon Wed Fri     tacrolimus  6.5 mg Oral BID     valGANciclovir  450 mg Oral Once per day on Mon Thu       Physical Exam:     Admit Weight: 57.2 kg (126 lb 1.7 oz)    Current vitals:   /78 (BP Location: Right arm)   Pulse 96   Temp 98.4  F (36.9  C)   Resp 16   Ht 1.676 m (5' 6\")   Wt 57.2 kg (126 lb 1.7 oz)   SpO2 100%   BMI 20.35 kg/m      Vital sign ranges:    Temp:  [98  F (36.7  C)-99.2  F (37.3  C)] 98.4  F (36.9  C)  Pulse:  [82-96] 96  Heart Rate:  [78-96] 78  Resp:  [16-28] 16  BP: (102-122)/(63-86) 104/78  SpO2:  [98 %-100 %] 100 %  Patient Vitals for the past 24 hrs:   BP Temp Temp src Pulse Heart Rate Resp SpO2   07/06/20 0400 104/78 98.4  F (36.9  C) -- -- 78 16 100 %   07/05/20 2336 102/64 98  F (36.7  C) Oral -- 80 16 98 %   07/05/20 2038 122/72 98.2  F (36.8  C) Oral 96 -- 18 99 %   07/05/20 1530 106/66 98.7  F (37.1  C) Oral 87 -- 18 100 %   07/05/20 1445 108/70 99.2  F (37.3  C) Oral 85 -- 18 100 %   07/05/20 1345 111/72 99.2  F (37.3  C) Oral 82 82 27 100 %   07/05/20 1340 107/70 -- -- 86 89 20 100 %   07/05/20 1335 107/63 -- -- 88 89 26 100 %   07/05/20 1330 113/73 -- -- 96 96 25 99 " %   07/05/20 1325 113/74 -- -- 93 90 21 100 %   07/05/20 1320 115/70 -- -- 87 85 27 100 %   07/05/20 1315 114/65 -- -- 87 90 26 99 %   07/05/20 1310 113/73 -- -- 90 90 28 100 %   07/05/20 1305 118/74 99.2  F (37.3  C) Oral 92 90 26 100 %   07/05/20 1300 119/86 -- -- -- 95 24 100 %   07/05/20 1135 105/68 98.3  F (36.8  C) -- 83 -- 16 100 %   07/05/20 1115 104/63 98.2  F (36.8  C) -- 82 -- 16 100 %     General Appearance: in moderate distress and moderate pain.   Skin: warm, dry, no rashes  ENT: No thrush  Heart: Perfused  Lungs: RA, unlabored  Abdomen: The abdomen is slightly distended, tender to bilateral lower quadrants, greatest to LLQ, over the kidney graft. The wound is well approximated, without signs of infection and no drainage.  : polanco is not present.   Extremities: edema: absent.   Neurologic: awake, alert and oriented x4. Tremor absent.    Data:   CMP  Recent Labs   Lab 07/06/20  0640 07/05/20  1330 07/05/20  0553  07/04/20  1334     --  136  --  135   POTASSIUM 4.3  --  4.1  --  4.1   CHLORIDE 107  --  110*  --  107   CO2 11*  --  14*  --  22   *  --  108*  --  96   BUN 48*  --  32*  --  25   CR 7.92*  --  6.13*  --  4.12*   GFRESTIMATED 6*  --  8*   < > 13*   GFRESTBLACK 7*  --  10*   < > 15*   CASIE 7.2*  --  6.9*  --  7.9*   MAG 2.3  --  1.2*  --  1.4*   PHOS 7.0*  --  4.2  --  4.6*   ALBUMIN  --   --   --   --  2.9*   BILITOTAL  --   --   --   --  0.7   ALKPHOS  --   --   --   --  123   AST  --   --   --   --  34   ALT  --   --   --   --  31   FCREAT  --  4.7  --   --   --     < > = values in this interval not displayed.     CBC  Recent Labs   Lab 07/06/20  0640 07/05/20  0651   HGB 8.5* 6.4*   WBC 4.9 6.0    165     COAGS  Recent Labs   Lab 07/04/20  1953 07/04/20  1334   INR  --  1.40*   PTT 35  --       Urinalysis  Recent Labs   Lab Test 07/04/20  1346 07/01/20  1430  07/16/12  1400 07/02/12  1130   COLOR Yellow Patricia   < > Light Yellow  --    APPEARANCE Slightly Cloudy  Slightly Cloudy   < > Clear  --    URINEGLC 70* Negative   < > Negative  --    URINEBILI Small* Negative   < > Negative  --    URINEKETONE Negative Negative   < > Negative  --    SG 1.027 1.025   < > 1.004  --    UBLD Small* Moderate*   < > Small*  --    URINEPH 8.0* 6.0   < > 7.5*  --    PROTEIN >600* 100*   < > Negative  --    NITRITE Negative Negative   < > Negative  --    LEUKEST Negative Negative   < > Negative  --    RBCU 21* 96*   < > 6*  --    WBCU 2 9*   < > <1  --    UTPG  --   --   --  1.53* 0.86*    < > = values in this interval not displayed.

## 2020-07-07 ENCOUNTER — APPOINTMENT (OUTPATIENT)
Dept: LAB | Facility: CLINIC | Age: 35
End: 2020-07-07
Payer: MEDICARE

## 2020-07-07 PROBLEM — E87.20 METABOLIC ACIDOSIS: Status: ACTIVE | Noted: 2020-07-07

## 2020-07-07 PROBLEM — R73.9 STEROID-INDUCED HYPERGLYCEMIA: Status: ACTIVE | Noted: 2020-07-07

## 2020-07-07 PROBLEM — T38.0X5A STEROID-INDUCED HYPERGLYCEMIA: Status: ACTIVE | Noted: 2020-07-07

## 2020-07-07 PROBLEM — T86.11 ACUTE REJECTION OF KIDNEY TRANSPLANT: Status: ACTIVE | Noted: 2020-07-07

## 2020-07-07 LAB
ANION GAP SERPL CALCULATED.3IONS-SCNC: 15 MMOL/L (ref 3–14)
BACTERIA SPEC CULT: NO GROWTH
BASOPHILS # BLD AUTO: 0 10E9/L (ref 0–0.2)
BASOPHILS NFR BLD AUTO: 0 %
BLD PROD TYP BPU: NORMAL
BLD UNIT ID BPU: 0
BLOOD PRODUCT CODE: NORMAL
BPU ID: NORMAL
BUN SERPL-MCNC: 62 MG/DL (ref 7–30)
CA-I SERPL ISE-MCNC: 3.3 MG/DL (ref 4.4–5.2)
CALCIUM SERPL-MCNC: 6.4 MG/DL (ref 8.5–10.1)
CHLORIDE SERPL-SCNC: 106 MMOL/L (ref 94–109)
CO2 SERPL-SCNC: 13 MMOL/L (ref 20–32)
COPATH REPORT: NORMAL
CREAT SERPL-MCNC: 8.94 MG/DL (ref 0.52–1.04)
DIFFERENTIAL METHOD BLD: ABNORMAL
EOSINOPHIL # BLD AUTO: 0 10E9/L (ref 0–0.7)
EOSINOPHIL NFR BLD AUTO: 0 %
ERYTHROCYTE [DISTWIDTH] IN BLOOD BY AUTOMATED COUNT: 14.6 % (ref 10–15)
GFR SERPL CREATININE-BSD FRML MDRD: 5 ML/MIN/{1.73_M2}
GLUCOSE BLDC GLUCOMTR-MCNC: 190 MG/DL (ref 70–99)
GLUCOSE BLDC GLUCOMTR-MCNC: 198 MG/DL (ref 70–99)
GLUCOSE BLDC GLUCOMTR-MCNC: 217 MG/DL (ref 70–99)
GLUCOSE BLDC GLUCOMTR-MCNC: 249 MG/DL (ref 70–99)
GLUCOSE BLDC GLUCOMTR-MCNC: 249 MG/DL (ref 70–99)
GLUCOSE SERPL-MCNC: 230 MG/DL (ref 70–99)
HAPTOGLOB SERPL-MCNC: 228 MG/DL (ref 32–197)
HCT VFR BLD AUTO: 21.8 % (ref 35–47)
HGB BLD-MCNC: 7.6 G/DL (ref 11.7–15.7)
IMM GRANULOCYTES # BLD: 0 10E9/L (ref 0–0.4)
IMM GRANULOCYTES NFR BLD: 0.6 %
LYMPHOCYTES # BLD AUTO: 0 10E9/L (ref 0.8–5.3)
LYMPHOCYTES NFR BLD AUTO: 0.9 %
Lab: NORMAL
MAGNESIUM SERPL-MCNC: 2.2 MG/DL (ref 1.6–2.3)
MCH RBC QN AUTO: 29.7 PG (ref 26.5–33)
MCHC RBC AUTO-ENTMCNC: 34.9 G/DL (ref 31.5–36.5)
MCV RBC AUTO: 85 FL (ref 78–100)
MONOCYTES # BLD AUTO: 0.1 10E9/L (ref 0–1.3)
MONOCYTES NFR BLD AUTO: 1.5 %
NEUTROPHILS # BLD AUTO: 3.2 10E9/L (ref 1.6–8.3)
NEUTROPHILS NFR BLD AUTO: 97 %
NRBC # BLD AUTO: 0 10*3/UL
NRBC BLD AUTO-RTO: 0 /100
PHOSPHATE SERPL-MCNC: 6.9 MG/DL (ref 2.5–4.5)
PLATELET # BLD AUTO: 148 10E9/L (ref 150–450)
POTASSIUM SERPL-SCNC: 4.2 MMOL/L (ref 3.4–5.3)
RBC # BLD AUTO: 2.56 10E12/L (ref 3.8–5.2)
RETICS # AUTO: 17.1 10E9/L (ref 25–95)
RETICS/RBC NFR AUTO: 0.7 % (ref 0.5–2)
SODIUM SERPL-SCNC: 134 MMOL/L (ref 133–144)
SPECIMEN SOURCE: NORMAL
TRANSFUSION STATUS PATIENT QL: NORMAL
WBC # BLD AUTO: 3.3 10E9/L (ref 4–11)

## 2020-07-07 PROCEDURE — 25800030 ZZH RX IP 258 OP 636: Performed by: INTERNAL MEDICINE

## 2020-07-07 PROCEDURE — 36415 COLL VENOUS BLD VENIPUNCTURE: CPT | Performed by: STUDENT IN AN ORGANIZED HEALTH CARE EDUCATION/TRAINING PROGRAM

## 2020-07-07 PROCEDURE — 36415 COLL VENOUS BLD VENIPUNCTURE: CPT | Performed by: NURSE PRACTITIONER

## 2020-07-07 PROCEDURE — 85045 AUTOMATED RETICULOCYTE COUNT: CPT | Performed by: STUDENT IN AN ORGANIZED HEALTH CARE EDUCATION/TRAINING PROGRAM

## 2020-07-07 PROCEDURE — 25000128 H RX IP 250 OP 636

## 2020-07-07 PROCEDURE — 25000132 ZZH RX MED GY IP 250 OP 250 PS 637: Mod: GY | Performed by: STUDENT IN AN ORGANIZED HEALTH CARE EDUCATION/TRAINING PROGRAM

## 2020-07-07 PROCEDURE — 40000141 ZZH STATISTIC PERIPHERAL IV START W/O US GUIDANCE

## 2020-07-07 PROCEDURE — 36514 APHERESIS PLASMA: CPT

## 2020-07-07 PROCEDURE — 86706 HEP B SURFACE ANTIBODY: CPT | Performed by: CLINICAL NURSE SPECIALIST

## 2020-07-07 PROCEDURE — 25000132 ZZH RX MED GY IP 250 OP 250 PS 637: Mod: GY | Performed by: NURSE PRACTITIONER

## 2020-07-07 PROCEDURE — 84100 ASSAY OF PHOSPHORUS: CPT | Performed by: STUDENT IN AN ORGANIZED HEALTH CARE EDUCATION/TRAINING PROGRAM

## 2020-07-07 PROCEDURE — 12000026 ZZH R&B TRANSPLANT

## 2020-07-07 PROCEDURE — 00000146 ZZHCL STATISTIC GLUCOSE BY METER IP

## 2020-07-07 PROCEDURE — 96374 THER/PROPH/DIAG INJ IV PUSH: CPT

## 2020-07-07 PROCEDURE — 25000128 H RX IP 250 OP 636: Performed by: NURSE PRACTITIONER

## 2020-07-07 PROCEDURE — 25000128 H RX IP 250 OP 636: Performed by: STUDENT IN AN ORGANIZED HEALTH CARE EDUCATION/TRAINING PROGRAM

## 2020-07-07 PROCEDURE — P9059 PLASMA, FRZ BETWEEN 8-24HOUR: HCPCS | Performed by: SURGERY

## 2020-07-07 PROCEDURE — 83010 ASSAY OF HAPTOGLOBIN QUANT: CPT | Performed by: NURSE PRACTITIONER

## 2020-07-07 PROCEDURE — 25000131 ZZH RX MED GY IP 250 OP 636 PS 637: Mod: GY | Performed by: NURSE PRACTITIONER

## 2020-07-07 PROCEDURE — G0499 HEPB SCREEN HIGH RISK INDIV: HCPCS | Performed by: CLINICAL NURSE SPECIALIST

## 2020-07-07 PROCEDURE — 25000131 ZZH RX MED GY IP 250 OP 636 PS 637: Mod: GY | Performed by: EMERGENCY MEDICINE

## 2020-07-07 PROCEDURE — 25800030 ZZH RX IP 258 OP 636: Performed by: NURSE PRACTITIONER

## 2020-07-07 PROCEDURE — 85025 COMPLETE CBC W/AUTO DIFF WBC: CPT | Performed by: STUDENT IN AN ORGANIZED HEALTH CARE EDUCATION/TRAINING PROGRAM

## 2020-07-07 PROCEDURE — 25000125 ZZHC RX 250: Performed by: NURSE PRACTITIONER

## 2020-07-07 PROCEDURE — 90937 HEMODIALYSIS REPEATED EVAL: CPT

## 2020-07-07 PROCEDURE — 40000344 ZZHCL STATISTIC THAWING COMPONENT: Performed by: SURGERY

## 2020-07-07 PROCEDURE — 83735 ASSAY OF MAGNESIUM: CPT | Performed by: STUDENT IN AN ORGANIZED HEALTH CARE EDUCATION/TRAINING PROGRAM

## 2020-07-07 PROCEDURE — 80048 BASIC METABOLIC PNL TOTAL CA: CPT | Performed by: STUDENT IN AN ORGANIZED HEALTH CARE EDUCATION/TRAINING PROGRAM

## 2020-07-07 PROCEDURE — 25000125 ZZHC RX 250

## 2020-07-07 PROCEDURE — 82330 ASSAY OF CALCIUM: CPT | Performed by: NURSE PRACTITIONER

## 2020-07-07 RX ORDER — NICOTINE POLACRILEX 4 MG
15-30 LOZENGE BUCCAL
Status: DISCONTINUED | OUTPATIENT
Start: 2020-07-07 | End: 2020-07-07

## 2020-07-07 RX ORDER — HYDROMORPHONE HYDROCHLORIDE 1 MG/ML
0.3 INJECTION, SOLUTION INTRAMUSCULAR; INTRAVENOUS; SUBCUTANEOUS EVERY 4 HOURS PRN
Status: DISCONTINUED | OUTPATIENT
Start: 2020-07-07 | End: 2020-07-08

## 2020-07-07 RX ORDER — CALCIUM ACETATE 667 MG/1
1334 CAPSULE ORAL
Status: DISCONTINUED | OUTPATIENT
Start: 2020-07-07 | End: 2020-07-16

## 2020-07-07 RX ORDER — DEXTROSE MONOHYDRATE 25 G/50ML
25-50 INJECTION, SOLUTION INTRAVENOUS
Status: DISCONTINUED | OUTPATIENT
Start: 2020-07-07 | End: 2020-07-07

## 2020-07-07 RX ORDER — DIPHENHYDRAMINE HYDROCHLORIDE 50 MG/ML
50 INJECTION INTRAMUSCULAR; INTRAVENOUS ONCE
Status: COMPLETED | OUTPATIENT
Start: 2020-07-07 | End: 2020-07-07

## 2020-07-07 RX ORDER — DIPHENHYDRAMINE HYDROCHLORIDE 50 MG/ML
50 INJECTION INTRAMUSCULAR; INTRAVENOUS
Status: COMPLETED | OUTPATIENT
Start: 2020-07-07 | End: 2020-07-07

## 2020-07-07 RX ORDER — CALCIUM GLUCONATE 100 MG/ML
AMPUL (ML) INTRAVENOUS
Status: COMPLETED | OUTPATIENT
Start: 2020-07-07 | End: 2020-07-07

## 2020-07-07 RX ORDER — METHYLPREDNISOLONE SODIUM SUCCINATE 125 MG/2ML
125 INJECTION, POWDER, LYOPHILIZED, FOR SOLUTION INTRAMUSCULAR; INTRAVENOUS
Status: DISCONTINUED | OUTPATIENT
Start: 2020-07-07 | End: 2020-07-09

## 2020-07-07 RX ORDER — ACETAMINOPHEN 325 MG/1
650 TABLET ORAL ONCE
Status: COMPLETED | OUTPATIENT
Start: 2020-07-07 | End: 2020-07-07

## 2020-07-07 RX ORDER — OXYCODONE HYDROCHLORIDE 5 MG/1
5 TABLET ORAL EVERY 4 HOURS PRN
Status: DISCONTINUED | OUTPATIENT
Start: 2020-07-07 | End: 2020-07-08

## 2020-07-07 RX ORDER — DIPHENHYDRAMINE HCL 25 MG
50 CAPSULE ORAL ONCE
Status: COMPLETED | OUTPATIENT
Start: 2020-07-07 | End: 2020-07-07

## 2020-07-07 RX ORDER — DIPHENHYDRAMINE HCL 25 MG
25-50 CAPSULE ORAL ONCE
Status: DISCONTINUED | OUTPATIENT
Start: 2020-07-07 | End: 2020-07-11

## 2020-07-07 RX ORDER — DIPHENHYDRAMINE HCL 12.5MG/5ML
25-50 LIQUID (ML) ORAL ONCE
Status: DISCONTINUED | OUTPATIENT
Start: 2020-07-07 | End: 2020-07-11

## 2020-07-07 RX ORDER — ACETAMINOPHEN 325 MG/1
650 TABLET ORAL
Status: DISCONTINUED | OUTPATIENT
Start: 2020-07-07 | End: 2020-07-09

## 2020-07-07 RX ORDER — DIPHENHYDRAMINE HCL 25 MG
50 CAPSULE ORAL
Status: DISCONTINUED | OUTPATIENT
Start: 2020-07-07 | End: 2020-07-09

## 2020-07-07 RX ADMIN — MYCOPHENOLATE MOFETIL 1000 MG: 500 INJECTION, POWDER, LYOPHILIZED, FOR SOLUTION INTRAVENOUS at 06:30

## 2020-07-07 RX ADMIN — HYDROMORPHONE HYDROCHLORIDE 0.5 MG: 1 INJECTION, SOLUTION INTRAMUSCULAR; INTRAVENOUS; SUBCUTANEOUS at 11:20

## 2020-07-07 RX ADMIN — ANTICOAGULANT CITRATE DEXTROSE SOLUTION FORMULA A 454 ML: 12.25; 11; 3.65 SOLUTION INTRAVENOUS at 13:38

## 2020-07-07 RX ADMIN — SODIUM CHLORIDE 250 ML: 9 INJECTION, SOLUTION INTRAVENOUS at 18:06

## 2020-07-07 RX ADMIN — ACETAMINOPHEN 650 MG: 325 TABLET, FILM COATED ORAL at 19:51

## 2020-07-07 RX ADMIN — TACROLIMUS 6.5 MG: 5 CAPSULE ORAL at 17:31

## 2020-07-07 RX ADMIN — DIPHENHYDRAMINE HYDROCHLORIDE 50 MG: 50 INJECTION, SOLUTION INTRAMUSCULAR; INTRAVENOUS at 14:48

## 2020-07-07 RX ADMIN — PANTOPRAZOLE SODIUM 40 MG: 40 TABLET, DELAYED RELEASE ORAL at 09:01

## 2020-07-07 RX ADMIN — INSULIN ASPART 2 UNITS: 100 INJECTION, SOLUTION INTRAVENOUS; SUBCUTANEOUS at 12:52

## 2020-07-07 RX ADMIN — CALCIUM GLUCONATE 1 G: 98 INJECTION, SOLUTION INTRAVENOUS at 11:05

## 2020-07-07 RX ADMIN — ATORVASTATIN CALCIUM 10 MG: 10 TABLET, FILM COATED ORAL at 09:01

## 2020-07-07 RX ADMIN — TACROLIMUS 6.5 MG: 5 CAPSULE ORAL at 09:00

## 2020-07-07 RX ADMIN — HYDROMORPHONE HYDROCHLORIDE 0.5 MG: 1 INJECTION, SOLUTION INTRAMUSCULAR; INTRAVENOUS; SUBCUTANEOUS at 04:28

## 2020-07-07 RX ADMIN — SODIUM CHLORIDE 300 ML: 9 INJECTION, SOLUTION INTRAVENOUS at 18:05

## 2020-07-07 RX ADMIN — CARVEDILOL 6.25 MG: 6.25 TABLET, FILM COATED ORAL at 19:51

## 2020-07-07 RX ADMIN — ONDANSETRON 4 MG: 2 INJECTION INTRAMUSCULAR; INTRAVENOUS at 19:51

## 2020-07-07 RX ADMIN — DIPHENHYDRAMINE HYDROCHLORIDE 50 MG: 50 INJECTION, SOLUTION INTRAMUSCULAR; INTRAVENOUS at 19:51

## 2020-07-07 RX ADMIN — ACETAMINOPHEN 650 MG: 325 TABLET, FILM COATED ORAL at 16:28

## 2020-07-07 RX ADMIN — PROCHLORPERAZINE EDISYLATE 10 MG: 5 INJECTION INTRAMUSCULAR; INTRAVENOUS at 18:10

## 2020-07-07 RX ADMIN — SODIUM BICARBONATE: 84 INJECTION, SOLUTION INTRAVENOUS at 22:13

## 2020-07-07 RX ADMIN — PROCHLORPERAZINE EDISYLATE 10 MG: 5 INJECTION INTRAMUSCULAR; INTRAVENOUS at 09:15

## 2020-07-07 RX ADMIN — Medication: at 18:06

## 2020-07-07 RX ADMIN — SODIUM BICARBONATE 650 MG TABLET 1300 MG: at 09:01

## 2020-07-07 RX ADMIN — DIPHENHYDRAMINE HYDROCHLORIDE 50 MG: 50 INJECTION, SOLUTION INTRAMUSCULAR; INTRAVENOUS at 16:28

## 2020-07-07 RX ADMIN — HYDROMORPHONE HYDROCHLORIDE 0.3 MG: 1 INJECTION, SOLUTION INTRAMUSCULAR; INTRAVENOUS; SUBCUTANEOUS at 16:14

## 2020-07-07 RX ADMIN — SODIUM BICARBONATE 650 MG TABLET 1300 MG: at 19:52

## 2020-07-07 RX ADMIN — RITUXIMAB 500 MG: 10 INJECTION, SOLUTION INTRAVENOUS at 20:39

## 2020-07-07 RX ADMIN — SODIUM CHLORIDE 250 MG: 9 INJECTION, SOLUTION INTRAVENOUS at 16:28

## 2020-07-07 RX ADMIN — CALCIUM GLUCONATE 3.9 G: 98 INJECTION, SOLUTION INTRAVENOUS at 13:38

## 2020-07-07 RX ADMIN — OXYCODONE HYDROCHLORIDE 5 MG: 5 TABLET ORAL at 18:47

## 2020-07-07 RX ADMIN — INSULIN ASPART 2 UNITS: 100 INJECTION, SOLUTION INTRAVENOUS; SUBCUTANEOUS at 17:31

## 2020-07-07 RX ADMIN — HUMAN IMMUNOGLOBULIN G 30 G: 20 LIQUID INTRAVENOUS at 17:20

## 2020-07-07 RX ADMIN — ONDANSETRON 4 MG: 4 TABLET, ORALLY DISINTEGRATING ORAL at 11:20

## 2020-07-07 RX ADMIN — SODIUM BICARBONATE: 84 INJECTION, SOLUTION INTRAVENOUS at 13:00

## 2020-07-07 RX ADMIN — CALCIUM ACETATE 1334 MG: 667 CAPSULE ORAL at 17:31

## 2020-07-07 RX ADMIN — CEFEPIME HYDROCHLORIDE 1 G: 1 INJECTION, POWDER, FOR SOLUTION INTRAMUSCULAR; INTRAVENOUS at 15:32

## 2020-07-07 RX ADMIN — CARVEDILOL 6.25 MG: 6.25 TABLET, FILM COATED ORAL at 09:01

## 2020-07-07 RX ADMIN — INSULIN ASPART 3 UNITS: 100 INJECTION, SOLUTION INTRAVENOUS; SUBCUTANEOUS at 09:07

## 2020-07-07 RX ADMIN — MYCOPHENOLATE MOFETIL 1000 MG: 500 INJECTION, POWDER, LYOPHILIZED, FOR SOLUTION INTRAVENOUS at 18:10

## 2020-07-07 ASSESSMENT — ACTIVITIES OF DAILY LIVING (ADL)
ADLS_ACUITY_SCORE: 10

## 2020-07-07 ASSESSMENT — PAIN DESCRIPTION - DESCRIPTORS
DESCRIPTORS: ACHING
DESCRIPTORS: ACHING

## 2020-07-07 ASSESSMENT — MIFFLIN-ST. JEOR: SCORE: 1293.75

## 2020-07-07 NOTE — CONSULTS
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 She has a history of IgA nephropathy, prior transplant in 12/2007 and recent transplant on 6/19/2020.  She notes she had been on dialysis prior to recent transplant, has a fistula in place.  She had a recent increase in her creatinine, biopsy on 7/5/2020 reportedly demonstrates mixed cellular and humoral rejection. Renal team requested a series of TPE to treat the AMR.     - The plan is to exchange every other day for a total of  5 procedures.  The patient has a dialysis fistula in place that will be used for the procedure    - We will use plasma for the initial procedure due to the recent biopsy, we will transition to albumin as the replacement in the future.  We will monitor coagulation with CBC, INR, and fibrinogen. We discussed the use of plasma as part of the replacement fluid and I reviewed with her the potential risks and benefits of blood transfusion.     - ACD-A will be used for anticoagulation of the apheresis equipment with calcium gluconate given throughout to offset the effects.    - If IVIG or other antibody-based treatments are given, this should be given following TPE or on alternate days, as TPE can remove these medications.    - Please do not start or continue ace-inhibitors throughout the duration of a therapeutic plasma exchange series. Please notify the apheresis physician of any upcoming procedures, surgeries, or biopsies as therapeutic plasma exchange will affect coagulation factors.             Chief Complaint:   Transfusion medicine consultation.         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  Her past medical history includes IgA nephropathy, prior renal transplant in , most recent transplant on 2020.   The procedure, risks/benefits were discussed with the patient and all of her questions were addressed at that time.  Consent was obtained.              Past Medical History:     Past Medical History:   Diagnosis Date     ACS (acute coronary syndrome) (H) 2014     Acute rejection of kidney transplant 2020    Mixed AMR & ACR Banff 2A     Allergic state     seasonal allergies     Anemia in chronic renal disease      Anxiety      Cervical cerclage suture present in second trimester      Chest pain 2014     Chronic renal transplant rejection      End stage kidney disease (H)      Heart murmur      History of  delivery ,     30 wks, 28 wks      Hypertension     resolved after transplant     IgA nephropathy      Kidney transplanted 2020    DDKT. Induction w/ thymo 5.7mg/kg.     MRSA (methicillin resistant Staphylococcus aureus)      [facility]     Pre-eclampsia      Renal failure affecting pregnancy in second trimester      S/P kidney transplant 2007    LDKT in 2007 in [place]. History of 1B kidney rejection. Failed .     SAB (spontaneous )     2nd trimester              Past Surgical History:     Past Surgical History:   Procedure Laterality Date     CERCLAGE CERVICAL N/A 2015    Procedure: CERCLAGE CERVICAL;  Surgeon: Norbert Chopra MD;  [facility]      SECTION  2012    Procedure:  SECTION;;  Surgeon: Chelsea Cross MD;  Location: [facility]      SECTION N/A 2015    Procedure:  SECTION;  Surgeon: Chelsea Cross MD;  Location: [facility] OR     cesearean section       EXPLANT TRANSPLANTED KIDNEY  3/29/2013     Procedure: EXPLANT TRANSPLANTED KIDNEY;  Explant Transplanted right Kidney (from patients right iliac fossa);  Surgeon: Nory Morgan MD;  Location: [facility]     IR FINE NEEDLE ASPIRATION W ULTRASOUND  2020     IR RENAL BIOPSY LEFT  2020     NEPHRECTOMY  3/29/13    Transplant nephrectomy      WILIAN/DIALYSIS CATHETER       TRANSPLANT KIDNEY RECIPIENT  DONOR N/A 2020    Procedure: TRANSPLANT, KIDNEY, RECIPIENT,  DONOR, venous reconstruction, and back bench preparation of kidney;  Surgeon: Irma Shannon MD;  Location: [facility]     TRANSPLANT KIDNEY RECIPIENT LIVING UNRELATED  Dec 2007    (Adult male in [place])                Allergies:   No Known Allergies          Medications:     Current Facility-Administered Medications   Medication     0.9% sodium chloride BOLUS     0.9% sodium chloride BOLUS     0.9% sodium chloride BOLUS     acetaminophen (TYLENOL) tablet 650 mg     acetaminophen (TYLENOL) tablet 650 mg     acetaminophen (TYLENOL) tablet 650 mg     acetaminophen (TYLENOL) tablet 650 mg     [Rx hold ] aspirin (ASA) chewable tablet 81 mg     atorvastatin (LIPITOR) tablet 10 mg     benzocaine-menthol (CEPACOL) 15-3.6 MG lozenge 1 lozenge     calcium acetate (PHOSLO) capsule 1,334 mg     carvedilol (COREG) tablet 6.25 mg     ceFEPIme (MAXIPIME) 1g vial to attach to  ml bag for ADULTS or NS 50 ml bag for PEDS     D5W 1,000 mL with sodium bicarbonate 150 mEq/L infusion     glucose gel 15-30 g    Or     dextrose 50 % injection 25-50 mL    Or     glucagon injection 1 mg     diphenhydrAMINE (BENADRYL) capsule 25-50 mg    Or     diphenhydrAMINE (BENADRYL) solution 25-50 mg     diphenhydrAMINE (BENADRYL) capsule 50 mg    Or     diphenhydrAMINE (BENADRYL) injection 50 mg     diphenhydrAMINE (BENADRYL) capsule 50 mg     diphenhydrAMINE (BENADRYL) injection 50 mg     EPINEPHrine (ADRENALIN) kit 0.3 mg     famotidine (PEPCID) injection 20 mg     gelatin absorbable (GELFOAM) sponge 1  each     HOLD: aspirin, warfarin (COUMADIN), ticagrelor (BRILINTA), prasugrel (EFFIENT) and clopidogrel (PLAVIX) for 5 days PRE-kidney biopsy.     HYDROmorphone (PF) (DILAUDID) injection 0.3 mg     immune globulin - sucrose free 10 % injection 30 g     insulin aspart (NovoLOG) injection (RAPID ACTING)     insulin aspart (NovoLOG) injection (RAPID ACTING)     lidocaine (LMX4) cream     lidocaine (XYLOCAINE) 2 % 15 mL, alum & mag hydroxide-simethicone (MAALOX  ES) 15 mL GI Cocktail     methylPREDNISolone sodium succinate (solu-MEDROL) 250 mg in sodium chloride 0.9 % 50 mL intermittent infusion     methylPREDNISolone sodium succinate (solu-MEDROL) injection 125 mg     mycophenolate mofetil (CELLCEPT) 1,000 mg in D5W intermittent infusion     naloxone (NARCAN) injection 0.1-0.4 mg     No heparin via hemodialysis machine     ondansetron (ZOFRAN) injection 4 mg     ondansetron (ZOFRAN-ODT) ODT tab 4 mg     oxyCODONE (ROXICODONE) tablet 5 mg     pantoprazole (PROTONIX) EC tablet 40 mg     prochlorperazine (COMPAZINE) injection 10 mg    Or     prochlorperazine (COMPAZINE) tablet 10 mg     riTUXimab (RITUXAN) 500 mg in sodium chloride 0.9 % 50 mL NON-oncology use     sodium bicarbonate tablet 1,300 mg     sodium chloride (PF) 0.9% PF flush 3 mL     sodium chloride (PF) 0.9% PF flush 3 mL     sulfamethoxazole-trimethoprim (BACTRIM) 400-80 MG per tablet 1 tablet     tacrolimus (GENERIC EQUIVALENT) capsule 6.5 mg     valGANciclovir (VALCYTE) tablet 450 mg             Review of Systems:     CONSTITUTIONAL:  negative for  fevers and chills  HEENT:  negative for  nasal congestion and epistaxis  RESPIRATORY:  negative for  dyspnea and cough  CARDIOVASCULAR:  negative for  chest pain, palpitations  GASTROINTESTINAL:  positive for diarrhea, she notes 2-3 watery BM perday  negative for nausea and vomiting  HEMATOLOGIC/LYMPHATIC:  She notes 4-5 prior transfusions, she notes one itchy reaction  NEUROLOGICAL:  negative for seizures,  "numbness and tingling           Vital Signs:   /89 (BP Location: Right arm)   Pulse 96   Temp 98.3  F (36.8  C) (Oral)   Resp 20   Ht 1.676 m (5' 6\")   Wt 57.2 kg (126 lb 1.7 oz)   SpO2 98%   BMI 20.35 kg/m                Data:     CBC RESULTS:   Recent Labs   Lab Test 07/07/20  0623   WBC 3.3*   RBC 2.56*   HGB 7.6*   HCT 21.8*   MCV 85   MCH 29.7   MCHC 34.9   RDW 14.6   *                 Aleksander Thomas MD  Transfusion Medicine Attending  Laboratory Medicine and Pathology  Pager [phone]         "

## 2020-07-07 NOTE — PLAN OF CARE
"BP (!) 136/97   Pulse 70   Temp 98.4  F (36.9  C) (Oral)   Resp 15   Ht 1.676 m (5' 6\")   Wt 57.2 kg (126 lb 1.7 oz)   SpO2 97%   BMI 20.35 kg/m      Patient intermittently hypertensive during plasmapheresis and dialysis today, AM coreg not given d/t hypotension, AOVSS on RA, afebrile. Ionized Ca 3.3, IV Ca gluc given. -217-199, managed with sliding scale insulin. Abd and back pain managed with prn dilaudid x2, prn oxy x1 and heat. Regular diet with no appetite, IV compazine given prior to meds x2. R PIV x2 infusing IVIG and MMF, MIVF to restart @ 100 ml/hr once other medications done. IV cefepime given. IV solumed and benadryl given prior to start of IVIG. Pt tolerating well. 15 ml UOP today, Cr 8.94. Small loose BM. LLQ biopsy site PARVIZ, CDI. Mild edema. Up to bathroom x1 today. Pt in AMR- plasmapheresis followed by IVIG and HD via L AV fistula. Plan for IV rituximab tonight and Thymo tomorrow.Educated for pheresis and IVIG, encouraged oral nausea meds, pt declined and preferred IV. Pt also declining oral phoslo d/t pill size. Plan to monitor pain and nausea overnight, encourage good sleep hygiene.   Will continue with POC and notify MD with changes or concerns.    "

## 2020-07-07 NOTE — PROCEDURES
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 She has a history of IgA nephropathy, prior transplant in 12/2007 and recent transplant on 6/19/2020.  She notes she had been on dialysis prior to recent transplant, has a fistula in place.  She had a recent increase in her creatinine, biopsy on 7/5/2020 reportedly demonstrates mixed cellular and humoral rejection. Renal team requested a series of TPE to treat the AMR.     - The plan is to exchange every other day for a total of  5 procedures.  The patient has a dialysis fistula in place that will be used for the procedure    - Today is procedure #1 of the planned 5 procedures.  We used plasma for the initial procedure due to the recent biopsy, we will transition to albumin as the replacement in the future.  She tolerated the procedure, but there were increases noted in her blood pressures during the run.  About three quarters of the way through the procedure the patient noted some itchy eyes, we paused the procedure and administered 25 mg of IV benadryl.  Symptoms improved and we were able to complete the procedure    - ACD-A will be used for anticoagulation of the apheresis equipment with calcium gluconate given throughout to offset the effects.    - If IVIG or other antibody-based treatments are given, this should be given following TPE or on alternate days, as TPE can remove these medications.    - Please do not start or continue ace-inhibitors throughout the duration of a therapeutic plasma exchange series. Please notify the apheresis physician of any upcoming procedures, surgeries, or biopsies as therapeutic plasma exchange will affect coagulation factors.               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  Her past medical history includes IgA nephropathy, prior renal transplant in , most recent transplant on 2020.             Past Medical History:     Past Medical History:   Diagnosis Date     ACS (acute coronary syndrome) (H) 2014     Acute rejection of kidney transplant 2020    Mixed AMR & ACR [place]ff 2A     Allergic state     seasonal allergies     Anemia in chronic renal disease      Anxiety      Cervical cerclage suture present in second trimester      Chest pain 2014     Chronic renal transplant rejection      End stage kidney disease (H)      Heart murmur      History of  delivery ,     30 wks, 28 wks      Hypertension     resolved after transplant     IgA nephropathy      Kidney transplanted 2020    DDKT. Induction w/ thymo 5.7mg/kg.     MRSA (methicillin resistant Staphylococcus aureus)      [facility]     Pre-eclampsia      Renal failure affecting pregnancy in second trimester      S/P kidney transplant 2007    LDKT in 2007 in [place]. History of 1B kidney rejection. Failed .     SAB (spontaneous )     2nd trimester              Past Surgical History:     Past Surgical History:   Procedure Laterality Date     CERCLAGE CERVICAL N/A 2015    Procedure: CERCLAGE CERVICAL;  Surgeon: Norbert Chopra MD;  [facility]      SECTION  2012    Procedure:  SECTION;;  Surgeon: Chelsea Cross MD;  [facility]      SECTION N/A 2015    Procedure:  SECTION;  Surgeon: Chelsea Cross MD;  Location: [facility]     cesearean section       EXPLANT TRANSPLANTED KIDNEY  3/29/2013    Procedure: EXPLANT TRANSPLANTED KIDNEY;  Explant Transplanted right Kidney (from patients right iliac fossa);   Surgeon: Nory Morgan MD;  Location: [facility]     IR FINE NEEDLE ASPIRATION W ULTRASOUND  2020     IR RENAL BIOPSY LEFT  2020     NEPHRECTOMY  3/29/13    Transplant nephrectomy      WILIAN/DIALYSIS CATHETER       TRANSPLANT KIDNEY RECIPIENT  DONOR N/A 2020    Procedure: TRANSPLANT, KIDNEY, RECIPIENT,  DONOR, venous reconstruction, and back bench preparation of kidney;  Surgeon: Irma Shannon MD;  Location: [facility]     TRANSPLANT KIDNEY RECIPIENT LIVING UNRELATED  Dec 2007    (Adult male in [place])                Allergies:   No Known Allergies          Medications:     Current Facility-Administered Medications   Medication     0.9% sodium chloride BOLUS     0.9% sodium chloride BOLUS     0.9% sodium chloride BOLUS     acetaminophen (TYLENOL) tablet 650 mg     acetaminophen (TYLENOL) tablet 650 mg     acetaminophen (TYLENOL) tablet 650 mg     acetaminophen (TYLENOL) tablet 650 mg     [Rx hold ] aspirin (ASA) chewable tablet 81 mg     atorvastatin (LIPITOR) tablet 10 mg     benzocaine-menthol (CEPACOL) 15-3.6 MG lozenge 1 lozenge     calcium acetate (PHOSLO) capsule 1,334 mg     carvedilol (COREG) tablet 6.25 mg     ceFEPIme (MAXIPIME) 1g vial to attach to  ml bag for ADULTS or NS 50 ml bag for PEDS     D5W 1,000 mL with sodium bicarbonate 150 mEq/L infusion     glucose gel 15-30 g    Or     dextrose 50 % injection 25-50 mL    Or     glucagon injection 1 mg     diphenhydrAMINE (BENADRYL) capsule 25-50 mg    Or     diphenhydrAMINE (BENADRYL) solution 25-50 mg     diphenhydrAMINE (BENADRYL) capsule 50 mg    Or     diphenhydrAMINE (BENADRYL) injection 50 mg     diphenhydrAMINE (BENADRYL) capsule 50 mg     diphenhydrAMINE (BENADRYL) injection 50 mg     EPINEPHrine (ADRENALIN) kit 0.3 mg     famotidine (PEPCID) injection 20 mg     gelatin absorbable (GELFOAM) sponge 1 each     HOLD: aspirin, warfarin (COUMADIN), ticagrelor (BRILINTA), prasugrel (EFFIENT) and clopidogrel  "(PLAVIX) for 5 days PRE-kidney biopsy.     HYDROmorphone (PF) (DILAUDID) injection 0.3 mg     immune globulin - sucrose free 10 % injection 30 g     insulin aspart (NovoLOG) injection (RAPID ACTING)     insulin aspart (NovoLOG) injection (RAPID ACTING)     lidocaine (LMX4) cream     lidocaine (XYLOCAINE) 2 % 15 mL, alum & mag hydroxide-simethicone (MAALOX  ES) 15 mL GI Cocktail     methylPREDNISolone sodium succinate (solu-MEDROL) 250 mg in sodium chloride 0.9 % 50 mL intermittent infusion     methylPREDNISolone sodium succinate (solu-MEDROL) injection 125 mg     mycophenolate mofetil (CELLCEPT) 1,000 mg in D5W intermittent infusion     naloxone (NARCAN) injection 0.1-0.4 mg     No heparin via hemodialysis machine     ondansetron (ZOFRAN) injection 4 mg     ondansetron (ZOFRAN-ODT) ODT tab 4 mg     oxyCODONE (ROXICODONE) tablet 5 mg     pantoprazole (PROTONIX) EC tablet 40 mg     prochlorperazine (COMPAZINE) injection 10 mg    Or     prochlorperazine (COMPAZINE) tablet 10 mg     riTUXimab (RITUXAN) 500 mg in sodium chloride 0.9 % 500 mL NON-oncology use     sodium bicarbonate tablet 1,300 mg     sodium chloride (PF) 0.9% PF flush 3 mL     sodium chloride (PF) 0.9% PF flush 3 mL     sulfamethoxazole-trimethoprim (BACTRIM) 400-80 MG per tablet 1 tablet     tacrolimus (GENERIC EQUIVALENT) capsule 6.5 mg     valGANciclovir (VALCYTE) tablet 450 mg             Review of Systems:     See above           Vital Signs:   BP (!) 145/103   Pulse 71   Temp 97.7  F (36.5  C) (Oral)   Resp 18   Ht 1.676 m (5' 6\")   Wt 57.2 kg (126 lb 1.7 oz)   SpO2 98%   BMI 20.35 kg/m                Data:       BMP  Recent Labs   Lab 07/07/20  0623 07/06/20  2113 07/06/20  0640 07/05/20  0553    133 135 136   POTASSIUM 4.2 4.0 4.3 4.1   CHLORIDE 106 104 107 110*   CASIE 6.4* 6.6* 7.2* 6.9*   CO2 13* 15* 11* 14*   BUN 62* 57* 48* 32*   CR 8.94* 8.68* 7.92* 6.13*   * 241* 221* 108*     CBC  Recent Labs   Lab 07/07/20  0623 " 07/06/20  0640 07/05/20  0651 07/05/20  0553   WBC 3.3* 4.9 6.0 5.7   RBC 2.56* 2.86* 2.13* 2.22*   HGB 7.6* 8.5* 6.4* 6.6*   HCT 21.8* 26.3* 20.3* 21.1*   MCV 85 92 95 95   MCH 29.7 29.7 30.0 29.7   MCHC 34.9 32.3 31.5 31.3*   RDW 14.6 14.4 14.5 14.3   * 168 165 160     INR  Recent Labs   Lab 07/04/20  1334   INR 1.40*                 Procedure Summary:   A single volume therapeutic plasma exchange was performed and donor plasma was used as the replacement fluid.  The patient's fistula was used for access and allowed for appropriate flow during the procedure.  ACD-A was used for anticoagulation. To offset the effects of the citrate, calcium gluconate was given in the return line.      Elevated blood pressures were noted later in the run, she also had some itchiness of her eyes.  The procedure was paused and she was treated with benadryl.   The patient otherwise tolerated the procedure well.  See apheresis flowsheet for additional details.      Attestation: I, Aleksander Thomas MD, was immediately available onsite throughout the duration of the apheresis procedure, and I was in direct communication with the apheresis staff regarding the patient's procedure and care plan.  Due to COVID concerns and efforts to conserve PPE, I did not enter the patient's room.      Aleksander Thomas MD  Transfusion Medicine Attending  Laboratory Medicine & Pathology  Pager: [phone]

## 2020-07-07 NOTE — PROGRESS NOTES
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 She presented on 7/4/2020 with fever and ARF of transplanted kidney.    Graft function: POD #18  Mixed rejection with ARF: Cr 8.9, baseline Cr 0.6. Low UOP. 7/4 US: several perinephric fluid collections, kidney 14cm (previously 11.6 on POD # 0), no hydro. 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 DSA negative. AT1R pending. Treatment as below. Dialysis today.  Perinephric fluid collections: Noted on US. 7/5 IR: 50ml cloudy serosang fluid aspirated, gram stain negative, culture pending. Not suggestive of urine leak.  Immunosuppression management:    Pheresis/IVIG: Consult Transfusion Med. Plan for pheresis/IVIG every other day x5.  Thymo: 125mg on 7/7. Plan for 2mg/kg 7/8, 1mg/kg 7/10, 1mg/kg after final pheresis.  Rituximab: 500mg on 7/7.  Methylpred: 500/500/250  Tacro: Goal 8-10  MMF: 1000 IV BID d/t vomiting. Was on Myfortic on admission.  Complexity of management: Medium. Contributing factors: rejection  Hematology:   ANTHONY/ACD: Hgb 8.5->7.6. Received 1u RBC on 7/5 for hgb 6.4. No sign of bleeding.  PSAT 9, consider venofer if no infection. Check hapto and retic count.  Cardiorespiratory:   HTN: Coreg reduced this admission. SBP 100s-120s.  GI/Nutrition:   Diet: Limited by vomiting.  Nausea/Vomiting: Schedule zofran. PPI added. GI cocktail.  Diarrhea: Since transplant. C-diff and enteric panel negative.  Endocrine: No acute issues.   Fluid/Electrolytes: MIVF: D5 bicarb @ 100  Metabolic acidosis: Sodium bicarb added. Dialysis today.  Hypocalcemia: iCa 3.3. Calcium gluconate today.  Hyperphosphatemia: Add Phoslo.  : Incontinent at times.  Infectious disease: Afebrile, no leukocytosis  Fever: Fever 103.7F on  "admission. Infection vs rejection. Cipro started outpatient, broadened to Cefepine on 7/4. COVID neg, 7/4 UC neg, 7/4 blood cx NGTD, 7/5 perinephric fluid cultures pending (gram stain neg), enteric panel neg, C-diff neg.  Prophylaxis: Bactrim (PCP ppx) three times per week  Valcyte (CMV PPx)   Disposition: 7A    Medical Decision Making: Medium  Subsequent visit 92041 (moderate level decision making)    CONNIE/Fellow/Resident Provider: Stephanie Ling NP [id number]    Faculty: Nory Morgan M.D.  _________________________________________________________________    Interval History: History is obtained from the patient  Nausea slightly better, no appetite, LBP, pain over kidney slightly better    ROS:   A 10-point review of systems was negative except as noted above.    Meds:    atorvastatin  10 mg Oral Daily     carvedilol  6.25 mg Oral BID     ceFEPIme (MAXIPIME) IV  1 g Intravenous Q24H     insulin aspart  1-7 Units Subcutaneous TID AC     insulin aspart  1-5 Units Subcutaneous At Bedtime     mycophenolate mofetil  1,000 mg Intravenous Q12H     ondansetron  4 mg Oral TID AC     pantoprazole  40 mg Oral QAM AC     sodium bicarbonate  1,300 mg Oral BID     sodium chloride (PF)  3 mL Intracatheter Q8H     sulfamethoxazole-trimethoprim  1 tablet Oral Once per day on Mon Wed Fri     tacrolimus  6.5 mg Oral BID     valGANciclovir  450 mg Oral Once per day on Mon Thu       Physical Exam:     Admit Weight: 57.2 kg (126 lb 1.7 oz)    Current vitals:   /78 (BP Location: Right arm)   Pulse 96   Temp 97.8  F (36.6  C) (Oral)   Resp 20   Ht 1.676 m (5' 6\")   Wt 57.2 kg (126 lb 1.7 oz)   SpO2 98%   BMI 20.35 kg/m      Vital sign ranges:    Temp:  [97.4  F (36.3  C)-98.3  F (36.8  C)] 97.8  F (36.6  C)  Heart Rate:  [78-91] 78  Resp:  [14-24] 20  BP: ()/(62-86) 121/78  SpO2:  [96 %-100 %] 98 %  Patient Vitals for the past 24 hrs:   BP Temp Temp src Heart Rate Resp SpO2   07/07/20 0505 121/78 97.8  F (36.6  C) Oral 78 20 " 98 %   07/07/20 0423 125/82 97.5  F (36.4  C) Oral 80 20 96 %   07/07/20 0300 100/65 -- -- 81 -- --   07/07/20 0210 98/62 98.3  F (36.8  C) Oral 81 20 99 %   07/07/20 0100 105/68 -- -- 81 20 98 %   07/07/20 0000 106/63 -- -- 81 -- --   07/06/20 2330 102/70 97.8  F (36.6  C) Oral 79 18 99 %   07/06/20 2245 103/67 -- -- 78 -- --   07/06/20 2230 104/70 -- -- 81 -- --   07/06/20 2215 109/72 -- -- 81 -- 99 %   07/06/20 2209 113/86 98.1  F (36.7  C) Oral 91 18 99 %   07/06/20 2010 106/71 97.4  F (36.3  C) Oral 82 18 99 %   07/06/20 1605 102/64 98  F (36.7  C) Oral 81 24 99 %   07/06/20 0900 112/72 97.6  F (36.4  C) Oral 83 14 100 %     General Appearance: NAD  Skin: warm, dry, no rashes  Heart: Perfused  Lungs: RA, unlabored  Abdomen: The abdomen is slightly distended, tender to bilateral lower quadrants, greatest to LLQ, over the kidney graft. The wound is well approximated, without signs of infection and no drainage.  : polanco is not present.   Extremities: edema: absent.   Neurologic: awake, alert and oriented x4. Tremor absent.    Data:   CMP  Recent Labs   Lab 07/07/20  0623 07/06/20  2113 07/06/20  0640 07/05/20  1330  07/04/20  1334    133 135  --    < > 135   POTASSIUM 4.2 4.0 4.3  --    < > 4.1   CHLORIDE 106 104 107  --    < > 107   CO2 13* 15* 11*  --    < > 22   * 241* 221*  --    < > 96   BUN 62* 57* 48*  --    < > 25   CR 8.94* 8.68* 7.92*  --    < > 4.12*   GFRESTIMATED 5* 5* 6*  --    < > 13*   GFRESTBLACK 6* 6* 7*  --    < > 15*   CASIE 6.4* 6.6* 7.2*  --    < > 7.9*   MAG 2.2  --  2.3  --    < > 1.4*   PHOS 6.9*  --  7.0*  --    < > 4.6*   ALBUMIN  --   --   --   --   --  2.9*   BILITOTAL  --   --   --   --   --  0.7   ALKPHOS  --   --   --   --   --  123   AST  --   --   --   --   --  34   ALT  --   --   --   --   --  31   FCREAT  --   --   --  4.7  --   --     < > = values in this interval not displayed.     CBC  Recent Labs   Lab 07/07/20  0623 07/06/20  0640   HGB 7.6* 8.5*   WBC 3.3* 4.9    * 168     COAGS  Recent Labs   Lab 07/04/20  1953 07/04/20  1334   INR  --  1.40*   PTT 35  --       Urinalysis  Recent Labs   Lab Test 07/06/20  1412 07/04/20  1346  07/16/12  1400 07/02/12  1130   COLOR Yellow Yellow   < > Light Yellow  --    APPEARANCE Slightly Cloudy Slightly Cloudy   < > Clear  --    URINEGLC 150* 70*   < > Negative  --    URINEBILI Negative Small*   < > Negative  --    URINEKETONE 10* Negative   < > Negative  --    SG 1.022 1.027   < > 1.004  --    UBLD Small* Small*   < > Small*  --    URINEPH 8.0* 8.0*   < > 7.5*  --    PROTEIN >600* >600*   < > Negative  --    NITRITE Negative Negative   < > Negative  --    LEUKEST Negative Negative   < > Negative  --    RBCU 78* 21*   < > 6*  --    WBCU 21* 2   < > <1  --    UTPG  --   --   --  1.53* 0.86*    < > = values in this interval not displayed.

## 2020-07-07 NOTE — PLAN OF CARE
"/78 (BP Location: Right arm)   Pulse 96   Temp 97.8  F (36.6  C) (Oral)   Resp 20   Ht 1.676 m (5' 6\")   Wt 57.2 kg (126 lb 1.7 oz)   SpO2 98%   BMI 20.35 kg/m        Reason for Admission: Pt. admitted for fevers, chills,  CANDI, abdominal pain. Pt. now with  AMR per kidney biopsy results. Pt. received first dose Thymo. last evening and tolerated well. Pt. to get for aphresis & IVIG today  Neuro: Pt. alert & Ox4  Behavior: Pt. calm & cooperative with cares.    Activity: Pt. up with SBA.   Vital: Pt. with soft BPs, AOVSS on RA.   LDAs: D5+ sodium bicarb. 150meq at 100cc/hour into right PIV. Left AV fistula.   Respiratory: WNL  GI/: Pt. oliguric & voided 50cc, no stools this shift.   Endocrine: - no orders to treat.  Skin: WNL  Pain: Abdominal pain radiating to back, prn IV Dilaudid x1.   Nausea: No c/o's nausea overnight.   Diet: NPO since midnight for procedure.    Labs/Imaging: Enteric panel pending.   Plan: Aphresis,  IVIG  & stent removal today. Continue to follow POC & notify MD with change in status.      "

## 2020-07-07 NOTE — PROGRESS NOTES
[facility], [place]   Transplant Nephrology Progress Note  Date of Admission:  7/4/2020  Today's Date: 07/06/2020    Recommendations:  For the mixed rejection would recommend Thymoglobulin based on her Banff 2 a cellular rejection.  Due to her features of acute antibody mediated rejection evident by glomerulitis grade 1 and peritubular capillaritis grade 1 in addition to the ATN features would recommend plasmapheresis and IVIG.    I reviewed the slides with the pathologist and results as communicated to me by the pathologist pending official report.     Assessment & Plan      # DDKT: Trend up to 6.13              - Baseline Cr ~ 0.97              - Proteinuria: Not checked post transplant              - Date DSA Last Checked: checked at [facility] time of tx       Latest DSA: No               - BK Viremia: Not checked post transplant              - Kidney Tx Biopsy:  Banff 2A cellular rejection with additional features suggestive of AbMR (G 1+ PTC 1) although C4d was negative.  Other Banff scoring include V1, T1, I1.  The biopsy also showed interstitial hemorrhage and tubular injury with vacuolization.  The ATN component may be a phenotype of a BMR.     # Immunosuppression: Tacrolimus immediate release (goal 8-10) and Myfortic 720 twice a day.              - Changes: Rejection treatment will include Thymoglobulin 2 mg/kg per dose x3, plasmapheresis with IVIG and Solu-Medrol 500 mg daily x3.  Following the intense course, prednisone taper will be recommended and prednisone maintenance 5 mg daily.     # Infection Prophylaxis:   - PJP: Sulfa/TMP (Bactrim) daily  - CMV: Valcyte      # Hypertension: Controlled;   Goal BP: < 130/80              - Volume status: Euvolemic            - Changes: Not at this time     # Elevated Blood Glucose: Glucose generally running ~               - Management as per primary team.     # Anemia in Chronic Renal Disease: Hgb: Improved with transfusion     ENZO:  Yes.  Special attention with pheresis to use plasma the first couple of treatments due to recent biopsy.     # Mineral Bone Disorder:   - Secondary renal hyperparathyroidism; PTH level: Moderately elevated (301-600 pg/ml)        On treatment: None  - Vitamin D; level: Normal        On supplement: No  - Calcium; level: Low normal        On supplement: No  - Phosphorus; level: Normal        On supplement: No     # Electrolytes:   -Moderate acidosis: IV fluid was changed to D5 water with 3 A of bicarb running at 100 mL/h, please repeat renal panel this evening.     # perinephric fluid collections:  Drained by IR cultures are pending and patient is on empiric antibiotic coverage.     # Transplant History:  Etiology of Kidney Failure: IgA nephropathy  Tx: LDKT and DDKT  Transplant: 6/19/2020 (Kidney), 12/1/2007 (Kidney)  Donor Type: Donation after Brain Death        Donor Class:   Crossmatch at time of Tx: pending   Significant changes in immunosuppression: None  Significant transplant-related complications: None    Mahesh Armando MD   Pager: [phone]    Interval History   Evens was seen this afternoon and again after the biopsy results.  She is resting comfortably in bed with minimal complaints.  I shared the biopsy results with her.  Biopsy with features of Banff 2A cellular rejection and AbMR features.    Review of Systems   4 point ROS was obtained and negative except as noted in the Interval History.    MEDICATIONS:    atorvastatin  10 mg Oral Daily     carvedilol  6.25 mg Oral BID     ceFEPIme (MAXIPIME) IV  1 g Intravenous Q24H     methylPREDNISolone  500 mg Intravenous Once     mycophenolate mofetil  1,000 mg Intravenous Q12H     ondansetron  4 mg Oral TID AC     pantoprazole  40 mg Oral QAM AC     sodium bicarbonate  1,300 mg Oral BID     sodium chloride (PF)  3 mL Intracatheter Q8H     sodium chloride         sulfamethoxazole-trimethoprim  1 tablet Oral Once per day on Mon Wed Fri     tacrolimus  6.5 mg  "Oral BID     valGANciclovir  450 mg Oral Once per day on Mon Thu       IV infusion builder WITH additives Stopped (20 1600)       Physical Exam   Temp  Av.8  F (37.1  C)  Min: 97.6  F (36.4  C)  Max: 103.7  F (39.8  C)      Pulse  Av.5  Min: 74  Max: 122 Resp  Av.1  Min: 13  Max: 28  SpO2  Av.1 %  Min: 93 %  Max: 100 %     /64 (BP Location: Right arm)   Pulse 96   Temp 98  F (36.7  C) (Oral)   Resp 24   Ht 1.676 m (5' 6\")   Wt 57.2 kg (126 lb 1.7 oz)   SpO2 99%   BMI 20.35 kg/m     Date 20 0700 - 20 0659   Shift 2463-2778 3163-6004 8607-1075 24 Hour Total   INTAKE   P.O. 600 200  800   I.V.  810  810   Shift Total(mL/kg) 600(10.49) 1010(17.66)  1610(28.15)   OUTPUT   Urine 50   50   Shift Total(mL/kg) 50(0.87)   50(0.87)   Weight (kg) 57.2 57.2 57.2 57.2      Admit Weight: 57.2 kg (126 lb 1.7 oz)     GENERAL APPEARANCE: alert and no distress  HENT: mouth without ulcers or lesions  LYMPHATICS: no cervical or supraclavicular nodes  RESP: lungs clear to auscultation - no rales, rhonchi or wheezes  CV: regular rhythm, normal rate, no rub, no murmur  EDEMA: no LE edema bilaterally  ABDOMEN: soft, nondistended, nontender, bowel sounds normal  MS: extremities normal - no gross deformities noted, no evidence of inflammation in joints, no muscle tenderness  SKIN: no rash    Data   All labs reviewed by me.  CMP  Recent Labs   Lab 20  0640 20  0553 20  1335 20  1334 20  0830    136  --  135 137   POTASSIUM 4.3 4.1  --  4.1 4.2   CHLORIDE 107 110*  --  107 108   CO2 11* 14*  --  22 22   ANIONGAP 17* 11  --  6 7   * 108*  --  96 97   BUN 48* 32*  --  25 15   CR 7.92* 6.13*  --  4.12* 0.92   GFRESTIMATED 6* 8* 11* 13* 82   GFRESTBLACK 7* 10* 14* 15* >90   CASIE 7.2* 6.9*  --  7.9* 9.5   MAG 2.3 1.2*  --  1.4* 1.6   PHOS 7.0* 4.2  --  4.6* 1.8*   PROTTOTAL  --   --   --  6.5*  --    ALBUMIN  --   --   --  2.9*  --    BILITOTAL  --   --   -- "  0.7  --    ALKPHOS  --   --   --  123  --    AST  --   --   --  34  --    ALT  --   --   --  31  --      CBC  Recent Labs   Lab 07/06/20  0640 07/05/20  0651 07/05/20  0553 07/04/20  1334   HGB 8.5* 6.4* 6.6* 8.3*   WBC 4.9 6.0 5.7 6.8   RBC 2.86* 2.13* 2.22* 2.76*   HCT 26.3* 20.3* 21.1* 26.0*   MCV 92 95 95 94   MCH 29.7 30.0 29.7 30.1   MCHC 32.3 31.5 31.3* 31.9   RDW 14.4 14.5 14.3 14.3    165 160 231     INR  Recent Labs   Lab 07/04/20  1953 07/04/20  1334   INR  --  1.40*   PTT 35  --      ABGNo lab results found in last 7 days.   Urine Studies  Recent Labs   Lab Test 07/06/20  1412 07/04/20  1346 07/01/20  1430 06/23/20  0830   COLOR Yellow Yellow Patricia Yellow   APPEARANCE Slightly Cloudy Slightly Cloudy Slightly Cloudy Clear   URINEGLC 150* 70* Negative Negative   URINEBILI Negative Small* Negative Negative   URINEKETONE 10* Negative Negative Negative   SG 1.022 1.027 1.025 1.012   UBLD Small* Small* Moderate* Moderate*   URINEPH 8.0* 8.0* 6.0 6.5   PROTEIN >600* >600* 100* 10*   NITRITE Negative Negative Negative Negative   LEUKEST Negative Negative Negative Small*   RBCU 78* 21* 96* 144*   WBCU 21* 2 9* 4     Recent Labs   Lab Test 07/16/12  1400 07/02/12  1130 06/25/12  0615 06/17/12  0930 06/13/12  1120 06/07/12  0643 06/01/12  0950 05/25/12  1830 05/25/12  1110 05/22/12  1536   UTPG 1.53* 0.86* 0.69* 0.82* 0.80* 0.88* 0.58* 0.47*  0.45* 0.56* 0.51*     PTH  Recent Labs   Lab Test 06/26/20  0842 11/30/12  2030 11/30/12  1030   PTHI 226* 500* 794*     Iron Studies  Recent Labs   Lab Test 07/05/20  0553 06/25/20  0749 11/30/12  1950   IRON 10* 57 125   * 163* 155*   IRONSAT 9* 35 81*   SHAHBAZ  --  886* 401*       IMAGING:  All imaging studies reviewed by me.

## 2020-07-07 NOTE — PROGRESS NOTES
Care Coordinator  D: s/p KT with mixed rejection, including AMR.  I: I sent emails to [facility]fin[facility]-specialists and simran Clifton and kayli Lara to check coverage for Outpatient Plasmapheresis and Ivig.    P: This is Simran Clifton's response for Plasmapheresis  Simran Clifton 7/7/2020 12:13 PM  Hi Morena,         Patient has Medicare as primary coverage, so the following guidelines would apply to her:         Item/Service Description    A. General    Apheresis (also known as pheresis or therapeutic pheresis) is a medical procedure utilizing specialized equipment to remove selected blood constituents (plasma, leukocytes, plataelets, or cells) from whole blood. The remainder is retransfused into the person from whom the blood was taken.    For purposes of Medicare coverage, apheresis is defined as an autologous procedure, i.e., blood is taken from the patient, processed, and returned to the patient as part of a continuous procedure (as distinguished from the procedure in which a patient donates blood preoperatively and is transfused with the donated blood at a later date).      Indications and Limitations of Coverage    B. Indications    Apheresis is covered for the following indications:              Plasma exchange for acquired myasthenia gravis;              Leukapheresis in the treatment of leukemia;              Plasmapheresis in the treatment of primary macroglobulinemia ([place]);              Treatment of hyperglobulinemias, including (but not limited to) multiple myelomas, cryoglobulinemia and hyperviscosity syndromes;              Plasmapheresis or plasma exchange as a last resort treatment of thromobotic thrombocytopenic purpura (TTP);              Plasmapheresis or plasma exchange in the last resort treatment of life threatening rheumatoid vasculitis;              Plasma perfusion of charcoal filters for treatment of pruritis of cholestatic liver disease;              Plasma exchange in the  treatment of Goodpasture's Syndrome;              Plasma exchange in the treatment of glomerulonephritis associated with antiglomerular basement membrane antibodies and advancing renal failure or pulmonary hemorrhage;              Treatment of chronic relapsing polyneuropathy for patients with severe or life threatening symptoms who have failed to respond to conventional therapy;              Treatment of life threatening scleroderma and polymyositis when the patient is unresponsive to conventional therapy;              Treatment of Guillain-[place] Syndrome; and              Treatment of last resort for life threatening systemic lupus erythematosus (SLE) when conventional therapy has failed to prevent clinical deterioration.    C. Settings    Apheresis is covered only when performed in a hospital setting (either inpatient or outpatient) or in a nonhospital setting, e.g., a physician directed clinic when the following conditions are met:              A physician (or a number of physicians) is present to perform medical services and to respond to medical emergencies at all times during patient care hours;              Each patient is under the care of a physician; and              All nonphysician services are furnished under the direct, personal supervision of a physician.              If this would not apply to her condition then it would not be covered by Medicare.           Please let me know if you have any questions or concerns.         Thanks,         Simran Clifton        I have informed Stephanie Ling NP.  Wait for IVig information.

## 2020-07-07 NOTE — PLAN OF CARE
"/64 (BP Location: Right arm)   Pulse 96   Temp 98  F (36.7  C) (Oral)   Resp 24   Ht 1.676 m (5' 6\")   Wt 57.2 kg (126 lb 1.7 oz)   SpO2 99%   BMI 20.35 kg/m      Patient slightly hypotensive, AOVSS on RA, afebrile. Lethargic and sleeping most of morning. Epigastric pain managed with prn IV dilaudid x2. Tolerating regular diet with fair appetite, IV  compazine given x2 prior to medication administrations and scheduled ODT zofran given once, patient declined PM dose. Maalox given x1 for heartburn. R PIV infusing MMF, IVMF @ 100 (D5W w/150 mEq sod bicarb). IV cefepime given. L AV + bruit/thrill.BM today, loose/watery brown, enteric panel pending, c-diff negative. 50 ml UOP, UA/UC sent, Cr 7.92. Trace edema throughout. Up SBA to bathroom x1 today. Plan for stent removal tomorrow, NPO @ 0000 7/7. Educated for nausea and pain management. Plan to monitor pain and nausea overnight.   Will continue with POC and notify MD with changes or concerns.    "

## 2020-07-07 NOTE — PROGRESS NOTES
"SPIRITUAL HEALTH SERVICES  SPIRITUAL ASSESSMENT Progress Note  [facility] ([place]) 7A     REFERRAL SOURCE: Self initiated  visit, [facility] specific.    Pt Jelly identifies as [facility] and is of [place] descent. She shared with me that, \"This was my second kidney transplant, and its failing now\". She stated that she is feeling tired from this 7.5 year wait to get a kidney. Her hopes are that the kidney will function and that she will get better.    Her  was also present for some time during the visit. They share two children together.     Jelly asked for \"Jojo (Islamic Prayer) that I get better\". We prayed together at her request.     PLAN: I will follow up with Jelly for the duration of her stay. SHS is available to Jelly per request.     John De La Rosa  Lead [facility]   Pager [phone]    "

## 2020-07-08 ENCOUNTER — ANESTHESIA (OUTPATIENT)
Dept: SURGERY | Facility: CLINIC | Age: 35
DRG: 698 | End: 2020-07-08
Payer: MEDICARE

## 2020-07-08 ENCOUNTER — TELEPHONE (OUTPATIENT)
Dept: NEPHROLOGY | Facility: CLINIC | Age: 35
End: 2020-07-08

## 2020-07-08 ENCOUNTER — ANESTHESIA EVENT (OUTPATIENT)
Dept: SURGERY | Facility: CLINIC | Age: 35
DRG: 698 | End: 2020-07-08
Payer: MEDICARE

## 2020-07-08 LAB
ANION GAP SERPL CALCULATED.3IONS-SCNC: 7 MMOL/L (ref 3–14)
BASOPHILS # BLD AUTO: 0 10E9/L (ref 0–0.2)
BASOPHILS NFR BLD AUTO: 0 %
BUN SERPL-MCNC: 29 MG/DL (ref 7–30)
CALCIUM SERPL-MCNC: 7.1 MG/DL (ref 8.5–10.1)
CELL TYPE ALLO: NORMAL
CHANNELSHIFTALLOB1: -10
CHANNELSHIFTALLOB2: -18
CHANNELSHIFTALLOT1: -17
CHANNELSHIFTALLOT2: 11
CHLORIDE SERPL-SCNC: 98 MMOL/L (ref 94–109)
CO2 SERPL-SCNC: 28 MMOL/L (ref 20–32)
COMMENT ALLOB2: NORMAL
CREAT SERPL-MCNC: 4.53 MG/DL (ref 0.52–1.04)
CROSSMATCHDATEALLO: NORMAL
DIFFERENTIAL METHOD BLD: ABNORMAL
DONOR ALLO: NORMAL
DONORCELLDATE ALLO: NORMAL
EOSINOPHIL # BLD AUTO: 0 10E9/L (ref 0–0.7)
EOSINOPHIL NFR BLD AUTO: 0 %
ERYTHROCYTE [DISTWIDTH] IN BLOOD BY AUTOMATED COUNT: 14.5 % (ref 10–15)
GFR SERPL CREATININE-BSD FRML MDRD: 12 ML/MIN/{1.73_M2}
GLUCOSE BLDC GLUCOMTR-MCNC: 188 MG/DL (ref 70–99)
GLUCOSE BLDC GLUCOMTR-MCNC: 218 MG/DL (ref 70–99)
GLUCOSE BLDC GLUCOMTR-MCNC: 259 MG/DL (ref 70–99)
GLUCOSE BLDC GLUCOMTR-MCNC: 285 MG/DL (ref 70–99)
GLUCOSE SERPL-MCNC: 298 MG/DL (ref 70–99)
HBV CORE AB SERPL QL IA: REACTIVE
HBV SURFACE AB SERPL IA-ACNC: 915.36 M[IU]/ML
HBV SURFACE AB SERPL IA-ACNC: >1000 M[IU]/ML
HBV SURFACE AG SERPL QL IA: NONREACTIVE
HCT VFR BLD AUTO: 21.5 % (ref 35–47)
HGB BLD-MCNC: 7.3 G/DL (ref 11.7–15.7)
IMM GRANULOCYTES # BLD: 0 10E9/L (ref 0–0.4)
IMM GRANULOCYTES NFR BLD: 0.4 %
LYMPHOCYTES # BLD AUTO: 0.1 10E9/L (ref 0.8–5.3)
LYMPHOCYTES NFR BLD AUTO: 2 %
MAGNESIUM SERPL-MCNC: 1.8 MG/DL (ref 1.6–2.3)
MCH RBC QN AUTO: 30 PG (ref 26.5–33)
MCHC RBC AUTO-ENTMCNC: 34 G/DL (ref 31.5–36.5)
MCV RBC AUTO: 89 FL (ref 78–100)
MONOCYTES # BLD AUTO: 0.1 10E9/L (ref 0–1.3)
MONOCYTES NFR BLD AUTO: 3.6 %
NEUTROPHILS # BLD AUTO: 2.3 10E9/L (ref 1.6–8.3)
NEUTROPHILS NFR BLD AUTO: 94 %
NRBC # BLD AUTO: 0 10*3/UL
NRBC BLD AUTO-RTO: 0 /100
PHOSPHATE SERPL-MCNC: 4.6 MG/DL (ref 2.5–4.5)
PLATELET # BLD AUTO: 165 10E9/L (ref 150–450)
POS CUT OFF ALLO B: >93
POS CUT OFF ALLO T: >79
POTASSIUM SERPL-SCNC: 3.9 MMOL/L (ref 3.4–5.3)
RBC # BLD AUTO: 2.43 10E12/L (ref 3.8–5.2)
RESULT ALLO B1: NORMAL
RESULT ALLO B2: NORMAL
RESULT ALLO T1: NORMAL
RESULT ALLO T2: NORMAL
SERUM DATE ALLO B1: NORMAL
SERUM DATE ALLO B2: NORMAL
SERUM DATE ALLO T1: NORMAL
SERUM DATE ALLO T2: NORMAL
SODIUM SERPL-SCNC: 133 MMOL/L (ref 133–144)
TACROLIMUS BLD-MCNC: 11.8 UG/L (ref 5–15)
TESTMETHODALLO: NORMAL
TME LAST DOSE: NORMAL H
TREATMENT ALLO B1: NORMAL
TREATMENT ALLO B2: NORMAL
TREATMENT ALLO T1: NORMAL
TREATMENT ALLO T2: NORMAL
WBC # BLD AUTO: 2.5 10E9/L (ref 4–11)

## 2020-07-08 PROCEDURE — 25000132 ZZH RX MED GY IP 250 OP 250 PS 637: Mod: GY | Performed by: SURGERY

## 2020-07-08 PROCEDURE — 25000128 H RX IP 250 OP 636: Performed by: NURSE PRACTITIONER

## 2020-07-08 PROCEDURE — 83735 ASSAY OF MAGNESIUM: CPT | Performed by: STUDENT IN AN ORGANIZED HEALTH CARE EDUCATION/TRAINING PROGRAM

## 2020-07-08 PROCEDURE — 0TP98DZ REMOVAL OF INTRALUMINAL DEVICE FROM URETER, VIA NATURAL OR ARTIFICIAL OPENING ENDOSCOPIC: ICD-10-PCS | Performed by: SURGERY

## 2020-07-08 PROCEDURE — 86704 HEP B CORE ANTIBODY TOTAL: CPT | Performed by: NURSE PRACTITIONER

## 2020-07-08 PROCEDURE — 86706 HEP B SURFACE ANTIBODY: CPT | Performed by: NURSE PRACTITIONER

## 2020-07-08 PROCEDURE — 25800030 ZZH RX IP 258 OP 636: Performed by: NURSE PRACTITIONER

## 2020-07-08 PROCEDURE — 25000128 H RX IP 250 OP 636: Performed by: NURSE ANESTHETIST, CERTIFIED REGISTERED

## 2020-07-08 PROCEDURE — 36415 COLL VENOUS BLD VENIPUNCTURE: CPT | Performed by: NURSE PRACTITIONER

## 2020-07-08 PROCEDURE — 85025 COMPLETE CBC W/AUTO DIFF WBC: CPT | Performed by: STUDENT IN AN ORGANIZED HEALTH CARE EDUCATION/TRAINING PROGRAM

## 2020-07-08 PROCEDURE — 25000125 ZZHC RX 250: Performed by: NURSE PRACTITIONER

## 2020-07-08 PROCEDURE — 12000026 ZZH R&B TRANSPLANT

## 2020-07-08 PROCEDURE — 25000132 ZZH RX MED GY IP 250 OP 250 PS 637: Mod: GY | Performed by: NURSE PRACTITIONER

## 2020-07-08 PROCEDURE — 37000009 ZZH ANESTHESIA TECHNICAL FEE, EACH ADDTL 15 MIN: Performed by: SURGERY

## 2020-07-08 PROCEDURE — 25000132 ZZH RX MED GY IP 250 OP 250 PS 637: Mod: GY | Performed by: STUDENT IN AN ORGANIZED HEALTH CARE EDUCATION/TRAINING PROGRAM

## 2020-07-08 PROCEDURE — 36000053 ZZH SURGERY LEVEL 2 EA 15 ADDTL MIN - UMMC: Performed by: SURGERY

## 2020-07-08 PROCEDURE — 40000170 ZZH STATISTIC PRE-PROCEDURE ASSESSMENT II: Performed by: SURGERY

## 2020-07-08 PROCEDURE — 00000146 ZZHCL STATISTIC GLUCOSE BY METER IP

## 2020-07-08 PROCEDURE — 80048 BASIC METABOLIC PNL TOTAL CA: CPT | Performed by: STUDENT IN AN ORGANIZED HEALTH CARE EDUCATION/TRAINING PROGRAM

## 2020-07-08 PROCEDURE — 80197 ASSAY OF TACROLIMUS: CPT | Performed by: NURSE PRACTITIONER

## 2020-07-08 PROCEDURE — 84100 ASSAY OF PHOSPHORUS: CPT | Performed by: STUDENT IN AN ORGANIZED HEALTH CARE EDUCATION/TRAINING PROGRAM

## 2020-07-08 PROCEDURE — 36000051 ZZH SURGERY LEVEL 2 1ST 30 MIN - UMMC: Performed by: SURGERY

## 2020-07-08 PROCEDURE — 25000131 ZZH RX MED GY IP 250 OP 636 PS 637: Mod: GY | Performed by: NURSE PRACTITIONER

## 2020-07-08 PROCEDURE — 25000125 ZZHC RX 250: Performed by: NURSE ANESTHETIST, CERTIFIED REGISTERED

## 2020-07-08 PROCEDURE — 25000128 H RX IP 250 OP 636: Performed by: STUDENT IN AN ORGANIZED HEALTH CARE EDUCATION/TRAINING PROGRAM

## 2020-07-08 PROCEDURE — 25000125 ZZHC RX 250: Performed by: SURGERY

## 2020-07-08 PROCEDURE — 37000008 ZZH ANESTHESIA TECHNICAL FEE, 1ST 30 MIN: Performed by: SURGERY

## 2020-07-08 PROCEDURE — 25000131 ZZH RX MED GY IP 250 OP 636 PS 637: Mod: GY | Performed by: EMERGENCY MEDICINE

## 2020-07-08 PROCEDURE — 25800030 ZZH RX IP 258 OP 636: Performed by: NURSE ANESTHETIST, CERTIFIED REGISTERED

## 2020-07-08 RX ORDER — DIPHENHYDRAMINE HCL 25 MG
25-50 CAPSULE ORAL ONCE
Status: COMPLETED | OUTPATIENT
Start: 2020-07-08 | End: 2020-07-08

## 2020-07-08 RX ORDER — DIPHENHYDRAMINE HYDROCHLORIDE 50 MG/ML
50 INJECTION INTRAMUSCULAR; INTRAVENOUS
Status: CANCELLED | OUTPATIENT
Start: 2020-07-08

## 2020-07-08 RX ORDER — ONDANSETRON 2 MG/ML
INJECTION INTRAMUSCULAR; INTRAVENOUS PRN
Status: DISCONTINUED | OUTPATIENT
Start: 2020-07-08 | End: 2020-07-08

## 2020-07-08 RX ORDER — FENTANYL CITRATE 50 UG/ML
INJECTION, SOLUTION INTRAMUSCULAR; INTRAVENOUS PRN
Status: DISCONTINUED | OUTPATIENT
Start: 2020-07-08 | End: 2020-07-08

## 2020-07-08 RX ORDER — LIDOCAINE 4 G/G
1 PATCH TOPICAL
Status: DISCONTINUED | OUTPATIENT
Start: 2020-07-08 | End: 2020-07-18 | Stop reason: HOSPADM

## 2020-07-08 RX ORDER — PROPOFOL 10 MG/ML
INJECTION, EMULSION INTRAVENOUS CONTINUOUS PRN
Status: DISCONTINUED | OUTPATIENT
Start: 2020-07-08 | End: 2020-07-08

## 2020-07-08 RX ORDER — METHYLPREDNISOLONE SODIUM SUCCINATE 125 MG/2ML
100 INJECTION, POWDER, LYOPHILIZED, FOR SOLUTION INTRAMUSCULAR; INTRAVENOUS ONCE
Status: COMPLETED | OUTPATIENT
Start: 2020-07-08 | End: 2020-07-08

## 2020-07-08 RX ORDER — METHOCARBAMOL 500 MG/1
500 TABLET, FILM COATED ORAL 4 TIMES DAILY PRN
Status: DISCONTINUED | OUTPATIENT
Start: 2020-07-08 | End: 2020-07-09

## 2020-07-08 RX ORDER — LIDOCAINE HYDROCHLORIDE 20 MG/ML
JELLY TOPICAL PRN
Status: DISCONTINUED | OUTPATIENT
Start: 2020-07-08 | End: 2020-07-08 | Stop reason: HOSPADM

## 2020-07-08 RX ORDER — CEFAZOLIN SODIUM 2 G/100ML
INJECTION, SOLUTION INTRAVENOUS PRN
Status: DISCONTINUED | OUTPATIENT
Start: 2020-07-08 | End: 2020-07-08

## 2020-07-08 RX ORDER — ACETAMINOPHEN 325 MG/1
650 TABLET ORAL ONCE
Status: DISCONTINUED | OUTPATIENT
Start: 2020-07-08 | End: 2020-07-09

## 2020-07-08 RX ORDER — SODIUM CHLORIDE, SODIUM LACTATE, POTASSIUM CHLORIDE, CALCIUM CHLORIDE 600; 310; 30; 20 MG/100ML; MG/100ML; MG/100ML; MG/100ML
INJECTION, SOLUTION INTRAVENOUS CONTINUOUS PRN
Status: DISCONTINUED | OUTPATIENT
Start: 2020-07-08 | End: 2020-07-08

## 2020-07-08 RX ORDER — LIDOCAINE HYDROCHLORIDE 20 MG/ML
INJECTION, SOLUTION INFILTRATION; PERINEURAL PRN
Status: DISCONTINUED | OUTPATIENT
Start: 2020-07-08 | End: 2020-07-08

## 2020-07-08 RX ORDER — SODIUM CHLORIDE, SODIUM LACTATE, POTASSIUM CHLORIDE, CALCIUM CHLORIDE 600; 310; 30; 20 MG/100ML; MG/100ML; MG/100ML; MG/100ML
INJECTION, SOLUTION INTRAVENOUS CONTINUOUS
Status: CANCELLED | OUTPATIENT
Start: 2020-07-08

## 2020-07-08 RX ORDER — FENTANYL CITRATE 50 UG/ML
25-50 INJECTION, SOLUTION INTRAMUSCULAR; INTRAVENOUS
Status: CANCELLED | OUTPATIENT
Start: 2020-07-08

## 2020-07-08 RX ORDER — DIPHENHYDRAMINE HCL 12.5MG/5ML
25-50 LIQUID (ML) ORAL ONCE
Status: COMPLETED | OUTPATIENT
Start: 2020-07-08 | End: 2020-07-08

## 2020-07-08 RX ADMIN — TACROLIMUS 6 MG: 5 CAPSULE ORAL at 17:43

## 2020-07-08 RX ADMIN — ONDANSETRON 4 MG: 4 TABLET, ORALLY DISINTEGRATING ORAL at 09:25

## 2020-07-08 RX ADMIN — ONDANSETRON 4 MG: 4 TABLET, ORALLY DISINTEGRATING ORAL at 11:49

## 2020-07-08 RX ADMIN — CARVEDILOL 6.25 MG: 6.25 TABLET, FILM COATED ORAL at 09:26

## 2020-07-08 RX ADMIN — MIDAZOLAM 1 MG: 1 INJECTION INTRAMUSCULAR; INTRAVENOUS at 07:52

## 2020-07-08 RX ADMIN — Medication 1 MG: at 09:26

## 2020-07-08 RX ADMIN — TACROLIMUS 6.5 MG: 5 CAPSULE ORAL at 09:27

## 2020-07-08 RX ADMIN — PROPOFOL 75 MCG/KG/MIN: 10 INJECTION, EMULSION INTRAVENOUS at 08:00

## 2020-07-08 RX ADMIN — INSULIN ASPART 3 UNITS: 100 INJECTION, SOLUTION INTRAVENOUS; SUBCUTANEOUS at 12:07

## 2020-07-08 RX ADMIN — ACETAMINOPHEN 650 MG: 325 TABLET, FILM COATED ORAL at 11:50

## 2020-07-08 RX ADMIN — METHYLPREDNISOLONE SODIUM SUCCINATE 100 MG: 125 INJECTION, POWDER, FOR SOLUTION INTRAMUSCULAR; INTRAVENOUS at 11:52

## 2020-07-08 RX ADMIN — HYDROMORPHONE HYDROCHLORIDE 0.3 MG: 1 INJECTION, SOLUTION INTRAMUSCULAR; INTRAVENOUS; SUBCUTANEOUS at 01:25

## 2020-07-08 RX ADMIN — SODIUM BICARBONATE 650 MG TABLET 1300 MG: at 19:30

## 2020-07-08 RX ADMIN — ATORVASTATIN CALCIUM 10 MG: 10 TABLET, FILM COATED ORAL at 09:24

## 2020-07-08 RX ADMIN — ONDANSETRON 4 MG: 2 INJECTION INTRAMUSCULAR; INTRAVENOUS at 08:00

## 2020-07-08 RX ADMIN — PROCHLORPERAZINE EDISYLATE 10 MG: 5 INJECTION INTRAMUSCULAR; INTRAVENOUS at 01:25

## 2020-07-08 RX ADMIN — MYCOPHENOLATE MOFETIL 1000 MG: 500 INJECTION, POWDER, LYOPHILIZED, FOR SOLUTION INTRAVENOUS at 09:33

## 2020-07-08 RX ADMIN — Medication 2 MG: at 19:46

## 2020-07-08 RX ADMIN — SODIUM BICARBONATE 650 MG TABLET 1300 MG: at 09:22

## 2020-07-08 RX ADMIN — ONDANSETRON 4 MG: 4 TABLET, ORALLY DISINTEGRATING ORAL at 17:43

## 2020-07-08 RX ADMIN — SULFAMETHOXAZOLE AND TRIMETHOPRIM 1 TABLET: 400; 80 TABLET ORAL at 09:23

## 2020-07-08 RX ADMIN — MIDAZOLAM 1 MG: 1 INJECTION INTRAMUSCULAR; INTRAVENOUS at 08:00

## 2020-07-08 RX ADMIN — DIPHENHYDRAMINE HYDROCHLORIDE 25 MG: 25 CAPSULE ORAL at 15:26

## 2020-07-08 RX ADMIN — DEXTROSE AND SODIUM CHLORIDE: 5; 900 INJECTION, SOLUTION INTRAVENOUS at 11:52

## 2020-07-08 RX ADMIN — Medication 2 MG: at 15:26

## 2020-07-08 RX ADMIN — DIPHENHYDRAMINE HYDROCHLORIDE 25 MG: 25 CAPSULE ORAL at 11:50

## 2020-07-08 RX ADMIN — LIDOCAINE HYDROCHLORIDE 40 MG: 20 INJECTION, SOLUTION INFILTRATION; PERINEURAL at 08:00

## 2020-07-08 RX ADMIN — MYCOPHENOLATE MOFETIL 1000 MG: 500 INJECTION, POWDER, LYOPHILIZED, FOR SOLUTION INTRAVENOUS at 18:03

## 2020-07-08 RX ADMIN — Medication 2 G: at 08:10

## 2020-07-08 RX ADMIN — SODIUM BICARBONATE: 84 INJECTION, SOLUTION INTRAVENOUS at 09:33

## 2020-07-08 RX ADMIN — INSULIN ASPART 2 UNITS: 100 INJECTION, SOLUTION INTRAVENOUS; SUBCUTANEOUS at 17:43

## 2020-07-08 RX ADMIN — PANTOPRAZOLE SODIUM 40 MG: 40 TABLET, DELAYED RELEASE ORAL at 09:25

## 2020-07-08 RX ADMIN — FENTANYL CITRATE 50 MCG: 50 INJECTION, SOLUTION INTRAMUSCULAR; INTRAVENOUS at 08:00

## 2020-07-08 RX ADMIN — SODIUM CHLORIDE, POTASSIUM CHLORIDE, SODIUM LACTATE AND CALCIUM CHLORIDE: 600; 310; 30; 20 INJECTION, SOLUTION INTRAVENOUS at 07:56

## 2020-07-08 RX ADMIN — HEPARIN SODIUM 125 MG: 1000 INJECTION INTRAVENOUS; SUBCUTANEOUS at 13:03

## 2020-07-08 RX ADMIN — CEFEPIME HYDROCHLORIDE 1 G: 1 INJECTION, POWDER, FOR SOLUTION INTRAMUSCULAR; INTRAVENOUS at 15:09

## 2020-07-08 RX ADMIN — INSULIN ASPART 3 UNITS: 100 INJECTION, SOLUTION INTRAVENOUS; SUBCUTANEOUS at 07:41

## 2020-07-08 ASSESSMENT — ACTIVITIES OF DAILY LIVING (ADL)
ADLS_ACUITY_SCORE: 10

## 2020-07-08 ASSESSMENT — PAIN DESCRIPTION - DESCRIPTORS
DESCRIPTORS: ACHING

## 2020-07-08 NOTE — PROGRESS NOTES
Care Coordinator  D/I: I have not gotten a response for outpatient Ivig coverage, so I called [phone] and Darshan said that Ethan Phoenix does not work today--she will send him another email to check on 7/9 and call me.  P: wait to see if Ivig is covered OP and let team know.

## 2020-07-08 NOTE — PLAN OF CARE
"/63 (BP Location: Right arm)   Pulse 71   Temp 98.5  F (36.9  C)   Resp 15   Ht 1.676 m (5' 6\")   Wt 57.2 kg (126 lb 1.7 oz)   SpO2 99%   BMI 20.35 kg/m      VSS. Hg 7.1 this AM. Complains of pain in back and stomach controlled with 0.3 dilaudid given once this shift. IV pain medications discontinued this AM. Intermittent nausea controlled with PRN compazine given once this shift. NPO since midnight. Right PIV Saline locked. Other Right PIV running D5W with bicarb at 150 ml/hr. Oliguric. No BM this shift. Stand by assist. Plan for thymo today. Will update team with any changes.     Patient off floor for cystoscopy for stent removal, preop checklist done. Cellcept to be started when she returns to unit.   "

## 2020-07-08 NOTE — ANESTHESIA PREPROCEDURE EVALUATION
Anesthesia Pre-Procedure Evaluation    Patient: Jelly Dietz   MRN:     [id number] Gender:   female   Age:    35 year old :      1985        Preoperative Diagnosis: History of cystoscopy [Z98.890]   Procedure(s):  CYSTOSCOPY, WITH STENT REMOVAL     LABS:  CBC:   Lab Results   Component Value Date    WBC 2.5 (L) 2020    WBC 3.3 (L) 2020    HGB 7.3 (L) 2020    HGB 7.6 (L) 2020    HCT 21.5 (L) 2020    HCT 21.8 (L) 2020     2020     (L) 2020     BMP:   Lab Results   Component Value Date     2020     2020    POTASSIUM 3.9 2020    POTASSIUM 4.2 2020    CHLORIDE 98 2020    CHLORIDE 106 2020    CO2 28 2020    CO2 13 (L) 2020    BUN 29 2020    BUN 62 (H) 2020    CR 4.53 (H) 2020    CR 8.94 (H) 2020     (H) 2020     (H) 2020     COAGS:   Lab Results   Component Value Date    PTT 35 2020    INR 1.40 (H) 2020    FIBR 634 (H) 07/15/2012     POC:   Lab Results   Component Value Date     (H) 2020    HCG Negative 2018    HCGS Negative 2020     OTHER:   Lab Results   Component Value Date    PH 7.31 (L) 2012    LACT 0.6 (L) 2020    A1C 5.1 2020    CASIE 7.1 (L) 2020    PHOS 4.6 (H) 2020    MAG 1.8 2020    ALBUMIN 2.9 (L) 2020    PROTTOTAL 6.5 (L) 2020    ALT 31 2020    AST 34 2020    ALKPHOS 123 2020    BILITOTAL 0.7 2020    LIPASE 126 2015    TSH 1.02 2012    T4 1.05 2012        Preop Vitals    BP Readings from Last 3 Encounters:   20 117/83   20 115/80   20 120/68    Pulse Readings from Last 3 Encounters:   20 71   20 96   20 96      Resp Readings from Last 3 Encounters:   20 18   20 18   20 16    SpO2 Readings from Last 3 Encounters:   20 99%   20 100%   20  "100%      Temp Readings from Last 1 Encounters:   20 36.7  C (98.1  F)    Ht Readings from Last 1 Encounters:   20 1.676 m (5' 6\")      Wt Readings from Last 1 Encounters:   20 57.2 kg (126 lb 1.7 oz)    Estimated body mass index is 20.35 kg/m  as calculated from the following:    Height as of this encounter: 1.676 m (5' 6\").    Weight as of this encounter: 57.2 kg (126 lb 1.7 oz).     LDA:  Peripheral IV 20 Right Lower forearm (Active)   Site Assessment WDL 20 230   Line Status Saline locked 20   Phlebitis Scale 0-->no symptoms 20   Infiltration Scale 0 20   Infiltration Site Treatment Method  None 20   Extravasation? No 20   Number of days: 2       Peripheral IV 20 Right;Anterior Lower forearm (Active)   Site Assessment WDL 20 2300   Line Status Infusing 20 230   Phlebitis Scale 0-->no symptoms 20   Infiltration Scale 0 20   Infiltration Site Treatment Method  None 20   Extravasation? No 20   Number of days: 1       Hemodialysis Vascular Access Arteriovenous fistula Left Arm (Active)   Site Assessment WDL;Bruit present;Thrill present 20 0000   Cannulation Needle Size 16 20 1715   Dressing Intervention New dressing 20   Dressing Status Clean, dry, intact 20   Hand Off Report Yes 20   Number of days:         Past Medical History:   Diagnosis Date     ACS (acute coronary syndrome) (H) 2014     Acute rejection of kidney transplant 2020    Mixed AMR & ACR Banff 2A     Allergic state     seasonal allergies     Anemia in chronic renal disease      Anxiety      Cervical cerclage suture present in second trimester      Chest pain 2014     Chronic renal transplant rejection      End stage kidney disease (H)      Heart murmur      History of  delivery ,     30 wks, 28 wks      Hypertension     resolved " after transplant     IgA nephropathy      Kidney transplanted 2020    DDKT. Induction w/ thymo 5.7mg/kg.     MRSA (methicillin resistant Staphylococcus aureus)      [facility]     Pre-eclampsia      Renal failure affecting pregnancy in second trimester      S/P kidney transplant 2007    LDKT in 2007 in [place]. History of 1B kidney rejection. Failed .     SAB (spontaneous )     2nd trimester       Past Surgical History:   Procedure Laterality Date     CERCLAGE CERVICAL N/A 2015    Procedure: CERCLAGE CERVICAL;  Surgeon: Norbert Chopra MD;  Location: [facility] L[facility]      SECTION  2012    Procedure:  SECTION;;  Surgeon: Chelsea Cross MD;  Location: [facility] L[facility]      SECTION N/A 2015    Procedure:  SECTION;  Surgeon: Chelsea Cross MD;  Location: [facility] OR     cesearean section       EXPLANT TRANSPLANTED KIDNEY  3/29/2013    Procedure: EXPLANT TRANSPLANTED KIDNEY;  Explant Transplanted right Kidney (from patients right iliac fossa);  Surgeon: Nory Morgan MD;  Location: [facility]     IR FINE NEEDLE ASPIRATION W ULTRASOUND  2020     IR RENAL BIOPSY LEFT  2020     NEPHRECTOMY  3/29/13    Transplant nephrectomy      WILIAN/DIALYSIS CATHETER       TRANSPLANT KIDNEY RECIPIENT  DONOR N/A 2020    Procedure: TRANSPLANT, KIDNEY, RECIPIENT,  DONOR, venous reconstruction, and back bench preparation of kidney;  Surgeon: Irma Shannon MD;  Location: [facility] OR     TRANSPLANT KIDNEY RECIPIENT LIVING UNRELATED  Dec 2007    (Adult male in [place])      No Known Allergies     Anesthesia Evaluation     . Pt has had prior anesthetic. Type: General and MAC           ROS/MED HX    ENT/Pulmonary:  - neg pulmonary ROS     Neurologic:  - neg neurologic ROS     Cardiovascular:         METS/Exercise Tolerance:     Hematologic:     (+) Anemia, History of  Transfusion no previous transfusion reaction -     (-) history of blood clots   Musculoskeletal:   (+)  other musculoskeletal- mineralization disorder      GI/Hepatic:  - neg GI/hepatic ROS       Renal/Genitourinary:     (+) Pt has history of transplant, date: 2020,       Endo:     (+) type I DM, .      Psychiatric:  - neg psychiatric ROS       Infectious Disease:   (+) MRSA,       Malignancy:      - no malignancy   Other:    - neg other ROS                     PHYSICAL EXAM:   Mental Status/Neuro: A/A/O   Airway: Facies: Feasible  Mallampati: I  Mouth/Opening: Full  TM distance: > 6 cm  Neck ROM: Full   Respiratory: Auscultation: CTAB     Resp. Rate: Normal     Resp. Effort: Normal      CV: Rhythm: Regular  Rate: Age appropriate  Heart: Normal Sounds  Edema: None   Comments:      Dental: Normal Dentition                Assessment:   ASA SCORE: 3    H&P: History and physical reviewed and following examination; no interval change.   Smoking Status:  Non-Smoker/Unknown   NPO Status: NPO Appropriate     Plan:   Anes. Type:  General   Pre-Medication: None   Induction:  IV (Standard)   Airway: ETT; Oral   Access/Monitoring: PIV   Maintenance: Balanced     Postop Plan:   Postop Pain: Opioids  Postop Sedation/Airway: Not planned  Disposition: Inpatient/Admit     PONV Management:   Adult Risk Factors: Female, Non-Smoker, Postop Opioids   Prevention: Ondansetron, Dexamethasone     CONSENT: Direct conversation   Plan and risks discussed with: Patient   Blood Products: Consent Deferred (Minimal Blood Loss)                   Martín Woodward MD

## 2020-07-08 NOTE — PROGRESS NOTES
[facility], [place]   Transplant Nephrology Progress Note  Date of Admission:  7/4/2020  Today's Date: 07/08/2020    Recommendations:  - Agree with thymo/plex/IVIG and single dose rituximab   - will reassess for dialysis need on daily basis      Assessment & Plan      # DDKT: Trend up again today with anuria               - Baseline Cr ~ 0.97              - Proteinuria: Not checked post transplant              - Date DSA Last Checked: checked at [facility] time of tx       Latest DSA: No               - BK Viremia: Not checked post transplant              - Kidney Tx Biopsy:  Banff 2A cellular rejection with additional features suggestive of AbMR (G 1+ PTC 1) although C4d was negative.  Other Banff scoring include V1, T1, I1.  The biopsy also showed interstitial hemorrhage and tubular injury with vacuolization.  The ATN component may be a phenotype of a BMR.     # Immunosuppression: Tacrolimus immediate release (goal 8-10) and Myfortic 720 twice a day.              - Changes: Rejection treatment will include Thymoglobulin 2 mg/kg per dose x3, plasmapheresis with IVIG and Solu-Medrol 500 mg daily x3.  Following the intense course, prednisone taper will be recommended and prednisone maintenance 5 mg daily.  Agree with rituximab single dose.     # Infection Prophylaxis: (please renally dose)   - PJP: Sulfa/TMP (Bactrim)   - CMV: Valcyte      # Hypertension: Controlled;   Goal BP: < 130/80              - Volume status: Euvolemic            - Changes: Not at this time     # Elevated Blood Glucose: Glucose generally running ~               - Management as per primary team.     # Anemia in Chronic Renal Disease: Hgb: Improved with transfusion     ENZO: Yes.  Special attention with pheresis to use plasma the first couple of treatments due to recent biopsy.     # Mineral Bone Disorder:   - Secondary renal hyperparathyroidism; PTH level: Moderately elevated (301-600 pg/ml)        On treatment:  None  - Vitamin D; level: Normal        On supplement: No  - Calcium; level: Low normal        On supplement: No  - Phosphorus; level: Normal        On supplement: No     # Electrolytes:   -resolved with dialysis      # Transplant History:  Etiology of Kidney Failure: IgA nephropathy  Tx: LDKT and DDKT  Transplant: 2020 (Kidney), 2007 (Kidney)  Donor Type: Donation after Brain Death        Donor Class:   Crossmatch at time of Tx: pending   Significant changes in immunosuppression: None  Significant transplant-related complications: None    Mahesh Armando MD   Pager: [phone]    Interval History   Feels somewhat swollen and bloated but less than before. No fevers or chills. Appetite is poor. UOP is very minimal.     Review of Systems   4 point ROS was obtained and negative except as noted in the Interval History.    MEDICATIONS:    acetaminophen  650 mg Oral Once     atorvastatin  10 mg Oral Daily     calcium acetate  1,334 mg Oral TID w/meals     carvedilol  6.25 mg Oral BID     diphenhydrAMINE  25-50 mg Oral Once    Or     diphenhydrAMINE  25-50 mg Per NG tube Once     insulin aspart  1-7 Units Subcutaneous TID AC     insulin aspart  1-5 Units Subcutaneous At Bedtime     lidocaine  1 patch Transdermal Q24H     lidocaine   Transdermal Q8H     mycophenolate mofetil  1,000 mg Intravenous Q12H     ondansetron  4 mg Oral TID AC     pantoprazole  40 mg Oral QAM AC     sodium bicarbonate  1,300 mg Oral BID     sodium chloride (PF)  3 mL Intracatheter Q8H     sulfamethoxazole-trimethoprim  1 tablet Oral Once per day on      tacrolimus  6 mg Oral BID     valGANciclovir  450 mg Oral Once per day on u       dextrose 5% and 0.9% NaCl 100 mL/hr at 20 1152       Physical Exam   Temp  Av.8  F (37.1  C)  Min: 97.6  F (36.4  C)  Max: 103.7  F (39.8  C)      Pulse  Av.5  Min: 74  Max: 122 Resp  Av.1  Min: 13  Max: 28  SpO2  Av.1 %  Min: 93 %  Max: 100 %     /73 (BP  "Location: Right arm)   Pulse 91   Temp 98.2  F (36.8  C) (Oral)   Resp 16   Ht 1.676 m (5' 6\")   Wt 57.2 kg (126 lb 1.7 oz)   SpO2 99%   BMI 20.35 kg/m     Date 07/06/20 0700 - 07/07/20 0659   Shift 6263-9482 3439-8655 9472-8243 24 Hour Total   INTAKE   P.O. 600 200  800   I.V.  810  810   Shift Total(mL/kg) 600(10.49) 1010(17.66)  1610(28.15)   OUTPUT   Urine 50   50   Shift Total(mL/kg) 50(0.87)   50(0.87)   Weight (kg) 57.2 57.2 57.2 57.2      Admit Weight: 57.2 kg (126 lb 1.7 oz)     GENERAL APPEARANCE: alert and no distress  HENT: mouth without ulcers or lesions  LYMPHATICS: no cervical or supraclavicular nodes  RESP: lungs clear to auscultation - no rales, rhonchi or wheezes  CV: regular rhythm, normal rate, no rub, no murmur  EDEMA: no LE edema bilaterally  ABDOMEN: soft, nondistended, nontender, bowel sounds normal  MS: extremities normal - no gross deformities noted, no evidence of inflammation in joints, no muscle tenderness  SKIN: no rash    Data   All labs reviewed by me.  CMP  Recent Labs   Lab 07/08/20  0628 07/07/20  0623 07/06/20  2113 07/06/20  0640 07/05/20  0553  07/04/20  1334    134 133 135 136  --  135   POTASSIUM 3.9 4.2 4.0 4.3 4.1  --  4.1   CHLORIDE 98 106 104 107 110*  --  107   CO2 28 13* 15* 11* 14*  --  22   ANIONGAP 7 15* 15* 17* 11  --  6   * 230* 241* 221* 108*  --  96   BUN 29 62* 57* 48* 32*  --  25   CR 4.53* 8.94* 8.68* 7.92* 6.13*  --  4.12*   GFRESTIMATED 12* 5* 5* 6* 8*   < > 13*   GFRESTBLACK 14* 6* 6* 7* 10*   < > 15*   CASIE 7.1* 6.4* 6.6* 7.2* 6.9*  --  7.9*   MAG 1.8 2.2  --  2.3 1.2*  --  1.4*   PHOS 4.6* 6.9*  --  7.0* 4.2  --  4.6*   PROTTOTAL  --   --   --   --   --   --  6.5*   ALBUMIN  --   --   --   --   --   --  2.9*   BILITOTAL  --   --   --   --   --   --  0.7   ALKPHOS  --   --   --   --   --   --  123   AST  --   --   --   --   --   --  34   ALT  --   --   --   --   --   --  31    < > = values in this interval not displayed.     CBC  Recent " Labs   Lab 07/08/20  0628 07/07/20  0623 07/06/20  0640 07/05/20  0651   HGB 7.3* 7.6* 8.5* 6.4*   WBC 2.5* 3.3* 4.9 6.0   RBC 2.43* 2.56* 2.86* 2.13*   HCT 21.5* 21.8* 26.3* 20.3*   MCV 89 85 92 95   MCH 30.0 29.7 29.7 30.0   MCHC 34.0 34.9 32.3 31.5   RDW 14.5 14.6 14.4 14.5    148* 168 165     INR  Recent Labs   Lab 07/04/20  1953 07/04/20  1334   INR  --  1.40*   PTT 35  --      ABGNo lab results found in last 7 days.   Urine Studies  Recent Labs   Lab Test 07/06/20  1412 07/04/20  1346 07/01/20  1430 06/23/20  0830   COLOR Yellow Yellow Patricia Yellow   APPEARANCE Slightly Cloudy Slightly Cloudy Slightly Cloudy Clear   URINEGLC 150* 70* Negative Negative   URINEBILI Negative Small* Negative Negative   URINEKETONE 10* Negative Negative Negative   SG 1.022 1.027 1.025 1.012   UBLD Small* Small* Moderate* Moderate*   URINEPH 8.0* 8.0* 6.0 6.5   PROTEIN >600* >600* 100* 10*   NITRITE Negative Negative Negative Negative   LEUKEST Negative Negative Negative Small*   RBCU 78* 21* 96* 144*   WBCU 21* 2 9* 4     Recent Labs   Lab Test 07/16/12  1400 07/02/12  1130 06/25/12  0615 06/17/12  0930 06/13/12  1120 06/07/12  0643 06/01/12  0950 05/25/12  1830 05/25/12  1110 05/22/12  1536   UTPG 1.53* 0.86* 0.69* 0.82* 0.80* 0.88* 0.58* 0.47*  0.45* 0.56* 0.51*     PTH  Recent Labs   Lab Test 06/26/20  0842 11/30/12  2030 11/30/12  1030   PTHI 226* 500* 794*     Iron Studies  Recent Labs   Lab Test 07/05/20  0553 06/25/20  0749 11/30/12  1950   IRON 10* 57 125   * 163* 155*   IRONSAT 9* 35 81*   SHAHBAZ  --  886* 401*       IMAGING:  All imaging studies reviewed by me.

## 2020-07-08 NOTE — PROGRESS NOTES
[facility], [place]   Transplant Nephrology Progress Note  Date of Admission:  7/4/2020  Today's Date: 07/07/2020    Recommendations:  - Agree with thymo/plex/IVIG and single dose rituximab   - HD today due to worsening volume overload     Assessment & Plan      # DDKT: Trend up again today with anuria               - Baseline Cr ~ 0.97              - Proteinuria: Not checked post transplant              - Date DSA Last Checked: checked at [facility] time of tx       Latest DSA: No               - BK Viremia: Not checked post transplant              - Kidney Tx Biopsy:  Banff 2A cellular rejection with additional features suggestive of AbMR (G 1+ PTC 1) although C4d was negative.  Other Banff scoring include V1, T1, I1.  The biopsy also showed interstitial hemorrhage and tubular injury with vacuolization.  The ATN component may be a phenotype of a BMR.     # Immunosuppression: Tacrolimus immediate release (goal 8-10) and Myfortic 720 twice a day.              - Changes: Rejection treatment will include Thymoglobulin 2 mg/kg per dose x3, plasmapheresis with IVIG and Solu-Medrol 500 mg daily x3.  Following the intense course, prednisone taper will be recommended and prednisone maintenance 5 mg daily.  Agree with rituximab single dose.     # Infection Prophylaxis:   - PJP: Sulfa/TMP (Bactrim) daily  - CMV: Valcyte      # Hypertension: Controlled;   Goal BP: < 130/80              - Volume status: Euvolemic            - Changes: Not at this time     # Elevated Blood Glucose: Glucose generally running ~               - Management as per primary team.     # Anemia in Chronic Renal Disease: Hgb: Improved with transfusion     ENZO: Yes.  Special attention with pheresis to use plasma the first couple of treatments due to recent biopsy.     # Mineral Bone Disorder:   - Secondary renal hyperparathyroidism; PTH level: Moderately elevated (301-600 pg/ml)        On treatment: None  - Vitamin D; level:  Normal        On supplement: No  - Calcium; level: Low normal        On supplement: No  - Phosphorus; level: Normal        On supplement: No     # Electrolytes:   -Moderate acidosis: IV fluid was changed to D5 water with 3 A of bicarb running at 100 mL/h, please repeat renal panel this evening.     # Transplant History:  Etiology of Kidney Failure: IgA nephropathy  Tx: LDKT and DDKT  Transplant: 2020 (Kidney), 2007 (Kidney)  Donor Type: Donation after Brain Death        Donor Class:   Crossmatch at time of Tx: pending   Significant changes in immunosuppression: None  Significant transplant-related complications: None    Mahesh Armando MD   Pager: [phone]    Interval History   Feels somewhat swollen and bloated. No fevers or chills. Appetite is poor. UOP is very minimal.     Review of Systems   4 point ROS was obtained and negative except as noted in the Interval History.    MEDICATIONS:    atorvastatin  10 mg Oral Daily     calcium acetate  1,334 mg Oral TID w/meals     carvedilol  6.25 mg Oral BID     ceFEPIme (MAXIPIME) IV  1 g Intravenous Q24H     diphenhydrAMINE  25-50 mg Oral Once    Or     diphenhydrAMINE  25-50 mg Per NG tube Once     gelatin absorbable  1 each Topical During Hemodialysis (from stock)     insulin aspart  1-7 Units Subcutaneous TID AC     insulin aspart  1-5 Units Subcutaneous At Bedtime     mycophenolate mofetil  1,000 mg Intravenous Q12H     ondansetron  4 mg Oral TID AC     pantoprazole  40 mg Oral QAM AC     sodium bicarbonate  1,300 mg Oral BID     sodium chloride (PF)  3 mL Intracatheter Q8H     sulfamethoxazole-trimethoprim  1 tablet Oral Once per day on      tacrolimus  6.5 mg Oral BID     valGANciclovir  450 mg Oral Once per day on        IV infusion builder WITH additives 100 mL/hr at 20       Physical Exam   Temp  Av.8  F (37.1  C)  Min: 97.6  F (36.4  C)  Max: 103.7  F (39.8  C)      Pulse  Av.5  Min: 74  Max: 122 Resp  Av.1   "Min: 13  Max: 28  SpO2  Av.1 %  Min: 93 %  Max: 100 %     BP (!) 145/100   Pulse 70   Temp 97.3  F (36.3  C) (Oral)   Resp 15   Ht 1.676 m (5' 6\")   Wt 57.2 kg (126 lb 1.7 oz)   SpO2 99%   BMI 20.35 kg/m     Date 20 07 - 20 0659   Shift 9396-2502 4339-9728 1339-1993 24 Hour Total   INTAKE   P.O. 600 200  800   I.V.  810  810   Shift Total(mL/kg) 600(10.49) 1010(17.66)  1610(28.15)   OUTPUT   Urine 50   50   Shift Total(mL/kg) 50(0.87)   50(0.87)   Weight (kg) 57.2 57.2 57.2 57.2      Admit Weight: 57.2 kg (126 lb 1.7 oz)     GENERAL APPEARANCE: alert and no distress  HENT: mouth without ulcers or lesions  LYMPHATICS: no cervical or supraclavicular nodes  RESP: lungs clear to auscultation - no rales, rhonchi or wheezes  CV: regular rhythm, normal rate, no rub, no murmur  EDEMA: no LE edema bilaterally  ABDOMEN: soft, nondistended, nontender, bowel sounds normal  MS: extremities normal - no gross deformities noted, no evidence of inflammation in joints, no muscle tenderness  SKIN: no rash    Data   All labs reviewed by me.  CMP  Recent Labs   Lab 20  0623 20  2113 20  0640 20  0553  20  1334    133 135 136  --  135   POTASSIUM 4.2 4.0 4.3 4.1  --  4.1   CHLORIDE 106 104 107 110*  --  107   CO2 13* 15* 11* 14*  --  22   ANIONGAP 15* 15* 17* 11  --  6   * 241* 221* 108*  --  96   BUN 62* 57* 48* 32*  --  25   CR 8.94* 8.68* 7.92* 6.13*  --  4.12*   GFRESTIMATED 5* 5* 6* 8*   < > 13*   GFRESTBLACK 6* 6* 7* 10*   < > 15*   CASIE 6.4* 6.6* 7.2* 6.9*  --  7.9*   MAG 2.2  --  2.3 1.2*  --  1.4*   PHOS 6.9*  --  7.0* 4.2  --  4.6*   PROTTOTAL  --   --   --   --   --  6.5*   ALBUMIN  --   --   --   --   --  2.9*   BILITOTAL  --   --   --   --   --  0.7   ALKPHOS  --   --   --   --   --  123   AST  --   --   --   --   --  34   ALT  --   --   --   --   --  31    < > = values in this interval not displayed.     CBC  Recent Labs   Lab 20  0620  0640 " 07/05/20  0651 07/05/20  0553   HGB 7.6* 8.5* 6.4* 6.6*   WBC 3.3* 4.9 6.0 5.7   RBC 2.56* 2.86* 2.13* 2.22*   HCT 21.8* 26.3* 20.3* 21.1*   MCV 85 92 95 95   MCH 29.7 29.7 30.0 29.7   MCHC 34.9 32.3 31.5 31.3*   RDW 14.6 14.4 14.5 14.3   * 168 165 160     INR  Recent Labs   Lab 07/04/20  1953 07/04/20  1334   INR  --  1.40*   PTT 35  --      ABGNo lab results found in last 7 days.   Urine Studies  Recent Labs   Lab Test 07/06/20  1412 07/04/20  1346 07/01/20  1430 06/23/20  0830   COLOR Yellow Yellow Patricia Yellow   APPEARANCE Slightly Cloudy Slightly Cloudy Slightly Cloudy Clear   URINEGLC 150* 70* Negative Negative   URINEBILI Negative Small* Negative Negative   URINEKETONE 10* Negative Negative Negative   SG 1.022 1.027 1.025 1.012   UBLD Small* Small* Moderate* Moderate*   URINEPH 8.0* 8.0* 6.0 6.5   PROTEIN >600* >600* 100* 10*   NITRITE Negative Negative Negative Negative   LEUKEST Negative Negative Negative Small*   RBCU 78* 21* 96* 144*   WBCU 21* 2 9* 4     Recent Labs   Lab Test 07/16/12  1400 07/02/12  1130 06/25/12  0615 06/17/12  0930 06/13/12  1120 06/07/12  0643 06/01/12  0950 05/25/12  1830 05/25/12  1110 05/22/12  1536   UTPG 1.53* 0.86* 0.69* 0.82* 0.80* 0.88* 0.58* 0.47*  0.45* 0.56* 0.51*     PTH  Recent Labs   Lab Test 06/26/20  0842 11/30/12  2030 11/30/12  1030   PTHI 226* 500* 794*     Iron Studies  Recent Labs   Lab Test 07/05/20  0553 06/25/20  0749 11/30/12  1950   IRON 10* 57 125   * 163* 155*   IRONSAT 9* 35 81*   SHAHBAZ  --  886* 401*       IMAGING:  All imaging studies reviewed by me.

## 2020-07-08 NOTE — PROGRESS NOTES
Transplant Surgery  Inpatient Daily Progress Note  07/08/2020    Assessment & Plan: 34 yo with PMH significant for ESRD due to IgA Nephropathy s/p KT in 2007 (failed due to non-compliance during pregnancy) and 6/19/2020 (3 arteries, + stent), and HTN. Baseline Cr 0.6 post-transplant. She presented on 7/4/2020 with fever and ARF of transplanted kidney.    Graft function: POD #19   Mixed rejection with ARF: Dialyzed on 7/7, oliguria. 7/4 US: several perinephric fluid collections, kidney 14cm (previously 11.6 on POD # 0), no hydro. 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 DSA negative. AT1R pending. Treatment as below. Ureteral stent removed today.  Perinephric fluid collections: Noted on US. 7/5 IR: 50ml cloudy serosang fluid aspirated, gram stain negative, culture NGTD. Not suggestive of urine leak.  Immunosuppression management:    Pheresis/IVIG: Plan for pheresis/IVIG every other day x5 starting 7/7.  Thymo: 125mg on 7/7, 125mg today to run throughout day. Plan 1mg/kg 7/10, 1mg/kg after final pheresis.  Rituximab: 500mg on 7/7.  Methylpred: 500/500/250/100  Tacro: Goal 8-10  MMF: 1000 IV BID d/t nausea. Was on Myfortic on admission.  Complexity of management: Medium. Contributing factors: rejection  Hematology:   ANTHONY/ACD: Hgb 7.3. Received 1u RBC on 7/5 for hgb 6.4. No sign of bleeding.  PSAT 9, consider venofer if no infection. Hapto high, retic low.  Leukopenia: WBC 2.5. Secondary to thymo.  Cardiorespiratory:   HTN: Coreg reduced this admission. SBP 100s-120s.  GI/Nutrition:   Diet: Regular diet  Nausea/Vomiting: Scheduled zofran and PPI.  Diarrhea: Since transplant. C-diff and enteric panel negative.  Endocrine: No acute issues.   Fluid/Electrolytes: MIVF: change to D5NS  Metabolic acidosis: Dialyzed yesterday.  Hypocalcemia: Replaced 7/8  Hyperphosphatemia: Continue Phoslo.  : Incontinent at times.  Neuro:   Acute pain: Over kidney graft, and low  back. Stop IV dilaudid today. Change oxycodone to low dose oral dilaudid. Continue acetaminophen. Add Robaxin and lidoderm.  Infectious disease: Afebrile, no leukocytosis  Fever: Fever 103.7F on admission. Infection vs rejection. Cipro started outpatient, broadened to Cefepine on 7/4-7/8. COVID neg, 7/4 UC neg, 7/4 blood cx NGTD, 7/5 perinephric fluid cultures pending (gram stain neg), enteric panel neg, C-diff neg.  Prophylaxis: Bactrim (PCP ppx) three times per week  Valcyte (CMV PPx)   Disposition: 7A    Medical Decision Making: Medium  Subsequent visit 81638 (moderate level decision making)    CONNIE/Fellow/Resident Provider: Stephanie Ling NP [id number]    Faculty: Nory Morgan M.D.  _________________________________________________________________    Interval History: History is obtained from the patient  Nausea improving and no vomiting yesterday. Pain improving.    ROS:   A 10-point review of systems was negative except as noted above.    Meds:    acetaminophen  650 mg Oral Once     antithymocyte globulin (THYMOGLOBULIN - Rabbit) w/ hydrocortisone peripheral infusion  2 mg/kg Intravenous Once     atorvastatin  10 mg Oral Daily     calcium acetate  1,334 mg Oral TID w/meals     carvedilol  6.25 mg Oral BID     ceFEPIme (MAXIPIME) IV  1 g Intravenous Q24H     diphenhydrAMINE  25-50 mg Oral Once    Or     diphenhydrAMINE  25-50 mg Per NG tube Once     diphenhydrAMINE  25-50 mg Oral Once    Or     diphenhydrAMINE  25-50 mg Per NG tube Once     insulin aspart  1-7 Units Subcutaneous TID AC     insulin aspart  1-5 Units Subcutaneous At Bedtime     lidocaine  1 patch Transdermal Q24H     lidocaine   Transdermal Q8H     methylPREDNISolone  100 mg Intravenous Once     mycophenolate mofetil  1,000 mg Intravenous Q12H     ondansetron  4 mg Oral TID AC     pantoprazole  40 mg Oral QAM AC     sodium bicarbonate  1,300 mg Oral BID     sodium chloride (PF)  3 mL Intracatheter Q8H     sulfamethoxazole-trimethoprim  1 tablet Oral  "Once per day on Mon Wed Fri     tacrolimus  6.5 mg Oral BID     valGANciclovir  450 mg Oral Once per day on Mon Thu       Physical Exam:     Admit Weight: 57.2 kg (126 lb 1.7 oz)    Current vitals:   /80 (BP Location: Right arm)   Pulse 71   Temp 97.7  F (36.5  C) (Oral)   Resp 21   Ht 1.676 m (5' 6\")   Wt 57.2 kg (126 lb 1.7 oz)   SpO2 98%   BMI 20.35 kg/m      Vital sign ranges:    Temp:  [97.3  F (36.3  C)-98.5  F (36.9  C)] 97.7  F (36.5  C)  Pulse:  [64-80] 71  Heart Rate:  [64-79] 64  Resp:  [15-21] 21  BP: (105-158)/() 106/80  SpO2:  [97 %-99 %] 98 %  Patient Vitals for the past 24 hrs:   BP Temp Temp src Pulse Heart Rate Resp SpO2 Height Weight   07/08/20 0905 106/80 -- -- -- 64 -- 98 % -- --   07/08/20 0850 111/80 97.7  F (36.5  C) Oral -- 67 21 97 % -- --   07/08/20 0830 (!) 140/91 -- -- -- 69 18 -- -- --   07/08/20 0725 117/83 98.1  F (36.7  C) -- -- 72 18 99 % -- --   07/08/20 0427 105/63 98.5  F (36.9  C) -- -- 79 15 99 % -- --   07/07/20 2356 120/79 97.9  F (36.6  C) Oral -- 70 16 99 % -- --   07/07/20 2200 124/84 98.4  F (36.9  C) -- 71 -- 16 -- -- --   07/07/20 2130 122/75 -- -- 80 -- 16 -- -- --   07/07/20 2112 131/84 -- -- 76 -- 16 99 % -- --   07/07/20 2050 (!) 145/100 97.3  F (36.3  C) Oral 70 -- 15 -- -- --   07/07/20 2045 (!) 143/94 -- -- 70 -- 15 99 % -- --   07/07/20 2030 (!) 150/109 -- -- 70 -- 15 99 % -- --   07/07/20 2015 (!) 158/105 -- -- 68 -- 15 99 % -- --   07/07/20 2000 (!) 156/111 -- -- 68 -- 15 99 % -- --   07/07/20 1945 (!) 134/98 -- -- 64 -- 15 99 % -- --   07/07/20 1930 (!) 142/95 -- -- 65 -- 15 99 % -- --   07/07/20 1915 (!) 155/119 -- -- 70 -- 16 99 % -- --   07/07/20 1900 (!) 157/104 -- -- 65 -- 16 99 % -- --   07/07/20 1845 (!) 150/95 -- -- 69 -- 16 99 % -- --   07/07/20 1830 (!) 136/97 -- -- 70 -- 15 97 % -- --   07/07/20 1815 (!) 135/100 -- -- 66 -- 15 99 % -- --   07/07/20 1800 119/84 -- -- 67 -- 15 99 % -- --   07/07/20 1745 (!) 129/90 -- -- 77 -- 16 98 " % -- --   07/07/20 1722 124/84 98.4  F (36.9  C) Oral 80 -- 18 98 % -- --   07/07/20 1715 115/74 97.4  F (36.3  C) Oral 74 -- 16 98 % -- --   07/07/20 1540 124/88 98.4  F (36.9  C) Oral 74 -- 18 -- -- --   07/07/20 1509 (!) 145/103 -- -- -- -- -- -- -- --   07/07/20 1444 (!) 141/104 97.7  F (36.5  C) Oral 71 -- -- -- -- --   07/07/20 1421 (!) 139/100 97.7  F (36.5  C) Oral 77 -- -- -- -- --   07/07/20 1412 (!) 147/106 97.6  F (36.4  C) Oral -- -- 18 -- -- --   07/07/20 1345 (!) 125/91 97.8  F (36.6  C) Oral -- -- 20 -- -- --   07/07/20 1300 121/85 97.8  F (36.6  C) Oral -- -- 20 -- -- 57.2 kg (126 lb 1.7 oz)   07/07/20 0958 123/89 98.3  F (36.8  C) Oral -- 74 20 98 % -- --     General Appearance: Sleepy after cystoscopy  Skin: warm, dry, no rashes  Heart: Perfused  Lungs: RA, unlabored  Abdomen: Soft, less tender over kidney  : polanco is not present.   Extremities: edema: absent.   Neurologic: awake, alert and oriented x4. Tremor absent.    Data:   CMP  Recent Labs   Lab 07/08/20  0628 07/07/20  0623  07/05/20  1330  07/04/20  1334    134   < >  --    < > 135   POTASSIUM 3.9 4.2   < >  --    < > 4.1   CHLORIDE 98 106   < >  --    < > 107   CO2 28 13*   < >  --    < > 22   * 230*   < >  --    < > 96   BUN 29 62*   < >  --    < > 25   CR 4.53* 8.94*   < >  --    < > 4.12*   GFRESTIMATED 12* 5*   < >  --    < > 13*   GFRESTBLACK 14* 6*   < >  --    < > 15*   CASIE 7.1* 6.4*   < >  --    < > 7.9*   MAG 1.8 2.2   < >  --    < > 1.4*   PHOS 4.6* 6.9*   < >  --    < > 4.6*   ALBUMIN  --   --   --   --   --  2.9*   BILITOTAL  --   --   --   --   --  0.7   ALKPHOS  --   --   --   --   --  123   AST  --   --   --   --   --  34   ALT  --   --   --   --   --  31   FCREAT  --   --   --  4.7  --   --     < > = values in this interval not displayed.     CBC  Recent Labs   Lab 07/08/20  0628 07/07/20  0623   HGB 7.3* 7.6*   WBC 2.5* 3.3*    148*

## 2020-07-08 NOTE — PROGRESS NOTES
Care Coordinator  D: 36 yo with PMH significant for ESRD due to IgA Nephropathy s/p KT in 2007 (failed due to non-compliance during pregnancy) and 6/19/2020 (3 arteries, + stent), and HTN. She presented on 7/4/2020 with fever and ARF of transplanted kidney.   Graft function: POD #18  Mixed rejection with ARF: Cr 8.9, baseline Cr 0.6. Low UOP. 7/4 US: several perinephric fluid collections, kidney 14cm (previously 11.6 on POD # 0), no hydro. 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 DSA negative. AT1R pending. Treatment as below. Dialysis today---note per Stephanie Ling NP on 7/7. Per Stephanie pt will need OP HD arranged--had prior to kidney transplant.  I: Pt had cystoscopy with stent removal this morning--I called her on her room phone, explained my role--she is still sleepy but could answer my questions about dialysis unit preference.  [facility] [place] [facility] [place] [address] Ph[place] [phone]  [facility]--prefers MWF first shift  [facility]--all pertinent paperwork has been sent in except Hep B Total Core Antibody: fax'd today  Start Date: requested Monday, 7/13  A: s/p KT and now with mixed rejection and needs hemodialysis again  P: Send in Hep B core when resulted. Wait for [facility] to call back with unit availability.  Pt to continue with Plasmapheresis/Ivig  I am waiting to hear if Outpatient IVig is covered as Outpatient_____let team know (PP is).  Pt is open with [facility]: RN. Will follow.

## 2020-07-08 NOTE — ANESTHESIA CARE TRANSFER NOTE
Patient: Jelly Dietz    Procedure(s):  CYSTOSCOPY, WITH STENT REMOVAL    Diagnosis: History of cystoscopy [Z98.890]  Diagnosis Additional Information: No value filed.    Anesthesia Type:   No value filed.     Note:  Airway :Face Mask  Patient transferred to:Phase II  Comments: To P2, VSS, pt awake and alert, exchanging well, report to RN, care accepted.Handoff Report: Identifed the Patient, Identified the Reponsible Provider, Reviewed the pertinent medical history, Discussed the surgical course, Reviewed Intra-OP anesthesia mangement and issues during anesthesia, Set expectations for post-procedure period and Allowed opportunity for questions and acknowledgement of understanding      Vitals: (Last set prior to Anesthesia Care Transfer)    CRNA VITALS  7/8/2020 0757 - 7/8/2020 0835      7/8/2020             Resp Rate (observed):  16                Electronically Signed By: CHAU Dewitt CRNA  July 8, 2020  8:35 AM

## 2020-07-08 NOTE — PLAN OF CARE
"/84 (BP Location: Right arm)   Pulse 71   Temp 98.4  F (36.9  C)   Resp 16   Ht 1.676 m (5' 6\")   Wt 57.2 kg (126 lb 1.7 oz)   SpO2 99%   BMI 20.35 kg/m      [id number]  Vitals: Pt. hypertensive during dialysis run this evening, see v.s. flowsheet. Pt. took sched. Coreg this evening.  Pt. had 1.5L removed from dialysis. AOVSS on RA  Neuro: Pt. alert & Ox4  Activity: Pt. up with SBA.   Endocrine:  HS - no treatment per sliding scale insulin.  LDAs: MIVF at 100cc/hour into right PIV. Rituxmab into 2nd PIV. Left fistula dressing c/d/I.  Respiratory: on RA  GI/: Pt. oliguric, no urine output this evening. No stools this evening.   Skin: Intact  Pain: Pt. c/o abdominal & back pain & receiving prn Dilaudid IV & Oxycodone.   Nausea: PRN Zofran IV x1, no emesis this evening.    Diet: Regular, NPO after midnight for stent removal tomorrow.  Labs/Imaging: N/A  Plan: Pt. currently receiving Rituxmab for rejection. Plan for Thymo. tomorrow. Continue to follow POC & notify MD with change in status.     "

## 2020-07-08 NOTE — PROGRESS NOTES
HEMODIALYSIS TREATMENT NOTE    Date: 7/7/2020  Time: 9.00 PM    Data:  Pre Wt: 57.2kg    Desired Wt: 55.7 kg   Post Wt:  55.7kg Estimated  Ultrafiltration - Post Run Net Total Removed : 1500ML   Vascular Access Status: Fistula  patent  Dialyzer Rinse:  Streaked. Clear.  Total Blood Volume Processed:64.0L     Total Dialysis (Treatment) Time:3.0  Dialysate Bath: K 3, Ca 3  Heparin: None    Lab:   Yes ( Hep B : Antibody & Antigen)    Assessment / Interventions:Pt alert & orient x4, on room air, calm & cooperative. 3.0 hours HD via LAVF, thrill & bruit present. 16g needle. Arterial Needle x1 while Venous cannulated 3 times due to cannulation problem.  with good flow. No medication given during treatment. Pt hemodynamically stable. Completed her treatment, no issues,blood rinsed back, Fistula hemostasis achieved in less than 10 minutes & hand off report given.       Plan:    Cont. With Renal Team.

## 2020-07-08 NOTE — OP NOTE
Transplant Surgery  Operative Note    Preop dx: Status post  Donor kidney  Re-transplant.  Post op dx: same   Procedure: Flexible cystoscopy with stent removal   Surgeon: Nory Morgan M.D.  Assistant: Nadir Carlos MD  Anesthesia: MAC w/ sedation  EBL: 0   Specimens: stent.  Findings: none abnormal. Stent inspected and noted to be intact.   Indication: The patient is status post kidney transplant and the ureteral stent is no longer needed.    Procedure: The patient was brought to the operating room and placed supine on the table.  Sedation was administered and monitored by anesthesia.  Groin was sterilely prepped and draped in the usual fashion. Time out was done. Lido Jet was applied to urethra and an attempt was made to obtain catheterized urine sample. No urine obtained as patient is anuric. Antibiotics were administered. A flexible cystoscope was inserted and advanced thru the urethra into the bladder. The stent was visualized and grasped. The cystoscope, grasper and stent were removed en-mass. The stent was visualized to be intact.  The bladder mucosa was normal in appearance. A catheter was reinserted and the bladder drained. Faculty was available for the procedure. The patient tolerated the procedure well and  was transferred in stable and satisfactory condition to the PACU.     I was present during the key portions of the procedure, and I was immediately available for the entire procedure.

## 2020-07-08 NOTE — PLAN OF CARE
"/73 (BP Location: Right arm)   Pulse 91   Temp 98.2  F (36.8  C) (Oral)   Resp 16   Ht 1.676 m (5' 6\")   Wt 57.2 kg (126 lb 1.7 oz)   SpO2 99%   BMI 20.35 kg/m      Assumed care from 1336-8469. Patient hypotensive, AOVSS on RA, afebrile. , managed with sliding scale insulin. Back pain managed with heat and abdominal pain managed with lidos. Tolerating regular diet with no appetite. R PIV x2, infusing thymo and MMF, changed in IVMF 50 ml/hr. No BM today. No void. Abdomen rounded. Up SBA. Plan for IVIG and pheresis tomorrow. Will continue with POC and notify MD with changes or concerns.    "

## 2020-07-08 NOTE — ANESTHESIA POSTPROCEDURE EVALUATION
Anesthesia POST Procedure Evaluation    Patient: Jelly Dietz   MRN:     [id number] Gender:   female   Age:    35 year old :      1985        Preoperative Diagnosis: History of cystoscopy [Z98.890]   Procedure(s):  CYSTOSCOPY, WITH STENT REMOVAL   Postop Comments: No value filed.     Anesthesia Type: No value filed.          Postop Pain Control: Uneventful            Sign Out: Pain at Preoperative BASELINE   PONV: No   Neuro/Psych: Uneventful            Sign Out: Acceptable/Baseline neuro status   Airway/Respiratory: Uneventful            Sign Out: Acceptable/Baseline resp. status   CV/Hemodynamics: Uneventful            Sign Out: Acceptable CV status   Other NRE: NONE   DID A NON-ROUTINE EVENT OCCUR? No         Last Anesthesia Record Vitals:  CRNA VITALS  2020 0757 - 2020 0843      2020             SpO2:  97 %          Last PACU Vitals:  Vitals Value Taken Time   /91 2020  8:30 AM   Temp     Pulse     Resp 18 2020  8:30 AM   SpO2     Temp src     NIBP 140/91 2020  8:32 AM   Pulse     SpO2 97 % 2020  8:38 AM   Resp     Temp     Ht Rate 66 2020  8:32 AM   Temp 2           Electronically Signed By: Martín Woodward MD, 2020, 8:43 AM

## 2020-07-08 NOTE — TELEPHONE ENCOUNTER
Post Kidney and Pancreas Transplant Team Conference  Date: 7/8/2020  Transplant Coordinator: Mulu Arguello     Attendees:  [x]  Dr. Anglin  [x] Hollie Hong LPN     [x]  Dr. Armando [x] Mulu Arguello RN [] Tracy Wells LPN   []  Dr. Peraza [] Birgit Solano, RN     [] Chloe Lechuga RN [x] Anam Hermosillo, LisaD   [] Dr. Altman [x] Lucina Jacinto RN    [] Dr. Morgan [] Ron Terrazas RN    [] Dr. Lakhani [] Laura Cervantes RN    [x] Dr. Younger [] Faustina Aragon RN     [] Yoli cMcoy, PERI    [] Surgery Fellow [x] Chelsea Simpson RN    [x] Kailey Santos, EVA [] Sharon Lujan RN        Verbal Plan Read Back:   Currently inpatient due to recent rejection    Routed to RN Coordinator   Hollie Hong LPN

## 2020-07-08 NOTE — PLAN OF CARE
"/63 (BP Location: Right arm)   Pulse 91   Temp 98.1  F (36.7  C) (Oral)   Resp 18   Ht 1.676 m (5' 6\")   Wt 57.2 kg (126 lb 1.7 oz)   SpO2 99%   BMI 20.35 kg/m    Patient A&O, but very sleepy all shift. Had stent removal this morning, no complications. 2 PIV on right arm, thymo running at 28.8mg a hour in one, will run for 20 hours throughout the night per MD orders. PRN dialudid given for pain x1. Poor appetite after procedure, voiding appropriately. Blood sugar managed with supplemental insulin.   "

## 2020-07-09 ENCOUNTER — APPOINTMENT (OUTPATIENT)
Dept: LAB | Facility: CLINIC | Age: 35
DRG: 698 | End: 2020-07-09
Payer: MEDICARE

## 2020-07-09 LAB
ABO + RH BLD: NORMAL
ABO + RH BLD: NORMAL
ANION GAP SERPL CALCULATED.3IONS-SCNC: 10 MMOL/L (ref 3–14)
BASOPHILS # BLD AUTO: 0 10E9/L (ref 0–0.2)
BASOPHILS # BLD AUTO: 0 10E9/L (ref 0–0.2)
BASOPHILS NFR BLD AUTO: 0 %
BASOPHILS NFR BLD AUTO: 0 %
BLD GP AB SCN SERPL QL: NORMAL
BLD PROD TYP BPU: NORMAL
BLD PROD TYP BPU: NORMAL
BLD UNIT ID BPU: 0
BLOOD BANK CMNT PATIENT-IMP: NORMAL
BLOOD PRODUCT CODE: NORMAL
BPU ID: NORMAL
BUN SERPL-MCNC: 42 MG/DL (ref 7–30)
CALCIUM SERPL-MCNC: 6.1 MG/DL (ref 8.5–10.1)
CHLORIDE SERPL-SCNC: 98 MMOL/L (ref 94–109)
CO2 SERPL-SCNC: 26 MMOL/L (ref 20–32)
CREAT SERPL-MCNC: 5.61 MG/DL (ref 0.52–1.04)
DIFFERENTIAL METHOD BLD: ABNORMAL
DIFFERENTIAL METHOD BLD: ABNORMAL
EOSINOPHIL # BLD AUTO: 0 10E9/L (ref 0–0.7)
EOSINOPHIL # BLD AUTO: 0 10E9/L (ref 0–0.7)
EOSINOPHIL NFR BLD AUTO: 0 %
EOSINOPHIL NFR BLD AUTO: 0 %
ERYTHROCYTE [DISTWIDTH] IN BLOOD BY AUTOMATED COUNT: 14.2 % (ref 10–15)
ERYTHROCYTE [DISTWIDTH] IN BLOOD BY AUTOMATED COUNT: 14.3 % (ref 10–15)
FIBRINOGEN PPP-MCNC: 246 MG/DL (ref 200–420)
GFR SERPL CREATININE-BSD FRML MDRD: 9 ML/MIN/{1.73_M2}
GLUCOSE BLDC GLUCOMTR-MCNC: 142 MG/DL (ref 70–99)
GLUCOSE BLDC GLUCOMTR-MCNC: 199 MG/DL (ref 70–99)
GLUCOSE BLDC GLUCOMTR-MCNC: 201 MG/DL (ref 70–99)
GLUCOSE BLDC GLUCOMTR-MCNC: 96 MG/DL (ref 70–99)
GLUCOSE BLDC GLUCOMTR-MCNC: 98 MG/DL (ref 70–99)
GLUCOSE SERPL-MCNC: 159 MG/DL (ref 70–99)
HBV CORE AB SERPL QL IA: REACTIVE
HCT VFR BLD AUTO: 20 % (ref 35–47)
HCT VFR BLD AUTO: 22.8 % (ref 35–47)
HGB BLD-MCNC: 6.5 G/DL (ref 11.7–15.7)
HGB BLD-MCNC: 7.6 G/DL (ref 11.7–15.7)
IMM GRANULOCYTES # BLD: 0 10E9/L (ref 0–0.4)
IMM GRANULOCYTES # BLD: 0 10E9/L (ref 0–0.4)
IMM GRANULOCYTES NFR BLD: 0.4 %
IMM GRANULOCYTES NFR BLD: 1.1 %
INR PPP: 1.49 (ref 0.86–1.14)
LYMPHOCYTES # BLD AUTO: 0 10E9/L (ref 0.8–5.3)
LYMPHOCYTES # BLD AUTO: 0 10E9/L (ref 0.8–5.3)
LYMPHOCYTES NFR BLD AUTO: 1.3 %
LYMPHOCYTES NFR BLD AUTO: 1.7 %
MAGNESIUM SERPL-MCNC: 1.7 MG/DL (ref 1.6–2.3)
MCH RBC QN AUTO: 29.8 PG (ref 26.5–33)
MCH RBC QN AUTO: 30.2 PG (ref 26.5–33)
MCHC RBC AUTO-ENTMCNC: 32.5 G/DL (ref 31.5–36.5)
MCHC RBC AUTO-ENTMCNC: 33.3 G/DL (ref 31.5–36.5)
MCV RBC AUTO: 91 FL (ref 78–100)
MCV RBC AUTO: 92 FL (ref 78–100)
MONOCYTES # BLD AUTO: 0 10E9/L (ref 0–1.3)
MONOCYTES # BLD AUTO: 0.1 10E9/L (ref 0–1.3)
MONOCYTES NFR BLD AUTO: 2.2 %
MONOCYTES NFR BLD AUTO: 2.3 %
NEUTROPHILS # BLD AUTO: 1.7 10E9/L (ref 1.6–8.3)
NEUTROPHILS # BLD AUTO: 2.2 10E9/L (ref 1.6–8.3)
NEUTROPHILS NFR BLD AUTO: 94.9 %
NEUTROPHILS NFR BLD AUTO: 96.1 %
NRBC # BLD AUTO: 0 10*3/UL
NRBC # BLD AUTO: 0 10*3/UL
NRBC BLD AUTO-RTO: 0 /100
NRBC BLD AUTO-RTO: 0 /100
NUM BPU REQUESTED: 1
PHOSPHATE SERPL-MCNC: 4.3 MG/DL (ref 2.5–4.5)
PLATELET # BLD AUTO: 137 10E9/L (ref 150–450)
PLATELET # BLD AUTO: 154 10E9/L (ref 150–450)
POTASSIUM SERPL-SCNC: 3.4 MMOL/L (ref 3.4–5.3)
RBC # BLD AUTO: 2.18 10E12/L (ref 3.8–5.2)
RBC # BLD AUTO: 2.52 10E12/L (ref 3.8–5.2)
SODIUM SERPL-SCNC: 134 MMOL/L (ref 133–144)
SPECIMEN EXP DATE BLD: NORMAL
TRANSFUSION STATUS PATIENT QL: NORMAL
TRANSFUSION STATUS PATIENT QL: NORMAL
WBC # BLD AUTO: 1.8 10E9/L (ref 4–11)
WBC # BLD AUTO: 2.2 10E9/L (ref 4–11)

## 2020-07-09 PROCEDURE — 40001071 ZZH STATISTICAL VASC ACCESS NURSE TIME, 1-15 MINUTES

## 2020-07-09 PROCEDURE — 36569 INSJ PICC 5 YR+ W/O IMAGING: CPT

## 2020-07-09 PROCEDURE — 86901 BLOOD TYPING SEROLOGIC RH(D): CPT | Performed by: SURGERY

## 2020-07-09 PROCEDURE — 86923 COMPATIBILITY TEST ELECTRIC: CPT | Performed by: SURGERY

## 2020-07-09 PROCEDURE — 25000132 ZZH RX MED GY IP 250 OP 250 PS 637: Mod: GY | Performed by: NURSE PRACTITIONER

## 2020-07-09 PROCEDURE — 25000132 ZZH RX MED GY IP 250 OP 250 PS 637: Mod: GY | Performed by: STUDENT IN AN ORGANIZED HEALTH CARE EDUCATION/TRAINING PROGRAM

## 2020-07-09 PROCEDURE — 80048 BASIC METABOLIC PNL TOTAL CA: CPT | Performed by: STUDENT IN AN ORGANIZED HEALTH CARE EDUCATION/TRAINING PROGRAM

## 2020-07-09 PROCEDURE — 25800030 ZZH RX IP 258 OP 636: Performed by: INTERNAL MEDICINE

## 2020-07-09 PROCEDURE — 86900 BLOOD TYPING SEROLOGIC ABO: CPT | Performed by: SURGERY

## 2020-07-09 PROCEDURE — 25000125 ZZHC RX 250: Performed by: STUDENT IN AN ORGANIZED HEALTH CARE EDUCATION/TRAINING PROGRAM

## 2020-07-09 PROCEDURE — 40000556 ZZH STATISTIC PERIPHERAL IV START W US GUIDANCE

## 2020-07-09 PROCEDURE — 25000125 ZZHC RX 250: Performed by: SURGERY

## 2020-07-09 PROCEDURE — 36415 COLL VENOUS BLD VENIPUNCTURE: CPT | Performed by: STUDENT IN AN ORGANIZED HEALTH CARE EDUCATION/TRAINING PROGRAM

## 2020-07-09 PROCEDURE — 25000128 H RX IP 250 OP 636: Performed by: STUDENT IN AN ORGANIZED HEALTH CARE EDUCATION/TRAINING PROGRAM

## 2020-07-09 PROCEDURE — P9016 RBC LEUKOCYTES REDUCED: HCPCS | Performed by: SURGERY

## 2020-07-09 PROCEDURE — 86704 HEP B CORE ANTIBODY TOTAL: CPT | Performed by: STUDENT IN AN ORGANIZED HEALTH CARE EDUCATION/TRAINING PROGRAM

## 2020-07-09 PROCEDURE — 85384 FIBRINOGEN ACTIVITY: CPT

## 2020-07-09 PROCEDURE — P9041 ALBUMIN (HUMAN),5%, 50ML: HCPCS

## 2020-07-09 PROCEDURE — 85610 PROTHROMBIN TIME: CPT

## 2020-07-09 PROCEDURE — 12000026 ZZH R&B TRANSPLANT

## 2020-07-09 PROCEDURE — 25800030 ZZH RX IP 258 OP 636: Performed by: NURSE PRACTITIONER

## 2020-07-09 PROCEDURE — 85025 COMPLETE CBC W/AUTO DIFF WBC: CPT

## 2020-07-09 PROCEDURE — 84100 ASSAY OF PHOSPHORUS: CPT | Performed by: STUDENT IN AN ORGANIZED HEALTH CARE EDUCATION/TRAINING PROGRAM

## 2020-07-09 PROCEDURE — C1751 CATH, INF, PER/CENT/MIDLINE: HCPCS

## 2020-07-09 PROCEDURE — 00000146 ZZHCL STATISTIC GLUCOSE BY METER IP

## 2020-07-09 PROCEDURE — 85025 COMPLETE CBC W/AUTO DIFF WBC: CPT | Performed by: STUDENT IN AN ORGANIZED HEALTH CARE EDUCATION/TRAINING PROGRAM

## 2020-07-09 PROCEDURE — 25800030 ZZH RX IP 258 OP 636: Performed by: STUDENT IN AN ORGANIZED HEALTH CARE EDUCATION/TRAINING PROGRAM

## 2020-07-09 PROCEDURE — 25000131 ZZH RX MED GY IP 250 OP 636 PS 637: Mod: GY | Performed by: NURSE PRACTITIONER

## 2020-07-09 PROCEDURE — 36514 APHERESIS PLASMA: CPT

## 2020-07-09 PROCEDURE — 83735 ASSAY OF MAGNESIUM: CPT | Performed by: STUDENT IN AN ORGANIZED HEALTH CARE EDUCATION/TRAINING PROGRAM

## 2020-07-09 PROCEDURE — 90937 HEMODIALYSIS REPEATED EVAL: CPT

## 2020-07-09 PROCEDURE — 86850 RBC ANTIBODY SCREEN: CPT | Performed by: SURGERY

## 2020-07-09 PROCEDURE — 25000128 H RX IP 250 OP 636

## 2020-07-09 PROCEDURE — 27211417 ZZ H KIT, VPS RHYTHM STYLET

## 2020-07-09 PROCEDURE — 25000125 ZZHC RX 250

## 2020-07-09 PROCEDURE — 25000128 H RX IP 250 OP 636: Performed by: NURSE PRACTITIONER

## 2020-07-09 RX ORDER — HEPARIN SODIUM,PORCINE 10 UNIT/ML
2-5 VIAL (ML) INTRAVENOUS
Status: ACTIVE | OUTPATIENT
Start: 2020-07-09 | End: 2020-07-12

## 2020-07-09 RX ORDER — OLANZAPINE 2.5 MG/1
2.5 TABLET, FILM COATED ORAL ONCE
Status: DISCONTINUED | OUTPATIENT
Start: 2020-07-09 | End: 2020-07-13

## 2020-07-09 RX ORDER — DIPHENHYDRAMINE HYDROCHLORIDE 50 MG/ML
50 INJECTION INTRAMUSCULAR; INTRAVENOUS
Status: DISCONTINUED | OUTPATIENT
Start: 2020-07-09 | End: 2020-07-11

## 2020-07-09 RX ORDER — PREDNISONE 20 MG/1
40 TABLET ORAL 2 TIMES DAILY
Status: COMPLETED | OUTPATIENT
Start: 2020-07-10 | End: 2020-07-10

## 2020-07-09 RX ORDER — METHYLPREDNISOLONE SODIUM SUCCINATE 125 MG/2ML
100 INJECTION, POWDER, LYOPHILIZED, FOR SOLUTION INTRAMUSCULAR; INTRAVENOUS ONCE
Status: COMPLETED | OUTPATIENT
Start: 2020-07-09 | End: 2020-07-09

## 2020-07-09 RX ORDER — DIPHENHYDRAMINE HCL 25 MG
50 CAPSULE ORAL
Status: DISCONTINUED | OUTPATIENT
Start: 2020-07-09 | End: 2020-07-11

## 2020-07-09 RX ORDER — FUROSEMIDE 10 MG/ML
80 INJECTION INTRAMUSCULAR; INTRAVENOUS
Status: DISCONTINUED | OUTPATIENT
Start: 2020-07-09 | End: 2020-07-09

## 2020-07-09 RX ORDER — METHYLPREDNISOLONE SODIUM SUCCINATE 125 MG/2ML
100 INJECTION, POWDER, LYOPHILIZED, FOR SOLUTION INTRAMUSCULAR; INTRAVENOUS ONCE
Status: CANCELLED
Start: 2020-07-11

## 2020-07-09 RX ORDER — LIDOCAINE 40 MG/G
CREAM TOPICAL
Status: ACTIVE | OUTPATIENT
Start: 2020-07-09 | End: 2020-07-12

## 2020-07-09 RX ORDER — PREDNISONE 20 MG/1
40 TABLET ORAL DAILY
Status: COMPLETED | OUTPATIENT
Start: 2020-07-16 | End: 2020-07-16

## 2020-07-09 RX ORDER — PREDNISONE 20 MG/1
20 TABLET ORAL DAILY
Status: COMPLETED | OUTPATIENT
Start: 2020-07-17 | End: 2020-07-18

## 2020-07-09 RX ORDER — HEPARIN SODIUM,PORCINE 10 UNIT/ML
5 VIAL (ML) INTRAVENOUS
Status: CANCELLED | OUTPATIENT
Start: 2020-07-11

## 2020-07-09 RX ORDER — ACETAMINOPHEN 325 MG/1
650 TABLET ORAL ONCE
Status: CANCELLED
Start: 2020-07-11

## 2020-07-09 RX ORDER — FUROSEMIDE 10 MG/ML
60 INJECTION INTRAMUSCULAR; INTRAVENOUS
Status: DISCONTINUED | OUTPATIENT
Start: 2020-07-09 | End: 2020-07-11

## 2020-07-09 RX ORDER — DIPHENHYDRAMINE HCL 25 MG
50 CAPSULE ORAL ONCE
Status: COMPLETED | OUTPATIENT
Start: 2020-07-09 | End: 2020-07-09

## 2020-07-09 RX ORDER — DEXTROSE MONOHYDRATE 100 MG/ML
INJECTION, SOLUTION INTRAVENOUS CONTINUOUS PRN
Status: DISCONTINUED | OUTPATIENT
Start: 2020-07-09 | End: 2020-07-18 | Stop reason: HOSPADM

## 2020-07-09 RX ORDER — HYDROXYZINE HYDROCHLORIDE 10 MG/1
10 TABLET, FILM COATED ORAL ONCE
Status: COMPLETED | OUTPATIENT
Start: 2020-07-09 | End: 2020-07-09

## 2020-07-09 RX ORDER — CALCIUM GLUCONATE 100 MG/ML
AMPUL (ML) INTRAVENOUS
Status: COMPLETED | OUTPATIENT
Start: 2020-07-09 | End: 2020-07-09

## 2020-07-09 RX ORDER — ALBUMIN HUMAN 25 %
2750 INTRAVENOUS SOLUTION INTRAVENOUS
Status: COMPLETED | OUTPATIENT
Start: 2020-07-09 | End: 2020-07-09

## 2020-07-09 RX ORDER — DIPHENHYDRAMINE HCL 25 MG
50 CAPSULE ORAL ONCE
Status: CANCELLED | OUTPATIENT
Start: 2020-07-11

## 2020-07-09 RX ORDER — ACETAMINOPHEN 325 MG/1
650 TABLET ORAL
Status: DISCONTINUED | OUTPATIENT
Start: 2020-07-09 | End: 2020-07-11

## 2020-07-09 RX ORDER — ACETAMINOPHEN 325 MG/1
650 TABLET ORAL ONCE
Status: COMPLETED | OUTPATIENT
Start: 2020-07-09 | End: 2020-07-09

## 2020-07-09 RX ORDER — PREDNISONE 20 MG/1
40 TABLET ORAL DAILY
Status: COMPLETED | OUTPATIENT
Start: 2020-07-14 | End: 2020-07-14

## 2020-07-09 RX ORDER — HYDROXYZINE HYDROCHLORIDE 25 MG/1
25 TABLET, FILM COATED ORAL 3 TIMES DAILY PRN
Status: DISCONTINUED | OUTPATIENT
Start: 2020-07-09 | End: 2020-07-18 | Stop reason: HOSPADM

## 2020-07-09 RX ORDER — METHYLPREDNISOLONE SODIUM SUCCINATE 125 MG/2ML
125 INJECTION, POWDER, LYOPHILIZED, FOR SOLUTION INTRAMUSCULAR; INTRAVENOUS
Status: DISCONTINUED | OUTPATIENT
Start: 2020-07-09 | End: 2020-07-11

## 2020-07-09 RX ORDER — PREDNISONE 5 MG/1
5 TABLET ORAL DAILY
Status: DISCONTINUED | OUTPATIENT
Start: 2020-07-19 | End: 2020-07-18 | Stop reason: HOSPADM

## 2020-07-09 RX ORDER — PREDNISONE 20 MG/1
40 TABLET ORAL 2 TIMES DAILY
Status: COMPLETED | OUTPATIENT
Start: 2020-07-12 | End: 2020-07-12

## 2020-07-09 RX ADMIN — MYCOPHENOLATE MOFETIL 1000 MG: 500 INJECTION, POWDER, LYOPHILIZED, FOR SOLUTION INTRAVENOUS at 05:52

## 2020-07-09 RX ADMIN — HUMAN IMMUNOGLOBULIN G 30 G: 20 LIQUID INTRAVENOUS at 17:27

## 2020-07-09 RX ADMIN — SODIUM CHLORIDE 300 ML: 9 INJECTION, SOLUTION INTRAVENOUS at 17:57

## 2020-07-09 RX ADMIN — TACROLIMUS 6 MG: 5 CAPSULE ORAL at 18:37

## 2020-07-09 RX ADMIN — SODIUM BICARBONATE 650 MG TABLET 1300 MG: at 19:15

## 2020-07-09 RX ADMIN — ONDANSETRON 4 MG: 4 TABLET, ORALLY DISINTEGRATING ORAL at 13:21

## 2020-07-09 RX ADMIN — CALCIUM GLUCONATE 1 G: 98 INJECTION, SOLUTION INTRAVENOUS at 08:36

## 2020-07-09 RX ADMIN — FUROSEMIDE 30 MG: 10 INJECTION, SOLUTION INTRAMUSCULAR; INTRAVENOUS at 11:02

## 2020-07-09 RX ADMIN — INSULIN ASPART 1 UNITS: 100 INJECTION, SOLUTION INTRAVENOUS; SUBCUTANEOUS at 08:38

## 2020-07-09 RX ADMIN — SODIUM CHLORIDE 250 ML: 9 INJECTION, SOLUTION INTRAVENOUS at 17:57

## 2020-07-09 RX ADMIN — Medication: at 17:57

## 2020-07-09 RX ADMIN — Medication 2 MG: at 08:39

## 2020-07-09 RX ADMIN — TACROLIMUS 6 MG: 5 CAPSULE ORAL at 08:34

## 2020-07-09 RX ADMIN — ANTICOAGULANT CITRATE DEXTROSE SOLUTION FORMULA A 466 ML: 12.25; 11; 3.65 SOLUTION INTRAVENOUS at 14:16

## 2020-07-09 RX ADMIN — CALCIUM ACETATE 1334 MG: 667 CAPSULE ORAL at 18:37

## 2020-07-09 RX ADMIN — HYDROXYZINE HYDROCHLORIDE 10 MG: 10 TABLET ORAL at 06:32

## 2020-07-09 RX ADMIN — Medication 2 MG: at 16:17

## 2020-07-09 RX ADMIN — HYDROXYZINE HYDROCHLORIDE 25 MG: 25 TABLET ORAL at 08:35

## 2020-07-09 RX ADMIN — VALGANCICLOVIR 450 MG: 450 TABLET, FILM COATED ORAL at 11:02

## 2020-07-09 RX ADMIN — FUROSEMIDE 60 MG: 10 INJECTION, SOLUTION INTRAMUSCULAR; INTRAVENOUS at 16:40

## 2020-07-09 RX ADMIN — CARVEDILOL 6.25 MG: 6.25 TABLET, FILM COATED ORAL at 08:33

## 2020-07-09 RX ADMIN — ALBUMIN HUMAN 2750 ML: 0.05 INJECTION, SOLUTION INTRAVENOUS at 14:14

## 2020-07-09 RX ADMIN — DIPHENHYDRAMINE HYDROCHLORIDE 50 MG: 25 CAPSULE ORAL at 16:39

## 2020-07-09 RX ADMIN — METHOCARBAMOL 500 MG: 500 TABLET, FILM COATED ORAL at 13:21

## 2020-07-09 RX ADMIN — PANTOPRAZOLE SODIUM 40 MG: 40 TABLET, DELAYED RELEASE ORAL at 08:34

## 2020-07-09 RX ADMIN — ACETAMINOPHEN 650 MG: 325 TABLET, FILM COATED ORAL at 11:02

## 2020-07-09 RX ADMIN — MYCOPHENOLATE MOFETIL 1000 MG: 500 INJECTION, POWDER, LYOPHILIZED, FOR SOLUTION INTRAVENOUS at 19:38

## 2020-07-09 RX ADMIN — I.V. FAT EMULSION 250 ML: 20 EMULSION INTRAVENOUS at 20:47

## 2020-07-09 RX ADMIN — Medication 2 MG: at 05:25

## 2020-07-09 RX ADMIN — METHYLPREDNISOLONE SODIUM SUCCINATE 100 MG: 125 INJECTION, POWDER, FOR SOLUTION INTRAMUSCULAR; INTRAVENOUS at 16:39

## 2020-07-09 RX ADMIN — SODIUM BICARBONATE 650 MG TABLET 1300 MG: at 08:33

## 2020-07-09 RX ADMIN — ASCORBIC ACID, VITAMIN A PALMITATE, CHOLECALCIFEROL, THIAMINE HYDROCHLORIDE, RIBOFLAVIN-5 PHOSPHATE SODIUM, PYRIDOXINE HYDROCHLORIDE, NIACINAMIDE, DEXPANTHENOL, ALPHA-TOCOPHEROL ACETATE, VITAMIN K1, FOLIC ACID, BIOTIN, CYANOCOBALAMIN: 200; 3300; 200; 6; 3.6; 6; 40; 15; 10; 150; 600; 60; 5 INJECTION, SOLUTION INTRAVENOUS at 20:47

## 2020-07-09 RX ADMIN — ATORVASTATIN CALCIUM 10 MG: 10 TABLET, FILM COATED ORAL at 08:32

## 2020-07-09 RX ADMIN — LIDOCAINE 1 PATCH: 560 PATCH PERCUTANEOUS; TOPICAL; TRANSDERMAL at 08:37

## 2020-07-09 RX ADMIN — HYDROXYZINE HYDROCHLORIDE 25 MG: 25 TABLET ORAL at 14:39

## 2020-07-09 RX ADMIN — ONDANSETRON 4 MG: 4 TABLET, ORALLY DISINTEGRATING ORAL at 08:32

## 2020-07-09 RX ADMIN — ACETAMINOPHEN 650 MG: 325 TABLET, FILM COATED ORAL at 16:39

## 2020-07-09 RX ADMIN — Medication 2 MG: at 13:21

## 2020-07-09 RX ADMIN — CALCIUM GLUCONATE 4 G: 98 INJECTION, SOLUTION INTRAVENOUS at 14:15

## 2020-07-09 ASSESSMENT — ACTIVITIES OF DAILY LIVING (ADL)
ADLS_ACUITY_SCORE: 10

## 2020-07-09 ASSESSMENT — PAIN DESCRIPTION - DESCRIPTORS
DESCRIPTORS: ACHING;PRESSURE
DESCRIPTORS: ACHING;PRESSURE;HEADACHE

## 2020-07-09 ASSESSMENT — MIFFLIN-ST. JEOR
SCORE: 1409.4
SCORE: 1380.75
SCORE: 1345.75

## 2020-07-09 NOTE — PROGRESS NOTES
Care Coordinator  D/I: I have called Ethan Phoenix in [facility] at [phone] to inquire if Ivig is covered as Outpatient and left a voicemessage. I sent another request to: [email or web address] also.  P: Wait for benefit check and inform team.

## 2020-07-09 NOTE — PROGRESS NOTES
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  Other Banff scoring include V1, T1, I1.  The biopsy also showed interstitial hemorrhage and tubular injury with vacuolization.  The ATN component may be a phenotype of a BMR.     # Immunosuppression: Tacrolimus immediate release (goal 8-10) and Myfortic 720 twice a day.              - Changes: Rejection treatment will include Thymoglobulin 2 mg/kg per dose x3, plasmapheresis with IVIG and Solu-Medrol 500 mg daily x3.  Following the intense course, prednisone taper will be recommended and prednisone maintenance 5 mg daily.  Agree with rituximab single dose.     # Infection Prophylaxis: (please renally dose)   - PJP: Sulfa/TMP (Bactrim)   - CMV: Valcyte      # Hypertension: Controlled;   Goal BP: < 130/80              - Volume status: hypervolemic            - will dialyze today.       # Anemia in Chronic Renal Disease: Hgb: Improved with transfusion     ENZO: will start 2000 units three times per week     # Mineral Bone Disorder:   - Secondary renal hyperparathyroidism; PTH level: Moderately elevated (301-600 pg/ml)        On treatment: None  - Vitamin D; level: Normal        On supplement: No  - Calcium; level: Low normal        On supplement: No  - Phosphorus; level: Normal        On supplement: No     # Electrolytes:   -resolved with dialysis       # Transplant History:  Etiology of Kidney Failure: IgA nephropathy  Tx: LDKT and DDKT  Transplant: 2020 (Kidney), 2007 (Kidney)  Donor Type: Donation after Brain Death        Donor Class:   Crossmatch at time of Tx: pending   Significant changes in immunosuppression: None  Significant transplant-related complications: None    Mahesh Armando MD   Pager: [phone]    Interval History   Feels somewhat swollen and bloated. No fevers or chills. Appetite is poor. UOP is very minimal but feels to be increasing.     Review of Systems   4 point ROS was obtained and negative except as noted in the Interval History.    MEDICATIONS:    acetaminophen  650 mg Oral Once     albumin human  2,750 mL Apheresis During apheresis procedure     anticoagulant citrate   Apheresis During Apheresis (from stock)     atorvastatin  10 mg Oral Daily     calcium acetate  1,334 mg Oral TID w/meals     calcium gluconate   Intravenous During Apheresis (from stock)     carvedilol  6.25 mg Oral BID     diphenhydrAMINE  25-50 mg Oral Once    Or     diphenhydrAMINE  25-50 mg Per NG tube Once     diphenhydrAMINE  50 mg Oral Once     furosemide  60 mg Intravenous BID     immune globulin - sucrose free  0.5 g/kg (Ideal) Intravenous Once     insulin aspart  1-7 Units Subcutaneous TID AC     insulin aspart  1-5 Units Subcutaneous At Bedtime     lidocaine  1 patch Transdermal Q24H     lidocaine   Transdermal Q8H     methylPREDNISolone  100 mg Intravenous Once     mycophenolate mofetil  1,000 mg Intravenous Q12H     ondansetron  4 mg Oral TID AC     pantoprazole  40 mg Oral QAM AC     sodium bicarbonate  1,300 mg Oral BID     sodium chloride (PF)  3 mL Intracatheter Q8H     sulfamethoxazole-trimethoprim  1 tablet Oral Once per day on      tacrolimus  6 mg Oral BID     valGANciclovir  450 mg Oral Once per day on u         Physical Exam   Temp  Av.8  F (37.1  C)  Min: 97.6  F (36.4  C)  Max: 103.7  F (39.8  C)      Pulse   "Av.5  Min: 74  Max: 122 Resp  Av.1  Min: 13  Max: 28  SpO2  Av.1 %  Min: 93 %  Max: 100 %     BP (!) 134/94 (BP Location: Right arm)   Pulse 62   Temp 97.9  F (36.6  C) (Oral)   Resp 18   Ht 1.676 m (5' 6\")   Wt 68.8 kg (151 lb 9.6 oz)   SpO2 100%   BMI 24.47 kg/m     Date 20 07 - 20 0659   Shift 5872-3914 4014-9082 1166-3535 24 Hour Total   INTAKE   P.O. 600 200  800   I.V.  810  810   Shift Total(mL/kg) 600(10.49) 1010(17.66)  1610(28.15)   OUTPUT   Urine 50   50   Shift Total(mL/kg) 50(0.87)   50(0.87)   Weight (kg) 57.2 57.2 57.2 57.2      Admit Weight: 57.2 kg (126 lb 1.7 oz)     GENERAL APPEARANCE: alert and no distress  HENT: mouth without ulcers or lesions  LYMPHATICS: no cervical or supraclavicular nodes  RESP: lungs clear to auscultation - no rales, rhonchi or wheezes  CV: regular rhythm, normal rate, no rub, no murmur  EDEMA: no LE edema bilaterally  ABDOMEN: soft, nondistended, nontender, bowel sounds normal  MS: extremities normal - no gross deformities noted, no evidence of inflammation in joints, no muscle tenderness  SKIN: no rash    Data   All labs reviewed by me.  CMP  Recent Labs   Lab 20  0615 20  0628 20  0623 20  2113 20  0640  20  1334    133 134 133 135   < > 135   POTASSIUM 3.4 3.9 4.2 4.0 4.3   < > 4.1   CHLORIDE 98 98 106 104 107   < > 107   CO2 26 28 13* 15* 11*   < > 22   ANIONGAP 10 7 15* 15* 17*   < > 6   * 298* 230* 241* 221*   < > 96   BUN 42* 29 62* 57* 48*   < > 25   CR 5.61* 4.53* 8.94* 8.68* 7.92*   < > 4.12*   GFRESTIMATED 9* 12* 5* 5* 6*   < > 13*   GFRESTBLACK 11* 14* 6* 6* 7*   < > 15*   CASIE 6.1* 7.1* 6.4* 6.6* 7.2*   < > 7.9*   MAG 1.7 1.8 2.2  --  2.3   < > 1.4*   PHOS 4.3 4.6* 6.9*  --  7.0*   < > 4.6*   PROTTOTAL  --   --   --   --   --   --  6.5*   ALBUMIN  --   --   --   --   --   --  2.9*   BILITOTAL  --   --   --   --   --   --  0.7   ALKPHOS  --   --   --   --   --   --  123   AST  " --   --   --   --   --   --  34   ALT  --   --   --   --   --   --  31    < > = values in this interval not displayed.     CBC  Recent Labs   Lab 07/09/20  0615 07/08/20  0628 07/07/20  0623 07/06/20  0640   HGB 6.5* 7.3* 7.6* 8.5*   WBC 1.8* 2.5* 3.3* 4.9   RBC 2.18* 2.43* 2.56* 2.86*   HCT 20.0* 21.5* 21.8* 26.3*   MCV 92 89 85 92   MCH 29.8 30.0 29.7 29.7   MCHC 32.5 34.0 34.9 32.3   RDW 14.3 14.5 14.6 14.4   * 165 148* 168     INR  Recent Labs   Lab 07/04/20  1953 07/04/20  1334   INR  --  1.40*   PTT 35  --      ABGNo lab results found in last 7 days.   Urine Studies  Recent Labs   Lab Test 07/06/20  1412 07/04/20  1346 07/01/20  1430 06/23/20  0830   COLOR Yellow Yellow Patricia Yellow   APPEARANCE Slightly Cloudy Slightly Cloudy Slightly Cloudy Clear   URINEGLC 150* 70* Negative Negative   URINEBILI Negative Small* Negative Negative   URINEKETONE 10* Negative Negative Negative   SG 1.022 1.027 1.025 1.012   UBLD Small* Small* Moderate* Moderate*   URINEPH 8.0* 8.0* 6.0 6.5   PROTEIN >600* >600* 100* 10*   NITRITE Negative Negative Negative Negative   LEUKEST Negative Negative Negative Small*   RBCU 78* 21* 96* 144*   WBCU 21* 2 9* 4     Recent Labs   Lab Test 07/16/12  1400 07/02/12  1130 06/25/12  0615 06/17/12  0930 06/13/12  1120 06/07/12  0643 06/01/12  0950 05/25/12  1830 05/25/12  1110 05/22/12  1536   UTPG 1.53* 0.86* 0.69* 0.82* 0.80* 0.88* 0.58* 0.47*  0.45* 0.56* 0.51*     PTH  Recent Labs   Lab Test 06/26/20  0842 11/30/12  2030 11/30/12  1030   PTHI 226* 500* 794*     Iron Studies  Recent Labs   Lab Test 07/05/20  0553 06/25/20  0749 11/30/12  1950   IRON 10* 57 125   * 163* 155*   IRONSAT 9* 35 81*   SHAHBAZ  --  886* 401*       IMAGING:  All imaging studies reviewed by me.

## 2020-07-09 NOTE — PROGRESS NOTES
CLINICAL NUTRITION SERVICES - ASSESSMENT NOTE     Nutrition Prescription    RECOMMENDATIONS FOR MDs/PROVIDERS TO ORDER:  Liberalize diet to Regular if possible.      Would recommend tube feeding over parenteral nutrition to maintain gut integrity and mucosal immunity. See TF recommendations below.     Malnutrition Status:    Severe malnutrition in the context of acute illness    Recommendations already ordered by Registered Dietitian (RD):  TPN recommendations: 960 ml (40 ml/hr) - Dex 125 grams (GIR 1.5 mg/kg/min), AA 90 grams + 250 ml 20% IV lipids 5x/week.  Pending electrolytes/BG, recommend dextrose advancement by ~30-45 grams/day to goal of 215 grams/day.  Goal PN regimen will provide 1448 kcal (25 kcal/kg), 1.6g/kg protein, 25% kcal from fat.     Trial of Boost Breeze and Boost Glucose Control (both low phosphorus)    Future/Additional Recommendations:  Tube Feeding Recommendations:  Nepro @ goal 40 ml/hr (960 ml/day) to provide 1728 kcals (30 kcal/kg/day), 78 g PRO (1.3 g/kg/day), 701 ml free H2O, 155 g CHO and 12 g Fiber daily.    Encourage small/frequent, bland/low fiber meals to minimize bloating and nausea.      REASON FOR ASSESSMENT  Jelly Dietz is a/an 35 year old female assessed by the dietitian for Provider Order - concern for malnutrition, poor recent intake    NUTRITION HISTORY  Attempted to phone patient to discuss PO intake this morning, but was unable to reach.  Per chart review, poor appetite was noted following kidney transplant~3 weeks ago.  She started vomiting, spiking fevers on 7/3 or 7/4 per H+P.  Likely has had poor PO intake at least since that time, but likely oral intake has been reduced from usual intake since kidney transplant surgery.     CURRENT NUTRITION ORDERS  Diet: Low Phosphorus    Intake/Tolerance: 50% of meals recorded by nursing.  Yesterday, 3 meals were ordered but nothing was recorded on calorie counts.  Poor appetite, bloating noted.  Having 1 BM daily.     LABS  K+  "3.4 (normal)    Lab Results   Component Value Date/Time    PHOS 4.3 07/09/2020 06:15 AM    PHOS 4.6 (H) 07/08/2020 06:28 AM    PHOS 6.9 (H) 07/07/2020 06:23 AM    PHOS 7.0 (H) 07/06/2020 06:40 AM     MEDICATIONS  Lasix 60 mg BID  Tacrolimus 6 mg BID  PhosLo TID with meals  Med sliding scale insulin with meals  Zofran 4 mg TID with meals  Cellcept 1000 mg BID  Steroid taper noted    ANTHROPOMETRICS  Height: 167.6 cm (5' 6\")  Most Recent Weight: 68.8 kg (151 lb 9.6 oz)    IBW: 59.1 kg  BMI: Normal BMI  Weight History: Admission weight was 57.2 kg (7/5) - 126#. Weight at the time of transplant was 62.1 kg. Suggests weight loss of 4.9 kg (11#/8%) within 3 weeks.    Dosing Weight: 57 kg (actual wt)    ASSESSED NUTRITION NEEDS  Estimated Energy Needs: 5182-7264 kcals/day (30-35 kcals/kg)  Justification: Recent kidney transplant/rejection treatment  Estimated Protein Needs:  grams protein/day (1.3 - 2 grams of pro/kg)  Justification: Increased needs s/p kidney transplant, rejection treatment  Estimated Fluid Needs: 1 ml/kcal or per MD  Justification: Maintenance    PHYSICAL FINDINGS  See malnutrition section below.    MALNUTRITION  % Intake: </= 50% for >/= 5 days (severe)  % Weight Loss: > 2% in 1 week (severe)  Subcutaneous Fat Loss: Unable to assess  Muscle Loss: Unable to assess  Fluid Accumulation/Edema: Unable to assess  Malnutrition Diagnosis: Severe malnutrition in the context of acute illness    NUTRITION DIAGNOSIS  Inadequate oral intake related to nausea, bloating, poor appetite as evidenced by patient with minimal PO intake per calorie counts, wt loss of 8% in 3 weeks.     INTERVENTIONS  Implementation  Nutrition Education: Unable to complete due to unable to connect with patient via room phone.  Will re-attempt as able.     Collaboration with other providers - paged ordering provider re: recommendations for TF over TPN as patient's preferred nutrition support method.  Encouraged NJT placement if " possible.     Goals  Total avg nutritional intake to meet a minimum of 30 kcal/kg and 1.3 g PRO/kg daily (per dosing wt 57 kg).     Monitoring/Evaluation  Progress toward goals will be monitored and evaluated per protocol.    Chelsea Flores MS, RD, LD  Pager [phone]

## 2020-07-09 NOTE — PROGRESS NOTES
"Post Transplant Patient Social Work Assessment     Patient Name: Jelly Dietz  : 1985  Age: 35 year old  MRN: [id number]  Date of transplant: 2020    Patient known to me from follow up in the transplant program.  Admitted on 2020 for fever and ARF of transplanted kidney. Reviewed chart and spoke with treatment team today to update assessment.  SW will meet with patient week of . Patient has been out of room or not been feeling well enough to complete in person.    Presenting Information   Living Situation: Patient resides in an apartment in [place] with her two children Isabella and Laura.  Functional Status: Per notes, patient up and independent in her room  Cultural/Language/Spiritual Considerations: Patient is a [place] female, speaks English    Support System  Primary Support Person Mother and boyfriend per previous assessment  Other support:  Father, sister, aunt and friend in [place], two sisters and three brothers in [place]  Plan for support in immediate post-hospital period: Mother and boyfriend per previous assessment    Health Care Directive  Decision Maker: Patient  Alternate Decision Maker: NOK are his parents  Health Care Directive: Did not discuss today    Mental Health/Coping:   History of Mental Health: Per treatment team, patient having anxiety. Is currently prescribed atarax but asked team for IV dilauded  History of Chemical Health: None indicated per chart review  Current status: Unable to assess today  Coping: Per treatment team, patient very anxious and wants to be \"knocked out\"  Services Needed/Recommended: Psychiatry Consult while inpatient to address anxiety symptoms. Treatment team will determine if that is appropriate    Financial   Income: SSDI, works part time as PCA  Impact of transplant on income: Unable to work for 6-8 weeks post-transplant  Insurance and medication coverage: Patient has Medicare and are [place]  Financial concerns: Not assessed  Resources " needed: Continue to assess    Discharge Plan   Patient and family discharge goal: Home with assist   Barriers to discharge: Medical stability, coverage for IVIG    Education provided by SW: None    Assessment and recommendations and plan:   Patient is a 35-year-old, female, who was admitted to [facility] on [facility]/20 for fever and ARF of her transplanted kidney. Reviewed chart and spoke with treatment team today to update assessment.  SW will meet with patient week of 7/13. Patient has been out of room or has not been feeling well enough to complete in person. Patient has been experiencing anxiety symptoms during her hospital stay. Team may consult psychiatry if needed. SW to check in with patient the week of 7/13 to check and provide support as indicated.    MARYJANE Sanderson, MIGUESW  7A   Ph: [phone]  Pager: [phone]

## 2020-07-09 NOTE — PROCEDURES
[facility], [place]    Double Lumen Midline Placement    Date/Time: 7/9/2020 4:13 PM  Performed by: Ambar Carty RN  Authorized by: Stephanie Ling APRN CNP   Indications: vascular access    UNIVERSAL PROTOCOL   Site Marked: Yes  Prior Images Obtained and Reviewed:  Yes  Required items: Required blood products, implants, devices and special equipment available    Patient identity confirmed:  Verbally with patient  Patient was reevaluated immediately before administering moderate or deep sedation or anesthesia  Confirmation Checklist:  Patient's identity using two indicators, relevant allergies, procedure was appropriate and matched the consent or emergent situation and correct equipment/implants were available  Time out: Immediately prior to the procedure a time out was called    Universal Protocol: the Joint Commission Universal Protocol was followed    Preparation: Patient was prepped and draped in usual sterile fashion           ANESTHESIA    Anesthesia: See MAR for details  Local Anesthetic:  Lidocaine 1% without epinephrine  Anesthetic Total (mL):  1      SEDATION    Patient Sedated: No        Preparation: skin prepped with ChloraPrep  Skin prep agent: skin prep agent completely dried prior to procedure  Sterile barriers: maximum sterile barriers were used: cap, mask, sterile gown, sterile gloves, and large sterile sheet  Hand hygiene: hand hygiene performed prior to central venous catheter insertion  Type of line used: Midline  Catheter type: double lumen  Lumen type: valved  Catheter size: 5 Fr  : Bioflo   Lot number: [id number]  Placement method: venipuncture, MST, ultrasound and tip confirmation system  Number of attempts: 1  Successful placement: no  Comment: will maintain as midline  Orientation: right  Location: brachial vein (lateral) (Vein diameter 0.64)  Arm circumference: adults 10 cm  Extremity circumference: 30  Visible catheter length: 0  Internal length: 25  cm  Total catheter length: 25  Dressing and securement: blood removed, chlorhexidine disc applied, dressing applied, line secured, occlusive dressing applied, securement device, site cleaned, statlock and sterile dressing applied  Post procedure assessment: blood return through all ports  PROCEDURE   Patient Tolerance:  Patient tolerated the procedure well with no immediate complications  Describe Procedure: Unable to place picc line.  Access obtained with Ultrasound, Introducer placed, wire threaded with out difficulty.  On attempt to place picc line, catheter would not thread past the subclavian.  Catheter trimmed to 25 cm for MIDLINE placement. Positive blood return, flushes easily. Label placed on MIDLINE. Bedside nurse updated.

## 2020-07-09 NOTE — PROGRESS NOTES
Calorie Count  Intake recorded for: 7/8  Total Kcals: 0 Total Protein: 0g  Kcals from Hospital Food: 0   Protein: 0g  Kcals from Outside Food (average):0 Protein: 0g  # Meals Recorded: 3 meals ordered from kitchen, no intake recorded.   # Supplements Recorded: no intake recorded.

## 2020-07-09 NOTE — PROGRESS NOTES
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 Baseline Cr 0.6 post-transplant. She presented on 7/4/2020 with fever and ARF of transplanted kidney.    Graft function: POD #20. Ureteral stent removed 7/8.  Mixed rejection with ARF: Cr 4.5->5.6, oliguria. Dialyzed on 7/7. 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 DSA negative. AT1R pending. Treatment as below.   Perinephric fluid collections: Noted on US. 7/5 IR: 50ml cloudy serosang fluid aspirated, gram stain negative, culture NGTD. Not suggestive of urine leak.  Immunosuppression management:    Pheresis/IVIG: Plan for pheresis/IVIG every other day x5 starting 7/7. IVIG 0.5g/kg doses #1-4, 1g/kg dose #5.  Thymo: 125mg on 7/6, 125mg 7/8. Plan 1mg/kg 7/10, 1mg/kg after final pheresis.  Rituximab: 500mg on 7/7.  Methylpred: 500/500/250/100/100  Tacro: Goal 8-10  MMF: 1000 IV BID d/t nausea. Was on Myfortic on admission.  Complexity of management: Medium. Contributing factors: rejection  Hematology:   ANTHONY/ACD: Hgb 6.5. Received 1u RBC on 7/5, transfuse again today. No sign of bleeding.  PSAT 9, consider venofer if no infection. Hapto high, retic low. Discussed Epo with Nephrology, but they feel that it will not be effective in setting of inflammation.  Leukopenia: WBC 1.8. Secondary to thymo.  Cardiorespiratory:   HTN: Coreg reduced this admission. BP controlled.  GI/Nutrition:   Diet: Regular diet with poor intake. Nutrition consult and ej counts.  Nausea/Vomiting: Scheduled zofran and PPI. PRN compazine. Vomiting resolved.  Diarrhea: Since transplant. 7/6 C-diff and enteric panel negative.  Endocrine: No acute issues.   Fluid/Electrolytes:   Metabolic acidosis: Resolved with dialysis.  Hypocalcemia:  "Secondary to ARF/high phos. Replace PRN.  Hyperphosphatemia: Continue Phoslo. Low phos diet.  Hypervolemia: Stop IVF. May need dialysis today.  : Incontinent at times.  Neuro/Psych:   Acute pain: Pain over kidney and low back. Continue acetaminophen, lidoderm, and low dose oral dilaudid for break-thru. Plan to taper off dilaudid. Stop Robaxin due to restlessness after dose.  Anxiety and paranoia: Likely exacerbated by steroids and poor sleep. Concern that opioids are being used to manage these symptoms. Will try atarax today.  Infectious disease: Afebrile, no leukocytosis  Fever: Fever 103.7F on admission. Infection vs rejection. Cipro started outpatient, broadened to Cefepine on 7/4-7/8. COVID neg, 7/4 UC neg, 7/4 blood cx NGTD, 7/5 perinephric fluid cultures NGTD (gram stain neg), enteric panel neg, C-diff neg.  HBcAb positive: sAg nonreactive, sAb >1000. This may be positive due to IVIG infusions.  Prophylaxis: Bactrim (PCP ppx) three times per week  Valcyte (CMV PPx)   Disposition: 7A    Medical Decision Making: Medium  Subsequent visit 09468 (moderate level decision making)    CONNIE/Fellow/Resident Provider: Stephanie Ling NP [id number]    Faculty: Nory Morgan M.D.  _________________________________________________________________    Interval History: History is obtained from the patient  Nausea, but no longer vomiting. Loose BM this morning. Some pain over kidney, less tender on exam. Reports LBP. No sleep overnight. Feels very anxious and paranoid. Asking to be \"knocked out\".     ROS:   A 10-point review of systems was negative except as noted above.    Meds:    acetaminophen  650 mg Oral Once     albumin human  2,750 mL Apheresis During apheresis procedure     anticoagulant citrate   Apheresis During Apheresis (from stock)     atorvastatin  10 mg Oral Daily     calcium acetate  1,334 mg Oral TID w/meals     calcium gluconate  1 g Intravenous Once     calcium gluconate   Intravenous During Apheresis (from stock) " "    carvedilol  6.25 mg Oral BID     diphenhydrAMINE  25-50 mg Oral Once    Or     diphenhydrAMINE  25-50 mg Per NG tube Once     diphenhydrAMINE  50 mg Oral Once     immune globulin - sucrose free  0.5 g/kg (Ideal) Intravenous Once     insulin aspart  1-7 Units Subcutaneous TID AC     insulin aspart  1-5 Units Subcutaneous At Bedtime     lidocaine  1 patch Transdermal Q24H     lidocaine   Transdermal Q8H     methylPREDNISolone  100 mg Intravenous Once     mycophenolate mofetil  1,000 mg Intravenous Q12H     ondansetron  4 mg Oral TID AC     pantoprazole  40 mg Oral QAM AC     sodium bicarbonate  1,300 mg Oral BID     sodium chloride (PF)  3 mL Intracatheter Q8H     sulfamethoxazole-trimethoprim  1 tablet Oral Once per day on Mon Wed Fri     tacrolimus  6 mg Oral BID     valGANciclovir  450 mg Oral Once per day on Mon Thu       Physical Exam:     Admit Weight: 57.2 kg (126 lb 1.7 oz)    Current vitals:   BP (!) 132/95   Pulse 79   Temp 98.1  F (36.7  C) (Oral)   Resp 18   Ht 1.676 m (5' 6\")   Wt 68.8 kg (151 lb 9.6 oz)   SpO2 99%   BMI 24.47 kg/m      Vital sign ranges:    Temp:  [97.6  F (36.4  C)-98.5  F (36.9  C)] 98.1  F (36.7  C)  Pulse:  [75-91] 79  Heart Rate:  [64-83] 70  Resp:  [14-21] 18  BP: (100-134)/(63-95) 132/95  SpO2:  [97 %-100 %] 99 %  Patient Vitals for the past 24 hrs:   BP Temp Temp src Pulse Heart Rate Resp SpO2 Weight   07/09/20 0808 (!) 132/95 98.1  F (36.7  C) Oral 79 70 18 99 % --   07/09/20 0642 -- -- -- -- -- -- -- 68.8 kg (151 lb 9.6 oz)   07/09/20 0529 (!) 129/90 98.2  F (36.8  C) Oral -- 67 15 99 % --   07/08/20 2340 121/76 98.5  F (36.9  C) Oral -- 74 14 99 % --   07/08/20 1935 115/78 97.6  F (36.4  C) Oral -- 75 14 100 % --   07/08/20 1547 106/73 98.2  F (36.8  C) Oral -- 72 16 99 % --   07/08/20 1316 100/63 98.1  F (36.7  C) Oral -- 75 -- 99 % --   07/08/20 1205 112/74 98.2  F (36.8  C) Oral -- 83 18 98 % --   07/08/20 1200 111/69 -- -- 91 -- -- -- --   07/08/20 1130 116/74 " -- -- 76 -- -- -- --   07/08/20 1100 (!) 122/90 -- -- 75 -- -- -- --   07/08/20 1030 115/82 -- -- -- 73 -- -- --   07/08/20 0945 127/69 -- -- -- 69 21 98 % --   07/08/20 0930 (!) 131/92 -- -- -- 76 -- -- --   07/08/20 0915 134/88 -- -- -- 73 -- -- --   07/08/20 0905 106/80 -- -- -- 64 -- 98 % --   07/08/20 0850 111/80 97.7  F (36.5  C) Oral -- 67 21 97 % --     General Appearance: Upset this morning  Skin: warm, dry, no rashes  Heart: Perfused  Lungs: RA, unlabored  Abdomen: Soft, less tender over kidney  : polanco is not present.   Extremities: edema: generalized  Neurologic: awake, alert and oriented x4. Tremor absent.    Data:   CMP  Recent Labs   Lab 07/09/20  0615 07/08/20  0628  07/05/20  1330  07/04/20  1334    133   < >  --    < > 135   POTASSIUM 3.4 3.9   < >  --    < > 4.1   CHLORIDE 98 98   < >  --    < > 107   CO2 26 28   < >  --    < > 22   * 298*   < >  --    < > 96   BUN 42* 29   < >  --    < > 25   CR 5.61* 4.53*   < >  --    < > 4.12*   GFRESTIMATED 9* 12*   < >  --    < > 13*   GFRESTBLACK 11* 14*   < >  --    < > 15*   CASIE 6.1* 7.1*   < >  --    < > 7.9*   MAG 1.7 1.8   < >  --    < > 1.4*   PHOS 4.3 4.6*   < >  --    < > 4.6*   ALBUMIN  --   --   --   --   --  2.9*   BILITOTAL  --   --   --   --   --  0.7   ALKPHOS  --   --   --   --   --  123   AST  --   --   --   --   --  34   ALT  --   --   --   --   --  31   FCREAT  --   --   --  4.7  --   --     < > = values in this interval not displayed.     CBC  Recent Labs   Lab 07/09/20  0615 07/08/20  0628   HGB 6.5* 7.3*   WBC 1.8* 2.5*   * 165

## 2020-07-09 NOTE — PROGRESS NOTES
Care Coordinator  D/I: I have called [facility]: [phone] Nany--They did NOT receive my fax on 7/8 @ 11:59am from 7A fax machine.  P: I have re-fax'd at [phone] from unit 7B fax machine.

## 2020-07-09 NOTE — PLAN OF CARE
VS: stable room air.   BG: 10 pm- 188, no insulin needed.   Pain: dilaudid 2 mg x 2. C/o jittery. Reported to lisandra. Atarax given.   Neuro: oriented x 4, slept good.   Nausea: denies.  Diet: low phosphorus, calorie count.  Lines: AVF + thrill, + bruit. PIV R x 2 infusing thymo @ 28.8 ml/hr. Started Cellcept IV @ 0630. Stopped D5NS MIVF (only 2 PIV).   : oliguric, on HD.   GI: no BM, abdomen rounded. Discomfort.  Skin: edema + 1 generalized.  Mobility: up with easy SBA.    Plan: IVIG & pheresis today (second round), 3 more to go, every other day.    Hgb of 6.5. Lisandra reported. Blood released in active orders . Blood consent in chart.

## 2020-07-09 NOTE — PROCEDURES
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 She has a history of IgA nephropathy, prior transplant in 12/2007 and recent transplant on 6/19/2020.  She notes she had been on dialysis prior to recent transplant, has a fistula in place.  She had a recent increase in her creatinine, biopsy on 7/5/2020 reportedly demonstrates mixed cellular and humoral rejection. Renal team requested a series of TPE to treat the AMR.     - The plan is to exchange every other day for a total of  5 procedures.  The patient has a dialysis fistula in place that will be used for the procedure    - Today is procedure #2 of the planned 5 procedures.  She tolerated the procedure well without complication. We used albumin as the replacement.  Her hemoglobin was low and received a RBC transfusion on the morning of 7/9.     - ACD-A will be used for anticoagulation of the apheresis equipment with calcium gluconate given throughout to offset the effects.    - If IVIG or other antibody-based treatments are given, this should be given following TPE or on alternate days, as TPE can remove these medications.    - Please do not start or continue ace-inhibitors throughout the duration of a therapeutic plasma exchange series. Please notify the apheresis physician of any upcoming procedures, surgeries, or biopsies as therapeutic plasma exchange will affect coagulation factors.               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  Her past medical history includes IgA nephropathy, prior renal transplant in 2007, most recent transplant on 6/19/2020.             Past Medical  History:     Past Medical History:   Diagnosis Date     ACS (acute coronary syndrome) (H) 2014     Acute rejection of kidney transplant 2020    Mixed AMR & ACR [place] 2A     Allergic state     seasonal allergies     Anemia in chronic renal disease      Anxiety      Cervical cerclage suture present in second trimester      Chest pain 2014     Chronic renal transplant rejection      End stage kidney disease (H)      Heart murmur      History of  delivery ,     30 wks, 28 wks      Hypertension     resolved after transplant     IgA nephropathy      Kidney transplanted 2020    DDKT. Induction w/ thymo 5.7mg/kg.     MRSA (methicillin resistant Staphylococcus aureus)      [facility]     Pre-eclampsia      Renal failure affecting pregnancy in second trimester      S/P kidney transplant 2007    LDKT in 2007 in [place]. History of 1B kidney rejection. Failed .     SAB (spontaneous )     2nd trimester              Past Surgical History:     Past Surgical History:   Procedure Laterality Date     CERCLAGE CERVICAL N/A 2015    Procedure: CERCLAGE CERVICAL;  Surgeon: Norbert Chopra MD;  Location: [facility]      SECTION  2012    Procedure:  SECTION;;  Surgeon: Chelsea Cross MD;  Location: [facility]      SECTION N/A 2015    Procedure:  SECTION;  Surgeon: Chelsea Cross MD;  Location: [facility] OR     cesearean section       EXPLANT TRANSPLANTED KIDNEY  3/29/2013    Procedure: EXPLANT TRANSPLANTED KIDNEY;  Explant Transplanted right Kidney (from patients right iliac fossa);  Surgeon: Nory Morgan MD;  Location: [facility] OR     IR FINE NEEDLE ASPIRATION W ULTRASOUND  2020     IR RENAL BIOPSY LEFT  2020     MIDLINE DOUBLE LUMEN PLACEMENT Right 2020    unable to place PICC line, MIDLINE placed     NEPHRECTOMY  3/29/13    Transplant  nephrectomy      WILIAN/DIALYSIS CATHETER       TRANSPLANT KIDNEY RECIPIENT  DONOR N/A 2020    Procedure: TRANSPLANT, KIDNEY, RECIPIENT,  DONOR, venous reconstruction, and back bench preparation of kidney;  Surgeon: Irma Shannon MD;  Location: [facility] OR     TRANSPLANT KIDNEY RECIPIENT LIVING UNRELATED  Dec 2007    (Adult male in [place])                Allergies:   No Known Allergies          Medications:     Current Facility-Administered Medications   Medication     0.9% sodium chloride BOLUS     0.9% sodium chloride BOLUS     0.9% sodium chloride BOLUS     0.9% sodium chloride BOLUS     acetaminophen (TYLENOL) tablet 650 mg     acetaminophen (TYLENOL) tablet 650 mg     acetaminophen (TYLENOL) tablet 650 mg     [Rx hold ] aspirin (ASA) chewable tablet 81 mg     atorvastatin (LIPITOR) tablet 10 mg     calcium acetate (PHOSLO) capsule 1,334 mg     carvedilol (COREG) tablet 6.25 mg     dextrose 10% infusion     glucose gel 15-30 g    Or     dextrose 50 % injection 25-50 mL    Or     glucagon injection 1 mg     diphenhydrAMINE (BENADRYL) capsule 25-50 mg    Or     diphenhydrAMINE (BENADRYL) solution 25-50 mg     diphenhydrAMINE (BENADRYL) capsule 50 mg     diphenhydrAMINE (BENADRYL) capsule 50 mg    Or     diphenhydrAMINE (BENADRYL) injection 50 mg     diphenhydrAMINE (BENADRYL) injection 50 mg     EPINEPHrine (ADRENALIN) kit 0.3 mg     [START ON 7/10/2020] epoetin staci-epbx (RETACRIT) injection 2,000 Units     famotidine (PEPCID) injection 20 mg     furosemide (LASIX) injection 60 mg     gelatin absorbable (GELFOAM) sponge 1 each     heparin lock flush 10 UNIT/ML injection 2-5 mL     HYDROmorphone (DILAUDID) half-tab 1-2 mg     hydrOXYzine (ATARAX) tablet 25 mg     immune globulin - sucrose free 10 % injection 30 g     insulin aspart (NovoLOG) injection (RAPID ACTING)     insulin aspart (NovoLOG) injection (RAPID ACTING)     Lidocaine (LIDOCARE) 4 % Patch 1 patch     lidocaine (LMX4) cream  "    lidocaine (LMX4) cream     lidocaine (XYLOCAINE) 2 % 15 mL, alum & mag hydroxide-simethicone (MAALOX  ES) 15 mL GI Cocktail     lidocaine 1 % 0.1-5 mL     lidocaine patch in PLACE     lipids (INTRALIPID) 20 % infusion 250 mL     methylPREDNISolone sodium succinate (solu-MEDROL) injection 100 mg     methylPREDNISolone sodium succinate (solu-MEDROL) injection 125 mg     mycophenolate mofetil (CELLCEPT) 1,000 mg in D5W intermittent infusion     naloxone (NARCAN) injection 0.1-0.4 mg     No heparin via hemodialysis machine     OLANZapine (zyPREXA) tablet 2.5 mg     ondansetron (ZOFRAN) injection 4 mg     ondansetron (ZOFRAN-ODT) ODT tab 4 mg     pantoprazole (PROTONIX) EC tablet 40 mg     parenteral nutrition - ADULT compounded formula     [START ON 7/10/2020] predniSONE (DELTASONE) tablet 40 mg    Followed by     [START ON 7/12/2020] predniSONE (DELTASONE) tablet 40 mg    Followed by     [START ON 7/14/2020] predniSONE (DELTASONE) tablet 40 mg    Followed by     [START ON 7/16/2020] predniSONE (DELTASONE) tablet 40 mg    Followed by     [START ON 7/17/2020] predniSONE (DELTASONE) tablet 20 mg     [START ON 7/19/2020] predniSONE (DELTASONE) tablet 5 mg     prochlorperazine (COMPAZINE) injection 10 mg    Or     prochlorperazine (COMPAZINE) tablet 10 mg     sodium bicarbonate tablet 1,300 mg     sodium chloride (PF) 0.9% PF flush 3 mL     sodium chloride (PF) 0.9% PF flush 3 mL     sodium chloride (PF) 0.9% PF flush 5-50 mL     sulfamethoxazole-trimethoprim (BACTRIM) 400-80 MG per tablet 1 tablet     tacrolimus (GENERIC EQUIVALENT) capsule 6 mg     valGANciclovir (VALCYTE) tablet 450 mg            Vital Signs:   BP (!) 143/84 (BP Location: Right leg)   Pulse 65   Temp 98.2  F (36.8  C) (Oral)   Resp 16   Ht 1.63 m (5' 4.17\")   Wt 68.8 kg (151 lb 9.6 oz)   SpO2 100%   BMI 25.88 kg/m                Data:       BMP  Recent Labs   Lab 07/09/20  0615 07/08/20  0628 07/07/20  0623 07/06/20  2113    133 134 133 "   POTASSIUM 3.4 3.9 4.2 4.0   CHLORIDE 98 98 106 104   CASIE 6.1* 7.1* 6.4* 6.6*   CO2 26 28 13* 15*   BUN 42* 29 62* 57*   CR 5.61* 4.53* 8.94* 8.68*   * 298* 230* 241*     CBC  Recent Labs   Lab 07/09/20  1310 07/09/20  0615 07/08/20  0628 07/07/20  0623   WBC 2.2* 1.8* 2.5* 3.3*   RBC 2.52* 2.18* 2.43* 2.56*   HGB 7.6* 6.5* 7.3* 7.6*   HCT 22.8* 20.0* 21.5* 21.8*   MCV 91 92 89 85   MCH 30.2 29.8 30.0 29.7   MCHC 33.3 32.5 34.0 34.9   RDW 14.2 14.3 14.5 14.6    137* 165 148*     INR  Recent Labs   Lab 07/09/20  1310 07/04/20  1334   INR 1.49* 1.40*     Fibrinogen   Date Value Ref Range Status   07/09/2020 246 200 - 420 mg/dL Final               Procedure Summary:   A single volume therapeutic plasma exchange was performed and 5% albumin was used as the replacement fluid.  The patient's fistula was used for access and allowed for appropriate flow during the procedure.  ACD-A was used for anticoagulation. To offset the effects of the citrate, calcium gluconate was given in the return line.  The patient's vital signs were stable throughout.  The patient tolerated the procedure well without complication.  See apheresis flowsheet for additional details.      Attestation: I, Aleksander Thomas MD, was immediately available onsite throughout the duration of the apheresis procedure, and I was in direct communication with the apheresis staff regarding the patient's procedure and care plan.  Due to COVID concerns and efforts to conserve PPE, I did not enter the patient's room.      Aleksander Thomas MD  Transfusion Medicine Attending  Laboratory Medicine & Pathology  Pager: [phone]

## 2020-07-09 NOTE — PLAN OF CARE
"/86   Pulse 60   Temp 98.4  F (36.9  C) (Oral)   Resp 16   Ht 1.63 m (5' 4.17\")   Wt 68.8 kg (151 lb 9.6 oz)   SpO2 99%   BMI 25.88 kg/m    Patient A&O. Up to the bathroom independently, reported her last bowel movement was this morning, urinating small amounts. Poor po intake, patient refused to eat breakfast or lunch. Phos diet and calorie count. Patient's hgb was 6.5 this morning, one unit of blood was transfused. Both PIVs infiltrated after the infusion, vascular access inserted a new PIV and that also infiltrated. PICC placement is planned for today. Patient received plasmaphoresis today with plans to go to dialysis afterwards. IVIG still needs to be infused after dialysis. PRN tylenol, dialudid, robaxin and atarax given for pain.   "

## 2020-07-09 NOTE — PROGRESS NOTES
Unable to place PICC line. Access obtained, introducer placed without difficulty, wire threaded with out difficulty, unable to thread catheter beyond 25 cm.  Catheter trimmed, to 25 cm, positive blood return, flushes easily. Catheter labeled as MIDLINE, beside RN aware.

## 2020-07-10 ENCOUNTER — APPOINTMENT (OUTPATIENT)
Dept: INTERVENTIONAL RADIOLOGY/VASCULAR | Facility: CLINIC | Age: 35
DRG: 698 | End: 2020-07-10
Attending: NURSE PRACTITIONER
Payer: MEDICARE

## 2020-07-10 LAB
ALBUMIN SERPL-MCNC: 3 G/DL (ref 3.4–5)
ALP SERPL-CCNC: 47 U/L (ref 40–150)
ALT SERPL W P-5'-P-CCNC: 24 U/L (ref 0–50)
ANION GAP SERPL CALCULATED.3IONS-SCNC: 7 MMOL/L (ref 3–14)
AST SERPL W P-5'-P-CCNC: 26 U/L (ref 0–45)
B-HCG SERPL-ACNC: <1 IU/L (ref 0–5)
BACTERIA SPEC CULT: NO GROWTH
BASOPHILS # BLD AUTO: 0 10E9/L (ref 0–0.2)
BASOPHILS NFR BLD AUTO: 0 %
BILIRUB DIRECT SERPL-MCNC: 0.2 MG/DL (ref 0–0.2)
BILIRUB SERPL-MCNC: 1 MG/DL (ref 0.2–1.3)
BUN SERPL-MCNC: 28 MG/DL (ref 7–30)
CALCIUM SERPL-MCNC: 7.4 MG/DL (ref 8.5–10.1)
CHLORIDE SERPL-SCNC: 99 MMOL/L (ref 94–109)
CO2 SERPL-SCNC: 26 MMOL/L (ref 20–32)
CREAT SERPL-MCNC: 3.85 MG/DL (ref 0.52–1.04)
DIFFERENTIAL METHOD BLD: ABNORMAL
EOSINOPHIL # BLD AUTO: 0 10E9/L (ref 0–0.7)
EOSINOPHIL NFR BLD AUTO: 0 %
ERYTHROCYTE [DISTWIDTH] IN BLOOD BY AUTOMATED COUNT: 14 % (ref 10–15)
GFR SERPL CREATININE-BSD FRML MDRD: 14 ML/MIN/{1.73_M2}
GLUCOSE BLDC GLUCOMTR-MCNC: 111 MG/DL (ref 70–99)
GLUCOSE BLDC GLUCOMTR-MCNC: 157 MG/DL (ref 70–99)
GLUCOSE BLDC GLUCOMTR-MCNC: 170 MG/DL (ref 70–99)
GLUCOSE BLDC GLUCOMTR-MCNC: 221 MG/DL (ref 70–99)
GLUCOSE SERPL-MCNC: 200 MG/DL (ref 70–99)
HCT VFR BLD AUTO: 26.6 % (ref 35–47)
HGB BLD-MCNC: 8.6 G/DL (ref 11.7–15.7)
IMM GRANULOCYTES # BLD: 0 10E9/L (ref 0–0.4)
IMM GRANULOCYTES NFR BLD: 0.6 %
INR PPP: 1.65 (ref 0.86–1.14)
LACTATE BLD-SCNC: 1 MMOL/L (ref 0.7–2)
LYMPHOCYTES # BLD AUTO: 0 10E9/L (ref 0.8–5.3)
LYMPHOCYTES NFR BLD AUTO: 1.2 %
Lab: NORMAL
Lab: NORMAL
MAGNESIUM SERPL-MCNC: 1.6 MG/DL (ref 1.6–2.3)
MCH RBC QN AUTO: 30.1 PG (ref 26.5–33)
MCHC RBC AUTO-ENTMCNC: 32.3 G/DL (ref 31.5–36.5)
MCV RBC AUTO: 93 FL (ref 78–100)
MONOCYTES # BLD AUTO: 0.1 10E9/L (ref 0–1.3)
MONOCYTES NFR BLD AUTO: 1.5 %
NEUTROPHILS # BLD AUTO: 3.3 10E9/L (ref 1.6–8.3)
NEUTROPHILS NFR BLD AUTO: 96.7 %
NRBC # BLD AUTO: 0 10*3/UL
NRBC BLD AUTO-RTO: 0 /100
PHOSPHATE SERPL-MCNC: 3.8 MG/DL (ref 2.5–4.5)
PLATELET # BLD AUTO: 177 10E9/L (ref 150–450)
POTASSIUM SERPL-SCNC: 4.1 MMOL/L (ref 3.4–5.3)
PREALB SERPL IA-MCNC: 22 MG/DL (ref 15–45)
PROT SERPL-MCNC: 5.7 G/DL (ref 6.8–8.8)
RBC # BLD AUTO: 2.86 10E12/L (ref 3.8–5.2)
SODIUM SERPL-SCNC: 132 MMOL/L (ref 133–144)
SPECIMEN SOURCE: NORMAL
TACROLIMUS BLD-MCNC: 9.3 UG/L (ref 5–15)
TME LAST DOSE: NORMAL H
WBC # BLD AUTO: 3.4 10E9/L (ref 4–11)

## 2020-07-10 PROCEDURE — 12000026 ZZH R&B TRANSPLANT

## 2020-07-10 PROCEDURE — 80197 ASSAY OF TACROLIMUS: CPT | Performed by: STUDENT IN AN ORGANIZED HEALTH CARE EDUCATION/TRAINING PROGRAM

## 2020-07-10 PROCEDURE — 25000128 H RX IP 250 OP 636: Performed by: NURSE PRACTITIONER

## 2020-07-10 PROCEDURE — 25000132 ZZH RX MED GY IP 250 OP 250 PS 637: Mod: GY | Performed by: STUDENT IN AN ORGANIZED HEALTH CARE EDUCATION/TRAINING PROGRAM

## 2020-07-10 PROCEDURE — 36573 INSJ PICC RS&I 5 YR+: CPT

## 2020-07-10 PROCEDURE — 25000128 H RX IP 250 OP 636: Performed by: PHYSICIAN ASSISTANT

## 2020-07-10 PROCEDURE — 27210908 ZZH NEEDLE CR4

## 2020-07-10 PROCEDURE — 25800030 ZZH RX IP 258 OP 636: Performed by: STUDENT IN AN ORGANIZED HEALTH CARE EDUCATION/TRAINING PROGRAM

## 2020-07-10 PROCEDURE — 25000128 H RX IP 250 OP 636: Performed by: STUDENT IN AN ORGANIZED HEALTH CARE EDUCATION/TRAINING PROGRAM

## 2020-07-10 PROCEDURE — 84134 ASSAY OF PREALBUMIN: CPT | Performed by: SURGERY

## 2020-07-10 PROCEDURE — 02PYX3Z REMOVAL OF INFUSION DEVICE FROM GREAT VESSEL, EXTERNAL APPROACH: ICD-10-PCS | Performed by: RADIOLOGY

## 2020-07-10 PROCEDURE — 84100 ASSAY OF PHOSPHORUS: CPT | Performed by: SURGERY

## 2020-07-10 PROCEDURE — 84702 CHORIONIC GONADOTROPIN TEST: CPT | Performed by: SURGERY

## 2020-07-10 PROCEDURE — 27210886 ZZH ACCESSORY CR5

## 2020-07-10 PROCEDURE — 36592 COLLECT BLOOD FROM PICC: CPT | Performed by: SURGERY

## 2020-07-10 PROCEDURE — 27210732 ZZH ACCESSORY CR1

## 2020-07-10 PROCEDURE — 25000132 ZZH RX MED GY IP 250 OP 250 PS 637: Mod: GY | Performed by: PHYSICIAN ASSISTANT

## 2020-07-10 PROCEDURE — C1751 CATH, INF, PER/CENT/MIDLINE: HCPCS

## 2020-07-10 PROCEDURE — C1769 GUIDE WIRE: HCPCS

## 2020-07-10 PROCEDURE — 83735 ASSAY OF MAGNESIUM: CPT | Performed by: SURGERY

## 2020-07-10 PROCEDURE — 25000125 ZZHC RX 250: Performed by: STUDENT IN AN ORGANIZED HEALTH CARE EDUCATION/TRAINING PROGRAM

## 2020-07-10 PROCEDURE — 25500064 ZZH RX 255 OP 636: Performed by: RADIOLOGY

## 2020-07-10 PROCEDURE — C1887 CATHETER, GUIDING: HCPCS

## 2020-07-10 PROCEDURE — 63400005 ZZH RX 634: Performed by: SURGERY

## 2020-07-10 PROCEDURE — 99153 MOD SED SAME PHYS/QHP EA: CPT

## 2020-07-10 PROCEDURE — 05H533Z INSERTION OF INFUSION DEVICE INTO RIGHT SUBCLAVIAN VEIN, PERCUTANEOUS APPROACH: ICD-10-PCS | Performed by: RADIOLOGY

## 2020-07-10 PROCEDURE — 00000146 ZZHCL STATISTIC GLUCOSE BY METER IP

## 2020-07-10 PROCEDURE — 85025 COMPLETE CBC W/AUTO DIFF WBC: CPT | Performed by: STUDENT IN AN ORGANIZED HEALTH CARE EDUCATION/TRAINING PROGRAM

## 2020-07-10 PROCEDURE — 25000125 ZZHC RX 250: Performed by: SURGERY

## 2020-07-10 PROCEDURE — 83605 ASSAY OF LACTIC ACID: CPT | Performed by: SURGERY

## 2020-07-10 PROCEDURE — 25000125 ZZHC RX 250: Performed by: RADIOLOGY

## 2020-07-10 PROCEDURE — 85610 PROTHROMBIN TIME: CPT | Performed by: SURGERY

## 2020-07-10 PROCEDURE — 80053 COMPREHEN METABOLIC PANEL: CPT | Performed by: SURGERY

## 2020-07-10 PROCEDURE — 25000131 ZZH RX MED GY IP 250 OP 636 PS 637: Mod: GY | Performed by: NURSE PRACTITIONER

## 2020-07-10 PROCEDURE — 36592 COLLECT BLOOD FROM PICC: CPT | Performed by: STUDENT IN AN ORGANIZED HEALTH CARE EDUCATION/TRAINING PROGRAM

## 2020-07-10 PROCEDURE — 25800030 ZZH RX IP 258 OP 636: Performed by: PHYSICIAN ASSISTANT

## 2020-07-10 PROCEDURE — 25000132 ZZH RX MED GY IP 250 OP 250 PS 637: Mod: GY | Performed by: SURGERY

## 2020-07-10 PROCEDURE — 3E0436Z INTRODUCTION OF NUTRITIONAL SUBSTANCE INTO CENTRAL VEIN, PERCUTANEOUS APPROACH: ICD-10-PCS | Performed by: TRANSPLANT SURGERY

## 2020-07-10 PROCEDURE — 25000128 H RX IP 250 OP 636: Performed by: RADIOLOGY

## 2020-07-10 PROCEDURE — 99152 MOD SED SAME PHYS/QHP 5/>YRS: CPT

## 2020-07-10 PROCEDURE — 82248 BILIRUBIN DIRECT: CPT | Performed by: SURGERY

## 2020-07-10 RX ORDER — CLONIDINE HYDROCHLORIDE 0.1 MG/1
0.1 TABLET ORAL 3 TIMES DAILY PRN
Status: DISCONTINUED | OUTPATIENT
Start: 2020-07-10 | End: 2020-07-18 | Stop reason: HOSPADM

## 2020-07-10 RX ORDER — METHYLPREDNISOLONE SODIUM SUCCINATE 40 MG/ML
40 INJECTION, POWDER, LYOPHILIZED, FOR SOLUTION INTRAMUSCULAR; INTRAVENOUS ONCE
Status: DISCONTINUED | OUTPATIENT
Start: 2020-07-10 | End: 2020-07-10

## 2020-07-10 RX ORDER — AMLODIPINE BESYLATE 5 MG/1
5 TABLET ORAL DAILY
Status: DISCONTINUED | OUTPATIENT
Start: 2020-07-10 | End: 2020-07-18 | Stop reason: HOSPADM

## 2020-07-10 RX ORDER — DIPHENHYDRAMINE HCL 12.5MG/5ML
25-50 LIQUID (ML) ORAL ONCE
Status: COMPLETED | OUTPATIENT
Start: 2020-07-10 | End: 2020-07-10

## 2020-07-10 RX ORDER — PANTOPRAZOLE SODIUM 40 MG/1
40 TABLET, DELAYED RELEASE ORAL 2 TIMES DAILY
Status: DISCONTINUED | OUTPATIENT
Start: 2020-07-10 | End: 2020-07-18 | Stop reason: HOSPADM

## 2020-07-10 RX ORDER — DIPHENHYDRAMINE HCL 25 MG
25-50 CAPSULE ORAL ONCE
Status: COMPLETED | OUTPATIENT
Start: 2020-07-10 | End: 2020-07-10

## 2020-07-10 RX ORDER — ACETAMINOPHEN 325 MG/1
650 TABLET ORAL ONCE
Status: COMPLETED | OUTPATIENT
Start: 2020-07-10 | End: 2020-07-10

## 2020-07-10 RX ORDER — FENTANYL CITRATE 50 UG/ML
25-50 INJECTION, SOLUTION INTRAMUSCULAR; INTRAVENOUS EVERY 5 MIN PRN
Status: DISCONTINUED | OUTPATIENT
Start: 2020-07-10 | End: 2020-07-10 | Stop reason: HOSPADM

## 2020-07-10 RX ORDER — DIPHENHYDRAMINE HYDROCHLORIDE 50 MG/ML
50 INJECTION INTRAMUSCULAR; INTRAVENOUS
Status: CANCELLED | OUTPATIENT
Start: 2020-07-10

## 2020-07-10 RX ORDER — FLUMAZENIL 0.1 MG/ML
0.2 INJECTION, SOLUTION INTRAVENOUS
Status: DISCONTINUED | OUTPATIENT
Start: 2020-07-10 | End: 2020-07-10 | Stop reason: HOSPADM

## 2020-07-10 RX ORDER — NALOXONE HYDROCHLORIDE 0.4 MG/ML
.1-.4 INJECTION, SOLUTION INTRAMUSCULAR; INTRAVENOUS; SUBCUTANEOUS
Status: DISCONTINUED | OUTPATIENT
Start: 2020-07-10 | End: 2020-07-10

## 2020-07-10 RX ORDER — PANTOPRAZOLE SODIUM 40 MG/1
40 TABLET, DELAYED RELEASE ORAL
Status: DISCONTINUED | OUTPATIENT
Start: 2020-07-11 | End: 2020-07-10

## 2020-07-10 RX ORDER — IODIXANOL 320 MG/ML
50 INJECTION, SOLUTION INTRAVASCULAR ONCE
Status: COMPLETED | OUTPATIENT
Start: 2020-07-10 | End: 2020-07-10

## 2020-07-10 RX ORDER — NALOXONE HYDROCHLORIDE 0.4 MG/ML
.1-.4 INJECTION, SOLUTION INTRAMUSCULAR; INTRAVENOUS; SUBCUTANEOUS
Status: DISCONTINUED | OUTPATIENT
Start: 2020-07-10 | End: 2020-07-10 | Stop reason: HOSPADM

## 2020-07-10 RX ORDER — METHYLPREDNISOLONE SODIUM SUCCINATE 40 MG/ML
40 INJECTION, POWDER, LYOPHILIZED, FOR SOLUTION INTRAMUSCULAR; INTRAVENOUS ONCE
Status: DISCONTINUED | OUTPATIENT
Start: 2020-07-10 | End: 2020-07-10 | Stop reason: CLARIF

## 2020-07-10 RX ORDER — FUROSEMIDE 10 MG/ML
60 INJECTION INTRAMUSCULAR; INTRAVENOUS EVERY 12 HOURS
Status: DISCONTINUED | OUTPATIENT
Start: 2020-07-10 | End: 2020-07-10

## 2020-07-10 RX ADMIN — MYCOPHENOLATE MOFETIL 1000 MG: 500 INJECTION, POWDER, LYOPHILIZED, FOR SOLUTION INTRAVENOUS at 20:21

## 2020-07-10 RX ADMIN — PANTOPRAZOLE SODIUM 40 MG: 40 TABLET, DELAYED RELEASE ORAL at 08:57

## 2020-07-10 RX ADMIN — CARVEDILOL 6.25 MG: 6.25 TABLET, FILM COATED ORAL at 06:37

## 2020-07-10 RX ADMIN — MIDAZOLAM 1 MG: 1 INJECTION INTRAMUSCULAR; INTRAVENOUS at 13:03

## 2020-07-10 RX ADMIN — PREDNISONE 40 MG: 20 TABLET ORAL at 08:58

## 2020-07-10 RX ADMIN — INSULIN ASPART 1 UNITS: 100 INJECTION, SOLUTION INTRAVENOUS; SUBCUTANEOUS at 09:15

## 2020-07-10 RX ADMIN — ACETAMINOPHEN 650 MG: 325 TABLET, FILM COATED ORAL at 13:40

## 2020-07-10 RX ADMIN — Medication 1 MG: at 21:47

## 2020-07-10 RX ADMIN — MYCOPHENOLATE MOFETIL 1000 MG: 500 INJECTION, POWDER, LYOPHILIZED, FOR SOLUTION INTRAVENOUS at 06:09

## 2020-07-10 RX ADMIN — Medication 2 MG: at 01:21

## 2020-07-10 RX ADMIN — INSULIN ASPART 1 UNITS: 100 INJECTION, SOLUTION INTRAVENOUS; SUBCUTANEOUS at 16:53

## 2020-07-10 RX ADMIN — FENTANYL CITRATE 100 MCG: 50 INJECTION, SOLUTION INTRAMUSCULAR; INTRAVENOUS at 12:49

## 2020-07-10 RX ADMIN — AMLODIPINE BESYLATE 5 MG: 5 TABLET ORAL at 11:24

## 2020-07-10 RX ADMIN — PANTOPRAZOLE SODIUM 40 MG: 40 TABLET, DELAYED RELEASE ORAL at 17:55

## 2020-07-10 RX ADMIN — FUROSEMIDE 60 MG: 10 INJECTION, SOLUTION INTRAMUSCULAR; INTRAVENOUS at 08:59

## 2020-07-10 RX ADMIN — FUROSEMIDE 60 MG: 10 INJECTION, SOLUTION INTRAMUSCULAR; INTRAVENOUS at 16:44

## 2020-07-10 RX ADMIN — CARVEDILOL 6.25 MG: 6.25 TABLET, FILM COATED ORAL at 20:13

## 2020-07-10 RX ADMIN — ONDANSETRON 4 MG: 4 TABLET, ORALLY DISINTEGRATING ORAL at 08:57

## 2020-07-10 RX ADMIN — MIDAZOLAM 2 MG: 1 INJECTION INTRAMUSCULAR; INTRAVENOUS at 12:49

## 2020-07-10 RX ADMIN — ATORVASTATIN CALCIUM 10 MG: 10 TABLET, FILM COATED ORAL at 08:58

## 2020-07-10 RX ADMIN — DIPHENHYDRAMINE HYDROCHLORIDE 50 MG: 25 CAPSULE ORAL at 13:41

## 2020-07-10 RX ADMIN — TACROLIMUS 6 MG: 5 CAPSULE ORAL at 08:57

## 2020-07-10 RX ADMIN — SODIUM BICARBONATE 650 MG TABLET 1300 MG: at 08:57

## 2020-07-10 RX ADMIN — CALCIUM ACETATE 1334 MG: 667 CAPSULE ORAL at 08:58

## 2020-07-10 RX ADMIN — SULFAMETHOXAZOLE AND TRIMETHOPRIM 1 TABLET: 400; 80 TABLET ORAL at 09:15

## 2020-07-10 RX ADMIN — EPOETIN ALFA-EPBX 2000 UNITS: 10000 INJECTION, SOLUTION INTRAVENOUS; SUBCUTANEOUS at 11:25

## 2020-07-10 RX ADMIN — SODIUM BICARBONATE 650 MG TABLET 1300 MG: at 20:12

## 2020-07-10 RX ADMIN — Medication 2 MG: at 04:14

## 2020-07-10 RX ADMIN — ACETAMINOPHEN 650 MG: 325 TABLET, FILM COATED ORAL at 20:12

## 2020-07-10 RX ADMIN — LIDOCAINE HYDROCHLORIDE 30 ML: 20 SOLUTION ORAL; TOPICAL at 06:36

## 2020-07-10 RX ADMIN — HEPARIN SODIUM 75 MG: 1000 INJECTION INTRAVENOUS; SUBCUTANEOUS at 14:37

## 2020-07-10 RX ADMIN — LIDOCAINE HYDROCHLORIDE 10 ML: 10 INJECTION, SOLUTION EPIDURAL; INFILTRATION; INTRACAUDAL; PERINEURAL at 13:06

## 2020-07-10 RX ADMIN — I.V. FAT EMULSION 250 ML: 20 EMULSION INTRAVENOUS at 20:17

## 2020-07-10 RX ADMIN — PREDNISONE 40 MG: 20 TABLET ORAL at 20:12

## 2020-07-10 RX ADMIN — IODIXANOL 25 ML: 320 INJECTION, SOLUTION INTRAVASCULAR at 13:30

## 2020-07-10 RX ADMIN — FENTANYL CITRATE 50 MCG: 50 INJECTION, SOLUTION INTRAMUSCULAR; INTRAVENOUS at 13:03

## 2020-07-10 RX ADMIN — ASCORBIC ACID, VITAMIN A PALMITATE, CHOLECALCIFEROL, THIAMINE HYDROCHLORIDE, RIBOFLAVIN-5 PHOSPHATE SODIUM, PYRIDOXINE HYDROCHLORIDE, NIACINAMIDE, DEXPANTHENOL, ALPHA-TOCOPHEROL ACETATE, VITAMIN K1, FOLIC ACID, BIOTIN, CYANOCOBALAMIN: 200; 3300; 200; 6; 3.6; 6; 40; 15; 10; 150; 600; 60; 5 INJECTION, SOLUTION INTRAVENOUS at 21:32

## 2020-07-10 RX ADMIN — TACROLIMUS 6 MG: 5 CAPSULE ORAL at 17:55

## 2020-07-10 ASSESSMENT — ACTIVITIES OF DAILY LIVING (ADL)
ADLS_ACUITY_SCORE: 10

## 2020-07-10 ASSESSMENT — MIFFLIN-ST. JEOR: SCORE: 1381.75

## 2020-07-10 NOTE — PROGRESS NOTES
Care Coordinator  D/I: I had a message to call gabriela at [facility] back to go over chair availability. I did call her back and left a message approx 12 noon  P: I requested OP HD start date of 7/15 via voice message. Wait for Gabriela to call back.  3:30pm I still do NOT have an answer about the IVig--I have informed GERMAN [facility]unsford.

## 2020-07-10 NOTE — PLAN OF CARE
VS: Hypertensive, 181/106. On call paged. Coreg given. Room air.  Per on call, morning BP meds given early.   BG: Q6 hrs (TPN) 201, covered with sliding scale.  Pain: dil 0.2 mg PO x 2 given.  Nausea: emesis x 1 after maalox (xylocaine).   Diet: Low phos diet, ej counts   Lines: AVF + thrill, +bruit, PIVR saline locked. Midline Infusing PPN & lipids. Started cell cept IV.   : oliguric. On HD.  GI: Bm x 1.  Skin: primapore on R abdomen, incision healed PARVIZ. Legs edema +2.  Mobility: up ad kathy, minimal assist, walker   Plan: 3 more rounds of IVIG and apheresis.

## 2020-07-10 NOTE — PROGRESS NOTES
Calorie Count  Intake recorded for: 7/9  Total Kcals: 836 Total Protein: 29g  Kcals from Hospital Food: 836  Protein: 29g  Kcals from Outside Food (average):0 Protein: 0g  # Meals Recorded: 100% baked cod w/ lemon & derek sauce, wheat roll w/ butter, orange, brownie, ice cream, gregory  # Supplements Recorded: 0

## 2020-07-10 NOTE — PROGRESS NOTES
Transplant Surgery  Inpatient Daily Progress Note  07/10/2020    Assessment & Plan: 34 yo with PMH significant for ESRD due to IgA Nephropathy s/p KT in 2007 (failed due to non-compliance during pregnancy) and 6/19/2020 (3 arteries, + stent), and HTN. Baseline Cr 0.6 post-transplant. She presented on 7/4/2020 with fever and ARF of transplanted kidney.    Graft function: POD #21. Ureteral stent removed 7/8.  Mixed rejection with ARF: Oliguria, UOP slightly increased to 150 ml yesterday. Lasix 60 mg IV BID. Dialyzed on 7/7 & 7/9. 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 DSA negative. AT1R pending. Treatment as below.   Perinephric fluid collections: Noted on US. 7/5 IR: 50ml cloudy serosang fluid aspirated, gram stain negative, culture NGTD. Not suggestive of urine leak.  Immunosuppression management:    Pheresis/IVIG: Plan for pheresis/IVIG every other day x5 starting 7/7. IVIG 0.5g/kg doses #1-4, 1g/kg dose #5.  Thymo: 125mg on 7/6, 125mg 7/8, 75 mg 7/10. Plan for 1mg/kg after final pheresis.  Rituximab: 500mg on 7/7.  Methylpred: 500/500/250/100/100  Tacro: Goal 8-10 7/10 9.3 (11.5 hour trough). Continue 6 mg BID.  MMF: 1000 IV BID d/t nausea. Was on Myfortic on admission.  Complexity of management: Medium. Contributing factors: rejection  Hematology:   ANTHONY/ACD: Hgb 6.5->8.6 following transfusion on 7/9. Received 1u RBC on 7/5 and 7/9. No sign of bleeding.  PSAT 9, consider venofer if no infection. Hapto high, retic low. Discussed Epo with Nephrology, but they feel that it will not be effective in setting of inflammation.  Leukopenia: WBC 1.8->3.4. Secondary to thymo.  Cardiorespiratory:   HTN: Coreg reduced this admission. -180, add Norvasc.   GI/Nutrition:   Diet: Regular diet with poor intake. Nutrition consult and ej counts. 836 kcal yesterday, all in evening meal.   Severe malnutrition in the context of acute illness: Started PPN on 7/9. Unable  to place PICC due to innominate vein occlusion per IR.   Nausea/Vomiting: Scheduled zofran and PPI. PRN compazine. Vomiting resolved.  Diarrhea: Since transplant. 7/6 C-diff and enteric panel negative.  Endocrine: No acute issues.   Fluid/Electrolytes:   Metabolic acidosis: Resolved with dialysis.  Hypocalcemia: Secondary to ARF/high phos. Replace PRN.  Hyperphosphatemia: Continue Phoslo. Low phos diet.  Hypervolemia: Stop IVF.  : Incontinent at times.  Neuro/Psych:   Acute pain: Pain over kidney and low back. Continue acetaminophen, lidoderm, and low dose oral dilaudid for break-thru. Plan to taper off dilaudid. Stop Robaxin due to restlessness after dose.  Anxiety and paranoia: Likely exacerbated by steroids and poor sleep. Concern that opioids are being used to manage these symptoms.   Infectious disease: Afebrile, no leukocytosis  Fever: Fever 103.7F on admission. Infection vs rejection. Cipro started outpatient, broadened to Cefepine on 7/4-7/8. COVID neg, 7/4 UC neg, 7/4 blood cx NGTD, 7/5 perinephric fluid cultures NGTD (gram stain neg), enteric panel neg, C-diff neg.  HBcAb positive: sAg nonreactive, sAb >1000. This may be positive due to IVIG infusions.  Prophylaxis: Bactrim (PCP ppx) three times per week  Valcyte (CMV PPx)   Disposition: 7A    Medical Decision Making: Medium  Subsequent visit 76896 (moderate level decision making)    CONNIE/Fellow/Resident Provider: Mireille Munroe PA-C [id number]    Faculty: Nory Morgan M.D.  _________________________________________________________________    Interval History: History is obtained from the patient  No nausea. Complaining of heartburn after large spicy meal last evening.     ROS:   A 10-point review of systems was negative except as noted above.    Meds:    acetaminophen  650 mg Oral Once     amLODIPine  5 mg Oral Daily     antithymocyte globulin (THYMOGLOBULIN - Rabbit) w/ hydrocortisone peripheral infusion  75 mg Intravenous Once     atorvastatin  10 mg Oral  "Daily     calcium acetate  1,334 mg Oral TID w/meals     carvedilol  6.25 mg Oral BID     diphenhydrAMINE  25-50 mg Oral Once    Or     diphenhydrAMINE  25-50 mg Per NG tube Once     diphenhydrAMINE  25-50 mg Oral Once    Or     diphenhydrAMINE  25-50 mg Per NG tube Once     epoetin staci-epbx (RETACRIT) inj ESRD  2,000 Units Intravenous Once per day on Mon Wed Fri     furosemide  60 mg Intravenous BID     insulin aspart  1-7 Units Subcutaneous TID AC     insulin aspart  1-5 Units Subcutaneous At Bedtime     lidocaine  1 patch Transdermal Q24H     lidocaine   Transdermal Q8H     lipids  250 mL Intravenous Q Mon thru Fri     mycophenolate mofetil  1,000 mg Intravenous Q12H     OLANZapine  2.5 mg Oral Once     ondansetron  4 mg Oral TID AC     pantoprazole  40 mg Oral QAM AC     predniSONE  40 mg Oral BID    Followed by     [START ON 7/12/2020] predniSONE  40 mg Oral BID    Followed by     [START ON 7/14/2020] predniSONE  40 mg Oral Daily    Followed by     [START ON 7/16/2020] predniSONE  40 mg Oral Daily    Followed by     [START ON 7/17/2020] predniSONE  20 mg Oral Daily     [START ON 7/19/2020] predniSONE  5 mg Oral Daily     sodium bicarbonate  1,300 mg Oral BID     sodium chloride (PF)  10 mL Intracatheter Q8H     sodium chloride (PF)  3 mL Intracatheter Q8H     sulfamethoxazole-trimethoprim  1 tablet Oral Once per day on Mon Wed Fri     tacrolimus  6 mg Oral BID     valGANciclovir  450 mg Oral Once per day on Mon Thu       Physical Exam:     Admit Weight: 57.2 kg (126 lb 1.7 oz)    Current vitals:   BP (!) 144/84   Pulse 66   Temp 97.8  F (36.6  C) (Oral)   Resp 22   Ht 1.63 m (5' 4.17\")   Wt 65.3 kg (143 lb 15.4 oz)   SpO2 98%   BMI 24.58 kg/m      Vital sign ranges:    Temp:  [97.4  F (36.3  C)-98.4  F (36.9  C)] 97.8  F (36.6  C)  Pulse:  [60-67] 66  Heart Rate:  [52-75] 68  Resp:  [12-22] 22  BP: (129-181)/() 144/84  SpO2:  [97 %-100 %] 98 %  Patient Vitals for the past 24 hrs:   BP Temp Temp " src Pulse Heart Rate Resp SpO2 Height Weight   07/10/20 1240 (!) 144/84 -- -- 66 68 -- 98 % -- --   07/10/20 1235 (!) 147/89 -- -- 67 68 -- 98 % -- --   07/10/20 1230 (!) 167/109 -- -- 66 64 22 100 % -- --   07/10/20 0728 (!) 174/106 97.8  F (36.6  C) Oral -- 72 16 100 % -- --   07/10/20 0417 (!) 181/106 97.7  F (36.5  C) Oral -- 68 16 100 % -- --   07/09/20 2330 (!) 162/91 98.1  F (36.7  C) Oral -- 75 16 99 % -- --   07/09/20 2130 (!) 169/109 98.1  F (36.7  C) Oral -- 58 16 99 % -- --   07/09/20 2120 (!) 163/116 -- -- -- 66 14 99 % -- 65.3 kg (143 lb 15.4 oz)   07/09/20 2115 (!) 162/112 -- -- -- 53 12 98 % -- --   07/09/20 2100 (!) 164/111 -- -- -- 53 18 99 % -- --   07/09/20 2045 (!) 169/110 -- -- -- 54 16 98 % -- --   07/09/20 2030 (!) 160/118 -- -- -- 52 14 99 % -- --   07/09/20 2015 (!) 156/115 -- -- -- 53 18 99 % -- --   07/09/20 2000 (!) 170/110 -- -- -- 56 16 99 % -- --   07/09/20 1947 (!) 163/108 97.6  F (36.4  C) Oral -- 56 20 99 % -- --   07/09/20 1945 (!) 163/108 -- -- -- 55 12 99 % -- --   07/09/20 1930 (!) 170/102 97.7  F (36.5  C) Oral -- 58 12 99 % -- --   07/09/20 1915 (!) 167/103 -- -- -- 73 16 98 % -- --   07/09/20 1913 (!) 139/90 97.4  F (36.3  C) Oral -- 61 16 99 % -- --   07/09/20 1900 (!) 139/90 -- -- -- 61 16 99 % -- --   07/09/20 1845 (!) 150/89 97.4  F (36.3  C) -- -- 62 14 99 % -- --   07/09/20 1839 136/82 97.5  F (36.4  C) Oral -- 63 16 98 % -- --   07/09/20 1815 (!) 144/88 97.8  F (36.6  C) Oral -- 67 -- -- -- --   07/09/20 1806 (!) 143/90 -- -- -- 69 -- 98 % -- --   07/09/20 1800 (!) 143/90 -- -- -- 70 -- -- -- --   07/09/20 1750 (!) 144/87 97.9  F (36.6  C) Oral -- 72 -- 97 % -- 68.8 kg (151 lb 10.8 oz)   07/09/20 1731 (!) 149/97 97.8  F (36.6  C) Oral 66 66 16 98 % -- --   07/09/20 1608 (!) 143/84 98.2  F (36.8  C) Oral -- 64 16 100 % -- --   07/09/20 1453 129/88 98.2  F (36.8  C) Oral 65 -- 16 -- -- --   07/09/20 1442 -- 98.2  F (36.8  C) Oral -- -- -- -- -- --   07/09/20 1411 (!)  "143/96 -- -- 63 -- -- -- -- --   07/09/20 1308 139/86 98.4  F (36.9  C) Oral 60 -- 16 -- 1.63 m (5' 4.17\") --     General Appearance: Uncomfortable  Skin: warm, dry, no rashes  Heart: Perfused  Lungs: RA, unlabored  Abdomen: Soft, less tender over kidney  : polanco is not present.   Extremities: edema: generalized  Neurologic: awake, alert and oriented x4. Tremor absent.    Data:   CMP  Recent Labs   Lab 07/10/20  0556 07/09/20  0615  07/05/20  1330  07/04/20  1334   * 134   < >  --    < > 135   POTASSIUM 4.1 3.4   < >  --    < > 4.1   CHLORIDE 99 98   < >  --    < > 107   CO2 26 26   < >  --    < > 22   * 159*   < >  --    < > 96   BUN 28 42*   < >  --    < > 25   CR 3.85* 5.61*   < >  --    < > 4.12*   GFRESTIMATED 14* 9*   < >  --    < > 13*   GFRESTBLACK 17* 11*   < >  --    < > 15*   ACSIE 7.4* 6.1*   < >  --    < > 7.9*   MAG 1.6 1.7   < >  --    < > 1.4*   PHOS 3.8 4.3   < >  --    < > 4.6*   ALBUMIN 3.0*  --   --   --   --  2.9*   BILITOTAL 1.0  --   --   --   --  0.7   ALKPHOS 47  --   --   --   --  123   AST 26  --   --   --   --  34   ALT 24  --   --   --   --  31   FCREAT  --   --   --  4.7  --   --     < > = values in this interval not displayed.     CBC  Recent Labs   Lab 07/10/20  0556 07/09/20  1310   HGB 8.6* 7.6*   WBC 3.4* 2.2*    154           "

## 2020-07-10 NOTE — PROGRESS NOTES
Nutrition Services Brief Progress Note - please see note from 7/9 for full assessment    Diet: low phosphorus with boost glucose control and between meals and boost breeze with meals   Calorie counts:  7/8: 0 kcals, 0 g PRO  7/9: 836 kcals, 29 g PRO    PPN: Clinimix 4.25/5 at 40 ml/hr providing Dextrose 48 grams and AA 41 grams 250 ml 20% lipids 3 times weekly. This provided: 541 kcals, 0.8 g/kg PRO    PPN was started as patient did not of central access. PICC line to be placed today per chart review     Labs (7/5): Na 132 mmol/L (L), BUN 28 mg/dL, Cr 3.85 mg/dL (H)    Interventions  1. Discussed with PharmD. Once PICC line placed will begin previous RD recommendations for TPN as follows: TPN recommendations: 960 ml (40 ml/hr) - Dex 125 grams (GIR 1.5 mg/kg/min), AA 90 grams + 250 ml 20% IV lipids 5x/week.  Pending electrolytes/BG, recommend dextrose advancement by ~30-45 grams/day to goal of 215 grams/day.  Goal PN regimen will provide 1448 kcal (25 kcal/kg), 1.6g/kg protein, 25% kcal from fat.   2. TF recs in note from 7/9  3. Once patient is able to tolerate 1200 kcals and 50 gram PRO consistently recommend discontinue TPN     Unit RD will continue to monitor per protocol  Taty Sweeney, MS/RD/LD/CNSC  [address]A RD Pager: [phone]    Addendum to above note (7/10 at 1400). Patient unable to have PICC placed.   Discussed with PharmD and will increase Clinimix 4.25/5 to 75 ml/hr with 250 ml 20% IV lipids 4 times weekly. This will provide (1800 ml): Dextrose 90 grams (306 kcals) , AA 77 g (308 kcals) for 900 kcals (16 kcals/kg), 1.4 g/kg PRO, 32% calories from fat.    Unit RD will continue to monitor.  Taty Sweeney, MS/RD/LD/CNSC  7A RD Pager: [phone]

## 2020-07-10 NOTE — PLAN OF CARE
Vitals: Hypertensive this morning, 181/106, improving to 140's this afternoon after Normvasc.  Endocrine: Blood glucose 157, 1U Novolog.  Labs: Creatinine 3.8, is on HD/plasmapheresis.  Pain: No complaints of pain.  PRN's: Fentanyl and Versed with PICC placement.  Diet: Low Phosphorus diet, did not eat breakfast, ordered late lunch.  LDA: DL mid-line, replaced with PICC line this afternoon in IR. Left arm graft. Clinimix increased from 40cc/hr to 75cc/hr.  GI: Loose BM's.  : Oliguric, is on HD, last run yesterday.  Skin: Skin is intact, abdomen distended.  Neuro: Very fatigued this morning, sleeps between cares.  Mobility: Up to the bathroom with minimal stand by assit.  Education: n/a  Plan:  Thymoglobulin dose this afternoon.

## 2020-07-10 NOTE — PRE-PROCEDURE
GENERAL PRE-PROCEDURE:   Procedure:  PICC placement    Written consent obtained?: Yes    Risks and benefits: Risks, benefits and alternatives were discussed    Consent given by:  Patient  Patient states understanding of procedure being performed: Yes    Patient's understanding of procedure matches consent: Yes    Procedure consent matches procedure scheduled: Yes    Expected level of sedation:  Moderate  Appropriately NPO:  Yes  ASA Class:  Class 2- mild systemic disease, no acute problems, no functional limitations  Mallampati  :  Grade 2- soft palate, base of uvula, tonsillar pillars, and portion of posterior pharyngeal wall visible  Lungs:  Lungs clear with good breath sounds bilaterally  Heart:  Normal heart sounds and rate  History & Physical reviewed:  History and physical reviewed and no updates needed  Statement of review:  I have reviewed the lab findings, diagnostic data, medications, and the plan for sedation

## 2020-07-10 NOTE — CONSULTS
Interventional Radiology Consult Service Note    Patient is on IR schedule 7/10 for a DL PICC line placement, RIGHT.   Labs WNL for procedure.  No NPO required.  Consent will be done prior to procedure.     Please contact the IR charge RN at [phone] for estimated time of procedure.     Case discussed with Dr. Nicole from IR and . This is a 36 yo with PMH significant for ESRD due to IgA Nephropathy s/p KT in 2007 (failed due to non-compliance during pregnancy) and 6/19/2020 (3 arteries, + stent), and HTN. Baseline Cr 0.6 post-transplant. She presented on 7/4/2020 with fever and ARF of transplanted kidney. Team planning for discharge and they would like to send pt out on TPN. Vascular access attempted RIGHT sided PICC line placement 7/9 and failed due to inability to thread past 25cm. IR has therefore been consulted for PICC line placement. Will use midline access to attempt to get centrally located catheter. Did discuss risks associated with PICC line placement in ESRD patients and team would still like us to proceed despite this.    Ritu Higuera, BENEDICTO, APRN  Interventional Radiology  Pager: [phone]

## 2020-07-10 NOTE — PLAN OF CARE
"BP (!) 162/112   Pulse 66   Temp 97.6  F (36.4  C) (Oral)   Resp 12   Ht 1.63 m (5' 4.17\")   Wt 65.3 kg (143 lb 15.4 oz)   SpO2 98%   BMI 24.58 kg/m      Shift: 4170-5226  VS: hypertensive (140s-160s/90s) OVSS on RA  Neuro: AOx4; anxious  BG: ACHS (98,)  Respiratory: WDL  Pain/Nausea/PRN: back/abd pain controlled with PO dilaudid; no c/o nausea  Diet: low phos  LDA: L fistula; R PIV SL and midline infusing PPN with lipids @ 40ml/h  GI/: oliguric, completed HD this evening; LBM this AM  Skin: incision on abd; +1 edema  Mobility: up with assist x1  Plan: IVIG (2/5) completed; plans for 3 more doses every other day    Will continue with POC and notify MD with changes or concerns.    "

## 2020-07-10 NOTE — DISCHARGE SUMMARY
[facility], [place]    Discharge Summary  Solid Organ Transplant    Date of Admission:  7/4/2020  Date of Discharge:  7/18/2020  Discharging Provider: Mireille Munroe PA-C/Carmelo Jones MD     Discharge Diagnoses   Principal Problem:    Acute rejection of kidney transplant  Active Problems:    Anemia in chronic renal disease    Anxiety    Acute renal failure (ARF) (H)    Metabolic acidosis    Steroid-induced hyperglycemia    Nausea vomiting and diarrhea    Hypervolemia    Follow-ups Needed After Discharge     Unresulted Labs Ordered in the Past 30 Days of this Admission     Date and Time Order Name Status Description    7/17/2020 0642 Blood culture Preliminary     7/17/2020 0642 Blood culture Preliminary     7/15/2020 0927 Blood culture Preliminary     7/15/2020 0927 Blood culture Preliminary     7/7/2020 1213 Plasma prepare order mLs In process     7/7/2020 1159 HLA Final Crossmatch Recipient In process     7/7/2020 1112 Plasma prepare order mL In process     7/5/2020 1402 Fungus culture Preliminary     7/5/2020 1207 Fungus Culture, non-blood Preliminary     6/18/2020 0942 PRA SINGLE ANTIGEN IGG ANTIBODY In process       These results will be followed up by transplant coordinator    Discharge Disposition   Discharged to home  Condition at discharge: Stable      Hospital Course   Jelly Dietz was admitted on 7/4/2020.  The following problems were addressed during her hospitalization:    Kidney transplant 6/19/20 with Mixed rejection with ARF: Presented with SCr of 4.5 on admission and a biopsy was obtained on 7/5 with mixed ARF (see below).She ultimately required dialysis on 7/7 She was started on diuretics. Her last dialysis was 7/17 for volume overload. She is making good UOP with ~0.9- 1.5L/day on Lasix 40 mg IV TID, will transition to 80 mg PO BID on discharge. Her ureteral stent was removed on 7/8. SCr was 3.2 at discharge.     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 DSA negative. AT1R and endothelial antibody cannot be run at this time due to issue at accepting lab, blood from 7/7 is saved in lab and will be sent when it will be accepted. Treated with PLEX/IVIG x5 days, and steroids, and Thymo= 375mg.She received a single dose of Rituximab. She will require a repeat biopsy in 2 weeks, ~ 7/30.    Perinephric fluid collections: Noted on US. 7/5 IR: 50ml cloudy serosang fluid aspirated, gram stain negative, culture Neg. Not suggestive of urine leak.    Immunosuppression:    Pheresis/IVIG: completed plasmapheresis/IVIG every other day x5  7/7-7/15.  IVIG 0.5g/kg doses #1-4, 1g/kg dose #5    Received thymoglobulin 375mg (6.5mg/kg) and steroid taper. Maintenance immunosuppression with tacrolimus and myfortic. Tacrolimus goal level 8-10 (12 hour trough).  Infectious prophylaxis with valganciclovir (x12 weeks) and bactrim (indefinitely).     Transplant coordinator Mulu Dennysmelissagenevieve [phone]  Donor type:  DBD  DSA at time of transplant: no   Ureteral stent: yes, removed 7/8  CMV:  Donor neg / Recipient pos  EBV:  Donor pos / Recipient pos  Thymoglobulin:  6.5mg/kg    Nasea/Vomiting: present on admission, unable to keep medications down.  Required antiemetics. Resolved at discharge.    Diarrhea: present since transplant, 7/6 cdiff and EP negative.    Non-severe malnutrition in the context of acute illness due to nausea and vomiting.  She ultimately required TPN. Her oral intake improved and she was tolerating a regular diet and the TPN stopped.     Fever: 103.7 on admission. Concern for infection vs rejection. Cipro had been initiated prior to admission for possible UTI. Broadened to cefepime. Infectious work up to include CXR, BCx, UA, Ucx, COVID negative on admission. Perinephric fluid collections aspirated in IR on 7/5 which were also negative.    HTN: -150s. Controlled. Coreg 6.25mg. Norvasc  5mg    Hypervolemia: Weight remains up ~13kg. Last dialysis 7/ 17. Continue lasix 80 mg BID on discharge.    Acute pain: initially graft pain and low back pain. Current pain reports related to volume overload. Continue acetaminophen Q4, lidoderm, tramadol 50mg q6PRN, and atarax 25mg TID PRN.      Consultations This Hospital Stay   MEDICATION HISTORY IP PHARMACY CONSULT  INTERVENTIONAL RADIOLOGY ADULT/PEDS IP CONSULT  INTERVENTIONAL RADIOLOGY ADULT/PEDS IP CONSULT  VASCULAR ACCESS CARE ADULT IP CONSULT  APHERESIS TRANSFUSION ADULT/PEDS IP CONSULT  VASCULAR ACCESS CARE ADULT IP CONSULT  VASCULAR ACCESS CARE ADULT IP CONSULT  VASCULAR ACCESS CARE ADULT IP CONSULT  NUTRITION SERVICES ADULT IP CONSULT  PHARMACY/NUTRITION TO START AND MANAGE TPN  VASCULAR ACCESS FOR PICC PLACEMENT ADULT IP CONSULT  PHARMACY IP CONSULT  INTERVENTIONAL RADIOLOGY ADULT/PEDS IP CONSULT  VASCULAR ACCESS CARE ADULT IP CONSULT  INTERVENTIONAL RADIOLOGY ADULT/PEDS IP CONSULT  PHYSICAL THERAPY ADULT IP CONSULT    Code Status   Full Code    Time Spent on this Encounter   IMireille PA-C, personally saw the patient today and spent greater than 30 minutes discharging this patient.     [facility], [place]  ______________________________________________________________________    Physical Exam   Temp: 98.6  F (37  C) Temp src: Oral BP: (!) 148/87   Heart Rate: 74 Resp: 18 SpO2: 99 % O2 Device: None (Room air)    Vitals:    07/16/20 1826 07/17/20 0648 07/18/20 0627   Weight: 69.8 kg (153 lb 14.1 oz) 70.4 kg (155 lb 3.2 oz) 70.5 kg (155 lb 6.4 oz)     Vital Signs with Ranges  Temp:  [98.4  F (36.9  C)-98.9  F (37.2  C)] 98.6  F (37  C)  Heart Rate:  [74-87] 74  Resp:  [18] 18  BP: (139-148)/(78-94) 148/87  SpO2:  [98 %-99 %] 99 %  I/O last 3 completed shifts:  In: 400 [P.O.:400]  Out: 850 [Urine:850]     General Appearance:  NAD.   Skin: warm, dry, no rashes  Heart: Perfused  Lungs: RA, unlabored  Abdomen:  Soft, Anasarca to abdomen, NT, Incision to LLQ CDI.  : voiding  Extremities: edema: generalized to trunk, back, upper body and above the knee/thighs.  Neurologic: awake, alert and oriented x4. Tremor absent.    Primary Care Physician   Physician No Ref-Primary    Discharge Orders      Home care nursing referral      Follow Up and recommended labs and tests    Hemodialysis    Jelly, You will return to the [facility] Inpatient dialysis unit  [address]  [address]rd [place] [address]  Ph: [phone]    ---for 2 weeks    And then you might need to go to:    [facility]---7/17 I called and left them a message of above plan  [address]  [address]  [place] [address]  Ph: [phone]  Fax: [phone]    Days: MWF  Time: 3pm  ---please be there at 2:30pm on your first day  Start: Monday: 7/20    Nephrologist: Domenica Car     Reason for your hospital stay    Kidney rejection     Follow Up and recommended labs and tests    Follow up with Dr. Armando in clinic at 9AM Monday 7/20 as scheduled.     HD as planned with Dr. Armando, possibly Monday 7/20 at 1pm.     Labs every MWF. Labs are to include: CBC, BMP, Mg, Phos and Tacrolimus level.     Record calorie counts at home and bring to clinic.     Activity    Your activity upon discharge: activity as tolerated and ambulate in house     Monitor and record    Monitor temperature, blood pressure, abdominal pain, urine output and calorie intake.     When to contact your care team    Contact your transplant coordinator, [phone], if you have any questions or concerns or if unable to take your medications for any reason. Also contact with temperature > 100.5 degrees, decreased UOP, shortness of breath or inability to tolerate oral intake.     Diet    Follow this diet upon discharge:       Calorie Counts      Regular Diet Adult         Discharge Medications   Current Discharge Medication List      START taking these medications    Details    amLODIPine (NORVASC) 5 MG tablet Take 1 tablet (5 mg) by mouth daily  Qty: 30 tablet, Refills: 1    Associated Diagnoses: Acute renal failure, unspecified acute renal failure type (H)      epoetin staci-epbx (RETACRIT) 78953 UNIT/ML injection Inject 0.2 mLs (2,000 Units) Subcutaneous three times a week  Qty:      Associated Diagnoses: Acute renal failure, unspecified acute renal failure type (H)      furosemide (LASIX) 80 MG tablet Take 1 tablet (80 mg) by mouth 2 times daily  Qty: 28 tablet, Refills: 1    Associated Diagnoses: Acute renal failure, unspecified acute renal failure type (H)      hydrOXYzine (ATARAX) 25 MG tablet Take 1 tablet (25 mg) by mouth 3 times daily as needed for anxiety  Qty: 20 tablet, Refills: 0    Associated Diagnoses: Acute renal failure, unspecified acute renal failure type (H)      losartan (COZAAR) 25 MG tablet Take 0.5 tablets (12.5 mg) by mouth At Bedtime  Qty: 30 tablet, Refills: 1    Associated Diagnoses: Acute renal failure, unspecified acute renal failure type (H)      ondansetron (ZOFRAN-ODT) 4 MG ODT tab Take 1 tablet (4 mg) by mouth 3 times daily (before meals)  Qty: 20 tablet, Refills: 1    Associated Diagnoses: Acute renal failure, unspecified acute renal failure type (H)      pantoprazole (PROTONIX) 40 MG EC tablet Take 1 tablet (40 mg) by mouth 2 times daily  Qty: 30 tablet, Refills: 1    Associated Diagnoses: Acute renal failure, unspecified acute renal failure type (H)      predniSONE (DELTASONE) 5 MG tablet Take 1 tablet (5 mg) by mouth daily  Qty: 30 tablet, Refills: 3    Associated Diagnoses: Acute renal failure, unspecified acute renal failure type (H)      sodium bicarbonate 650 MG tablet Take 2 tablets (1,300 mg) by mouth 2 times daily  Qty: 120 tablet, Refills: 1    Associated Diagnoses: Acute renal failure, unspecified acute renal failure type (H)      traMADol (ULTRAM) 50 MG tablet Take 1 tablet (50 mg) by mouth every 6 hours as needed for moderate pain  Qty: 15  tablet, Refills: 0    Associated Diagnoses: Acute renal failure, unspecified acute renal failure type (H)         CONTINUE these medications which have CHANGED    Details   sulfamethoxazole-trimethoprim (BACTRIM) 400-80 MG tablet Take 1 tablet by mouth three times a week  Qty:      Associated Diagnoses: Acute renal failure, unspecified acute renal failure type (H)      tacrolimus (GENERIC EQUIVALENT) 0.5 MG capsule Take 11 capsules (5.5 mg) by mouth 2 times daily  Qty:      Associated Diagnoses: Acute renal failure, unspecified acute renal failure type (H)      valGANciclovir (VALCYTE) 450 MG tablet Take 1 tablet (450 mg) by mouth twice a week  Qty:      Associated Diagnoses: Acute renal failure, unspecified acute renal failure type (H)         CONTINUE these medications which have NOT CHANGED    Details   acetaminophen (TYLENOL) 325 MG tablet Take 2 tablets (650 mg) by mouth every 4 hours as needed for other (multimodal surgical pain management along with NSAIDS and opioid medication as indicated based on pain control and physical function.)  Qty: 100 tablet, Refills: 0    Associated Diagnoses: Kidney transplanted      aspirin (ASA) 81 MG chewable tablet Take 1 tablet (81 mg) by mouth daily  Qty: 30 tablet, Refills: 5    Associated Diagnoses: Kidney transplanted      atorvastatin (LIPITOR) 10 MG tablet Take 1 tablet (10 mg) by mouth daily  Qty: 30 tablet, Refills: 5    Associated Diagnoses: Kidney transplanted      carvedilol (COREG) 6.25 MG tablet Take 2 tablets (12.5 mg) by mouth 2 times daily  Qty: 120 tablet, Refills: 2    Associated Diagnoses: Kidney transplanted      cetirizine (ZYRTEC) 10 MG tablet Take 10 mg by mouth nightly as needed for allergies       Lidocaine (LIDOCARE) 4 % Patch Place 3 patches onto the skin every 24 hours To prevent lidocaine toxicity, patient should be patch free for 12 hrs daily.  Qty: 9 patch, Refills: 1    Associated Diagnoses: Kidney transplanted      menthol (ICY HOT) 5 % PTCH  Apply 1 patch topically every 8 hours as needed for muscle soreness  Qty: 6 each, Refills: 0    Associated Diagnoses: Kidney transplanted      mycophenolic acid (GENERIC EQUIVALENT) 180 MG EC tablet Take 4 tablets (720 mg) by mouth 2 times daily  Qty: 240 tablet, Refills: 3    Comments: Put on hold  Associated Diagnoses: Kidney replaced by transplant      polyethylene glycol (MIRALAX) 17 g packet Take 17 g by mouth daily  Qty: 30 packet, Refills: 1    Associated Diagnoses: Kidney transplanted      senna-docusate (SENOKOT-S/PERICOLACE) 8.6-50 MG tablet Take 1-2 tablets by mouth 2 times daily  Qty: 120 tablet, Refills: 1    Associated Diagnoses: Kidney transplanted      Vitamin D3 (CHOLECALCIFEROL) 25 mcg (1000 units) tablet Take 2 tablets (50 mcg) by mouth daily  Qty: 60 tablet, Refills: 3    Associated Diagnoses: Kidney transplanted         STOP taking these medications       ciprofloxacin (CIPRO) 500 MG tablet Comments:   Reason for Stopping:         famotidine (PEPCID) 20 MG tablet Comments:   Reason for Stopping:         HYDROmorphone (DILAUDID) 2 MG tablet Comments:   Reason for Stopping:         magnesium oxide (MAG-OX) 400 MG tablet Comments:   Reason for Stopping:         phosphorus tablet 250 mg (PHOSPHA 250 NEUTRAL) 250 MG per tablet Comments:   Reason for Stopping:         tacrolimus (GENERIC EQUIVALENT) 1 MG capsule Comments:   Reason for Stopping:             Allergies   No Known Allergies     Attestation:    The patient has been seen and evaluated by me.   Vital signs, labs, medications and orders were reviewed.   When obtained, diagnostic images were reviewed by me and interpreted as above.    The care plan was discussed with the multidisciplinary team and I agree with the findings and plan in this note, with any differences recorded in blue.    Immunosuppressive medication management was reviewed and adjusted as reflected in the note and orders.     .

## 2020-07-10 NOTE — PROGRESS NOTES
HEMODIALYSIS TREATMENT NOTE    Date: 7/9/2020  Time: 7:58 PM    Data:  Pre Wt: 68.8 kg (Estimated)   Desired Wt: 65.3 kg   Post Wt: 65.3 kg (Estimated)    Weight change: 3.5 kg  Ultrafiltration - Post Run Net Total Removed (mL): 3500 mL  Vascular Access Status: Lt AVF  patent  Dialyzer Rinse: Clear  Total Blood Volume Processed: 71.5 L   Total Dialysis (Treatment) Time: 3.5  Dialysate Bath: K 4, Ca 3  Heparin: None    Lab:   No    Interventions:  Writer took over from a fellow HD RN @ about 1830. Pt completed 3.5 hours of HD tx without any issue. 3.5L of fluid was pulled per pt request. BV % -13.5. During tx, HTN was noted; however, pt was asymptomatic. Pt rinsed back post tx, needles were pulled, and pressure applied for about 8mins. Hand off report given to PCN.    Assessment:  -Pt was calm and cooperative  -A & O X 4     Plan:    -To cont to challenge EDW as tolerated  -Next HD tx per renal team

## 2020-07-10 NOTE — PROCEDURES
[facility], [place]    Procedure: IR Procedure Note    Date/Time: 7/10/2020 1:11 PM  Performed by: Maurilio Roger MD  Authorized by: Maurilio Roger MD   IR Fellow Physician:  Radiology Resident Physician: Ranjeet Workman      UNIVERSAL PROTOCOL   Site Marked: NA  Prior Images Obtained and Reviewed:  Yes  Required items: Required blood products, implants, devices and special equipment available    Patient identity confirmed:  Verbally with patient, arm band, provided demographic data and hospital-assigned identification number  Patient was reevaluated immediately before administering moderate or deep sedation or anesthesia  Confirmation Checklist:  Patient's identity using two indicators, relevant allergies, procedure was appropriate and matched the consent or emergent situation and correct equipment/implants were available  Time out: Immediately prior to the procedure a time out was called    Universal Protocol: the Joint Commission Universal Protocol was followed    Preparation: Patient was prepped and draped in usual sterile fashion           ANESTHESIA    Anesthesia: Local infiltration  Local Anesthetic:  Lidocaine 1% without epinephrine      SEDATION    Patient Sedated: Yes    Vital signs: Vital signs monitored during sedation    See dictated procedure note for full details.  Findings: Successful right upper extremity PICC exchange with tip advancement    Specimens: none    Complications: None    Condition: Stable    Plan: Return to patient care unit for postprocedural monitoring    PROCEDURE   Patient Tolerance:  Patient tolerated the procedure well with no immediate complications    Length of time physician/provider present for 1:1 monitoring during sedation: 45

## 2020-07-10 NOTE — PROGRESS NOTES
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  Other Banff scoring include V1, T1, I1.  The biopsy also showed interstitial hemorrhage and tubular injury with vacuolization.  The ATN component may be a phenotype of a BMR.     # Immunosuppression: Tacrolimus immediate release (goal 8-10) and Myfortic 720 twice a day.              - Changes: Rejection treatment will include Thymoglobulin 2 mg/kg per dose x3, plasmapheresis with IVIG x 5 and Solu-Medrol 500 mg daily x3.  Following the intense course, prednisone taper will be recommended and prednisone maintenance 5 mg daily.  Agree with rituximab single dose.     # Infection Prophylaxis: (please renally dose)   - PJP: Sulfa/TMP (Bactrim)   - CMV: Valcyte      # Hypertension: Controlled;   Goal BP: < 130/80              - Volume status: hypervolemic              - will add Norvasc 5 mg daily       # Anemia in Chronic Renal Disease: Hgb: Improved with transfusion     ENZO: will start 2000 units three times per week     # Mineral Bone Disorder:   - Secondary renal hyperparathyroidism; PTH level: Moderately elevated (301-600 pg/ml)        On treatment: None  - Vitamin D; level: Normal        On supplement: No  - Calcium; level: Low normal        On supplement: No  - Phosphorus; level: Normal        On supplement: No     # Electrolytes:   -resolved with dialysis      # Transplant History:  Etiology of  Kidney Failure: IgA nephropathy  Tx: LDKT and DDKT  Transplant: 6/19/2020 (Kidney), 12/1/2007 (Kidney)  Donor Type: Donation after Brain Death        Donor Class:   Crossmatch at time of Tx: pending   Significant changes in immunosuppression: None  Significant transplant-related complications: None    Mahesh Armando MD   Pager: [phone]    Interval History   Feels somewhat swollen and bloated. No fevers or chills. Appetite is poor. UOP is very minimal but feels to be increasing.     Review of Systems   4 point ROS was obtained and negative except as noted in the Interval History.    MEDICATIONS:    acetaminophen  650 mg Oral Once     amLODIPine  5 mg Oral Daily     antithymocyte globulin (THYMOGLOBULIN - Rabbit) w/ hydrocortisone peripheral infusion  75 mg Intravenous Once     atorvastatin  10 mg Oral Daily     calcium acetate  1,334 mg Oral TID w/meals     carvedilol  6.25 mg Oral BID     diphenhydrAMINE  25-50 mg Oral Once    Or     diphenhydrAMINE  25-50 mg Per NG tube Once     diphenhydrAMINE  25-50 mg Oral Once    Or     diphenhydrAMINE  25-50 mg Per NG tube Once     epoetin staci-epbx (RETACRIT) inj ESRD  2,000 Units Subcutaneous Once per day on Mon Wed Fri     furosemide  60 mg Intravenous BID     insulin aspart  1-7 Units Subcutaneous TID AC     insulin aspart  1-5 Units Subcutaneous At Bedtime     lidocaine  1 patch Transdermal Q24H     lidocaine   Transdermal Q8H     lipids  250 mL Intravenous Q Mon thru Fri     mycophenolate mofetil  1,000 mg Intravenous Q12H     OLANZapine  2.5 mg Oral Once     ondansetron  4 mg Oral TID AC     pantoprazole  40 mg Oral QAM AC     predniSONE  40 mg Oral BID    Followed by     [START ON 7/12/2020] predniSONE  40 mg Oral BID    Followed by     [START ON 7/14/2020] predniSONE  40 mg Oral Daily    Followed by     [START ON 7/16/2020] predniSONE  40 mg Oral Daily    Followed by     [START ON 7/17/2020] predniSONE  20 mg Oral Daily     [START ON 7/19/2020] predniSONE  5 mg  "Oral Daily     sodium bicarbonate  1,300 mg Oral BID     sodium chloride (PF)  10 mL Intracatheter Q8H     sodium chloride (PF)  3 mL Intracatheter Q8H     sulfamethoxazole-trimethoprim  1 tablet Oral Once per day on      tacrolimus  6 mg Oral BID     valGANciclovir  450 mg Oral Once per day on        dextrose       parenteral nutrition - Clinimix E 40 mL/hr at 07/10/20 0700       Physical Exam   Temp  Av.8  F (37.1  C)  Min: 97.6  F (36.4  C)  Max: 103.7  F (39.8  C)      Pulse  Av.5  Min: 74  Max: 122 Resp  Av.1  Min: 13  Max: 28  SpO2  Av.1 %  Min: 93 %  Max: 100 %     BP (!) 174/106 (BP Location: Right leg)   Pulse 66   Temp 97.8  F (36.6  C) (Oral)   Resp 16   Ht 1.63 m (5' 4.17\")   Wt 65.3 kg (143 lb 15.4 oz)   SpO2 100%   BMI 24.58 kg/m     Date 20 07 - 20 0659   Shift 7331-7715 0415-8725 8577-2669 24 Hour Total   INTAKE   P.O. 600 200  800   I.V.  810  810   Shift Total(mL/kg) 600(10.49) 1010(17.66)  1610(28.15)   OUTPUT   Urine 50   50   Shift Total(mL/kg) 50(0.87)   50(0.87)   Weight (kg) 57.2 57.2 57.2 57.2      Admit Weight: 57.2 kg (126 lb 1.7 oz)     GENERAL APPEARANCE: alert and no distress  HENT: mouth without ulcers or lesions  LYMPHATICS: no cervical or supraclavicular nodes  RESP: lungs clear to auscultation - no rales, rhonchi or wheezes  CV: regular rhythm, normal rate, no rub, no murmur  EDEMA: no LE edema bilaterally  ABDOMEN: soft, nondistended, nontender, bowel sounds normal  MS: extremities normal - no gross deformities noted, no evidence of inflammation in joints, no muscle tenderness  SKIN: no rash    Data   All labs reviewed by me.  CMP  Recent Labs   Lab 07/10/20  0556 20  0615 20  0628 20  0623  20  1334   * 134 133 134   < > 135   POTASSIUM 4.1 3.4 3.9 4.2   < > 4.1   CHLORIDE 99 98 98 106   < > 107   CO2 26 26 28 13*   < > 22   ANIONGAP 7 10 7 15*   < > 6   * 159* 298* 230*   < > 96   BUN " 28 42* 29 62*   < > 25   CR 3.85* 5.61* 4.53* 8.94*   < > 4.12*   GFRESTIMATED 14* 9* 12* 5*   < > 13*   GFRESTBLACK 17* 11* 14* 6*   < > 15*   CASIE 7.4* 6.1* 7.1* 6.4*   < > 7.9*   MAG 1.6 1.7 1.8 2.2   < > 1.4*   PHOS 3.8 4.3 4.6* 6.9*   < > 4.6*   PROTTOTAL 5.7*  --   --   --   --  6.5*   ALBUMIN 3.0*  --   --   --   --  2.9*   BILITOTAL 1.0  --   --   --   --  0.7   ALKPHOS 47  --   --   --   --  123   AST 26  --   --   --   --  34   ALT 24  --   --   --   --  31    < > = values in this interval not displayed.     CBC  Recent Labs   Lab 07/10/20  0556 07/09/20  1310 07/09/20  0615 07/08/20  0628   HGB 8.6* 7.6* 6.5* 7.3*   WBC 3.4* 2.2* 1.8* 2.5*   RBC 2.86* 2.52* 2.18* 2.43*   HCT 26.6* 22.8* 20.0* 21.5*   MCV 93 91 92 89   MCH 30.1 30.2 29.8 30.0   MCHC 32.3 33.3 32.5 34.0   RDW 14.0 14.2 14.3 14.5    154 137* 165     INR  Recent Labs   Lab 07/10/20  0556 07/09/20  1310 07/04/20  1953 07/04/20  1334   INR 1.65* 1.49*  --  1.40*   PTT  --   --  35  --      ABGNo lab results found in last 7 days.   Urine Studies  Recent Labs   Lab Test 07/06/20  1412 07/04/20  1346 07/01/20  1430 06/23/20  0830   COLOR Yellow Yellow Particia Yellow   APPEARANCE Slightly Cloudy Slightly Cloudy Slightly Cloudy Clear   URINEGLC 150* 70* Negative Negative   URINEBILI Negative Small* Negative Negative   URINEKETONE 10* Negative Negative Negative   SG 1.022 1.027 1.025 1.012   UBLD Small* Small* Moderate* Moderate*   URINEPH 8.0* 8.0* 6.0 6.5   PROTEIN >600* >600* 100* 10*   NITRITE Negative Negative Negative Negative   LEUKEST Negative Negative Negative Small*   RBCU 78* 21* 96* 144*   WBCU 21* 2 9* 4     Recent Labs   Lab Test 07/16/12  1400 07/02/12  1130 06/25/12  0615 06/17/12  0930 06/13/12  1120 06/07/12  0643 06/01/12  0950 05/25/12  1830 05/25/12  1110 05/22/12  1536   UTPG 1.53* 0.86* 0.69* 0.82* 0.80* 0.88* 0.58* 0.47*  0.45* 0.56* 0.51*     PTH  Recent Labs   Lab Test 06/26/20  0842 11/30/12  2030 11/30/12  1030   PTHI  226* 500* 794*     Iron Studies  Recent Labs   Lab Test 07/05/20  0553 06/25/20  0749 11/30/12  1950   IRON 10* 57 125   * 163* 155*   IRONSAT 9* 35 81*   SHAHBAZ  --  886* 401*       IMAGING:  All imaging studies reviewed by me.

## 2020-07-11 ENCOUNTER — APPOINTMENT (OUTPATIENT)
Dept: LAB | Facility: CLINIC | Age: 35
End: 2020-07-11
Payer: MEDICARE

## 2020-07-11 LAB
ANION GAP SERPL CALCULATED.3IONS-SCNC: 7 MMOL/L (ref 3–14)
BASOPHILS # BLD AUTO: 0 10E9/L (ref 0–0.2)
BASOPHILS NFR BLD AUTO: 0 %
BUN SERPL-MCNC: 52 MG/DL (ref 7–30)
CALCIUM SERPL-MCNC: 6.8 MG/DL (ref 8.5–10.1)
CHLORIDE SERPL-SCNC: 95 MMOL/L (ref 94–109)
CO2 SERPL-SCNC: 24 MMOL/L (ref 20–32)
CREAT SERPL-MCNC: 4.87 MG/DL (ref 0.52–1.04)
DIFFERENTIAL METHOD BLD: ABNORMAL
EOSINOPHIL # BLD AUTO: 0 10E9/L (ref 0–0.7)
EOSINOPHIL NFR BLD AUTO: 0 %
ERYTHROCYTE [DISTWIDTH] IN BLOOD BY AUTOMATED COUNT: 13.6 % (ref 10–15)
FIBRINOGEN PPP-MCNC: 153 MG/DL (ref 200–420)
GFR SERPL CREATININE-BSD FRML MDRD: 11 ML/MIN/{1.73_M2}
GLUCOSE BLDC GLUCOMTR-MCNC: 197 MG/DL (ref 70–99)
GLUCOSE BLDC GLUCOMTR-MCNC: 204 MG/DL (ref 70–99)
GLUCOSE BLDC GLUCOMTR-MCNC: 239 MG/DL (ref 70–99)
GLUCOSE BLDC GLUCOMTR-MCNC: 255 MG/DL (ref 70–99)
GLUCOSE SERPL-MCNC: 198 MG/DL (ref 70–99)
HCT VFR BLD AUTO: 24.4 % (ref 35–47)
HGB BLD-MCNC: 8 G/DL (ref 11.7–15.7)
IMM GRANULOCYTES # BLD: 0.1 10E9/L (ref 0–0.4)
IMM GRANULOCYTES NFR BLD: 2 %
INR PPP: 1.35 (ref 0.86–1.14)
LACTATE BLD-SCNC: 1.5 MMOL/L (ref 0.7–2)
LYMPHOCYTES # BLD AUTO: 0 10E9/L (ref 0.8–5.3)
LYMPHOCYTES NFR BLD AUTO: 0.6 %
MAGNESIUM SERPL-MCNC: 1.7 MG/DL (ref 1.6–2.3)
MCH RBC QN AUTO: 30 PG (ref 26.5–33)
MCHC RBC AUTO-ENTMCNC: 32.8 G/DL (ref 31.5–36.5)
MCV RBC AUTO: 91 FL (ref 78–100)
MONOCYTES # BLD AUTO: 0.1 10E9/L (ref 0–1.3)
MONOCYTES NFR BLD AUTO: 2 %
NEUTROPHILS # BLD AUTO: 3.3 10E9/L (ref 1.6–8.3)
NEUTROPHILS NFR BLD AUTO: 95.4 %
NRBC # BLD AUTO: 0 10*3/UL
NRBC BLD AUTO-RTO: 0 /100
PHOSPHATE SERPL-MCNC: 5 MG/DL (ref 2.5–4.5)
PLATELET # BLD AUTO: 168 10E9/L (ref 150–450)
POTASSIUM SERPL-SCNC: 4.2 MMOL/L (ref 3.4–5.3)
RBC # BLD AUTO: 2.67 10E12/L (ref 3.8–5.2)
SODIUM SERPL-SCNC: 126 MMOL/L (ref 133–144)
WBC # BLD AUTO: 3.5 10E9/L (ref 4–11)

## 2020-07-11 PROCEDURE — 25000132 ZZH RX MED GY IP 250 OP 250 PS 637: Mod: GY | Performed by: SURGERY

## 2020-07-11 PROCEDURE — 25000128 H RX IP 250 OP 636: Performed by: STUDENT IN AN ORGANIZED HEALTH CARE EDUCATION/TRAINING PROGRAM

## 2020-07-11 PROCEDURE — 25000128 H RX IP 250 OP 636

## 2020-07-11 PROCEDURE — 83735 ASSAY OF MAGNESIUM: CPT | Performed by: STUDENT IN AN ORGANIZED HEALTH CARE EDUCATION/TRAINING PROGRAM

## 2020-07-11 PROCEDURE — 25000128 H RX IP 250 OP 636: Performed by: NURSE PRACTITIONER

## 2020-07-11 PROCEDURE — 85384 FIBRINOGEN ACTIVITY: CPT

## 2020-07-11 PROCEDURE — 25000132 ZZH RX MED GY IP 250 OP 250 PS 637: Mod: GY | Performed by: STUDENT IN AN ORGANIZED HEALTH CARE EDUCATION/TRAINING PROGRAM

## 2020-07-11 PROCEDURE — 36592 COLLECT BLOOD FROM PICC: CPT | Performed by: STUDENT IN AN ORGANIZED HEALTH CARE EDUCATION/TRAINING PROGRAM

## 2020-07-11 PROCEDURE — 25800030 ZZH RX IP 258 OP 636: Performed by: INTERNAL MEDICINE

## 2020-07-11 PROCEDURE — 25800030 ZZH RX IP 258 OP 636: Performed by: STUDENT IN AN ORGANIZED HEALTH CARE EDUCATION/TRAINING PROGRAM

## 2020-07-11 PROCEDURE — 85610 PROTHROMBIN TIME: CPT

## 2020-07-11 PROCEDURE — 80048 BASIC METABOLIC PNL TOTAL CA: CPT | Performed by: STUDENT IN AN ORGANIZED HEALTH CARE EDUCATION/TRAINING PROGRAM

## 2020-07-11 PROCEDURE — 84100 ASSAY OF PHOSPHORUS: CPT | Performed by: STUDENT IN AN ORGANIZED HEALTH CARE EDUCATION/TRAINING PROGRAM

## 2020-07-11 PROCEDURE — 25000125 ZZHC RX 250: Performed by: SURGERY

## 2020-07-11 PROCEDURE — 90937 HEMODIALYSIS REPEATED EVAL: CPT

## 2020-07-11 PROCEDURE — 36592 COLLECT BLOOD FROM PICC: CPT | Performed by: SURGERY

## 2020-07-11 PROCEDURE — 25000132 ZZH RX MED GY IP 250 OP 250 PS 637: Mod: GY | Performed by: NURSE PRACTITIONER

## 2020-07-11 PROCEDURE — 25000125 ZZHC RX 250

## 2020-07-11 PROCEDURE — 25000132 ZZH RX MED GY IP 250 OP 250 PS 637: Mod: GY | Performed by: PHYSICIAN ASSISTANT

## 2020-07-11 PROCEDURE — 00000146 ZZHCL STATISTIC GLUCOSE BY METER IP

## 2020-07-11 PROCEDURE — P9041 ALBUMIN (HUMAN),5%, 50ML: HCPCS

## 2020-07-11 PROCEDURE — 85025 COMPLETE CBC W/AUTO DIFF WBC: CPT | Performed by: STUDENT IN AN ORGANIZED HEALTH CARE EDUCATION/TRAINING PROGRAM

## 2020-07-11 PROCEDURE — 36514 APHERESIS PLASMA: CPT

## 2020-07-11 PROCEDURE — 12000026 ZZH R&B TRANSPLANT

## 2020-07-11 PROCEDURE — 25000131 ZZH RX MED GY IP 250 OP 636 PS 637: Mod: GY | Performed by: NURSE PRACTITIONER

## 2020-07-11 PROCEDURE — 83605 ASSAY OF LACTIC ACID: CPT | Performed by: SURGERY

## 2020-07-11 PROCEDURE — 25000125 ZZHC RX 250: Performed by: STUDENT IN AN ORGANIZED HEALTH CARE EDUCATION/TRAINING PROGRAM

## 2020-07-11 RX ORDER — TRAMADOL HYDROCHLORIDE 50 MG/1
50 TABLET ORAL EVERY 6 HOURS PRN
Status: DISCONTINUED | OUTPATIENT
Start: 2020-07-11 | End: 2020-07-18 | Stop reason: HOSPADM

## 2020-07-11 RX ORDER — ACETAMINOPHEN 325 MG/1
650 TABLET ORAL ONCE
Status: COMPLETED | OUTPATIENT
Start: 2020-07-11 | End: 2020-07-11

## 2020-07-11 RX ORDER — OLANZAPINE 5 MG/1
5 TABLET, ORALLY DISINTEGRATING ORAL ONCE
Status: COMPLETED | OUTPATIENT
Start: 2020-07-11 | End: 2020-07-11

## 2020-07-11 RX ORDER — DEXTROSE MONOHYDRATE 25 G/50ML
25-50 INJECTION, SOLUTION INTRAVENOUS
Status: DISCONTINUED | OUTPATIENT
Start: 2020-07-11 | End: 2020-07-18 | Stop reason: HOSPADM

## 2020-07-11 RX ORDER — ALBUMIN HUMAN 25 %
2750 INTRAVENOUS SOLUTION INTRAVENOUS
Status: COMPLETED | OUTPATIENT
Start: 2020-07-11 | End: 2020-07-11

## 2020-07-11 RX ORDER — ACETAMINOPHEN 325 MG/1
650 TABLET ORAL EVERY 4 HOURS
Status: DISCONTINUED | OUTPATIENT
Start: 2020-07-11 | End: 2020-07-18 | Stop reason: HOSPADM

## 2020-07-11 RX ORDER — HYDROMORPHONE HYDROCHLORIDE 1 MG/ML
1 SOLUTION ORAL ONCE
Status: DISCONTINUED | OUTPATIENT
Start: 2020-07-11 | End: 2020-07-11

## 2020-07-11 RX ORDER — METHYLPREDNISOLONE SODIUM SUCCINATE 125 MG/2ML
125 INJECTION, POWDER, LYOPHILIZED, FOR SOLUTION INTRAMUSCULAR; INTRAVENOUS
Status: DISCONTINUED | OUTPATIENT
Start: 2020-07-11 | End: 2020-07-12

## 2020-07-11 RX ORDER — DIPHENHYDRAMINE HYDROCHLORIDE 50 MG/ML
50 INJECTION INTRAMUSCULAR; INTRAVENOUS
Status: DISCONTINUED | OUTPATIENT
Start: 2020-07-11 | End: 2020-07-12

## 2020-07-11 RX ORDER — METHYLPREDNISOLONE SODIUM SUCCINATE 125 MG/2ML
100 INJECTION, POWDER, LYOPHILIZED, FOR SOLUTION INTRAMUSCULAR; INTRAVENOUS ONCE
Status: COMPLETED | OUTPATIENT
Start: 2020-07-11 | End: 2020-07-11

## 2020-07-11 RX ORDER — HEPARIN SODIUM,PORCINE 10 UNIT/ML
5-10 VIAL (ML) INTRAVENOUS
Status: DISCONTINUED | OUTPATIENT
Start: 2020-07-11 | End: 2020-07-18 | Stop reason: HOSPADM

## 2020-07-11 RX ORDER — DIPHENHYDRAMINE HYDROCHLORIDE 50 MG/ML
50 INJECTION INTRAMUSCULAR; INTRAVENOUS
Status: DISCONTINUED | OUTPATIENT
Start: 2020-07-11 | End: 2020-07-14

## 2020-07-11 RX ORDER — FUROSEMIDE 10 MG/ML
40 INJECTION INTRAMUSCULAR; INTRAVENOUS
Status: DISCONTINUED | OUTPATIENT
Start: 2020-07-11 | End: 2020-07-13

## 2020-07-11 RX ORDER — ACETAMINOPHEN 325 MG/1
650 TABLET ORAL
Status: DISCONTINUED | OUTPATIENT
Start: 2020-07-11 | End: 2020-07-14

## 2020-07-11 RX ORDER — DIPHENHYDRAMINE HCL 25 MG
50 CAPSULE ORAL ONCE
Status: COMPLETED | OUTPATIENT
Start: 2020-07-11 | End: 2020-07-11

## 2020-07-11 RX ORDER — DIPHENHYDRAMINE HYDROCHLORIDE 50 MG/ML
50 INJECTION INTRAMUSCULAR; INTRAVENOUS ONCE
Status: COMPLETED | OUTPATIENT
Start: 2020-07-11 | End: 2020-07-11

## 2020-07-11 RX ORDER — DIPHENHYDRAMINE HCL 25 MG
50 CAPSULE ORAL
Status: DISCONTINUED | OUTPATIENT
Start: 2020-07-11 | End: 2020-07-14

## 2020-07-11 RX ORDER — HEPARIN SODIUM,PORCINE 10 UNIT/ML
5 VIAL (ML) INTRAVENOUS EVERY 24 HOURS
Status: DISCONTINUED | OUTPATIENT
Start: 2020-07-11 | End: 2020-07-18 | Stop reason: HOSPADM

## 2020-07-11 RX ORDER — CALCIUM GLUCONATE 100 MG/ML
AMPUL (ML) INTRAVENOUS
Status: COMPLETED | OUTPATIENT
Start: 2020-07-11 | End: 2020-07-11

## 2020-07-11 RX ORDER — NICOTINE POLACRILEX 4 MG
15-30 LOZENGE BUCCAL
Status: DISCONTINUED | OUTPATIENT
Start: 2020-07-11 | End: 2020-07-18 | Stop reason: HOSPADM

## 2020-07-11 RX ADMIN — CARVEDILOL 6.25 MG: 6.25 TABLET, FILM COATED ORAL at 20:15

## 2020-07-11 RX ADMIN — CLONIDINE HYDROCHLORIDE 0.1 MG: 0.1 TABLET ORAL at 00:20

## 2020-07-11 RX ADMIN — MYCOPHENOLATE MOFETIL 1000 MG: 500 INJECTION, POWDER, LYOPHILIZED, FOR SOLUTION INTRAVENOUS at 20:25

## 2020-07-11 RX ADMIN — CALCIUM ACETATE 1334 MG: 667 CAPSULE ORAL at 20:14

## 2020-07-11 RX ADMIN — ACETAMINOPHEN 650 MG: 325 TABLET, FILM COATED ORAL at 20:15

## 2020-07-11 RX ADMIN — ACETAMINOPHEN 650 MG: 325 SOLUTION ORAL at 01:40

## 2020-07-11 RX ADMIN — HYDROXYZINE HYDROCHLORIDE 25 MG: 25 TABLET ORAL at 07:34

## 2020-07-11 RX ADMIN — Medication 1 MG: at 02:22

## 2020-07-11 RX ADMIN — TRAMADOL HYDROCHLORIDE 50 MG: 50 TABLET, COATED ORAL at 09:33

## 2020-07-11 RX ADMIN — TACROLIMUS 6 MG: 5 CAPSULE ORAL at 20:14

## 2020-07-11 RX ADMIN — PANTOPRAZOLE SODIUM 40 MG: 40 TABLET, DELAYED RELEASE ORAL at 20:14

## 2020-07-11 RX ADMIN — ONDANSETRON 4 MG: 4 TABLET, ORALLY DISINTEGRATING ORAL at 07:34

## 2020-07-11 RX ADMIN — ACETAMINOPHEN 650 MG: 325 TABLET, FILM COATED ORAL at 06:31

## 2020-07-11 RX ADMIN — ATORVASTATIN CALCIUM 10 MG: 10 TABLET, FILM COATED ORAL at 07:37

## 2020-07-11 RX ADMIN — ALBUMIN HUMAN 2750 ML: 0.05 INJECTION, SOLUTION INTRAVENOUS at 10:11

## 2020-07-11 RX ADMIN — SODIUM BICARBONATE 650 MG TABLET 1300 MG: at 07:38

## 2020-07-11 RX ADMIN — FUROSEMIDE 60 MG: 10 INJECTION, SOLUTION INTRAMUSCULAR; INTRAVENOUS at 07:37

## 2020-07-11 RX ADMIN — MYCOPHENOLATE MOFETIL 1000 MG: 500 INJECTION, POWDER, LYOPHILIZED, FOR SOLUTION INTRAVENOUS at 07:53

## 2020-07-11 RX ADMIN — SODIUM BICARBONATE 650 MG TABLET 1300 MG: at 20:14

## 2020-07-11 RX ADMIN — HUMAN IMMUNOGLOBULIN G 30 G: 20 LIQUID INTRAVENOUS at 21:46

## 2020-07-11 RX ADMIN — FUROSEMIDE 40 MG: 10 INJECTION, SOLUTION INTRAMUSCULAR; INTRAVENOUS at 20:23

## 2020-07-11 RX ADMIN — DIPHENHYDRAMINE HYDROCHLORIDE 50 MG: 25 CAPSULE ORAL at 20:15

## 2020-07-11 RX ADMIN — Medication: at 13:36

## 2020-07-11 RX ADMIN — CARVEDILOL 6.25 MG: 6.25 TABLET, FILM COATED ORAL at 07:35

## 2020-07-11 RX ADMIN — SODIUM CHLORIDE 300 ML: 9 INJECTION, SOLUTION INTRAVENOUS at 13:36

## 2020-07-11 RX ADMIN — CLONIDINE HYDROCHLORIDE 0.1 MG: 0.1 TABLET ORAL at 10:48

## 2020-07-11 RX ADMIN — TRAMADOL HYDROCHLORIDE 50 MG: 50 TABLET, COATED ORAL at 20:15

## 2020-07-11 RX ADMIN — AMLODIPINE BESYLATE 5 MG: 5 TABLET ORAL at 07:36

## 2020-07-11 RX ADMIN — CALCIUM GLUCONATE 2.5 G: 98 INJECTION, SOLUTION INTRAVENOUS at 10:12

## 2020-07-11 RX ADMIN — Medication 1 MG: at 00:09

## 2020-07-11 RX ADMIN — POTASSIUM CHLORIDE: 2 INJECTION, SOLUTION, CONCENTRATE INTRAVENOUS at 20:40

## 2020-07-11 RX ADMIN — OLANZAPINE 5 MG: 5 TABLET, ORALLY DISINTEGRATING ORAL at 01:40

## 2020-07-11 RX ADMIN — ANTICOAGULANT CITRATE DEXTROSE SOLUTION FORMULA A 468 ML: 12.25; 11; 3.65 SOLUTION INTRAVENOUS at 10:12

## 2020-07-11 RX ADMIN — I.V. FAT EMULSION 250 ML: 20 EMULSION INTRAVENOUS at 20:40

## 2020-07-11 RX ADMIN — INSULIN ASPART 2 UNITS: 100 INJECTION, SOLUTION INTRAVENOUS; SUBCUTANEOUS at 09:38

## 2020-07-11 RX ADMIN — TACROLIMUS 6 MG: 5 CAPSULE ORAL at 07:39

## 2020-07-11 RX ADMIN — SODIUM CHLORIDE 250 ML: 9 INJECTION, SOLUTION INTRAVENOUS at 14:55

## 2020-07-11 RX ADMIN — METHYLPREDNISOLONE SODIUM SUCCINATE 100 MG: 125 INJECTION, POWDER, FOR SOLUTION INTRAMUSCULAR; INTRAVENOUS at 20:24

## 2020-07-11 RX ADMIN — PANTOPRAZOLE SODIUM 40 MG: 40 TABLET, DELAYED RELEASE ORAL at 07:34

## 2020-07-11 ASSESSMENT — ACTIVITIES OF DAILY LIVING (ADL)
ADLS_ACUITY_SCORE: 10
ADLS_ACUITY_SCORE: 12
ADLS_ACUITY_SCORE: 16
ADLS_ACUITY_SCORE: 12
ADLS_ACUITY_SCORE: 16

## 2020-07-11 ASSESSMENT — MIFFLIN-ST. JEOR
SCORE: 1418.05
SCORE: 1417.6

## 2020-07-11 NOTE — PLAN OF CARE
"BP (!) 164/96 (BP Location: Right leg)   Pulse 68   Temp 97.6  F (36.4  C) (Oral)   Resp 16   Ht 1.63 m (5' 4.17\")   Wt 68.9 kg (151 lb 14.4 oz)   SpO2 99%   BMI 25.93 kg/m      3547-0569  Hypertensive, prn catapres given. BP lowered. Ambulated to bathroom, SBA w/minimal assist. Skin intact, abdomen distended w/pain radiating to back. PO dilaudid given x2, pt refused Tylenol overnight. Scheduled Tylenol q4 started overnight, pt took this am when due. Zypreza given for anxiety and to help with sleep. Low phos diet with calorie counts, poor appetite per report. DL midline, sticky note by previous nurse to clarify if it is a PICC or midline. One lumen infusing PPN @75 and lipds @20.8, other lumen is SL. L arm fistula, +thrill and bruit, dsg CDI. LBM 7/10. Oliguric, 50 ml UOP this shift, pt on HD. Denies nausea. R PIV SL. IV cellcept q12 hours. Will continue to monitor and notify team with any changes.   "

## 2020-07-11 NOTE — PROGRESS NOTES
[facility], [place]   Transplant Nephrology Progress Note  Date of Admission:  7/4/2020  Today's Date: 07/11/2020    Recommendations:  1.  We will dialyze today and will attempt for liters removal.  2.  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  Other Banff scoring include V1, T1, I1.  The biopsy also showed interstitial hemorrhage and tubular injury with vacuolization.  The ATN component may be a phenotype of a BMR.     # Immunosuppression: Tacrolimus immediate release (goal 8-10) and Myfortic 720 twice a day.              - Changes: Rejection treatment will include Thymoglobulin 2 mg/kg per dose x3, plasmapheresis with IVIG x 5 and Solu-Medrol 500 mg daily x3.  Following the intense course, prednisone taper will be recommended and prednisone maintenance 5 mg daily.  Agree with rituximab single dose.     # Infection Prophylaxis: (please renally dose)   - PJP: Sulfa/TMP (Bactrim)   - CMV: Valcyte      # Hypertension: Controlled;   Goal BP: < 130/80              - Volume status: hypervolemic              - will add Norvasc 5 mg daily       # Anemia in Chronic Renal Disease: Hgb: Improved with transfusion     ENZO: will start 2000 units three times per week     # Mineral Bone Disorder:   - Secondary renal hyperparathyroidism; PTH level: Moderately elevated (301-600 pg/ml)        On treatment: None  - Vitamin D; level: Normal        On supplement: No  - Calcium; level: Low normal        On supplement: No  - Phosphorus; level: Normal        On supplement: No     #  Electrolytes:   -resolved with dialysis      # Transplant History:  Etiology of Kidney Failure: IgA nephropathy  Tx: LDKT and DDKT  Transplant: 6/19/2020 (Kidney), 12/1/2007 (Kidney)  Donor Type: Donation after Brain Death        Donor Class:   Crossmatch at time of Tx: pending   Significant changes in immunosuppression: None  Significant transplant-related complications: None    Mahesh Armando MD   Pager: [phone]    Interval History   Feels somewhat swollen and bloated. No fevers or chills. Appetite is poor. UOP is picking up ~ 400 mL   Review of Systems   4 point ROS was obtained and negative except as noted in the Interval History.    MEDICATIONS:    sodium chloride 0.9%  250 mL Intravenous Once in dialysis     sodium chloride 0.9%  300 mL Hemodialysis Machine Once     acetaminophen  650 mg Oral Q4H     acetaminophen  650 mg Oral Once     amLODIPine  5 mg Oral Daily     atorvastatin  10 mg Oral Daily     calcium acetate  1,334 mg Oral TID w/meals     carvedilol  6.25 mg Oral BID     diphenhydrAMINE  50 mg Oral Once    Or     diphenhydrAMINE  50 mg Intravenous Once     epoetin staci-epbx (RETACRIT) inj ESRD  2,000 Units Intravenous Once per day on Mon Wed Fri     furosemide  60 mg Intravenous BID     gelatin absorbable  1 each Topical During Hemodialysis (from stock)     heparin lock flush  5 mL Intravenous Q24H     immune globulin - sucrose free  0.5 g/kg (Ideal) Intravenous Once     insulin aspart  1-7 Units Subcutaneous TID AC     insulin aspart  1-5 Units Subcutaneous At Bedtime     lidocaine  1 patch Transdermal Q24H     lidocaine   Transdermal Q8H     lipids  250 mL Intravenous Once per day on Sun Mon Wed Fri Sat     methylPREDNISolone  100 mg Intravenous Once     mycophenolate mofetil  1,000 mg Intravenous Q12H     - MEDICATION INSTRUCTIONS -   Does not apply Once     OLANZapine  2.5 mg Oral Once     ondansetron  4 mg Oral TID AC     pantoprazole  40 mg Oral BID     [START ON 7/12/2020] predniSONE  40 mg  "Oral BID    Followed by     [START ON 2020] predniSONE  40 mg Oral Daily    Followed by     [START ON 2020] predniSONE  40 mg Oral Daily    Followed by     [START ON 2020] predniSONE  20 mg Oral Daily     [START ON 2020] predniSONE  5 mg Oral Daily     sodium bicarbonate  1,300 mg Oral BID     sodium chloride (PF)  10 mL Intracatheter Q8H     sodium chloride (PF)  3 mL Intracatheter Q8H     sulfamethoxazole-trimethoprim  1 tablet Oral Once per day on      tacrolimus  6 mg Oral BID     valGANciclovir  450 mg Oral Once per day on        dextrose       parenteral nutrition - ADULT compounded formula       parenteral nutrition - Clinimix E 75 mL/hr at 20 0659       Physical Exam   Temp  Av.8  F (37.1  C)  Min: 97.6  F (36.4  C)  Max: 103.7  F (39.8  C)      Pulse  Av.5  Min: 74  Max: 122 Resp  Av.1  Min: 13  Max: 28  SpO2  Av.1 %  Min: 93 %  Max: 100 %     BP (!) 144/95   Pulse 69   Temp 97.8  F (36.6  C) (Oral)   Resp 16   Ht 1.63 m (5' 4.17\")   Wt 68.9 kg (151 lb 14.4 oz)   SpO2 99%   BMI 25.93 kg/m     Date 20 0700 - 20 0659   Shift 7288-9243 3870-3843 4185-6903 24 Hour Total   INTAKE   P.O. 600 200  800   I.V.  810  810   Shift Total(mL/kg) 600(10.49) 1010(17.66)  1610(28.15)   OUTPUT   Urine 50   50   Shift Total(mL/kg) 50(0.87)   50(0.87)   Weight (kg) 57.2 57.2 57.2 57.2      Admit Weight: 57.2 kg (126 lb 1.7 oz)     GENERAL APPEARANCE: alert and no distress  HENT: mouth without ulcers or lesions  LYMPHATICS: no cervical or supraclavicular nodes  RESP: lungs clear to auscultation - no rales, rhonchi or wheezes  CV: regular rhythm, normal rate, no rub, no murmur  EDEMA: LE edema bilaterally  ABDOMEN: soft, nondistended, nontender, bowel sounds normal  MS: extremities normal - no gross deformities noted, no evidence of inflammation in joints, no muscle tenderness  SKIN: no rash    Data   All labs reviewed by me.  CMP  Recent Labs "   Lab 07/11/20  0630 07/10/20  0556 07/09/20  0615 07/08/20  0628  07/04/20  1334   * 132* 134 133   < > 135   POTASSIUM 4.2 4.1 3.4 3.9   < > 4.1   CHLORIDE 95 99 98 98   < > 107   CO2 24 26 26 28   < > 22   ANIONGAP 7 7 10 7   < > 6   * 200* 159* 298*   < > 96   BUN 52* 28 42* 29   < > 25   CR 4.87* 3.85* 5.61* 4.53*   < > 4.12*   GFRESTIMATED 11* 14* 9* 12*   < > 13*   GFRESTBLACK 13* 17* 11* 14*   < > 15*   CASIE 6.8* 7.4* 6.1* 7.1*   < > 7.9*   MAG 1.7 1.6 1.7 1.8   < > 1.4*   PHOS 5.0* 3.8 4.3 4.6*   < > 4.6*   PROTTOTAL  --  5.7*  --   --   --  6.5*   ALBUMIN  --  3.0*  --   --   --  2.9*   BILITOTAL  --  1.0  --   --   --  0.7   ALKPHOS  --  47  --   --   --  123   AST  --  26  --   --   --  34   ALT  --  24  --   --   --  31    < > = values in this interval not displayed.     CBC  Recent Labs   Lab 07/11/20  0630 07/10/20  0556 07/09/20  1310 07/09/20  0615   HGB 8.0* 8.6* 7.6* 6.5*   WBC 3.5* 3.4* 2.2* 1.8*   RBC 2.67* 2.86* 2.52* 2.18*   HCT 24.4* 26.6* 22.8* 20.0*   MCV 91 93 91 92   MCH 30.0 30.1 30.2 29.8   MCHC 32.8 32.3 33.3 32.5   RDW 13.6 14.0 14.2 14.3    177 154 137*     INR  Recent Labs   Lab 07/11/20  0900 07/10/20  0556 07/09/20  1310 07/04/20  1953 07/04/20  1334   INR 1.35* 1.65* 1.49*  --  1.40*   PTT  --   --   --  35  --      ABGNo lab results found in last 7 days.   Urine Studies  Recent Labs   Lab Test 07/06/20  1412 07/04/20  1346 07/01/20  1430 06/23/20  0830   COLOR Yellow Yellow Patricia Yellow   APPEARANCE Slightly Cloudy Slightly Cloudy Slightly Cloudy Clear   URINEGLC 150* 70* Negative Negative   URINEBILI Negative Small* Negative Negative   URINEKETONE 10* Negative Negative Negative   SG 1.022 1.027 1.025 1.012   UBLD Small* Small* Moderate* Moderate*   URINEPH 8.0* 8.0* 6.0 6.5   PROTEIN >600* >600* 100* 10*   NITRITE Negative Negative Negative Negative   LEUKEST Negative Negative Negative Small*   RBCU 78* 21* 96* 144*   WBCU 21* 2 9* 4     Recent Labs   Lab  Test 07/16/12  1400 07/02/12  1130 06/25/12  0615 06/17/12  0930 06/13/12  1120 06/07/12  0643 06/01/12  0950 05/25/12  1830 05/25/12  1110 05/22/12  1536   UTPG 1.53* 0.86* 0.69* 0.82* 0.80* 0.88* 0.58* 0.47*  0.45* 0.56* 0.51*     PTH  Recent Labs   Lab Test 06/26/20  0842 11/30/12  2030 11/30/12  1030   PTHI 226* 500* 794*     Iron Studies  Recent Labs   Lab Test 07/05/20  0553 06/25/20  0749 11/30/12  1950   IRON 10* 57 125   * 163* 155*   IRONSAT 9* 35 81*   SHAHBAZ  --  886* 401*       IMAGING:  All imaging studies reviewed by me.

## 2020-07-11 NOTE — PROCEDURES
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 She has a history of IgA nephropathy, prior transplant in 12/2007 and recent transplant on 6/19/2020.  She notes she had been on dialysis prior to recent transplant, has a fistula in place.  She had a recent increase in her creatinine, biopsy on 7/5/2020 reportedly demonstrates mixed cellular and humoral rejection. Renal team requested a series of TPE to treat the AMR.     - The plan is to exchange every other day for a total of  5 procedures.  The patient has a dialysis fistula in place that will be used for the procedure    - Today is procedure #3 of the planned 5 procedures.  She tolerated the procedure well without complication. We used albumin as the replacement.  She was noting some abdominal pain, she had been in contact with the team and they were getting her some pain meds.  She has also been dealing with elevated blood pressures, team is attempting to manage this with medication.    - ACD-A will be used for anticoagulation of the apheresis equipment with calcium gluconate given throughout to offset the effects.    - If IVIG or other antibody-based treatments are given, this should be given following TPE or on alternate days, as TPE can remove these medications.    - Please do not start or continue ace-inhibitors throughout the duration of a therapeutic plasma exchange series. Please notify the apheresis physician of any upcoming procedures, surgeries, or biopsies as therapeutic plasma exchange will affect coagulation factors.               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   Her past medical history includes IgA nephropathy, prior renal transplant in , most recent transplant on 2020.             Past Medical History:     Past Medical History:   Diagnosis Date     ACS (acute coronary syndrome) (H) 2014     Acute rejection of kidney transplant 2020    Mixed AMR & ACR [place] 2A     Allergic state     seasonal allergies     Anemia in chronic renal disease      Anxiety      Cervical cerclage suture present in second trimester      Chest pain 2014     Chronic renal transplant rejection      End stage kidney disease (H)      Heart murmur      History of  delivery ,     30 wks, 28 wks      Hypertension     resolved after transplant     IgA nephropathy      Kidney transplanted 2020    DDKT. Induction w/ thymo 5.7mg/kg.     MRSA (methicillin resistant Staphylococcus aureus)      [facility]     Pre-eclampsia      Renal failure affecting pregnancy in second trimester      S/P kidney transplant 2007    LDKT in 2007 in [facility]. History of 1B kidney rejection. Failed .     SAB (spontaneous )     2nd trimester              Past Surgical History:     Past Surgical History:   Procedure Laterality Date     CERCLAGE CERVICAL N/A 2015    Procedure: CERCLAGE CERVICAL;  Surgeon: Norbert Chopra MD;  Location: [facility] L[facility]      SECTION  2012    Procedure:  SECTION;;  Surgeon: Chelsea Cross MD;  Location: [facility] L[facility]      SECTION N/A 2015    Procedure:  SECTION;  Surgeon: Chelsea Cross MD;  Location: [facility] OR     cesearean section       EXPLANT TRANSPLANTED KIDNEY  3/29/2013    Procedure: EXPLANT TRANSPLANTED KIDNEY;  Explant Transplanted right Kidney (from patients right iliac fossa);  Surgeon: Nory Morgan MD;  Location: [facility] OR     IR FINE NEEDLE ASPIRATION W ULTRASOUND  2020     IR RENAL BIOPSY LEFT   2020     MIDLINE DOUBLE LUMEN PLACEMENT Right 2020    unable to place PICC line, MIDLINE placed     NEPHRECTOMY  3/29/13    Transplant nephrectomy      WILIAN/DIALYSIS CATHETER       TRANSPLANT KIDNEY RECIPIENT  DONOR N/A 2020    Procedure: TRANSPLANT, KIDNEY, RECIPIENT,  DONOR, venous reconstruction, and back bench preparation of kidney;  Surgeon: Irma Shannon MD;  Location: [facility] OR     TRANSPLANT KIDNEY RECIPIENT LIVING UNRELATED  Dec 2007    (Adult male in [place])                Allergies:   No Known Allergies          Medications:     Current Facility-Administered Medications   Medication     0.9% sodium chloride BOLUS     acetaminophen (TYLENOL) tablet 650 mg     acetaminophen (TYLENOL) tablet 650 mg     acetaminophen (TYLENOL) tablet 650 mg     amLODIPine (NORVASC) tablet 5 mg     [Rx hold ] aspirin (ASA) chewable tablet 81 mg     atorvastatin (LIPITOR) tablet 10 mg     calcium acetate (PHOSLO) capsule 1,334 mg     carvedilol (COREG) tablet 6.25 mg     cloNIDine (CATAPRES) tablet 0.1 mg     dextrose 10% infusion     glucose gel 15-30 g    Or     dextrose 50 % injection 25-50 mL    Or     glucagon injection 1 mg     diphenhydrAMINE (BENADRYL) capsule 50 mg    Or     diphenhydrAMINE (BENADRYL) injection 50 mg     diphenhydrAMINE (BENADRYL) capsule 50 mg    Or     diphenhydrAMINE (BENADRYL) injection 50 mg     diphenhydrAMINE (BENADRYL) injection 50 mg     EPINEPHrine (ADRENALIN) kit 0.3 mg     epoetin staci-epbx (RETACRIT) injection 2,000 Units     famotidine (PEPCID) injection 20 mg     furosemide (LASIX) injection 60 mg     heparin lock flush 10 UNIT/ML injection 2-5 mL     heparin lock flush 10 UNIT/ML injection 5 mL     heparin lock flush 10 UNIT/ML injection 5-10 mL     HYDROmorphone (DILAUDID) half-tab 1-2 mg     hydrOXYzine (ATARAX) tablet 25 mg     immune globulin - sucrose free 10 % injection 27.6 g     insulin aspart (NovoLOG) injection (RAPID ACTING)      "insulin aspart (NovoLOG) injection (RAPID ACTING)     Lidocaine (LIDOCARE) 4 % Patch 1 patch     lidocaine (LMX4) cream     lidocaine (LMX4) cream     lidocaine (XYLOCAINE) 2 % 15 mL, alum & mag hydroxide-simethicone (MAALOX  ES) 15 mL GI Cocktail     lidocaine 1 % 0.1-5 mL     lidocaine patch in PLACE     lipids (INTRALIPID) 20 % infusion 250 mL     methylPREDNISolone sodium succinate (solu-MEDROL) injection 100 mg     methylPREDNISolone sodium succinate (solu-MEDROL) injection 125 mg     mycophenolate mofetil (CELLCEPT) 1,000 mg in D5W intermittent infusion     naloxone (NARCAN) injection 0.1-0.4 mg     OLANZapine (zyPREXA) tablet 2.5 mg     ondansetron (ZOFRAN) injection 4 mg     ondansetron (ZOFRAN-ODT) ODT tab 4 mg     pantoprazole (PROTONIX) EC tablet 40 mg     parenteral nutrition - ADULT compounded formula     parenteral nutrition - Clinimix E     [START ON 7/12/2020] predniSONE (DELTASONE) tablet 40 mg    Followed by     [START ON 7/14/2020] predniSONE (DELTASONE) tablet 40 mg    Followed by     [START ON 7/16/2020] predniSONE (DELTASONE) tablet 40 mg    Followed by     [START ON 7/17/2020] predniSONE (DELTASONE) tablet 20 mg     [START ON 7/19/2020] predniSONE (DELTASONE) tablet 5 mg     prochlorperazine (COMPAZINE) injection 10 mg    Or     prochlorperazine (COMPAZINE) tablet 10 mg     sodium bicarbonate tablet 1,300 mg     sodium chloride (PF) 0.9% PF flush 10 mL     sodium chloride (PF) 0.9% PF flush 10-20 mL     sodium chloride (PF) 0.9% PF flush 10-20 mL     sodium chloride (PF) 0.9% PF flush 3 mL     sodium chloride (PF) 0.9% PF flush 3 mL     sodium chloride (PF) 0.9% PF flush 5-50 mL     sulfamethoxazole-trimethoprim (BACTRIM) 400-80 MG per tablet 1 tablet     tacrolimus (GENERIC EQUIVALENT) capsule 6 mg     traMADol (ULTRAM) tablet 50 mg     valGANciclovir (VALCYTE) tablet 450 mg            Vital Signs:   /78   Pulse 73   Temp (P) 98  F (36.7  C) (Oral)   Resp 20   Ht 1.63 m (5' 4.17\")  "  Wt 72.5 kg (159 lb 12.8 oz)   SpO2 100%   BMI 27.28 kg/m                   Data:       BMP  Recent Labs   Lab 07/11/20  0630 07/10/20  0556 07/09/20  0615 07/08/20  0628   * 132* 134 133   POTASSIUM 4.2 4.1 3.4 3.9   CHLORIDE 95 99 98 98   CASIE 6.8* 7.4* 6.1* 7.1*   CO2 24 26 26 28   BUN 52* 28 42* 29   CR 4.87* 3.85* 5.61* 4.53*   * 200* 159* 298*     CBC  Recent Labs   Lab 07/11/20  0630 07/10/20  0556 07/09/20  1310 07/09/20  0615   WBC 3.5* 3.4* 2.2* 1.8*   RBC 2.67* 2.86* 2.52* 2.18*   HGB 8.0* 8.6* 7.6* 6.5*   HCT 24.4* 26.6* 22.8* 20.0*   MCV 91 93 91 92   MCH 30.0 30.1 30.2 29.8   MCHC 32.8 32.3 33.3 32.5   RDW 13.6 14.0 14.2 14.3    177 154 137*     INR  Recent Labs   Lab 07/10/20  0556 07/09/20  1310 07/04/20  1334   INR 1.65* 1.49* 1.40*     Fibrinogen   Date Value Ref Range Status   07/09/2020 246 200 - 420 mg/dL Final               Procedure Summary:   A single volume therapeutic plasma exchange was performed and 5% albumin was used as the replacement fluid.  The patient's fistula was used for access and allowed for appropriate flow during the procedure.  ACD-A was used for anticoagulation. To offset the effects of the citrate, calcium gluconate was given in the return line.    The patient tolerated the procedure well without complication.  See apheresis flowsheet for additional details.        Attestation: I, Aleksander Thomas MD, was immediately available during the apheresis procedure, and I was in direct communication with the apheresis staff regarding the patient's procedure and care plan.  Due to COVID concerns and efforts to conserve PPE, I did not enter the patient's room.      Aleksander Thomas MD  Transfusion Medicine Attending  Laboratory Medicine & Pathology  Pager: [phone]

## 2020-07-11 NOTE — PLAN OF CARE
Patient very anxious, agitated, crying and frustrated this morning with lack of progress and unresolved abdominal pain. She refused plasmapheresis, HD until pain was resolved.Patient is upset with nurses pushing Dilaudid on her, states it does not work. Tramadol PRN started, Atarax 25 mg, Zofran ODT X1 with some relief.Plasmapheresis in progress this morning, HD afterwards.  Patient reports her abdomen is so tight that she cannot breathe but states having BM's and passing flatus. Patient declined walking in the halls stating she could not walk anymore due to pain. New order for abdominal xray after dialysis.  BP elevated, 156/109, morning BP given. Clonidine 0.1 mg X1 PRN, BP improved 144/95. Blood glucose 197 this morning, 2U Novolog. Patient at dialysis, blood glucose not obtained before transfer.  IR consulted for tunneled catheter for TPN.

## 2020-07-11 NOTE — PROGRESS NOTES
Calorie Count  Intake recorded for: 7/10  Total Kcals: 239 Total Protein: 7  Kcals from Hospital Food: 239  Protein: 7g  Kcals from Outside Food (average):0 Protein: 0g  # Meals Recorded: 100% white toast w/ peanut butter and jelly, 50% black tea  # Supplements Recorded: 0

## 2020-07-11 NOTE — PROGRESS NOTES
Transplant Surgery  Inpatient Daily Progress Note  07/11/2020    Assessment & Plan: 36 yo with PMH significant for ESRD due to IgA Nephropathy s/p KT in 2007 (failed due to non-compliance during pregnancy) and 6/19/2020 (3 arteries, + stent), and HTN. Baseline Cr 0.6 post-transplant. She presented on 7/4/2020 with fever and ARF of transplanted kidney.    Overnight event: Swelling all over the body, Increased pain, anxiety.     Graft function: POD #22. Ureteral stent removed 7/8.  Mixed rejection with ARF: Oliguria, UOP is ~ 100ml/hr (7/11). Lasix 60 mg IV BID. Dialyzed on 7/7 & 7/9. 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 DSA negative. AT1R pending. Treatment as below.   Perinephric fluid collections: Noted on US. 7/5 IR: 50ml cloudy serosang fluid aspirated, gram stain negative, culture NGTD. Not suggestive of urine leak.  Immunosuppression management:    Pheresis/IVIG: Plan for pheresis/IVIG every other day x5 starting 7/7. IVIG 0.5g/kg doses #1-4, 1g/kg dose #5.  Thymo: 125mg on 7/6, 125mg 7/8, 75 mg 7/10. Plan for 1mg/kg after final pheresis.  Rituximab: 500mg on 7/7.  Methylpred: 500/500/250/100/100  Tacro: Goal 8-10 7/10 9.3 (11.5 hour trough). Continue 6 mg BID.  MMF: 1000 IV BID d/t nausea. Was on Myfortic on admission.  Complexity of management: Medium. Contributing factors: rejection  Hematology:   ANTHONY/ACD: Hgb 6.5->8.6->8.0 (7/11) following transfusion on 7/9. Received 1u RBC on 7/5 and 7/9. No sign of bleeding.  PSAT 9, consider venofer if no infection. Hapto high, retic low. Discussed Epo with Nephrology, but they feel that it will not be effective in setting of inflammation.  Leukopenia: WBC 1.8->3.4. Secondary to thymo.  Cardiorespiratory:   HTN: Coreg 6.25mg. Norvasc 5mg. Goal -180.  -CXR today, COVID-19 re screen today.     GI/Nutrition:   Diet: Regular diet with poor intake. Nutrition consult and ej counts.    Severe malnutrition in the context of acute illness: Started PPN on 7/9. PICC placed by IR on 7/10. Start TPN.  Nausea/Vomiting: Scheduled zofran and PPI. PRN compazine. Vomiting resolved.  Diarrhea: Since transplant. 7/6 C-diff and enteric panel negative.  Endocrine: No acute issues.   Fluid/Electrolytes:   Planned for HD today. Continue Lasix- reduce to 40mg BID as per Neph recommendations.  Metabolic acidosis: Resolved with dialysis.  Hypocalcemia: Secondary to ARF/high phos. Replace PRN.  Hyperphosphatemia: Continue Phoslo. Low phos diet.  Hypervolemia: Stop IVF.  : Incontinent at times.  Neuro/Psych:   Acute pain: Pain over kidney and low back. Continue acetaminophen, lidoderm, and low dose oral dilaudid for break-thru. Discontinue dilaudid. Started on oral tramadol.   Anxiety and paranoia: Likely exacerbated by steroids and poor sleep. Concern that opioids are being used to manage these symptoms.   Infectious disease: Afebrile, no leukocytosis  Fever: Fever 103.7F on admission. Infection vs rejection. Cipro started outpatient, broadened to Cefepine on 7/4-7/8. COVID neg, 7/4 UC neg, 7/4 blood cx NGTD, 7/5 perinephric fluid cultures NGTD (gram stain neg), enteric panel neg, C-diff neg.  HBcAb positive: sAg nonreactive, sAb >1000. This may be positive due to IVIG infusions.  Prophylaxis: Bactrim (PCP ppx) three times per week  Valcyte (CMV PPx)   Disposition: 7A    Medical Decision Making: Medium  Subsequent visit 21981 (moderate level decision making)    CONNIE/Fellow/Resident Provider: Faustino Mathur MD    Faculty: Nory Morgan M.D.  _________________________________________________________________    Interval History: History is obtained from the patient  Increased swelling over whole body,pain- generalized over whole body.     ROS:   A 10-point review of systems was negative except as noted above.    Meds:    sodium chloride 0.9%  250 mL Intravenous Once in dialysis     sodium chloride 0.9%  300 mL  "Hemodialysis Machine Once     acetaminophen  650 mg Oral Q4H     acetaminophen  650 mg Oral Once     amLODIPine  5 mg Oral Daily     atorvastatin  10 mg Oral Daily     calcium acetate  1,334 mg Oral TID w/meals     carvedilol  6.25 mg Oral BID     diphenhydrAMINE  50 mg Oral Once    Or     diphenhydrAMINE  50 mg Intravenous Once     epoetin staci-epbx (RETACRIT) inj ESRD  2,000 Units Intravenous Once per day on Mon Wed Fri     furosemide  60 mg Intravenous BID     gelatin absorbable  1 each Topical During Hemodialysis (from stock)     heparin lock flush  5 mL Intravenous Q24H     immune globulin - sucrose free  0.5 g/kg (Ideal) Intravenous Once     insulin aspart  1-7 Units Subcutaneous TID AC     insulin aspart  1-5 Units Subcutaneous At Bedtime     lidocaine  1 patch Transdermal Q24H     lidocaine   Transdermal Q8H     lipids  250 mL Intravenous Once per day on Sun Mon Wed Fri Sat     methylPREDNISolone  100 mg Intravenous Once     mycophenolate mofetil  1,000 mg Intravenous Q12H     - MEDICATION INSTRUCTIONS -   Does not apply Once     OLANZapine  2.5 mg Oral Once     ondansetron  4 mg Oral TID AC     pantoprazole  40 mg Oral BID     [START ON 7/12/2020] predniSONE  40 mg Oral BID    Followed by     [START ON 7/14/2020] predniSONE  40 mg Oral Daily    Followed by     [START ON 7/16/2020] predniSONE  40 mg Oral Daily    Followed by     [START ON 7/17/2020] predniSONE  20 mg Oral Daily     [START ON 7/19/2020] predniSONE  5 mg Oral Daily     sodium bicarbonate  1,300 mg Oral BID     sodium chloride (PF)  10 mL Intracatheter Q8H     sodium chloride (PF)  3 mL Intracatheter Q8H     sulfamethoxazole-trimethoprim  1 tablet Oral Once per day on Mon Wed Fri     tacrolimus  6 mg Oral BID     valGANciclovir  450 mg Oral Once per day on Mon Thu       Physical Exam:     Admit Weight: 57.2 kg (126 lb 1.7 oz)    Current vitals:   BP (!) 156/2   Pulse 69   Temp 98  F (36.7  C) (Oral)   Resp 16   Ht 1.63 m (5' 4.17\")   Wt " 72.5 kg (159 lb 12.8 oz)   SpO2 99%   BMI 27.28 kg/m      Vital sign ranges:    Temp:  [97.6  F (36.4  C)-98.1  F (36.7  C)] 98  F (36.7  C)  Pulse:  [68-69] 69  Heart Rate:  [60-77] 60  Resp:  [14-16] 16  BP: (144-178)/(2-116) 156/2  SpO2:  [99 %-100 %] 99 %  Patient Vitals for the past 24 hrs:   BP Temp Temp src Pulse Heart Rate Resp SpO2 Height Weight   07/11/20 1336 (!) 156/2 98  F (36.7  C) Oral -- 60 16 99 % -- --   07/11/20 1315 -- -- -- -- -- -- -- -- 72.5 kg (159 lb 12.8 oz)   07/11/20 1200 -- -- -- -- -- -- -- -- 72.5 kg (159 lb 14.4 oz)   07/11/20 1135 (!) 144/95 -- -- -- -- -- -- -- --   07/11/20 1112 (!) 172/104 97.8  F (36.6  C) Oral 69 -- 16 -- -- --   07/11/20 1048 (!) 178/110 -- -- 68 -- -- -- -- --   07/11/20 0936 (!) 172/116 98.1  F (36.7  C) Oral -- 70 16 99 % -- --   07/11/20 0900 (!) 159/105 98.1  F (36.7  C) Oral 68 -- 16 -- -- --   07/11/20 0633 (!) 156/96 -- -- -- -- -- -- -- --   07/10/20 2354 (!) 164/96 97.6  F (36.4  C) Oral 68 -- 16 99 % -- --   07/10/20 1959 (!) 163/95 98.1  F (36.7  C) Oral -- 71 16 100 % -- --   07/10/20 1548 (!) 159/102 97.8  F (36.6  C) Oral -- 77 14 99 % -- --   07/10/20 1535 -- -- -- -- -- -- -- -- 68.9 kg (151 lb 14.4 oz)     General Appearance: Uncomfortable  Skin: warm, dry, no rashes  Heart: Perfused  Lungs: RA, unlabored  Abdomen: Soft, less tender over kidney  : polanco is not present.   Extremities: edema: generalized  Neurologic: awake, alert and oriented x4. Tremor absent.    Data:   CMP  Recent Labs   Lab 07/11/20  0630 07/10/20  0556  07/05/20  1330   * 132*   < >  --    POTASSIUM 4.2 4.1   < >  --    CHLORIDE 95 99   < >  --    CO2 24 26   < >  --    * 200*   < >  --    BUN 52* 28   < >  --    CR 4.87* 3.85*   < >  --    GFRESTIMATED 11* 14*   < >  --    GFRESTBLACK 13* 17*   < >  --    CASIE 6.8* 7.4*   < >  --    MAG 1.7 1.6   < >  --    PHOS 5.0* 3.8   < >  --    ALBUMIN  --  3.0*  --   --    BILITOTAL  --  1.0  --   --    ALKPHOS  --   47  --   --    AST  --  26  --   --    ALT  --  24  --   --    FCREAT  --   --   --  4.7    < > = values in this interval not displayed.     CBC  Recent Labs   Lab 07/11/20  0630 07/10/20  0556   HGB 8.0* 8.6*   WBC 3.5* 3.4*    177       ALVINA Yip, MS  Plastic Surgery, PGY-1

## 2020-07-11 NOTE — PLAN OF CARE
Afebrile, -160/. Tolerated thymo w/o issues. Tylenol and dilaudid x1 for pain. Fair UOP, 200cc this shift. On ej counts, minimal dinner. Up with SBA in room. , 221.

## 2020-07-12 ENCOUNTER — APPOINTMENT (OUTPATIENT)
Dept: GENERAL RADIOLOGY | Facility: CLINIC | Age: 35
DRG: 698 | End: 2020-07-12
Payer: MEDICARE

## 2020-07-12 LAB
ALBUMIN SERPL-MCNC: 3.1 G/DL (ref 3.4–5)
ALP SERPL-CCNC: 35 U/L (ref 40–150)
ALT SERPL W P-5'-P-CCNC: 25 U/L (ref 0–50)
ANION GAP SERPL CALCULATED.3IONS-SCNC: 7 MMOL/L (ref 3–14)
AST SERPL W P-5'-P-CCNC: 20 U/L (ref 0–45)
BACTERIA SPEC CULT: NORMAL
BACTERIA SPEC CULT: NORMAL
BASOPHILS # BLD AUTO: 0 10E9/L (ref 0–0.2)
BASOPHILS NFR BLD AUTO: 0 %
BILIRUB DIRECT SERPL-MCNC: 0.1 MG/DL (ref 0–0.2)
BILIRUB SERPL-MCNC: 0.3 MG/DL (ref 0.2–1.3)
BUN SERPL-MCNC: 39 MG/DL (ref 7–30)
CALCIUM SERPL-MCNC: 7.7 MG/DL (ref 8.5–10.1)
CHLORIDE SERPL-SCNC: 96 MMOL/L (ref 94–109)
CO2 SERPL-SCNC: 26 MMOL/L (ref 20–32)
CREAT SERPL-MCNC: 2.99 MG/DL (ref 0.52–1.04)
DIFFERENTIAL METHOD BLD: ABNORMAL
EOSINOPHIL # BLD AUTO: 0 10E9/L (ref 0–0.7)
EOSINOPHIL NFR BLD AUTO: 0 %
ERYTHROCYTE [DISTWIDTH] IN BLOOD BY AUTOMATED COUNT: 13.4 % (ref 10–15)
GFR SERPL CREATININE-BSD FRML MDRD: 20 ML/MIN/{1.73_M2}
GLUCOSE BLDC GLUCOMTR-MCNC: 187 MG/DL (ref 70–99)
GLUCOSE BLDC GLUCOMTR-MCNC: 216 MG/DL (ref 70–99)
GLUCOSE BLDC GLUCOMTR-MCNC: 226 MG/DL (ref 70–99)
GLUCOSE BLDC GLUCOMTR-MCNC: 239 MG/DL (ref 70–99)
GLUCOSE BLDC GLUCOMTR-MCNC: 246 MG/DL (ref 70–99)
GLUCOSE SERPL-MCNC: 237 MG/DL (ref 70–99)
HCT VFR BLD AUTO: 26.5 % (ref 35–47)
HGB BLD-MCNC: 8.7 G/DL (ref 11.7–15.7)
IMM GRANULOCYTES # BLD: 0.2 10E9/L (ref 0–0.4)
IMM GRANULOCYTES NFR BLD: 4.4 %
INR PPP: 1.45 (ref 0.86–1.14)
LYMPHOCYTES # BLD AUTO: 0 10E9/L (ref 0.8–5.3)
LYMPHOCYTES NFR BLD AUTO: 0.6 %
Lab: NORMAL
Lab: NORMAL
MAGNESIUM SERPL-MCNC: 1.6 MG/DL (ref 1.6–2.3)
MCH RBC QN AUTO: 30.4 PG (ref 26.5–33)
MCHC RBC AUTO-ENTMCNC: 32.8 G/DL (ref 31.5–36.5)
MCV RBC AUTO: 93 FL (ref 78–100)
MONOCYTES # BLD AUTO: 0.1 10E9/L (ref 0–1.3)
MONOCYTES NFR BLD AUTO: 2 %
NEUTROPHILS # BLD AUTO: 5 10E9/L (ref 1.6–8.3)
NEUTROPHILS NFR BLD AUTO: 93 %
NRBC # BLD AUTO: 0 10*3/UL
NRBC BLD AUTO-RTO: 0 /100
PHOSPHATE SERPL-MCNC: 3.3 MG/DL (ref 2.5–4.5)
PLATELET # BLD AUTO: 202 10E9/L (ref 150–450)
POTASSIUM SERPL-SCNC: 4.2 MMOL/L (ref 3.4–5.3)
PROT SERPL-MCNC: 5.7 G/DL (ref 6.8–8.8)
RBC # BLD AUTO: 2.86 10E12/L (ref 3.8–5.2)
SODIUM SERPL-SCNC: 128 MMOL/L (ref 133–144)
SPECIMEN SOURCE: NORMAL
SPECIMEN SOURCE: NORMAL
WBC # BLD AUTO: 5.4 10E9/L (ref 4–11)

## 2020-07-12 PROCEDURE — 25000132 ZZH RX MED GY IP 250 OP 250 PS 637: Mod: GY | Performed by: NURSE PRACTITIONER

## 2020-07-12 PROCEDURE — 25000132 ZZH RX MED GY IP 250 OP 250 PS 637: Mod: GY | Performed by: STUDENT IN AN ORGANIZED HEALTH CARE EDUCATION/TRAINING PROGRAM

## 2020-07-12 PROCEDURE — 12000026 ZZH R&B TRANSPLANT

## 2020-07-12 PROCEDURE — 25000125 ZZHC RX 250: Performed by: SURGERY

## 2020-07-12 PROCEDURE — 85610 PROTHROMBIN TIME: CPT | Performed by: SURGERY

## 2020-07-12 PROCEDURE — 25000131 ZZH RX MED GY IP 250 OP 636 PS 637: Mod: GY | Performed by: NURSE PRACTITIONER

## 2020-07-12 PROCEDURE — 25000128 H RX IP 250 OP 636: Performed by: STUDENT IN AN ORGANIZED HEALTH CARE EDUCATION/TRAINING PROGRAM

## 2020-07-12 PROCEDURE — 84100 ASSAY OF PHOSPHORUS: CPT | Performed by: SURGERY

## 2020-07-12 PROCEDURE — 83735 ASSAY OF MAGNESIUM: CPT | Performed by: SURGERY

## 2020-07-12 PROCEDURE — 71045 X-RAY EXAM CHEST 1 VIEW: CPT

## 2020-07-12 PROCEDURE — 90937 HEMODIALYSIS REPEATED EVAL: CPT

## 2020-07-12 PROCEDURE — 25000132 ZZH RX MED GY IP 250 OP 250 PS 637: Mod: GY | Performed by: SURGERY

## 2020-07-12 PROCEDURE — 00000146 ZZHCL STATISTIC GLUCOSE BY METER IP

## 2020-07-12 PROCEDURE — 25000132 ZZH RX MED GY IP 250 OP 250 PS 637: Mod: GY | Performed by: PHYSICIAN ASSISTANT

## 2020-07-12 PROCEDURE — 85025 COMPLETE CBC W/AUTO DIFF WBC: CPT | Performed by: SURGERY

## 2020-07-12 PROCEDURE — 74018 RADEX ABDOMEN 1 VIEW: CPT

## 2020-07-12 PROCEDURE — 84134 ASSAY OF PREALBUMIN: CPT | Performed by: SURGERY

## 2020-07-12 PROCEDURE — 25000125 ZZHC RX 250: Performed by: STUDENT IN AN ORGANIZED HEALTH CARE EDUCATION/TRAINING PROGRAM

## 2020-07-12 PROCEDURE — 25800030 ZZH RX IP 258 OP 636: Performed by: INTERNAL MEDICINE

## 2020-07-12 PROCEDURE — 36592 COLLECT BLOOD FROM PICC: CPT | Performed by: SURGERY

## 2020-07-12 PROCEDURE — 25800030 ZZH RX IP 258 OP 636: Performed by: STUDENT IN AN ORGANIZED HEALTH CARE EDUCATION/TRAINING PROGRAM

## 2020-07-12 PROCEDURE — 82248 BILIRUBIN DIRECT: CPT | Performed by: SURGERY

## 2020-07-12 PROCEDURE — 80053 COMPREHEN METABOLIC PANEL: CPT | Performed by: SURGERY

## 2020-07-12 RX ORDER — DIPHENHYDRAMINE HYDROCHLORIDE 50 MG/ML
50 INJECTION INTRAMUSCULAR; INTRAVENOUS
Status: CANCELLED | OUTPATIENT
Start: 2020-07-12

## 2020-07-12 RX ADMIN — INSULIN ASPART 2 UNITS: 100 INJECTION, SOLUTION INTRAVENOUS; SUBCUTANEOUS at 09:04

## 2020-07-12 RX ADMIN — LIDOCAINE 1 PATCH: 560 PATCH PERCUTANEOUS; TOPICAL; TRANSDERMAL at 08:46

## 2020-07-12 RX ADMIN — TRAMADOL HYDROCHLORIDE 50 MG: 50 TABLET, COATED ORAL at 20:40

## 2020-07-12 RX ADMIN — ACETAMINOPHEN 650 MG: 325 TABLET, FILM COATED ORAL at 18:22

## 2020-07-12 RX ADMIN — SODIUM CHLORIDE 300 ML: 9 INJECTION, SOLUTION INTRAVENOUS at 13:28

## 2020-07-12 RX ADMIN — ACETAMINOPHEN 650 MG: 325 TABLET, FILM COATED ORAL at 22:46

## 2020-07-12 RX ADMIN — CARVEDILOL 6.25 MG: 6.25 TABLET, FILM COATED ORAL at 08:48

## 2020-07-12 RX ADMIN — FUROSEMIDE 40 MG: 10 INJECTION, SOLUTION INTRAMUSCULAR; INTRAVENOUS at 08:52

## 2020-07-12 RX ADMIN — SODIUM CHLORIDE 250 ML: 9 INJECTION, SOLUTION INTRAVENOUS at 13:28

## 2020-07-12 RX ADMIN — PREDNISONE 40 MG: 20 TABLET ORAL at 08:47

## 2020-07-12 RX ADMIN — Medication: at 13:29

## 2020-07-12 RX ADMIN — CARVEDILOL 6.25 MG: 6.25 TABLET, FILM COATED ORAL at 20:18

## 2020-07-12 RX ADMIN — CALCIUM ACETATE 1334 MG: 667 CAPSULE ORAL at 18:22

## 2020-07-12 RX ADMIN — ATORVASTATIN CALCIUM 10 MG: 10 TABLET, FILM COATED ORAL at 08:47

## 2020-07-12 RX ADMIN — I.V. FAT EMULSION 250 ML: 20 EMULSION INTRAVENOUS at 20:16

## 2020-07-12 RX ADMIN — Medication 5 ML: at 22:58

## 2020-07-12 RX ADMIN — PANTOPRAZOLE SODIUM 40 MG: 40 TABLET, DELAYED RELEASE ORAL at 20:18

## 2020-07-12 RX ADMIN — INSULIN ASPART 1 UNITS: 100 INJECTION, SOLUTION INTRAVENOUS; SUBCUTANEOUS at 12:38

## 2020-07-12 RX ADMIN — Medication 5 ML: at 04:54

## 2020-07-12 RX ADMIN — PANTOPRAZOLE SODIUM 40 MG: 40 TABLET, DELAYED RELEASE ORAL at 08:48

## 2020-07-12 RX ADMIN — SODIUM BICARBONATE 650 MG TABLET 1300 MG: at 08:47

## 2020-07-12 RX ADMIN — AMLODIPINE BESYLATE 5 MG: 5 TABLET ORAL at 08:48

## 2020-07-12 RX ADMIN — TACROLIMUS 6 MG: 5 CAPSULE ORAL at 18:22

## 2020-07-12 RX ADMIN — TRAMADOL HYDROCHLORIDE 50 MG: 50 TABLET, COATED ORAL at 04:52

## 2020-07-12 RX ADMIN — MYCOPHENOLATE MOFETIL 1000 MG: 500 INJECTION, POWDER, LYOPHILIZED, FOR SOLUTION INTRAVENOUS at 08:53

## 2020-07-12 RX ADMIN — HYDROXYZINE HYDROCHLORIDE 25 MG: 25 TABLET ORAL at 22:58

## 2020-07-12 RX ADMIN — PREDNISONE 40 MG: 20 TABLET ORAL at 20:17

## 2020-07-12 RX ADMIN — ACETAMINOPHEN 650 MG: 325 TABLET, FILM COATED ORAL at 12:39

## 2020-07-12 RX ADMIN — TACROLIMUS 6 MG: 5 CAPSULE ORAL at 08:48

## 2020-07-12 RX ADMIN — INSULIN ASPART 2 UNITS: 100 INJECTION, SOLUTION INTRAVENOUS; SUBCUTANEOUS at 18:25

## 2020-07-12 RX ADMIN — SODIUM BICARBONATE 650 MG TABLET 1300 MG: at 20:17

## 2020-07-12 RX ADMIN — ONDANSETRON 4 MG: 4 TABLET, ORALLY DISINTEGRATING ORAL at 18:21

## 2020-07-12 RX ADMIN — ONDANSETRON 4 MG: 4 TABLET, ORALLY DISINTEGRATING ORAL at 08:47

## 2020-07-12 RX ADMIN — FUROSEMIDE 40 MG: 10 INJECTION, SOLUTION INTRAMUSCULAR; INTRAVENOUS at 16:24

## 2020-07-12 RX ADMIN — MYCOPHENOLATE MOFETIL 1000 MG: 500 INJECTION, POWDER, LYOPHILIZED, FOR SOLUTION INTRAVENOUS at 20:19

## 2020-07-12 RX ADMIN — ACETAMINOPHEN 650 MG: 325 TABLET, FILM COATED ORAL at 04:52

## 2020-07-12 RX ADMIN — CALCIUM ACETATE 1334 MG: 667 CAPSULE ORAL at 12:39

## 2020-07-12 RX ADMIN — POTASSIUM CHLORIDE: 2 INJECTION, SOLUTION, CONCENTRATE INTRAVENOUS at 20:16

## 2020-07-12 RX ADMIN — ACETAMINOPHEN 650 MG: 325 TABLET, FILM COATED ORAL at 01:00

## 2020-07-12 RX ADMIN — Medication 5 ML: at 06:32

## 2020-07-12 ASSESSMENT — ACTIVITIES OF DAILY LIVING (ADL)
ADLS_ACUITY_SCORE: 13

## 2020-07-12 ASSESSMENT — MIFFLIN-ST. JEOR: SCORE: 1395.83

## 2020-07-12 NOTE — PROGRESS NOTES
HEMODIALYSIS TREATMENT NOTE    Date: 7/11/2020  Time: 6.00 PM    Data:  Pre Wt: 72.5 kg (151 lb 10.8 oz)   Desired Wt: 68.49 kg   Post Wt: 68.5 kg (143 lb 15.4 oz)  Weight change: 4.0 kg  Ultrafiltration - Post Run Net Total Removed : 4000 mL  Vascular Access Status: Fistula  patent  Dialyzer Rinse: Streaked, Light  Total Blood Volume Processed: 101.99 L   Total Dialysis (Treatment) Time: 4   Dialysate Bath: K 3, Ca 3  Heparin: None    Lab:   No    Assessment / Interventions:Assumed care from another nurse mid treatment. 4 hours HD via LAVF  with good flow.  during treatment, primary nurse informed. Pt hemodynamically stable throughout her treatment. Completed her treatment, blood rinsed back , Fistula hemostasis achieved in less than 10 minutes & report given. SEE FLOW SHEET & MAR FOR DETAILS.         Plan:    Cont. Per Renal Team.

## 2020-07-12 NOTE — PROGRESS NOTES
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 Baseline Cr 0.6 post-transplant. She presented on 7/4/2020 with fever and ARF of transplanted kidney.    Overnight event: Feeling edematous    Graft function: POD #23. Ureteral stent removed 7/8.  Mixed rejection with ARF: Oliguria, UOP is ~ 100ml/hr (7/11). Lasix 60 mg IV BID. Dialyzed on 7/7 & 7/9. 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 DSA negative. AT1R pending. Treatment as below.   Perinephric fluid collections: Noted on US. 7/5 IR: 50ml cloudy serosang fluid aspirated, gram stain negative, culture NGTD. Not suggestive of urine leak.  Immunosuppression management:    Pheresis/IVIG: Plan for pheresis/IVIG every other day x5 starting 7/7. IVIG 0.5g/kg doses #1-4, 1g/kg dose #5.  Thymo: 125mg on 7/6, 125mg 7/8, 75 mg 7/10. Plan for 1mg/kg after final pheresis.  Rituximab: 500mg on 7/7.  Methylpred: 500/500/250/100/100  Tacro: Goal 8-10 7/10 9.3 (11.5 hour trough). Continue 6 mg BID.  MMF: 1000 IV BID d/t nausea. Was on Myfortic on admission.  Complexity of management: Medium. Contributing factors: rejection  Hematology:   ANTHONY/ACD: Hgb 6.5->8.6->8.0 (7/11) following transfusion on 7/9. Received 1u RBC on 7/5 and 7/9. No sign of bleeding.  PSAT 9, consider venofer if no infection. Hapto high, retic low. Discussed Epo with Nephrology, but they feel that it will not be effective in setting of inflammation.  Leukopenia: Secondary to thymo, improving. WBC 5.4 (3.5). .  Cardiorespiratory:   HTN: Coreg 6.25mg. Norvasc 5mg. Goal -180.  -CXR today, small bilateral pleural effusions, increased interstitial markings. Will get dialysis again today.    GI/Nutrition:   Diet: Regular diet with  poor intake. Nutrition consult and ej counts.   Severe malnutrition in the context of acute illness: Started PPN on 7/9. PICC placed by IR on 7/10. Started TPN.  Nausea/Vomiting: Scheduled zofran and PPI. PRN compazine. Vomiting resolved.  Diarrhea: Since transplant. 7/6 C-diff and enteric panel negative.  Endocrine: No acute issues.   Fluid/Electrolytes:   Planned for HD today. Continue Lasix- reduce to 40mg BID as per Neph recommendations. Patient states she's making more urine.  Metabolic acidosis: Resolved with dialysis.  Hypocalcemia: Secondary to ARF/high phos. Replace PRN.  Hyperphosphatemia: Continue Phoslo. Low phos diet.  Hypervolemia: Stop IVF.  : Incontinent at times.  Neuro/Psych:   Acute pain: Pain over kidney and low back. Continue acetaminophen, lidoderm, and low dose oral dilaudid for break-thru. Discontinue dilaudid. Started on oral tramadol.   Anxiety and paranoia: Likely exacerbated by steroids and poor sleep. Concern that opioids are being used to manage these symptoms.   Infectious disease: Afebrile, no leukocytosis  Fever: Fever 103.7F on admission. Infection vs rejection. Cipro started outpatient, broadened to Cefepine on 7/4-7/8. COVID neg, 7/4 UC neg, 7/4 blood cx NGTD, 7/5 perinephric fluid cultures NGTD (gram stain neg), enteric panel neg, C-diff neg.  HBcAb positive: sAg nonreactive, sAb >1000. This may be positive due to IVIG infusions.  Prophylaxis: Bactrim (PCP ppx) three times per week  Valcyte (CMV PPx)   Disposition: 7A    Medical Decision Making: Medium  Subsequent visit 17313 (moderate level decision making)    CONNIE/Fellow/Resident Provider: Gwendolyn Thomas PA-C    Faculty: Nory Morgan M.D.  _________________________________________________________________    Interval History: History is obtained from the patient  Feeling better after dialysis yesterday, though still noting increased swelling, requesting dialysis again. Requesting enema.    ROS:   A 10-point review of  "systems was negative except as noted above.    Meds:    acetaminophen  650 mg Oral Q4H     amLODIPine  5 mg Oral Daily     atorvastatin  10 mg Oral Daily     calcium acetate  1,334 mg Oral TID w/meals     carvedilol  6.25 mg Oral BID     epoetin staci-epbx (RETACRIT) inj ESRD  2,000 Units Intravenous Once per day on Mon Wed Fri     furosemide  40 mg Intravenous BID     gelatin absorbable  1 each Topical During Hemodialysis (from stock)     heparin lock flush  5 mL Intravenous Q24H     insulin aspart  1-7 Units Subcutaneous TID AC     insulin aspart  1-5 Units Subcutaneous At Bedtime     lidocaine  1 patch Transdermal Q24H     lidocaine   Transdermal Q8H     lipids  250 mL Intravenous Once per day on Sun Mon Wed Fri Sat     mycophenolate mofetil  1,000 mg Intravenous Q12H     OLANZapine  2.5 mg Oral Once     ondansetron  4 mg Oral TID AC     pantoprazole  40 mg Oral BID     predniSONE  40 mg Oral BID    Followed by     [START ON 7/14/2020] predniSONE  40 mg Oral Daily    Followed by     [START ON 7/16/2020] predniSONE  40 mg Oral Daily    Followed by     [START ON 7/17/2020] predniSONE  20 mg Oral Daily     [START ON 7/19/2020] predniSONE  5 mg Oral Daily     sodium bicarbonate  1,300 mg Oral BID     sodium chloride (PF)  10 mL Intracatheter Q8H     sodium chloride (PF)  3 mL Intracatheter Q8H     sulfamethoxazole-trimethoprim  1 tablet Oral Once per day on Mon Wed Fri     tacrolimus  6 mg Oral BID     valGANciclovir  450 mg Oral Once per day on Mon Thu       Physical Exam:     Admit Weight: 57.2 kg (126 lb 1.7 oz)    Current vitals:   /85   Pulse 69   Temp 98  F (36.7  C) (Oral)   Resp 14   Ht 1.63 m (5' 4.17\")   Wt 70.3 kg (155 lb)   SpO2 100%   BMI 26.46 kg/m      Vital sign ranges:    Temp:  [97.4  F (36.3  C)-98.4  F (36.9  C)] 98  F (36.7  C)  Pulse:  [67-80] 69  Heart Rate:  [] 79  Resp:  [12-24] 14  BP: (118-156)/() 139/85  SpO2:  [95 %-100 %] 100 %  Patient Vitals for the past 24 " hrs:   BP Temp Temp src Pulse Heart Rate Resp SpO2 Weight   07/12/20 1515 139/85 -- -- -- 78 14 100 % --   07/12/20 1500 137/84 -- -- -- 74 16 100 % --   07/12/20 1445 (!) 144/95 -- -- -- 73 18 100 % --   07/12/20 1430 (!) 142/93 -- -- -- 75 17 100 % --   07/12/20 1415 (!) 147/92 -- -- -- 81 16 99 % --   07/12/20 1400 (!) 151/96 -- -- -- 77 14 100 % --   07/12/20 1345 (!) 153/97 -- -- -- 71 12 100 % --   07/12/20 1330 (!) 156/101 -- -- -- 85 15 95 % --   07/12/20 1315 (!) 153/98 -- -- -- 70 20 100 % --   07/12/20 1307 (!) 156/99 -- -- -- 70 14 100 % --   07/12/20 1225 (!) 155/93 98  F (36.7  C) Oral -- 72 16 100 % --   07/12/20 1224 (!) 155/93 98.3  F (36.8  C) Oral -- 72 16 100 % --   07/12/20 0700 (!) 155/92 98.4  F (36.9  C) Oral -- 70 16 100 % --   07/12/20 0437 (!) 154/84 98.4  F (36.9  C) Oral -- 69 16 99 % --   07/12/20 0100 -- -- -- -- -- -- -- 70.3 kg (155 lb)   07/12/20 0000 (!) 149/84 97.4  F (36.3  C) Oral 69 -- 18 98 % --   07/11/20 2315 (!) 145/89 -- -- -- 70 18 98 % --   07/11/20 2300 (!) 142/80 -- -- -- 68 18 98 % --   07/11/20 2230 139/71 -- -- -- 77 14 98 % --   07/11/20 2215 (!) 149/85 98.4  F (36.9  C) Oral -- 79 20 99 % --   07/11/20 2200 131/73 98.1  F (36.7  C) Oral -- 70 18 99 % --   07/11/20 2145 133/68 98.3  F (36.8  C) Oral -- 72 24 98 % --   07/11/20 2041 135/81 -- -- -- 74 24 99 % --   07/11/20 2017 139/82 98.3  F (36.8  C) Oral -- 74 22 99 % --   07/11/20 1958 (!) 141/83 98.2  F (36.8  C) Oral 73 -- -- 99 % --   07/11/20 1745 129/78 98  F (36.7  C) Oral 73 71 20 100 % --   07/11/20 1742 118/75 -- -- -- 75 19 99 % --   07/11/20 1730 121/77 -- -- 71 71 20 100 % --   07/11/20 1715 131/79 -- -- 74 72 20 100 % --   07/11/20 1700 138/83 -- -- 69 70 20 100 % --   07/11/20 1645 (!) 144/84 -- -- 76 74 16 100 % --   07/11/20 1630 136/84 -- -- 69 105 16 100 % --   07/11/20 1615 (!) 141/86 -- -- 75 68 16 100 % --   07/11/20 1600 (!) 141/84 -- -- 80 -- -- 100 % --   07/11/20 1545 139/77 -- -- 76 75  -- 100 % --   07/11/20 1530 134/76 -- -- 67 69 -- 99 % --     General Appearance: No acute distress  Skin: warm, dry, no rashes  Heart: Perfused  Lungs: RA, unlabored  Abdomen: Soft  : polanco is not present.   Extremities: edema: generalized  Neurologic: awake, alert and oriented x4. Tremor absent.    Data:   CMP  Recent Labs   Lab 07/12/20  0643 07/11/20  0630 07/10/20  0556   * 126* 132*   POTASSIUM 4.2 4.2 4.1   CHLORIDE 96 95 99   CO2 26 24 26   * 198* 200*   BUN 39* 52* 28   CR 2.99* 4.87* 3.85*   GFRESTIMATED 20* 11* 14*   GFRESTBLACK 23* 13* 17*   CASIE 7.7* 6.8* 7.4*   MAG 1.6 1.7 1.6   PHOS 3.3 5.0* 3.8   ALBUMIN 3.1*  --  3.0*   BILITOTAL 0.3  --  1.0   ALKPHOS 35*  --  47   AST 20  --  26   ALT 25  --  24     CBC  Recent Labs   Lab 07/12/20  0643 07/11/20  0630   HGB 8.7* 8.0*   WBC 5.4 3.5*    168

## 2020-07-12 NOTE — PLAN OF CARE
/83  Pulse 73   Temp 98.2  Resp 22 2000-2300: Pt admitted with fevers and ARF of txp kidney - found to have AMR via graft bx 7/5. Started Apheresis/IVIG 7/7 with the plan of finishing 5 rounds total. Third round of apheresis followed by Thymo this AM. Started 3rd round of IVIG this evening and has tolerated well so far with premeds with the exception of having full body sweats about 2/3rds of the way through infusion (diaphoresis started at ~2330), rate not increased further at this point, no other s/s of intolerance of infusion. H/o DDKT 6/19/20 d/t IgA Nephropathy, POD#22, and LDKT 2007 that failed d/t non-compliance during pregnancy. HD this evening, 4L off (goal). AVSS on RA. Did trigger sepsis this evening d/t WBC - LA 1.5.  Neuro: WDL.  Cardiac: WDL ex heart murmur heard upon auscultation. Generalized edema present, non-pitting, receiving 40 mg IV Lasix BID.  Resp: WDL.  GI/: Last BM today. Oliguric, 25 ml UOP this evening.  Diet/Appetite: On low Phos diet with fair appetite, ate ~75% of dinner. No nausea and declined taking Zofran this evening.   Endocrine: ACHS BG checks (255) d/t steroid induced hyperglycemia, sliding scale insulin coverage.  Skin: WDL.  Access: DL R PICC with continuous TPN @ 40 + Lipids, second lumen (red) SLd but has no blood return. PIV x1 SLd. L + AVF.  Drains: NA.  Activity: Up SBA, went for walk in the halls with fiance this evening.  Pain: Endorsed mild abd pain, received 50 mg Tramadol along with scheduled Tyl with adequate relief.  Plan: Still needs abd XR (indication abd distention), CXR (indication pulm edema), and asx Cov swab - not done d/t getting back from HD so late. Continue Apheresis/IVIG tx every other day. Continue to monitor labs and assess need for HD - crt appears a bit labile. Will continue with plan of care and notify team of any changes.?

## 2020-07-12 NOTE — PLAN OF CARE
"BP (!) 154/84 (BP Location: Right leg)   Pulse 69   Temp 98.4  F (36.9  C) (Oral)   Resp 16   Ht 1.63 m (5' 4.17\")   Wt 70.3 kg (155 lb)   SpO2 99%   BMI 26.46 kg/m      2643-1400  Hypertensive, OVSS on RA. Up ad kathy, ambulated to bathroom. ACHS, 216 @2 am check. Skin intact. Pain controlled with prn Tramadol and scheduled Tylenol. Low phos diet with calorie counts. R double lumen PICC, one lumen hep locked, other infusing TPN @40 ml/hr and lipids @20.8. L arm fistula, +thrill and bruit. Oliguric, pt on HD. Denies nausea. IV cellcept q12 hours. COVID swab pending, not collected overnight, crossover wanted to discuss with day team regarding this order. Chest xray and abdominal xray done overnight. Will continue to monitor and notify team with any changes.   "

## 2020-07-12 NOTE — PROGRESS NOTES
HEMODIALYSIS TREATMENT NOTE    Date: 7/12/2020  Time: 4:15 PM    Data:  Pre Wt: 70.3kg  Desired Wt: 67.3 kg   Post Wt: 67.3 kg  Weight change: 3 kg  Ultrafiltration - Post Run Net Total Removed (mL): 3000 mL  Vascular Access Status: Fistula  patent  Dialyzer Rinse: Streaked, Light  Total Blood Volume Processed: 55.1 L   Total Dialysis (Treatment) Time: 3   Dialysate Bath: K 3, Ca 3  Heparin: None    Lab:   No    Assessment / Interventions:  ESRD pt seen in patient's room 3970-36 for 3 hour emergent run with goal UF 3L.  Edema LE - none to trace  UE- none to trace  LS CTA  No SOB or LORENZO per patient  Used L forearm AVF with no lidocaine per patient, no concerns. Pt held sites per request. RN completed TX and left after pt achieved hemostasis.   per MD order  NET UF 3L  VSS  Run unremarkable.       Plan:    Per Renal.

## 2020-07-12 NOTE — PLAN OF CARE
"BP (!) 140/80   Pulse 69   Temp 97.7  F (36.5  C) (Oral)   Resp 15   Ht 1.63 m (5' 4.17\")   Wt 70.3 kg (155 lb)   SpO2 100%   BMI 26.46 kg/m     Neuro: A/Ox4  VS: VSS on RA  Pain: c/o incisional pain treated by scheduled tylenol and lidocaine patch  GI: on renal diet with poor appetite. C/o nausea controlled by scheduled zofran PO. Medium BM and refused tab water enema  B, 187, and 226 received Novolog sliding scale  ADLs: R PICC double lumen infusing TPN. PIV SL  Skin: Abdominal incision intact PARVIZ  : Voiding small amount dark urine. Pt received HD at bedside today  Activity - Independent  Education - Medication review, bowel/pain management. Infection prevention  Plan of Care - Continue with POC    " Dr. Sabas Pena told Akua Brooke he needs to talk to her primary doctor about her Kidney Function Lab Results.

## 2020-07-13 ENCOUNTER — APPOINTMENT (OUTPATIENT)
Dept: LAB | Facility: CLINIC | Age: 35
End: 2020-07-13
Payer: MEDICARE

## 2020-07-13 PROBLEM — R19.7 NAUSEA VOMITING AND DIARRHEA: Status: ACTIVE | Noted: 2020-07-13

## 2020-07-13 PROBLEM — E87.70 HYPERVOLEMIA: Status: ACTIVE | Noted: 2020-07-09

## 2020-07-13 PROBLEM — R11.2 NAUSEA VOMITING AND DIARRHEA: Status: ACTIVE | Noted: 2020-07-13

## 2020-07-13 LAB
ALBUMIN SERPL-MCNC: 2.9 G/DL (ref 3.4–5)
ALP SERPL-CCNC: 40 U/L (ref 40–150)
ALT SERPL W P-5'-P-CCNC: 29 U/L (ref 0–50)
ANION GAP SERPL CALCULATED.3IONS-SCNC: 6 MMOL/L (ref 3–14)
AST SERPL W P-5'-P-CCNC: 18 U/L (ref 0–45)
BASOPHILS # BLD AUTO: 0 10E9/L (ref 0–0.2)
BASOPHILS NFR BLD AUTO: 0 %
BILIRUB SERPL-MCNC: 1.1 MG/DL (ref 0.2–1.3)
BUN SERPL-MCNC: 46 MG/DL (ref 7–30)
CALCIUM SERPL-MCNC: 7.9 MG/DL (ref 8.5–10.1)
CHLORIDE SERPL-SCNC: 98 MMOL/L (ref 94–109)
CO2 SERPL-SCNC: 26 MMOL/L (ref 20–32)
CREAT SERPL-MCNC: 3.02 MG/DL (ref 0.52–1.04)
DIFFERENTIAL METHOD BLD: ABNORMAL
EOSINOPHIL # BLD AUTO: 0 10E9/L (ref 0–0.7)
EOSINOPHIL NFR BLD AUTO: 0 %
ERYTHROCYTE [DISTWIDTH] IN BLOOD BY AUTOMATED COUNT: 13.6 % (ref 10–15)
FIBRINOGEN PPP-MCNC: 116 MG/DL (ref 200–420)
GFR SERPL CREATININE-BSD FRML MDRD: 19 ML/MIN/{1.73_M2}
GLUCOSE BLDC GLUCOMTR-MCNC: 148 MG/DL (ref 70–99)
GLUCOSE BLDC GLUCOMTR-MCNC: 185 MG/DL (ref 70–99)
GLUCOSE BLDC GLUCOMTR-MCNC: 198 MG/DL (ref 70–99)
GLUCOSE BLDC GLUCOMTR-MCNC: 215 MG/DL (ref 70–99)
GLUCOSE BLDC GLUCOMTR-MCNC: 254 MG/DL (ref 70–99)
GLUCOSE SERPL-MCNC: 204 MG/DL (ref 70–99)
HCT VFR BLD AUTO: 26 % (ref 35–47)
HGB BLD-MCNC: 8.4 G/DL (ref 11.7–15.7)
INR PPP: 1.43 (ref 0.86–1.14)
LYMPHOCYTES # BLD AUTO: 0 10E9/L (ref 0.8–5.3)
LYMPHOCYTES NFR BLD AUTO: 0 %
MAGNESIUM SERPL-MCNC: 1.9 MG/DL (ref 1.6–2.3)
MCH RBC QN AUTO: 30 PG (ref 26.5–33)
MCHC RBC AUTO-ENTMCNC: 32.3 G/DL (ref 31.5–36.5)
MCV RBC AUTO: 93 FL (ref 78–100)
MONOCYTES # BLD AUTO: 0.1 10E9/L (ref 0–1.3)
MONOCYTES NFR BLD AUTO: 1.7 %
MYELOCYTES # BLD: 0.1 10E9/L
MYELOCYTES NFR BLD MANUAL: 0.8 %
NEUTROPHILS # BLD AUTO: 7.2 10E9/L (ref 1.6–8.3)
NEUTROPHILS NFR BLD AUTO: 97.5 %
NRBC # BLD AUTO: 0.1 10*3/UL
NRBC BLD AUTO-RTO: 1 /100
PHOSPHATE SERPL-MCNC: 2.6 MG/DL (ref 2.5–4.5)
PLATELET # BLD AUTO: 229 10E9/L (ref 150–450)
POIKILOCYTOSIS BLD QL SMEAR: SLIGHT
POLYCHROMASIA BLD QL SMEAR: SLIGHT
POTASSIUM SERPL-SCNC: 4 MMOL/L (ref 3.4–5.3)
PREALB SERPL IA-MCNC: 27 MG/DL (ref 15–45)
PREALB SERPL IA-MCNC: 34 MG/DL (ref 15–45)
PROT SERPL-MCNC: 5.4 G/DL (ref 6.8–8.8)
RBC # BLD AUTO: 2.8 10E12/L (ref 3.8–5.2)
RBC INCLUSIONS BLD: SLIGHT
SODIUM SERPL-SCNC: 130 MMOL/L (ref 133–144)
TACROLIMUS BLD-MCNC: 7.9 UG/L (ref 5–15)
TME LAST DOSE: NORMAL H
WBC # BLD AUTO: 7.4 10E9/L (ref 4–11)

## 2020-07-13 PROCEDURE — 63400005 ZZH RX 634: Performed by: SURGERY

## 2020-07-13 PROCEDURE — 25000132 ZZH RX MED GY IP 250 OP 250 PS 637: Mod: GY | Performed by: SURGERY

## 2020-07-13 PROCEDURE — 25000132 ZZH RX MED GY IP 250 OP 250 PS 637: Mod: GY | Performed by: INTERNAL MEDICINE

## 2020-07-13 PROCEDURE — 84134 ASSAY OF PREALBUMIN: CPT | Performed by: SURGERY

## 2020-07-13 PROCEDURE — 25000125 ZZHC RX 250: Performed by: PATHOLOGY

## 2020-07-13 PROCEDURE — 25000131 ZZH RX MED GY IP 250 OP 636 PS 637: Mod: GY | Performed by: NURSE PRACTITIONER

## 2020-07-13 PROCEDURE — 83735 ASSAY OF MAGNESIUM: CPT | Performed by: SURGERY

## 2020-07-13 PROCEDURE — 85025 COMPLETE CBC W/AUTO DIFF WBC: CPT | Performed by: STUDENT IN AN ORGANIZED HEALTH CARE EDUCATION/TRAINING PROGRAM

## 2020-07-13 PROCEDURE — 12000026 ZZH R&B TRANSPLANT

## 2020-07-13 PROCEDURE — 25000132 ZZH RX MED GY IP 250 OP 250 PS 637: Mod: GY | Performed by: STUDENT IN AN ORGANIZED HEALTH CARE EDUCATION/TRAINING PROGRAM

## 2020-07-13 PROCEDURE — 25000128 H RX IP 250 OP 636: Performed by: NURSE PRACTITIONER

## 2020-07-13 PROCEDURE — P9041 ALBUMIN (HUMAN),5%, 50ML: HCPCS | Performed by: PATHOLOGY

## 2020-07-13 PROCEDURE — 80197 ASSAY OF TACROLIMUS: CPT | Performed by: STUDENT IN AN ORGANIZED HEALTH CARE EDUCATION/TRAINING PROGRAM

## 2020-07-13 PROCEDURE — 25000132 ZZH RX MED GY IP 250 OP 250 PS 637: Mod: GY | Performed by: NURSE PRACTITIONER

## 2020-07-13 PROCEDURE — 84100 ASSAY OF PHOSPHORUS: CPT | Performed by: SURGERY

## 2020-07-13 PROCEDURE — 85384 FIBRINOGEN ACTIVITY: CPT

## 2020-07-13 PROCEDURE — 85384 FIBRINOGEN ACTIVITY: CPT | Performed by: SURGERY

## 2020-07-13 PROCEDURE — 25000128 H RX IP 250 OP 636: Performed by: STUDENT IN AN ORGANIZED HEALTH CARE EDUCATION/TRAINING PROGRAM

## 2020-07-13 PROCEDURE — 25000125 ZZHC RX 250: Performed by: SURGERY

## 2020-07-13 PROCEDURE — 85610 PROTHROMBIN TIME: CPT | Performed by: SURGERY

## 2020-07-13 PROCEDURE — 80053 COMPREHEN METABOLIC PANEL: CPT | Performed by: SURGERY

## 2020-07-13 PROCEDURE — 25000132 ZZH RX MED GY IP 250 OP 250 PS 637: Mod: GY | Performed by: PHYSICIAN ASSISTANT

## 2020-07-13 PROCEDURE — 00000146 ZZHCL STATISTIC GLUCOSE BY METER IP

## 2020-07-13 PROCEDURE — 36514 APHERESIS PLASMA: CPT

## 2020-07-13 PROCEDURE — 25000128 H RX IP 250 OP 636: Performed by: PATHOLOGY

## 2020-07-13 PROCEDURE — 36592 COLLECT BLOOD FROM PICC: CPT | Performed by: STUDENT IN AN ORGANIZED HEALTH CARE EDUCATION/TRAINING PROGRAM

## 2020-07-13 RX ORDER — MYCOPHENOLATE MOFETIL 250 MG/1
1000 CAPSULE ORAL
Status: COMPLETED | OUTPATIENT
Start: 2020-07-13 | End: 2020-07-13

## 2020-07-13 RX ORDER — MYCOPHENOLIC ACID 360 MG/1
720 TABLET, DELAYED RELEASE ORAL 2 TIMES DAILY
Status: DISCONTINUED | OUTPATIENT
Start: 2020-07-13 | End: 2020-07-18 | Stop reason: HOSPADM

## 2020-07-13 RX ORDER — ALBUMIN HUMAN 25 %
2750 INTRAVENOUS SOLUTION INTRAVENOUS
Status: COMPLETED | OUTPATIENT
Start: 2020-07-13 | End: 2020-07-13

## 2020-07-13 RX ORDER — DIPHENHYDRAMINE HYDROCHLORIDE 50 MG/ML
50 INJECTION INTRAMUSCULAR; INTRAVENOUS
Status: DISCONTINUED | OUTPATIENT
Start: 2020-07-13 | End: 2020-07-14

## 2020-07-13 RX ORDER — CARVEDILOL 12.5 MG/1
12.5 TABLET ORAL 2 TIMES DAILY
Status: DISCONTINUED | OUTPATIENT
Start: 2020-07-13 | End: 2020-07-18 | Stop reason: HOSPADM

## 2020-07-13 RX ORDER — DIPHENHYDRAMINE HCL 25 MG
50 CAPSULE ORAL
Status: DISCONTINUED | OUTPATIENT
Start: 2020-07-13 | End: 2020-07-14

## 2020-07-13 RX ORDER — FUROSEMIDE 10 MG/ML
40 INJECTION INTRAMUSCULAR; INTRAVENOUS 3 TIMES DAILY
Status: DISCONTINUED | OUTPATIENT
Start: 2020-07-13 | End: 2020-07-18

## 2020-07-13 RX ORDER — DIPHENHYDRAMINE HCL 25 MG
50 CAPSULE ORAL ONCE
Status: COMPLETED | OUTPATIENT
Start: 2020-07-13 | End: 2020-07-13

## 2020-07-13 RX ORDER — METHYLPREDNISOLONE SODIUM SUCCINATE 125 MG/2ML
125 INJECTION, POWDER, LYOPHILIZED, FOR SOLUTION INTRAMUSCULAR; INTRAVENOUS
Status: DISCONTINUED | OUTPATIENT
Start: 2020-07-13 | End: 2020-07-14

## 2020-07-13 RX ORDER — METHYLPREDNISOLONE SODIUM SUCCINATE 125 MG/2ML
100 INJECTION, POWDER, LYOPHILIZED, FOR SOLUTION INTRAMUSCULAR; INTRAVENOUS ONCE
Status: COMPLETED | OUTPATIENT
Start: 2020-07-13 | End: 2020-07-13

## 2020-07-13 RX ORDER — ACETAMINOPHEN 325 MG/1
650 TABLET ORAL ONCE
Status: COMPLETED | OUTPATIENT
Start: 2020-07-13 | End: 2020-07-13

## 2020-07-13 RX ORDER — ACETAMINOPHEN 325 MG/1
650 TABLET ORAL
Status: DISCONTINUED | OUTPATIENT
Start: 2020-07-13 | End: 2020-07-14

## 2020-07-13 RX ORDER — CALCIUM GLUCONATE 100 MG/ML
AMPUL (ML) INTRAVENOUS
Status: COMPLETED | OUTPATIENT
Start: 2020-07-13 | End: 2020-07-13

## 2020-07-13 RX ADMIN — FUROSEMIDE 40 MG: 10 INJECTION, SOLUTION INTRAMUSCULAR; INTRAVENOUS at 07:56

## 2020-07-13 RX ADMIN — AMLODIPINE BESYLATE 5 MG: 5 TABLET ORAL at 07:56

## 2020-07-13 RX ADMIN — SODIUM BICARBONATE 650 MG TABLET 1300 MG: at 20:36

## 2020-07-13 RX ADMIN — DIPHENHYDRAMINE HYDROCHLORIDE 50 MG: 25 CAPSULE ORAL at 13:56

## 2020-07-13 RX ADMIN — TRAMADOL HYDROCHLORIDE 50 MG: 50 TABLET, COATED ORAL at 08:16

## 2020-07-13 RX ADMIN — ANTICOAGULANT CITRATE DEXTROSE SOLUTION FORMULA A 488 ML: 12.25; 11; 3.65 SOLUTION INTRAVENOUS at 10:26

## 2020-07-13 RX ADMIN — I.V. FAT EMULSION 250 ML: 20 EMULSION INTRAVENOUS at 20:31

## 2020-07-13 RX ADMIN — Medication 5 ML: at 06:10

## 2020-07-13 RX ADMIN — ACETAMINOPHEN 650 MG: 325 TABLET, FILM COATED ORAL at 16:03

## 2020-07-13 RX ADMIN — EPOETIN ALFA-EPBX 2000 UNITS: 10000 INJECTION, SOLUTION INTRAVENOUS; SUBCUTANEOUS at 12:38

## 2020-07-13 RX ADMIN — CALCIUM GLUCONATE 2.6 G: 98 INJECTION, SOLUTION INTRAVENOUS at 10:25

## 2020-07-13 RX ADMIN — ONDANSETRON 4 MG: 4 TABLET, ORALLY DISINTEGRATING ORAL at 07:55

## 2020-07-13 RX ADMIN — TACROLIMUS 6 MG: 5 CAPSULE ORAL at 17:51

## 2020-07-13 RX ADMIN — FUROSEMIDE 40 MG: 10 INJECTION, SOLUTION INTRAMUSCULAR; INTRAVENOUS at 14:48

## 2020-07-13 RX ADMIN — SULFAMETHOXAZOLE AND TRIMETHOPRIM 1 TABLET: 400; 80 TABLET ORAL at 12:41

## 2020-07-13 RX ADMIN — CALCIUM ACETATE 1334 MG: 667 CAPSULE ORAL at 12:48

## 2020-07-13 RX ADMIN — PANTOPRAZOLE SODIUM 40 MG: 40 TABLET, DELAYED RELEASE ORAL at 07:56

## 2020-07-13 RX ADMIN — CARVEDILOL 12.5 MG: 12.5 TABLET, FILM COATED ORAL at 20:37

## 2020-07-13 RX ADMIN — TRAMADOL HYDROCHLORIDE 50 MG: 50 TABLET, COATED ORAL at 21:38

## 2020-07-13 RX ADMIN — IMMUNE GLOBULIN INFUSION (HUMAN) 30 G: 100 INJECTION, SOLUTION INTRAVENOUS; SUBCUTANEOUS at 16:46

## 2020-07-13 RX ADMIN — FUROSEMIDE 40 MG: 10 INJECTION, SOLUTION INTRAMUSCULAR; INTRAVENOUS at 20:38

## 2020-07-13 RX ADMIN — MYCOPHENOLIC ACID 720 MG: 360 TABLET, DELAYED RELEASE ORAL at 20:37

## 2020-07-13 RX ADMIN — INSULIN ASPART 1 UNITS: 100 INJECTION, SOLUTION INTRAVENOUS; SUBCUTANEOUS at 12:48

## 2020-07-13 RX ADMIN — ALBUMIN HUMAN 2750 ML: 0.05 INJECTION, SOLUTION INTRAVENOUS at 10:25

## 2020-07-13 RX ADMIN — MYCOPHENOLATE MOFETIL 1000 MG: 250 CAPSULE ORAL at 07:55

## 2020-07-13 RX ADMIN — PANTOPRAZOLE SODIUM 40 MG: 40 TABLET, DELAYED RELEASE ORAL at 20:36

## 2020-07-13 RX ADMIN — CARVEDILOL 6.25 MG: 6.25 TABLET, FILM COATED ORAL at 07:56

## 2020-07-13 RX ADMIN — POTASSIUM CHLORIDE: 2 INJECTION, SOLUTION, CONCENTRATE INTRAVENOUS at 20:31

## 2020-07-13 RX ADMIN — ATORVASTATIN CALCIUM 10 MG: 10 TABLET, FILM COATED ORAL at 07:56

## 2020-07-13 RX ADMIN — INSULIN ASPART 2 UNITS: 100 INJECTION, SOLUTION INTRAVENOUS; SUBCUTANEOUS at 17:25

## 2020-07-13 RX ADMIN — METHYLPREDNISOLONE SODIUM SUCCINATE 100 MG: 125 INJECTION, POWDER, FOR SOLUTION INTRAMUSCULAR; INTRAVENOUS at 16:05

## 2020-07-13 RX ADMIN — TACROLIMUS 6 MG: 5 CAPSULE ORAL at 07:55

## 2020-07-13 RX ADMIN — CALCIUM ACETATE 1334 MG: 667 CAPSULE ORAL at 17:52

## 2020-07-13 RX ADMIN — SODIUM BICARBONATE 650 MG TABLET 1300 MG: at 07:59

## 2020-07-13 RX ADMIN — INSULIN ASPART 1 UNITS: 100 INJECTION, SOLUTION INTRAVENOUS; SUBCUTANEOUS at 08:26

## 2020-07-13 RX ADMIN — VALGANCICLOVIR 450 MG: 450 TABLET, FILM COATED ORAL at 12:41

## 2020-07-13 RX ADMIN — DIPHENHYDRAMINE HYDROCHLORIDE 50 MG: 25 CAPSULE ORAL at 16:03

## 2020-07-13 ASSESSMENT — ACTIVITIES OF DAILY LIVING (ADL)
ADLS_ACUITY_SCORE: 12
ADLS_ACUITY_SCORE: 13
ADLS_ACUITY_SCORE: 12

## 2020-07-13 ASSESSMENT — PAIN DESCRIPTION - DESCRIPTORS: DESCRIPTORS: ACHING

## 2020-07-13 ASSESSMENT — MIFFLIN-ST. JEOR: SCORE: 1401.27

## 2020-07-13 NOTE — PROGRESS NOTES
[facility] [place]   Transplant Nephrology Progress Note  Date of Admission:  7/4/2020  Today's Date: 07/13/2020    Recommendations:  1. 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  Other Banff scoring include V1, T1, I1.  The biopsy also showed interstitial hemorrhage and tubular injury with vacuolization.  The ATN component may be a phenotype of a BMR.     # Immunosuppression: Tacrolimus immediate release (goal 8-10) and Myfortic 720 twice a day.              - Changes: Rejection treatment will include Thymoglobulin 2 mg/kg per dose x3, plasmapheresis with IVIG x 5 and Solu-Medrol 500 mg daily x3.  Following the intense course, prednisone taper will be recommended and prednisone maintenance 5 mg daily.  Agree with rituximab single dose.     # Infection Prophylaxis: (please renally dose)   - PJP: Sulfa/TMP (Bactrim)   - CMV: Valcyte      # Hypertension: Controlled;   Goal BP: < 130/80              - Volume status: hypervolemic              - will add Norvasc 5 mg daily       # Anemia in Chronic Renal Disease: Hgb: Improved with transfusion     ENZO: will start 2000 units three times per week     # Mineral Bone Disorder:   - Secondary renal hyperparathyroidism; PTH level: Moderately elevated (301-600 pg/ml)        On treatment: None  - Vitamin D; level: Normal        On supplement: No  - Calcium; level: Low normal        On supplement: No  - Phosphorus; level: Normal        On supplement: No     # Electrolytes:   -resolved with dialysis      # Transplant  History:  Etiology of Kidney Failure: IgA nephropathy  Tx: LDKT and DDKT  Transplant: 6/19/2020 (Kidney), 12/1/2007 (Kidney)  Donor Type: Donation after Brain Death        Donor Class:   Crossmatch at time of Tx: pending   Significant changes in immunosuppression: None  Significant transplant-related complications: None    Mahesh Armando MD   Pager: [phone]    Interval History   Feels somewhat better today specially after dialysis and would like to hold off. Her UOP is improving     Review of Systems   4 point ROS was obtained and negative except as noted in the Interval History.    MEDICATIONS:    acetaminophen  650 mg Oral Once     acetaminophen  650 mg Oral Q4H     amLODIPine  5 mg Oral Daily     atorvastatin  10 mg Oral Daily     calcium acetate  1,334 mg Oral TID w/meals     carvedilol  6.25 mg Oral BID     diphenhydrAMINE  50 mg Oral Once     epoetin staci-epbx (RETACRIT) inj ESRD  2,000 Units Intravenous Once per day on Mon Wed Fri     furosemide  40 mg Intravenous TID     heparin lock flush  5 mL Intravenous Q24H     immune globulin - sucrose free  0.5 g/kg (Ideal) Intravenous Once     insulin aspart  1-7 Units Subcutaneous TID AC     insulin aspart  1-5 Units Subcutaneous At Bedtime     lidocaine  1 patch Transdermal Q24H     lidocaine   Transdermal Q8H     lipids  250 mL Intravenous Once per day on Sun Mon Wed Fri Sat     methylPREDNISolone  100 mg Intravenous Once     mycophenolic acid  720 mg Oral BID     OLANZapine  2.5 mg Oral Once     ondansetron  4 mg Oral TID AC     pantoprazole  40 mg Oral BID     [START ON 7/14/2020] predniSONE  40 mg Oral Daily    Followed by     [START ON 7/16/2020] predniSONE  40 mg Oral Daily    Followed by     [START ON 7/17/2020] predniSONE  20 mg Oral Daily     [START ON 7/19/2020] predniSONE  5 mg Oral Daily     sodium bicarbonate  1,300 mg Oral BID     sodium chloride (PF)  10 mL Intracatheter Q8H     sodium chloride (PF)  3 mL Intracatheter Q8H      "sulfamethoxazole-trimethoprim  1 tablet Oral Once per day on      tacrolimus  6 mg Oral BID     valGANciclovir  450 mg Oral Once per day on        dextrose       parenteral nutrition - ADULT compounded formula       parenteral nutrition - ADULT compounded formula 40 mL/hr at 20 0820       Physical Exam   Temp  Av.8  F (37.1  C)  Min: 97.6  F (36.4  C)  Max: 103.7  F (39.8  C)      Pulse  Av.5  Min: 74  Max: 122 Resp  Av.1  Min: 13  Max: 28  SpO2  Av.1 %  Min: 93 %  Max: 100 %     BP (!) 158/98   Pulse 73   Temp 97.9  F (36.6  C) (Oral)   Resp 16   Ht 1.63 m (5' 4.17\")   Wt 70.3 kg (155 lb)   SpO2 100%   BMI 26.46 kg/m     Date 20 0700 - 20 0659   Shift 8303-5807 4146-8032 7161-4266 24 Hour Total   INTAKE   P.O. 600 200  800   I.V.  810  810   Shift Total(mL/kg) 600(10.49) 1010(17.66)  1610(28.15)   OUTPUT   Urine 50   50   Shift Total(mL/kg) 50(0.87)   50(0.87)   Weight (kg) 57.2 57.2 57.2 57.2      Admit Weight: 57.2 kg (126 lb 1.7 oz)     GENERAL APPEARANCE: alert and no distress  HENT: mouth without ulcers or lesions  LYMPHATICS: no cervical or supraclavicular nodes  RESP: lungs clear to auscultation - no rales, rhonchi or wheezes  CV: regular rhythm, normal rate, no rub, no murmur  EDEMA: LE edema bilaterally  ABDOMEN: soft, nondistended, nontender, bowel sounds normal  MS: extremities normal - no gross deformities noted, no evidence of inflammation in joints, no muscle tenderness  SKIN: no rash    Data   All labs reviewed by me.  CMP  Recent Labs   Lab 20  0619 20  0643 20  0630 07/10/20  0556   * 128* 126* 132*   POTASSIUM 4.0 4.2 4.2 4.1   CHLORIDE 98 96 95 99   CO2 26 26 24 26   ANIONGAP 6 7 7 7   * 237* 198* 200*   BUN 46* 39* 52* 28   CR 3.02* 2.99* 4.87* 3.85*   GFRESTIMATED 19* 20* 11* 14*   GFRESTBLACK 22* 23* 13* 17*   CASIE 7.9* 7.7* 6.8* 7.4*   MAG 1.9 1.6 1.7 1.6   PHOS 2.6 3.3 5.0* 3.8   PROTTOTAL 5.4* 5.7*  " --  5.7*   ALBUMIN 2.9* 3.1*  --  3.0*   BILITOTAL 1.1 0.3  --  1.0   ALKPHOS 40 35*  --  47   AST 18 20  --  26   ALT 29 25  --  24     CBC  Recent Labs   Lab 07/13/20  0619 07/12/20  0643 07/11/20  0630 07/10/20  0556   HGB 8.4* 8.7* 8.0* 8.6*   WBC 7.4 5.4 3.5* 3.4*   RBC 2.80* 2.86* 2.67* 2.86*   HCT 26.0* 26.5* 24.4* 26.6*   MCV 93 93 91 93   MCH 30.0 30.4 30.0 30.1   MCHC 32.3 32.8 32.8 32.3   RDW 13.6 13.4 13.6 14.0    202 168 177     INR  Recent Labs   Lab 07/13/20  0619 07/12/20  0643 07/11/20  0900 07/10/20  0556   INR 1.43* 1.45* 1.35* 1.65*     ABGNo lab results found in last 7 days.   Urine Studies  Recent Labs   Lab Test 07/06/20  1412 07/04/20  1346 07/01/20  1430 06/23/20  0830   COLOR Yellow Yellow Patricia Yellow   APPEARANCE Slightly Cloudy Slightly Cloudy Slightly Cloudy Clear   URINEGLC 150* 70* Negative Negative   URINEBILI Negative Small* Negative Negative   URINEKETONE 10* Negative Negative Negative   SG 1.022 1.027 1.025 1.012   UBLD Small* Small* Moderate* Moderate*   URINEPH 8.0* 8.0* 6.0 6.5   PROTEIN >600* >600* 100* 10*   NITRITE Negative Negative Negative Negative   LEUKEST Negative Negative Negative Small*   RBCU 78* 21* 96* 144*   WBCU 21* 2 9* 4     Recent Labs   Lab Test 07/16/12  1400 07/02/12  1130 06/25/12  0615 06/17/12  0930 06/13/12  1120 06/07/12  0643 06/01/12  0950 05/25/12  1830 05/25/12  1110 05/22/12  1536   UTPG 1.53* 0.86* 0.69* 0.82* 0.80* 0.88* 0.58* 0.47*  0.45* 0.56* 0.51*     PTH  Recent Labs   Lab Test 06/26/20  0842 11/30/12  2030 11/30/12  1030   PTHI 226* 500* 794*     Iron Studies  Recent Labs   Lab Test 07/05/20  0553 06/25/20  0749 11/30/12  1950   IRON 10* 57 125   * 163* 155*   IRONSAT 9* 35 81*   SHAHBAZ  --  886* 401*       IMAGING:  All imaging studies reviewed by me.     Effusion on cxr from 7/12

## 2020-07-13 NOTE — PROGRESS NOTES
[facility], [place]   Transplant Nephrology Progress Note  Date of Admission:  7/4/2020  Today's Date: 07/12/2020    Recommendations:  1.  We will dialyze again today and will attempt 3 liters removal.  2.  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  Other Banff scoring include V1, T1, I1.  The biopsy also showed interstitial hemorrhage and tubular injury with vacuolization.  The ATN component may be a phenotype of a BMR.     # Immunosuppression: Tacrolimus immediate release (goal 8-10) and Myfortic 720 twice a day.              - Changes: Rejection treatment will include Thymoglobulin 2 mg/kg per dose x3, plasmapheresis with IVIG x 5 and Solu-Medrol 500 mg daily x3.  Following the intense course, prednisone taper will be recommended and prednisone maintenance 5 mg daily.  Agree with rituximab single dose.     # Infection Prophylaxis: (please renally dose)   - PJP: Sulfa/TMP (Bactrim)   - CMV: Valcyte      # Hypertension: Controlled;   Goal BP: < 130/80              - Volume status: hypervolemic              - will add Norvasc 5 mg daily       # Anemia in Chronic Renal Disease: Hgb: Improved with transfusion     ENZO: will start 2000 units three times per week     # Mineral Bone Disorder:   - Secondary renal hyperparathyroidism; PTH level: Moderately elevated (301-600 pg/ml)        On treatment: None  - Vitamin D; level: Normal        On supplement: No  - Calcium; level: Low normal        On supplement: No  - Phosphorus; level: Normal        On supplement: No     #  Electrolytes:   -resolved with dialysis      # Transplant History:  Etiology of Kidney Failure: IgA nephropathy  Tx: LDKT and DDKT  Transplant: 6/19/2020 (Kidney), 12/1/2007 (Kidney)  Donor Type: Donation after Brain Death        Donor Class:   Crossmatch at time of Tx: pending   Significant changes in immunosuppression: None  Significant transplant-related complications: None    Mahesh Armando MD   Pager: [phone]    Interval History   Feels somewhat better today specially after dialysis and would like to get 3 more kg off. She had increased marking and pleural effusion on cxr     Review of Systems   4 point ROS was obtained and negative except as noted in the Interval History.    MEDICATIONS:    acetaminophen  650 mg Oral Q4H     amLODIPine  5 mg Oral Daily     atorvastatin  10 mg Oral Daily     calcium acetate  1,334 mg Oral TID w/meals     carvedilol  6.25 mg Oral BID     epoetin staci-epbx (RETACRIT) inj ESRD  2,000 Units Intravenous Once per day on Mon Wed Fri     furosemide  40 mg Intravenous BID     gelatin absorbable  1 each Topical During Hemodialysis (from stock)     heparin lock flush  5 mL Intravenous Q24H     insulin aspart  1-7 Units Subcutaneous TID AC     insulin aspart  1-5 Units Subcutaneous At Bedtime     lidocaine  1 patch Transdermal Q24H     lidocaine   Transdermal Q8H     lipids  250 mL Intravenous Once per day on Sun Mon Wed Fri Sat     mycophenolate mofetil  1,000 mg Intravenous Q12H     OLANZapine  2.5 mg Oral Once     ondansetron  4 mg Oral TID AC     pantoprazole  40 mg Oral BID     predniSONE  40 mg Oral BID    Followed by     [START ON 7/14/2020] predniSONE  40 mg Oral Daily    Followed by     [START ON 7/16/2020] predniSONE  40 mg Oral Daily    Followed by     [START ON 7/17/2020] predniSONE  20 mg Oral Daily     [START ON 7/19/2020] predniSONE  5 mg Oral Daily     sodium bicarbonate  1,300 mg Oral BID     sodium chloride (PF)  10 mL Intracatheter Q8H     sodium chloride (PF)  3 mL  "Intracatheter Q8H     sulfamethoxazole-trimethoprim  1 tablet Oral Once per day on      tacrolimus  6 mg Oral BID     valGANciclovir  450 mg Oral Once per day on        dextrose       parenteral nutrition - ADULT compounded formula         Physical Exam   Temp  Av.8  F (37.1  C)  Min: 97.6  F (36.4  C)  Max: 103.7  F (39.8  C)      Pulse  Av.5  Min: 74  Max: 122 Resp  Av.1  Min: 13  Max: 28  SpO2  Av.1 %  Min: 93 %  Max: 100 %     BP (!) 153/91 (BP Location: Right leg)   Pulse 69   Temp 98.1  F (36.7  C) (Oral)   Resp 12   Ht 1.63 m (5' 4.17\")   Wt 70.3 kg (155 lb)   SpO2 100%   BMI 26.46 kg/m     Date 20 0700 - 20 0659   Shift 5257-9410 6264-4693 7372-9070 24 Hour Total   INTAKE   P.O. 600 200  800   I.V.  810  810   Shift Total(mL/kg) 600(10.49) 1010(17.66)  1610(28.15)   OUTPUT   Urine 50   50   Shift Total(mL/kg) 50(0.87)   50(0.87)   Weight (kg) 57.2 57.2 57.2 57.2      Admit Weight: 57.2 kg (126 lb 1.7 oz)     GENERAL APPEARANCE: alert and no distress  HENT: mouth without ulcers or lesions  LYMPHATICS: no cervical or supraclavicular nodes  RESP: lungs clear to auscultation - no rales, rhonchi or wheezes  CV: regular rhythm, normal rate, no rub, no murmur  EDEMA: LE edema bilaterally  ABDOMEN: soft, nondistended, nontender, bowel sounds normal  MS: extremities normal - no gross deformities noted, no evidence of inflammation in joints, no muscle tenderness  SKIN: no rash    Data   All labs reviewed by me.  CMP  Recent Labs   Lab 20  0643 20  0630 07/10/20  0556 20  0615   * 126* 132* 134   POTASSIUM 4.2 4.2 4.1 3.4   CHLORIDE 96 95 99 98   CO2 26 24 26 26   ANIONGAP 7 7 7 10   * 198* 200* 159*   BUN 39* 52* 28 42*   CR 2.99* 4.87* 3.85* 5.61*   GFRESTIMATED 20* 11* 14* 9*   GFRESTBLACK 23* 13* 17* 11*   CASIE 7.7* 6.8* 7.4* 6.1*   MAG 1.6 1.7 1.6 1.7   PHOS 3.3 5.0* 3.8 4.3   PROTTOTAL 5.7*  --  5.7*  --    ALBUMIN 3.1*  --  " 3.0*  --    BILITOTAL 0.3  --  1.0  --    ALKPHOS 35*  --  47  --    AST 20  --  26  --    ALT 25  --  24  --      CBC  Recent Labs   Lab 07/12/20  0643 07/11/20  0630 07/10/20  0556 07/09/20  1310   HGB 8.7* 8.0* 8.6* 7.6*   WBC 5.4 3.5* 3.4* 2.2*   RBC 2.86* 2.67* 2.86* 2.52*   HCT 26.5* 24.4* 26.6* 22.8*   MCV 93 91 93 91   MCH 30.4 30.0 30.1 30.2   MCHC 32.8 32.8 32.3 33.3   RDW 13.4 13.6 14.0 14.2    168 177 154     INR  Recent Labs   Lab 07/12/20  0643 07/11/20  0900 07/10/20  0556 07/09/20  1310   INR 1.45* 1.35* 1.65* 1.49*     ABGNo lab results found in last 7 days.   Urine Studies  Recent Labs   Lab Test 07/06/20  1412 07/04/20  1346 07/01/20  1430 06/23/20  0830   COLOR Yellow Yellow Patricia Yellow   APPEARANCE Slightly Cloudy Slightly Cloudy Slightly Cloudy Clear   URINEGLC 150* 70* Negative Negative   URINEBILI Negative Small* Negative Negative   URINEKETONE 10* Negative Negative Negative   SG 1.022 1.027 1.025 1.012   UBLD Small* Small* Moderate* Moderate*   URINEPH 8.0* 8.0* 6.0 6.5   PROTEIN >600* >600* 100* 10*   NITRITE Negative Negative Negative Negative   LEUKEST Negative Negative Negative Small*   RBCU 78* 21* 96* 144*   WBCU 21* 2 9* 4     Recent Labs   Lab Test 07/16/12  1400 07/02/12  1130 06/25/12  0615 06/17/12  0930 06/13/12  1120 06/07/12  0643 06/01/12  0950 05/25/12  1830 05/25/12  1110 05/22/12  1536   UTPG 1.53* 0.86* 0.69* 0.82* 0.80* 0.88* 0.58* 0.47*  0.45* 0.56* 0.51*     PTH  Recent Labs   Lab Test 06/26/20  0842 11/30/12  2030 11/30/12  1030   PTHI 226* 500* 794*     Iron Studies  Recent Labs   Lab Test 07/05/20  0553 06/25/20  0749 11/30/12  1950   IRON 10* 57 125   * 163* 155*   IRONSAT 9* 35 81*   SHAHBAZ  --  886* 401*       IMAGING:  All imaging studies reviewed by me.     Effusion on cxr from 7/12

## 2020-07-13 NOTE — PROCEDURES
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  She underwent therapeutic plasma exchange #4 of planned 5. She tolerated the procedure well. Next procedure on Wednesday. Fibrinogen trending down due to albumin replacement with TPE. Please check INR and fibrinogen with AM labs on Wednesday. Patient may require fibrinogen replacement with procedure.    Please do not start ACE inhibitors throughout the duration of the TPE series as these have been associated with reactions during apheresis.  Please notify the Transfusion Medicine physician of any upcoming procedures, surgeries, or biopsies as TPE with albumin replacement will affect coagulation factor levels.         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  She had a first kidney transplant in 2007. She underwent a  donor kidney transplant on 2020.   She was treated for a UTI on 2020, but returned on 2020 with fever, chills, myalgias, generalized fatigue and vomiting. She underwent renal biopsy and fluid collection drainage on 2020. Biopsy consistent with cellular rejection with some suggestion of antibody mediated rejection. Donor specific antibody pre and post transplant are negative.  A series of TPE were requested with the first one on 2020 with IVIG and rituximab. She has a fistula for access. Had dialysis yesterday. Creatinine trending down. Reports feeling better today but continues to have edema.  Feels like urine output is increasing, though not back to highest level.  Nausea improving. No fever, chills, cough, shortness of breath. Pain controlled.         Past Medical History:     Past Medical History:   Diagnosis Date     ACS (acute coronary syndrome) (H) 2014     Acute rejection of kidney transplant 2020    Mixed AMR & ACR Banff 2A     Allergic state     seasonal allergies     Anemia in chronic renal disease      Anxiety      Cervical cerclage suture present in second trimester      Chest pain 2014     Chronic renal transplant rejection      End stage kidney disease (H)      Heart murmur      History of  delivery ,     30 wks, 28 wks      Hypertension     resolved after transplant     IgA nephropathy      Kidney transplanted 2020    DDKT. Induction w/ thymo 5.7mg/kg.     MRSA (methicillin resistant Staphylococcus aureus)      [facility]     Pre-eclampsia      Renal failure affecting pregnancy in second trimester      S/P kidney transplant 2007    [facility]. History of 1B kidney rejection. Failed .     SAB (spontaneous )     2nd trimester            Past Surgical History:     Past Surgical History:   Procedure Laterality Date     CERCLAGE CERVICAL N/A 2015    Procedure: CERCLAGE CERVICAL;  Surgeon: Norbert Chopra MD;  Location: [facility] L[facility]D      SECTION  2012    Procedure:  SECTION;;  Surgeon: Chelsea Cross MD;  Location: [facility] L[facility]      SECTION N/A 2015    Procedure:  SECTION;  Surgeon: Chelsea Cross MD;  Location: [facility] OR     cesearean section       EXPLANT TRANSPLANTED KIDNEY  3/29/2013    Procedure: EXPLANT TRANSPLANTED KIDNEY;  Explant Transplanted right Kidney (from patients right iliac fossa);  Surgeon: Nory Morgan MD;  Location: [facility] OR     IR FINE NEEDLE ASPIRATION W ULTRASOUND  2020     IR PICC PLACEMENT > 5 YRS OF AGE  7/10/2020     IR RENAL BIOPSY LEFT   2020     MIDLINE DOUBLE LUMEN PLACEMENT Right 2020    unable to place PICC line, MIDLINE placed     NEPHRECTOMY  3/29/13    Transplant nephrectomy      WILIAN/DIALYSIS CATHETER       TRANSPLANT KIDNEY RECIPIENT  DONOR N/A 2020    Procedure: TRANSPLANT, KIDNEY, RECIPIENT,  DONOR, venous reconstruction, and back bench preparation of kidney;  Surgeon: Irma Shannon MD;  Location: [facility] OR     TRANSPLANT KIDNEY RECIPIENT LIVING UNRELATED  Dec 2007    (Adult male in [place])          Social History:   2 children, worked previously but not recently          Allergies:   No Known Allergies          Medications:     Current Facility-Administered Medications   Medication     acetaminophen (TYLENOL) tablet 650 mg     acetaminophen (TYLENOL) tablet 650 mg     acetaminophen (TYLENOL) tablet 650 mg     acetaminophen (TYLENOL) tablet 650 mg     amLODIPine (NORVASC) tablet 5 mg     [Rx hold ] aspirin (ASA) chewable tablet 81 mg     atorvastatin (LIPITOR) tablet 10 mg     calcium acetate (PHOSLO) capsule 1,334 mg     carvedilol (COREG) tablet 6.25 mg     cloNIDine (CATAPRES) tablet 0.1 mg     dextrose 10% infusion     glucose gel 15-30 g    Or     dextrose 50 % injection 25-50 mL    Or     glucagon injection 1 mg     diphenhydrAMINE (BENADRYL) capsule 50 mg     diphenhydrAMINE (BENADRYL) capsule 50 mg    Or     diphenhydrAMINE (BENADRYL) injection 50 mg     diphenhydrAMINE (BENADRYL) capsule 50 mg    Or     diphenhydrAMINE (BENADRYL) injection 50 mg     diphenhydrAMINE (BENADRYL) injection 50 mg     EPINEPHrine (ADRENALIN) kit 0.3 mg     epoetin staci-epbx (RETACRIT) injection 2,000 Units     famotidine (PEPCID) injection 20 mg     furosemide (LASIX) injection 40 mg     heparin lock flush 10 UNIT/ML injection 5 mL     heparin lock flush 10 UNIT/ML injection 5-10 mL     hydrOXYzine (ATARAX) tablet 25 mg     immune globulin - sucrose free 10 % injection 30 g     insulin aspart (NovoLOG) injection  (RAPID ACTING)     insulin aspart (NovoLOG) injection (RAPID ACTING)     Lidocaine (LIDOCARE) 4 % Patch 1 patch     lidocaine (LMX4) cream     lidocaine (XYLOCAINE) 2 % 15 mL, alum & mag hydroxide-simethicone (MAALOX  ES) 15 mL GI Cocktail     lidocaine patch in PLACE     lipids (INTRALIPID) 20 % infusion 250 mL     methylPREDNISolone sodium succinate (solu-MEDROL) injection 100 mg     methylPREDNISolone sodium succinate (solu-MEDROL) injection 125 mg     mycophenolic acid (GENERIC EQUIVALENT) EC tablet 720 mg     naloxone (NARCAN) injection 0.1-0.4 mg     OLANZapine (zyPREXA) tablet 2.5 mg     ondansetron (ZOFRAN) injection 4 mg     ondansetron (ZOFRAN-ODT) ODT tab 4 mg     pantoprazole (PROTONIX) EC tablet 40 mg     parenteral nutrition - ADULT compounded formula     parenteral nutrition - ADULT compounded formula     [START ON 7/14/2020] predniSONE (DELTASONE) tablet 40 mg    Followed by     [START ON 7/16/2020] predniSONE (DELTASONE) tablet 40 mg    Followed by     [START ON 7/17/2020] predniSONE (DELTASONE) tablet 20 mg     [START ON 7/19/2020] predniSONE (DELTASONE) tablet 5 mg     prochlorperazine (COMPAZINE) injection 10 mg    Or     prochlorperazine (COMPAZINE) tablet 10 mg     sodium bicarbonate tablet 1,300 mg     sodium chloride (PF) 0.9% PF flush 10 mL     sodium chloride (PF) 0.9% PF flush 10-20 mL     sodium chloride (PF) 0.9% PF flush 10-20 mL     sodium chloride (PF) 0.9% PF flush 3 mL     sodium chloride (PF) 0.9% PF flush 3 mL     sulfamethoxazole-trimethoprim (BACTRIM) 400-80 MG per tablet 1 tablet     tacrolimus (GENERIC EQUIVALENT) capsule 6 mg     traMADol (ULTRAM) tablet 50 mg     valGANciclovir (VALCYTE) tablet 450 mg           Review of Systems:   See above         Exam:   /79 P 76 T 98.5 RR 16 O2 sat 100% on RA  Alert, no apparent distress, watching phone  Breathing appears comfortable  Left arm fistula  + edema           Data:     Results for orders placed or performed during the  hospital encounter of 07/04/20 (from the past 24 hour(s))   Glucose by meter   Result Value Ref Range    Glucose 246 (H) 70 - 99 mg/dL   Glucose by meter   Result Value Ref Range    Glucose 215 (H) 70 - 99 mg/dL   Tacrolimus level   Result Value Ref Range    Tacrolimus Last Dose Not Provided     Tacrolimus Level 7.9 5.0 - 15.0 ug/L   Comprehensive metabolic panel   Result Value Ref Range    Sodium 130 (L) 133 - 144 mmol/L    Potassium 4.0 3.4 - 5.3 mmol/L    Chloride 98 94 - 109 mmol/L    Carbon Dioxide 26 20 - 32 mmol/L    Anion Gap 6 3 - 14 mmol/L    Glucose 204 (H) 70 - 99 mg/dL    Urea Nitrogen 46 (H) 7 - 30 mg/dL    Creatinine 3.02 (H) 0.52 - 1.04 mg/dL    GFR Estimate 19 (L) >60 mL/min/[1.73_m2]    GFR Estimate If Black 22 (L) >60 mL/min/[1.73_m2]    Calcium 7.9 (L) 8.5 - 10.1 mg/dL    Bilirubin Total 1.1 0.2 - 1.3 mg/dL    Albumin 2.9 (L) 3.4 - 5.0 g/dL    Protein Total 5.4 (L) 6.8 - 8.8 g/dL    Alkaline Phosphatase 40 40 - 150 U/L    ALT 29 0 - 50 U/L    AST 18 0 - 45 U/L   Magnesium   Result Value Ref Range    Magnesium 1.9 1.6 - 2.3 mg/dL   Phosphorus   Result Value Ref Range    Phosphorus 2.6 2.5 - 4.5 mg/dL   INR   Result Value Ref Range    INR 1.43 (H) 0.86 - 1.14   Prealbumin   Result Value Ref Range    Prealbumin 34 15 - 45 mg/dL   CBC with platelets differential   Result Value Ref Range    WBC 7.4 4.0 - 11.0 10e9/L    RBC Count 2.80 (L) 3.8 - 5.2 10e12/L    Hemoglobin 8.4 (L) 11.7 - 15.7 g/dL    Hematocrit 26.0 (L) 35.0 - 47.0 %    MCV 93 78 - 100 fl    MCH 30.0 26.5 - 33.0 pg    MCHC 32.3 31.5 - 36.5 g/dL    RDW 13.6 10.0 - 15.0 %    Platelet Count 229 150 - 450 10e9/L    Diff Method Manual Differential     % Neutrophils 97.5 %    % Lymphocytes 0.0 %    % Monocytes 1.7 %    % Eosinophils 0.0 %    % Basophils 0.0 %    % Myelocytes 0.8 %    Nucleated RBCs 1 (H) 0 /100    Absolute Neutrophil 7.2 1.6 - 8.3 10e9/L    Absolute Lymphocytes 0.0 (L) 0.8 - 5.3 10e9/L    Absolute Monocytes 0.1 0.0 - 1.3 10e9/L     Absolute Eosinophils 0.0 0.0 - 0.7 10e9/L    Absolute Basophils 0.0 0.0 - 0.2 10e9/L    Absolute Myelocytes 0.1 (H) 0 10e9/L    Absolute Nucleated RBC 0.1     Poikilocytosis Slight     Polychromasia Slight     RBC Fragments Slight    Fibrinogen activity   Result Value Ref Range    Fibrinogen 116 (L) 200 - 420 mg/dL          Procedure Summary:     A single plasma volume plasma exchange was performed with a Spectra Optia cell separator.  The replacement fluid was 5% albumin.  The left arm fistuale was used for access.  ACD-A was used for anticoagulation.  To offset the effects of the citrate, calcium gluconate was given in the return line.  The patient's vital signs were stable throughout.  The patient tolerated the procedure well.    ATTESTATION STATEMENT:  This patient has been seen and evaluated by me directly, Isabelle Jolly MD, PhD.    Isabelle Jolly M.D., Ph.D.  Attending Physician  Division of Transfusion Medicine  Department of Laboratory [place] [address]  Pager [phone]

## 2020-07-13 NOTE — PLAN OF CARE
D AVSS with sat's 99% on room air. Heart regular and lung clear. Voiced c/o pain but refused Tylenol then would fall back to sleep. Has denied nausea and has slept well between cares. TPN/lipids running per order and blood glucose has been stable.   I Vital's, assessment and med's per order.   A Resting in bed with call light in reach.   P Continue to monitor and update MD with changes.

## 2020-07-13 NOTE — PLAN OF CARE
"BP (!) 144/75 (BP Location: Right leg)   Pulse 77   Temp 98.5  F (36.9  C) (Oral)   Resp 18   Ht 1.63 m (5' 4.17\")   Wt 70.9 kg (156 lb 3.2 oz)   SpO2 100%   BMI 26.67 kg/m      0730 - 1930    Neuro: A&Ox4.   Cardiac: SR. VSS.   Respiratory: Sating 100% on RA.  GI/: No bm.  Marginal urine output, providers know. Lasix was given x2.  Diet/appetite: Tolerating current diet. Eating well.  Activity: Stand by assist, up to chair and commode.  Pain: At acceptable level on current regimen.   Skin: No new deficits noted.  LDA's:PIV x1 and double lumen PICC.  New this shift : Plasma pheresis, then IVIG was started and is currently infusing without any problem(frequent vital signs was initiated and it has been stable).   Plan: Continue with POC. Notify primary team with changes.  "

## 2020-07-13 NOTE — PROGRESS NOTES
Transplant Surgery  Inpatient Daily Progress Note  07/13/2020    Assessment & Plan: 34 yo with PMH significant for ESRD due to IgA Nephropathy s/p KT in 2007 (failed due to non-compliance during pregnancy) and 6/19/2020 (3 arteries, + stent), and HTN. Baseline Cr 0.6 post-transplant. She presented on 7/4/2020 with fever and ARF of transplanted kidney.    Graft function: POD #24. Ureteral stent removed 7/8.  Mixed rejection with ARF: On dialysis since 7/7. UOP increasing on lasix. 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 DSA negative. AT1R and endothelial antibody cannot be run at this time due to issue at accepting lab. Treatment as below.   Perinephric fluid collections: Noted on US. 7/5 IR: 50ml cloudy serosang fluid aspirated, gram stain negative, culture NGTD. Not suggestive of urine leak.  Immunosuppression management:    Pheresis/IVIG: Plan for pheresis/IVIG every other day x5 starting 7/7. IVIG 0.5g/kg doses #1-4, 1g/kg dose #5.  Thymo: 125mg on 7/6, 125mg 7/8, 75 mg 7/10. Plan for 1mg/kg after final pheresis.  Rituximab: 500mg on 7/7.  Methylpred: 500/500/250/100/100/100  Prednisone: Tapering between methylpred doses, end with 5mg daily.  Tacro: Goal 8-10   MMF: Switch back to Myfortic 720mg BID today.  Complexity of management: Medium. Contributing factors: rejection  Hematology:   ANTHONY/ACD: Hgb 8.4. Received 1u RBC on 7/5 and 7/9. No sign of bleeding. PSAT 9, consider venofer if no infection. Hapto high, retic low. On Retacrit.   Cardiorespiratory:   HTN: Controlled. Coreg 6.25mg. Norvasc 5mg.   Pulmonary edema: Noted on CXR. No hypoxia. Monitor.  GI/Nutrition:   Severe malnutrition in the context of acute illness: Started parenteral nutirtion on 7/9. PICC placed by IR on 7/10. Start ej counts today.  Nausea/Vomiting: Improving. Scheduled zofran and PPI. PRN compazine.   Diarrhea: Since transplant, now slowing. 7/6 C-diff and enteric panel  negative.  Endocrine:   Steroid induced hyperglycemia: Continue sliding scale insulin.  Fluid/Electrolytes:   Hypervolemia: Weight up ~13kg. Dialyzed since 7/7. Increase lasix TID today.  Metabolic acidosis: Resolved with dialysis.  Hypocalcemia: Secondary to ARF/high phos. Replace PRN.  Hyperphosphatemia: Continue Phoslo. Low phos diet.  : Incontinent at times.  Neuro/Psych:   Acute pain: Pain over kidney and low back. Continue acetaminophen, lidoderm, and tramadol for break-thru.   Anxiety and paranoia: Likely exacerbated by steroids and poor sleep. Concern that opioids are being used to manage these symptoms. Has atarax PRN.  Infectious disease: Afebrile, no leukocytosis  Fever: Fever 103.7F on admission. Infection vs rejection. Cipro started outpatient, broadened to Cefepine on 7/4-7/8. COVID neg, 7/4 UC neg, 7/4 blood cx negative, 7/5 perinephric fluid cultures negative, enteric panel neg, C-diff neg.  HBcAb positive: sAg nonreactive, sAb >1000. This may be positive due to IVIG infusions.  Prophylaxis: Bactrim (PCP ppx) three times per week  Valcyte (CMV PPx)   Disposition: 7A    Medical Decision Making: Medium  Subsequent visit 73650 (moderate level decision making)    CONNIE/Fellow/Resident Provider: Stephanie Ling NP [id number]    Faculty: Carmelo Jones MD   _________________________________________________________________    Interval History: History is obtained from the patient  Nausea resolved, stools more formed, + flatus, bilateral foot and leg pain d/t edema, feels edematous    ROS:   A 10-point review of systems was negative except as noted above.    Meds:    acetaminophen  650 mg Oral Once     acetaminophen  650 mg Oral Q4H     albumin human  2,750 mL Apheresis During apheresis procedure     amLODIPine  5 mg Oral Daily     anticoagulant citrate   Apheresis During Apheresis (from stock)     atorvastatin  10 mg Oral Daily     calcium acetate  1,334 mg Oral TID w/meals     calcium gluconate   Intravenous During  "Apheresis (from stock)     carvedilol  6.25 mg Oral BID     diphenhydrAMINE  50 mg Oral Once     epoetin staci-epbx (RETACRIT) inj ESRD  2,000 Units Intravenous Once per day on Mon Wed Fri     furosemide  40 mg Intravenous BID     heparin lock flush  5 mL Intravenous Q24H     immune globulin - sucrose free  0.5 g/kg (Ideal) Intravenous Once     insulin aspart  1-7 Units Subcutaneous TID AC     insulin aspart  1-5 Units Subcutaneous At Bedtime     lidocaine  1 patch Transdermal Q24H     lidocaine   Transdermal Q8H     lipids  250 mL Intravenous Once per day on Sun Mon Wed Fri Sat     methylPREDNISolone  100 mg Intravenous Once     mycophenolic acid  720 mg Oral BID     OLANZapine  2.5 mg Oral Once     ondansetron  4 mg Oral TID AC     pantoprazole  40 mg Oral BID     [START ON 7/14/2020] predniSONE  40 mg Oral Daily    Followed by     [START ON 7/16/2020] predniSONE  40 mg Oral Daily    Followed by     [START ON 7/17/2020] predniSONE  20 mg Oral Daily     [START ON 7/19/2020] predniSONE  5 mg Oral Daily     sodium bicarbonate  1,300 mg Oral BID     sodium chloride (PF)  10 mL Intracatheter Q8H     sodium chloride (PF)  3 mL Intracatheter Q8H     sulfamethoxazole-trimethoprim  1 tablet Oral Once per day on Mon Wed Fri     tacrolimus  6 mg Oral BID     valGANciclovir  450 mg Oral Once per day on Mon Thu       Physical Exam:     Admit Weight: 57.2 kg (126 lb 1.7 oz)    Current vitals:   BP (!) 168/99 (BP Location: Right leg)   Pulse 84   Temp 97.5  F (36.4  C) (Oral)   Resp 18   Ht 1.63 m (5' 4.17\")   Wt 70.3 kg (155 lb)   SpO2 100%   BMI 26.46 kg/m      Vital sign ranges:    Temp:  [97.5  F (36.4  C)-98.4  F (36.9  C)] 97.5  F (36.4  C)  Pulse:  [77-84] 84  Heart Rate:  [70-85] 80  Resp:  [12-20] 18  BP: (132-168)/() 168/99  SpO2:  [95 %-100 %] 100 %  Patient Vitals for the past 24 hrs:   BP Temp Temp src Pulse Heart Rate Resp SpO2 Height Weight   07/13/20 0809 (!) 168/99 97.5  F (36.4  C) Oral -- 80 18 100 " % -- --   07/13/20 0511 (!) 151/95 98.4  F (36.9  C) Oral 84 -- 16 99 % -- --   07/12/20 2304 (!) 149/87 97.7  F (36.5  C) Oral 77 -- 16 99 % -- --   07/12/20 1953 (!) 153/91 98.1  F (36.7  C) Oral -- 74 12 100 % -- --   07/12/20 1605 (!) 140/80 -- -- -- 71 15 100 % -- --   07/12/20 1600 132/82 97.7  F (36.5  C) Oral -- 80 16 98 % -- --   07/12/20 1545 135/83 -- -- -- 77 19 99 % -- --   07/12/20 1530 (!) 140/89 -- -- -- 79 14 100 % -- --   07/12/20 1515 139/85 -- -- -- 78 14 100 % -- --   07/12/20 1500 137/84 -- -- -- 74 16 100 % -- --   07/12/20 1445 (!) 144/95 -- -- -- 73 18 100 % -- --   07/12/20 1430 (!) 142/93 -- -- -- 75 17 100 % -- --   07/12/20 1415 (!) 147/92 -- -- -- 81 16 99 % -- --   07/12/20 1400 (!) 151/96 -- -- -- 77 14 100 % -- --   07/12/20 1345 (!) 153/97 -- -- -- 71 12 100 % -- --   07/12/20 1330 (!) 156/101 -- -- -- 85 15 95 % -- --   07/12/20 1315 (!) 153/98 -- -- -- 70 20 100 % -- --   07/12/20 1307 (!) 156/99 -- -- -- 70 14 100 % -- --   07/12/20 1225 (!) 155/93 98  F (36.7  C) Oral -- 72 16 100 % -- --   07/12/20 1224 (!) 155/93 98.3  F (36.8  C) Oral -- 72 16 100 % -- --     General Appearance: No acute distress  Skin: warm, dry, no rashes  Heart: Perfused  Lungs: RA, unlabored  Abdomen: Soft, distended, edematous  : polanco is not present.   Extremities: edema: generalized, shanell in trunk, back, upper body  Neurologic: awake, alert and oriented x4. Tremor absent.    Data:   CMP  Recent Labs   Lab 07/13/20  0619 07/12/20  0643   * 128*   POTASSIUM 4.0 4.2   CHLORIDE 98 96   CO2 26 26   * 237*   BUN 46* 39*   CR 3.02* 2.99*   GFRESTIMATED 19* 20*   GFRESTBLACK 22* 23*   CASIE 7.9* 7.7*   MAG 1.9 1.6   PHOS 2.6 3.3   ALBUMIN 2.9* 3.1*   BILITOTAL 1.1 0.3   ALKPHOS 40 35*   AST 18 20   ALT 29 25     CBC  Recent Labs   Lab 07/13/20  0619 07/12/20  0643   HGB 8.4* 8.7*   WBC 7.4 5.4    202     Attestation:    The patient has been seen and evaluated by me.   Vital signs, labs,  medications and orders were reviewed.   When obtained, diagnostic images were reviewed by me and interpreted as above.    The care plan was discussed with the multidisciplinary team and I agree with the findings and plan in this note, with any differences recorded in blue.    Immunosuppressive medication management was reviewed and adjusted as reflected in the note and orders.     .

## 2020-07-13 NOTE — PROGRESS NOTES
"SPIRITUAL HEALTH SERVICES  SPIRITUAL ASSESSMENT Progress Note  [facility] ([place]) 7A     PRIMARY FOCUS:   Goals of care  Emotional/spiritual/Scientology distress  Support for coping    REFERRAL SOURCE: Follow up, [facility] specific    ILLNESS CIRCUMSTANCES:   Reviewed documentation. Reflective conversation shared with Pt Jelly which integrated elements of illness and family narratives.   Context of Serious Illness/Symptom(s) - Kidney failure  Resources for Support - Partner, sobia (Jainism), Allah (God)    DISTRESS:   Emotional/Spiritual/Existential Distress - Jelly is experiencing some spiritual distress. She has shared that, \"I want to wear hijab and I want to pray, but something is holding me back\". She has some ideas as to what this may be. We processed through her thoughts about this. She hopes that \"when I get physically better I can pray on time\".   Yarsanism Distress - no apparent distress.  Social/Cultural/Economic Distress - not asked.    SPIRITUAL/Adventist COPING:   Moravian/Sobia - Jainism/Mormon  Spiritual Practice(s) - Prayer, Islamic supplications  Emotional/Relational/Existential Connections - Immediate family. Jelly has a particular connection with her 8 year-old son.     GOALS OF CARE:  Goals of Care - Jelly shared that he kidney is now starting to create urine and she is anticipating restoration/recovery from kidney failure.   Meaning/Sense-Making - Jelly states that, \"All this is happening for a reason. I believe Allamandeep is calling to me, to come to Him\".     PLAN: I will follow up with Jelly for the duration of her stay. [facility] is available to Jelly per request.     John De La Rosa  Lead [facility]   Pager [phone]    "

## 2020-07-14 LAB
ANION GAP SERPL CALCULATED.3IONS-SCNC: 10 MMOL/L (ref 3–14)
ANISOCYTOSIS BLD QL SMEAR: SLIGHT
BASOPHILS # BLD AUTO: 0 10E9/L (ref 0–0.2)
BASOPHILS NFR BLD AUTO: 0 %
BUN SERPL-MCNC: 83 MG/DL (ref 7–30)
CALCIUM SERPL-MCNC: 8.2 MG/DL (ref 8.5–10.1)
CHLORIDE SERPL-SCNC: 96 MMOL/L (ref 94–109)
CO2 SERPL-SCNC: 24 MMOL/L (ref 20–32)
CREAT SERPL-MCNC: 3.98 MG/DL (ref 0.52–1.04)
DIFFERENTIAL METHOD BLD: ABNORMAL
EOSINOPHIL # BLD AUTO: 0 10E9/L (ref 0–0.7)
EOSINOPHIL NFR BLD AUTO: 0 %
ERYTHROCYTE [DISTWIDTH] IN BLOOD BY AUTOMATED COUNT: 13.9 % (ref 10–15)
GFR SERPL CREATININE-BSD FRML MDRD: 14 ML/MIN/{1.73_M2}
GLUCOSE BLDC GLUCOMTR-MCNC: 153 MG/DL (ref 70–99)
GLUCOSE BLDC GLUCOMTR-MCNC: 160 MG/DL (ref 70–99)
GLUCOSE SERPL-MCNC: 184 MG/DL (ref 70–99)
HCT VFR BLD AUTO: 24.3 % (ref 35–47)
HGB BLD-MCNC: 8 G/DL (ref 11.7–15.7)
LYMPHOCYTES # BLD AUTO: 0 10E9/L (ref 0.8–5.3)
LYMPHOCYTES NFR BLD AUTO: 0 %
MAGNESIUM SERPL-MCNC: 2.4 MG/DL (ref 1.6–2.3)
MCH RBC QN AUTO: 30.5 PG (ref 26.5–33)
MCHC RBC AUTO-ENTMCNC: 32.9 G/DL (ref 31.5–36.5)
MCV RBC AUTO: 93 FL (ref 78–100)
MONOCYTES # BLD AUTO: 0.4 10E9/L (ref 0–1.3)
MONOCYTES NFR BLD AUTO: 2.7 %
NEUTROPHILS # BLD AUTO: 12.6 10E9/L (ref 1.6–8.3)
NEUTROPHILS NFR BLD AUTO: 97.3 %
OVALOCYTES BLD QL SMEAR: SLIGHT
PHOSPHATE SERPL-MCNC: 2.7 MG/DL (ref 2.5–4.5)
PLATELET # BLD AUTO: 253 10E9/L (ref 150–450)
PLATELET # BLD EST: ABNORMAL 10*3/UL
POIKILOCYTOSIS BLD QL SMEAR: SLIGHT
POLYCHROMASIA BLD QL SMEAR: SLIGHT
POTASSIUM SERPL-SCNC: 3.6 MMOL/L (ref 3.4–5.3)
RBC # BLD AUTO: 2.62 10E12/L (ref 3.8–5.2)
SODIUM SERPL-SCNC: 130 MMOL/L (ref 133–144)
TARGETS BLD QL SMEAR: SLIGHT
WBC # BLD AUTO: 13 10E9/L (ref 4–11)

## 2020-07-14 PROCEDURE — 25000132 ZZH RX MED GY IP 250 OP 250 PS 637: Mod: GY | Performed by: NURSE PRACTITIONER

## 2020-07-14 PROCEDURE — 12000026 ZZH R&B TRANSPLANT

## 2020-07-14 PROCEDURE — 83735 ASSAY OF MAGNESIUM: CPT | Performed by: STUDENT IN AN ORGANIZED HEALTH CARE EDUCATION/TRAINING PROGRAM

## 2020-07-14 PROCEDURE — 25000132 ZZH RX MED GY IP 250 OP 250 PS 637: Mod: GY | Performed by: INTERNAL MEDICINE

## 2020-07-14 PROCEDURE — 36592 COLLECT BLOOD FROM PICC: CPT | Performed by: STUDENT IN AN ORGANIZED HEALTH CARE EDUCATION/TRAINING PROGRAM

## 2020-07-14 PROCEDURE — 00000146 ZZHCL STATISTIC GLUCOSE BY METER IP

## 2020-07-14 PROCEDURE — 25000132 ZZH RX MED GY IP 250 OP 250 PS 637: Mod: GY | Performed by: STUDENT IN AN ORGANIZED HEALTH CARE EDUCATION/TRAINING PROGRAM

## 2020-07-14 PROCEDURE — 25000132 ZZH RX MED GY IP 250 OP 250 PS 637: Mod: GY | Performed by: SURGERY

## 2020-07-14 PROCEDURE — 25000128 H RX IP 250 OP 636: Performed by: STUDENT IN AN ORGANIZED HEALTH CARE EDUCATION/TRAINING PROGRAM

## 2020-07-14 PROCEDURE — 25000128 H RX IP 250 OP 636: Performed by: NURSE PRACTITIONER

## 2020-07-14 PROCEDURE — 85025 COMPLETE CBC W/AUTO DIFF WBC: CPT | Performed by: STUDENT IN AN ORGANIZED HEALTH CARE EDUCATION/TRAINING PROGRAM

## 2020-07-14 PROCEDURE — 25000131 ZZH RX MED GY IP 250 OP 636 PS 637: Mod: GY | Performed by: NURSE PRACTITIONER

## 2020-07-14 PROCEDURE — 25000132 ZZH RX MED GY IP 250 OP 250 PS 637: Mod: GY | Performed by: PHYSICIAN ASSISTANT

## 2020-07-14 PROCEDURE — 80048 BASIC METABOLIC PNL TOTAL CA: CPT | Performed by: STUDENT IN AN ORGANIZED HEALTH CARE EDUCATION/TRAINING PROGRAM

## 2020-07-14 PROCEDURE — 25000125 ZZHC RX 250: Performed by: SURGERY

## 2020-07-14 PROCEDURE — 84100 ASSAY OF PHOSPHORUS: CPT | Performed by: STUDENT IN AN ORGANIZED HEALTH CARE EDUCATION/TRAINING PROGRAM

## 2020-07-14 RX ORDER — LANOLIN ALCOHOL/MO/W.PET/CERES
3 CREAM (GRAM) TOPICAL
Status: DISCONTINUED | OUTPATIENT
Start: 2020-07-14 | End: 2020-07-18 | Stop reason: HOSPADM

## 2020-07-14 RX ORDER — DIPHENHYDRAMINE HYDROCHLORIDE 50 MG/ML
50 INJECTION INTRAMUSCULAR; INTRAVENOUS
Status: CANCELLED | OUTPATIENT
Start: 2020-07-14

## 2020-07-14 RX ADMIN — SODIUM BICARBONATE 650 MG TABLET 1300 MG: at 20:51

## 2020-07-14 RX ADMIN — INSULIN ASPART 1 UNITS: 100 INJECTION, SOLUTION INTRAVENOUS; SUBCUTANEOUS at 13:07

## 2020-07-14 RX ADMIN — INSULIN ASPART 1 UNITS: 100 INJECTION, SOLUTION INTRAVENOUS; SUBCUTANEOUS at 09:24

## 2020-07-14 RX ADMIN — ACETAMINOPHEN 650 MG: 325 TABLET, FILM COATED ORAL at 21:41

## 2020-07-14 RX ADMIN — PANTOPRAZOLE SODIUM 40 MG: 40 TABLET, DELAYED RELEASE ORAL at 20:51

## 2020-07-14 RX ADMIN — CALCIUM ACETATE 1334 MG: 667 CAPSULE ORAL at 18:49

## 2020-07-14 RX ADMIN — CALCIUM ACETATE 1334 MG: 667 CAPSULE ORAL at 09:12

## 2020-07-14 RX ADMIN — FUROSEMIDE 40 MG: 10 INJECTION, SOLUTION INTRAMUSCULAR; INTRAVENOUS at 20:51

## 2020-07-14 RX ADMIN — LIDOCAINE 1 PATCH: 560 PATCH PERCUTANEOUS; TOPICAL; TRANSDERMAL at 09:26

## 2020-07-14 RX ADMIN — FUROSEMIDE 40 MG: 10 INJECTION, SOLUTION INTRAMUSCULAR; INTRAVENOUS at 14:34

## 2020-07-14 RX ADMIN — MELATONIN TAB 3 MG 3 MG: 3 TAB at 21:41

## 2020-07-14 RX ADMIN — CARVEDILOL 12.5 MG: 12.5 TABLET, FILM COATED ORAL at 09:11

## 2020-07-14 RX ADMIN — ACETAMINOPHEN 650 MG: 325 TABLET, FILM COATED ORAL at 09:15

## 2020-07-14 RX ADMIN — TRAMADOL HYDROCHLORIDE 50 MG: 50 TABLET, COATED ORAL at 16:38

## 2020-07-14 RX ADMIN — FUROSEMIDE 40 MG: 10 INJECTION, SOLUTION INTRAMUSCULAR; INTRAVENOUS at 09:16

## 2020-07-14 RX ADMIN — MYCOPHENOLIC ACID 720 MG: 360 TABLET, DELAYED RELEASE ORAL at 09:12

## 2020-07-14 RX ADMIN — ATORVASTATIN CALCIUM 10 MG: 10 TABLET, FILM COATED ORAL at 09:11

## 2020-07-14 RX ADMIN — ONDANSETRON 4 MG: 4 TABLET, ORALLY DISINTEGRATING ORAL at 09:10

## 2020-07-14 RX ADMIN — POTASSIUM CHLORIDE: 2 INJECTION, SOLUTION, CONCENTRATE INTRAVENOUS at 21:21

## 2020-07-14 RX ADMIN — PANTOPRAZOLE SODIUM 40 MG: 40 TABLET, DELAYED RELEASE ORAL at 09:11

## 2020-07-14 RX ADMIN — TACROLIMUS 6 MG: 5 CAPSULE ORAL at 09:12

## 2020-07-14 RX ADMIN — Medication 5 ML: at 06:24

## 2020-07-14 RX ADMIN — ACETAMINOPHEN 650 MG: 325 TABLET, FILM COATED ORAL at 14:34

## 2020-07-14 RX ADMIN — MYCOPHENOLIC ACID 720 MG: 360 TABLET, DELAYED RELEASE ORAL at 20:51

## 2020-07-14 RX ADMIN — SODIUM BICARBONATE 650 MG TABLET 1300 MG: at 09:12

## 2020-07-14 RX ADMIN — HYDROXYZINE HYDROCHLORIDE 25 MG: 25 TABLET ORAL at 21:41

## 2020-07-14 RX ADMIN — TACROLIMUS 6 MG: 5 CAPSULE ORAL at 18:48

## 2020-07-14 RX ADMIN — CARVEDILOL 12.5 MG: 12.5 TABLET, FILM COATED ORAL at 20:51

## 2020-07-14 RX ADMIN — AMLODIPINE BESYLATE 5 MG: 5 TABLET ORAL at 09:11

## 2020-07-14 RX ADMIN — ACETAMINOPHEN 650 MG: 325 TABLET, FILM COATED ORAL at 18:49

## 2020-07-14 RX ADMIN — PREDNISONE 40 MG: 20 TABLET ORAL at 09:12

## 2020-07-14 ASSESSMENT — ACTIVITIES OF DAILY LIVING (ADL)
ADLS_ACUITY_SCORE: 12

## 2020-07-14 NOTE — PLAN OF CARE
"/73 (BP Location: Right arm)   Pulse 79   Temp 98.2  F (36.8  C) (Oral)   Resp 20   Ht 1.63 m (5' 4.17\")   Wt 70.9 kg (156 lb 3.2 oz)   SpO2 98%   BMI 26.67 kg/m     Neuro: A/Ox4  VS: VSS on RA  Pain: c/o abdominal/incisional pain treated by scheduled tylenol and lidocaine patch  GI: on renal diet with poor appetite. TPN running at 40ml/h. Denies nausea on scheduled zofran. No BM this shift   and 158 on sliding scale novolog  : Voiding small amount clear urine frequently (about 500ml UOP this shift) On IV lasix  Skin: Abdominal incision intact PARVIZ  R arm PICC infusing TPN  Activity - Independent  Education - Mobility/infection prevention  Plan of Care - Continue with POC    "

## 2020-07-14 NOTE — PROGRESS NOTES
Transplant Surgery  Inpatient Daily Progress Note  07/14/2020    Assessment & Plan: 36 yo with PMH significant for ESRD due to IgA Nephropathy s/p KT in 2007 (failed due to non-compliance during pregnancy) and 6/19/2020 (3 arteries, + stent), and HTN. Baseline Cr 0.6 post-transplant. She presented on 7/4/2020 with fever and ARF of transplanted kidney.    Graft function: POD #25. Ureteral stent removed 7/8.  Mixed rejection with ARF: On dialysis since 7/7. UOP increasing on lasix, 1 L output yesterday. 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 DSA negative. AT1R and endothelial antibody cannot be run at this time due to issue at accepting lab, blood from 7/7 is saved in lab and will be send when it will be accepted. Treatment as below.   Perinephric fluid collections: Noted on US. 7/5 IR: 50ml cloudy serosang fluid aspirated, gram stain negative, culture NGTD. Not suggestive of urine leak.  Immunosuppression management:    Pheresis/IVIG: Plan for pheresis/IVIG every other day x5 starting 7/7. IVIG 0.5g/kg doses #1-4, 1g/kg dose #5.  Thymo: 125mg on 7/6, 125mg 7/8, 75 mg 7/10. Plan for 1mg/kg after final pheresis.  Rituximab: 500mg on 7/7.  Methylpred: 500/500/250/100/100/100  Prednisone: Tapering between methylpred doses, end with 5mg daily.  Tacro: Goal 8-10 . 7/13 7.9.   MMF: Switched back to Myfortic 720mg BID.  Complexity of management: Medium. Contributing factors: rejection  Hematology:   ANTHONY/ACD: Hgb 8.0. Received 1u RBC on 7/5 and 7/9. No sign of bleeding. PSAT 9, consider venofer if no infection. Hapto high, retic low. On Retacrit.   Cardiorespiratory:   HTN: Controlled. Coreg 6.25mg. Norvasc 5mg.   Pulmonary edema: Noted on CXR. No hypoxia. Monitor.  GI/Nutrition:   Severe malnutrition in the context of acute illness: Started parenteral nutirtion on 7/9. PICC placed by IR on 7/10. Start ej counts today. Increased oral intake per patient yesterday.    Nausea/Vomiting: Improving. Scheduled zofran and PPI. PRN compazine.   Diarrhea: Since transplant, now slowing. 7/6 C-diff and enteric panel negative.  Endocrine:   Steroid induced hyperglycemia: Continue sliding scale insulin.  Fluid/Electrolytes:   Hypervolemia: Weight up ~13kg. Dialyzed since 7/7. Continue lasix TID today.  Metabolic acidosis: Resolved with dialysis.  Hypocalcemia: Secondary to ARF/high phos. Replace PRN.  Hyperphosphatemia: Continue Phoslo. Low phos diet.  : Incontinent at times.  Neuro/Psych:   Acute pain: Pain over kidney and low back. Continue acetaminophen, lidoderm, and tramadol for break-thru.   Anxiety and paranoia: Likely exacerbated by steroids and poor sleep. Concern that opioids are being used to manage these symptoms. Has atarax PRN.  Infectious disease: Afebrile, no leukocytosis  Fever: Fever 103.7F on admission. Infection vs rejection. Cipro started outpatient, broadened to Cefepine on 7/4-7/8. COVID neg, 7/4 UC neg, 7/4 blood cx negative, 7/5 perinephric fluid cultures negative, enteric panel neg, C-diff neg.  Leukocytosis: WBC 13.0<-7.4. Will monitor.   HBcAb positive: sAg nonreactive, sAb >1000. This may be positive due to IVIG infusions.  Prophylaxis: Bactrim (PCP ppx) three times per week  Valcyte (CMV PPx)   Disposition: 7A    Medical Decision Making: Medium  Subsequent visit 76021 (moderate level decision making)    CONNIE/Fellow/Resident Provider: Mireille Munroe PA-C [id number]    Faculty: Carmelo Jones MD   _________________________________________________________________    Interval History: History is obtained from the patient  Nausea improving, increased appetite.     ROS:   A 10-point review of systems was negative except as noted above.    Meds:    acetaminophen  650 mg Oral Q4H     amLODIPine  5 mg Oral Daily     atorvastatin  10 mg Oral Daily     calcium acetate  1,334 mg Oral TID w/meals     carvedilol  12.5 mg Oral BID     epoetin staci-epbx (RETACRIT) inj ESRD  2,000  "Units Intravenous Once per day on Mon Wed Fri     furosemide  40 mg Intravenous TID     heparin lock flush  5 mL Intravenous Q24H     insulin aspart  1-7 Units Subcutaneous TID AC     insulin aspart  1-5 Units Subcutaneous At Bedtime     lidocaine  1 patch Transdermal Q24H     lidocaine   Transdermal Q8H     lipids  250 mL Intravenous Once per day on Sun Mon Wed Fri Sat     mycophenolic acid  720 mg Oral BID     ondansetron  4 mg Oral TID AC     pantoprazole  40 mg Oral BID     [START ON 7/16/2020] predniSONE  40 mg Oral Daily    Followed by     [START ON 7/17/2020] predniSONE  20 mg Oral Daily     [START ON 7/19/2020] predniSONE  5 mg Oral Daily     sodium bicarbonate  1,300 mg Oral BID     sodium chloride (PF)  10 mL Intracatheter Q8H     sodium chloride (PF)  3 mL Intracatheter Q8H     sulfamethoxazole-trimethoprim  1 tablet Oral Once per day on Mon Wed Fri     tacrolimus  6 mg Oral BID     valGANciclovir  450 mg Oral Once per day on Mon Thu       Physical Exam:     Admit Weight: 57.2 kg (126 lb 1.7 oz)    Current vitals:   /73 (BP Location: Right arm)   Pulse 79   Temp 98.2  F (36.8  C) (Oral)   Resp 20   Ht 1.63 m (5' 4.17\")   Wt 70.9 kg (156 lb 3.2 oz)   SpO2 98%   BMI 26.67 kg/m      Vital sign ranges:    Temp:  [98  F (36.7  C)-98.5  F (36.9  C)] 98.2  F (36.8  C)  Pulse:  [72-84] 79  Heart Rate:  [76-83] 83  Resp:  [18-20] 20  BP: (122-157)/(61-94) 130/73  SpO2:  [98 %-100 %] 98 %  Patient Vitals for the past 24 hrs:   BP Temp Temp src Pulse Heart Rate Resp SpO2   07/14/20 1138 130/73 98.2  F (36.8  C) Oral -- 83 -- 98 %   07/14/20 0730 133/70 98.3  F (36.8  C) Oral -- 83 20 98 %   07/14/20 0026 (!) 157/94 98.3  F (36.8  C) Oral -- 78 20 98 %   07/13/20 2225 (!) 156/93 98.1  F (36.7  C) Oral -- 78 20 98 %   07/13/20 2021 (!) 149/91 98  F (36.7  C) Oral -- 76 18 99 %   07/13/20 1830 (!) 142/73 98.2  F (36.8  C) Oral 79 -- 20 100 %   07/13/20 1815 (!) 144/75 98.5  F (36.9  C) Oral 77 -- 18 100 % "   07/13/20 1800 (!) 142/75 98.2  F (36.8  C) Oral 72 -- 18 100 %   07/13/20 1745 (!) 145/81 98.2  F (36.8  C) Oral 75 -- 18 100 %   07/13/20 1730 122/61 98.3  F (36.8  C) Oral 78 -- 18 100 %   07/13/20 1715 136/70 98.3  F (36.8  C) Oral 78 -- 18 99 %   07/13/20 1700 135/72 98.2  F (36.8  C) Oral 84 -- 20 99 %   07/13/20 1640 138/75 98.4  F (36.9  C) Oral 78 -- 20 99 %     General Appearance: No acute distress  Skin: warm, dry, no rashes  Heart: Perfused  Lungs: RA, unlabored  Abdomen: Soft, distended, edematous  : polanco is not present.   Extremities: edema: generalized, shanell in trunk, back, upper body  Neurologic: awake, alert and oriented x4. Tremor absent.    Data:   CMP  Recent Labs   Lab 07/14/20  0608 07/13/20  0619 07/12/20  0643   * 130* 128*   POTASSIUM 3.6 4.0 4.2   CHLORIDE 96 98 96   CO2 24 26 26   * 204* 237*   BUN 83* 46* 39*   CR 3.98* 3.02* 2.99*   GFRESTIMATED 14* 19* 20*   GFRESTBLACK 16* 22* 23*   CASIE 8.2* 7.9* 7.7*   MAG 2.4* 1.9 1.6   PHOS 2.7 2.6 3.3   ALBUMIN  --  2.9* 3.1*   BILITOTAL  --  1.1 0.3   ALKPHOS  --  40 35*   AST  --  18 20   ALT  --  29 25     CBC  Recent Labs   Lab 07/14/20  0608 07/13/20  0619   HGB 8.0* 8.4*   WBC 13.0* 7.4    229     Attestation:    The patient has been seen and evaluated by me.   Vital signs, labs, medications and orders were reviewed.   When obtained, diagnostic images were reviewed by me and interpreted as above.    The care plan was discussed with the multidisciplinary team and I agree with the findings and plan in this note, with any differences recorded in blue.    Immunosuppressive medication management was reviewed and adjusted as reflected in the note and orders.     .

## 2020-07-14 NOTE — PLAN OF CARE
3490 - 7708    34 y/o F w/hx of ESRD due to IgA Nephropathy s/p KT in 2007 (failed due to non-compliance during pregnancy) and 6/19/2020 (3 arteries, + stent), and HTN. Baseline Cr 0.6 post-transplant. She presented on 7/4/2020 with fever and ARF of transplanted kidney.     Vitals: Temp: 98.3  F (36.8  C) Temp src: Oral BP: (!) 157/94 Pulse: 79 Heart Rate: 78 Resp: 20 SpO2: 98 % O2 Device: None (Room air)    Endocrine: BG checks ACHS; 254 at 2200; s/s novolog   Labs: WBCs trending up  Pain: patient requested tramadol at bedtime; administered x1.   PRN's: pt can receive tramadol & tylenol for pain; zofran for nausea, atarax for anxiety  Diet: low phosphorus   LDA: RPIV saline locked; R double lumen PICC - infusing, heparin locked; surgical incision - L abdomen, dressed; Lfistula.   GI: diarrhea; intermittent nausea (denied this shift)  : oliguric, using bedside commode & hat in bathroom; approx 600 out this shift.   Skin: pale, otherwise WDL.  Neuro: A&O x4.   Mobility: Independent w/some weakness; requested assistance ambulating to bathroom x2 this shift.   Plan: continue to monitor; follow poc; lasix to continue w/goal of 1L net-negative output per day.

## 2020-07-15 ENCOUNTER — APPOINTMENT (OUTPATIENT)
Dept: LAB | Facility: CLINIC | Age: 35
End: 2020-07-15
Payer: MEDICARE

## 2020-07-15 ENCOUNTER — APPOINTMENT (OUTPATIENT)
Dept: GENERAL RADIOLOGY | Facility: CLINIC | Age: 35
DRG: 698 | End: 2020-07-15
Attending: PHYSICIAN ASSISTANT
Payer: MEDICARE

## 2020-07-15 LAB
ALBUMIN UR-MCNC: 30 MG/DL
ANION GAP SERPL CALCULATED.3IONS-SCNC: 9 MMOL/L (ref 3–14)
APPEARANCE UR: CLEAR
BASOPHILS # BLD AUTO: 0 10E9/L (ref 0–0.2)
BASOPHILS NFR BLD AUTO: 0.1 %
BILIRUB UR QL STRIP: NEGATIVE
BUN SERPL-MCNC: 108 MG/DL (ref 7–30)
CALCIUM SERPL-MCNC: 8.4 MG/DL (ref 8.5–10.1)
CHLORIDE SERPL-SCNC: 99 MMOL/L (ref 94–109)
CO2 SERPL-SCNC: 23 MMOL/L (ref 20–32)
COLOR UR AUTO: YELLOW
CREAT SERPL-MCNC: 4.57 MG/DL (ref 0.52–1.04)
DIFFERENTIAL METHOD BLD: ABNORMAL
EOSINOPHIL # BLD AUTO: 0 10E9/L (ref 0–0.7)
EOSINOPHIL NFR BLD AUTO: 0 %
ERYTHROCYTE [DISTWIDTH] IN BLOOD BY AUTOMATED COUNT: 14.2 % (ref 10–15)
FIBRINOGEN PPP-MCNC: 164 MG/DL (ref 200–420)
GFR SERPL CREATININE-BSD FRML MDRD: 12 ML/MIN/{1.73_M2}
GLUCOSE BLDC GLUCOMTR-MCNC: 176 MG/DL (ref 70–99)
GLUCOSE BLDC GLUCOMTR-MCNC: 187 MG/DL (ref 70–99)
GLUCOSE BLDC GLUCOMTR-MCNC: 228 MG/DL (ref 70–99)
GLUCOSE BLDC GLUCOMTR-MCNC: 241 MG/DL (ref 70–99)
GLUCOSE SERPL-MCNC: 224 MG/DL (ref 70–99)
GLUCOSE UR STRIP-MCNC: 150 MG/DL
HCT VFR BLD AUTO: 25.8 % (ref 35–47)
HGB BLD-MCNC: 8.2 G/DL (ref 11.7–15.7)
HGB UR QL STRIP: ABNORMAL
IMM GRANULOCYTES # BLD: 0.5 10E9/L (ref 0–0.4)
IMM GRANULOCYTES NFR BLD: 4.6 %
INR PPP: 1.27 (ref 0.86–1.14)
KETONES UR STRIP-MCNC: NEGATIVE MG/DL
LEUKOCYTE ESTERASE UR QL STRIP: ABNORMAL
LYMPHOCYTES # BLD AUTO: 0.1 10E9/L (ref 0.8–5.3)
LYMPHOCYTES NFR BLD AUTO: 1.1 %
MAGNESIUM SERPL-MCNC: 2.7 MG/DL (ref 1.6–2.3)
MCH RBC QN AUTO: 29.8 PG (ref 26.5–33)
MCHC RBC AUTO-ENTMCNC: 31.8 G/DL (ref 31.5–36.5)
MCV RBC AUTO: 94 FL (ref 78–100)
MONOCYTES # BLD AUTO: 0.4 10E9/L (ref 0–1.3)
MONOCYTES NFR BLD AUTO: 3.5 %
NEUTROPHILS # BLD AUTO: 9.4 10E9/L (ref 1.6–8.3)
NEUTROPHILS NFR BLD AUTO: 90.7 %
NITRATE UR QL: NEGATIVE
NRBC # BLD AUTO: 0 10*3/UL
NRBC BLD AUTO-RTO: 0 /100
PH UR STRIP: 5.5 PH (ref 5–7)
PHOSPHATE SERPL-MCNC: 2.6 MG/DL (ref 2.5–4.5)
PLATELET # BLD AUTO: 222 10E9/L (ref 150–450)
POTASSIUM SERPL-SCNC: 3.8 MMOL/L (ref 3.4–5.3)
RBC # BLD AUTO: 2.75 10E12/L (ref 3.8–5.2)
RBC #/AREA URNS AUTO: 1 /HPF (ref 0–2)
SODIUM SERPL-SCNC: 131 MMOL/L (ref 133–144)
SOURCE: ABNORMAL
SP GR UR STRIP: 1.01 (ref 1–1.03)
SQUAMOUS #/AREA URNS AUTO: 2 /HPF (ref 0–1)
TACROLIMUS BLD-MCNC: 10.3 UG/L (ref 5–15)
TME LAST DOSE: NORMAL H
UROBILINOGEN UR STRIP-MCNC: NORMAL MG/DL (ref 0–2)
WBC # BLD AUTO: 10.3 10E9/L (ref 4–11)
WBC #/AREA URNS AUTO: 5 /HPF (ref 0–5)

## 2020-07-15 PROCEDURE — 85025 COMPLETE CBC W/AUTO DIFF WBC: CPT | Performed by: STUDENT IN AN ORGANIZED HEALTH CARE EDUCATION/TRAINING PROGRAM

## 2020-07-15 PROCEDURE — 25000125 ZZHC RX 250

## 2020-07-15 PROCEDURE — 80048 BASIC METABOLIC PNL TOTAL CA: CPT | Performed by: STUDENT IN AN ORGANIZED HEALTH CARE EDUCATION/TRAINING PROGRAM

## 2020-07-15 PROCEDURE — 36415 COLL VENOUS BLD VENIPUNCTURE: CPT | Performed by: PHYSICIAN ASSISTANT

## 2020-07-15 PROCEDURE — 36514 APHERESIS PLASMA: CPT

## 2020-07-15 PROCEDURE — P9041 ALBUMIN (HUMAN),5%, 50ML: HCPCS

## 2020-07-15 PROCEDURE — 25000128 H RX IP 250 OP 636: Performed by: NURSE PRACTITIONER

## 2020-07-15 PROCEDURE — 25000128 H RX IP 250 OP 636: Performed by: STUDENT IN AN ORGANIZED HEALTH CARE EDUCATION/TRAINING PROGRAM

## 2020-07-15 PROCEDURE — 25000128 H RX IP 250 OP 636

## 2020-07-15 PROCEDURE — 36592 COLLECT BLOOD FROM PICC: CPT | Performed by: STUDENT IN AN ORGANIZED HEALTH CARE EDUCATION/TRAINING PROGRAM

## 2020-07-15 PROCEDURE — 87077 CULTURE AEROBIC IDENTIFY: CPT | Performed by: PHYSICIAN ASSISTANT

## 2020-07-15 PROCEDURE — 63400005 ZZH RX 634: Performed by: SURGERY

## 2020-07-15 PROCEDURE — 25000128 H RX IP 250 OP 636: Performed by: PHYSICIAN ASSISTANT

## 2020-07-15 PROCEDURE — 25000132 ZZH RX MED GY IP 250 OP 250 PS 637: Mod: GY | Performed by: STUDENT IN AN ORGANIZED HEALTH CARE EDUCATION/TRAINING PROGRAM

## 2020-07-15 PROCEDURE — 25000125 ZZHC RX 250: Performed by: SURGERY

## 2020-07-15 PROCEDURE — 71045 X-RAY EXAM CHEST 1 VIEW: CPT

## 2020-07-15 PROCEDURE — 25000131 ZZH RX MED GY IP 250 OP 636 PS 637: Mod: GY | Performed by: PHYSICIAN ASSISTANT

## 2020-07-15 PROCEDURE — 85610 PROTHROMBIN TIME: CPT | Performed by: NURSE PRACTITIONER

## 2020-07-15 PROCEDURE — 25000132 ZZH RX MED GY IP 250 OP 250 PS 637: Mod: GY | Performed by: PHYSICIAN ASSISTANT

## 2020-07-15 PROCEDURE — 25000132 ZZH RX MED GY IP 250 OP 250 PS 637: Mod: GY | Performed by: NURSE PRACTITIONER

## 2020-07-15 PROCEDURE — 25000131 ZZH RX MED GY IP 250 OP 636 PS 637: Mod: GY | Performed by: NURSE PRACTITIONER

## 2020-07-15 PROCEDURE — 87040 BLOOD CULTURE FOR BACTERIA: CPT | Performed by: PHYSICIAN ASSISTANT

## 2020-07-15 PROCEDURE — 84100 ASSAY OF PHOSPHORUS: CPT | Performed by: SURGERY

## 2020-07-15 PROCEDURE — 00000146 ZZHCL STATISTIC GLUCOSE BY METER IP

## 2020-07-15 PROCEDURE — 12000026 ZZH R&B TRANSPLANT

## 2020-07-15 PROCEDURE — 25000132 ZZH RX MED GY IP 250 OP 250 PS 637: Mod: GY | Performed by: INTERNAL MEDICINE

## 2020-07-15 PROCEDURE — 87800 DETECT AGNT MULT DNA DIREC: CPT | Performed by: PHYSICIAN ASSISTANT

## 2020-07-15 PROCEDURE — 25000132 ZZH RX MED GY IP 250 OP 250 PS 637: Mod: GY | Performed by: SURGERY

## 2020-07-15 PROCEDURE — 85384 FIBRINOGEN ACTIVITY: CPT | Performed by: NURSE PRACTITIONER

## 2020-07-15 PROCEDURE — 87086 URINE CULTURE/COLONY COUNT: CPT | Performed by: PHYSICIAN ASSISTANT

## 2020-07-15 PROCEDURE — 36592 COLLECT BLOOD FROM PICC: CPT | Performed by: NURSE PRACTITIONER

## 2020-07-15 PROCEDURE — 80197 ASSAY OF TACROLIMUS: CPT | Performed by: STUDENT IN AN ORGANIZED HEALTH CARE EDUCATION/TRAINING PROGRAM

## 2020-07-15 PROCEDURE — 83735 ASSAY OF MAGNESIUM: CPT | Performed by: STUDENT IN AN ORGANIZED HEALTH CARE EDUCATION/TRAINING PROGRAM

## 2020-07-15 PROCEDURE — 81001 URINALYSIS AUTO W/SCOPE: CPT | Performed by: PHYSICIAN ASSISTANT

## 2020-07-15 PROCEDURE — 84100 ASSAY OF PHOSPHORUS: CPT | Performed by: STUDENT IN AN ORGANIZED HEALTH CARE EDUCATION/TRAINING PROGRAM

## 2020-07-15 PROCEDURE — 87186 SC STD MICRODIL/AGAR DIL: CPT | Performed by: PHYSICIAN ASSISTANT

## 2020-07-15 RX ORDER — DIPHENHYDRAMINE HYDROCHLORIDE 50 MG/ML
50 INJECTION INTRAMUSCULAR; INTRAVENOUS
Status: DISCONTINUED | OUTPATIENT
Start: 2020-07-15 | End: 2020-07-16

## 2020-07-15 RX ORDER — DIPHENHYDRAMINE HCL 25 MG
50 CAPSULE ORAL ONCE
Status: COMPLETED | OUTPATIENT
Start: 2020-07-15 | End: 2020-07-15

## 2020-07-15 RX ORDER — ACETAMINOPHEN 325 MG/1
650 TABLET ORAL
Status: DISCONTINUED | OUTPATIENT
Start: 2020-07-15 | End: 2020-07-16

## 2020-07-15 RX ORDER — CALCIUM GLUCONATE 100 MG/ML
AMPUL (ML) INTRAVENOUS
Status: COMPLETED | OUTPATIENT
Start: 2020-07-15 | End: 2020-07-15

## 2020-07-15 RX ORDER — ALBUMIN HUMAN 25 %
2750 INTRAVENOUS SOLUTION INTRAVENOUS
Status: COMPLETED | OUTPATIENT
Start: 2020-07-15 | End: 2020-07-15

## 2020-07-15 RX ORDER — ACETAMINOPHEN 325 MG/1
650 TABLET ORAL ONCE
Status: COMPLETED | OUTPATIENT
Start: 2020-07-15 | End: 2020-07-15

## 2020-07-15 RX ORDER — DIPHENHYDRAMINE HCL 25 MG
50 CAPSULE ORAL
Status: DISCONTINUED | OUTPATIENT
Start: 2020-07-15 | End: 2020-07-16

## 2020-07-15 RX ORDER — METHYLPREDNISOLONE SODIUM SUCCINATE 125 MG/2ML
125 INJECTION, POWDER, LYOPHILIZED, FOR SOLUTION INTRAMUSCULAR; INTRAVENOUS
Status: DISCONTINUED | OUTPATIENT
Start: 2020-07-15 | End: 2020-07-16

## 2020-07-15 RX ORDER — DIPHENHYDRAMINE HYDROCHLORIDE 50 MG/ML
50 INJECTION INTRAMUSCULAR; INTRAVENOUS ONCE
Status: COMPLETED | OUTPATIENT
Start: 2020-07-15 | End: 2020-07-15

## 2020-07-15 RX ORDER — MEPERIDINE HYDROCHLORIDE 25 MG/ML
25-50 INJECTION INTRAMUSCULAR; INTRAVENOUS; SUBCUTANEOUS
Status: DISCONTINUED | OUTPATIENT
Start: 2020-07-15 | End: 2020-07-16

## 2020-07-15 RX ADMIN — DIPHENHYDRAMINE HYDROCHLORIDE 50 MG: 25 CAPSULE ORAL at 15:56

## 2020-07-15 RX ADMIN — ACETAMINOPHEN 650 MG: 325 TABLET, FILM COATED ORAL at 15:55

## 2020-07-15 RX ADMIN — ONDANSETRON 4 MG: 4 TABLET, ORALLY DISINTEGRATING ORAL at 11:00

## 2020-07-15 RX ADMIN — FUROSEMIDE 40 MG: 10 INJECTION, SOLUTION INTRAMUSCULAR; INTRAVENOUS at 11:27

## 2020-07-15 RX ADMIN — ANTICOAGULANT CITRATE DEXTROSE SOLUTION FORMULA A 455 ML: 12.25; 11; 3.65 SOLUTION INTRAVENOUS at 09:53

## 2020-07-15 RX ADMIN — I.V. FAT EMULSION 250 ML: 20 EMULSION INTRAVENOUS at 20:18

## 2020-07-15 RX ADMIN — MEPERIDINE HYDROCHLORIDE 25 MG: 25 INJECTION INTRAMUSCULAR; INTRAVENOUS; SUBCUTANEOUS at 22:19

## 2020-07-15 RX ADMIN — ATORVASTATIN CALCIUM 10 MG: 10 TABLET, FILM COATED ORAL at 10:59

## 2020-07-15 RX ADMIN — INSULIN ASPART 1 UNITS: 100 INJECTION, SOLUTION INTRAVENOUS; SUBCUTANEOUS at 11:08

## 2020-07-15 RX ADMIN — PANTOPRAZOLE SODIUM 40 MG: 40 TABLET, DELAYED RELEASE ORAL at 20:17

## 2020-07-15 RX ADMIN — ALBUMIN HUMAN 2750 ML: 0.05 INJECTION, SOLUTION INTRAVENOUS at 09:51

## 2020-07-15 RX ADMIN — CALCIUM GLUCONATE 4 G: 98 INJECTION, SOLUTION INTRAVENOUS at 09:52

## 2020-07-15 RX ADMIN — SULFAMETHOXAZOLE AND TRIMETHOPRIM 1 TABLET: 400; 80 TABLET ORAL at 11:23

## 2020-07-15 RX ADMIN — INSULIN ASPART 1 UNITS: 100 INJECTION, SOLUTION INTRAVENOUS; SUBCUTANEOUS at 17:41

## 2020-07-15 RX ADMIN — MYCOPHENOLIC ACID 720 MG: 360 TABLET, DELAYED RELEASE ORAL at 20:17

## 2020-07-15 RX ADMIN — TACROLIMUS 6 MG: 5 CAPSULE ORAL at 11:18

## 2020-07-15 RX ADMIN — HYDROXYZINE HYDROCHLORIDE 25 MG: 25 TABLET ORAL at 21:49

## 2020-07-15 RX ADMIN — SODIUM BICARBONATE 650 MG TABLET 1300 MG: at 20:17

## 2020-07-15 RX ADMIN — FUROSEMIDE 40 MG: 10 INJECTION, SOLUTION INTRAMUSCULAR; INTRAVENOUS at 20:17

## 2020-07-15 RX ADMIN — CARVEDILOL 12.5 MG: 12.5 TABLET, FILM COATED ORAL at 20:17

## 2020-07-15 RX ADMIN — HUMAN IMMUNOGLOBULIN G 55 G: 40 LIQUID INTRAVENOUS at 16:30

## 2020-07-15 RX ADMIN — TACROLIMUS 5.5 MG: 5 CAPSULE ORAL at 17:45

## 2020-07-15 RX ADMIN — HYDROCORTISONE SODIUM SUCCINATE 100 MG: 100 INJECTION, POWDER, FOR SOLUTION INTRAMUSCULAR; INTRAVENOUS at 15:56

## 2020-07-15 RX ADMIN — POTASSIUM CHLORIDE: 2 INJECTION, SOLUTION, CONCENTRATE INTRAVENOUS at 20:18

## 2020-07-15 RX ADMIN — AMLODIPINE BESYLATE 5 MG: 5 TABLET ORAL at 10:59

## 2020-07-15 RX ADMIN — CALCIUM ACETATE 1334 MG: 667 CAPSULE ORAL at 11:17

## 2020-07-15 RX ADMIN — PANTOPRAZOLE SODIUM 40 MG: 40 TABLET, DELAYED RELEASE ORAL at 11:20

## 2020-07-15 RX ADMIN — FUROSEMIDE 40 MG: 10 INJECTION, SOLUTION INTRAMUSCULAR; INTRAVENOUS at 14:59

## 2020-07-15 RX ADMIN — SODIUM BICARBONATE 650 MG TABLET 1300 MG: at 11:00

## 2020-07-15 RX ADMIN — EPOETIN ALFA-EPBX 2000 UNITS: 10000 INJECTION, SOLUTION INTRAVENOUS; SUBCUTANEOUS at 14:17

## 2020-07-15 RX ADMIN — TRAMADOL HYDROCHLORIDE 50 MG: 50 TABLET, COATED ORAL at 15:00

## 2020-07-15 RX ADMIN — ACETAMINOPHEN 650 MG: 325 TABLET, FILM COATED ORAL at 21:48

## 2020-07-15 RX ADMIN — MELATONIN TAB 3 MG 3 MG: 3 TAB at 21:49

## 2020-07-15 RX ADMIN — MYCOPHENOLIC ACID 720 MG: 360 TABLET, DELAYED RELEASE ORAL at 10:59

## 2020-07-15 RX ADMIN — Medication 5 ML: at 13:43

## 2020-07-15 RX ADMIN — MEPERIDINE HYDROCHLORIDE 25 MG: 25 INJECTION INTRAMUSCULAR; INTRAVENOUS; SUBCUTANEOUS at 09:38

## 2020-07-15 RX ADMIN — CARVEDILOL 12.5 MG: 12.5 TABLET, FILM COATED ORAL at 11:00

## 2020-07-15 RX ADMIN — TRAMADOL HYDROCHLORIDE 50 MG: 50 TABLET, COATED ORAL at 21:49

## 2020-07-15 ASSESSMENT — ACTIVITIES OF DAILY LIVING (ADL)
ADLS_ACUITY_SCORE: 12
ADLS_ACUITY_SCORE: 14
ADLS_ACUITY_SCORE: 14
ADLS_ACUITY_SCORE: 12

## 2020-07-15 ASSESSMENT — MIFFLIN-ST. JEOR: SCORE: 1409.43

## 2020-07-15 NOTE — PLAN OF CARE
" Blood pressure 139/82, pulse 73, temperature 98.5  F (36.9  C), temperature source Oral, resp. rate 18, height 1.63 m (5' 4.17\"), weight 71.7 kg (158 lb), SpO2 99 %, not currently breastfeeding.   Current condition: Stable on RA   Neuro: WDL. N  Cardio: WDL  Respiratory: WDL Lungs clear bilat.  GI/: Voids spontaneously, no BM during shift.   Skin: WDL although, C/O itching skin.   Diet:   Low phos diet, on Nicolás counts. TPN infusing. Poor appetite. On calorie counts.  BG: AC/HS bedtime. Irregular mealtimes. Glucose of 224 and 187.   LDA:  Dble. lumen PICC with TPN at 40cc/hr.   Immunosuppression; Tacro dose adjusted. On oral MMF.   Treated for rejection.on apheresis followed by IVIG today.  Pain; c/o joint and back pain. Back pain accompanied by rigors this morning. Given IV Demerol with relief.  Mobility:  UAL.   Potential infection: blood cultures drawn via PICC and peripheral sites. Results pending.  Pain: back pain/abdominal pain.  PRN medications: PRN Atarax and melatonin was given before bed. Pt. Is requesting something else to help her sleep at night.  Plan of Care:  Will continue to monitor and assess for changes. Pt. Is hoping to discharge soon. We reviewed her labs. May need dialysis after treatment of rejection. Care coordinator is arranging.        "

## 2020-07-15 NOTE — PROGRESS NOTES
Care Coordinator  D/I: Peewee Flores at [facility]ake and Jelly agrees and Dr Mahesh Armando is aware:    Hemodialysis    [facility]  [address]  [address]  [place] [address]  Ph: [phone]  Fax: [phone]    Days: MWF  Time: 3pm  ---please be there at 2:30pm on your first day  Start: Monday: 7/20    Nephrologist: Domenica Car  A: likely here several more days  P: will follow. Need to make a PCP appointment for pt @ [facility]: [facility] with Beatris Anderson (she had a 7/1 appointment and did not go as she was in the Tx clinic with uti sx).

## 2020-07-15 NOTE — PROGRESS NOTES
Transplant Social Work Services Progress Note      Date of Initial Social Work Evaluation: 7/9/2020  Collaborated with: Treatment Team, Chart Review    Data: Patient is a 34 yo with PMH significant for ESRD due to IgA Nephropathy s/p KT in 2007 (failed due to non-compliance during pregnancy) and 6/19/2020 (3 arteries, + stent), and HTN. Baseline Cr 0.6 post-transplant. She presented on 7/4/2020 with fever and ARF of transplanted kidney.    Intervention:   Treatment Team: Treatment team thinks patient may be here until the weekend and potentially next week depending on her medical stability.   Patient: Patient out of her room for multiple treatments today.   Chart Review: Patient currently on TPN with poor appetite, on calorie counts. Patient up independently but has been asking for help intermittently from the staff due to weakness. Patient is being supported spiritually by the [facility] .     Assessment: SW anticipates patient will be able to discharge home once medically cleared. Will need teaching, home RN, and a insurance coverage check if she needs to go home on TPN.   Education provided by SW: None  Plan:    Discharge Plans in Progress: Home with assist from family and SO    Barriers to d/c plan: Medical Stability    Follow up Plan: SW to continue following for support and any discharge planning needs    MARYJANE Sand[place], [place]  7A   Ph: [phone]  Pager: [phone]

## 2020-07-15 NOTE — PROGRESS NOTES
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 Baseline Cr 0.6 post-transplant. She presented on 7/4/2020 with fever and ARF of transplanted kidney.    Graft function: POD #26. Ureteral stent removed 7/8.  Mixed rejection with ARF: On dialysis since 7/7. UOP increasing on lasix, 1.8 L output yesterday. 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 DSA negative. AT1R and endothelial antibody cannot be run at this time due to issue at accepting lab, blood from 7/7 is saved in lab and will be send when it will be accepted. Treatment as below.   Perinephric fluid collections: Noted on US. 7/5 IR: 50ml cloudy serosang fluid aspirated, gram stain negative, culture NGTD. Not suggestive of urine leak.  Immunosuppression management:    Pheresis/IVIG: Plan for pheresis/IVIG every other day x5 starting 7/7, completed on 7/15. IVIG 0.5g/kg doses #1-4, 1g/kg dose #5.  Thymo: 125mg on 7/6, 125mg 7/8, 75 mg 7/10. Plan for 1mg/kg after final pheresis, will hold today due to chills and leukocytosis.  Rituximab: 500mg on 7/7.  Methylpred: 500/500/250/100/100/100/100  Prednisone: Tapering between methylpred doses, end with 5mg daily.  Tacro: Goal 8-10 . 7/13 7.9. 7/15 level pending.   MMF: Switched back to Myfortic 720mg BID.  Complexity of management: Medium. Contributing factors: rejection  Hematology:   ANTHONY/ACD: Hgb 8.2. Received 1u RBC on 7/5 and 7/9. No sign of bleeding. PSAT 9, consider venofer if no infection. Hapto high, retic low. On Retacrit.   Cardiorespiratory:   HTN: Controlled. Coreg 6.25mg. Norvasc 5mg.   Pulmonary edema: Noted on CXR. No hypoxia. Monitor.  GI/Nutrition:   Severe malnutrition in the context of acute illness: Started parenteral nutirtion on 7/9. PICC  placed by IR on 7/10. No caloric intake today.  Nausea/Vomiting: Improving. Scheduled zofran and PPI. PRN compazine.   Diarrhea: Since transplant, now slowing. 7/6 C-diff and enteric panel negative.  Endocrine:   Steroid induced hyperglycemia: Continue sliding scale insulin.  Fluid/Electrolytes:   Hypervolemia: Weight up ~13kg. Dialyzed since 7/7. Continue lasix TID today.  Metabolic acidosis: Resolved with dialysis.  Hypocalcemia: Secondary to ARF/high phos. Replace PRN.  Hyperphosphatemia: Continue Phoslo. Low phos diet.  : Incontinent at times.  Neuro/Psych:   Acute pain: Pain over kidney and low back. Continue acetaminophen, lidoderm, and tramadol for break-thru.   Anxiety and paranoia: Likely exacerbated by steroids and poor sleep. Concern that opioids are being used to manage these symptoms. Has atarax PRN.  Infectious disease: Afebrile, no leukocytosis  Fever: Fever 103.7F on admission. Infection vs rejection. Cipro started outpatient, broadened to Cefepine on 7/4-7/8. COVID neg, 7/4 UC neg, 7/4 blood cx negative, 7/5 perinephric fluid cultures negative, enteric panel neg, C-diff neg.  Leukocytosis: WBC 10.3<-13.0<-7.4. New rigors and joint/back pain. Will send CXR, BCx and UA/UC. Infection vs serum sickness. Hold Thymo today for work up.   HBcAb positive: sAg nonreactive, sAb >1000. This may be positive due to IVIG infusions.  Prophylaxis: Bactrim (PCP ppx) three times per week  Valcyte (CMV PPx)   Disposition: 7A    Medical Decision Making: Medium  Subsequent visit 23333 (moderate level decision making)    CONNIE/Fellow/Resident Provider: Mireille Munroe PAObedC [id number]    Faculty: Carmelo Jones MD   _________________________________________________________________    Interval History: History is obtained from the patient  New rigors, joint and lower back pain. Afebrile. No appetite.     ROS:   A 10-point review of systems was negative except as noted above.    Meds:    acetaminophen  650 mg Oral Once      "acetaminophen  650 mg Oral Q4H     amLODIPine  5 mg Oral Daily     atorvastatin  10 mg Oral Daily     calcium acetate  1,334 mg Oral TID w/meals     carvedilol  12.5 mg Oral BID     diphenhydrAMINE  50 mg Oral Once    Or     diphenhydrAMINE  50 mg Intravenous Once     epoetin staci-epbx (RETACRIT) inj ESRD  2,000 Units Intravenous Once per day on Mon Wed Fri     furosemide  40 mg Intravenous TID     heparin lock flush  5 mL Intravenous Q24H     hydrocortisone sodium succinate PF  100 mg Intravenous Once     immune globulin - sucrose free  1 g/kg (Ideal) Intravenous Once     insulin aspart  1-7 Units Subcutaneous TID AC     insulin aspart  1-5 Units Subcutaneous At Bedtime     lidocaine  1 patch Transdermal Q24H     lidocaine   Transdermal Q8H     lipids  250 mL Intravenous Once per day on Sun Mon Wed Fri Sat     mycophenolic acid  720 mg Oral BID     ondansetron  4 mg Oral TID AC     pantoprazole  40 mg Oral BID     [START ON 7/16/2020] predniSONE  40 mg Oral Daily    Followed by     [START ON 7/17/2020] predniSONE  20 mg Oral Daily     [START ON 7/19/2020] predniSONE  5 mg Oral Daily     sodium bicarbonate  1,300 mg Oral BID     sodium chloride (PF)  10 mL Intracatheter Q8H     sodium chloride (PF)  3 mL Intracatheter Q8H     sulfamethoxazole-trimethoprim  1 tablet Oral Once per day on Mon Wed Fri     tacrolimus  6 mg Oral BID     valGANciclovir  450 mg Oral Once per day on Mon Thu       Physical Exam:     Admit Weight: 57.2 kg (126 lb 1.7 oz)    Current vitals:   /82   Pulse 73   Temp 98.5  F (36.9  C) (Oral)   Resp 18   Ht 1.63 m (5' 4.17\")   Wt 71.7 kg (158 lb)   SpO2 99%   BMI 26.97 kg/m      Vital sign ranges:    Temp:  [97.9  F (36.6  C)-98.5  F (36.9  C)] 98.5  F (36.9  C)  Pulse:  [71-78] 73  Heart Rate:  [71-77] 71  Resp:  [18-20] 18  BP: (134-159)/(75-97) 139/82  SpO2:  [97 %-100 %] 99 %  Patient Vitals for the past 24 hrs:   BP Temp Temp src Pulse Heart Rate Resp SpO2 Weight   07/15/20 1030 " 139/82 98.5  F (36.9  C) Oral 73 -- 18 -- --   07/15/20 0958 -- -- -- 78 -- -- -- --   07/15/20 0932 -- -- -- 73 -- -- -- --   07/15/20 0919 -- -- -- 73 -- -- -- --   07/15/20 0850 (!) 142/85 97.9  F (36.6  C) Oral 75 -- 20 -- --   07/15/20 0810 (!) 142/85 97.9  F (36.6  C) Oral 75 -- 20 -- --   07/15/20 0619 134/75 98.3  F (36.8  C) Oral 71 71 18 99 % 71.7 kg (158 lb)   07/14/20 2252 (!) 142/75 98.2  F (36.8  C) Oral 76 72 18 97 % --   07/14/20 1913 (!) 159/94 98.2  F (36.8  C) Axillary 76 76 18 98 % --   07/14/20 1527 (!) 153/97 98.2  F (36.8  C) Oral -- 77 18 100 % --     General Appearance: Uncomfortable.   Skin: warm, dry, no rashes  Heart: Perfused  Lungs: RA, unlabored  Abdomen: Soft, distended, edematous  : polanco is not present.   Extremities: edema: generalized, shanell in trunk, back, upper body  Neurologic: awake, alert and oriented x4. Tremor absent.    Data:   CMP  Recent Labs   Lab 07/15/20  0611 07/14/20  0608 07/13/20  0619 07/12/20  0643   * 130* 130* 128*   POTASSIUM 3.8 3.6 4.0 4.2   CHLORIDE 99 96 98 96   CO2 23 24 26 26   * 184* 204* 237*   * 83* 46* 39*   CR 4.57* 3.98* 3.02* 2.99*   GFRESTIMATED 12* 14* 19* 20*   GFRESTBLACK 14* 16* 22* 23*   CASIE 8.4* 8.2* 7.9* 7.7*   MAG 2.7* 2.4* 1.9 1.6   PHOS 2.6 2.7 2.6 3.3   ALBUMIN  --   --  2.9* 3.1*   BILITOTAL  --   --  1.1 0.3   ALKPHOS  --   --  40 35*   AST  --   --  18 20   ALT  --   --  29 25     CBC  Recent Labs   Lab 07/15/20  0611 07/14/20  0608   HGB 8.2* 8.0*   WBC 10.3 13.0*    253     Attestation:    The patient has been seen and evaluated by me.   Vital signs, labs, medications and orders were reviewed.   When obtained, diagnostic images were reviewed by me and interpreted as above.    The care plan was discussed with the multidisciplinary team and I agree with the findings and plan in this note, with any differences recorded in blue.    Immunosuppressive medication management was reviewed and adjusted as  reflected in the note and orders.     .

## 2020-07-15 NOTE — PLAN OF CARE
"VS: /75 (BP Location: Right leg)   Pulse 71   Temp 98.3  F (36.8  C) (Oral)   Resp 18   Ht 1.63 m (5' 4.17\")   Wt 71.7 kg (158 lb)   SpO2 99%   BMI 26.97 kg/m      Current condition: Stable on RA   Neuro: WDL  Cardio: WDL  Respiratory: WDL  GI/: Voids spontaneously, no BM during shift.   Skin: WDL  Diet:   Low phos diet, on Nicolás counts.   BG: AC/HS bedtime BG was 241  LDA:  2x lumen PICC with TPN at 40cc/hr.   Mobility:  UAL.   Pain: back pain/abdominal pain  PRN medications: PRN Atarax and melatonin was given before bed.   Plan of Care:  Will continue to monitor and assess for changes.   "

## 2020-07-15 NOTE — PLAN OF CARE
"BP (!) 153/97 (BP Location: Right leg)   Pulse 79   Temp 98.2  F (36.8  C) (Oral)   Resp 18   Ht 1.63 m (5' 4.17\")   Wt 70.9 kg (156 lb 3.2 oz)   SpO2 100%   BMI 26.67 kg/m        Patient VSS on RA; afebrile. Ate without calling so no BG taken. Tylenol and tramadol given x1. Tolerating low phos diet with good appetite. PICC infusing TPN. One small BM, voiding well. Up independently. Plan for PP/IVIG. Will continue with POC and notify MD with changes or concerns.       "

## 2020-07-15 NOTE — PROGRESS NOTES
Calorie Count  Intake recorded for: 7/14  Total Kcals: 0 Total Protein: 0g  Kcals from Hospital Food: 0   Protein: 0g  Kcals from Outside Food (average):0 Protein: 0g  # Meals Recorded: 2 meals ordered from kitchen, no intake recorded.   # Supplements Recorded: no intake recorded.

## 2020-07-15 NOTE — PROCEDURES
Laboratory Medicine and Pathology  Transfusion Medicine - Apheresis Procedure    Jelly Dietz MRN# [id number]   YOB: 1985 Age: 35 year old        Reason for consult: Rejection of kidney transplant           Assessment and Plan:   The patient is a 35 year old female with ESRD due to IgA nephropathy S/P Kidney transplant ( and then retransplanted 2020) with signs of rejection.  She underwent therapeutic plasma exchange #5 of planned 5. She tolerated the procedure well.          Chief Complaint:   Edema and back pain         History of Present Illness:   The patient is a 35 year old female with ESRD due to IgA nephropathy.  She had a first kidney transplant in 2007. She underwent a  donor kidney transplant on 2020.   She was treated for a UTI on 2020, but returned on 2020 with fever, chills, myalgias, generalized fatigue and vomiting. She underwent renal biopsy and fluid collection drainage on 2020. Biopsy consistent with cellular rejection with some suggestion of antibody mediated rejection. Donor specific antibody pre and post transplant are negative.  A series of TPE were requested with the first one on 2020 with IVIG and rituximab. She has a fistula for access. She reports back pain and aching all over. Continues to have edema, though it is better.  Denied fever, chills, cough, shortness of breath, nausea, vomiting, diarrhea.         Past Medical History:     Past Medical History:   Diagnosis Date     ACS (acute coronary syndrome) (H) 2014     Acute rejection of kidney transplant 2020    Mixed AMR & ACR [place] 2A     Allergic state     seasonal allergies     Anemia in chronic renal disease      Anxiety      Cervical cerclage suture present in second trimester      Chest pain 2014     Chronic renal transplant rejection      End stage kidney disease (H)      Heart murmur      History of  delivery ,     30 wks, 28  wks      Hypertension     resolved after transplant     IgA nephropathy      Kidney transplanted 2020    DDKT. Induction w/ thymo 5.7mg/kg.     MRSA (methicillin resistant Staphylococcus aureus)      [facility]     Pre-eclampsia      Renal failure affecting pregnancy in second trimester      S/P kidney transplant 2007    LDKT in 2007 in [place]. History of 1B kidney rejection. Failed .     SAB (spontaneous )     2nd trimester            Past Surgical History:     Past Surgical History:   Procedure Laterality Date     CERCLAGE CERVICAL N/A 2015    Procedure: CERCLAGE CERVICAL;  Surgeon: Norbert Chopra MD;  Location: [facility]      SECTION  2012    Procedure:  SECTION;;  Surgeon: Chelsea Cross MD;  Location: [facility]      SECTION N/A 2015    Procedure:  SECTION;  Surgeon: Chelsea Cross MD;  Location: [facility]     cesearean section       CYSTOSCOPY, REMOVE STENT(S), COMBINED N/A 2020    Procedure: CYSTOSCOPY, WITH STENT REMOVAL;  Surgeon: Nory Morgan MD;  Location: [facility]     EXPLANT TRANSPLANTED KIDNEY  3/29/2013    Procedure: EXPLANT TRANSPLANTED KIDNEY;  Explant Transplanted right Kidney (from patients right iliac fossa);  Surgeon: Nory Morgan MD;  Location: [facility]     IR FINE NEEDLE ASPIRATION W ULTRASOUND  2020     IR PICC PLACEMENT > 5 YRS OF AGE  7/10/2020     IR RENAL BIOPSY LEFT  2020     MIDLINE DOUBLE LUMEN PLACEMENT Right 2020    unable to place PICC line, MIDLINE placed     NEPHRECTOMY  3/29/13    Transplant nephrectomy      WILIAN/DIALYSIS CATHETER       TRANSPLANT KIDNEY RECIPIENT  DONOR N/A 2020    Procedure: TRANSPLANT, KIDNEY, RECIPIENT,  DONOR, venous reconstruction, and back bench preparation of kidney;  Surgeon: Irma Shannon MD;  Location: [facility]     TRANSPLANT KIDNEY RECIPIENT  LIVING UNRELATED  Dec 2007    (Adult male in [place])          Social History:   2 children, worked previously but not recently          Allergies:   No Known Allergies          Medications:     Current Facility-Administered Medications   Medication     acetaminophen (TYLENOL) tablet 650 mg     acetaminophen (TYLENOL) tablet 650 mg     acetaminophen (TYLENOL) tablet 650 mg     amLODIPine (NORVASC) tablet 5 mg     [Rx hold ] aspirin (ASA) chewable tablet 81 mg     atorvastatin (LIPITOR) tablet 10 mg     calcium acetate (PHOSLO) capsule 1,334 mg     carvedilol (COREG) tablet 12.5 mg     cloNIDine (CATAPRES) tablet 0.1 mg     dextrose 10% infusion     glucose gel 15-30 g    Or     dextrose 50 % injection 25-50 mL    Or     glucagon injection 1 mg     diphenhydrAMINE (BENADRYL) capsule 50 mg    Or     diphenhydrAMINE (BENADRYL) injection 50 mg     diphenhydrAMINE (BENADRYL) capsule 50 mg    Or     diphenhydrAMINE (BENADRYL) injection 50 mg     diphenhydrAMINE (BENADRYL) injection 50 mg     EPINEPHrine (ADRENALIN) kit 0.3 mg     epoetin staci-epbx (RETACRIT) injection 2,000 Units     famotidine (PEPCID) injection 20 mg     furosemide (LASIX) injection 40 mg     heparin lock flush 10 UNIT/ML injection 5 mL     heparin lock flush 10 UNIT/ML injection 5-10 mL     hydrocortisone sodium succinate PF (solu-CORTEF) injection 100 mg     hydrOXYzine (ATARAX) tablet 25 mg     immune globulin - sucrose free 10 % injection 55.1 g     insulin aspart (NovoLOG) injection (RAPID ACTING)     insulin aspart (NovoLOG) injection (RAPID ACTING)     Lidocaine (LIDOCARE) 4 % Patch 1 patch     lidocaine (LMX4) cream     lidocaine (XYLOCAINE) 2 % 15 mL, alum & mag hydroxide-simethicone (MAALOX  ES) 15 mL GI Cocktail     lidocaine patch in PLACE     lipids (INTRALIPID) 20 % infusion 250 mL     melatonin tablet 3 mg     meperidine (DEMEROL) injection 25-50 mg     methylPREDNISolone sodium succinate (solu-MEDROL) injection 125 mg     mycophenolic  acid (GENERIC EQUIVALENT) EC tablet 720 mg     naloxone (NARCAN) injection 0.1-0.4 mg     ondansetron (ZOFRAN) injection 4 mg     ondansetron (ZOFRAN-ODT) ODT tab 4 mg     pantoprazole (PROTONIX) EC tablet 40 mg     parenteral nutrition - ADULT compounded formula     [START ON 7/16/2020] predniSONE (DELTASONE) tablet 40 mg    Followed by     [START ON 7/17/2020] predniSONE (DELTASONE) tablet 20 mg     [START ON 7/19/2020] predniSONE (DELTASONE) tablet 5 mg     prochlorperazine (COMPAZINE) injection 10 mg    Or     prochlorperazine (COMPAZINE) tablet 10 mg     sodium bicarbonate tablet 1,300 mg     sodium chloride (PF) 0.9% PF flush 10 mL     sodium chloride (PF) 0.9% PF flush 10-20 mL     sodium chloride (PF) 0.9% PF flush 10-20 mL     sodium chloride (PF) 0.9% PF flush 3 mL     sodium chloride (PF) 0.9% PF flush 3 mL     sulfamethoxazole-trimethoprim (BACTRIM) 400-80 MG per tablet 1 tablet     tacrolimus (GENERIC EQUIVALENT) capsule 6 mg     traMADol (ULTRAM) tablet 50 mg     valGANciclovir (VALCYTE) tablet 450 mg           Review of Systems:   See above         Exam:   /85 P 75 T 97.9 RR 20 O2 sat 100%  Sleeping, but easily awaken  Breathing appears comfortable   Fistula  Edema         Data:     Results for orders placed or performed during the hospital encounter of 07/04/20 (from the past 24 hour(s))   Glucose by meter   Result Value Ref Range    Glucose 153 (H) 70 - 99 mg/dL   Glucose by meter   Result Value Ref Range    Glucose 241 (H) 70 - 99 mg/dL   CBC with platelets differential   Result Value Ref Range    WBC 10.3 4.0 - 11.0 10e9/L    RBC Count 2.75 (L) 3.8 - 5.2 10e12/L    Hemoglobin 8.2 (L) 11.7 - 15.7 g/dL    Hematocrit 25.8 (L) 35.0 - 47.0 %    MCV 94 78 - 100 fl    MCH 29.8 26.5 - 33.0 pg    MCHC 31.8 31.5 - 36.5 g/dL    RDW 14.2 10.0 - 15.0 %    Platelet Count 222 150 - 450 10e9/L    Diff Method Automated Method     % Neutrophils 90.7 %    % Lymphocytes 1.1 %    % Monocytes 3.5 %    %  Eosinophils 0.0 %    % Basophils 0.1 %    % Immature Granulocytes 4.6 %    Nucleated RBCs 0 0 /100    Absolute Neutrophil 9.4 (H) 1.6 - 8.3 10e9/L    Absolute Lymphocytes 0.1 (L) 0.8 - 5.3 10e9/L    Absolute Monocytes 0.4 0.0 - 1.3 10e9/L    Absolute Eosinophils 0.0 0.0 - 0.7 10e9/L    Absolute Basophils 0.0 0.0 - 0.2 10e9/L    Abs Immature Granulocytes 0.5 (H) 0 - 0.4 10e9/L    Absolute Nucleated RBC 0.0    Basic metabolic panel   Result Value Ref Range    Sodium 131 (L) 133 - 144 mmol/L    Potassium 3.8 3.4 - 5.3 mmol/L    Chloride 99 94 - 109 mmol/L    Carbon Dioxide 23 20 - 32 mmol/L    Anion Gap 9 3 - 14 mmol/L    Glucose 224 (H) 70 - 99 mg/dL    Urea Nitrogen 108 (H) 7 - 30 mg/dL    Creatinine 4.57 (H) 0.52 - 1.04 mg/dL    GFR Estimate 12 (L) >60 mL/min/[1.73_m2]    GFR Estimate If Black 14 (L) >60 mL/min/[1.73_m2]    Calcium 8.4 (L) 8.5 - 10.1 mg/dL   Magnesium   Result Value Ref Range    Magnesium 2.7 (H) 1.6 - 2.3 mg/dL   Phosphorus   Result Value Ref Range    Phosphorus 2.6 2.5 - 4.5 mg/dL   INR   Result Value Ref Range    INR 1.27 (H) 0.86 - 1.14   Fibrinogen activity   Result Value Ref Range    Fibrinogen 164 (L) 200 - 420 mg/dL   Glucose by meter   Result Value Ref Range    Glucose 187 (H) 70 - 99 mg/dL          Procedure Summary:     A single plasma volume plasma exchange was performed with a Spectra Optia cell separator.  The replacement fluid was 5% albumin.  The left arm fistuale was used for access.  ACD-A was used for anticoagulation.  To offset the effects of the citrate, calcium gluconate was given in the return line.  The patient's vital signs were stable throughout.  The patient tolerated the procedure well.    ATTESTATION STATEMENT:  This patient has been seen and evaluated by me directly, Isabelle Jolly MD, PhD.    Isabelle Jolly M.D., Ph.D.  Attending Physician  Division of Transfusion Medicine  Department of Laboratory Medicine and Pathology  [facility] [place] [address]  Pager [phone]

## 2020-07-15 NOTE — DISCHARGE INSTRUCTIONS
Plasma exchange:  If you received blood products (plasma or red blood cells) as part of your treatment, you need to be aware that transfusion reactions can occur up to several hours after they have been given to you.  Call your physician if you experience any symptoms in the next 48 hours, including: breathing problems, rash, itching, hives, nausea or vomiting, fever or chills, blood in your urine or stools, or joint pain.  Please inform the Transfusion Medicine Physician by calling [phone] and asking for the physician on call.  If you received albumin as part of your treatment (this is the most common), some of your clotting factors have been removed.  You body will replace these factors, but you could be at a slight risk for bleeding.  Please inform us if you have had any bleeding or a recent invasive procedure, such as a biopsy or surgery.    Certain medications that lower your blood pressure (ace inhibitors) such as Lisinopril are contraindicated while you are receiving plasma exchange.  Please inform us if you have started taking this medication during your plasma exchange series.  ________________________________________________________  Discharge RN please fax discharge orders to home care agency: [facility]  ________________________________________________________  Ko [place] F: [phone]

## 2020-07-15 NOTE — PROGRESS NOTES
[facility], [place]   Transplant Nephrology Progress Note  Date of Admission:  7/4/2020  Today's Date: 07/14/2020    Recommendations:  1. 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  Other Banff scoring include V1, T1, I1.  The biopsy also showed interstitial hemorrhage and tubular injury with vacuolization.  The ATN component may be a phenotype of a BMR.     # Immunosuppression: Tacrolimus immediate release (goal 8-10) and Myfortic 720 twice a day.              - Changes: Rejection treatment will include Thymoglobulin 2 mg/kg per dose x3, plasmapheresis with IVIG x 5 and Solu-Medrol 500 mg daily x3.  Following the intense course, prednisone taper will be recommended and prednisone maintenance 5 mg daily.  Agree with rituximab single dose.     # Infection Prophylaxis: (please renally dose)   - PJP: Sulfa/TMP (Bactrim)   - CMV: Valcyte      # Hypertension: Controlled;   Goal BP: < 130/80              - Volume status: hypervolemic              - will add Norvasc 5 mg daily       # Anemia in Chronic Renal Disease: Hgb: Improved with transfusion     ENZO: will start 2000 units three times per week     # Mineral Bone Disorder:   - Secondary renal hyperparathyroidism; PTH level: Moderately elevated (301-600 pg/ml)        On treatment: None  - Vitamin D; level: Normal        On supplement: No  - Calcium; level: Low normal        On supplement: No  - Phosphorus; level: Normal        On supplement: No     # Electrolytes:   -resolved with dialysis      # Transplant  History:  Etiology of Kidney Failure: IgA nephropathy  Tx: LDKT and DDKT  Transplant: 2020 (Kidney), 2007 (Kidney)  Donor Type: Donation after Brain Death        Donor Class:   Crossmatch at time of Tx: pending   Significant changes in immunosuppression: None  Significant transplant-related complications: None    Mahesh Armando MD   Pager: [phone]    Interval History   Feels somewhat better today specially after dialysis and would like continue to hold off. Her UOP is improving.    Review of Systems   4 point ROS was obtained and negative except as noted in the Interval History.    MEDICATIONS:    acetaminophen  650 mg Oral Q4H     amLODIPine  5 mg Oral Daily     atorvastatin  10 mg Oral Daily     calcium acetate  1,334 mg Oral TID w/meals     carvedilol  12.5 mg Oral BID     epoetin staci-epbx (RETACRIT) inj ESRD  2,000 Units Intravenous Once per day on      furosemide  40 mg Intravenous TID     heparin lock flush  5 mL Intravenous Q24H     insulin aspart  1-7 Units Subcutaneous TID AC     insulin aspart  1-5 Units Subcutaneous At Bedtime     lidocaine  1 patch Transdermal Q24H     lidocaine   Transdermal Q8H     lipids  250 mL Intravenous Once per day on Sun  Sat     mycophenolic acid  720 mg Oral BID     ondansetron  4 mg Oral TID AC     pantoprazole  40 mg Oral BID     [START ON 2020] predniSONE  40 mg Oral Daily    Followed by     [START ON 2020] predniSONE  20 mg Oral Daily     [START ON 2020] predniSONE  5 mg Oral Daily     sodium bicarbonate  1,300 mg Oral BID     sodium chloride (PF)  10 mL Intracatheter Q8H     sodium chloride (PF)  3 mL Intracatheter Q8H     sulfamethoxazole-trimethoprim  1 tablet Oral Once per day on      tacrolimus  6 mg Oral BID     valGANciclovir  450 mg Oral Once per day on        dextrose       parenteral nutrition - ADULT compounded formula 40 mL/hr at 20       Physical Exam   Temp  Av.8  F (37.1  C)  " Min: 97.6  F (36.4  C)  Max: 103.7  F (39.8  C)      Pulse  Av.5  Min: 74  Max: 122 Resp  Av.1  Min: 13  Max: 28  SpO2  Av.1 %  Min: 93 %  Max: 100 %     BP (!) 142/75 (BP Location: Right leg)   Pulse 76   Temp 98.2  F (36.8  C) (Oral)   Resp 18   Ht 1.63 m (5' 4.17\")   Wt 70.9 kg (156 lb 3.2 oz)   SpO2 97%   BMI 26.67 kg/m     Date 20 07 - 20 0659   Shift 6380-4323 7572-2594 2527-0036 24 Hour Total   INTAKE   P.O. 600 200  800   I.V.  810  810   Shift Total(mL/kg) 600(10.49) 1010(17.66)  1610(28.15)   OUTPUT   Urine 50   50   Shift Total(mL/kg) 50(0.87)   50(0.87)   Weight (kg) 57.2 57.2 57.2 57.2      Admit Weight: 57.2 kg (126 lb 1.7 oz)     GENERAL APPEARANCE: alert and no distress  HENT: mouth without ulcers or lesions  LYMPHATICS: no cervical or supraclavicular nodes  RESP: lungs clear to auscultation - no rales, rhonchi or wheezes  CV: regular rhythm, normal rate, no rub, no murmur  EDEMA: LE edema bilaterally  ABDOMEN: soft, nondistended, nontender, bowel sounds normal  MS: extremities normal - no gross deformities noted, no evidence of inflammation in joints, no muscle tenderness  SKIN: no rash    Data   All labs reviewed by me.  CMP  Recent Labs   Lab 20  0608 20  0619 20  0643 20  0630 07/10/20  0556   * 130* 128* 126* 132*   POTASSIUM 3.6 4.0 4.2 4.2 4.1   CHLORIDE 96 98 96 95 99   CO2 24 26 26 24 26   ANIONGAP 10 6 7 7 7   * 204* 237* 198* 200*   BUN 83* 46* 39* 52* 28   CR 3.98* 3.02* 2.99* 4.87* 3.85*   GFRESTIMATED 14* 19* 20* 11* 14*   GFRESTBLACK 16* 22* 23* 13* 17*   CASIE 8.2* 7.9* 7.7* 6.8* 7.4*   MAG 2.4* 1.9 1.6 1.7 1.6   PHOS 2.7 2.6 3.3 5.0* 3.8   PROTTOTAL  --  5.4* 5.7*  --  5.7*   ALBUMIN  --  2.9* 3.1*  --  3.0*   BILITOTAL  --  1.1 0.3  --  1.0   ALKPHOS  --  40 35*  --  47   AST  --  18 20  --  26   ALT  --  29 25  --  24     CBC  Recent Labs   Lab 20  0608 20  0619 20  0643 20  0630   HGB " 8.0* 8.4* 8.7* 8.0*   WBC 13.0* 7.4 5.4 3.5*   RBC 2.62* 2.80* 2.86* 2.67*   HCT 24.3* 26.0* 26.5* 24.4*   MCV 93 93 93 91   MCH 30.5 30.0 30.4 30.0   MCHC 32.9 32.3 32.8 32.8   RDW 13.9 13.6 13.4 13.6    229 202 168     INR  Recent Labs   Lab 07/13/20  0619 07/12/20  0643 07/11/20  0900 07/10/20  0556   INR 1.43* 1.45* 1.35* 1.65*     ABGNo lab results found in last 7 days.   Urine Studies  Recent Labs   Lab Test 07/06/20  1412 07/04/20  1346 07/01/20  1430 06/23/20  0830   COLOR Yellow Yellow Patricia Yellow   APPEARANCE Slightly Cloudy Slightly Cloudy Slightly Cloudy Clear   URINEGLC 150* 70* Negative Negative   URINEBILI Negative Small* Negative Negative   URINEKETONE 10* Negative Negative Negative   SG 1.022 1.027 1.025 1.012   UBLD Small* Small* Moderate* Moderate*   URINEPH 8.0* 8.0* 6.0 6.5   PROTEIN >600* >600* 100* 10*   NITRITE Negative Negative Negative Negative   LEUKEST Negative Negative Negative Small*   RBCU 78* 21* 96* 144*   WBCU 21* 2 9* 4     Recent Labs   Lab Test 07/16/12  1400 07/02/12  1130 06/25/12  0615 06/17/12  0930 06/13/12  1120 06/07/12  0643 06/01/12  0950 05/25/12  1830 05/25/12  1110 05/22/12  1536   UTPG 1.53* 0.86* 0.69* 0.82* 0.80* 0.88* 0.58* 0.47*  0.45* 0.56* 0.51*     PTH  Recent Labs   Lab Test 06/26/20  0842 11/30/12  2030 11/30/12  1030   PTHI 226* 500* 794*     Iron Studies  Recent Labs   Lab Test 07/05/20  0553 06/25/20  0749 11/30/12  1950   IRON 10* 57 125   * 163* 155*   IRONSAT 9* 35 81*   SHAHBAZ  --  886* 401*       IMAGING:  All imaging studies reviewed by me.     Effusion on cxr from 7/12

## 2020-07-16 ENCOUNTER — APPOINTMENT (OUTPATIENT)
Dept: ULTRASOUND IMAGING | Facility: CLINIC | Age: 35
DRG: 698 | End: 2020-07-16
Attending: NURSE PRACTITIONER
Payer: MEDICARE

## 2020-07-16 ENCOUNTER — RESULTS ONLY (OUTPATIENT)
Dept: OTHER | Facility: CLINIC | Age: 35
End: 2020-07-16

## 2020-07-16 LAB
ANION GAP SERPL CALCULATED.3IONS-SCNC: 10 MMOL/L (ref 3–14)
BACTERIA SPEC CULT: NO GROWTH
BASOPHILS # BLD AUTO: 0 10E9/L (ref 0–0.2)
BASOPHILS NFR BLD AUTO: 0 %
BUN SERPL-MCNC: 119 MG/DL (ref 7–30)
CALCIUM SERPL-MCNC: 8.3 MG/DL (ref 8.5–10.1)
CHLORIDE SERPL-SCNC: 100 MMOL/L (ref 94–109)
CO2 SERPL-SCNC: 21 MMOL/L (ref 20–32)
CREAT SERPL-MCNC: 4.65 MG/DL (ref 0.52–1.04)
DIFFERENTIAL METHOD BLD: ABNORMAL
EOSINOPHIL # BLD AUTO: 0 10E9/L (ref 0–0.7)
EOSINOPHIL NFR BLD AUTO: 0 %
ERYTHROCYTE [DISTWIDTH] IN BLOOD BY AUTOMATED COUNT: 14.4 % (ref 10–15)
GFR SERPL CREATININE-BSD FRML MDRD: 11 ML/MIN/{1.73_M2}
GLUCOSE BLDC GLUCOMTR-MCNC: 182 MG/DL (ref 70–99)
GLUCOSE BLDC GLUCOMTR-MCNC: 235 MG/DL (ref 70–99)
GLUCOSE BLDC GLUCOMTR-MCNC: 262 MG/DL (ref 70–99)
GLUCOSE SERPL-MCNC: 191 MG/DL (ref 70–99)
HCT VFR BLD AUTO: 22.4 % (ref 35–47)
HGB BLD-MCNC: 7.3 G/DL (ref 11.7–15.7)
LYMPHOCYTES # BLD AUTO: 0.1 10E9/L (ref 0.8–5.3)
LYMPHOCYTES NFR BLD AUTO: 0.9 %
Lab: NORMAL
MAGNESIUM SERPL-MCNC: 2.3 MG/DL (ref 1.6–2.3)
MCH RBC QN AUTO: 30.7 PG (ref 26.5–33)
MCHC RBC AUTO-ENTMCNC: 32.6 G/DL (ref 31.5–36.5)
MCV RBC AUTO: 94 FL (ref 78–100)
MONOCYTES # BLD AUTO: 0.2 10E9/L (ref 0–1.3)
MONOCYTES NFR BLD AUTO: 2.6 %
MYELOCYTES # BLD: 0.2 10E9/L
MYELOCYTES NFR BLD MANUAL: 1.8 %
NEUTROPHILS # BLD AUTO: 9.1 10E9/L (ref 1.6–8.3)
NEUTROPHILS NFR BLD AUTO: 94.7 %
OVALOCYTES BLD QL SMEAR: SLIGHT
PHOSPHATE SERPL-MCNC: 2.1 MG/DL (ref 2.5–4.5)
PLATELET # BLD AUTO: 217 10E9/L (ref 150–450)
PLATELET # BLD EST: ABNORMAL 10*3/UL
POIKILOCYTOSIS BLD QL SMEAR: SLIGHT
POTASSIUM SERPL-SCNC: 3.5 MMOL/L (ref 3.4–5.3)
RBC # BLD AUTO: 2.38 10E12/L (ref 3.8–5.2)
SODIUM SERPL-SCNC: 131 MMOL/L (ref 133–144)
SPECIMEN SOURCE: NORMAL
WBC # BLD AUTO: 9.6 10E9/L (ref 4–11)

## 2020-07-16 PROCEDURE — 86828 HLA CLASS I&II ANTIBODY QUAL: CPT | Performed by: EMERGENCY MEDICINE

## 2020-07-16 PROCEDURE — 25000132 ZZH RX MED GY IP 250 OP 250 PS 637: Mod: GY | Performed by: STUDENT IN AN ORGANIZED HEALTH CARE EDUCATION/TRAINING PROGRAM

## 2020-07-16 PROCEDURE — 25000128 H RX IP 250 OP 636: Performed by: NURSE PRACTITIONER

## 2020-07-16 PROCEDURE — 25000132 ZZH RX MED GY IP 250 OP 250 PS 637: Mod: GY | Performed by: PHYSICIAN ASSISTANT

## 2020-07-16 PROCEDURE — 25800030 ZZH RX IP 258 OP 636: Performed by: INTERNAL MEDICINE

## 2020-07-16 PROCEDURE — 86832 HLA CLASS I HIGH DEFIN QUAL: CPT | Performed by: EMERGENCY MEDICINE

## 2020-07-16 PROCEDURE — 36415 COLL VENOUS BLD VENIPUNCTURE: CPT | Performed by: STUDENT IN AN ORGANIZED HEALTH CARE EDUCATION/TRAINING PROGRAM

## 2020-07-16 PROCEDURE — 36415 COLL VENOUS BLD VENIPUNCTURE: CPT | Performed by: NURSE PRACTITIONER

## 2020-07-16 PROCEDURE — 90937 HEMODIALYSIS REPEATED EVAL: CPT

## 2020-07-16 PROCEDURE — 86833 HLA CLASS II HIGH DEFIN QUAL: CPT | Performed by: EMERGENCY MEDICINE

## 2020-07-16 PROCEDURE — 25000132 ZZH RX MED GY IP 250 OP 250 PS 637: Mod: GY | Performed by: SURGERY

## 2020-07-16 PROCEDURE — 25000131 ZZH RX MED GY IP 250 OP 636 PS 637: Mod: GY | Performed by: PHYSICIAN ASSISTANT

## 2020-07-16 PROCEDURE — 80048 BASIC METABOLIC PNL TOTAL CA: CPT | Performed by: STUDENT IN AN ORGANIZED HEALTH CARE EDUCATION/TRAINING PROGRAM

## 2020-07-16 PROCEDURE — 83735 ASSAY OF MAGNESIUM: CPT | Performed by: STUDENT IN AN ORGANIZED HEALTH CARE EDUCATION/TRAINING PROGRAM

## 2020-07-16 PROCEDURE — 25000132 ZZH RX MED GY IP 250 OP 250 PS 637: Mod: GY | Performed by: INTERNAL MEDICINE

## 2020-07-16 PROCEDURE — 85025 COMPLETE CBC W/AUTO DIFF WBC: CPT | Performed by: STUDENT IN AN ORGANIZED HEALTH CARE EDUCATION/TRAINING PROGRAM

## 2020-07-16 PROCEDURE — 12000026 ZZH R&B TRANSPLANT

## 2020-07-16 PROCEDURE — 25000131 ZZH RX MED GY IP 250 OP 636 PS 637: Mod: GY | Performed by: NURSE PRACTITIONER

## 2020-07-16 PROCEDURE — 25000125 ZZHC RX 250: Performed by: SURGERY

## 2020-07-16 PROCEDURE — 00000146 ZZHCL STATISTIC GLUCOSE BY METER IP

## 2020-07-16 PROCEDURE — 25000132 ZZH RX MED GY IP 250 OP 250 PS 637: Mod: GY | Performed by: NURSE PRACTITIONER

## 2020-07-16 PROCEDURE — 25000128 H RX IP 250 OP 636: Performed by: STUDENT IN AN ORGANIZED HEALTH CARE EDUCATION/TRAINING PROGRAM

## 2020-07-16 PROCEDURE — 84100 ASSAY OF PHOSPHORUS: CPT | Performed by: STUDENT IN AN ORGANIZED HEALTH CARE EDUCATION/TRAINING PROGRAM

## 2020-07-16 PROCEDURE — 76776 US EXAM K TRANSPL W/DOPPLER: CPT

## 2020-07-16 PROCEDURE — 25800030 ZZH RX IP 258 OP 636: Performed by: NURSE PRACTITIONER

## 2020-07-16 RX ORDER — ACETAMINOPHEN 325 MG/1
650 TABLET ORAL ONCE
Status: COMPLETED | OUTPATIENT
Start: 2020-07-16 | End: 2020-07-16

## 2020-07-16 RX ORDER — DIPHENHYDRAMINE HCL 12.5MG/5ML
25-50 LIQUID (ML) ORAL ONCE
Status: COMPLETED | OUTPATIENT
Start: 2020-07-16 | End: 2020-07-16

## 2020-07-16 RX ORDER — ONDANSETRON 2 MG/ML
4 INJECTION INTRAMUSCULAR; INTRAVENOUS EVERY 30 MIN PRN
Status: DISCONTINUED | OUTPATIENT
Start: 2020-07-16 | End: 2020-07-18 | Stop reason: HOSPADM

## 2020-07-16 RX ORDER — DIPHENHYDRAMINE HCL 25 MG
25-50 CAPSULE ORAL ONCE
Status: COMPLETED | OUTPATIENT
Start: 2020-07-16 | End: 2020-07-16

## 2020-07-16 RX ORDER — ONDANSETRON 4 MG/1
4 TABLET, ORALLY DISINTEGRATING ORAL EVERY 30 MIN PRN
Status: DISCONTINUED | OUTPATIENT
Start: 2020-07-16 | End: 2020-07-18 | Stop reason: HOSPADM

## 2020-07-16 RX ADMIN — TRAMADOL HYDROCHLORIDE 50 MG: 50 TABLET, COATED ORAL at 06:16

## 2020-07-16 RX ADMIN — VALGANCICLOVIR 450 MG: 450 TABLET, FILM COATED ORAL at 13:49

## 2020-07-16 RX ADMIN — HYDROCORTISONE SODIUM SUCCINATE 50 MG: 100 INJECTION, POWDER, FOR SOLUTION INTRAMUSCULAR; INTRAVENOUS at 19:39

## 2020-07-16 RX ADMIN — CARVEDILOL 12.5 MG: 12.5 TABLET, FILM COATED ORAL at 19:36

## 2020-07-16 RX ADMIN — FUROSEMIDE 40 MG: 10 INJECTION, SOLUTION INTRAMUSCULAR; INTRAVENOUS at 19:38

## 2020-07-16 RX ADMIN — POTASSIUM CHLORIDE: 2 INJECTION, SOLUTION, CONCENTRATE INTRAVENOUS at 20:28

## 2020-07-16 RX ADMIN — TACROLIMUS 5.5 MG: 5 CAPSULE ORAL at 19:36

## 2020-07-16 RX ADMIN — CARVEDILOL 12.5 MG: 12.5 TABLET, FILM COATED ORAL at 08:25

## 2020-07-16 RX ADMIN — MYCOPHENOLIC ACID 720 MG: 360 TABLET, DELAYED RELEASE ORAL at 19:36

## 2020-07-16 RX ADMIN — Medication 5 ML: at 10:34

## 2020-07-16 RX ADMIN — HYDROXYZINE HYDROCHLORIDE 25 MG: 25 TABLET ORAL at 06:17

## 2020-07-16 RX ADMIN — INSULIN ASPART 1 UNITS: 100 INJECTION, SOLUTION INTRAVENOUS; SUBCUTANEOUS at 08:37

## 2020-07-16 RX ADMIN — ACETAMINOPHEN 650 MG: 325 TABLET, FILM COATED ORAL at 03:08

## 2020-07-16 RX ADMIN — ACETAMINOPHEN 650 MG: 325 TABLET, FILM COATED ORAL at 19:36

## 2020-07-16 RX ADMIN — SODIUM BICARBONATE 650 MG TABLET 1300 MG: at 08:26

## 2020-07-16 RX ADMIN — SODIUM CHLORIDE 300 ML: 9 INJECTION, SOLUTION INTRAVENOUS at 15:20

## 2020-07-16 RX ADMIN — TRAMADOL HYDROCHLORIDE 50 MG: 50 TABLET, COATED ORAL at 13:49

## 2020-07-16 RX ADMIN — ACETAMINOPHEN 650 MG: 325 TABLET, FILM COATED ORAL at 13:49

## 2020-07-16 RX ADMIN — FUROSEMIDE 40 MG: 10 INJECTION, SOLUTION INTRAMUSCULAR; INTRAVENOUS at 08:26

## 2020-07-16 RX ADMIN — Medication: at 15:20

## 2020-07-16 RX ADMIN — SODIUM BICARBONATE 650 MG TABLET 1300 MG: at 19:36

## 2020-07-16 RX ADMIN — CALCIUM ACETATE 1334 MG: 667 CAPSULE ORAL at 08:38

## 2020-07-16 RX ADMIN — AMLODIPINE BESYLATE 5 MG: 5 TABLET ORAL at 08:25

## 2020-07-16 RX ADMIN — PANTOPRAZOLE SODIUM 40 MG: 40 TABLET, DELAYED RELEASE ORAL at 19:37

## 2020-07-16 RX ADMIN — SODIUM CHLORIDE 250 ML: 9 INJECTION, SOLUTION INTRAVENOUS at 15:20

## 2020-07-16 RX ADMIN — PREDNISONE 40 MG: 20 TABLET ORAL at 08:25

## 2020-07-16 RX ADMIN — ANTI-THYMOCYTE GLOBULIN (RABBIT) 50 MG: 5 INJECTION, POWDER, LYOPHILIZED, FOR SOLUTION INTRAVENOUS at 20:28

## 2020-07-16 RX ADMIN — INSULIN ASPART 2 UNITS: 100 INJECTION, SOLUTION INTRAVENOUS; SUBCUTANEOUS at 19:32

## 2020-07-16 RX ADMIN — ATORVASTATIN CALCIUM 10 MG: 10 TABLET, FILM COATED ORAL at 08:26

## 2020-07-16 RX ADMIN — DIPHENHYDRAMINE HYDROCHLORIDE 50 MG: 25 CAPSULE ORAL at 19:36

## 2020-07-16 RX ADMIN — PANTOPRAZOLE SODIUM 40 MG: 40 TABLET, DELAYED RELEASE ORAL at 08:25

## 2020-07-16 RX ADMIN — TACROLIMUS 5.5 MG: 5 CAPSULE ORAL at 08:25

## 2020-07-16 RX ADMIN — MYCOPHENOLIC ACID 720 MG: 360 TABLET, DELAYED RELEASE ORAL at 08:25

## 2020-07-16 RX ADMIN — ACETAMINOPHEN 650 MG: 325 TABLET, FILM COATED ORAL at 08:27

## 2020-07-16 ASSESSMENT — MIFFLIN-ST. JEOR
SCORE: 1425.31
SCORE: 1390.75

## 2020-07-16 ASSESSMENT — ACTIVITIES OF DAILY LIVING (ADL)
ADLS_ACUITY_SCORE: 14
ADLS_ACUITY_SCORE: 12

## 2020-07-16 NOTE — PROGRESS NOTES
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 Baseline Cr 0.6 post-transplant. She presented on 7/4/2020 with fever and ARF of transplanted kidney.    Graft function: POD #27. Ureteral stent removed 7/8.  Mixed rejection with ARF: On dialysis since 7/7. UOP improved on lasix 40 TID, 1.2 L output yesterday. 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 DSA negative. AT1R and endothelial antibody cannot be run at this time due to issue at accepting lab, blood from 7/7 is saved in lab and will be sent when it will be accepted. Treatment as below. Repeat US today  Perinephric fluid collections: Noted on US. 7/5 IR: 50ml cloudy serosang fluid aspirated, gram stain negative, culture Neg. Not suggestive of urine leak.  Immunosuppression management:    Pheresis/IVIG: completed plasmapheresis/IVIG every other day x5 starting 7/7, completed on 7/15. IVIG 0.5g/kg doses #1-4, 1g/kg dose #5.  Thymo: 125mg on 7/6, 125mg 7/8, 75 mg 7/10. Will give 50mg (1mg/kg) today after final pheresis, delayed yesterday due to c/o chills and leukocytosis.  Rituximab: 500mg on 7/7.  Methylpred: 500/500/250/100/100/100/100  Prednisone: Tapering between methylpred doses, taper to 5mg daily starting 7/19.  Tacro: Goal 8-10 . 7/15  Level 7/15 =10.3 -decreased to 5.5mg BID. Repeat MWF  MMF:  Myfortic 720mg BID.  Complexity of management: Medium. Contributing factors: rejection  Hematology:   ANTHONY/ACD: Hgb 7.3-downtrending. Received 1u RBC on 7/5 and 7/9. No sign of bleeding. PSAT 9 on admission, will start venofer tomorrow. Hapto high, retic low. On Retacrit 2000units MWF  Cardiorespiratory:   HTN: -150s. Controlled. Continue Coreg 6.25mg, Norvasc 5mg.    Pulmonary edema: Noted on CXR. No hypoxia. RA  GI/Nutrition:   Severe malnutrition in the context of acute illness: Started parenteral nutirtion on 7/9. PICC placed by IR on 7/10. Calorie counts initiated.  Nausea/Vomiting: Improving. Scheduled zofran and PPI. PRN compazine.   Diarrhea: Resolved. 7/6 C-diff and enteric panel negative.  Endocrine:   Steroid induced hyperglycemia: Continue sliding scale insulin PRN.  Fluid/Electrolytes:   Hypervolemia: Weight up ~14kg. Dialyzed initiated 7/7. Last run on 7/12, will plan on today for volume overload. Continue lasix TID today.  Metabolic acidosis: Resolved with dialysis.  Hypocalcemia: Secondary to ARF/high phos. Replace PRN.  Hyperphosphatemia: resolved, 2.1, discontinue Phoslo and phos restricted diet.  : Incontinent at times.  Neuro/Psych:   Acute pain: initially graft pain and low back pain. Current pain reports related to volume overload. Continue acetaminophen Q4, lidoderm, tramadol 50mg q6PRN, and atarax 25mg TID PRN.   Anxiety and paranoia: Likely exacerbated by steroids and poor sleep. Concern that opioids are being used to manage these symptoms. Improved, continue atarax PRN.  Infectious disease: Afebrile, no leukocytosis  Fever: Fever 103.7F on admission. Infection vs rejection. Cipro started outpatient, broadened to Cefepine on 7/4-7/8. COVID neg, 7/4 UC neg, 7/4 blood cx neg, 7/5 perinephric fluid cultures neg, enteric panel neg, C-diff neg.  Leukocytosis: WBC 9.6 (10.3). New rigors and joint/back pain on 7/15 with concern for infection vs serum sickness. Infectious workup-neg CXR, BCx and UA/UC.    HBcAb positive: sAg nonreactive, sAb >1000. This may be positive due to IVIG infusions.  Prophylaxis: Bactrim (PCP ppx) three times per week  Valcyte (CMV PPx)   Disposition: 7A    Medical Decision Making: Medium  Subsequent visit 66250 (moderate level decision making)    CONNIE/Fellow/Resident Provider: Jess Huang, NP [id number]    Faculty: Carmelo Jones MD  "  _________________________________________________________________    Interval History: History is obtained from the patient  C/o pain due to edema, swelling with excess fluid.  Reluctant to dialyze.  Requesting IV pain meds for relief. Tolerating diet but no appetite. Reports does not like po supplements. Remains on TPN.    ROS:   A 10-point review of systems was negative except as noted above.    Meds:    acetaminophen  650 mg Oral Q4H     amLODIPine  5 mg Oral Daily     atorvastatin  10 mg Oral Daily     calcium acetate  1,334 mg Oral TID w/meals     carvedilol  12.5 mg Oral BID     epoetin staci-epbx (RETACRIT) inj ESRD  2,000 Units Intravenous Once per day on Mon Wed Fri     furosemide  40 mg Intravenous TID     heparin lock flush  5 mL Intravenous Q24H     insulin aspart  1-7 Units Subcutaneous TID AC     insulin aspart  1-5 Units Subcutaneous At Bedtime     lidocaine  1 patch Transdermal Q24H     lidocaine   Transdermal Q8H     lipids  250 mL Intravenous Once per day on Sun Mon Wed Fri Sat     mycophenolic acid  720 mg Oral BID     ondansetron  4 mg Oral TID AC     pantoprazole  40 mg Oral BID     predniSONE  40 mg Oral Daily    Followed by     [START ON 7/17/2020] predniSONE  20 mg Oral Daily     [START ON 7/19/2020] predniSONE  5 mg Oral Daily     sodium bicarbonate  1,300 mg Oral BID     sodium chloride (PF)  10 mL Intracatheter Q8H     sodium chloride (PF)  3 mL Intracatheter Q8H     sulfamethoxazole-trimethoprim  1 tablet Oral Once per day on Mon Wed Fri     tacrolimus  5.5 mg Oral BID     valGANciclovir  450 mg Oral Once per day on Mon Thu       Physical Exam:     Admit Weight: 57.2 kg (126 lb 1.7 oz)    Current vitals:   BP (!) 155/95 (BP Location: Right leg)   Pulse 73   Temp 97.8  F (36.6  C) (Oral)   Resp 16   Ht 1.63 m (5' 4.17\")   Wt 71.7 kg (158 lb)   SpO2 98%   BMI 26.97 kg/m      Vital sign ranges:    Temp:  [97.8  F (36.6  C)-98.8  F (37.1  C)] 97.8  F (36.6  C)  Pulse:  [73-78] " 73  Heart Rate:  [72-79] 78  Resp:  [16-20] 16  BP: (120-159)/(60-95) 155/95  SpO2:  [98 %-100 %] 98 %  Patient Vitals for the past 24 hrs:   BP Temp Temp src Pulse Heart Rate Resp SpO2 Weight   07/16/20 0314 (!) 155/95 97.8  F (36.6  C) Oral -- 78 16 98 % --   07/16/20 0043 (!) 159/80 98.8  F (37.1  C) Oral -- 78 16 100 % --   07/15/20 1935 (!) 151/94 98.3  F (36.8  C) Oral -- 72 16 100 % --   07/15/20 1650 120/60 98.6  F (37  C) Oral -- 79 16 100 % --   07/15/20 1030 139/82 98.5  F (36.9  C) Oral 73 -- 18 -- --   07/15/20 0958 -- -- -- 78 -- -- -- --   07/15/20 0932 -- -- -- 73 -- -- -- --   07/15/20 0919 -- -- -- 73 -- -- -- --   07/15/20 0850 (!) 142/85 97.9  F (36.6  C) Oral 75 -- 20 -- --   07/15/20 0810 (!) 142/85 97.9  F (36.6  C) Oral 75 -- 20 -- --     General Appearance:  NAD.   Skin: warm, dry, no rashes  Heart: Perfused  Lungs: RA, unlabored  Abdomen: Soft, Anasarca to abdomen, NT,  Incision to LLQ CDI.  : voiding  Extremities: edema: generalized to trunk, back, upper body and above the knee/thighs.  Neurologic: awake, alert and oriented x4. Tremor absent.    Data:   CMP  Recent Labs   Lab 07/16/20  0609 07/15/20  0611  07/13/20  0619 07/12/20  0643   * 131*   < > 130* 128*   POTASSIUM 3.5 3.8   < > 4.0 4.2   CHLORIDE 100 99   < > 98 96   CO2 21 23   < > 26 26   * 224*   < > 204* 237*   * 108*   < > 46* 39*   CR 4.65* 4.57*   < > 3.02* 2.99*   GFRESTIMATED 11* 12*   < > 19* 20*   GFRESTBLACK 13* 14*   < > 22* 23*   CASIE 8.3* 8.4*   < > 7.9* 7.7*   MAG 2.3 2.7*   < > 1.9 1.6   PHOS 2.1* 2.6   < > 2.6 3.3   ALBUMIN  --   --   --  2.9* 3.1*   BILITOTAL  --   --   --  1.1 0.3   ALKPHOS  --   --   --  40 35*   AST  --   --   --  18 20   ALT  --   --   --  29 25    < > = values in this interval not displayed.     CBC  Recent Labs   Lab 07/16/20  0609 07/15/20  0611   HGB 7.3* 8.2*   WBC 9.6 10.3    222     Attestation:    The patient has been seen and evaluated by me.   Vital  signs, labs, medications and orders were reviewed.   When obtained, diagnostic images were reviewed by me and interpreted as above.    The care plan was discussed with the multidisciplinary team and I agree with the findings and plan in this note, with any differences recorded in blue.    Immunosuppressive medication management was reviewed and adjusted as reflected in the note and orders.     .

## 2020-07-16 NOTE — PLAN OF CARE
"VS: BP (!) 145/79 (BP Location: Right leg)   Pulse 73   Temp 98.7  F (37.1  C) (Oral)   Resp 16   Ht 1.63 m (5' 4.17\")   Wt 71.7 kg (158 lb)   SpO2 99%   BMI 26.97 kg/m      Current condition: Stable on RA.   Neuro: WDL  Cardio: WDL  Respiratory: WDL  GI/: Voids spontaneously, no BM during shift.   Skin: WDL  Diet:   Low phosphorous with Nicolás counts.   BG: AC/HS bedtime   LDA:  RPICC with TPN at 40 with lipids and other saline locked.   Mobility:  UAL  Pain: Generalized.   PRN medications: Tramadol x1 atarax x1.    Plan of Care: Will continue to monitor and assess for any changes.    "

## 2020-07-16 NOTE — PLAN OF CARE
"BP (!) 151/94 (BP Location: Right leg)   Pulse 73   Temp 98.3  F (36.8  C) (Oral)   Resp 16   Ht 1.63 m (5' 4.17\")   Wt 71.7 kg (158 lb)   SpO2 100%   BMI 26.97 kg/m        Patient VSS on RA; afebrile. BG mildly elevated, 220 at hs; covered per protocol. Tramadol and tylenol given x1, atarax and melatonin given at HS for bedtime. Patient adamant about using demerol for back pain this evening she describes comparable to labor pain; educated on indication of medication and effects on kidneys- she would like to speak to a pharmacist tomorrow about this. Tolerating low phos diet with fair appetite; eating oatmeal from home. PICC infusing TPN and lipids, IVIG given this shift with no incident. Voiding well on lasix. Up independently around room. Will continue with POC and notify MD with changes or concerns.       "

## 2020-07-16 NOTE — PROGRESS NOTES
[facility], [place]   Transplant Nephrology Progress Note  Date of Admission:  7/4/2020  Today's Date: 07/15/2020    Recommendations:  1. Continue lasix to net negative goal of 1 liter per day   2. Will consider hd if not able to meet fluid balance   3. Consider solumedrol for possible serum sickness     Assessment & Plan      # Possible serum sickness vs infection: wbc is declining, UA not suggestive and cxr with increased interstitial marking. Discussed solumedrol with the team. 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  Other Banff scoring include V1, T1, I1.  The biopsy also showed interstitial hemorrhage and tubular injury with vacuolization.  The ATN component may be a phenotype of a BMR.     # Immunosuppression: Tacrolimus immediate release (goal 8-10) and Myfortic 720 twice a day.              - Changes: Rejection treatment will include Thymoglobulin 2 mg/kg per dose x3, plasmapheresis with IVIG x 5 and Solu-Medrol 500 mg daily x3.  Following the intense course, prednisone taper will be recommended and prednisone maintenance 5 mg daily.  Agree with rituximab single dose.     # Infection Prophylaxis: (please renally dose)   - PJP: Sulfa/TMP (Bactrim)   - CMV: Valcyte      # Hypertension: Controlled;   Goal BP: < 130/80              - Volume status: hypervolemic              - will add Norvasc 5 mg daily       # Anemia in Chronic Renal Disease: Hgb: Improved with transfusion     ENZO: will start 2000 units three times per week     # Mineral Bone Disorder:   - Secondary renal  hyperparathyroidism; PTH level: Moderately elevated (301-600 pg/ml)        On treatment: None  - Vitamin D; level: Normal        On supplement: No  - Calcium; level: Low normal        On supplement: No  - Phosphorus; level: Normal        On supplement: No     # Electrolytes:   -resolved with dialysis      # Transplant History:  Etiology of Kidney Failure: IgA nephropathy  Tx: LDKT and DDKT  Transplant: 6/19/2020 (Kidney), 12/1/2007 (Kidney)  Donor Type: Donation after Brain Death        Donor Class:   Crossmatch at time of Tx: pending   Significant changes in immunosuppression: None  Significant transplant-related complications: None    Mahesh Armando MD   Pager: [phone]    Interval History   Patient feels worse today with rigors and total body ache. She received demerol today with interval improvement.      Review of Systems   4 point ROS was obtained and negative except as noted in the Interval History.    MEDICATIONS:    acetaminophen  650 mg Oral Q4H     amLODIPine  5 mg Oral Daily     atorvastatin  10 mg Oral Daily     calcium acetate  1,334 mg Oral TID w/meals     carvedilol  12.5 mg Oral BID     epoetin staci-epbx (RETACRIT) inj ESRD  2,000 Units Intravenous Once per day on Mon Wed Fri     furosemide  40 mg Intravenous TID     heparin lock flush  5 mL Intravenous Q24H     insulin aspart  1-7 Units Subcutaneous TID AC     insulin aspart  1-5 Units Subcutaneous At Bedtime     lidocaine  1 patch Transdermal Q24H     lidocaine   Transdermal Q8H     lipids  250 mL Intravenous Once per day on Sun Mon Wed Fri Sat     mycophenolic acid  720 mg Oral BID     ondansetron  4 mg Oral TID AC     pantoprazole  40 mg Oral BID     [START ON 7/16/2020] predniSONE  40 mg Oral Daily    Followed by     [START ON 7/17/2020] predniSONE  20 mg Oral Daily     [START ON 7/19/2020] predniSONE  5 mg Oral Daily     sodium bicarbonate  1,300 mg Oral BID     sodium chloride (PF)  10 mL Intracatheter Q8H     sodium chloride (PF)  3 mL  "Intracatheter Q8H     sulfamethoxazole-trimethoprim  1 tablet Oral Once per day on      tacrolimus  5.5 mg Oral BID     valGANciclovir  450 mg Oral Once per day on        dextrose       parenteral nutrition - ADULT compounded formula 40 mL/hr at 07/15/20 2018       Physical Exam   Temp  Av.8  F (37.1  C)  Min: 97.6  F (36.4  C)  Max: 103.7  F (39.8  C)      Pulse  Av.5  Min: 74  Max: 122 Resp  Av.1  Min: 13  Max: 28  SpO2  Av.1 %  Min: 93 %  Max: 100 %     BP (!) 151/94 (BP Location: Right leg)   Pulse 73   Temp 98.3  F (36.8  C) (Oral)   Resp 16   Ht 1.63 m (5' 4.17\")   Wt 71.7 kg (158 lb)   SpO2 100%   BMI 26.97 kg/m     Date 20 07 - 20 0659   Shift 2756-4575 3299-7339 1194-3789 24 Hour Total   INTAKE   P.O. 600 200  800   I.V.  810  810   Shift Total(mL/kg) 600(10.49) 1010(17.66)  1610(28.15)   OUTPUT   Urine 50   50   Shift Total(mL/kg) 50(0.87)   50(0.87)   Weight (kg) 57.2 57.2 57.2 57.2      Admit Weight: 57.2 kg (126 lb 1.7 oz)     GENERAL APPEARANCE: alert and no distress  HENT: mouth without ulcers or lesions  LYMPHATICS: no cervical or supraclavicular nodes  RESP: lungs clear to auscultation - no rales, rhonchi or wheezes  CV: regular rhythm, normal rate, no rub, no murmur  EDEMA: LE edema bilaterally  ABDOMEN: soft, nondistended, nontender, bowel sounds normal  MS: extremities normal - no gross deformities noted, no evidence of inflammation in joints, no muscle tenderness  SKIN: no rash    Data   All labs reviewed by me.  CMP  Recent Labs   Lab 07/15/20  0611 20  0608 20  0619 20  0643  07/10/20  0556   * 130* 130* 128*   < > 132*   POTASSIUM 3.8 3.6 4.0 4.2   < > 4.1   CHLORIDE 99 96 98 96   < > 99   CO2 23 24 26 26   < > 26   ANIONGAP 9 10 6 7   < > 7   * 184* 204* 237*   < > 200*   * 83* 46* 39*   < > 28   CR 4.57* 3.98* 3.02* 2.99*   < > 3.85*   GFRESTIMATED 12* 14* 19* 20*   < > 14*   GFRESTBLACK 14* 16* " 22* 23*   < > 17*   CASIE 8.4* 8.2* 7.9* 7.7*   < > 7.4*   MAG 2.7* 2.4* 1.9 1.6   < > 1.6   PHOS 2.6 2.7 2.6 3.3   < > 3.8   PROTTOTAL  --   --  5.4* 5.7*  --  5.7*   ALBUMIN  --   --  2.9* 3.1*  --  3.0*   BILITOTAL  --   --  1.1 0.3  --  1.0   ALKPHOS  --   --  40 35*  --  47   AST  --   --  18 20  --  26   ALT  --   --  29 25  --  24    < > = values in this interval not displayed.     CBC  Recent Labs   Lab 07/15/20  0611 07/14/20  0608 07/13/20  0619 07/12/20  0643   HGB 8.2* 8.0* 8.4* 8.7*   WBC 10.3 13.0* 7.4 5.4   RBC 2.75* 2.62* 2.80* 2.86*   HCT 25.8* 24.3* 26.0* 26.5*   MCV 94 93 93 93   MCH 29.8 30.5 30.0 30.4   MCHC 31.8 32.9 32.3 32.8   RDW 14.2 13.9 13.6 13.4    253 229 202     INR  Recent Labs   Lab 07/15/20  0811 07/13/20  0619 07/12/20  0643 07/11/20  0900   INR 1.27* 1.43* 1.45* 1.35*     ABGNo lab results found in last 7 days.   Urine Studies  Recent Labs   Lab Test 07/15/20  1500 07/06/20  1412 07/04/20  1346 07/01/20  1430   COLOR Yellow Yellow Yellow Patricia   APPEARANCE Clear Slightly Cloudy Slightly Cloudy Slightly Cloudy   URINEGLC 150* 150* 70* Negative   URINEBILI Negative Negative Small* Negative   URINEKETONE Negative 10* Negative Negative   SG 1.010 1.022 1.027 1.025   UBLD Trace* Small* Small* Moderate*   URINEPH 5.5 8.0* 8.0* 6.0   PROTEIN 30* >600* >600* 100*   NITRITE Negative Negative Negative Negative   LEUKEST Trace* Negative Negative Negative   RBCU 1 78* 21* 96*   WBCU 5 21* 2 9*     Recent Labs   Lab Test 07/16/12  1400 07/02/12  1130 06/25/12  0615 06/17/12  0930 06/13/12  1120 06/07/12  0643 06/01/12  0950 05/25/12  1830 05/25/12  1110 05/22/12  1536   UTPG 1.53* 0.86* 0.69* 0.82* 0.80* 0.88* 0.58* 0.47*  0.45* 0.56* 0.51*     PTH  Recent Labs   Lab Test 06/26/20  0842 11/30/12  2030 11/30/12  1030   PTHI 226* 500* 794*     Iron Studies  Recent Labs   Lab Test 07/05/20  0553 06/25/20  0749 11/30/12  1950   IRON 10* 57 125   * 163* 155*   IRONSAT 9* 35 81*   SHAHBAZ   --  886* 401*       IMAGING:  All imaging studies reviewed by me.

## 2020-07-16 NOTE — PROGRESS NOTES
[facility], [place]   Transplant Nephrology Progress Note  Date of Admission:  7/4/2020  Today's Date: 07/16/2020    Recommendations:  1. Continue lasix to net negative goal of 1 liter per day   2. Will dialyze today with net -3 to 3.5 kg.    Assessment & Plan      # Possible serum sickness vs infection: Feels better today we will continue to monitor.    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  Other Banff scoring include V1, T1, I1.  The biopsy also showed interstitial hemorrhage and tubular injury with vacuolization.  The ATN component may be a phenotype of a BMR.     # Immunosuppression: Tacrolimus immediate release (goal 8-10) and Myfortic 720 twice a day.              - Changes: Rejection treatment will include Thymoglobulin 2 mg/kg per dose x3, plasmapheresis with IVIG x 5 and Solu-Medrol 500 mg daily x3.  Following the intense course, prednisone taper will be recommended and prednisone maintenance 5 mg daily.  Agree with rituximab single dose.     # Infection Prophylaxis: (please renally dose)   - PJP: Sulfa/TMP (Bactrim)   - CMV: Valcyte      # Hypertension: Controlled;   Goal BP: < 130/80              - Volume status: hypervolemic              - will add Norvasc 5 mg daily       # Anemia in Chronic Renal Disease: Hgb: Improved with transfusion     ENZO: will start 2000 units three times per week     # Mineral Bone Disorder:   - Secondary renal hyperparathyroidism; PTH level: Moderately elevated (301-600 pg/ml)        On treatment: None  - Vitamin D; level: Normal        On supplement: No  - Calcium;  level: Low normal        On supplement: No  - Phosphorus; level: Normal        On supplement: No     # Electrolytes:   -resolved with dialysis      # Transplant History:  Etiology of Kidney Failure: IgA nephropathy  Tx: LDKT and DDKT  Transplant: 6/19/2020 (Kidney), 12/1/2007 (Kidney)  Donor Type: Donation after Brain Death        Donor Class:   Crossmatch at time of Tx: pending   Significant changes in immunosuppression: None  Significant transplant-related complications: None    Mahesh Armando MD   Pager: [phone]    Interval History   Patient feels better today.  She is less short of breath.  She is not taking as much as she was yesterday.  She continues to produce large amount of urine.    Review of Systems   4 point ROS was obtained and negative except as noted in the Interval History.    MEDICATIONS:    acetaminophen  650 mg Oral Once     acetaminophen  650 mg Oral Q4H     amLODIPine  5 mg Oral Daily     anti-thymocyte globulin  50 mg Intravenous Central line Once     atorvastatin  10 mg Oral Daily     carvedilol  12.5 mg Oral BID     diphenhydrAMINE  25-50 mg Oral Once    Or     diphenhydrAMINE  25-50 mg Per NG tube Once     epoetin staci-epbx (RETACRIT) inj ESRD  2,000 Units Intravenous Once per day on Mon Wed Fri     furosemide  40 mg Intravenous TID     gelatin absorbable  1 each Topical During Hemodialysis (from stock)     heparin lock flush  5 mL Intravenous Q24H     hydrocortisone sodium succinate PF  50 mg Intravenous Once     insulin aspart  1-7 Units Subcutaneous TID AC     insulin aspart  1-5 Units Subcutaneous At Bedtime     [START ON 7/17/2020] iron sucrose (VENOFER) intermittent infusion  200 mg Intravenous Once     lidocaine  1 patch Transdermal Q24H     lidocaine   Transdermal Q8H     lipids  250 mL Intravenous Once per day on Sun Mon Wed Fri Sat     mycophenolic acid  720 mg Oral BID     ondansetron  4 mg Oral TID AC     pantoprazole  40 mg Oral BID     [START ON 7/17/2020] predniSONE  20 mg  "Oral Daily     [START ON 2020] predniSONE  5 mg Oral Daily     sodium bicarbonate  1,300 mg Oral BID     sodium chloride (PF)  10 mL Intracatheter Q8H     sodium chloride (PF)  3 mL Intracatheter Q8H     sulfamethoxazole-trimethoprim  1 tablet Oral Once per day on      tacrolimus  5.5 mg Oral BID     valGANciclovir  450 mg Oral Once per day on Mon Th       dextrose       parenteral nutrition - ADULT compounded formula       parenteral nutrition - ADULT compounded formula 40 mL/hr at 20 1500       Physical Exam   Temp  Av.8  F (37.1  C)  Min: 97.6  F (36.4  C)  Max: 103.7  F (39.8  C)      Pulse  Av.5  Min: 74  Max: 122 Resp  Av.1  Min: 13  Max: 28  SpO2  Av.1 %  Min: 93 %  Max: 100 %     /86   Pulse 70   Temp 98.4  F (36.9  C) (Oral)   Resp 24   Ht 1.63 m (5' 4.17\")   Wt 73.3 kg (161 lb 8 oz)   SpO2 100%   BMI 27.57 kg/m     Date 20 0700 - 20 0659   Shift 7007-4258 6917-7761 2487-1838 24 Hour Total   INTAKE   P.O. 600 200  800   I.V.  810  810   Shift Total(mL/kg) 600(10.49) 1010(17.66)  1610(28.15)   OUTPUT   Urine 50   50   Shift Total(mL/kg) 50(0.87)   50(0.87)   Weight (kg) 57.2 57.2 57.2 57.2      Admit Weight: 57.2 kg (126 lb 1.7 oz)     GENERAL APPEARANCE: alert and no distress  HENT: mouth without ulcers or lesions  LYMPHATICS: no cervical or supraclavicular nodes  RESP: lungs clear to auscultation - no rales, rhonchi or wheezes  CV: regular rhythm, normal rate, no rub, no murmur  EDEMA: LE edema bilaterally  ABDOMEN: soft, nondistended, nontender, bowel sounds normal  MS: extremities normal - no gross deformities noted, no evidence of inflammation in joints, no muscle tenderness  SKIN: no rash    Data   All labs reviewed by me.  CMP  Recent Labs   Lab 20  0609 07/15/20  0611 20  0608 20  0619 20  0643  07/10/20  0556   * 131* 130* 130* 128*   < > 132*   POTASSIUM 3.5 3.8 3.6 4.0 4.2   < > 4.1   CHLORIDE 100 99 " 96 98 96   < > 99   CO2 21 23 24 26 26   < > 26   ANIONGAP 10 9 10 6 7   < > 7   * 224* 184* 204* 237*   < > 200*   * 108* 83* 46* 39*   < > 28   CR 4.65* 4.57* 3.98* 3.02* 2.99*   < > 3.85*   GFRESTIMATED 11* 12* 14* 19* 20*   < > 14*   GFRESTBLACK 13* 14* 16* 22* 23*   < > 17*   CASIE 8.3* 8.4* 8.2* 7.9* 7.7*   < > 7.4*   MAG 2.3 2.7* 2.4* 1.9 1.6   < > 1.6   PHOS 2.1* 2.6 2.7 2.6 3.3   < > 3.8   PROTTOTAL  --   --   --  5.4* 5.7*  --  5.7*   ALBUMIN  --   --   --  2.9* 3.1*  --  3.0*   BILITOTAL  --   --   --  1.1 0.3  --  1.0   ALKPHOS  --   --   --  40 35*  --  47   AST  --   --   --  18 20  --  26   ALT  --   --   --  29 25  --  24    < > = values in this interval not displayed.     CBC  Recent Labs   Lab 07/16/20  0609 07/15/20  0611 07/14/20  0608 07/13/20  0619   HGB 7.3* 8.2* 8.0* 8.4*   WBC 9.6 10.3 13.0* 7.4   RBC 2.38* 2.75* 2.62* 2.80*   HCT 22.4* 25.8* 24.3* 26.0*   MCV 94 94 93 93   MCH 30.7 29.8 30.5 30.0   MCHC 32.6 31.8 32.9 32.3   RDW 14.4 14.2 13.9 13.6    222 253 229     INR  Recent Labs   Lab 07/15/20  0811 07/13/20  0619 07/12/20  0643 07/11/20  0900   INR 1.27* 1.43* 1.45* 1.35*     ABGNo lab results found in last 7 days.   Urine Studies  Recent Labs   Lab Test 07/15/20  1500 07/06/20  1412 07/04/20  1346 07/01/20  1430   COLOR Yellow Yellow Yellow Patricia   APPEARANCE Clear Slightly Cloudy Slightly Cloudy Slightly Cloudy   URINEGLC 150* 150* 70* Negative   URINEBILI Negative Negative Small* Negative   URINEKETONE Negative 10* Negative Negative   SG 1.010 1.022 1.027 1.025   UBLD Trace* Small* Small* Moderate*   URINEPH 5.5 8.0* 8.0* 6.0   PROTEIN 30* >600* >600* 100*   NITRITE Negative Negative Negative Negative   LEUKEST Trace* Negative Negative Negative   RBCU 1 78* 21* 96*   WBCU 5 21* 2 9*     Recent Labs   Lab Test 07/16/12  1400 07/02/12  1130 06/25/12  0615 06/17/12  0930 06/13/12  1120 06/07/12  0643 06/01/12  0950 05/25/12  1830 05/25/12  1110 05/22/12  1536   UTPG  1.53* 0.86* 0.69* 0.82* 0.80* 0.88* 0.58* 0.47*  0.45* 0.56* 0.51*     PTH  Recent Labs   Lab Test 06/26/20  0842 11/30/12  2030 11/30/12  1030   PTHI 226* 500* 794*     Iron Studies  Recent Labs   Lab Test 07/05/20  0553 06/25/20  0749 11/30/12  1950   IRON 10* 57 125   * 163* 155*   IRONSAT 9* 35 81*   SHAHBAZ  --  886* 401*       IMAGING:  All imaging studies reviewed by me.

## 2020-07-16 NOTE — PROGRESS NOTES
CLINICAL NUTRITION SERVICES - REASSESSMENT NOTE     Nutrition Prescription    RECOMMENDATIONS FOR MDs/PROVIDERS TO ORDER:  Once patient is able to tolerate 1200 kcals and 50 gram PRO consistently recommend discontinue TPN    Malnutrition Status:    at least Non-severe malnutrition in the context of acute illness    Recommendations already ordered by Registered Dietitian (RD):  Continue kcal counts    Discontinued Boost Glucose Control and Boost Breeze - pt doesn't like. Ordered Magic Cups BID between meals.    Future/Additional Recommendations:  PO adequacy and supplement tolerance  Weight trends  Ability to wean TPN    If EN:  Tube Feeding Recommendations:  Nepro @ goal 40 ml/hr (960 ml/day) to provide 1728 kcals (30 kcal/kg/day), 78 g PRO (1.3 g/kg/day), 701 ml free H2O, 155 g CHO and 12 g Fiber daily.     EVALUATION OF THE PROGRESS TOWARD GOALS   Diet: Low Phosphorus, Boost Glucose Control @ HS, Boost Breeze with dinner  Nutrition Support:   7/9 - 7/11: PPN w/ clinimix E 4.25/5 - 2000 mL/day (75 ml/hr) - 100 grams Dex, 85 g AA to provide 680 kcal (12 kcal/kg) and 1.5 g protein/kg.     7/11 - ___: CPN w/ 960 ml (40 ml/hr) - Dex 125 grams (GIR 1.5 mg/kg/min), AA 90 grams + 250 ml 20% IV lipids 5x/week.  Pending electrolytes/BG, recommend dextrose advancement by ~30-45 grams/day to goal of 215 grams/day. Goal PN regimen will provide 1448 kcal (25 kcal/kg), 1.6g/kg protein, 25% kcal from fat.       -Goal dex reached 7/14.     Intake:  Kcal counts:  7/8         Total Kcals: 0           Total Protein: 0g (3 meals ordered, no intake recorded)  7/9         Total Kcals: 836       Total Protein: 29g  7/10       Total Kcals: 239       Total Protein: 7  7/14       Total Kcals: 0           Total Protein: 0g (2 meals ordered, no intake recorded)  7/15       Total Kcals: 0           Total Protein: 0g (no meals ordered, no intake recorded)    Spoke with pt over the phone. Pt reports appetite was improving, then the last few days  "she has had a lot of \"swelling and fullness\" and \"solid, heavy foods make it worse.\" Pt has been eating 3-4 cups of oatmeal with whole milk and sugar over the past 2 days (brought from outside). Pt reports the oatmeal helped her have consistent BMs. Pt reports she is feeling better today and is going to try more solid foods re: cod, sandwich from outside hospital. Pt is choosing \"light\" foods. Pt has not been drinking the Boost Glucose Control or Boost Breeze - she does not like the smell of them. Reviewed other supplements, pt agreeable to try chocolate Magic Cups. Encouraged po intake.      NEW FINDINGS   Weight:   07/15/20 0619  71.7 kg (158 lb)     07/13/20 1207  70.9 kg (156 lb 3.2 oz)     07/12/20 0100  70.3 kg (155 lb)     07/11/20 1315  72.5 kg (159 lb 12.8 oz)     07/11/20 1200  72.5 kg (159 lb 14.4 oz)     07/10/20 1535  68.9 kg (151 lb 14.4 oz)     07/09/20 2120  65.3 kg (143 lb 15.4 oz)     07/09/20 1750  68.8 kg (151 lb 10.8 oz)       Labs: Na 131 (L), K+ 3.5 (low normal), Cr 4.65 (H <- 4.57 <- 3.98 <- 3.02), Mg++ 2.3 (WNL <- 2.7), -228   Ref. Range 7/7/2020 06:23 7/8/2020 06:28 7/9/2020 06:15 7/10/2020 05:56 7/11/2020 06:30 7/12/2020 06:43 7/13/2020 06:19 7/14/2020 06:08 7/15/2020 06:11 7/16/2020 06:09   Phosphorus Latest Ref Range: 2.5 - 4.5 mg/dL 6.9 (H) 4.6 (H) 4.3 3.8 5.0 (H) 3.3 2.6 2.7 2.6 2.1 (L)     Meds: phoslo TID, lasix, medium sliding scale insulin, mycophenolic acid, zofran TID before meals, prednisone, sodium bicarb BID, tacrolimus  GI: abdominal discomfort, BM 7/15  Renal: HD    MALNUTRITION  % Intake: < 75% for > 7 days (non-severe)  % Weight Loss: unable to assess given fluid fluctuations  Subcutaneous Fat Loss: Unable to assess  Muscle Loss: Unable to assess  Fluid Accumulation/Edema: 1-2+ mild edema  Malnutrition Diagnosis: at least Non-severe malnutrition in the context of acute illness    Previous Goals   Total avg nutritional intake to meet a minimum of 30 kcal/kg and 1.3 g " PRO/kg daily (per dosing wt 57 kg).  Evaluation: improving, not met    Previous Nutrition Diagnosis  Inadequate oral intake related to nausea, bloating, poor appetite as evidenced by patient with minimal PO intake per calorie counts, wt loss of 8% in 3 weeks.  Evaluation: No change    CURRENT NUTRITION DIAGNOSIS  Inadequate oral intake related to nausea, bloating/swelling, poor appetite as evidenced by patient report and minimal PO intake per calorie counts and need for TPN supplement intake.     INTERVENTIONS  Implementation  Kcal counts  Medical food supplement therapy - as above  Nutrition education for recommended modifications - with pt  Parenteral Nutrition/IV Fluids - continue    Goals  Total avg nutritional intake to meet a minimum of 30 kcal/kg and 1.3 g PRO/kg daily (per dosing wt 57 kg).    Monitoring/Evaluation  Progress toward goals will be monitored and evaluated per protocol.    Theresa Resendez, MS, RD, LD, [facility]  [address] Pager: [phone]

## 2020-07-16 NOTE — PLAN OF CARE
"Blood pressure (!) 145/79, pulse 73, temperature 98.7  F (37.1  C), temperature source Oral, resp. rate 16, height 1.63 m (5' 4.17\"), weight 71.7 kg (158 lb), SpO2 99 %, not currently breastfeeding.  Current condition: Stable- impaired renal function  Neuro: WDL  Cardio: WDL  Respiratory: WDL  GI/: Voids spontaneously, no BM during shift.   Skin: WDL  Diet:   Low phosphorous with Nicolás counts. Poor appetite. Does drink adequate amount of fluids.   BG: AC/HS bedtime   LDA:  RPICC with TPN at 40 with lipids and other saline locked.   Mobility:  UAL  Pain: Generalized. Has joint and back pain. Likely due to to excess water weight.  PRN medications: Tramadol x1. Reinforced indication for Demerol. Given Tylenol and Tramadol.  Plan of Care: Will have dialysis this afternoon. Will continue to monitor and assess for any changes.          "

## 2020-07-16 NOTE — PROGRESS NOTES
HEMODIALYSIS TREATMENT NOTE    Date: 7/16/2020  Time: 5:27 PM    Data:  Pre Wt: 73.3 kg (Estimated)   Desired Wt: 70.3 kg   Post Wt:70.3 kg (Estimated)  Weight change: 3.5 kg  Ultrafiltration - Post Run Net Total Removed (mL): 3500 mL  Vascular Access Status: CVC  patent  Dialyzer Rinse: Clear  Total Blood Volume Processed: 70.5 L   Total Dialysis (Treatment) Time: 3   Dialysate Bath: K 3, Ca 3  Heparin: None    Lab:   No    Interventions:  Pt had a stable uncomplicated 3 hours of HD. 3.5 L of fluid was pulled with BV % -8.5. Initial goal was 3L but during tx, pt requested 3.5L , writer updated MD, and got an okay to pull 3.5 L provided pt SBP is > 110. Ending BP was 140/81 . Pt rinsed back post tx, needles were pulled, new dressings applied, and pressure was applied for about 8 mins to achieve hemostasis. Hand off report given to PCN.    Assessment:  -A & O X 4  -Calm & Cooperative  -VSS     Plan:    -To cont to challenge EDW as tolerated  -Next HD tx per renal team

## 2020-07-16 NOTE — PROGRESS NOTES
Calorie Count  Intake recorded for: 7/15  Total Kcals: 0 Total Protein: 0g  Kcals from Hospital Food: 0   Protein: 0g  Kcals from Outside Food (average):0 Protein: 0g  # Meals Recorded: no meals ordered from kitchen, no intake recorded.   # Supplements Recorded: no intake recorded.

## 2020-07-17 ENCOUNTER — APPOINTMENT (OUTPATIENT)
Dept: PHYSICAL THERAPY | Facility: CLINIC | Age: 35
DRG: 698 | End: 2020-07-17
Attending: NURSE PRACTITIONER
Payer: MEDICARE

## 2020-07-17 LAB
ANION GAP SERPL CALCULATED.3IONS-SCNC: 8 MMOL/L (ref 3–14)
BASOPHILS # BLD AUTO: 0 10E9/L (ref 0–0.2)
BASOPHILS NFR BLD AUTO: 0 %
BUN SERPL-MCNC: 75 MG/DL (ref 7–30)
CALCIUM SERPL-MCNC: 8.5 MG/DL (ref 8.5–10.1)
CHLORIDE SERPL-SCNC: 102 MMOL/L (ref 94–109)
CO2 SERPL-SCNC: 26 MMOL/L (ref 20–32)
CREAT SERPL-MCNC: 3.01 MG/DL (ref 0.52–1.04)
DIFFERENTIAL METHOD BLD: ABNORMAL
DONOR IDENTIFICATION: NORMAL
DSA COMMENTS: NORMAL
DSA PRESENT: NORMAL
DSA TEST METHOD: NORMAL
EOSINOPHIL # BLD AUTO: 0 10E9/L (ref 0–0.7)
EOSINOPHIL NFR BLD AUTO: 0 %
ERYTHROCYTE [DISTWIDTH] IN BLOOD BY AUTOMATED COUNT: 14.7 % (ref 10–15)
GFR SERPL CREATININE-BSD FRML MDRD: 19 ML/MIN/{1.73_M2}
GLUCOSE BLDC GLUCOMTR-MCNC: 147 MG/DL (ref 70–99)
GLUCOSE BLDC GLUCOMTR-MCNC: 216 MG/DL (ref 70–99)
GLUCOSE BLDC GLUCOMTR-MCNC: 253 MG/DL (ref 70–99)
GLUCOSE BLDC GLUCOMTR-MCNC: 276 MG/DL (ref 70–99)
GLUCOSE BLDC GLUCOMTR-MCNC: 277 MG/DL (ref 70–99)
GLUCOSE SERPL-MCNC: 254 MG/DL (ref 70–99)
HCT VFR BLD AUTO: 23.1 % (ref 35–47)
HGB BLD-MCNC: 7.4 G/DL (ref 11.7–15.7)
INTERFERING SUBST TEST METHOD: NORMAL
INTERFERING SUBSTANCE COMMENT: NORMAL
INTERFERING SUBSTANCE RESULT: NORMAL
INTERFERING SUBSTANCE: NORMAL
LYMPHOCYTES # BLD AUTO: 0 10E9/L (ref 0.8–5.3)
LYMPHOCYTES NFR BLD AUTO: 0 %
MAGNESIUM SERPL-MCNC: 1.8 MG/DL (ref 1.6–2.3)
MCH RBC QN AUTO: 30 PG (ref 26.5–33)
MCHC RBC AUTO-ENTMCNC: 32 G/DL (ref 31.5–36.5)
MCV RBC AUTO: 94 FL (ref 78–100)
METAMYELOCYTES # BLD: 0.3 10E9/L
METAMYELOCYTES NFR BLD MANUAL: 3.4 %
MONOCYTES # BLD AUTO: 0.1 10E9/L (ref 0–1.3)
MONOCYTES NFR BLD AUTO: 0.9 %
MYELOCYTES # BLD: 0.1 10E9/L
MYELOCYTES NFR BLD MANUAL: 1.7 %
NEUTROPHILS # BLD AUTO: 8.1 10E9/L (ref 1.6–8.3)
NEUTROPHILS NFR BLD AUTO: 94 %
ORGAN: NORMAL
PHOSPHATE SERPL-MCNC: 2.1 MG/DL (ref 2.5–4.5)
PLATELET # BLD AUTO: 223 10E9/L (ref 150–450)
PLATELET # BLD EST: ABNORMAL 10*3/UL
POIKILOCYTOSIS BLD QL SMEAR: SLIGHT
POLYCHROMASIA BLD QL SMEAR: SLIGHT
POTASSIUM SERPL-SCNC: 3.8 MMOL/L (ref 3.4–5.3)
RBC # BLD AUTO: 2.47 10E12/L (ref 3.8–5.2)
SA1 CELL: NORMAL
SA1 COMMENTS: NORMAL
SA1 HI RISK ABY: NORMAL
SA1 MOD RISK ABY: NORMAL
SA1 TEST METHOD: NORMAL
SA2 CELL: NORMAL
SA2 COMMENTS: NORMAL
SA2 HI RISK ABY UA: NORMAL
SA2 MOD RISK ABY: NORMAL
SA2 TEST METHOD: NORMAL
SODIUM SERPL-SCNC: 136 MMOL/L (ref 133–144)
TACROLIMUS BLD-MCNC: 9.8 UG/L (ref 5–15)
TARGETS BLD QL SMEAR: SLIGHT
TME LAST DOSE: NORMAL H
UNACCEPTABLE ANTIGEN: NORMAL
UNOS CPRA: 95
WBC # BLD AUTO: 8.6 10E9/L (ref 4–11)

## 2020-07-17 PROCEDURE — 83735 ASSAY OF MAGNESIUM: CPT | Performed by: STUDENT IN AN ORGANIZED HEALTH CARE EDUCATION/TRAINING PROGRAM

## 2020-07-17 PROCEDURE — 25000128 H RX IP 250 OP 636: Performed by: STUDENT IN AN ORGANIZED HEALTH CARE EDUCATION/TRAINING PROGRAM

## 2020-07-17 PROCEDURE — 85025 COMPLETE CBC W/AUTO DIFF WBC: CPT | Performed by: STUDENT IN AN ORGANIZED HEALTH CARE EDUCATION/TRAINING PROGRAM

## 2020-07-17 PROCEDURE — 36415 COLL VENOUS BLD VENIPUNCTURE: CPT | Performed by: NURSE PRACTITIONER

## 2020-07-17 PROCEDURE — 80197 ASSAY OF TACROLIMUS: CPT | Performed by: STUDENT IN AN ORGANIZED HEALTH CARE EDUCATION/TRAINING PROGRAM

## 2020-07-17 PROCEDURE — 25000132 ZZH RX MED GY IP 250 OP 250 PS 637: Mod: GY | Performed by: STUDENT IN AN ORGANIZED HEALTH CARE EDUCATION/TRAINING PROGRAM

## 2020-07-17 PROCEDURE — 25000128 H RX IP 250 OP 636: Performed by: NURSE PRACTITIONER

## 2020-07-17 PROCEDURE — 25000132 ZZH RX MED GY IP 250 OP 250 PS 637: Mod: GY | Performed by: SURGERY

## 2020-07-17 PROCEDURE — 25000131 ZZH RX MED GY IP 250 OP 636 PS 637: Mod: GY | Performed by: NURSE PRACTITIONER

## 2020-07-17 PROCEDURE — 84100 ASSAY OF PHOSPHORUS: CPT | Performed by: STUDENT IN AN ORGANIZED HEALTH CARE EDUCATION/TRAINING PROGRAM

## 2020-07-17 PROCEDURE — 36415 COLL VENOUS BLD VENIPUNCTURE: CPT | Performed by: STUDENT IN AN ORGANIZED HEALTH CARE EDUCATION/TRAINING PROGRAM

## 2020-07-17 PROCEDURE — 87040 BLOOD CULTURE FOR BACTERIA: CPT | Performed by: NURSE PRACTITIONER

## 2020-07-17 PROCEDURE — 40000141 ZZH STATISTIC PERIPHERAL IV START W/O US GUIDANCE

## 2020-07-17 PROCEDURE — 40000558 ZZH STATISTIC CVC DRESSING CHANGE

## 2020-07-17 PROCEDURE — 97110 THERAPEUTIC EXERCISES: CPT | Mod: GP

## 2020-07-17 PROCEDURE — 25800030 ZZH RX IP 258 OP 636: Performed by: NURSE PRACTITIONER

## 2020-07-17 PROCEDURE — 25000131 ZZH RX MED GY IP 250 OP 636 PS 637: Mod: GY | Performed by: PHYSICIAN ASSISTANT

## 2020-07-17 PROCEDURE — 63400005 ZZH RX 634: Performed by: SURGERY

## 2020-07-17 PROCEDURE — 80048 BASIC METABOLIC PNL TOTAL CA: CPT | Performed by: STUDENT IN AN ORGANIZED HEALTH CARE EDUCATION/TRAINING PROGRAM

## 2020-07-17 PROCEDURE — 97116 GAIT TRAINING THERAPY: CPT | Mod: GP

## 2020-07-17 PROCEDURE — 97161 PT EVAL LOW COMPLEX 20 MIN: CPT | Mod: GP

## 2020-07-17 PROCEDURE — 25000132 ZZH RX MED GY IP 250 OP 250 PS 637: Mod: GY | Performed by: INTERNAL MEDICINE

## 2020-07-17 PROCEDURE — 00000146 ZZHCL STATISTIC GLUCOSE BY METER IP

## 2020-07-17 PROCEDURE — 12000026 ZZH R&B TRANSPLANT

## 2020-07-17 PROCEDURE — 25000132 ZZH RX MED GY IP 250 OP 250 PS 637: Mod: GY | Performed by: PHYSICIAN ASSISTANT

## 2020-07-17 RX ADMIN — TRAMADOL HYDROCHLORIDE 50 MG: 50 TABLET, COATED ORAL at 22:57

## 2020-07-17 RX ADMIN — Medication 12.5 MG: at 22:57

## 2020-07-17 RX ADMIN — CARVEDILOL 12.5 MG: 12.5 TABLET, FILM COATED ORAL at 09:49

## 2020-07-17 RX ADMIN — ACETAMINOPHEN 650 MG: 325 TABLET, FILM COATED ORAL at 00:47

## 2020-07-17 RX ADMIN — MYCOPHENOLIC ACID 720 MG: 360 TABLET, DELAYED RELEASE ORAL at 09:49

## 2020-07-17 RX ADMIN — Medication 5 ML: at 09:50

## 2020-07-17 RX ADMIN — FUROSEMIDE 40 MG: 10 INJECTION, SOLUTION INTRAMUSCULAR; INTRAVENOUS at 20:06

## 2020-07-17 RX ADMIN — EPOETIN ALFA-EPBX 2000 UNITS: 10000 INJECTION, SOLUTION INTRAVENOUS; SUBCUTANEOUS at 09:49

## 2020-07-17 RX ADMIN — SULFAMETHOXAZOLE AND TRIMETHOPRIM 1 TABLET: 400; 80 TABLET ORAL at 09:47

## 2020-07-17 RX ADMIN — PANTOPRAZOLE SODIUM 40 MG: 40 TABLET, DELAYED RELEASE ORAL at 20:08

## 2020-07-17 RX ADMIN — SODIUM BICARBONATE 650 MG TABLET 1300 MG: at 09:48

## 2020-07-17 RX ADMIN — TRAMADOL HYDROCHLORIDE 50 MG: 50 TABLET, COATED ORAL at 00:47

## 2020-07-17 RX ADMIN — PREDNISONE 20 MG: 20 TABLET ORAL at 09:48

## 2020-07-17 RX ADMIN — ATORVASTATIN CALCIUM 10 MG: 10 TABLET, FILM COATED ORAL at 09:47

## 2020-07-17 RX ADMIN — PANTOPRAZOLE SODIUM 40 MG: 40 TABLET, DELAYED RELEASE ORAL at 09:49

## 2020-07-17 RX ADMIN — ACETAMINOPHEN 650 MG: 325 TABLET, FILM COATED ORAL at 19:04

## 2020-07-17 RX ADMIN — FUROSEMIDE 40 MG: 10 INJECTION, SOLUTION INTRAMUSCULAR; INTRAVENOUS at 15:14

## 2020-07-17 RX ADMIN — INSULIN ASPART 2 UNITS: 100 INJECTION, SOLUTION INTRAVENOUS; SUBCUTANEOUS at 09:48

## 2020-07-17 RX ADMIN — FUROSEMIDE 40 MG: 10 INJECTION, SOLUTION INTRAMUSCULAR; INTRAVENOUS at 09:49

## 2020-07-17 RX ADMIN — AMLODIPINE BESYLATE 5 MG: 5 TABLET ORAL at 09:49

## 2020-07-17 RX ADMIN — INSULIN ASPART 1 UNITS: 100 INJECTION, SOLUTION INTRAVENOUS; SUBCUTANEOUS at 14:45

## 2020-07-17 RX ADMIN — MYCOPHENOLIC ACID 720 MG: 360 TABLET, DELAYED RELEASE ORAL at 20:05

## 2020-07-17 RX ADMIN — TACROLIMUS 5.5 MG: 5 CAPSULE ORAL at 19:04

## 2020-07-17 RX ADMIN — CARVEDILOL 12.5 MG: 12.5 TABLET, FILM COATED ORAL at 20:06

## 2020-07-17 RX ADMIN — TACROLIMUS 5.5 MG: 5 CAPSULE ORAL at 09:48

## 2020-07-17 RX ADMIN — ACETAMINOPHEN 650 MG: 325 TABLET, FILM COATED ORAL at 09:48

## 2020-07-17 RX ADMIN — SODIUM BICARBONATE 650 MG TABLET 1300 MG: at 20:08

## 2020-07-17 RX ADMIN — ACETAMINOPHEN 650 MG: 325 TABLET, FILM COATED ORAL at 14:45

## 2020-07-17 RX ADMIN — IRON SUCROSE 200 MG: 20 INJECTION, SOLUTION INTRAVENOUS at 15:14

## 2020-07-17 RX ADMIN — ACETAMINOPHEN 650 MG: 325 TABLET, FILM COATED ORAL at 04:38

## 2020-07-17 ASSESSMENT — PAIN DESCRIPTION - DESCRIPTORS
DESCRIPTORS: ACHING
DESCRIPTORS: ACHING

## 2020-07-17 ASSESSMENT — MIFFLIN-ST. JEOR: SCORE: 1396.73

## 2020-07-17 ASSESSMENT — ACTIVITIES OF DAILY LIVING (ADL)
ADLS_ACUITY_SCORE: 12

## 2020-07-17 NOTE — PLAN OF CARE
PT -   Discharge Planner PT   Patient plan for discharge: home  Current status: Pt is mod I for bed mobility and sit<>stand transfers. Pt ambulates with hallway ~500ft with SBA and no AD. Pt with steady gait, slow gait speed and limited by fatigue and pain  Barriers to return to prior living situation: medical status, decreased activity tolerance, weakness, pain  Recommendations for discharge: Home with assist, OP PT  Rationale for recommendations: Pt demonstrates safety with mobility for discharge home. Pt will benefit from continued skilled PT to progress activity tolerance, strengthening and pain management  Entered by: Mary Dominguez 07/17/2020 1:14 PM

## 2020-07-17 NOTE — PLAN OF CARE
"BP (!) 152/97 (BP Location: Right leg)   Pulse 79   Temp 98.2  F (36.8  C) (Oral)   Resp 18   Ht 1.63 m (5' 4.17\")   Wt 69.8 kg (153 lb 14.1 oz)   SpO2 100%   BMI 26.27 kg/m      5506-0533: Pt at dialysis for the first four hours of the shift. Pt returned to the unit around 1900. Since returning to the unit, pt has been afebrile. Pt slightly hypertensive (140s-150s/80s-90s). OVSS on RA. Pt complaining of generalized muscle pain, pt declined administration of pain medications until closer to bedtime. Pt on regular diet with calorie counts, pt was able to eat some dinner during dialysis, no complaints of nausea. BS checks AC and HS. BS check when returning back to the unit was 235, 2 units of insulin given per order parameters. BS check around bedtime was 262, 2 units of insulin given per order parameters. Double Lumen PICC, one port infusing with TPN at 40 mL/hr, second port currently infusing with patients scheduled dose of Thymo (pt tolerating well without any signs/symptoms of reaction). Pt voiding adequate amounts, pt receiving IV Lasix TID, pt saving via hat in the bathroom. No BM this shift, per pt report she had a BM earlier in the day, pt is also passing flatus. Pt up independently. Call light in reach. Will continue to monitor and follow plan of care.   "

## 2020-07-17 NOTE — PROGRESS NOTES
[facility], [place]   Transplant Nephrology Progress Note  Date of Admission:  7/4/2020  Today's Date: 07/17/2020    Recommendations:  1. Continue lasix to net negative goal of 1 liter per day   2. Will start small dose losartan 12.5 mg at bedtime (low threshold to hold incase of hyperkalemia)     Assessment & Plan      # Possible serum sickness vs infection: Feels better today we will continue to monitor.    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  Other Banff scoring include V1, T1, I1.  The biopsy also showed interstitial hemorrhage and tubular injury with vacuolization.  The ATN component may be a phenotype of a BMR.     # Immunosuppression: Tacrolimus immediate release (goal 8-10) and Myfortic 720 twice a day.              - Changes: Rejection treatment will include Thymoglobulin 2 mg/kg per dose x3, plasmapheresis with IVIG x 5 and Solu-Medrol 500 mg daily x3.  Following the intense course, prednisone taper will be recommended and prednisone maintenance 5 mg daily.  Agree with rituximab single dose.     # Infection Prophylaxis: (please renally dose)   - PJP: Sulfa/TMP (Bactrim)   - CMV: Valcyte      # Hypertension: Controlled;   Goal BP: < 130/80              - Volume status: hypervolemic              - will add Norvasc 5 mg daily       # Anemia in Chronic Renal Disease: Hgb: Improved with transfusion     ENZO: will start 2000 units three times per week     # Mineral Bone Disorder:   - Secondary renal hyperparathyroidism; PTH level: Moderately elevated (301-600 pg/ml)        On treatment: None  - Vitamin  D; level: Normal        On supplement: No  - Calcium; level: Low normal        On supplement: No  - Phosphorus; level: Normal        On supplement: No     # Electrolytes:   -resolved with dialysis      # Transplant History:  Etiology of Kidney Failure: IgA nephropathy  Tx: LDKT and DDKT  Transplant: 6/19/2020 (Kidney), 12/1/2007 (Kidney)  Donor Type: Donation after Brain Death        Donor Class:   Crossmatch at time of Tx: pending   Significant changes in immunosuppression: None  Significant transplant-related complications: None    Mahesh Armando MD   Pager: [phone]    Interval History   Patient feels better today.  She is less short of breath.  She is not taking as much as she was yesterday.  She continues to produce large amount of urine.  Surgery team raised the concern over AT1R. Results will not be back anytime soon. We elected to start a small dose of losartan 12.5 mg     Review of Systems   4 point ROS was obtained and negative except as noted in the Interval History.    MEDICATIONS:    acetaminophen  650 mg Oral Q4H     amLODIPine  5 mg Oral Daily     atorvastatin  10 mg Oral Daily     carvedilol  12.5 mg Oral BID     epoetin staci-epbx (RETACRIT) inj ESRD  2,000 Units Intravenous Once per day on Mon Wed Fri     furosemide  40 mg Intravenous TID     heparin lock flush  5 mL Intravenous Q24H     insulin aspart  1-7 Units Subcutaneous TID AC     insulin aspart  1-5 Units Subcutaneous At Bedtime     [START ON 7/18/2020] iron sucrose (VENOFER) intermittent infusion  200 mg Intravenous Once     lidocaine  1 patch Transdermal Q24H     lidocaine   Transdermal Q8H     losartan  12.5 mg Oral At Bedtime     mycophenolic acid  720 mg Oral BID     ondansetron  4 mg Oral TID AC     pantoprazole  40 mg Oral BID     predniSONE  20 mg Oral Daily     [START ON 7/19/2020] predniSONE  5 mg Oral Daily     sodium bicarbonate  1,300 mg Oral BID     sodium chloride (PF)  10 mL Intracatheter Q8H     sodium chloride (PF)  3 mL  "Intracatheter Q8H     sulfamethoxazole-trimethoprim  1 tablet Oral Once per day on      tacrolimus  5.5 mg Oral BID     valGANciclovir  450 mg Oral Once per day on u       dextrose         Physical Exam   Temp  Av.8  F (37.1  C)  Min: 97.6  F (36.4  C)  Max: 103.7  F (39.8  C)      Pulse  Av.5  Min: 74  Max: 122 Resp  Av.1  Min: 13  Max: 28  SpO2  Av.1 %  Min: 93 %  Max: 100 %     /82 (BP Location: Right arm)   Pulse 79   Temp 98.9  F (37.2  C) (Oral)   Resp 18   Ht 1.63 m (5' 4.17\")   Wt 70.4 kg (155 lb 3.2 oz)   SpO2 98%   BMI 26.50 kg/m     Date 20 0700 - 20 0659   Shift 6107-8036 3839-5213 6743-7573 24 Hour Total   INTAKE   P.O. 600 200  800   I.V.  810  810   Shift Total(mL/kg) 600(10.49) 1010(17.66)  1610(28.15)   OUTPUT   Urine 50   50   Shift Total(mL/kg) 50(0.87)   50(0.87)   Weight (kg) 57.2 57.2 57.2 57.2      Admit Weight: 57.2 kg (126 lb 1.7 oz)     GENERAL APPEARANCE: alert and no distress  HENT: mouth without ulcers or lesions  LYMPHATICS: no cervical or supraclavicular nodes  RESP: lungs clear to auscultation - no rales, rhonchi or wheezes  CV: regular rhythm, normal rate, no rub, no murmur  EDEMA: LE edema bilaterally  ABDOMEN: soft, nondistended, nontender, bowel sounds normal  MS: extremities normal - no gross deformities noted, no evidence of inflammation in joints, no muscle tenderness  SKIN: no rash    Data   All labs reviewed by me.  CMP  Recent Labs   Lab 20  0602 20  0609 07/15/20  0611 20  0608 20  0619 20  0643    131* 131* 130* 130* 128*   POTASSIUM 3.8 3.5 3.8 3.6 4.0 4.2   CHLORIDE 102 100 99 96 98 96   CO2 26 21 23 24 26 26   ANIONGAP 8 10 9 10 6 7   * 191* 224* 184* 204* 237*   BUN 75* 119* 108* 83* 46* 39*   CR 3.01* 4.65* 4.57* 3.98* 3.02* 2.99*   GFRESTIMATED 19* 11* 12* 14* 19* 20*   GFRESTBLACK 23* 13* 14* 16* 22* 23*   CASIE 8.5 8.3* 8.4* 8.2* 7.9* 7.7*   MAG 1.8 2.3 2.7* 2.4* " 1.9 1.6   PHOS 2.1* 2.1* 2.6 2.7 2.6 3.3   PROTTOTAL  --   --   --   --  5.4* 5.7*   ALBUMIN  --   --   --   --  2.9* 3.1*   BILITOTAL  --   --   --   --  1.1 0.3   ALKPHOS  --   --   --   --  40 35*   AST  --   --   --   --  18 20   ALT  --   --   --   --  29 25     CBC  Recent Labs   Lab 07/17/20  0602 07/16/20  0609 07/15/20  0611 07/14/20  0608   HGB 7.4* 7.3* 8.2* 8.0*   WBC 8.6 9.6 10.3 13.0*   RBC 2.47* 2.38* 2.75* 2.62*   HCT 23.1* 22.4* 25.8* 24.3*   MCV 94 94 94 93   MCH 30.0 30.7 29.8 30.5   MCHC 32.0 32.6 31.8 32.9   RDW 14.7 14.4 14.2 13.9    217 222 253     INR  Recent Labs   Lab 07/15/20  0811 07/13/20  0619 07/12/20  0643 07/11/20  0900   INR 1.27* 1.43* 1.45* 1.35*     ABGNo lab results found in last 7 days.   Urine Studies  Recent Labs   Lab Test 07/15/20  1500 07/06/20  1412 07/04/20  1346 07/01/20  1430   COLOR Yellow Yellow Yellow Patricia   APPEARANCE Clear Slightly Cloudy Slightly Cloudy Slightly Cloudy   URINEGLC 150* 150* 70* Negative   URINEBILI Negative Negative Small* Negative   URINEKETONE Negative 10* Negative Negative   SG 1.010 1.022 1.027 1.025   UBLD Trace* Small* Small* Moderate*   URINEPH 5.5 8.0* 8.0* 6.0   PROTEIN 30* >600* >600* 100*   NITRITE Negative Negative Negative Negative   LEUKEST Trace* Negative Negative Negative   RBCU 1 78* 21* 96*   WBCU 5 21* 2 9*     Recent Labs   Lab Test 07/16/12  1400 07/02/12  1130 06/25/12  0615 06/17/12  0930 06/13/12  1120 06/07/12  0643 06/01/12  0950 05/25/12  1830 05/25/12  1110 05/22/12  1536   UTPG 1.53* 0.86* 0.69* 0.82* 0.80* 0.88* 0.58* 0.47*  0.45* 0.56* 0.51*     PTH  Recent Labs   Lab Test 06/26/20  0842 11/30/12  2030 11/30/12  1030   PTHI 226* 500* 794*     Iron Studies  Recent Labs   Lab Test 07/05/20  0553 06/25/20  0749 11/30/12  1950   IRON 10* 57 125   * 163* 155*   IRONSAT 9* 35 81*   SHAHBAZ  --  886* 401*       IMAGING:  All imaging studies reviewed by me.

## 2020-07-17 NOTE — PLAN OF CARE
"BP (!) 159/89 (BP Location: Right leg)   Pulse 79   Temp 98.5  F (36.9  C) (Oral)   Resp 18   Ht 1.63 m (5' 4.17\")   Wt 70.4 kg (155 lb 3.2 oz)   SpO2 99%   BMI 26.50 kg/m        Vitals: Pt. hypertensive 150's/90's, AOVSS on RA  Neuro: Pt. alert & Ox4  Behavior: Pt. calm & cooperative with cares.   Activity: Pt. Up ad kathy  Endocrine: 2gm , pt. Had just finished a shake.  LDAs: Right DL PICC with TPN at 40cc/hour, 2nd lumen saline locked.  Respiratory: WNL  GI/: Pt. voiding spontaneously, no stools this shift. Pt. on HD which she had yesterday.  Skin: WNL  Pain: Pt. c/o abdominal & BLE pain, sched. Tylenol & prn Tramadol x1.  Diet: Regular diet with calorie counts.  Labs/Imaging: Per lab report, pt. with positive blood cx. from 7/15 with gram +cocci in clusters & surgery on-call paged with no treatment ordered. Infectious  disease called this morning & stated blood cx.  +Staphylcoccus epidermidis mecA gene & CHRISTINA Huang, NP notified & ordered stat blood cx. x2  Plan: Continue to follow POC & notify MD with change in status.      "

## 2020-07-17 NOTE — PROGRESS NOTES
"Calorie Count    Intake recorded for: 7/16/2020  Total Kcals: ? Total Protein: ?g    Kcals from Hospital Food: ?   Protein: ?g    Kcals from Outside Food (average):? Protein: ?g    # Meals Recorded: 1 meal ordered from kitchen. No food intake recorded on calorie count sheet. Meal tray ticket not saved.     # Supplements Recorded: no intake recorded.     Food Record in Epic shows \"25% intake\" @ 0800 - no meals or supplements ordered around this time.          "

## 2020-07-17 NOTE — PROGRESS NOTES
Care Coordinator  D/I: Per Dr Mahesh Armando, transplant nephrologist, Plan is now  for pt to RETURN TO [facility] FOR INTERMITTENT DIALYSIS RUNS, and NM: [facility] has accepted her.  Dr Armando does not want me to cancel her [facility] chair--I have called: [phone] and left a message for the .    Hemodialysis    Jelly, You will return to the [facility] Inpatient dialysis unit  [address]  [address] [place] [address]  Ph: [phone]    ---for 2 weeks    And then you might need to go to:    [facility]---7/17 I called and left them a message of above plan  [address]  [address]  [place] [address]  Ph: [phone]  Fax: [phone]    Days: MWF  Time: 3pm  ---please be there at 2:30pm on your first day  Start: Monday: 7/20    Nephrologist: Domenica Car

## 2020-07-17 NOTE — PLAN OF CARE
"/82 (BP Location: Right arm)   Pulse 79   Temp 98.9  F (37.2  C) (Oral)   Resp 18   Ht 1.63 m (5' 4.17\")   Wt 70.4 kg (155 lb 3.2 oz)   SpO2 98%   BMI 26.50 kg/m      Patient VSS on RA, afebrile.  & 147, managed with sliding scale insulin. Scheduled tyl given, denies pain. Tolerating regular diet with fair appetite, ej counts active, pt declining scheduled zofran. R midline-SL, TPN stopped and IV iron given. BM today. Voidng but not saving today. Pt states ~ 400 ml UOP. Encouraged activity, patient remained in bed for most of day. Plan for potential discharge tomorrow. Will continue with POC and notify MD with changes or concerns.    "

## 2020-07-17 NOTE — PROGRESS NOTES
Was called to assess a PICC that's not drawing blood.  Upon inspection, noticed the RUE I slightly swollen as confirmed by patient.  Patient also mentioned that it is a midline and not a PICC but charted as such.  According to notes, it is indeed a midline and not a PICC.  PCU RN informed and will inform MD of findings.

## 2020-07-17 NOTE — PROGRESS NOTES
07/17/20 1700   Quick Adds   Type of Visit Initial PT Evaluation   Living Environment   Lives With child(césar), dependent;significant other   Living Arrangements apartment   Home Accessibility no concerns   Transportation Anticipated family or friend will provide   Living Environment Comment Pt lives in apartment with elevator access, has 2 young children.    Self-Care   Usual Activity Tolerance moderate   Current Activity Tolerance moderate   Regular Exercise No   Equipment Currently Used at Home none   Activity/Exercise/Self-Care Comment Pt IND at baseline   Functional Level Prior   Ambulation 0-->independent   Transferring 0-->independent   Toileting 0-->independent   Bathing 0-->independent   Communication 0-->understands/communicates without difficulty   Swallowing 0-->swallows foods/liquids without difficulty   Cognition 0 - no cognition issues reported   Fall history within last six months no   Which of the above functional risks had a recent onset or change? 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 SCr 0.6 germán post-transplant. She presented on 7/4/2020 with fever and ARF of transplanted kidney.   Precautions/Limitations fall precautions   Cognitive Status Examination   Orientation orientation to person, place and time   Level of Consciousness alert   Follows Commands and Answers Questions 100% of the time   Personal Safety and Judgment intact   Memory intact   Pain Assessment   Patient Currently  "in Pain Yes, see Vital Sign flowsheet   Integumentary/Edema   Integumentary/Edema no deficits were identifed   Posture    Posture Forward head position;Protracted shoulders   Range of Motion (ROM)   ROM Comment WFL   Strength   Strength Comments Grossly 4/5 aniyah LEs   Bed Mobility   Bed Mobility Comments mod I   Transfer Skills   Transfer Comments IND   Gait   Gait Comments SBA   Balance   Balance Comments steady gait without AD   General Therapy Interventions   Planned Therapy Interventions balance training;gait training;strengthening;progressive activity/exercise   Clinical Impression   Criteria for Skilled Therapeutic Intervention yes, treatment indicated   PT Diagnosis Deconditioning   Influenced by the following impairments pain, decreased activity tolerance, weakness, PMH   Functional limitations due to impairments below baseline, ADLs/IADLs, ambulation   Clinical Presentation Stable/Uncomplicated   Clinical Presentation Rationale chart review and clinical judgement   Clinical Decision Making (Complexity) Low complexity   Therapy Frequency 3x/week   Predicted Duration of Therapy Intervention (days/wks) 1 week   Anticipated Discharge Disposition Home with Assist;Home with Outpatient Therapy   Risk & Benefits of therapy have been explained Yes   Patient, Family & other staff in agreement with plan of care Yes   [facility] AM-PAC  \"6 Clicks\" V.2 Basic Mobility Inpatient Short Form   1. Turning from your back to your side while in a flat bed without using bedrails? 4 - None   2. Moving from lying on your back to sitting on the side of a flat bed without using bedrails? 4 - None   3. Moving to and from a bed to a chair (including a wheelchair)? 4 - None   4. Standing up from a chair using your arms (e.g., wheelchair, or bedside chair)? 4 - None   5. To walk in hospital room? 3 - A Little   6. Climbing 3-5 steps with a railing? 3 - A Little   Basic Mobility Raw Score (Score out of 24.Lower scores equate to " lower levels of function) 22   Total Evaluation Time   Total Evaluation Time (Minutes) 9

## 2020-07-17 NOTE — PROGRESS NOTES
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 SCr 0.6 germán post-transplant. She presented on 7/4/2020 with fever and ARF of transplanted kidney.    Graft function: POD #28. Ureteral stent removed 7/8.  Mixed rejection with ARF: On dialysis since 7/7. UOP increasing on lasix, 1.8 L output yesterday. 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 DSA negative. AT1R and endothelial antibody cannot be run at this time due to issue at accepting lab, blood from 7/7 is saved in lab and will be send when it will be accepted. Treatment as below.   US 7/16: elevated velocities compared to 7/4, patent  Perinephric fluid collections: Noted on US. 7/5 IR: 50ml cloudy serosang fluid aspirated, gram stain negative, culture NGTD. Not suggestive of urine leak.  Immunosuppression management:    Pheresis/IVIG: Plan for pheresis/IVIG every other day x5 starting 7/7, completed on 7/15. IVIG 0.5g/kg doses #1-4, 1g/kg dose #5.  Thymo: 125mg on 7/6, 125mg 7/8, 75 mg 7/10. 50mg 7/17.  Rituximab: 500mg on 7/7.  Methylpred: 500/500/250/100/100/100/100  Prednisone: Tapering between methylpred doses, taper to 5mg daily starting 7/19  Tacro: Goal 8-10, Level 7/17 =9.8, Continue 5.5mg BID. Repeat MWF   MMF: Myfortic 720mg BID.  Complexity of management: Medium. Contributing factors: rejection  Hematology:   ANTHONY/ACD: Hgb 7.4, Hgb downtrending. Received 1u RBC on 7/5 and 7/9. No sign of bleeding. PSAT 9 on admission, venofer 200mg x2. Hapto high, retic low. On Retacrit  2000units MWF  Cardiorespiratory:   HTN:-150s. Controlled. Coreg 6.25mg. Norvasc 5mg.   Pulmonary edema: Noted on CXR. No hypoxia. RA  GI/Nutrition:   Severe malnutrition in the context of acute  illness: Started parenteral nutirtion on 7/9-stop today. PICC placed by IR on 7/10. Nicolás counts.  Nausea/Vomiting: Improving. Scheduled zofran and PPI. PRN compazine.   Diarrhea: Resolved, 7/6 C-diff an EP negative.  Endocrine:   Steroid induced hyperglycemia: Continue sliding scale insulin PRN.  Fluid/Electrolytes:   Hypervolemia: Weight up ~13kg. Dialyzed since 7/7. Continue lasix TID today.  Metabolic acidosis: Resolved with dialysis.  Hypocalcemia: Resolved.   Hyperphosphatemia: Phos 2.1, stop phoslo.  : Voiding  Neuro/Psych:   Acute pain: initially graft pain and low back pain. Current pain reports related to volume overload. Continue acetaminophen Q4, lidoderm, tramadol 50mg q6PRN, and atarax 25mg TID PRN.   Anxiety and paranoia:Likely exacerbated by steroids and poor sleep. Concern that opioids are being used to manage these symptoms. Improved, continue atarax PRN  Infectious disease: Afebrile, no leukocytosis  Fever: Fever 103.7F on admission. Infection vs rejection. Cipro started outpatient, broadened to Cefepine on 7/4-7/8. COVID neg, 7/4 UC neg, 7/4 blood cx negative, 7/5 perinephric fluid cultures negative, enteric panel neg, C-diff neg.  Leukocytosis: WBC 8.6 (9.6). New rigors and joint/back pain on 7/15 with concern for infection vs serum sickness. Infectious workup-neg CXR, UA/UC   Blood culture 7/15 : +GPC, might be contaminant -repeat blood cx today.    HBcAb positive: sAg nonreactive, sAb >1000. This may be positive due to IVIG infusions.  Prophylaxis: Bactrim (PCP ppx) three times per week  Valcyte (CMV PPx)   Disposition: 7A    Medical Decision Making: Medium  Subsequent visit 76137 (moderate level decision making)    CONNIE/Fellow/Resident Provider: Jess Huang NP.  [id number]    Faculty: Carmelo Jones MD   _________________________________________________________________    Interval History: History is obtained from the patient  Feels well, no c/o's following dialysis yesterday. Feels less SOB.  "Reported eating well yesterday.    ROS:   A 10-point review of systems was negative except as noted above.    Meds:    acetaminophen  650 mg Oral Q4H     amLODIPine  5 mg Oral Daily     atorvastatin  10 mg Oral Daily     carvedilol  12.5 mg Oral BID     epoetin staci-epbx (RETACRIT) inj ESRD  2,000 Units Intravenous Once per day on Mon Wed Fri     furosemide  40 mg Intravenous TID     heparin lock flush  5 mL Intravenous Q24H     insulin aspart  1-7 Units Subcutaneous TID AC     insulin aspart  1-5 Units Subcutaneous At Bedtime     [START ON 7/18/2020] iron sucrose (VENOFER) intermittent infusion  200 mg Intravenous Once     iron sucrose (VENOFER) intermittent infusion  200 mg Intravenous Once     lidocaine  1 patch Transdermal Q24H     lidocaine   Transdermal Q8H     losartan  12.5 mg Oral At Bedtime     mycophenolic acid  720 mg Oral BID     ondansetron  4 mg Oral TID AC     pantoprazole  40 mg Oral BID     predniSONE  20 mg Oral Daily     [START ON 7/19/2020] predniSONE  5 mg Oral Daily     sodium bicarbonate  1,300 mg Oral BID     sodium chloride (PF)  10 mL Intracatheter Q8H     sodium chloride (PF)  3 mL Intracatheter Q8H     sulfamethoxazole-trimethoprim  1 tablet Oral Once per day on Mon Wed Fri     tacrolimus  5.5 mg Oral BID     valGANciclovir  450 mg Oral Once per day on Mon Thu       Physical Exam:     Admit Weight: 57.2 kg (126 lb 1.7 oz)    Current vitals:   BP (!) 155/89 (BP Location: Right leg)   Pulse 79   Temp 98.7  F (37.1  C) (Axillary)   Resp 18   Ht 1.63 m (5' 4.17\")   Wt 70.4 kg (155 lb 3.2 oz)   SpO2 100%   BMI 26.50 kg/m      Vital sign ranges:    Temp:  [98  F (36.7  C)-98.7  F (37.1  C)] 98.7  F (37.1  C)  Pulse:  [64-81] 79  Heart Rate:  [65-84] 84  Resp:  [16-28] 18  BP: (122-159)/(74-97) 155/89  SpO2:  [97 %-100 %] 100 %  Patient Vitals for the past 24 hrs:   BP Temp Temp src Pulse Heart Rate Resp SpO2 Weight   07/17/20 1129 (!) 155/89 98.7  F (37.1  C) Axillary -- 84 18 100 % -- "   07/17/20 0816 (!) 140/74 98.6  F (37  C) Oral -- 82 18 99 % --   07/17/20 0648 -- -- -- -- -- -- -- 70.4 kg (155 lb 3.2 oz)   07/17/20 0438 (!) 159/89 98.5  F (36.9  C) Oral -- 77 18 99 % --   07/17/20 0043 (!) 158/87 98.3  F (36.8  C) Oral -- 81 18 100 % --   07/16/20 2236 (!) 152/97 98.2  F (36.8  C) Oral -- 75 18 100 % --   07/16/20 2130 (!) 145/85 98.2  F (36.8  C) Oral -- 76 18 100 % --   07/16/20 2037 (!) 154/91 98.2  F (36.8  C) Oral -- 74 18 100 % --   07/16/20 1917 (!) 147/81 98  F (36.7  C) Oral -- 70 18 100 % --   07/16/20 1830 (!) 140/81 -- -- 79 72 21 100 % --   07/16/20 1826 (!) 148/78 98.5  F (36.9  C) Oral 74 74 22 -- 69.8 kg (153 lb 14.1 oz)   07/16/20 1815 (!) 143/83 -- -- 75 73 23 100 % --   07/16/20 1800 136/82 -- -- 70 70 22 100 % --   07/16/20 1745 122/85 -- -- 69 71 23 100 % --   07/16/20 1730 (!) 135/93 -- -- 73 76 16 100 % --   07/16/20 1715 135/87 -- -- 73 71 20 100 % --   07/16/20 1700 135/85 -- -- 69 70 23 98 % --   07/16/20 1645 133/78 -- -- 71 74 28 97 % --   07/16/20 1630 134/75 -- -- 64 65 16 100 % --   07/16/20 1615 133/77 -- -- 66 68 18 100 % --   07/16/20 1600 137/77 -- -- 66 65 22 100 % --   07/16/20 1545 136/78 -- -- 67 67 22 100 % --   07/16/20 1530 131/86 -- -- 70 73 24 100 % --   07/16/20 1520 138/79 -- -- 75 81 23 100 % --   07/16/20 1515 137/88 98.4  F (36.9  C) Oral 81 -- 18 100 % --   07/16/20 1500 -- -- -- -- -- -- -- 73.3 kg (161 lb 8 oz)     General Appearance:  NAD.   Skin: warm, dry, no rashes  Heart: Perfused  Lungs: RA, unlabored  Abdomen: Soft, Anasarca to abdomen, NT, Incision to LLQ CDI.  : voiding  Extremities: edema: generalized to trunk, back, upper body and above the knee/thighs.  Neurologic: awake, alert and oriented x4. Tremor absent.       Data:   CMP  Recent Labs   Lab 07/17/20  0602 07/16/20  0609  07/13/20  0619 07/12/20  0643    131*   < > 130* 128*   POTASSIUM 3.8 3.5   < > 4.0 4.2   CHLORIDE 102 100   < > 98 96   CO2 26 21   < > 26 26   GLC  254* 191*   < > 204* 237*   BUN 75* 119*   < > 46* 39*   CR 3.01* 4.65*   < > 3.02* 2.99*   GFRESTIMATED 19* 11*   < > 19* 20*   GFRESTBLACK 23* 13*   < > 22* 23*   CASIE 8.5 8.3*   < > 7.9* 7.7*   MAG 1.8 2.3   < > 1.9 1.6   PHOS 2.1* 2.1*   < > 2.6 3.3   ALBUMIN  --   --   --  2.9* 3.1*   BILITOTAL  --   --   --  1.1 0.3   ALKPHOS  --   --   --  40 35*   AST  --   --   --  18 20   ALT  --   --   --  29 25    < > = values in this interval not displayed.     CBC  Recent Labs   Lab 07/17/20  0602 07/16/20  0609   HGB 7.4* 7.3*   WBC 8.6 9.6    217   Attestation:    The patient has been seen and evaluated by me.   Vital signs, labs, medications and orders were reviewed.   When obtained, diagnostic images were reviewed by me and interpreted as above.    The care plan was discussed with the multidisciplinary team and I agree with the findings and plan in this note, with any differences recorded in blue.    Immunosuppressive medication management was reviewed and adjusted as reflected in the note and orders.     .    \

## 2020-07-18 VITALS
HEART RATE: 79 BPM | BODY MASS INDEX: 26.53 KG/M2 | DIASTOLIC BLOOD PRESSURE: 87 MMHG | TEMPERATURE: 98.6 F | WEIGHT: 155.4 LBS | SYSTOLIC BLOOD PRESSURE: 148 MMHG | OXYGEN SATURATION: 99 % | HEIGHT: 64 IN | RESPIRATION RATE: 18 BRPM

## 2020-07-18 LAB
ANION GAP SERPL CALCULATED.3IONS-SCNC: 8 MMOL/L (ref 3–14)
BASOPHILS # BLD AUTO: 0 10E9/L (ref 0–0.2)
BASOPHILS NFR BLD AUTO: 0 %
BUN SERPL-MCNC: 84 MG/DL (ref 7–30)
CALCIUM SERPL-MCNC: 8.6 MG/DL (ref 8.5–10.1)
CHLORIDE SERPL-SCNC: 104 MMOL/L (ref 94–109)
CO2 SERPL-SCNC: 24 MMOL/L (ref 20–32)
CREAT SERPL-MCNC: 3.16 MG/DL (ref 0.52–1.04)
DIFFERENTIAL METHOD BLD: ABNORMAL
EOSINOPHIL # BLD AUTO: 0 10E9/L (ref 0–0.7)
EOSINOPHIL NFR BLD AUTO: 0 %
ERYTHROCYTE [DISTWIDTH] IN BLOOD BY AUTOMATED COUNT: 15.2 % (ref 10–15)
GFR SERPL CREATININE-BSD FRML MDRD: 18 ML/MIN/{1.73_M2}
GLUCOSE BLDC GLUCOMTR-MCNC: 106 MG/DL (ref 70–99)
GLUCOSE BLDC GLUCOMTR-MCNC: 117 MG/DL (ref 70–99)
GLUCOSE BLDC GLUCOMTR-MCNC: 121 MG/DL (ref 70–99)
GLUCOSE SERPL-MCNC: 123 MG/DL (ref 70–99)
HCT VFR BLD AUTO: 24.1 % (ref 35–47)
HGB BLD-MCNC: 7.5 G/DL (ref 11.7–15.7)
IMM GRANULOCYTES # BLD: 0.4 10E9/L (ref 0–0.4)
IMM GRANULOCYTES NFR BLD: 4.3 %
LYMPHOCYTES # BLD AUTO: 0.1 10E9/L (ref 0.8–5.3)
LYMPHOCYTES NFR BLD AUTO: 1 %
MAGNESIUM SERPL-MCNC: 1.6 MG/DL (ref 1.6–2.3)
MCH RBC QN AUTO: 29.8 PG (ref 26.5–33)
MCHC RBC AUTO-ENTMCNC: 31.1 G/DL (ref 31.5–36.5)
MCV RBC AUTO: 96 FL (ref 78–100)
MONOCYTES # BLD AUTO: 0.5 10E9/L (ref 0–1.3)
MONOCYTES NFR BLD AUTO: 6 %
NEUTROPHILS # BLD AUTO: 7.8 10E9/L (ref 1.6–8.3)
NEUTROPHILS NFR BLD AUTO: 88.7 %
NRBC # BLD AUTO: 0 10*3/UL
NRBC BLD AUTO-RTO: 0 /100
PHOSPHATE SERPL-MCNC: 1.9 MG/DL (ref 2.5–4.5)
PLATELET # BLD AUTO: 197 10E9/L (ref 150–450)
POTASSIUM SERPL-SCNC: 3.4 MMOL/L (ref 3.4–5.3)
RBC # BLD AUTO: 2.52 10E12/L (ref 3.8–5.2)
SODIUM SERPL-SCNC: 136 MMOL/L (ref 133–144)
WBC # BLD AUTO: 8.8 10E9/L (ref 4–11)

## 2020-07-18 PROCEDURE — 25000128 H RX IP 250 OP 636: Performed by: STUDENT IN AN ORGANIZED HEALTH CARE EDUCATION/TRAINING PROGRAM

## 2020-07-18 PROCEDURE — 25000131 ZZH RX MED GY IP 250 OP 636 PS 637: Mod: GY | Performed by: PHYSICIAN ASSISTANT

## 2020-07-18 PROCEDURE — 83735 ASSAY OF MAGNESIUM: CPT | Performed by: STUDENT IN AN ORGANIZED HEALTH CARE EDUCATION/TRAINING PROGRAM

## 2020-07-18 PROCEDURE — 25000132 ZZH RX MED GY IP 250 OP 250 PS 637: Mod: GY | Performed by: STUDENT IN AN ORGANIZED HEALTH CARE EDUCATION/TRAINING PROGRAM

## 2020-07-18 PROCEDURE — 25000132 ZZH RX MED GY IP 250 OP 250 PS 637: Mod: GY | Performed by: SURGERY

## 2020-07-18 PROCEDURE — 25000131 ZZH RX MED GY IP 250 OP 636 PS 637: Mod: GY | Performed by: NURSE PRACTITIONER

## 2020-07-18 PROCEDURE — 80048 BASIC METABOLIC PNL TOTAL CA: CPT | Performed by: STUDENT IN AN ORGANIZED HEALTH CARE EDUCATION/TRAINING PROGRAM

## 2020-07-18 PROCEDURE — 25000128 H RX IP 250 OP 636: Performed by: NURSE PRACTITIONER

## 2020-07-18 PROCEDURE — 25800030 ZZH RX IP 258 OP 636: Performed by: NURSE PRACTITIONER

## 2020-07-18 PROCEDURE — 84100 ASSAY OF PHOSPHORUS: CPT | Performed by: STUDENT IN AN ORGANIZED HEALTH CARE EDUCATION/TRAINING PROGRAM

## 2020-07-18 PROCEDURE — 25000132 ZZH RX MED GY IP 250 OP 250 PS 637: Mod: GY | Performed by: PHYSICIAN ASSISTANT

## 2020-07-18 PROCEDURE — 25000132 ZZH RX MED GY IP 250 OP 250 PS 637: Mod: GY | Performed by: INTERNAL MEDICINE

## 2020-07-18 PROCEDURE — 36415 COLL VENOUS BLD VENIPUNCTURE: CPT | Performed by: STUDENT IN AN ORGANIZED HEALTH CARE EDUCATION/TRAINING PROGRAM

## 2020-07-18 PROCEDURE — 85025 COMPLETE CBC W/AUTO DIFF WBC: CPT | Performed by: STUDENT IN AN ORGANIZED HEALTH CARE EDUCATION/TRAINING PROGRAM

## 2020-07-18 PROCEDURE — 00000146 ZZHCL STATISTIC GLUCOSE BY METER IP

## 2020-07-18 RX ORDER — SODIUM BICARBONATE 650 MG/1
1300 TABLET ORAL 2 TIMES DAILY
Qty: 120 TABLET | Refills: 1 | Status: SHIPPED | OUTPATIENT
Start: 2020-07-18 | End: 2020-07-20

## 2020-07-18 RX ORDER — ONDANSETRON 4 MG/1
4 TABLET, ORALLY DISINTEGRATING ORAL
Qty: 20 TABLET | Refills: 1 | Status: SHIPPED | OUTPATIENT
Start: 2020-07-18 | End: 2020-08-07

## 2020-07-18 RX ORDER — TACROLIMUS 0.5 MG/1
5.5 CAPSULE ORAL 2 TIMES DAILY
Start: 2020-07-18 | End: 2020-07-21

## 2020-07-18 RX ORDER — SULFAMETHOXAZOLE AND TRIMETHOPRIM 400; 80 MG/1; MG/1
1 TABLET ORAL
Start: 2020-07-20 | End: 2020-08-04

## 2020-07-18 RX ORDER — FUROSEMIDE 20 MG
80 TABLET ORAL
Status: DISCONTINUED | OUTPATIENT
Start: 2020-07-18 | End: 2020-07-18 | Stop reason: HOSPADM

## 2020-07-18 RX ORDER — HYDROXYZINE HYDROCHLORIDE 25 MG/1
25 TABLET, FILM COATED ORAL 3 TIMES DAILY PRN
Qty: 20 TABLET | Refills: 0 | Status: SHIPPED | OUTPATIENT
Start: 2020-07-18 | End: 2020-07-24

## 2020-07-18 RX ORDER — LOSARTAN POTASSIUM 25 MG/1
12.5 TABLET ORAL AT BEDTIME
Qty: 30 TABLET | Refills: 1 | Status: SHIPPED | OUTPATIENT
Start: 2020-07-18 | End: 2020-09-03

## 2020-07-18 RX ORDER — PANTOPRAZOLE SODIUM 40 MG/1
40 TABLET, DELAYED RELEASE ORAL 2 TIMES DAILY
Qty: 30 TABLET | Refills: 1 | Status: SHIPPED | OUTPATIENT
Start: 2020-07-18 | End: 2020-09-03

## 2020-07-18 RX ORDER — AMLODIPINE BESYLATE 5 MG/1
5 TABLET ORAL DAILY
Qty: 30 TABLET | Refills: 1 | Status: SHIPPED | OUTPATIENT
Start: 2020-07-19 | End: 2020-09-29

## 2020-07-18 RX ORDER — FUROSEMIDE 80 MG
80 TABLET ORAL
Qty: 28 TABLET | Refills: 1 | Status: SHIPPED | OUTPATIENT
Start: 2020-07-18 | End: 2020-07-22

## 2020-07-18 RX ORDER — VALGANCICLOVIR 450 MG/1
450 TABLET, FILM COATED ORAL
Start: 2020-07-20 | End: 2020-07-24

## 2020-07-18 RX ORDER — TRAMADOL HYDROCHLORIDE 50 MG/1
50 TABLET ORAL EVERY 6 HOURS PRN
Qty: 15 TABLET | Refills: 0 | Status: SHIPPED | OUTPATIENT
Start: 2020-07-18 | End: 2020-07-24

## 2020-07-18 RX ORDER — PREDNISONE 5 MG/1
5 TABLET ORAL DAILY
Qty: 30 TABLET | Refills: 3 | Status: SHIPPED | OUTPATIENT
Start: 2020-07-19 | End: 2020-07-20

## 2020-07-18 RX ADMIN — ACETAMINOPHEN 650 MG: 325 TABLET, FILM COATED ORAL at 12:04

## 2020-07-18 RX ADMIN — PANTOPRAZOLE SODIUM 40 MG: 40 TABLET, DELAYED RELEASE ORAL at 09:17

## 2020-07-18 RX ADMIN — ATORVASTATIN CALCIUM 10 MG: 10 TABLET, FILM COATED ORAL at 09:18

## 2020-07-18 RX ADMIN — MYCOPHENOLIC ACID 720 MG: 360 TABLET, DELAYED RELEASE ORAL at 09:19

## 2020-07-18 RX ADMIN — TACROLIMUS 5.5 MG: 5 CAPSULE ORAL at 09:19

## 2020-07-18 RX ADMIN — IRON SUCROSE 200 MG: 20 INJECTION, SOLUTION INTRAVENOUS at 09:23

## 2020-07-18 RX ADMIN — PREDNISONE 20 MG: 20 TABLET ORAL at 09:18

## 2020-07-18 RX ADMIN — FUROSEMIDE 80 MG: 20 TABLET ORAL at 17:14

## 2020-07-18 RX ADMIN — SODIUM BICARBONATE 650 MG TABLET 1300 MG: at 09:17

## 2020-07-18 RX ADMIN — ACETAMINOPHEN 650 MG: 325 TABLET, FILM COATED ORAL at 09:18

## 2020-07-18 RX ADMIN — AMLODIPINE BESYLATE 5 MG: 5 TABLET ORAL at 09:18

## 2020-07-18 RX ADMIN — ACETAMINOPHEN 650 MG: 325 TABLET, FILM COATED ORAL at 17:14

## 2020-07-18 RX ADMIN — LIDOCAINE 1 PATCH: 560 PATCH PERCUTANEOUS; TOPICAL; TRANSDERMAL at 09:19

## 2020-07-18 RX ADMIN — TACROLIMUS 5.5 MG: 5 CAPSULE ORAL at 17:14

## 2020-07-18 RX ADMIN — FUROSEMIDE 40 MG: 10 INJECTION, SOLUTION INTRAMUSCULAR; INTRAVENOUS at 09:17

## 2020-07-18 RX ADMIN — CARVEDILOL 12.5 MG: 12.5 TABLET, FILM COATED ORAL at 09:17

## 2020-07-18 ASSESSMENT — ACTIVITIES OF DAILY LIVING (ADL)
ADLS_ACUITY_SCORE: 12
ADLS_ACUITY_SCORE: 13
ADLS_ACUITY_SCORE: 12
ADLS_ACUITY_SCORE: 13
ADLS_ACUITY_SCORE: 13
ADLS_ACUITY_SCORE: 12

## 2020-07-18 ASSESSMENT — MIFFLIN-ST. JEOR: SCORE: 1397.64

## 2020-07-18 ASSESSMENT — PAIN DESCRIPTION - DESCRIPTORS: DESCRIPTORS: ACHING

## 2020-07-18 NOTE — PROGRESS NOTES
Calorie Count  Intake recorded for: 7/17  Total Kcals: 0 Total Protein: 0g  Kcals from Hospital Food: 0   Protein: 0g  Kcals from Outside Food (average):0 Protein: 0g  # Meals Recorded: 2 meals ordered from kitchen, no intake recorded.   # Supplements Recorded: no intake recorded.     Note: Per Epic Food Record, pt consumed 50% @ 11 AM, but not enough information was given to calculate calories/protien.

## 2020-07-18 NOTE — PLAN OF CARE
"BP (!) 143/78 (BP Location: Right leg)   Pulse 79   Temp 98.4  F (36.9  C) (Oral)   Resp 18   Ht 1.63 m (5' 4.17\")   Wt 70.4 kg (155 lb 3.2 oz)   SpO2 99%   BMI 26.50 kg/m      3705-1099 VS are stable except for mildly elevated systolic BP. BS ACHS, BS at 2200 277 (pt was snacking late, multiple meal trays in pt's room), BS at 0300 117. Patient reports back pain and was given Tramadol 1x, pt refused Tylenol overnight. Patient denies nausea.   IV access - Pt has double lumen midline in right arm that is saline locked.   Urine Output - Pt is up to commode and not saving. Pt did not want to use bathroom and wanted to use commode overnight after lasix.  Bowel Function - No BM on shift, last BM 7/17/2020.   Nutrition - Pt is on a regular diet.   Activity - Pt is up independently to commode in room.  Plan - Will continue to monitor and notify team with any concerns. Possible discharge today.  "

## 2020-07-18 NOTE — PROGRESS NOTES
[facility], [place]   Transplant Nephrology Progress Note  Date of Admission:  7/4/2020  Today's Date: 07/18/2020    Recommendations:  - Okay to discharge. See in clinic monday and discuss dialysis  -80mg BID of lasix     Assessment & Plan   # DDKT: Trend down   - Baseline Cr ~ 0.97   - Proteinuria: Moderate (1-3 grams)   - Date DSA Last Checked: Jul/2020      Latest DSA: No   - BK Viremia: No   - Kidney Tx Biopsy: Jul 05, 2020; Result: FINAL DIAGNOSIS:   Kidney allograft biopsy        - Acute cellular rejection, [place] type IIA        - Acute tubular injury     Rejection treatment will included Thymoglobulin 2 mg/kg per dose x3, plasmapheresis with IVIG x 5 and Solu-Medrol 500 mg daily x3.  Following the intense course, prednisone taper recommended and prednisone maintenance 5 mg daily.    # Immunosuppression: Tacrolimus immediate release (goal 8-10) and myfortic 720mg BID    - Changes: No     # Infection Prophylaxis:   - PJP: Sulfa/TMP (Bactrim)  - CMV: Valcyte    # Hypertension: Borderline control;  Goal BP: < 130/80   - Volume status: Mildly hypervolemic   - Changes: Yes - will d/c on lasix 80mg BID    # Elevated Blood Glucose: Glucose generally running ~ 220's   - Management as per primary team.    # Anemia in Chronic Renal Disease: Hgb: Stable      ENZO: will start 2000 units three times per week      - Iron studies: Not checked recently    # Mineral Bone Disorder:   - Secondary renal hyperparathyroidism; PTH level: Moderately elevated (301-600 pg/ml)        On treatment: None  - Vitamin D; level: Normal        On supplement: No  - Calcium; level: Normal        On supplement: No  - Phosphorus; level: Normal        On supplement: No    # Electrolytes:   - Potassium; level: Low normal        On supplement: No  - Magnesium; level: Normal        On supplement: No  - Bicarbonate; level: Normal        On supplement: No  - Sodium; level: Normal        # Transplant History:  Etiology of  Kidney Failure: IgA nephropathy  Tx: LDKT and DDKT  Transplant: 2020 (Kidney), 2007 (Kidney)  Donor Type: Donation after Brain Death Donor Class:   Crossmatch at time of Tx: negative  Significant changes in immunosuppression: None  Significant transplant-related complications: None    Recommendations were communicated to the primary team verbally.    Seen and discussed with Dr. freya Santos, NP   Pager: [phone]    Interval History   States she was chills over night    No fevers per patient, no nausea or vomiting    No hematuria, dysuria or frequency.    States overall she feels better.      Review of Systems   4 point ROS was obtained and negative except as noted in the Interval History.    MEDICATIONS:    acetaminophen  650 mg Oral Q4H     amLODIPine  5 mg Oral Daily     atorvastatin  10 mg Oral Daily     carvedilol  12.5 mg Oral BID     epoetin staci-epbx (RETACRIT) inj ESRD  2,000 Units Intravenous Once per day on      furosemide  40 mg Intravenous TID     heparin lock flush  5 mL Intravenous Q24H     insulin aspart  1-7 Units Subcutaneous TID AC     insulin aspart  1-5 Units Subcutaneous At Bedtime     lidocaine  1 patch Transdermal Q24H     lidocaine   Transdermal Q8H     losartan  12.5 mg Oral At Bedtime     mycophenolic acid  720 mg Oral BID     ondansetron  4 mg Oral TID AC     pantoprazole  40 mg Oral BID     [START ON 2020] predniSONE  5 mg Oral Daily     sodium bicarbonate  1,300 mg Oral BID     sodium chloride (PF)  10 mL Intracatheter Q8H     sodium chloride (PF)  3 mL Intracatheter Q8H     sulfamethoxazole-trimethoprim  1 tablet Oral Once per day on      tacrolimus  5.5 mg Oral BID     valGANciclovir  450 mg Oral Once per day on        dextrose         Physical Exam   Temp  Av.3  F (36.8  C)  Min: 97.3  F (36.3  C)  Max: 103.7  F (39.8  C)      Pulse  Av.9  Min: 60  Max: 122 Resp  Av.7  Min: 12  Max: 28  SpO2  Av.1 %  Min: 93  "%  Max: 100 %     BP (!) 148/87 (BP Location: Right leg)   Pulse 79   Temp 98.6  F (37  C) (Oral)   Resp 18   Ht 1.63 m (5' 4.17\")   Wt 70.5 kg (155 lb 6.4 oz)   SpO2 99%   BMI 26.53 kg/m     Date 07/18/20 0700 - 07/19/20 0659   Shift 0482-3608 4475-0120 6773-7598 24 Hour Total   INTAKE   P.O. 400   400   I.V. 100   100   Shift Total(mL/kg) 500(7.09)   500(7.09)   OUTPUT   Urine 1500   1500   Shift Total(mL/kg) 1500(21.28)   1500(21.28)   Weight (kg) 70.49 70.49 70.49 70.49      Admit Weight: 57.2 kg (126 lb 1.7 oz)     GENERAL APPEARANCE: alert and no distress  HENT: mouth without ulcers or lesions  LYMPHATICS: no cervical or supraclavicular nodes  RESP: lungs clear to auscultation - no rales, rhonchi or wheezes  CV: regular rhythm, normal rate, no rub, no murmur  EDEMA: +2 LE edema bilaterally  ABDOMEN: soft, nondistended, nontender, bowel sounds normal  MS: extremities normal - no gross deformities noted, no evidence of inflammation in joints, no muscle tenderness  SKIN: no rash    Data   All labs reviewed by me.  CMP  Recent Labs   Lab 07/18/20  0617 07/17/20  0602 07/16/20  0609 07/15/20  0611  07/13/20  0619 07/12/20  0643    136 131* 131*   < > 130* 128*   POTASSIUM 3.4 3.8 3.5 3.8   < > 4.0 4.2   CHLORIDE 104 102 100 99   < > 98 96   CO2 24 26 21 23   < > 26 26   ANIONGAP 8 8 10 9   < > 6 7   * 254* 191* 224*   < > 204* 237*   BUN 84* 75* 119* 108*   < > 46* 39*   CR 3.16* 3.01* 4.65* 4.57*   < > 3.02* 2.99*   GFRESTIMATED 18* 19* 11* 12*   < > 19* 20*   GFRESTBLACK 21* 23* 13* 14*   < > 22* 23*   CASIE 8.6 8.5 8.3* 8.4*   < > 7.9* 7.7*   MAG 1.6 1.8 2.3 2.7*   < > 1.9 1.6   PHOS 1.9* 2.1* 2.1* 2.6   < > 2.6 3.3   PROTTOTAL  --   --   --   --   --  5.4* 5.7*   ALBUMIN  --   --   --   --   --  2.9* 3.1*   BILITOTAL  --   --   --   --   --  1.1 0.3   ALKPHOS  --   --   --   --   --  40 35*   AST  --   --   --   --   --  18 20   ALT  --   --   --   --   --  29 25    < > = values in this " interval not displayed.     CBC  Recent Labs   Lab 07/18/20  0617 07/17/20  0602 07/16/20  0609 07/15/20  0611   HGB 7.5* 7.4* 7.3* 8.2*   WBC 8.8 8.6 9.6 10.3   RBC 2.52* 2.47* 2.38* 2.75*   HCT 24.1* 23.1* 22.4* 25.8*   MCV 96 94 94 94   MCH 29.8 30.0 30.7 29.8   MCHC 31.1* 32.0 32.6 31.8   RDW 15.2* 14.7 14.4 14.2    223 217 222     INR  Recent Labs   Lab 07/15/20  0811 07/13/20  0619 07/12/20  0643   INR 1.27* 1.43* 1.45*     ABGNo lab results found in last 7 days.   Urine Studies  Recent Labs   Lab Test 07/15/20  1500 07/06/20  1412 07/04/20  1346 07/01/20  1430   COLOR Yellow Yellow Yellow Patricia   APPEARANCE Clear Slightly Cloudy Slightly Cloudy Slightly Cloudy   URINEGLC 150* 150* 70* Negative   URINEBILI Negative Negative Small* Negative   URINEKETONE Negative 10* Negative Negative   SG 1.010 1.022 1.027 1.025   UBLD Trace* Small* Small* Moderate*   URINEPH 5.5 8.0* 8.0* 6.0   PROTEIN 30* >600* >600* 100*   NITRITE Negative Negative Negative Negative   LEUKEST Trace* Negative Negative Negative   RBCU 1 78* 21* 96*   WBCU 5 21* 2 9*     Recent Labs   Lab Test 07/16/12  1400 07/02/12  1130 06/25/12  0615 06/17/12  0930 06/13/12  1120 06/07/12  0643 06/01/12  0950 05/25/12  1830 05/25/12  1110 05/22/12  1536   UTPG 1.53* 0.86* 0.69* 0.82* 0.80* 0.88* 0.58* 0.47*  0.45* 0.56* 0.51*     PTH  Recent Labs   Lab Test 06/26/20  0842 11/30/12  2030 11/30/12  1030   PTHI 226* 500* 794*     Iron Studies  Recent Labs   Lab Test 07/05/20  0553 06/25/20  0749 11/30/12  1950   IRON 10* 57 125   * 163* 155*   IRONSAT 9* 35 81*   SHAHBAZ  --  886* 401*       IMAGING:  All imaging studies reviewed by me.     Patient was seen by myself, Dr. Don Peraza, in conjunction with Kailey Santos, as part of a shared visit.    I personally reviewed past medical and surgical history, vital signs, medications and labs.  Present and past medical history, along with significant physical exam findings were all reviewed with  CONNIE.    My bueno findings:  Jelly Dietz is a 35 year old year old female with ACR and AbMR s/p PLEX/IVIG/Thymo    Patient feels ok. No cp, sob, no n/v, no f/s/c.     Key management decisions made by me and discussed with CONNIE:  1. Kidney transplanted: continue immunosuppression as below. Labs next on Monday to assess for HD need.   2. Immunosuppression: tac 5.5 mg po bid,  mg po bid, pred 20 mg daily on taper down to 5 mg daily.   3. CANDI due to ACR / AbMR: follow creatinine next on Monday with assessment for need for HD.   4. Volume overload/HTN: furosemide 80 mg po bid, coreg 12.5 mg po bid, amlo 5 mg po at bedtime.

## 2020-07-19 LAB
BACTERIA SPEC CULT: ABNORMAL
Lab: ABNORMAL
SPECIMEN SOURCE: ABNORMAL

## 2020-07-19 NOTE — PLAN OF CARE
DISCHARGE:  D: Patient with orders to discharge to home.  I: Discharge instructions, medications & follow ups reviewed with patient. Med card updated, reviewed with patient, discharge medications picked up and medications split up for Sunday & Monday dosings. Copy of discharge summary given to patient. Midline removed. All belongings packed & sent with patient. Medications filled & picked up at discharge pharmacy. Paperwork faxed.   A: Patient in stable condition. AVSS. Patient had no further questions regarding discharge instructions and medications. Pt uninterested in education and involvement regarding medications. Patient transferred out by wheelchair & left with boyfriend.  P: Plan for follow up appointment with ATC on Monday.

## 2020-07-20 ENCOUNTER — TELEPHONE (OUTPATIENT)
Dept: TRANSPLANT | Facility: CLINIC | Age: 35
End: 2020-07-20

## 2020-07-20 ENCOUNTER — RESULTS ONLY (OUTPATIENT)
Dept: OTHER | Facility: CLINIC | Age: 35
End: 2020-07-20

## 2020-07-20 ENCOUNTER — OFFICE VISIT (OUTPATIENT)
Dept: NEPHROLOGY | Facility: CLINIC | Age: 35
End: 2020-07-20
Attending: INTERNAL MEDICINE
Payer: MEDICARE

## 2020-07-20 VITALS
BODY MASS INDEX: 25.37 KG/M2 | SYSTOLIC BLOOD PRESSURE: 126 MMHG | TEMPERATURE: 98.3 F | OXYGEN SATURATION: 100 % | WEIGHT: 148.6 LBS | DIASTOLIC BLOOD PRESSURE: 88 MMHG | HEART RATE: 80 BPM

## 2020-07-20 DIAGNOSIS — Z48.298 AFTERCARE FOLLOWING ORGAN TRANSPLANT: ICD-10-CM

## 2020-07-20 DIAGNOSIS — Z94.0 KIDNEY REPLACED BY TRANSPLANT: ICD-10-CM

## 2020-07-20 DIAGNOSIS — Z48.298 AFTERCARE FOLLOWING ORGAN TRANSPLANT: Primary | ICD-10-CM

## 2020-07-20 DIAGNOSIS — N17.9 ACUTE RENAL FAILURE, UNSPECIFIED ACUTE RENAL FAILURE TYPE (H): ICD-10-CM

## 2020-07-20 DIAGNOSIS — Z79.899 LONG TERM USE OF DRUG: ICD-10-CM

## 2020-07-20 DIAGNOSIS — Z94.0 KIDNEY TRANSPLANTED: Primary | ICD-10-CM

## 2020-07-20 DIAGNOSIS — T86.10 COMPLICATION OF TRANSPLANTED KIDNEY, UNSPECIFIED COMPLICATION: ICD-10-CM

## 2020-07-20 DIAGNOSIS — Z94.0 KIDNEY TRANSPLANTED: Chronic | ICD-10-CM

## 2020-07-20 LAB
ALBUMIN SERPL-MCNC: 3.5 G/DL (ref 3.4–5)
ANION GAP SERPL CALCULATED.3IONS-SCNC: 7 MMOL/L (ref 3–14)
BASOPHILS # BLD AUTO: 0 10E9/L (ref 0–0.2)
BASOPHILS NFR BLD AUTO: 0 %
BUN SERPL-MCNC: 74 MG/DL (ref 7–30)
CALCIUM SERPL-MCNC: 8 MG/DL (ref 8.5–10.1)
CHLORIDE SERPL-SCNC: 104 MMOL/L (ref 94–109)
CO2 SERPL-SCNC: 28 MMOL/L (ref 20–32)
CREAT SERPL-MCNC: 2.09 MG/DL (ref 0.52–1.04)
CREAT UR-MCNC: 31 MG/DL
DIFFERENTIAL METHOD BLD: ABNORMAL
EOSINOPHIL # BLD AUTO: 0 10E9/L (ref 0–0.7)
EOSINOPHIL NFR BLD AUTO: 0.1 %
ERYTHROCYTE [DISTWIDTH] IN BLOOD BY AUTOMATED COUNT: 15.7 % (ref 10–15)
GFR SERPL CREATININE-BSD FRML MDRD: 30 ML/MIN/{1.73_M2}
GLUCOSE SERPL-MCNC: 75 MG/DL (ref 70–99)
HCT VFR BLD AUTO: 21.8 % (ref 35–47)
HGB BLD-MCNC: 7 G/DL (ref 11.7–15.7)
IMM GRANULOCYTES # BLD: 0.3 10E9/L (ref 0–0.4)
IMM GRANULOCYTES NFR BLD: 4 %
LYMPHOCYTES # BLD AUTO: 0.2 10E9/L (ref 0.8–5.3)
LYMPHOCYTES NFR BLD AUTO: 2.4 %
MAGNESIUM SERPL-MCNC: 1.4 MG/DL (ref 1.6–2.3)
MCH RBC QN AUTO: 30.4 PG (ref 26.5–33)
MCHC RBC AUTO-ENTMCNC: 32.1 G/DL (ref 31.5–36.5)
MCV RBC AUTO: 95 FL (ref 78–100)
MONOCYTES # BLD AUTO: 0.5 10E9/L (ref 0–1.3)
MONOCYTES NFR BLD AUTO: 6.1 %
NEUTROPHILS # BLD AUTO: 6.5 10E9/L (ref 1.6–8.3)
NEUTROPHILS NFR BLD AUTO: 87.4 %
NRBC # BLD AUTO: 0 10*3/UL
NRBC BLD AUTO-RTO: 0 /100
PHOSPHATE SERPL-MCNC: 1.5 MG/DL (ref 2.5–4.5)
PLATELET # BLD AUTO: 199 10E9/L (ref 150–450)
POTASSIUM SERPL-SCNC: 2.9 MMOL/L (ref 3.4–5.3)
PROT UR-MCNC: 0.22 G/L
PROT/CREAT 24H UR: 0.7 G/G CR (ref 0–0.2)
RBC # BLD AUTO: 2.3 10E12/L (ref 3.8–5.2)
SODIUM SERPL-SCNC: 139 MMOL/L (ref 133–144)
TACROLIMUS BLD-MCNC: 31.6 UG/L (ref 5–15)
TME LAST DOSE: ABNORMAL H
WBC # BLD AUTO: 7.5 10E9/L (ref 4–11)

## 2020-07-20 PROCEDURE — 87799 DETECT AGENT NOS DNA QUANT: CPT | Performed by: INTERNAL MEDICINE

## 2020-07-20 PROCEDURE — 85025 COMPLETE CBC W/AUTO DIFF WBC: CPT | Performed by: INTERNAL MEDICINE

## 2020-07-20 PROCEDURE — 36415 COLL VENOUS BLD VENIPUNCTURE: CPT | Performed by: INTERNAL MEDICINE

## 2020-07-20 PROCEDURE — 80180 DRUG SCRN QUAN MYCOPHENOLATE: CPT | Performed by: INTERNAL MEDICINE

## 2020-07-20 PROCEDURE — 84156 ASSAY OF PROTEIN URINE: CPT | Performed by: INTERNAL MEDICINE

## 2020-07-20 PROCEDURE — 80069 RENAL FUNCTION PANEL: CPT | Performed by: INTERNAL MEDICINE

## 2020-07-20 PROCEDURE — 86833 HLA CLASS II HIGH DEFIN QUAL: CPT | Performed by: INTERNAL MEDICINE

## 2020-07-20 PROCEDURE — 86832 HLA CLASS I HIGH DEFIN QUAL: CPT | Performed by: INTERNAL MEDICINE

## 2020-07-20 PROCEDURE — 83735 ASSAY OF MAGNESIUM: CPT | Performed by: INTERNAL MEDICINE

## 2020-07-20 PROCEDURE — 80197 ASSAY OF TACROLIMUS: CPT | Performed by: INTERNAL MEDICINE

## 2020-07-20 PROCEDURE — 86828 HLA CLASS I&II ANTIBODY QUAL: CPT | Mod: XU | Performed by: INTERNAL MEDICINE

## 2020-07-20 RX ORDER — PREDNISONE 5 MG/1
TABLET ORAL
Qty: 45 TABLET | Refills: 0 | Status: SHIPPED | OUTPATIENT
Start: 2020-07-20 | End: 2020-08-07

## 2020-07-20 RX ORDER — POTASSIUM CHLORIDE 1500 MG/1
20 TABLET, EXTENDED RELEASE ORAL DAILY
Qty: 7 TABLET | Refills: 0 | Status: SHIPPED | OUTPATIENT
Start: 2020-07-20 | End: 2020-07-24

## 2020-07-20 RX ORDER — CARVEDILOL 6.25 MG/1
6.25 TABLET ORAL 2 TIMES DAILY
Qty: 60 TABLET | Refills: 3 | Status: SHIPPED | OUTPATIENT
Start: 2020-07-20 | End: 2020-12-21

## 2020-07-20 ASSESSMENT — PAIN SCALES - GENERAL: PAINLEVEL: SEVERE PAIN (7)

## 2020-07-20 NOTE — LETTER
7/20/2020       RE: Jelly Dietz  [address]  [facility] [address]     Dear Colleague,    Thank you for referring your patient, Jelly Dietz, to the [facility] at [facility]. Please see a copy of my visit note below.    ACUTE TRANSPLANT NEPHROLOGY VISIT    Assessment & Plan    # Severe Acute rejection: Mixed   # DDKT: creatinine is somewhat better, last dialysis 7/17/20   - Baseline Cr ~ initially less than 1 mg/dL, TBD   - Proteinuria: Not checked recently   - Date DSA Last Checked: 7/2020      Latest DSA: No   - BK Viremia: No   - Kidney Tx Biopsy: Jul 04, 2020; Result: severe [place] IIA with AbMR features     # Immunosuppression: Tacrolimus immediate release (goal 8-10), Mycophenolic acid (dose 720 mg every 12 hours) and Prednisone (dose taper)   - Changes: prednisone 15 mg through 7/27 then 10 mg through 8/3 then 5 mg starting 8/10     # Infection Prophylaxis:   - PJP: Sulfa/TMP (Bactrim)  - CMV: Valcyte    # Hypertension: Controlled;  Goal BP: < 140/90   - Volume status: Mildly hypervolemic  EDW ~ 128Lb   - Changes: Yes - increased lasix     # Anemia in Chronic Renal Disease: Hgb: Stable      ENZO: Yes   - Iron studies: Replete    # Mineral Bone Disorder:   - Calcium; level: Normal        On supplement: No  - Phosphorus; level: Low        On supplement: started supplement     # Electrolytes:   - Potassium; level: Low        On supplement: started supplement   - Magnesium; level: low        On supplement: started supplement     # Medical Compliance: Yes    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 Donor Class:   Crossmatch at time of Tx: negative  DSA at time of Tx: No  Significant changes in immunosuppression: steroid maintenance due to early mixed rejection   CMV IgG Ab High Risk Discordance (D+/R-): No  EBV IgG Ab High Risk  Discordance (D+/R-): No  Significant transplant-related complications: early acute rejection possible Non-HLA with an element of memory response     Transplant Office Phone Number: [phone]    Assessment and plan was discussed with the patient and she voiced her understanding and agreement.    Return visit: Return in about 4 days (around 7/24/2020).    Mahesh Armando MD    Chief Complaint   Ms. Dietz is a 35 year old here for immunosuppression management and follow-up after hospitalization.     History of Present Illness     Evens is a 35-year-old lady with end-stage kidney disease status post second kidney transplant.  Her post transplant course was complicated by severe mixed early acute rejection around 2 weeks from her engraftment.  She did not have donor specific antibodies that we were able to detect.  Her blood was sent to an outside lab for AT-1R assessment but we may not have results due to the lack of test kits.  She was empirically started on losartan 12.5 mg after she had completed 5 session course of plasmapheresis IVIG.  She also received a single dose rituximab 500 mg x 1.  Additionally a course of thymoglobulin with last dose received prior to discharge.  Her weight is down by 7 pounds from her discharge weight.  She continues to produce very large amount of urine on her current diuretics.  Yet, she feels bloated and carrying a lot of weight around her stomach and thighs.  She does have any leg swelling she does not have shortness of breath no chest pains.  She is going to the bathroom a lot at night and that is bothering her.  Unfortunately she was not able to get her blood drawn this morning due to scheduling issues.  She was sent down and was hoping to get her labs today prior to deciding on the need for dialysis.  She is eating well and drinking plenty of fluids.  She had no fevers chills nausea vomiting.  Home BP: controlled     Review of Systems   A comprehensive review of systems was  obtained and negative, except as noted in the HPI or PMH.    Problem List   Patient Active Problem List   Diagnosis     CARDIOVASCULAR SCREENING; LDL GOAL LESS THAN 160     Kidney replaced by transplant     HTN, kidney transplant related     ACS (acute coronary syndrome) (H)     Anemia in chronic renal disease     IgA nephropathy     Anxiety     Kidney transplanted     Immunosuppression (H)     Painful bladder spasm     Hypophosphatemia     Constipation due to pain medication     Aftercare following organ transplant     Hypomagnesemia     Dehydration     Acute renal failure (ARF) (H)     Acute rejection of kidney transplant     Metabolic acidosis     Steroid-induced hyperglycemia     Nausea vomiting and diarrhea     Hypervolemia       Social History   Social History     Tobacco Use     Smoking status: Never Smoker     Smokeless tobacco: Never Used   Substance Use Topics     Alcohol use: No     Drug use: No       Allergies   No Known Allergies    Medications   Current Outpatient Medications   Medication Sig     acetaminophen (TYLENOL) 325 MG tablet Take 2 tablets (650 mg) by mouth every 4 hours as needed for other (multimodal surgical pain management along with NSAIDS and opioid medication as indicated based on pain control and physical function.)     amLODIPine (NORVASC) 5 MG tablet Take 1 tablet (5 mg) by mouth daily     aspirin (ASA) 81 MG chewable tablet Take 1 tablet (81 mg) by mouth daily     atorvastatin (LIPITOR) 10 MG tablet Take 1 tablet (10 mg) by mouth daily     carvedilol (COREG) 6.25 MG tablet Take 2 tablets (12.5 mg) by mouth 2 times daily     cetirizine (ZYRTEC) 10 MG tablet Take 10 mg by mouth nightly as needed for allergies      epoetin staci-epbx (RETACRIT) 60757 UNIT/ML injection Inject 0.2 mLs (2,000 Units) Subcutaneous three times a week     furosemide (LASIX) 80 MG tablet Take 1 tablet (80 mg) by mouth 2 times daily     hydrOXYzine (ATARAX) 25 MG tablet Take 1 tablet (25 mg) by mouth 3 times  daily as needed for anxiety     Lidocaine (LIDOCARE) 4 % Patch Place 3 patches onto the skin every 24 hours To prevent lidocaine toxicity, patient should be patch free for 12 hrs daily.     losartan (COZAAR) 25 MG tablet Take 0.5 tablets (12.5 mg) by mouth At Bedtime     menthol (ICY HOT) 5 % PTCH Apply 1 patch topically every 8 hours as needed for muscle soreness     mycophenolic acid (GENERIC EQUIVALENT) 180 MG EC tablet Take 4 tablets (720 mg) by mouth 2 times daily     ondansetron (ZOFRAN-ODT) 4 MG ODT tab Take 1 tablet (4 mg) by mouth 3 times daily (before meals)     pantoprazole (PROTONIX) 40 MG EC tablet Take 1 tablet (40 mg) by mouth 2 times daily     polyethylene glycol (MIRALAX) 17 g packet Take 17 g by mouth daily     predniSONE (DELTASONE) 5 MG tablet Take 1 tablet (5 mg) by mouth daily     senna-docusate (SENOKOT-S/PERICOLACE) 8.6-50 MG tablet Take 1-2 tablets by mouth 2 times daily     sodium bicarbonate 650 MG tablet Take 2 tablets (1,300 mg) by mouth 2 times daily     sulfamethoxazole-trimethoprim (BACTRIM) 400-80 MG tablet Take 1 tablet by mouth three times a week     tacrolimus (GENERIC EQUIVALENT) 0.5 MG capsule Take 11 capsules (5.5 mg) by mouth 2 times daily     traMADol (ULTRAM) 50 MG tablet Take 1 tablet (50 mg) by mouth every 6 hours as needed for moderate pain     valGANciclovir (VALCYTE) 450 MG tablet Take 1 tablet (450 mg) by mouth twice a week     Vitamin D3 (CHOLECALCIFEROL) 25 mcg (1000 units) tablet Take 2 tablets (50 mcg) by mouth daily     No current facility-administered medications for this visit.      There are no discontinued medications.    Physical Exam   Vital Signs: /88   Pulse 80   Temp 98.3  F (36.8  C)   Wt 67.4 kg (148 lb 9.6 oz)   SpO2 100%   BMI 25.37 kg/m      GENERAL APPEARANCE: alert and no distress  HENT: mouth without ulcers or lesions  LYMPHATICS: no cervical or supraclavicular nodes  RESP: lungs clear to auscultation - no rales, rhonchi or wheezes  CV:  regular rhythm, normal rate, no rub, no murmur  EDEMA: trace LE edema bilaterally  ABDOMEN: soft, nondistended, nontender, bowel sounds normal  MS: extremities normal - no gross deformities noted, no evidence of inflammation in joints, no muscle tenderness  SKIN: no rash    Data     Renal Latest Ref Rng & Units 7/18/2020 7/17/2020 7/16/2020   Na 133 - 144 mmol/L 136 136 131(L)   K 3.4 - 5.3 mmol/L 3.4 3.8 3.5   Cl 94 - 109 mmol/L 104 102 100   CO2 20 - 32 mmol/L 24 26 21   BUN 7 - 30 mg/dL 84(H) 75(H) 119(H)   Cr 0.52 - 1.04 mg/dL 3.16(H) 3.01(H) 4.65(H)   Glucose 70 - 99 mg/dL 123(H) 254(H) 191(H)   Ca  8.5 - 10.1 mg/dL 8.6 8.5 8.3(L)   Mg 1.6 - 2.3 mg/dL 1.6 1.8 2.3     Bone Health Latest Ref Rng & Units 7/18/2020 7/17/2020 7/16/2020   Phos 2.5 - 4.5 mg/dL 1.9(L) 2.1(L) 2.1(L)   PTHi 18 - 80 pg/mL - - -   Vit D Def 20 - 75 ug/L - - -     Heme Latest Ref Rng & Units 7/18/2020 7/17/2020 7/16/2020   WBC 4.0 - 11.0 10e9/L 8.8 8.6 9.6   Hgb 11.7 - 15.7 g/dL 7.5(L) 7.4(L) 7.3(L)   Plt 150 - 450 10e9/L 197 223 217   ABSOLUTE NEUTROPHIL 1.6 - 8.3 10e9/L 7.8 8.1 9.1(H)   ABSOLUTE LYMPHOCYTES 0.8 - 5.3 10e9/L 0.1(L) 0.0(L) 0.1(L)   ABSOLUTE MONOCYTES 0.0 - 1.3 10e9/L 0.5 0.1 0.2   ABSOLUTE EOSINOPHILS 0.0 - 0.7 10e9/L 0.0 0.0 0.0   ABSOLUTE BASOPHILS 0.0 - 0.2 10e9/L 0.0 0.0 0.0   ABS IMMATURE GRANULOCYTES 0 - 0.4 10e9/L 0.4 - -   ABSOLUTE NUCLEATED RBC - 0.0 - -     Liver Latest Ref Rng & Units 7/13/2020 7/12/2020 7/10/2020   AP 40 - 150 U/L 40 35(L) 47   TBili 0.2 - 1.3 mg/dL 1.1 0.3 1.0   DBili 0.0 - 0.2 mg/dL - 0.1 0.2   ALT 0 - 50 U/L 29 25 24   AST 0 - 45 U/L 18 20 26   Tot Protein 6.8 - 8.8 g/dL 5.4(L) 5.7(L) 5.7(L)   Albumin 3.4 - 5.0 g/dL 2.9(L) 3.1(L) 3.0(L)     Pancreas Latest Ref Rng & Units 6/19/2020 5/30/2015 10/6/2014   A1C 0 - 5.6 % 5.1 - -   Lipase 73 - 393 U/L - 126 100     Iron studies Latest Ref Rng & Units 7/5/2020 6/25/2020 11/30/2012   Iron 35 - 180 ug/dL 10(L) 57 125   Iron sat 15 - 46 % 9(L) 35  81(H)   Ferritin 12 - 150 ng/mL - 886(H) 401(H)     [facility] Txp Virology Latest Ref Rng & Units 7/9/2020 7/8/2020 6/19/2020   CVM DNA Quant - - - -   CMV Quant <100 Copies/mL - - -   CMV QT Log <2.0 Log copies/mL - - -   BK Spec - - - -   BK Res BKNEG copies/mL - - -   BK Log <2.7 Log copies/mL - - -   EBV VCA IGG ANTIBODY U/mL - - -   EBV CAPSID ANTIBODY IGG 0.0 - 0.8 AI - - >8.0(H)   EBV DNA COPIES/ML <1000 Copies/mL - - -   EBV DNA LOG OF COPIES <3.0 Log copies/mL - - -   Hep B Core NR:Nonreactive Reactive(AA) Reactive(AA) -   Hep B Surf - - - -   HIV 1&2 NEG - - -        Recent Labs   Lab Test 07/13/20  0619 07/15/20  0611 07/17/20  0602   DOSTAC Not Provided Not Provided Not Provided   TACROL 7.9 10.3 9.8     Recent Labs   Lab Test 06/26/20  0842   DOSMPA 8:00 pm 6/25/2020   MPACID 2.00   MPAG 32.2       Again, thank you for allowing me to participate in the care of your patient.      Sincerely,    [facility] Transplant

## 2020-07-20 NOTE — PROGRESS NOTES
ACUTE TRANSPLANT NEPHROLOGY VISIT    Assessment & Plan    # Severe Acute rejection: Mixed   # DDKT: creatinine is somewhat better, last dialysis 7/17/20   - Baseline Cr ~ initially less than 1 mg/dL, TBD   - Proteinuria: Not checked recently   - Date DSA Last Checked: 7/2020      Latest DSA: No   - BK Viremia: No   - Kidney Tx Biopsy: Jul 04, 2020; Result: severe [place] IIA with AbMR features     # Immunosuppression: Tacrolimus immediate release (goal 8-10), Mycophenolic acid (dose 720 mg every 12 hours) and Prednisone (dose taper)   - Changes: prednisone 15 mg through 7/27 then 10 mg through 8/3 then 5 mg starting 8/10     # Infection Prophylaxis:   - PJP: Sulfa/TMP (Bactrim)  - CMV: Valcyte    # Hypertension: Controlled;  Goal BP: < 140/90   - Volume status: Mildly hypervolemic  EDW ~ 128Lb   - Changes: Yes - increased lasix     # Anemia in Chronic Renal Disease: Hgb: Stable      ENZO: Yes   - Iron studies: Replete    # Mineral Bone Disorder:   - Calcium; level: Normal        On supplement: No  - Phosphorus; level: Low        On supplement: started supplement     # Electrolytes:   - Potassium; level: Low        On supplement: started supplement   - Magnesium; level: low        On supplement: started supplement     # Medical Compliance: Yes    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 Donor Class:   Crossmatch at time of Tx: negative  DSA at time of Tx: No  Significant changes in immunosuppression: steroid maintenance due to early mixed rejection   CMV IgG Ab High Risk Discordance (D+/R-): No  EBV IgG Ab High Risk Discordance (D+/R-): No  Significant transplant-related complications: early acute rejection possible Non-HLA with an element of memory response     Transplant Office Phone Number: [phone]    Assessment and plan was discussed with the patient and she voiced her understanding  and agreement.    Return visit: Return in about 4 days (around 7/24/2020).    Mahesh Armando MD    Chief Complaint   Ms. Dietz is a 35 year old here for immunosuppression management and follow-up after hospitalization.     History of Present Illness     Evens is a 35-year-old lady with end-stage kidney disease status post second kidney transplant.  Her post transplant course was complicated by severe mixed early acute rejection around 2 weeks from her engraftment.  She did not have donor specific antibodies that we were able to detect.  Her blood was sent to an outside lab for AT-1R assessment but we may not have results due to the lack of test kits.  She was empirically started on losartan 12.5 mg after she had completed 5 session course of plasmapheresis IVIG.  She also received a single dose rituximab 500 mg x 1.  Additionally a course of thymoglobulin with last dose received prior to discharge.  Her weight is down by 7 pounds from her discharge weight.  She continues to produce very large amount of urine on her current diuretics.  Yet, she feels bloated and carrying a lot of weight around her stomach and thighs.  She does have any leg swelling she does not have shortness of breath no chest pains.  She is going to the bathroom a lot at night and that is bothering her.  Unfortunately she was not able to get her blood drawn this morning due to scheduling issues.  She was sent down and was hoping to get her labs today prior to deciding on the need for dialysis.  She is eating well and drinking plenty of fluids.  She had no fevers chills nausea vomiting.  Home BP: controlled     Review of Systems   A comprehensive review of systems was obtained and negative, except as noted in the HPI or PMH.    Problem List   Patient Active Problem List   Diagnosis     CARDIOVASCULAR SCREENING; LDL GOAL LESS THAN 160     Kidney replaced by transplant     HTN, kidney transplant related     ACS (acute coronary syndrome) (H)      Anemia in chronic renal disease     IgA nephropathy     Anxiety     Kidney transplanted     Immunosuppression (H)     Painful bladder spasm     Hypophosphatemia     Constipation due to pain medication     Aftercare following organ transplant     Hypomagnesemia     Dehydration     Acute renal failure (ARF) (H)     Acute rejection of kidney transplant     Metabolic acidosis     Steroid-induced hyperglycemia     Nausea vomiting and diarrhea     Hypervolemia       Social History   Social History     Tobacco Use     Smoking status: Never Smoker     Smokeless tobacco: Never Used   Substance Use Topics     Alcohol use: No     Drug use: No       Allergies   No Known Allergies    Medications   Current Outpatient Medications   Medication Sig     acetaminophen (TYLENOL) 325 MG tablet Take 2 tablets (650 mg) by mouth every 4 hours as needed for other (multimodal surgical pain management along with NSAIDS and opioid medication as indicated based on pain control and physical function.)     amLODIPine (NORVASC) 5 MG tablet Take 1 tablet (5 mg) by mouth daily     aspirin (ASA) 81 MG chewable tablet Take 1 tablet (81 mg) by mouth daily     atorvastatin (LIPITOR) 10 MG tablet Take 1 tablet (10 mg) by mouth daily     carvedilol (COREG) 6.25 MG tablet Take 2 tablets (12.5 mg) by mouth 2 times daily     cetirizine (ZYRTEC) 10 MG tablet Take 10 mg by mouth nightly as needed for allergies      epoetin staci-epbx (RETACRIT) 98700 UNIT/ML injection Inject 0.2 mLs (2,000 Units) Subcutaneous three times a week     furosemide (LASIX) 80 MG tablet Take 1 tablet (80 mg) by mouth 2 times daily     hydrOXYzine (ATARAX) 25 MG tablet Take 1 tablet (25 mg) by mouth 3 times daily as needed for anxiety     Lidocaine (LIDOCARE) 4 % Patch Place 3 patches onto the skin every 24 hours To prevent lidocaine toxicity, patient should be patch free for 12 hrs daily.     losartan (COZAAR) 25 MG tablet Take 0.5 tablets (12.5 mg) by mouth At Bedtime     menthol  (ICY HOT) 5 % PTCH Apply 1 patch topically every 8 hours as needed for muscle soreness     mycophenolic acid (GENERIC EQUIVALENT) 180 MG EC tablet Take 4 tablets (720 mg) by mouth 2 times daily     ondansetron (ZOFRAN-ODT) 4 MG ODT tab Take 1 tablet (4 mg) by mouth 3 times daily (before meals)     pantoprazole (PROTONIX) 40 MG EC tablet Take 1 tablet (40 mg) by mouth 2 times daily     polyethylene glycol (MIRALAX) 17 g packet Take 17 g by mouth daily     predniSONE (DELTASONE) 5 MG tablet Take 1 tablet (5 mg) by mouth daily     senna-docusate (SENOKOT-S/PERICOLACE) 8.6-50 MG tablet Take 1-2 tablets by mouth 2 times daily     sodium bicarbonate 650 MG tablet Take 2 tablets (1,300 mg) by mouth 2 times daily     sulfamethoxazole-trimethoprim (BACTRIM) 400-80 MG tablet Take 1 tablet by mouth three times a week     tacrolimus (GENERIC EQUIVALENT) 0.5 MG capsule Take 11 capsules (5.5 mg) by mouth 2 times daily     traMADol (ULTRAM) 50 MG tablet Take 1 tablet (50 mg) by mouth every 6 hours as needed for moderate pain     valGANciclovir (VALCYTE) 450 MG tablet Take 1 tablet (450 mg) by mouth twice a week     Vitamin D3 (CHOLECALCIFEROL) 25 mcg (1000 units) tablet Take 2 tablets (50 mcg) by mouth daily     No current facility-administered medications for this visit.      There are no discontinued medications.    Physical Exam   Vital Signs: /88   Pulse 80   Temp 98.3  F (36.8  C)   Wt 67.4 kg (148 lb 9.6 oz)   SpO2 100%   BMI 25.37 kg/m      GENERAL APPEARANCE: alert and no distress  HENT: mouth without ulcers or lesions  LYMPHATICS: no cervical or supraclavicular nodes  RESP: lungs clear to auscultation - no rales, rhonchi or wheezes  CV: regular rhythm, normal rate, no rub, no murmur  EDEMA: trace LE edema bilaterally  ABDOMEN: soft, nondistended, nontender, bowel sounds normal  MS: extremities normal - no gross deformities noted, no evidence of inflammation in joints, no muscle tenderness  SKIN: no rash    Data      Renal Latest Ref Rng & Units 7/18/2020 7/17/2020 7/16/2020   Na 133 - 144 mmol/L 136 136 131(L)   K 3.4 - 5.3 mmol/L 3.4 3.8 3.5   Cl 94 - 109 mmol/L 104 102 100   CO2 20 - 32 mmol/L 24 26 21   BUN 7 - 30 mg/dL 84(H) 75(H) 119(H)   Cr 0.52 - 1.04 mg/dL 3.16(H) 3.01(H) 4.65(H)   Glucose 70 - 99 mg/dL 123(H) 254(H) 191(H)   Ca  8.5 - 10.1 mg/dL 8.6 8.5 8.3(L)   Mg 1.6 - 2.3 mg/dL 1.6 1.8 2.3     Bone Health Latest Ref Rng & Units 7/18/2020 7/17/2020 7/16/2020   Phos 2.5 - 4.5 mg/dL 1.9(L) 2.1(L) 2.1(L)   PTHi 18 - 80 pg/mL - - -   Vit D Def 20 - 75 ug/L - - -     Heme Latest Ref Rng & Units 7/18/2020 7/17/2020 7/16/2020   WBC 4.0 - 11.0 10e9/L 8.8 8.6 9.6   Hgb 11.7 - 15.7 g/dL 7.5(L) 7.4(L) 7.3(L)   Plt 150 - 450 10e9/L 197 223 217   ABSOLUTE NEUTROPHIL 1.6 - 8.3 10e9/L 7.8 8.1 9.1(H)   ABSOLUTE LYMPHOCYTES 0.8 - 5.3 10e9/L 0.1(L) 0.0(L) 0.1(L)   ABSOLUTE MONOCYTES 0.0 - 1.3 10e9/L 0.5 0.1 0.2   ABSOLUTE EOSINOPHILS 0.0 - 0.7 10e9/L 0.0 0.0 0.0   ABSOLUTE BASOPHILS 0.0 - 0.2 10e9/L 0.0 0.0 0.0   ABS IMMATURE GRANULOCYTES 0 - 0.4 10e9/L 0.4 - -   ABSOLUTE NUCLEATED RBC - 0.0 - -     Liver Latest Ref Rng & Units 7/13/2020 7/12/2020 7/10/2020   AP 40 - 150 U/L 40 35(L) 47   TBili 0.2 - 1.3 mg/dL 1.1 0.3 1.0   DBili 0.0 - 0.2 mg/dL - 0.1 0.2   ALT 0 - 50 U/L 29 25 24   AST 0 - 45 U/L 18 20 26   Tot Protein 6.8 - 8.8 g/dL 5.4(L) 5.7(L) 5.7(L)   Albumin 3.4 - 5.0 g/dL 2.9(L) 3.1(L) 3.0(L)     Pancreas Latest Ref Rng & Units 6/19/2020 5/30/2015 10/6/2014   A1C 0 - 5.6 % 5.1 - -   Lipase 73 - 393 U/L - 126 100     Iron studies Latest Ref Rng & Units 7/5/2020 6/25/2020 11/30/2012   Iron 35 - 180 ug/dL 10(L) 57 125   Iron sat 15 - 46 % 9(L) 35 81(H)   Ferritin 12 - 150 ng/mL - 886(H) 401(H)     [facility] Txp Virology Latest Ref Rng & Units 7/9/2020 7/8/2020 6/19/2020   CVM DNA Quant - - - -   CMV Quant <100 Copies/mL - - -   CMV QT Log <2.0 Log copies/mL - - -   BK Spec - - - -   BK Res BKNEG copies/mL - - -   BK Log <2.7 Log  copies/mL - - -   EBV VCA IGG ANTIBODY U/mL - - -   EBV CAPSID ANTIBODY IGG 0.0 - 0.8 AI - - >8.0(H)   EBV DNA COPIES/ML <1000 Copies/mL - - -   EBV DNA LOG OF COPIES <3.0 Log copies/mL - - -   Hep B Core NR:Nonreactive Reactive(AA) Reactive(AA) -   Hep B Surf - - - -   HIV 1&2 NEG - - -        Recent Labs   Lab Test 07/13/20  0619 07/15/20  0611 07/17/20  0602   DOSTAC Not Provided Not Provided Not Provided   TACROL 7.9 10.3 9.8     Recent Labs   Lab Test 06/26/20  0842   DOSMPA 8:00 pm 6/25/2020   MPACID 2.00   MPAG 32.2

## 2020-07-20 NOTE — NURSING NOTE
"Chief Complaint   Patient presents with     RECHECK     Follow up Kidney TX     Vital signs:  Temp: 98.3  F (36.8  C)   BP: 126/88 Pulse: 80     SpO2: 100 %       Weight: 67.4 kg (148 lb 9.6 oz)  Estimated body mass index is 25.37 kg/m  as calculated from the following:    Height as of 7/9/20: 1.63 m (5' 4.17\").    Weight as of this encounter: 67.4 kg (148 lb 9.6 oz).        Marni Avilez, CMA    "

## 2020-07-20 NOTE — TELEPHONE ENCOUNTER
Provider Call: Home Care  Route to [facility]  Facility Name: [facility]  Reason for Call: patient was discharged and will be getting home care. She needs the following orders for home care-  Resumption of care orders starting today.   Labs 1x week this week  2x week x5 weeks  3 PRN lab draws  Callback needed? Yes    Return Call Needed  Same as documented in contacts section

## 2020-07-20 NOTE — PLAN OF CARE
Physical Therapy Discharge Summary    Reason for therapy discharge:    Discharged to home.    Progress towards therapy goal(s). See goals on Care Plan in [facility] electronic health record for goal details.  Goals partially met.  Barriers to achieving goals:   discharge from facility.    Therapy recommendation(s):    No further therapy is recommended.

## 2020-07-21 ENCOUNTER — TELEPHONE (OUTPATIENT)
Dept: TRANSPLANT | Facility: CLINIC | Age: 35
End: 2020-07-21

## 2020-07-21 ENCOUNTER — INFUSION THERAPY VISIT (OUTPATIENT)
Dept: INFUSION THERAPY | Facility: CLINIC | Age: 35
End: 2020-07-21
Attending: INTERNAL MEDICINE
Payer: MEDICARE

## 2020-07-21 VITALS
WEIGHT: 147.6 LBS | HEART RATE: 88 BPM | RESPIRATION RATE: 16 BRPM | TEMPERATURE: 99 F | OXYGEN SATURATION: 96 % | DIASTOLIC BLOOD PRESSURE: 85 MMHG | SYSTOLIC BLOOD PRESSURE: 134 MMHG | BODY MASS INDEX: 25.2 KG/M2

## 2020-07-21 DIAGNOSIS — Z79.899 LONG TERM USE OF DRUG: ICD-10-CM

## 2020-07-21 DIAGNOSIS — Z94.0 KIDNEY REPLACED BY TRANSPLANT: ICD-10-CM

## 2020-07-21 DIAGNOSIS — Z76.82 AWAITING ORGAN TRANSPLANT: ICD-10-CM

## 2020-07-21 DIAGNOSIS — Z94.0 KIDNEY TRANSPLANTED: ICD-10-CM

## 2020-07-21 DIAGNOSIS — D64.9 ANEMIA, UNSPECIFIED TYPE: Primary | ICD-10-CM

## 2020-07-21 DIAGNOSIS — Z94.0 KIDNEY REPLACED BY TRANSPLANT: Primary | ICD-10-CM

## 2020-07-21 DIAGNOSIS — D64.9 ANEMIA, UNSPECIFIED TYPE: ICD-10-CM

## 2020-07-21 DIAGNOSIS — N17.9 ACUTE RENAL FAILURE, UNSPECIFIED ACUTE RENAL FAILURE TYPE (H): ICD-10-CM

## 2020-07-21 LAB
ABO + RH BLD: NORMAL
ABO + RH BLD: NORMAL
ANION GAP SERPL CALCULATED.3IONS-SCNC: 6 MMOL/L (ref 3–14)
BACTERIA SPEC CULT: NO GROWTH
BLD GP AB SCN SERPL QL: NORMAL
BLD PROD TYP BPU: NORMAL
BLD UNIT ID BPU: 0
BLD UNIT ID BPU: 0
BLOOD BANK CMNT PATIENT-IMP: NORMAL
BLOOD PRODUCT CODE: NORMAL
BLOOD PRODUCT CODE: NORMAL
BPU ID: NORMAL
BPU ID: NORMAL
BUN SERPL-MCNC: 57 MG/DL (ref 7–30)
CALCIUM SERPL-MCNC: 7.8 MG/DL (ref 8.5–10.1)
CHLORIDE SERPL-SCNC: 102 MMOL/L (ref 94–109)
CO2 SERPL-SCNC: 30 MMOL/L (ref 20–32)
CREAT SERPL-MCNC: 1.72 MG/DL (ref 0.52–1.04)
ERYTHROCYTE [DISTWIDTH] IN BLOOD BY AUTOMATED COUNT: 15.8 % (ref 10–15)
GFR SERPL CREATININE-BSD FRML MDRD: 38 ML/MIN/{1.73_M2}
GLUCOSE SERPL-MCNC: 80 MG/DL (ref 70–99)
HCT VFR BLD AUTO: 20.9 % (ref 35–47)
HGB BLD-MCNC: 6.6 G/DL (ref 11.7–15.7)
Lab: NORMAL
MCH RBC QN AUTO: 30.4 PG (ref 26.5–33)
MCHC RBC AUTO-ENTMCNC: 31.6 G/DL (ref 31.5–36.5)
MCV RBC AUTO: 96 FL (ref 78–100)
NUM BPU REQUESTED: 2
PLATELET # BLD AUTO: 201 10E9/L (ref 150–450)
POTASSIUM SERPL-SCNC: 3 MMOL/L (ref 3.4–5.3)
RBC # BLD AUTO: 2.17 10E12/L (ref 3.8–5.2)
SODIUM SERPL-SCNC: 138 MMOL/L (ref 133–144)
SPECIMEN EXP DATE BLD: NORMAL
SPECIMEN SOURCE: NORMAL
TACROLIMUS BLD-MCNC: 9.3 UG/L (ref 5–15)
TME LAST DOSE: NORMAL H
TRANSFUSION STATUS PATIENT QL: NORMAL
WBC # BLD AUTO: 7.1 10E9/L (ref 4–11)

## 2020-07-21 PROCEDURE — 36430 TRANSFUSION BLD/BLD COMPNT: CPT

## 2020-07-21 PROCEDURE — 86900 BLOOD TYPING SEROLOGIC ABO: CPT

## 2020-07-21 PROCEDURE — 36415 COLL VENOUS BLD VENIPUNCTURE: CPT | Performed by: STUDENT IN AN ORGANIZED HEALTH CARE EDUCATION/TRAINING PROGRAM

## 2020-07-21 PROCEDURE — 85027 COMPLETE CBC AUTOMATED: CPT | Performed by: STUDENT IN AN ORGANIZED HEALTH CARE EDUCATION/TRAINING PROGRAM

## 2020-07-21 PROCEDURE — 86850 RBC ANTIBODY SCREEN: CPT

## 2020-07-21 PROCEDURE — 80048 BASIC METABOLIC PNL TOTAL CA: CPT | Performed by: STUDENT IN AN ORGANIZED HEALTH CARE EDUCATION/TRAINING PROGRAM

## 2020-07-21 PROCEDURE — 80197 ASSAY OF TACROLIMUS: CPT | Performed by: STUDENT IN AN ORGANIZED HEALTH CARE EDUCATION/TRAINING PROGRAM

## 2020-07-21 PROCEDURE — 86923 COMPATIBILITY TEST ELECTRIC: CPT

## 2020-07-21 PROCEDURE — P9016 RBC LEUKOCYTES REDUCED: HCPCS

## 2020-07-21 PROCEDURE — 86901 BLOOD TYPING SEROLOGIC RH(D): CPT

## 2020-07-21 RX ORDER — HEPARIN SODIUM,PORCINE 10 UNIT/ML
5 VIAL (ML) INTRAVENOUS
Status: CANCELLED | OUTPATIENT
Start: 2020-07-21

## 2020-07-21 RX ORDER — TACROLIMUS 0.5 MG/1
5 CAPSULE ORAL 2 TIMES DAILY
Qty: 300 CAPSULE | Refills: 3 | Status: SHIPPED | OUTPATIENT
Start: 2020-07-21 | End: 2020-08-03

## 2020-07-21 RX ORDER — HEPARIN SODIUM (PORCINE) LOCK FLUSH IV SOLN 100 UNIT/ML 100 UNIT/ML
5 SOLUTION INTRAVENOUS
Status: CANCELLED | OUTPATIENT
Start: 2020-07-21

## 2020-07-21 NOTE — TELEPHONE ENCOUNTER
Issue 1  HGB 6.6    Called Dr Armando ordering 2 units PRBC   Sent request  To [facility] infusion in clinic   Appointment set up 1300 for transfusion     Attempted to call Jelly morgan - it appears she is in clinic   Dr Armando   Needs 40 meq [facility] for low k today   New dose of tacrolimus  5 mg twice per day

## 2020-07-21 NOTE — LETTER
2020         RE: Jelly Dietz  [address]  [facility] [address]        Dear Colleague,    Thank you for referring your patient, Jelly Dietz, to the [facility] SPECIALTY AND PROCEDURE. Please see a copy of my visit note below.    Dr. Armando ok and patient in agreement to do 1 unit of PRBC today and 1 unit of PRBC tomorrow.     Tonja Caldwell, PERI      Blood Product Transfusion Nursing Note:    Jelly Dietz presents today to [facility] for a RBC transfusion.  During today's [facility] appointment orders from Dr. Armando were completed.    Patient will come to [facility] for 1 more unit RBC tomorrow morning.    Progress note:  ID verified by name and .  Assessment completed.  Vitals were stable throughout time in [facility].    verbal and printed education given to patient/representative regarding transfusion and possible side effects.  Patient/representative verbalized understanding.     present during visit today: Not Applicable.    All pertinent labs reviewed prior to infusion: YES    Date of consent or authorization: 20    Patient tolerated the procedure well    Transfusion given over approximately  1.5 hours     Discharge Plan:    Discharge instructions were reviewed with patient Yes  Patient/representative verbalized understanding of discharge instructions and all questions answered Yes.        Janice Fajardo RN    BP (!) 146/92   Pulse 86   Temp 98.6  F (37  C) (Oral)   Resp 16   Wt 67 kg (147 lb 9.6 oz)   SpO2 98%   BMI 25.20 kg/m          Again, thank you for allowing me to participate in the care of your patient.        Sincerely,        [facility]

## 2020-07-21 NOTE — PROGRESS NOTES
Blood Product Transfusion Nursing Note:    Jelly Dietz presents today to [facility] for a RBC transfusion.  During today's [facility] appointment orders from Dr. Armando were completed.    Patient will come to [facility] for 1 more unit RBC tomorrow morning.    Progress note:  ID verified by name and .  Assessment completed.  Vitals were stable throughout time in [facility].    verbal and printed education given to patient/representative regarding transfusion and possible side effects.  Patient/representative verbalized understanding.     present during visit today: Not Applicable.    All pertinent labs reviewed prior to infusion: YES    Date of consent or authorization: 20    Patient tolerated the procedure well    Transfusion given over approximately  1.5 hours     Discharge Plan:    Discharge instructions were reviewed with patient Yes  Patient/representative verbalized understanding of discharge instructions and all questions answered Yes.        Janice Fajardo RN    BP (!) 146/92   Pulse 86   Temp 98.6  F (37  C) (Oral)   Resp 16   Wt 67 kg (147 lb 9.6 oz)   SpO2 98%   BMI 25.20 kg/m

## 2020-07-21 NOTE — TELEPHONE ENCOUNTER
Followed up with Dr Armando   Plan to restart tacrolimus  5 mg twice per day starting the am of July 21  -- discharged on July 19 acute rejection

## 2020-07-21 NOTE — TELEPHONE ENCOUNTER
DATE:  7/21/2020   TIME OF RECEIPT FROM LAB:  9:45 am  LAB TEST:  HGB  LAB VALUE:  6.6  RESULTS GIVEN WITH READ-BACK TO (PROVIDER):  Mulu Arguello  TIME LAB VALUE REPORTED TO PROVIDER:   9:51 am

## 2020-07-21 NOTE — PATIENT INSTRUCTIONS
Patient Education     After Your Blood Transfusion  Discharge Instructions  After you leave  After a blood transfusion (received red blood cells, platelets, plasma, cryo or granulocytes), you will need to watch for signs of a reaction for the next 48 hours.  Call your clinic or 911 (or go to the Emergency room) if you have any signs of a reaction:    Shaking or chills    Fever (temperature above 100.0 F)    Headache    Nausea    Hives    Itching    Swelling of the face or feeling flushed    Ongoing dry cough (nothing is coughed up)    Trouble breathing or wheezing  Some signs of a reaction won't show up for a few [id number].  These may include:    Fatigue    Dizziness    Pink or red urine  Your clinic is:   ________________________________________  ________________________________________  For informational purposes only. Not to replace the advice of your health care provider.   Copyright   2015 [employer]. All rights reserved. Check 164108 - Rev 07/16.  For informational purposes only. Not to replace the advice of your health care provider.  Copyright   2018 [employer]. All rights reserved.

## 2020-07-21 NOTE — TELEPHONE ENCOUNTER
Called [facility]  Message regarding her tacrolimus    Called confirmed home care has been set up

## 2020-07-21 NOTE — TELEPHONE ENCOUNTER
Lab called around 1700 to notify this coordinator that pt's tac level was 32.6. This writer tried to reach patient multiple times however phone went straight to voicemail. Pt called back around 2030 and states that she did not take her AM prograf dose until after her labs were drawn this morning in the clinic. Spoke w Dr. Carlos and called patient back to tell her not to take her evening dose of prograf tonight or tomorrow morning. She needs to call the [facility] lab at 0700 to find out what time she can come in for labs. This writer placed orders for Tac, CBC & BMP as pt's mag, phos and K+ were all quite low. She should not take her prograf again until she hears from her coordinator about her new prograf dose moving forward. Called patient back to discuss and her phone went to voicemail again. Left a thorough voice message regarding the above details and also sent a text message with the same information and the [facility] lab phone number to her cell phone. Will need follow up tomorrow afternoon regarding Prograf dose moving forward.

## 2020-07-21 NOTE — PROGRESS NOTES
Dr. Armando ok and patient in agreement to do 1 unit of PRBC today and 1 unit of PRBC tomorrow.     Tonja Caldwell RN

## 2020-07-22 ENCOUNTER — INFUSION THERAPY VISIT (OUTPATIENT)
Dept: INFUSION THERAPY | Facility: CLINIC | Age: 35
End: 2020-07-22
Attending: INTERNAL MEDICINE
Payer: MEDICARE

## 2020-07-22 ENCOUNTER — TELEPHONE (OUTPATIENT)
Dept: TRANSPLANT | Facility: CLINIC | Age: 35
End: 2020-07-22

## 2020-07-22 VITALS
OXYGEN SATURATION: 100 % | DIASTOLIC BLOOD PRESSURE: 105 MMHG | BODY MASS INDEX: 24.41 KG/M2 | SYSTOLIC BLOOD PRESSURE: 153 MMHG | TEMPERATURE: 98.6 F | WEIGHT: 143 LBS | RESPIRATION RATE: 18 BRPM

## 2020-07-22 DIAGNOSIS — D64.9 ANEMIA, UNSPECIFIED TYPE: Primary | ICD-10-CM

## 2020-07-22 DIAGNOSIS — Z94.0 KIDNEY REPLACED BY TRANSPLANT: ICD-10-CM

## 2020-07-22 DIAGNOSIS — E87.6 HYPOKALEMIA: Primary | ICD-10-CM

## 2020-07-22 DIAGNOSIS — N17.9 ACUTE RENAL FAILURE, UNSPECIFIED ACUTE RENAL FAILURE TYPE (H): ICD-10-CM

## 2020-07-22 DIAGNOSIS — E83.42 HYPOMAGNESEMIA: ICD-10-CM

## 2020-07-22 LAB
ANION GAP SERPL CALCULATED.3IONS-SCNC: 8 MMOL/L (ref 3–14)
BASOPHILS # BLD AUTO: 0 10E9/L (ref 0–0.2)
BASOPHILS NFR BLD AUTO: 0 %
BKV DNA # SPEC NAA+PROBE: NORMAL COPIES/ML
BKV DNA SPEC NAA+PROBE-LOG#: NORMAL LOG COPIES/ML
BLD PROD TYP BPU: NORMAL
BLD UNIT ID BPU: 0
BLOOD PRODUCT CODE: NORMAL
BPU ID: NORMAL
BUN SERPL-MCNC: 47 MG/DL (ref 7–30)
CALCIUM SERPL-MCNC: 7.4 MG/DL (ref 8.5–10.1)
CHLORIDE SERPL-SCNC: 106 MMOL/L (ref 94–109)
CO2 SERPL-SCNC: 26 MMOL/L (ref 20–32)
CREAT SERPL-MCNC: 1.59 MG/DL (ref 0.52–1.04)
DIFFERENTIAL METHOD BLD: ABNORMAL
DONOR IDENTIFICATION: NORMAL
DSA COMMENTS: NORMAL
DSA PRESENT: NORMAL
DSA TEST METHOD: NORMAL
EOSINOPHIL # BLD AUTO: 0 10E9/L (ref 0–0.7)
EOSINOPHIL NFR BLD AUTO: 0 %
ERYTHROCYTE [DISTWIDTH] IN BLOOD BY AUTOMATED COUNT: 16.2 % (ref 10–15)
GFR SERPL CREATININE-BSD FRML MDRD: 42 ML/MIN/{1.73_M2}
GLUCOSE SERPL-MCNC: 140 MG/DL (ref 70–99)
HCT VFR BLD AUTO: 23.8 % (ref 35–47)
HGB BLD-MCNC: 7.7 G/DL (ref 11.7–15.7)
IMM GRANULOCYTES # BLD: 0.1 10E9/L (ref 0–0.4)
IMM GRANULOCYTES NFR BLD: 2.1 %
INTERFERING SUBST TEST METHOD: NORMAL
INTERFERING SUBSTANCE COMMENT: NORMAL
INTERFERING SUBSTANCE RESULT: NORMAL
INTERFERING SUBSTANCE: NORMAL
LYMPHOCYTES # BLD AUTO: 0.2 10E9/L (ref 0.8–5.3)
LYMPHOCYTES NFR BLD AUTO: 3.2 %
MAGNESIUM SERPL-MCNC: 1.1 MG/DL (ref 1.6–2.3)
MCH RBC QN AUTO: 30.1 PG (ref 26.5–33)
MCHC RBC AUTO-ENTMCNC: 32.4 G/DL (ref 31.5–36.5)
MCV RBC AUTO: 93 FL (ref 78–100)
MONOCYTES # BLD AUTO: 0.3 10E9/L (ref 0–1.3)
MONOCYTES NFR BLD AUTO: 4.9 %
NEUTROPHILS # BLD AUTO: 5.5 10E9/L (ref 1.6–8.3)
NEUTROPHILS NFR BLD AUTO: 89.8 %
NRBC # BLD AUTO: 0 10*3/UL
NRBC BLD AUTO-RTO: 0 /100
ORGAN: NORMAL
PHOSPHATE SERPL-MCNC: 1.9 MG/DL (ref 2.5–4.5)
PLATELET # BLD AUTO: 175 10E9/L (ref 150–450)
POTASSIUM SERPL-SCNC: 2.8 MMOL/L (ref 3.4–5.3)
RBC # BLD AUTO: 2.56 10E12/L (ref 3.8–5.2)
SA1 CELL: NORMAL
SA1 COMMENTS: NORMAL
SA1 HI RISK ABY: NORMAL
SA1 MOD RISK ABY: NORMAL
SA1 TEST METHOD: NORMAL
SA2 CELL: NORMAL
SA2 COMMENTS: NORMAL
SA2 HI RISK ABY UA: NORMAL
SA2 MOD RISK ABY: NORMAL
SA2 TEST METHOD: NORMAL
SODIUM SERPL-SCNC: 140 MMOL/L (ref 133–144)
SPECIMEN SOURCE: NORMAL
TACROLIMUS BLD-MCNC: 12.1 UG/L (ref 5–15)
TME LAST DOSE: NORMAL H
TRANSFUSION STATUS PATIENT QL: NORMAL
TRANSFUSION STATUS PATIENT QL: NORMAL
UNACCEPTABLE ANTIGEN: NORMAL
UNOS CPRA: 95
WBC # BLD AUTO: 6.2 10E9/L (ref 4–11)

## 2020-07-22 PROCEDURE — 80197 ASSAY OF TACROLIMUS: CPT | Performed by: INTERNAL MEDICINE

## 2020-07-22 PROCEDURE — 96365 THER/PROPH/DIAG IV INF INIT: CPT

## 2020-07-22 PROCEDURE — 85025 COMPLETE CBC W/AUTO DIFF WBC: CPT | Performed by: INTERNAL MEDICINE

## 2020-07-22 PROCEDURE — 83735 ASSAY OF MAGNESIUM: CPT | Performed by: INTERNAL MEDICINE

## 2020-07-22 PROCEDURE — 25000128 H RX IP 250 OP 636: Mod: ZF | Performed by: INTERNAL MEDICINE

## 2020-07-22 PROCEDURE — 36430 TRANSFUSION BLD/BLD COMPNT: CPT

## 2020-07-22 PROCEDURE — 96367 TX/PROPH/DG ADDL SEQ IV INF: CPT

## 2020-07-22 PROCEDURE — 25800030 ZZH RX IP 258 OP 636: Mod: ZF | Performed by: INTERNAL MEDICINE

## 2020-07-22 PROCEDURE — 80048 BASIC METABOLIC PNL TOTAL CA: CPT | Performed by: INTERNAL MEDICINE

## 2020-07-22 PROCEDURE — 96366 THER/PROPH/DIAG IV INF ADDON: CPT

## 2020-07-22 PROCEDURE — 84100 ASSAY OF PHOSPHORUS: CPT | Performed by: INTERNAL MEDICINE

## 2020-07-22 PROCEDURE — P9016 RBC LEUKOCYTES REDUCED: HCPCS

## 2020-07-22 RX ORDER — FUROSEMIDE 80 MG
40 TABLET ORAL DAILY
Qty: 28 TABLET | Refills: 0 | Status: SHIPPED | OUTPATIENT
Start: 2020-07-22 | End: 2020-07-22

## 2020-07-22 RX ORDER — POTASSIUM CHLORIDE 29.8 MG/ML
20 INJECTION INTRAVENOUS ONCE
Status: DISCONTINUED | OUTPATIENT
Start: 2020-07-22 | End: 2020-07-22 | Stop reason: HOSPADM

## 2020-07-22 RX ORDER — MAGNESIUM SULFATE HEPTAHYDRATE 40 MG/ML
2 INJECTION, SOLUTION INTRAVENOUS ONCE
Status: COMPLETED | OUTPATIENT
Start: 2020-07-22 | End: 2020-07-22

## 2020-07-22 RX ORDER — FUROSEMIDE 40 MG
40 TABLET ORAL 2 TIMES DAILY
Qty: 28 TABLET | Refills: 0 | Status: SHIPPED | OUTPATIENT
Start: 2020-07-22 | End: 2020-07-24

## 2020-07-22 RX ORDER — FUROSEMIDE 40 MG
40 TABLET ORAL DAILY
Qty: 28 TABLET | Refills: 0 | Status: SHIPPED | OUTPATIENT
Start: 2020-07-22 | End: 2020-07-22

## 2020-07-22 RX ADMIN — MAGNESIUM SULFATE IN WATER 2 G: 40 INJECTION, SOLUTION INTRAVENOUS at 10:19

## 2020-07-22 RX ADMIN — POTASSIUM CHLORIDE: 149 INJECTION, SOLUTION, CONCENTRATE INTRAVENOUS at 11:40

## 2020-07-22 NOTE — TELEPHONE ENCOUNTER
Post Kidney and Pancreas Transplant Team Conference  Date: 7/22/2020  Transplant Coordinator: Mulu Arguello     Attendees:  [x]  Dr. Anglin  [x] Hollie Hong LPN     [x]  Dr. Armando [x] Mulu Arguello RN [] Tracy Wells LPN   [x]  Dr. Peraza [] Birgit Solano, RN     [] Chloe Lechuga RN [] Anam Hermosillo, PharmD   [] Dr. Altman [x] Lucina Jacinto, PERI    [x] Dr. Morgan [] Ron Terrazas RN    [x] Dr. Lakhani [] Laura Cervantes RN    [] Dr. Younger [] Faustina Aragon RN     [] Yoli Mccoy, PERI    [] Surgery Fellow [x] Chelsea Simpson RN    [] Kailey Santos NP [] Sharon Lujan RN        Verbal Plan Read Back:   No changes at this time    Routed to RN Coordinator   Hollie Hong LPN

## 2020-07-22 NOTE — LETTER
7/22/2020         RE: Jelly Dietz  [address]  [facility] [address]        Dear Colleague,    Thank you for referring your patient, Jelly Dietz, to the [facility] SPECIALTY AND PROCEDURE. Please see a copy of my visit note below.    ACUTE TRANSPLANT NEPHROLOGY VISIT    Recommendations   - 1 unit of blood today   - 20 meq of KCL IV + 20 meq orally and continue with oral supplement until the course is finished   - 2 gm of magnesium sulfate   - reduced lasix to 40 mg twice daily until weight reaches 135 Lb then 40 mg daily until 130 lb at which point will stop    Assessment & Plan    # Severe Acute rejection: Mixed rejection treated with thymo ritux and plex/ivig followed by steroid taper.    # DDKT: decreasing, last dialysis 7/17    - Baseline Cr ~ initially less than 1 mg/dL, TBD   - Proteinuria: Not checked recently   - Date DSA Last Checked: 7/2020      Latest DSA: No   - BK Viremia: No   - Kidney Tx Biopsy: Jul 04, 2020; Result: severe [place] IIA with AbMR features     # Immunosuppression: Tacrolimus immediate release (goal 8-10), Mycophenolic acid (dose 720 mg every 12 hours) and Prednisone (dose 5 mg daily)   - Changes: No    # Infection Prophylaxis:   - PJP: Sulfa/TMP (Bactrim)  - CMV: Valcyte    # Hypertension: Controlled;  Goal BP: < 140/90   - Volume status: Mildly hypervolemic  EDW ~ 128Lb   - Changes: Yes - reduce lasix     # Anemia in Chronic Renal Disease: Hgb: Stable      ENZO: Yes   - Iron studies: Replete    # Mineral Bone Disorder:   - Calcium; level: Normal        On supplement: No  - Phosphorus; level: Low        On supplement: No    # Electrolytes:   - Potassium; level: Low        On supplement: yes  - Magnesium; level: Low        On supplement: yes    # Hypokalemia: will give 20 Meq IV and 20 oral     # Hypomagnesemia: will give 2 gm of IV magnesium sulfate     # Medical Compliance: Yes    # Transplant History:  Etiology of Kidney Failure: IgA  nephropathy  Tx: DDKT  Transplant: 6/19/2020 (Kidney), 12/1/2007 (Kidney)  Donor Type: Donation after Brain Death Donor Class:   Crossmatch at time of Tx: negative  DSA at time of Tx: No  Significant changes in immunosuppression: treated with thymo/plex/ivig and ritux and will be on steroid maintenance following mixed early acute rejection IIA+  CMV IgG Ab High Risk Discordance (D+/R-): No   EBV IgG Ab High Risk Discordance (D+/R-): No  Significant transplant-related complications: early acute rejection    Transplant Office Phone Number: [phone]    Assessment and plan was discussed with the patient and she voiced her understanding and agreement.    Return visit: in 2 days     Mahesh Armando MD    Chief Complaint   Ms. Dietz is a 35 year old here for immunosuppression management and elevated creatinine.     History of Present Illness     Evens is a 35-year-old lady with end-stage kidney disease status post second kidney transplant.  Her post transplant course was complicated by severe mixed early acute rejection around 2 weeks from her engraftment.  She did not have donor specific antibodies that we were able to detect.  Her blood was sent to an outside lab for AT-1R assessment but we may not have results due to the lack of test kits.  She was empirically started on losartan 12.5 mg after she had completed 5 session course of plasmapheresis IVIG.  She also received a single dose rituximab 500 mg x 1.  Additionally a course of thymoglobulin with last dose received prior to discharge.  Her weight is down by 7 pounds from her discharge weight.  She continues to produce very large amount of urine on her current diuretics.  Yet, she feels bloated and carrying a lot of weight around her stomach and thighs.  She doesn't have any leg swelling she does not have shortness of breath no chest pains.  She is going to the bathroom a lot at night and that is bothering her.    Since she was seen, she has done well. She  needed blood transfusion for low hemoglobin. She received a unit last night and will be getting one today.   She remains off dialysis. She is urinating frequently and loosing the water weight from the hospitalization.     Review of Systems   A comprehensive review of systems was obtained and negative, except as noted in the HPI or PMH.    Problem List   Patient Active Problem List   Diagnosis     CARDIOVASCULAR SCREENING; LDL GOAL LESS THAN 160     Kidney replaced by transplant     HTN, kidney transplant related     ACS (acute coronary syndrome) (H)     Anemia in chronic renal disease     IgA nephropathy     Anxiety     Kidney transplanted     Immunosuppression (H)     Painful bladder spasm     Hypophosphatemia     Constipation due to pain medication     Aftercare following organ transplant     Hypomagnesemia     Dehydration     Acute renal failure (ARF) (H)     Acute rejection of kidney transplant     Metabolic acidosis     Steroid-induced hyperglycemia     Nausea vomiting and diarrhea     Hypervolemia     Anemia       Social History   Social History     Tobacco Use     Smoking status: Never Smoker     Smokeless tobacco: Never Used   Substance Use Topics     Alcohol use: No     Drug use: No       Allergies   No Known Allergies    Medications   Current Outpatient Medications   Medication Sig     acetaminophen (TYLENOL) 325 MG tablet Take 2 tablets (650 mg) by mouth every 4 hours as needed for other (multimodal surgical pain management along with NSAIDS and opioid medication as indicated based on pain control and physical function.)     amLODIPine (NORVASC) 5 MG tablet Take 1 tablet (5 mg) by mouth daily     aspirin (ASA) 81 MG chewable tablet Take 1 tablet (81 mg) by mouth daily     atorvastatin (LIPITOR) 10 MG tablet Take 1 tablet (10 mg) by mouth daily     carvedilol (COREG) 6.25 MG tablet Take 1 tablet (6.25 mg) by mouth 2 times daily     cetirizine (ZYRTEC) 10 MG tablet Take 10 mg by mouth nightly as needed for  allergies      epoetin staci-epbx (RETACRIT) 06952 UNIT/ML injection Inject 0.2 mLs (2,000 Units) Subcutaneous three times a week     furosemide (LASIX) 40 MG tablet Take 1 tablet (40 mg) by mouth daily     hydrOXYzine (ATARAX) 25 MG tablet Take 1 tablet (25 mg) by mouth 3 times daily as needed for anxiety     Lidocaine (LIDOCARE) 4 % Patch Place 3 patches onto the skin every 24 hours To prevent lidocaine toxicity, patient should be patch free for 12 hrs daily.     losartan (COZAAR) 25 MG tablet Take 0.5 tablets (12.5 mg) by mouth At Bedtime     magnesium oxide (MAG-OX) 400 (241.3 Mg) MG tablet Take 2 tablets (800 mg) by mouth daily     menthol (ICY HOT) 5 % PTCH Apply 1 patch topically every 8 hours as needed for muscle soreness     mycophenolic acid (GENERIC EQUIVALENT) 180 MG EC tablet Take 4 tablets (720 mg) by mouth 2 times daily     ondansetron (ZOFRAN-ODT) 4 MG ODT tab Take 1 tablet (4 mg) by mouth 3 times daily (before meals)     pantoprazole (PROTONIX) 40 MG EC tablet Take 1 tablet (40 mg) by mouth 2 times daily     phosphorus tablet 250 mg (PHOSPHORUS TABLET 250 MG) 250 MG per tablet Take 2 tablets (500 mg) by mouth 2 times daily     polyethylene glycol (MIRALAX) 17 g packet Take 17 g by mouth daily     potassium chloride ER (K-TAB) 20 MEQ CR tablet Take 1 tablet (20 mEq) by mouth daily for 7 days     predniSONE (DELTASONE) 5 MG tablet Take 3 tablets (15 mg) by mouth daily for 7 days, THEN 2 tablets (10 mg) daily for 7 days, THEN 1 tablet (5 mg) daily for 7 days.     senna-docusate (SENOKOT-S/PERICOLACE) 8.6-50 MG tablet Take 1-2 tablets by mouth 2 times daily     sulfamethoxazole-trimethoprim (BACTRIM) 400-80 MG tablet Take 1 tablet by mouth three times a week     tacrolimus (GENERIC EQUIVALENT) 0.5 MG capsule Take 10 capsules (5 mg) by mouth 2 times daily     traMADol (ULTRAM) 50 MG tablet Take 1 tablet (50 mg) by mouth every 6 hours as needed for moderate pain     valGANciclovir (VALCYTE) 450 MG tablet  Take 1 tablet (450 mg) by mouth twice a week     Vitamin D3 (CHOLECALCIFEROL) 25 mcg (1000 units) tablet Take 2 tablets (50 mcg) by mouth daily     No current facility-administered medications for this visit.      There are no discontinued medications.    Physical Exam   Vital Signs: reviewed     GENERAL APPEARANCE: alert and no distress  HENT: mouth without ulcers or lesions  LYMPHATICS: no cervical or supraclavicular nodes  RESP: lungs clear to auscultation - no rales, rhonchi or wheezes  CV: regular rhythm, normal rate, no rub, no murmur  EDEMA: no LE edema bilaterally  ABDOMEN: soft, nondistended, nontender, bowel sounds normal  MS: extremities normal - no gross deformities noted, no evidence of inflammation in joints, no muscle tenderness  SKIN: no rash    Data     Renal Latest Ref Rng & Units 7/22/2020 7/21/2020 7/20/2020   Na 133 - 144 mmol/L 140 138 139   K 3.4 - 5.3 mmol/L 2.8(L) 3.0(L) 2.9(L)   Cl 94 - 109 mmol/L 106 102 104   CO2 20 - 32 mmol/L 26 30 28   BUN 7 - 30 mg/dL 47(H) 57(H) 74(H)   Cr 0.52 - 1.04 mg/dL 1.59(H) 1.72(H) 2.09(H)   Glucose 70 - 99 mg/dL 140(H) 80 75   Ca  8.5 - 10.1 mg/dL 7.4(L) 7.8(L) 8.0(L)   Mg 1.6 - 2.3 mg/dL 1.1(L) - 1.4(L)     Bone Health Latest Ref Rng & Units 7/22/2020 7/20/2020 7/18/2020   Phos 2.5 - 4.5 mg/dL 1.9(L) 1.5(L) 1.9(L)   PTHi 18 - 80 pg/mL - - -   Vit D Def 20 - 75 ug/L - - -     Heme Latest Ref Rng & Units 7/22/2020 7/21/2020 7/20/2020   WBC 4.0 - 11.0 10e9/L 6.2 7.1 7.5   Hgb 11.7 - 15.7 g/dL 7.7(L) 6.6(LL) 7.0(L)   Plt 150 - 450 10e9/L 175 201 199   ABSOLUTE NEUTROPHIL 1.6 - 8.3 10e9/L 5.5 - 6.5   ABSOLUTE LYMPHOCYTES 0.8 - 5.3 10e9/L 0.2(L) - 0.2(L)   ABSOLUTE MONOCYTES 0.0 - 1.3 10e9/L 0.3 - 0.5   ABSOLUTE EOSINOPHILS 0.0 - 0.7 10e9/L 0.0 - 0.0   ABSOLUTE BASOPHILS 0.0 - 0.2 10e9/L 0.0 - 0.0   ABS IMMATURE GRANULOCYTES 0 - 0.4 10e9/L 0.1 - 0.3   ABSOLUTE NUCLEATED RBC - 0.0 - 0.0     Liver Latest Ref Rng & Units 7/20/2020 7/13/2020 7/12/2020   AP 40 - 150  U/L - 40 35(L)   TBili 0.2 - 1.3 mg/dL - 1.1 0.3   DBili 0.0 - 0.2 mg/dL - - 0.1   ALT 0 - 50 U/L - 29 25   AST 0 - 45 U/L - 18 20   Tot Protein 6.8 - 8.8 g/dL - 5.4(L) 5.7(L)   Albumin 3.4 - 5.0 g/dL 3.5 2.9(L) 3.1(L)     Pancreas Latest Ref Rng & Units 6/19/2020 5/30/2015 10/6/2014   A1C 0 - 5.6 % 5.1 - -   Lipase 73 - 393 U/L - 126 100     Iron studies Latest Ref Rng & Units 7/5/2020 6/25/2020 11/30/2012   Iron 35 - 180 ug/dL 10(L) 57 125   Iron sat 15 - 46 % 9(L) 35 81(H)   Ferritin 12 - 150 ng/mL - 886(H) 401(H)     [facility] Txp Virology Latest Ref Rng & Units 7/9/2020 7/8/2020 6/19/2020   CVM DNA Quant - - - -   CMV Quant <100 Copies/mL - - -   CMV QT Log <2.0 Log copies/mL - - -   BK Spec - - - -   BK Res BKNEG copies/mL - - -   BK Log <2.7 Log copies/mL - - -   EBV VCA IGG ANTIBODY U/mL - - -   EBV CAPSID ANTIBODY IGG 0.0 - 0.8 AI - - >8.0(H)   EBV DNA COPIES/ML <1000 Copies/mL - - -   EBV DNA LOG OF COPIES <3.0 Log copies/mL - - -   Hep B Core NR:Nonreactive Reactive(AA) Reactive(AA) -   Hep B Surf - - - -   HIV 1&2 NEG - - -        Recent Labs   Lab Test 07/17/20  0602 07/20/20  0959 07/21/20  0928   DOSTAC Not Provided Not Provided 9p   TACROL 9.8 31.6* 9.3     Recent Labs   Lab Test 06/26/20  0842   DOSMPA 8:00 pm 6/25/2020   MPACID 2.00   MPAG 32.2       Again, thank you for allowing me to participate in the care of your patient.        Sincerely,        Mahesh Armando MD

## 2020-07-22 NOTE — LETTER
2020         RE: Jelly Dietz  [address]  [facility] [address]        Dear Colleague,    Thank you for referring your patient, Jelly Dietz, to the [facility] SPECIALTY AND PROCEDURE. Please see a copy of my visit note below.    Blood Product Transfusion Nursing Note:    Jelly Dietz presents today to [facility] for a PRBC transfusion.  During today's [facility] appointment orders from Dr. Armando were completed.    Reviewed patient's blood pressures with Dr. Armando: Dr. Armando ok proceeding with transfusion. Dr. Prabha reynolds proceeding with blood transfusion without updated CBC .     Lasix decreased to 40 mg BID per Dr. Armando    Reminded patient to  phosphorous refill from [facility] pharmacy and lasix 40 mg tabs from [address] [place] [address] pharmacy.     Medication card updated after multiple errors noted.     Medication box updated after errors noted.     Magnesium and potassium low: replaced via IV per Dr. Armando's TORB.     60 minutes of nursing time spent sorting medications.     Administrations This Visit     magnesium sulfate 2 g in water intermittent infusion     Admin Date  2020 Action  New Bag Dose  2 g Rate  50 mL/hr Route  Intravenous Administered By  Tonja Caldwell, RN          sodium chloride 0.9 % 250 mL with potassium chloride 20 mEq *See DOSE to administer in MAR details (order question)     Admin Date  2020 Action  New Bag Dose   Rate  142.5 mL/hr Route  Intravenous Administered By  Tonja Caldwell, RN                Progress note:  ID verified by name and .  Assessment completed.  Vitals were stable throughout time in [facility].    verbal and printed education given to patient/representative regarding transfusion and possible side effects.  Patient/representative verbalized understanding.     present during visit today: Not Applicable.    All pertinent labs reviewed prior to infusion: YES. hgb 6.6 yesterday. Transplant labs drawn today per Dr. Armando.      Date of consent or authorization: 7/22/20    Patient tolerated the procedure well    Transfusion given over approximately  1 1/2 hours.      Discharge Plan:    Discharge instructions were reviewed with patient Yes  Patient/representative verbalized understanding of discharge instructions and all questions answered Yes.        Tonja Caldwell RN    BP (!) 155/101   Temp 99  F (37.2  C) (Oral)   Resp 18   SpO2 100%         Again, thank you for allowing me to participate in the care of your patient.        Sincerely,        [facility]

## 2020-07-22 NOTE — PROGRESS NOTES
ACUTE TRANSPLANT NEPHROLOGY VISIT    Recommendations   - 1 unit of blood today   - 20 meq of KCL IV + 20 meq orally and continue with oral supplement until the course is finished   - 2 gm of magnesium sulfate   - reduced lasix to 40 mg twice daily until weight reaches 135 Lb then 40 mg daily until 130 lb at which point will stop    Assessment & Plan    # Severe Acute rejection: Mixed rejection treated with thymo ritux and plex/ivig followed by steroid taper.    # DDKT: decreasing, last dialysis 7/17    - Baseline Cr ~ initially less than 1 mg/dL, TBD   - Proteinuria: Not checked recently   - Date DSA Last Checked: 7/2020      Latest DSA: No   - BK Viremia: No   - Kidney Tx Biopsy: Jul 04, 2020; Result: severe [place] IIA with AbMR features     # Immunosuppression: Tacrolimus immediate release (goal 8-10), Mycophenolic acid (dose 720 mg every 12 hours) and Prednisone (dose 5 mg daily)   - Changes: No    # Infection Prophylaxis:   - PJP: Sulfa/TMP (Bactrim)  - CMV: Valcyte    # Hypertension: Controlled;  Goal BP: < 140/90   - Volume status: Mildly hypervolemic  EDW ~ 128Lb   - Changes: Yes - reduce lasix     # Anemia in Chronic Renal Disease: Hgb: Stable      ENZO: Yes   - Iron studies: Replete    # Mineral Bone Disorder:   - Calcium; level: Normal        On supplement: No  - Phosphorus; level: Low        On supplement: No    # Electrolytes:   - Potassium; level: Low        On supplement: yes  - Magnesium; level: Low        On supplement: yes    # Hypokalemia: will give 20 Meq IV and 20 oral     # Hypomagnesemia: will give 2 gm of IV magnesium sulfate     # Medical Compliance: Yes    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 Donor Class:   Crossmatch at time of Tx: negative  DSA at time of Tx: No  Significant changes in immunosuppression: treated with thymo/plex/ivig and ritux and will be on  steroid maintenance following mixed early acute rejection IIA+  CMV IgG Ab High Risk Discordance (D+/R-): No   EBV IgG Ab High Risk Discordance (D+/R-): No  Significant transplant-related complications: early acute rejection    Transplant Office Phone Number: [phone]    Assessment and plan was discussed with the patient and she voiced her understanding and agreement.    Return visit: in 2 days     Mahesh Armando MD    Chief Complaint   Ms. Dietz is a 35 year old here for immunosuppression management and elevated creatinine.     History of Present Illness     Evens is a 35-year-old lady with end-stage kidney disease status post second kidney transplant.  Her post transplant course was complicated by severe mixed early acute rejection around 2 weeks from her engraftment.  She did not have donor specific antibodies that we were able to detect.  Her blood was sent to an outside lab for AT-1R assessment but we may not have results due to the lack of test kits.  She was empirically started on losartan 12.5 mg after she had completed 5 session course of plasmapheresis IVIG.  She also received a single dose rituximab 500 mg x 1.  Additionally a course of thymoglobulin with last dose received prior to discharge.  Her weight is down by 7 pounds from her discharge weight.  She continues to produce very large amount of urine on her current diuretics.  Yet, she feels bloated and carrying a lot of weight around her stomach and thighs.  She doesn't have any leg swelling she does not have shortness of breath no chest pains.  She is going to the bathroom a lot at night and that is bothering her.    Since she was seen, she has done well. She needed blood transfusion for low hemoglobin. She received a unit last night and will be getting one today.   She remains off dialysis. She is urinating frequently and loosing the water weight from the hospitalization.     Review of Systems   A comprehensive review of systems was  obtained and negative, except as noted in the HPI or PMH.    Problem List   Patient Active Problem List   Diagnosis     CARDIOVASCULAR SCREENING; LDL GOAL LESS THAN 160     Kidney replaced by transplant     HTN, kidney transplant related     ACS (acute coronary syndrome) (H)     Anemia in chronic renal disease     IgA nephropathy     Anxiety     Kidney transplanted     Immunosuppression (H)     Painful bladder spasm     Hypophosphatemia     Constipation due to pain medication     Aftercare following organ transplant     Hypomagnesemia     Dehydration     Acute renal failure (ARF) (H)     Acute rejection of kidney transplant     Metabolic acidosis     Steroid-induced hyperglycemia     Nausea vomiting and diarrhea     Hypervolemia     Anemia       Social History   Social History     Tobacco Use     Smoking status: Never Smoker     Smokeless tobacco: Never Used   Substance Use Topics     Alcohol use: No     Drug use: No       Allergies   No Known Allergies    Medications   Current Outpatient Medications   Medication Sig     acetaminophen (TYLENOL) 325 MG tablet Take 2 tablets (650 mg) by mouth every 4 hours as needed for other (multimodal surgical pain management along with NSAIDS and opioid medication as indicated based on pain control and physical function.)     amLODIPine (NORVASC) 5 MG tablet Take 1 tablet (5 mg) by mouth daily     aspirin (ASA) 81 MG chewable tablet Take 1 tablet (81 mg) by mouth daily     atorvastatin (LIPITOR) 10 MG tablet Take 1 tablet (10 mg) by mouth daily     carvedilol (COREG) 6.25 MG tablet Take 1 tablet (6.25 mg) by mouth 2 times daily     cetirizine (ZYRTEC) 10 MG tablet Take 10 mg by mouth nightly as needed for allergies      epoetin staci-epbx (RETACRIT) 40149 UNIT/ML injection Inject 0.2 mLs (2,000 Units) Subcutaneous three times a week     furosemide (LASIX) 40 MG tablet Take 1 tablet (40 mg) by mouth daily     hydrOXYzine (ATARAX) 25 MG tablet Take 1 tablet (25 mg) by mouth 3 times  daily as needed for anxiety     Lidocaine (LIDOCARE) 4 % Patch Place 3 patches onto the skin every 24 hours To prevent lidocaine toxicity, patient should be patch free for 12 hrs daily.     losartan (COZAAR) 25 MG tablet Take 0.5 tablets (12.5 mg) by mouth At Bedtime     magnesium oxide (MAG-OX) 400 (241.3 Mg) MG tablet Take 2 tablets (800 mg) by mouth daily     menthol (ICY HOT) 5 % PTCH Apply 1 patch topically every 8 hours as needed for muscle soreness     mycophenolic acid (GENERIC EQUIVALENT) 180 MG EC tablet Take 4 tablets (720 mg) by mouth 2 times daily     ondansetron (ZOFRAN-ODT) 4 MG ODT tab Take 1 tablet (4 mg) by mouth 3 times daily (before meals)     pantoprazole (PROTONIX) 40 MG EC tablet Take 1 tablet (40 mg) by mouth 2 times daily     phosphorus tablet 250 mg (PHOSPHORUS TABLET 250 MG) 250 MG per tablet Take 2 tablets (500 mg) by mouth 2 times daily     polyethylene glycol (MIRALAX) 17 g packet Take 17 g by mouth daily     potassium chloride ER (K-TAB) 20 MEQ CR tablet Take 1 tablet (20 mEq) by mouth daily for 7 days     predniSONE (DELTASONE) 5 MG tablet Take 3 tablets (15 mg) by mouth daily for 7 days, THEN 2 tablets (10 mg) daily for 7 days, THEN 1 tablet (5 mg) daily for 7 days.     senna-docusate (SENOKOT-S/PERICOLACE) 8.6-50 MG tablet Take 1-2 tablets by mouth 2 times daily     sulfamethoxazole-trimethoprim (BACTRIM) 400-80 MG tablet Take 1 tablet by mouth three times a week     tacrolimus (GENERIC EQUIVALENT) 0.5 MG capsule Take 10 capsules (5 mg) by mouth 2 times daily     traMADol (ULTRAM) 50 MG tablet Take 1 tablet (50 mg) by mouth every 6 hours as needed for moderate pain     valGANciclovir (VALCYTE) 450 MG tablet Take 1 tablet (450 mg) by mouth twice a week     Vitamin D3 (CHOLECALCIFEROL) 25 mcg (1000 units) tablet Take 2 tablets (50 mcg) by mouth daily     No current facility-administered medications for this visit.      There are no discontinued medications.    Physical Exam   Vital  Signs: reviewed     GENERAL APPEARANCE: alert and no distress  HENT: mouth without ulcers or lesions  LYMPHATICS: no cervical or supraclavicular nodes  RESP: lungs clear to auscultation - no rales, rhonchi or wheezes  CV: regular rhythm, normal rate, no rub, no murmur  EDEMA: no LE edema bilaterally  ABDOMEN: soft, nondistended, nontender, bowel sounds normal  MS: extremities normal - no gross deformities noted, no evidence of inflammation in joints, no muscle tenderness  SKIN: no rash    Data     Renal Latest Ref Rng & Units 7/22/2020 7/21/2020 7/20/2020   Na 133 - 144 mmol/L 140 138 139   K 3.4 - 5.3 mmol/L 2.8(L) 3.0(L) 2.9(L)   Cl 94 - 109 mmol/L 106 102 104   CO2 20 - 32 mmol/L 26 30 28   BUN 7 - 30 mg/dL 47(H) 57(H) 74(H)   Cr 0.52 - 1.04 mg/dL 1.59(H) 1.72(H) 2.09(H)   Glucose 70 - 99 mg/dL 140(H) 80 75   Ca  8.5 - 10.1 mg/dL 7.4(L) 7.8(L) 8.0(L)   Mg 1.6 - 2.3 mg/dL 1.1(L) - 1.4(L)     Bone Health Latest Ref Rng & Units 7/22/2020 7/20/2020 7/18/2020   Phos 2.5 - 4.5 mg/dL 1.9(L) 1.5(L) 1.9(L)   PTHi 18 - 80 pg/mL - - -   Vit D Def 20 - 75 ug/L - - -     Heme Latest Ref Rng & Units 7/22/2020 7/21/2020 7/20/2020   WBC 4.0 - 11.0 10e9/L 6.2 7.1 7.5   Hgb 11.7 - 15.7 g/dL 7.7(L) 6.6(LL) 7.0(L)   Plt 150 - 450 10e9/L 175 201 199   ABSOLUTE NEUTROPHIL 1.6 - 8.3 10e9/L 5.5 - 6.5   ABSOLUTE LYMPHOCYTES 0.8 - 5.3 10e9/L 0.2(L) - 0.2(L)   ABSOLUTE MONOCYTES 0.0 - 1.3 10e9/L 0.3 - 0.5   ABSOLUTE EOSINOPHILS 0.0 - 0.7 10e9/L 0.0 - 0.0   ABSOLUTE BASOPHILS 0.0 - 0.2 10e9/L 0.0 - 0.0   ABS IMMATURE GRANULOCYTES 0 - 0.4 10e9/L 0.1 - 0.3   ABSOLUTE NUCLEATED RBC - 0.0 - 0.0     Liver Latest Ref Rng & Units 7/20/2020 7/13/2020 7/12/2020   AP 40 - 150 U/L - 40 35(L)   TBili 0.2 - 1.3 mg/dL - 1.1 0.3   DBili 0.0 - 0.2 mg/dL - - 0.1   ALT 0 - 50 U/L - 29 25   AST 0 - 45 U/L - 18 20   Tot Protein 6.8 - 8.8 g/dL - 5.4(L) 5.7(L)   Albumin 3.4 - 5.0 g/dL 3.5 2.9(L) 3.1(L)     Pancreas Latest Ref Rng & Units 6/19/2020 5/30/2015  10/6/2014   A1C 0 - 5.6 % 5.1 - -   Lipase 73 - 393 U/L - 126 100     Iron studies Latest Ref Rng & Units 7/5/2020 6/25/2020 11/30/2012   Iron 35 - 180 ug/dL 10(L) 57 125   Iron sat 15 - 46 % 9(L) 35 81(H)   Ferritin 12 - 150 ng/mL - 886(H) 401(H)     [facility] Txp Virology Latest Ref Rng & Units 7/9/2020 7/8/2020 6/19/2020   CVM DNA Quant - - - -   CMV Quant <100 Copies/mL - - -   CMV QT Log <2.0 Log copies/mL - - -   BK Spec - - - -   BK Res BKNEG copies/mL - - -   BK Log <2.7 Log copies/mL - - -   EBV VCA IGG ANTIBODY U/mL - - -   EBV CAPSID ANTIBODY IGG 0.0 - 0.8 AI - - >8.0(H)   EBV DNA COPIES/ML <1000 Copies/mL - - -   EBV DNA LOG OF COPIES <3.0 Log copies/mL - - -   Hep B Core NR:Nonreactive Reactive(AA) Reactive(AA) -   Hep B Surf - - - -   HIV 1&2 NEG - - -        Recent Labs   Lab Test 07/17/20  0602 07/20/20  0959 07/21/20  0928   DOSTAC Not Provided Not Provided 9p   TACROL 9.8 31.6* 9.3     Recent Labs   Lab Test 06/26/20  0842   DOSMPA 8:00 pm 6/25/2020   MPACID 2.00   MPAG 32.2

## 2020-07-22 NOTE — PROGRESS NOTES
Blood Product Transfusion Nursing Note:    Jelly Dietz presents today to [facility] for a PRBC transfusion.  During today's [facility] appointment orders from Dr. Armando were completed.    Reviewed patient's blood pressures with Dr. Armando: Dr. Prabha reynolds proceeding with transfusion. Dr. Prabha reynolds proceeding with blood transfusion without updated CBC .     Lasix decreased to 40 mg BID per Dr. Armando    Reminded patient to  phosphorous refill from [facility] pharmacy and lasix 40 mg tabs from [address] [place] [address] pharmacy.     Medication card updated after multiple errors noted.     Medication box updated after errors noted.     Magnesium and potassium low: replaced via IV per Dr. Armando's TORB.     60 minutes of nursing time spent sorting medications.     Administrations This Visit     magnesium sulfate 2 g in water intermittent infusion     Admin Date  2020 Action  New Bag Dose  2 g Rate  50 mL/hr Route  Intravenous Administered By  Tonja Caldwell, RN          sodium chloride 0.9 % 250 mL with potassium chloride 20 mEq *See DOSE to administer in MAR details (order question)     Admin Date  2020 Action  New Bag Dose   Rate  142.5 mL/hr Route  Intravenous Administered By  Tonja Caldwell, PERI                Progress note:  ID verified by name and .  Assessment completed.  Vitals were stable throughout time in [facility].    verbal and printed education given to patient/representative regarding transfusion and possible side effects.  Patient/representative verbalized understanding.     present during visit today: Not Applicable.    All pertinent labs reviewed prior to infusion: YES. hgb 6.6 yesterday. Transplant labs drawn today per Dr. Armando.     Date of consent or authorization: 20    Patient tolerated the procedure well    Transfusion given over approximately  1 1/2 hours.      Discharge Plan:    Discharge instructions were reviewed with patient Yes  Patient/representative verbalized understanding of discharge  instructions and all questions answered Yes.        Tonja Caldwell RN    BP (!) 155/101   Temp 99  F (37.2  C) (Oral)   Resp 18   SpO2 100%

## 2020-07-22 NOTE — PATIENT INSTRUCTIONS
Dear Jelly Dietz    Thank you for choosing [facility] ([facility]) for your transplant cares and transfusion.  The following information is a summary of our appointment as well as important reminders.      Remember to  your lasix from downstairs and add 1 tab to the AM and PM slot of your pill box.     Decrease your lasix to 1 tab daily when you weigh 135 pounds  Stop your lasix when you weigh 130 pounds.     Please refill your phosphorous at your [facility] pharmacy and add to 2 tabs to your AM and PM slots of your pill box.      HOME CARE FOR PATIENTS RECEIVING BLOOD PRODUCTS    You have just received a blood product.  During the next 48 hours please be aware of the following symptoms of a blood product reaction.    The possible symptoms of a blood reaction are:      Chills    Fever temperature above 100.0 orally    Headache    Nausea    Persistent non-productive cough    Hives    Itching    Facial swelling or flushing    Difficulty breathing or wheezing    Bloody urine      If you experience any of these symptoms contact:    [phone]  Emergency Room    [phone]  Transplant Center  7:00 am - 6:30 pm     If you do not live locally you should contact your local physician.    BLOOD TRANSFUSION QUESTIONS AND ANSWERS    What is a transfusion?    During your treatment, we may need to give you blood.  This is called a transfusion.  Blood is given slowly through a small needle in a vein.  They may include:    Red blood cells:      These help replace blood you may have lost through bleeding or anemia (low red blood cell count).  They will increase your blood's ability to carry oxygen.    Platelets:      These help blood to clot.  They are used for low platelet counts and some bleeding disorders.           Plasma and cryoprecipitate:      These help the blood to clot.  They are used to treat     some bleeding disorders.    Granulocytes (a type of  white blood cell):      These help your body fight infection.    A transfusion takes about 1 to 4 hours, depending  on the blood component you receive. If you need a transfusion, your doctor will prescribe one.  We will ask you to sign a consent form before your first transfusion.  Do not sign this form until all your questions have been answered.  Before your transfusion, your caregiver will double-check your identity for your safety.    What is the risk of getting a disease from the transfusion?    The chances are low.  Donors are carefully screened before giving blood.  Also, before any donated blood is used, it must be tested for infectious diseases.  Here are examples of your risk for infection:    Hepatitis B:  1 in 200.000 Bacterial infection:  1 in 20.000     Hepatitis C:  1 in 1,200,000 Other infectious diseases such as West Nile virus:  1 in 1,000,000     HIV:  1 in 1,400,000 Other infectious diseases such as Babesiosis or Chagas:  Very rare     HTLV:  1 in 641,000                  What are some of the side-effects?    While most transfusions have no side effects, you could have some of the symptoms listed below.  If you think you may be having a reaction, tell your doctor or nurse right away.  Common reactions include:    Fever or chills Jaundice (yellow eyes and skin     Skin rash, hives, itching or flushing Trouble breathing     Nausea (feeling sick to your stomach  or vomiting (throwing up)   Chest or back pain   Bruising Irritation at the infusion site         Some symptoms may occur up to four weeks after a transfusion.  If you have any kind of symptom, call your doctor.    Are there options besides transfusion?    Medicine (such as erythropoietin or iron pills) can cause the body to make more red blood cells.  It may take months to replace all of the red blood cells you have lost.    In some cases, you can donate your own blood before surgery.  The blood may be given back to you during your surgery.   This is called autologous donation.              We look forward in seeing you on your next appointment here at [facility] ([facility]).  Please don t hesitate to call us at [phone] to reschedule any of your appointments or to speak with one of the [facility] registered nurses.  It was a pleasure taking care of you today.    Sincerely,    [facility]  [facility]  [address] [place] [address]  [id number]  Phone:  [phone]

## 2020-07-23 ENCOUNTER — TELEPHONE (OUTPATIENT)
Dept: TRANSPLANT | Facility: CLINIC | Age: 35
End: 2020-07-23

## 2020-07-23 DIAGNOSIS — Z94.0 KIDNEY TRANSPLANTED: Primary | ICD-10-CM

## 2020-07-23 DIAGNOSIS — D64.9 ANEMIA, UNSPECIFIED TYPE: ICD-10-CM

## 2020-07-23 LAB
BACTERIA SPEC CULT: NO GROWTH
BACTERIA SPEC CULT: NO GROWTH
MYCOPHENOLATE SERPL LC/MS/MS-MCNC: 3.01 MG/L (ref 1–3.5)
MYCOPHENOLATE-G SERPL LC/MS/MS-MCNC: >200 MG/L (ref 30–95)
SPECIMEN SOURCE: NORMAL
SPECIMEN SOURCE: NORMAL
TME LAST DOSE: ABNORMAL H

## 2020-07-23 NOTE — TELEPHONE ENCOUNTER
Mahesh Armando MD Huepfel, Mulu BAI RN               Lets see what Friday brings and we can lower it if elevated   Can we king sure she has labs scheduled that day before clinic   Thanks   SR

## 2020-07-24 ENCOUNTER — OFFICE VISIT (OUTPATIENT)
Dept: NEPHROLOGY | Facility: CLINIC | Age: 35
End: 2020-07-24
Attending: SURGERY
Payer: MEDICARE

## 2020-07-24 VITALS
TEMPERATURE: 98.5 F | BODY MASS INDEX: 23.92 KG/M2 | SYSTOLIC BLOOD PRESSURE: 136 MMHG | WEIGHT: 140.1 LBS | DIASTOLIC BLOOD PRESSURE: 95 MMHG | HEART RATE: 81 BPM | OXYGEN SATURATION: 100 %

## 2020-07-24 DIAGNOSIS — Z79.899 LONG TERM USE OF DRUG: ICD-10-CM

## 2020-07-24 DIAGNOSIS — N17.9 ACUTE RENAL FAILURE, UNSPECIFIED ACUTE RENAL FAILURE TYPE (H): ICD-10-CM

## 2020-07-24 DIAGNOSIS — Z48.298 AFTERCARE FOLLOWING ORGAN TRANSPLANT: Primary | ICD-10-CM

## 2020-07-24 DIAGNOSIS — Z48.298 AFTERCARE FOLLOWING ORGAN TRANSPLANT: ICD-10-CM

## 2020-07-24 DIAGNOSIS — Z94.0 KIDNEY REPLACED BY TRANSPLANT: ICD-10-CM

## 2020-07-24 DIAGNOSIS — Z94.0 KIDNEY TRANSPLANTED: ICD-10-CM

## 2020-07-24 LAB
ALBUMIN SERPL-MCNC: 4.2 G/DL (ref 3.4–5)
ALP SERPL-CCNC: 104 U/L (ref 40–150)
ALT SERPL W P-5'-P-CCNC: 215 U/L (ref 0–50)
ANION GAP SERPL CALCULATED.3IONS-SCNC: 6 MMOL/L (ref 3–14)
AST SERPL W P-5'-P-CCNC: 98 U/L (ref 0–45)
BASOPHILS # BLD AUTO: 0 10E9/L (ref 0–0.2)
BASOPHILS NFR BLD AUTO: 0.2 %
BILIRUB SERPL-MCNC: 0.8 MG/DL (ref 0.2–1.3)
BUN SERPL-MCNC: 30 MG/DL (ref 7–30)
CALCIUM SERPL-MCNC: 8.6 MG/DL (ref 8.5–10.1)
CHLORIDE SERPL-SCNC: 110 MMOL/L (ref 94–109)
CO2 SERPL-SCNC: 24 MMOL/L (ref 20–32)
CREAT SERPL-MCNC: 0.99 MG/DL (ref 0.52–1.04)
DIFFERENTIAL METHOD BLD: ABNORMAL
EOSINOPHIL # BLD AUTO: 0 10E9/L (ref 0–0.7)
EOSINOPHIL NFR BLD AUTO: 0.4 %
ERYTHROCYTE [DISTWIDTH] IN BLOOD BY AUTOMATED COUNT: 16.1 % (ref 10–15)
GFR SERPL CREATININE-BSD FRML MDRD: 75 ML/MIN/{1.73_M2}
GLUCOSE SERPL-MCNC: 88 MG/DL (ref 70–99)
HCT VFR BLD AUTO: 28 % (ref 35–47)
HGB BLD-MCNC: 8.8 G/DL (ref 11.7–15.7)
IMM GRANULOCYTES # BLD: 0.1 10E9/L (ref 0–0.4)
IMM GRANULOCYTES NFR BLD: 0.9 %
LYMPHOCYTES # BLD AUTO: 0.2 10E9/L (ref 0.8–5.3)
LYMPHOCYTES NFR BLD AUTO: 2.8 %
MAGNESIUM SERPL-MCNC: 1.4 MG/DL (ref 1.6–2.3)
MCH RBC QN AUTO: 30.4 PG (ref 26.5–33)
MCHC RBC AUTO-ENTMCNC: 31.4 G/DL (ref 31.5–36.5)
MCV RBC AUTO: 97 FL (ref 78–100)
MONOCYTES # BLD AUTO: 0.2 10E9/L (ref 0–1.3)
MONOCYTES NFR BLD AUTO: 2.7 %
NEUTROPHILS # BLD AUTO: 5.3 10E9/L (ref 1.6–8.3)
NEUTROPHILS NFR BLD AUTO: 93 %
NRBC # BLD AUTO: 0 10*3/UL
NRBC BLD AUTO-RTO: 0 /100
PHOSPHATE SERPL-MCNC: 1.6 MG/DL (ref 2.5–4.5)
PLATELET # BLD AUTO: 162 10E9/L (ref 150–450)
POTASSIUM SERPL-SCNC: 3.6 MMOL/L (ref 3.4–5.3)
PROT SERPL-MCNC: 7.9 G/DL (ref 6.8–8.8)
RBC # BLD AUTO: 2.89 10E12/L (ref 3.8–5.2)
SODIUM SERPL-SCNC: 139 MMOL/L (ref 133–144)
TACROLIMUS BLD-MCNC: 12.2 UG/L (ref 5–15)
TME LAST DOSE: NORMAL H
WBC # BLD AUTO: 5.7 10E9/L (ref 4–11)

## 2020-07-24 PROCEDURE — 80197 ASSAY OF TACROLIMUS: CPT | Performed by: INTERNAL MEDICINE

## 2020-07-24 PROCEDURE — 80053 COMPREHEN METABOLIC PANEL: CPT | Performed by: INTERNAL MEDICINE

## 2020-07-24 PROCEDURE — 84100 ASSAY OF PHOSPHORUS: CPT | Performed by: INTERNAL MEDICINE

## 2020-07-24 PROCEDURE — 83735 ASSAY OF MAGNESIUM: CPT | Performed by: INTERNAL MEDICINE

## 2020-07-24 PROCEDURE — 80180 DRUG SCRN QUAN MYCOPHENOLATE: CPT | Performed by: INTERNAL MEDICINE

## 2020-07-24 PROCEDURE — 36415 COLL VENOUS BLD VENIPUNCTURE: CPT | Performed by: INTERNAL MEDICINE

## 2020-07-24 PROCEDURE — 85025 COMPLETE CBC W/AUTO DIFF WBC: CPT | Performed by: INTERNAL MEDICINE

## 2020-07-24 RX ORDER — VALGANCICLOVIR 450 MG/1
450 TABLET, FILM COATED ORAL DAILY
Qty: 90 TABLET | Refills: 1 | Status: SHIPPED | OUTPATIENT
Start: 2020-07-24 | End: 2020-08-04

## 2020-07-24 ASSESSMENT — PAIN SCALES - GENERAL: PAINLEVEL: NO PAIN (0)

## 2020-07-24 NOTE — LETTER
7/24/2020       RE: Jelly Dietz  [address]  [facility] [address]     Dear Colleague,    Thank you for referring your patient, Jelly Dietz, to the [facility] NEPHROLOGY at [facility]. Please see a copy of my visit note below.    ACUTE TRANSPLANT NEPHROLOGY VISIT    Assessment & Plan    # Severe Acute rejection: Mixed treated with improving creatinine, < 1 today     # DDKT: creatinine is somewhat better, last dialysis 7/17/20   - Baseline Cr ~ initially less than 1 mg/dL, TBD   - Proteinuria: Not checked recently   - Date DSA Last Checked: 7/2020      Latest DSA: No   - BK Viremia: No   - Kidney Tx Biopsy: Jul 04, 2020; Result: severe [place] IIA with AbMR features     # Immunosuppression: Tacrolimus immediate release (goal 8-10), Mycophenolic acid (dose 720 mg every 12 hours) and Prednisone (dose taper)   - Changes: prednisone 15 mg through 7/27 then 10 mg through 8/3 then 5 mg starting 8/10     # Infection Prophylaxis:   - PJP: Sulfa/TMP (Bactrim)  - CMV: Valcyte    # Hypertension: Controlled;  Goal BP: < 140/90   - Volume status: euvolemic  EDW ~ 128Lb   - Changes: will discontinue lasix at this point     # Anemia in Chronic Renal Disease: Hgb: Stable      ENZO: Yes   - Iron studies: Replete    # Mineral Bone Disorder:   - Calcium; level: Normal        On supplement: No  - Phosphorus; level: Low        On supplement: started supplement     # Electrolytes:   - Potassium; level: Low        On supplement: continue supplement   - Magnesium; level: low        On supplement: continue supplement     # Medical Compliance: Yes    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 Donor Class:   Crossmatch at time of Tx: negative  DSA at time of Tx: No  Significant changes in immunosuppression: steroid maintenance due to early mixed rejection   CMV IgG Ab High  Risk Discordance (D+/R-): No  EBV IgG Ab High Risk Discordance (D+/R-): No  Significant transplant-related complications: early acute rejection possible Non-HLA with an element of memory response     Transplant Office Phone Number: [phone]    Assessment and plan was discussed with the patient and she voiced her understanding and agreement.    Return visit: Return in about 2 weeks (around 8/7/2020) for Video Visit.    Mahesh Armando MD    Chief Complaint   Ms. Dietz is a 35 year old here for immunosuppression management and follow-up after hospitalization.     History of Present Illness     Evens is a 35-year-old lady with end-stage kidney disease status post second kidney transplant.  Her post transplant course was complicated by severe mixed early acute rejection around 2 weeks from her engraftment.  She did not have donor specific antibodies that we were able to detect.  Her blood was sent to an outside lab for AT-1R assessment but we may not have results due to the lack of test kits.  She was empirically started on losartan 12.5 mg after she had completed 5 session course of plasmapheresis IVIG.  She also received a single dose rituximab 500 mg x 1.  Additionally a course of thymoglobulin with last dose received prior to discharge.  Her weight is significantly down and within 5 pounds of her estimated dry weight.  Due to her frequent urination and profound hypo-kalemia and hypomagnesemia we decided to stop her diuretic.  Her creatinine has improved vastly to under 1 mg/dL.  She feels great and her energy level is coming back.  Her appetite is coming back as well.  She plans on hydrating herself briskly.  She specifically denied any chest pain shortness of breath nausea vomiting or diarrhea.  She knows her medications well.  We updated her magnesium and phosphorus prescription as she was not able to collect them.    Review of Systems   A comprehensive review of systems was obtained and negative, except  as noted in the HPI or PMH.    Problem List   Patient Active Problem List   Diagnosis     CARDIOVASCULAR SCREENING; LDL GOAL LESS THAN 160     Kidney replaced by transplant     HTN, kidney transplant related     ACS (acute coronary syndrome) (H)     Anemia in chronic renal disease     IgA nephropathy     Anxiety     Kidney transplanted     Immunosuppression (H)     Painful bladder spasm     Hypophosphatemia     Constipation due to pain medication     Aftercare following organ transplant     Hypomagnesemia     Dehydration     Acute renal failure (ARF) (H)     Acute rejection of kidney transplant     Metabolic acidosis     Steroid-induced hyperglycemia     Nausea vomiting and diarrhea     Hypervolemia     Anemia       Social History   Social History     Tobacco Use     Smoking status: Never Smoker     Smokeless tobacco: Never Used   Substance Use Topics     Alcohol use: No     Drug use: No       Allergies   No Known Allergies    Medications   Current Outpatient Medications   Medication Sig     acetaminophen (TYLENOL) 325 MG tablet Take 2 tablets (650 mg) by mouth every 4 hours as needed for other (multimodal surgical pain management along with NSAIDS and opioid medication as indicated based on pain control and physical function.)     amLODIPine (NORVASC) 5 MG tablet Take 1 tablet (5 mg) by mouth daily     aspirin (ASA) 81 MG chewable tablet Take 1 tablet (81 mg) by mouth daily     atorvastatin (LIPITOR) 10 MG tablet Take 1 tablet (10 mg) by mouth daily     carvedilol (COREG) 6.25 MG tablet Take 1 tablet (6.25 mg) by mouth 2 times daily     cetirizine (ZYRTEC) 10 MG tablet Take 10 mg by mouth nightly as needed for allergies      epoetin staci-epbx (RETACRIT) 09758 UNIT/ML injection Inject 0.2 mLs (2,000 Units) Subcutaneous three times a week     Lidocaine (LIDOCARE) 4 % Patch Place 3 patches onto the skin every 24 hours To prevent lidocaine toxicity, patient should be patch free for 12 hrs daily.     losartan (COZAAR)  25 MG tablet Take 0.5 tablets (12.5 mg) by mouth At Bedtime     magnesium oxide (MAG-OX) 400 (241.3 Mg) MG tablet Take 2 tablets (800 mg) by mouth daily     menthol (ICY HOT) 5 % PTCH Apply 1 patch topically every 8 hours as needed for muscle soreness     mycophenolic acid (GENERIC EQUIVALENT) 180 MG EC tablet Take 4 tablets (720 mg) by mouth 2 times daily     ondansetron (ZOFRAN-ODT) 4 MG ODT tab Take 1 tablet (4 mg) by mouth 3 times daily (before meals)     pantoprazole (PROTONIX) 40 MG EC tablet Take 1 tablet (40 mg) by mouth 2 times daily     phosphorus tablet 250 mg (PHOSPHORUS TABLET 250 MG) 250 MG per tablet Take 2 tablets (500 mg) by mouth 2 times daily     polyethylene glycol (MIRALAX) 17 g packet Take 17 g by mouth daily     predniSONE (DELTASONE) 5 MG tablet Take 3 tablets (15 mg) by mouth daily for 7 days, THEN 2 tablets (10 mg) daily for 7 days, THEN 1 tablet (5 mg) daily for 7 days.     senna-docusate (SENOKOT-S/PERICOLACE) 8.6-50 MG tablet Take 1-2 tablets by mouth 2 times daily     sulfamethoxazole-trimethoprim (BACTRIM) 400-80 MG tablet Take 1 tablet by mouth three times a week     valGANciclovir (VALCYTE) 450 MG tablet Take 1 tablet (450 mg) by mouth daily     Vitamin D3 (CHOLECALCIFEROL) 25 mcg (1000 units) tablet Take 2 tablets (50 mcg) by mouth daily     tacrolimus (GENERIC EQUIVALENT) 1 MG capsule Take 6 capsules (6 mg) by mouth 2 times daily     No current facility-administered medications for this visit.      Medications Discontinued During This Encounter   Medication Reason     furosemide (LASIX) 40 MG tablet      hydrOXYzine (ATARAX) 25 MG tablet      magnesium oxide (MAG-OX) 400 (241.3 Mg) MG tablet Reorder     phosphorus tablet 250 mg (PHOSPHORUS TABLET 250 MG) 250 MG per tablet Reorder     potassium chloride ER (K-TAB) 20 MEQ CR tablet      traMADol (ULTRAM) 50 MG tablet      valGANciclovir (VALCYTE) 450 MG tablet        Physical Exam   Vital Signs: BP (!) 136/95   Pulse 81   Temp 98.5   F (36.9  C)   Wt 63.5 kg (140 lb 1.6 oz)   SpO2 100%   BMI 23.92 kg/m      GENERAL APPEARANCE: alert and no distress  HENT: mouth without ulcers or lesions  LYMPHATICS: no cervical or supraclavicular nodes  RESP: lungs clear to auscultation - no rales, rhonchi or wheezes  CV: regular rhythm, normal rate, no rub, no murmur  EDEMA: trace LE edema bilaterally  ABDOMEN: soft, nondistended, nontender, bowel sounds normal  MS: extremities normal - no gross deformities noted, no evidence of inflammation in joints, no muscle tenderness  SKIN: no rash    Data     Renal Latest Ref Rng & Units 8/3/2020 7/29/2020 7/24/2020   Na 133 - 144 mmol/L 139 138 139   K 3.4 - 5.3 mmol/L 3.8 4.1 3.6   Cl 94 - 109 mmol/L 107 111(H) 110(H)   CO2 20 - 32 mmol/L 22 20 24   BUN 7 - 30 mg/dL 18 21 30   Cr 0.52 - 1.04 mg/dL 0.70 0.77 0.99   Glucose 70 - 99 mg/dL 90 109(H) 88   Ca  8.5 - 10.1 mg/dL 8.9 8.7 8.6   Mg 1.6 - 2.3 mg/dL 0.9(LL) 1.1(L) 1.4(L)     Bone Health Latest Ref Rng & Units 8/3/2020 7/29/2020 7/24/2020   Phos 2.5 - 4.5 mg/dL 1.5(L) 1.4(L) 1.6(L)   PTHi 18 - 80 pg/mL - - -   Vit D Def 20 - 75 ug/L - - -     Heme Latest Ref Rng & Units 8/3/2020 7/29/2020 7/24/2020   WBC 4.0 - 11.0 10e9/L 5.5 4.8 5.7   Hgb 11.7 - 15.7 g/dL 9.9(L) 10.1(L) 8.8(L)   Plt 150 - 450 10e9/L 288 199 162   ABSOLUTE NEUTROPHIL 1.6 - 8.3 10e9/L - - 5.3   ABSOLUTE LYMPHOCYTES 0.8 - 5.3 10e9/L - - 0.2(L)   ABSOLUTE MONOCYTES 0.0 - 1.3 10e9/L - - 0.2   ABSOLUTE EOSINOPHILS 0.0 - 0.7 10e9/L - - 0.0   ABSOLUTE BASOPHILS 0.0 - 0.2 10e9/L - - 0.0   ABS IMMATURE GRANULOCYTES 0 - 0.4 10e9/L - - 0.1   ABSOLUTE NUCLEATED RBC - - - 0.0     Liver Latest Ref Rng & Units 7/24/2020 7/20/2020 7/13/2020   AP 40 - 150 U/L 104 - 40   TBili 0.2 - 1.3 mg/dL 0.8 - 1.1   DBili 0.0 - 0.2 mg/dL - - -   ALT 0 - 50 U/L 215(H) - 29   AST 0 - 45 U/L 98(H) - 18   Tot Protein 6.8 - 8.8 g/dL 7.9 - 5.4(L)   Albumin 3.4 - 5.0 g/dL 4.2 3.5 2.9(L)     Pancreas Latest Ref Rng & Units 6/19/2020  5/30/2015 10/6/2014   A1C 0 - 5.6 % 5.1 - -   Lipase 73 - 393 U/L - 126 100     Iron studies Latest Ref Rng & Units 7/5/2020 6/25/2020 11/30/2012   Iron 35 - 180 ug/dL 10(L) 57 125   Iron sat 15 - 46 % 9(L) 35 81(H)   Ferritin 12 - 150 ng/mL - 886(H) 401(H)     [facility] Txp Virology Latest Ref Rng & Units 7/20/2020 7/9/2020 7/8/2020   CVM DNA Quant - - - -   CMV Quant <100 Copies/mL - - -   CMV QT Log <2.0 Log copies/mL - - -   BK Spec - Plasma - -   BK Res BKNEG:BK Virus DNA Not Detected copies/mL BK Virus DNA Not Detected - -   BK Log <2.7 Log copies/mL Not Calculated - -   EBV VCA IGG ANTIBODY U/mL - - -   EBV CAPSID ANTIBODY IGG 0.0 - 0.8 AI - - -   EBV DNA COPIES/ML <1000 Copies/mL - - -   EBV DNA LOG OF COPIES <3.0 Log copies/mL - - -   Hep B Core NR:Nonreactive - Reactive(AA) Reactive(AA)   Hep B Surf - - - -   HIV 1&2 NEG - - -        Recent Labs   Lab Test 07/22/20  0845 07/24/20  0943 07/29/20  0916   DOSTAC Not Provided 7/24/20 0935 07/28/20  2100   TACROL 12.1 12.2 6.0     Recent Labs   Lab Test 06/26/20  0842 07/20/20  0959 07/24/20  0943   DOSMPA 8:00 pm 6/25/2020 Not Provided 7/24/20 0935   MPACID 2.00 3.01 3.13   MPAG 32.2 >200.0* 154.1*       Again, thank you for allowing me to participate in the care of your patient.      Sincerely,    [facility] Transplant

## 2020-07-24 NOTE — NURSING NOTE
"Chief Complaint   Patient presents with     RECHECK     Follow up Kidney TX rejection     Vital signs:  Temp: 98.5  F (36.9  C)   BP: (!) 136/95 Pulse: 81     SpO2: 100 %       Weight: 63.5 kg (140 lb 1.6 oz)  Estimated body mass index is 23.92 kg/m  as calculated from the following:    Height as of 7/9/20: 1.63 m (5' 4.17\").    Weight as of this encounter: 63.5 kg (140 lb 1.6 oz).      Marni Avilez, CMA    "

## 2020-07-24 NOTE — PATIENT INSTRUCTIONS
1. Stop lasix  2. Change tacrolimus to 4 mg twice daily  3.  the magnesium and the phosphorus supplement and start taking   4. Stop potassium  5. Increase Valcyte to 1 tablet daily

## 2020-07-27 ENCOUNTER — TELEPHONE (OUTPATIENT)
Dept: PHARMACY | Facility: CLINIC | Age: 35
End: 2020-07-27

## 2020-07-27 ENCOUNTER — TELEPHONE (OUTPATIENT)
Dept: NEPHROLOGY | Facility: CLINIC | Age: 35
End: 2020-07-27

## 2020-07-27 ENCOUNTER — TELEPHONE (OUTPATIENT)
Dept: TRANSPLANT | Facility: CLINIC | Age: 35
End: 2020-07-27

## 2020-07-27 NOTE — TELEPHONE ENCOUNTER
Mahesh Armando MD Schindelholz, Alanna, PERI; Mulu Arguello, RN               We will need to repeat them in 2 weeks   We can check hep B dna (she was core neg before the IVIG) so I suspect that is the IVIG rather than an infection.   She is completely off dialysis as of (7/17) she is now off lasix. Needs labs twice weekly.   Last tac was off because she took it 10 min before draw so no changes.      LPN task: please send orders to home care (hepatic panel, hep B DNA). Patient to continue twice weekly labs draws. No tacrolimus dose change at this time.

## 2020-07-27 NOTE — TELEPHONE ENCOUNTER
Clinical Pharmacy Consult:                                                      Transplant Specific: 1 Month Post Transplant Call  Date of Transplant: 6/19/20  Type of Transplant: kidney  First Transplant: no, #2  History of rejection: yes    Immunosuppression Regimen   TAC 5mg qAM & 5mg qPM, Prednisone 10mg qAM and Myforitic 720mg qAM & 720mg qPM  Patient specific goal: 8-10  Most recent level: 12.2, date 7/24/20  Immunosuppressant Levels:  Supratherapeutic  Pt adherent to lab draws: yes  Scr:   Lab Results   Component Value Date    CR 0.99 07/24/2020     Side effects: no side effects    Prophylactic Medications  Antibacterial:  Bactrim SS daily  Scheduled Discontinue Date: Lifelong    Antifungal: Not needed thus far  Scheduled Discontinue Date: N/A    Antiviral: CrCl 40 to 59 mL/minute: Valcyte 450 mg once daily   Scheduled Discontinue Date: 3 months    Acid Reducer: Protonix (pantoprazole)  Scheduled Reviewed Date: MD to determine    Thrombosis Prevention: Aspirin 81 mg PO daily  Scheduled Discontinue Date: MD to determine    Blood Pressure Management  Frequency of home Blood Pressure checks: twice daily  Most recent home BP: 138/90  Patient Blood pressure goal: <140/90  Patient blood pressure at goal:  yes  Hospitalizations/ER visits since last assessment: 1, acute rejection    Medication adherence flowsheet 7/27/2020   Patient medication administration: Responsible for own medications   Patient estimated adherence level: %   Pharmacist assessment of adherence: Excellent   Patient reported doses missed per week: 0-1   Pharmacy MPR: -   Facilitators to medication adherence  Cell phone;Medication dosing chart;Pill box   Patient reported barriers to medication adherence  No issues identified   Adherence intervention(s): -      Medication access flowsheet 7/27/2020   Number of pharmacies used: 2   Pharmacy: [facility];Other   Other pharmacy: [facility]   Enrolled in [facility]? Yes    Patient reported barriers to accessing medications: No issues reported by patient   Medication access interventions: No issues identified      Med rec/DUR performed: yes  Med Rec Discrepancies: no    Jelly is doing ok.  She was hospitalized from 7/4-7/18 for acute rejection.  She is feeling better and the extra fluid she is retaining is coming off.  She uses alarms as reminders and currently has homecare who is setting up her pill box, but she is capable of doing this herself.  This is her 2nd transplant.  She has not missed any doses.  She denies any side effects at this time.  Her blood pressure is currently under control and she has been checking one to 2 times per day.  No questions or concerns today.  Went over how to order her medications for [facility].  She does want her medications delivered, so she will reach out if she is getting low on anything.      Will follow up in one month.    Simran Mittal [facility]

## 2020-07-27 NOTE — LETTER
PHYSICIAN ORDERS      DATE & TIME ISSUED: 2020 9:12 AM  PATIENT NAME: Jelly Dietz   : 1985     [facility] MR# [if applicable]: [id number]     DIAGNOSIS:  Kidney Transplant  ICD-10 CODE: Z94.0     Please complete the following labs at the next routine transplant lab draw:  hepatic panel  Hep B DNA    Any questions please call: [phone]  Please fax lab results to [phone].      Dr. Mahesh Armando

## 2020-07-28 ENCOUNTER — TELEPHONE (OUTPATIENT)
Dept: TRANSPLANT | Facility: CLINIC | Age: 35
End: 2020-07-28

## 2020-07-28 LAB
MYCOPHENOLATE SERPL LC/MS/MS-MCNC: 3.13 MG/L (ref 1–3.5)
MYCOPHENOLATE-G SERPL LC/MS/MS-MCNC: 154.1 MG/L (ref 30–95)
TME LAST DOSE: ABNORMAL H

## 2020-07-28 NOTE — TELEPHONE ENCOUNTER
[facility] now requests orders and shares plan of care/discharge summaries for some patients through [employer].  Please REPLY TO THIS MESSAGE OR ROUTE BACK TO THE AUTHOR in order to give authorization for orders when needed.  This is considered a verbal order, you will still receive a faxed copy of orders for signature.  Thank you for your assistance in improving collaboration for our patients.    ORDER: I am homecare nurse going to see Pt tomorrow to draw Hepatic Panel, Hep B.  Just wanting to verify also that we are taking over also starting tomorrow in drawing the transplant labs per the transplant lab letter that was recently ordered back at the beginning of the month.  From what I see she should be at    Labs 2x/week (Monday and Thursday)    CBC with platelets    Basic Metabolic Panel (Sodium, Potassium, Chloride, Creatinine, CO2, Urea Nitrogen, gl ucose, Calcium)    Tacrolimus/Prograf/ drug level  Labs weekly (Monday)    Phosphorus, magnesium  Biweekly    Mycophenolic Acid  Monthly    BK PCR QT    Feel free to call with any questions.  Thanks.  Juaquin Donovan, EDUARDON.  [phone]

## 2020-07-29 ENCOUNTER — TELEPHONE (OUTPATIENT)
Dept: TRANSPLANT | Facility: CLINIC | Age: 35
End: 2020-07-29

## 2020-07-29 DIAGNOSIS — Z94.0 KIDNEY REPLACED BY TRANSPLANT: ICD-10-CM

## 2020-07-29 DIAGNOSIS — Z79.899 LONG TERM USE OF DRUG: ICD-10-CM

## 2020-07-29 LAB
ANION GAP SERPL CALCULATED.3IONS-SCNC: 7 MMOL/L (ref 3–14)
BUN SERPL-MCNC: 21 MG/DL (ref 7–30)
CALCIUM SERPL-MCNC: 8.7 MG/DL (ref 8.5–10.1)
CHLORIDE SERPL-SCNC: 111 MMOL/L (ref 94–109)
CO2 SERPL-SCNC: 20 MMOL/L (ref 20–32)
CREAT SERPL-MCNC: 0.77 MG/DL (ref 0.52–1.04)
ERYTHROCYTE [DISTWIDTH] IN BLOOD BY AUTOMATED COUNT: 16.3 % (ref 10–15)
GFR SERPL CREATININE-BSD FRML MDRD: >90 ML/MIN/{1.73_M2}
GLUCOSE SERPL-MCNC: 109 MG/DL (ref 70–99)
HCT VFR BLD AUTO: 32.5 % (ref 35–47)
HGB BLD-MCNC: 10.1 G/DL (ref 11.7–15.7)
MAGNESIUM SERPL-MCNC: 1.1 MG/DL (ref 1.6–2.3)
MCH RBC QN AUTO: 30.1 PG (ref 26.5–33)
MCHC RBC AUTO-ENTMCNC: 31.1 G/DL (ref 31.5–36.5)
MCV RBC AUTO: 97 FL (ref 78–100)
PHOSPHATE SERPL-MCNC: 1.4 MG/DL (ref 2.5–4.5)
PLATELET # BLD AUTO: 199 10E9/L (ref 150–450)
POTASSIUM SERPL-SCNC: 4.1 MMOL/L (ref 3.4–5.3)
RBC # BLD AUTO: 3.36 10E12/L (ref 3.8–5.2)
SODIUM SERPL-SCNC: 138 MMOL/L (ref 133–144)
TACROLIMUS BLD-MCNC: 6 UG/L (ref 5–15)
TME LAST DOSE: NORMAL H
WBC # BLD AUTO: 4.8 10E9/L (ref 4–11)

## 2020-07-29 PROCEDURE — 80197 ASSAY OF TACROLIMUS: CPT | Performed by: INTERNAL MEDICINE

## 2020-07-29 NOTE — TELEPHONE ENCOUNTER
Post Kidney and Pancreas Transplant Team Conference  Date: 7/29/2020  Transplant Coordinator: Mulu Arguello     Attendees:  [x]  Dr. Anglin  [x] Hollie Hong LPN     []  Dr. Armando [x] Mulu Arguello RN [] Tracy Wells LPN   [x]  Dr. Peraza [] Birgit Solano, RN     [] Chloe Lechuga RN [x] Anam Hermosillo, PharmD   [] Dr. Altman [x] Lucina Jacinto, PERI    [x] Dr. Morgan [] Ron Terrazas RN    [x] Dr. Lakhani [] Laura Cervantes, PERI    [x] Dr. Younger [] Faustina Aragon, RN     [x] Yoli Mccoy, PERI    [] Surgery Fellow [x] Chelsea Simpson RN    [x] Kailey Santos, EVA [] Sharon Lujan RN        Verbal Plan Read Back:   Closure biopsy needed    Routed to RN Coordinator   Hollie Hong LPN

## 2020-07-30 ENCOUNTER — TELEPHONE (OUTPATIENT)
Dept: TRANSPLANT | Facility: CLINIC | Age: 35
End: 2020-07-30

## 2020-07-30 NOTE — TELEPHONE ENCOUNTER
LVM   Called confirmed tacrolimus  Level 6   Confirmed yesterday tacrolimus  4 mg twice per day   Plan to increase tacrolimus            Kieran Anglin MD Huepfel, Mulu BAI RN               Improving kidney function, but low serum magnesium level and recommend increasing magnesium oxide to 800 mg at lunch and supper.

## 2020-07-31 ENCOUNTER — TELEPHONE (OUTPATIENT)
Dept: TRANSPLANT | Facility: CLINIC | Age: 35
End: 2020-07-31

## 2020-07-31 NOTE — TELEPHONE ENCOUNTER
Called times one went to direct VM   Left urgent message to call transplant  Office or to answer phone   Low tacrolimus level   Post kidney rejection that required dialysis

## 2020-08-03 ENCOUNTER — MEDICAL CORRESPONDENCE (OUTPATIENT)
Dept: HEALTH INFORMATION MANAGEMENT | Facility: CLINIC | Age: 35
End: 2020-08-03

## 2020-08-03 DIAGNOSIS — Z94.0 KIDNEY TRANSPLANTED: Primary | Chronic | ICD-10-CM

## 2020-08-03 DIAGNOSIS — Z94.0 KIDNEY TRANSPLANTED: Primary | ICD-10-CM

## 2020-08-03 LAB
ANION GAP SERPL CALCULATED.3IONS-SCNC: 10 MMOL/L (ref 3–14)
BUN SERPL-MCNC: 18 MG/DL (ref 7–30)
CALCIUM SERPL-MCNC: 8.9 MG/DL (ref 8.5–10.1)
CHLORIDE SERPL-SCNC: 107 MMOL/L (ref 94–109)
CO2 SERPL-SCNC: 22 MMOL/L (ref 20–32)
CREAT SERPL-MCNC: 0.7 MG/DL (ref 0.52–1.04)
ERYTHROCYTE [DISTWIDTH] IN BLOOD BY AUTOMATED COUNT: 16 % (ref 10–15)
FUNGUS SPEC CULT: NORMAL
FUNGUS SPEC CULT: NORMAL
GFR SERPL CREATININE-BSD FRML MDRD: >90 ML/MIN/{1.73_M2}
GLUCOSE SERPL-MCNC: 90 MG/DL (ref 70–99)
HCT VFR BLD AUTO: 30.4 % (ref 35–47)
HGB BLD-MCNC: 9.9 G/DL (ref 11.7–15.7)
Lab: NORMAL
Lab: NORMAL
MAGNESIUM SERPL-MCNC: 0.9 MG/DL (ref 1.6–2.3)
MCH RBC QN AUTO: 30.6 PG (ref 26.5–33)
MCHC RBC AUTO-ENTMCNC: 32.6 G/DL (ref 31.5–36.5)
MCV RBC AUTO: 94 FL (ref 78–100)
PHOSPHATE SERPL-MCNC: 1.5 MG/DL (ref 2.5–4.5)
PLATELET # BLD AUTO: 288 10E9/L (ref 150–450)
POTASSIUM SERPL-SCNC: 3.8 MMOL/L (ref 3.4–5.3)
RBC # BLD AUTO: 3.24 10E12/L (ref 3.8–5.2)
SODIUM SERPL-SCNC: 139 MMOL/L (ref 133–144)
SPECIMEN SOURCE: NORMAL
SPECIMEN SOURCE: NORMAL
TACROLIMUS BLD-MCNC: 18.5 UG/L (ref 5–15)
TME LAST DOSE: ABNORMAL H
WBC # BLD AUTO: 5.5 10E9/L (ref 4–11)

## 2020-08-03 PROCEDURE — 80197 ASSAY OF TACROLIMUS: CPT | Performed by: SURGERY

## 2020-08-03 PROCEDURE — 80048 BASIC METABOLIC PNL TOTAL CA: CPT | Performed by: SURGERY

## 2020-08-03 PROCEDURE — 83735 ASSAY OF MAGNESIUM: CPT | Performed by: SURGERY

## 2020-08-03 PROCEDURE — 85027 COMPLETE CBC AUTOMATED: CPT | Performed by: SURGERY

## 2020-08-03 PROCEDURE — 80180 DRUG SCRN QUAN MYCOPHENOLATE: CPT | Performed by: SURGERY

## 2020-08-03 PROCEDURE — 84100 ASSAY OF PHOSPHORUS: CPT | Performed by: SURGERY

## 2020-08-03 RX ORDER — MAGNESIUM SULFATE HEPTAHYDRATE 40 MG/ML
2 INJECTION, SOLUTION INTRAVENOUS ONCE
Status: CANCELLED
Start: 2020-08-03

## 2020-08-03 RX ORDER — TACROLIMUS 1 MG/1
6 CAPSULE ORAL 2 TIMES DAILY
Qty: 360 CAPSULE | Refills: 3 | Status: SHIPPED | OUTPATIENT
Start: 2020-08-03 | End: 2020-08-07

## 2020-08-03 NOTE — PROGRESS NOTES
ACUTE TRANSPLANT NEPHROLOGY VISIT    Assessment & Plan    # Severe Acute rejection: Mixed treated with improving creatinine, < 1 today     # DDKT: creatinine is somewhat better, last dialysis 7/17/20   - Baseline Cr ~ initially less than 1 mg/dL, TBD   - Proteinuria: Not checked recently   - Date DSA Last Checked: 7/2020      Latest DSA: No   - BK Viremia: No   - Kidney Tx Biopsy: Jul 04, 2020; Result: severe [place] IIA with AbMR features     # Immunosuppression: Tacrolimus immediate release (goal 8-10), Mycophenolic acid (dose 720 mg every 12 hours) and Prednisone (dose taper)   - Changes: prednisone 15 mg through 7/27 then 10 mg through 8/3 then 5 mg starting 8/10     # Infection Prophylaxis:   - PJP: Sulfa/TMP (Bactrim)  - CMV: Valcyte    # Hypertension: Controlled;  Goal BP: < 140/90   - Volume status: euvolemic  EDW ~ 128Lb   - Changes: will discontinue lasix at this point     # Anemia in Chronic Renal Disease: Hgb: Stable      ENZO: Yes   - Iron studies: Replete    # Mineral Bone Disorder:   - Calcium; level: Normal        On supplement: No  - Phosphorus; level: Low        On supplement: started supplement     # Electrolytes:   - Potassium; level: Low        On supplement: continue supplement   - Magnesium; level: low        On supplement: continue supplement     # Medical Compliance: Yes    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 Donor Class:   Crossmatch at time of Tx: negative  DSA at time of Tx: No  Significant changes in immunosuppression: steroid maintenance due to early mixed rejection   CMV IgG Ab High Risk Discordance (D+/R-): No  EBV IgG Ab High Risk Discordance (D+/R-): No  Significant transplant-related complications: early acute rejection possible Non-HLA with an element of memory response     Transplant Office Phone Number: [phone]    Assessment and plan was  discussed with the patient and she voiced her understanding and agreement.    Return visit: Return in about 2 weeks (around 8/7/2020) for Video Visit.    Mahesh Armando MD    Chief Complaint   Ms. Dietz is a 35 year old here for immunosuppression management and follow-up after hospitalization.     History of Present Illness     Evens is a 35-year-old lady with end-stage kidney disease status post second kidney transplant.  Her post transplant course was complicated by severe mixed early acute rejection around 2 weeks from her engraftment.  She did not have donor specific antibodies that we were able to detect.  Her blood was sent to an outside lab for AT-1R assessment but we may not have results due to the lack of test kits.  She was empirically started on losartan 12.5 mg after she had completed 5 session course of plasmapheresis IVIG.  She also received a single dose rituximab 500 mg x 1.  Additionally a course of thymoglobulin with last dose received prior to discharge.  Her weight is significantly down and within 5 pounds of her estimated dry weight.  Due to her frequent urination and profound hypo-kalemia and hypomagnesemia we decided to stop her diuretic.  Her creatinine has improved vastly to under 1 mg/dL.  She feels great and her energy level is coming back.  Her appetite is coming back as well.  She plans on hydrating herself briskly.  She specifically denied any chest pain shortness of breath nausea vomiting or diarrhea.  She knows her medications well.  We updated her magnesium and phosphorus prescription as she was not able to collect them.    Review of Systems   A comprehensive review of systems was obtained and negative, except as noted in the HPI or PMH.    Problem List   Patient Active Problem List   Diagnosis     CARDIOVASCULAR SCREENING; LDL GOAL LESS THAN 160     Kidney replaced by transplant     HTN, kidney transplant related     ACS (acute coronary syndrome) (H)     Anemia in chronic renal  disease     IgA nephropathy     Anxiety     Kidney transplanted     Immunosuppression (H)     Painful bladder spasm     Hypophosphatemia     Constipation due to pain medication     Aftercare following organ transplant     Hypomagnesemia     Dehydration     Acute renal failure (ARF) (H)     Acute rejection of kidney transplant     Metabolic acidosis     Steroid-induced hyperglycemia     Nausea vomiting and diarrhea     Hypervolemia     Anemia       Social History   Social History     Tobacco Use     Smoking status: Never Smoker     Smokeless tobacco: Never Used   Substance Use Topics     Alcohol use: No     Drug use: No       Allergies   No Known Allergies    Medications   Current Outpatient Medications   Medication Sig     acetaminophen (TYLENOL) 325 MG tablet Take 2 tablets (650 mg) by mouth every 4 hours as needed for other (multimodal surgical pain management along with NSAIDS and opioid medication as indicated based on pain control and physical function.)     amLODIPine (NORVASC) 5 MG tablet Take 1 tablet (5 mg) by mouth daily     aspirin (ASA) 81 MG chewable tablet Take 1 tablet (81 mg) by mouth daily     atorvastatin (LIPITOR) 10 MG tablet Take 1 tablet (10 mg) by mouth daily     carvedilol (COREG) 6.25 MG tablet Take 1 tablet (6.25 mg) by mouth 2 times daily     cetirizine (ZYRTEC) 10 MG tablet Take 10 mg by mouth nightly as needed for allergies      epoetin staci-epbx (RETACRIT) 72643 UNIT/ML injection Inject 0.2 mLs (2,000 Units) Subcutaneous three times a week     Lidocaine (LIDOCARE) 4 % Patch Place 3 patches onto the skin every 24 hours To prevent lidocaine toxicity, patient should be patch free for 12 hrs daily.     losartan (COZAAR) 25 MG tablet Take 0.5 tablets (12.5 mg) by mouth At Bedtime     magnesium oxide (MAG-OX) 400 (241.3 Mg) MG tablet Take 2 tablets (800 mg) by mouth daily     menthol (ICY HOT) 5 % PTCH Apply 1 patch topically every 8 hours as needed for muscle soreness     mycophenolic acid  (GENERIC EQUIVALENT) 180 MG EC tablet Take 4 tablets (720 mg) by mouth 2 times daily     ondansetron (ZOFRAN-ODT) 4 MG ODT tab Take 1 tablet (4 mg) by mouth 3 times daily (before meals)     pantoprazole (PROTONIX) 40 MG EC tablet Take 1 tablet (40 mg) by mouth 2 times daily     phosphorus tablet 250 mg (PHOSPHORUS TABLET 250 MG) 250 MG per tablet Take 2 tablets (500 mg) by mouth 2 times daily     polyethylene glycol (MIRALAX) 17 g packet Take 17 g by mouth daily     predniSONE (DELTASONE) 5 MG tablet Take 3 tablets (15 mg) by mouth daily for 7 days, THEN 2 tablets (10 mg) daily for 7 days, THEN 1 tablet (5 mg) daily for 7 days.     senna-docusate (SENOKOT-S/PERICOLACE) 8.6-50 MG tablet Take 1-2 tablets by mouth 2 times daily     sulfamethoxazole-trimethoprim (BACTRIM) 400-80 MG tablet Take 1 tablet by mouth three times a week     valGANciclovir (VALCYTE) 450 MG tablet Take 1 tablet (450 mg) by mouth daily     Vitamin D3 (CHOLECALCIFEROL) 25 mcg (1000 units) tablet Take 2 tablets (50 mcg) by mouth daily     tacrolimus (GENERIC EQUIVALENT) 1 MG capsule Take 6 capsules (6 mg) by mouth 2 times daily     No current facility-administered medications for this visit.      Medications Discontinued During This Encounter   Medication Reason     furosemide (LASIX) 40 MG tablet      hydrOXYzine (ATARAX) 25 MG tablet      magnesium oxide (MAG-OX) 400 (241.3 Mg) MG tablet Reorder     phosphorus tablet 250 mg (PHOSPHORUS TABLET 250 MG) 250 MG per tablet Reorder     potassium chloride ER (K-TAB) 20 MEQ CR tablet      traMADol (ULTRAM) 50 MG tablet      valGANciclovir (VALCYTE) 450 MG tablet        Physical Exam   Vital Signs: BP (!) 136/95   Pulse 81   Temp 98.5  F (36.9  C)   Wt 63.5 kg (140 lb 1.6 oz)   SpO2 100%   BMI 23.92 kg/m      GENERAL APPEARANCE: alert and no distress  HENT: mouth without ulcers or lesions  LYMPHATICS: no cervical or supraclavicular nodes  RESP: lungs clear to auscultation - no rales, rhonchi or  wheezes  CV: regular rhythm, normal rate, no rub, no murmur  EDEMA: trace LE edema bilaterally  ABDOMEN: soft, nondistended, nontender, bowel sounds normal  MS: extremities normal - no gross deformities noted, no evidence of inflammation in joints, no muscle tenderness  SKIN: no rash    Data     Renal Latest Ref Rng & Units 8/3/2020 7/29/2020 7/24/2020   Na 133 - 144 mmol/L 139 138 139   K 3.4 - 5.3 mmol/L 3.8 4.1 3.6   Cl 94 - 109 mmol/L 107 111(H) 110(H)   CO2 20 - 32 mmol/L 22 20 24   BUN 7 - 30 mg/dL 18 21 30   Cr 0.52 - 1.04 mg/dL 0.70 0.77 0.99   Glucose 70 - 99 mg/dL 90 109(H) 88   Ca  8.5 - 10.1 mg/dL 8.9 8.7 8.6   Mg 1.6 - 2.3 mg/dL 0.9(LL) 1.1(L) 1.4(L)     Bone Health Latest Ref Rng & Units 8/3/2020 7/29/2020 7/24/2020   Phos 2.5 - 4.5 mg/dL 1.5(L) 1.4(L) 1.6(L)   PTHi 18 - 80 pg/mL - - -   Vit D Def 20 - 75 ug/L - - -     Heme Latest Ref Rng & Units 8/3/2020 7/29/2020 7/24/2020   WBC 4.0 - 11.0 10e9/L 5.5 4.8 5.7   Hgb 11.7 - 15.7 g/dL 9.9(L) 10.1(L) 8.8(L)   Plt 150 - 450 10e9/L 288 199 162   ABSOLUTE NEUTROPHIL 1.6 - 8.3 10e9/L - - 5.3   ABSOLUTE LYMPHOCYTES 0.8 - 5.3 10e9/L - - 0.2(L)   ABSOLUTE MONOCYTES 0.0 - 1.3 10e9/L - - 0.2   ABSOLUTE EOSINOPHILS 0.0 - 0.7 10e9/L - - 0.0   ABSOLUTE BASOPHILS 0.0 - 0.2 10e9/L - - 0.0   ABS IMMATURE GRANULOCYTES 0 - 0.4 10e9/L - - 0.1   ABSOLUTE NUCLEATED RBC - - - 0.0     Liver Latest Ref Rng & Units 7/24/2020 7/20/2020 7/13/2020   AP 40 - 150 U/L 104 - 40   TBili 0.2 - 1.3 mg/dL 0.8 - 1.1   DBili 0.0 - 0.2 mg/dL - - -   ALT 0 - 50 U/L 215(H) - 29   AST 0 - 45 U/L 98(H) - 18   Tot Protein 6.8 - 8.8 g/dL 7.9 - 5.4(L)   Albumin 3.4 - 5.0 g/dL 4.2 3.5 2.9(L)     Pancreas Latest Ref Rng & Units 6/19/2020 5/30/2015 10/6/2014   A1C 0 - 5.6 % 5.1 - -   Lipase 73 - 393 U/L - 126 100     Iron studies Latest Ref Rng & Units 7/5/2020 6/25/2020 11/30/2012   Iron 35 - 180 ug/dL 10(L) 57 125   Iron sat 15 - 46 % 9(L) 35 81(H)   Ferritin 12 - 150 ng/mL - 886(H) 401(H)     [facility]  Txp Virology Latest Ref Rng & Units 7/20/2020 7/9/2020 7/8/2020   CVM DNA Quant - - - -   CMV Quant <100 Copies/mL - - -   CMV QT Log <2.0 Log copies/mL - - -   BK Spec - Plasma - -   BK Res BKNEG:BK Virus DNA Not Detected copies/mL BK Virus DNA Not Detected - -   BK Log <2.7 Log copies/mL Not Calculated - -   EBV VCA IGG ANTIBODY U/mL - - -   EBV CAPSID ANTIBODY IGG 0.0 - 0.8 AI - - -   EBV DNA COPIES/ML <1000 Copies/mL - - -   EBV DNA LOG OF COPIES <3.0 Log copies/mL - - -   Hep B Core NR:Nonreactive - Reactive(AA) Reactive(AA)   Hep B Surf - - - -   HIV 1&2 NEG - - -        Recent Labs   Lab Test 07/22/20  0845 07/24/20  0943 07/29/20  0916   DOSTAC Not Provided 7/24/20 0935 07/28/20  2100   TACROL 12.1 12.2 6.0     Recent Labs   Lab Test 06/26/20  0842 07/20/20  0959 07/24/20  0943   DOSMPA 8:00 pm 6/25/2020 Not Provided 7/24/20 0935   MPACID 2.00 3.01 3.13   MPAG 32.2 >200.0* 154.1*

## 2020-08-03 NOTE — TELEPHONE ENCOUNTER
Spoke  To Jelly who called the transplant  Office several times but did not leave voicemail     Reviewed to leave a voicemail    Confirmed increased tacrolimus  To 6 mg on 7/30/2020  Confirmed  mag oxide 2 tabs once per day

## 2020-08-03 NOTE — TELEPHONE ENCOUNTER
Issue Mag 0.9    Reviewed labs with  Dr Anglin   Ordered 1 L of LR    Ordered 2 grams of Mag sulfate IV   Increased oral mag sulfate     Dr Anglin questioned if may lower PPI  Or follow up with gi     2nd Issue   Slight increase of LFT's   Repeated within normal    3rd Issue  Due to rejection and need for additional Thymoglobulin   Requested repeat CMV  PCR neg  4th issue  Increased Valcyte dosing

## 2020-08-04 ENCOUNTER — INFUSION THERAPY VISIT (OUTPATIENT)
Dept: INFUSION THERAPY | Facility: CLINIC | Age: 35
End: 2020-08-04
Attending: INTERNAL MEDICINE
Payer: MEDICARE

## 2020-08-04 VITALS
HEART RATE: 87 BPM | TEMPERATURE: 97.9 F | SYSTOLIC BLOOD PRESSURE: 136 MMHG | RESPIRATION RATE: 16 BRPM | DIASTOLIC BLOOD PRESSURE: 87 MMHG | OXYGEN SATURATION: 100 %

## 2020-08-04 DIAGNOSIS — Z94.0 KIDNEY TRANSPLANTED: Primary | ICD-10-CM

## 2020-08-04 DIAGNOSIS — Z48.298 AFTERCARE FOLLOWING ORGAN TRANSPLANT: ICD-10-CM

## 2020-08-04 DIAGNOSIS — N17.9 ACUTE RENAL FAILURE, UNSPECIFIED ACUTE RENAL FAILURE TYPE (H): ICD-10-CM

## 2020-08-04 DIAGNOSIS — Z94.0 KIDNEY TRANSPLANTED: ICD-10-CM

## 2020-08-04 DIAGNOSIS — E86.0 DEHYDRATION: Primary | ICD-10-CM

## 2020-08-04 LAB
ALBUMIN SERPL-MCNC: 4 G/DL (ref 3.4–5)
ALP SERPL-CCNC: 133 U/L (ref 40–150)
ALT SERPL W P-5'-P-CCNC: 41 U/L (ref 0–50)
ANION GAP SERPL CALCULATED.3IONS-SCNC: 10 MMOL/L (ref 3–14)
AST SERPL W P-5'-P-CCNC: 16 U/L (ref 0–45)
BILIRUB SERPL-MCNC: 0.5 MG/DL (ref 0.2–1.3)
BUN SERPL-MCNC: 14 MG/DL (ref 7–30)
CALCIUM SERPL-MCNC: 8.5 MG/DL (ref 8.5–10.1)
CHLORIDE SERPL-SCNC: 111 MMOL/L (ref 94–109)
CO2 SERPL-SCNC: 19 MMOL/L (ref 20–32)
CREAT SERPL-MCNC: 0.75 MG/DL (ref 0.52–1.04)
GFR SERPL CREATININE-BSD FRML MDRD: >90 ML/MIN/{1.73_M2}
GLUCOSE SERPL-MCNC: 122 MG/DL (ref 70–99)
POTASSIUM SERPL-SCNC: 3.5 MMOL/L (ref 3.4–5.3)
PROT SERPL-MCNC: 7.8 G/DL (ref 6.8–8.8)
SODIUM SERPL-SCNC: 140 MMOL/L (ref 133–144)

## 2020-08-04 PROCEDURE — 87517 HEPATITIS B DNA QUANT: CPT | Performed by: INTERNAL MEDICINE

## 2020-08-04 PROCEDURE — 96365 THER/PROPH/DIAG IV INF INIT: CPT

## 2020-08-04 PROCEDURE — 80053 COMPREHEN METABOLIC PANEL: CPT | Performed by: INTERNAL MEDICINE

## 2020-08-04 PROCEDURE — 25000128 H RX IP 250 OP 636: Mod: ZF | Performed by: INTERNAL MEDICINE

## 2020-08-04 PROCEDURE — 25800030 ZZH RX IP 258 OP 636: Mod: ZF | Performed by: INTERNAL MEDICINE

## 2020-08-04 RX ORDER — VALGANCICLOVIR 450 MG/1
900 TABLET, FILM COATED ORAL DAILY
Qty: 60 TABLET | Refills: 1 | Status: SHIPPED | OUTPATIENT
Start: 2020-08-04 | End: 2020-09-29

## 2020-08-04 RX ORDER — HEPARIN SODIUM,PORCINE 10 UNIT/ML
5 VIAL (ML) INTRAVENOUS
Status: CANCELLED | OUTPATIENT
Start: 2020-08-04

## 2020-08-04 RX ORDER — SULFAMETHOXAZOLE AND TRIMETHOPRIM 400; 80 MG/1; MG/1
1 TABLET ORAL DAILY
Qty: 60 TABLET | Refills: 30 | Status: SHIPPED | OUTPATIENT
Start: 2020-08-04 | End: 2021-09-03

## 2020-08-04 RX ORDER — HEPARIN SODIUM (PORCINE) LOCK FLUSH IV SOLN 100 UNIT/ML 100 UNIT/ML
5 SOLUTION INTRAVENOUS
Status: CANCELLED | OUTPATIENT
Start: 2020-08-04

## 2020-08-04 RX ORDER — MAGNESIUM SULFATE HEPTAHYDRATE 40 MG/ML
2 INJECTION, SOLUTION INTRAVENOUS ONCE
Status: COMPLETED | OUTPATIENT
Start: 2020-08-04 | End: 2020-08-04

## 2020-08-04 RX ORDER — MAGNESIUM SULFATE HEPTAHYDRATE 40 MG/ML
2 INJECTION, SOLUTION INTRAVENOUS ONCE
Status: CANCELLED
Start: 2020-08-04

## 2020-08-04 RX ADMIN — SODIUM CHLORIDE, POTASSIUM CHLORIDE, SODIUM LACTATE AND CALCIUM CHLORIDE 1000 ML: 600; 310; 30; 20 INJECTION, SOLUTION INTRAVENOUS at 13:09

## 2020-08-04 RX ADMIN — MAGNESIUM SULFATE IN WATER 2 G: 40 INJECTION, SOLUTION INTRAVENOUS at 13:19

## 2020-08-04 NOTE — LETTER
8/4/2020         RE: Jelly Dietz  [address]  [facility] [address]        Dear Colleague,    Thank you for referring your patient, Jelly Dietz, to the [facility] [place] [facility]. Please see a copy of my visit note below.    Nursing Note  Jelly Dietz [facility] for:   Chief Complaint   Patient presents with     Infusion     IV fluids, magnesium replacement     During today's [facility] appointment, orders from Dr. Kieran Anglin were completed.  Frequency: once    Progress note:  Patient identification verified by name and date of birth.  Assessment completed.  Vitals recorded in Doc Flowsheets.  Patient was provided with education regarding medication/procedure and possible side effects.  Patient verbalized understanding.     present during visit today: Not Applicable.     Treatment Conditions: Magnesium 0.9 on 8/3/2020    Premedications: were not ordered.    Drug Waste Record: No    Infusion length and rate: LR and magnesium given concurrently over approximately 1 hour.    Labs: were drawn per orders.     Vascular access: peripheral IV placed today.    Patient had questions on a couple medications in med box. RN checked med questioned meds to med card and medication list in computer. All accurate.     Post Infusion Assessment:  Patient tolerated infusion without incident.  Blood return noted pre and post infusion.  Site patent and intact, free from redness, edema or discomfort.  No evidence of extravasations.  Access discontinued per protocol.     Discharge Plan:   Follow up plan of care with: transplant coordinator.  Discharge instructions were reviewed with patient.  Patient/representative verbalized understanding of discharge instructions and all questions answered.  Patient discharged [facility] in stable condition.    Donnie Caro  "RN      Administrations This Visit     lactated ringers BOLUS 1,000 mL     Admin Date  08/04/2020 Action  New Bag Dose  1000 mL Route  Intravenous Administered By  Donnie Caro RN           Admin Date  08/04/2020 Action  Restarted Dose   Route  Intravenous Administered By  Donnie Caro RN          magnesium sulfate 2 g in water intermittent infusion     Admin Date  08/04/2020 Action  New Bag Dose  2 g Rate  50 mL/hr Route  Intravenous Administered By  Donnie Caro RN              Vital signs:  Temp: 97.9  F (36.6  C) Temp src: Oral BP: (!) 134/92(asymptomatic)   Heart Rate: 98 Resp: 18 SpO2: 100 % O2 Device: None (Room air)        Estimated body mass index is 23.92 kg/m  as calculated from the following:    Height as of 7/9/20: 1.63 m (5' 4.17\").    Weight as of 7/24/20: 63.5 kg (140 lb 1.6 oz).          Again, thank you for allowing me to participate in the care of your patient.        Sincerely,        [facility]    "

## 2020-08-04 NOTE — PROGRESS NOTES
Nursing Note  Jelly Dietz [facility] for:   Chief Complaint   Patient presents with     Infusion     IV fluids, magnesium replacement     During [facility]'s [facility] appointment, orders from Dr. Kieran Anglin were completed.  Frequency: once    Progress note:  Patient identification verified by name and date of birth.  Assessment completed.  Vitals recorded in Doc Flowsheets.  Patient was provided with education regarding medication/procedure and possible side effects.  Patient verbalized understanding.     present during visit today: Not Applicable.     Treatment Conditions: Magnesium 0.9 on 8/3/2020    Premedications: were not ordered.    Drug Waste Record: No    Infusion length and rate: LR and magnesium given concurrently over approximately 1 hour.    Labs: were drawn per orders.     Vascular access: peripheral IV placed today.    Patient had questions on a couple medications in med box. RN checked med questioned meds to med card and medication list in computer. All accurate.     Post Infusion Assessment:  Patient tolerated infusion without incident.  Blood return noted pre and post infusion.  Site patent and intact, free from redness, edema or discomfort.  No evidence of extravasations.  Access discontinued per protocol.     Discharge Plan:   Follow up plan of care with: transplant coordinator.  Discharge instructions were reviewed with patient.  Patient/representative verbalized understanding of discharge instructions and all questions answered.  Patient discharged [facility] in stable condition.    Donnie Caro RN      Administrations This Visit     lactated ringers BOLUS 1,000 mL     Admin Date  08/04/2020 Action  New Bag Dose  1000 mL Route  Intravenous Administered By  Donnie Caro RN           Admin Date  08/04/2020 Action  Restarted Dose   Route  Intravenous Administered  "By  Donnie Caro, RN          magnesium sulfate 2 g in water intermittent infusion     Admin Date  08/04/2020 Action  New Bag Dose  2 g Rate  50 mL/hr Route  Intravenous Administered By  Donnie Caro, RN              Vital signs:  Temp: 97.9  F (36.6  C) Temp src: Oral BP: (!) 134/92(asymptomatic)   Heart Rate: 98 Resp: 18 SpO2: 100 % O2 Device: None (Room air)        Estimated body mass index is 23.92 kg/m  as calculated from the following:    Height as of 7/9/20: 1.63 m (5' 4.17\").    Weight as of 7/24/20: 63.5 kg (140 lb 1.6 oz).        "

## 2020-08-05 ENCOUNTER — TELEPHONE (OUTPATIENT)
Dept: NEPHROLOGY | Facility: CLINIC | Age: 35
End: 2020-08-05

## 2020-08-05 LAB
CMV DNA SPEC NAA+PROBE-ACNC: NORMAL [IU]/ML
CMV DNA SPEC NAA+PROBE-LOG#: NORMAL {LOG_IU}/ML
MYCOPHENOLATE SERPL LC/MS/MS-MCNC: 2.59 MG/L (ref 1–3.5)
MYCOPHENOLATE-G SERPL LC/MS/MS-MCNC: 60.1 MG/L (ref 30–95)
SPECIMEN SOURCE: NORMAL
TME LAST DOSE: NORMAL H

## 2020-08-05 NOTE — TELEPHONE ENCOUNTER
Post Kidney and Pancreas Transplant Team Conference  Date: 8/5/2020  Transplant Coordinator: Mulu Arguello     Attendees:  [x]  Dr. Anglin  [x] Hollie Hong LPN     [x]  Dr. Aramndo [x] Mulu Arguello RN [] Tracy Wells LPN   []  Dr. Peraza [] Birgit Solano, RN     [] Chloe Lechuga RN [] Anam Hermosillo, PharmD   [] Dr. Altman [x] Lucina Jacinto, PERI    [] Dr. Morgan [] Ron Terrazas RN    [x] Dr. Lakhani [] Laura Cervantes, PERI    [x] Dr. Younger [] Faustina Aragon, PERI     [x] Yoli Mccoy, PERI    [] Surgery Fellow [x] Chelsea Simpson RN    [x] Kailey Santos, EVA [] Sharon Lujan RN        Verbal Plan Read Back:   No changes at this time    Routed to RN Coordinator   Hollie Hong LPN

## 2020-08-06 ENCOUNTER — MEDICAL CORRESPONDENCE (OUTPATIENT)
Dept: HEALTH INFORMATION MANAGEMENT | Facility: CLINIC | Age: 35
End: 2020-08-06

## 2020-08-06 LAB
ANION GAP SERPL CALCULATED.3IONS-SCNC: 5 MMOL/L (ref 3–14)
BUN SERPL-MCNC: 12 MG/DL (ref 7–30)
CALCIUM SERPL-MCNC: 9.5 MG/DL (ref 8.5–10.1)
CHLORIDE SERPL-SCNC: 108 MMOL/L (ref 94–109)
CO2 SERPL-SCNC: 25 MMOL/L (ref 20–32)
CREAT SERPL-MCNC: 0.74 MG/DL (ref 0.52–1.04)
ERYTHROCYTE [DISTWIDTH] IN BLOOD BY AUTOMATED COUNT: 15.6 % (ref 10–15)
GFR SERPL CREATININE-BSD FRML MDRD: >90 ML/MIN/{1.73_M2}
GLUCOSE SERPL-MCNC: 90 MG/DL (ref 70–99)
HCT VFR BLD AUTO: 32 % (ref 35–47)
HGB BLD-MCNC: 10.4 G/DL (ref 11.7–15.7)
MCH RBC QN AUTO: 30.2 PG (ref 26.5–33)
MCHC RBC AUTO-ENTMCNC: 32.5 G/DL (ref 31.5–36.5)
MCV RBC AUTO: 93 FL (ref 78–100)
MYCOPHENOLATE SERPL LC/MS/MS-MCNC: 7.66 MG/L (ref 1–3.5)
MYCOPHENOLATE-G SERPL LC/MS/MS-MCNC: 69.4 MG/L (ref 30–95)
PLATELET # BLD AUTO: 331 10E9/L (ref 150–450)
POTASSIUM SERPL-SCNC: 3.8 MMOL/L (ref 3.4–5.3)
RBC # BLD AUTO: 3.44 10E12/L (ref 3.8–5.2)
SODIUM SERPL-SCNC: 138 MMOL/L (ref 133–144)
TACROLIMUS BLD-MCNC: 17.3 UG/L (ref 5–15)
TME LAST DOSE: ABNORMAL H
TME LAST DOSE: ABNORMAL H
WBC # BLD AUTO: 8 10E9/L (ref 4–11)

## 2020-08-06 PROCEDURE — 80197 ASSAY OF TACROLIMUS: CPT | Performed by: SURGERY

## 2020-08-06 PROCEDURE — 80180 DRUG SCRN QUAN MYCOPHENOLATE: CPT | Performed by: SURGERY

## 2020-08-06 PROCEDURE — 80048 BASIC METABOLIC PNL TOTAL CA: CPT | Performed by: SURGERY

## 2020-08-06 PROCEDURE — 85027 COMPLETE CBC AUTOMATED: CPT | Performed by: SURGERY

## 2020-08-07 ENCOUNTER — VIRTUAL VISIT (OUTPATIENT)
Dept: NEPHROLOGY | Facility: CLINIC | Age: 35
End: 2020-08-07
Attending: INTERNAL MEDICINE
Payer: MEDICARE

## 2020-08-07 DIAGNOSIS — N17.9 ACUTE RENAL FAILURE, UNSPECIFIED ACUTE RENAL FAILURE TYPE (H): ICD-10-CM

## 2020-08-07 DIAGNOSIS — Z94.0 KIDNEY TRANSPLANTED: ICD-10-CM

## 2020-08-07 LAB
HBV DNA SERPL NAA+PROBE-ACNC: NORMAL [IU]/ML
HBV DNA SERPL NAA+PROBE-LOG IU: NORMAL {LOG_IU}/ML

## 2020-08-07 RX ORDER — TACROLIMUS 1 MG/1
4 CAPSULE ORAL 2 TIMES DAILY
Qty: 240 CAPSULE | Refills: 3 | Status: SHIPPED | OUTPATIENT
Start: 2020-08-07 | End: 2020-09-03

## 2020-08-07 RX ORDER — PREDNISONE 5 MG/1
5 TABLET ORAL DAILY
Qty: 90 TABLET | Refills: 3 | Status: SHIPPED | OUTPATIENT
Start: 2020-08-07 | End: 2020-12-07

## 2020-08-07 RX ORDER — PREDNISONE 5 MG/1
5 TABLET ORAL DAILY
Qty: 90 TABLET | Refills: 3 | Status: SHIPPED | OUTPATIENT
Start: 2020-08-07 | End: 2020-08-07

## 2020-08-07 NOTE — PROGRESS NOTES
"TRANSPLANT NEPHROLOGY TELEMEDICINE VISIT    Jelly Dietz is a 35 year old female who is being evaluated via a billable telemedicine (video/telephone) visit on August 7, 2020.     The patient has been notified of following:     \"This telemedicine (video/telephone) visit will be conducted via a video/phone call between you and your physician. We have found that certain health care needs can be provided without the need for a physical exam.  This service lets us provide the care you need with a short video/phone conversation.  If a prescription is necessary, we can send it directly to your pharmacy.  If lab work is needed, we can place an order for that and you can then stop by our lab to have the test done at a later time.    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 Tacrolimus level is still elevated      # Infection Prophylaxis:   - PJP: Sulfa/TMP (Bactrim)  - CMV: Valcyte    # Hypertension: Controlled;  Goal BP: < 140/90   - Volume status: euvolemic  EDW ~ 130 Lb   - Changes: no    # Anemia in Chronic Renal Disease: Hgb: Stable      ENZO: no longer needed   - Iron studies: Replete    # Mineral Bone Disorder:   - Calcium; " level: Normal        On supplement: No  - Phosphorus; level: Low        On supplement: started supplement     # Electrolytes:   - Potassium; level: normal       On supplement: continue supplement   - Magnesium; level: low        On supplement: continue supplement     # Medical Compliance: Yes    # COVID-19 Virus Review: Discussed COVID-19 virus and the potential medical risks.  Reviewed preventative health recommendations, which includes washing hands for 20 seconds, avoid touching your face, and social distancing.  Asked patient to inform the transplant center if they are exposed or diagnosed with this virus.    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 Donor Class:   Crossmatch at time of Tx: negative  DSA at time of Tx: No  Significant changes in immunosuppression: steroid maintenance due to early mixed rejection   CMV IgG Ab High Risk Discordance (D+/R-): No  EBV IgG Ab High Risk Discordance (D+/R-): No  Significant transplant-related complications: early acute rejection possible Non-HLA with an element of memory response     Transplant Office Phone Number: [phone]    Assessment and plan was discussed with the patient and she voiced her understanding and agreement.    Return Visit: 4 weeks       Jelly Dietz has a clinical telephone visit for routine follow up and immunosuppression management.    History of Present Illness   Since she was seen last, her creatinine continued to improve down to baseline. She has no new complaints. Her tacrolimus level has been erratic without explanation. We discussed the importance of staying on top of it. Her dose was recently reduced. She is no longer on diuretics.    Recent Hospitalizations:  [x] No [] Yes    New Medical Issues: [x] No [] Yes    Decreased energy: [x] No [] Yes    Chest pain or SOB with exertion:  [x] No [] Yes    Appetite  change or weight change: [x] No [] Yes    Nausea, vomiting or diarrhea:  [x] No [] Yes    Fever, sweats or chills: [x] No [] Yes    Leg swelling: [x] No [] Yes      Home BP: controlled     Review of Systems   A comprehensive review of systems was obtained and negative, except as noted in the HPI or PMH.    I have reviewed and updated the patient's Past Medical History, Social History, and Medication List.    Active Medical Problems  Patient Active Problem List   Diagnosis     CARDIOVASCULAR SCREENING; LDL GOAL LESS THAN 160     Kidney replaced by transplant     HTN, kidney transplant related     ACS (acute coronary syndrome) (H)     Anemia in chronic renal disease     IgA nephropathy     Anxiety     Kidney transplanted     Immunosuppression (H)     Painful bladder spasm     Hypophosphatemia     Constipation due to pain medication     Aftercare following organ transplant     Hypomagnesemia     Dehydration     Acute renal failure (ARF) (H)     Acute rejection of kidney transplant     Metabolic acidosis     Steroid-induced hyperglycemia     Nausea vomiting and diarrhea     Hypervolemia     Anemia     Allergies  Patient has no known allergies.    Medications  Current Outpatient Medications   Medication Sig     acetaminophen (TYLENOL) 325 MG tablet Take 2 tablets (650 mg) by mouth every 4 hours as needed for other (multimodal surgical pain management along with NSAIDS and opioid medication as indicated based on pain control and physical function.)     amLODIPine (NORVASC) 5 MG tablet Take 1 tablet (5 mg) by mouth daily     aspirin (ASA) 81 MG chewable tablet Take 1 tablet (81 mg) by mouth daily     atorvastatin (LIPITOR) 10 MG tablet Take 1 tablet (10 mg) by mouth daily     carvedilol (COREG) 6.25 MG tablet Take 1 tablet (6.25 mg) by mouth 2 times daily     cetirizine (ZYRTEC) 10 MG tablet Take 10 mg by mouth nightly as needed for allergies      Lidocaine (LIDOCARE) 4 % Patch Place 3 patches onto the skin every 24 hours  To prevent lidocaine toxicity, patient should be patch free for 12 hrs daily.     losartan (COZAAR) 25 MG tablet Take 0.5 tablets (12.5 mg) by mouth At Bedtime     magnesium oxide (MAG-OX) 400 (241.3 Mg) MG tablet Take 2 tablets (800 mg) by mouth 2 times daily     menthol (ICY HOT) 5 % PTCH Apply 1 patch topically every 8 hours as needed for muscle soreness     mycophenolic acid (GENERIC EQUIVALENT) 180 MG EC tablet Take 4 tablets (720 mg) by mouth 2 times daily     ondansetron (ZOFRAN-ODT) 4 MG ODT tab Take 1 tablet (4 mg) by mouth 3 times daily (before meals)     pantoprazole (PROTONIX) 40 MG EC tablet Take 1 tablet (40 mg) by mouth 2 times daily     phosphorus tablet 250 mg (PHOSPHORUS TABLET 250 MG) 250 MG per tablet Take 2 tablets (500 mg) by mouth 3 times daily     polyethylene glycol (MIRALAX) 17 g packet Take 17 g by mouth daily     predniSONE (DELTASONE) 5 MG tablet Take 3 tablets (15 mg) by mouth daily for 7 days, THEN 2 tablets (10 mg) daily for 7 days, THEN 1 tablet (5 mg) daily for 7 days.     senna-docusate (SENOKOT-S/PERICOLACE) 8.6-50 MG tablet Take 1-2 tablets by mouth 2 times daily     sulfamethoxazole-trimethoprim (BACTRIM) 400-80 MG tablet Take 1 tablet by mouth daily     tacrolimus (GENERIC EQUIVALENT) 1 MG capsule Take 4 capsules (4 mg) by mouth 2 times daily     valGANciclovir (VALCYTE) 450 MG tablet Take 2 tablets (900 mg) by mouth daily     Vitamin D3 (CHOLECALCIFEROL) 25 mcg (1000 units) tablet Take 2 tablets (50 mcg) by mouth daily     epoetin staci-epbx (RETACRIT) 13781 UNIT/ML injection Inject 0.2 mLs (2,000 Units) Subcutaneous three times a week (Patient not taking: Reported on 8/7/2020)     No current facility-administered medications for this visit.        Phone call contact time:  I spent 30 min     Mahesh Armando MD

## 2020-08-07 NOTE — LETTER
"8/7/2020       RE: Jelly Dietz  [address]  [facility] [address]     Dear Colleague,    Thank you for referring your patient, Jelly Dietz, to the [facility] NEPHROLOGY at [facility]. Please see a copy of my visit note below.    Jelly Dietz is a 35 year old female who is being evaluated via a billable video visit.              Phone-Visit Details    Type of service:  Video Visit    Originating Location (pt. Location): Home    Distant Location (provider location):  [facility] NEPHROLOGY     30 min total           TRANSPLANT NEPHROLOGY TELEMEDICINE VISIT    Jelly Dietz is a 35 year old female who is being evaluated via a billable telemedicine (video/telephone) visit on August 7, 2020.     The patient has been notified of following:     \"This telemedicine (video/telephone) visit will be conducted via a video/phone call between you and your physician. We have found that certain health care needs can be provided without the need for a physical exam.  This service lets us provide the care you need with a short video/phone conversation.  If a prescription is necessary, we can send it directly to your pharmacy.  If lab work is needed, we can place an order for that and you can then stop by our lab to have the test done at a later time.    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 Tacrolimus level is still elevated      # Infection Prophylaxis:   - PJP: Sulfa/TMP (Bactrim)  - CMV: Valcyte    # Hypertension: Controlled;  Goal BP: < 140/90   - Volume status: euvolemic  EDW ~ 130 Lb   - Changes: no    # Anemia in Chronic Renal Disease: Hgb: Stable      ENZO: no longer needed   - Iron studies: Replete    # Mineral Bone Disorder:   - Calcium; level: Normal        On supplement: No  - Phosphorus; level: Low        On supplement: started supplement     # Electrolytes:   - Potassium; level: normal       On supplement: continue supplement   - Magnesium; level: low        On supplement: continue supplement     # Medical Compliance: Yes    # COVID-19 Virus Review: Discussed COVID-19 virus and the potential medical risks.  Reviewed preventative health recommendations, which includes washing hands for 20 seconds, avoid touching your face, and social distancing.  Asked patient to inform the transplant center if they are exposed or diagnosed with this virus.    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 Donor Class:   Crossmatch at time of Tx: negative  DSA at time of Tx: No  Significant changes in immunosuppression: steroid maintenance due to early mixed rejection   CMV IgG Ab High Risk Discordance (D+/R-): No  EBV IgG Ab High Risk Discordance (D+/R-): No  Significant transplant-related complications: early acute rejection possible Non-HLA with an element of memory response     Transplant Office Phone Number: [phone]    Assessment and plan was discussed with the patient and she voiced her understanding and agreement.    Return Visit: 4 weeks       Jelly Dietz has a  clinical telephone visit for routine follow up and immunosuppression management.    History of Present Illness   Since she was seen last, her creatinine continued to improve down to baseline. She has no new complaints. Her tacrolimus level has been erratic without explanation. We discussed the importance of staying on top of it. Her dose was recently reduced. She is no longer on diuretics.    Recent Hospitalizations:  [x] No [] Yes    New Medical Issues: [x] No [] Yes    Decreased energy: [x] No [] Yes    Chest pain or SOB with exertion:  [x] No [] Yes    Appetite change or weight change: [x] No [] Yes    Nausea, vomiting or diarrhea:  [x] No [] Yes    Fever, sweats or chills: [x] No [] Yes    Leg swelling: [x] No [] Yes      Home BP: controlled     Review of Systems   A comprehensive review of systems was obtained and negative, except as noted in the HPI or PMH.    I have reviewed and updated the patient's Past Medical History, Social History, and Medication List.    Active Medical Problems  Patient Active Problem List   Diagnosis     CARDIOVASCULAR SCREENING; LDL GOAL LESS THAN 160     Kidney replaced by transplant     HTN, kidney transplant related     ACS (acute coronary syndrome) (H)     Anemia in chronic renal disease     IgA nephropathy     Anxiety     Kidney transplanted     Immunosuppression (H)     Painful bladder spasm     Hypophosphatemia     Constipation due to pain medication     Aftercare following organ transplant     Hypomagnesemia     Dehydration     Acute renal failure (ARF) (H)     Acute rejection of kidney transplant     Metabolic acidosis     Steroid-induced hyperglycemia     Nausea vomiting and diarrhea     Hypervolemia     Anemia     Allergies  Patient has no known allergies.    Medications  Current Outpatient Medications   Medication Sig     acetaminophen (TYLENOL) 325 MG tablet Take 2 tablets (650 mg) by mouth every 4 hours as needed for other (multimodal surgical pain management along with  NSAIDS and opioid medication as indicated based on pain control and physical function.)     amLODIPine (NORVASC) 5 MG tablet Take 1 tablet (5 mg) by mouth daily     aspirin (ASA) 81 MG chewable tablet Take 1 tablet (81 mg) by mouth daily     atorvastatin (LIPITOR) 10 MG tablet Take 1 tablet (10 mg) by mouth daily     carvedilol (COREG) 6.25 MG tablet Take 1 tablet (6.25 mg) by mouth 2 times daily     cetirizine (ZYRTEC) 10 MG tablet Take 10 mg by mouth nightly as needed for allergies      Lidocaine (LIDOCARE) 4 % Patch Place 3 patches onto the skin every 24 hours To prevent lidocaine toxicity, patient should be patch free for 12 hrs daily.     losartan (COZAAR) 25 MG tablet Take 0.5 tablets (12.5 mg) by mouth At Bedtime     magnesium oxide (MAG-OX) 400 (241.3 Mg) MG tablet Take 2 tablets (800 mg) by mouth 2 times daily     menthol (ICY HOT) 5 % PTCH Apply 1 patch topically every 8 hours as needed for muscle soreness     mycophenolic acid (GENERIC EQUIVALENT) 180 MG EC tablet Take 4 tablets (720 mg) by mouth 2 times daily     ondansetron (ZOFRAN-ODT) 4 MG ODT tab Take 1 tablet (4 mg) by mouth 3 times daily (before meals)     pantoprazole (PROTONIX) 40 MG EC tablet Take 1 tablet (40 mg) by mouth 2 times daily     phosphorus tablet 250 mg (PHOSPHORUS TABLET 250 MG) 250 MG per tablet Take 2 tablets (500 mg) by mouth 3 times daily     polyethylene glycol (MIRALAX) 17 g packet Take 17 g by mouth daily     predniSONE (DELTASONE) 5 MG tablet Take 3 tablets (15 mg) by mouth daily for 7 days, THEN 2 tablets (10 mg) daily for 7 days, THEN 1 tablet (5 mg) daily for 7 days.     senna-docusate (SENOKOT-S/PERICOLACE) 8.6-50 MG tablet Take 1-2 tablets by mouth 2 times daily     sulfamethoxazole-trimethoprim (BACTRIM) 400-80 MG tablet Take 1 tablet by mouth daily     tacrolimus (GENERIC EQUIVALENT) 1 MG capsule Take 4 capsules (4 mg) by mouth 2 times daily     valGANciclovir (VALCYTE) 450 MG tablet Take 2 tablets (900 mg) by mouth  daily     Vitamin D3 (CHOLECALCIFEROL) 25 mcg (1000 units) tablet Take 2 tablets (50 mcg) by mouth daily     epoetin staci-epbx (RETACRIT) 89971 UNIT/ML injection Inject 0.2 mLs (2,000 Units) Subcutaneous three times a week (Patient not taking: Reported on 8/7/2020)     No current facility-administered medications for this visit.        Phone call contact time:  I spent 30 min     Mahesh Armando MD

## 2020-08-07 NOTE — TELEPHONE ENCOUNTER
tacrolimus level   17.3     Called Jelly Villavicencioshe - sleepy this morning   Confirmed taking tacrolimus  5 mg twice per day   Confirmed   Lowered tacrolimus  4 mg twice per day

## 2020-08-07 NOTE — PROGRESS NOTES
"Jelly Dietz is a 35 year old female who is being evaluated via a billable video visit.      The patient has been notified of following:     \"This video visit will be conducted via a call between you and your physician/provider. We have found that certain health care needs can be provided without the need for an in-person physical exam.  This service lets us provide the care you need with a video conversation.  If a prescription is necessary we can send it directly to your pharmacy.  If lab work is needed we can place an order for that and you can then stop by our lab to have the test done at a later time.    Video visits are billed at different rates depending on your insurance coverage.  Please reach out to your insurance provider with any questions.    If during the course of the call the physician/provider feels a video visit is not appropriate, you will not be charged for this service.\"    Patient has given verbal consent for Video visit? Yes  How would you like to obtain your AVS? Mail a copy  If you are dropped from the video visit, the video invite should be resent to: Send to e-mail at: [email or web address]  Please email link if patient unable to get in to [employer]  Will anyone else be joining your video visit? No        Phone-Visit Details    Type of service:  Video Visit    Originating Location (pt. Location): Home    Distant Location (provider location):  [facility] NEPHROLOGY     30 min total         "

## 2020-08-10 ENCOUNTER — MEDICAL CORRESPONDENCE (OUTPATIENT)
Dept: HEALTH INFORMATION MANAGEMENT | Facility: CLINIC | Age: 35
End: 2020-08-10

## 2020-08-10 LAB
ALBUMIN SERPL-MCNC: 4 G/DL (ref 3.4–5)
ALP SERPL-CCNC: 131 U/L (ref 40–150)
ALT SERPL W P-5'-P-CCNC: 28 U/L (ref 0–50)
ANION GAP SERPL CALCULATED.3IONS-SCNC: 9 MMOL/L (ref 3–14)
AST SERPL W P-5'-P-CCNC: 17 U/L (ref 0–45)
BILIRUB SERPL-MCNC: 0.7 MG/DL (ref 0.2–1.3)
BUN SERPL-MCNC: 11 MG/DL (ref 7–30)
CALCIUM SERPL-MCNC: 9.2 MG/DL (ref 8.5–10.1)
CHLORIDE SERPL-SCNC: 108 MMOL/L (ref 94–109)
CO2 SERPL-SCNC: 23 MMOL/L (ref 20–32)
CREAT SERPL-MCNC: 0.7 MG/DL (ref 0.52–1.04)
ERYTHROCYTE [DISTWIDTH] IN BLOOD BY AUTOMATED COUNT: 15.9 % (ref 10–15)
GFR SERPL CREATININE-BSD FRML MDRD: >90 ML/MIN/{1.73_M2}
GLUCOSE SERPL-MCNC: 76 MG/DL (ref 70–99)
HCT VFR BLD AUTO: 28.7 % (ref 35–47)
HGB BLD-MCNC: 9.3 G/DL (ref 11.7–15.7)
MAGNESIUM SERPL-MCNC: 1.1 MG/DL (ref 1.6–2.3)
MCH RBC QN AUTO: 30.2 PG (ref 26.5–33)
MCHC RBC AUTO-ENTMCNC: 32.4 G/DL (ref 31.5–36.5)
MCV RBC AUTO: 93 FL (ref 78–100)
PHOSPHATE SERPL-MCNC: 1.9 MG/DL (ref 2.5–4.5)
PLATELET # BLD AUTO: 251 10E9/L (ref 150–450)
POTASSIUM SERPL-SCNC: 3.6 MMOL/L (ref 3.4–5.3)
PROT SERPL-MCNC: 7.6 G/DL (ref 6.8–8.8)
RBC # BLD AUTO: 3.08 10E12/L (ref 3.8–5.2)
SODIUM SERPL-SCNC: 140 MMOL/L (ref 133–144)
TACROLIMUS BLD-MCNC: 6.9 UG/L (ref 5–15)
TME LAST DOSE: NORMAL H
WBC # BLD AUTO: 11.7 10E9/L (ref 4–11)

## 2020-08-10 PROCEDURE — 85027 COMPLETE CBC AUTOMATED: CPT | Performed by: SURGERY

## 2020-08-10 PROCEDURE — 80197 ASSAY OF TACROLIMUS: CPT | Performed by: SURGERY

## 2020-08-10 PROCEDURE — 84100 ASSAY OF PHOSPHORUS: CPT | Performed by: SURGERY

## 2020-08-10 PROCEDURE — 80180 DRUG SCRN QUAN MYCOPHENOLATE: CPT | Performed by: SURGERY

## 2020-08-10 PROCEDURE — 80053 COMPREHEN METABOLIC PANEL: CPT | Performed by: SURGERY

## 2020-08-10 PROCEDURE — 83735 ASSAY OF MAGNESIUM: CPT | Performed by: SURGERY

## 2020-08-11 ENCOUNTER — TELEPHONE (OUTPATIENT)
Dept: TRANSPLANT | Facility: CLINIC | Age: 35
End: 2020-08-11

## 2020-08-11 LAB
MYCOPHENOLATE SERPL LC/MS/MS-MCNC: 5.08 MG/L (ref 1–3.5)
MYCOPHENOLATE-G SERPL LC/MS/MS-MCNC: 75.1 MG/L (ref 30–95)
TME LAST DOSE: ABNORMAL H

## 2020-08-13 ENCOUNTER — TELEPHONE (OUTPATIENT)
Dept: TRANSPLANT | Facility: CLINIC | Age: 35
End: 2020-08-13

## 2020-08-13 ENCOUNTER — MEDICAL CORRESPONDENCE (OUTPATIENT)
Dept: HEALTH INFORMATION MANAGEMENT | Facility: CLINIC | Age: 35
End: 2020-08-13

## 2020-08-13 DIAGNOSIS — Z94.0 KIDNEY TRANSPLANTED: Primary | ICD-10-CM

## 2020-08-13 LAB
ANION GAP SERPL CALCULATED.3IONS-SCNC: 7 MMOL/L (ref 3–14)
BUN SERPL-MCNC: 14 MG/DL (ref 7–30)
CALCIUM SERPL-MCNC: 8.6 MG/DL (ref 8.5–10.1)
CHLORIDE SERPL-SCNC: 109 MMOL/L (ref 94–109)
CO2 SERPL-SCNC: 22 MMOL/L (ref 20–32)
CREAT SERPL-MCNC: 0.72 MG/DL (ref 0.52–1.04)
ERYTHROCYTE [DISTWIDTH] IN BLOOD BY AUTOMATED COUNT: 15.1 % (ref 10–15)
GFR SERPL CREATININE-BSD FRML MDRD: >90 ML/MIN/{1.73_M2}
GLUCOSE SERPL-MCNC: 85 MG/DL (ref 70–99)
HCT VFR BLD AUTO: 27.2 % (ref 35–47)
HGB BLD-MCNC: 8.9 G/DL (ref 11.7–15.7)
MAGNESIUM SERPL-MCNC: 1 MG/DL (ref 1.6–2.3)
MCH RBC QN AUTO: 30.1 PG (ref 26.5–33)
MCHC RBC AUTO-ENTMCNC: 32.7 G/DL (ref 31.5–36.5)
MCV RBC AUTO: 92 FL (ref 78–100)
MYCOPHENOLATE SERPL LC/MS/MS-MCNC: 2.12 MG/L (ref 1–3.5)
MYCOPHENOLATE-G SERPL LC/MS/MS-MCNC: 41.6 MG/L (ref 30–95)
PHOSPHATE SERPL-MCNC: 2 MG/DL (ref 2.5–4.5)
PLATELET # BLD AUTO: 202 10E9/L (ref 150–450)
POTASSIUM SERPL-SCNC: 3.5 MMOL/L (ref 3.4–5.3)
RBC # BLD AUTO: 2.96 10E12/L (ref 3.8–5.2)
SODIUM SERPL-SCNC: 138 MMOL/L (ref 133–144)
TACROLIMUS BLD-MCNC: 11.3 UG/L (ref 5–15)
TME LAST DOSE: NORMAL H
TME LAST DOSE: NORMAL H
WBC # BLD AUTO: 10.8 10E9/L (ref 4–11)

## 2020-08-13 PROCEDURE — 85027 COMPLETE CBC AUTOMATED: CPT | Performed by: SURGERY

## 2020-08-13 PROCEDURE — 80180 DRUG SCRN QUAN MYCOPHENOLATE: CPT | Performed by: SURGERY

## 2020-08-13 PROCEDURE — 80048 BASIC METABOLIC PNL TOTAL CA: CPT | Performed by: SURGERY

## 2020-08-13 PROCEDURE — 84100 ASSAY OF PHOSPHORUS: CPT | Performed by: SURGERY

## 2020-08-13 PROCEDURE — 83735 ASSAY OF MAGNESIUM: CPT | Performed by: SURGERY

## 2020-08-13 PROCEDURE — 80197 ASSAY OF TACROLIMUS: CPT | Performed by: SURGERY

## 2020-08-13 NOTE — TELEPHONE ENCOUNTER
Confirmed did receive message tacrolimus   5 mg twice per day     Still difficult to reach via phone  Called times 4     Stated that her children has her cellphone in the day time       Sent information via Mail to sign up for [employer] help desk at [phone]  [email or web address]

## 2020-08-13 NOTE — TELEPHONE ENCOUNTER
Patient Call: Voicemail  Date/Time: 8/13/20 / 9:57 pm  Reason for call: Returning Mulu's call. Tried yesterday but could not get through.  Patient wants Mulu to know that she did change the meds that she wanted her to change.     Call back requested.

## 2020-08-13 NOTE — LETTER
"        Good Day       I am sending you on information  \" MyChart \"  This may assist or make it easier  with our  communication dose changes  Of immunosuppression medications  Prograf tacrolimus        MyChart help desk at [phone]  [email or web address]        Be well      Mulu  Transplant  coordinator   "

## 2020-08-13 NOTE — LETTER
Jelly Dietz           MyChart help desk at [phone]  [email or web address]

## 2020-08-17 LAB
ANION GAP SERPL CALCULATED.3IONS-SCNC: 6 MMOL/L (ref 3–14)
BUN SERPL-MCNC: 10 MG/DL (ref 7–30)
CALCIUM SERPL-MCNC: 8.5 MG/DL (ref 8.5–10.1)
CHLORIDE SERPL-SCNC: 108 MMOL/L (ref 94–109)
CO2 SERPL-SCNC: 24 MMOL/L (ref 20–32)
CREAT SERPL-MCNC: 0.69 MG/DL (ref 0.52–1.04)
ERYTHROCYTE [DISTWIDTH] IN BLOOD BY AUTOMATED COUNT: 15.4 % (ref 10–15)
GFR SERPL CREATININE-BSD FRML MDRD: >90 ML/MIN/{1.73_M2}
GLUCOSE SERPL-MCNC: 88 MG/DL (ref 70–99)
HCT VFR BLD AUTO: 25.9 % (ref 35–47)
HGB BLD-MCNC: 8.7 G/DL (ref 11.7–15.7)
MAGNESIUM SERPL-MCNC: 1.1 MG/DL (ref 1.6–2.3)
MCH RBC QN AUTO: 30.5 PG (ref 26.5–33)
MCHC RBC AUTO-ENTMCNC: 33.6 G/DL (ref 31.5–36.5)
MCV RBC AUTO: 91 FL (ref 78–100)
MYCOPHENOLATE SERPL LC/MS/MS-MCNC: 4.54 MG/L (ref 1–3.5)
MYCOPHENOLATE-G SERPL LC/MS/MS-MCNC: 50.8 MG/L (ref 30–95)
PHOSPHATE SERPL-MCNC: 1.9 MG/DL (ref 2.5–4.5)
PLATELET # BLD AUTO: 192 10E9/L (ref 150–450)
POTASSIUM SERPL-SCNC: 3.4 MMOL/L (ref 3.4–5.3)
RBC # BLD AUTO: 2.85 10E12/L (ref 3.8–5.2)
SODIUM SERPL-SCNC: 138 MMOL/L (ref 133–144)
TACROLIMUS BLD-MCNC: 13.5 UG/L (ref 5–15)
TME LAST DOSE: ABNORMAL H
TME LAST DOSE: NORMAL H
WBC # BLD AUTO: 11.5 10E9/L (ref 4–11)

## 2020-08-17 PROCEDURE — 80048 BASIC METABOLIC PNL TOTAL CA: CPT | Performed by: SURGERY

## 2020-08-17 PROCEDURE — 83735 ASSAY OF MAGNESIUM: CPT | Performed by: SURGERY

## 2020-08-17 PROCEDURE — 87799 DETECT AGENT NOS DNA QUANT: CPT | Performed by: SURGERY

## 2020-08-17 PROCEDURE — 80197 ASSAY OF TACROLIMUS: CPT | Performed by: SURGERY

## 2020-08-17 PROCEDURE — 85027 COMPLETE CBC AUTOMATED: CPT | Performed by: SURGERY

## 2020-08-17 PROCEDURE — 80180 DRUG SCRN QUAN MYCOPHENOLATE: CPT | Performed by: SURGERY

## 2020-08-17 PROCEDURE — 84100 ASSAY OF PHOSPHORUS: CPT | Performed by: SURGERY

## 2020-08-19 ENCOUNTER — TELEPHONE (OUTPATIENT)
Dept: NEPHROLOGY | Facility: CLINIC | Age: 35
End: 2020-08-19

## 2020-08-19 LAB
BKV DNA # SPEC NAA+PROBE: NORMAL COPIES/ML
BKV DNA SPEC NAA+PROBE-LOG#: NORMAL LOG COPIES/ML
SPECIMEN SOURCE: NORMAL

## 2020-08-21 ENCOUNTER — TELEPHONE (OUTPATIENT)
Dept: TRANSPLANT | Facility: CLINIC | Age: 35
End: 2020-08-21

## 2020-08-21 ENCOUNTER — MEDICAL CORRESPONDENCE (OUTPATIENT)
Dept: HEALTH INFORMATION MANAGEMENT | Facility: CLINIC | Age: 35
End: 2020-08-21

## 2020-08-21 LAB
ANION GAP SERPL CALCULATED.3IONS-SCNC: 9 MMOL/L (ref 3–14)
BUN SERPL-MCNC: 12 MG/DL (ref 7–30)
CALCIUM SERPL-MCNC: 7.8 MG/DL (ref 8.5–10.1)
CHLORIDE SERPL-SCNC: 109 MMOL/L (ref 94–109)
CO2 SERPL-SCNC: 22 MMOL/L (ref 20–32)
CREAT SERPL-MCNC: 0.74 MG/DL (ref 0.52–1.04)
ERYTHROCYTE [DISTWIDTH] IN BLOOD BY AUTOMATED COUNT: 16.1 % (ref 10–15)
GFR SERPL CREATININE-BSD FRML MDRD: >90 ML/MIN/{1.73_M2}
GLUCOSE SERPL-MCNC: 89 MG/DL (ref 70–99)
HCT VFR BLD AUTO: 24.5 % (ref 35–47)
HGB BLD-MCNC: 8.2 G/DL (ref 11.7–15.7)
MCH RBC QN AUTO: 30.6 PG (ref 26.5–33)
MCHC RBC AUTO-ENTMCNC: 33.5 G/DL (ref 31.5–36.5)
MCV RBC AUTO: 91 FL (ref 78–100)
PLATELET # BLD AUTO: 230 10E9/L (ref 150–450)
POTASSIUM SERPL-SCNC: 3.3 MMOL/L (ref 3.4–5.3)
RBC # BLD AUTO: 2.68 10E12/L (ref 3.8–5.2)
SODIUM SERPL-SCNC: 140 MMOL/L (ref 133–144)
TACROLIMUS BLD-MCNC: 5.3 UG/L (ref 5–15)
TME LAST DOSE: NORMAL H
WBC # BLD AUTO: 11.2 10E9/L (ref 4–11)

## 2020-08-21 PROCEDURE — 80048 BASIC METABOLIC PNL TOTAL CA: CPT | Performed by: SURGERY

## 2020-08-21 PROCEDURE — 85027 COMPLETE CBC AUTOMATED: CPT | Performed by: SURGERY

## 2020-08-21 PROCEDURE — 80197 ASSAY OF TACROLIMUS: CPT | Performed by: SURGERY

## 2020-08-21 NOTE — TELEPHONE ENCOUNTER
Issue:   WBC 11.2   K 3.3  Calcium 7.9  Tac 5.3 (goal 8-10) - currently taking 4 mg BID.     Plan:   S/s of infection?   N/V/D?   Fevers?   Burning with urination? Cloudy or odorous urine?   Confirm only taking 5 mg prednisone daily     Left VM for patient requesting a return phone call.

## 2020-08-21 NOTE — TELEPHONE ENCOUNTER
Second called placed to patient. Left detailed voice message recommending tacrolimus increase by 1 mg BID. Requested return phone call.

## 2020-08-22 ENCOUNTER — TELEPHONE (OUTPATIENT)
Dept: TRANSPLANT | Facility: CLINIC | Age: 35
End: 2020-08-22

## 2020-08-22 DIAGNOSIS — Z48.298 AFTERCARE FOLLOWING ORGAN TRANSPLANT: ICD-10-CM

## 2020-08-22 DIAGNOSIS — Z94.0 KIDNEY TRANSPLANTED: ICD-10-CM

## 2020-08-22 DIAGNOSIS — D84.9 IMMUNOSUPPRESSED STATUS (H): ICD-10-CM

## 2020-08-22 DIAGNOSIS — T86.10 COMPLICATIONS, KIDNEY TRANSPLANT: ICD-10-CM

## 2020-08-22 DIAGNOSIS — N39.0 URINARY TRACT INFECTION: Primary | ICD-10-CM

## 2020-08-22 NOTE — TELEPHONE ENCOUNTER
Jelly left  - returning a call to Mulu about a message she had left regarding a low WBC, patient states that she may have had a UTI or yeast infection last week. She would like to know if she needs to get labs or testing done. Would like her coordinator to call her back.

## 2020-08-26 ENCOUNTER — TELEPHONE (OUTPATIENT)
Dept: TRANSPLANT | Facility: CLINIC | Age: 35
End: 2020-08-26

## 2020-08-26 NOTE — TELEPHONE ENCOUNTER
Provider Call: General  Route to LPN    Reason for call: Needs skilled Nursing orders  2 x a wk x 9 wks  3 PRN visits  For labs and medication education     Call back needed? Yes    Return Call Needed  Same as documented in contacts section  When to return call?: Greater than one day: Route standard priority

## 2020-08-28 LAB
ANION GAP SERPL CALCULATED.3IONS-SCNC: 9 MMOL/L (ref 3–14)
BUN SERPL-MCNC: 17 MG/DL (ref 7–30)
CALCIUM SERPL-MCNC: 8.3 MG/DL (ref 8.5–10.1)
CHLORIDE SERPL-SCNC: 109 MMOL/L (ref 94–109)
CO2 SERPL-SCNC: 24 MMOL/L (ref 20–32)
CREAT SERPL-MCNC: 0.74 MG/DL (ref 0.52–1.04)
ERYTHROCYTE [DISTWIDTH] IN BLOOD BY AUTOMATED COUNT: 18.4 % (ref 10–15)
GFR SERPL CREATININE-BSD FRML MDRD: >90 ML/MIN/{1.73_M2}
GLUCOSE SERPL-MCNC: 77 MG/DL (ref 70–99)
HCT VFR BLD AUTO: 25.6 % (ref 35–47)
HGB BLD-MCNC: 8.3 G/DL (ref 11.7–15.7)
MAGNESIUM SERPL-MCNC: 1.2 MG/DL (ref 1.6–2.3)
MCH RBC QN AUTO: 30.9 PG (ref 26.5–33)
MCHC RBC AUTO-ENTMCNC: 32.4 G/DL (ref 31.5–36.5)
MCV RBC AUTO: 95 FL (ref 78–100)
MYCOPHENOLATE SERPL LC/MS/MS-MCNC: 0.84 MG/L (ref 1–3.5)
MYCOPHENOLATE-G SERPL LC/MS/MS-MCNC: 18.4 MG/L (ref 30–95)
PHOSPHATE SERPL-MCNC: 2.3 MG/DL (ref 2.5–4.5)
PLATELET # BLD AUTO: 302 10E9/L (ref 150–450)
POTASSIUM SERPL-SCNC: 3.5 MMOL/L (ref 3.4–5.3)
RBC # BLD AUTO: 2.69 10E12/L (ref 3.8–5.2)
SODIUM SERPL-SCNC: 142 MMOL/L (ref 133–144)
TACROLIMUS BLD-MCNC: 5 UG/L (ref 5–15)
TME LAST DOSE: ABNORMAL H
TME LAST DOSE: NORMAL H
WBC # BLD AUTO: 8 10E9/L (ref 4–11)

## 2020-08-28 PROCEDURE — 84100 ASSAY OF PHOSPHORUS: CPT | Performed by: SURGERY

## 2020-08-28 PROCEDURE — 80197 ASSAY OF TACROLIMUS: CPT | Performed by: SURGERY

## 2020-08-28 PROCEDURE — 80180 DRUG SCRN QUAN MYCOPHENOLATE: CPT | Performed by: SURGERY

## 2020-08-28 PROCEDURE — 80048 BASIC METABOLIC PNL TOTAL CA: CPT | Performed by: SURGERY

## 2020-08-28 PROCEDURE — 83735 ASSAY OF MAGNESIUM: CPT | Performed by: SURGERY

## 2020-08-28 PROCEDURE — 85027 COMPLETE CBC AUTOMATED: CPT | Performed by: SURGERY

## 2020-09-01 LAB
ANION GAP SERPL CALCULATED.3IONS-SCNC: 9 MMOL/L (ref 3–14)
BUN SERPL-MCNC: 12 MG/DL (ref 7–30)
CALCIUM SERPL-MCNC: 8.3 MG/DL (ref 8.5–10.1)
CHLORIDE SERPL-SCNC: 109 MMOL/L (ref 94–109)
CO2 SERPL-SCNC: 24 MMOL/L (ref 20–32)
CREAT SERPL-MCNC: 0.64 MG/DL (ref 0.52–1.04)
ERYTHROCYTE [DISTWIDTH] IN BLOOD BY AUTOMATED COUNT: 18.8 % (ref 10–15)
GFR SERPL CREATININE-BSD FRML MDRD: >90 ML/MIN/{1.73_M2}
GLUCOSE SERPL-MCNC: 87 MG/DL (ref 70–99)
HCT VFR BLD AUTO: 25.8 % (ref 35–47)
HGB BLD-MCNC: 8.3 G/DL (ref 11.7–15.7)
MAGNESIUM SERPL-MCNC: 1.3 MG/DL (ref 1.6–2.3)
MCH RBC QN AUTO: 31.2 PG (ref 26.5–33)
MCHC RBC AUTO-ENTMCNC: 32.2 G/DL (ref 31.5–36.5)
MCV RBC AUTO: 97 FL (ref 78–100)
MYCOPHENOLATE SERPL LC/MS/MS-MCNC: 1.28 MG/L (ref 1–3.5)
MYCOPHENOLATE-G SERPL LC/MS/MS-MCNC: 31.9 MG/L (ref 30–95)
PHOSPHATE SERPL-MCNC: 2.4 MG/DL (ref 2.5–4.5)
PLATELET # BLD AUTO: 270 10E9/L (ref 150–450)
POTASSIUM SERPL-SCNC: 3.3 MMOL/L (ref 3.4–5.3)
RBC # BLD AUTO: 2.66 10E12/L (ref 3.8–5.2)
SODIUM SERPL-SCNC: 142 MMOL/L (ref 133–144)
TACROLIMUS BLD-MCNC: 5.1 UG/L (ref 5–15)
TME LAST DOSE: NORMAL H
TME LAST DOSE: NORMAL H
WBC # BLD AUTO: 6.8 10E9/L (ref 4–11)

## 2020-09-01 PROCEDURE — 84100 ASSAY OF PHOSPHORUS: CPT | Performed by: SURGERY

## 2020-09-01 PROCEDURE — 85027 COMPLETE CBC AUTOMATED: CPT | Performed by: SURGERY

## 2020-09-01 PROCEDURE — 83735 ASSAY OF MAGNESIUM: CPT | Performed by: SURGERY

## 2020-09-01 PROCEDURE — 80180 DRUG SCRN QUAN MYCOPHENOLATE: CPT | Performed by: SURGERY

## 2020-09-01 PROCEDURE — 80048 BASIC METABOLIC PNL TOTAL CA: CPT | Performed by: SURGERY

## 2020-09-01 PROCEDURE — 80197 ASSAY OF TACROLIMUS: CPT | Performed by: SURGERY

## 2020-09-02 ENCOUNTER — TELEPHONE (OUTPATIENT)
Dept: TRANSPLANT | Facility: CLINIC | Age: 35
End: 2020-09-02

## 2020-09-02 NOTE — TELEPHONE ENCOUNTER
After 4 th attempt to phone call   Jelly stated that she did increase her tacrolimus  To  6 mg twice per day        Jelly PALACIOS J Luis stated that her child had her cellphone unable to receive phone call

## 2020-09-02 NOTE — TELEPHONE ENCOUNTER
Post Kidney and Pancreas Transplant Team Conference  Date: 9/2/2020  Transplant Coordinator: Mulu Arguello RN     Attendees:  [x]  Dr. Anglin  [x] Hollie Hong LPN     [x]  Dr. Armando [x] Mulu Arguello RN [] Tracy Wells LPN   [x]  Dr. Mancilla [] Birgit Solano, PERI     [] Chloe Lechuga RN [x] Anam Hermosillo, LisaD   [] Dr. Altman [] Lucina Jacinto RN    [x] Dr. Morgan [] Ron Terrazas RN    [x] Dr. Lakhani [] Laura Cervantes, PERI    [x] Dr. Younger [] Faustina Aragon, PERI     [] Yoli Mccoy, PERI    [x] Dr. Maki [x] Chelsea Simpson, PERI    [x] Kailey Santos, EVA [] Sharon Lujan, RN        Verbal Plan Read Back:   No changes at this time    Routed to RN Coordinator   Hollie Hong LPN

## 2020-09-03 ENCOUNTER — OFFICE VISIT (OUTPATIENT)
Dept: NEPHROLOGY | Facility: CLINIC | Age: 35
End: 2020-09-03
Attending: INTERNAL MEDICINE
Payer: MEDICARE

## 2020-09-03 ENCOUNTER — TELEPHONE (OUTPATIENT)
Dept: TRANSPLANT | Facility: CLINIC | Age: 35
End: 2020-09-03

## 2020-09-03 VITALS
HEIGHT: 65 IN | SYSTOLIC BLOOD PRESSURE: 114 MMHG | HEART RATE: 84 BPM | OXYGEN SATURATION: 100 % | DIASTOLIC BLOOD PRESSURE: 77 MMHG | TEMPERATURE: 98.1 F | BODY MASS INDEX: 23.03 KG/M2 | WEIGHT: 138.2 LBS

## 2020-09-03 DIAGNOSIS — Z48.298 AFTERCARE FOLLOWING ORGAN TRANSPLANT: ICD-10-CM

## 2020-09-03 DIAGNOSIS — Z94.0 KIDNEY TRANSPLANTED: ICD-10-CM

## 2020-09-03 DIAGNOSIS — N17.9 ACUTE RENAL FAILURE, UNSPECIFIED ACUTE RENAL FAILURE TYPE (H): ICD-10-CM

## 2020-09-03 PROCEDURE — G0463 HOSPITAL OUTPT CLINIC VISIT: HCPCS | Mod: ZF

## 2020-09-03 RX ORDER — TACROLIMUS 1 MG/1
5 CAPSULE ORAL 2 TIMES DAILY
Qty: 240 CAPSULE | Refills: 3 | Status: SHIPPED | OUTPATIENT
Start: 2020-09-03 | End: 2020-09-03

## 2020-09-03 RX ORDER — TACROLIMUS 1 MG/1
7 CAPSULE ORAL 2 TIMES DAILY
Qty: 420 CAPSULE | Refills: 11 | Status: SHIPPED | OUTPATIENT
Start: 2020-09-03 | End: 2020-09-25

## 2020-09-03 RX ORDER — PANTOPRAZOLE SODIUM 40 MG/1
40 TABLET, DELAYED RELEASE ORAL 2 TIMES DAILY
Qty: 30 TABLET | Refills: 1 | Status: SHIPPED | OUTPATIENT
Start: 2020-09-03 | End: 2020-09-03

## 2020-09-03 RX ORDER — PANTOPRAZOLE SODIUM 40 MG/1
40 TABLET, DELAYED RELEASE ORAL 2 TIMES DAILY
Qty: 30 TABLET | Refills: 4 | Status: SHIPPED | OUTPATIENT
Start: 2020-09-03 | End: 2020-12-07

## 2020-09-03 RX ORDER — LOSARTAN POTASSIUM 25 MG/1
12.5 TABLET ORAL AT BEDTIME
Qty: 30 TABLET | Refills: 11 | Status: SHIPPED | OUTPATIENT
Start: 2020-09-03 | End: 2021-10-28

## 2020-09-03 RX ORDER — LOSARTAN POTASSIUM 25 MG/1
12.5 TABLET ORAL AT BEDTIME
Qty: 30 TABLET | Refills: 1 | Status: SHIPPED | OUTPATIENT
Start: 2020-09-03 | End: 2020-09-03

## 2020-09-03 ASSESSMENT — PAIN SCALES - GENERAL: PAINLEVEL: NO PAIN (0)

## 2020-09-03 ASSESSMENT — MIFFLIN-ST. JEOR: SCORE: 1327.98

## 2020-09-03 NOTE — LETTER
9/3/2020       RE: Jelly Dietz  [address]  [facility] [address]     Dear Colleague,    Thank you for referring your patient, Jelly Dietz, to the [facility]ROLOGY at [facility]. Please see a copy of my visit note below.    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 Pt creatinine improved back to her baseline of 0.6-0.9.      # DDKT: stable, last dialysis 7/17/20              - Baseline Cr ~ 0.6-0.9 mg/dL               - Proteinuria: minimal              - Date DSA Last Checked: 7/2020      Latest DSA: No              - BK Viremia: No              - Kidney Tx Biopsy: Jul 04, 2020; Result: severe [place] IIA with AbMR features      # Immunosuppression: Tacrolimus immediate release (goal 8-10), Mycophenolic acid (dose 720 mg every 12 hours) and Prednisone (5 mg)    Tacrolimus levels are all over the place, unclear if any drugs or food altering with her drug levels (eats lot of grapefruit) or if she is missing some doses. But she endorses compliance with her medications.  - increased her dose from 4 mg BID to 7 mg BID on 9/3/2020 as her levels was low at ~5 since 8/21.  - explained in detail about possibility of rejecting the kidney with low immunosuppression and avoidance of food interfere with tacrolimus     - noted her Myfortic level is also low intermittently.      # Infection Prophylaxis:   - PJP: Sulfa/TMP (Bactrim)  - CMV: Valcyte     # Hypertension: Controlled;   Goal BP: < 140/90              - Volume status: euvolemic    EDW ~ 130 Lb              - Changes: no     # Anemia in Chronic Renal Disease: Hgb: Stable      ENZO: no longer needed              - Iron studies: Replete     # Mineral Bone Disorder:   - Calcium; level: Normal        On supplement: No  - Phosphorus; level: Low        On supplement: yes      # Electrolytes:   - Potassium; level:  low       On supplement: no, encouraged potassium rich foods.  - Magnesium; level: low        On supplement: continue supplement      # Medical Compliance: Yes, per pt     # COVID-19 Virus Review: Discussed COVID-19 virus and the potential medical risks.  Reviewed preventative health recommendations, which includes washing hands for 20 seconds, avoid touching your face, and social distancing.  Asked patient to inform the transplant center if they are exposed or diagnosed with this virus.     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        Donor Class:   Crossmatch at time of Tx: negative  DSA at time of Tx: No  Significant changes in immunosuppression: steroid maintenance due to early mixed rejection   CMV IgG Ab High Risk Discordance (D+/R-): No  EBV IgG Ab High Risk Discordance (D+/R-): No  Significant transplant-related complications: early acute rejection possible Non-HLA with an element of memory response     Transplant Office Phone Number: [phone]    Assessment and plan was discussed with the patient and she voiced her understanding and agreement.    Return visit: Return in about 4 weeks (around 10/1/2020).    Isabela Graham MD    Chief Complaint   Ms. Dietz is a 35 year old here for kidney transplant follow up.     History of Present Illness    Ms. Dietz is a 35 year old female with ESRD s/p second renal transplant in June 2020. Her history is complicated with acute mixed rejection during early week of engraftment. S/p therapy with plasmapheresis, IVIG, thymo and ritux with improvement in renal function.    Pt with no acute complaints this morning. Denied any dysuria, urgency or frequency of urination. She was eating a lot of grapefruit recently. Also delaying in taking her medication while she is preparing her children for school. Apparently she does not have any help at  home. So arranged to talk to our  to see if can be of any help to her. Also endorsed taking half the dose of MMF by mistake.    Stressed the importance of taking her medication and probability of rejection.    Also discussed in detail about her sensitivity and inability to get another kidney if she looses this one for rejection or for non compliance.    Recent Hospitalizations:  [x] No [] Yes    New Medical Issues: [x] No [] Yes    Decreased energy: [x] No [] Yes    Chest pain or SOB with exertion:  [x] No [] Yes    Appetite change or weight change: [x] No [] Yes    Nausea, vomiting or diarrhea:  [x] No [] Yes    Fever, sweats or chills: [x] No [] Yes    Leg swelling: [x] No [] Yes      Home BP: 113/80     zyrtec for seasonal allergies    Review of Systems   A comprehensive review of systems was obtained and negative, except as noted in the HPI or PMH.    Problem List   Patient Active Problem List   Diagnosis     CARDIOVASCULAR SCREENING; LDL GOAL LESS THAN 160     Kidney replaced by transplant     HTN, kidney transplant related     ACS (acute coronary syndrome) (H)     Anemia in chronic renal disease     IgA nephropathy     Anxiety     Kidney transplanted     Immunosuppression (H)     Painful bladder spasm     Hypophosphatemia     Constipation due to pain medication     Aftercare following organ transplant     Hypomagnesemia     Dehydration     Acute renal failure (ARF) (H)     Acute rejection of kidney transplant     Metabolic acidosis     Steroid-induced hyperglycemia     Nausea vomiting and diarrhea     Hypervolemia     Anemia       Social History   Social History     Tobacco Use     Smoking status: Never Smoker     Smokeless tobacco: Never Used   Substance Use Topics     Alcohol use: No     Drug use: No       Allergies   No Known Allergies    Medications   Current Outpatient Medications   Medication Sig     acetaminophen (TYLENOL) 325 MG tablet Take 2 tablets (650 mg) by mouth every 4 hours as  needed for other (multimodal surgical pain management along with NSAIDS and opioid medication as indicated based on pain control and physical function.)     amLODIPine (NORVASC) 5 MG tablet Take 1 tablet (5 mg) by mouth daily     aspirin (ASA) 81 MG chewable tablet Take 1 tablet (81 mg) by mouth daily     atorvastatin (LIPITOR) 10 MG tablet Take 1 tablet (10 mg) by mouth daily     carvedilol (COREG) 6.25 MG tablet Take 1 tablet (6.25 mg) by mouth 2 times daily     losartan (COZAAR) 25 MG tablet Take 0.5 tablets (12.5 mg) by mouth At Bedtime     magnesium oxide (MAG-OX) 400 (241.3 Mg) MG tablet Take 2 tablets (800 mg) by mouth 2 times daily     mycophenolic acid (GENERIC EQUIVALENT) 180 MG EC tablet Take 4 tablets (720 mg) by mouth 2 times daily     pantoprazole (PROTONIX) 40 MG EC tablet Take 1 tablet (40 mg) by mouth 2 times daily     phosphorus tablet 250 mg (PHOSPHORUS TABLET 250 MG) 250 MG per tablet Take 2 tablets (500 mg) by mouth 3 times daily     predniSONE (DELTASONE) 5 MG tablet Take 1 tablet (5 mg) by mouth daily     sulfamethoxazole-trimethoprim (BACTRIM) 400-80 MG tablet Take 1 tablet by mouth daily     tacrolimus (GENERIC EQUIVALENT) 1 MG capsule Take 7 capsules (7 mg) by mouth 2 times daily     valGANciclovir (VALCYTE) 450 MG tablet Take 2 tablets (900 mg) by mouth daily     Vitamin D3 (CHOLECALCIFEROL) 25 mcg (1000 units) tablet Take 2 tablets (50 mcg) by mouth daily     No current facility-administered medications for this visit.      Medications Discontinued During This Encounter   Medication Reason     magnesium oxide (MAG-OX) 400 (241.3 Mg) MG tablet Reorder     tacrolimus (GENERIC EQUIVALENT) 1 MG capsule Reorder     losartan (COZAAR) 25 MG tablet Reorder     pantoprazole (PROTONIX) 40 MG EC tablet Reorder     losartan (COZAAR) 25 MG tablet Reorder     pantoprazole (PROTONIX) 40 MG EC tablet Reorder       Physical Exam   Vital Signs: /77   Pulse 84   Temp 98.1  F (36.7  C) (Oral)   Ht  "1.643 m (5' 4.7\")   Wt 62.7 kg (138 lb 3.2 oz)   SpO2 100%   BMI 23.21 kg/m      GENERAL APPEARANCE: alert and no distress  HENT: mouth without ulcers or lesions  LYMPHATICS: no cervical or supraclavicular nodes  RESP: lungs clear to auscultation - no rales, rhonchi or wheezes  CV: regular rhythm, normal rate, no rub, no murmur  EDEMA: no LE edema bilaterally  ABDOMEN: soft, nondistended, nontender, bowel sounds normal  MS: extremities normal - no gross deformities noted, no evidence of inflammation in joints, no muscle tenderness  SKIN: no rash    Data     Renal Latest Ref Rng & Units 9/1/2020 8/28/2020 8/21/2020   Na 133 - 144 mmol/L 142 142 140   K 3.4 - 5.3 mmol/L 3.3(L) 3.5 3.3(L)   Cl 94 - 109 mmol/L 109 109 109   CO2 20 - 32 mmol/L 24 24 22   BUN 7 - 30 mg/dL 12 17 12   Cr 0.52 - 1.04 mg/dL 0.64 0.74 0.74   Glucose 70 - 99 mg/dL 87 77 89   Ca  8.5 - 10.1 mg/dL 8.3(L) 8.3(L) 7.8(L)   Mg 1.6 - 2.3 mg/dL 1.3(L) 1.2(L) -     Bone Health Latest Ref Rng & Units 9/1/2020 8/28/2020 8/17/2020   Phos 2.5 - 4.5 mg/dL 2.4(L) 2.3(L) 1.9(L)   PTHi 18 - 80 pg/mL - - -   Vit D Def 20 - 75 ug/L - - -     Heme Latest Ref Rng & Units 9/1/2020 8/28/2020 8/21/2020   WBC 4.0 - 11.0 10e9/L 6.8 8.0 11.2(H)   Hgb 11.7 - 15.7 g/dL 8.3(L) 8.3(L) 8.2(L)   Plt 150 - 450 10e9/L 270 302 230   ABSOLUTE NEUTROPHIL 1.6 - 8.3 10e9/L - - -   ABSOLUTE LYMPHOCYTES 0.8 - 5.3 10e9/L - - -   ABSOLUTE MONOCYTES 0.0 - 1.3 10e9/L - - -   ABSOLUTE EOSINOPHILS 0.0 - 0.7 10e9/L - - -   ABSOLUTE BASOPHILS 0.0 - 0.2 10e9/L - - -   ABS IMMATURE GRANULOCYTES 0 - 0.4 10e9/L - - -   ABSOLUTE NUCLEATED RBC - - - -     Liver Latest Ref Rng & Units 8/10/2020 8/4/2020 7/24/2020   AP 40 - 150 U/L 131 133 104   TBili 0.2 - 1.3 mg/dL 0.7 0.5 0.8   DBili 0.0 - 0.2 mg/dL - - -   ALT 0 - 50 U/L 28 41 215(H)   AST 0 - 45 U/L 17 16 98(H)   Tot Protein 6.8 - 8.8 g/dL 7.6 7.8 7.9   Albumin 3.4 - 5.0 g/dL 4.0 4.0 4.2     Pancreas Latest Ref Rng & Units 6/19/2020 5/30/2015 " 10/6/2014   A1C 0 - 5.6 % 5.1 - -   Lipase 73 - 393 U/L - 126 100     Iron studies Latest Ref Rng & Units 7/5/2020 6/25/2020 11/30/2012   Iron 35 - 180 ug/dL 10(L) 57 125   Iron sat 15 - 46 % 9(L) 35 81(H)   Ferritin 12 - 150 ng/mL - 886(H) 401(H)     [facility] Txp Virology Latest Ref Rng & Units 8/17/2020 8/4/2020 7/20/2020   CVM DNA Quant - - EDTA PLASMA -   CMV Quant <100 Copies/mL - - -   CMV QT Log <2.0 Log copies/mL - - -   CMV QUANT IU/ML CMVND:CMV DNA Not Detected [IU]/mL - CMV DNA Not Detected -   LOG IU/ML OF CMVQNT <2.1 [Log:IU]/mL - Not Calculated -   BK Spec - Plasma - Plasma   BK Res BKNEG:BK Virus DNA Not Detected copies/mL BK Virus DNA Not Detected - BK Virus DNA Not Detected   BK Log <2.7 Log copies/mL Not Calculated - Not Calculated   EBV VCA IGG ANTIBODY U/mL - - -   EBV CAPSID ANTIBODY IGG 0.0 - 0.8 AI - - -   EBV DNA COPIES/ML <1000 Copies/mL - - -   EBV DNA LOG OF COPIES <3.0 Log copies/mL - - -   Hep B Core NR:Nonreactive - - -   Hep B Surf - - - -   HIV 1&2 NEG - - -        Recent Labs   Lab Test 08/21/20  0900 08/28/20  0900 09/01/20  0900   DOSTAC 8.19.20 2100 8/27 2100 Not Provided   TACROL 5.3 5.0 5.1     Recent Labs   Lab Test 08/17/20  0930 08/28/20  0900 09/01/20  0900   DOSMPA Not Provided 8/27 2100 Not Provided   MPACID 4.54* 0.84* 1.28   MPAG 50.8 18.4* 31.9     Patient was seen and evaluated by me, Mahesh Armando MD. I have reviewed the note and agree with the the plan of care as documented by the fellow.      Again, thank you for allowing me to participate in the care of your patient.      Sincerely,    [facility]

## 2020-09-03 NOTE — PATIENT INSTRUCTIONS
1. Increase Tacrolimus to 7 mg twice daily   2. Increase magnesium to 2 tabs twice daily   3. Connect with nadine to schedule the allograft biopsy   4. RTC in 4 weeks

## 2020-09-03 NOTE — TELEPHONE ENCOUNTER
Issue low levels tacrolimus  And  Cellcept mycophenolate mofetil   Called this morning to confirm returning to clinic    Tacrolimus level still below goal level     After increasing tacrolimus  Level to 5 mg twice per day

## 2020-09-03 NOTE — PROGRESS NOTES
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 Pt creatinine improved back to her baseline of 0.6-0.9.      # DDKT: stable, last dialysis 7/17/20              - Baseline Cr ~ 0.6-0.9 mg/dL               - Proteinuria: minimal              - Date DSA Last Checked: 7/2020      Latest DSA: No              - BK Viremia: No              - Kidney Tx Biopsy: Jul 04, 2020; Result: severe [place]ff IIA with AbMR features      # Immunosuppression: Tacrolimus immediate release (goal 8-10), Mycophenolic acid (dose 720 mg every 12 hours) and Prednisone (5 mg)    Tacrolimus levels are all over the place, unclear if any drugs or food altering with her drug levels (eats lot of grapefruit) or if she is missing some doses. But she endorses compliance with her medications.  - increased her dose from 4 mg BID to 7 mg BID on 9/3/2020 as her levels was low at ~5 since 8/21.  - explained in detail about possibility of rejecting the kidney with low immunosuppression and avoidance of food interfere with tacrolimus     - noted her Myfortic level is also low intermittently.      # Infection Prophylaxis:   - PJP: Sulfa/TMP (Bactrim)  - CMV: Valcyte     # Hypertension: Controlled;   Goal BP: < 140/90              - Volume status: euvolemic    EDW ~ 130 Lb              - Changes: no     # Anemia in Chronic Renal Disease: Hgb: Stable      ENZO: no longer needed              - Iron studies: Replete     # Mineral Bone Disorder:   - Calcium; level: Normal        On supplement: No  - Phosphorus; level: Low        On supplement: yes      # Electrolytes:   - Potassium; level: low       On supplement: no, encouraged potassium rich foods.  - Magnesium; level: low        On supplement: continue supplement      # Medical Compliance: Yes, per pt     # COVID-19 Virus Review: Discussed COVID-19 virus and the potential medical risks.  Reviewed preventative  health recommendations, which includes washing hands for 20 seconds, avoid touching your face, and social distancing.  Asked patient to inform the transplant center if they are exposed or diagnosed with this virus.     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        Donor Class:   Crossmatch at time of Tx: negative  DSA at time of Tx: No  Significant changes in immunosuppression: steroid maintenance due to early mixed rejection   CMV IgG Ab High Risk Discordance (D+/R-): No  EBV IgG Ab High Risk Discordance (D+/R-): No  Significant transplant-related complications: early acute rejection possible Non-HLA with an element of memory response     Transplant Office Phone Number: [phone]    Assessment and plan was discussed with the patient and she voiced her understanding and agreement.    Return visit: Return in about 4 weeks (around 10/1/2020).    Isabela Graham MD    Chief Complaint   Ms. Dietz is a 35 year old here for kidney transplant follow up.     History of Present Illness    Ms. Dietz is a 35 year old female with ESRD s/p second renal transplant in June 2020. Her history is complicated with acute mixed rejection during early week of engraftment. S/p therapy with plasmapheresis, IVIG, thymo and ritux with improvement in renal function.    Pt with no acute complaints this morning. Denied any dysuria, urgency or frequency of urination. She was eating a lot of grapefruit recently. Also delaying in taking her medication while she is preparing her children for school. Apparently she does not have any help at home. So arranged to talk to our  to see if can be of any help to her. Also endorsed taking half the dose of MMF by mistake.    Stressed the importance of taking her medication and probability of rejection.    Also discussed in detail about her sensitivity and  inability to get another kidney if she looses this one for rejection or for non compliance.    Recent Hospitalizations:  [x] No [] Yes    New Medical Issues: [x] No [] Yes    Decreased energy: [x] No [] Yes    Chest pain or SOB with exertion:  [x] No [] Yes    Appetite change or weight change: [x] No [] Yes    Nausea, vomiting or diarrhea:  [x] No [] Yes    Fever, sweats or chills: [x] No [] Yes    Leg swelling: [x] No [] Yes      Home BP: 113/80     zyrtec for seasonal allergies    Review of Systems   A comprehensive review of systems was obtained and negative, except as noted in the HPI or PMH.    Problem List   Patient Active Problem List   Diagnosis     CARDIOVASCULAR SCREENING; LDL GOAL LESS THAN 160     Kidney replaced by transplant     HTN, kidney transplant related     ACS (acute coronary syndrome) (H)     Anemia in chronic renal disease     IgA nephropathy     Anxiety     Kidney transplanted     Immunosuppression (H)     Painful bladder spasm     Hypophosphatemia     Constipation due to pain medication     Aftercare following organ transplant     Hypomagnesemia     Dehydration     Acute renal failure (ARF) (H)     Acute rejection of kidney transplant     Metabolic acidosis     Steroid-induced hyperglycemia     Nausea vomiting and diarrhea     Hypervolemia     Anemia       Social History   Social History     Tobacco Use     Smoking status: Never Smoker     Smokeless tobacco: Never Used   Substance Use Topics     Alcohol use: No     Drug use: No       Allergies   No Known Allergies    Medications   Current Outpatient Medications   Medication Sig     acetaminophen (TYLENOL) 325 MG tablet Take 2 tablets (650 mg) by mouth every 4 hours as needed for other (multimodal surgical pain management along with NSAIDS and opioid medication as indicated based on pain control and physical function.)     amLODIPine (NORVASC) 5 MG tablet Take 1 tablet (5 mg) by mouth daily     aspirin (ASA) 81 MG chewable tablet Take 1  "tablet (81 mg) by mouth daily     atorvastatin (LIPITOR) 10 MG tablet Take 1 tablet (10 mg) by mouth daily     carvedilol (COREG) 6.25 MG tablet Take 1 tablet (6.25 mg) by mouth 2 times daily     losartan (COZAAR) 25 MG tablet Take 0.5 tablets (12.5 mg) by mouth At Bedtime     magnesium oxide (MAG-OX) 400 (241.3 Mg) MG tablet Take 2 tablets (800 mg) by mouth 2 times daily     mycophenolic acid (GENERIC EQUIVALENT) 180 MG EC tablet Take 4 tablets (720 mg) by mouth 2 times daily     pantoprazole (PROTONIX) 40 MG EC tablet Take 1 tablet (40 mg) by mouth 2 times daily     phosphorus tablet 250 mg (PHOSPHORUS TABLET 250 MG) 250 MG per tablet Take 2 tablets (500 mg) by mouth 3 times daily     predniSONE (DELTASONE) 5 MG tablet Take 1 tablet (5 mg) by mouth daily     sulfamethoxazole-trimethoprim (BACTRIM) 400-80 MG tablet Take 1 tablet by mouth daily     tacrolimus (GENERIC EQUIVALENT) 1 MG capsule Take 7 capsules (7 mg) by mouth 2 times daily     valGANciclovir (VALCYTE) 450 MG tablet Take 2 tablets (900 mg) by mouth daily     Vitamin D3 (CHOLECALCIFEROL) 25 mcg (1000 units) tablet Take 2 tablets (50 mcg) by mouth daily     No current facility-administered medications for this visit.      Medications Discontinued During This Encounter   Medication Reason     magnesium oxide (MAG-OX) 400 (241.3 Mg) MG tablet Reorder     tacrolimus (GENERIC EQUIVALENT) 1 MG capsule Reorder     losartan (COZAAR) 25 MG tablet Reorder     pantoprazole (PROTONIX) 40 MG EC tablet Reorder     losartan (COZAAR) 25 MG tablet Reorder     pantoprazole (PROTONIX) 40 MG EC tablet Reorder       Physical Exam   Vital Signs: /77   Pulse 84   Temp 98.1  F (36.7  C) (Oral)   Ht 1.643 m (5' 4.7\")   Wt 62.7 kg (138 lb 3.2 oz)   SpO2 100%   BMI 23.21 kg/m      GENERAL APPEARANCE: alert and no distress  HENT: mouth without ulcers or lesions  LYMPHATICS: no cervical or supraclavicular nodes  RESP: lungs clear to auscultation - no rales, rhonchi or " wheezes  CV: regular rhythm, normal rate, no rub, no murmur  EDEMA: no LE edema bilaterally  ABDOMEN: soft, nondistended, nontender, bowel sounds normal  MS: extremities normal - no gross deformities noted, no evidence of inflammation in joints, no muscle tenderness  SKIN: no rash    Data     Renal Latest Ref Rng & Units 9/1/2020 8/28/2020 8/21/2020   Na 133 - 144 mmol/L 142 142 140   K 3.4 - 5.3 mmol/L 3.3(L) 3.5 3.3(L)   Cl 94 - 109 mmol/L 109 109 109   CO2 20 - 32 mmol/L 24 24 22   BUN 7 - 30 mg/dL 12 17 12   Cr 0.52 - 1.04 mg/dL 0.64 0.74 0.74   Glucose 70 - 99 mg/dL 87 77 89   Ca  8.5 - 10.1 mg/dL 8.3(L) 8.3(L) 7.8(L)   Mg 1.6 - 2.3 mg/dL 1.3(L) 1.2(L) -     Bone Health Latest Ref Rng & Units 9/1/2020 8/28/2020 8/17/2020   Phos 2.5 - 4.5 mg/dL 2.4(L) 2.3(L) 1.9(L)   PTHi 18 - 80 pg/mL - - -   Vit D Def 20 - 75 ug/L - - -     Heme Latest Ref Rng & Units 9/1/2020 8/28/2020 8/21/2020   WBC 4.0 - 11.0 10e9/L 6.8 8.0 11.2(H)   Hgb 11.7 - 15.7 g/dL 8.3(L) 8.3(L) 8.2(L)   Plt 150 - 450 10e9/L 270 302 230   ABSOLUTE NEUTROPHIL 1.6 - 8.3 10e9/L - - -   ABSOLUTE LYMPHOCYTES 0.8 - 5.3 10e9/L - - -   ABSOLUTE MONOCYTES 0.0 - 1.3 10e9/L - - -   ABSOLUTE EOSINOPHILS 0.0 - 0.7 10e9/L - - -   ABSOLUTE BASOPHILS 0.0 - 0.2 10e9/L - - -   ABS IMMATURE GRANULOCYTES 0 - 0.4 10e9/L - - -   ABSOLUTE NUCLEATED RBC - - - -     Liver Latest Ref Rng & Units 8/10/2020 8/4/2020 7/24/2020   AP 40 - 150 U/L 131 133 104   TBili 0.2 - 1.3 mg/dL 0.7 0.5 0.8   DBili 0.0 - 0.2 mg/dL - - -   ALT 0 - 50 U/L 28 41 215(H)   AST 0 - 45 U/L 17 16 98(H)   Tot Protein 6.8 - 8.8 g/dL 7.6 7.8 7.9   Albumin 3.4 - 5.0 g/dL 4.0 4.0 4.2     Pancreas Latest Ref Rng & Units 6/19/2020 5/30/2015 10/6/2014   A1C 0 - 5.6 % 5.1 - -   Lipase 73 - 393 U/L - 126 100     Iron studies Latest Ref Rng & Units 7/5/2020 6/25/2020 11/30/2012   Iron 35 - 180 ug/dL 10(L) 57 125   Iron sat 15 - 46 % 9(L) 35 81(H)   Ferritin 12 - 150 ng/mL - 886(H) 401(H)     [facility] Txp Virology  Latest Ref Rng & Units 8/17/2020 8/4/2020 7/20/2020   CVM DNA Quant - - EDTA PLASMA -   CMV Quant <100 Copies/mL - - -   CMV QT Log <2.0 Log copies/mL - - -   CMV QUANT IU/ML CMVND:CMV DNA Not Detected [IU]/mL - CMV DNA Not Detected -   LOG IU/ML OF CMVQNT <2.1 [Log:IU]/mL - Not Calculated -   BK Spec - Plasma - Plasma   BK Res BKNEG:BK Virus DNA Not Detected copies/mL BK Virus DNA Not Detected - BK Virus DNA Not Detected   BK Log <2.7 Log copies/mL Not Calculated - Not Calculated   EBV VCA IGG ANTIBODY U/mL - - -   EBV CAPSID ANTIBODY IGG 0.0 - 0.8 AI - - -   EBV DNA COPIES/ML <1000 Copies/mL - - -   EBV DNA LOG OF COPIES <3.0 Log copies/mL - - -   Hep B Core NR:Nonreactive - - -   Hep B Surf - - - -   HIV 1&2 NEG - - -        Recent Labs   Lab Test 08/21/20  0900 08/28/20  0900 09/01/20  0900   DOSTAC 8.19.20 2100 8/27 2100 Not Provided   TACROL 5.3 5.0 5.1     Recent Labs   Lab Test 08/17/20  0930 08/28/20  0900 09/01/20  0900   DOSMPA Not Provided 8/27 2100 Not Provided   MPACID 4.54* 0.84* 1.28   MPAG 50.8 18.4* 31.9     Patient was seen and evaluated by me, Mahesh Armando MD. I have reviewed the note and agree with the the plan of care as documented by the fellow.

## 2020-09-04 ENCOUNTER — TELEPHONE (OUTPATIENT)
Dept: TRANSPLANT | Facility: CLINIC | Age: 35
End: 2020-09-04

## 2020-09-04 LAB
ANION GAP SERPL CALCULATED.3IONS-SCNC: 8 MMOL/L (ref 3–14)
BUN SERPL-MCNC: 18 MG/DL (ref 7–30)
CALCIUM SERPL-MCNC: 9.1 MG/DL (ref 8.5–10.1)
CHLORIDE SERPL-SCNC: 107 MMOL/L (ref 94–109)
CO2 SERPL-SCNC: 24 MMOL/L (ref 20–32)
CREAT SERPL-MCNC: 0.76 MG/DL (ref 0.52–1.04)
ERYTHROCYTE [DISTWIDTH] IN BLOOD BY AUTOMATED COUNT: 18.5 % (ref 10–15)
GFR SERPL CREATININE-BSD FRML MDRD: >90 ML/MIN/{1.73_M2}
GLUCOSE SERPL-MCNC: 86 MG/DL (ref 70–99)
HCT VFR BLD AUTO: 27.6 % (ref 35–47)
HGB BLD-MCNC: 9.1 G/DL (ref 11.7–15.7)
MCH RBC QN AUTO: 31.9 PG (ref 26.5–33)
MCHC RBC AUTO-ENTMCNC: 33 G/DL (ref 31.5–36.5)
MCV RBC AUTO: 97 FL (ref 78–100)
PLATELET # BLD AUTO: 298 10E9/L (ref 150–450)
POTASSIUM SERPL-SCNC: 3.9 MMOL/L (ref 3.4–5.3)
RBC # BLD AUTO: 2.85 10E12/L (ref 3.8–5.2)
SODIUM SERPL-SCNC: 139 MMOL/L (ref 133–144)
TACROLIMUS BLD-MCNC: 7.5 UG/L (ref 5–15)
TME LAST DOSE: NORMAL H
WBC # BLD AUTO: 7.6 10E9/L (ref 4–11)

## 2020-09-04 PROCEDURE — 80180 DRUG SCRN QUAN MYCOPHENOLATE: CPT | Performed by: SURGERY

## 2020-09-04 PROCEDURE — 80048 BASIC METABOLIC PNL TOTAL CA: CPT | Performed by: SURGERY

## 2020-09-04 PROCEDURE — 80197 ASSAY OF TACROLIMUS: CPT | Performed by: SURGERY

## 2020-09-04 PROCEDURE — 85027 COMPLETE CBC AUTOMATED: CPT | Performed by: SURGERY

## 2020-09-04 NOTE — TELEPHONE ENCOUNTER
Transplant Social Work Services Phone Call      Data: Spoke with transplant nephrologist who reported pt appeared to be overwhelmed during yesterday's visit and requested this writer to contact pt to see if there is any assistance available. Spoke with pt. Pt reported she is a little overwhelmed being a new transplant patient (pt was transplanted 6/19/20) and getting ready for distance learning for her 3rd grade son (who is on the autism spectrum per pt) and her daughter who will be in . Pt reported she has been trying to get her apartment ready for school. She is anxious because she is not sure how the school year will go. Encouraged pt to reach out to the school if she needs help. Reviewed [employer] website regarding support for students who have an IEP (she reported her son does) and emailed the website to her to review. Inquired if pt has any family who could come help her. She stated that her sister may come live with her (her family lives in [place], [place]). Pt stated she is thinking about moving back to [place] when her lease is up in her apartment so she can have more family support since she is a single mother. Inquired about medication compliance and labs. Pt reported she has been taking her medications as prescribed (transplant nephrologist was concerned there may be some non-compliance as her lab values don't quite add up, pt stated she was eating a lot of grapefruit) and she is getting her labs done as scheduled through home care. Pt reported she is now working with the pharmacy on having her medications delivered to her home so she does not have to go pick them up from the pharmacy. Pt denied needing any further assistance at this time.   Intervention: phone call, supportive listening   Assessment: Due to COVID-19, the school district pt's children attend is going to be distance learning for sometime. Pt has some anxiety about how the school year will go since she  will be having to help with both of her children. Pt lacks support in the [facility] area as her family lives in [place], [place] but pt is hopeful her sister will come stay with her and help her. Pt has a history of non-compliance with dialysis because of  issues. Pt stated she is compliant with medications.   Education provided by [facility]: [facility] resource page  Plan: This writer sent pt an email to [email or web address] with this writer's contact information and the website to the [facility] Special Education page. Pt to reach out to this writer with further concerns.       LA Lozano    Kidney/Pancreas/Auto Islet Transplant Programs

## 2020-09-05 NOTE — TELEPHONE ENCOUNTER
Spoke with DR Amandeep Zaman, patient to stay on 720 mg BID of myfortic as WBC within normal, no symptoms, CMV negative and repeat labs on Tuesday.     Left detailed message for patient to take 720 mg BID and repeat labs on Tuesday, but to return call to discuss.

## 2020-09-05 NOTE — TELEPHONE ENCOUNTER
Patient called back to report that she did not take the myfortic prior to blood draw. She was taking 2 tablets 360 mg BID until 2 days ago and then started taking 4 tablets 720 mg BID. Patient does not have any N/V or diarrhea.   Paged Dr CHARLES Cornelius. Patient to take 360 mg in the AM and on call coordinator to page on call transplant nephrologist in AM for future plan.  Spoke with Jelly she will take 2 tablets 360 mg in AM and aware will call with a plan on 9/5.

## 2020-09-05 NOTE — TELEPHONE ENCOUNTER
Received a page that patient had critical MPA level of 14.62. left message for patient to return call to discuss.

## 2020-09-08 ENCOUNTER — MEDICAL CORRESPONDENCE (OUTPATIENT)
Dept: HEALTH INFORMATION MANAGEMENT | Facility: CLINIC | Age: 35
End: 2020-09-08

## 2020-09-08 ENCOUNTER — TELEPHONE (OUTPATIENT)
Dept: TRANSPLANT | Facility: CLINIC | Age: 35
End: 2020-09-08

## 2020-09-08 LAB
ANION GAP SERPL CALCULATED.3IONS-SCNC: 8 MMOL/L (ref 3–14)
BUN SERPL-MCNC: 13 MG/DL (ref 7–30)
CALCIUM SERPL-MCNC: 9 MG/DL (ref 8.5–10.1)
CHLORIDE SERPL-SCNC: 109 MMOL/L (ref 94–109)
CO2 SERPL-SCNC: 24 MMOL/L (ref 20–32)
CREAT SERPL-MCNC: 0.68 MG/DL (ref 0.52–1.04)
ERYTHROCYTE [DISTWIDTH] IN BLOOD BY AUTOMATED COUNT: 19.2 % (ref 10–15)
GFR SERPL CREATININE-BSD FRML MDRD: >90 ML/MIN/{1.73_M2}
GLUCOSE SERPL-MCNC: 87 MG/DL (ref 70–99)
HCT VFR BLD AUTO: 27.3 % (ref 35–47)
HGB BLD-MCNC: 8.9 G/DL (ref 11.7–15.7)
MAGNESIUM SERPL-MCNC: 1.4 MG/DL (ref 1.6–2.3)
MCH RBC QN AUTO: 32.2 PG (ref 26.5–33)
MCHC RBC AUTO-ENTMCNC: 32.6 G/DL (ref 31.5–36.5)
MCV RBC AUTO: 99 FL (ref 78–100)
MYCOPHENOLATE SERPL LC/MS/MS-MCNC: 1.74 MG/L (ref 1–3.5)
MYCOPHENOLATE SERPL LC/MS/MS-MCNC: 15.74 MG/L (ref 1–3.5)
MYCOPHENOLATE-G SERPL LC/MS/MS-MCNC: 50.3 MG/L (ref 30–95)
MYCOPHENOLATE-G SERPL LC/MS/MS-MCNC: 84.4 MG/L (ref 30–95)
PHOSPHATE SERPL-MCNC: 2.1 MG/DL (ref 2.5–4.5)
PLATELET # BLD AUTO: 266 10E9/L (ref 150–450)
POTASSIUM SERPL-SCNC: 3.7 MMOL/L (ref 3.4–5.3)
RBC # BLD AUTO: 2.76 10E12/L (ref 3.8–5.2)
SODIUM SERPL-SCNC: 141 MMOL/L (ref 133–144)
TACROLIMUS BLD-MCNC: 10.8 UG/L (ref 5–15)
TME LAST DOSE: ABNORMAL H
TME LAST DOSE: NORMAL H
TME LAST DOSE: NORMAL H
WBC # BLD AUTO: 7.5 10E9/L (ref 4–11)

## 2020-09-08 PROCEDURE — 80180 DRUG SCRN QUAN MYCOPHENOLATE: CPT | Performed by: SURGERY

## 2020-09-08 PROCEDURE — 83735 ASSAY OF MAGNESIUM: CPT | Performed by: SURGERY

## 2020-09-08 PROCEDURE — 85027 COMPLETE CBC AUTOMATED: CPT | Performed by: SURGERY

## 2020-09-08 PROCEDURE — 84100 ASSAY OF PHOSPHORUS: CPT | Performed by: SURGERY

## 2020-09-08 PROCEDURE — 80197 ASSAY OF TACROLIMUS: CPT | Performed by: SURGERY

## 2020-09-08 PROCEDURE — 80048 BASIC METABOLIC PNL TOTAL CA: CPT | Performed by: SURGERY

## 2020-09-08 NOTE — TELEPHONE ENCOUNTER
Left message for patient with the following:  Encouraged patient to increase dietary phosphorus intake (dairy, root beer floats, etc)

## 2020-09-08 NOTE — TELEPHONE ENCOUNTER
ISSUE:   Mag 1.4  Phos 2.1    PLAN:  Og Mancilla MD Lavelle Peterson, Chloe CAI RN               No. If Mag and phos trending down on next check will need to increase but wouldn't do it yet. Please ask her to increase PO phos intake.      LPN TASK:   Call pt and educate on increasing dietary phosphorus intake (dairy, root beer floats, etc)

## 2020-09-10 ENCOUNTER — TELEPHONE (OUTPATIENT)
Dept: PHARMACY | Facility: CLINIC | Age: 35
End: 2020-09-10

## 2020-09-10 NOTE — TELEPHONE ENCOUNTER
Clinical Pharmacy Consult:                                                      Transplant Specific: 2 Month Post Transplant Medication  Review  Date of Transplant: 6/19/20  Type of Transplant: kidney  First Transplant: no, #2  History of rejection: yes    Immunosuppression Regimen   TAC 7mg qAM & 7mg qPM, Prednisone 5mg qAM and Myforitic 720mg qAM & 720mg qPM  Patient specific goal: 8-10  Most recent level: 10.8, date 9/8/20  Immunosuppressant Levels:  Supratherapeutic  Pt adherent to lab draws: yes  Scr:   Lab Results   Component Value Date    CR 0.99 07/24/2020     Side effects: could not contact    Prophylactic Medications  Antibacterial:  Bactrim SS daily  Scheduled Discontinue Date: Lifelong    Antifungal: Not needed thus far  Scheduled Discontinue Date: N/A    Antiviral: CrCl 40 to 59 mL/minute: Valcyte 450 mg once daily   Scheduled Discontinue Date: 3 months    Acid Reducer: Protonix (pantoprazole)  Scheduled Reviewed Date: MD to determine    Thrombosis Prevention: Aspirin 81 mg PO daily  Scheduled Discontinue Date: MD to determine    Blood Pressure Management  Frequency of home Blood Pressure checks: could not contact  Most recent home BP:   Patient Blood pressure goal: <140/90  Patient blood pressure at goal:  yes  Hospitalizations/ER visits since last assessment: 1, acute rejection     Med rec/DUR performed: yes  Med Rec Discrepancies: no    Could not contact.   Appears adherent, last tacro was high.      Fox Mendoza [facility]  Specialty Pharmacist [phone]

## 2020-09-11 ENCOUNTER — MEDICAL CORRESPONDENCE (OUTPATIENT)
Dept: HEALTH INFORMATION MANAGEMENT | Facility: CLINIC | Age: 35
End: 2020-09-11

## 2020-09-11 LAB
ANION GAP SERPL CALCULATED.3IONS-SCNC: 8 MMOL/L (ref 3–14)
BUN SERPL-MCNC: 14 MG/DL (ref 7–30)
CALCIUM SERPL-MCNC: 9.2 MG/DL (ref 8.5–10.1)
CHLORIDE SERPL-SCNC: 107 MMOL/L (ref 94–109)
CO2 SERPL-SCNC: 23 MMOL/L (ref 20–32)
CREAT SERPL-MCNC: 0.7 MG/DL (ref 0.52–1.04)
ERYTHROCYTE [DISTWIDTH] IN BLOOD BY AUTOMATED COUNT: 18.5 % (ref 10–15)
GFR SERPL CREATININE-BSD FRML MDRD: >90 ML/MIN/{1.73_M2}
GLUCOSE SERPL-MCNC: 114 MG/DL (ref 70–99)
HCT VFR BLD AUTO: 29.6 % (ref 35–47)
HGB BLD-MCNC: 9.6 G/DL (ref 11.7–15.7)
MCH RBC QN AUTO: 32.4 PG (ref 26.5–33)
MCHC RBC AUTO-ENTMCNC: 32.4 G/DL (ref 31.5–36.5)
MCV RBC AUTO: 100 FL (ref 78–100)
PLATELET # BLD AUTO: 299 10E9/L (ref 150–450)
POTASSIUM SERPL-SCNC: 3.4 MMOL/L (ref 3.4–5.3)
RBC # BLD AUTO: 2.96 10E12/L (ref 3.8–5.2)
SODIUM SERPL-SCNC: 138 MMOL/L (ref 133–144)
TACROLIMUS BLD-MCNC: 11.8 UG/L (ref 5–15)
TME LAST DOSE: NORMAL H
WBC # BLD AUTO: 8.8 10E9/L (ref 4–11)

## 2020-09-11 PROCEDURE — 80197 ASSAY OF TACROLIMUS: CPT | Performed by: SURGERY

## 2020-09-11 PROCEDURE — 85027 COMPLETE CBC AUTOMATED: CPT | Performed by: SURGERY

## 2020-09-11 PROCEDURE — 80048 BASIC METABOLIC PNL TOTAL CA: CPT | Performed by: SURGERY

## 2020-09-15 ENCOUNTER — TELEPHONE (OUTPATIENT)
Dept: TRANSPLANT | Facility: CLINIC | Age: 35
End: 2020-09-15

## 2020-09-15 NOTE — TELEPHONE ENCOUNTER
Patient Call: General  Route to LPN    Reason for call: pt thought home care was coming out today  And they said she was discharged from their services  She does not want to go to a hospital or clinic for her lab draws     Call back needed? Yes    Return Call Needed  Same as documented in contacts section  When to return call?: Greater than one day: Route standard priority

## 2020-09-16 ENCOUNTER — MEDICAL CORRESPONDENCE (OUTPATIENT)
Dept: HEALTH INFORMATION MANAGEMENT | Facility: CLINIC | Age: 35
End: 2020-09-16

## 2020-09-16 LAB
ANION GAP SERPL CALCULATED.3IONS-SCNC: 7 MMOL/L (ref 3–14)
BUN SERPL-MCNC: 16 MG/DL (ref 7–30)
CALCIUM SERPL-MCNC: 9.4 MG/DL (ref 8.5–10.1)
CHLORIDE SERPL-SCNC: 109 MMOL/L (ref 94–109)
CO2 SERPL-SCNC: 22 MMOL/L (ref 20–32)
CREAT SERPL-MCNC: 0.8 MG/DL (ref 0.52–1.04)
ERYTHROCYTE [DISTWIDTH] IN BLOOD BY AUTOMATED COUNT: 18.2 % (ref 10–15)
GFR SERPL CREATININE-BSD FRML MDRD: >90 ML/MIN/{1.73_M2}
GLUCOSE SERPL-MCNC: 93 MG/DL (ref 70–99)
HCT VFR BLD AUTO: 28.5 % (ref 35–47)
HGB BLD-MCNC: 9.4 G/DL (ref 11.7–15.7)
MAGNESIUM SERPL-MCNC: 1.7 MG/DL (ref 1.6–2.3)
MCH RBC QN AUTO: 32.3 PG (ref 26.5–33)
MCHC RBC AUTO-ENTMCNC: 33 G/DL (ref 31.5–36.5)
MCV RBC AUTO: 98 FL (ref 78–100)
PHOSPHATE SERPL-MCNC: 2.7 MG/DL (ref 2.5–4.5)
PLATELET # BLD AUTO: 289 10E9/L (ref 150–450)
POTASSIUM SERPL-SCNC: 3.7 MMOL/L (ref 3.4–5.3)
RBC # BLD AUTO: 2.91 10E12/L (ref 3.8–5.2)
SODIUM SERPL-SCNC: 138 MMOL/L (ref 133–144)
TACROLIMUS BLD-MCNC: 9.6 UG/L (ref 5–15)
TME LAST DOSE: NORMAL H
WBC # BLD AUTO: 7.5 10E9/L (ref 4–11)

## 2020-09-16 PROCEDURE — 83735 ASSAY OF MAGNESIUM: CPT | Performed by: SURGERY

## 2020-09-16 PROCEDURE — 85027 COMPLETE CBC AUTOMATED: CPT | Performed by: SURGERY

## 2020-09-16 PROCEDURE — 84100 ASSAY OF PHOSPHORUS: CPT | Performed by: SURGERY

## 2020-09-16 PROCEDURE — 80197 ASSAY OF TACROLIMUS: CPT | Performed by: SURGERY

## 2020-09-16 PROCEDURE — 80048 BASIC METABOLIC PNL TOTAL CA: CPT | Performed by: SURGERY

## 2020-09-16 PROCEDURE — 80180 DRUG SCRN QUAN MYCOPHENOLATE: CPT | Performed by: SURGERY

## 2020-09-17 LAB
MYCOPHENOLATE SERPL LC/MS/MS-MCNC: 2.01 MG/L (ref 1–3.5)
MYCOPHENOLATE-G SERPL LC/MS/MS-MCNC: 43.4 MG/L (ref 30–95)
TME LAST DOSE: NORMAL H

## 2020-09-18 ENCOUNTER — MEDICAL CORRESPONDENCE (OUTPATIENT)
Dept: HEALTH INFORMATION MANAGEMENT | Facility: CLINIC | Age: 35
End: 2020-09-18

## 2020-09-18 LAB
ANION GAP SERPL CALCULATED.3IONS-SCNC: 9 MMOL/L (ref 3–14)
BUN SERPL-MCNC: 14 MG/DL (ref 7–30)
CALCIUM SERPL-MCNC: 9.2 MG/DL (ref 8.5–10.1)
CHLORIDE SERPL-SCNC: 108 MMOL/L (ref 94–109)
CO2 SERPL-SCNC: 21 MMOL/L (ref 20–32)
CREAT SERPL-MCNC: 0.79 MG/DL (ref 0.52–1.04)
ERYTHROCYTE [DISTWIDTH] IN BLOOD BY AUTOMATED COUNT: 17.6 % (ref 10–15)
GFR SERPL CREATININE-BSD FRML MDRD: >90 ML/MIN/{1.73_M2}
GLUCOSE SERPL-MCNC: 93 MG/DL (ref 70–99)
HCT VFR BLD AUTO: 28.7 % (ref 35–47)
HGB BLD-MCNC: 9.3 G/DL (ref 11.7–15.7)
MAGNESIUM SERPL-MCNC: 1.4 MG/DL (ref 1.6–2.3)
MCH RBC QN AUTO: 32.1 PG (ref 26.5–33)
MCHC RBC AUTO-ENTMCNC: 32.4 G/DL (ref 31.5–36.5)
MCV RBC AUTO: 99 FL (ref 78–100)
PHOSPHATE SERPL-MCNC: 2.4 MG/DL (ref 2.5–4.5)
PLATELET # BLD AUTO: 299 10E9/L (ref 150–450)
POTASSIUM SERPL-SCNC: 3.6 MMOL/L (ref 3.4–5.3)
RBC # BLD AUTO: 2.9 10E12/L (ref 3.8–5.2)
SODIUM SERPL-SCNC: 138 MMOL/L (ref 133–144)
TACROLIMUS BLD-MCNC: 11.3 UG/L (ref 5–15)
TME LAST DOSE: NORMAL H
WBC # BLD AUTO: 7.7 10E9/L (ref 4–11)

## 2020-09-18 PROCEDURE — 80197 ASSAY OF TACROLIMUS: CPT | Performed by: SURGERY

## 2020-09-18 PROCEDURE — 85027 COMPLETE CBC AUTOMATED: CPT | Performed by: SURGERY

## 2020-09-18 PROCEDURE — 80048 BASIC METABOLIC PNL TOTAL CA: CPT | Performed by: SURGERY

## 2020-09-18 PROCEDURE — 83735 ASSAY OF MAGNESIUM: CPT | Performed by: SURGERY

## 2020-09-18 PROCEDURE — 84100 ASSAY OF PHOSPHORUS: CPT | Performed by: SURGERY

## 2020-09-23 DIAGNOSIS — Z94.0 KIDNEY TRANSPLANTED: ICD-10-CM

## 2020-09-23 DIAGNOSIS — Z48.298 AFTERCARE FOLLOWING ORGAN TRANSPLANT: ICD-10-CM

## 2020-09-23 DIAGNOSIS — Z94.0 KIDNEY REPLACED BY TRANSPLANT: ICD-10-CM

## 2020-09-23 DIAGNOSIS — Z20.828 CONTACT WITH AND (SUSPECTED) EXPOSURE TO OTHER VIRAL COMMUNICABLE DISEASES: ICD-10-CM

## 2020-09-23 DIAGNOSIS — N39.0 URINARY TRACT INFECTION: ICD-10-CM

## 2020-09-23 DIAGNOSIS — T86.10 COMPLICATIONS, KIDNEY TRANSPLANT: ICD-10-CM

## 2020-09-23 DIAGNOSIS — D84.9 IMMUNOSUPPRESSED STATUS (H): ICD-10-CM

## 2020-09-23 DIAGNOSIS — Z79.899 LONG TERM USE OF DRUG: ICD-10-CM

## 2020-09-23 LAB
ALBUMIN UR-MCNC: NEGATIVE MG/DL
ANION GAP SERPL CALCULATED.3IONS-SCNC: 8 MMOL/L (ref 3–14)
APPEARANCE UR: CLEAR
BILIRUB UR QL STRIP: NEGATIVE
BUN SERPL-MCNC: 13 MG/DL (ref 7–30)
CALCIUM SERPL-MCNC: 9.2 MG/DL (ref 8.5–10.1)
CHLORIDE SERPL-SCNC: 106 MMOL/L (ref 94–109)
CO2 SERPL-SCNC: 22 MMOL/L (ref 20–32)
COLOR UR AUTO: YELLOW
CREAT SERPL-MCNC: 0.83 MG/DL (ref 0.52–1.04)
ERYTHROCYTE [DISTWIDTH] IN BLOOD BY AUTOMATED COUNT: 16.5 % (ref 10–15)
GFR SERPL CREATININE-BSD FRML MDRD: >90 ML/MIN/{1.73_M2}
GLUCOSE SERPL-MCNC: 106 MG/DL (ref 70–99)
GLUCOSE UR STRIP-MCNC: NEGATIVE MG/DL
HCT VFR BLD AUTO: 30.5 % (ref 35–47)
HGB BLD-MCNC: 9.7 G/DL (ref 11.7–15.7)
HGB UR QL STRIP: NEGATIVE
KETONES UR STRIP-MCNC: NEGATIVE MG/DL
LEUKOCYTE ESTERASE UR QL STRIP: NEGATIVE
MAGNESIUM SERPL-MCNC: 1.3 MG/DL (ref 1.6–2.3)
MCH RBC QN AUTO: 32.8 PG (ref 26.5–33)
MCHC RBC AUTO-ENTMCNC: 31.8 G/DL (ref 31.5–36.5)
MCV RBC AUTO: 103 FL (ref 78–100)
NITRATE UR QL: NEGATIVE
PH UR STRIP: 7 PH (ref 5–7)
PHOSPHATE SERPL-MCNC: 2 MG/DL (ref 2.5–4.5)
PLATELET # BLD AUTO: 270 10E9/L (ref 150–450)
POTASSIUM SERPL-SCNC: 3.2 MMOL/L (ref 3.4–5.3)
RBC # BLD AUTO: 2.96 10E12/L (ref 3.8–5.2)
RBC #/AREA URNS AUTO: 1 /HPF (ref 0–2)
SODIUM SERPL-SCNC: 137 MMOL/L (ref 133–144)
SOURCE: ABNORMAL
SP GR UR STRIP: 1.01 (ref 1–1.03)
SQUAMOUS #/AREA URNS AUTO: 2 /HPF (ref 0–1)
TACROLIMUS BLD-MCNC: 6.3 UG/L (ref 5–15)
TME LAST DOSE: NORMAL H
UROBILINOGEN UR STRIP-MCNC: 0 MG/DL (ref 0–2)
WBC # BLD AUTO: 6.9 10E9/L (ref 4–11)
WBC #/AREA URNS AUTO: 1 /HPF (ref 0–5)

## 2020-09-23 PROCEDURE — 80197 ASSAY OF TACROLIMUS: CPT | Performed by: INTERNAL MEDICINE

## 2020-09-23 PROCEDURE — 87799 DETECT AGENT NOS DNA QUANT: CPT | Performed by: INTERNAL MEDICINE

## 2020-09-23 PROCEDURE — 80180 DRUG SCRN QUAN MYCOPHENOLATE: CPT | Performed by: INTERNAL MEDICINE

## 2020-09-24 ENCOUNTER — TELEPHONE (OUTPATIENT)
Dept: TRANSPLANT | Facility: CLINIC | Age: 35
End: 2020-09-24

## 2020-09-24 DIAGNOSIS — Z94.0 KIDNEY TRANSPLANTED: ICD-10-CM

## 2020-09-24 LAB
MYCOPHENOLATE SERPL LC/MS/MS-MCNC: 2.69 MG/L (ref 1–3.5)
MYCOPHENOLATE-G SERPL LC/MS/MS-MCNC: 45.4 MG/L (ref 30–95)
TME LAST DOSE: NORMAL H

## 2020-09-25 LAB
BKV DNA # SPEC NAA+PROBE: NORMAL COPIES/ML
BKV DNA SPEC NAA+PROBE-LOG#: NORMAL LOG COPIES/ML
MPX SERIES: NONREACTIVE
SPECIMEN SOURCE: NORMAL
WNV RNA SPEC QL NAA+PROBE: NONREACTIVE

## 2020-09-25 RX ORDER — TACROLIMUS 1 MG/1
8 CAPSULE ORAL 2 TIMES DAILY
Qty: 480 CAPSULE | Refills: 11 | Status: SHIPPED | OUTPATIENT
Start: 2020-09-25 | End: 2021-01-11

## 2020-09-25 NOTE — TELEPHONE ENCOUNTER
Jelly Dietz returned phone call    Issue tacrolimus  Level 6.4  From 11.3  without dose change   Continues with variable tacrolimus levels post kidney transplant  -     Jelly states that she has been trying to taker her all her medications   Confirmed taking tacrolimus   7 mg twice per day   Increased tacrolimus  To 8 mg twice    Jelly verbalized understanding

## 2020-09-28 ENCOUNTER — TELEPHONE (OUTPATIENT)
Dept: TRANSPLANT | Facility: CLINIC | Age: 35
End: 2020-09-28

## 2020-09-28 NOTE — TELEPHONE ENCOUNTER
101 days post kidney transplant    Called times 2 without response    Spoke to home care nurse     May stop Valcyte   CMV:  Donor neg / Recipient pos  EBV:  Donor pos / Recipient pos

## 2020-09-29 ENCOUNTER — TELEPHONE (OUTPATIENT)
Dept: TRANSPLANT | Facility: CLINIC | Age: 35
End: 2020-09-29

## 2020-09-29 ENCOUNTER — TELEPHONE (OUTPATIENT)
Dept: PHARMACY | Facility: CLINIC | Age: 35
End: 2020-09-29

## 2020-09-29 DIAGNOSIS — N17.9 ACUTE RENAL FAILURE, UNSPECIFIED ACUTE RENAL FAILURE TYPE (H): ICD-10-CM

## 2020-09-29 RX ORDER — AMLODIPINE BESYLATE 5 MG/1
5 TABLET ORAL DAILY
Qty: 90 TABLET | Refills: 3 | Status: SHIPPED | OUTPATIENT
Start: 2020-09-29 | End: 2020-12-21

## 2020-09-29 NOTE — TELEPHONE ENCOUNTER
Clinical Pharmacy Consult:                                                      Transplant Specific: 3 Month Post Transplant Medication Call  Date of Transplant: 6/19/20  Type of Transplant: kidney  First Transplant: no, #2  History of rejection: yes    Immunosuppression Regimen   TAC 8mg qAM & 8mg qPM, Prednisone 5mg qAM and Myforitic 720mg qAM & 720mg qPM  Patient specific goal: 8-10  Most recent level: 6.3, date 9/23/20  Immunosuppressant Levels:  Subtherapeutic  Pt adherent to lab draws: yes  Scr:   Lab Results   Component Value Date    CR 0.99 07/24/2020     Side effects: None    Prophylactic Medications  Antibacterial:  Bactrim SS daily  Scheduled Discontinue Date: Lifelong    Antifungal: Not needed thus far  Scheduled Discontinue Date: N/A    Antiviral: CrCl 40 to 59 mL/minute: Valcyte 450 mg once daily   Scheduled Discontinue Date: 3 months-Therapy completed    Acid Reducer: Protonix (pantoprazole)  Scheduled Reviewed Date: MD to determine    Thrombosis Prevention: Aspirin 81 mg PO daily  Scheduled Discontinue Date: MD to determine    Blood Pressure Management  Frequency of home Blood Pressure checks: Checks daily and had in home nurse twice per week  Most recent home BP: Did not have records with her, but said is it stable  Patient Blood pressure goal: <140/90  Patient blood pressure at goal:  yes  Hospitalizations/ER visits since last assessment: 0     Med rec/DUR performed: yes  Med Rec Discrepancies: yes, therapy is complete on valganciclovir    Jelly states she is feeling great!  She is filling her own med box now and getting the hang of things.  She sometimes uses alarms on her phone as reminders.  She takes immunos at 9:30-10am each day and same in the evening.  She denies missing any doses.  She has an in home nurse that comes twice each week, but Jelly now takes care of her own meds.      She appears to be compliant with filling her transplant medications.  No concerns today.     Simran Mittal,  PharmD  [phone]

## 2020-09-29 NOTE — TELEPHONE ENCOUNTER
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

## 2020-10-01 NOTE — TELEPHONE ENCOUNTER
Called Jelly  who reports home care did not come to her house at the correct time  0930 --     Currently Jelly is independent with her medications  - trying  hard to take medication but continues with variable  Levels   Verbalizes good understanding of patient education post transplant    But ontinues with issues with compliance as described prior to transplant - managing family of  school age children and managing her medical needs post   transplant  -       Reviewed with Jelly that she is 104 days post kidney transplant  -  Reviewed currently unknown date of discharge from home care   Lives close to [facility] lab - 10 mins   Jelly reports that she has to help her children  with school from 9 to  3 pm   Reviewed options that  [facility] opens at 0630  So maybe draw labs prior to children's school   Her mother may help with children

## 2020-10-05 ENCOUNTER — TELEPHONE (OUTPATIENT)
Dept: TRANSPLANT | Facility: CLINIC | Age: 35
End: 2020-10-05

## 2020-10-05 NOTE — TELEPHONE ENCOUNTER
Provider Call: General    Reason for call: Roseanna is trying to reach Faustina Carrizales, transplant coordinator for Jelly. Currently, they are seeing Jelly for labs only as she knows her medications pretty well. Roseanna is trying to plan discharge for Jelly. Roseanna tried to plan it earlier, but Jelly doesn't want them to discharge her. 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 Roseanna was told by manager that there is someone that could help so they can discharge. There is really not any other skilled need they are seeing her for. They need to discharge her so are trying to see if they can get lab company set up to go see Jelly and plan for a discharge.    Call back needed? Yes    Return Call Needed  Same as documented in contacts section  When to return call?: Same day: Route High Priority

## 2020-10-07 ENCOUNTER — TELEPHONE (OUTPATIENT)
Dept: TRANSPLANT | Facility: CLINIC | Age: 35
End: 2020-10-07

## 2020-10-07 NOTE — TELEPHONE ENCOUNTER
Post Kidney and Pancreas Transplant Team Conference  Date: 10/7/2020  Transplant Coordinator: Mulu Arguello     Attendees:  [x]  Dr. Anglin  [x] Hollie Hong LPN     []  Dr. Armando [x] Mulu Arguello RN [] Tracy Wells LPN   [x]  Dr. Whelan [] Birgit Solano, PERI    [x]  Dr. Mancilla [] Chloe Lechuga RN [x] Anam Hermosillo, PharmD   [x] Dr. Altman [x] Lucina Jacinto, PERI    [] Dr. Morgan [] Ron Terrazas RN    [x] Dr. Lakhani [] Laura Cervantes, PERI    [x] Dr. Younger [] Faustina Aragon RN    []  Dr. Bartlett [] Yoli Mccoy, PERI    [] Surgery Fellow [x] Chelsea Simpson RN    [] Kailey Santos, NP [] Sharon Lujan RN        Verbal Plan Read Back:   Txp labs needed  Patient to work with  regarding challenges post txp    Routed to RN Coordinator   Hollie Hong LPN

## 2020-10-08 ENCOUNTER — TELEPHONE (OUTPATIENT)
Dept: TRANSPLANT | Facility: CLINIC | Age: 35
End: 2020-10-08

## 2020-10-08 NOTE — TELEPHONE ENCOUNTER
Patient Call: General  Route to LPN    Reason for call: Please connect with pt regarding Lab, they would like to discharge pt from HC     Call back needed? Yes    Return Call Needed  Same as documented in contacts section  When to return call?: Greater than one day: Route standard priority

## 2020-10-09 ENCOUNTER — MEDICAL CORRESPONDENCE (OUTPATIENT)
Dept: HEALTH INFORMATION MANAGEMENT | Facility: CLINIC | Age: 35
End: 2020-10-09

## 2020-10-09 LAB
ANION GAP SERPL CALCULATED.3IONS-SCNC: 7 MMOL/L (ref 3–14)
BASOPHILS # BLD AUTO: 0 10E9/L (ref 0–0.2)
BASOPHILS NFR BLD AUTO: 0.9 %
BUN SERPL-MCNC: 14 MG/DL (ref 7–30)
CALCIUM SERPL-MCNC: 9.1 MG/DL (ref 8.5–10.1)
CHLORIDE SERPL-SCNC: 111 MMOL/L (ref 94–109)
CO2 SERPL-SCNC: 22 MMOL/L (ref 20–32)
CREAT SERPL-MCNC: 0.79 MG/DL (ref 0.52–1.04)
DIFFERENTIAL METHOD BLD: ABNORMAL
EOSINOPHIL # BLD AUTO: 0.1 10E9/L (ref 0–0.7)
EOSINOPHIL NFR BLD AUTO: 1.8 %
ERYTHROCYTE [DISTWIDTH] IN BLOOD BY AUTOMATED COUNT: 15 % (ref 10–15)
GFR SERPL CREATININE-BSD FRML MDRD: >90 ML/MIN/{1.73_M2}
GLUCOSE SERPL-MCNC: 89 MG/DL (ref 70–99)
HCT VFR BLD AUTO: 29 % (ref 35–47)
HGB BLD-MCNC: 9.5 G/DL (ref 11.7–15.7)
IMM GRANULOCYTES # BLD: 0.2 10E9/L (ref 0–0.4)
IMM GRANULOCYTES NFR BLD: 4.5 %
LYMPHOCYTES # BLD AUTO: 0.4 10E9/L (ref 0.8–5.3)
LYMPHOCYTES NFR BLD AUTO: 8.6 %
MAGNESIUM SERPL-MCNC: 1.5 MG/DL (ref 1.6–2.3)
MCH RBC QN AUTO: 33.8 PG (ref 26.5–33)
MCHC RBC AUTO-ENTMCNC: 32.8 G/DL (ref 31.5–36.5)
MCV RBC AUTO: 103 FL (ref 78–100)
MONOCYTES # BLD AUTO: 0.3 10E9/L (ref 0–1.3)
MONOCYTES NFR BLD AUTO: 6.8 %
NEUTROPHILS # BLD AUTO: 3.4 10E9/L (ref 1.6–8.3)
NEUTROPHILS NFR BLD AUTO: 77.4 %
NRBC # BLD AUTO: 0 10*3/UL
NRBC BLD AUTO-RTO: 0 /100
PHOSPHATE SERPL-MCNC: 2.7 MG/DL (ref 2.5–4.5)
PLATELET # BLD AUTO: 264 10E9/L (ref 150–450)
POTASSIUM SERPL-SCNC: 3.5 MMOL/L (ref 3.4–5.3)
RBC # BLD AUTO: 2.81 10E12/L (ref 3.8–5.2)
SODIUM SERPL-SCNC: 140 MMOL/L (ref 133–144)
TACROLIMUS BLD-MCNC: 8.3 UG/L (ref 5–15)
TME LAST DOSE: NORMAL H
WBC # BLD AUTO: 4.4 10E9/L (ref 4–11)

## 2020-10-09 PROCEDURE — 83735 ASSAY OF MAGNESIUM: CPT | Performed by: SURGERY

## 2020-10-09 PROCEDURE — 80180 DRUG SCRN QUAN MYCOPHENOLATE: CPT | Performed by: SURGERY

## 2020-10-09 PROCEDURE — 80197 ASSAY OF TACROLIMUS: CPT | Performed by: SURGERY

## 2020-10-09 PROCEDURE — 85025 COMPLETE CBC W/AUTO DIFF WBC: CPT | Performed by: SURGERY

## 2020-10-09 PROCEDURE — 80048 BASIC METABOLIC PNL TOTAL CA: CPT | Performed by: SURGERY

## 2020-10-09 PROCEDURE — 84100 ASSAY OF PHOSPHORUS: CPT | Performed by: SURGERY

## 2020-10-09 NOTE — TELEPHONE ENCOUNTER
Issue  Discharge from home care     Reviewed she is independent  With medications and not homebound does not qualify for home are   Jelly lives  5 to 10 minutes from [facility] lab   Jelly difficulty managing childcare and remote  school  10am to 2 pm    Reviewed that she may return to lab early in morning  Or may return to clinic in afternoon for her surveillance  Lab raw

## 2020-10-09 NOTE — TELEPHONE ENCOUNTER
Spoke to home care nurse -   Who expressed that Jelly often changes her lab days with home care /or not at home or does not answer door

## 2020-10-13 ENCOUNTER — MEDICAL CORRESPONDENCE (OUTPATIENT)
Dept: HEALTH INFORMATION MANAGEMENT | Facility: CLINIC | Age: 35
End: 2020-10-13

## 2020-10-13 LAB
ANION GAP SERPL CALCULATED.3IONS-SCNC: 8 MMOL/L (ref 3–14)
BUN SERPL-MCNC: 16 MG/DL (ref 7–30)
CALCIUM SERPL-MCNC: 9.2 MG/DL (ref 8.5–10.1)
CHLORIDE SERPL-SCNC: 109 MMOL/L (ref 94–109)
CO2 SERPL-SCNC: 23 MMOL/L (ref 20–32)
CREAT SERPL-MCNC: 0.78 MG/DL (ref 0.52–1.04)
ERYTHROCYTE [DISTWIDTH] IN BLOOD BY AUTOMATED COUNT: 14.5 % (ref 10–15)
GFR SERPL CREATININE-BSD FRML MDRD: >90 ML/MIN/{1.73_M2}
GLUCOSE SERPL-MCNC: 112 MG/DL (ref 70–99)
HCT VFR BLD AUTO: 31.7 % (ref 35–47)
HGB BLD-MCNC: 10.2 G/DL (ref 11.7–15.7)
MAGNESIUM SERPL-MCNC: 1.5 MG/DL (ref 1.6–2.3)
MCH RBC QN AUTO: 33 PG (ref 26.5–33)
MCHC RBC AUTO-ENTMCNC: 32.2 G/DL (ref 31.5–36.5)
MCV RBC AUTO: 103 FL (ref 78–100)
MYCOPHENOLATE SERPL LC/MS/MS-MCNC: 1.75 MG/L (ref 1–3.5)
MYCOPHENOLATE SERPL LC/MS/MS-MCNC: 7.11 MG/L (ref 1–3.5)
MYCOPHENOLATE-G SERPL LC/MS/MS-MCNC: 31.9 MG/L (ref 30–95)
MYCOPHENOLATE-G SERPL LC/MS/MS-MCNC: 69.9 MG/L (ref 30–95)
PHOSPHATE SERPL-MCNC: 2 MG/DL (ref 2.5–4.5)
PLATELET # BLD AUTO: 272 10E9/L (ref 150–450)
POTASSIUM SERPL-SCNC: 3.1 MMOL/L (ref 3.4–5.3)
RBC # BLD AUTO: 3.09 10E12/L (ref 3.8–5.2)
SODIUM SERPL-SCNC: 140 MMOL/L (ref 133–144)
TACROLIMUS BLD-MCNC: 11.1 UG/L (ref 5–15)
TME LAST DOSE: ABNORMAL H
TME LAST DOSE: NORMAL H
TME LAST DOSE: NORMAL H
WBC # BLD AUTO: 4.5 10E9/L (ref 4–11)

## 2020-10-13 PROCEDURE — 84100 ASSAY OF PHOSPHORUS: CPT | Performed by: SURGERY

## 2020-10-13 PROCEDURE — 80048 BASIC METABOLIC PNL TOTAL CA: CPT | Performed by: SURGERY

## 2020-10-13 PROCEDURE — 80180 DRUG SCRN QUAN MYCOPHENOLATE: CPT | Performed by: SURGERY

## 2020-10-13 PROCEDURE — 85027 COMPLETE CBC AUTOMATED: CPT | Performed by: SURGERY

## 2020-10-13 PROCEDURE — 83735 ASSAY OF MAGNESIUM: CPT | Performed by: SURGERY

## 2020-10-13 PROCEDURE — 80197 ASSAY OF TACROLIMUS: CPT | Performed by: SURGERY

## 2020-10-20 ENCOUNTER — VIRTUAL VISIT (OUTPATIENT)
Dept: NEPHROLOGY | Facility: CLINIC | Age: 35
End: 2020-10-20
Attending: SURGERY
Payer: MEDICARE

## 2020-10-20 DIAGNOSIS — Z94.0 KIDNEY TRANSPLANTED: Primary | ICD-10-CM

## 2020-10-20 PROCEDURE — 99443 PR PHYSICIAN TELEPHONE EVALUATION 21-30 MIN: CPT | Mod: 95

## 2020-10-20 RX ORDER — POTASSIUM CHLORIDE 1500 MG/1
20 TABLET, EXTENDED RELEASE ORAL DAILY
Qty: 60 TABLET | Refills: 0 | Status: SHIPPED | OUTPATIENT
Start: 2020-10-20 | End: 2021-03-22

## 2020-10-20 ASSESSMENT — PAIN SCALES - GENERAL: PAINLEVEL: NO PAIN (0)

## 2020-10-20 NOTE — PROGRESS NOTES
"TRANSPLANT NEPHROLOGY TELEMEDICINE VISIT    Jelly Dietz is a 35 year old female who is being evaluated via a billable telemedicine (video/telephone) visit on August 7, 2020.     The patient has been notified of following:     \"This telemedicine (video/telephone) visit will be conducted via a video/phone call between you and your physician. We have found that certain health care needs can be provided without the need for a physical exam.  This service lets us provide the care you need with a short video/phone conversation.  If a prescription is necessary, we can send it directly to your pharmacy.  If lab work is needed, we can place an order for that and you can then stop by our lab to have the test done at a later time.    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   - BK Viremia: No   - Kidney Tx Biopsy: Jul 04, 2020; Result: severe [place] IIA with AbMR features     # Immunosuppression: Tacrolimus immediate release (goal 8-10), Mycophenolic acid (dose 720 mg every 12 hours) and Prednisone (5 mg)   - Changes: tacrolimus level is at 11, continue her current dose of 8 mg bid, continue mycophenolate and prednisone.       # Infection Prophylaxis:   - PJP: Sulfa/TMP (Bactrim)  - CMV: Valcyte    # Hypertension: Controlled; BP average 110/80  Goal BP: < 140/90   - Volume status: euvolemic  EDW ~ 135-140 Lb   - " Changes: , will discontinue amlodipine and continue to monitor BP. Consider increasing losartan to 50 if BP is an issue.     # Anemia in Chronic Renal Disease: Hgb: Stable      ENZO: no longer needed   - Iron studies: Replete    # Mineral Bone Disorder:   - Calcium; level: Normal        On supplement: No  - Phosphorus; level: Low        On supplement: on supplement     # Electrolytes:   - Potassium; level: low       On supplement: start supplement   - Magnesium; level: low        On supplement: increase supplement     # Medical Compliance: Yes    # COVID-19 Virus Review: Discussed COVID-19 virus and the potential medical risks.  Reviewed preventative health recommendations, which includes washing hands for 20 seconds, avoid touching your face, and social distancing.  Asked patient to inform the transplant center if they are exposed or diagnosed with this virus.    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 Donor Class:   Crossmatch at time of Tx: negative  DSA at time of Tx: No  Significant changes in immunosuppression: steroid maintenance due to early mixed rejection   CMV IgG Ab High Risk Discordance (D+/R-): No  EBV IgG Ab High Risk Discordance (D+/R-): No  Significant transplant-related complications: early acute rejection possible Non-HLA with an element of memory response     Transplant Office Phone Number: [phone]    Assessment and plan was discussed with the patient and she voiced her understanding and agreement.    Return Visit: 4 weeks       Jelly Dietz has a clinical telephone visit for routine follow up and immunosuppression management.    History of Present Illness   Since she was seen last, she has been feeling well, she takes all her IS with no reported side effects, she gained 10 Ib over last 6-8 weeks which she attributes to enhanced appetite. She states her BP runs  110/70's range. No dizziness. She denies any dyspnea, fever or chills.     Recent Hospitalizations:  [x] No [] Yes    New Medical Issues: [x] No [] Yes    Decreased energy: [x] No [] Yes    Chest pain or SOB with exertion:  [x] No [] Yes    Appetite change or weight change: [x] No [] Yes    Nausea, vomiting or diarrhea:  [x] No [] Yes    Fever, sweats or chills: [x] No [] Yes    Leg swelling: [x] No [] Yes      Home BP: controlled     Review of Systems   A comprehensive review of systems was obtained and negative, except as noted in the HPI or PMH.    I have reviewed and updated the patient's Past Medical History, Social History, and Medication List.    Active Medical Problems  Patient Active Problem List   Diagnosis     CARDIOVASCULAR SCREENING; LDL GOAL LESS THAN 160     Kidney replaced by transplant     HTN, kidney transplant related     ACS (acute coronary syndrome) (H)     Anemia in chronic renal disease     IgA nephropathy     Anxiety     Kidney transplanted     Immunosuppression (H)     Painful bladder spasm     Hypophosphatemia     Constipation due to pain medication     Aftercare following organ transplant     Hypomagnesemia     Dehydration     Acute renal failure (ARF) (H)     Acute rejection of kidney transplant     Metabolic acidosis     Steroid-induced hyperglycemia     Nausea vomiting and diarrhea     Hypervolemia     Anemia     Allergies  Patient has no known allergies.    Medications  Current Outpatient Medications   Medication Sig     potassium chloride ER (KLOR-CON M) 20 MEQ CR tablet Take 1 tablet (20 mEq) by mouth daily     acetaminophen (TYLENOL) 325 MG tablet Take 2 tablets (650 mg) by mouth every 4 hours as needed for other (multimodal surgical pain management along with NSAIDS and opioid medication as indicated based on pain control and physical function.)     amLODIPine (NORVASC) 5 MG tablet Take 1 tablet (5 mg) by mouth daily     aspirin (ASA) 81 MG chewable tablet Take 1 tablet (81 mg) by  mouth daily     atorvastatin (LIPITOR) 10 MG tablet Take 1 tablet (10 mg) by mouth daily     carvedilol (COREG) 6.25 MG tablet Take 1 tablet (6.25 mg) by mouth 2 times daily     losartan (COZAAR) 25 MG tablet Take 0.5 tablets (12.5 mg) by mouth At Bedtime     magnesium oxide (MAG-OX) 400 (241.3 Mg) MG tablet Take 2 tablets (800 mg) by mouth 2 times daily     mycophenolic acid (GENERIC EQUIVALENT) 180 MG EC tablet Take 4 tablets (720 mg) by mouth 2 times daily     pantoprazole (PROTONIX) 40 MG EC tablet Take 1 tablet (40 mg) by mouth 2 times daily     phosphorus tablet 250 mg (PHOSPHORUS TABLET 250 MG) 250 MG per tablet Take 2 tablets (500 mg) by mouth 3 times daily     predniSONE (DELTASONE) 5 MG tablet Take 1 tablet (5 mg) by mouth daily     sulfamethoxazole-trimethoprim (BACTRIM) 400-80 MG tablet Take 1 tablet by mouth daily     tacrolimus (GENERIC EQUIVALENT) 1 MG capsule Take 8 capsules (8 mg) by mouth 2 times daily     Vitamin D3 (CHOLECALCIFEROL) 25 mcg (1000 units) tablet Take 2 tablets (50 mcg) by mouth daily     No current facility-administered medications for this visit.        Amy Mohamud MD   Transplant Nephrology Fellow  10/20/2020 at 11:01 AM  Patient was seen and evaluated by me, Mahesh Armando MD. I have reviewed the note and agree with the the plan of care as documented by the fellow.

## 2020-10-20 NOTE — PROGRESS NOTES
"Jelly Dietz is a 35 year old female who is being evaluated via a billable telephone visit.      The patient has been notified of following:     \"This telephone visit will be conducted via a call between you and your physician/provider. We have found that certain health care needs can be provided without the need for a physical exam.  This service lets us provide the care you need with a short phone conversation.  If a prescription is necessary we can send it directly to your pharmacy.  If lab work is needed we can place an order for that and you can then stop by our lab to have the test done at a later time.    Telephone visits are billed at different rates depending on your insurance coverage. During this emergency period, for some insurers they may be billed the same as an in-person visit.  Please reach out to your insurance provider with any questions.    If during the course of the call the physician/provider feels a telephone visit is not appropriate, you will not be charged for this service.\"    Patient has given verbal consent for Telephone visit?  Yes    What phone number would you like to be contacted at? [phone]    How would you like to obtain your AVS? Mail a copy    Phone call duration: 22 minutes    Mahesh Armando MD      "

## 2020-10-20 NOTE — PATIENT INSTRUCTIONS
Stop taking your amlodipine and keep checking your blood pressure daily, call the office if your blood pressure is above or 140/90.  Increase magnesium supplement one more tablet twice daily and start taking potassium supplement sent to your pharmacy.   Follow up appointment in 2 months.

## 2020-10-20 NOTE — LETTER
"10/20/2020       RE: Jelly Dietz  [address]  [facility] [address]     Dear Colleague,    Thank you for referring your patient, Jelly Dietz, to the [facility] [place] at [facility]. Please see a copy of my visit note below.    Jelly Dietz is a 35 year old female who is being evaluated via a billable telephone visit.      The patient has been notified of following:     \"This telephone visit will be conducted via a call between you and your physician/provider. We have found that certain health care needs can be provided without the need for a physical exam.  This service lets us provide the care you need with a short phone conversation.  If a prescription is necessary we can send it directly to your pharmacy.  If lab work is needed we can place an order for that and you can then stop by our lab to have the test done at a later time.    Telephone visits are billed at different rates depending on your insurance coverage. During this emergency period, for some insurers they may be billed the same as an in-person visit.  Please reach out to your insurance provider with any questions.    If during the course of the call the physician/provider feels a telephone visit is not appropriate, you will not be charged for this service.\"    Patient has given verbal consent for Telephone visit?  Yes    What phone number would you like to be contacted at? [phone]    How would you like to obtain your AVS? Mail a copy    Phone call duration: 22 minutes    Mahesh Armando MD        TRANSPLANT NEPHROLOGY TELEMEDICINE VISIT    Jelly Dietz is a 35 year old female who is being evaluated via a billable telemedicine (video/telephone) visit on August 7, 2020.     The patient has been notified of following:     \"This telemedicine (video/telephone) visit will be conducted via a video/phone call between you and your physician. We have found that " "certain health care needs can be provided without the need for a physical exam.  This service lets us provide the care you need with a short video/phone conversation.  If a prescription is necessary, we can send it directly to your pharmacy.  If lab work is needed, we can place an order for that and you can then stop by our lab to have the test done at a later time.    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   - BK Viremia: No   - Kidney Tx Biopsy: Jul 04, 2020; Result: severe [place] IIA with AbMR features     # Immunosuppression: Tacrolimus immediate release (goal 8-10), Mycophenolic acid (dose 720 mg every 12 hours) and Prednisone (5 mg)   - Changes: tacrolimus level is at 11, continue her current dose of 8 mg bid, continue mycophenolate and prednisone.       # Infection Prophylaxis:   - PJP: Sulfa/TMP (Bactrim)  - CMV: Valcyte    # Hypertension: Controlled; BP average 110/80  Goal BP: < 140/90   - Volume status: euvolemic  EDW ~ 135-140 Lb   - Changes: , will discontinue amlodipine and continue to monitor BP. Consider increasing losartan to 50 if BP is an issue.     # Anemia in Chronic Renal Disease: Hgb: Stable      ENZO: no longer needed   - Iron studies: Replete    # Mineral Bone Disorder:   - Calcium; level: Normal        On supplement: No  - Phosphorus; level: Low        On supplement: on " supplement     # Electrolytes:   - Potassium; level: low       On supplement: start supplement   - Magnesium; level: low        On supplement: increase supplement     # Medical Compliance: Yes    # COVID-19 Virus Review: Discussed COVID-19 virus and the potential medical risks.  Reviewed preventative health recommendations, which includes washing hands for 20 seconds, avoid touching your face, and social distancing.  Asked patient to inform the transplant center if they are exposed or diagnosed with this virus.    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 Donor Class:   Crossmatch at time of Tx: negative  DSA at time of Tx: No  Significant changes in immunosuppression: steroid maintenance due to early mixed rejection   CMV IgG Ab High Risk Discordance (D+/R-): No  EBV IgG Ab High Risk Discordance (D+/R-): No  Significant transplant-related complications: early acute rejection possible Non-HLA with an element of memory response     Transplant Office Phone Number: [phone]    Assessment and plan was discussed with the patient and she voiced her understanding and agreement.    Return Visit: 4 weeks       Jelly Dietz has a clinical telephone visit for routine follow up and immunosuppression management.    History of Present Illness   Since she was seen last, she has been feeling well, she takes all her IS with no reported side effects, she gained 10 Ib over last 6-8 weeks which she attributes to enhanced appetite. She states her BP runs 110/70's range. No dizziness. She denies any dyspnea, fever or chills.     Recent Hospitalizations:  [x] No [] Yes    New Medical Issues: [x] No [] Yes    Decreased energy: [x] No [] Yes    Chest pain or SOB with exertion:  [x] No [] Yes    Appetite change or weight change: [x] No [] Yes    Nausea, vomiting or diarrhea:  [x] No [] Yes    Fever, sweats or  chills: [x] No [] Yes    Leg swelling: [x] No [] Yes      Home BP: controlled     Review of Systems   A comprehensive review of systems was obtained and negative, except as noted in the HPI or PMH.    I have reviewed and updated the patient's Past Medical History, Social History, and Medication List.    Active Medical Problems  Patient Active Problem List   Diagnosis     CARDIOVASCULAR SCREENING; LDL GOAL LESS THAN 160     Kidney replaced by transplant     HTN, kidney transplant related     ACS (acute coronary syndrome) (H)     Anemia in chronic renal disease     IgA nephropathy     Anxiety     Kidney transplanted     Immunosuppression (H)     Painful bladder spasm     Hypophosphatemia     Constipation due to pain medication     Aftercare following organ transplant     Hypomagnesemia     Dehydration     Acute renal failure (ARF) (H)     Acute rejection of kidney transplant     Metabolic acidosis     Steroid-induced hyperglycemia     Nausea vomiting and diarrhea     Hypervolemia     Anemia     Allergies  Patient has no known allergies.    Medications  Current Outpatient Medications   Medication Sig     potassium chloride ER (KLOR-CON M) 20 MEQ CR tablet Take 1 tablet (20 mEq) by mouth daily     acetaminophen (TYLENOL) 325 MG tablet Take 2 tablets (650 mg) by mouth every 4 hours as needed for other (multimodal surgical pain management along with NSAIDS and opioid medication as indicated based on pain control and physical function.)     amLODIPine (NORVASC) 5 MG tablet Take 1 tablet (5 mg) by mouth daily     aspirin (ASA) 81 MG chewable tablet Take 1 tablet (81 mg) by mouth daily     atorvastatin (LIPITOR) 10 MG tablet Take 1 tablet (10 mg) by mouth daily     carvedilol (COREG) 6.25 MG tablet Take 1 tablet (6.25 mg) by mouth 2 times daily     losartan (COZAAR) 25 MG tablet Take 0.5 tablets (12.5 mg) by mouth At Bedtime     magnesium oxide (MAG-OX) 400 (241.3 Mg) MG tablet Take 2 tablets (800 mg) by mouth 2 times daily      mycophenolic acid (GENERIC EQUIVALENT) 180 MG EC tablet Take 4 tablets (720 mg) by mouth 2 times daily     pantoprazole (PROTONIX) 40 MG EC tablet Take 1 tablet (40 mg) by mouth 2 times daily     phosphorus tablet 250 mg (PHOSPHORUS TABLET 250 MG) 250 MG per tablet Take 2 tablets (500 mg) by mouth 3 times daily     predniSONE (DELTASONE) 5 MG tablet Take 1 tablet (5 mg) by mouth daily     sulfamethoxazole-trimethoprim (BACTRIM) 400-80 MG tablet Take 1 tablet by mouth daily     tacrolimus (GENERIC EQUIVALENT) 1 MG capsule Take 8 capsules (8 mg) by mouth 2 times daily     Vitamin D3 (CHOLECALCIFEROL) 25 mcg (1000 units) tablet Take 2 tablets (50 mcg) by mouth daily     No current facility-administered medications for this visit.        Amy Mohamud MD   Transplant Nephrology Fellow  10/20/2020 at 11:01 AM  Patient was seen and evaluated by me, Mahesh Armando MD. I have reviewed the note and agree with the the plan of care as documented by the fellow.          Again, thank you for allowing me to participate in the care of your patient.      Sincerely,    [facility]

## 2020-11-10 ENCOUNTER — TELEPHONE (OUTPATIENT)
Dept: TRANSPLANT | Facility: CLINIC | Age: 35
End: 2020-11-10

## 2020-11-10 NOTE — LETTER
Jelly Dietz  [address]  [facility] [address]                November 11, 2020    Dear Jelly,    This letter is regarding your transplant lab work. The most recent laboratory results we have on file for you are from 10/13/2020. Your labs should be completed every other week.   For the best outcome for your transplant, we encourage you to have a current labs. Please call your coordinator with any questions or concerns.    The Transplant Office phone number is [phone] or [phone].       Thank you,    The Transplant Staff at [facility]

## 2020-11-11 NOTE — TELEPHONE ENCOUNTER
Left message for patient regarding the need for txp labs, overdue.  Also, mailed letter to patient regarding overdue labs.

## 2020-11-11 NOTE — TELEPHONE ENCOUNTER
Issue overdue for transplant labs for 3 weeks    Please call review the need for transplant labs   Please assist in setting up labs at [facility]   IF does not respond to your phone  Call   Send letter

## 2020-11-13 ENCOUNTER — RESULTS ONLY (OUTPATIENT)
Dept: OTHER | Facility: CLINIC | Age: 35
End: 2020-11-13

## 2020-11-13 DIAGNOSIS — Z94.0 KIDNEY TRANSPLANTED: ICD-10-CM

## 2020-11-13 DIAGNOSIS — Z94.0 KIDNEY REPLACED BY TRANSPLANT: ICD-10-CM

## 2020-11-13 DIAGNOSIS — Z79.899 LONG TERM USE OF DRUG: ICD-10-CM

## 2020-11-13 LAB
ANION GAP SERPL CALCULATED.3IONS-SCNC: 7 MMOL/L (ref 3–14)
BUN SERPL-MCNC: 23 MG/DL (ref 7–30)
CALCIUM SERPL-MCNC: 9.4 MG/DL (ref 8.5–10.1)
CHLORIDE SERPL-SCNC: 103 MMOL/L (ref 94–109)
CO2 SERPL-SCNC: 24 MMOL/L (ref 20–32)
CREAT SERPL-MCNC: 0.92 MG/DL (ref 0.52–1.04)
ERYTHROCYTE [DISTWIDTH] IN BLOOD BY AUTOMATED COUNT: 12.4 % (ref 10–15)
GFR SERPL CREATININE-BSD FRML MDRD: 82 ML/MIN/{1.73_M2}
GLUCOSE SERPL-MCNC: 98 MG/DL (ref 70–99)
HCT VFR BLD AUTO: 32.9 % (ref 35–47)
HGB BLD-MCNC: 10.6 G/DL (ref 11.7–15.7)
MAGNESIUM SERPL-MCNC: 1.4 MG/DL (ref 1.6–2.3)
MCH RBC QN AUTO: 32.5 PG (ref 26.5–33)
MCHC RBC AUTO-ENTMCNC: 32.2 G/DL (ref 31.5–36.5)
MCV RBC AUTO: 101 FL (ref 78–100)
PHOSPHATE SERPL-MCNC: 2.4 MG/DL (ref 2.5–4.5)
PLATELET # BLD AUTO: 244 10E9/L (ref 150–450)
POTASSIUM SERPL-SCNC: 3.5 MMOL/L (ref 3.4–5.3)
PTH-INTACT SERPL-MCNC: 75 PG/ML (ref 18–80)
RBC # BLD AUTO: 3.26 10E12/L (ref 3.8–5.2)
SODIUM SERPL-SCNC: 133 MMOL/L (ref 133–144)
T4 FREE SERPL-MCNC: 1.23 NG/DL (ref 0.76–1.46)
TACROLIMUS BLD-MCNC: 8.2 UG/L (ref 5–15)
TME LAST DOSE: NORMAL H
TSH SERPL DL<=0.005 MIU/L-ACNC: 1.73 MU/L (ref 0.4–4)
WBC # BLD AUTO: 6.9 10E9/L (ref 4–11)

## 2020-11-13 PROCEDURE — 80197 ASSAY OF TACROLIMUS: CPT | Performed by: PATHOLOGY

## 2020-11-13 PROCEDURE — 36415 COLL VENOUS BLD VENIPUNCTURE: CPT | Performed by: PATHOLOGY

## 2020-11-13 PROCEDURE — 84443 ASSAY THYROID STIM HORMONE: CPT | Performed by: PATHOLOGY

## 2020-11-13 PROCEDURE — 83970 ASSAY OF PARATHORMONE: CPT | Performed by: PATHOLOGY

## 2020-11-13 PROCEDURE — 85027 COMPLETE CBC AUTOMATED: CPT | Performed by: PATHOLOGY

## 2020-11-13 PROCEDURE — 80048 BASIC METABOLIC PNL TOTAL CA: CPT | Performed by: PATHOLOGY

## 2020-11-13 PROCEDURE — 84100 ASSAY OF PHOSPHORUS: CPT | Performed by: PATHOLOGY

## 2020-11-13 PROCEDURE — 83735 ASSAY OF MAGNESIUM: CPT | Performed by: PATHOLOGY

## 2020-11-13 PROCEDURE — 86833 HLA CLASS II HIGH DEFIN QUAL: CPT | Performed by: PATHOLOGY

## 2020-11-13 PROCEDURE — 86832 HLA CLASS I HIGH DEFIN QUAL: CPT | Performed by: PATHOLOGY

## 2020-11-13 PROCEDURE — 84439 ASSAY OF FREE THYROXINE: CPT | Performed by: PATHOLOGY

## 2020-11-13 PROCEDURE — 80180 DRUG SCRN QUAN MYCOPHENOLATE: CPT | Performed by: PATHOLOGY

## 2020-11-13 PROCEDURE — 87799 DETECT AGENT NOS DNA QUANT: CPT | Performed by: PATHOLOGY

## 2020-11-15 NOTE — RESULT ENCOUNTER NOTE
Sent to provider to review    Increase in creatinine  0.9    Continues in [place] in labs   BK and Donor Specific Antibodies  Are currently  Pending

## 2020-11-16 LAB
MYCOPHENOLATE SERPL LC/MS/MS-MCNC: 2.08 MG/L (ref 1–3.5)
MYCOPHENOLATE-G SERPL LC/MS/MS-MCNC: 61.5 MG/L (ref 30–95)
TME LAST DOSE: NORMAL H

## 2020-11-17 DIAGNOSIS — Z48.298 AFTERCARE FOLLOWING ORGAN TRANSPLANT: Primary | ICD-10-CM

## 2020-11-17 NOTE — TELEPHONE ENCOUNTER
Spoke to patient regarding:  Slightly increased serum creatinine.  Recommended good hydration.    Also near normal serum phosphorus level.  Patient verbalizes understanding to decrease phosphorus supplement to 500 mg bid.   Reviewed lab schedule and confirmed follow up lab appointments

## 2020-11-17 NOTE — TELEPHONE ENCOUNTER
Slightly increased serum creatinine and recommend good hydration.  Also near normal serum phosphorus level and would decrease phosphorus supplement to 500 mg bid and taper off over time.  Dr Anglin     Plan /Task   Please call review Dr Anglin recommendations -   Please lab schedule and confirm follow up lab appointments

## 2020-11-18 ENCOUNTER — TELEPHONE (OUTPATIENT)
Dept: TRANSPLANT | Facility: CLINIC | Age: 35
End: 2020-11-18

## 2020-11-18 DIAGNOSIS — Z94.0 KIDNEY TRANSPLANTED: Primary | ICD-10-CM

## 2020-11-18 LAB
BKV DNA # SPEC NAA+PROBE: ABNORMAL COPIES/ML
BKV DNA SPEC NAA+PROBE-LOG#: 4.7 LOG COPIES/ML
DONOR IDENTIFICATION: NORMAL
DSA COMMENTS: NORMAL
DSA PRESENT: NO
DSA TEST METHOD: NORMAL
ORGAN: NORMAL
SA1 CELL: NORMAL
SA1 COMMENTS: NORMAL
SA1 HI RISK ABY: NORMAL
SA1 MOD RISK ABY: NORMAL
SA1 TEST METHOD: NORMAL
SA2 CELL: NORMAL
SA2 COMMENTS: NORMAL
SA2 HI RISK ABY UA: NORMAL
SA2 MOD RISK ABY: NORMAL
SA2 TEST METHOD: NORMAL
SPECIMEN SOURCE: ABNORMAL
UNACCEPTABLE ANTIGEN: NORMAL
UNOS CPRA: 95

## 2020-11-18 NOTE — TELEPHONE ENCOUNTER
Issue BK  64446 PCR QT     Reviewed  With Dr Armando    Taking Myfortic mycophenolic acid  720 mg twice per day   Lowering to Myfortic mycophenolic acid  540 mg twice perday     Ordering IGG

## 2020-11-19 DIAGNOSIS — Z94.0 KIDNEY REPLACED BY TRANSPLANT: ICD-10-CM

## 2020-11-19 RX ORDER — MYCOPHENOLIC ACID 180 MG/1
540 TABLET, DELAYED RELEASE ORAL 2 TIMES DAILY
Qty: 240 TABLET | Refills: 3 | Status: SHIPPED | OUTPATIENT
Start: 2020-11-19 | End: 2021-02-09

## 2020-11-19 NOTE — TELEPHONE ENCOUNTER
Nov 18,2020    BK 46,185     Reviewed with Dr Armando    Plan   Decreasing Myfortic mycophenolic acid  From 720 mg twice per day to Myfortic mycophenolic acid  540 mg twice per day     ___________________  Updated orders to repeat  Transplant  Labs every 2 weeks with BK   One time order for IgG      Multi attempts to contact Jelly -  Reviewed with Jelly  Regarding concern of BK  May cause harm to kidney   Reviewed that must have transplant  Labs every 2 weeks  Encouraged to answer phone or use Link To Media  Due to increase need to communication  And adjustment of medications

## 2020-12-07 DIAGNOSIS — N17.9 ACUTE RENAL FAILURE, UNSPECIFIED ACUTE RENAL FAILURE TYPE (H): ICD-10-CM

## 2020-12-07 DIAGNOSIS — Z94.0 KIDNEY TRANSPLANTED: Primary | ICD-10-CM

## 2020-12-07 DIAGNOSIS — Z94.0 KIDNEY TRANSPLANTED: ICD-10-CM

## 2020-12-07 DIAGNOSIS — Z48.298 AFTERCARE FOLLOWING ORGAN TRANSPLANT: ICD-10-CM

## 2020-12-07 RX ORDER — ASPIRIN 81 MG/1
81 TABLET, CHEWABLE ORAL DAILY
Qty: 30 TABLET | Refills: 5 | Status: SHIPPED | OUTPATIENT
Start: 2020-12-07 | End: 2021-07-21

## 2020-12-07 RX ORDER — PANTOPRAZOLE SODIUM 40 MG/1
40 TABLET, DELAYED RELEASE ORAL 2 TIMES DAILY
Qty: 30 TABLET | Refills: 4 | Status: SHIPPED | OUTPATIENT
Start: 2020-12-07 | End: 2020-12-21

## 2020-12-08 RX ORDER — PREDNISONE 5 MG/1
5 TABLET ORAL DAILY
Qty: 90 TABLET | Refills: 3 | Status: SHIPPED | OUTPATIENT
Start: 2020-12-08 | End: 2021-04-12

## 2020-12-16 ENCOUNTER — HOSPITAL ENCOUNTER (EMERGENCY)
Facility: CLINIC | Age: 35
Discharge: HOME | End: 2020-12-16
Attending: EMERGENCY MEDICINE | Admitting: EMERGENCY MEDICINE

## 2020-12-16 ENCOUNTER — APPOINTMENT (OUTPATIENT)
Dept: ULTRASOUND IMAGING | Facility: CLINIC | Age: 35
End: 2020-12-16
Attending: EMERGENCY MEDICINE

## 2020-12-16 VITALS
WEIGHT: 143 LBS | RESPIRATION RATE: 16 BRPM | HEIGHT: 67 IN | HEART RATE: 80 BPM | SYSTOLIC BLOOD PRESSURE: 108 MMHG | OXYGEN SATURATION: 100 % | DIASTOLIC BLOOD PRESSURE: 73 MMHG | BODY MASS INDEX: 22.44 KG/M2 | TEMPERATURE: 98.7 F

## 2020-12-16 DIAGNOSIS — Z94.0 KIDNEY TRANSPLANTED: Chronic | ICD-10-CM

## 2020-12-16 DIAGNOSIS — M79.10 MUSCLE SORENESS: ICD-10-CM
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
## 2020-12-16 LAB
ALBUMIN UR-MCNC: NEGATIVE MG/DL
APPEARANCE UR: CLEAR
BILIRUB UR QL STRIP: NEGATIVE
COLOR UR AUTO: ABNORMAL
GLUCOSE UR STRIP-MCNC: NEGATIVE MG/DL
HCG UR QL: NEGATIVE
HGB UR QL STRIP: ABNORMAL
KETONES UR STRIP-MCNC: NEGATIVE MG/DL
LEUKOCYTE ESTERASE UR QL STRIP: NEGATIVE
NITRATE UR QL: NEGATIVE
PH UR STRIP: 6 PH (ref 5–7)
RBC #/AREA URNS AUTO: 1 /HPF (ref 0–2)
SOURCE: ABNORMAL
SP GR UR STRIP: 1.01 (ref 1–1.03)
SQUAMOUS #/AREA URNS AUTO: 4 /HPF (ref 0–1)
UROBILINOGEN UR STRIP-MCNC: NORMAL MG/DL (ref 0–2)
WBC #/AREA URNS AUTO: 1 /HPF (ref 0–5)

## 2020-12-16 PROCEDURE — 250N000013 HC RX MED GY IP 250 OP 250 PS 637: Performed by: EMERGENCY MEDICINE

## 2020-12-16 PROCEDURE — 76776 US EXAM K TRANSPL W/DOPPLER: CPT | Mod: 26 | Performed by: RADIOLOGY

## 2020-12-16 PROCEDURE — 99284 EMERGENCY DEPT VISIT MOD MDM: CPT | Mod: 25 | Performed by: EMERGENCY MEDICINE

## 2020-12-16 PROCEDURE — 81025 URINE PREGNANCY TEST: CPT | Performed by: EMERGENCY MEDICINE

## 2020-12-16 PROCEDURE — 99284 EMERGENCY DEPT VISIT MOD MDM: CPT | Performed by: EMERGENCY MEDICINE

## 2020-12-16 PROCEDURE — 81001 URINALYSIS AUTO W/SCOPE: CPT | Performed by: EMERGENCY MEDICINE

## 2020-12-16 PROCEDURE — 76776 US EXAM K TRANSPL W/DOPPLER: CPT

## 2020-12-16 RX ORDER — ACETAMINOPHEN 500 MG
1000 TABLET ORAL ONCE
Status: COMPLETED | OUTPATIENT
Start: 2020-12-16 | End: 2020-12-16

## 2020-12-16 RX ORDER — CYCLOBENZAPRINE HCL 10 MG
10 TABLET ORAL 3 TIMES DAILY PRN
Qty: 9 TABLET | Refills: 0 | Status: SHIPPED | OUTPATIENT
Start: 2020-12-16 | End: 2020-12-19

## 2020-12-16 RX ADMIN — ACETAMINOPHEN 1000 MG: 500 TABLET, FILM COATED ORAL at 02:53

## 2020-12-16 ASSESSMENT — ENCOUNTER SYMPTOMS
WEAKNESS: 0
FATIGUE: 0
HEMATURIA: 0
BACK PAIN: 1
SORE THROAT: 0
CONFUSION: 0
MYALGIAS: 1
BRUISES/BLEEDS EASILY: 0
FEVER: 0
HEADACHES: 1
PHOTOPHOBIA: 0
SHORTNESS OF BREATH: 0
DYSURIA: 1
ABDOMINAL PAIN: 0

## 2020-12-16 ASSESSMENT — MIFFLIN-ST. JEOR: SCORE: 1378.33
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
## 2020-12-16 NOTE — ED AVS SNAPSHOT
[facility]  [address] [address]  Phone: [phone]                                    Jelly Dietz   MRN: [id number]    Department: [facility]   Date of Visit: 12/16/2020           After Visit Summary Signature Page    I have received my discharge instructions, and my questions have been answered. I have discussed any challenges I see with this plan with the nurse or doctor.    ..........................................................................................................................................  Patient/Patient Representative Signature      ..........................................................................................................................................  Patient Representative Print Name and Relationship to Patient    ..................................................               ................................................  Date                                   Time    ..........................................................................................................................................  Reviewed by Signature/Title    ...................................................              ..............................................  Date                                               Time          22EPIC Rev 08/18

## 2020-12-16 NOTE — DISCHARGE INSTRUCTIONS
Thank you for your patience today.  Please follow-up with your regular doctor and transplant team in the next 2-3 days for further evaluation and follow-up care.  Please call to schedule an appointment.  Please continue your own medications.  Please take Tylenol 1000 mg every 6 hours as needed for pain.  Please take muscle relaxer 1 tablet every 8 hours or at bedtime to help with muscle spasms.  This medication may make you sleepy and drowsy so please be careful and do not drive or work while on this medication.  Please return to the ER if you develop increasing pain, weakness, blood in your urine, or any worsening of your current symptoms.  It was a pleasure taking care of you today.  We hope you feel better soon.
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
## 2020-12-18 ENCOUNTER — TELEPHONE (OUTPATIENT)
Dept: TRANSPLANT | Facility: CLINIC | Age: 35
End: 2020-12-18

## 2020-12-18 DIAGNOSIS — Z94.0 KIDNEY TRANSPLANTED: ICD-10-CM

## 2020-12-18 DIAGNOSIS — Z79.899 LONG TERM USE OF DRUG: ICD-10-CM

## 2020-12-18 DIAGNOSIS — Z94.0 KIDNEY REPLACED BY TRANSPLANT: ICD-10-CM

## 2020-12-18 LAB
ALBUMIN SERPL-MCNC: 3.9 G/DL (ref 3.4–5)
ALP SERPL-CCNC: 114 U/L (ref 40–150)
ALT SERPL W P-5'-P-CCNC: 18 U/L (ref 0–50)
ANION GAP SERPL CALCULATED.3IONS-SCNC: 8 MMOL/L (ref 3–14)
AST SERPL W P-5'-P-CCNC: 12 U/L (ref 0–45)
BASOPHILS # BLD AUTO: 0 10E9/L (ref 0–0.2)
BASOPHILS NFR BLD AUTO: 0.6 %
BILIRUB DIRECT SERPL-MCNC: 0.2 MG/DL (ref 0–0.2)
BILIRUB SERPL-MCNC: 0.5 MG/DL (ref 0.2–1.3)
BUN SERPL-MCNC: 19 MG/DL (ref 7–30)
CALCIUM SERPL-MCNC: 9.6 MG/DL (ref 8.5–10.1)
CHLORIDE SERPL-SCNC: 110 MMOL/L (ref 94–109)
CHOLEST SERPL-MCNC: 132 MG/DL
CO2 SERPL-SCNC: 24 MMOL/L (ref 20–32)
CREAT SERPL-MCNC: 0.88 MG/DL (ref 0.52–1.04)
CREAT UR-MCNC: 155 MG/DL
DIFFERENTIAL METHOD BLD: ABNORMAL
EOSINOPHIL # BLD AUTO: 0.1 10E9/L (ref 0–0.7)
EOSINOPHIL NFR BLD AUTO: 2.3 %
ERYTHROCYTE [DISTWIDTH] IN BLOOD BY AUTOMATED COUNT: 12 % (ref 10–15)
GFR SERPL CREATININE-BSD FRML MDRD: 86 ML/MIN/{1.73_M2}
GLUCOSE SERPL-MCNC: 107 MG/DL (ref 70–99)
HBA1C MFR BLD: 5.4 % (ref 0–5.6)
HCT VFR BLD AUTO: 32.9 % (ref 35–47)
HDLC SERPL-MCNC: 54 MG/DL
HGB BLD-MCNC: 10.5 G/DL (ref 11.7–15.7)
IMM GRANULOCYTES # BLD: 0 10E9/L (ref 0–0.4)
IMM GRANULOCYTES NFR BLD: 0.8 %
LDLC SERPL CALC-MCNC: 57 MG/DL
LYMPHOCYTES # BLD AUTO: 0.6 10E9/L (ref 0.8–5.3)
LYMPHOCYTES NFR BLD AUTO: 10.6 %
MAGNESIUM SERPL-MCNC: 1.4 MG/DL (ref 1.6–2.3)
MCH RBC QN AUTO: 31.6 PG (ref 26.5–33)
MCHC RBC AUTO-ENTMCNC: 31.9 G/DL (ref 31.5–36.5)
MCV RBC AUTO: 99 FL (ref 78–100)
MONOCYTES # BLD AUTO: 0.4 10E9/L (ref 0–1.3)
MONOCYTES NFR BLD AUTO: 6.6 %
NEUTROPHILS # BLD AUTO: 4.2 10E9/L (ref 1.6–8.3)
NEUTROPHILS NFR BLD AUTO: 79.1 %
NONHDLC SERPL-MCNC: 78 MG/DL
NRBC # BLD AUTO: 0 10*3/UL
NRBC BLD AUTO-RTO: 0 /100
PHOSPHATE SERPL-MCNC: 2.4 MG/DL (ref 2.5–4.5)
PLATELET # BLD AUTO: 254 10E9/L (ref 150–450)
POTASSIUM SERPL-SCNC: 3.5 MMOL/L (ref 3.4–5.3)
PROT SERPL-MCNC: 7.3 G/DL (ref 6.8–8.8)
PROT UR-MCNC: 0.22 G/L
PROT/CREAT 24H UR: 0.14 G/G CR (ref 0–0.2)
RBC # BLD AUTO: 3.32 10E12/L (ref 3.8–5.2)
SODIUM SERPL-SCNC: 142 MMOL/L (ref 133–144)
TACROLIMUS BLD-MCNC: 9.6 UG/L (ref 5–15)
TME LAST DOSE: NORMAL H
TRIGL SERPL-MCNC: 108 MG/DL
URATE SERPL-MCNC: 4.6 MG/DL (ref 2.6–6)
WBC # BLD AUTO: 5.3 10E9/L (ref 4–11)

## 2020-12-18 PROCEDURE — 80180 DRUG SCRN QUAN MYCOPHENOLATE: CPT | Performed by: PATHOLOGY

## 2020-12-18 PROCEDURE — 85025 COMPLETE CBC W/AUTO DIFF WBC: CPT | Performed by: PATHOLOGY

## 2020-12-18 PROCEDURE — 83036 HEMOGLOBIN GLYCOSYLATED A1C: CPT | Performed by: PATHOLOGY

## 2020-12-18 PROCEDURE — 80048 BASIC METABOLIC PNL TOTAL CA: CPT | Performed by: PATHOLOGY

## 2020-12-18 PROCEDURE — 36415 COLL VENOUS BLD VENIPUNCTURE: CPT | Performed by: PATHOLOGY

## 2020-12-18 PROCEDURE — 83735 ASSAY OF MAGNESIUM: CPT | Performed by: PATHOLOGY

## 2020-12-18 PROCEDURE — 84156 ASSAY OF PROTEIN URINE: CPT | Performed by: PATHOLOGY

## 2020-12-18 PROCEDURE — 80076 HEPATIC FUNCTION PANEL: CPT | Performed by: PATHOLOGY

## 2020-12-18 PROCEDURE — 84550 ASSAY OF BLOOD/URIC ACID: CPT | Performed by: PATHOLOGY

## 2020-12-18 PROCEDURE — 80061 LIPID PANEL: CPT | Performed by: PATHOLOGY

## 2020-12-18 PROCEDURE — 84100 ASSAY OF PHOSPHORUS: CPT | Performed by: PATHOLOGY

## 2020-12-18 PROCEDURE — 87799 DETECT AGENT NOS DNA QUANT: CPT | Performed by: PATHOLOGY

## 2020-12-18 PROCEDURE — 80197 ASSAY OF TACROLIMUS: CPT | Performed by: PATHOLOGY

## 2020-12-18 NOTE — TELEPHONE ENCOUNTER
Continues with gaps in transplant  Labs  With BK virus     Multi attempts for encouraging  to obtain transplant  Labs  Post transplant  by providers and transplant office     Transplant  Labs have ordered an scheduled with lab appointment        Called Jelly Dietz reviewed my concern for the need for transplant  Lab every 2 weeks -   Reviewed my concern for BK  PCR QT   Overall  Jelly  Has good knowledge for the need for follow up care   Please note she lives 10 min from [facility]  Lab   Issue is managing children  /household -      Please note this issue is documented pre transplant as a barrier to be active on the transplant list

## 2020-12-21 ENCOUNTER — TELEPHONE (OUTPATIENT)
Dept: NEPHROLOGY | Facility: CLINIC | Age: 35
End: 2020-12-21

## 2020-12-21 ENCOUNTER — OFFICE VISIT (OUTPATIENT)
Dept: NEPHROLOGY | Facility: CLINIC | Age: 35
End: 2020-12-21
Attending: SURGERY
Payer: MEDICARE

## 2020-12-21 VITALS
SYSTOLIC BLOOD PRESSURE: 110 MMHG | OXYGEN SATURATION: 100 % | TEMPERATURE: 98 F | BODY MASS INDEX: 24.02 KG/M2 | WEIGHT: 151.1 LBS | HEART RATE: 73 BPM | DIASTOLIC BLOOD PRESSURE: 71 MMHG

## 2020-12-21 DIAGNOSIS — Z94.0 KIDNEY TRANSPLANTED: Chronic | ICD-10-CM

## 2020-12-21 DIAGNOSIS — Z23 NEED FOR IMMUNIZATION AGAINST INFLUENZA: Primary | ICD-10-CM

## 2020-12-21 LAB
BKV DNA # SPEC NAA+PROBE: 6354 COPIES/ML
BKV DNA SPEC NAA+PROBE-LOG#: 3.8 LOG COPIES/ML
MYCOPHENOLATE SERPL LC/MS/MS-MCNC: 3.83 MG/L (ref 1–3.5)
MYCOPHENOLATE-G SERPL LC/MS/MS-MCNC: 55 MG/L (ref 30–95)
SPECIMEN SOURCE: ABNORMAL
TME LAST DOSE: ABNORMAL H

## 2020-12-21 PROCEDURE — 90686 IIV4 VACC NO PRSV 0.5 ML IM: CPT | Performed by: INTERNAL MEDICINE

## 2020-12-21 PROCEDURE — 250N000011 HC RX IP 250 OP 636: Performed by: INTERNAL MEDICINE

## 2020-12-21 PROCEDURE — G0008 ADMIN INFLUENZA VIRUS VAC: HCPCS | Performed by: INTERNAL MEDICINE

## 2020-12-21 PROCEDURE — 99214 OFFICE O/P EST MOD 30 MIN: CPT | Mod: GC

## 2020-12-21 RX ORDER — CARVEDILOL 6.25 MG/1
3.12 TABLET ORAL 2 TIMES DAILY
Qty: 60 TABLET | Refills: 3 | COMMUNITY
Start: 2020-12-21 | End: 2021-03-22

## 2020-12-21 RX ADMIN — INFLUENZA A VIRUS A/GUANGDONG-MAONAN/SWL1536/2019 CNIC-1909 (H1N1) ANTIGEN (FORMALDEHYDE INACTIVATED), INFLUENZA A VIRUS A/HONG KONG/2671/2019 (H3N2) ANTIGEN (FORMALDEHYDE INACTIVATED), INFLUENZA B VIRUS B/PHUKET/3073/2013 ANTIGEN (FORMALDEHYDE INACTIVATED), AND INFLUENZA B VIRUS B/WASHINGTON/02/2019 ANTIGEN (FORMALDEHYDE INACTIVATED) 0.5 ML: 15; 15; 15; 15 INJECTION, SUSPENSION INTRAMUSCULAR at 17:00

## 2020-12-21 ASSESSMENT — PAIN SCALES - GENERAL: PAINLEVEL: EXTREME PAIN (8)

## 2020-12-21 NOTE — PATIENT INSTRUCTIONS
Decrease carvedilol to 3.125 mg (half a tab) twice daily.  Continue tacrolimus same dose and recheck blood level this Friday (12 hour after last dose)   Stop taking pantoprazole.

## 2020-12-21 NOTE — PROGRESS NOTES
TRANSPLANT NEPHROLOGY ACUTE VISIT  Assessment & Plan   # DDKT: creatinine is stable    - Baseline Cr ~ 0.7-0.9 mg/dL    - Proteinuria: Not checked recently   - Date DSA Last Checked: 7/2020      Latest DSA: No, checked afterwards but it was not feasible since she was receiving IVIG at that time, will check DSA with next lab.   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      # Infection Prophylaxis:   - PJP: Sulfa/TMP (Bactrim)  - CMV: Valcyte    # Hypertension: Controlled; BP average 110/80  Goal BP: < 140/90   - Volume status: euvolemic  EDW ~ 135-140 Lb   - Changes: , will decrease coreg to 3.125     # Anemia in Chronic Renal Disease: Hgb: Stable      ENZO: no longer needed   - Iron studies: Replete    # Mineral Bone Disorder:   - Calcium; level: Normal        On supplement: No  - Phosphorus; level: Low Normal       On supplement: on supplement     # Electrolytes:   - Potassium; level: low       On supplement: start supplement   - Magnesium; level: low        On supplement: continue supplement , discontinue PPI    # Medical Compliance: Yes    # COVID-19 Virus Review: Discussed COVID-19 virus and the potential medical risks.  Reviewed preventative health recommendations, which includes washing hands for 20 seconds, avoid touching your face, and social distancing.  Asked patient to inform the transplant center if they are exposed or diagnosed with this virus.    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     # Transplant History:  Etiology of Kidney Failure: IgA nephropathy  Tx: DDKT  Transplant: 6/19/2020 (Kidney), 12/1/2007 (Kidney)  Donor Type: Donation after Brain  Death Donor Class:   Crossmatch at time of Tx: negative  DSA at time of Tx: No  Significant changes in immunosuppression: steroid maintenance due to early mixed rejection   CMV IgG Ab High Risk Discordance (D+/R-): No  EBV IgG Ab High Risk Discordance (D+/R-): No  Significant transplant-related complications: early acute rejection possible Non-HLA with an element of memory response     Transplant Office Phone Number: [phone]    Assessment and plan was discussed with the patient and she voiced her understanding and agreement.    Return Visit: 4 weeks     Jelly Dietz has a clinical telephone visit for routine follow up and immunosuppression management.    History of Present Illness   Since she was seen last, she has been feeling well, she takes all her IS with no reported side effects, she gained few bounds which she attributes to enhanced appetite. She states her BP runs 110/70's range. No dizziness. She denies any dyspnea, fever or chills.     Recent Hospitalizations:  [x] No [] Yes    New Medical Issues: [x] No [] Yes    Decreased energy: [x] No [] Yes    Chest pain or SOB with exertion:  [x] No [] Yes    Appetite change or weight change: [x] No [] Yes    Nausea, vomiting or diarrhea:  [x] No [] Yes    Fever, sweats or chills: [x] No [] Yes    Leg swelling: [x] No [] Yes      Home BP: controlled     Review of Systems   A comprehensive review of systems was obtained and negative, except as noted in the HPI or PMH.    I have reviewed and updated the patient's Past Medical History, Social History, and Medication List.    Active Medical Problems  Patient Active Problem List   Diagnosis     CARDIOVASCULAR SCREENING; LDL GOAL LESS THAN 160     Kidney replaced by transplant     HTN, kidney transplant related     ACS (acute coronary syndrome) (H)     Anemia in chronic renal disease     IgA nephropathy     Anxiety     Kidney transplanted     Immunosuppression (H)     Painful bladder spasm     Hypophosphatemia      Constipation due to pain medication     Aftercare following organ transplant     Hypomagnesemia     Dehydration     Acute renal failure (ARF) (H)     Acute rejection of kidney transplant     Metabolic acidosis     Steroid-induced hyperglycemia     Nausea vomiting and diarrhea     Hypervolemia     Anemia     Allergies  Patient has no known allergies.    Physical Exam:  GENERAL APPEARANCE: alert and no distress  HENT: no obvious abnormalities on appearance  CVS: RRR, no murmurs.   RESP: CTA bilaterally , no wheezing or crackles.   Abdomen soft , NT, ND  MS: extremities normal - no gross deformities noted  SKIN: no apparent rash and normal skin tone  NEURO: speech is clear with no obvious neurological deficits  PSYCH: mentation appears normal and affect normal    Medications  Current Outpatient Medications   Medication Sig     acetaminophen (TYLENOL) 325 MG tablet Take 2 tablets (650 mg) by mouth every 4 hours as needed for other (multimodal surgical pain management along with NSAIDS and opioid medication as indicated based on pain control and physical function.)     amLODIPine (NORVASC) 5 MG tablet Take 1 tablet (5 mg) by mouth daily     aspirin (ASA) 81 MG chewable tablet Take 1 tablet (81 mg) by mouth daily     atorvastatin (LIPITOR) 10 MG tablet Take 1 tablet (10 mg) by mouth daily     carvedilol (COREG) 6.25 MG tablet Take 1 tablet (6.25 mg) by mouth 2 times daily     losartan (COZAAR) 25 MG tablet Take 0.5 tablets (12.5 mg) by mouth At Bedtime     magnesium oxide (MAG-OX) 400 (241.3 Mg) MG tablet Take 2 tablets (800 mg) by mouth 2 times daily     mycophenolic acid (GENERIC EQUIVALENT) 180 MG EC tablet Take 3 tablets (540 mg) by mouth 2 times daily     pantoprazole (PROTONIX) 40 MG EC tablet Take 1 tablet (40 mg) by mouth 2 times daily     phosphorus tablet 250 mg (PHOSPHORUS TABLET 250 MG) 250 MG per tablet Take 2 tablets (500 mg) by mouth 2 times daily     potassium chloride ER (KLOR-CON M) 20 MEQ CR tablet Take  1 tablet (20 mEq) by mouth daily     predniSONE (DELTASONE) 5 MG tablet Take 1 tablet (5 mg) by mouth daily     sulfamethoxazole-trimethoprim (BACTRIM) 400-80 MG tablet Take 1 tablet by mouth daily     tacrolimus (GENERIC EQUIVALENT) 1 MG capsule Take 8 capsules (8 mg) by mouth 2 times daily     Vitamin D3 (CHOLECALCIFEROL) 25 mcg (1000 units) tablet Take 2 tablets (50 mcg) by mouth daily     No current facility-administered medications for this visit.        Amy Mohamud MD   Transplant Nephrology Fellow    Patient was seen and evaluated by me, Mahesh Armando MD. I have reviewed the note and agree with the the plan of care as documented by the fellow.

## 2020-12-21 NOTE — NURSING NOTE
"Chief Complaint   Patient presents with     RECHECK     Follow up Tx and meds     Vital signs:  Temp: 98  F (36.7  C)   BP: 110/71 Pulse: 73     SpO2: 100 %       Weight: 68.5 kg (151 lb 1.6 oz)  Estimated body mass index is 24.02 kg/m  as calculated from the following:    Height as of 12/16/20: 1.689 m (5' 6.5\").    Weight as of this encounter: 68.5 kg (151 lb 1.6 oz).        Marni Avilez, CMA    "

## 2020-12-21 NOTE — LETTER
12/21/2020       RE: Jelly Dietz  [address]  [facility] [address]     Dear Colleague,    Thank you for referring your patient, Jelly Dietz, to the [facility] NEPHROLOGY [facility] [place] at [facility]. Please see a copy of my visit note below.    TRANSPLANT NEPHROLOGY ACUTE VISIT  Assessment & Plan   # DDKT: creatinine is stable    - Baseline Cr ~ 0.7-0.9 mg/dL    - Proteinuria: Not checked recently   - Date DSA Last Checked: 7/2020      Latest DSA: No, checked afterwards but it was not feasible since she was receiving IVIG at that time, will check DSA with next lab.   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      # Infection Prophylaxis:   - PJP: Sulfa/TMP (Bactrim)  - CMV: Valcyte    # Hypertension: Controlled; BP average 110/80  Goal BP: < 140/90   - Volume status: euvolemic  EDW ~ 135-140 Lb   - Changes: , will decrease coreg to 3.125     # Anemia in Chronic Renal Disease: Hgb: Stable      ENZO: no longer needed   - Iron studies: Replete    # Mineral Bone Disorder:   - Calcium; level: Normal        On supplement: No  - Phosphorus; level: Low Normal       On supplement: on supplement     # Electrolytes:   - Potassium; level: low       On supplement: start supplement   - Magnesium; level: low        On supplement: continue supplement , discontinue PPI    # Medical Compliance: Yes    # COVID-19 Virus Review: Discussed COVID-19 virus and the potential medical risks.  Reviewed preventative health recommendations, which includes washing hands for 20 seconds, avoid touching your face, and social distancing.  Asked patient to inform the transplant  center if they are exposed or diagnosed with this virus.    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 Donor Class:   Crossmatch at time of Tx: negative  DSA at time of Tx: No  Significant changes in immunosuppression: steroid maintenance due to early mixed rejection   CMV IgG Ab High Risk Discordance (D+/R-): No  EBV IgG Ab High Risk Discordance (D+/R-): No  Significant transplant-related complications: early acute rejection possible Non-HLA with an element of memory response     Transplant Office Phone Number: [phone]    Assessment and plan was discussed with the patient and she voiced her understanding and agreement.    Return Visit: 4 weeks     Jelly Dietz has a clinical telephone visit for routine follow up and immunosuppression management.    History of Present Illness   Since she was seen last, she has been feeling well, she takes all her IS with no reported side effects, she gained few bounds which she attributes to enhanced appetite. She states her BP runs 110/70's range. No dizziness. She denies any dyspnea, fever or chills.     Recent Hospitalizations:  [x] No [] Yes    New Medical Issues: [x] No [] Yes    Decreased energy: [x] No [] Yes    Chest pain or SOB with exertion:  [x] No [] Yes    Appetite change or weight change: [x] No [] Yes    Nausea, vomiting or diarrhea:  [x] No [] Yes    Fever, sweats or chills: [x] No [] Yes    Leg swelling: [x] No [] Yes      Home BP: controlled     Review of Systems   A comprehensive review of systems was obtained and negative, except as noted in the HPI or PMH.    I have reviewed and updated the patient's Past Medical History, Social History, and Medication List.    Active Medical Problems  Patient Active Problem List   Diagnosis     CARDIOVASCULAR  SCREENING; LDL GOAL LESS THAN 160     Kidney replaced by transplant     HTN, kidney transplant related     ACS (acute coronary syndrome) (H)     Anemia in chronic renal disease     IgA nephropathy     Anxiety     Kidney transplanted     Immunosuppression (H)     Painful bladder spasm     Hypophosphatemia     Constipation due to pain medication     Aftercare following organ transplant     Hypomagnesemia     Dehydration     Acute renal failure (ARF) (H)     Acute rejection of kidney transplant     Metabolic acidosis     Steroid-induced hyperglycemia     Nausea vomiting and diarrhea     Hypervolemia     Anemia     Allergies  Patient has no known allergies.    Physical Exam:  GENERAL APPEARANCE: alert and no distress  HENT: no obvious abnormalities on appearance  CVS: RRR, no murmurs.   RESP: CTA bilaterally , no wheezing or crackles.   Abdomen soft , NT, ND  MS: extremities normal - no gross deformities noted  SKIN: no apparent rash and normal skin tone  NEURO: speech is clear with no obvious neurological deficits  PSYCH: mentation appears normal and affect normal    Medications  Current Outpatient Medications   Medication Sig     acetaminophen (TYLENOL) 325 MG tablet Take 2 tablets (650 mg) by mouth every 4 hours as needed for other (multimodal surgical pain management along with NSAIDS and opioid medication as indicated based on pain control and physical function.)     amLODIPine (NORVASC) 5 MG tablet Take 1 tablet (5 mg) by mouth daily     aspirin (ASA) 81 MG chewable tablet Take 1 tablet (81 mg) by mouth daily     atorvastatin (LIPITOR) 10 MG tablet Take 1 tablet (10 mg) by mouth daily     carvedilol (COREG) 6.25 MG tablet Take 1 tablet (6.25 mg) by mouth 2 times daily     losartan (COZAAR) 25 MG tablet Take 0.5 tablets (12.5 mg) by mouth At Bedtime     magnesium oxide (MAG-OX) 400 (241.3 Mg) MG tablet Take 2 tablets (800 mg) by mouth 2 times daily     mycophenolic acid (GENERIC EQUIVALENT) 180 MG EC tablet Take 3  tablets (540 mg) by mouth 2 times daily     pantoprazole (PROTONIX) 40 MG EC tablet Take 1 tablet (40 mg) by mouth 2 times daily     phosphorus tablet 250 mg (PHOSPHORUS TABLET 250 MG) 250 MG per tablet Take 2 tablets (500 mg) by mouth 2 times daily     potassium chloride ER (KLOR-CON M) 20 MEQ CR tablet Take 1 tablet (20 mEq) by mouth daily     predniSONE (DELTASONE) 5 MG tablet Take 1 tablet (5 mg) by mouth daily     sulfamethoxazole-trimethoprim (BACTRIM) 400-80 MG tablet Take 1 tablet by mouth daily     tacrolimus (GENERIC EQUIVALENT) 1 MG capsule Take 8 capsules (8 mg) by mouth 2 times daily     Vitamin D3 (CHOLECALCIFEROL) 25 mcg (1000 units) tablet Take 2 tablets (50 mcg) by mouth daily     No current facility-administered medications for this visit.        Amy Mohamud MD   Transplant Nephrology Fellow    Patient was seen and evaluated by me, Mahesh Armando MD. I have reviewed the note and agree with the the plan of care as documented by the fellow.          Again, thank you for allowing me to participate in the care of your patient.      Sincerely,    Early Post Transplant

## 2020-12-22 ENCOUNTER — ALLIED HEALTH/NURSE VISIT (OUTPATIENT)
Dept: TRANSPLANT | Facility: CLINIC | Age: 35
End: 2020-12-22
Attending: SURGERY
Payer: MEDICARE

## 2020-12-22 DIAGNOSIS — Z94.0 KIDNEY REPLACED BY TRANSPLANT: Primary | ICD-10-CM

## 2020-12-22 NOTE — PROGRESS NOTES
Post Transplant Patient Social Work Assessment -Outpatient    Patient Name: Jelly Dietz  : 1985  Age: 35 year old  MRN: [id number]  Date of transplant: 2020    Patient known to this writer from follow up in the transplant program. Telephone visit completed with patient today to update assessment.      Presenting Information   Living Situation: Patient resides in an apartment in [place] with her two children Isabella (8) Laura (5) and her sister Le. Pt is sill unsure if she will move to [place], [place] when her lease is up.   Functional Status: Independent with ADL's drives self  Cultural/Language/Spiritual Considerations: Patient is a Citizen of the [place] female, speaks English    Support System  Primary Support Person Sister Le  Other support:  Boyfriend, friends and aunt in [place], family in [place]    Health Care Directive  Decision Maker: Self  Alternate Decision Maker: Parents are legal NOK  Health Care Directive: Provided education    Mental Health/Coping:   History of Mental Health: Denied, previous anxiety when in the hospital  History of Chemical Health: Previously smoked Hookah, denied any alcohol or substance use  Current status: Pt denied any current mental health concerns. She stated things are going well.  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  Insurance and medication coverage: Medicare and are MA  Financial concerns: None identified at this time    Resources needed: None identified at this time     Education provided by SW: Social Work role outpatient setting and post-transplant expectations    Assessment and recommendations and plan:  Pt had her first kidney transplant in [place] in . Pt has a history of  issues which caused her to miss dialysis in the past. Per chart review,  it appears pt was not getting her labs done as often as she should. Addressed concern with pt. Pt reported she was supposed to get her labs done every 1-2 months but now that has changed to every 2 weeks on Fridays. Pt reported her sister is able to help her but at times is busy with college and will be starting a job soon. Reminded pt that labs are very important and if her sister cannot help her then she needs to find another person who could. Pt then stated her sister is doing college courses online and is able to help. Pt reported she will be able to get her labs done every 2 weeks with out any issues. Reminded pt to let the transplant team know if she has barriers to getting her labs drawn as it is important to communicate any issues that come up. Pt reported she would. Pt reported she takes her medications as prescribed and does not have issues taking her medications. Reviewed post-transplant expectations as well as psychosocial risks of transplant. Patient seemed to process information well via telephone. Pt had no questions or concerns for this writer.     Makenzie Desai, [facility]    Kidney/Pancreas/Auto Islet Transplant Programs

## 2020-12-28 ENCOUNTER — TELEPHONE (OUTPATIENT)
Dept: PHARMACY | Facility: CLINIC | Age: 35
End: 2020-12-28

## 2020-12-28 NOTE — TELEPHONE ENCOUNTER
Clinical Pharmacy Consult:                                                      Transplant Specific: 3 Month Post Transplant Medication Call  Date of Transplant: 6/19/20  Type of Transplant: kidney  First Transplant: no, #2  History of rejection: yes    Immunosuppression Regimen   TAC 8mg qAM & 8mg qPM, Prednisone 5mg qAM and Myforitic 720mg qAM & 720mg qPM  Patient specific goal: 6-8  Most recent level: 9.6, date 12/18/20  Immunosuppressant Levels:  Supratherapeutic  Pt adherent to lab draws: No  Scr:   Lab Results   Component Value Date    CR 0.99 07/24/2020     Side effects: Unknown, unable to reach    Prophylactic Medications  Antibacterial:  Bactrim SS daily  Scheduled Discontinue Date: Lifelong    Antifungal: Not needed thus far  Scheduled Discontinue Date: N/A    Antiviral: CrCl 40 to 59 mL/minute: Valcyte 450 mg once daily   Scheduled Discontinue Date: 3 months-Therapy completed    Acid Reducer: Protonix (pantoprazole)  Scheduled Reviewed Date: Therapy completed 12/21/20    Thrombosis Prevention: Aspirin 81 mg PO daily  Scheduled Discontinue Date: MD to determine    Blood Pressure Management  Frequency of home Blood Pressure checks: Unknown  Most recent home BP: Unknown  Patient Blood pressure goal: <140/90  Patient blood pressure at goal:  yes  Hospitalizations/ER visits since last assessment: 1     Med rec/DUR performed: No  Med Rec Discrepancies: Unknown    Unable to reach patient.   She has not been compliant with labs.  It is hard to tell if she is taking carvedilol or aspirin and has never filled the potassium.  Will let coordinator know.  She has been filling her immunos on time.      Simran Mittal, PharmD  [phone]

## 2020-12-30 ENCOUNTER — TELEPHONE (OUTPATIENT)
Dept: TRANSPLANT | Facility: CLINIC | Age: 35
End: 2020-12-30

## 2020-12-30 DIAGNOSIS — Z94.0 KIDNEY TRANSPLANTED: Primary | ICD-10-CM

## 2021-01-03 RX ORDER — PANTOPRAZOLE SODIUM 20 MG/1
20 TABLET, DELAYED RELEASE ORAL DAILY
Qty: 60 TABLET | Refills: 3 | Status: SHIPPED | OUTPATIENT
Start: 2021-01-03 | End: 2021-03-22

## 2021-01-03 NOTE — TELEPHONE ENCOUNTER
Prabha, MD Jos Hankins, Mulu BAI RN             Tac goal 6-8      Confirmed that Jelly  Lowered tacrolimus  Dose at last appointment  With transplant  Nephrolog  Encouraged  Jelly Dietz reviewed to obtain transplant as recommended for surveillance  Labs      2nd Item   After stopping Protonix - reviewed with Dr Armando

## 2021-01-04 ENCOUNTER — TELEPHONE (OUTPATIENT)
Dept: TRANSPLANT | Facility: CLINIC | Age: 36
End: 2021-01-04

## 2021-01-04 ENCOUNTER — E-VISIT (OUTPATIENT)
Dept: URGENT CARE | Facility: URGENT CARE | Age: 36
End: 2021-01-04
Payer: MEDICARE

## 2021-01-04 DIAGNOSIS — J02.9 SORE THROAT: ICD-10-CM

## 2021-01-04 DIAGNOSIS — Z20.822 COVID-19 RULED OUT: ICD-10-CM

## 2021-01-04 DIAGNOSIS — Z20.822 SUSPECTED COVID-19 VIRUS INFECTION: ICD-10-CM

## 2021-01-04 DIAGNOSIS — Z94.0 KIDNEY TRANSPLANTED: Primary | ICD-10-CM

## 2021-01-04 PROCEDURE — 99207 PR NO BILLABLE SERVICE THIS VISIT: CPT | Performed by: PHYSICIAN ASSISTANT

## 2021-01-04 NOTE — PATIENT INSTRUCTIONS
Dear Jelly Dietz,    Your symptoms show that you may have coronavirus (COVID-19). This illness can cause fever, cough and trouble breathing. Many people get a mild case and get better on their own. Some people can get very sick.    Because you also reported sore throat I would like to also test you for Strep Throat to determine if we need to treat you for that as well.    What should I do?  We would like to test you for Covid-19 virus and Strep Throat. I have placed orders for these tests.     For all employees or close contacts (except [employer] [facility] and [employer] - see below), go to your [employer] home page and scroll down to the section that says  You have an appointment that needs to be scheduled  and click the large green button that says  Schedule Now  and follow the steps to find the next available opening.  It is important that when you are asked what the reason for your appointment is that you mention you need BOTH Covid and Strep tests.  tests.     If you are unable to complete these steps or if you cannot find any available times, please call [phone] to schedule employee testing.     [facility] employees or close contacts, please call [phone].   [place] ([employer]) employees or close contacts call [phone].    Return to work/school/ guidance:  Please let your workplace manager and staffing office know when your quarantine ends     We can t give you an exact date as it depends on the above. You can calculate this on your own or work with your manager/staffing office to calculate this. (For example if you were exposed on 10/4, you would have to quarantine for 14 full days. That would be through 10/18. You could return on 10/19.)      If you receive a positive COVID-19 test result, follow the guidance of the those who are giving you the results. Usually the return to work is 10 (or in some cases 20 days from symptom onset.) If you work at [employer], you must also be cleared by  Employee Occupational Health and Safety to return to work.        If you receive a negative COVID-19 test result and did not have a high risk exposure to someone with a known positive COVID-19 test, you can return to work once you're free of fever for 24 hours without fever-reducing medication and your symptoms are improving or resolved.      If you receive a negative COVID-19 test and If you had a high risk exposure to someone who has tested positive for COVID-19 then you can return to work 14 days after your last contact with the positive individual    Note: If you have ongoing exposure to the covid positive person, this quarantine period may be more than 14 days. (For example, if you are continued to be exposed to your child who tested positive and cannot isolate from them, then the quarantine of 7-14 days can't start until your child is no longer contagious. This is typically 10 days from onset of the child's symptoms. So the total duration may be 17-24 days in this case.)    Sign up for [employer].   We know it's scary to hear that you might have COVID-19. We want to track your symptoms to make sure you're okay over the next 2 weeks. Please look for an email from [employer]--this is a free, online program that we'll use to keep in touch. To sign up, follow the link in the email you will receive. Learn more at [email or web address]    How can I take care of myself?    Get lots of rest. Drink extra fluids (unless a doctor has told you not to)    Take Tylenol (acetaminophen) or ibuprofen for fever or pain. If you have liver or kidney problems, ask your family doctor if it's okay to take Tylenol o ibuprofen    If you have other health problems (like cancer, heart failure, an organ transplant or severe kidney disease): Call your specialty clinic if you don't feel better in the next 2 days.    Know when to call 911. Emergency warning signs include:  o Trouble breathing or shortness of breath  o Pain  or pressure in the chest that doesn't go away  o Feeling confused like you haven't felt before, or not being able to wake up  o Bluish-colored lips or face    Where can I get more information?  [facility] - About COVID-19:   [email or web address]    CDC - What to Do If You're Sick:   www.cdc.gov/coronavirus/2019-ncov/about/steps-when-sick.html      Dear Jelly Dietz,    Your symptoms show that you may have coronavirus (COVID-19). This illness can cause fever, cough and trouble breathing. Many people get a mild case and get better on their own. Some people can get very sick.    Because you also reported sore throat I would like to also test you for Strep Throat to determine if we need to treat you for that as well.    What should I do?  We would like to test you for Covid-19 virus and Strep Throat. I have placed orders for these tests.   To schedule: go to your [employer] home page and scroll down to the section that says  You have an appointment that needs to be scheduled  and click the large green button that says  Schedule Now  and follow the steps to find the next available openings. It is important that when you are asked what the reason for your appointment is that you mention you need BOTH Covid and Strep tests.    If you are unable to complete these [employer] scheduling steps, please call [phone] to schedule your testing.     Return to work/school/ guidance:   Please let your workplace manager and staffing office know when your quarantine ends     We can t give you an exact date as it depends on the above. You can calculate this on your own or work with your manager/staffing office to calculate this. (For example if you were exposed on 10/4, you would have to quarantine for 14 full days. That would be through 10/18. You could return on 10/19.)      If you receive a positive COVID-19 test result, follow the guidance of the those who are giving you the results. Usually the return to  work is 10 (or in some cases 20 days from symptom onset.) If you work at [employer], you must also be cleared by Employee Occupational Health and Safety to return to work.        If you receive a negative COVID-19 test result and did not have a high risk exposure to someone with a known positive COVID-19 test, you can return to work once you're free of fever for 24 hours without fever-reducing medication and your symptoms are improving or resolved.      If you receive a negative COVID-19 test and If you had a high risk exposure to someone who has tested positive for COVID-19 then you can return to work 14 days after your last contact with the positive individual    Note: If you have ongoing exposure to the covid positive person, this quarantine period may be more than 14 days. (For example, if you are continued to be exposed to your child who tested positive and cannot isolate from them, then the quarantine of 7-14 days can't start until your child is no longer contagious. This is typically 10 days from onset of the child's symptoms. So the total duration may be 17-24 days in this case.)    Sign up for [employer].   We know it's scary to hear that you might have COVID-19. We want to track your symptoms to make sure you're okay over the next 2 weeks. Please look for an email from [employer]--this is a free, online program that we'll use to keep in touch. To sign up, follow the link in the email you will receive. Learn more at [email or web address]    How can I take care of myself?    Get lots of rest. Drink extra fluids (unless a doctor has told you not to)    Take Tylenol (acetaminophen) or ibuprofen for fever or pain. If you have liver or kidney problems, ask your family doctor if it's okay to take Tylenol o ibuprofen    If you have other health problems (like cancer, heart failure, an organ transplant or severe kidney disease): Call your specialty clinic if you don't feel better in the next 2  days.    Know when to call 911. Emergency warning signs include:  o Trouble breathing or shortness of breath  o Pain or pressure in the chest that doesn't go away  o Feeling confused like you haven't felt before, or not being able to wake up  o Bluish-colored lips or face    Where can I get more information?  [facility] - About COVID-19:   [email or web address]    CDC - What to Do If You're Sick:   www.cdc.gov/coronavirus/2019-ncov/about/steps-when-sick.html    January 4, 2021  RE:  Jelly PALACIOS Hurshe                                                                                                                  [address]   [facility] [address]      To whom it may concern:    I evaluated Jelly Villavicencioshe on January 4, 2021. Jelly PALACIOS Hurshe should be excused from work/school.     They should let their workplace manager and staffing office know when their quarantine ends.    We can not give an exact date as it depends on the information below. They can calculate this on their own or work with their manager/staffing office to calculate this. (For example if they were exposed on 10/04, they would have to quarantine for 14 full days. That would be through 10/18. They could return on 10/19.)    Quarantine Guidelines:      If patient receives a positive COVID-19 test result, they should follow the guidance of those who are giving the results. Usually the return to work is 10 (or in some cases 20 days from symptom onset.) If they work at [facility] [employer], they must be cleared by Employee Occupational Health and Safety to return to work.        If patient receives a negative COVID-19 test result and did not have a high risk exposure to someone with a known positive COVID-19 test, they can return to work once they're free of fever for 24 hours without fever-reducing medication and their symptoms are improving or resolved.      If patient receives a negative COVID-19 test and if they had a high risk  exposure to someone who has tested positive for COVID-19 then they can return to work 14 days after their last contact with the positive individual    Note: If there is ongoing exposure to the covid positive person, this quarantine period may be longer than 14 days. (For example, if they are continually exposed to their child, who tested positive and cannot isolate from them, then the quarantine of 7-14 days can't start until their child is no longer contagious. This is typically 10 days from onset to the child's symptoms. So the total duration may be 17-24 days in this case.)     Sincerely,  Maurilio Fenton PA-C

## 2021-01-04 NOTE — TELEPHONE ENCOUNTER
"Called Jelly Dietz who states has mild cold symptoms may have exposure    NO fever no cough no fatigue      Reviewed with Jelly to follow up PCP  (has not established PCP  Care)  Offered \"on care\" appointment      Attempted to call  Times 2 for follow up   Attempting to set up COVID-19 testing   "

## 2021-01-05 ENCOUNTER — TELEPHONE (OUTPATIENT)
Dept: TRANSPLANT | Facility: CLINIC | Age: 36
End: 2021-01-05

## 2021-01-05 DIAGNOSIS — Z20.822 SUSPECTED COVID-19 VIRUS INFECTION: ICD-10-CM

## 2021-01-05 DIAGNOSIS — Z20.822 COVID-19 RULED OUT: ICD-10-CM

## 2021-01-05 DIAGNOSIS — Z94.0 KIDNEY TRANSPLANTED: ICD-10-CM

## 2021-01-05 DIAGNOSIS — Z94.0 KIDNEY TRANSPLANTED: Primary | ICD-10-CM

## 2021-01-05 PROCEDURE — U0003 INFECTIOUS AGENT DETECTION BY NUCLEIC ACID (DNA OR RNA); SEVERE ACUTE RESPIRATORY SYNDROME CORONAVIRUS 2 (SARS-COV-2) (CORONAVIRUS DISEASE [COVID-19]), AMPLIFIED PROBE TECHNIQUE, MAKING USE OF HIGH THROUGHPUT TECHNOLOGIES AS DESCRIBED BY CMS-2020-01-R: HCPCS | Performed by: INTERNAL MEDICINE

## 2021-01-06 LAB
SARS-COV-2 RNA RESP QL NAA+PROBE: NOT DETECTED
SPECIMEN SOURCE: NORMAL

## 2021-01-08 ENCOUNTER — RESULTS ONLY (OUTPATIENT)
Dept: OTHER | Facility: CLINIC | Age: 36
End: 2021-01-08

## 2021-01-08 DIAGNOSIS — Z94.0 KIDNEY REPLACED BY TRANSPLANT: ICD-10-CM

## 2021-01-08 DIAGNOSIS — Z94.0 KIDNEY TRANSPLANTED: ICD-10-CM

## 2021-01-08 DIAGNOSIS — Z79.899 LONG TERM USE OF DRUG: ICD-10-CM

## 2021-01-08 LAB
ANION GAP SERPL CALCULATED.3IONS-SCNC: 5 MMOL/L (ref 3–14)
BASOPHILS # BLD AUTO: 0 10E9/L (ref 0–0.2)
BASOPHILS NFR BLD AUTO: 0.6 %
BUN SERPL-MCNC: 16 MG/DL (ref 7–30)
CALCIUM SERPL-MCNC: 9.2 MG/DL (ref 8.5–10.1)
CHLORIDE SERPL-SCNC: 106 MMOL/L (ref 94–109)
CO2 SERPL-SCNC: 28 MMOL/L (ref 20–32)
CREAT SERPL-MCNC: 1 MG/DL (ref 0.52–1.04)
DIFFERENTIAL METHOD BLD: ABNORMAL
EOSINOPHIL # BLD AUTO: 0.1 10E9/L (ref 0–0.7)
EOSINOPHIL NFR BLD AUTO: 1.5 %
ERYTHROCYTE [DISTWIDTH] IN BLOOD BY AUTOMATED COUNT: 12.6 % (ref 10–15)
GFR SERPL CREATININE-BSD FRML MDRD: 74 ML/MIN/{1.73_M2}
GLUCOSE SERPL-MCNC: 95 MG/DL (ref 70–99)
HCT VFR BLD AUTO: 31.5 % (ref 35–47)
HGB BLD-MCNC: 10.2 G/DL (ref 11.7–15.7)
IMM GRANULOCYTES # BLD: 0.1 10E9/L (ref 0–0.4)
IMM GRANULOCYTES NFR BLD: 1 %
LYMPHOCYTES # BLD AUTO: 0.5 10E9/L (ref 0.8–5.3)
LYMPHOCYTES NFR BLD AUTO: 9.7 %
MAGNESIUM SERPL-MCNC: 1.4 MG/DL (ref 1.6–2.3)
MCH RBC QN AUTO: 31.1 PG (ref 26.5–33)
MCHC RBC AUTO-ENTMCNC: 32.4 G/DL (ref 31.5–36.5)
MCV RBC AUTO: 96 FL (ref 78–100)
MONOCYTES # BLD AUTO: 0.4 10E9/L (ref 0–1.3)
MONOCYTES NFR BLD AUTO: 8.3 %
NEUTROPHILS # BLD AUTO: 4.1 10E9/L (ref 1.6–8.3)
NEUTROPHILS NFR BLD AUTO: 78.9 %
NRBC # BLD AUTO: 0 10*3/UL
NRBC BLD AUTO-RTO: 0 /100
PHOSPHATE SERPL-MCNC: 2.7 MG/DL (ref 2.5–4.5)
PLATELET # BLD AUTO: 222 10E9/L (ref 150–450)
POTASSIUM SERPL-SCNC: 3 MMOL/L (ref 3.4–5.3)
RBC # BLD AUTO: 3.28 10E12/L (ref 3.8–5.2)
SODIUM SERPL-SCNC: 139 MMOL/L (ref 133–144)
TACROLIMUS BLD-MCNC: 5.7 UG/L (ref 5–15)
TME LAST DOSE: NORMAL H
WBC # BLD AUTO: 5.2 10E9/L (ref 4–11)

## 2021-01-08 PROCEDURE — 87799 DETECT AGENT NOS DNA QUANT: CPT | Performed by: PATHOLOGY

## 2021-01-08 PROCEDURE — 85025 COMPLETE CBC W/AUTO DIFF WBC: CPT | Performed by: PATHOLOGY

## 2021-01-08 PROCEDURE — 83735 ASSAY OF MAGNESIUM: CPT | Performed by: PATHOLOGY

## 2021-01-08 PROCEDURE — 80197 ASSAY OF TACROLIMUS: CPT | Performed by: PATHOLOGY

## 2021-01-08 PROCEDURE — 80180 DRUG SCRN QUAN MYCOPHENOLATE: CPT | Performed by: PATHOLOGY

## 2021-01-08 PROCEDURE — 86833 HLA CLASS II HIGH DEFIN QUAL: CPT | Performed by: PATHOLOGY

## 2021-01-08 PROCEDURE — 86832 HLA CLASS I HIGH DEFIN QUAL: CPT | Performed by: PATHOLOGY

## 2021-01-08 PROCEDURE — 84100 ASSAY OF PHOSPHORUS: CPT | Performed by: PATHOLOGY

## 2021-01-08 PROCEDURE — 80048 BASIC METABOLIC PNL TOTAL CA: CPT | Performed by: PATHOLOGY

## 2021-01-08 PROCEDURE — 36415 COLL VENOUS BLD VENIPUNCTURE: CPT | Performed by: PATHOLOGY

## 2021-01-11 LAB
BKV DNA # SPEC NAA+PROBE: 1695 COPIES/ML
BKV DNA SPEC NAA+PROBE-LOG#: 3.2 LOG COPIES/ML
DONOR IDENTIFICATION: NORMAL
DSA COMMENTS: NORMAL
DSA PRESENT: NO
DSA TEST METHOD: NORMAL
MYCOPHENOLATE SERPL LC/MS/MS-MCNC: 1.79 MG/L (ref 1–3.5)
MYCOPHENOLATE-G SERPL LC/MS/MS-MCNC: 50.4 MG/L (ref 30–95)
ORGAN: NORMAL
SA1 CELL: NORMAL
SA1 COMMENTS: NORMAL
SA1 HI RISK ABY: NORMAL
SA1 MOD RISK ABY: NORMAL
SA1 TEST METHOD: NORMAL
SA2 CELL: NORMAL
SA2 COMMENTS: NORMAL
SA2 HI RISK ABY UA: NORMAL
SA2 MOD RISK ABY: NORMAL
SA2 TEST METHOD: NORMAL
SPECIMEN SOURCE: ABNORMAL
TME LAST DOSE: NORMAL H
UNACCEPTABLE ANTIGEN: NORMAL
UNOS CPRA: 95

## 2021-01-11 RX ORDER — TACROLIMUS 1 MG/1
7 CAPSULE ORAL 2 TIMES DAILY
Qty: 420 CAPSULE | Refills: 11 | Status: SHIPPED | OUTPATIENT
Start: 2021-01-11 | End: 2021-03-10

## 2021-01-11 NOTE — TELEPHONE ENCOUNTER
Issue tacrolimus  Level  5.7 below goal level   6 to 8      Jelly reports that Dr Armando lowered 6 mg twice per day  Previous appointment    Reviewed to increase tacrolimus  7 mg twice per day     Encouraged to have lab drawn every 2 weeks  Discussed prior conversations       Jelly verbalized understanding

## 2021-01-12 DIAGNOSIS — Z94.0 KIDNEY TRANSPLANTED: ICD-10-CM

## 2021-01-12 RX ORDER — ATORVASTATIN CALCIUM 10 MG/1
10 TABLET, FILM COATED ORAL DAILY
Qty: 30 TABLET | Refills: 5 | Status: SHIPPED | OUTPATIENT
Start: 2021-01-12 | End: 2021-07-21

## 2021-01-15 ENCOUNTER — HEALTH MAINTENANCE LETTER (OUTPATIENT)
Age: 36
End: 2021-01-15

## 2021-01-27 ENCOUNTER — TELEPHONE (OUTPATIENT)
Dept: TRANSPLANT | Facility: CLINIC | Age: 36
End: 2021-01-27

## 2021-01-27 NOTE — TELEPHONE ENCOUNTER
Post Kidney and Pancreas Transplant Team Conference  Date: 1/27/2021  Transplant Coordinator: Mulu Arguello     Attendees:  [x]  Dr. Anglin  [x] Hollie Hong LPN     [x]  Dr. Armando [x] Mulu Arguello RN [] Tracy Wells LPN   [x]  Dr. Whelan [] Birgit Solano, PERI    [x]  Dr. Mancilla [] Chloe Lechuga RN [x] Anam Hermosillo, PharmD   [] Dr. Altman [x] Lucina Jacinto, PERI    [] Dr. Morgan [] Ron Terrazas RN    [x] Dr. Lakhani [] Laura Cervantes RN    [] Dr. Younger [] Faustina Aragon RN    []  Dr. Bartlett [] Yoli Mccoy, PERI    [] Surgery Fellow [x] Chelsea Simpson RN    [] Kailey Santos, NP [] Sharon Lujan RN        Verbal Plan Read Back:   Patient needs to have txp labs completed    Routed to RN Coordinator   Hollie Hong LPN

## 2021-02-01 ENCOUNTER — MYC MEDICAL ADVICE (OUTPATIENT)
Dept: TRANSPLANT | Facility: CLINIC | Age: 36
End: 2021-02-01

## 2021-02-03 ENCOUNTER — TELEPHONE (OUTPATIENT)
Dept: TRANSPLANT | Facility: CLINIC | Age: 36
End: 2021-02-03

## 2021-02-05 DIAGNOSIS — Z79.899 LONG TERM USE OF DRUG: ICD-10-CM

## 2021-02-05 DIAGNOSIS — Z94.0 KIDNEY TRANSPLANTED: ICD-10-CM

## 2021-02-05 DIAGNOSIS — Z94.0 KIDNEY REPLACED BY TRANSPLANT: ICD-10-CM

## 2021-02-05 LAB
ANION GAP SERPL CALCULATED.3IONS-SCNC: 6 MMOL/L (ref 3–14)
BASOPHILS # BLD AUTO: 0 10E9/L (ref 0–0.2)
BASOPHILS NFR BLD AUTO: 0.8 %
BUN SERPL-MCNC: 18 MG/DL (ref 7–30)
CALCIUM SERPL-MCNC: 9.1 MG/DL (ref 8.5–10.1)
CHLORIDE SERPL-SCNC: 107 MMOL/L (ref 94–109)
CO2 SERPL-SCNC: 26 MMOL/L (ref 20–32)
CREAT SERPL-MCNC: 0.92 MG/DL (ref 0.52–1.04)
DIFFERENTIAL METHOD BLD: ABNORMAL
EOSINOPHIL # BLD AUTO: 0.1 10E9/L (ref 0–0.7)
EOSINOPHIL NFR BLD AUTO: 2 %
ERYTHROCYTE [DISTWIDTH] IN BLOOD BY AUTOMATED COUNT: 13 % (ref 10–15)
GFR SERPL CREATININE-BSD FRML MDRD: 82 ML/MIN/{1.73_M2}
GLUCOSE SERPL-MCNC: 90 MG/DL (ref 70–99)
HCT VFR BLD AUTO: 30.6 % (ref 35–47)
HGB BLD-MCNC: 9.7 G/DL (ref 11.7–15.7)
IMM GRANULOCYTES # BLD: 0 10E9/L (ref 0–0.4)
IMM GRANULOCYTES NFR BLD: 0.6 %
LYMPHOCYTES # BLD AUTO: 0.7 10E9/L (ref 0.8–5.3)
LYMPHOCYTES NFR BLD AUTO: 13.9 %
MAGNESIUM SERPL-MCNC: 1.5 MG/DL (ref 1.6–2.3)
MCH RBC QN AUTO: 30.9 PG (ref 26.5–33)
MCHC RBC AUTO-ENTMCNC: 31.7 G/DL (ref 31.5–36.5)
MCV RBC AUTO: 98 FL (ref 78–100)
MONOCYTES # BLD AUTO: 0.4 10E9/L (ref 0–1.3)
MONOCYTES NFR BLD AUTO: 7.1 %
MYCOPHENOLATE SERPL LC/MS/MS-MCNC: 3.8 MG/L (ref 1–3.5)
MYCOPHENOLATE-G SERPL LC/MS/MS-MCNC: 51.8 MG/L (ref 30–95)
NEUTROPHILS # BLD AUTO: 3.9 10E9/L (ref 1.6–8.3)
NEUTROPHILS NFR BLD AUTO: 75.6 %
NRBC # BLD AUTO: 0 10*3/UL
NRBC BLD AUTO-RTO: 0 /100
PHOSPHATE SERPL-MCNC: 2.6 MG/DL (ref 2.5–4.5)
PLATELET # BLD AUTO: 250 10E9/L (ref 150–450)
POTASSIUM SERPL-SCNC: 4.1 MMOL/L (ref 3.4–5.3)
RBC # BLD AUTO: 3.14 10E12/L (ref 3.8–5.2)
SODIUM SERPL-SCNC: 139 MMOL/L (ref 133–144)
TACROLIMUS BLD-MCNC: 8.2 UG/L (ref 5–15)
TME LAST DOSE: ABNORMAL H
TME LAST DOSE: NORMAL H
WBC # BLD AUTO: 5.1 10E9/L (ref 4–11)

## 2021-02-05 PROCEDURE — 84100 ASSAY OF PHOSPHORUS: CPT | Performed by: PATHOLOGY

## 2021-02-05 PROCEDURE — 87799 DETECT AGENT NOS DNA QUANT: CPT | Performed by: INTERNAL MEDICINE

## 2021-02-05 PROCEDURE — 80048 BASIC METABOLIC PNL TOTAL CA: CPT | Performed by: PATHOLOGY

## 2021-02-05 PROCEDURE — 80180 DRUG SCRN QUAN MYCOPHENOLATE: CPT | Performed by: INTERNAL MEDICINE

## 2021-02-05 PROCEDURE — 83735 ASSAY OF MAGNESIUM: CPT | Performed by: PATHOLOGY

## 2021-02-05 PROCEDURE — 80197 ASSAY OF TACROLIMUS: CPT | Performed by: INTERNAL MEDICINE

## 2021-02-05 PROCEDURE — 36415 COLL VENOUS BLD VENIPUNCTURE: CPT | Performed by: PATHOLOGY

## 2021-02-05 PROCEDURE — 85025 COMPLETE CBC W/AUTO DIFF WBC: CPT | Performed by: PATHOLOGY

## 2021-02-07 DIAGNOSIS — Z94.0 KIDNEY TRANSPLANTED: Primary | ICD-10-CM

## 2021-02-08 ENCOUNTER — DOCUMENTATION ONLY (OUTPATIENT)
Dept: TRANSPLANT | Facility: CLINIC | Age: 36
End: 2021-02-08

## 2021-02-08 LAB
BKV DNA # SPEC NAA+PROBE: 8133 COPIES/ML
BKV DNA SPEC NAA+PROBE-LOG#: 3.9 LOG COPIES/ML
SPECIMEN SOURCE: ABNORMAL

## 2021-02-08 NOTE — PROGRESS NOTES
ISSUE  BK increased from last two checks at 8,133/3.9      Message sent to provider:  Derek Schwartz Dr.isa's BK bumped up just a little. Creatinine is at baseline. Tac is at high end of goal 6-8. She is on decreased dose of Myfortic (540 mg BID) since BK popped up in November. MPA level is a little high, but was therapeutic last month on the same dose. She should be having labs with BK drawn every 2 weeks.   Any further recommendations?  -Sharon Lujan (covering for Mulu)

## 2021-02-09 ENCOUNTER — TELEPHONE (OUTPATIENT)
Dept: TRANSPLANT | Facility: CLINIC | Age: 36
End: 2021-02-09

## 2021-02-09 DIAGNOSIS — Z94.0 KIDNEY REPLACED BY TRANSPLANT: ICD-10-CM

## 2021-02-09 DIAGNOSIS — Z48.298 AFTERCARE FOLLOWING ORGAN TRANSPLANT: Primary | ICD-10-CM

## 2021-02-09 DIAGNOSIS — Z79.60 LONG-TERM USE OF IMMUNOSUPPRESSANT MEDICATION: ICD-10-CM

## 2021-02-09 RX ORDER — MYCOPHENOLIC ACID 180 MG/1
TABLET, DELAYED RELEASE ORAL
Qty: 150 TABLET | Refills: 1 | Status: SHIPPED | OUTPATIENT
Start: 2021-02-09 | End: 2021-05-07

## 2021-02-09 NOTE — TELEPHONE ENCOUNTER
ISSUE: BK viremia with slight MPA elevation; see notes 2/8/21    PLAN:  Message  Received: Yesterday  Message Contents   Mahesh Armando MD Huepfel, Mary K RN             If you can get her to reduce myfortic from 540 bid to 540/360 that may be helpful.   Would repeat MPA level in 7-14 days after reduction    Previous Messages    ----- Message -----   From: Mulu Arguello RN   Sent: 2/8/2021   7:46 AM CST   To: MD Dr Prabha Quiroga to review transplant  Labs         OUTCOME:   Lab orders reviewed; MPA level ordered every other week. Lab appt for Friday 2/19/21. Patient aware to hold MPA dose in the morning for lab draw/12 hour trough. She verbalized understanding to reduce mycophenolic acid to 2 capsules (360 mg) in the evening and continue the 3 caps (540 mg) in the morning. Jelly will also have UPCR collected at next lab appt per monthly standing order.

## 2021-02-15 ENCOUNTER — TELEPHONE (OUTPATIENT)
Dept: TRANSPLANT | Facility: CLINIC | Age: 36
End: 2021-02-15

## 2021-02-15 NOTE — LETTER
OUTPATIENT LABORATORY TEST ORDER    Patient: Jelly Dietz    Transplant Date: 6/19/2020  YOB: 1985    Ordered By: Dr. Irma Shannon  MRN: [id number]     Issued Date/Time: 2/23/2021  8:54 AM         Expiration Date: 6/19/2021    Diagnosis: Kidney Transplant (ICD-10 Z94.0)    Aftercare following organ transplant (ICD-10 Z48.288)    Long term use of medications (ICD-10 Z79.899)    Lab results to be available on the same day drawn    Patient should release information to the [facility].     Please fax all results to [phone]    2/23/2021 - 6/19/2021  Every 2 weeks    CBC with platelets    Basic Metabolic Panel (Sodium, Potassium, Chloride, Creatinine, CO2, Urea Nitrogen, glucose, Calcium)    Tacrolimus/Prograf/ drug level    BK (Polyoma Virus) PCR Quantitative/Plasma    At 9 months post-transplant (Due: 3/18/2021)    Urine protein/creatinine    12 months post-transplant (Fasting labs)  Due: 6/19/2021    LFTs    Hemoglobin A1c    Uric Acid    Lipid panel    Urine protein/creatinine    DSA PRA    PTH    Vitamin D          If you have any questions please call the Transplant Center at [phone] option [phone]. All lab results should be faxed to [phone]        Irma Shannon MD FACS  Assistant Professor of Surgery  Director, [facility].

## 2021-02-15 NOTE — TELEPHONE ENCOUNTER
Mahesh Armando MD Huepfel, Mary K, RN             If you can get her to reduce myfortic from 540 bid to 540/360 that may be helpful.   Would repeat MPA level in 7-14 days after reduction

## 2021-02-23 NOTE — TELEPHONE ENCOUNTER
Issue no showed to last week  Lab   Appointment at [facility]   Friday Feb 19   Continues to miss lab appointments    Please attempt  To call Jelly Villavicencioshsariah regarding the need for transplant  Labs    If able to to reach  Review Dr Armando Recommendation of lowering  Myfortic   but must obtain transplant  Labs every 2 weeks

## 2021-03-04 DIAGNOSIS — Z48.298 AFTERCARE FOLLOWING ORGAN TRANSPLANT: ICD-10-CM

## 2021-03-04 RX ORDER — MAGNESIUM OXIDE 400 MG/1
TABLET ORAL
Qty: 60 TABLET | Refills: 3 | Status: SHIPPED | OUTPATIENT
Start: 2021-03-04 | End: 2021-07-19

## 2021-03-08 NOTE — ED PROVIDER NOTES
History     Chief Complaint   Patient presents with     Shortness of Breath     History of renal failure with dialysis x 3 a week. Told to come here by on call doctor. Waiting for transplant. Could not dalysis yesterday because sitter involved in MVA.     HPI  Jelly Dietz is a 32 year old female who who presents emergency room shortness breath with increasing edema.  Patient history of end-stage renal disease dialyzed Tuesday Thursday Saturday but could not make it yesterday because her  got into a car accident.  Patient called her dialysis physician stated she was more short of breath increasing edema felt she needed to have a dialysis run advised to come to the ER.  Patient now presents here for dialysis.  Patient denies any chest pain otherwise no fevers or chills no nausea vomiting.    I have reviewed the Medications, Allergies, Past Medical and Surgical History, and Social History in the Epic system.    Review of Systems   Constitutional: Positive for activity change and fatigue. Negative for fever.   HENT: Negative for congestion.    Eyes: Negative for redness.   Respiratory: Positive for shortness of breath.    Cardiovascular: Positive for leg swelling (increase bilateral). Negative for chest pain.   Gastrointestinal: Negative for abdominal pain, nausea and vomiting.   Genitourinary: Negative for difficulty urinating.   Musculoskeletal: Negative for arthralgias and neck stiffness.   Skin: Negative for color change.   Neurological: Negative for headaches.   Psychiatric/Behavioral: Positive for decreased concentration and dysphoric mood. Negative for confusion and hallucinations.   All other systems reviewed and are negative.      Physical Exam   BP: 128/89  Pulse: 90  Temp: 98.2  F (36.8  C)  Resp: 18  SpO2: 100 %  Physical Exam   Constitutional: She is oriented to person, place, and time. She appears well-developed and well-nourished. She appears distressed.   Mild distress with fatigue but  Left message for Zohra to call back.  Offer her an appointment as a new patient on Wednesday 3/10/21 at 11:00. I will  speak with patient when she calls back.     vss   HENT:   Head: Normocephalic and atraumatic.   Eyes: Conjunctivae and EOM are normal. Pupils are equal, round, and reactive to light. No scleral icterus.   Neck: Normal range of motion. Neck supple.   Cardiovascular: Regular rhythm.    Pulmonary/Chest: Breath sounds normal. No stridor. She is in respiratory distress (mild). She has no rales.   Abdominal: She exhibits no distension. There is no tenderness.   Musculoskeletal: She exhibits edema (bilateral lower ext). She exhibits no tenderness.   Neurological: She is alert and oriented to person, place, and time. She has normal reflexes. No cranial nerve deficit.   Skin: Skin is warm and dry. No rash noted. She is not diaphoretic. No erythema. No pallor.   Psychiatric:   Flat but appropriate     Nursing note and vitals reviewed.      ED Course     ED Course     Procedures         IV with labs drawn.  K+ 5.6  Creat 13.9  BUN 87  wbcv 5.2 hgbb 10.8  EKG without hyperacute changes.    Discussed with renal MD who advised admit to [facility] for dialysis today.  Discussed with medicine also.  Bed on 6D but dialysis to be done phyiscally on 4C.    Patient transferred to 4C for dialysis then to 6d for med eval and dispo.       EKG Interpretation:      Interpreted by Fox Andrade  Time reviewed: 1152  Symptoms at time of EKG: Dialysis   Rhythm: normal sinus   Rate: normal  Axis: normal  Ectopy: none  Conduction: normal  ST Segments/ T Waves: No ST-T wave changes  Q Waves: none  Comparison to prior: Unchanged    Clinical Impression: normal EKG          Critical Care time:  none               Labs Ordered and Resulted from Time of ED Arrival Up to the Time of Departure from the ED   CBC WITH PLATELETS DIFFERENTIAL - Abnormal; Notable for the following:        Result Value    RBC Count 3.38 (*)     Hemoglobin 10.8 (*)     Hematocrit 33.8 (*)     Platelet Count 117 (*)     All other components within normal limits   BASIC METABOLIC PANEL - Abnormal; Notable for the  following:     Potassium 5.6 (*)     Glucose 105 (*)     Urea Nitrogen 87 (*)     Creatinine 13.90 (*)     GFR Estimate 3 (*)     GFR Estimate If Black 4 (*)     All other components within normal limits     Results for orders placed or performed during the hospital encounter of 04/30/17   CBC with platelets differential   Result Value Ref Range    WBC 5.2 4.0 - 11.0 10e9/L    RBC Count 3.38 (L) 3.8 - 5.2 10e12/L    Hemoglobin 10.8 (L) 11.7 - 15.7 g/dL    Hematocrit 33.8 (L) 35.0 - 47.0 %     78 - 100 fl    MCH 32.0 26.5 - 33.0 pg    MCHC 32.0 31.5 - 36.5 g/dL    RDW 14.8 10.0 - 15.0 %    Platelet Count 117 (L) 150 - 450 10e9/L    Diff Method Automated Method     % Neutrophils 70.6 %    % Lymphocytes 21.2 %    % Monocytes 3.5 %    % Eosinophils 3.9 %    % Basophils 0.4 %    % Immature Granulocytes 0.4 %    Nucleated RBCs 0 0 /100    Absolute Neutrophil 3.7 1.6 - 8.3 10e9/L    Absolute Lymphocytes 1.1 0.8 - 5.3 10e9/L    Absolute Monocytes 0.2 0.0 - 1.3 10e9/L    Absolute Eosinophils 0.2 0.0 - 0.7 10e9/L    Absolute Basophils 0.0 0.0 - 0.2 10e9/L    Abs Immature Granulocytes 0.0 0 - 0.4 10e9/L    Absolute Nucleated RBC 0.0    Basic metabolic panel   Result Value Ref Range    Sodium 136 133 - 144 mmol/L    Potassium 5.6 (H) 3.4 - 5.3 mmol/L    Chloride 100 94 - 109 mmol/L    Carbon Dioxide 23 20 - 32 mmol/L    Anion Gap 13 3 - 14 mmol/L    Glucose 105 (H) 70 - 99 mg/dL    Urea Nitrogen 87 (H) 7 - 30 mg/dL    Creatinine 13.90 (H) 0.52 - 1.04 mg/dL    GFR Estimate 3 (L) >60 mL/min/1.7m2    GFR Estimate If Black 4 (L) >60 mL/min/1.7m2    Calcium 9.4 8.5 - 10.1 mg/dL   EKG 12 lead   Result Value Ref Range    Interpretation ECG Click View Image link to view waveform and result             Assessments & Plan (with Medical Decision Making)  31 yo esrd missed dialysis yesterday now with sob and increase edema hypervolemia. K+ 5.6 without EKG changes.  Patient transfer to  for dialysis then to 6d admit under medicine  to then eval and dispo         I have reviewed the nursing notes.    I have reviewed the findings, diagnosis, plan and need for follow up with the patient.    Discharge Medication List as of 4/30/2017  6:46 PM          Final diagnoses:   SOB (shortness of breath)   ESRD (end stage renal disease) on dialysis (H)   Hyperkalemia   Other hypervolemia       4/30/2017   [facility], EMERGENCY DEPARTMENT    This note was created at least in part by the use of dragon voice dictation system. Inadvertent typographical errors may still exist.  Fox Andrade MD.         Fox Andrade MD  05/01/17 110

## 2021-03-09 ENCOUNTER — RESULTS ONLY (OUTPATIENT)
Dept: OTHER | Facility: CLINIC | Age: 36
End: 2021-03-09

## 2021-03-09 DIAGNOSIS — Z79.899 LONG TERM USE OF DRUG: ICD-10-CM

## 2021-03-09 DIAGNOSIS — Z94.0 KIDNEY TRANSPLANTED: ICD-10-CM

## 2021-03-09 DIAGNOSIS — Z94.0 KIDNEY REPLACED BY TRANSPLANT: ICD-10-CM

## 2021-03-09 LAB
ANION GAP SERPL CALCULATED.3IONS-SCNC: 8 MMOL/L (ref 3–14)
BASOPHILS # BLD AUTO: 0 10E9/L (ref 0–0.2)
BASOPHILS NFR BLD AUTO: 0.3 %
BUN SERPL-MCNC: 19 MG/DL (ref 7–30)
CALCIUM SERPL-MCNC: 9.4 MG/DL (ref 8.5–10.1)
CHLORIDE SERPL-SCNC: 107 MMOL/L (ref 94–109)
CO2 SERPL-SCNC: 24 MMOL/L (ref 20–32)
CREAT SERPL-MCNC: 0.99 MG/DL (ref 0.52–1.04)
DIFFERENTIAL METHOD BLD: ABNORMAL
EOSINOPHIL # BLD AUTO: 0.1 10E9/L (ref 0–0.7)
EOSINOPHIL NFR BLD AUTO: 0.8 %
ERYTHROCYTE [DISTWIDTH] IN BLOOD BY AUTOMATED COUNT: 13.1 % (ref 10–15)
GFR SERPL CREATININE-BSD FRML MDRD: 74 ML/MIN/{1.73_M2}
GLUCOSE SERPL-MCNC: 102 MG/DL (ref 70–99)
HCT VFR BLD AUTO: 32.6 % (ref 35–47)
HGB BLD-MCNC: 10.8 G/DL (ref 11.7–15.7)
IMM GRANULOCYTES # BLD: 0.1 10E9/L (ref 0–0.4)
IMM GRANULOCYTES NFR BLD: 1 %
LYMPHOCYTES # BLD AUTO: 0.8 10E9/L (ref 0.8–5.3)
LYMPHOCYTES NFR BLD AUTO: 12.6 %
MCH RBC QN AUTO: 31.4 PG (ref 26.5–33)
MCHC RBC AUTO-ENTMCNC: 33.1 G/DL (ref 31.5–36.5)
MCV RBC AUTO: 95 FL (ref 78–100)
MONOCYTES # BLD AUTO: 0.4 10E9/L (ref 0–1.3)
MONOCYTES NFR BLD AUTO: 6.1 %
NEUTROPHILS # BLD AUTO: 4.8 10E9/L (ref 1.6–8.3)
NEUTROPHILS NFR BLD AUTO: 79.2 %
NRBC # BLD AUTO: 0 10*3/UL
NRBC BLD AUTO-RTO: 0 /100
PLATELET # BLD AUTO: 273 10E9/L (ref 150–450)
POTASSIUM SERPL-SCNC: 3.8 MMOL/L (ref 3.4–5.3)
RBC # BLD AUTO: 3.44 10E12/L (ref 3.8–5.2)
SODIUM SERPL-SCNC: 139 MMOL/L (ref 133–144)
WBC # BLD AUTO: 6.1 10E9/L (ref 4–11)

## 2021-03-09 PROCEDURE — 85025 COMPLETE CBC W/AUTO DIFF WBC: CPT | Performed by: PATHOLOGY

## 2021-03-09 PROCEDURE — 80048 BASIC METABOLIC PNL TOTAL CA: CPT | Performed by: PATHOLOGY

## 2021-03-09 PROCEDURE — 86833 HLA CLASS II HIGH DEFIN QUAL: CPT | Performed by: INTERNAL MEDICINE

## 2021-03-09 PROCEDURE — 80180 DRUG SCRN QUAN MYCOPHENOLATE: CPT | Performed by: INTERNAL MEDICINE

## 2021-03-09 PROCEDURE — 80197 ASSAY OF TACROLIMUS: CPT | Performed by: INTERNAL MEDICINE

## 2021-03-09 PROCEDURE — 87799 DETECT AGENT NOS DNA QUANT: CPT | Performed by: INTERNAL MEDICINE

## 2021-03-09 PROCEDURE — 86832 HLA CLASS I HIGH DEFIN QUAL: CPT | Performed by: INTERNAL MEDICINE

## 2021-03-09 PROCEDURE — 36415 COLL VENOUS BLD VENIPUNCTURE: CPT | Performed by: PATHOLOGY

## 2021-03-10 DIAGNOSIS — Z94.0 KIDNEY TRANSPLANTED: ICD-10-CM

## 2021-03-10 LAB
BKV DNA # SPEC NAA+PROBE: ABNORMAL COPIES/ML
BKV DNA SPEC NAA+PROBE-LOG#: 4 LOG COPIES/ML
DONOR IDENTIFICATION: NORMAL
DSA COMMENTS: NORMAL
DSA PRESENT: NO
DSA TEST METHOD: NORMAL
ORGAN: NORMAL
SA1 CELL: NORMAL
SA1 COMMENTS: NORMAL
SA1 HI RISK ABY: NORMAL
SA1 MOD RISK ABY: NORMAL
SA1 TEST METHOD: NORMAL
SA2 CELL: NORMAL
SA2 COMMENTS: NORMAL
SA2 HI RISK ABY UA: NORMAL
SA2 MOD RISK ABY: NORMAL
SA2 TEST METHOD: NORMAL
SPECIMEN SOURCE: ABNORMAL
TACROLIMUS BLD-MCNC: 9.5 UG/L (ref 5–15)
TME LAST DOSE: NORMAL H
UNACCEPTABLE ANTIGEN: NORMAL
UNOS CPRA: 95

## 2021-03-10 RX ORDER — TACROLIMUS 1 MG/1
6 CAPSULE ORAL 2 TIMES DAILY
Qty: 420 CAPSULE | Refills: 11 | Status: SHIPPED | OUTPATIENT
Start: 2021-03-10 | End: 2021-04-23

## 2021-03-11 LAB
MYCOPHENOLATE SERPL LC/MS/MS-MCNC: 1.66 MG/L (ref 1–3.5)
MYCOPHENOLATE-G SERPL LC/MS/MS-MCNC: 66.4 MG/L (ref 30–95)
TME LAST DOSE: NORMAL H

## 2021-03-14 ENCOUNTER — TELEPHONE (OUTPATIENT)
Dept: TRANSPLANT | Facility: CLINIC | Age: 36
End: 2021-03-14

## 2021-03-14 DIAGNOSIS — Z94.0 KIDNEY TRANSPLANTED: Primary | ICD-10-CM

## 2021-03-14 NOTE — TELEPHONE ENCOUNTER
Issue tacrolimus  Level 9.5 above goal level   Issue increase BK PCR QT virus  Increase to~10,000 copies  Issue overdue for transplant  Labs despite calling sending reminder messages     Spoke to Jelly  Reviewed concern regarding  BK virus   And the need to focus on immunosuppression medications  Monitoring and BK     Reviewed to lower tacrolimus  From 7 mg twice per day    Lowered tacrolimus    6 mg  Twice per day   Jelly verbalized understanding  Of medication adjustment and the need for transplant  Labs

## 2021-03-22 ENCOUNTER — VIRTUAL VISIT (OUTPATIENT)
Dept: NEPHROLOGY | Facility: CLINIC | Age: 36
End: 2021-03-22
Attending: INTERNAL MEDICINE
Payer: MEDICARE

## 2021-03-22 DIAGNOSIS — Z94.0 KIDNEY TRANSPLANTED: ICD-10-CM

## 2021-03-22 PROCEDURE — 99214 OFFICE O/P EST MOD 30 MIN: CPT | Mod: 95 | Performed by: INTERNAL MEDICINE

## 2021-03-22 ASSESSMENT — PAIN SCALES - GENERAL: PAINLEVEL: NO PAIN (0)

## 2021-03-22 NOTE — PROGRESS NOTES
Jelly is a 36 year old who is being evaluated via a billable video visit.      How would you like to obtain your AVS? Mail a copy  If the video visit is dropped, the invitation should be resent by: Send to e-mail at: [email or web address]  Will anyone else be joining your video visit? No      Video-Visit Details    Type of service:  Video Visit    Originating Location (pt. Location): Home    Distant Location (provider location):  [facility] [place]     Platform used for Video Visit: [employer]

## 2021-03-22 NOTE — PROGRESS NOTES
TRANSPLANT NEPHROLOGY FOLLOW UP VISIT  Assessment & Plan   # DDKT: creatinine is stable to slightly up     - Baseline Cr ~ 0.7-0.9 mg/dL    - Proteinuria: Not checked recently   - Date DSA Last Checked: 7/2020      Latest DSA: No, checked afterwards but it was not feasible since she was receiving IVIG at that time, will check DSA with next lab.   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 Repeat biopsy was not performed (patient preference). Re-biopsy threshold > 1.2 mg/dL     # Immunosuppression: Tacrolimus immediate release (goal 6-8), Mycophenolic acid (dose 540 mg every 12 hours) and Prednisone (5 mg)   - Changes: recent was lowered to 6 mg po bid     # BK viremia: stable load     # Infection Prophylaxis:   - PJP: Sulfa/TMP (Bactrim)  - CMV: Valcyte completed the course     # Hypertension: Controlled; BP average 110/80  Goal BP: < 140/90   - Volume status: euvolemic     - Changes: None     # Anemia in Chronic Renal Disease: Hgb: Stable      ENZO: no longer needed   - Iron studies: Replete    # Mineral Bone Disorder:   - Calcium; level: Normal        On supplement: No  - Phosphorus; level: Low Normal       On supplement: on supplement     # Electrolytes:   - Potassium; level: low       On supplement: start supplement   - Magnesium; level: low        On supplement: continue supplement , discontinue PPI    # Medical Compliance: Yes    # COVID-19 Virus Review: Discussed COVID-19 virus and the potential medical risks.  Reviewed preventative health recommendations, which includes washing hands for 20 seconds, avoid touching your face, and social distancing.  Asked patient to inform the transplant center if they are exposed or diagnosed with this virus.    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 Donor Class:   Crossmatch at time of Tx:  negative  DSA at time of Tx: No  Significant changes in immunosuppression: steroid maintenance due to early mixed rejection   CMV IgG Ab High Risk Discordance (D+/R-): No  EBV IgG Ab High Risk Discordance (D+/R-): No  Significant transplant-related complications: early acute rejection possible Non-HLA with an element of memory response     Transplant Office Phone Number: [phone]    Assessment and plan was discussed with the patient and she voiced her understanding and agreement.    Return Visit: 4 weeks     Jelly Dietz has a clinical video visit for routine follow up and immunosuppression management.    History of Present Illness   Since she was seen last, she has been feeling well, she takes all her IS with no reported side effects. She noted some heart burn and would like to refill her PPI. She states her BP runs 110/70's range. No dizziness. She denies any dyspnea, fever or chills.     Recent Hospitalizations:  [x] No [] Yes    New Medical Issues: [x] No [] Yes    Decreased energy: [x] No [] Yes    Chest pain or SOB with exertion:  [x] No [] Yes    Appetite change or weight change: [x] No [] Yes    Nausea, vomiting or diarrhea:  [x] No [] Yes Some reflux symptoms.    Fever, sweats or chills: [x] No [] Yes    Leg swelling: [x] No [] Yes      Home BP: controlled     Review of Systems   A comprehensive review of systems was obtained and negative, except as noted in the HPI or PMH.    I have reviewed and updated the patient's Past Medical History, Social History, and Medication List.    Active Medical Problems  Patient Active Problem List   Diagnosis     CARDIOVASCULAR SCREENING; LDL GOAL LESS THAN 160     Kidney replaced by transplant     HTN, kidney transplant related     ACS (acute coronary syndrome) (H)     Anemia in chronic renal disease     IgA nephropathy     Anxiety     Kidney transplanted     Immunosuppression (H)     Painful bladder spasm     Hypophosphatemia     Constipation due to pain medication      Aftercare following organ transplant     Hypomagnesemia     Dehydration     Acute renal failure (ARF) (H)     Acute rejection of kidney transplant     Metabolic acidosis     Steroid-induced hyperglycemia     Nausea vomiting and diarrhea     Hypervolemia     Anemia     Allergies  Patient has no known allergies.    Physical Exam:  Deferred due to telemedicine     Medications  Current Outpatient Medications   Medication Sig     acetaminophen (TYLENOL) 325 MG tablet Take 2 tablets (650 mg) by mouth every 4 hours as needed for other (multimodal surgical pain management along with NSAIDS and opioid medication as indicated based on pain control and physical function.)     aspirin (ASA) 81 MG chewable tablet Take 1 tablet (81 mg) by mouth daily     atorvastatin (LIPITOR) 10 MG tablet Take 1 tablet (10 mg) by mouth daily     carvedilol (COREG) 6.25 MG tablet Take 0.5 tablets (3.125 mg) by mouth 2 times daily     losartan (COZAAR) 25 MG tablet Take 0.5 tablets (12.5 mg) by mouth At Bedtime     magnesium oxide (MAG-OX) 400 MG tablet TAKE TWO TABLETS BY MOUTH TWICE A DAY     mycophenolic acid (GENERIC EQUIVALENT) 180 MG EC tablet Take 3 tablets (540 mg) by mouth every morning AND 2 tablets (360 mg) every evening.     pantoprazole (PROTONIX) 20 MG EC tablet Take 1 tablet (20 mg) by mouth daily     phosphorus tablet 250 mg (PHOSPHORUS TABLET 250 MG) 250 MG per tablet Take 2 tablets (500 mg) by mouth 2 times daily     predniSONE (DELTASONE) 5 MG tablet Take 1 tablet (5 mg) by mouth daily     sulfamethoxazole-trimethoprim (BACTRIM) 400-80 MG tablet Take 1 tablet by mouth daily     tacrolimus (GENERIC EQUIVALENT) 1 MG capsule Take 6 capsules (6 mg) by mouth 2 times daily     potassium chloride ER (KLOR-CON M) 20 MEQ CR tablet Take 1 tablet (20 mEq) by mouth daily (Patient not taking: Reported on 3/22/2021)     Vitamin D3 (CHOLECALCIFEROL) 25 mcg (1000 units) tablet Take 2 tablets (50 mcg) by mouth daily (Patient not taking:  Reported on 3/22/2021)     No current facility-administered medications for this visit.        Mahesh Armando MD

## 2021-03-22 NOTE — LETTER
3/22/2021       RE: Jelly Dietz  [address]  [facility] [address]     Dear Colleague,    Thank you for referring your patient, Jelly Dietz, to the [facility] [place] at [facility]. Please see a copy of my visit note below.    Jelly is a 36 year old who is being evaluated via a billable video visit.      How would you like to obtain your AVS? Mail a copy  If the video visit is dropped, the invitation should be resent by: Send to e-mail at: [email or web address]  Will anyone else be joining your video visit? No      Video-Visit Details    Type of service:  Video Visit    Originating Location (pt. Location): Home    Distant Location (provider location):  [facility] [place]     Platform used for Video Visit: [employer]      TRANSPLANT NEPHROLOGY FOLLOW UP VISIT  Assessment & Plan   # DDKT: creatinine is stable to slightly up     - Baseline Cr ~ 0.7-0.9 mg/dL    - Proteinuria: Not checked recently   - Date DSA Last Checked: 7/2020      Latest DSA: No, checked afterwards but it was not feasible since she was receiving IVIG at that time, will check DSA with next lab.   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 Repeat biopsy was not performed (patient preference). Re-biopsy threshold > 1.2 mg/dL     # Immunosuppression: Tacrolimus immediate release (goal 6-8), Mycophenolic acid (dose 540 mg every 12 hours) and Prednisone (5 mg)   - Changes: recent was lowered to 6 mg po bid     # BK viremia: stable load     # Infection Prophylaxis:   - PJP: Sulfa/TMP (Bactrim)  - CMV: Valcyte completed the course     # Hypertension: Controlled; BP average 110/80  Goal BP: < 140/90   - Volume status: euvolemic     - Changes: None     # Anemia in Chronic Renal Disease: Hgb: Stable      ENZO: no longer needed   - Iron studies: Replete    # Mineral Bone Disorder:   -  Calcium; level: Normal        On supplement: No  - Phosphorus; level: Low Normal       On supplement: on supplement     # Electrolytes:   - Potassium; level: low       On supplement: start supplement   - Magnesium; level: low        On supplement: continue supplement , discontinue PPI    # Medical Compliance: Yes    # COVID-19 Virus Review: Discussed COVID-19 virus and the potential medical risks.  Reviewed preventative health recommendations, which includes washing hands for 20 seconds, avoid touching your face, and social distancing.  Asked patient to inform the transplant center if they are exposed or diagnosed with this virus.    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 Donor Class:   Crossmatch at time of Tx: negative  DSA at time of Tx: No  Significant changes in immunosuppression: steroid maintenance due to early mixed rejection   CMV IgG Ab High Risk Discordance (D+/R-): No  EBV IgG Ab High Risk Discordance (D+/R-): No  Significant transplant-related complications: early acute rejection possible Non-HLA with an element of memory response     Transplant Office Phone Number: [phone]    Assessment and plan was discussed with the patient and she voiced her understanding and agreement.    Return Visit: 4 weeks     Jelly Dietz has a clinical video visit for routine follow up and immunosuppression management.    History of Present Illness   Since she was seen last, she has been feeling well, she takes all her IS with no reported side effects. She noted some heart burn and would like to refill her PPI. She states her BP runs 110/70's range. No dizziness. She denies any dyspnea, fever or chills.     Recent Hospitalizations:  [x] No [] Yes    New Medical Issues: [x] No [] Yes    Decreased energy: [x] No [] Yes    Chest pain or SOB with exertion:  [x] No [] Yes    Appetite change or  weight change: [x] No [] Yes    Nausea, vomiting or diarrhea:  [x] No [] Yes Some reflux symptoms.    Fever, sweats or chills: [x] No [] Yes    Leg swelling: [x] No [] Yes      Home BP: controlled     Review of Systems   A comprehensive review of systems was obtained and negative, except as noted in the HPI or PMH.    I have reviewed and updated the patient's Past Medical History, Social History, and Medication List.    Active Medical Problems  Patient Active Problem List   Diagnosis     CARDIOVASCULAR SCREENING; LDL GOAL LESS THAN 160     Kidney replaced by transplant     HTN, kidney transplant related     ACS (acute coronary syndrome) (H)     Anemia in chronic renal disease     IgA nephropathy     Anxiety     Kidney transplanted     Immunosuppression (H)     Painful bladder spasm     Hypophosphatemia     Constipation due to pain medication     Aftercare following organ transplant     Hypomagnesemia     Dehydration     Acute renal failure (ARF) (H)     Acute rejection of kidney transplant     Metabolic acidosis     Steroid-induced hyperglycemia     Nausea vomiting and diarrhea     Hypervolemia     Anemia     Allergies  Patient has no known allergies.    Physical Exam:  Deferred due to telemedicine     Medications  Current Outpatient Medications   Medication Sig     acetaminophen (TYLENOL) 325 MG tablet Take 2 tablets (650 mg) by mouth every 4 hours as needed for other (multimodal surgical pain management along with NSAIDS and opioid medication as indicated based on pain control and physical function.)     aspirin (ASA) 81 MG chewable tablet Take 1 tablet (81 mg) by mouth daily     atorvastatin (LIPITOR) 10 MG tablet Take 1 tablet (10 mg) by mouth daily     carvedilol (COREG) 6.25 MG tablet Take 0.5 tablets (3.125 mg) by mouth 2 times daily     losartan (COZAAR) 25 MG tablet Take 0.5 tablets (12.5 mg) by mouth At Bedtime     magnesium oxide (MAG-OX) 400 MG tablet TAKE TWO TABLETS BY MOUTH TWICE A DAY      mycophenolic acid (GENERIC EQUIVALENT) 180 MG EC tablet Take 3 tablets (540 mg) by mouth every morning AND 2 tablets (360 mg) every evening.     pantoprazole (PROTONIX) 20 MG EC tablet Take 1 tablet (20 mg) by mouth daily     phosphorus tablet 250 mg (PHOSPHORUS TABLET 250 MG) 250 MG per tablet Take 2 tablets (500 mg) by mouth 2 times daily     predniSONE (DELTASONE) 5 MG tablet Take 1 tablet (5 mg) by mouth daily     sulfamethoxazole-trimethoprim (BACTRIM) 400-80 MG tablet Take 1 tablet by mouth daily     tacrolimus (GENERIC EQUIVALENT) 1 MG capsule Take 6 capsules (6 mg) by mouth 2 times daily     potassium chloride ER (KLOR-CON M) 20 MEQ CR tablet Take 1 tablet (20 mEq) by mouth daily (Patient not taking: Reported on 3/22/2021)     Vitamin D3 (CHOLECALCIFEROL) 25 mcg (1000 units) tablet Take 2 tablets (50 mcg) by mouth daily (Patient not taking: Reported on 3/22/2021)     No current facility-administered medications for this visit.        Mahesh Armando MD

## 2021-03-23 RX ORDER — PANTOPRAZOLE SODIUM 20 MG/1
20 TABLET, DELAYED RELEASE ORAL DAILY
Qty: 90 TABLET | Refills: 3 | Status: SHIPPED | OUTPATIENT
Start: 2021-03-23 | End: 2022-04-23

## 2021-04-12 DIAGNOSIS — Z94.0 KIDNEY TRANSPLANTED: Primary | ICD-10-CM

## 2021-04-12 RX ORDER — PREDNISONE 5 MG/1
5 TABLET ORAL DAILY
Qty: 90 TABLET | Refills: 3 | Status: SHIPPED | OUTPATIENT
Start: 2021-04-12 | End: 2021-07-17

## 2021-04-12 NOTE — TELEPHONE ENCOUNTER
Provider Call: Medication Refill  Route to LPN      predniSONE (DELTASONE) 5 MG tablet    [place], [place] - [address] [facility] [address] Phone:  [phone]   Fax:  [phone]          When will the patient be out of this medication?: Less than 24 hours (Shailesh LANDIN, then page if no answer)  Callback needed? Yes    Return Call Needed  Same as documented in contacts section  When to return call?: Same day: Route High Priority

## 2021-04-13 ENCOUNTER — RESULTS ONLY (OUTPATIENT)
Dept: OTHER | Facility: CLINIC | Age: 36
End: 2021-04-13

## 2021-04-13 DIAGNOSIS — Z94.0 KIDNEY TRANSPLANTED: ICD-10-CM

## 2021-04-13 LAB
ANION GAP SERPL CALCULATED.3IONS-SCNC: 7 MMOL/L (ref 3–14)
BASOPHILS # BLD AUTO: 0 10E9/L (ref 0–0.2)
BASOPHILS NFR BLD AUTO: 0.5 %
BUN SERPL-MCNC: 17 MG/DL (ref 7–30)
CALCIUM SERPL-MCNC: 9.2 MG/DL (ref 8.5–10.1)
CHLORIDE SERPL-SCNC: 108 MMOL/L (ref 94–109)
CO2 SERPL-SCNC: 23 MMOL/L (ref 20–32)
CREAT SERPL-MCNC: 0.74 MG/DL (ref 0.52–1.04)
DIFFERENTIAL METHOD BLD: ABNORMAL
EOSINOPHIL # BLD AUTO: 0.1 10E9/L (ref 0–0.7)
EOSINOPHIL NFR BLD AUTO: 1.1 %
ERYTHROCYTE [DISTWIDTH] IN BLOOD BY AUTOMATED COUNT: 13.2 % (ref 10–15)
GFR SERPL CREATININE-BSD FRML MDRD: >90 ML/MIN/{1.73_M2}
GLUCOSE SERPL-MCNC: 76 MG/DL (ref 70–99)
HCT VFR BLD AUTO: 32.8 % (ref 35–47)
HGB BLD-MCNC: 10.3 G/DL (ref 11.7–15.7)
IMM GRANULOCYTES # BLD: 0.1 10E9/L (ref 0–0.4)
IMM GRANULOCYTES NFR BLD: 0.8 %
LYMPHOCYTES # BLD AUTO: 0.8 10E9/L (ref 0.8–5.3)
LYMPHOCYTES NFR BLD AUTO: 12.5 %
MCH RBC QN AUTO: 31.2 PG (ref 26.5–33)
MCHC RBC AUTO-ENTMCNC: 31.4 G/DL (ref 31.5–36.5)
MCV RBC AUTO: 99 FL (ref 78–100)
MONOCYTES # BLD AUTO: 0.4 10E9/L (ref 0–1.3)
MONOCYTES NFR BLD AUTO: 6.3 %
NEUTROPHILS # BLD AUTO: 4.9 10E9/L (ref 1.6–8.3)
NEUTROPHILS NFR BLD AUTO: 78.8 %
NRBC # BLD AUTO: 0 10*3/UL
NRBC BLD AUTO-RTO: 0 /100
PLATELET # BLD AUTO: 271 10E9/L (ref 150–450)
POTASSIUM SERPL-SCNC: 3.3 MMOL/L (ref 3.4–5.3)
RBC # BLD AUTO: 3.3 10E12/L (ref 3.8–5.2)
SODIUM SERPL-SCNC: 138 MMOL/L (ref 133–144)
TACROLIMUS BLD-MCNC: 8.5 UG/L (ref 5–15)
TME LAST DOSE: NORMAL H
WBC # BLD AUTO: 6.2 10E9/L (ref 4–11)

## 2021-04-13 PROCEDURE — 80180 DRUG SCRN QUAN MYCOPHENOLATE: CPT | Performed by: INTERNAL MEDICINE

## 2021-04-13 PROCEDURE — 86832 HLA CLASS I HIGH DEFIN QUAL: CPT | Performed by: INTERNAL MEDICINE

## 2021-04-13 PROCEDURE — 87799 DETECT AGENT NOS DNA QUANT: CPT | Performed by: INTERNAL MEDICINE

## 2021-04-13 PROCEDURE — 85025 COMPLETE CBC W/AUTO DIFF WBC: CPT | Performed by: PATHOLOGY

## 2021-04-13 PROCEDURE — 36415 COLL VENOUS BLD VENIPUNCTURE: CPT | Performed by: PATHOLOGY

## 2021-04-13 PROCEDURE — 86833 HLA CLASS II HIGH DEFIN QUAL: CPT | Performed by: INTERNAL MEDICINE

## 2021-04-13 PROCEDURE — 80197 ASSAY OF TACROLIMUS: CPT | Performed by: INTERNAL MEDICINE

## 2021-04-13 PROCEDURE — 80048 BASIC METABOLIC PNL TOTAL CA: CPT | Performed by: PATHOLOGY

## 2021-04-14 LAB
BKV DNA # SPEC NAA+PROBE: 8361 COPIES/ML
BKV DNA SPEC NAA+PROBE-LOG#: 3.9 LOG COPIES/ML
MYCOPHENOLATE SERPL LC/MS/MS-MCNC: 1.95 MG/L (ref 1–3.5)
MYCOPHENOLATE-G SERPL LC/MS/MS-MCNC: 57.3 MG/L (ref 30–95)
SPECIMEN SOURCE: ABNORMAL
TME LAST DOSE: NORMAL H

## 2021-04-15 ENCOUNTER — IMMUNIZATION (OUTPATIENT)
Dept: NURSING | Facility: CLINIC | Age: 36
End: 2021-04-15
Payer: MEDICARE

## 2021-04-15 LAB
DONOR IDENTIFICATION: NORMAL
DSA COMMENTS: NORMAL
DSA PRESENT: NO
DSA TEST METHOD: NORMAL
ORGAN: NORMAL
SA1 CELL: NORMAL
SA1 COMMENTS: NORMAL
SA1 HI RISK ABY: NORMAL
SA1 MOD RISK ABY: NORMAL
SA1 TEST METHOD: NORMAL
SA2 CELL: NORMAL
SA2 COMMENTS: NORMAL
SA2 HI RISK ABY UA: NORMAL
SA2 MOD RISK ABY: NORMAL
SA2 TEST METHOD: NORMAL
UNACCEPTABLE ANTIGEN: NORMAL
UNOS CPRA: 95

## 2021-04-15 PROCEDURE — 91300 PR COVID VAC PFIZER DIL RECON 30 MCG/0.3 ML IM: CPT

## 2021-04-15 PROCEDURE — 0001A PR COVID VAC PFIZER DIL RECON 30 MCG/0.3 ML IM: CPT

## 2021-04-19 ENCOUNTER — TELEPHONE (OUTPATIENT)
Dept: TRANSPLANT | Facility: CLINIC | Age: 36
End: 2021-04-19

## 2021-04-20 ENCOUNTER — TELEPHONE (OUTPATIENT)
Dept: TRANSPLANT | Facility: CLINIC | Age: 36
End: 2021-04-20

## 2021-04-20 DIAGNOSIS — Z94.0 KIDNEY REPLACED BY TRANSPLANT: ICD-10-CM

## 2021-04-20 DIAGNOSIS — Z79.899 LONG TERM USE OF DRUG: ICD-10-CM

## 2021-04-20 DIAGNOSIS — Z94.0 KIDNEY TRANSPLANTED: ICD-10-CM

## 2021-04-20 LAB
ANION GAP SERPL CALCULATED.3IONS-SCNC: 7 MMOL/L (ref 3–14)
BASOPHILS # BLD AUTO: 0 10E9/L (ref 0–0.2)
BASOPHILS NFR BLD AUTO: 0.5 %
BUN SERPL-MCNC: 11 MG/DL (ref 7–30)
CALCIUM SERPL-MCNC: 9.2 MG/DL (ref 8.5–10.1)
CHLORIDE SERPL-SCNC: 109 MMOL/L (ref 94–109)
CO2 SERPL-SCNC: 23 MMOL/L (ref 20–32)
CREAT SERPL-MCNC: 0.72 MG/DL (ref 0.52–1.04)
CREAT UR-MCNC: 191 MG/DL
DIFFERENTIAL METHOD BLD: ABNORMAL
EOSINOPHIL # BLD AUTO: 0.1 10E9/L (ref 0–0.7)
EOSINOPHIL NFR BLD AUTO: 1.2 %
ERYTHROCYTE [DISTWIDTH] IN BLOOD BY AUTOMATED COUNT: 13.2 % (ref 10–15)
GFR SERPL CREATININE-BSD FRML MDRD: >90 ML/MIN/{1.73_M2}
GLUCOSE SERPL-MCNC: 85 MG/DL (ref 70–99)
HCT VFR BLD AUTO: 34.1 % (ref 35–47)
HGB BLD-MCNC: 10.7 G/DL (ref 11.7–15.7)
IMM GRANULOCYTES # BLD: 0.1 10E9/L (ref 0–0.4)
IMM GRANULOCYTES NFR BLD: 0.8 %
LYMPHOCYTES # BLD AUTO: 0.9 10E9/L (ref 0.8–5.3)
LYMPHOCYTES NFR BLD AUTO: 14.7 %
MCH RBC QN AUTO: 30.5 PG (ref 26.5–33)
MCHC RBC AUTO-ENTMCNC: 31.4 G/DL (ref 31.5–36.5)
MCV RBC AUTO: 97 FL (ref 78–100)
MONOCYTES # BLD AUTO: 0.4 10E9/L (ref 0–1.3)
MONOCYTES NFR BLD AUTO: 7.4 %
MYCOPHENOLATE SERPL LC/MS/MS-MCNC: 2.14 MG/L (ref 1–3.5)
MYCOPHENOLATE-G SERPL LC/MS/MS-MCNC: 46.6 MG/L (ref 30–95)
NEUTROPHILS # BLD AUTO: 4.5 10E9/L (ref 1.6–8.3)
NEUTROPHILS NFR BLD AUTO: 75.4 %
NRBC # BLD AUTO: 0 10*3/UL
NRBC BLD AUTO-RTO: 0 /100
PLATELET # BLD AUTO: 270 10E9/L (ref 150–450)
POTASSIUM SERPL-SCNC: 3.3 MMOL/L (ref 3.4–5.3)
PROT UR-MCNC: 0.16 G/L
PROT/CREAT 24H UR: 0.08 G/G CR (ref 0–0.2)
RBC # BLD AUTO: 3.51 10E12/L (ref 3.8–5.2)
SODIUM SERPL-SCNC: 139 MMOL/L (ref 133–144)
TACROLIMUS BLD-MCNC: 4.3 UG/L (ref 5–15)
TME LAST DOSE: 1 H
TME LAST DOSE: 1 H
WBC # BLD AUTO: 5.9 10E9/L (ref 4–11)

## 2021-04-20 PROCEDURE — 84156 ASSAY OF PROTEIN URINE: CPT | Performed by: PATHOLOGY

## 2021-04-20 PROCEDURE — 87799 DETECT AGENT NOS DNA QUANT: CPT | Performed by: INTERNAL MEDICINE

## 2021-04-20 PROCEDURE — 80048 BASIC METABOLIC PNL TOTAL CA: CPT | Performed by: PATHOLOGY

## 2021-04-20 PROCEDURE — 85025 COMPLETE CBC W/AUTO DIFF WBC: CPT | Performed by: PATHOLOGY

## 2021-04-20 PROCEDURE — 80197 ASSAY OF TACROLIMUS: CPT | Performed by: INTERNAL MEDICINE

## 2021-04-20 PROCEDURE — 36415 COLL VENOUS BLD VENIPUNCTURE: CPT | Performed by: PATHOLOGY

## 2021-04-20 PROCEDURE — 80180 DRUG SCRN QUAN MYCOPHENOLATE: CPT | Performed by: INTERNAL MEDICINE

## 2021-04-20 NOTE — TELEPHONE ENCOUNTER
t dd labs today  Give her  Call if any changes  Started taking a Vit D supplement and her urine color is more yellow now

## 2021-04-21 ENCOUNTER — MYC MEDICAL ADVICE (OUTPATIENT)
Dept: NEPHROLOGY | Facility: CLINIC | Age: 36
End: 2021-04-21

## 2021-04-21 LAB
BKV DNA # SPEC NAA+PROBE: 8119 COPIES/ML
BKV DNA SPEC NAA+PROBE-LOG#: 3.9 LOG COPIES/ML
SPECIMEN SOURCE: ABNORMAL

## 2021-04-22 ENCOUNTER — TELEPHONE (OUTPATIENT)
Dept: TRANSPLANT | Facility: CLINIC | Age: 36
End: 2021-04-22

## 2021-04-22 DIAGNOSIS — Z94.0 KIDNEY TRANSPLANTED: ICD-10-CM

## 2021-04-22 NOTE — TELEPHONE ENCOUNTER
" Hi there got my lab for this week creatinine a little too high? Or I m I just freaking out for no reason. I called joão Tuesday no reply so I think all is well. Sorry to bother u    __________________  Called Jelly PALACIOS [facility] times 2 LVM   3 rd attempt    Discussed Jelly difficultly to provide care post transplant     If she does not obtain transplant labs  regularly    Jelly reports that her children on spring break why missed labs          Previous discussions regarding missed labs - this issue identified by  prior to transplant  And  Follow up        __________________  Reviewed transplant  Labs   Creatinine  Is  0.72 stable   BK low level    Issue Tacrolimus  Level 9.0   Confirmed 12 hour trough tacrolimus   level    Confirmed  Taking tacrolimus  6 mg twice per day   Reviewed to lower tacrolimus  5 mg twice per day     2nd issue urine more    Reviewed should increase hydration     Jelly  Verbalized  Understanding and \"promised\" to due better with obtaining  Labs                         "

## 2021-04-23 RX ORDER — TACROLIMUS 1 MG/1
5 CAPSULE ORAL 2 TIMES DAILY
Qty: 300 CAPSULE | Refills: 11 | Status: SHIPPED | OUTPATIENT
Start: 2021-04-23 | End: 2021-06-10

## 2021-05-03 ENCOUNTER — TELEPHONE (OUTPATIENT)
Dept: TRANSPLANT | Facility: CLINIC | Age: 36
End: 2021-05-03

## 2021-05-03 NOTE — TELEPHONE ENCOUNTER
Returned call from Jelly.  She asked for the phone # for the [facility] on [facility].  Layla Sanchez, RN, BA  On Call Organ Coordinator

## 2021-05-06 ENCOUNTER — TELEPHONE (OUTPATIENT)
Dept: TRANSPLANT | Facility: CLINIC | Age: 36
End: 2021-05-06

## 2021-05-06 ENCOUNTER — IMMUNIZATION (OUTPATIENT)
Dept: NURSING | Facility: CLINIC | Age: 36
End: 2021-05-06
Attending: INTERNAL MEDICINE
Payer: MEDICARE

## 2021-05-06 DIAGNOSIS — Z94.0 KIDNEY REPLACED BY TRANSPLANT: ICD-10-CM

## 2021-05-06 DIAGNOSIS — Z48.298 AFTERCARE FOLLOWING ORGAN TRANSPLANT: ICD-10-CM

## 2021-05-06 DIAGNOSIS — Z79.60 LONG-TERM USE OF IMMUNOSUPPRESSANT MEDICATION: ICD-10-CM

## 2021-05-06 PROCEDURE — 91300 PR COVID VAC PFIZER DIL RECON 30 MCG/0.3 ML IM: CPT

## 2021-05-06 PROCEDURE — 0002A PR COVID VAC PFIZER DIL RECON 30 MCG/0.3 ML IM: CPT

## 2021-05-06 NOTE — TELEPHONE ENCOUNTER
Patient called via triage, is trying to get Mycophenolate refilled.  She is currently at the pharmacy & they are going to give her enough meds for tonight and tomorrow but the coordinator needs to connect with patient/pharmacy in the AM to figure out what paperwork is missing to refill the full Rx.  Will have coordinator contact patient in the AM.

## 2021-05-07 RX ORDER — MYCOPHENOLIC ACID 180 MG/1
TABLET, DELAYED RELEASE ORAL
Qty: 150 TABLET | Refills: 4 | Status: SHIPPED | OUTPATIENT
Start: 2021-05-07 | End: 2021-09-18

## 2021-05-07 NOTE — TELEPHONE ENCOUNTER
Called confirmed current dose of Myfortic mycophenolic acid    Confirmed picking up Myfotic  @  the [facility]

## 2021-05-07 NOTE — TELEPHONE ENCOUNTER
Patient returning call states she takes 180 mg MPA 3 tablets in the AM and @ tablets in the evening and will get labs done on 5/11/21 at 12 noon.

## 2021-05-07 NOTE — TELEPHONE ENCOUNTER
Attempted ot call Jelly Dietz     Please call  Jelly Dietz please confirm she going to the [facility] pharmacy   For RX 's     Resent the RX  Myfortic        Please confirm she is taking the medication as directed   540 in am and 360 mg in pm    Please encourage her to have transplant  Labs    Please instruct and review transplant  office hours  To obtain RX refills

## 2021-05-11 ENCOUNTER — RESULTS ONLY (OUTPATIENT)
Dept: OTHER | Facility: CLINIC | Age: 36
End: 2021-05-11

## 2021-05-11 DIAGNOSIS — Z94.0 KIDNEY TRANSPLANTED: ICD-10-CM

## 2021-05-11 LAB
BASOPHILS # BLD AUTO: 0 10E9/L (ref 0–0.2)
BASOPHILS NFR BLD AUTO: 0.5 %
CREAT UR-MCNC: 120 MG/DL
DIFFERENTIAL METHOD BLD: ABNORMAL
EOSINOPHIL # BLD AUTO: 0.1 10E9/L (ref 0–0.7)
EOSINOPHIL NFR BLD AUTO: 1.8 %
ERYTHROCYTE [DISTWIDTH] IN BLOOD BY AUTOMATED COUNT: 12.8 % (ref 10–15)
HCT VFR BLD AUTO: 35.1 % (ref 35–47)
HGB BLD-MCNC: 11.2 G/DL (ref 11.7–15.7)
IMM GRANULOCYTES # BLD: 0 10E9/L (ref 0–0.4)
IMM GRANULOCYTES NFR BLD: 0.7 %
LYMPHOCYTES # BLD AUTO: 1.1 10E9/L (ref 0.8–5.3)
LYMPHOCYTES NFR BLD AUTO: 18.9 %
MCH RBC QN AUTO: 30.8 PG (ref 26.5–33)
MCHC RBC AUTO-ENTMCNC: 31.9 G/DL (ref 31.5–36.5)
MCV RBC AUTO: 96 FL (ref 78–100)
MONOCYTES # BLD AUTO: 0.4 10E9/L (ref 0–1.3)
MONOCYTES NFR BLD AUTO: 6.6 %
NEUTROPHILS # BLD AUTO: 4 10E9/L (ref 1.6–8.3)
NEUTROPHILS NFR BLD AUTO: 71.5 %
NRBC # BLD AUTO: 0 10*3/UL
NRBC BLD AUTO-RTO: 0 /100
PLATELET # BLD AUTO: 267 10E9/L (ref 150–450)
PROT UR-MCNC: 0.1 G/L
PROT/CREAT 24H UR: 0.08 G/G CR (ref 0–0.2)
RBC # BLD AUTO: 3.64 10E12/L (ref 3.8–5.2)
WBC # BLD AUTO: 5.6 10E9/L (ref 4–11)

## 2021-05-11 PROCEDURE — 86833 HLA CLASS II HIGH DEFIN QUAL: CPT | Mod: 90 | Performed by: PATHOLOGY

## 2021-05-11 PROCEDURE — 86832 HLA CLASS I HIGH DEFIN QUAL: CPT | Mod: 90 | Performed by: PATHOLOGY

## 2021-05-11 PROCEDURE — 84156 ASSAY OF PROTEIN URINE: CPT | Performed by: PATHOLOGY

## 2021-05-11 PROCEDURE — 87799 DETECT AGENT NOS DNA QUANT: CPT | Mod: 90 | Performed by: PATHOLOGY

## 2021-05-11 PROCEDURE — 80197 ASSAY OF TACROLIMUS: CPT | Mod: 90 | Performed by: PATHOLOGY

## 2021-05-11 PROCEDURE — 85025 COMPLETE CBC W/AUTO DIFF WBC: CPT | Performed by: PATHOLOGY

## 2021-05-11 PROCEDURE — 80180 DRUG SCRN QUAN MYCOPHENOLATE: CPT | Mod: 90 | Performed by: PATHOLOGY

## 2021-05-11 PROCEDURE — 36415 COLL VENOUS BLD VENIPUNCTURE: CPT | Performed by: PATHOLOGY

## 2021-05-12 LAB
BKV DNA # SPEC NAA+PROBE: 7968 COPIES/ML
BKV DNA SPEC NAA+PROBE-LOG#: 3.9 LOG COPIES/ML
DONOR IDENTIFICATION: NORMAL
DSA COMMENTS: NORMAL
DSA PRESENT: NO
DSA TEST METHOD: NORMAL
MYCOPHENOLATE SERPL LC/MS/MS-MCNC: 1.34 MG/L (ref 1–3.5)
MYCOPHENOLATE-G SERPL LC/MS/MS-MCNC: 47.2 MG/L (ref 30–95)
ORGAN: NORMAL
SA1 CELL: NORMAL
SA1 COMMENTS: NORMAL
SA1 HI RISK ABY: NORMAL
SA1 MOD RISK ABY: NORMAL
SA1 TEST METHOD: NORMAL
SA2 CELL: NORMAL
SA2 COMMENTS: NORMAL
SA2 HI RISK ABY UA: NORMAL
SA2 MOD RISK ABY: NORMAL
SA2 TEST METHOD: NORMAL
SPECIMEN SOURCE: ABNORMAL
TACROLIMUS BLD-MCNC: 5.9 UG/L (ref 5–15)
TME LAST DOSE: NORMAL H
TME LAST DOSE: NORMAL H
UNACCEPTABLE ANTIGEN: NORMAL
UNOS CPRA: 95

## 2021-06-03 DIAGNOSIS — Z94.0 KIDNEY TRANSPLANTED: ICD-10-CM

## 2021-06-03 LAB
ANION GAP SERPL CALCULATED.3IONS-SCNC: 8 MMOL/L (ref 3–14)
BASOPHILS # BLD AUTO: 0 10E9/L (ref 0–0.2)
BASOPHILS NFR BLD AUTO: 0.8 %
BUN SERPL-MCNC: 17 MG/DL (ref 7–30)
CALCIUM SERPL-MCNC: 9.2 MG/DL (ref 8.5–10.1)
CHLORIDE SERPL-SCNC: 106 MMOL/L (ref 94–109)
CO2 SERPL-SCNC: 24 MMOL/L (ref 20–32)
CREAT SERPL-MCNC: 0.72 MG/DL (ref 0.52–1.04)
DIFFERENTIAL METHOD BLD: ABNORMAL
EOSINOPHIL # BLD AUTO: 0.1 10E9/L (ref 0–0.7)
EOSINOPHIL NFR BLD AUTO: 1.8 %
ERYTHROCYTE [DISTWIDTH] IN BLOOD BY AUTOMATED COUNT: 13 % (ref 10–15)
GFR SERPL CREATININE-BSD FRML MDRD: >90 ML/MIN/{1.73_M2}
GLUCOSE SERPL-MCNC: 101 MG/DL (ref 70–99)
HCT VFR BLD AUTO: 31.3 % (ref 35–47)
HGB BLD-MCNC: 10.2 G/DL (ref 11.7–15.7)
IMM GRANULOCYTES # BLD: 0 10E9/L (ref 0–0.4)
IMM GRANULOCYTES NFR BLD: 0.6 %
LYMPHOCYTES # BLD AUTO: 0.9 10E9/L (ref 0.8–5.3)
LYMPHOCYTES NFR BLD AUTO: 17.4 %
MCH RBC QN AUTO: 30.4 PG (ref 26.5–33)
MCHC RBC AUTO-ENTMCNC: 32.6 G/DL (ref 31.5–36.5)
MCV RBC AUTO: 93 FL (ref 78–100)
MONOCYTES # BLD AUTO: 0.4 10E9/L (ref 0–1.3)
MONOCYTES NFR BLD AUTO: 7.8 %
MYCOPHENOLATE SERPL LC/MS/MS-MCNC: 1.7 MG/L (ref 1–3.5)
MYCOPHENOLATE-G SERPL LC/MS/MS-MCNC: 43.6 MG/L (ref 30–95)
NEUTROPHILS # BLD AUTO: 3.6 10E9/L (ref 1.6–8.3)
NEUTROPHILS NFR BLD AUTO: 71.6 %
NRBC # BLD AUTO: 0 10*3/UL
NRBC BLD AUTO-RTO: 0 /100
PLATELET # BLD AUTO: 276 10E9/L (ref 150–450)
POTASSIUM SERPL-SCNC: 3.6 MMOL/L (ref 3.4–5.3)
RBC # BLD AUTO: 3.36 10E12/L (ref 3.8–5.2)
SODIUM SERPL-SCNC: 137 MMOL/L (ref 133–144)
TACROLIMUS BLD-MCNC: 6.6 UG/L (ref 5–15)
TME LAST DOSE: NORMAL H
TME LAST DOSE: NORMAL H
WBC # BLD AUTO: 5 10E9/L (ref 4–11)

## 2021-06-03 PROCEDURE — 87799 DETECT AGENT NOS DNA QUANT: CPT | Performed by: INTERNAL MEDICINE

## 2021-06-03 PROCEDURE — 80048 BASIC METABOLIC PNL TOTAL CA: CPT | Performed by: PATHOLOGY

## 2021-06-03 PROCEDURE — 80180 DRUG SCRN QUAN MYCOPHENOLATE: CPT | Performed by: INTERNAL MEDICINE

## 2021-06-03 PROCEDURE — 36415 COLL VENOUS BLD VENIPUNCTURE: CPT | Performed by: PATHOLOGY

## 2021-06-03 PROCEDURE — 85025 COMPLETE CBC W/AUTO DIFF WBC: CPT | Performed by: PATHOLOGY

## 2021-06-03 PROCEDURE — 80197 ASSAY OF TACROLIMUS: CPT | Performed by: INTERNAL MEDICINE

## 2021-06-04 LAB
BKV DNA # SPEC NAA+PROBE: ABNORMAL COPIES/ML
BKV DNA SPEC NAA+PROBE-LOG#: 4.2 LOG COPIES/ML
SPECIMEN SOURCE: ABNORMAL

## 2021-06-10 DIAGNOSIS — Z48.298 AFTERCARE FOLLOWING ORGAN TRANSPLANT: ICD-10-CM

## 2021-06-10 DIAGNOSIS — Z79.899 ENCOUNTER FOR LONG-TERM CURRENT USE OF MEDICATION: ICD-10-CM

## 2021-06-10 DIAGNOSIS — Z94.0 KIDNEY TRANSPLANTED: ICD-10-CM

## 2021-06-10 DIAGNOSIS — Z94.0 KIDNEY REPLACED BY TRANSPLANT: Primary | ICD-10-CM

## 2021-06-10 RX ORDER — TACROLIMUS 1 MG/1
4 CAPSULE ORAL 2 TIMES DAILY
Qty: 240 CAPSULE | Refills: 11 | Status: SHIPPED | OUTPATIENT
Start: 2021-06-10 | End: 2021-07-17

## 2021-06-10 RX ORDER — TACROLIMUS 0.5 MG/1
0.5 CAPSULE ORAL 2 TIMES DAILY
Qty: 60 CAPSULE | Refills: 11 | Status: SHIPPED | OUTPATIENT
Start: 2021-06-10 | End: 2021-09-27

## 2021-06-10 NOTE — TELEPHONE ENCOUNTER
Spoke to patient regarding:  Tacrolimus  6.6       Confirmed this was an accurate 12-hour trough. Verified Tacrolimus IR dose 5 mg BID. Confirmed no new medications or illness. Confirmed no missed doses. Patient verbalizes understanding to decrease Tacrolimus IR dose to 4.5 mg BID and repeat labs in  7 days    updated orders sent to Jelly Dietz and updated Epic orders   Patient confirms that she has scheduled next lab appt. At the [facility].

## 2021-06-10 NOTE — LETTER
OUTPATIENT LABORATORY TEST ORDER    Patient: Jelly Dietz    Transplant Date: 6/19/2020  YOB: 1985    Ordered By: Dr. Irma Shannon  MRN: [id number]     Issued Date/Time: 6/10/2021  2:00 PM         Expiration Date: 6/19/2021    Diagnosis: Kidney Transplant (ICD-10 Z94.0)    Aftercare following organ transplant (ICD-10 Z48.288)    Long term use of medications (ICD-10 Z79.899)    Lab results to be available on the same day drawn    Patient should release information to the [facility].     Please fax all results to [phone]    2/23/2021 - 6/19/2021 Every 2 weeks, then  Monthly starting 6/20/2021    CBC with platelets    Basic Metabolic Panel (Sodium, Potassium, Chloride, Creatinine, CO2, Urea Nitrogen, glucose, Calcium)    Tacrolimus/Prograf/ drug level    BK (Polyoma Virus) PCR Quantitative/Plasma    12 months post-transplant (Fasting labs)  Due: 6/19/2021    LFTs    Hemoglobin A1c    Uric Acid    Lipid panel    Urine protein/creatinine    DSA PRA    PTH    Vitamin D    Every 6 months starting 6/20/2021    Urine protein/creatinine    Yearly starting 6/20/2021    PRA/DSA        If you have any questions please call the Transplant Center at [phone] option 5. All lab results should be faxed to [phone]        Dr. Mahesh Armando

## 2021-06-10 NOTE — TELEPHONE ENCOUNTER
Mahesh Armando MD Huepfel, Mulu BAI RN             Lets try to inch down the tac to close to 5 would drop her by 0.5 mg so if she is on 5 mg bid would go to 4.5 mg po bid (example, not sure if this is what she is currently taking)     One year post kidney transplant  On 6/19/2021    Issue post transplant   Low level BK PCR  And compliance with lab follow up         ISSUE:  Tacrolimus  6.6      PLAN:   Please call patient and confirm this was an accurate 12-hour trough. Verify Tacrolimus IR dose 5 mg BID. Confirm no new medications or illness. Confirm no missed doses. If accurate trough and accurate dose, decrease Tacrolimus IR dose to 4.5 mg BID and repeat labs in  7 days post  Days      Task 2 update orders  - please send copy to   Jelly Dietz  And update Epic orders   Please review how to make lab appointments  At [facility]

## 2021-06-24 ENCOUNTER — TELEPHONE (OUTPATIENT)
Dept: PHARMACY | Facility: CLINIC | Age: 36
End: 2021-06-24

## 2021-06-24 NOTE — TELEPHONE ENCOUNTER
Clinical Pharmacy Consult:                                                      Transplant Specific: 12 Month Post Transplant Medication Call  Date of Transplant: 6/19/20  Type of Transplant: kidney  First Transplant: no, #2  History of rejection: yes    Immunosuppression Regimen   TAC 4.5mg qAM & 4.5mg qPM, Prednisone 5mg qAM and Myforitic 540mg qAM & 360mg qPM  Patient specific goal: 6-8  Most recent level: 6.6, date 6/3/21  Immunosuppressant Levels:  therapeutic  Pt adherent to lab draws: No  Scr:   Lab Results   Component Value Date    CR 0.99 07/24/2020     Side effects: none    Prophylactic Medications  Antibacterial:  Bactrim SS daily  Scheduled Discontinue Date: Lifelong    Antifungal: Not needed thus far  Scheduled Discontinue Date: N/A    Antiviral: CrCl 40 to 59 mL/minute: Valcyte 450 mg once daily   Scheduled Discontinue Date: 3 months-Therapy completed    Acid Reducer: Protonix (pantoprazole)  Scheduled Reviewed Date: Therapy completed 12/21/20    Thrombosis Prevention: Aspirin 81 mg PO daily  Scheduled Discontinue Date: MD to determine    Blood Pressure Management  Frequency of home Blood Pressure checks: 1-2 times a week  Most recent home BP: Unknown  Patient Blood pressure goal: <140/90  Patient blood pressure at goal:  yes  Hospitalizations/ER visits since last assessment: 1     Med rec/DUR performed: Yes  Med Rec Discrepancies: No    Jelly reports feeling good and has no side effects.  She knew her meds well and uses a pill box her med list and alarms on her phone to manage her meds.    Fox Mendoza [facility]  Specialty Pharmacist [phone]

## 2021-07-13 NOTE — PROGRESS NOTES
Transplant Surgery  Inpatient Daily Progress Note  2020    Assessment & Plan:   Jelly Dietz is a 35 year old female with a past medical history significant for end stage kidney disease secondary to IgA nephropathy, s/p previous kidney transplant (, failed d/t noncompliance during pregnancy). She is s/p  donor kidney transplant with stent on 20.    Graft function: POD #4. Cr 0.7. US today negative for hematoma.  Immunosuppression management:   Induction: High risk. Thymo 375mg, 5.7mg/kg.  Solu-Medrol/pred: 500mg/250mg/100mg   MMF: 1000 mg BID. REMS education today.  Tacro: Goal level 8-10. Increase today.  Complexity of management: Medium. Contributing factors: induction   Neuro:   Acute surgical pain: Scheduled tylenol. PRN oxycodone. Lidoderm patches LLQ.  Hematology:   Anemia: Hgb 6.8, no sign of bleeding. Transfuse 1u RBC today. Last transfused on .   Vascular ppx: ASA 81mg daily.  Cardiorespiratory: Stable. -150s. HR 70-100s. Continue to hold home anti-hypertensives. Patient to be OOB to chair, ambulate, and frequent IS 10x/hr.   GI/Nutrition: Regular diet.   Constipation: Improving. Senna BID.    Endocrine:   Mild steroid hyperglycemia: Resolved.   Fluid/Electrolytes:   Hypophosphatemia: Phos 1.7. Increase Phospha to QID.  : Bauman to remain due to new surgical anastomosis for 7 days  Bladder spasms: On oxybutynin and B&O.  Vaginal itch: Add miconazole cream.  Infectious disease: Tmax 99.1F, WBC 3.2, sweats and chills overnight  R/o infection: Check UA today.  Prophylaxis: DVT (PCDs), viral (Valcyte), PJP (Bactrim).  Disposition: 7A     Medical Decision Making: Medium    CONNIE/Fellow/Resident Provider: Stephanie Ling NP [id number]    Faculty: Gino Altman MD   _________________________________________________________________  Transplant History:   2020 (Kidney), 2007 (Kidney), Postoperative day: 4     Interval History: History is obtained from the patient  Body aches,  eMERGENCY dEPARTMENT eNCOUnter        [facility]    Chief Complaint   Patient presents with    Back Pain       HPI    Rebecca Dawson is a 23 y.o. male who presents after a fall. Patient states he was pulling on his front screen door of the house to open it and the entire framing came off, falling on him and causing him to fall backwards across a bannister. He states once his friends pulled the door off of him, he fell to the ground, flat on his back. He reports hitting his head and believes he had + LOC for a minute. He is complaining of diffuse back pain. Aching, severity 9/10, worse with movement. He states he had some numbness/tingling in his left arm and bilateral LE when the fall first occurred but sensation has returned to normal.  Denies any bowel/bladder dysfunction. Denies abrasions, lacerations. Denies any other injury. Asking what time he can get out of here. REVIEW OF SYSTEMS    See HPI for further details. Review of systems otherwise negative. Constitutional:  Denies fever. Neck:  Denies neck pain. Respiratory:  Denies any shortness of breath. Cardiovascular:  Denies chest pain. GI:  Denies bowel dysfunction. :  Denies urinary incontinence. Musculoskeletal:  Denies edema, tenderness. Back:  + back pain  Integument:  Denies skin changes. Neurologic:  + numbness or tingling. Denies saddle anesthesia. ? LOC.     PAST MEDICAL HISTORY    Past Medical History:   Diagnosis Date    ADD (attention deficit disorder)     ADHD (attention deficit hyperactivity disorder)     Asthma     Bipolar 1 disorder (HCC)     Murmur     Staph aureus infection        SURGICAL HISTORY    Past Surgical History:   Procedure Laterality Date    HAND SURGERY Right        CURRENT MEDICATIONS    Current Outpatient Rx   Medication Sig Dispense Refill    Pseudoephedrine-DM-GG 60- MG TABS Take 1 tablet by mouth every 6 hours 20 tablet 0    Benzocaine-Menthol (CEPACOL) 15-2.3 MG LOZG "sweating, and mild chills overnight. Urethral irritation, bladder spasms, and vaginal itch. Anxiety. + menses    ROS:   A 10-point review of systems was negative except as noted above.    Meds:    acetaminophen  975 mg Oral Q8H     aspirin  81 mg Oral Daily     atorvastatin  10 mg Oral Daily     carvedilol  3.125 mg Oral BID     lidocaine  3 patch Transdermal Q24h    And     lidocaine   Transdermal Q8H     magnesium oxide  400 mg Oral Daily with lunch     menthol   Transdermal Q8H     miconazole   Topical BID     mycophenolate  1,000 mg Oral BID IS     oxybutynin  5 mg Oral TID     phosphorus tablet 250 mg  500 mg Oral 4x Daily     polyethylene glycol  17 g Oral Daily     senna-docusate  1 tablet Oral BID    Or     senna-docusate  2 tablet Oral BID     sodium chloride (PF)  10 mL Intracatheter Q8H     sulfamethoxazole-trimethoprim  1 tablet Oral Daily     tacrolimus  4.5 mg Oral BID IS     valGANciclovir  900 mg Oral Daily     cholecalciferol  50 mcg Oral Daily       Physical Exam:     Admit Weight: 62.1 kg (136 lb 14.5 oz)    Current vitals:   BP (!) 147/108   Pulse 74   Temp 98.5  F (36.9  C)   Resp 16   Ht 1.676 m (5' 6\")   Wt 65.9 kg (145 lb 4.8 oz)   SpO2 99%   BMI 23.45 kg/m      Vital sign ranges:    Temp:  [98.4  F (36.9  C)-99.6  F (37.6  C)] 98.5  F (36.9  C)  Pulse:  [74] 74  Heart Rate:  [] 80  Resp:  [16-18] 16  BP: (125-149)/() 147/108  SpO2:  [97 %-100 %] 99 %    General Appearance: in no apparent distress.   Skin: Warm, perfused  Heart: Perfused  Lungs: non-labored breathing on room air  Abdomen: The abdomen is soft, non-tender, mildly distended  : polanco is present.  Urine is clear.  Extremities: edema: none  Neurologic: awake, alert and oriented x4. Tremor absent..     Data:   CMP  Recent Labs   Lab 06/23/20  0402 06/22/20  0523  06/19/20  1622 06/19/20  1134 06/19/20  0411    140   < > 138 141 138   POTASSIUM 3.6 3.9   < > 3.9 4.0 3.8   CHLORIDE 110* 112*   < >  --   --  " Take 1 lozenge by mouth every 3 hours as needed (pain) 30 lozenge 0    butalbital-acetaminophen-caffeine (FIORICET, ESGIC) -40 MG per tablet Take 1 tablet by mouth every 4 hours as needed for Headaches 20 tablet 0    hydrOXYzine (VISTARIL) 25 MG capsule Take 1 capsule by mouth 3 times daily as needed for Anxiety 20 capsule 0       ALLERGIES    No Known Allergies    FAMILY HISTORY    No family history on file. SOCIAL HISTORY    Social History     Socioeconomic History    Marital status: Single     Spouse name: Not on file    Number of children: Not on file    Years of education: Not on file    Highest education level: Not on file   Occupational History    Not on file   Tobacco Use    Smoking status: Never Smoker    Smokeless tobacco: Never Used   Vaping Use    Vaping Use: Never used   Substance and Sexual Activity    Alcohol use: Yes     Comment: had 2 beers today, doesnt usually drink    Drug use: Yes     Types: Marijuana     Comment: daily    Sexual activity: Not on file   Other Topics Concern    Not on file   Social History Narrative    Not on file     Social Determinants of Health     Financial Resource Strain:     Difficulty of Paying Living Expenses:    Food Insecurity:     Worried About Running Out of Food in the Last Year:     Ran Out of Food in the Last Year:    Transportation Needs:     Lack of Transportation (Medical):      Lack of Transportation (Non-Medical):    Physical Activity:     Days of Exercise per Week:     Minutes of Exercise per Session:    Stress:     Feeling of Stress :    Social Connections:     Frequency of Communication with Friends and Family:     Frequency of Social Gatherings with Friends and Family:     Attends [employer] Services:     Active Member of Clubs or Organizations:     Attends Club or Organization Meetings:     Marital Status:    Intimate Partner Violence:     Fear of Current or Ex-Partner:     Emotionally Abused:     Physically Abused: 101   CO2 24 23   < >  --   --  25   * 138*   < > 137* 85 90   BUN 9 14   < >  --   --  50*   CR 0.68 0.70   < >  --   --  10.10*   GFRESTIMATED >90 >90   < >  --   --  5*   GFRESTBLACK >90 >90   < >  --   --  5*   CASIE 8.1* 8.5   < >  --   --  7.5*   ICAW  --   --   --  3.7* 3.7*  --    MAG 1.7 1.9   < >  --   --   --    PHOS 1.7* 1.0*   < >  --   --   --    ALBUMIN  --   --   --   --   --  3.4   BILITOTAL  --   --   --   --   --  0.4   ALKPHOS  --   --   --   --   --  54   AST  --   --   --   --   --  11   ALT  --   --   --   --   --  13    < > = values in this interval not displayed.     CBC  Recent Labs   Lab 06/23/20  0525 06/23/20  0402 06/22/20  0523  06/19/20  0411   HGB 6.8* 6.6* 7.1*   < > 8.8*   WBC  --  3.2* 2.4*   < > 3.0*   PLT  --  139* 123*   < > 156   A1C  --   --   --   --  5.1    < > = values in this interval not displayed.       Sexually Abused:        PHYSICAL EXAM    VITAL SIGNS: BP (!) 147/85   Pulse 75   Temp 98.1 °F (36.7 °C) (Oral)   Resp 15   SpO2 96%    Constitutional:  Well developed, well nourished, no acute distress, non-toxic appearance   HENT:  NC/AT. Ears, nose, mouth normal.  Neck:  Supple, no cervical spinal tenderness. Full ROM without difficulty. Respiratory:  Normal respiratory effort. Musculoskeletal:  No edema, no tenderness, no deformities. Back:  Tenderness to palpation over the entire back. No overlying erythema, rashes, masses. Straight leg test negative. Lower extremity strength 5/5 bilaterally. Sensation intact to light touch. DTRs intact. DP symmetric. Integument:  Well hydrated. Neurologic:  Alert and oriented. No focal deficits. RADIOLOGY/PROCEDURES    XR CHEST (2 VW)    Result Date: 7/12/2021  EXAMINATION: TWO XRAY VIEWS OF THE CHEST 7/12/2021 10:55 am COMPARISON: 09/14/2020 HISTORY: ORDERING SYSTEM PROVIDED HISTORY: cough TECHNOLOGIST PROVIDED HISTORY: Reason for exam:->cough Reason for Exam: cough FINDINGS: Cardiac and mediastinal silhouettes appear within normal limits for size. Pulmonary vascularity is normal.  No parenchymal consolidation or pleural effusion is identified. No pneumothorax. No acute osseous abnormality is identified. Clear chest without acute cardiopulmonary process. CT Head WO Contrast    Result Date: 7/13/2021  EXAMINATION: CT OF THE HEAD WITHOUT CONTRAST; CT OF THE CERVICAL SPINE WITHOUT CONTRAST 7/13/2021 8:03 pm TECHNIQUE: CT of the head was performed without the administration of intravenous contrast. Dose modulation, iterative reconstruction, and/or weight based adjustment of the mA/kV was utilized to reduce the radiation dose to as low as reasonably achievable.; CT of the cervical spine was performed without the administration of intravenous contrast. Multiplanar reformatted images are provided for review.  Dose modulation, iterative reconstruction, and/or weight based adjustment of the mA/kV was utilized to reduce the radiation dose to as low as reasonably achievable. COMPARISON: None. HISTORY: ORDERING SYSTEM PROVIDED HISTORY: fall, hit head, pain TECHNOLOGIST PROVIDED HISTORY: Reason for exam:->fall, hit head, ? LOC Has a \"code stroke\" or \"stroke alert\" been called? ->No Decision Support Exception - unselect if not a suspected or confirmed emergency medical condition->Emergency Medical Condition (MA) Reason for Exam: fall,hit head, LOC? Acuity: Acute Type of Exam: Initial Mechanism of Injury: pt fell backwards down a flight of stairs with a door and door frame on top of him FINDINGS: Head: BRAIN/VENTRICLES: There is no acute intracranial hemorrhage, mass effect or midline shift. No abnormal extra-axial fluid collection. The gray-white differentiation is maintained without evidence of an acute infarct. There is no evidence of hydrocephalus. ORBITS: The visualized portion of the orbits demonstrate no acute abnormality. SINUSES: The visualized paranasal sinuses and mastoid air cells demonstrate no acute abnormality. SOFT TISSUES/SKULL:  No acute abnormality of the visualized skull or soft tissues. Cervical spine: BONES/ALIGNMENT: There is no acute fracture or traumatic malalignment. Slight reversal of normal cervical lordosis is noted. DEGENERATIVE CHANGES: No significant degenerative changes. SOFT TISSUES: There is no prevertebral soft tissue swelling. No apical pneumothorax. No acute intracranial abnormality. No acute fracture or subluxation in the cervical spine. Slight reversal of normal cervical lordosis. MRI may be obtained if clinical suspicion for paravertebral soft tissue injury is high.      CT CERVICAL SPINE WO CONTRAST    Result Date: 7/13/2021  EXAMINATION: CT OF THE HEAD WITHOUT CONTRAST; CT OF THE CERVICAL SPINE WITHOUT CONTRAST 7/13/2021 8:03 pm TECHNIQUE: CT of the head was performed without the administration of intravenous contrast. Dose modulation, iterative reconstruction, and/or weight based adjustment of the mA/kV was utilized to reduce the radiation dose to as low as reasonably achievable.; CT of the cervical spine was performed without the administration of intravenous contrast. Multiplanar reformatted images are provided for review. Dose modulation, iterative reconstruction, and/or weight based adjustment of the mA/kV was utilized to reduce the radiation dose to as low as reasonably achievable. COMPARISON: None. HISTORY: ORDERING SYSTEM PROVIDED HISTORY: fall, hit head, pain TECHNOLOGIST PROVIDED HISTORY: Reason for exam:->fall, hit head, ? LOC Has a \"code stroke\" or \"stroke alert\" been called? ->No Decision Support Exception - unselect if not a suspected or confirmed emergency medical condition->Emergency Medical Condition (MA) Reason for Exam: fall,hit head, LOC? Acuity: Acute Type of Exam: Initial Mechanism of Injury: pt fell backwards down a flight of stairs with a door and door frame on top of him FINDINGS: Head: BRAIN/VENTRICLES: There is no acute intracranial hemorrhage, mass effect or midline shift. No abnormal extra-axial fluid collection. The gray-white differentiation is maintained without evidence of an acute infarct. There is no evidence of hydrocephalus. ORBITS: The visualized portion of the orbits demonstrate no acute abnormality. SINUSES: The visualized paranasal sinuses and mastoid air cells demonstrate no acute abnormality. SOFT TISSUES/SKULL:  No acute abnormality of the visualized skull or soft tissues. Cervical spine: BONES/ALIGNMENT: There is no acute fracture or traumatic malalignment. Slight reversal of normal cervical lordosis is noted. DEGENERATIVE CHANGES: No significant degenerative changes. SOFT TISSUES: There is no prevertebral soft tissue swelling. No apical pneumothorax. No acute intracranial abnormality. No acute fracture or subluxation in the cervical spine.  Slight reversal HISTORY: Reason for exam:->fall back pain Decision Support Exception - unselect if not a suspected or confirmed emergency medical condition->Emergency Medical Condition (MA) Reason for Exam: fall,back pain Acuity: Acute Type of Exam: Initial Mechanism of Injury: pt fell backwards down a flight of stairs with a door and door frame on top of him FINDINGS: BONES/ALIGNMENT: There is normal alignment of the spine. The vertebral body heights are maintained. No osseous destructive lesion is seen. DEGENERATIVE CHANGES: No significant degenerative changes of the lumbar spine. SOFT TISSUES/RETROPERITONEUM: No paraspinal mass is seen. No acute fracture or subluxation in the lumbar spine. ED COURSE & MEDICAL DECISION MAKING    Pertinent Labs & Imaging studies reviewed. (See chart for details)  -  Patient seen and evaluated in the emergency department. -  Triage and nursing notes reviewed and incorporated. -  Old chart records reviewed and incorporated. -  Supervising physician was Dr. Blake Woods. Patient was seen independently. -  Differential diagnosis includes: DDD, spinal stenosis, herniated disc, cauda equina, AAA, lumbar strain, discitis, epidural abscess, and others  -  Work-up included:  See above  -  Patient treated with Toradol, Norflex in the ED.  -  Results discussed with patient. Patient was up walking around the ED when I went to re-examine him. He has refused to wear c-collar. We discussed no acute findings on imaging. Rx:  Ibuprofen, Flexeril, Lidoderm patches. Instructed on use of ice, heat, stretches. FU with PCP in 1-2 days. Return to the ED for new/worsening symptoms as needed. Patient agreeable with plan of care and disposition.  -  Patient dc home.       In light of current events, I did utilize appropriate PPE (including N95 and surgical face mask, safety glasses, and gloves, as recommended by the health facility/national standard best practice, during my bedside interactions with the patient. FINAL IMPRESSION    1. Fall, initial encounter    2.  Acute back pain, unspecified back location, unspecified back pain laterality              Gris Bone PA-C  07/13/21 0724

## 2021-07-17 ENCOUNTER — TELEPHONE (OUTPATIENT)
Dept: TRANSPLANT | Facility: CLINIC | Age: 36
End: 2021-07-17

## 2021-07-17 DIAGNOSIS — Z94.0 KIDNEY TRANSPLANTED: ICD-10-CM

## 2021-07-17 DIAGNOSIS — Z48.298 AFTERCARE FOLLOWING ORGAN TRANSPLANT: ICD-10-CM

## 2021-07-17 DIAGNOSIS — Z79.899 ENCOUNTER FOR LONG-TERM CURRENT USE OF MEDICATION: ICD-10-CM

## 2021-07-17 DIAGNOSIS — Z94.0 KIDNEY REPLACED BY TRANSPLANT: ICD-10-CM

## 2021-07-17 RX ORDER — TACROLIMUS 1 MG/1
4 CAPSULE ORAL 2 TIMES DAILY
Qty: 16 CAPSULE | Refills: 0 | Status: SHIPPED | OUTPATIENT
Start: 2021-07-17 | End: 2021-07-19

## 2021-07-17 RX ORDER — PREDNISONE 5 MG/1
5 TABLET ORAL DAILY
Qty: 1 TABLET | Refills: 0 | Status: SHIPPED | OUTPATIENT
Start: 2021-07-17 | End: 2021-07-19

## 2021-07-17 NOTE — TELEPHONE ENCOUNTER
Sarah paged that [facility] did not deliver tacro and prednisone. She needs to  at local [facility]. Good rx coupon sent to sarah for tacro. She would only like 3 doses. Sarah to return call with any issues or concerns.

## 2021-07-19 DIAGNOSIS — Z48.298 AFTERCARE FOLLOWING ORGAN TRANSPLANT: ICD-10-CM

## 2021-07-19 RX ORDER — MAGNESIUM OXIDE 400 MG/1
TABLET ORAL
Qty: 60 TABLET | Refills: 3 | Status: SHIPPED | OUTPATIENT
Start: 2021-07-19 | End: 2021-12-20

## 2021-07-21 DIAGNOSIS — Z94.0 KIDNEY TRANSPLANTED: ICD-10-CM

## 2021-07-21 RX ORDER — ATORVASTATIN CALCIUM 10 MG/1
10 TABLET, FILM COATED ORAL DAILY
Qty: 30 TABLET | Refills: 5 | Status: SHIPPED | OUTPATIENT
Start: 2021-07-21 | End: 2022-02-07

## 2021-07-21 RX ORDER — ASPIRIN 81 MG/1
TABLET, CHEWABLE ORAL
Qty: 30 TABLET | Refills: 5 | Status: SHIPPED | OUTPATIENT
Start: 2021-07-21 | End: 2022-02-07

## 2021-07-27 ENCOUNTER — RESULTS ONLY (OUTPATIENT)
Dept: LAB | Facility: CLINIC | Age: 36
End: 2021-07-27

## 2021-07-27 ENCOUNTER — LAB (OUTPATIENT)
Dept: LAB | Facility: CLINIC | Age: 36
End: 2021-07-27
Payer: MEDICARE

## 2021-07-27 ENCOUNTER — TELEPHONE (OUTPATIENT)
Dept: TRANSPLANT | Facility: CLINIC | Age: 36
End: 2021-07-27

## 2021-07-27 DIAGNOSIS — Z79.899 LONG TERM USE OF DRUG: ICD-10-CM

## 2021-07-27 DIAGNOSIS — Z79.899 ENCOUNTER FOR LONG-TERM CURRENT USE OF MEDICATION: ICD-10-CM

## 2021-07-27 DIAGNOSIS — Z94.0 KIDNEY REPLACED BY TRANSPLANT: ICD-10-CM

## 2021-07-27 DIAGNOSIS — Z48.298 AFTERCARE FOLLOWING ORGAN TRANSPLANT: ICD-10-CM

## 2021-07-27 LAB
ALBUMIN SERPL-MCNC: 3.7 G/DL (ref 3.4–5)
ALP SERPL-CCNC: 95 U/L (ref 40–150)
ALT SERPL W P-5'-P-CCNC: 21 U/L (ref 0–50)
ANION GAP SERPL CALCULATED.3IONS-SCNC: 5 MMOL/L (ref 3–14)
AST SERPL W P-5'-P-CCNC: 14 U/L (ref 0–45)
BILIRUB DIRECT SERPL-MCNC: 0.1 MG/DL (ref 0–0.2)
BILIRUB SERPL-MCNC: 0.5 MG/DL (ref 0.2–1.3)
BUN SERPL-MCNC: 11 MG/DL (ref 7–30)
CALCIUM SERPL-MCNC: 9.5 MG/DL (ref 8.5–10.1)
CHLORIDE BLD-SCNC: 106 MMOL/L (ref 94–109)
CHOLEST SERPL-MCNC: 143 MG/DL
CO2 SERPL-SCNC: 27 MMOL/L (ref 20–32)
CREAT SERPL-MCNC: 0.75 MG/DL (ref 0.52–1.04)
CREAT UR-MCNC: 215 MG/DL
ERYTHROCYTE [DISTWIDTH] IN BLOOD BY AUTOMATED COUNT: 13.2 % (ref 10–15)
FASTING STATUS PATIENT QL REPORTED: NORMAL
GFR SERPL CREATININE-BSD FRML MDRD: >90 ML/MIN/1.73M2
GLUCOSE BLD-MCNC: 110 MG/DL (ref 70–99)
HBA1C MFR BLD: 5.6 % (ref 0–5.6)
HCT VFR BLD AUTO: 34.3 % (ref 35–47)
HDLC SERPL-MCNC: 74 MG/DL
HGB BLD-MCNC: 11.2 G/DL (ref 11.7–15.7)
LDLC SERPL CALC-MCNC: 48 MG/DL
MCH RBC QN AUTO: 30.9 PG (ref 26.5–33)
MCHC RBC AUTO-ENTMCNC: 32.7 G/DL (ref 31.5–36.5)
MCV RBC AUTO: 95 FL (ref 78–100)
NONHDLC SERPL-MCNC: 69 MG/DL
PLATELET # BLD AUTO: 289 10E3/UL (ref 150–450)
POTASSIUM BLD-SCNC: 3.6 MMOL/L (ref 3.4–5.3)
PROT SERPL-MCNC: 7.1 G/DL (ref 6.8–8.8)
PROT UR-MCNC: 0.17 G/L
PROT/CREAT 24H UR: 0.08 G/G CR (ref 0–0.2)
PTH-INTACT SERPL-MCNC: 60 PG/ML (ref 18–80)
RBC # BLD AUTO: 3.63 10E6/UL (ref 3.8–5.2)
SODIUM SERPL-SCNC: 138 MMOL/L (ref 133–144)
TACROLIMUS BLD-MCNC: 5.6 UG/L (ref 5–15)
TME LAST DOSE: NORMAL H
TME LAST DOSE: NORMAL H
TRIGL SERPL-MCNC: 107 MG/DL
URATE SERPL-MCNC: 3.2 MG/DL (ref 2.6–6)
WBC # BLD AUTO: 5.3 10E3/UL (ref 4–11)

## 2021-07-27 PROCEDURE — 80053 COMPREHEN METABOLIC PANEL: CPT | Performed by: PATHOLOGY

## 2021-07-27 PROCEDURE — 84550 ASSAY OF BLOOD/URIC ACID: CPT | Performed by: PATHOLOGY

## 2021-07-27 PROCEDURE — 86832 HLA CLASS I HIGH DEFIN QUAL: CPT | Performed by: INTERNAL MEDICINE

## 2021-07-27 PROCEDURE — 83970 ASSAY OF PARATHORMONE: CPT | Performed by: INTERNAL MEDICINE

## 2021-07-27 PROCEDURE — 84156 ASSAY OF PROTEIN URINE: CPT | Performed by: PATHOLOGY

## 2021-07-27 PROCEDURE — 36415 COLL VENOUS BLD VENIPUNCTURE: CPT | Performed by: PATHOLOGY

## 2021-07-27 PROCEDURE — 87799 DETECT AGENT NOS DNA QUANT: CPT | Performed by: INTERNAL MEDICINE

## 2021-07-27 PROCEDURE — 83036 HEMOGLOBIN GLYCOSYLATED A1C: CPT | Performed by: PATHOLOGY

## 2021-07-27 PROCEDURE — 85027 COMPLETE CBC AUTOMATED: CPT | Performed by: PATHOLOGY

## 2021-07-27 PROCEDURE — 80197 ASSAY OF TACROLIMUS: CPT | Performed by: INTERNAL MEDICINE

## 2021-07-27 PROCEDURE — 86833 HLA CLASS II HIGH DEFIN QUAL: CPT | Performed by: INTERNAL MEDICINE

## 2021-07-27 PROCEDURE — 82248 BILIRUBIN DIRECT: CPT | Performed by: PATHOLOGY

## 2021-07-27 PROCEDURE — 82306 VITAMIN D 25 HYDROXY: CPT | Performed by: INTERNAL MEDICINE

## 2021-07-27 PROCEDURE — 80061 LIPID PANEL: CPT | Performed by: PATHOLOGY

## 2021-07-28 LAB
BK VIRUS DNA COPIES/ML, INSTRUMENT: 2924 COPIES/ML
BKV DNA SPEC NAA+PROBE-LOG#: 3.5 {LOG_COPIES}/ML
DEPRECATED CALCIDIOL+CALCIFEROL SERPL-MC: 27 UG/L (ref 20–75)

## 2021-07-29 LAB
DONOR IDENTIFICATION: NORMAL
DSA COMMENTS: NORMAL
DSA PRESENT: NO
DSA TEST METHOD: NORMAL
HISTOTRAC: YES
ORGAN: NORMAL
SA 1 CELL: NORMAL
SA 1 TEST METHOD: NORMAL
SA 2 CELL: NORMAL
SA 2 TEST METHOD: NORMAL
SA1 HI RISK ABY: NORMAL
SA1 MOD RISK ABY: NORMAL
SA2 HI RISK ABY: NORMAL
SA2 MOD RISK ABY: NORMAL
UNACCEPTABLE ANTIGENS: NORMAL
UNOS CPRA: 95
ZZZSA 1  COMMENTS: NORMAL
ZZZSA 2 COMMENTS: NORMAL

## 2021-08-05 ENCOUNTER — LAB (OUTPATIENT)
Dept: LAB | Facility: CLINIC | Age: 36
End: 2021-08-05
Payer: MEDICARE

## 2021-08-05 DIAGNOSIS — Z48.298 AFTERCARE FOLLOWING ORGAN TRANSPLANT: ICD-10-CM

## 2021-08-05 DIAGNOSIS — Z94.0 KIDNEY TRANSPLANTED: ICD-10-CM

## 2021-08-05 DIAGNOSIS — Z94.0 KIDNEY REPLACED BY TRANSPLANT: ICD-10-CM

## 2021-08-05 DIAGNOSIS — Z79.899 ENCOUNTER FOR LONG-TERM CURRENT USE OF MEDICATION: ICD-10-CM

## 2021-08-05 PROCEDURE — 36415 COLL VENOUS BLD VENIPUNCTURE: CPT | Performed by: PATHOLOGY

## 2021-08-05 PROCEDURE — 86832 HLA CLASS I HIGH DEFIN QUAL: CPT | Performed by: INTERNAL MEDICINE

## 2021-08-05 PROCEDURE — 86833 HLA CLASS II HIGH DEFIN QUAL: CPT | Performed by: INTERNAL MEDICINE

## 2021-08-05 PROCEDURE — 80180 DRUG SCRN QUAN MYCOPHENOLATE: CPT | Performed by: INTERNAL MEDICINE

## 2021-08-06 LAB
MYCOPHENOLATE SERPL LC/MS/MS-MCNC: 1.54 MG/L (ref 1–3.5)
MYCOPHENOLATE-G SERPL LC/MS/MS-MCNC: 38.6 MG/L (ref 30–95)
TME LAST DOSE: NORMAL H
TME LAST DOSE: NORMAL H

## 2021-08-11 LAB
DONOR IDENTIFICATION: NORMAL
DSA COMMENTS: NORMAL
DSA PRESENT: NO
DSA TEST METHOD: NORMAL
ORGAN: NORMAL
SA 1 CELL: NORMAL
SA 1 TEST METHOD: NORMAL
SA 2 CELL: NORMAL
SA 2 TEST METHOD: NORMAL
SA1 HI RISK ABY: NORMAL
SA1 MOD RISK ABY: NORMAL
SA2 HI RISK ABY: NORMAL
SA2 MOD RISK ABY: NORMAL
UNACCEPTABLE ANTIGENS: NORMAL
UNOS CPRA: 95
ZZZSA 1  COMMENTS: NORMAL
ZZZSA 2 COMMENTS: NORMAL

## 2021-08-20 ENCOUNTER — TELEPHONE (OUTPATIENT)
Dept: TRANSPLANT | Facility: CLINIC | Age: 36
End: 2021-08-20

## 2021-08-20 DIAGNOSIS — Z94.0 KIDNEY TRANSPLANTED: Primary | ICD-10-CM

## 2021-08-20 RX ORDER — PREDNISONE 5 MG/1
5 TABLET ORAL DAILY
Qty: 90 TABLET | Refills: 3 | Status: SHIPPED | OUTPATIENT
Start: 2021-08-20 | End: 2021-09-27

## 2021-08-20 NOTE — TELEPHONE ENCOUNTER
Patient Call: Medication Refill  Is asking for Prednisone refill (I do not see Prednisone on her medication list )  [facility] [address] [facility]   Prednisone  5mg   When will the patient be out of this medication?: Less than 3 days (Route high priority)    Please call Jelly # [phone]

## 2021-09-03 DIAGNOSIS — N17.9 ACUTE RENAL FAILURE, UNSPECIFIED ACUTE RENAL FAILURE TYPE (H): ICD-10-CM

## 2021-09-03 RX ORDER — SULFAMETHOXAZOLE AND TRIMETHOPRIM 400; 80 MG/1; MG/1
1 TABLET ORAL DAILY
Qty: 30 TABLET | Refills: 11 | Status: SHIPPED | OUTPATIENT
Start: 2021-09-03 | End: 2021-09-27

## 2021-09-18 ENCOUNTER — TELEPHONE (OUTPATIENT)
Dept: TRANSPLANT | Facility: CLINIC | Age: 36
End: 2021-09-18

## 2021-09-18 DIAGNOSIS — Z94.0 KIDNEY REPLACED BY TRANSPLANT: ICD-10-CM

## 2021-09-18 DIAGNOSIS — Z79.60 LONG-TERM USE OF IMMUNOSUPPRESSANT MEDICATION: ICD-10-CM

## 2021-09-18 DIAGNOSIS — Z48.298 AFTERCARE FOLLOWING ORGAN TRANSPLANT: ICD-10-CM

## 2021-09-18 RX ORDER — MYCOPHENOLIC ACID 180 MG/1
TABLET, DELAYED RELEASE ORAL
Qty: 150 TABLET | Refills: 4 | Status: SHIPPED | OUTPATIENT
Start: 2021-09-18 | End: 2021-09-27

## 2021-09-19 ENCOUNTER — HEALTH MAINTENANCE LETTER (OUTPATIENT)
Age: 36
End: 2021-09-19

## 2021-09-19 NOTE — TELEPHONE ENCOUNTER
Jelly reports that she refilled all 3 IS meds when prompted by [facility]. She went to the store 9/18/2021 evening to fill.  When she returned home to set up her pill box, only the tacrolimus and prednisone were in the bag.    RNCC confirmed dose.    The pharmacy where she fills was closed when RNCC as able to call at 7:40p. Their store staff was able to provide the phone number for the [address] [place] [address] pharmacy on [address]. [phone]    Rx routed to the [address] [place] [address]. Jelly notified and she stated she will proceed there to fill the Rx.

## 2021-09-21 ENCOUNTER — VIRTUAL VISIT (OUTPATIENT)
Dept: NEPHROLOGY | Facility: CLINIC | Age: 36
End: 2021-09-21
Attending: INTERNAL MEDICINE
Payer: MEDICARE

## 2021-09-21 DIAGNOSIS — Z48.298 AFTERCARE FOLLOWING ORGAN TRANSPLANT: Primary | ICD-10-CM

## 2021-09-21 PROCEDURE — 99214 OFFICE O/P EST MOD 30 MIN: CPT | Mod: 95 | Performed by: INTERNAL MEDICINE

## 2021-09-21 NOTE — PROGRESS NOTES
"Jelly is a 36 year old who is being evaluated via a billable video visit.      How would you like to obtain your AVS? MyChart  If the video visit is dropped, the invitation should be resent by: Send to e-mail at: [email or web address]  Will anyone else be joining your video visit? No  {If patient encounters technical issues they should call [phone] :[phone]}    Video Start Time: {video visit start/end time for provider to select:152948}  Video-Visit Details    Type of service:  Video Visit    Video End Time:{video visit start/end time for provider to select:152948}    Originating Location (pt. Location): {video visit patient location:451476::\"Home\"}    Distant Location (provider location):  [facility] [place]     Platform used for Video Visit: {Virtual Visit Platforms:085747::\"[place]\"}  "

## 2021-09-21 NOTE — LETTER
9/21/2021       RE: Jelly Dietz  [address]  [facility] [address]     Dear Colleague,    Thank you for referring your patient, Jelly Dietz, to the [facility] [place] at [facility]. Please see a copy of my visit note below.    Jelly is a 36 year old who is being evaluated via a billable video visit.      How would you like to obtain your AVS? MyChart  If the video visit is dropped, the invitation should be resent by: Text to cell phone: on file  Will anyone else be joining your video visit? No      Video Start Time: 245pm  Video-Visit Details    Type of service:  Video Visit    Video End Time:305 pm    Originating Location (pt. Location): Home    Distant Location (provider location):  [facility] [place]     Platform used for Video Visit: [employer]      TRANSPLANT NEPHROLOGY FOLLOW UP VISIT  Assessment & Plan   # DDKT: creatinine is stable      - Baseline Cr ~ 0.7-0.9 mg/dL    - Proteinuria: Not checked recently   - Date DSA Last Checked: 7/2020      Latest DSA: No, checked afterwards but it was not feasible since she was receiving IVIG at that time, will check DSA with next lab.   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 Repeat biopsy was not performed (patient preference). Re-biopsy threshold > 1.2 mg/dL     # Immunosuppression: Tacrolimus immediate release (goal ~6), Mycophenolic acid (dose 540 mg/360 every 12 hours) and Prednisone (5 mg)   - Changes: no    # BK viremia: stable load      # Infection Prophylaxis:   - PJP: Sulfa/TMP (Bactrim)  - CMV: Valcyte completed the course     # Hypertension: Controlled; BP average 110/80  Goal BP: < 140/90   - Volume status: euvolemic     - Changes: None     # Anemia in Chronic Renal Disease: Hgb: Stable      ENZO: no longer needed   - Iron studies: Replete    # Mineral Bone Disorder:   - Calcium;  level: Normal        On supplement: No  - Phosphorus; level: Low Normal       On supplement: on supplement     # Electrolytes:   - Potassium; level: low       On supplement: start supplement   - Magnesium; level: low        On supplement: continue supplement , discontinue PPI    # Medical Compliance: Yes    # COVID-19 Virus Review: Discussed COVID-19 virus and the potential medical risks.  Reviewed preventative health recommendations, which includes washing hands for 20 seconds, avoid touching your face, and social distancing.  Asked patient to inform the transplant center if they are exposed or diagnosed with this virus.    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    Return Visit: 6 months     Jelly Dietz has a clinical video visit for routine follow up and immunosuppression management.    History of Present Illness   Since she was seen last, she has been feeling well, she takes all her IS with no reported side effects. She noted no heartburn with PPI. She states her BP runs 110/70's range. No dizziness. She denies any dyspnea, fever or chills. Plans to get her 3rd covid shot.      Recent Hospitalizations:  [x] No [] Yes    New Medical Issues: [x] No [] Yes    Decreased energy: [x] No [] Yes    Chest pain or SOB with exertion:  [x] No [] Yes    Appetite change or  weight change: [x] No [] Yes    Nausea, vomiting or diarrhea:  [x] No [] Yes    Fever, sweats or chills: [x] No [] Yes    Leg swelling: [x] No [] Yes      Home BP: controlled     Review of Systems   A comprehensive review of systems was obtained and negative, except as noted in the HPI or PMH.    I have reviewed and updated the patient's Past Medical History, Social History, and Medication List.    Active Medical Problems  Patient Active Problem List   Diagnosis     CARDIOVASCULAR SCREENING; LDL GOAL LESS THAN 160     Kidney replaced by transplant     HTN, kidney transplant related     ACS (acute coronary syndrome) (H)     Anemia in chronic renal disease     IgA nephropathy     Anxiety     Kidney transplanted     Immunosuppression (H)     Painful bladder spasm     Hypophosphatemia     Constipation due to pain medication     Aftercare following organ transplant     Hypomagnesemia     Dehydration     Acute renal failure (ARF) (H)     Acute rejection of kidney transplant     Metabolic acidosis     Steroid-induced hyperglycemia     Nausea vomiting and diarrhea     Hypervolemia     Anemia     Allergies  Patient has no known allergies.    Physical Exam:  Deferred due to telemedicine     Medications  Current Outpatient Medications   Medication Sig     acetaminophen (TYLENOL) 325 MG tablet Take 2 tablets (650 mg) by mouth every 4 hours as needed for other (multimodal surgical pain management along with NSAIDS and opioid medication as indicated based on pain control and physical function.)     aspirin (ASA) 81 MG chewable tablet CHEW AND SWALLOW ONE TABLET BY MOUTH ONCE DAILY     atorvastatin (LIPITOR) 10 MG tablet Take 1 tablet (10 mg) by mouth daily     losartan (COZAAR) 25 MG tablet Take 0.5 tablets (12.5 mg) by mouth At Bedtime     magnesium oxide (MAG-OX) 400 MG tablet TAKE TWO TABLETS BY MOUTH TWICE A DAY     mycophenolic acid (GENERIC EQUIVALENT) 180 MG EC tablet Take 3 tablets (540 mg) by mouth every morning AND 2  tablets (360 mg) every evening.     pantoprazole (PROTONIX) 20 MG EC tablet Take 1 tablet (20 mg) by mouth daily     phosphorus tablet 250 mg (PHOSPHORUS TABLET 250 MG) 250 MG per tablet Take 2 tablets (500 mg) by mouth 2 times daily     predniSONE (DELTASONE) 5 MG tablet Take 1 tablet (5 mg) by mouth daily     sulfamethoxazole-trimethoprim (BACTRIM) 400-80 MG tablet Take 1 tablet by mouth daily     tacrolimus (GENERIC EQUIVALENT) 0.5 MG capsule Take 1 capsule (0.5 mg) by mouth 2 times daily Total dose = 4.5 mg BID     Vitamin D3 (CHOLECALCIFEROL) 25 mcg (1000 units) tablet Take 2 tablets (50 mcg) by mouth daily (Patient not taking: Reported on 3/22/2021)     No current facility-administered medications for this visit.       Mahesh Armando MD         Again, thank you for allowing me to participate in the care of your patient.      Sincerely,    Mahesh Armando MD

## 2021-09-21 NOTE — PROGRESS NOTES
Jelly is a 36 year old who is being evaluated via a billable video visit.      How would you like to obtain your AVS? MyChart  If the video visit is dropped, the invitation should be resent by: Text to cell phone: on file  Will anyone else be joining your video visit? No      Video Start Time: 245pm  Video-Visit Details    Type of service:  Video Visit    Video End Time:305 pm    Originating Location (pt. Location): Home    Distant Location (provider location):  [facility] [place]     Platform used for Video Visit: [employer]      TRANSPLANT NEPHROLOGY FOLLOW UP VISIT  Assessment & Plan   # DDKT: creatinine is stable      - Baseline Cr ~ 0.7-0.9 mg/dL    - Proteinuria: Not checked recently   - Date DSA Last Checked: 7/2020      Latest DSA: No, checked afterwards but it was not feasible since she was receiving IVIG at that time, will check DSA with next lab.   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 Repeat biopsy was not performed (patient preference). Re-biopsy threshold > 1.2 mg/dL     # Immunosuppression: Tacrolimus immediate release (goal ~6), Mycophenolic acid (dose 540 mg/360 every 12 hours) and Prednisone (5 mg)   - Changes: no    # BK viremia: stable load      # Infection Prophylaxis:   - PJP: Sulfa/TMP (Bactrim)  - CMV: Valcyte completed the course     # Hypertension: Controlled; BP average 110/80  Goal BP: < 140/90   - Volume status: euvolemic     - Changes: None     # Anemia in Chronic Renal Disease: Hgb: Stable      ENZO: no longer needed   - Iron studies: Replete    # Mineral Bone Disorder:   - Calcium; level: Normal        On supplement: No  - Phosphorus; level: Low Normal       On supplement: on supplement     # Electrolytes:   - Potassium; level: low       On supplement: start supplement   - Magnesium; level: low        On supplement: continue supplement , discontinue PPI    # Medical Compliance: Yes    # COVID-19  Virus Review: Discussed COVID-19 virus and the potential medical risks.  Reviewed preventative health recommendations, which includes washing hands for 20 seconds, avoid touching your face, and social distancing.  Asked patient to inform the transplant center if they are exposed or diagnosed with this virus.    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 Donor Class:   Crossmatch at time of Tx: negative  DSA at time of Tx: No  Significant changes in immunosuppression: steroid maintenance due to early mixed rejection   CMV IgG Ab High Risk Discordance (D+/R-): No  EBV IgG Ab High Risk Discordance (D+/R-): No  Significant transplant-related complications: early acute rejection possible Non-HLA with an element of memory response     Transplant Office Phone Number: [phone]    Assessment and plan was discussed with the patient and she voiced her understanding and agreement.    Return Visit: 6 months     Jelly Dietz has a clinical video visit for routine follow up and immunosuppression management.    History of Present Illness   Since she was seen last, she has been feeling well, she takes all her IS with no reported side effects. She noted no heartburn with PPI. She states her BP runs 110/70's range. No dizziness. She denies any dyspnea, fever or chills. Plans to get her 3rd covid shot.      Recent Hospitalizations:  [x] No [] Yes    New Medical Issues: [x] No [] Yes    Decreased energy: [x] No [] Yes    Chest pain or SOB with exertion:  [x] No [] Yes    Appetite change or weight change: [x] No [] Yes    Nausea, vomiting or diarrhea:  [x] No [] Yes    Fever, sweats or chills: [x] No [] Yes    Leg swelling: [x] No [] Yes      Home BP: controlled     Review of Systems   A comprehensive review of systems was obtained and negative, except as noted in the HPI or PMH.    I have reviewed and  updated the patient's Past Medical History, Social History, and Medication List.    Active Medical Problems  Patient Active Problem List   Diagnosis     CARDIOVASCULAR SCREENING; LDL GOAL LESS THAN 160     Kidney replaced by transplant     HTN, kidney transplant related     ACS (acute coronary syndrome) (H)     Anemia in chronic renal disease     IgA nephropathy     Anxiety     Kidney transplanted     Immunosuppression (H)     Painful bladder spasm     Hypophosphatemia     Constipation due to pain medication     Aftercare following organ transplant     Hypomagnesemia     Dehydration     Acute renal failure (ARF) (H)     Acute rejection of kidney transplant     Metabolic acidosis     Steroid-induced hyperglycemia     Nausea vomiting and diarrhea     Hypervolemia     Anemia     Allergies  Patient has no known allergies.    Physical Exam:  Deferred due to telemedicine     Medications  Current Outpatient Medications   Medication Sig     acetaminophen (TYLENOL) 325 MG tablet Take 2 tablets (650 mg) by mouth every 4 hours as needed for other (multimodal surgical pain management along with NSAIDS and opioid medication as indicated based on pain control and physical function.)     aspirin (ASA) 81 MG chewable tablet CHEW AND SWALLOW ONE TABLET BY MOUTH ONCE DAILY     atorvastatin (LIPITOR) 10 MG tablet Take 1 tablet (10 mg) by mouth daily     losartan (COZAAR) 25 MG tablet Take 0.5 tablets (12.5 mg) by mouth At Bedtime     magnesium oxide (MAG-OX) 400 MG tablet TAKE TWO TABLETS BY MOUTH TWICE A DAY     mycophenolic acid (GENERIC EQUIVALENT) 180 MG EC tablet Take 3 tablets (540 mg) by mouth every morning AND 2 tablets (360 mg) every evening.     pantoprazole (PROTONIX) 20 MG EC tablet Take 1 tablet (20 mg) by mouth daily     phosphorus tablet 250 mg (PHOSPHORUS TABLET 250 MG) 250 MG per tablet Take 2 tablets (500 mg) by mouth 2 times daily     predniSONE (DELTASONE) 5 MG tablet Take 1 tablet (5 mg) by mouth daily      sulfamethoxazole-trimethoprim (BACTRIM) 400-80 MG tablet Take 1 tablet by mouth daily     tacrolimus (GENERIC EQUIVALENT) 0.5 MG capsule Take 1 capsule (0.5 mg) by mouth 2 times daily Total dose = 4.5 mg BID     Vitamin D3 (CHOLECALCIFEROL) 25 mcg (1000 units) tablet Take 2 tablets (50 mcg) by mouth daily (Patient not taking: Reported on 3/22/2021)     No current facility-administered medications for this visit.       Mahesh Armando MD

## 2021-09-27 DIAGNOSIS — Z79.60 LONG-TERM USE OF IMMUNOSUPPRESSANT MEDICATION: ICD-10-CM

## 2021-09-27 DIAGNOSIS — Z48.298 AFTERCARE FOLLOWING ORGAN TRANSPLANT: ICD-10-CM

## 2021-09-27 DIAGNOSIS — Z94.0 KIDNEY REPLACED BY TRANSPLANT: ICD-10-CM

## 2021-09-27 DIAGNOSIS — Z79.899 ENCOUNTER FOR LONG-TERM CURRENT USE OF MEDICATION: ICD-10-CM

## 2021-09-27 DIAGNOSIS — Z94.0 KIDNEY REPLACED BY TRANSPLANT: Primary | ICD-10-CM

## 2021-09-27 DIAGNOSIS — Z94.0 KIDNEY TRANSPLANTED: Primary | ICD-10-CM

## 2021-09-27 DIAGNOSIS — N17.9 ACUTE RENAL FAILURE, UNSPECIFIED ACUTE RENAL FAILURE TYPE (H): ICD-10-CM

## 2021-09-27 RX ORDER — TACROLIMUS 0.5 MG/1
0.5 CAPSULE ORAL 2 TIMES DAILY
Qty: 60 CAPSULE | Refills: 0 | Status: SHIPPED | OUTPATIENT
Start: 2021-09-27 | End: 2022-03-11

## 2021-09-27 RX ORDER — SULFAMETHOXAZOLE AND TRIMETHOPRIM 400; 80 MG/1; MG/1
1 TABLET ORAL DAILY
Qty: 30 TABLET | Refills: 0 | Status: SHIPPED | OUTPATIENT
Start: 2021-09-27 | End: 2022-03-11

## 2021-09-27 RX ORDER — PREDNISONE 5 MG/1
5 TABLET ORAL DAILY
Qty: 90 TABLET | Refills: 0 | Status: SHIPPED | OUTPATIENT
Start: 2021-09-27 | End: 2022-02-07

## 2021-09-27 RX ORDER — MYCOPHENOLIC ACID 180 MG/1
TABLET, DELAYED RELEASE ORAL
Qty: 150 TABLET | Refills: 11 | Status: SHIPPED | OUTPATIENT
Start: 2021-09-27 | End: 2021-10-23

## 2021-09-27 NOTE — TELEPHONE ENCOUNTER
Patient Call: Medication Refill  Prednisone 5mg  Bactrim 400-80mg  Tacrolimus 0.5mg   Has one week  Left   Pharmacy Name: [facility]

## 2021-09-27 NOTE — TELEPHONE ENCOUNTER
Patient Call: Medication Refill  Is out of MPA  180mg EC  [facility]      When will the patient be out of this medication?: Less than 3 days (Route high priority)

## 2021-09-30 NOTE — ANESTHESIA POSTPROCEDURE EVALUATION
Patient: Jelly Dietz    Procedure(s):  TRANSPLANT, KIDNEY, RECIPIENT,  DONOR, venous reconstruction, and back bench preparation of kidney    Diagnosis:End stage renal disease (H) [N18.6]  Diagnosis Additional Information: No value filed.    Anesthesia Type:  General    Note:  Disposition: Admission   Postop Pain Control: Uneventful            Sign Out: Well controlled pain   PONV:    Neuro/Psych: Uneventful            Sign Out: Acceptable/Baseline neuro status   Airway/Respiratory: Uneventful            Sign Out: Acceptable/Baseline resp. status   CV/Hemodynamics: Uneventful            Sign Out: Acceptable CV status; No obvious hypovolemia; No obvious fluid overload   Other NRE:    DID A NON-ROUTINE EVENT OCCUR?            Last vitals:  Vitals Value Taken Time   /102 20   Temp 36.9  C (98.4  F) 20   Pulse 122 205   Resp 18 20   SpO2 100 % 20       Electronically Signed By: Jacky Pringle MD  2021  7:18 AM

## 2021-10-23 ENCOUNTER — TELEPHONE (OUTPATIENT)
Dept: TRANSPLANT | Facility: CLINIC | Age: 36
End: 2021-10-23

## 2021-10-23 DIAGNOSIS — Z94.0 KIDNEY REPLACED BY TRANSPLANT: ICD-10-CM

## 2021-10-23 DIAGNOSIS — Z79.60 LONG-TERM USE OF IMMUNOSUPPRESSANT MEDICATION: ICD-10-CM

## 2021-10-23 DIAGNOSIS — Z48.298 AFTERCARE FOLLOWING ORGAN TRANSPLANT: ICD-10-CM

## 2021-10-23 DIAGNOSIS — Z94.0 KIDNEY REPLACED BY TRANSPLANT: Primary | ICD-10-CM

## 2021-10-23 RX ORDER — MYCOPHENOLIC ACID 180 MG/1
TABLET, DELAYED RELEASE ORAL
Qty: 15 TABLET | Refills: 0 | Status: SHIPPED | OUTPATIENT
Start: 2021-10-23 | End: 2021-10-26

## 2021-10-23 NOTE — TELEPHONE ENCOUNTER
Sarah, paged, that she did not realize there was a game going on and she missed the pharmacy. She is willing to  myfortic 15 tablets from [facility]. She will use [facility] for 15 tablets. sarah will return call with any issues.

## 2021-10-27 RX ORDER — DIBASIC SODIUM PHOSPHATE, MONOBASIC POTASSIUM PHOSPHATE AND MONOBASIC SODIUM PHOSPHATE 852; 155; 130 MG/1; MG/1; MG/1
TABLET ORAL
Qty: 120 TABLET | Refills: 3 | Status: SHIPPED | OUTPATIENT
Start: 2021-10-27 | End: 2022-08-15

## 2021-10-28 DIAGNOSIS — N17.9 ACUTE RENAL FAILURE, UNSPECIFIED ACUTE RENAL FAILURE TYPE (H): ICD-10-CM

## 2021-10-28 RX ORDER — LOSARTAN POTASSIUM 25 MG/1
12.5 TABLET ORAL AT BEDTIME
Qty: 30 TABLET | Refills: 6 | Status: SHIPPED | OUTPATIENT
Start: 2021-10-28 | End: 2021-12-31

## 2021-11-08 ENCOUNTER — TELEPHONE (OUTPATIENT)
Dept: TRANSPLANT | Facility: CLINIC | Age: 36
End: 2021-11-08
Payer: MEDICAID

## 2021-11-08 NOTE — LETTER
Jelly Dietz  [address]  [facility] [facility] [address]                November 9, 2021    Dear Jelly,    This letter is regarding your transplant lab work. The most recent laboratory results we have on file for you are from 7/27/2021. Your labs should be completed monthly.   For the best outcome for your transplant and in order for us to refill your prescriptions, we encourage you to have a yearly clinic appointment with a physician and to have current labs. If you are unable to return to our transplant center there are several alternatives. Please call your coordinator to discuss them.  To make an appointment with a physician here at [facility], please call:  The Transplant Office at [phone] or [phone].     The Transplant Staff at [facility]

## 2021-11-08 NOTE — LETTER
Jelly Dietz  [address]  [facility] [place] [address]                November 9, 2021    Dear Jelly,    This letter is regarding your transplant lab work. The most recent laboratory results we have on file for you are from 7/27/2021. Your labs should be completed monthly.   For the best outcome for your transplant and in order for us to refill your prescriptions, we encourage you to have a yearly clinic appointment with a physician and to have current labs. If you are unable to return to our transplant center there are several alternatives. Please call your coordinator to discuss them.  To make an appointment with a physician here at [facility], please call:  The Transplant Office at [phone] or [phone].     The Transplant Staff at [facility]

## 2021-11-09 NOTE — TELEPHONE ENCOUNTER
Overdue for transplant  Labs      Please call /send MyChart  Message to obtain transplant labs    Transplant  Labs are needed  for the best outcomes for this kidney transplant    Transplant  Labs are needed for further RX refills     Please call  [phone] option 4  For lab appointment

## 2021-11-26 ENCOUNTER — LAB (OUTPATIENT)
Dept: LAB | Facility: CLINIC | Age: 36
End: 2021-11-26
Payer: MEDICARE

## 2021-11-26 DIAGNOSIS — Z48.298 AFTERCARE FOLLOWING ORGAN TRANSPLANT: ICD-10-CM

## 2021-11-26 DIAGNOSIS — Z79.899 ENCOUNTER FOR LONG-TERM CURRENT USE OF MEDICATION: ICD-10-CM

## 2021-11-26 DIAGNOSIS — Z94.0 KIDNEY TRANSPLANTED: ICD-10-CM

## 2021-11-26 DIAGNOSIS — Z94.0 KIDNEY REPLACED BY TRANSPLANT: ICD-10-CM

## 2021-11-26 LAB
ALBUMIN SERPL-MCNC: 3.7 G/DL (ref 3.4–5)
ALBUMIN UR-MCNC: NEGATIVE MG/DL
ANION GAP SERPL CALCULATED.3IONS-SCNC: 8 MMOL/L (ref 3–14)
APPEARANCE UR: CLEAR
BASOPHILS # BLD AUTO: 0 10E3/UL (ref 0–0.2)
BASOPHILS NFR BLD AUTO: 1 %
BILIRUB UR QL STRIP: NEGATIVE
BUN SERPL-MCNC: 10 MG/DL (ref 7–30)
CALCIUM SERPL-MCNC: 9.3 MG/DL (ref 8.5–10.1)
CHLORIDE BLD-SCNC: 106 MMOL/L (ref 94–109)
CO2 SERPL-SCNC: 24 MMOL/L (ref 20–32)
COLOR UR AUTO: NORMAL
CREAT SERPL-MCNC: 0.72 MG/DL (ref 0.52–1.04)
CREAT UR-MCNC: 30 MG/DL
EOSINOPHIL # BLD AUTO: 0.1 10E3/UL (ref 0–0.7)
EOSINOPHIL NFR BLD AUTO: 1 %
ERYTHROCYTE [DISTWIDTH] IN BLOOD BY AUTOMATED COUNT: 13 % (ref 10–15)
GFR SERPL CREATININE-BSD FRML MDRD: >90 ML/MIN/1.73M2
GLUCOSE BLD-MCNC: 86 MG/DL (ref 70–99)
GLUCOSE UR STRIP-MCNC: NEGATIVE MG/DL
HCT VFR BLD AUTO: 35.5 % (ref 35–47)
HGB BLD-MCNC: 11.3 G/DL (ref 11.7–15.7)
HGB UR QL STRIP: NEGATIVE
IMM GRANULOCYTES # BLD: 0 10E3/UL
IMM GRANULOCYTES NFR BLD: 1 %
KETONES UR STRIP-MCNC: NEGATIVE MG/DL
LEUKOCYTE ESTERASE UR QL STRIP: NEGATIVE
LYMPHOCYTES # BLD AUTO: 1.3 10E3/UL (ref 0.8–5.3)
LYMPHOCYTES NFR BLD AUTO: 20 %
MAGNESIUM SERPL-MCNC: 1.4 MG/DL (ref 1.6–2.3)
MCH RBC QN AUTO: 30.3 PG (ref 26.5–33)
MCHC RBC AUTO-ENTMCNC: 31.8 G/DL (ref 31.5–36.5)
MCV RBC AUTO: 95 FL (ref 78–100)
MONOCYTES # BLD AUTO: 0.5 10E3/UL (ref 0–1.3)
MONOCYTES NFR BLD AUTO: 7 %
NEUTROPHILS # BLD AUTO: 4.6 10E3/UL (ref 1.6–8.3)
NEUTROPHILS NFR BLD AUTO: 70 %
NITRATE UR QL: NEGATIVE
NRBC # BLD AUTO: 0 10E3/UL
NRBC BLD AUTO-RTO: 0 /100
PH UR STRIP: 7 [PH] (ref 5–7)
PHOSPHATE SERPL-MCNC: 2 MG/DL (ref 2.5–4.5)
PLATELET # BLD AUTO: 265 10E3/UL (ref 150–450)
POTASSIUM BLD-SCNC: 3.6 MMOL/L (ref 3.4–5.3)
PROT UR-MCNC: <0.05 G/L
PROT/CREAT 24H UR: NORMAL MG/G{CREAT}
RBC # BLD AUTO: 3.73 10E6/UL (ref 3.8–5.2)
RBC URINE: <1 /HPF
SODIUM SERPL-SCNC: 138 MMOL/L (ref 133–144)
SP GR UR STRIP: 1 (ref 1–1.03)
TACROLIMUS BLD-MCNC: 4.5 UG/L (ref 5–15)
TME LAST DOSE: ABNORMAL H
TME LAST DOSE: ABNORMAL H
UROBILINOGEN UR STRIP-MCNC: NORMAL MG/DL
WBC # BLD AUTO: 6.5 10E3/UL (ref 4–11)
WBC URINE: <1 /HPF

## 2021-11-26 PROCEDURE — 36415 COLL VENOUS BLD VENIPUNCTURE: CPT | Performed by: PATHOLOGY

## 2021-11-26 PROCEDURE — 86833 HLA CLASS II HIGH DEFIN QUAL: CPT | Performed by: INTERNAL MEDICINE

## 2021-11-26 PROCEDURE — 85025 COMPLETE CBC W/AUTO DIFF WBC: CPT | Performed by: PATHOLOGY

## 2021-11-26 PROCEDURE — 83735 ASSAY OF MAGNESIUM: CPT | Performed by: PATHOLOGY

## 2021-11-26 PROCEDURE — 84156 ASSAY OF PROTEIN URINE: CPT | Performed by: PATHOLOGY

## 2021-11-26 PROCEDURE — 87799 DETECT AGENT NOS DNA QUANT: CPT | Performed by: INTERNAL MEDICINE

## 2021-11-26 PROCEDURE — 81001 URINALYSIS AUTO W/SCOPE: CPT | Performed by: PATHOLOGY

## 2021-11-26 PROCEDURE — 80197 ASSAY OF TACROLIMUS: CPT | Performed by: INTERNAL MEDICINE

## 2021-11-26 PROCEDURE — 80069 RENAL FUNCTION PANEL: CPT | Performed by: PATHOLOGY

## 2021-11-26 PROCEDURE — 86832 HLA CLASS I HIGH DEFIN QUAL: CPT | Performed by: INTERNAL MEDICINE

## 2021-11-29 ENCOUNTER — TELEPHONE (OUTPATIENT)
Dept: TRANSPLANT | Facility: CLINIC | Age: 36
End: 2021-11-29
Payer: MEDICAID

## 2021-11-29 LAB
BK VIRUS DNA COPIES/ML, INSTRUMENT: 2241 COPIES/ML
BKV DNA SPEC NAA+PROBE-LOG#: 3.4 {LOG_COPIES}/ML

## 2021-11-29 NOTE — TELEPHONE ENCOUNTER
Patient Call: General  Route to LPN    Reason for call: Jelly wanted to review labs, some results came back low.      Call back needed? Yes    Return Call Needed  Same as documented in contacts section

## 2021-11-30 ENCOUNTER — OFFICE VISIT (OUTPATIENT)
Dept: FAMILY MEDICINE | Facility: CLINIC | Age: 36
End: 2021-11-30
Payer: MEDICARE

## 2021-11-30 VITALS
DIASTOLIC BLOOD PRESSURE: 84 MMHG | SYSTOLIC BLOOD PRESSURE: 119 MMHG | HEART RATE: 94 BPM | HEIGHT: 67 IN | OXYGEN SATURATION: 99 % | WEIGHT: 155 LBS | BODY MASS INDEX: 24.33 KG/M2

## 2021-11-30 DIAGNOSIS — R35.0 URINARY FREQUENCY: Primary | ICD-10-CM

## 2021-11-30 DIAGNOSIS — R10.2 PELVIC PRESSURE IN FEMALE: ICD-10-CM

## 2021-11-30 LAB
ALBUMIN UR-MCNC: NEGATIVE MG/DL
AMORPH CRY #/AREA URNS HPF: ABNORMAL /HPF
APPEARANCE UR: ABNORMAL
BILIRUB UR QL STRIP: NEGATIVE
CLUE CELLS: ABNORMAL
COLOR UR AUTO: YELLOW
DONOR IDENTIFICATION: NORMAL
DSA COMMENTS: NORMAL
DSA PRESENT: NO
DSA TEST METHOD: NORMAL
GLUCOSE UR STRIP-MCNC: NEGATIVE MG/DL
HGB UR QL STRIP: NEGATIVE
KETONES UR STRIP-MCNC: NEGATIVE MG/DL
LEUKOCYTE ESTERASE UR QL STRIP: NEGATIVE
NITRATE UR QL: NEGATIVE
ORGAN: NORMAL
PH UR STRIP: 8 [PH] (ref 5–7)
RBC URINE: <1 /HPF
SA 1 CELL: NORMAL
SA 1 TEST METHOD: NORMAL
SA 2 CELL: NORMAL
SA 2 TEST METHOD: NORMAL
SA1 HI RISK ABY: NORMAL
SA1 MOD RISK ABY: NORMAL
SA2 HI RISK ABY: NORMAL
SA2 MOD RISK ABY: NORMAL
SP GR UR STRIP: 1.01 (ref 1–1.03)
SQUAMOUS EPITHELIAL: 1 /HPF
TRICHOMONAS, WET PREP: ABNORMAL
UNACCEPTABLE ANTIGENS: NORMAL
UNOS CPRA: 95
UROBILINOGEN UR STRIP-MCNC: NORMAL MG/DL
WBC URINE: 0 /HPF
WBC'S/HIGH POWER FIELD, WET PREP: ABNORMAL
YEAST, WET PREP: ABNORMAL
ZZZSA 1  COMMENTS: NORMAL
ZZZSA 2 COMMENTS: NORMAL

## 2021-11-30 PROCEDURE — 81001 URINALYSIS AUTO W/SCOPE: CPT | Performed by: PATHOLOGY

## 2021-11-30 PROCEDURE — 99203 OFFICE O/P NEW LOW 30 MIN: CPT | Performed by: NURSE PRACTITIONER

## 2021-11-30 PROCEDURE — 87086 URINE CULTURE/COLONY COUNT: CPT | Performed by: NURSE PRACTITIONER

## 2021-11-30 PROCEDURE — 87210 SMEAR WET MOUNT SALINE/INK: CPT | Performed by: PATHOLOGY

## 2021-11-30 ASSESSMENT — MIFFLIN-ST. JEOR: SCORE: 1427.77

## 2021-11-30 NOTE — NURSING NOTE
Chief Complaint   Patient presents with     \[facility]\"" Care     med history, discuss iron levels     Gyn Exam     pap     Physical     whenever she sits she feels a sharp pain in her uterus, in the past 4-5 days has had some odd smelling urine     Blood Draw     ua, std checking       Deepthi Alberto EMT at 3:27 PM on 11/30/2021

## 2021-11-30 NOTE — PROGRESS NOTES
"  Assessment & Plan     Urinary frequency  - Urine Culture  - UA with Micro reflex to Culture  - Urine Culture  - UA with Micro reflex to Culture  - Wet prep    Pelvic pressure in female  - Urine Culture  - US Pelvic w/ Transvaginal  - UA with Micro reflex to Culture  - Urine Culture  - UA with Micro reflex to Culture  - Wet prep        22 minutes spent on the date of the encounter doing chart review, history and exam, documentation and further activities per the note       See Patient Instructions  Await UA/UC and wet prep.  Obtain pelvic ultrasound  Patient aware of s/s requiring urgent evaluation  Return to clinic if no improvement or symptoms worsen.  Patient verbalized understanding & agreed with plan of care.    Domenica Crawford, CHAU, CNP  [facility] NACHO Reaves is a 36 year old who presents for the following health issues  accompanied by herself.    HPI   Patient present today to establish care.  Patient with a history of kidney failure with transplant.  Genitourinary - Female  Onset/Duration:  X 2 months  - when she sits feels like her uterus would \"come down\".  She also notes that this worse around her period.   Urine is clear to yellow with an odor.   Has sharp pain in her uterus.   Has had a urinary tract infection and a yeast infection before.    Description:   Painful urination (Dysuria): no           Frequency: YES  Blood in urine (Hematuria): no  Delay in urine (Hesitency): no  Intensity: mild to moderate   Progression of Symptoms:  intermittent  Accompanying Signs & Symptoms:  Fever/chills: no  Flank pain: no but has a history of chronic back pain   Nausea and vomiting: no  Vaginal symptoms: discharge is clear.  Some days has an odor.  No itching or pain.  She is sexually active with fiance.  No concerns with STI.    Abdominal/Pelvic Pain: no  History:   History of frequent UTI s: no  History of kidney stones: no  Sexually Active: " "no  Possibility of pregnancy: Yes with same partner for three years   Precipitating or alleviating factors: None  Therapies tried and outcome:  - has not tried anything    Two children - 6 and 9 years.   Had an IUD after her son but had it removed.   Felt like when IUD was placed she felt like her uterus was shifting.   OCP is taking and is using condoms.   LMP 3.5 weeks ago.  Period is every month for lasts 7 days.   Has not missed any birth control pills.          Review of Systems   Constitutional, HEENT, cardiovascular, pulmonary, gi and gu systems are negative, except as otherwise noted.      Objective    /84 (BP Location: Right arm, Patient Position: Sitting, Cuff Size: Adult Regular)   Pulse 94   Ht 1.689 m (5' 6.5\")   Wt 70.3 kg (155 lb)   LMP 11/03/2021 (Within Days)   SpO2 99%   BMI 24.64 kg/m    Body mass index is 24.64 kg/m .  Physical Exam   GENERAL: healthy, alert and no distress  RESP: lungs clear to auscultation - no rales, rhonchi or wheezes  ABDOMEN: soft, nontender, no hepatosplenomegaly, no masses and bowel sounds normal   (female): normal female external genitalia, normal urethral meatus , vaginal mucosa pink, moist, well rugated and normal cervix, adnexae, and uterus without masses.  PSYCH: mentation appears normal, affect normal/bright    Lab on 11/26/2021   Component Date Value Ref Range Status     Tacrolimus by Tandem Mass Spectrom* 11/26/2021 4.5* 5.0 - 15.0 ug/L Final    Comment: Tacrolimus Reference Range (ug/L):    Kidney Transplant:  Pediatric  0-3 months post transplant: 10-12  3-6 months post transplant: 8-10  6-12 months post transplant: 6-8  >12 months post transplant: 4-7    Adult  0-6 months post transplant: 8-10  6-12 months post transplant: 6-8  >12 months post transplant: 4-6  >5 years post transplant: 3-5    Heart Transplant:  Pediatric  0-12 months post transplant: 10-15  >12 months post transplant: 5-10    Adult  0-3 months post transplant: 10-15  3-6 months " post transplant: 8-12  6-12 months post transplant: 6-12  >12 months post transplant: 6-10    Lung Transplant:  0-12 months post transplant: 10-15  >12 months post transplant: 8-12    Liver Transplant:  Pediatric  0-3 months post transplant: 10-15  3-6 months post transplant: 8-10  >6 months post transplant: 6-8    Adult  0-3 months post transplant: 10-12  3-6 months post transplant: 8-10  >6 months post transplant: 6-8    Pancreas Transplant:  0-6 months post transplant: 8-10  >6 months post                            transplant: 5-8     Tacrolimus Last Dose Date 11/26/2021    Final    Last dose information not provided.     Tacrolimus Last Dose Time 11/26/2021    Final    Last dose information not provided.     Total Protein Random Urine g/L 11/26/2021 <0.05  g/L Final    The reference range has not been established for total protein in random urine samples.  The result should be integrated into the clinical context for interpretation.     Total Protein Urine g/gr Creatinine 11/26/2021    Final    Unable to calculate:  Urine creatinine or protein value below detectable level     Creatinine Urine mg/dL 11/26/2021 30  mg/dL Final     Sodium 11/26/2021 138  133 - 144 mmol/L Final     Potassium 11/26/2021 3.6  3.4 - 5.3 mmol/L Final     Chloride 11/26/2021 106  94 - 109 mmol/L Final     Carbon Dioxide (CO2) 11/26/2021 24  20 - 32 mmol/L Final     Anion Gap 11/26/2021 8  3 - 14 mmol/L Final     Urea Nitrogen 11/26/2021 10  7 - 30 mg/dL Final     Creatinine 11/26/2021 0.72  0.52 - 1.04 mg/dL Final     Calcium 11/26/2021 9.3  8.5 - 10.1 mg/dL Final     Glucose 11/26/2021 86  70 - 99 mg/dL Final     Albumin 11/26/2021 3.7  3.4 - 5.0 g/dL Final     Phosphorus 11/26/2021 2.0* 2.5 - 4.5 mg/dL Final     GFR Estimate 11/26/2021 >90  >60 mL/min/1.73m2 Final    As of July 11, 2021, eGFR is calculated by the CKD-EPI creatinine equation, without race adjustment. eGFR can be influenced by muscle mass, exercise, and diet. The  reported eGFR is an estimation only and is only applicable if the renal function is stable.     BK Virus DNA Copies/mL, Instrument 11/26/2021 2,241* <1 copies/mL Final     BK VIRUS LOG 11/26/2021 3.4   Final     Magnesium 11/26/2021 1.4* 1.6 - 2.3 mg/dL Final     Color Urine 11/26/2021 Straw  Colorless, Straw, Light Yellow, Yellow Final     Appearance Urine 11/26/2021 Clear  Clear Final     Glucose Urine 11/26/2021 Negative  Negative mg/dL Final     Bilirubin Urine 11/26/2021 Negative  Negative Final     Ketones Urine 11/26/2021 Negative  Negative mg/dL Final     Specific Gravity Urine 11/26/2021 1.004  1.003 - 1.035 Final     Blood Urine 11/26/2021 Negative  Negative Final     pH Urine 11/26/2021 7.0  5.0 - 7.0 Final     Protein Albumin Urine 11/26/2021 Negative  Negative mg/dL Final     Urobilinogen Urine 11/26/2021 Normal  Normal, 2.0 mg/dL Final     Nitrite Urine 11/26/2021 Negative  Negative Final     Leukocyte Esterase Urine 11/26/2021 Negative  Negative Final     RBC Urine 11/26/2021 <1  <=2 /HPF Final     WBC Urine 11/26/2021 <1  <=5 /HPF Final     WBC Count 11/26/2021 6.5  4.0 - 11.0 10e3/uL Final     RBC Count 11/26/2021 3.73* 3.80 - 5.20 10e6/uL Final     Hemoglobin 11/26/2021 11.3* 11.7 - 15.7 g/dL Final     Hematocrit 11/26/2021 35.5  35.0 - 47.0 % Final     MCV 11/26/2021 95  78 - 100 fL Final     MCH 11/26/2021 30.3  26.5 - 33.0 pg Final     MCHC 11/26/2021 31.8  31.5 - 36.5 g/dL Final     RDW 11/26/2021 13.0  10.0 - 15.0 % Final     Platelet Count 11/26/2021 265  150 - 450 10e3/uL Final     % Neutrophils 11/26/2021 70  % Final     % Lymphocytes 11/26/2021 20  % Final     % Monocytes 11/26/2021 7  % Final     % Eosinophils 11/26/2021 1  % Final     % Basophils 11/26/2021 1  % Final     % Immature Granulocytes 11/26/2021 1  % Final     NRBCs per 100 WBC 11/26/2021 0  <1 /100 Final     Absolute Neutrophils 11/26/2021 4.6  1.6 - 8.3 10e3/uL Final     Absolute Lymphocytes 11/26/2021 1.3  0.8 - 5.3 10e3/uL  Final     Absolute Monocytes 11/26/2021 0.5  0.0 - 1.3 10e3/uL Final     Absolute Eosinophils 11/26/2021 0.1  0.0 - 0.7 10e3/uL Final     Absolute Basophils 11/26/2021 0.0  0.0 - 0.2 10e3/uL Final     Absolute Immature Granulocytes 11/26/2021 0.0  <=0.0 10e3/uL Final     Absolute NRBCs 11/26/2021 0.0  10e3/uL Final     Donor Identification 11/26/2021 06/19/2020   Final     Organ 11/26/2021 Right Kidney   Final     DSA Present 11/26/2021 NO   Final     DSA Comments 11/26/2021  Flow Single Antigen Beads assays are intended for detection/identification of IgG anti-HLA antibodies. Mfi values may not accurately quantify donor-specific antibody levels in all instances.   Final     DSA Test Method 11/26/2021 SA FCS   Final     SA 1 TEST METHOD 11/26/2021 SA FCS   Final     SA 1 CELL 11/26/2021 Class I   Final     SA1 HI RISK KADEEM 11/26/2021 None   Final     SA1 MOD RISK KADEEM 11/26/2021 None   Final     SA 1  COMMENTS 11/26/2021  Test performed by modified procedure. Serum heat inactivated and tested by a modified ([place]) protocol including fetal calf serum addition. High-risk, mfi >3,000. Mod-risk, mfi 500-3,000.   Final     SA 2 TEST METHOD 11/26/2021 SA FCS   Final     SA 2 CELL 11/26/2021 Class II   Final     SA2 HI RISK KADEEM 11/26/2021 None   Final     SA2 MOD RISK KADEEM 11/26/2021 DR:8   Final     SA 2 COMMENTS 11/26/2021  Test performed by modified procedure. Serum heat inactivated and tested by a modified ([place]) protocol including fetal calf serum addition. High-risk, mfi >3,000. Mod-risk, mfi 500-3,000.   Final     UNOS CPRA 11/26/2021 95   Final     UNACCEPTABLE ANTIGENS 11/26/2021 A:3 11 24 25 26 43 66 B:35 37 47 51 53 76 78  DR:8 12   DRw:53        DQ:5 7 9     Final

## 2021-12-01 LAB — BACTERIA UR CULT: NO GROWTH

## 2021-12-02 NOTE — TELEPHONE ENCOUNTER
[11:05 AM] Simran Mittal follow up question to [facility]   Hi Mulu!  We have her on MPA 540mg/360mg.  She has been filling every month consistently      Called Jelly Dietz reviewed transplant  Labs    Encouraged her to have her transplant surveillance    labs monthly   -  Jelly ovalle busy with her children  6 year old and 9 year old  Confirmed taking medications         Sent labs to Dr Armando

## 2021-12-10 ENCOUNTER — TELEPHONE (OUTPATIENT)
Dept: TRANSPLANT | Facility: CLINIC | Age: 36
End: 2021-12-10
Payer: MEDICAID

## 2021-12-10 NOTE — TELEPHONE ENCOUNTER
----- Message -----   From: Mulu Arguello RN   Sent: 12/5/2021  12:00 AM CST   To: Mulu Arguello RN     [11:05 AM] Simran Mittal!  We have her on MPA 540mg/360mg.  She has been filling every month consistently

## 2021-12-20 DIAGNOSIS — Z48.298 AFTERCARE FOLLOWING ORGAN TRANSPLANT: ICD-10-CM

## 2021-12-20 RX ORDER — MAGNESIUM OXIDE 400 MG/1
TABLET ORAL
Qty: 60 TABLET | Refills: 3 | Status: SHIPPED | OUTPATIENT
Start: 2021-12-20 | End: 2022-06-06

## 2021-12-20 NOTE — TELEPHONE ENCOUNTER
"  Called Jelly A Hurshe regarding most recent labs - previous labs done in July 2021  Encouraged Jelly to obtain monthly transplant  Labs -    Jelly continues to state she is\" too busy with her family  \"    Confirmed with [facility] pharmacy immunosuppression medications dispensed monthly        "

## 2021-12-31 ENCOUNTER — TELEPHONE (OUTPATIENT)
Dept: NEPHROLOGY | Facility: CLINIC | Age: 36
End: 2021-12-31
Payer: MEDICAID

## 2021-12-31 DIAGNOSIS — N17.9 ACUTE RENAL FAILURE, UNSPECIFIED ACUTE RENAL FAILURE TYPE (H): ICD-10-CM

## 2021-12-31 RX ORDER — LOSARTAN POTASSIUM 25 MG/1
12.5 TABLET ORAL AT BEDTIME
Qty: 30 TABLET | Refills: 6 | Status: SHIPPED | OUTPATIENT
Start: 2021-12-31 | End: 2022-04-23

## 2021-12-31 NOTE — TELEPHONE ENCOUNTER
Pharmacy reported they do not have an ETA on when the losartan will arrive. Refill sent to [facility] in [place] on [address] instead. Blazent message sent to pt.

## 2021-12-31 NOTE — TELEPHONE ENCOUNTER
12/31- I spoke to Jelly regarding the medication refill. She counted her Losartan medication and stated that she has 8 nights left of the med before she will be out. She reported that she can have it filled at [facility] on [address] if her current pharmacy cannot get the medication in the next 8 days. Her BP has been running 120's/80's.  Writer sent msg to pharmacist regarding these questions. Will let Jelly know once we know if the medication will be filled at [facility] vs [facility] on [address].    Kieran Anglin MD Stechmann, Kathleen, RN Katie,     Can you see how the patient's blood pressure is running and follow up with Dr. Armando who is following her.     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  Losartan is on back order and only the 100 mg tabs are available.  Can we please get an order for something else/substitute.     Thanks     Nika

## 2022-01-08 PROCEDURE — 99285 EMERGENCY DEPT VISIT HI MDM: CPT | Mod: 25 | Performed by: EMERGENCY MEDICINE

## 2022-01-08 PROCEDURE — 93005 ELECTROCARDIOGRAM TRACING: CPT | Performed by: EMERGENCY MEDICINE

## 2022-01-08 PROCEDURE — C9803 HOPD COVID-19 SPEC COLLECT: HCPCS | Performed by: EMERGENCY MEDICINE

## 2022-01-08 PROCEDURE — 96375 TX/PRO/DX INJ NEW DRUG ADDON: CPT | Performed by: EMERGENCY MEDICINE

## 2022-01-08 PROCEDURE — 96361 HYDRATE IV INFUSION ADD-ON: CPT | Performed by: EMERGENCY MEDICINE

## 2022-01-08 PROCEDURE — 96365 THER/PROPH/DIAG IV INF INIT: CPT | Performed by: EMERGENCY MEDICINE

## 2022-01-08 PROCEDURE — 93010 ELECTROCARDIOGRAM REPORT: CPT | Performed by: EMERGENCY MEDICINE

## 2022-01-08 PROCEDURE — 99284 EMERGENCY DEPT VISIT MOD MDM: CPT | Mod: 25 | Performed by: EMERGENCY MEDICINE

## 2022-01-08 ASSESSMENT — MIFFLIN-ST. JEOR: SCORE: 1414.83

## 2022-01-09 ENCOUNTER — HOSPITAL ENCOUNTER (EMERGENCY)
Facility: CLINIC | Age: 37
Discharge: HOME | End: 2022-01-09
Attending: EMERGENCY MEDICINE | Admitting: EMERGENCY MEDICINE
Payer: MEDICARE

## 2022-01-09 VITALS
WEIGHT: 155 LBS | HEART RATE: 86 BPM | BODY MASS INDEX: 24.91 KG/M2 | RESPIRATION RATE: 20 BRPM | SYSTOLIC BLOOD PRESSURE: 83 MMHG | DIASTOLIC BLOOD PRESSURE: 43 MMHG | OXYGEN SATURATION: 99 % | TEMPERATURE: 98.3 F | HEIGHT: 66 IN

## 2022-01-09 DIAGNOSIS — G44.209 TENSION HEADACHE: ICD-10-CM

## 2022-01-09 DIAGNOSIS — G47.9 DIFFICULTY SLEEPING: ICD-10-CM

## 2022-01-09 LAB
ALBUMIN SERPL-MCNC: 3.6 G/DL (ref 3.4–5)
ALBUMIN UR-MCNC: NEGATIVE MG/DL
ALP SERPL-CCNC: 101 U/L (ref 40–150)
ALT SERPL W P-5'-P-CCNC: 17 U/L (ref 0–50)
ANION GAP SERPL CALCULATED.3IONS-SCNC: 6 MMOL/L (ref 3–14)
APPEARANCE UR: CLEAR
AST SERPL W P-5'-P-CCNC: 15 U/L (ref 0–45)
BASOPHILS # BLD AUTO: 0 10E3/UL (ref 0–0.2)
BASOPHILS NFR BLD AUTO: 1 %
BILIRUB SERPL-MCNC: 0.3 MG/DL (ref 0.2–1.3)
BILIRUB UR QL STRIP: NEGATIVE
BUN SERPL-MCNC: 12 MG/DL (ref 7–30)
CALCIUM SERPL-MCNC: 9.5 MG/DL (ref 8.5–10.1)
CHLORIDE BLD-SCNC: 108 MMOL/L (ref 94–109)
CO2 SERPL-SCNC: 24 MMOL/L (ref 20–32)
COLOR UR AUTO: ABNORMAL
CREAT SERPL-MCNC: 0.63 MG/DL (ref 0.52–1.04)
EOSINOPHIL # BLD AUTO: 0.1 10E3/UL (ref 0–0.7)
EOSINOPHIL NFR BLD AUTO: 1 %
ERYTHROCYTE [DISTWIDTH] IN BLOOD BY AUTOMATED COUNT: 12.9 % (ref 10–15)
FLUAV RNA SPEC QL NAA+PROBE: NEGATIVE
FLUBV RNA RESP QL NAA+PROBE: NEGATIVE
GFR SERPL CREATININE-BSD FRML MDRD: >90 ML/MIN/1.73M2
GLUCOSE BLD-MCNC: 120 MG/DL (ref 70–99)
GLUCOSE UR STRIP-MCNC: NEGATIVE MG/DL
HCT VFR BLD AUTO: 33.4 % (ref 35–47)
HGB BLD-MCNC: 10.8 G/DL (ref 11.7–15.7)
HGB UR QL STRIP: NEGATIVE
IMM GRANULOCYTES # BLD: 0 10E3/UL
IMM GRANULOCYTES NFR BLD: 0 %
KETONES UR STRIP-MCNC: NEGATIVE MG/DL
LEUKOCYTE ESTERASE UR QL STRIP: NEGATIVE
LYMPHOCYTES # BLD AUTO: 1.1 10E3/UL (ref 0.8–5.3)
LYMPHOCYTES NFR BLD AUTO: 15 %
MAGNESIUM SERPL-MCNC: 1.4 MG/DL (ref 1.6–2.3)
MCH RBC QN AUTO: 29.9 PG (ref 26.5–33)
MCHC RBC AUTO-ENTMCNC: 32.3 G/DL (ref 31.5–36.5)
MCV RBC AUTO: 93 FL (ref 78–100)
MONOCYTES # BLD AUTO: 0.4 10E3/UL (ref 0–1.3)
MONOCYTES NFR BLD AUTO: 6 %
MUCOUS THREADS #/AREA URNS LPF: PRESENT /LPF
NEUTROPHILS # BLD AUTO: 5.4 10E3/UL (ref 1.6–8.3)
NEUTROPHILS NFR BLD AUTO: 77 %
NITRATE UR QL: NEGATIVE
NRBC # BLD AUTO: 0 10E3/UL
NRBC BLD AUTO-RTO: 0 /100
PH UR STRIP: 5.5 [PH] (ref 5–7)
PLATELET # BLD AUTO: 341 10E3/UL (ref 150–450)
POTASSIUM BLD-SCNC: 4.3 MMOL/L (ref 3.4–5.3)
PROT SERPL-MCNC: 7.2 G/DL (ref 6.8–8.8)
RBC # BLD AUTO: 3.61 10E6/UL (ref 3.8–5.2)
RBC URINE: 1 /HPF
RSV RNA SPEC NAA+PROBE: NEGATIVE
SARS-COV-2 RNA RESP QL NAA+PROBE: NEGATIVE
SODIUM SERPL-SCNC: 138 MMOL/L (ref 133–144)
SP GR UR STRIP: 1.01 (ref 1–1.03)
SQUAMOUS EPITHELIAL: <1 /HPF
TROPONIN I SERPL HS-MCNC: 7 NG/L
UROBILINOGEN UR STRIP-MCNC: 2 MG/DL
WBC # BLD AUTO: 7 10E3/UL (ref 4–11)
WBC URINE: 1 /HPF

## 2022-01-09 PROCEDURE — 87637 SARSCOV2&INF A&B&RSV AMP PRB: CPT | Performed by: EMERGENCY MEDICINE

## 2022-01-09 PROCEDURE — 83735 ASSAY OF MAGNESIUM: CPT | Performed by: EMERGENCY MEDICINE

## 2022-01-09 PROCEDURE — 84484 ASSAY OF TROPONIN QUANT: CPT | Performed by: EMERGENCY MEDICINE

## 2022-01-09 PROCEDURE — 250N000013 HC RX MED GY IP 250 OP 250 PS 637: Performed by: EMERGENCY MEDICINE

## 2022-01-09 PROCEDURE — 250N000011 HC RX IP 250 OP 636: Performed by: EMERGENCY MEDICINE

## 2022-01-09 PROCEDURE — 258N000003 HC RX IP 258 OP 636: Performed by: EMERGENCY MEDICINE

## 2022-01-09 PROCEDURE — 85025 COMPLETE CBC W/AUTO DIFF WBC: CPT | Performed by: EMERGENCY MEDICINE

## 2022-01-09 PROCEDURE — 36415 COLL VENOUS BLD VENIPUNCTURE: CPT | Performed by: EMERGENCY MEDICINE

## 2022-01-09 PROCEDURE — 80053 COMPREHEN METABOLIC PANEL: CPT | Performed by: EMERGENCY MEDICINE

## 2022-01-09 PROCEDURE — 81001 URINALYSIS AUTO W/SCOPE: CPT | Performed by: EMERGENCY MEDICINE

## 2022-01-09 RX ORDER — HYDROXYZINE HYDROCHLORIDE 25 MG/1
25 TABLET, FILM COATED ORAL
Qty: 15 TABLET | Refills: 0 | Status: SHIPPED | OUTPATIENT
Start: 2022-01-09 | End: 2022-03-07

## 2022-01-09 RX ORDER — ACETAMINOPHEN 500 MG
1000 TABLET ORAL ONCE
Status: COMPLETED | OUTPATIENT
Start: 2022-01-09 | End: 2022-01-09

## 2022-01-09 RX ORDER — DIPHENHYDRAMINE HYDROCHLORIDE 50 MG/ML
25 INJECTION INTRAMUSCULAR; INTRAVENOUS ONCE
Status: COMPLETED | OUTPATIENT
Start: 2022-01-09 | End: 2022-01-09

## 2022-01-09 RX ORDER — MAGNESIUM SULFATE 1 G/100ML
1 INJECTION INTRAVENOUS ONCE
Status: COMPLETED | OUTPATIENT
Start: 2022-01-09 | End: 2022-01-09

## 2022-01-09 RX ADMIN — DIPHENHYDRAMINE HYDROCHLORIDE 25 MG: 50 INJECTION INTRAMUSCULAR; INTRAVENOUS at 01:55

## 2022-01-09 RX ADMIN — MAGNESIUM SULFATE 1 G: 1 INJECTION INTRAVENOUS at 03:51

## 2022-01-09 RX ADMIN — PROCHLORPERAZINE EDISYLATE 10 MG: 5 INJECTION INTRAMUSCULAR; INTRAVENOUS at 01:55

## 2022-01-09 RX ADMIN — SODIUM CHLORIDE 500 ML: 9 INJECTION, SOLUTION INTRAVENOUS at 01:54

## 2022-01-09 RX ADMIN — ACETAMINOPHEN 1000 MG: 500 TABLET, FILM COATED ORAL at 01:54

## 2022-01-09 NOTE — ED TRIAGE NOTES
----- Message from Boston Martinez MD sent at 1/29/2020  5:57 PM CST -----  Labs reviewed.  Patient is mildly anemic and could use an iron supplement or increasing iron-rich foods (leafy greens, meat) but otherwise they are normal.  Will await ultrasound results.   Pt presents with c/o dizziness, fever and chills since yesterday after hearing death news of a family member.  H/o renal transplant.  Temp not measured at home.  +Head pressure.

## 2022-01-09 NOTE — DISCHARGE INSTRUCTIONS
Please make an appointment to follow up with Your Primary Care Provider in 3-5 days if not improving.    Return to the Emergency Department if your symptoms are worsening or if you have further concerns.    Use the hydroxyzine prior to bed if you need help sleeping    If Jelly has discomfort from fever or other pain, she can have:  Acetaminophen (Tylenol) every 6 hours as needed. Her dose is:    2 regular strength tabs (650 mg)                                     (43.2+ kg/96+ lb)    NOTE: If your acetaminophen (Tylenol) came with a dropper marked with 0.4 and 0.8 ml, call us ([phone]) or check with your doctor about the dose before using it.

## 2022-01-09 NOTE — ED PROVIDER NOTES
[place] EMERGENCY DEPARTMENT ([facility])  22  History     Chief Complaint   Patient presents with     Fever     Dizziness     HPI  Jelly Dietz is a 37 year old female with a past medical history significant for ESRD complicated by IgA nephropathy s/p kidney transplant with acute rejection and BK viremia, acute coronary syndrome, heart murmur, CKD related anemia, who presents to the Emergency Department for evaluation of headache, chilss, dizziness after hearing about the death of someone close to her.  She said she is having trouble sleeping and is only slept 4 hours.  Denies recorded temperature at home.  Is nauseous but no vomiting.  Has not been able to eat due to lack of appetite.  She is having normal bowel movements without abdominal pain.  She reports pressure on the top of her head, no trauma.  She is not on blood thinners.  Denies visual changes, numbness, tingling or weakness.  No trouble walking or concentrating.  She tried melatonin for sleeping.  No thoughts of harming himself or others, no hallucinations or voices..      Per review of [facility] patient is vaccinated against Covid-19 (4/15/21, 21).     Past Medical History  Past Medical History:   Diagnosis Date     ACS (acute coronary syndrome) (H) 2014     Acute rejection of kidney transplant 2020    Mixed AMR & ACR [place] 2A     Allergic state     seasonal allergies     Anemia in chronic renal disease      Anxiety      BK viremia 2021     Cervical cerclage suture present in second trimester      Chest pain 2014     Chronic renal transplant rejection      End stage kidney disease (H)      Heart murmur      History of  delivery ,     30 wks, 28 wks      Hypertension     resolved after transplant     IgA nephropathy      Kidney transplanted 2020    DDKT. Induction w/ thymo 5.7mg/kg.     MRSA (methicillin resistant Staphylococcus aureus)      [facility]     Pre-eclampsia      Renal failure affecting pregnancy in second trimester      S/P kidney transplant 2007    LDKT in 2007 in [place]. History of 1B kidney rejection. Failed .     SAB (spontaneous )     2nd trimester      Past Surgical History:   Procedure Laterality Date     CERCLAGE CERVICAL N/A 2015    Procedure: CERCLAGE CERVICAL;  Surgeon: Norbert Chopra MD;  Location: [facility] L[facility]D      SECTION  2012    Procedure:  SECTION;;  Surgeon: Chelsea Cross MD;  Location: [facility] L+D      SECTION N/A 2015    Procedure:  SECTION;  Surgeon: Chelsea Cross MD;  Location: [facility] OR     cesearean section       CYSTOSCOPY, REMOVE STENT(S), COMBINED N/A 2020    Procedure: CYSTOSCOPY, WITH STENT REMOVAL;  Surgeon: Nory Morgan MD;  Location: [facility] OR     EXPLANT TRANSPLANTED KIDNEY  3/29/2013    Procedure: EXPLANT TRANSPLANTED KIDNEY;  Explant Transplanted right Kidney (from patients right iliac fossa);  Surgeon: Nory Morgan MD;  Location: [facility] OR     IR FINE NEEDLE ASPIRATION W ULTRASOUND  2020     IR PICC PLACEMENT > 5 YRS OF AGE  7/10/2020     IR RENAL BIOPSY LEFT  2020     MIDLINE DOUBLE LUMEN PLACEMENT Right 2020    unable to place PICC line, MIDLINE placed     NEPHRECTOMY  3/29/13    Transplant nephrectomy      WILIAN/DIALYSIS CATHETER       TRANSPLANT KIDNEY RECIPIENT  DONOR N/A 2020    Procedure: TRANSPLANT, KIDNEY, RECIPIENT,  DONOR, venous reconstruction, and back bench preparation of kidney;  Surgeon: Irma Shannon MD;  Location: [facility] OR     TRANSPLANT KIDNEY RECIPIENT LIVING UNRELATED  Dec 2007    (Adult male in [place])     aspirin (ASA) 81 MG chewable tablet  atorvastatin (LIPITOR) 10 MG tablet  losartan (COZAAR) 25 MG tablet  magnesium oxide (MAG-OX) 400 MG tablet  pantoprazole (PROTONIX) 20 MG EC tablet  PHOSPHORUS TABLET 250  MG per  tablet  predniSONE (DELTASONE) 5 MG tablet  sulfamethoxazole-trimethoprim (BACTRIM) 400-80 MG tablet  tacrolimus (GENERIC EQUIVALENT) 0.5 MG capsule  Vitamin D3 (CHOLECALCIFEROL) 25 mcg (1000 units) tablet      No Known Allergies  Family History  Family History   Problem Relation Age of Onset     Diabetes Paternal Grandfather      Breast Cancer Maternal Grandmother 55     Cancer No family hx of         No known family hx of skin cancer     Social History   Social History     Tobacco Use     Smoking status: Never Smoker     Smokeless tobacco: Never Used   Substance Use Topics     Alcohol use: No     Drug use: No      Past medical history, past surgical history, medications, allergies, family history, and social history were reviewed with the patient. No additional pertinent items.     I have reviewed the Medications, Allergies, Past Medical and Surgical History, and Social History in the Epic system.    Review of Systems  A complete review of systems was performed with pertinent positives and negatives noted in the HPI, and all other systems negative.    Physical Exam          Physical Exam  Physical Exam   Constitutional: oriented to person, place, and time. appears well-developed and well-nourished.   HENT:   Head: Normocephalic and atraumatic. No tenderness over the cranium.  Neck: Normal range of motion. No tenderness over the C-spine.  No nuchal rigidity.  Pulmonary/Chest: Effort normal. No respiratory distress.   Cardiac: No murmurs, rubs, gallops. RRR.  Abdominal: Abdomen soft, nontender, nondistended. No rebound tenderness.  MSK: Long bones without deformity or evidence of trauma  Neurological: alert and oriented to person, place, and time. Gait stable.  Cranial nerves II to XII normal.  Pupils 3 mm and reactive bilaterally.  Sensation gross intact light touch in all extremities.  , bicep, tricep, lower leg and hip strength is symmetric.  Skin: Skin is warm and dry.   Psychiatric:  normal mood and affect.   behavior is normal. Thought content normal.     ED Course     At 10:50 PM the patient was seen and examined by , MD in Room ED VTA.        Procedures              EKG Interpretation:      Interpreted by Michael Dean MD  Time reviewed: 0140  Symptoms at time of EKG: dizziness   Rhythm: normal sinus   Rate: normal  Axis: normal  Ectopy: none  Conduction: normal  ST Segments/ T Waves: TWI in v1/v2 that have been present in prior ecg's from 2018  Q Waves: none  Comparison to prior: No acute changes    Clinical Impression: normal EKG, no WPW, no HOCM, no brugada, no ARVD        No results found. However, due to the size of the patient record, not all encounters were searched. Please check Results Review for a complete set of results.  Medications - No data to display          Assessments & Plan (with Medical Decision Making)   MDM  Patient presenting with multiple symptoms after having stressful news.  Here neurologic exam is normal without nuchal rigidity, very unlikely meningitis or encephalitis.  Labs including creatinine appear to be within normal limits for her.  Magnesium was replaced.  EKG shows no signs or symptoms of dysrhythmia or other cause for her symptoms.  No history of trauma, no blood thinners, very unlikely intracranial hemorrhage.  Additionally, her headache did improve significantly since medications were given.  Offered hydroxyzine to help her sleep. Patient has no concern for mental health, no SI/HI. Recommended that she follow-up with primary care provider.    I have reviewed the nursing notes.    I have reviewed the findings, diagnosis, plan and need for follow up with the patient.    New Prescriptions    HYDROXYZINE (ATARAX) 25 MG TABLET    Take 1 tablet (25 mg) by mouth nightly as needed for other (sleep)       Final diagnoses:   Tension headache   Difficulty sleeping       Kathryn BERRIOS am serving as a trained medical scribe to document services personally performed by , MD, based on  the provider's statements to me.      I, Michael Dean MD, was physically present and have reviewed and verified the accuracy of this note documented by Kathryn Saavedra.       1/8/2022   [facility] EMERGENCY DEPARTMENT     Michael Dean MD  01/09/22 0201

## 2022-01-11 LAB
ATRIAL RATE - MUSE: 83 BPM
DIASTOLIC BLOOD PRESSURE - MUSE: NORMAL MMHG
INTERPRETATION ECG - MUSE: NORMAL
P AXIS - MUSE: 15 DEGREES
PR INTERVAL - MUSE: 176 MS
QRS DURATION - MUSE: 88 MS
QT - MUSE: 372 MS
QTC - MUSE: 437 MS
R AXIS - MUSE: 49 DEGREES
SYSTOLIC BLOOD PRESSURE - MUSE: NORMAL MMHG
T AXIS - MUSE: 52 DEGREES
VENTRICULAR RATE- MUSE: 83 BPM

## 2022-01-18 ENCOUNTER — VIRTUAL VISIT (OUTPATIENT)
Dept: FAMILY MEDICINE | Facility: CLINIC | Age: 37
End: 2022-01-18
Payer: MEDICARE

## 2022-01-18 DIAGNOSIS — Z11.52 ENCOUNTER FOR SCREENING LABORATORY TESTING FOR COVID-19 VIRUS: Primary | ICD-10-CM

## 2022-01-18 DIAGNOSIS — J06.9 VIRAL URI: ICD-10-CM

## 2022-01-18 PROCEDURE — 99213 OFFICE O/P EST LOW 20 MIN: CPT | Mod: 95 | Performed by: PHYSICIAN ASSISTANT

## 2022-01-18 NOTE — PATIENT INSTRUCTIONS
Your symptoms are concerning for COVID-19 Influenza, or other viral illness.  A COVID-19 and Flu test have been ordered for you.  You will be contacted within 24 hours to schedule your tests.  After completing the tests, results should be available within 1-2 days and will be sent to your MyChart or will be called to you.  You may use Tylenol for fever and pain management. Make sure to get plenty of rest and stay hydrated.      If you have severe symptoms such as chest pain, wheezing, shortness of breath, severe coughing, or fevers that you cannot control, please go to the emergency department.    Please reach out with any questions or concerns.  Take care,  Uyen Solis PA-C    Patient Education     Viral Upper Respiratory Illness (Adult)    You have a viral upper respiratory illness (URI), which is another term for the common cold. This illness is contagious during the first few days. It is spread through the air by coughing and sneezing. It may also be spread by direct contact (touching the sick person and then touching your own eyes, nose, or mouth). Frequent handwashing will decrease risk of spread. Most viral illnesses go away within 7 to 10 days with rest and simple home remedies. Sometimes the illness may last for several weeks. Antibiotics will not kill a virus, and they are generally not prescribed for this condition.  Home care    If symptoms are severe, rest at home for the first 2 to 3 days. When you resume activity, don't let yourself get too tired.    Don't smoke. If you need help stopping, talk with your healthcare provider.    Avoid being exposed to cigarette smoke (yours or others ).    You may use acetaminophen or ibuprofen to control pain and fever, unless another medicine was prescribed. If you have chronic liver or kidney disease, have ever had a stomach ulcer or gastrointestinal bleeding, or are taking blood-thinning medicines, talk with your healthcare provider before using these medicines.  Aspirin should never be given to anyone under 18 years of age who is ill with a viral infection or fever. It may cause severe liver or brain damage.    Your appetite may be poor, so a light diet is fine. Stay well hydrated by drinking 6 to 8 glasses of fluids per day (water, soft drinks, juices, tea, or soup). Extra fluids will help loosen secretions in the nose and lungs.    Over-the-counter cold medicines will not shorten the length of time you re sick, but they may be helpful for the following symptoms: cough, sore throat, and nasal and sinus congestion. If you take prescription medicines, ask your healthcare provider or pharmacist which over-the-counter medicines are safe to use. (Note: Don't use decongestants if you have high blood pressure.)  Follow-up care  Follow up with your healthcare provider, or as advised.  When to seek medical advice  Call your healthcare provider right away if any of these occur:    Cough with lots of colored sputum (mucus)    Severe headache; face, neck, or ear pain    Difficulty swallowing due to throat pain    Fever of 100.4 F (38 C) or higher, or as directed by your healthcare provider  Call 911  Call 911 if any of these occur:    Chest pain, shortness of breath, wheezing, or difficulty breathing    Coughing up blood    Very severe pain with swallowing, especially if it goes along with a muffled voice   [employer] last reviewed this educational content on 6/1/2018 2000-2021 [employer]. All rights reserved. This information is not intended as a substitute for professional medical care. Always follow your healthcare professional's instructions.

## 2022-01-18 NOTE — PROGRESS NOTES
Jelly is a 37 year old who is being evaluated via a billable telephone visit.      What phone number would you like to be contacted at? [phone]  How would you like to obtain your AVS? MyChart    Assessment & Plan     Encounter for screening laboratory testing for COVID-19 virus  Viral URI  Low suspicion for severe respiratory distress as patient is able to speak in full sentences.  Symptoms are concerning for COVID-19, influenza, or other viral URI.  COVID-19 PCR and Influenza Antigen pending.  Discussed symptomatic treatment with Tylenol, rest, and hydration.  Discussed symptoms that warrant emergent follow-up.    - Symptomatic; Yes; 1/11/2022 COVID-19 Virus (Coronavirus) by PCR Nose; Future  - Influenza A/B antigen      See Patient Instructions    Return in about 1 week (around 1/25/2022), or if symptoms worsen or fail to improve.    ROCAEL Meyer [facility] OLEKSANDR Reaves is a 37 year old who presents for the following health issues     HPI     Acute Illness  Acute illness concerns: Body aches  Onset/Duration: 1 week  Symptoms:  Fever: no  Chills/Sweats: no  Headache (location?): YES  Sinus Pressure: no  Conjunctivitis:  no  Ear Pain: no  Rhinorrhea: YES  Congestion: YES  Sore Throat: no  Cough: no  Wheeze: no  Decreased Appetite: no  Nausea: no  Vomiting: no  Diarrhea: no  Dysuria/Freq.: no  Dysuria or Hematuria: no  Fatigue/Achiness: YES  Sick/Strep Exposure: YES- Son had positive Covid exposure at school, daughter also with sx  Therapies tried and outcome: None          Objective           Vitals:  No vitals were obtained today due to virtual visit.    Physical Exam   healthy, alert and no distress  PSYCH: Alert and oriented times 3; coherent speech, normal   rate and volume, able to articulate logical thoughts, able   to abstract reason, no tangential thoughts, no hallucinations   or delusions  Her affect is normal  RESP: No cough, no audible wheezing, able to talk in  full sentences  Remainder of exam unable to be completed due to telephone visits          Phone call duration: 7 minutes

## 2022-01-31 ENCOUNTER — APPOINTMENT (OUTPATIENT)
Dept: GENERAL RADIOLOGY | Facility: CLINIC | Age: 37
End: 2022-01-31
Attending: EMERGENCY MEDICINE
Payer: MEDICARE

## 2022-01-31 ENCOUNTER — HOSPITAL ENCOUNTER (EMERGENCY)
Facility: CLINIC | Age: 37
Discharge: HOME | End: 2022-01-31
Attending: EMERGENCY MEDICINE | Admitting: PHYSICIAN ASSISTANT
Payer: MEDICARE

## 2022-01-31 VITALS
DIASTOLIC BLOOD PRESSURE: 67 MMHG | RESPIRATION RATE: 16 BRPM | OXYGEN SATURATION: 98 % | HEART RATE: 94 BPM | HEIGHT: 66 IN | WEIGHT: 159 LBS | BODY MASS INDEX: 25.55 KG/M2 | SYSTOLIC BLOOD PRESSURE: 117 MMHG | TEMPERATURE: 99.3 F
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
## 2022-01-31 DIAGNOSIS — S82.839A: ICD-10-CM
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
## 2022-01-31 DIAGNOSIS — S82.832A CLOSED FRACTURE OF DISTAL END OF LEFT FIBULA, UNSPECIFIED FRACTURE MORPHOLOGY, INITIAL ENCOUNTER: ICD-10-CM

## 2022-01-31 PROCEDURE — 73590 X-RAY EXAM OF LOWER LEG: CPT | Mod: LT

## 2022-01-31 PROCEDURE — 250N000013 HC RX MED GY IP 250 OP 250 PS 637: Performed by: PHYSICIAN ASSISTANT

## 2022-01-31 PROCEDURE — 27786 TREATMENT OF ANKLE FRACTURE: CPT | Performed by: PHYSICIAN ASSISTANT

## 2022-01-31 PROCEDURE — 99284 EMERGENCY DEPT VISIT MOD MDM: CPT | Mod: 25 | Performed by: PHYSICIAN ASSISTANT

## 2022-01-31 PROCEDURE — 73590 X-RAY EXAM OF LOWER LEG: CPT | Mod: 26 | Performed by: RADIOLOGY

## 2022-01-31 PROCEDURE — 73610 X-RAY EXAM OF ANKLE: CPT | Mod: LT

## 2022-01-31 PROCEDURE — 73610 X-RAY EXAM OF ANKLE: CPT | Mod: 26 | Performed by: RADIOLOGY

## 2022-01-31 PROCEDURE — 99284 EMERGENCY DEPT VISIT MOD MDM: CPT | Mod: 57 | Performed by: PHYSICIAN ASSISTANT

## 2022-01-31 PROCEDURE — 27786 TREATMENT OF ANKLE FRACTURE: CPT | Mod: 54 | Performed by: PHYSICIAN ASSISTANT

## 2022-01-31 RX ORDER — HYDROCODONE BITARTRATE AND ACETAMINOPHEN 5; 325 MG/1; MG/1
1 TABLET ORAL ONCE
Status: COMPLETED | OUTPATIENT
Start: 2022-01-31 | End: 2022-01-31

## 2022-01-31 RX ADMIN — HYDROCODONE BITARTRATE AND ACETAMINOPHEN 1 TABLET: 5; 325 TABLET ORAL at 18:09

## 2022-01-31 ASSESSMENT — MIFFLIN-ST. JEOR: SCORE: 1432.97

## 2022-01-31 NOTE — ED PROVIDER NOTES
ED Provider Note  [facility]      History     Chief Complaint   Patient presents with     Fall     Leg Injury     HPI  Jelly Dietz is a 37 year old female with history of IgA nephropathy s/p kidney transplant on 6/19/2020, ACS who presents with leg injury after a fall.  Patient presents alone.  States about an hour and half ago prior to coming the emergency department, she was going down some steps at home.  States that she was try to go quickly, lost her balance, fell forward.  She states that she fell on an outstretched hand bilaterally, as well as did hit the inferior aspect of her lower lip.  She also reports twisting her left ankle.  She was initially put to ambulate, and comes to the emergency department for further evaluation.  She states that she did hit the right temporal region of her head, though denies any loss of consciousness, associated headache, changes in vision or any vomiting.  She is anticoagulated with aspirin only.    She states her main concern today is with left ankle pain is localized to the medial lateral aspect of the left ankle some involvement of the more distal aspect of the left lower leg.  She denies any numbness or tingling of the left lower extremity.  She denies any history of ankle problems in the past.  She denies any knee pain.  She denies any right lower extremity pain, pain in her hands or wrists.  She did take some Tylenol at home prior to coming into the emergency department.    Epic records reviewed, she is on baby aspirin, losartan, tacrolimus, prednisone and Bactrim.  She is otherwise not on any prescription anticoagulation.    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 Induction w/ thymo 5.7mg/kg.     MRSA (methicillin resistant Staphylococcus aureus)      [facility]     Pre-eclampsia      Renal failure affecting pregnancy in second trimester      S/P kidney transplant 2007    LDKT in 2007 in [place]. History of 1B kidney rejection. Failed .     SAB (spontaneous )     2nd trimester      Past Surgical History:   Procedure Laterality Date     CERCLAGE CERVICAL N/A 2015    Procedure: CERCLAGE CERVICAL;  Surgeon: Norbert Chopra MD;  Location: [facility] L[facility]      SECTION  2012    Procedure:  SECTION;;  Surgeon: Chelsea Cross MD;  Location: [facility] L[facility]      SECTION N/A 2015    Procedure:  SECTION;  Surgeon: Chelsea Cross MD;  Location: [facility] OR     cesearean section       CYSTOSCOPY, REMOVE STENT(S), COMBINED N/A 2020    Procedure: CYSTOSCOPY, WITH STENT REMOVAL;  Surgeon: Nory Morgan MD;  Location: [facility] OR     EXPLANT TRANSPLANTED KIDNEY  3/29/2013    Procedure: EXPLANT TRANSPLANTED KIDNEY;  Explant Transplanted right Kidney (from patients right iliac fossa);  Surgeon: Nory Morgan MD;  Location: [facility] OR     IR FINE NEEDLE ASPIRATION W ULTRASOUND  2020     IR PICC PLACEMENT > 5 YRS OF AGE  7/10/2020     IR RENAL BIOPSY LEFT  2020     MIDLINE DOUBLE LUMEN PLACEMENT Right 2020    unable to place PICC line, MIDLINE placed     NEPHRECTOMY  3/29/13    Transplant nephrectomy      WILIAN/DIALYSIS CATHETER       TRANSPLANT KIDNEY RECIPIENT  DONOR N/A 2020    Procedure:  "TRANSPLANT, KIDNEY, RECIPIENT,  DONOR, venous reconstruction, and back bench preparation of kidney;  Surgeon: Irma Shannon MD;  Location: [facility] OR     TRANSPLANT KIDNEY RECIPIENT LIVING UNRELATED  Dec 2007    (Adult male in [place])     aspirin (ASA) 81 MG chewable tablet  atorvastatin (LIPITOR) 10 MG tablet  hydrOXYzine (ATARAX) 25 MG tablet  losartan (COZAAR) 25 MG tablet  magnesium oxide (MAG-OX) 400 MG tablet  pantoprazole (PROTONIX) 20 MG EC tablet  PHOSPHORUS TABLET 250  MG per tablet  predniSONE (DELTASONE) 5 MG tablet  sulfamethoxazole-trimethoprim (BACTRIM) 400-80 MG tablet  tacrolimus (GENERIC EQUIVALENT) 0.5 MG capsule  Vitamin D3 (CHOLECALCIFEROL) 25 mcg (1000 units) tablet      No Known Allergies  Family History  Family History   Problem Relation Age of Onset     Diabetes Paternal Grandfather      Breast Cancer Maternal Grandmother 55     Cancer No family hx of         No known family hx of skin cancer     Social History   Social History     Tobacco Use     Smoking status: Never Smoker     Smokeless tobacco: Never Used   Substance Use Topics     Alcohol use: No     Drug use: No      Past medical history, past surgical history, medications, allergies, family history, and social history were reviewed with the patient. No additional pertinent items.       Review of Systems  A complete review of systems was performed with pertinent positives and negatives noted in the HPI, and all other systems negative.    Physical Exam   BP: 111/65  Pulse: 100  Temp: 99.3  F (37.4  C)  Resp: 16  Height: 167.6 cm (5' 6\")  Weight: 72.1 kg (159 lb)  SpO2: 99 %  Physical Exam  Constitutional:       Appearance: Normal appearance.   HENT:      Head: Normocephalic.      Comments: Patient has small ecchymosis and hematoma to the right temporal region of the forehead, this is without significant tenderness, open skin.     Mouth/Throat:      Mouth: Mucous membranes are moist.   Eyes:      Extraocular " Movements: Extraocular movements intact.      Conjunctiva/sclera: Conjunctivae normal.      Pupils: Pupils are equal, round, and reactive to light.   Cardiovascular:      Rate and Rhythm: Normal rate.   Pulmonary:      Breath sounds: Normal breath sounds.   Abdominal:      Palpations: Abdomen is soft.   Musculoskeletal:         General: Swelling present.      Comments: Examination of the left ankle reveals focal swelling about the medial lateral malleoli regions with some visible ecchymosis present.  Appreciate no visible involvement of the left foot, knee or shin.  Patient does have focal tenderness to palpation of these regions as well.  She has no tenderness to palpation over the metatarsals, tibial plateau region of the left knee.  No focal tenderness noted palpation of the proximal aspect of the left shin.  Patient has no tenderness to palpation of the wrists, hands bilaterally including snuffbox.   Skin:     General: Skin is warm.      Capillary Refill: Capillary refill takes less than 2 seconds.      Comments: Examination of the mucosal aspect of the inferior lip reveals superficial abrasion, appreciate no visible loose teeth, current bleeding, deeper lacerations noted.   Neurological:      General: No focal deficit present.      Mental Status: She is alert. Mental status is at baseline.      Cranial Nerves: No cranial nerve deficit.      Sensory: No sensory deficit.       ED [facility]    Splint Application    Date/Time: 1/31/2022 5:50 PM  Performed by: Priscilla Noe PA-C  Authorized by: Priscilla Noe PA-C     Risks, benefits and alternatives discussed.      PRE-PROCEDURE DETAILS     Sensation:  Normal    PROCEDURE DETAILS     Laterality:  Left    Location:  Ankle    Ankle:  L ankle    Splint type: posterior ankle splint.    POST PROCEDURE DETAILS     Pain:  Unchanged    Sensation:  Normal              Results for orders placed or performed  during the hospital encounter of 01/31/22   XR Ankle Left G/E 3 Views     Status: None    Narrative    EXAM: XR TIBIA & FIBULA LT 2 VW, XR ANKLE LEFT G/E 3 VIEWS   1/31/2022 4:17 PM      HISTORY: Trauma    COMPARISON: None    FINDINGS: Nonweightbearing AP and lateral views of the left leg.  Nonweightbearing AP, oblique, and lateral views of the left ankle.    Nondisplaced transverse fracture through the distal fibula. Lucency  through the medial malleolus with adjacent density suspicious for  additional fracture. Ankle mortise and syndesmosis appear congruent.  No posterior malleolus fracture. Overlying soft tissue swelling,  especially laterally. Proximal tib-fib joint appears congruent. No  significant degenerative change of the left knee.       Impression    IMPRESSION: Transverse nondisplaced distal fibular fracture. Lucency  in the medial malleolus with adjacent calcific fragments suspicious  for additional fracture, attention on follow-up.         CECILLE CHRISTIANSON MD (Joe)         SYSTEM ID:  [id number]   XR Tibia & Fibula Left 2 Views     Status: None    Narrative    EXAM: XR TIBIA & FIBULA LT 2 VW, XR ANKLE LEFT G/E 3 VIEWS   1/31/2022 4:17 PM      HISTORY: Trauma    COMPARISON: None    FINDINGS: Nonweightbearing AP and lateral views of the left leg.  Nonweightbearing AP, oblique, and lateral views of the left ankle.    Nondisplaced transverse fracture through the distal fibula. Lucency  through the medial malleolus with adjacent density suspicious for  additional fracture. Ankle mortise and syndesmosis appear congruent.  No posterior malleolus fracture. Overlying soft tissue swelling,  especially laterally. Proximal tib-fib joint appears congruent. No  significant degenerative change of the left knee.       Impression    IMPRESSION: Transverse nondisplaced distal fibular fracture. Lucency  in the medial malleolus with adjacent calcific fragments suspicious  for additional fracture, attention on  follow-up.         CECILLE CHRISTIANSON MD (Joe)         SYSTEM ID:  [id number]     Medications   HYDROcodone-acetaminophen (NORCO) 5-325 MG per tablet 1 tablet (has no administration in time range)        Assessments & Plan (with Medical Decision Making)   At this time, discussed with patient findings today from history and physical exam.  Patient resents with stable vital signs in the emergency department.  She presents with focal tenderness to palpation of the left ankle after recent injury.  She has normal neurologic exam.  Without any red flag signs regards to neurologic status, findings not consistent with intracranial etiology.  She has no other abnormal findings with musculoskeletal examination.    Radiographs of the left ankl hand tib-fib were ordered, returned.  I'm interpretation, did appreciate a distal fibular fracture, which was also noted by the radiologist.  Radiology also has some concern for possible medial malleolar fracture with adjacent calcific fragments noted on the radiographs.    With these findings, I did discuss this with the patient.  Patient was placed in posterior short leg splint by myself with the assistance of Dr. Cleary.  Patient does request pain medication here in the emergency department, and was given 1 Vicodin here.  We discussed Tylenol to use at home, she is a kidney transplant patient and therefore should not be taking NSAIDs.  Discussed importance of ice elevation, and follow-up with orthopedics.  She was given crutches and placed nonweightbearing until follow-up.  Patient has no other questions or concerns at this time.  Red flag signs were addressed, and they were in agreement with the patient care plan provided.    I have reviewed the nursing notes. I have reviewed the findings, diagnosis, plan and need for follow up with the patient.    New Prescriptions    No medications on file       Final diagnoses:   Pathological fracture of left fibula       --    [facility]  [facility] EMERGENCY DEPARTMENT  1/31/2022     Priscilla Noe PA-C  02/01/22 [id number]

## 2022-01-31 NOTE — ED TRIAGE NOTES
Pt fell down stairs in apartment, hurt left leg, bumped lip and hit right side of head. Did not lose consciousness. Left ankle is swollen. Hx kidney transplant.

## 2022-01-31 NOTE — Clinical Note
Jelly Dietz was seen and treated in our emergency department on 1/31/2022.  She may return to work on 02/07/2022.  Patient is to be nonweightbearing with crutches until follow-up with orthopedics in 1 week.     If you have any questions or concerns, please don't hesitate to call.      Priscilla Noe PA-C

## 2022-02-01 NOTE — DISCHARGE INSTRUCTIONS
Here in the emergency department, you had x-rays with findings consistent with a distal fibular fracture.  You were placed in a splint today, we discussed the importance of elevation, ice, Tylenol only for pain.  The splint should remain on until you follow-up with orthopedics, we discussed not getting this wet.  You're given crutches and we discussed importance of nonweightbearing until he can follow-up with orthopedics.

## 2022-02-01 NOTE — TELEPHONE ENCOUNTER
DIAGNOSIS: Left distal fibular fracture   APPOINTMENT DATE: 2.7.22   NOTES STATUS DETAILS   OFFICE NOTE from referring provider N/A    OFFICE NOTE from other specialist N/A    DISCHARGE SUMMARY from hospital N/A    DISCHARGE REPORT from the ER Internal 1.31.22 [facility] ED   OPERATIVE REPORT N/A    EMG report N/A    MEDICATION LIST Internal    MRI N/A    DEXA (osteoporosis/bone health) N/A    CT SCAN N/A    XRAYS (IMAGES & REPORTS) Internal 1.31.22 L ankle, L tib fib

## 2022-02-02 ENCOUNTER — NURSE TRIAGE (OUTPATIENT)
Dept: NURSING | Facility: CLINIC | Age: 37
End: 2022-02-02

## 2022-02-02 ENCOUNTER — HOSPITAL ENCOUNTER (EMERGENCY)
Facility: CLINIC | Age: 37
Discharge: HOME | End: 2022-02-02
Attending: EMERGENCY MEDICINE | Admitting: PHYSICIAN ASSISTANT
Payer: MEDICARE

## 2022-02-02 VITALS
OXYGEN SATURATION: 100 % | BODY MASS INDEX: 24.86 KG/M2 | SYSTOLIC BLOOD PRESSURE: 118 MMHG | HEART RATE: 87 BPM | DIASTOLIC BLOOD PRESSURE: 74 MMHG | RESPIRATION RATE: 15 BRPM | WEIGHT: 154 LBS | TEMPERATURE: 98.2 F

## 2022-02-02 DIAGNOSIS — S82.832A CLOSED FRACTURE OF DISTAL END OF LEFT FIBULA, UNSPECIFIED FRACTURE MORPHOLOGY, INITIAL ENCOUNTER: ICD-10-CM

## 2022-02-02 PROBLEM — B34.8 BK VIREMIA: Status: ACTIVE | Noted: 2021-02-09

## 2022-02-02 PROCEDURE — 99283 EMERGENCY DEPT VISIT LOW MDM: CPT | Performed by: PHYSICIAN ASSISTANT

## 2022-02-02 RX ORDER — HYDROCODONE BITARTRATE AND ACETAMINOPHEN 5; 325 MG/1; MG/1
1 TABLET ORAL EVERY 6 HOURS PRN
Qty: 12 TABLET | Refills: 0 | Status: SHIPPED | OUTPATIENT
Start: 2022-02-02 | End: 2022-02-02

## 2022-02-02 RX ORDER — HYDROCODONE BITARTRATE AND ACETAMINOPHEN 5; 325 MG/1; MG/1
1 TABLET ORAL EVERY 6 HOURS PRN
Qty: 12 TABLET | Refills: 0 | Status: SHIPPED | OUTPATIENT
Start: 2022-02-02 | End: 2022-05-09

## 2022-02-02 RX ORDER — DIPHENHYDRAMINE HCL 25 MG
25-50 TABLET ORAL EVERY 6 HOURS PRN
Qty: 30 TABLET | Refills: 0 | Status: SHIPPED | OUTPATIENT
Start: 2022-02-02 | End: 2022-05-09

## 2022-02-02 RX ORDER — ONDANSETRON 4 MG/1
4 TABLET, FILM COATED ORAL EVERY 8 HOURS PRN
Qty: 15 TABLET | Refills: 0 | Status: SHIPPED | OUTPATIENT
Start: 2022-02-02 | End: 2022-05-09

## 2022-02-02 RX ORDER — DIPHENHYDRAMINE HCL 25 MG
25-50 TABLET ORAL EVERY 6 HOURS PRN
Qty: 30 TABLET | Refills: 0 | Status: SHIPPED | OUTPATIENT
Start: 2022-02-02 | End: 2022-02-02

## 2022-02-02 RX ORDER — ONDANSETRON 4 MG/1
4 TABLET, FILM COATED ORAL EVERY 8 HOURS PRN
Qty: 15 TABLET | Refills: 0 | Status: SHIPPED | OUTPATIENT
Start: 2022-02-02 | End: 2022-02-02

## 2022-02-03 NOTE — TELEPHONE ENCOUNTER
Patient calling - says she just left the ED with a broken leg.   Says her prescriptions were sent to a pharmacy that is currently closed.  She says she really needs the medication because she is in a lot of pain.    Is requesting prescriptions be re-sent to [facility] on [address] in [place].    Call placed to [facility] ED.  Spoke with charge nurse, Yash, and relayed patient's request.  Call was placed on hold and he never returned.  Message sent to provider as well.    Beverly Mendoza RN  Triage Nurse Advisor      Reason for Disposition    [1] Prescription not at pharmacy AND [2] was prescribed by PCP recently     Prescribed by ED provider & sent to closed pharmacy    Protocols used: MEDICATION QUESTION CALL-A-
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
## 2022-02-03 NOTE — ED TRIAGE NOTES
Returns to ER for worsening right foot pain. Was seen for same earlier this week, dx'ed with fracture at that time. Also wishing to have kidney function evaluated, no new symptoms at this time. (Transplant 2020)

## 2022-02-04 DIAGNOSIS — M25.572 LEFT ANKLE PAIN: Primary | ICD-10-CM

## 2022-02-07 ENCOUNTER — PRE VISIT (OUTPATIENT)
Dept: ORTHOPEDICS | Facility: CLINIC | Age: 37
End: 2022-02-07

## 2022-02-07 ENCOUNTER — ANCILLARY PROCEDURE (OUTPATIENT)
Dept: GENERAL RADIOLOGY | Facility: CLINIC | Age: 37
End: 2022-02-07
Attending: FAMILY MEDICINE
Payer: MEDICARE

## 2022-02-07 ENCOUNTER — OFFICE VISIT (OUTPATIENT)
Dept: ORTHOPEDICS | Facility: CLINIC | Age: 37
End: 2022-02-07
Payer: MEDICARE

## 2022-02-07 VITALS — HEIGHT: 66 IN | WEIGHT: 154 LBS | BODY MASS INDEX: 24.75 KG/M2

## 2022-02-07 DIAGNOSIS — Z48.298 AFTERCARE FOLLOWING ORGAN TRANSPLANT: ICD-10-CM

## 2022-02-07 DIAGNOSIS — Z94.0 KIDNEY TRANSPLANTED: Primary | ICD-10-CM

## 2022-02-07 DIAGNOSIS — N17.9 ACUTE RENAL FAILURE, UNSPECIFIED ACUTE RENAL FAILURE TYPE (H): ICD-10-CM

## 2022-02-07 DIAGNOSIS — Z94.0 KIDNEY TRANSPLANTED: ICD-10-CM

## 2022-02-07 DIAGNOSIS — Z79.899 ENCOUNTER FOR LONG-TERM CURRENT USE OF MEDICATION: ICD-10-CM

## 2022-02-07 DIAGNOSIS — S82.832D OTHER CLOSED FRACTURE OF DISTAL END OF LEFT FIBULA WITH ROUTINE HEALING, SUBSEQUENT ENCOUNTER: Primary | ICD-10-CM

## 2022-02-07 DIAGNOSIS — Z94.0 KIDNEY REPLACED BY TRANSPLANT: ICD-10-CM

## 2022-02-07 PROCEDURE — 99204 OFFICE O/P NEW MOD 45 MIN: CPT | Performed by: STUDENT IN AN ORGANIZED HEALTH CARE EDUCATION/TRAINING PROGRAM

## 2022-02-07 PROCEDURE — 73610 X-RAY EXAM OF ANKLE: CPT | Mod: LT | Performed by: RADIOLOGY

## 2022-02-07 RX ORDER — DICLOFENAC SODIUM 75 MG/1
75 TABLET, DELAYED RELEASE ORAL 2 TIMES DAILY
Qty: 60 TABLET | Refills: 0 | Status: SHIPPED | OUTPATIENT
Start: 2022-02-07 | End: 2022-03-16

## 2022-02-07 RX ORDER — ASPIRIN 81 MG/1
TABLET, CHEWABLE ORAL
Qty: 30 TABLET | Refills: 4 | Status: SHIPPED | OUTPATIENT
Start: 2022-02-07 | End: 2022-04-23

## 2022-02-07 RX ORDER — ATORVASTATIN CALCIUM 10 MG/1
TABLET, FILM COATED ORAL
Qty: 30 TABLET | Refills: 2 | Status: SHIPPED | OUTPATIENT
Start: 2022-02-07 | End: 2022-04-23

## 2022-02-07 RX ORDER — PREDNISONE 5 MG/1
5 TABLET ORAL DAILY
Qty: 90 TABLET | Refills: 0 | Status: SHIPPED | OUTPATIENT
Start: 2022-02-07 | End: 2022-03-11

## 2022-02-07 ASSESSMENT — MIFFLIN-ST. JEOR: SCORE: 1410.29

## 2022-02-07 NOTE — LETTER
"  2/7/2022      RE: Jelly Dietz  [address]  [address]  [facility] [address]       [facility]  Sports Medicine Clinic  [facility]           SUBJECTIVE       Jelly Dietz is a 37 year old female presenting to clinic today with a left distal fib fracture.    Background:   Date of injury: 1/31/22  Duration of symptoms: 7 days  Mechanism of Injury: Pt fell down the stairs and rolled her ankle  Intensity: 9/10  Aggravating factors:  constant   Relieving Factors: Hydrocodone   Prior Evaluation: ED, 1/31/22  \"At this time, discussed with patient findings today from history and physical exam.  Patient resents with stable vital signs in the emergency department.  She presents with focal tenderness to palpation of the left ankle after recent injury.  She has normal neurologic exam.  Without any red flag signs regards to neurologic status, findings not consistent with intracranial etiology.  She has no other abnormal findings with musculoskeletal examination.     Radiographs of the left ankl hand tib-fib were ordered, returned.  I'm interpretation, did appreciate a distal fibular fracture, which was also noted by the radiologist.  Radiology also has some concern for possible medial malleolar fracture with adjacent calcific fragments noted on the radiographs.     With these findings, I did discuss this with the patient.  Patient was placed in posterior short leg splint by myself with the assistance of Dr. Cleary.  Patient does request pain medication here in the emergency department, and was given 1 Vicodin here.  We discussed Tylenol to use at home, she is a kidney transplant patient and therefore should not be taking NSAIDs.  Discussed importance of ice elevation, and follow-up with orthopedics.  She was given crutches and placed nonweightbearing until follow-up.  Patient has no other questions or concerns at this time.  Red flag signs were addressed, and they were in agreement with " "the patient care plan provided.\"  Study Result    Narrative & Impression   EXAM: XR TIBIA & FIBULA LT 2 VW, XR ANKLE LEFT G/E 3 VIEWS   1/31/2022 4:17 PM       HISTORY: Trauma     COMPARISON: None     FINDINGS: Nonweightbearing AP and lateral views of the left leg.  Nonweightbearing AP, oblique, and lateral views of the left ankle.     Nondisplaced transverse fracture through the distal fibula. Lucency  through the medial malleolus with adjacent density suspicious for  additional fracture. Ankle mortise and syndesmosis appear congruent.  No posterior malleolus fracture. Overlying soft tissue swelling,  especially laterally. Proximal tib-fib joint appears congruent. No  significant degenerative change of the left knee.                                                                       IMPRESSION: Transverse nondisplaced distal fibular fracture. Lucency  in the medial malleolus with adjacent calcific fragments suspicious  for additional fracture, attention on follow-up.         PMH, Medications and Allergies were reviewed and updated as needed.    ROS:  As noted above otherwise negative.    Patient Active Problem List   Diagnosis     CARDIOVASCULAR SCREENING; LDL GOAL LESS THAN 160     Kidney replaced by transplant     HTN, kidney transplant related     ACS (acute coronary syndrome) (H)     Anemia in chronic renal disease     IgA nephropathy     Anxiety     Kidney transplanted     Immunosuppression (H)     Painful bladder spasm     Hypophosphatemia     Constipation due to pain medication     Aftercare following organ transplant     Hypomagnesemia     Dehydration     Acute renal failure (ARF) (H)     Acute rejection of kidney transplant     Metabolic acidosis     Steroid-induced hyperglycemia     Nausea vomiting and diarrhea     Hypervolemia     Anemia     BK viremia       Current Outpatient Medications   Medication Sig Dispense Refill     aspirin (ASA) 81 MG chewable tablet CHEW AND SWALLOW ONE TABLET BY MOUTH ONCE " "DAILY 30 tablet 5     atorvastatin (LIPITOR) 10 MG tablet Take 1 tablet (10 mg) by mouth daily 30 tablet 5     diphenhydrAMINE (BENADRYL) 25 MG tablet Take 1-2 tablets (25-50 mg) by mouth every 6 hours as needed for itching 30 tablet 0     HYDROcodone-acetaminophen (NORCO) 5-325 MG tablet Take 1 tablet by mouth every 6 hours as needed for pain 12 tablet 0     hydrOXYzine (ATARAX) 25 MG tablet Take 1 tablet (25 mg) by mouth nightly as needed for other (sleep) 15 tablet 0     losartan (COZAAR) 25 MG tablet Take 0.5 tablets (12.5 mg) by mouth At Bedtime 30 tablet 6     magnesium oxide (MAG-OX) 400 MG tablet TAKE TWO TABLETS BY MOUTH TWICE A DAY 60 tablet 3     ondansetron (ZOFRAN) 4 MG tablet Take 1 tablet (4 mg) by mouth every 8 hours as needed 15 tablet 0     pantoprazole (PROTONIX) 20 MG EC tablet Take 1 tablet (20 mg) by mouth daily 90 tablet 3     PHOSPHORUS TABLET 250  MG per tablet TAKE 2 TABLETS (500 MG) BY MOUTH 2 TIMES DAILY 120 tablet 3     predniSONE (DELTASONE) 5 MG tablet Take 1 tablet (5 mg) by mouth daily 90 tablet 0     sulfamethoxazole-trimethoprim (BACTRIM) 400-80 MG tablet Take 1 tablet by mouth daily 30 tablet 0     tacrolimus (GENERIC EQUIVALENT) 0.5 MG capsule Take 1 capsule (0.5 mg) by mouth 2 times daily Total dose = 4.5 mg BID 60 capsule 0     Vitamin D3 (CHOLECALCIFEROL) 25 mcg (1000 units) tablet Take 2 tablets (50 mcg) by mouth daily 60 tablet 3            OBJECTIVE:       Vitals:   Vitals:    02/07/22 1411   Weight: 69.9 kg (154 lb)   Height: 1.676 m (5' 6\")     BMI: Body mass index is 24.86 kg/m .    Gen:  Well nourished and in no acute distress  HEENT: Extraocular movement intact  Neck: Supple  Pulm:  Breathing Comfortably. No increased respiratory effort.  Psych: Euthymic. Appropriately answers questions    MSK: Left ankle visible edema about the medial and lateral portions.  Tenderness to palpation of the lateral malleolus, medial malleolus, and superior border of the calcaneus.  " Range of motion limited secondary to swelling and pain.  No Achilles tendon tenderness, with a negative Ly test.  Remainder of examination diminished secondary to intolerance of physical exam.      XRAY : Continue evidence of distal fibula fracture.  Will await official radiologic read.          ASSESSMENT and PLAN:     Jelly was seen today for pain.    Diagnoses and all orders for this visit:    Other closed fracture of distal end of left fibula with routine healing, subsequent encounter    37-year-old female presenting to clinic today 7 days after emergency room visit for a transverse closed distal fibula fracture.  Patient is nonweightbearing and was previously in a splint.  Given the read from the emergency department x-rays, there was concern for a transverse nondisplaced distal fibular fracture, but a lucency in the medial malleolus was also suspicious for additional fracture.  Given the examination patient is tender in that area as well.  Overall concern today would be for a bimalleolar fracture.  The patient is heading back to [facility] today, and will not be following up in this clinic.  Based on her overall presentation as well as concerns and areas of tendernes, would recommend CT evaluation for further appropriate management.  Have discussed this at length with the patient.  Given the fact the patient is leaving today, we have not ordered a CT as she would not be following up here.  I have recommended that the patient follow-up with an orthopedist in [place] or [facility].  She is amendable to this.  For immobilization, I have placed the patient in a cam boot.  She should remain nonweightbearing.  Should follow-up with orthopedist in the above listed area.  For her pain, I have sent diclofenac 75 mg to be used twice daily with food.  No other NSAIDs while on this medication.  Can use Tylenol as needed.    Was a pleasure seeing the patient today.      Options for treatment and/or  follow-up care were reviewed with the patient was actively involved in the decision making process. Patient verbalized understanding and was in agreement with the plan.    Naeem Cuenca DO  , Sports Medicine  Department of Family Medicine and [facility]

## 2022-02-07 NOTE — PROGRESS NOTES
"[facility]  Sports Medicine Clinic  [facility]           SUBJECTIVE       Jelly Dietz is a 37 year old female presenting to clinic today with a left distal fib fracture.    Background:   Date of injury: 1/31/22  Duration of symptoms: 7 days  Mechanism of Injury: Pt fell down the stairs and rolled her ankle  Intensity: 9/10  Aggravating factors:  constant   Relieving Factors: Hydrocodone   Prior Evaluation: ED, 1/31/22  \"At this time, discussed with patient findings today from history and physical exam.  Patient resents with stable vital signs in the emergency department.  She presents with focal tenderness to palpation of the left ankle after recent injury.  She has normal neurologic exam.  Without any red flag signs regards to neurologic status, findings not consistent with intracranial etiology.  She has no other abnormal findings with musculoskeletal examination.     Radiographs of the left ankl hand tib-fib were ordered, returned.  I'm interpretation, did appreciate a distal fibular fracture, which was also noted by the radiologist.  Radiology also has some concern for possible medial malleolar fracture with adjacent calcific fragments noted on the radiographs.     With these findings, I did discuss this with the patient.  Patient was placed in posterior short leg splint by myself with the assistance of Dr. Cleary.  Patient does request pain medication here in the emergency department, and was given 1 Vicodin here.  We discussed Tylenol to use at home, she is a kidney transplant patient and therefore should not be taking NSAIDs.  Discussed importance of ice elevation, and follow-up with orthopedics.  She was given crutches and placed nonweightbearing until follow-up.  Patient has no other questions or concerns at this time.  Red flag signs were addressed, and they were in agreement with the patient care plan provided.\"  Study Result    Narrative & Impression   EXAM: XR TIBIA & " FIBULA LT 2 VW, XR ANKLE LEFT G/E 3 VIEWS   1/31/2022 4:17 PM       HISTORY: Trauma     COMPARISON: None     FINDINGS: Nonweightbearing AP and lateral views of the left leg.  Nonweightbearing AP, oblique, and lateral views of the left ankle.     Nondisplaced transverse fracture through the distal fibula. Lucency  through the medial malleolus with adjacent density suspicious for  additional fracture. Ankle mortise and syndesmosis appear congruent.  No posterior malleolus fracture. Overlying soft tissue swelling,  especially laterally. Proximal tib-fib joint appears congruent. No  significant degenerative change of the left knee.                                                                       IMPRESSION: Transverse nondisplaced distal fibular fracture. Lucency  in the medial malleolus with adjacent calcific fragments suspicious  for additional fracture, attention on follow-up.         PMH, Medications and Allergies were reviewed and updated as needed.    ROS:  As noted above otherwise negative.    Patient Active Problem List   Diagnosis     CARDIOVASCULAR SCREENING; LDL GOAL LESS THAN 160     Kidney replaced by transplant     HTN, kidney transplant related     ACS (acute coronary syndrome) (H)     Anemia in chronic renal disease     IgA nephropathy     Anxiety     Kidney transplanted     Immunosuppression (H)     Painful bladder spasm     Hypophosphatemia     Constipation due to pain medication     Aftercare following organ transplant     Hypomagnesemia     Dehydration     Acute renal failure (ARF) (H)     Acute rejection of kidney transplant     Metabolic acidosis     Steroid-induced hyperglycemia     Nausea vomiting and diarrhea     Hypervolemia     Anemia     BK viremia       Current Outpatient Medications   Medication Sig Dispense Refill     aspirin (ASA) 81 MG chewable tablet CHEW AND SWALLOW ONE TABLET BY MOUTH ONCE DAILY 30 tablet 5     atorvastatin (LIPITOR) 10 MG tablet Take 1 tablet (10 mg) by mouth  "daily 30 tablet 5     diphenhydrAMINE (BENADRYL) 25 MG tablet Take 1-2 tablets (25-50 mg) by mouth every 6 hours as needed for itching 30 tablet 0     HYDROcodone-acetaminophen (NORCO) 5-325 MG tablet Take 1 tablet by mouth every 6 hours as needed for pain 12 tablet 0     hydrOXYzine (ATARAX) 25 MG tablet Take 1 tablet (25 mg) by mouth nightly as needed for other (sleep) 15 tablet 0     losartan (COZAAR) 25 MG tablet Take 0.5 tablets (12.5 mg) by mouth At Bedtime 30 tablet 6     magnesium oxide (MAG-OX) 400 MG tablet TAKE TWO TABLETS BY MOUTH TWICE A DAY 60 tablet 3     ondansetron (ZOFRAN) 4 MG tablet Take 1 tablet (4 mg) by mouth every 8 hours as needed 15 tablet 0     pantoprazole (PROTONIX) 20 MG EC tablet Take 1 tablet (20 mg) by mouth daily 90 tablet 3     PHOSPHORUS TABLET 250  MG per tablet TAKE 2 TABLETS (500 MG) BY MOUTH 2 TIMES DAILY 120 tablet 3     predniSONE (DELTASONE) 5 MG tablet Take 1 tablet (5 mg) by mouth daily 90 tablet 0     sulfamethoxazole-trimethoprim (BACTRIM) 400-80 MG tablet Take 1 tablet by mouth daily 30 tablet 0     tacrolimus (GENERIC EQUIVALENT) 0.5 MG capsule Take 1 capsule (0.5 mg) by mouth 2 times daily Total dose = 4.5 mg BID 60 capsule 0     Vitamin D3 (CHOLECALCIFEROL) 25 mcg (1000 units) tablet Take 2 tablets (50 mcg) by mouth daily 60 tablet 3            OBJECTIVE:       Vitals:   Vitals:    02/07/22 1411   Weight: 69.9 kg (154 lb)   Height: 1.676 m (5' 6\")     BMI: Body mass index is 24.86 kg/m .    Gen:  Well nourished and in no acute distress  HEENT: Extraocular movement intact  Neck: Supple  Pulm:  Breathing Comfortably. No increased respiratory effort.  Psych: Euthymic. Appropriately answers questions    MSK: Left ankle visible edema about the medial and lateral portions.  Tenderness to palpation of the lateral malleolus, medial malleolus, and superior border of the calcaneus.  Range of motion limited secondary to swelling and pain.  No Achilles tendon tenderness, " with a negative Ly test.  Remainder of examination diminished secondary to intolerance of physical exam.      XRAY : Continue evidence of distal fibula fracture.  Will await official radiologic read.          ASSESSMENT and PLAN:     Jelly was seen today for pain.    Diagnoses and all orders for this visit:    Other closed fracture of distal end of left fibula with routine healing, subsequent encounter    37-year-old female presenting to clinic today 7 days after emergency room visit for a transverse closed distal fibula fracture.  Patient is nonweightbearing and was previously in a splint.  Given the read from the emergency department x-rays, there was concern for a transverse nondisplaced distal fibular fracture, but a lucency in the medial malleolus was also suspicious for additional fracture.  Given the examination patient is tender in that area as well.  Overall concern today would be for a bimalleolar fracture.  The patient is heading back to [facility] today, and will not be following up in this clinic.  Based on her overall presentation as well as concerns and areas of tendernes, would recommend CT evaluation for further appropriate management.  Have discussed this at length with the patient.  Given the fact the patient is leaving today, we have not ordered a CT as she would not be following up here.  I have recommended that the patient follow-up with an orthopedist in [place] or [facility].  She is amendable to this.  For immobilization, I have placed the patient in a cam boot.  She should remain nonweightbearing.  Should follow-up with orthopedist in the above listed area.  For her pain, I have sent diclofenac 75 mg to be used twice daily with food.  No other NSAIDs while on this medication.  Can use Tylenol as needed.    Was a pleasure seeing the patient today.      Options for treatment and/or follow-up care were reviewed with the patient was actively involved in the decision  making process. Patient verbalized understanding and was in agreement with the plan.    Naeem Cuenca DO  , Sports Medicine  Department of Family Medicine and [facility]

## 2022-03-06 ENCOUNTER — HEALTH MAINTENANCE LETTER (OUTPATIENT)
Age: 37
End: 2022-03-06

## 2022-03-06 ENCOUNTER — HOSPITAL ENCOUNTER (EMERGENCY)
Facility: CLINIC | Age: 37
Discharge: HOME | End: 2022-03-07
Attending: EMERGENCY MEDICINE | Admitting: EMERGENCY MEDICINE
Payer: MEDICARE

## 2022-03-06 DIAGNOSIS — F43.22 ADJUSTMENT DISORDER WITH ANXIOUS MOOD: ICD-10-CM

## 2022-03-06 DIAGNOSIS — G47.00 INSOMNIA, UNSPECIFIED TYPE: ICD-10-CM

## 2022-03-06 PROCEDURE — 96374 THER/PROPH/DIAG INJ IV PUSH: CPT | Performed by: EMERGENCY MEDICINE

## 2022-03-06 PROCEDURE — 99285 EMERGENCY DEPT VISIT HI MDM: CPT | Mod: 25 | Performed by: EMERGENCY MEDICINE

## 2022-03-06 PROCEDURE — 99285 EMERGENCY DEPT VISIT HI MDM: CPT | Performed by: EMERGENCY MEDICINE

## 2022-03-06 PROCEDURE — 96361 HYDRATE IV INFUSION ADD-ON: CPT | Performed by: EMERGENCY MEDICINE

## 2022-03-07 ENCOUNTER — APPOINTMENT (OUTPATIENT)
Dept: GENERAL RADIOLOGY | Facility: CLINIC | Age: 37
End: 2022-03-07
Attending: EMERGENCY MEDICINE
Payer: MEDICARE

## 2022-03-07 VITALS
DIASTOLIC BLOOD PRESSURE: 78 MMHG | WEIGHT: 154 LBS | RESPIRATION RATE: 18 BRPM | OXYGEN SATURATION: 98 % | TEMPERATURE: 98.3 F | BODY MASS INDEX: 24.86 KG/M2 | SYSTOLIC BLOOD PRESSURE: 138 MMHG | HEART RATE: 74 BPM

## 2022-03-07 LAB
ALBUMIN UR-MCNC: 100 MG/DL
ANION GAP SERPL CALCULATED.3IONS-SCNC: 11 MMOL/L (ref 3–14)
APPEARANCE UR: ABNORMAL
BASOPHILS # BLD AUTO: 0 10E3/UL (ref 0–0.2)
BASOPHILS NFR BLD AUTO: 1 %
BILIRUB UR QL STRIP: NEGATIVE
BUN SERPL-MCNC: 11 MG/DL (ref 7–30)
CALCIUM SERPL-MCNC: 9.4 MG/DL (ref 8.5–10.1)
CHLORIDE BLD-SCNC: 106 MMOL/L (ref 94–109)
CO2 SERPL-SCNC: 21 MMOL/L (ref 20–32)
COLOR UR AUTO: ABNORMAL
CREAT SERPL-MCNC: 0.68 MG/DL (ref 0.52–1.04)
EOSINOPHIL # BLD AUTO: 0 10E3/UL (ref 0–0.7)
EOSINOPHIL NFR BLD AUTO: 0 %
ERYTHROCYTE [DISTWIDTH] IN BLOOD BY AUTOMATED COUNT: 13.1 % (ref 10–15)
GFR SERPL CREATININE-BSD FRML MDRD: >90 ML/MIN/1.73M2
GLUCOSE BLD-MCNC: 130 MG/DL (ref 70–99)
GLUCOSE UR STRIP-MCNC: NEGATIVE MG/DL
HCG UR QL: NEGATIVE
HCT VFR BLD AUTO: 32.5 % (ref 35–47)
HGB BLD-MCNC: 10.8 G/DL (ref 11.7–15.7)
HGB UR QL STRIP: ABNORMAL
IMM GRANULOCYTES # BLD: 0 10E3/UL
IMM GRANULOCYTES NFR BLD: 1 %
KETONES UR STRIP-MCNC: NEGATIVE MG/DL
LEUKOCYTE ESTERASE UR QL STRIP: ABNORMAL
LYMPHOCYTES # BLD AUTO: 0.5 10E3/UL (ref 0.8–5.3)
LYMPHOCYTES NFR BLD AUTO: 11 %
MCH RBC QN AUTO: 30.3 PG (ref 26.5–33)
MCHC RBC AUTO-ENTMCNC: 33.2 G/DL (ref 31.5–36.5)
MCV RBC AUTO: 91 FL (ref 78–100)
MONOCYTES # BLD AUTO: 0.2 10E3/UL (ref 0–1.3)
MONOCYTES NFR BLD AUTO: 4 %
NEUTROPHILS # BLD AUTO: 4.3 10E3/UL (ref 1.6–8.3)
NEUTROPHILS NFR BLD AUTO: 83 %
NITRATE UR QL: NEGATIVE
NRBC # BLD AUTO: 0 10E3/UL
NRBC BLD AUTO-RTO: 0 /100
PH UR STRIP: 6.5 [PH] (ref 5–7)
PLATELET # BLD AUTO: 279 10E3/UL (ref 150–450)
POTASSIUM BLD-SCNC: 3.8 MMOL/L (ref 3.4–5.3)
RBC # BLD AUTO: 3.57 10E6/UL (ref 3.8–5.2)
RBC URINE: 25 /HPF
SODIUM SERPL-SCNC: 138 MMOL/L (ref 133–144)
SP GR UR STRIP: 1 (ref 1–1.03)
SQUAMOUS EPITHELIAL: 7 /HPF
TSH SERPL DL<=0.005 MIU/L-ACNC: 1.07 MU/L (ref 0.4–4)
UROBILINOGEN UR STRIP-MCNC: NORMAL MG/DL
WBC # BLD AUTO: 5.1 10E3/UL (ref 4–11)
WBC URINE: 20 /HPF

## 2022-03-07 PROCEDURE — 73630 X-RAY EXAM OF FOOT: CPT | Mod: LT

## 2022-03-07 PROCEDURE — 81025 URINE PREGNANCY TEST: CPT | Performed by: EMERGENCY MEDICINE

## 2022-03-07 PROCEDURE — 85004 AUTOMATED DIFF WBC COUNT: CPT | Performed by: EMERGENCY MEDICINE

## 2022-03-07 PROCEDURE — 84443 ASSAY THYROID STIM HORMONE: CPT | Performed by: EMERGENCY MEDICINE

## 2022-03-07 PROCEDURE — 80048 BASIC METABOLIC PNL TOTAL CA: CPT | Performed by: EMERGENCY MEDICINE

## 2022-03-07 PROCEDURE — 87086 URINE CULTURE/COLONY COUNT: CPT | Performed by: EMERGENCY MEDICINE

## 2022-03-07 PROCEDURE — 250N000011 HC RX IP 250 OP 636: Performed by: EMERGENCY MEDICINE

## 2022-03-07 PROCEDURE — 81001 URINALYSIS AUTO W/SCOPE: CPT | Performed by: EMERGENCY MEDICINE

## 2022-03-07 PROCEDURE — 73630 X-RAY EXAM OF FOOT: CPT | Mod: 26 | Performed by: RADIOLOGY

## 2022-03-07 PROCEDURE — 36415 COLL VENOUS BLD VENIPUNCTURE: CPT | Performed by: EMERGENCY MEDICINE

## 2022-03-07 PROCEDURE — 258N000003 HC RX IP 258 OP 636: Performed by: EMERGENCY MEDICINE

## 2022-03-07 RX ORDER — ACETAMINOPHEN 325 MG/1
650 TABLET ORAL ONCE
Status: DISCONTINUED | OUTPATIENT
Start: 2022-03-07 | End: 2022-03-07 | Stop reason: HOSPADM

## 2022-03-07 RX ORDER — HYDROXYZINE HYDROCHLORIDE 25 MG/1
25 TABLET, FILM COATED ORAL
Qty: 15 TABLET | Refills: 0 | Status: SHIPPED | OUTPATIENT
Start: 2022-03-07 | End: 2022-05-09

## 2022-03-07 RX ORDER — LORAZEPAM 2 MG/ML
1 INJECTION INTRAMUSCULAR ONCE
Status: COMPLETED | OUTPATIENT
Start: 2022-03-07 | End: 2022-03-07

## 2022-03-07 RX ORDER — OLANZAPINE 10 MG/2ML
5 INJECTION, POWDER, FOR SOLUTION INTRAMUSCULAR ONCE
Status: DISCONTINUED | OUTPATIENT
Start: 2022-03-07 | End: 2022-03-07 | Stop reason: HOSPADM

## 2022-03-07 RX ADMIN — LORAZEPAM 1 MG: 2 INJECTION INTRAMUSCULAR; INTRAVENOUS at 00:57

## 2022-03-07 RX ADMIN — SODIUM CHLORIDE 1000 ML: 9 INJECTION, SOLUTION INTRAVENOUS at 00:58

## 2022-03-07 NOTE — DISCHARGE INSTRUCTIONS
Instructions from your doctor today:  Emergency Department testing is focused on the potential causes of your symptoms that are the most dangerous possibilities, and cannot cover every possibility. Based on the evaluation, it was deemed sufficiently safe to discharge and continue management through the clinics. Thus, follow-up is very important to assess for improvement/worsening, potential further testing, and potential treatment adjustments. If you were given opioid pain medications or other medications that can make you drowsy while in the ED, you should not drive for at least several hours and not until you feel completely back to normal.     Please make an appointment to follow up with:  - Your Primary Care Provider in 3-7 days  - If you do not have a primary care provider, you can be seen in follow-up and establish care by calling any of the clinics below:     - Primary Care Center (phone: [phone])     - Primary Care / [facility] (phone: [phone])   - Have your clinic provider review the results from today's visit with you again, including any potential follow-up or additional testing that may be needed based on the results. Occasionally, incidental findings are found on later review by radiologists that may need follow-up.     Return to the Emergency Department immediately if you have worsening symptoms, or any other urgent or potentially life-threatening concerns.

## 2022-03-07 NOTE — ED TRIAGE NOTES
Pt states she is having a lot of stress in her life, recent relationship stress.  Pt states she is struggling with anxiety and panic attacks due to the poor situation due to the recent break up.  Pt has a hx of kidney transplant.

## 2022-03-07 NOTE — ED PROVIDER NOTES
ED Provider Note  [facility]      History     Chief Complaint   Patient presents with     Anxiety     HPI  Jelly Dietz is a 37 year old female with a PMH of preeclampsia, S/P kidney transplant with chronic rejection, ESRD, ACS, IgA nephropathy and recent left-sided distal fibula fracture who presents the ED today complaining of anxiety.  Patient reports that she has been very stressed over the past few days due to an exfiancé spreading rumors about her over social media.  This is led to a lot of distress.  Patient has had difficulty eating and drinking last few days due to the stress.  She has been taking her antirejection meds.  She is worried about her transplanted kidney and potential for dehydration currently.  No dysuria, no urinary urgency/frequency, no fever, no abdominal nor flank pain.  She denies depressed mood, denies suicidal ideation.  Patient reports she was unable to sleep last night, feels like things are just cycling through her head and making it difficult for her to sleep.  Patient requests a strong medication to sleep here.    Patient also notes left foot pain.  Patient points to the dorsal aspect of the left foot over the second metatarsal as location.  Nonradiating.  Worsened during stressful times.  Patient has been wearing a surgical boot on her left foot/ankle due to a distal fibular fracture diagnosed about a month ago.  The ankle is somewhat swollen, but she denies pain in that area.     Past Medical History  Past Medical History:   Diagnosis Date     ACS (acute coronary syndrome) (H) 11/11/2014     Acute rejection of kidney transplant 07/05/2020    Mixed AMR & ACR [place] 2A     Allergic state     seasonal allergies     Anemia in chronic renal disease      Anxiety      BK viremia 02/09/2021     Cervical cerclage suture present in second trimester      Chest pain 11/11/2014     Chronic renal transplant rejection      End stage kidney disease (H)      Heart  murmur      History of  delivery 2015    30 wks, 28 wks      Hypertension     resolved after transplant     IgA nephropathy      Kidney transplanted 2020    DDKT. Induction w/ thymo 5.7mg/kg.     MRSA (methicillin resistant Staphylococcus aureus)      [facility]     Pre-eclampsia      Renal failure affecting pregnancy in second trimester      S/P kidney transplant 2007    LDKT in 2007 in [place]. History of 1B kidney rejection. Failed .     SAB (spontaneous )     2nd trimester      Past Surgical History:   Procedure Laterality Date     CERCLAGE CERVICAL N/A 2015    Procedure: CERCLAGE CERVICAL;  Surgeon: Norbert Chopra MD;  Location: [facility] L[facility]      SECTION  2012    Procedure:  SECTION;;  Surgeon: Chelsea Cross MD;  Location: [facility] L[facility]      SECTION N/A 2015    Procedure:  SECTION;  Surgeon: Chelsea Cross MD;  Location: [facility] OR     cesearean section       CYSTOSCOPY, REMOVE STENT(S), COMBINED N/A 2020    Procedure: CYSTOSCOPY, WITH STENT REMOVAL;  Surgeon: Nory Morgan MD;  Location: [facility] OR     EXPLANT TRANSPLANTED KIDNEY  3/29/2013    Procedure: EXPLANT TRANSPLANTED KIDNEY;  Explant Transplanted right Kidney (from patients right iliac fossa);  Surgeon: Nory Morgan MD;  Location: [facility] OR     IR FINE NEEDLE ASPIRATION W ULTRASOUND  2020     IR PICC PLACEMENT > 5 YRS OF AGE  7/10/2020     IR RENAL BIOPSY LEFT  2020     MIDLINE DOUBLE LUMEN PLACEMENT Right 2020    unable to place PICC line, MIDLINE placed     NEPHRECTOMY  3/29/13    Transplant nephrectomy      WILIAN/DIALYSIS CATHETER       TRANSPLANT KIDNEY RECIPIENT  DONOR N/A 2020    Procedure: TRANSPLANT, KIDNEY, RECIPIENT,  DONOR, venous reconstruction, and back bench preparation of kidney;  Surgeon: Irma Shannon MD;  Location: [facility]      TRANSPLANT KIDNEY RECIPIENT LIVING UNRELATED  Dec 2007    (Adult male in [place])     hydrOXYzine (ATARAX) 25 MG tablet  aspirin (ASA) 81 MG chewable tablet  atorvastatin (LIPITOR) 10 MG tablet  diclofenac (VOLTAREN) 75 MG EC tablet  diphenhydrAMINE (BENADRYL) 25 MG tablet  HYDROcodone-acetaminophen (NORCO) 5-325 MG tablet  losartan (COZAAR) 25 MG tablet  magnesium oxide (MAG-OX) 400 MG tablet  ondansetron (ZOFRAN) 4 MG tablet  pantoprazole (PROTONIX) 20 MG EC tablet  PHOSPHORUS TABLET 250  MG per tablet  predniSONE (DELTASONE) 5 MG tablet  sulfamethoxazole-trimethoprim (BACTRIM) 400-80 MG tablet  tacrolimus (GENERIC EQUIVALENT) 0.5 MG capsule  Vitamin D3 (CHOLECALCIFEROL) 25 mcg (1000 units) tablet      No Known Allergies  Social History   Social History     Tobacco Use     Smoking status: Never Smoker     Smokeless tobacco: Never Used   Substance Use Topics     Alcohol use: No     Drug use: No      Past medical history and social history were reviewed with the patient. Additional pertinent items: None      Review of Systems  A complete review of systems was performed with pertinent positives and negatives noted in the HPI, and all other systems negative.    Physical Exam   BP: (!) 147/97  Pulse: 100  Temp: 98.2  F (36.8  C)  Resp: 18  Weight: 69.9 kg (154 lb)  SpO2: 97 %  Physical Exam  General: no acute distress. Appears stated age.   HENT: MMM, no oropharyngeal lesions  Eyes: PERRL, normal sclerae  Cardio: regular rate. Regular rhythm. Extremities well perfused  Resp: Normal work of breathing, normal respiratory rate.  Chest/Back: no visual signs of trauma, no CVA tenderness  Abdomen: no tenderness, non-distended, no rebound, no guarding  Neuro: alert and fully oriented. CN II-XII grossly intact. Grossly normal strength and sensation in all extremities.   MSK: no deformities. Grossly normal ROM in extremities. L foot/ankle: mild ankle swelling present, no ankle tenderness nor pain with ROM; there is  tenderness over the mid-2nd MT, no proximal foot tenderness  Integumentary/Skin: no rash visualized, normal color  Psych: normal affect, normal behavior    ED Course      Procedures                  Results for orders placed or performed during the hospital encounter of 03/06/22   Foot XR, G/E 3 views, left     Status: None    Narrative    EXAM: XR FOOT LEFT G/E 3 VIEWS  LOCATION: [facility]  DATE/TIME: 3/7/2022 12:32 AM    INDICATION: pain over 2nd MT  COMPARISON: None.      Impression    IMPRESSION: Normal joint spaces and alignment. No fracture.   Basic metabolic panel     Status: Abnormal   Result Value Ref Range    Sodium 138 133 - 144 mmol/L    Potassium 3.8 3.4 - 5.3 mmol/L    Chloride 106 94 - 109 mmol/L    Carbon Dioxide (CO2) 21 20 - 32 mmol/L    Anion Gap 11 3 - 14 mmol/L    Urea Nitrogen 11 7 - 30 mg/dL    Creatinine 0.68 0.52 - 1.04 mg/dL    Calcium 9.4 8.5 - 10.1 mg/dL    Glucose 130 (H) 70 - 99 mg/dL    GFR Estimate >90 >60 mL/min/1.73m2   UA with Microscopic reflex to Culture     Status: Abnormal    Specimen: Urinary Bladder; Urine   Result Value Ref Range    Color Urine Light Red (A) Colorless, Straw, Light Yellow, Yellow    Appearance Urine Slightly Cloudy (A) Clear    Glucose Urine Negative Negative mg/dL    Bilirubin Urine Negative Negative    Ketones Urine Negative Negative mg/dL    Specific Gravity Urine 1.004 1.003 - 1.035    Blood Urine Large (A) Negative    pH Urine 6.5 5.0 - 7.0    Protein Albumin Urine 100  (A) Negative mg/dL    Urobilinogen Urine Normal Normal, 2.0 mg/dL    Nitrite Urine Negative Negative    Leukocyte Esterase Urine Small (A) Negative    RBC Urine 25 (H) <=2 /HPF    WBC Urine 20 (H) <=5 /HPF    Squamous Epithelials Urine 7 (H) <=1 /HPF    Narrative    Urine Culture ordered based on laboratory criteria   HCG qualitative urine (UPT)     Status: Normal   Result Value Ref Range    hCG Urine Qualitative Negative Negative   TSH with  free T4 reflex     Status: Normal   Result Value Ref Range    TSH 1.07 0.40 - 4.00 mU/L   CBC with platelets and differential     Status: Abnormal   Result Value Ref Range    WBC Count 5.1 4.0 - 11.0 10e3/uL    RBC Count 3.57 (L) 3.80 - 5.20 10e6/uL    Hemoglobin 10.8 (L) 11.7 - 15.7 g/dL    Hematocrit 32.5 (L) 35.0 - 47.0 %    MCV 91 78 - 100 fL    MCH 30.3 26.5 - 33.0 pg    MCHC 33.2 31.5 - 36.5 g/dL    RDW 13.1 10.0 - 15.0 %    Platelet Count 279 150 - 450 10e3/uL    % Neutrophils 83 %    % Lymphocytes 11 %    % Monocytes 4 %    % Eosinophils 0 %    % Basophils 1 %    % Immature Granulocytes 1 %    NRBCs per 100 WBC 0 <1 /100    Absolute Neutrophils 4.3 1.6 - 8.3 10e3/uL    Absolute Lymphocytes 0.5 (L) 0.8 - 5.3 10e3/uL    Absolute Monocytes 0.2 0.0 - 1.3 10e3/uL    Absolute Eosinophils 0.0 0.0 - 0.7 10e3/uL    Absolute Basophils 0.0 0.0 - 0.2 10e3/uL    Absolute Immature Granulocytes 0.0 <=0.4 10e3/uL    Absolute NRBCs 0.0 10e3/uL   CBC with platelets differential     Status: Abnormal    Narrative    The following orders were created for panel order CBC with platelets differential.  Procedure                               Abnormality         Status                     ---------                               -----------         ------                     CBC with platelets and d...[[id number]]  Abnormal            Final result                 Please view results for these tests on the individual orders.     Medications   OLANZapine (zyPREXA) injection 5 mg (0 mg Intramuscular Hold 3/7/22 0356)   acetaminophen (TYLENOL) tablet 650 mg (has no administration in time range)   0.9% sodium chloride BOLUS (0 mLs Intravenous Stopped 3/7/22 0356)   LORazepam (ATIVAN) injection 1 mg (1 mg Intravenous Given 3/7/22 0057)        Assessments & Plan (with Medical Decision Making)   Patient presenting with increased stress and anxiety related to an exfiancé spreading rumors over social media, also with concerns about her  transplanted kidney and left foot. Vitals in the ED with mildly elevated blood pressure 147/97, otherwise unremarkable. Nursing notes reviewed. Initial differential diagnosis includes but not limited to CANDI, dehydration, anxiety, insomnia, adjustment disorder.  NS bolus given.    In the ED, the patient's symptoms were managed with lorazepam, with improvement in symptoms upon reassessment.  Patient was then able to sleep, was monitored in the ED for several hours.     UA without clear evidence of UTI.  Does have some squamous cells, RBCs, and WBCs.  Patient did note being on current menses.  No urinary symptoms, no leukocytosis, no fever will follow culture.  UPT negative.  Electrolytes unremarkable, creatinine 0.68.  TSH within normal limits.    After counseling on the diagnosis, work-up, and treatment plan, the patient was discharged to home. The patient was advised to follow-up with primary care in few days.  Refill of hydroxyzine for sleep prescribed. The patient was advised to return to the ED if worsening symptoms, or if there are any urgent/life-threatening concerns.     Final diagnoses:   Adjustment disorder with anxious mood   Insomnia, unspecified type     Current Discharge Medication List          --  Saulo Pinto MD   Emergency Medicine   [facility] EMERGENCY DEPARTMENT  3/6/2022       Saulo Pinto MD  03/07/22 0558

## 2022-03-08 LAB — BACTERIA UR CULT: NORMAL

## 2022-03-11 DIAGNOSIS — Z94.0 KIDNEY TRANSPLANTED: Primary | ICD-10-CM

## 2022-03-11 DIAGNOSIS — S82.832D OTHER CLOSED FRACTURE OF DISTAL END OF LEFT FIBULA WITH ROUTINE HEALING, SUBSEQUENT ENCOUNTER: ICD-10-CM

## 2022-03-11 DIAGNOSIS — Z79.899 ENCOUNTER FOR LONG-TERM CURRENT USE OF MEDICATION: ICD-10-CM

## 2022-03-11 DIAGNOSIS — Z94.0 KIDNEY REPLACED BY TRANSPLANT: ICD-10-CM

## 2022-03-11 DIAGNOSIS — N17.9 ACUTE RENAL FAILURE, UNSPECIFIED ACUTE RENAL FAILURE TYPE (H): ICD-10-CM

## 2022-03-11 DIAGNOSIS — Z48.298 AFTERCARE FOLLOWING ORGAN TRANSPLANT: ICD-10-CM

## 2022-03-11 RX ORDER — SULFAMETHOXAZOLE AND TRIMETHOPRIM 400; 80 MG/1; MG/1
1 TABLET ORAL DAILY
Qty: 90 TABLET | Refills: 3 | Status: SHIPPED | OUTPATIENT
Start: 2022-03-11 | End: 2022-04-23

## 2022-03-11 RX ORDER — TACROLIMUS 0.5 MG/1
0.5 CAPSULE ORAL 2 TIMES DAILY
Qty: 60 CAPSULE | Refills: 0 | Status: SHIPPED | OUTPATIENT
Start: 2022-03-11 | End: 2022-06-04

## 2022-03-11 RX ORDER — TACROLIMUS 1 MG/1
4 CAPSULE ORAL 2 TIMES DAILY
Qty: 240 CAPSULE | Refills: 3 | Status: SHIPPED | OUTPATIENT
Start: 2022-03-11 | End: 2022-10-14

## 2022-03-11 RX ORDER — MYCOPHENOLIC ACID 180 MG/1
TABLET, DELAYED RELEASE ORAL
Qty: 150 TABLET | Refills: 4 | Status: SHIPPED | OUTPATIENT
Start: 2022-03-11 | End: 2022-04-23

## 2022-03-11 RX ORDER — PREDNISONE 5 MG/1
5 TABLET ORAL DAILY
Qty: 90 TABLET | Refills: 3 | Status: SHIPPED | OUTPATIENT
Start: 2022-03-11 | End: 2022-07-25

## 2022-03-15 NOTE — TELEPHONE ENCOUNTER
diclofenac (VOLTAREN) 75 MG EC tablet    Last Written Prescription Date: 2/7/22  Last Fill Quantity: 60  # refills: 0  Last Office Visit : 2/7/22  Future Office visit:  none    Routing refill request to provider for review/approval because: end date 3/9/22

## 2022-03-16 RX ORDER — DICLOFENAC SODIUM 75 MG/1
75 TABLET, DELAYED RELEASE ORAL 2 TIMES DAILY
Qty: 60 TABLET | Refills: 0 | Status: SHIPPED | OUTPATIENT
Start: 2022-03-16 | End: 2022-03-21

## 2022-03-21 ENCOUNTER — VIRTUAL VISIT (OUTPATIENT)
Dept: NEPHROLOGY | Facility: CLINIC | Age: 37
End: 2022-03-21
Attending: INTERNAL MEDICINE
Payer: MEDICARE

## 2022-03-21 DIAGNOSIS — Z94.0 KIDNEY TRANSPLANTED: ICD-10-CM

## 2022-03-21 DIAGNOSIS — E55.9 VITAMIN D DEFICIENCY: Primary | ICD-10-CM

## 2022-03-21 PROCEDURE — 99214 OFFICE O/P EST MOD 30 MIN: CPT | Mod: 95 | Performed by: INTERNAL MEDICINE

## 2022-03-21 PROCEDURE — G0463 HOSPITAL OUTPT CLINIC VISIT: HCPCS | Mod: PN,RTG | Performed by: INTERNAL MEDICINE

## 2022-03-21 RX ORDER — VITAMIN B COMPLEX
50 TABLET ORAL DAILY
Qty: 180 TABLET | Refills: 3 | Status: SHIPPED | OUTPATIENT
Start: 2022-03-21 | End: 2023-05-12

## 2022-03-21 ASSESSMENT — PAIN SCALES - GENERAL: PAINLEVEL: NO PAIN (0)

## 2022-03-21 NOTE — PROGRESS NOTES
TRANSPLANT NEPHROLOGY FOLLOW UP VISIT  Assessment & Plan   # DDKT: Not up-to-date   - Baseline Cr ~ 0.7-0.9 mg/dL    - Proteinuria: Not checked recently   - Date DSA Last Checked: 7/2020      Latest DSA: No, checked afterwards but it was not feasible since she was receiving IVIG at that time, will check DSA with next lab.   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 Repeat biopsy was not performed (patient preference). Re-biopsy threshold > 1.2 mg/dL     # Immunosuppression: Tacrolimus immediate release (goal ~6), Mycophenolic acid (dose 540 mg/360 every 12 hours) and Prednisone (5 mg)   - Changes: no    # BK viremia: stable load      # Infection Prophylaxis:   - PJP: Sulfa/TMP (Bactrim)  - CMV: Valcyte completed the course     # Hypertension: Controlled; BP average 110/80  Goal BP: < 140/90   - Volume status: euvolemic     - Changes: None     # Anemia in Chronic Renal Disease: Hgb: Stable      ENZO: no longer needed   - Iron studies: Replete    # Mineral Bone Disorder:   - Calcium; level: Normal        On supplement: No  - Phosphorus; level: Low Normal       On supplement: on supplement     # Electrolytes:   - Potassium; level: low       On supplement: start supplement   - Magnesium; level: low        On supplement: continue supplement , discontinue PPI    # Medical Compliance: Yes    # COVID-19 Virus Review: Discussed COVID-19 virus and the potential medical risks.  Reviewed preventative health recommendations, which includes washing hands for 20 seconds, avoid touching your face, and social distancing.  Asked patient to inform the transplant center if they are exposed or diagnosed with this virus.    Covid vaccination: Discussed the booster dose.    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 Donor Class:   Crossmatch at time of Tx:  negative  DSA at time of Tx: No  Significant changes in immunosuppression: steroid maintenance due to early mixed rejection   CMV IgG Ab High Risk Discordance (D+/R-): No  EBV IgG Ab High Risk Discordance (D+/R-): No  Significant transplant-related complications: early acute rejection possible Non-HLA with an element of memory response     Transplant Office Phone Number: [phone]    Assessment and plan was discussed with the patient and she voiced her understanding and agreement.    Return Visit: 6 months     Jelly Dietz has a clinical video visit for routine follow up and immunosuppression management.    History of Present Illness     Evens is 37-year-old lady status post second kidney transplant original disease IgA.  Her post transplant course complicated by early acute rejection requiring pheresis and rituximab.  She had made remarkable recovery.  Post transplant course was further complicated by BK viremia that responded to immunosuppression reduction and currently doing well.  Most recently she had a traumatic ankle fracture and now she is mobile again.  She feels well but overall feels stressed and overwhelmed with many stressors.  One of her sons is autistic and requires lots of care.  Also the Covid pandemic in the limitation associated with that had also affected her mood.  She plans a trip to [facility].  She was contemplating staying there for 3 months however we discussed the limitation with the medications.  She is willing to cut her trip down to 4 weeks and secure her medication supply prior to her travel.  I favored the shorter duration as she does not have to look for laboratory to complete blood work while awake.  Instead, she will check her labs prior to departure and upon arrival back [place] nice [place].  Encouraged her to discuss with her insurance if they are willing to provide her with extended supply.    Review of Systems   A comprehensive review of systems was obtained and negative,  except as noted in the HPI or PMH.    I have reviewed and updated the patient's Past Medical History, Social History, and Medication List.    Active Medical Problems  Patient Active Problem List   Diagnosis     CARDIOVASCULAR SCREENING; LDL GOAL LESS THAN 160     Kidney replaced by transplant     HTN, kidney transplant related     ACS (acute coronary syndrome) (H)     Anemia in chronic renal disease     IgA nephropathy     Anxiety     Kidney transplanted     Immunosuppression (H)     Painful bladder spasm     Hypophosphatemia     Constipation due to pain medication     Aftercare following organ transplant     Hypomagnesemia     Dehydration     Acute renal failure (ARF) (H)     Acute rejection of kidney transplant     Metabolic acidosis     Steroid-induced hyperglycemia     Nausea vomiting and diarrhea     Hypervolemia     Anemia     BK viremia     Allergies  Patient has no known allergies.    Physical Exam:  Deferred due to telemedicine     Medications  Current Outpatient Medications   Medication Sig     aspirin (ASA) 81 MG chewable tablet CHEW AND SWALLOW ONE TABLET BY MOUTH ONCE DAILY     atorvastatin (LIPITOR) 10 MG tablet TAKE ONE TABLET BY MOUTH ONCE DAILY     diclofenac (VOLTAREN) 75 MG EC tablet Take 1 tablet (75 mg) by mouth 2 times daily     losartan (COZAAR) 25 MG tablet Take 0.5 tablets (12.5 mg) by mouth At Bedtime     magnesium oxide (MAG-OX) 400 MG tablet TAKE TWO TABLETS BY MOUTH TWICE A DAY     mycophenolic acid (GENERIC EQUIVALENT) 180 MG EC tablet Take 3 tablets (540 mg) by mouth every morning AND 2 tablets (360 mg) every evening.     pantoprazole (PROTONIX) 20 MG EC tablet Take 1 tablet (20 mg) by mouth daily     PHOSPHORUS TABLET 250  MG per tablet TAKE 2 TABLETS (500 MG) BY MOUTH 2 TIMES DAILY     predniSONE (DELTASONE) 5 MG tablet Take 1 tablet (5 mg) by mouth daily     sulfamethoxazole-trimethoprim (BACTRIM) 400-80 MG tablet Take 1 tablet by mouth daily     tacrolimus (GENERIC  EQUIVALENT) 0.5 MG capsule Take 1 capsule (0.5 mg) by mouth 2 times daily Total dose = 4.5 mg BID     tacrolimus (GENERIC EQUIVALENT) 1 MG capsule Take 4 capsules (4 mg) by mouth 2 times daily Total dose tacrolimus   4.5 mg twice per day     Vitamin D3 (CHOLECALCIFEROL) 25 mcg (1000 units) tablet Take 2 tablets (50 mcg) by mouth daily     diphenhydrAMINE (BENADRYL) 25 MG tablet Take 1-2 tablets (25-50 mg) by mouth every 6 hours as needed for itching     HYDROcodone-acetaminophen (NORCO) 5-325 MG tablet Take 1 tablet by mouth every 6 hours as needed for pain     hydrOXYzine (ATARAX) 25 MG tablet Take 1 tablet (25 mg) by mouth nightly as needed for other (sleep)     ondansetron (ZOFRAN) 4 MG tablet Take 1 tablet (4 mg) by mouth every 8 hours as needed     No current facility-administered medications for this visit.       Mahesh Armando MD

## 2022-03-21 NOTE — LETTER
3/21/2022       RE: Jelly Dietz  [address]  [address]  [facility] [address]     Dear Colleague,    Thank you for referring your patient, Jelly Dietz, to the [facility]. Please see a copy of my visit note below.    Jelly is a 37 year old who is being evaluated via a billable video visit.      How would you like to obtain your AVS? MyChart  If the video visit is dropped, the invitation should be resent by: Text to cell phone: [phone]  Will anyone else be joining your video visit? No      Video Start Time: 4:05 pm  Video-Visit Details    Type of service:  Video Visit    Video End Time:4:35 pm     Originating Location (pt. Location): Home    Distant Location (provider location):  [facility]     Platform used for Video Visit: [employer]      TRANSPLANT NEPHROLOGY FOLLOW UP VISIT  Assessment & Plan   # DDKT: Not up-to-date   - Baseline Cr ~ 0.7-0.9 mg/dL    - Proteinuria: Not checked recently   - Date DSA Last Checked: 7/2020      Latest DSA: No, checked afterwards but it was not feasible since she was receiving IVIG at that time, will check DSA with next lab.   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 Repeat biopsy was not performed (patient preference). Re-biopsy threshold > 1.2 mg/dL     # Immunosuppression: Tacrolimus immediate release (goal ~6), Mycophenolic acid (dose 540 mg/360 every 12 hours) and Prednisone (5 mg)   - Changes: no    # BK viremia: stable load      # Infection Prophylaxis:   - PJP: Sulfa/TMP (Bactrim)  - CMV: Valcyte completed the course     # Hypertension: Controlled; BP average 110/80  Goal BP: < 140/90   - Volume status: euvolemic     - Changes: None     # Anemia in Chronic Renal Disease: Hgb: Stable      ENZO: no longer needed   - Iron studies: Replete    # Mineral Bone Disorder:   - Calcium;  level: Normal        On supplement: No  - Phosphorus; level: Low Normal       On supplement: on supplement     # Electrolytes:   - Potassium; level: low       On supplement: start supplement   - Magnesium; level: low        On supplement: continue supplement , discontinue PPI    # Medical Compliance: Yes    # COVID-19 Virus Review: Discussed COVID-19 virus and the potential medical risks.  Reviewed preventative health recommendations, which includes washing hands for 20 seconds, avoid touching your face, and social distancing.  Asked patient to inform the transplant center if they are exposed or diagnosed with this virus.    Covid vaccination: Discussed the booster dose.    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 Donor Class:   Crossmatch at time of Tx: negative  DSA at time of Tx: No  Significant changes in immunosuppression: steroid maintenance due to early mixed rejection   CMV IgG Ab High Risk Discordance (D+/R-): No  EBV IgG Ab High Risk Discordance (D+/R-): No  Significant transplant-related complications: early acute rejection possible Non-HLA with an element of memory response     Transplant Office Phone Number: [phone]    Assessment and plan was discussed with the patient and she voiced her understanding and agreement.    Return Visit: 6 months     Jelly Dietz has a clinical video visit for routine follow up and immunosuppression management.    History of Present Illness     Evens is 37-year-old lady status post second kidney transplant original disease IgA.  Her post transplant course complicated by early acute rejection requiring pheresis and rituximab.  She had made remarkable recovery.  Post transplant course was further complicated by BK viremia that responded to immunosuppression reduction and currently doing well.  Most recently she had a traumatic ankle fracture  and now she is mobile again.  She feels well but overall feels stressed and overwhelmed with many stressors.  One of her sons is autistic and requires lots of care.  Also the Covid pandemic in the limitation associated with that had also affected her mood.  She plans a trip to [facility].  She was contemplating staying there for 3 months however we discussed the limitation with the medications.  She is willing to cut her trip down to 4 weeks and secure her medication supply prior to her travel.  I favored the shorter duration as she does not have to look for laboratory to complete blood work while awake.  Instead, she will check her labs prior to departure and upon arrival back [place] states.  Encouraged her to discuss with her insurance if they are willing to provide her with extended supply.    Review of Systems   A comprehensive review of systems was obtained and negative, except as noted in the HPI or PMH.    I have reviewed and updated the patient's Past Medical History, Social History, and Medication List.    Active Medical Problems  Patient Active Problem List   Diagnosis     CARDIOVASCULAR SCREENING; LDL GOAL LESS THAN 160     Kidney replaced by transplant     HTN, kidney transplant related     ACS (acute coronary syndrome) (H)     Anemia in chronic renal disease     IgA nephropathy     Anxiety     Kidney transplanted     Immunosuppression (H)     Painful bladder spasm     Hypophosphatemia     Constipation due to pain medication     Aftercare following organ transplant     Hypomagnesemia     Dehydration     Acute renal failure (ARF) (H)     Acute rejection of kidney transplant     Metabolic acidosis     Steroid-induced hyperglycemia     Nausea vomiting and diarrhea     Hypervolemia     Anemia     BK viremia     Allergies  Patient has no known allergies.    Physical Exam:  Deferred due to telemedicine     Medications  Current Outpatient Medications   Medication Sig     aspirin (ASA) 81 MG chewable tablet  CHEW AND SWALLOW ONE TABLET BY MOUTH ONCE DAILY     atorvastatin (LIPITOR) 10 MG tablet TAKE ONE TABLET BY MOUTH ONCE DAILY     diclofenac (VOLTAREN) 75 MG EC tablet Take 1 tablet (75 mg) by mouth 2 times daily     losartan (COZAAR) 25 MG tablet Take 0.5 tablets (12.5 mg) by mouth At Bedtime     magnesium oxide (MAG-OX) 400 MG tablet TAKE TWO TABLETS BY MOUTH TWICE A DAY     mycophenolic acid (GENERIC EQUIVALENT) 180 MG EC tablet Take 3 tablets (540 mg) by mouth every morning AND 2 tablets (360 mg) every evening.     pantoprazole (PROTONIX) 20 MG EC tablet Take 1 tablet (20 mg) by mouth daily     PHOSPHORUS TABLET 250  MG per tablet TAKE 2 TABLETS (500 MG) BY MOUTH 2 TIMES DAILY     predniSONE (DELTASONE) 5 MG tablet Take 1 tablet (5 mg) by mouth daily     sulfamethoxazole-trimethoprim (BACTRIM) 400-80 MG tablet Take 1 tablet by mouth daily     tacrolimus (GENERIC EQUIVALENT) 0.5 MG capsule Take 1 capsule (0.5 mg) by mouth 2 times daily Total dose = 4.5 mg BID     tacrolimus (GENERIC EQUIVALENT) 1 MG capsule Take 4 capsules (4 mg) by mouth 2 times daily Total dose tacrolimus   4.5 mg twice per day     Vitamin D3 (CHOLECALCIFEROL) 25 mcg (1000 units) tablet Take 2 tablets (50 mcg) by mouth daily     diphenhydrAMINE (BENADRYL) 25 MG tablet Take 1-2 tablets (25-50 mg) by mouth every 6 hours as needed for itching     HYDROcodone-acetaminophen (NORCO) 5-325 MG tablet Take 1 tablet by mouth every 6 hours as needed for pain     hydrOXYzine (ATARAX) 25 MG tablet Take 1 tablet (25 mg) by mouth nightly as needed for other (sleep)     ondansetron (ZOFRAN) 4 MG tablet Take 1 tablet (4 mg) by mouth every 8 hours as needed     No current facility-administered medications for this visit.       Mahesh Armando MD

## 2022-03-21 NOTE — PROGRESS NOTES
Jelly is a 37 year old who is being evaluated via a billable video visit.      How would you like to obtain your AVS? MyChart  If the video visit is dropped, the invitation should be resent by: Text to cell phone: [phone]  Will anyone else be joining your video visit? No      Video Start Time: 4:05 pm  Video-Visit Details    Type of service:  Video Visit    Video End Time:4:35 pm     Originating Location (pt. Location): Home    Distant Location (provider location):  [facility]     Platform used for Video Visit: [employer]

## 2022-04-23 ENCOUNTER — TELEPHONE (OUTPATIENT)
Dept: TRANSPLANT | Facility: CLINIC | Age: 37
End: 2022-04-23
Payer: MEDICARE

## 2022-04-23 DIAGNOSIS — Z79.899 ENCOUNTER FOR LONG-TERM CURRENT USE OF MEDICATION: ICD-10-CM

## 2022-04-23 DIAGNOSIS — Z94.0 KIDNEY REPLACED BY TRANSPLANT: ICD-10-CM

## 2022-04-23 DIAGNOSIS — Z94.0 KIDNEY TRANSPLANTED: ICD-10-CM

## 2022-04-23 DIAGNOSIS — Z48.298 AFTERCARE FOLLOWING ORGAN TRANSPLANT: ICD-10-CM

## 2022-04-23 DIAGNOSIS — N17.9 ACUTE RENAL FAILURE, UNSPECIFIED ACUTE RENAL FAILURE TYPE (H): ICD-10-CM

## 2022-04-23 RX ORDER — ATORVASTATIN CALCIUM 10 MG/1
10 TABLET, FILM COATED ORAL DAILY
Qty: 1 TABLET | Refills: 0 | Status: SHIPPED | OUTPATIENT
Start: 2022-04-23 | End: 2022-04-29

## 2022-04-23 RX ORDER — MYCOPHENOLIC ACID 180 MG/1
TABLET, DELAYED RELEASE ORAL
Qty: 10 TABLET | Refills: 0 | Status: SHIPPED | OUTPATIENT
Start: 2022-04-23 | End: 2022-06-04

## 2022-04-23 RX ORDER — PANTOPRAZOLE SODIUM 20 MG/1
20 TABLET, DELAYED RELEASE ORAL DAILY
Qty: 1 TABLET | Refills: 0 | Status: SHIPPED | OUTPATIENT
Start: 2022-04-23 | End: 2022-04-23

## 2022-04-23 RX ORDER — SULFAMETHOXAZOLE AND TRIMETHOPRIM 400; 80 MG/1; MG/1
1 TABLET ORAL DAILY
Qty: 1 TABLET | Refills: 0 | Status: SHIPPED | OUTPATIENT
Start: 2022-04-23 | End: 2022-06-06

## 2022-04-23 RX ORDER — SULFAMETHOXAZOLE AND TRIMETHOPRIM 400; 80 MG/1; MG/1
1 TABLET ORAL DAILY
Qty: 1 TABLET | Refills: 0 | Status: SHIPPED | OUTPATIENT
Start: 2022-04-23 | End: 2022-04-23

## 2022-04-23 RX ORDER — ATORVASTATIN CALCIUM 10 MG/1
10 TABLET, FILM COATED ORAL DAILY
Qty: 1 TABLET | Refills: 0 | Status: SHIPPED | OUTPATIENT
Start: 2022-04-23 | End: 2022-04-23

## 2022-04-23 RX ORDER — LOSARTAN POTASSIUM 25 MG/1
12.5 TABLET ORAL AT BEDTIME
Qty: 1 TABLET | Refills: 0 | Status: SHIPPED | OUTPATIENT
Start: 2022-04-23 | End: 2022-06-04

## 2022-04-23 RX ORDER — LOSARTAN POTASSIUM 25 MG/1
12.5 TABLET ORAL AT BEDTIME
Qty: 1 TABLET | Refills: 0 | Status: SHIPPED | OUTPATIENT
Start: 2022-04-23 | End: 2022-04-23

## 2022-04-23 RX ORDER — MYCOPHENOLIC ACID 180 MG/1
TABLET, DELAYED RELEASE ORAL
Qty: 10 TABLET | Refills: 0 | Status: SHIPPED | OUTPATIENT
Start: 2022-04-23 | End: 2022-04-23

## 2022-04-23 RX ORDER — ASPIRIN 81 MG/1
TABLET, CHEWABLE ORAL
Qty: 2 TABLET | Refills: 0 | Status: SHIPPED | OUTPATIENT
Start: 2022-04-23 | End: 2022-06-04

## 2022-04-23 RX ORDER — ASPIRIN 81 MG/1
TABLET, CHEWABLE ORAL
Qty: 2 TABLET | Refills: 0 | Status: SHIPPED | OUTPATIENT
Start: 2022-04-23 | End: 2022-04-23

## 2022-04-23 RX ORDER — PANTOPRAZOLE SODIUM 20 MG/1
20 TABLET, DELAYED RELEASE ORAL DAILY
Qty: 1 TABLET | Refills: 0 | Status: SHIPPED | OUTPATIENT
Start: 2022-04-23 | End: 2022-06-04

## 2022-04-23 NOTE — TELEPHONE ENCOUNTER
Sarah, called to ask which pharmacy meds were sent to. Pt requested [facility] [address].confirmed address. sarah will return call with any questions.

## 2022-04-23 NOTE — TELEPHONE ENCOUNTER
Jelly,paged that she needs 2 days worth of meds. Attempted to call four times and goes right to voicemail. Left two message stating attempting to reach but unable. Need to know which medications and which pharmacy she would want sent to as she is out of town.

## 2022-04-23 NOTE — TELEPHONE ENCOUNTER
Contacted sarah again. She reports that her phone was on do no disturb. She needs short refill 1-2 days worth of meds as her car broke down and she doesn't have all of her meds with to cover the stay. She needs losartan, bactrim, myfortic, asa, protonix and lipitor. Scripts sent to julissa smith to return call with any issues or concerns.

## 2022-04-23 NOTE — TELEPHONE ENCOUNTER
Pt called to report that she made it to pharmacy and they closed at 6pm. [facility] [address] [facility] and scripts sent to alvin adam and will call back with any changes or concerns.

## 2022-04-29 DIAGNOSIS — Z94.0 KIDNEY TRANSPLANTED: ICD-10-CM

## 2022-04-29 RX ORDER — ATORVASTATIN CALCIUM 10 MG/1
10 TABLET, FILM COATED ORAL DAILY
Qty: 30 TABLET | Refills: 0 | Status: SHIPPED | OUTPATIENT
Start: 2022-04-29 | End: 2022-06-06

## 2022-05-02 ENCOUNTER — HOSPITAL ENCOUNTER (EMERGENCY)
Facility: CLINIC | Age: 37
Discharge: LEFT BEFORE DISCHARGE | End: 2022-05-02
Payer: MEDICARE

## 2022-05-02 VITALS
BODY MASS INDEX: 23.54 KG/M2 | RESPIRATION RATE: 16 BRPM | OXYGEN SATURATION: 99 % | TEMPERATURE: 98.8 F | DIASTOLIC BLOOD PRESSURE: 67 MMHG | HEIGHT: 67 IN | SYSTOLIC BLOOD PRESSURE: 120 MMHG | WEIGHT: 150 LBS | HEART RATE: 91 BPM

## 2022-05-03 NOTE — ED NOTES
Patient sts that she does not wish to wait any longer, patient reports to RN that she will be seeing primary doctor in AM and does not with to see doctor in ED. Patient signed left without medical advice form and verbalized understanding to return for care at soonest availability.

## 2022-05-03 NOTE — ED TRIAGE NOTES
Pt. Arrives to ED w/ multiple complaints including fatigue, headaches, recent stress, request for evaluation of kidney with history of transplant.      Triage Assessment     Row Name 05/02/22 [id number]       Triage Assessment (Adult)    Airway WDL WDL       Respiratory WDL    Respiratory WDL WDL       Skin Circulation/Temperature WDL    Skin Circulation/Temperature WDL WDL       Cardiac WDL    Cardiac WDL WDL       Peripheral/Neurovascular WDL    Peripheral Neurovascular WDL WDL       Cognitive/Neuro/Behavioral WDL    Cognitive/Neuro/Behavioral WDL WDL

## 2022-05-08 ENCOUNTER — TELEPHONE (OUTPATIENT)
Dept: TRANSPLANT | Facility: CLINIC | Age: 37
End: 2022-05-08
Payer: MEDICARE

## 2022-05-08 DIAGNOSIS — Z79.899 ENCOUNTER FOR LONG-TERM CURRENT USE OF MEDICATION: ICD-10-CM

## 2022-05-08 DIAGNOSIS — Z94.0 KIDNEY REPLACED BY TRANSPLANT: Primary | ICD-10-CM

## 2022-05-08 DIAGNOSIS — Z48.298 AFTERCARE FOLLOWING ORGAN TRANSPLANT: ICD-10-CM

## 2022-05-08 NOTE — TELEPHONE ENCOUNTER
Issue overdue for  transplant  Labs   2 years post kidney   transplant      Task   Please update epic orders Dr Bruce Zaman   Please mail copy lab letter and letter to letter to obtain labs

## 2022-05-08 NOTE — LETTER
OUTPATIENT LABORATORY TEST ORDER    Patient Name: Jelly Dietz  Transplant Date: 6/19/2020   YOB: 1985  Issue Date & Time: 5/9/2022  7:39 AM  [facility] MR: [id number] Exp. Date (1 year after date issued)      Diagnoses: Kidney Transplant (ICD-10  Z94.0)   Long term use of medications (ICD-10  Z79.899)     Lab results to be available on the same day drawn.   Patient should release information to the [facility].  Please fax to the Transplant Center at [phone].    Every other month   ?Hemogram and Platelet  ?Basic Metabolic Panel (Sodium, Potassium, Chloride, CO2, Creatinine, Urea Nitrogen,     Glucose,   Calcium)         ?/Tacrolimus/Prograf drug level                          Every 6 Months                  ?Urine for protein/creatinine    Yearly:   ?PRA/DSA level (mailers provided by the patient)     If you have any questions, please call The Transplant Center at [phone] or [phone].    Please fax labs to [phone]      Bruce Whelan MD

## 2022-05-09 NOTE — TELEPHONE ENCOUNTER
Updated current standing lab orders.  Sent copy of lab letter to patient via [employer].

## 2022-05-17 NOTE — TELEPHONE ENCOUNTER
Patient Call: Transplant Illness  Was in contact with family member who is now positive for covid-19  Jelly has congestion and running nose started Saturday No temp   Jelly is thinking she has a 'cold'      Transplanted organ? kidney  Illness: Other Congestion running nose No temp              Yes

## 2022-06-03 ENCOUNTER — LAB (OUTPATIENT)
Dept: LAB | Facility: CLINIC | Age: 37
End: 2022-06-03

## 2022-06-03 DIAGNOSIS — Z94.0 KIDNEY TRANSPLANTED: ICD-10-CM

## 2022-06-03 DIAGNOSIS — Z79.899 ENCOUNTER FOR LONG-TERM CURRENT USE OF MEDICATION: ICD-10-CM

## 2022-06-03 DIAGNOSIS — Z94.0 KIDNEY REPLACED BY TRANSPLANT: ICD-10-CM

## 2022-06-03 DIAGNOSIS — E55.9 VITAMIN D DEFICIENCY: ICD-10-CM

## 2022-06-03 DIAGNOSIS — Z48.298 AFTERCARE FOLLOWING ORGAN TRANSPLANT: ICD-10-CM

## 2022-06-03 LAB
ANION GAP SERPL CALCULATED.3IONS-SCNC: 7 MMOL/L (ref 3–14)
BASOPHILS # BLD AUTO: 0 10E3/UL (ref 0–0.2)
BASOPHILS NFR BLD AUTO: 1 %
BUN SERPL-MCNC: 17 MG/DL (ref 7–30)
CALCIUM SERPL-MCNC: 9.7 MG/DL (ref 8.5–10.1)
CHLORIDE BLD-SCNC: 108 MMOL/L (ref 94–109)
CO2 SERPL-SCNC: 25 MMOL/L (ref 20–32)
CREAT SERPL-MCNC: 0.72 MG/DL (ref 0.52–1.04)
CREAT UR-MCNC: 204 MG/DL
EOSINOPHIL # BLD AUTO: 0.1 10E3/UL (ref 0–0.7)
EOSINOPHIL NFR BLD AUTO: 2 %
ERYTHROCYTE [DISTWIDTH] IN BLOOD BY AUTOMATED COUNT: 13.7 % (ref 10–15)
GFR SERPL CREATININE-BSD FRML MDRD: >90 ML/MIN/1.73M2
GLUCOSE BLD-MCNC: 76 MG/DL (ref 70–99)
HCT VFR BLD AUTO: 35.9 % (ref 35–47)
HGB BLD-MCNC: 11.5 G/DL (ref 11.7–15.7)
IMM GRANULOCYTES # BLD: 0 10E3/UL
IMM GRANULOCYTES NFR BLD: 1 %
LYMPHOCYTES # BLD AUTO: 0.9 10E3/UL (ref 0.8–5.3)
LYMPHOCYTES NFR BLD AUTO: 17 %
MAGNESIUM SERPL-MCNC: 1.4 MG/DL (ref 1.6–2.3)
MCH RBC QN AUTO: 29.9 PG (ref 26.5–33)
MCHC RBC AUTO-ENTMCNC: 32 G/DL (ref 31.5–36.5)
MCV RBC AUTO: 94 FL (ref 78–100)
MONOCYTES # BLD AUTO: 0.3 10E3/UL (ref 0–1.3)
MONOCYTES NFR BLD AUTO: 5 %
NEUTROPHILS # BLD AUTO: 4 10E3/UL (ref 1.6–8.3)
NEUTROPHILS NFR BLD AUTO: 74 %
NRBC # BLD AUTO: 0 10E3/UL
NRBC BLD AUTO-RTO: 0 /100
PHOSPHATE SERPL-MCNC: 2.3 MG/DL (ref 2.5–4.5)
PLATELET # BLD AUTO: 241 10E3/UL (ref 150–450)
POTASSIUM BLD-SCNC: 3 MMOL/L (ref 3.4–5.3)
PROT UR-MCNC: 0.19 G/L
PROT/CREAT 24H UR: 0.09 G/G CR (ref 0–0.2)
RBC # BLD AUTO: 3.84 10E6/UL (ref 3.8–5.2)
SODIUM SERPL-SCNC: 140 MMOL/L (ref 133–144)
TACROLIMUS BLD-MCNC: 5.7 UG/L (ref 5–15)
TME LAST DOSE: NORMAL H
TME LAST DOSE: NORMAL H
WBC # BLD AUTO: 5.3 10E3/UL (ref 4–11)

## 2022-06-03 PROCEDURE — 84156 ASSAY OF PROTEIN URINE: CPT | Performed by: PATHOLOGY

## 2022-06-03 PROCEDURE — 80048 BASIC METABOLIC PNL TOTAL CA: CPT | Performed by: PATHOLOGY

## 2022-06-03 PROCEDURE — 99000 SPECIMEN HANDLING OFFICE-LAB: CPT | Performed by: PATHOLOGY

## 2022-06-03 PROCEDURE — 82306 VITAMIN D 25 HYDROXY: CPT | Mod: 90 | Performed by: PATHOLOGY

## 2022-06-03 PROCEDURE — 84100 ASSAY OF PHOSPHORUS: CPT | Performed by: PATHOLOGY

## 2022-06-03 PROCEDURE — 85025 COMPLETE CBC W/AUTO DIFF WBC: CPT | Performed by: PATHOLOGY

## 2022-06-03 PROCEDURE — 86832 HLA CLASS I HIGH DEFIN QUAL: CPT | Performed by: PATHOLOGY

## 2022-06-03 PROCEDURE — 86833 HLA CLASS II HIGH DEFIN QUAL: CPT | Performed by: PATHOLOGY

## 2022-06-03 PROCEDURE — 36415 COLL VENOUS BLD VENIPUNCTURE: CPT | Performed by: PATHOLOGY

## 2022-06-03 PROCEDURE — 83735 ASSAY OF MAGNESIUM: CPT | Performed by: PATHOLOGY

## 2022-06-03 PROCEDURE — 80197 ASSAY OF TACROLIMUS: CPT | Mod: 90 | Performed by: PATHOLOGY

## 2022-06-03 PROCEDURE — 87799 DETECT AGENT NOS DNA QUANT: CPT | Mod: 90 | Performed by: PATHOLOGY

## 2022-06-04 ENCOUNTER — MYC REFILL (OUTPATIENT)
Dept: TRANSPLANT | Facility: CLINIC | Age: 37
End: 2022-06-04
Payer: MEDICARE

## 2022-06-04 DIAGNOSIS — Z94.0 KIDNEY REPLACED BY TRANSPLANT: ICD-10-CM

## 2022-06-04 DIAGNOSIS — N17.9 ACUTE RENAL FAILURE, UNSPECIFIED ACUTE RENAL FAILURE TYPE (H): ICD-10-CM

## 2022-06-04 DIAGNOSIS — Z48.298 AFTERCARE FOLLOWING ORGAN TRANSPLANT: ICD-10-CM

## 2022-06-04 DIAGNOSIS — Z79.899 ENCOUNTER FOR LONG-TERM CURRENT USE OF MEDICATION: ICD-10-CM

## 2022-06-04 DIAGNOSIS — Z94.0 KIDNEY TRANSPLANTED: ICD-10-CM

## 2022-06-04 LAB — DEPRECATED CALCIDIOL+CALCIFEROL SERPL-MC: 16 UG/L (ref 20–75)

## 2022-06-04 RX ORDER — ATORVASTATIN CALCIUM 10 MG/1
10 TABLET, FILM COATED ORAL DAILY
Qty: 30 TABLET | Refills: 0 | Status: CANCELLED | OUTPATIENT
Start: 2022-06-04

## 2022-06-04 RX ORDER — SULFAMETHOXAZOLE AND TRIMETHOPRIM 400; 80 MG/1; MG/1
1 TABLET ORAL DAILY
Qty: 1 TABLET | Refills: 0 | Status: CANCELLED | OUTPATIENT
Start: 2022-06-04

## 2022-06-06 DIAGNOSIS — Z94.0 KIDNEY TRANSPLANTED: ICD-10-CM

## 2022-06-06 DIAGNOSIS — Z94.0 KIDNEY REPLACED BY TRANSPLANT: ICD-10-CM

## 2022-06-06 DIAGNOSIS — Z48.298 AFTERCARE FOLLOWING ORGAN TRANSPLANT: ICD-10-CM

## 2022-06-06 DIAGNOSIS — Z48.298 AFTERCARE FOLLOWING ORGAN TRANSPLANT: Primary | ICD-10-CM

## 2022-06-06 DIAGNOSIS — N17.9 ACUTE RENAL FAILURE, UNSPECIFIED ACUTE RENAL FAILURE TYPE (H): ICD-10-CM

## 2022-06-06 DIAGNOSIS — Z79.899 ENCOUNTER FOR LONG-TERM CURRENT USE OF MEDICATION: ICD-10-CM

## 2022-06-06 LAB
BK VIRUS DNA COPIES/ML, INSTRUMENT: 560 COPIES/ML
BKV DNA SPEC NAA+PROBE-LOG#: 2.7 {LOG_COPIES}/ML

## 2022-06-06 RX ORDER — LOSARTAN POTASSIUM 25 MG/1
12.5 TABLET ORAL AT BEDTIME
Qty: 1 TABLET | Refills: 0 | Status: SHIPPED | OUTPATIENT
Start: 2022-06-06 | End: 2022-06-06

## 2022-06-06 RX ORDER — LOSARTAN POTASSIUM 25 MG/1
12.5 TABLET ORAL AT BEDTIME
Qty: 30 TABLET | Refills: 0 | COMMUNITY
Start: 2022-06-06 | End: 2022-11-22

## 2022-06-06 RX ORDER — SULFAMETHOXAZOLE AND TRIMETHOPRIM 400; 80 MG/1; MG/1
1 TABLET ORAL DAILY
Qty: 1 TABLET | Refills: 4 | Status: SHIPPED | OUTPATIENT
Start: 2022-06-06 | End: 2022-12-13

## 2022-06-06 RX ORDER — ASPIRIN 81 MG/1
TABLET, CHEWABLE ORAL
Qty: 2 TABLET | Refills: 3 | Status: SHIPPED | OUTPATIENT
Start: 2022-06-06 | End: 2022-06-06

## 2022-06-06 RX ORDER — PANTOPRAZOLE SODIUM 20 MG/1
20 TABLET, DELAYED RELEASE ORAL DAILY
Qty: 1 TABLET | Refills: 0 | Status: SHIPPED | OUTPATIENT
Start: 2022-06-06 | End: 2022-06-10

## 2022-06-06 RX ORDER — SULFAMETHOXAZOLE AND TRIMETHOPRIM 400; 80 MG/1; MG/1
1 TABLET ORAL DAILY
Qty: 1 TABLET | Refills: 4 | Status: SHIPPED | OUTPATIENT
Start: 2022-06-06 | End: 2022-06-06

## 2022-06-06 RX ORDER — ASPIRIN 81 MG/1
TABLET, CHEWABLE ORAL
Qty: 30 TABLET | Refills: 3 | COMMUNITY
Start: 2022-06-06 | End: 2022-09-21

## 2022-06-06 RX ORDER — ATORVASTATIN CALCIUM 10 MG/1
10 TABLET, FILM COATED ORAL DAILY
Qty: 30 TABLET | Refills: 4 | Status: SHIPPED | OUTPATIENT
Start: 2022-06-06 | End: 2022-12-29

## 2022-06-06 RX ORDER — MAGNESIUM OXIDE 400 MG/1
400 TABLET ORAL 2 TIMES DAILY
Qty: 60 TABLET | Refills: 0 | Status: SHIPPED | OUTPATIENT
Start: 2022-06-06 | End: 2022-08-15

## 2022-06-06 RX ORDER — MYCOPHENOLIC ACID 180 MG/1
TABLET, DELAYED RELEASE ORAL
Qty: 10 TABLET | Refills: 0 | Status: SHIPPED | OUTPATIENT
Start: 2022-06-06 | End: 2022-12-13

## 2022-06-06 RX ORDER — TACROLIMUS 0.5 MG/1
0.5 CAPSULE ORAL 2 TIMES DAILY
Qty: 60 CAPSULE | Refills: 0 | Status: SHIPPED | OUTPATIENT
Start: 2022-06-06 | End: 2022-08-31

## 2022-06-06 NOTE — TELEPHONE ENCOUNTER
Call from  Jelly Dietz who repots leaving for [facility] to visit family for one month   Requesting Rx refills   Pharmacy  Requested patient to call the transplant office due it URGENTLY    Reviewed that I do not have any refill requests from [facility]      Refilled RX's patient requested - unsure pharmacy       Plan to repeat transplant

## 2022-06-08 ENCOUNTER — LAB (OUTPATIENT)
Dept: LAB | Facility: CLINIC | Age: 37
End: 2022-06-08
Payer: MEDICARE

## 2022-06-08 DIAGNOSIS — Z94.0 KIDNEY TRANSPLANTED: ICD-10-CM

## 2022-06-08 DIAGNOSIS — Z79.899 ENCOUNTER FOR LONG-TERM CURRENT USE OF MEDICATION: ICD-10-CM

## 2022-06-08 DIAGNOSIS — N17.9 ACUTE RENAL FAILURE, UNSPECIFIED ACUTE RENAL FAILURE TYPE (H): ICD-10-CM

## 2022-06-08 DIAGNOSIS — Z48.298 AFTERCARE FOLLOWING ORGAN TRANSPLANT: ICD-10-CM

## 2022-06-08 DIAGNOSIS — Z94.0 KIDNEY REPLACED BY TRANSPLANT: ICD-10-CM

## 2022-06-08 LAB
ANION GAP SERPL CALCULATED.3IONS-SCNC: 8 MMOL/L (ref 3–14)
BUN SERPL-MCNC: 13 MG/DL (ref 7–30)
CALCIUM SERPL-MCNC: 9.6 MG/DL (ref 8.5–10.1)
CHLORIDE BLD-SCNC: 107 MMOL/L (ref 94–109)
CO2 SERPL-SCNC: 27 MMOL/L (ref 20–32)
CREAT SERPL-MCNC: 0.72 MG/DL (ref 0.52–1.04)
DONOR IDENTIFICATION: NORMAL
DSA COMMENTS: NORMAL
DSA PRESENT: NO
DSA TEST METHOD: NORMAL
ERYTHROCYTE [DISTWIDTH] IN BLOOD BY AUTOMATED COUNT: 13.7 % (ref 10–15)
GFR SERPL CREATININE-BSD FRML MDRD: >90 ML/MIN/1.73M2
GLUCOSE BLD-MCNC: 84 MG/DL (ref 70–99)
HCT VFR BLD AUTO: 33.8 % (ref 35–47)
HGB BLD-MCNC: 10.7 G/DL (ref 11.7–15.7)
MAGNESIUM SERPL-MCNC: 1.5 MG/DL (ref 1.6–2.3)
MCH RBC QN AUTO: 29.5 PG (ref 26.5–33)
MCHC RBC AUTO-ENTMCNC: 31.7 G/DL (ref 31.5–36.5)
MCV RBC AUTO: 93 FL (ref 78–100)
ORGAN: NORMAL
PHOSPHATE SERPL-MCNC: 2.4 MG/DL (ref 2.5–4.5)
PLATELET # BLD AUTO: 323 10E3/UL (ref 150–450)
POTASSIUM BLD-SCNC: 3.6 MMOL/L (ref 3.4–5.3)
RBC # BLD AUTO: 3.63 10E6/UL (ref 3.8–5.2)
SA 1 CELL: NORMAL
SA 1 TEST METHOD: NORMAL
SA 2 CELL: NORMAL
SA 2 TEST METHOD: NORMAL
SA1 HI RISK ABY: NORMAL
SA1 MOD RISK ABY: NORMAL
SA2 HI RISK ABY: NORMAL
SA2 MOD RISK ABY: NORMAL
SODIUM SERPL-SCNC: 142 MMOL/L (ref 133–144)
TACROLIMUS BLD-MCNC: 5 UG/L (ref 5–15)
TME LAST DOSE: NORMAL H
TME LAST DOSE: NORMAL H
UNACCEPTABLE ANTIGENS: NORMAL
UNOS CPRA: 95
WBC # BLD AUTO: 5.4 10E3/UL (ref 4–11)
ZZZSA 1  COMMENTS: NORMAL
ZZZSA 2 COMMENTS: NORMAL

## 2022-06-08 PROCEDURE — 80197 ASSAY OF TACROLIMUS: CPT | Performed by: INTERNAL MEDICINE

## 2022-06-08 PROCEDURE — 80048 BASIC METABOLIC PNL TOTAL CA: CPT | Performed by: PATHOLOGY

## 2022-06-08 PROCEDURE — 80180 DRUG SCRN QUAN MYCOPHENOLATE: CPT | Performed by: INTERNAL MEDICINE

## 2022-06-08 PROCEDURE — 83735 ASSAY OF MAGNESIUM: CPT | Performed by: PATHOLOGY

## 2022-06-08 PROCEDURE — 85027 COMPLETE CBC AUTOMATED: CPT | Performed by: PATHOLOGY

## 2022-06-08 PROCEDURE — 36415 COLL VENOUS BLD VENIPUNCTURE: CPT | Performed by: PATHOLOGY

## 2022-06-08 PROCEDURE — 84100 ASSAY OF PHOSPHORUS: CPT | Performed by: PATHOLOGY

## 2022-06-09 ENCOUNTER — TELEPHONE (OUTPATIENT)
Dept: TRANSPLANT | Facility: CLINIC | Age: 37
End: 2022-06-09
Payer: MEDICARE

## 2022-06-09 LAB
MYCOPHENOLATE SERPL LC/MS/MS-MCNC: 2.5 MG/L (ref 1–3.5)
MYCOPHENOLATE-G SERPL LC/MS/MS-MCNC: 32.7 MG/L (ref 30–95)
TME LAST DOSE: NORMAL H
TME LAST DOSE: NORMAL H

## 2022-06-09 NOTE — TELEPHONE ENCOUNTER
Pt is traveling out of the country this evening  They told her she did not need anything other than her ticket  She just did he on line signing in and they said she needed her COVID vaccine card  She can't find her 1st 2 injection card  She has her 3rd booster card Can we send documentation that she received the vaccines and the dates

## 2022-06-09 NOTE — LETTER
Jelly Dietz  [address]  [address]  [place] [address]                June 9, 2022       COVID-19,PF,Pfizer (12+ Yrs)5/6/2021, 4/15/2021   COVID-19,PF,Pfizer 12+ Yrs (2022 and After)5/27/2022   Influenza (IIV3) PF10/15/2012   Influenza Vaccine IM > 6 months Valent IIV4 (Alfuria,Fluzone)12/21/2020, 10/1/2015   Mantoux Tuberculin Skin Test11/30/2012   Pneumococcal 23 niekrm89/1/2015

## 2022-06-09 NOTE — TELEPHONE ENCOUNTER
mychart document completed with immunization records.  Patient notified by admin staff that this was done.

## 2022-06-10 ENCOUNTER — TELEPHONE (OUTPATIENT)
Dept: NEPHROLOGY | Facility: CLINIC | Age: 37
End: 2022-06-10
Payer: MEDICARE

## 2022-06-10 DIAGNOSIS — Z94.0 KIDNEY TRANSPLANTED: ICD-10-CM

## 2022-06-10 RX ORDER — PANTOPRAZOLE SODIUM 20 MG/1
20 TABLET, DELAYED RELEASE ORAL DAILY
Qty: 90 TABLET | Refills: 0 | Status: SHIPPED | OUTPATIENT
Start: 2022-06-10 | End: 2024-01-11

## 2022-06-10 RX ORDER — PANTOPRAZOLE SODIUM 20 MG/1
20 TABLET, DELAYED RELEASE ORAL DAILY
Qty: 1 TABLET | Refills: 0 | Status: SHIPPED | OUTPATIENT
Start: 2022-06-10 | End: 2022-06-10

## 2022-06-10 NOTE — TELEPHONE ENCOUNTER
[facility] Call Center    Phone Message    May a detailed message be left on voicemail: yes     Reason for Call: Medication Question or concern regarding medication   Prescription Clarification  Name of Medication: pantoprazole (PROTONIX) 20 MG EC tablet  Prescribing Provider: Dr Mancilla   Pharmacy:  [facility] - [place], [place] - [address]   What on the order needs clarification? Qty on Rx is for 1 tablet.   Is this correct?  If not please send in new Rx. If this is correct please give the pharmacy a call to confirm          Action Taken: Message routed to:  [facility]    Travel Screening: Not Applicable

## 2022-06-28 ENCOUNTER — TELEPHONE (OUTPATIENT)
Dept: PHARMACY | Facility: CLINIC | Age: 37
End: 2022-06-28

## 2022-06-28 NOTE — TELEPHONE ENCOUNTER
Clinical Pharmacy Consult:                                                      Transplant Specific: 24 Month Post Transplant Medication Call  Date of Transplant: 6/19/20  Type of Transplant: kidney  First Transplant: no, #2  History of rejection: yes    Immunosuppression Regimen   TAC 4.5mg qAM & 4.5mg qPM, Prednisone 5mg qAM and Myforitic 540mg qAM & 360mg qPM  Patient specific goal: 4-6  Most recent level: 5.0, date 6/8/22  Immunosuppressant Levels:  therapeutic  Pt adherent to lab draws: Yes  Scr:   Lab Results   Component Value Date    CR 0.99 07/24/2020     Side effects: none    Prophylactic Medications  Antibacterial:  Bactrim SS daily  Scheduled Discontinue Date: Lifelong    Antifungal: Not needed thus far  Scheduled Discontinue Date: N/A    Antiviral: CrCl 40 to 59 mL/minute: Valcyte 450 mg once daily   Scheduled Discontinue Date: 3 months-Therapy completed    Acid Reducer: Protonix (pantoprazole)  Scheduled Reviewed Date: MD to determine    Thrombosis Prevention: Aspirin 81 mg PO daily  Scheduled Discontinue Date: MD to determine    Blood Pressure Management  Frequency of home Blood Pressure checks: Unknown, unable to contact  Most recent home BP: Unknown  Patient Blood pressure goal: <140/90  Patient blood pressure at goal:  yes  Hospitalizations/ER visits since last assessment: 4 (foot pain, nausea/vomiting, anxiety)     Med rec/DUR performed: Yes  Med Rec Discrepancies: No    Unable to contact [facility].  Recent labs and tacro is within goal range.  Refills have been on time.  No concerns today.  Will follow up in 1 year.     Simran Mittal [facility]  Specialty Pharmacist [phone]

## 2022-07-22 DIAGNOSIS — Z94.0 KIDNEY REPLACED BY TRANSPLANT: ICD-10-CM

## 2022-07-22 DIAGNOSIS — Z48.298 AFTERCARE FOLLOWING ORGAN TRANSPLANT: ICD-10-CM

## 2022-07-22 DIAGNOSIS — N17.9 ACUTE RENAL FAILURE, UNSPECIFIED ACUTE RENAL FAILURE TYPE (H): ICD-10-CM

## 2022-07-22 DIAGNOSIS — Z79.899 ENCOUNTER FOR LONG-TERM CURRENT USE OF MEDICATION: ICD-10-CM

## 2022-07-22 DIAGNOSIS — Z94.0 KIDNEY TRANSPLANTED: ICD-10-CM

## 2022-07-25 RX ORDER — PREDNISONE 5 MG/1
5 TABLET ORAL DAILY
Qty: 90 TABLET | Refills: 3 | Status: SHIPPED | OUTPATIENT
Start: 2022-07-25 | End: 2022-12-09

## 2022-08-15 DIAGNOSIS — Z48.298 AFTERCARE FOLLOWING ORGAN TRANSPLANT: ICD-10-CM

## 2022-08-15 DIAGNOSIS — R79.0 LOW SERUM PHOSPHORUS FOR AGE: Primary | ICD-10-CM

## 2022-08-15 DIAGNOSIS — Z94.0 KIDNEY REPLACED BY TRANSPLANT: ICD-10-CM

## 2022-08-15 RX ORDER — MAGNESIUM OXIDE 400 MG/1
400 TABLET ORAL 2 TIMES DAILY
Qty: 60 TABLET | Refills: 0 | Status: SHIPPED | OUTPATIENT
Start: 2022-08-15 | End: 2022-09-21

## 2022-08-21 ENCOUNTER — HEALTH MAINTENANCE LETTER (OUTPATIENT)
Age: 37
End: 2022-08-21

## 2022-08-31 DIAGNOSIS — Z48.298 AFTERCARE FOLLOWING ORGAN TRANSPLANT: ICD-10-CM

## 2022-08-31 DIAGNOSIS — Z94.0 KIDNEY REPLACED BY TRANSPLANT: ICD-10-CM

## 2022-08-31 DIAGNOSIS — Z94.0 KIDNEY TRANSPLANTED: Primary | ICD-10-CM

## 2022-08-31 DIAGNOSIS — Z79.899 ENCOUNTER FOR LONG-TERM CURRENT USE OF MEDICATION: ICD-10-CM

## 2022-08-31 DIAGNOSIS — N17.9 ACUTE RENAL FAILURE, UNSPECIFIED ACUTE RENAL FAILURE TYPE (H): ICD-10-CM

## 2022-08-31 RX ORDER — TACROLIMUS 0.5 MG/1
0.5 CAPSULE ORAL 2 TIMES DAILY
Qty: 60 CAPSULE | Refills: 11 | Status: SHIPPED | OUTPATIENT
Start: 2022-08-31 | End: 2023-08-16

## 2022-09-20 DIAGNOSIS — R79.0 LOW SERUM PHOSPHORUS FOR AGE: ICD-10-CM

## 2022-09-20 DIAGNOSIS — Z94.0 KIDNEY TRANSPLANTED: ICD-10-CM

## 2022-09-21 ENCOUNTER — MYC MEDICAL ADVICE (OUTPATIENT)
Dept: TRANSPLANT | Facility: CLINIC | Age: 37
End: 2022-09-21

## 2022-09-21 RX ORDER — MAGNESIUM OXIDE 400 MG/1
TABLET ORAL
Qty: 60 TABLET | Refills: 0 | Status: SHIPPED | OUTPATIENT
Start: 2022-09-21 | End: 2022-10-14

## 2022-09-21 RX ORDER — ASPIRIN 81 MG/1
TABLET, CHEWABLE ORAL
Qty: 30 TABLET | Refills: 2 | Status: SHIPPED | OUTPATIENT
Start: 2022-09-21 | End: 2023-01-16

## 2022-10-14 ENCOUNTER — TELEPHONE (OUTPATIENT)
Dept: TRANSPLANT | Facility: CLINIC | Age: 37
End: 2022-10-14

## 2022-10-14 DIAGNOSIS — Z94.0 KIDNEY TRANSPLANTED: ICD-10-CM

## 2022-10-14 DIAGNOSIS — Z94.0 KIDNEY TRANSPLANTED: Primary | ICD-10-CM

## 2022-10-14 DIAGNOSIS — R79.0 LOW SERUM PHOSPHORUS FOR AGE: ICD-10-CM

## 2022-10-14 RX ORDER — TACROLIMUS 1 MG/1
4 CAPSULE ORAL 2 TIMES DAILY
Qty: 240 CAPSULE | Refills: 3 | Status: SHIPPED | OUTPATIENT
Start: 2022-10-14 | End: 2023-03-05

## 2022-10-14 RX ORDER — MAGNESIUM OXIDE 400 MG/1
TABLET ORAL
Qty: 60 TABLET | Refills: 0 | Status: SHIPPED | OUTPATIENT
Start: 2022-10-14 | End: 2022-11-22

## 2022-10-14 RX ORDER — PANTOPRAZOLE SODIUM 20 MG/1
20 TABLET, DELAYED RELEASE ORAL DAILY
Qty: 90 TABLET | Refills: 0 | OUTPATIENT
Start: 2022-10-14

## 2022-10-14 RX ORDER — DIBASIC SODIUM PHOSPHATE, MONOBASIC POTASSIUM PHOSPHATE AND MONOBASIC SODIUM PHOSPHATE 852; 155; 130 MG/1; MG/1; MG/1
TABLET ORAL
Qty: 120 TABLET | Refills: 0 | Status: SHIPPED | OUTPATIENT
Start: 2022-10-14 | End: 2022-11-22

## 2022-10-14 NOTE — TELEPHONE ENCOUNTER
Please send over refills for Tacro 1mg caps. Pt will need more delivered to her by Monday, 10/17    Thank you,  [facility] mail order Services  [phone]

## 2022-11-21 ENCOUNTER — HEALTH MAINTENANCE LETTER (OUTPATIENT)
Age: 37
End: 2022-11-21

## 2022-11-21 DIAGNOSIS — R79.0 LOW SERUM PHOSPHORUS FOR AGE: ICD-10-CM

## 2022-11-21 DIAGNOSIS — N17.9 ACUTE RENAL FAILURE, UNSPECIFIED ACUTE RENAL FAILURE TYPE (H): ICD-10-CM

## 2022-11-22 ENCOUNTER — TELEPHONE (OUTPATIENT)
Dept: TRANSPLANT | Facility: CLINIC | Age: 37
End: 2022-11-22

## 2022-11-22 DIAGNOSIS — Z94.0 KIDNEY REPLACED BY TRANSPLANT: ICD-10-CM

## 2022-11-22 DIAGNOSIS — Z79.899 ENCOUNTER FOR LONG-TERM CURRENT USE OF MEDICATION: ICD-10-CM

## 2022-11-22 DIAGNOSIS — Z48.298 AFTERCARE FOLLOWING ORGAN TRANSPLANT: ICD-10-CM

## 2022-11-22 DIAGNOSIS — Z94.0 KIDNEY TRANSPLANTED: Primary | ICD-10-CM

## 2022-11-22 RX ORDER — DIBASIC SODIUM PHOSPHATE, MONOBASIC POTASSIUM PHOSPHATE AND MONOBASIC SODIUM PHOSPHATE 852; 155; 130 MG/1; MG/1; MG/1
TABLET ORAL
Qty: 120 TABLET | Refills: 0 | Status: SHIPPED | OUTPATIENT
Start: 2022-11-22 | End: 2022-12-30

## 2022-11-22 RX ORDER — MAGNESIUM OXIDE 400 MG/1
TABLET ORAL
Qty: 90 TABLET | Refills: 0 | Status: SHIPPED | OUTPATIENT
Start: 2022-11-22 | End: 2023-05-12

## 2022-11-22 RX ORDER — LOSARTAN POTASSIUM 25 MG/1
TABLET ORAL
Qty: 30 TABLET | Refills: 4 | Status: SHIPPED | OUTPATIENT
Start: 2022-11-22 | End: 2023-12-06

## 2022-11-22 NOTE — LETTER
Hello,  We received your request to refill your Losartan, K Phos, and Magnesium . Please do be aware that solid organ transplant is looking to collaborate with your primary care provider for ordering and management of non-transplant specific medications.     I have sent a refill for a 90 day supply. Please do connect with your primary provider within that time to discuss ongoing management.    Other prescriptions on your medication list that are currently filled by our team, but can be transferred to your primary provider include:  Losartan  Atorvastatin  K Phos  Magnesium  Pantoprazole  Phosphorus  Vitamin D                If you have any questions after talking with your primary provider about these medications, please do let us know.    Thank you,    Beti Billings RN

## 2022-11-22 NOTE — TELEPHONE ENCOUNTER
ISSUE: last lab on file 6/8/2022    LPN TASK:    Please call the patient and review the following, as well as please update lab order to be signed by Dr Mancilla    The most recent laboratory results we have on file for you are from 6/8/2022. Your labs should be completed MONTHLYd.   For the best outcome for your transplant and in order for us to refill your prescriptions, we encourage you to have a yearly clinic appointment with a physician and to have current labs. If you are unable to return to our transplant center there are several alternatives. Please call your coordinator to discuss them.  To make an appointment with a physician here at [facility], please call:  The Transplant Office at [phone] or [phone].   The Transplant Staff at [facility]    Beti Billings RN   Transplant Coordinator  [phone]

## 2022-12-09 DIAGNOSIS — Z79.899 ENCOUNTER FOR LONG-TERM CURRENT USE OF MEDICATION: ICD-10-CM

## 2022-12-09 DIAGNOSIS — R79.0 LOW SERUM PHOSPHORUS FOR AGE: ICD-10-CM

## 2022-12-09 DIAGNOSIS — N17.9 ACUTE RENAL FAILURE, UNSPECIFIED ACUTE RENAL FAILURE TYPE (H): ICD-10-CM

## 2022-12-09 DIAGNOSIS — Z94.0 KIDNEY TRANSPLANTED: ICD-10-CM

## 2022-12-09 DIAGNOSIS — Z94.0 KIDNEY REPLACED BY TRANSPLANT: ICD-10-CM

## 2022-12-09 DIAGNOSIS — Z48.298 AFTERCARE FOLLOWING ORGAN TRANSPLANT: ICD-10-CM

## 2022-12-09 RX ORDER — PREDNISONE 5 MG/1
5 TABLET ORAL DAILY
Qty: 90 TABLET | Refills: 3 | Status: SHIPPED | OUTPATIENT
Start: 2022-12-09 | End: 2023-06-26

## 2022-12-12 RX ORDER — PANTOPRAZOLE SODIUM 20 MG/1
20 TABLET, DELAYED RELEASE ORAL DAILY
Qty: 90 TABLET | Refills: 0 | OUTPATIENT
Start: 2022-12-12

## 2022-12-13 ENCOUNTER — MYC MEDICAL ADVICE (OUTPATIENT)
Dept: TRANSPLANT | Facility: CLINIC | Age: 37
End: 2022-12-13

## 2022-12-13 DIAGNOSIS — N17.9 ACUTE RENAL FAILURE, UNSPECIFIED ACUTE RENAL FAILURE TYPE (H): ICD-10-CM

## 2022-12-13 DIAGNOSIS — Z48.298 AFTERCARE FOLLOWING ORGAN TRANSPLANT: ICD-10-CM

## 2022-12-13 DIAGNOSIS — Z79.899 ENCOUNTER FOR LONG-TERM CURRENT USE OF MEDICATION: ICD-10-CM

## 2022-12-13 DIAGNOSIS — R79.89 ELEVATED SERUM CREATININE: Primary | ICD-10-CM

## 2022-12-13 DIAGNOSIS — Z94.0 KIDNEY REPLACED BY TRANSPLANT: ICD-10-CM

## 2022-12-13 DIAGNOSIS — Z94.0 KIDNEY TRANSPLANTED: Primary | ICD-10-CM

## 2022-12-13 RX ORDER — SULFAMETHOXAZOLE AND TRIMETHOPRIM 400; 80 MG/1; MG/1
1 TABLET ORAL DAILY
Qty: 90 TABLET | Refills: 0 | Status: SHIPPED | OUTPATIENT
Start: 2022-12-13 | End: 2023-03-05

## 2022-12-13 RX ORDER — MYCOPHENOLIC ACID 180 MG/1
TABLET, DELAYED RELEASE ORAL
Qty: 450 TABLET | Refills: 0 | Status: SHIPPED | OUTPATIENT
Start: 2022-12-13 | End: 2023-02-02

## 2022-12-13 NOTE — TELEPHONE ENCOUNTER
RNCC left detailed messaged asking patient to please obtain a full set of transplant labs.  Last labs 6/2022.  Will sent my chart as well as hard copy letter via mail.    Beti Billings RN   Transplant Coordinator  [phone]

## 2022-12-13 NOTE — LETTER
Jelly Dietz  [address]  [address]  [place] [address]                December 13, 2022    This letter is regarding your transplant lab work. The most recent laboratory results we have on file for you are from June 2022. Your labs should be completed as soon as possible.   For the best outcome for your transplant and in order for us to refill your prescriptions, we encourage you to have a yearly clinic appointment with a physician and to have current labs. If you are unable to return to our transplant center there are several alternatives. Please call your coordinator to discuss them.  To make an appointment with a physician here at [facility], please call:  The Transplant Office at [phone] or [phone].     The Transplant Staff at [facility]

## 2022-12-14 ENCOUNTER — TELEPHONE (OUTPATIENT)
Dept: TRANSPLANT | Facility: CLINIC | Age: 37
End: 2022-12-14

## 2022-12-14 DIAGNOSIS — R79.0 LOW SERUM PHOSPHORUS FOR AGE: ICD-10-CM

## 2022-12-14 DIAGNOSIS — Z94.0 KIDNEY TRANSPLANTED: Primary | ICD-10-CM

## 2022-12-14 DIAGNOSIS — E55.9 VITAMIN D DEFICIENCY: ICD-10-CM

## 2022-12-14 RX ORDER — ATORVASTATIN CALCIUM 10 MG/1
10 TABLET, FILM COATED ORAL DAILY
Qty: 90 TABLET | Refills: 0 | OUTPATIENT
Start: 2022-12-14

## 2022-12-14 RX ORDER — ASPIRIN 81 MG/1
TABLET, CHEWABLE ORAL
Qty: 90 TABLET | Refills: 0 | OUTPATIENT
Start: 2022-12-14

## 2022-12-14 RX ORDER — PANTOPRAZOLE SODIUM 20 MG/1
20 TABLET, DELAYED RELEASE ORAL DAILY
Qty: 90 TABLET | Refills: 0 | OUTPATIENT
Start: 2022-12-14

## 2022-12-14 RX ORDER — DIBASIC SODIUM PHOSPHATE, MONOBASIC POTASSIUM PHOSPHATE AND MONOBASIC SODIUM PHOSPHATE 852; 155; 130 MG/1; MG/1; MG/1
TABLET ORAL
Qty: 120 TABLET | Refills: 0 | OUTPATIENT
Start: 2022-12-14

## 2022-12-14 NOTE — TELEPHONE ENCOUNTER
ISSUE: received refill request for Phosphorus    PLAN:    These should all go to PCP, except for the phosphorus.  Would reduce that dose to 500 mg once daily and repeat phosphorus level, vitamin D and PTH with next labs.     Thanks     Jose    OUTCOME: call attempted X2,  My chart message sent as well as letter via mail.    Beti Billings RN   Transplant Coordinator  [phone]

## 2022-12-15 DIAGNOSIS — Z94.0 KIDNEY TRANSPLANTED: ICD-10-CM

## 2022-12-15 RX ORDER — ATORVASTATIN CALCIUM 10 MG/1
10 TABLET, FILM COATED ORAL DAILY
Qty: 30 TABLET | Refills: 4 | OUTPATIENT
Start: 2022-12-15

## 2022-12-15 RX ORDER — PANTOPRAZOLE SODIUM 20 MG/1
20 TABLET, DELAYED RELEASE ORAL DAILY
Qty: 90 TABLET | Refills: 0 | OUTPATIENT
Start: 2022-12-15

## 2022-12-28 DIAGNOSIS — Z94.0 KIDNEY TRANSPLANTED: ICD-10-CM

## 2022-12-29 DIAGNOSIS — Z94.0 KIDNEY TRANSPLANTED: Primary | ICD-10-CM

## 2022-12-29 RX ORDER — PANTOPRAZOLE SODIUM 20 MG/1
20 TABLET, DELAYED RELEASE ORAL DAILY
Qty: 90 TABLET | Status: CANCELLED | OUTPATIENT
Start: 2022-12-29

## 2022-12-29 RX ORDER — ATORVASTATIN CALCIUM 10 MG/1
10 TABLET, FILM COATED ORAL DAILY
Qty: 90 TABLET | Refills: 0 | Status: SHIPPED | OUTPATIENT
Start: 2022-12-29 | End: 2023-04-03

## 2022-12-29 RX ORDER — ATORVASTATIN CALCIUM 10 MG/1
10 TABLET, FILM COATED ORAL DAILY
Qty: 30 TABLET | Status: CANCELLED | OUTPATIENT
Start: 2022-12-29

## 2022-12-29 NOTE — TELEPHONE ENCOUNTER
pantoprazole (PROTONIX) 20 MG EC tablet    atorvastatin (LIPITOR) 10 MG tablet    There is a message by Nephrology/SOT  To have the Primary Care Provider take over refills for both of these medications for Pt care.     Refer to clinic for review and refills    Rena Zavala RN  Central Triage Red Flags/Med Refills        Amanda Elaine LPN       10:39 AM  Note     Please send to PCP

## 2022-12-30 RX ORDER — PANTOPRAZOLE SODIUM 20 MG/1
20 TABLET, DELAYED RELEASE ORAL DAILY
Qty: 90 TABLET | Refills: 0 | Status: CANCELLED | OUTPATIENT
Start: 2022-12-30

## 2023-01-16 DIAGNOSIS — Z94.0 KIDNEY TRANSPLANTED: Primary | ICD-10-CM

## 2023-01-16 RX ORDER — ASPIRIN 81 MG/1
81 TABLET, CHEWABLE ORAL DAILY
Qty: 90 TABLET | Refills: 0 | Status: SHIPPED | OUTPATIENT
Start: 2023-01-16 | End: 2023-02-02

## 2023-02-02 DIAGNOSIS — Z94.0 KIDNEY TRANSPLANTED: Primary | ICD-10-CM

## 2023-02-02 DIAGNOSIS — Z94.0 KIDNEY TRANSPLANTED: ICD-10-CM

## 2023-02-02 DIAGNOSIS — Z48.298 AFTERCARE FOLLOWING ORGAN TRANSPLANT: ICD-10-CM

## 2023-02-02 DIAGNOSIS — N17.9 ACUTE RENAL FAILURE, UNSPECIFIED ACUTE RENAL FAILURE TYPE (H): ICD-10-CM

## 2023-02-02 DIAGNOSIS — Z79.899 ENCOUNTER FOR LONG-TERM CURRENT USE OF MEDICATION: ICD-10-CM

## 2023-02-02 DIAGNOSIS — Z94.0 KIDNEY REPLACED BY TRANSPLANT: ICD-10-CM

## 2023-02-02 RX ORDER — MYCOPHENOLIC ACID 180 MG/1
TABLET, DELAYED RELEASE ORAL
Qty: 150 TABLET | Refills: 0 | Status: SHIPPED | OUTPATIENT
Start: 2023-02-02 | End: 2023-03-27

## 2023-02-02 RX ORDER — ASPIRIN 81 MG/1
81 TABLET, CHEWABLE ORAL DAILY
Qty: 90 TABLET | Refills: 0 | Status: SHIPPED | OUTPATIENT
Start: 2023-02-02 | End: 2023-11-09

## 2023-02-06 RX ORDER — ATORVASTATIN CALCIUM 10 MG/1
10 TABLET, FILM COATED ORAL DAILY
Qty: 90 TABLET | Refills: 0 | OUTPATIENT
Start: 2023-02-06

## 2023-02-20 ENCOUNTER — NURSE TRIAGE (OUTPATIENT)
Dept: NURSING | Facility: CLINIC | Age: 38
End: 2023-02-20
Payer: MEDICARE

## 2023-02-21 NOTE — TELEPHONE ENCOUNTER
"Patient calling. Earlier today, her hands got numb, but not painful unless she moves them, especially her fingers and palms. Worse on the left than the right. No precipitating factor per patient. Unable to . Skin has no color change. Sudden onset pain, started at approximately 3 pm. States that he feet hurt, but not on the soles. Ankles to knees feels like something is moving. States that she used to have this sensation when she used to be on dialysis. Has not tried any analgesics.    Care advice given for patient to be seen within 24 hours. Patient states that she will go to her regular clinic tomorrow.    Patricia Leal RN  [facility]  February 20, 2023, 8:14 PM    Reason for Disposition    Weakness (i.e., loss of strength) of new-onset in hand or fingers  (Exceptions: not truly weak, hand feels weak because of pain; weakness present > 2 weeks)    Additional Information    Negative: [1] Similar pain previously AND [2] it was from \"heart attack\"    Negative: [1] Similar pain previously AND [2] it was from \"angina\" AND [3] not relieved by nitroglycerin    Negative: Sounds like a life-threatening emergency to the triager    Negative: Followed a hand or wrist injury    Negative: Chest pain    Negative: Caused by an animal bite    Negative: Caused by a human bite    Negative: Wound looks infected    Negative: Finger pain is main symptom    Negative: Arm pain is main symptom    Negative: [1] Swollen joint AND [2] fever    Negative: [1] Red area or streak AND [2] fever    Negative: Patient sounds very sick or weak to the triager    Negative: [1] SEVERE pain (e.g., excruciating, unable to use hand at all) AND [2] not improved after 2 hours of pain medicine    Negative: [1] Looks infected (spreading redness, pus) AND [2] large red area (> 2 in. or 5 cm)    Protocols used: HAND AND WRIST PAIN-A-AH      "

## 2023-03-02 ENCOUNTER — TELEPHONE (OUTPATIENT)
Dept: TRANSPLANT | Facility: CLINIC | Age: 38
End: 2023-03-02
Payer: MEDICARE

## 2023-03-02 NOTE — LETTER
March 2, 2023        Jelly Dietz  [address]    [place] [address]  [place]          Dear Jelly Dietz,     We hope this letter finds you well. We at the Solid Organ Transplant Office have been trying to reach you via phone, however we have been unsuccessful. You are late to refill your transplant medications. Please contact us at your earliest convenience at [phone]. Thank you.           Sincerely,     Your Transplant Team

## 2023-03-02 NOTE — TELEPHONE ENCOUNTER
Simran Mittal, [facility]  Beti Billings, RN  Bassam Bang!     Jelly is about 13 days late to fill her myco, losartan, magnesium and Vit D.  I tried to call her today and phone just kept ringing without going to .  Not sure if she responds to [employer] messages better.       Simran     OUTCOME: patient's phone continues to ring, unable to LM.  LM on mothers , asked for CB    MY chart sent as well as letter via mail    Beti Billings RN   Transplant Coordinator  [phone]

## 2023-03-04 DIAGNOSIS — Z94.0 KIDNEY TRANSPLANTED: Primary | ICD-10-CM

## 2023-03-04 DIAGNOSIS — Z48.298 AFTERCARE FOLLOWING ORGAN TRANSPLANT: ICD-10-CM

## 2023-03-04 DIAGNOSIS — Z94.0 KIDNEY REPLACED BY TRANSPLANT: ICD-10-CM

## 2023-03-04 DIAGNOSIS — Z79.899 ENCOUNTER FOR LONG-TERM CURRENT USE OF MEDICATION: ICD-10-CM

## 2023-03-04 DIAGNOSIS — N17.9 ACUTE RENAL FAILURE, UNSPECIFIED ACUTE RENAL FAILURE TYPE (H): ICD-10-CM

## 2023-03-05 RX ORDER — TACROLIMUS 1 MG/1
4 CAPSULE ORAL 2 TIMES DAILY
Qty: 240 CAPSULE | Refills: 0 | Status: SHIPPED | OUTPATIENT
Start: 2023-03-05 | End: 2023-03-27

## 2023-03-05 RX ORDER — SULFAMETHOXAZOLE AND TRIMETHOPRIM 400; 80 MG/1; MG/1
1 TABLET ORAL DAILY
Qty: 90 TABLET | Refills: 0 | Status: SHIPPED | OUTPATIENT
Start: 2023-03-05 | End: 2023-03-27

## 2023-03-27 DIAGNOSIS — Z48.298 AFTERCARE FOLLOWING ORGAN TRANSPLANT: ICD-10-CM

## 2023-03-27 DIAGNOSIS — N17.9 ACUTE RENAL FAILURE, UNSPECIFIED ACUTE RENAL FAILURE TYPE (H): ICD-10-CM

## 2023-03-27 DIAGNOSIS — Z94.0 KIDNEY REPLACED BY TRANSPLANT: ICD-10-CM

## 2023-03-27 DIAGNOSIS — Z79.899 ENCOUNTER FOR LONG-TERM CURRENT USE OF MEDICATION: ICD-10-CM

## 2023-03-27 DIAGNOSIS — Z94.0 KIDNEY TRANSPLANTED: Primary | ICD-10-CM

## 2023-03-27 RX ORDER — MYCOPHENOLIC ACID 180 MG/1
TABLET, DELAYED RELEASE ORAL
Qty: 150 TABLET | Refills: 0 | Status: SHIPPED | OUTPATIENT
Start: 2023-03-27 | End: 2023-05-30

## 2023-03-27 RX ORDER — TACROLIMUS 1 MG/1
4 CAPSULE ORAL 2 TIMES DAILY
Qty: 240 CAPSULE | Refills: 0 | Status: SHIPPED | OUTPATIENT
Start: 2023-03-27 | End: 2023-05-30

## 2023-03-27 RX ORDER — SULFAMETHOXAZOLE AND TRIMETHOPRIM 400; 80 MG/1; MG/1
1 TABLET ORAL DAILY
Qty: 90 TABLET | Refills: 0 | Status: SHIPPED | OUTPATIENT
Start: 2023-03-27 | End: 2023-06-26

## 2023-03-29 DIAGNOSIS — Z94.0 KIDNEY TRANSPLANTED: ICD-10-CM

## 2023-04-03 RX ORDER — ATORVASTATIN CALCIUM 10 MG/1
10 TABLET, FILM COATED ORAL DAILY
Qty: 90 TABLET | Refills: 3 | Status: SHIPPED | OUTPATIENT
Start: 2023-04-03 | End: 2023-08-16

## 2023-04-03 NOTE — TELEPHONE ENCOUNTER
atorvastatin (LIPITOR) 10 MG tablet  Last Written Prescription Date:  12/29/2022  Last Fill Quantity: 90,   # refills: 0  Last Office Visit :  1/18/2022  Future Office visit:  None    Routing refill request to provider for review/approval because:  Needing an updated office visit and new order by NP Provider for further refills.   Please call Pt to schedule.       Rena Zavala RN  Central Triage Red Flags/Med Refills

## 2023-04-16 ENCOUNTER — HEALTH MAINTENANCE LETTER (OUTPATIENT)
Age: 38
End: 2023-04-16

## 2023-04-27 ENCOUNTER — TELEPHONE (OUTPATIENT)
Dept: TRANSPLANT | Facility: CLINIC | Age: 38
End: 2023-04-27
Payer: MEDICARE

## 2023-04-27 NOTE — LETTER
May 2, 2023            Jelly Dietz  [address]    [facility] [address]  [place] [place]          Dear Jelly Dietz,     We hope this letter finds you well. We at the Solid Organ Transplant Office have been trying to reach you via phone, however we have been unsuccessful.     You are late to refill your Mycophenolic Acid.  Your transplant team would like to ensure you are not out of your medication and are doing well.    Please contact us at your earliest convenience at [phone] x [phone]. Thank you.           Sincerely,     Your Transplant Team

## 2023-04-27 NOTE — TELEPHONE ENCOUNTER
Simran Mittal, [facility]  Beti Billings, PERI  Hi BetiJelly is late to fill her MPA by 16 days.  We are not able to leave a message on her phone.  I did send a generic text message that she is late to fill one or more meds and to call us to order.         Simran     OUTCOME: attempted to call patient X2 as well as emergency contact x2.  My chart message sent.  Will sent letter via mail.    Beti Billings RN   Transplant Coordinator  [phone]

## 2023-05-10 DIAGNOSIS — Z94.0 KIDNEY TRANSPLANTED: ICD-10-CM

## 2023-05-10 DIAGNOSIS — R79.0 LOW SERUM PHOSPHORUS FOR AGE: ICD-10-CM

## 2023-05-11 NOTE — TELEPHONE ENCOUNTER
Pt is picking these meds upday with there immunos meds can we please get these sent over asap please and thank you

## 2023-05-12 RX ORDER — VITAMIN B COMPLEX
50 TABLET ORAL DAILY
Qty: 180 TABLET | Refills: 3 | Status: SHIPPED | OUTPATIENT
Start: 2023-05-12 | End: 2024-05-17

## 2023-05-12 RX ORDER — MAGNESIUM OXIDE 400 MG/1
TABLET ORAL
Qty: 90 TABLET | Refills: 0 | Status: SHIPPED | OUTPATIENT
Start: 2023-05-12 | End: 2023-06-26

## 2023-05-30 DIAGNOSIS — Z79.899 ENCOUNTER FOR LONG-TERM CURRENT USE OF MEDICATION: ICD-10-CM

## 2023-05-30 DIAGNOSIS — Z94.0 KIDNEY TRANSPLANTED: Primary | ICD-10-CM

## 2023-05-30 DIAGNOSIS — Z48.298 AFTERCARE FOLLOWING ORGAN TRANSPLANT: ICD-10-CM

## 2023-05-30 DIAGNOSIS — N17.9 ACUTE RENAL FAILURE, UNSPECIFIED ACUTE RENAL FAILURE TYPE (H): ICD-10-CM

## 2023-05-30 DIAGNOSIS — Z94.0 KIDNEY REPLACED BY TRANSPLANT: ICD-10-CM

## 2023-05-30 RX ORDER — TACROLIMUS 1 MG/1
4 CAPSULE ORAL 2 TIMES DAILY
Qty: 240 CAPSULE | Refills: 0 | Status: SHIPPED | OUTPATIENT
Start: 2023-05-30 | End: 2023-07-14

## 2023-05-30 RX ORDER — MYCOPHENOLIC ACID 180 MG/1
TABLET, DELAYED RELEASE ORAL
Qty: 150 TABLET | Refills: 0 | Status: SHIPPED | OUTPATIENT
Start: 2023-05-30 | End: 2023-07-14

## 2023-06-26 DIAGNOSIS — Z79.899 ENCOUNTER FOR LONG-TERM CURRENT USE OF MEDICATION: ICD-10-CM

## 2023-06-26 DIAGNOSIS — Z48.298 AFTERCARE FOLLOWING ORGAN TRANSPLANT: ICD-10-CM

## 2023-06-26 DIAGNOSIS — N17.9 ACUTE RENAL FAILURE, UNSPECIFIED ACUTE RENAL FAILURE TYPE (H): ICD-10-CM

## 2023-06-26 DIAGNOSIS — R79.0 LOW SERUM PHOSPHORUS FOR AGE: ICD-10-CM

## 2023-06-26 DIAGNOSIS — Z94.0 KIDNEY REPLACED BY TRANSPLANT: ICD-10-CM

## 2023-06-26 DIAGNOSIS — Z94.0 KIDNEY TRANSPLANTED: ICD-10-CM

## 2023-06-26 RX ORDER — PREDNISONE 5 MG/1
5 TABLET ORAL DAILY
Qty: 90 TABLET | Refills: 0 | Status: SHIPPED | OUTPATIENT
Start: 2023-06-26 | End: 2023-08-16

## 2023-06-26 RX ORDER — MAGNESIUM OXIDE 400 MG/1
TABLET ORAL
Qty: 90 TABLET | Refills: 0 | Status: SHIPPED | OUTPATIENT
Start: 2023-06-26 | End: 2023-08-16

## 2023-06-26 RX ORDER — SULFAMETHOXAZOLE AND TRIMETHOPRIM 400; 80 MG/1; MG/1
TABLET ORAL
Qty: 90 TABLET | Refills: 0 | Status: SHIPPED | OUTPATIENT
Start: 2023-06-26 | End: 2024-01-09

## 2023-06-26 NOTE — TELEPHONE ENCOUNTER
Issue:  Overdue for transplant labs.  Overdue for annual nephrology appointment.  Has not responded to phone call reminders.    Outcome:  Letter sent to patient's home.

## 2023-06-26 NOTE — LETTER
Jelly Dietz  [address]  [address]  [place] [facility] [address]                June 26, 2023          Dear Jelly Dietz,     We hope this letter finds you well. We at the Solid Organ Transplant Office have been trying to reach you via phone, however we have been unsuccessful.   You are due for a set of transplant labs. Please make a lab appointment in the next 1-2 weeks.  You are also due for your annual nephrology appointment. You can call [phone] option #4 to schedule.         Sincerely,     Your Transplant Team

## 2023-06-27 ENCOUNTER — TELEPHONE (OUTPATIENT)
Dept: PHARMACY | Facility: CLINIC | Age: 38
End: 2023-06-27
Payer: MEDICARE

## 2023-06-27 NOTE — TELEPHONE ENCOUNTER
Clinical Pharmacy Consult:                                                      Transplant Specific: 36 Month Post Transplant Medication Call  Date of Transplant: 6/19/20  Type of Transplant: kidney  First Transplant: no, #2  History of rejection: yes    Immunosuppression Regimen   TAC 4mg qAM & 4mg qPM, Prednisone 5mg qAM and Myforitic 540mg qAM & 360mg qPM  Patient specific goal: 4-6  Most recent level: 5.0, date 6/8/22  Immunosuppressant Levels:  therapeutic  Pt adherent to lab draws: Yes  Scr:   Lab Results   Component Value Date    CR 0.99 07/24/2020     Side effects: none    Prophylactic Medications  Antibacterial:  Bactrim SS daily  Scheduled Discontinue Date: Lifelong    Antifungal: Not needed thus far  Scheduled Discontinue Date: N/A    Antiviral: CrCl 40 to 59 mL/minute: Valcyte 450 mg once daily   Scheduled Discontinue Date: 3 months-Therapy completed    Acid Reducer: Protonix (pantoprazole)  Scheduled Reviewed Date: MD to determine    Thrombosis Prevention: Aspirin 81 mg PO daily  Scheduled Discontinue Date: MD to determine    Blood Pressure Management  Frequency of home Blood Pressure checks: Unknown, unable to contact  Most recent home BP: Unknown  Patient Blood pressure goal: <140/90  Patient blood pressure at goal:  yes  Hospitalizations/ER visits since last assessment: 4 (foot pain, nausea/vomiting, anxiety)     Med rec/DUR performed: Yes  Med Rec Discrepancies: No    Unable to reach pt. [place] No current lab. Refills are in an order to be sent out 6/28/23.    Follow up in 1 year.    Maxwell Yap, Pharm D  [facility]

## 2023-07-14 DIAGNOSIS — N17.9 ACUTE RENAL FAILURE, UNSPECIFIED ACUTE RENAL FAILURE TYPE (H): ICD-10-CM

## 2023-07-14 DIAGNOSIS — Z48.298 AFTERCARE FOLLOWING ORGAN TRANSPLANT: ICD-10-CM

## 2023-07-14 DIAGNOSIS — Z79.899 ENCOUNTER FOR LONG-TERM CURRENT USE OF MEDICATION: ICD-10-CM

## 2023-07-14 DIAGNOSIS — Z94.0 KIDNEY REPLACED BY TRANSPLANT: ICD-10-CM

## 2023-07-14 DIAGNOSIS — Z94.0 KIDNEY TRANSPLANTED: ICD-10-CM

## 2023-07-14 RX ORDER — MYCOPHENOLIC ACID 180 MG/1
TABLET, DELAYED RELEASE ORAL
Qty: 150 TABLET | Refills: 0 | Status: SHIPPED | OUTPATIENT
Start: 2023-07-14 | End: 2023-09-01

## 2023-07-14 RX ORDER — TACROLIMUS 1 MG/1
CAPSULE ORAL
Qty: 240 CAPSULE | Refills: 0 | Status: SHIPPED | OUTPATIENT
Start: 2023-07-14 | End: 2023-09-01

## 2023-07-18 ENCOUNTER — TELEPHONE (OUTPATIENT)
Dept: TRANSPLANT | Facility: CLINIC | Age: 38
End: 2023-07-18
Payer: MEDICARE

## 2023-07-18 DIAGNOSIS — Z94.0 KIDNEY REPLACED BY TRANSPLANT: Primary | ICD-10-CM

## 2023-07-18 NOTE — TELEPHONE ENCOUNTER
Patient would like to speak with coordinator regarding getting an appointment to have her fistula looked at, patient was given fistula number but the mail box is full, also wondering if she's able to get the fistula removed.

## 2023-07-20 NOTE — TELEPHONE ENCOUNTER
Attempted to call patient. Phone rang indefinitely without option to leave a voice mail. Will attempt to call patient again.    Kailey Peña RN  Dialysis Access Care Coordinator  Phone: [phone]  Pool: Dialysis Access Nurse ([id number])

## 2023-07-21 ENCOUNTER — PATIENT OUTREACH (OUTPATIENT)
Dept: NEPHROLOGY | Facility: CLINIC | Age: 38
End: 2023-07-21
Payer: MEDICARE

## 2023-07-21 NOTE — TELEPHONE ENCOUNTER
Called patient a 2nd time. Phone continued to ring indefinitely without option to leave a voice mail. [employer] message sent to patient.    There is no record of patient's fistula creation or any subsequent follow-up, imaging, or fistulograms available in [facility] or care everywhere. Progress notes state patient has a left forearm AV fistula.  Patient should follow up with the surgeon or team that created her fistula if possible.    Kailey Peña RN on 7/21/2023 at 10:09 AM

## 2023-07-21 NOTE — TELEPHONE ENCOUNTER
Dialysis Access Care Coordination: Initial Outreach    REASON FOR CALL:     REASON FOR CALL: Clinic Care Coordination - Initial (Dialysis Access - Referral)                                  SITUATION/BACKROUND:   Patient is being referred for dialysis access consult by self requesting a fistula evaluation. Patient had a kidney transplant 2020.    Patient called transplant office asking for her fistula to be evaluated. She is also interested in having her fistula removed.    Neph Tracking Flowsheet Last Filled Values     Final Modality Transplant    Dialysis Access Referral 23  ligation and excision        Dialysis History      Start End Type Center Comments    2019 Hemo-In [facility] (ESRD)     3/1/2018 2019 Hemo-In [facility] (ESRD) T-TH-SAT PM    2012 Hemo-In [facility] (ESRD) T-TH-SAT  PM   Start per 2728                ASSESSMENT:     Attempted to reach patient to assess concerns with fistula and find out who manages her fistula care.    There is no record of patient's fistula creation or any subsequent follow-up, imaging, or fistulograms available in [facility] or care everywhere. Progress notes state patient has a left forearm AV fistula.    Access Type Placement Date Removal Date Surgeon Outcome   L RC AVF? Unknown  Unknown        Past Surgical History:   Procedure Laterality Date     CERCLAGE CERVICAL N/A 2015    Procedure: CERCLAGE CERVICAL;  Surgeon: Norbert Chopra MD;  Location: [facility] L+D      SECTION  2012    Procedure:  SECTION;;  Surgeon: Chelsea Cross MD;  Location: [facility] L+D      SECTION N/A 2015    Procedure:  SECTION;  Surgeon: Chelsea Cross MD;  Location: [facility] OR     cesearean section       CYSTOSCOPY, REMOVE STENT(S), COMBINED N/A 2020    Procedure: CYSTOSCOPY, WITH STENT REMOVAL;  Surgeon: Nory Morgan MD;  Location: [facility] OR     EXPLANT  TRANSPLANTED KIDNEY  3/29/2013    Procedure: EXPLANT TRANSPLANTED KIDNEY;  Explant Transplanted right Kidney (from patients right iliac fossa);  Surgeon: Nory Morgan MD;  Location: [facility] OR     IR FINE NEEDLE ASPIRATION W ULTRASOUND  2020     IR PICC PLACEMENT > 5 YRS OF AGE  7/10/2020     IR RENAL BIOPSY LEFT  2020     MIDLINE DOUBLE LUMEN PLACEMENT Right 2020    unable to place PICC line, MIDLINE placed     NEPHRECTOMY  3/29/13    Transplant nephrectomy      WILIAN/DIALYSIS CATHETER       TRANSPLANT KIDNEY RECIPIENT  DONOR N/A 2020    Procedure: TRANSPLANT, KIDNEY, RECIPIENT,  DONOR, venous reconstruction, and back bench preparation of kidney;  Surgeon: Irma Shannon MD;  Location: [facility] OR     TRANSPLANT KIDNEY RECIPIENT LIVING UNRELATED  Dec 2007    (Adult male in [place])         Immunosuppression:  Tacrolimus  Mycophenolic acid   Prednisone     Anticoagulation:  none     Antiplatelet:  none     Recent Labs:  Lab Results   Component Value Date    CR 0.72 2022    CR 0.72 2022    CR 0.68 2022    CR 0.72 2021    CR 0.72 2021    CR 0.74 2021    Lab Results   Component Value Date    GFRESTIMATED >90 2022    GFRESTIMATED >90 2022    GFRESTIMATED >90 2022    GFRESTIMATED >90 2021    GFRESTIMATED >90 2021    GFRESTIMATED >90 2021        Lab Results   Component Value Date    BUN 13 2022    BUN 17 2022    BUN 11 2022    BUN 17 2021    BUN 11 2021    BUN 17 2021    Lab Results   Component Value Date    ALBUMIN 3.6 2022    ALBUMIN 3.7 2021    ALBUMIN 3.7 2021    ALBUMIN 3.9 2020    ALBUMIN 4.0 08/10/2020    ALBUMIN 4.0 2020        Lab Results   Component Value Date    A1C 5.6 2021    A1C 5.4 2020    A1C 5.1 2020    A1C 6.3 07/15/2012           Dialysis Access Assessments:  No assessment indicated    PLAN:     Appointments in  Next Year    Aug 03, 2023  9:30 AM  LAB with  LAB  [facility] ([facility] ) [phone]   Aug 03, 2023 10:30 AM  (Arrive by 10:15 AM)  Return Kidney Transplant with Bruce Zaman MD  [facility] ([facility] ) [phone]          Follow Up:     Patient did not answer calls. Her phone does not have a voice mail option. MyChart message sent to patient.    Unable to refer patient to [facility] surgeon without more information.    Patient to call/[employer] message with updates.    Kailey Peña RN  Dialysis Access Care Coordinator  Phone: [phone]  Pool: P_Dialysis_Access_Nurse

## 2023-08-15 DIAGNOSIS — R79.0 LOW SERUM PHOSPHORUS FOR AGE: Primary | ICD-10-CM

## 2023-08-16 DIAGNOSIS — N17.9 ACUTE RENAL FAILURE, UNSPECIFIED ACUTE RENAL FAILURE TYPE (H): ICD-10-CM

## 2023-08-16 DIAGNOSIS — Z48.298 AFTERCARE FOLLOWING ORGAN TRANSPLANT: ICD-10-CM

## 2023-08-16 DIAGNOSIS — Z94.0 KIDNEY REPLACED BY TRANSPLANT: ICD-10-CM

## 2023-08-16 DIAGNOSIS — Z94.0 KIDNEY TRANSPLANTED: ICD-10-CM

## 2023-08-16 DIAGNOSIS — Z79.899 ENCOUNTER FOR LONG-TERM CURRENT USE OF MEDICATION: ICD-10-CM

## 2023-08-16 DIAGNOSIS — Z94.0 KIDNEY REPLACED BY TRANSPLANT: Primary | ICD-10-CM

## 2023-08-16 RX ORDER — MAGNESIUM OXIDE 400 MG/1
400 TABLET ORAL 2 TIMES DAILY
Qty: 180 TABLET | Refills: 0 | Status: SHIPPED | OUTPATIENT
Start: 2023-08-16 | End: 2024-01-11

## 2023-08-16 RX ORDER — PREDNISONE 5 MG/1
5 TABLET ORAL DAILY
Qty: 90 TABLET | Refills: 0 | Status: CANCELLED | OUTPATIENT
Start: 2023-08-16

## 2023-08-16 RX ORDER — TACROLIMUS 0.5 MG/1
0.5 CAPSULE ORAL 2 TIMES DAILY
Qty: 60 CAPSULE | Refills: 11 | Status: SHIPPED | OUTPATIENT
Start: 2023-08-16 | End: 2023-09-14

## 2023-08-16 RX ORDER — PREDNISONE 5 MG/1
5 TABLET ORAL DAILY
Qty: 90 TABLET | Refills: 0 | Status: SHIPPED | OUTPATIENT
Start: 2023-08-16 | End: 2023-09-21

## 2023-08-16 RX ORDER — ATORVASTATIN CALCIUM 10 MG/1
10 TABLET, FILM COATED ORAL DAILY
Qty: 90 TABLET | Refills: 3 | Status: SHIPPED | OUTPATIENT
Start: 2023-08-16 | End: 2024-01-11

## 2023-08-25 ENCOUNTER — TELEPHONE (OUTPATIENT)
Dept: TRANSPLANT | Facility: CLINIC | Age: 38
End: 2023-08-25
Payer: MEDICARE

## 2023-08-25 DIAGNOSIS — Z94.0 KIDNEY REPLACED BY TRANSPLANT: Primary | ICD-10-CM

## 2023-08-25 NOTE — TELEPHONE ENCOUNTER
Vonnie Mathias Sara, RN  Good Morning Beti:    Please complete the following missing items within the Patient Status Form (found in the Transplant Tab).  1. Functional Status  2. Working for income  3.    Please respond to this message after inputting the information.    Thank you for partnering in our regulatory reporting to [employer],  Vonnie Mathias - Clinical     OUTCOME: call attempted, unable to LM.  Will attempted again in one week.    Beti Billings RN   Transplant Coordinator  [phone]

## 2023-09-01 DIAGNOSIS — N17.9 ACUTE RENAL FAILURE, UNSPECIFIED ACUTE RENAL FAILURE TYPE (H): ICD-10-CM

## 2023-09-01 DIAGNOSIS — Z94.0 KIDNEY TRANSPLANTED: Primary | ICD-10-CM

## 2023-09-01 DIAGNOSIS — Z79.899 ENCOUNTER FOR LONG-TERM CURRENT USE OF MEDICATION: ICD-10-CM

## 2023-09-01 DIAGNOSIS — Z94.0 KIDNEY REPLACED BY TRANSPLANT: ICD-10-CM

## 2023-09-01 DIAGNOSIS — Z48.298 AFTERCARE FOLLOWING ORGAN TRANSPLANT: ICD-10-CM

## 2023-09-01 RX ORDER — TACROLIMUS 1 MG/1
4 CAPSULE ORAL 2 TIMES DAILY
Qty: 240 CAPSULE | Refills: 0 | Status: SHIPPED | OUTPATIENT
Start: 2023-09-01 | End: 2023-09-14

## 2023-09-01 RX ORDER — MYCOPHENOLIC ACID 180 MG/1
TABLET, DELAYED RELEASE ORAL
Qty: 150 TABLET | Refills: 0 | Status: SHIPPED | OUTPATIENT
Start: 2023-09-01 | End: 2023-10-10

## 2023-09-06 ASSESSMENT — ENCOUNTER SYMPTOMS: NEW SYMPTOMS OF CORONARY ARTERY DISEASE: 0

## 2023-09-06 NOTE — TELEPHONE ENCOUNTER
RNCC was able to speak with Jelly and review need for labs and follow up MD appointment.    Jelly is establishing with a PCP after her move to [place], and will notify [facility] of lab where orders for standard kidney transplant labs can be sent.     Order for follow up placed - virtual okay.    [employer] information updated.

## 2023-09-12 ENCOUNTER — TELEPHONE (OUTPATIENT)
Dept: TRANSPLANT | Facility: CLINIC | Age: 38
End: 2023-09-12
Payer: MEDICARE

## 2023-09-12 NOTE — TELEPHONE ENCOUNTER
Bassam Bang - I had just called Jelly to schedule a follow up appt. She had a request for you. She had signed up and paid/deposit for a procedure to get some sort of stomach reduction at a med spa.  After she paid she was told because she has had a transplant that she could not have this done. She would like a letter sent to her mychart stating that she had a transplant so that they will refund her the money put down. I hope this makes sense. Thanks Beti! Uyen

## 2023-09-12 NOTE — LETTER
Jelly Dietz  [address] [place] [address]                September 14, 2023          To whom it may concern,           Jelly Dietz had a kidney transplant in 2020.         Regards,     The Transplant Team at [facility]

## 2023-09-13 NOTE — ED PROVIDER NOTES
[place] EMERGENCY DEPARTMENT ([facility])  2019    History     Chief Complaint   Patient presents with     Dysuria     HPI  Jelly Dietz is a 34 year old female with a history of acute coronary syndrome, HTN, end stage kidney disease (dialysis T, Th, Sat) s/p kidney transplant ( and ) with chronic renal transplant rejection, s/p nephrectomy (), and anemia who presents to the ED for evaluation of dysuria. She only makes a small amount of urine. She also complains of what she says is a large volume of yellow vaginal discharge. No recent antibiotic use. She is not pregnant, and does not feel she is at risk for sexually transmitted infections. She is open to having a pelvic exam and wet prep and GC chlamydia done. She does not have abdominal pain. No fevers, no pelvic pain. She says she has been with the same partner for years.      This part of the medical record was transcribed by Janice Perez,  Medical Scribe, from a dictation done by To Gutierrez MD.       PAST MEDICAL HISTORY  Past Medical History:   Diagnosis Date     ACS (acute coronary syndrome) (H) 2014     Allergic state     seasonal allergies     Anemia in chronic renal disease      Cervical cerclage suture present in second trimester      Chest pain 2014     Chronic renal transplant rejection      End stage kidney disease (H)      Heart murmur      History of  delivery ,     30 wks, 28 wks      Hypertension     resolved after transplant     IgA nephropathy      MRSA (methicillin resistant Staphylococcus aureus)      [facility]     Pre-eclampsia      Renal failure affecting pregnancy in second trimester      S/P kidney transplant     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 History of 1B kidney rejection      SAB (spontaneous )     2nd trimester      PAST SURGICAL HISTORY  Past  "Chief Complaint  Tinnitus (Started Monday ), Nausea, and Dizziness    Subjective          Vance Lorenzo presents to [facility]  History of Present Illness    Vance is here today for ear drainage and ringing of the ears.  He also reports some intermittent nausea and dizziness.  He reports the ringing of his ear started Monday.    Ear ringing -   Mainly the right ear (this was the way it was before with his medication). He said the ringing is causing some headaches on the right side of his head. He denies other symptoms or feeling ill.     HTN -   Elevated today. Prescribed HCTZ and reports compliance. He does report checking his BP at home and states it is normal. He denies chest pain or shortness of breath.       Current Outpatient Medications   Medication Instructions   • acyclovir (Zovirax) 5 % ointment 1 application, Topical, Every 4 Hours   • amoxicillin (AMOXIL) 500 mg, Oral, 2 Times Daily   • dilTIAZem XR (DILACOR XR) 240 mg, Oral, Daily   • Eliquis 5 MG tablet tablet TAKE ONE TABLET BY MOUTH TWICE DAILY   • flecainide (TAMBOCOR) 100 mg, Oral, Daily PRN   • fluticasone (FLONASE) 50 MCG/ACT nasal spray 2 sprays, Nasal, Daily   • hydroCHLOROthiazide (HYDRODIURIL) 25 MG tablet TAKE ONE TABLET BY MOUTH EVERY DAY   • loteprednol (LOTEMAX) 0.5 % ophthalmic suspension INSTILL 1 DROP IN EACH EYE FOUR TIMES DAILY   • Xiidra 5 % ophthalmic solution INSTILL 1 DROP IN EACH EYE TWICE DAILY       The following portions of the patient's history were reviewed and updated as appropriate: allergies, current medications, past family history, past medical history, past social history, past surgical history, and problem list.    Objective   Vital Signs:   /90 (BP Location: Left arm, Patient Position: Sitting, Cuff Size: Large Adult)   Pulse 99   Temp 97.9 °F (36.6 °C) (Temporal)   Ht 195.6 cm (77\")   Wt (!) 146 kg (322 lb)   SpO2 98%   BMI 38.18 kg/m²     Wt " Neurocognitive consult/testing procedure:    Please contact office after scheduling with one of the facilities listed below with whom you are seeing, date and time of appt and we will fax orders and notes tho that facility and schedule your follow up with our office after testing. [facility] Neurology at [facility]  Dr. Aundrea Brown  [address] [facility] [address]  (B) [address] Neurology at [facility]  Dr. Shar Zamarripa  [address] [place] [address] Drive [address] [place], [place] [id number]  T) [phone]    [email or web address]  Dr. Martita Davis [address]  (A) [phone]  (k) [phone]    [facility] Neuropsychology ([email or web address]  Dr. Ravindra Petit  [address] [place], [address] [facility]  Office: [phone]   Fax: [phone]    [place]   Dr. Lavern Keyes [facility]  [place], [address] [place] [address]  X) [phone]  (v) [phone]    [facility], [place].   Dr. Jules Ledesma (provides counseling as well as cognitive evals)  [address] [place], [place] [address]  L) [phone]  (F) [phone] euro Readings from Last 3 Encounters:   01/23/23 (!) 146 kg (322 lb)   12/26/22 (!) 146 kg (320 lb 15.8 oz)   12/26/22 (!) 146 kg (322 lb)     BP Readings from Last 3 Encounters:   01/23/23 151/90   12/26/22 141/94   12/26/22 157/97     Physical Exam   Appearance: No acute distress, well-nourished  Head: normocephalic, atraumatic  Eyes: extraocular movements intact, no scleral icterus, no conjunctival injection  Ears, Nose, and Throat: external ears normal, nares patent, moist mucous membranes, erythema on right tympanic membrane with bulging   Cardiovascular: regular rate and rhythm. no murmurs, rubs, or gallops. no edema,  Respiratory: breathing comfortably, symmetric chest rise, clear to auscultation bilaterally. No wheezes, rales, or rhonchi.  Neuro: alert and oriented to time, place, and person. Normal gait  Psych: normal mood and affect     Result Review :   The following data was reviewed by: MARSHA Atkins on 01/23/2023:  Common labs    Common Labs 4/27/22 4/27/22 5/19/22 5/19/22 5/19/22 12/26/22 12/26/22    1041 1041 0751 0751 0751 1133 1133   Glucose 96      107 (A)   BUN 10      8   Creatinine 0.91      0.82   Sodium 139      138   Potassium 4.6      3.8   Chloride 102      100   Calcium 9.7      9.6   Albumin 4.70    4.50  4.60   Total Bilirubin 0.9    0.8  0.8   Alkaline Phosphatase 58    58  67   AST (SGOT) 20    15  16   ALT (SGPT) 17    17  19   WBC   5.84   8.27    Hemoglobin   16.6   16.1    Hematocrit   49.4   45.5    Platelets   223   230    Total Cholesterol  220 (A)  203 (A)      Triglycerides  137  126      HDL Cholesterol  35 (A)  42      LDL Cholesterol   160 (A)  138 (A)      (A) Abnormal value                   Lab Results   Component Value Date    INR 1.16 (H) 12/26/2022    BILIRUBINUR Negative 12/26/2022       Procedures        Assessment and Plan    Diagnoses and all orders for this visit:    1. Non-recurrent acute suppurative otitis media of right ear without spontaneous rupture of  Surgical History:   Procedure Laterality Date     CERCLAGE CERVICAL N/A 2015    Procedure: CERCLAGE CERVICAL;  Surgeon: Norbert Chopra MD;  Location: [facility]      SECTION  2012    Procedure:  SECTION;;  Surgeon: Chelsea Cross MD;  Location: [facility]      SECTION N/A 2015    Procedure:  SECTION;  Surgeon: Chelsea Cross MD;  Location: [facility] OR     cesearean section       EXPLANT TRANSPLANTED KIDNEY  3/29/2013    Procedure: EXPLANT TRANSPLANTED KIDNEY;  Explant Transplanted right Kidney (from patients right iliac fossa);  Surgeon: Nory Morgan MD;  Location: [facility] OR     NEPHRECTOMY  3/29/13    Transplant nephrectomy      WILIAN/DIALYSIS CATHETER       TRANSPLANT KIDNEY RECIPIENT LIVING UNRELATED  Dec 2007    (Adult male in [place])     FAMILY HISTORY  Family History   Problem Relation Age of Onset     Diabetes Paternal Grandfather      Breast Cancer Maternal Grandmother 55     Cancer No family hx of         No known family hx of skin cancer     SOCIAL HISTORY  Social History     Tobacco Use     Smoking status: Never Smoker     Smokeless tobacco: Never Used   Substance Use Topics     Alcohol use: No     MEDICATIONS  No current facility-administered medications for this encounter.      Current Outpatient Medications   Medication     nitroFURantoin macrocrystal-monohydrate (MACROBID) 100 MG capsule     acetaminophen (TYLENOL) 325 MG tablet     amLODIPine (NORVASC) 10 MG tablet     B Complex-C-Folic Acid (DIALYVITE) TABS     carvedilol (COREG) 25 MG tablet     cetirizine (ZYRTEC) 10 MG tablet     cloNIDine (CATAPRES) 0.1 MG tablet     RENVELA 800 MG tablet     ALLERGIES  No Known Allergies    I have reviewed the Medications, Allergies, Past Medical and Surgical History, and Social History in the Epic system.    Review of Systems   Constitutional: Negative for fever.   Genitourinary: Positive for dysuria and vaginal discharge. Negative for pelvic pain.   All  "other systems reviewed and are negative.      Physical Exam   BP: 125/86  Pulse: 69  Temp: 97.9  F (36.6  C)  Resp: 18  Height: 167.6 cm (5' 6\")  Weight: 58.5 kg (129 lb)  SpO2: 99 %      Physical Exam  Vitals signs and nursing note reviewed. Exam conducted with a chaperone present.   Constitutional:       General: She is not in acute distress.  Abdominal:      General: Abdomen is flat.      Palpations: Abdomen is soft.   Genitourinary:     Vagina: Vaginal discharge (yellow) present.      Cervix: Normal.      Adnexa: Right adnexa normal and left adnexa normal.   Skin:     General: Skin is warm.   Neurological:      Mental Status: She is alert.   Psychiatric:         Mood and Affect: Mood normal.         ED Course        Procedures        Wet prep is normal  GC chlamydia pending       Labs Ordered and Resulted from Time of ED Arrival Up to the Time of Departure from the ED   HCG QUALITATIVE   NEISSERIA GONORRHOEAE PCR   WET PREP   CHLAMYDIA TRACHOMATIS PCR            Assessments & Plan (with Medical Decision Making)   Jelly Dietz is a 34 year old female presents complaining of vaginal discharge.  She said she recently had a menstrual period and does not think she is pregnant. We sent a serum test, it has not returned, she did not want to wait for the results.  She has minimal abdominal discomfort.  The wet prep was negative for yeast clue cells or trichomonas.  Gonorrhea and Chlamydia testing are pending and for which she needs to follow-up with her clinic.  She just moved here from [place] and does not have a primary clinic, I will provide her with the phone number to the [facility] [place] as she is a dialysis patient. Her abdominal exam was not concerning and no imaging don. She was unable to provide urine for a UA.    This part of the medical record was transcribed by Janice Perez  Medical Scribe, from a dictation done by To Gutierrez MD.       I have reviewed the nursing notes.    I have " tympanic membrane (Primary)  Comments:  Will treat with antibiotics based on exam. Patient to follow up with symptoms do not improve   Orders:  -     amoxicillin (AMOXIL) 500 MG capsule; Take 1 capsule by mouth 2 (Two) Times a Day for 5 days.  Dispense: 10 capsule; Refill: 0  -     fluticasone (FLONASE) 50 MCG/ACT nasal spray; 2 sprays into the nostril(s) as directed by provider Daily.  Dispense: 16 g; Refill: 0    2. Tinnitus of right ear  Comments:  Most likely due to acute otitis media. Discussed follow up if symptoms do not improve   Orders:  -     fluticasone (FLONASE) 50 MCG/ACT nasal spray; 2 sprays into the nostril(s) as directed by provider Daily.  Dispense: 16 g; Refill: 0      Patient was instructed and educated on their diagnoses and treatment plan prior to leaving the office. If symptoms worsen or persist, seek emergency medical attention. Take all medications as prescribed. Call the office if any questions or concerns arise.       There are no discontinued medications.       Follow Up   Return if symptoms worsen or fail to improve.  Patient was given instructions and counseling regarding his condition or for health maintenance advice. Please see specific information pulled into the AVS if appropriate.       Meme Funes, MARSHA  01/23/23  10:12 EST               reviewed the findings, diagnosis, plan and need for follow up with the patient.    New Prescriptions    NITROFURANTOIN MACROCRYSTAL-MONOHYDRATE (MACROBID) 100 MG CAPSULE    Take 1 capsule (100 mg) by mouth 2 times daily       Final diagnoses:   Vaginal discharge       9/23/2019   [facility], [place], EMERGENCY DEPARTMENT     To Gutierrez MD  09/23/19 [id number]

## 2023-09-14 ENCOUNTER — TELEPHONE (OUTPATIENT)
Dept: TRANSPLANT | Facility: CLINIC | Age: 38
End: 2023-09-14
Payer: MEDICARE

## 2023-09-14 DIAGNOSIS — N17.9 ACUTE RENAL FAILURE, UNSPECIFIED ACUTE RENAL FAILURE TYPE (H): ICD-10-CM

## 2023-09-14 DIAGNOSIS — Z79.899 ENCOUNTER FOR LONG-TERM CURRENT USE OF MEDICATION: ICD-10-CM

## 2023-09-14 DIAGNOSIS — Z48.298 AFTERCARE FOLLOWING ORGAN TRANSPLANT: ICD-10-CM

## 2023-09-14 DIAGNOSIS — Z94.0 KIDNEY TRANSPLANTED: ICD-10-CM

## 2023-09-14 DIAGNOSIS — Z94.0 KIDNEY REPLACED BY TRANSPLANT: ICD-10-CM

## 2023-09-14 RX ORDER — TACROLIMUS 0.5 MG/1
0.5 CAPSULE ORAL 2 TIMES DAILY
Qty: 60 CAPSULE | Refills: 11 | Status: SHIPPED | OUTPATIENT
Start: 2023-09-14 | End: 2023-09-27

## 2023-09-14 RX ORDER — TACROLIMUS 1 MG/1
6 CAPSULE ORAL 2 TIMES DAILY
Qty: 1080 CAPSULE | Refills: 3 | Status: SHIPPED | OUTPATIENT
Start: 2023-09-14 | End: 2023-09-27

## 2023-09-15 NOTE — TELEPHONE ENCOUNTER
Tac: <1.0 at 1542 on 9/11. 16 hour and 45 min trough level on 4. 5 mg bid tacrolimus dose.    Last true trough level 6/8/2022    Tac dose adjusted to 6 mg bid.  Patient states she will obtained labs with accurate 12 hour trough level on Monday 9/18.  Orders are active.  RX updated.    Beti Billings RN   Transplant Coordinator  [phone]

## 2023-09-21 ENCOUNTER — TELEPHONE (OUTPATIENT)
Dept: TRANSPLANT | Facility: CLINIC | Age: 38
End: 2023-09-21
Payer: MEDICARE

## 2023-09-21 DIAGNOSIS — Z48.298 AFTERCARE FOLLOWING ORGAN TRANSPLANT: ICD-10-CM

## 2023-09-21 DIAGNOSIS — Z79.899 ENCOUNTER FOR LONG-TERM CURRENT USE OF MEDICATION: ICD-10-CM

## 2023-09-21 DIAGNOSIS — N17.9 ACUTE RENAL FAILURE, UNSPECIFIED ACUTE RENAL FAILURE TYPE (H): ICD-10-CM

## 2023-09-21 DIAGNOSIS — Z94.0 KIDNEY TRANSPLANTED: ICD-10-CM

## 2023-09-21 DIAGNOSIS — Z94.0 KIDNEY REPLACED BY TRANSPLANT: ICD-10-CM

## 2023-09-21 RX ORDER — PREDNISONE 5 MG/1
10 TABLET ORAL DAILY
Qty: 90 TABLET | Refills: 11
Start: 2023-09-21 | End: 2023-09-27

## 2023-09-21 NOTE — TELEPHONE ENCOUNTER
ISSUE: Patient is overdue for repeat tacrolimus 12 hour trough level after tacrolimus dose adjustment for low tac level 9/11.  Tac <1 on what patient reports was a 17 hour trough,  patient did not wish to increase prednisone dose until repeat level, which patient stated would be a couple days later after increasing tacrolimus dose from 4.5 mg bid to 6 mg bid.    Spoke with patient who states she was not able to obtain labs yet but is planning to have labs done tomorrow.  She is currently taking tacrolimus 6 mg bid, prednisone 5 mg daily, and /360mg.    Instructed patient to increase prednisone dose to 10 mg daily until tacrolimus level is at therapeutic level of ~6.  Reviewed with Dr Mancilla.    Beti Billings RN   Transplant Coordinator  [phone]

## 2023-09-27 ENCOUNTER — TELEPHONE (OUTPATIENT)
Dept: TRANSPLANT | Facility: CLINIC | Age: 38
End: 2023-09-27
Payer: MEDICARE

## 2023-09-27 DIAGNOSIS — Z94.0 KIDNEY REPLACED BY TRANSPLANT: ICD-10-CM

## 2023-09-27 DIAGNOSIS — N17.9 ACUTE RENAL FAILURE, UNSPECIFIED ACUTE RENAL FAILURE TYPE (H): ICD-10-CM

## 2023-09-27 DIAGNOSIS — Z94.0 KIDNEY TRANSPLANTED: ICD-10-CM

## 2023-09-27 DIAGNOSIS — Z48.298 AFTERCARE FOLLOWING ORGAN TRANSPLANT: ICD-10-CM

## 2023-09-27 DIAGNOSIS — Z79.899 ENCOUNTER FOR LONG-TERM CURRENT USE OF MEDICATION: ICD-10-CM

## 2023-09-27 RX ORDER — TACROLIMUS 0.5 MG/1
0.5 CAPSULE ORAL 2 TIMES DAILY
Qty: 60 CAPSULE | Refills: 11 | Status: SHIPPED | OUTPATIENT
Start: 2023-09-27

## 2023-09-27 RX ORDER — TACROLIMUS 1 MG/1
6 CAPSULE ORAL 2 TIMES DAILY
Qty: 1080 CAPSULE | Refills: 3
Start: 2023-09-27 | End: 2023-10-10

## 2023-09-27 RX ORDER — PREDNISONE 5 MG/1
5 TABLET ORAL DAILY
Qty: 30 TABLET | Refills: 11 | Status: SHIPPED | OUTPATIENT
Start: 2023-09-27 | End: 2023-10-10

## 2023-09-27 NOTE — TELEPHONE ENCOUNTER
ISSUE:   Tacrolimus IR level 4.6 on 9/25/2023, goal ~6, dose 6 mg BID.    PLAN:   Please call patient and confirm this was an accurate 12-hour trough. Verify Tacrolimus IR dose 6 mg BID. Confirm no new medications or illness. Confirm no missed doses. If accurate trough and accurate dose, increase Tacrolimus IR dose to 6.5 mg BID and repeat labs in 1 weeks    OUTCOME:   Spoke with patient, they confirm accurate trough level and current dose 6 mg BID. Patient confirmed dose change to 6.5 mg BID and to repeat labs in 1 weeks. Orders sent to preferred pharmacy for dose change and lab for repeat labs. Patient voiced understanding of plan.     Patient to decrease prednisone from 10 mg daily back to 5 mg daily as tacrolimus level is near therapeutic level.       Beti Billings RN   Transplant Coordinator  [phone]

## 2023-10-04 NOTE — TELEPHONE ENCOUNTER
Issue:   WBC 11.2   K 3.3      Called back Jelly Dietz.  Went straight to .  Left message have UA/UC completed. --- orders placed.  Increase potassium intake.      Hemigard Postcare Instructions: The HEMIGARD strips are to remain completely dry for at least 5-7 days.

## 2023-10-10 DIAGNOSIS — N17.9 ACUTE RENAL FAILURE, UNSPECIFIED ACUTE RENAL FAILURE TYPE (H): ICD-10-CM

## 2023-10-10 DIAGNOSIS — Z94.0 KIDNEY REPLACED BY TRANSPLANT: ICD-10-CM

## 2023-10-10 DIAGNOSIS — Z48.298 AFTERCARE FOLLOWING ORGAN TRANSPLANT: ICD-10-CM

## 2023-10-10 DIAGNOSIS — Z79.899 ENCOUNTER FOR LONG-TERM CURRENT USE OF MEDICATION: ICD-10-CM

## 2023-10-10 DIAGNOSIS — Z94.0 KIDNEY TRANSPLANTED: Primary | ICD-10-CM

## 2023-10-10 RX ORDER — TACROLIMUS 1 MG/1
6 CAPSULE ORAL 2 TIMES DAILY
Qty: 360 CAPSULE | Refills: 11 | Status: SHIPPED | OUTPATIENT
Start: 2023-10-10

## 2023-10-10 RX ORDER — PREDNISONE 5 MG/1
5 TABLET ORAL DAILY
Qty: 30 TABLET | Refills: 11 | Status: SHIPPED | OUTPATIENT
Start: 2023-10-10

## 2023-10-10 RX ORDER — TACROLIMUS 1 MG/1
4 CAPSULE ORAL 2 TIMES DAILY
Qty: 240 CAPSULE | Refills: 11 | Status: SHIPPED | OUTPATIENT
Start: 2023-10-10 | End: 2023-10-10

## 2023-10-10 RX ORDER — MYCOPHENOLIC ACID 180 MG/1
TABLET, DELAYED RELEASE ORAL
Qty: 150 TABLET | Refills: 11 | Status: SHIPPED | OUTPATIENT
Start: 2023-10-10

## 2023-10-20 ENCOUNTER — PATIENT OUTREACH (OUTPATIENT)
Dept: NEPHROLOGY | Facility: CLINIC | Age: 38
End: 2023-10-20
Payer: MEDICARE

## 2023-10-20 NOTE — TELEPHONE ENCOUNTER
Patient has not returned calls regarding her fistula. Removed Dialysis Access Care Coordinators from care team.    Kailey Peña RN on 10/20/2023 at 4:17 PM

## 2023-11-09 DIAGNOSIS — Z48.298 AFTERCARE FOLLOWING ORGAN TRANSPLANT: ICD-10-CM

## 2023-11-09 DIAGNOSIS — Z94.0 KIDNEY REPLACED BY TRANSPLANT: ICD-10-CM

## 2023-11-09 DIAGNOSIS — Z94.0 KIDNEY TRANSPLANTED: ICD-10-CM

## 2023-11-09 DIAGNOSIS — N17.9 ACUTE RENAL FAILURE, UNSPECIFIED ACUTE RENAL FAILURE TYPE (H): ICD-10-CM

## 2023-11-09 DIAGNOSIS — Z79.899 ENCOUNTER FOR LONG-TERM CURRENT USE OF MEDICATION: ICD-10-CM

## 2023-11-09 RX ORDER — ASPIRIN 81 MG
TABLET,CHEWABLE ORAL
Qty: 90 TABLET | Refills: 0 | Status: SHIPPED | OUTPATIENT
Start: 2023-11-09 | End: 2024-01-09

## 2023-12-06 DIAGNOSIS — N17.9 ACUTE RENAL FAILURE, UNSPECIFIED ACUTE RENAL FAILURE TYPE (H): Primary | ICD-10-CM

## 2023-12-06 RX ORDER — LOSARTAN POTASSIUM 25 MG/1
12.5 TABLET ORAL DAILY
Qty: 45 TABLET | Refills: 0 | Status: SHIPPED | OUTPATIENT
Start: 2023-12-06

## 2024-01-09 DIAGNOSIS — Z48.298 AFTERCARE FOLLOWING ORGAN TRANSPLANT: ICD-10-CM

## 2024-01-09 DIAGNOSIS — Z94.0 KIDNEY TRANSPLANTED: Primary | ICD-10-CM

## 2024-01-09 DIAGNOSIS — Z94.0 KIDNEY REPLACED BY TRANSPLANT: ICD-10-CM

## 2024-01-09 DIAGNOSIS — Z94.0 KIDNEY TRANSPLANTED: ICD-10-CM

## 2024-01-09 DIAGNOSIS — N17.9 ACUTE RENAL FAILURE, UNSPECIFIED ACUTE RENAL FAILURE TYPE (H): ICD-10-CM

## 2024-01-09 DIAGNOSIS — R79.0 LOW SERUM PHOSPHORUS FOR AGE: ICD-10-CM

## 2024-01-09 DIAGNOSIS — Z79.899 ENCOUNTER FOR LONG-TERM CURRENT USE OF MEDICATION: ICD-10-CM

## 2024-01-09 RX ORDER — SULFAMETHOXAZOLE AND TRIMETHOPRIM 400; 80 MG/1; MG/1
TABLET ORAL
Qty: 90 TABLET | Refills: 0 | Status: SHIPPED | OUTPATIENT
Start: 2024-01-09 | End: 2024-04-15

## 2024-01-09 RX ORDER — ASPIRIN 81 MG
81 TABLET,CHEWABLE ORAL DAILY
Qty: 90 TABLET | Refills: 0 | Status: SHIPPED | OUTPATIENT
Start: 2024-01-09

## 2024-01-11 ENCOUNTER — OFFICE VISIT (OUTPATIENT)
Dept: TRANSPLANT | Facility: CLINIC | Age: 39
End: 2024-01-11
Attending: INTERNAL MEDICINE
Payer: MEDICARE

## 2024-01-11 VITALS
DIASTOLIC BLOOD PRESSURE: 80 MMHG | OXYGEN SATURATION: 100 % | HEART RATE: 96 BPM | BODY MASS INDEX: 23.62 KG/M2 | WEIGHT: 150.8 LBS | SYSTOLIC BLOOD PRESSURE: 121 MMHG

## 2024-01-11 DIAGNOSIS — N02.B9 IGA NEPHROPATHY: ICD-10-CM

## 2024-01-11 DIAGNOSIS — R79.0 LOW SERUM PHOSPHORUS FOR AGE: ICD-10-CM

## 2024-01-11 DIAGNOSIS — Z48.298 AFTERCARE FOLLOWING ORGAN TRANSPLANT: ICD-10-CM

## 2024-01-11 DIAGNOSIS — Z29.89 NEED FOR PNEUMOCYSTIS PROPHYLAXIS: ICD-10-CM

## 2024-01-11 DIAGNOSIS — I15.1 HTN, KIDNEY TRANSPLANT RELATED: ICD-10-CM

## 2024-01-11 DIAGNOSIS — Z94.0 KIDNEY TRANSPLANTED: ICD-10-CM

## 2024-01-11 DIAGNOSIS — B34.8 BK VIREMIA: ICD-10-CM

## 2024-01-11 DIAGNOSIS — D84.9 IMMUNOSUPPRESSION (H): ICD-10-CM

## 2024-01-11 DIAGNOSIS — Z94.0 KIDNEY REPLACED BY TRANSPLANT: Primary | ICD-10-CM

## 2024-01-11 DIAGNOSIS — Z94.0 HTN, KIDNEY TRANSPLANT RELATED: ICD-10-CM

## 2024-01-11 PROCEDURE — G0463 HOSPITAL OUTPT CLINIC VISIT: HCPCS | Performed by: INTERNAL MEDICINE

## 2024-01-11 PROCEDURE — 99214 OFFICE O/P EST MOD 30 MIN: CPT | Performed by: INTERNAL MEDICINE

## 2024-01-11 RX ORDER — MAGNESIUM OXIDE 400 MG/1
800 TABLET ORAL 2 TIMES DAILY
Qty: 180 TABLET | Refills: 0 | Status: SHIPPED | OUTPATIENT
Start: 2024-01-11 | End: 2024-04-10

## 2024-01-11 RX ORDER — ATORVASTATIN CALCIUM 10 MG/1
10 TABLET, FILM COATED ORAL DAILY
Qty: 90 TABLET | Refills: 3 | Status: SHIPPED | OUTPATIENT
Start: 2024-01-11

## 2024-01-11 ASSESSMENT — PAIN SCALES - GENERAL: PAINLEVEL: NO PAIN (0)

## 2024-01-11 NOTE — LETTER
"    1/11/2024         RE: Jelly Dietz  [address] [place] [address]        Dear Colleague,    Thank you for referring your patient, Jelly Dietz, to the [facility]. Please see a copy of my visit note below.    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 Closure biopsy was not performed (patient preference).      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 Closure biopsy was not performed (patient preference).      # BK viremia: stable load      # Infection Prophylaxis:   - PJP: Sulfa/TMP (Bactrim)  - CMV: Valcyte completed the course      # Hypertension: Controlled; BP average 110/80       Goal BP: < 140/90              - Volume status: euvolemic                  - Changes: None      # Anemia in Chronic Renal Disease: Hgb: Stable      ENZO: no longer needed              - Iron studies: Replete     # Mineral Bone Disorder:   - Calcium; level: Normal        On supplement: No  - Phosphorus; level: Low Normal       On supplement: on supplement      # Electrolytes:   - Potassium; level: low       On supplement: start supplement   - Magnesium; level: low        On supplement: continue supplement , discontinue PPI     # Access dysfunction:   - LUE  radio-cephalic AVF: chronic old \"bulge\" stable in size. Non tender, no skin changes.   - advised to monitor for now, recommended preserving access for future use unless acute indications for " "repair/ligation     # Medical Compliance:  Evidence of labs less than recommended and infrequent transplant follow-up.  - Discussed importance of checking labs regularly as recommended, taking medications as prescribed and attending scheduled medical appointments.    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        Donor Class:   Crossmatch at time of Tx: negative  DSA at time of Tx: No  Significant changes in immunosuppression: steroid maintenance due to early mixed rejection   CMV IgG Ab High Risk Discordance (D+/R-): No  EBV IgG Ab High Risk Discordance (D+/R-): No  Significant transplant-related complications: early acute rejection possible Non-HLA with an element of memory response      # Health Maintenance and Vaccination Review: Reviewed and up to date      Transplant Office Phone Number: [phone]    Assessment and plan was discussed with the patient and she voiced her understanding and agreement.    Return visit: Return in about 1 year (around 1/11/2025).    Bruce Whelan MD    Chief Complaint  Ms. Dietz is a 39 year old here for kidney transplant and immunosuppression management.    History of Present Illness   Mrs. Diane was lost to f/up since 2022. Last visit with .    She feels good overall . She was wondering if she could have her fistula ligated/repaired due to the \"bulge\" around the wrist which reportedly has been stable for many years. She denies any pain, numbness, weakness, or change in size iver the years. Discussed the importamce of preserving access for future use especially in the setting of early acute mixed rejection post transplant.     Energy level is good. Denies any fevers, chills, weight loss, night sweats. No nausea, vomiting, abdominal pain, diarrhea. No chest pain, sob, leg swelling. No recent illness or hospitalization.     Home BP: Not " checked    Problem List  Patient Active Problem List   Diagnosis     CARDIOVASCULAR SCREENING; LDL GOAL LESS THAN 160     Kidney replaced by transplant     HTN, kidney transplant related     ACS (acute coronary syndrome) (H)     Anemia in chronic renal disease     IgA nephropathy     Anxiety     Kidney transplanted     Immunosuppression (H24)     Painful bladder spasm     Hypophosphatemia     Constipation due to pain medication     Aftercare following organ transplant     Hypomagnesemia     Dehydration     Acute renal failure (ARF) (H24)     Acute rejection of kidney transplant     Metabolic acidosis     Steroid-induced hyperglycemia     Nausea vomiting and diarrhea     Hypervolemia     Anemia     BK viremia       Allergies  No Known Allergies    Medications  Current Outpatient Medications   Medication Sig     ASPIRIN LOW DOSE 81 MG chewable tablet Take 1 tablet (81 mg) by mouth daily     atorvastatin (LIPITOR) 10 MG tablet Take 1 tablet (10 mg) by mouth daily     cyanocobalamin (VITAMIN B-12) 500 MCG SUBL sublingual tablet Place 1,000 mcg under the tongue daily     losartan (COZAAR) 25 MG tablet Take 0.5 tablets (12.5 mg) by mouth daily     magnesium oxide 400 MG tablet Take 2 tablets (800 mg) by mouth 2 times daily for 90 days     mycophenolic acid (GENERIC EQUIVALENT) 180 MG EC tablet TAKE 3 TABLETS BY MOUTH EVERY MORNING AND TAKE 2 TABLETS IN THE EVENING. (TOTAL DOSE = 540 MG IN THE MORNING  MG IN THE EVENING)     predniSONE (DELTASONE) 5 MG tablet Take 1 tablet (5 mg) by mouth daily     sulfamethoxazole-trimethoprim (BACTRIM) 400-80 MG tablet TAKE ONE TABLET BY MOUTH ONCE DAILY **NEED LABS**     tacrolimus (GENERIC EQUIVALENT) 0.5 MG capsule Take 1 capsule (0.5 mg) by mouth 2 times daily Total dose 6.5 mg two times daily     tacrolimus (GENERIC) 1 MG capsule Take 6 capsules (6 mg) by mouth 2 times daily Total dose 6.5 mg two times daily     Vitamin D3 (CHOLECALCIFEROL) 25 mcg (1000 units) tablet Take 2  "tablets (50 mcg) by mouth daily     No current facility-administered medications for this visit.     Medications Discontinued During This Encounter   Medication Reason     pantoprazole (PROTONIX) 20 MG EC tablet      phosphorus tablet 250 mg 250 MG per tablet      magnesium oxide 400 MG tablet      atorvastatin (LIPITOR) 10 MG tablet Reorder (No AVS)       Physical Exam  Vital Signs: /80   Pulse 96   Wt 68.4 kg (150 lb 12.8 oz)   SpO2 100%   BMI 23.62 kg/m      GENERAL APPEARANCE: alert and no distress  HENT: mouth without ulcers or lesions  RESP: lungs clear to auscultation - no rales, rhonchi or wheezes  CV: regular rhythm, normal rate, no rub, no murmur  EDEMA: no LE edema bilaterally  ABDOMEN: soft, nondistended, nontender, bowel sounds normal  MS: extremities normal - no gross deformities noted, no evidence of inflammation in joints, no muscle tenderness  SKIN: no rash  Access LUE AVF +\"small aneurysm\" stable, intact skin, non tender      Data        Latest Ref Rng & Units 9/25/2023    11:41 AM 6/8/2022    12:18 PM 6/3/2022     1:23 PM   Renal   Sodium 133 - 144 mmol/L  142  140    Na (external) 135 - 145 mmol/L 137      K 3.4 - 5.3 mmol/L  3.6  3.0    K (external) 3.6 - 5.2 mmol/L 4.1      Cl 98 - 107 mmol/L 105  107  108    Cl (external) 98 - 107 mmol/L 105  107  108    CO2 20 - 32 mmol/L  27  25    CO2 (external) 22 - 29 mmol/L 22      Urea Nitrogen 7 - 30 mg/dL  13  17    BUN (external) 6 - 21 mg/dL 15      Creatinine 0.52 - 1.04 mg/dL  0.72  0.72    Cr (external) 0.59 - 1.04 mg/dL 0.59      Glucose 70 - 99 mg/dL  84  76    Glucose (external) 70 - 140 mg/dL 97      Calcium 8.5 - 10.1 mg/dL  9.6  9.7    Ca (external) 8.6 - 10.0 mg/dL 9.2      Magnesium 1.6 - 2.3 mg/dL  1.5  1.4          Latest Ref Rng & Units 6/8/2022    12:18 PM 6/3/2022     1:23 PM 11/26/2021    12:49 PM   Bone Health   Phosphorus 2.5 - 4.5 mg/dL 2.4  2.3  2.0    Vit D Def 20 - 75 ug/L  16           Latest Ref Rng & Units " 9/25/2023    11:41 AM 6/8/2022    12:18 PM 6/3/2022     1:23 PM   Heme   WBC 4.0 - 11.0 10e3/uL  5.4  5.3    WBC (external) 3.4 - 9.6 10*9/L 8.7         Hgb 11.7 - 15.7 g/dL  10.7  11.5    Hgb (external) 11.6 - 15.0 g/dL 10.3         Plt 150 - 450 10e3/uL  323  241    Plt (external) 157 - 371 10*9/L 338         ABSOLUTE NEUTROPHILS (EXTERNAL) 1.56 - 6.45 10*9/L 5.81         ABSOLUTE LYMPHOCYTES (EXTERNAL) 0.95 - 3.07 10*9/L 1.67         ABSOLUTE MONOCYTES (EXTERNAL) 0.26 - 0.81 10*9/L 0.64         ABSOLUTE EOSINOPHILS (EXTERNAL) 0.03 - 0.48 10*9/L 0.47         ABSOLUTE BASOPHILS (EXTERNAL) 0.01 - 0.08 10*9/L 0.06             This result is from an external source.         Latest Ref Rng & Units 1/9/2022     1:14 AM 11/26/2021    12:49 PM 7/27/2021    12:46 PM   Liver   AP 40 - 150 U/L 101   95    TBili 0.2 - 1.3 mg/dL 0.3   0.5    Bilirubin Direct 0.0 - 0.2 mg/dL   0.1    ALT 0 - 50 U/L 17   21    AST 0 - 45 U/L 15   14    Tot Protein 6.8 - 8.8 g/dL 7.2   7.1    Albumin 3.4 - 5.0 g/dL 3.6  3.7  3.7          Latest Ref Rng & Units 7/27/2021    12:44 PM 12/18/2020    12:55 PM 6/19/2020     4:11 AM   Pancreas   A1C 0.0 - 5.6 % 5.6  5.4  5.1          Latest Ref Rng & Units 7/5/2020     5:53 AM 6/25/2020     7:49 AM 11/30/2012     7:50 PM   Iron studies   Iron 35 - 180 ug/dL 10  57  125    Iron Saturation Index 15 - 46 % 9  35  81    Ferritin 12 - 150 ng/mL  886  401          Latest Ref Rng & Units 6/3/2021    12:23 PM 5/11/2021     1:05 PM 4/20/2021    12:35 PM   [facility] Txp Virology   BK Spec  Plasma  Plasma  Plasma    BK Res BKNEG^BK Virus DNA Not Detected copies/mL 15,313  7,968  8,119    BK Log <2.7 Log copies/mL 4.2  3.9  3.9        Recent Labs   Lab Test 07/27/21  1245 11/26/21  1249 06/03/22  1323 06/08/22  1218   DOSTAC 7/26/2021  --  6/2/2022 6/7/2022   TACROL 5.6 4.5* 5.7 5.0     Recent Labs   Lab Test 06/03/21  1224 08/05/21  1249 06/08/22  1218   DOSMPA  LD 23:40  --  6/7/2022  12:00 AM   MPACID 1.70 1.54 2.50    MPAG 43.6 38.6 32.7       Again, thank you for allowing me to participate in the care of your patient.        Sincerely,        Bruce Whelan MD

## 2024-01-11 NOTE — PROGRESS NOTES
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 Closure biopsy was not performed (patient preference).      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 Closure biopsy was not performed (patient preference).      # BK viremia: stable load      # Infection Prophylaxis:   - PJP: Sulfa/TMP (Bactrim)  - CMV: Valcyte completed the course      # Hypertension: Controlled; BP average 110/80       Goal BP: < 140/90              - Volume status: euvolemic                  - Changes: None      # Anemia in Chronic Renal Disease: Hgb: Stable      ENZO: no longer needed              - Iron studies: Replete     # Mineral Bone Disorder:   - Calcium; level: Normal        On supplement: No  - Phosphorus; level: Low Normal       On supplement: on supplement      # Electrolytes:   - Potassium; level: low       On supplement: start supplement   - Magnesium; level: low        On supplement: continue supplement , discontinue PPI     # Access dysfunction:   - LUE  radio-cephalic AVF: chronic old \"bulge\" stable in size. Non tender, no skin changes.   - advised to monitor for now, recommended preserving access for future use unless acute indications for repair/ligation     # Medical Compliance:  Evidence of labs less than recommended and infrequent transplant follow-up.  - Discussed importance of checking labs regularly as recommended, taking medications as prescribed and attending scheduled medical " "appointments.    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        Donor Class:   Crossmatch at time of Tx: negative  DSA at time of Tx: No  Significant changes in immunosuppression: steroid maintenance due to early mixed rejection   CMV IgG Ab High Risk Discordance (D+/R-): No  EBV IgG Ab High Risk Discordance (D+/R-): No  Significant transplant-related complications: early acute rejection possible Non-HLA with an element of memory response      # Health Maintenance and Vaccination Review: Reviewed and up to date      Transplant Office Phone Number: [phone]    Assessment and plan was discussed with the patient and she voiced her understanding and agreement.    Return visit: Return in about 1 year (around 1/11/2025).    Bruce Whelan MD    Chief Complaint   Ms. Dietz is a 39 year old here for kidney transplant and immunosuppression management.    History of Present Illness    Mrs. Diane was lost to f/up since 2022. Last visit with .    She feels good overall . She was wondering if she could have her fistula ligated/repaired due to the \"bulge\" around the wrist which reportedly has been stable for many years. She denies any pain, numbness, weakness, or change in size iver the years. Discussed the importamce of preserving access for future use especially in the setting of early acute mixed rejection post transplant.     Energy level is good. Denies any fevers, chills, weight loss, night sweats. No nausea, vomiting, abdominal pain, diarrhea. No chest pain, sob, leg swelling. No recent illness or hospitalization.     Home BP: Not checked    Problem List   Patient Active Problem List   Diagnosis    CARDIOVASCULAR SCREENING; LDL GOAL LESS THAN 160    Kidney replaced by transplant    HTN, kidney transplant related    ACS (acute coronary syndrome) (H)    Anemia in chronic renal " disease    IgA nephropathy    Anxiety    Kidney transplanted    Immunosuppression (H24)    Painful bladder spasm    Hypophosphatemia    Constipation due to pain medication    Aftercare following organ transplant    Hypomagnesemia    Dehydration    Acute renal failure (ARF) (H24)    Acute rejection of kidney transplant    Metabolic acidosis    Steroid-induced hyperglycemia    Nausea vomiting and diarrhea    Hypervolemia    Anemia    BK viremia       Allergies   No Known Allergies    Medications   Current Outpatient Medications   Medication Sig    ASPIRIN LOW DOSE 81 MG chewable tablet Take 1 tablet (81 mg) by mouth daily    atorvastatin (LIPITOR) 10 MG tablet Take 1 tablet (10 mg) by mouth daily    cyanocobalamin (VITAMIN B-12) 500 MCG SUBL sublingual tablet Place 1,000 mcg under the tongue daily    losartan (COZAAR) 25 MG tablet Take 0.5 tablets (12.5 mg) by mouth daily    magnesium oxide 400 MG tablet Take 2 tablets (800 mg) by mouth 2 times daily for 90 days    mycophenolic acid (GENERIC EQUIVALENT) 180 MG EC tablet TAKE 3 TABLETS BY MOUTH EVERY MORNING AND TAKE 2 TABLETS IN THE EVENING. (TOTAL DOSE = 540 MG IN THE MORNING  MG IN THE EVENING)    predniSONE (DELTASONE) 5 MG tablet Take 1 tablet (5 mg) by mouth daily    sulfamethoxazole-trimethoprim (BACTRIM) 400-80 MG tablet TAKE ONE TABLET BY MOUTH ONCE DAILY **NEED LABS**    tacrolimus (GENERIC EQUIVALENT) 0.5 MG capsule Take 1 capsule (0.5 mg) by mouth 2 times daily Total dose 6.5 mg two times daily    tacrolimus (GENERIC) 1 MG capsule Take 6 capsules (6 mg) by mouth 2 times daily Total dose 6.5 mg two times daily    Vitamin D3 (CHOLECALCIFEROL) 25 mcg (1000 units) tablet Take 2 tablets (50 mcg) by mouth daily     No current facility-administered medications for this visit.     Medications Discontinued During This Encounter   Medication Reason    pantoprazole (PROTONIX) 20 MG EC tablet     phosphorus tablet 250 mg 250 MG per tablet     magnesium oxide 400  "MG tablet     atorvastatin (LIPITOR) 10 MG tablet Reorder (No AVS)       Physical Exam   Vital Signs: /80   Pulse 96   Wt 68.4 kg (150 lb 12.8 oz)   SpO2 100%   BMI 23.62 kg/m      GENERAL APPEARANCE: alert and no distress  HENT: mouth without ulcers or lesions  RESP: lungs clear to auscultation - no rales, rhonchi or wheezes  CV: regular rhythm, normal rate, no rub, no murmur  EDEMA: no LE edema bilaterally  ABDOMEN: soft, nondistended, nontender, bowel sounds normal  MS: extremities normal - no gross deformities noted, no evidence of inflammation in joints, no muscle tenderness  SKIN: no rash  Access LUE AVF +\"small aneurysm\" stable, intact skin, non tender      Data         Latest Ref Rng & Units 9/25/2023    11:41 AM 6/8/2022    12:18 PM 6/3/2022     1:23 PM   Renal   Sodium 133 - 144 mmol/L  142  140    Na (external) 135 - 145 mmol/L 137      K 3.4 - 5.3 mmol/L  3.6  3.0    K (external) 3.6 - 5.2 mmol/L 4.1      Cl 98 - 107 mmol/L 105  107  108    Cl (external) 98 - 107 mmol/L 105  107  108    CO2 20 - 32 mmol/L  27  25    CO2 (external) 22 - 29 mmol/L 22      Urea Nitrogen 7 - 30 mg/dL  13  17    BUN (external) 6 - 21 mg/dL 15      Creatinine 0.52 - 1.04 mg/dL  0.72  0.72    Cr (external) 0.59 - 1.04 mg/dL 0.59      Glucose 70 - 99 mg/dL  84  76    Glucose (external) 70 - 140 mg/dL 97      Calcium 8.5 - 10.1 mg/dL  9.6  9.7    Ca (external) 8.6 - 10.0 mg/dL 9.2      Magnesium 1.6 - 2.3 mg/dL  1.5  1.4          Latest Ref Rng & Units 6/8/2022    12:18 PM 6/3/2022     1:23 PM 11/26/2021    12:49 PM   Bone Health   Phosphorus 2.5 - 4.5 mg/dL 2.4  2.3  2.0    Vit D Def 20 - 75 ug/L  16           Latest Ref Rng & Units 9/25/2023    11:41 AM 6/8/2022    12:18 PM 6/3/2022     1:23 PM   Heme   WBC 4.0 - 11.0 10e3/uL  5.4  5.3    WBC (external) 3.4 - 9.6 10*9/L 8.7         Hgb 11.7 - 15.7 g/dL  10.7  11.5    Hgb (external) 11.6 - 15.0 g/dL 10.3         Plt 150 - 450 10e3/uL  323  241    Plt (external) 157 - " 371 10*9/L 338         ABSOLUTE NEUTROPHILS (EXTERNAL) 1.56 - 6.45 10*9/L 5.81         ABSOLUTE LYMPHOCYTES (EXTERNAL) 0.95 - 3.07 10*9/L 1.67         ABSOLUTE MONOCYTES (EXTERNAL) 0.26 - 0.81 10*9/L 0.64         ABSOLUTE EOSINOPHILS (EXTERNAL) 0.03 - 0.48 10*9/L 0.47         ABSOLUTE BASOPHILS (EXTERNAL) 0.01 - 0.08 10*9/L 0.06             This result is from an external source.         Latest Ref Rng & Units 1/9/2022     1:14 AM 11/26/2021    12:49 PM 7/27/2021    12:46 PM   Liver   AP 40 - 150 U/L 101   95    TBili 0.2 - 1.3 mg/dL 0.3   0.5    Bilirubin Direct 0.0 - 0.2 mg/dL   0.1    ALT 0 - 50 U/L 17   21    AST 0 - 45 U/L 15   14    Tot Protein 6.8 - 8.8 g/dL 7.2   7.1    Albumin 3.4 - 5.0 g/dL 3.6  3.7  3.7          Latest Ref Rng & Units 7/27/2021    12:44 PM 12/18/2020    12:55 PM 6/19/2020     4:11 AM   Pancreas   A1C 0.0 - 5.6 % 5.6  5.4  5.1          Latest Ref Rng & Units 7/5/2020     5:53 AM 6/25/2020     7:49 AM 11/30/2012     7:50 PM   Iron studies   Iron 35 - 180 ug/dL 10  57  125    Iron Saturation Index 15 - 46 % 9  35  81    Ferritin 12 - 150 ng/mL  886  401          Latest Ref Rng & Units 6/3/2021    12:23 PM 5/11/2021     1:05 PM 4/20/2021    12:35 PM   [facility] Txp Virology   BK Spec  Plasma  Plasma  Plasma    BK Res BKNEG^BK Virus DNA Not Detected copies/mL 15,313  7,968  8,119    BK Log <2.7 Log copies/mL 4.2  3.9  3.9        Recent Labs   Lab Test 07/27/21  1245 11/26/21  1249 06/03/22  1323 06/08/22  1218   DOSTAC 7/26/2021  --  6/2/2022 6/7/2022   TACROL 5.6 4.5* 5.7 5.0     Recent Labs   Lab Test 06/03/21  1224 08/05/21  1249 06/08/22  1218   DOSMPA  LD 23:40  --  6/7/2022  12:00 AM   MPACID 1.70 1.54 2.50   MPAG 43.6 38.6 32.7

## 2024-01-11 NOTE — PATIENT INSTRUCTIONS
Labs every 2  months, will add Magnesium and phosphorus (Feb 2024)      Transplant Patient Information  Your Post Transplant Coordinator is: Beti Billings  You and your care team can contact your transplant coordinator Monday - Friday, 8am - 5pm at [phone] (Option 2 to reach the coordinator or Option 4 to schedule an appointment).  You can also reach your care team online via [employer].  After hours for urgent matters, please call [facility] at [phone].

## 2024-02-22 ENCOUNTER — TELEPHONE (OUTPATIENT)
Dept: DERMATOLOGY | Facility: CLINIC | Age: 39
End: 2024-02-22
Payer: MEDICARE

## 2024-04-12 DIAGNOSIS — R79.0 LOW SERUM PHOSPHORUS FOR AGE: ICD-10-CM

## 2024-04-12 DIAGNOSIS — Z94.0 KIDNEY REPLACED BY TRANSPLANT: ICD-10-CM

## 2024-04-12 DIAGNOSIS — Z48.298 AFTERCARE FOLLOWING ORGAN TRANSPLANT: ICD-10-CM

## 2024-04-12 DIAGNOSIS — Z94.0 KIDNEY TRANSPLANTED: ICD-10-CM

## 2024-04-12 DIAGNOSIS — N17.9 ACUTE RENAL FAILURE, UNSPECIFIED ACUTE RENAL FAILURE TYPE (H): ICD-10-CM

## 2024-04-12 DIAGNOSIS — Z79.899 ENCOUNTER FOR LONG-TERM CURRENT USE OF MEDICATION: ICD-10-CM

## 2024-04-15 RX ORDER — DIBASIC SODIUM PHOSPHATE, MONOBASIC POTASSIUM PHOSPHATE AND MONOBASIC SODIUM PHOSPHATE 852; 155; 130 MG/1; MG/1; MG/1
500 TABLET ORAL DAILY
Qty: 120 TABLET | Refills: 3 | Status: SHIPPED | OUTPATIENT
Start: 2024-04-15

## 2024-04-15 RX ORDER — SULFAMETHOXAZOLE AND TRIMETHOPRIM 400; 80 MG/1; MG/1
TABLET ORAL
Qty: 90 TABLET | Refills: 0 | Status: SHIPPED | OUTPATIENT
Start: 2024-04-15 | End: 2024-05-17

## 2024-05-17 DIAGNOSIS — N17.9 ACUTE RENAL FAILURE, UNSPECIFIED ACUTE RENAL FAILURE TYPE (H): ICD-10-CM

## 2024-05-17 DIAGNOSIS — Z94.0 KIDNEY REPLACED BY TRANSPLANT: ICD-10-CM

## 2024-05-17 DIAGNOSIS — Z94.0 KIDNEY TRANSPLANTED: Primary | ICD-10-CM

## 2024-05-17 DIAGNOSIS — Z48.298 AFTERCARE FOLLOWING ORGAN TRANSPLANT: ICD-10-CM

## 2024-05-17 DIAGNOSIS — Z79.899 ENCOUNTER FOR LONG-TERM CURRENT USE OF MEDICATION: ICD-10-CM

## 2024-05-17 RX ORDER — SULFAMETHOXAZOLE AND TRIMETHOPRIM 400; 80 MG/1; MG/1
1 TABLET ORAL DAILY
Qty: 90 TABLET | Refills: 0 | Status: SHIPPED | OUTPATIENT
Start: 2024-05-17

## 2024-05-17 RX ORDER — VITAMIN B COMPLEX
2 TABLET ORAL DAILY
Qty: 180 TABLET | Refills: 0 | Status: SHIPPED | OUTPATIENT
Start: 2024-05-17 | End: 2024-08-19

## 2024-06-23 ENCOUNTER — HEALTH MAINTENANCE LETTER (OUTPATIENT)
Age: 39
End: 2024-06-23

## 2024-06-24 ENCOUNTER — TELEPHONE (OUTPATIENT)
Dept: PHARMACY | Facility: CLINIC | Age: 39
End: 2024-06-24
Payer: COMMERCIAL

## 2024-06-24 NOTE — TELEPHONE ENCOUNTER
Clinical Pharmacy Consult:                                                      Transplant Specific: 48 Month Post Transplant Medication Call  Date of Transplant: 6/19/20  Type of Transplant: kidney  First Transplant: no, #2  History of rejection: yes    Immunosuppression Regimen   TAC 6.5mg qAM & 6.5mg qPM, Prednisone 5mg qAM and Myforitic 540mg qAM & 360mg qPM  Patient specific goal: 6  Most recent level: 10.5, date 12/28/23  Immunosuppressant Levels:  supratherapeutic  Pt adherent to lab draws: Yes  Scr:   Lab Results   Component Value Date    CR 0.99 07/24/2020     Side effects: none    Prophylactic Medications  Antibacterial:  Bactrim SS daily  Scheduled Discontinue Date: Lifelong    Antifungal: Not needed thus far  Scheduled Discontinue Date: N/A    Antiviral: CrCl 40 to 59 mL/minute: Valcyte 450 mg once daily   Scheduled Discontinue Date: 3 months-Therapy completed    Acid Reducer: Protonix (pantoprazole)  Scheduled Reviewed Date: MD to determine - pt not taking    Thrombosis Prevention: Aspirin 81 mg PO daily  Scheduled Discontinue Date:  MD to determine    Blood Pressure Management  Frequency of home Blood Pressure checks: not checking  Most recent home BP: 121/80 at last clinic  Patient Blood pressure goal: <140/90  Patient blood pressure at goal:  yes  Hospitalizations/ER visits since last assessment: 0     Med rec/DUR performed: Yes  Med Rec Discrepancies: No    Spoke with Jelly via phone today. Reported doing very well. No missed dose, no side effects. Said she just had lab last month and will have her local lab release result to . Denied fever, pain, swelling or any urinary symptoms.    BP: not checking at home. Said last clinic visit in May was fine. Denied any symptoms.    No other questions or concerns today. Follow up in 1 year.    Maxwell Yap, Pharm D  [facility]

## 2024-07-30 NOTE — TELEPHONE ENCOUNTER
Patient Call: Medication Refill  Route to LPN  Instruct the patient to first contact their pharmacy. If they have called their pharmacy and require further assistance, route to LPN.    Pharmacy Name: [facility]   Pharmacy Location: [address]  Name of Medication: Prednisone- ASA- mag Oxide- Senna-  Pantoprazole  When will the patient be out of this medication?: Less than 24 hours (Shailesh LANDIN, then page if no answer)  
How Severe Are Your Spot(S)?: mild
Have Your Spot(S) Been Treated In The Past?: has not been treated
Hpi Title: Evaluation of Skin Lesions

## 2024-08-19 DIAGNOSIS — Z94.0 KIDNEY TRANSPLANTED: ICD-10-CM

## 2024-08-19 RX ORDER — CHOLECALCIFEROL (VITAMIN D3) 25 MCG
2 TABLET ORAL DAILY
Qty: 180 TABLET | Refills: 0 | Status: SHIPPED | OUTPATIENT
Start: 2024-08-19

## 2024-10-08 DIAGNOSIS — Z79.899 ENCOUNTER FOR LONG-TERM CURRENT USE OF MEDICATION: ICD-10-CM

## 2024-10-08 DIAGNOSIS — Z94.0 KIDNEY REPLACED BY TRANSPLANT: ICD-10-CM

## 2024-10-08 DIAGNOSIS — Z94.0 KIDNEY TRANSPLANTED: ICD-10-CM

## 2024-10-08 DIAGNOSIS — N17.9 ACUTE RENAL FAILURE, UNSPECIFIED ACUTE RENAL FAILURE TYPE (H): ICD-10-CM

## 2024-10-08 DIAGNOSIS — Z48.298 AFTERCARE FOLLOWING ORGAN TRANSPLANT: ICD-10-CM

## 2024-10-08 RX ORDER — MYCOPHENOLIC ACID 180 MG/1
TABLET, DELAYED RELEASE ORAL
Qty: 150 TABLET | Refills: 11 | Status: SHIPPED | OUTPATIENT
Start: 2024-10-08

## 2024-10-08 RX ORDER — CHOLECALCIFEROL (VITAMIN D3) 25 MCG
2 TABLET ORAL DAILY
Qty: 180 TABLET | Refills: 0 | Status: SHIPPED | OUTPATIENT
Start: 2024-10-08

## 2024-11-25 ENCOUNTER — TELEPHONE (OUTPATIENT)
Dept: TRANSPLANT | Facility: CLINIC | Age: 39
End: 2024-11-25
Payer: COMMERCIAL

## 2024-11-25 DIAGNOSIS — B34.8 BK VIREMIA: ICD-10-CM

## 2024-11-25 NOTE — LETTER
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 Pt creatinine improved back to her baseline of 0.6-0.9.      # DDKT: stable, last dialysis 7/17/20              - Baseline Cr ~ 0.6-0.9 mg/dL               - Proteinuria: minimal              - Date DSA Last Checked: 7/2020      Latest DSA: No              - BK Viremia: No              - Kidney Tx Biopsy: Jul 04, 2020; Result: severe [place] IIA with AbMR features      # Immunosuppression: Tacrolimus immediate release (goal 8-10), Mycophenolic acid (dose 720 mg every 12 hours) and Prednisone (5 mg)    Tacrolimus levels are all over the place, unclear if any drugs or food altering with her drug levels (eats lot of grapefruit) or if she is missing some doses. But she endorses compliance with her medications.  - increased her dose from 4 mg BID to 7 mg BID on 9/3/2020 as her levels was low at ~5 since 8/21.  - explained in detail about possibility of rejecting the kidney with low immunosuppression and avoidance of food interfere with tacrolimus     - noted her Myfortic level is also low intermittently.      # Infection Prophylaxis:   - PJP: Sulfa/TMP (Bactrim)  - CMV: Valcyte     # Hypertension: Controlled;   Goal BP: < 140/90              - Volume status: euvolemic    EDW ~ 130 Lb              - Changes: no     # Anemia in Chronic Renal Disease: Hgb: Stable      ENZO: no longer needed              - Iron studies: Replete     # Mineral Bone Disorder:   - Calcium; level: Normal        On supplement: No  - Phosphorus; level: Low        On supplement: yes      # Electrolytes:   - Potassium; level: low       On supplement: no, encouraged potassium rich foods.  - Magnesium; level: low        On supplement: continue supplement      # Medical Compliance: Yes, per pt     # COVID-19 Virus Review:  Discussed COVID-19 virus and the potential medical risks.  Reviewed preventative health recommendations, which includes washing hands for 20 seconds, avoid touching your face, and social distancing.  Asked patient to inform the transplant center if they are exposed or diagnosed with this virus.     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        Donor Class:   Crossmatch at time of Tx: negative  DSA at time of Tx: No  Significant changes in immunosuppression: steroid maintenance due to early mixed rejection   CMV IgG Ab High Risk Discordance (D+/R-): No  EBV IgG Ab High Risk Discordance (D+/R-): No  Significant transplant-related complications: early acute rejection possible Non-HLA with an element of memory response     Transplant Office Phone Number: [phone]    Assessment and plan was discussed with the patient and she voiced her understanding and agreement.    Return visit: Return in about 4 weeks (around 10/1/2020).    Isabela Graham MD    Chief Complaint   Ms. Dietz is a 35 year old here for kidney transplant follow up.     History of Present Illness    Ms. Dietz is a 35 year old female with ESRD s/p second renal transplant in June 2020. Her history is complicated with acute mixed rejection during early week of engraftment. S/p therapy with plasmapheresis, IVIG, thymo and ritux with improvement in renal function.    Pt with no acute complaints this morning. Denied any dysuria, urgency or frequency of urination. She was eating a lot of grapefruit recently. Also delaying in taking her medication while she is preparing her children for school. Apparently she does not have any help at home. So arranged to talk to our  to see if can be of any help to her. Also endorsed taking half the dose of MMF by mistake.    Stressed the importance of taking her medication and  probability of rejection.    Also discussed in detail about her sensitivity and inability to get another kidney if she looses this one for rejection or for non compliance.    Recent Hospitalizations:  [x] No [] Yes    New Medical Issues: [x] No [] Yes    Decreased energy: [x] No [] Yes    Chest pain or SOB with exertion:  [x] No [] Yes    Appetite change or weight change: [x] No [] Yes    Nausea, vomiting or diarrhea:  [x] No [] Yes    Fever, sweats or chills: [x] No [] Yes    Leg swelling: [x] No [] Yes      Home BP: 113/80     zyrtec for seasonal allergies    Review of Systems   A comprehensive review of systems was obtained and negative, except as noted in the HPI or PMH.    Problem List   Patient Active Problem List   Diagnosis     CARDIOVASCULAR SCREENING; LDL GOAL LESS THAN 160     Kidney replaced by transplant     HTN, kidney transplant related     ACS (acute coronary syndrome) (H)     Anemia in chronic renal disease     IgA nephropathy     Anxiety     Kidney transplanted     Immunosuppression (H)     Painful bladder spasm     Hypophosphatemia     Constipation due to pain medication     Aftercare following organ transplant     Hypomagnesemia     Dehydration     Acute renal failure (ARF) (H)     Acute rejection of kidney transplant     Metabolic acidosis     Steroid-induced hyperglycemia     Nausea vomiting and diarrhea     Hypervolemia     Anemia       Social History   Social History     Tobacco Use     Smoking status: Never Smoker     Smokeless tobacco: Never Used   Substance Use Topics     Alcohol use: No     Drug use: No       Allergies   No Known Allergies    Medications   Current Outpatient Medications   Medication Sig     acetaminophen (TYLENOL) 325 MG tablet Take 2 tablets (650 mg) by mouth every 4 hours as needed for other (multimodal surgical pain management along with NSAIDS and opioid medication as indicated based on pain control and physical function.)     amLODIPine (NORVASC) 5 MG tablet Take 1  "tablet (5 mg) by mouth daily     aspirin (ASA) 81 MG chewable tablet Take 1 tablet (81 mg) by mouth daily     atorvastatin (LIPITOR) 10 MG tablet Take 1 tablet (10 mg) by mouth daily     carvedilol (COREG) 6.25 MG tablet Take 1 tablet (6.25 mg) by mouth 2 times daily     losartan (COZAAR) 25 MG tablet Take 0.5 tablets (12.5 mg) by mouth At Bedtime     magnesium oxide (MAG-OX) 400 (241.3 Mg) MG tablet Take 2 tablets (800 mg) by mouth 2 times daily     mycophenolic acid (GENERIC EQUIVALENT) 180 MG EC tablet Take 4 tablets (720 mg) by mouth 2 times daily     pantoprazole (PROTONIX) 40 MG EC tablet Take 1 tablet (40 mg) by mouth 2 times daily     phosphorus tablet 250 mg (PHOSPHORUS TABLET 250 MG) 250 MG per tablet Take 2 tablets (500 mg) by mouth 3 times daily     predniSONE (DELTASONE) 5 MG tablet Take 1 tablet (5 mg) by mouth daily     sulfamethoxazole-trimethoprim (BACTRIM) 400-80 MG tablet Take 1 tablet by mouth daily     tacrolimus (GENERIC EQUIVALENT) 1 MG capsule Take 7 capsules (7 mg) by mouth 2 times daily     valGANciclovir (VALCYTE) 450 MG tablet Take 2 tablets (900 mg) by mouth daily     Vitamin D3 (CHOLECALCIFEROL) 25 mcg (1000 units) tablet Take 2 tablets (50 mcg) by mouth daily     No current facility-administered medications for this visit.      Medications Discontinued During This Encounter   Medication Reason     magnesium oxide (MAG-OX) 400 (241.3 Mg) MG tablet Reorder     tacrolimus (GENERIC EQUIVALENT) 1 MG capsule Reorder     losartan (COZAAR) 25 MG tablet Reorder     pantoprazole (PROTONIX) 40 MG EC tablet Reorder     losartan (COZAAR) 25 MG tablet Reorder     pantoprazole (PROTONIX) 40 MG EC tablet Reorder       Physical Exam   Vital Signs: /77   Pulse 84   Temp 98.1  F (36.7  C) (Oral)   Ht 1.643 m (5' 4.7\")   Wt 62.7 kg (138 lb 3.2 oz)   SpO2 100%   BMI 23.21 kg/m      GENERAL APPEARANCE: alert and no distress  HENT: mouth without ulcers or lesions  LYMPHATICS: no cervical or " supraclavicular nodes  RESP: lungs clear to auscultation - no rales, rhonchi or wheezes  CV: regular rhythm, normal rate, no rub, no murmur  EDEMA: no LE edema bilaterally  ABDOMEN: soft, nondistended, nontender, bowel sounds normal  MS: extremities normal - no gross deformities noted, no evidence of inflammation in joints, no muscle tenderness  SKIN: no rash    Data     Renal Latest Ref Rng & Units 9/1/2020 8/28/2020 8/21/2020   Na 133 - 144 mmol/L 142 142 140   K 3.4 - 5.3 mmol/L 3.3(L) 3.5 3.3(L)   Cl 94 - 109 mmol/L 109 109 109   CO2 20 - 32 mmol/L 24 24 22   BUN 7 - 30 mg/dL 12 17 12   Cr 0.52 - 1.04 mg/dL 0.64 0.74 0.74   Glucose 70 - 99 mg/dL 87 77 89   Ca  8.5 - 10.1 mg/dL 8.3(L) 8.3(L) 7.8(L)   Mg 1.6 - 2.3 mg/dL 1.3(L) 1.2(L) -     Bone Health Latest Ref Rng & Units 9/1/2020 8/28/2020 8/17/2020   Phos 2.5 - 4.5 mg/dL 2.4(L) 2.3(L) 1.9(L)   PTHi 18 - 80 pg/mL - - -   Vit D Def 20 - 75 ug/L - - -     Heme Latest Ref Rng & Units 9/1/2020 8/28/2020 8/21/2020   WBC 4.0 - 11.0 10e9/L 6.8 8.0 11.2(H)   Hgb 11.7 - 15.7 g/dL 8.3(L) 8.3(L) 8.2(L)   Plt 150 - 450 10e9/L 270 302 230   ABSOLUTE NEUTROPHIL 1.6 - 8.3 10e9/L - - -   ABSOLUTE LYMPHOCYTES 0.8 - 5.3 10e9/L - - -   ABSOLUTE MONOCYTES 0.0 - 1.3 10e9/L - - -   ABSOLUTE EOSINOPHILS 0.0 - 0.7 10e9/L - - -   ABSOLUTE BASOPHILS 0.0 - 0.2 10e9/L - - -   ABS IMMATURE GRANULOCYTES 0 - 0.4 10e9/L - - -   ABSOLUTE NUCLEATED RBC - - - -     Liver Latest Ref Rng & Units 8/10/2020 8/4/2020 7/24/2020   AP 40 - 150 U/L 131 133 104   TBili 0.2 - 1.3 mg/dL 0.7 0.5 0.8   DBili 0.0 - 0.2 mg/dL - - -   ALT 0 - 50 U/L 28 41 215(H)   AST 0 - 45 U/L 17 16 98(H)   Tot Protein 6.8 - 8.8 g/dL 7.6 7.8 7.9   Albumin 3.4 - 5.0 g/dL 4.0 4.0 4.2     Pancreas Latest Ref Rng & Units 6/19/2020 5/30/2015 10/6/2014   A1C 0 - 5.6 % 5.1 - -   Lipase 73 - 393 U/L - 126 100     Iron studies Latest Ref Rng & Units 7/5/2020 6/25/2020 11/30/2012   Iron 35 - 180 ug/dL 10(L) 57 125   Iron sat 15 - 46 %  9(L) 35 81(H)   Ferritin 12 - 150 ng/mL - 886(H) 401(H)     [facility] Txp Virology Latest Ref Rng & Units 8/17/2020 8/4/2020 7/20/2020   CVM DNA Quant - - EDTA PLASMA -   CMV Quant <100 Copies/mL - - -   CMV QT Log <2.0 Log copies/mL - - -   CMV QUANT IU/ML CMVND:CMV DNA Not Detected [IU]/mL - CMV DNA Not Detected -   LOG IU/ML OF CMVQNT <2.1 [Log:IU]/mL - Not Calculated -   BK Spec - Plasma - Plasma   BK Res BKNEG:BK Virus DNA Not Detected copies/mL BK Virus DNA Not Detected - BK Virus DNA Not Detected   BK Log <2.7 Log copies/mL Not Calculated - Not Calculated   EBV VCA IGG ANTIBODY U/mL - - -   EBV CAPSID ANTIBODY IGG 0.0 - 0.8 AI - - -   EBV DNA COPIES/ML <1000 Copies/mL - - -   EBV DNA LOG OF COPIES <3.0 Log copies/mL - - -   Hep B Core NR:Nonreactive - - -   Hep B Surf - - - -   HIV 1&2 NEG - - -        Recent Labs   Lab Test 08/21/20  0900 08/28/20  0900 09/01/20  0900   DOSTAC 8.19.20 2100 8/27 2100 Not Provided   TACROL 5.3 5.0 5.1     Recent Labs   Lab Test 08/17/20  0930 08/28/20  0900 09/01/20  0900   DOSMPA Not Provided 8/27 2100 Not Provided   MPACID 4.54* 0.84* 1.28   MPAG 50.8 18.4* 31.9     Patient was seen and evaluated by me, Mahesh Armando MD. I have reviewed the note and agree with the the plan of care as documented by the fellow.      Early Post Transplant       I have personally seen and examined the patient. I have collaborated with and supervised the

## 2024-11-25 NOTE — TELEPHONE ENCOUNTER
"RNCC made attempt to reach out to patient.  Unable to LM. Upon review of chart in C.E. patient is currently inpatient at [facility] being following by psych.  Per MD note from today 11/15, \" renal function is normal with excellent graft function.    Will reach out when discharged.    Beti Billings RN   Transplant Coordinator  [phone]    "

## 2024-11-29 ENCOUNTER — TELEPHONE (OUTPATIENT)
Dept: TRANSPLANT | Facility: CLINIC | Age: 39
End: 2024-11-29
Payer: COMMERCIAL

## 2024-11-29 DIAGNOSIS — Z94.0 KIDNEY TRANSPLANTED: Primary | ICD-10-CM

## 2024-11-29 DIAGNOSIS — B34.8 BK VIREMIA: ICD-10-CM

## 2024-11-29 NOTE — TELEPHONE ENCOUNTER
[facility] Call Center    Phone Message    May a detailed message be left on voicemail: no     Reason for Call: Other:  called patient is in mental health unit at their facility due to non compliance with transplant medications. Would like to set up a follow up for her with the physician. Please contact her [phone].     Action Taken: Other: SOT    Travel Screening: Not Applicable     Date of Service:

## 2024-12-02 NOTE — TELEPHONE ENCOUNTER
Left SW voice message letting her now SOT annual office visit order has been placed.  Scheduling will reach out to patient to schedule.    Beti Billings RN   Transplant Coordinator  [phone]

## 2024-12-04 ENCOUNTER — TELEPHONE (OUTPATIENT)
Dept: TRANSPLANT | Facility: CLINIC | Age: 39
End: 2024-12-04
Payer: COMMERCIAL

## 2024-12-04 DIAGNOSIS — B34.8 BK VIREMIA: ICD-10-CM

## 2024-12-04 DIAGNOSIS — Z94.0 KIDNEY TRANSPLANTED: Primary | ICD-10-CM

## 2024-12-04 NOTE — LETTER
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     Please fax each result to [phone], same day as resulted/available    Critical lab results page [phone]  Monday - Friday 8 am to 5 pm  Evening/Weekend/Holiday communicate Critical labs results [phone]    Every Other Month   CBC with platelets  Basic Metabolic Panel   Tacrolimus/Prograf/ drug level       Every 6 Months  Urine protein creatinine ratio    At 3 years, 4, years, and 5 years post-transplant   PRA/DSA (patient to supply )    If you have any questions please call the Transplant Center at [phone]. All lab results should be faxed to [phone]    .    Bruce Whelan MD

## 2024-12-04 NOTE — TELEPHONE ENCOUNTER
ISSUE: patient discharged from [facility] where she was treated for unspecified psychosis as well as CANDI.    OUTCOME: patient states she is doing well at home.  States she will have transplant labs obtained within the next week.  She is also due to annual SOT visit.  Order placed.  Jelly aware scheduling will be reaching out to schedule her apt.    Beti Billings RN   Transplant Coordinator  [phone]

## 2024-12-10 DIAGNOSIS — N17.9 ACUTE RENAL FAILURE, UNSPECIFIED ACUTE RENAL FAILURE TYPE (H): ICD-10-CM

## 2024-12-10 DIAGNOSIS — Z94.0 KIDNEY REPLACED BY TRANSPLANT: ICD-10-CM

## 2024-12-10 DIAGNOSIS — Z79.899 ENCOUNTER FOR LONG-TERM CURRENT USE OF MEDICATION: ICD-10-CM

## 2024-12-10 DIAGNOSIS — Z48.298 AFTERCARE FOLLOWING ORGAN TRANSPLANT: ICD-10-CM

## 2024-12-10 DIAGNOSIS — Z94.0 KIDNEY TRANSPLANTED: ICD-10-CM

## 2024-12-10 RX ORDER — PREDNISONE 5 MG/1
5 TABLET ORAL DAILY
Qty: 30 TABLET | Refills: 11 | Status: SHIPPED | OUTPATIENT
Start: 2024-12-10

## 2024-12-11 DIAGNOSIS — B34.8 BK VIREMIA: ICD-10-CM

## 2024-12-23 ENCOUNTER — TELEPHONE (OUTPATIENT)
Dept: TRANSPLANT | Facility: CLINIC | Age: 39
End: 2024-12-23
Payer: COMMERCIAL

## 2024-12-23 ENCOUNTER — PATIENT OUTREACH (OUTPATIENT)
Dept: CARE COORDINATION | Facility: CLINIC | Age: 39
End: 2024-12-23
Payer: COMMERCIAL

## 2024-12-23 DIAGNOSIS — B34.8 BK VIREMIA: ICD-10-CM

## 2024-12-23 DIAGNOSIS — Z94.0 KIDNEY REPLACED BY TRANSPLANT: Primary | ICD-10-CM

## 2024-12-23 DIAGNOSIS — D63.8 ANEMIA IN OTHER CHRONIC DISEASES CLASSIFIED ELSEWHERE: ICD-10-CM

## 2024-12-23 DIAGNOSIS — D64.9 ANEMIA: Primary | ICD-10-CM

## 2024-12-23 NOTE — PROGRESS NOTES
Anemia Management Note - Enrollment    SUBJECTIVE/OBJECTIVE:    Referred by Dr. Bruce Whelan on 2024  Primary Diagnosis: Anemia of Other Chronic Illness (D63.8)     Secondary Diagnosis: Organ or tissue replaced by transplant, kidney (Z94.0)  Date of Kidney Transplant: 2020 and 2017.   Hgb goal range: 9-10    Epo/Darbo: TBD:  Needs Updated Iron Studies.   *GFR >90.   Iron regimen: TBD:  Needs Updated Iron Studies.     Labs : 2025  Epic &Letters  RX/TX plans : TBD    Labs: [facility]. Ph [phone]  Fax [phone]    Recent ENZO use, transfusion, IV iron: Retacrit , Venofer .   No history of stroke, MI, and blood clots or cancers    Contact: OK to speak with Apolinar Hagan () regarding Scheduling and Medical Information per consent to communicate dated 2013.         Latest Ref Rng & Units 2021 2021 2022 3/7/2022 6/3/2022 2022   Anemia   Hemoglobin 11.7 - 15.7 g/dL 11.2  11.3  10.8  10.8  11.5  10.7      BP Readings from Last 3 Encounters:   24 121/80   22 120/67   22 138/78     Wt Readings from Last 2 Encounters:   24 68.4 kg (150 lb 12.8 oz)   22 68 kg (150 lb)     Current Outpatient Medications   Medication Sig Dispense Refill    ASPIRIN LOW DOSE 81 MG chewable tablet Take 1 tablet (81 mg) by mouth daily 90 tablet 0    atorvastatin (LIPITOR) 10 MG tablet Take 1 tablet (10 mg) by mouth daily 90 tablet 3    losartan (COZAAR) 25 MG tablet Take 0.5 tablets (12.5 mg) by mouth daily 45 tablet 0    mycophenolic acid (GENERIC EQUIVALENT) 180 MG EC tablet TAKE THREE TABLETS BY MOUTH EVERY MORNING & TAKE TWO TABLETS BY MOUTH EVERY EVENING 150 tablet 11    PHOSPHORUS TABLET 250  MG per tablet TAKE TWO TABLETS BY MOUTH ONCE DAILY 120 tablet 3    predniSONE (DELTASONE) 5 MG tablet TAKE ONE TABLET BY MOUTH ONCE DAILY 30 tablet 11    sulfamethoxazole-trimethoprim (BACTRIM) 400-80 MG tablet Take 1 tablet by  mouth daily 90 tablet 0    tacrolimus (GENERIC EQUIVALENT) 0.5 MG capsule Take 1 capsule (0.5 mg) by mouth 2 times daily Total dose 6.5 mg two times daily 60 capsule 11    tacrolimus (GENERIC) 1 MG capsule Take 6 capsules (6 mg) by mouth 2 times daily Total dose 6.5 mg two times daily 360 capsule 11    VITAMIN D3 25 MCG (1000 UT) tablet TAKE TWO TABLETS BY MOUTH ONCE DAILY 180 tablet 0       ASSESSMENT:  Hgb Not at goal/Initiation of therapy   Ferritin: Due for labs  TSat: Due for labs  Iron regimen recommended: TBD:  Needs Updated Iron Studies.   Recommended ENZO regimen: TBD:  Needs Updated Iron Studies.   Blood Pressure: Stable        PLAN:  1. Patient called today for enrollment in Anemia Management Service.  2. Discussed: anemia overview, monitoring service, and goal hemoglobin range and rationale and risks of ENZO blood clots, stroke, and increase in blood pressure  3. Dose location: in clinic TBD  4. Labs: [facility]; [place]  5. Pharmacy: [facility] needs updated Iron Studies.   Fax lab orders to the [facility] in [place]. Called Jelly, she does not plan to have labs drawn anytime soon. She only wants to go monthly for her lab appts.   Labs will be drawn in Mid Jan 2025      Next call date:  Mid Jan 2025    Vonnie Mccormack RN   Anemia Services  [facility]  [email or web address]   Office : [phone]  Fax: [phone]

## 2024-12-23 NOTE — LETTER
PHYSICIAN ORDERS      DATE & TIME ISSUED: 2024 1:33 PM  PATIENT NAME: Jelly Dietz   : 1985     [facility] MR# [if applicable]: [id number]     Primary Diagnosis: Anemia of Other Chronic Illness (D63.8)     Secondary Diagnosis: Organ or tissue replaced by transplant, kidney (Z94.0)  Date of Kidney Transplant: 2020 and 2017.     Additional Standing Lab Orders;   -Hgb/hematocrit weekly  -Ferritin every 4 weeks  -Iron/Iron Binding every 4 weeks      Any questions please call: Anemia Services at [phone].  Fax Results to [phone].         Bruce Whelan MD

## 2024-12-23 NOTE — TELEPHONE ENCOUNTER
ISSUE:         Component  Ref Range & Units 5 d ago 1 yr ago   Hemoglobin (External)  11.6 - 15.0 g/dL 8.7 Low  10.3 Low    Hematocrit (External)  35.5 - 44.9 % 29.3 Low  32.9 Low    RBC Count (External)  3.92 - 5.13 x10(12)/L 3.60 Low  3.61 Low  R   MCV (External)  78.2 - 97.9 fL 81.4 91.1   RDW (External)  12.2 - 16.1 % 20.1 High          Bruce Thakur MD Sveiven, Beti, RN  Refer to anemia services  Add iron studies    OUTCOME: orders placed.    Beti Billings RN   Transplant Coordinator  [phone]

## 2024-12-31 ENCOUNTER — LAB (OUTPATIENT)
Dept: LAB | Facility: CLINIC | Age: 39
End: 2024-12-31
Payer: COMMERCIAL

## 2024-12-31 DIAGNOSIS — Z94.0 KIDNEY TRANSPLANTED: ICD-10-CM

## 2025-01-02 ENCOUNTER — PATIENT OUTREACH (OUTPATIENT)
Dept: CARE COORDINATION | Facility: CLINIC | Age: 40
End: 2025-01-02
Payer: COMMERCIAL

## 2025-01-02 RX ORDER — MEPERIDINE HYDROCHLORIDE 25 MG/ML
25 INJECTION INTRAMUSCULAR; INTRAVENOUS; SUBCUTANEOUS
OUTPATIENT
Start: 2025-01-02

## 2025-01-02 RX ORDER — ALBUTEROL SULFATE 0.83 MG/ML
2.5 SOLUTION RESPIRATORY (INHALATION)
OUTPATIENT
Start: 2025-01-02

## 2025-01-02 RX ORDER — HEPARIN SODIUM,PORCINE 10 UNIT/ML
5-20 VIAL (ML) INTRAVENOUS DAILY PRN
OUTPATIENT
Start: 2025-01-02

## 2025-01-02 RX ORDER — METHYLPREDNISOLONE SODIUM SUCCINATE 40 MG/ML
40 INJECTION INTRAMUSCULAR; INTRAVENOUS
Start: 2025-01-02

## 2025-01-02 RX ORDER — ALBUTEROL SULFATE 90 UG/1
1-2 INHALANT RESPIRATORY (INHALATION)
Start: 2025-01-02

## 2025-01-02 RX ORDER — DIPHENHYDRAMINE HYDROCHLORIDE 50 MG/ML
25 INJECTION INTRAMUSCULAR; INTRAVENOUS
Start: 2025-01-02

## 2025-01-02 RX ORDER — HEPARIN SODIUM (PORCINE) LOCK FLUSH IV SOLN 100 UNIT/ML 100 UNIT/ML
5 SOLUTION INTRAVENOUS
OUTPATIENT
Start: 2025-01-02

## 2025-01-02 RX ORDER — EPINEPHRINE 1 MG/ML
0.3 INJECTION, SOLUTION, CONCENTRATE INTRAVENOUS EVERY 5 MIN PRN
OUTPATIENT
Start: 2025-01-02

## 2025-01-02 RX ORDER — DIPHENHYDRAMINE HYDROCHLORIDE 50 MG/ML
50 INJECTION INTRAMUSCULAR; INTRAVENOUS
Start: 2025-01-02

## 2025-01-02 NOTE — PROGRESS NOTES
Anemia Management Note - Follow Up      SUBJECTIVE/OBJECTIVE:    Referred by Dr. Bruce Whelan on 2024  Primary Diagnosis: Anemia of Other Chronic Illness (D63.8)     Secondary Diagnosis: Organ or tissue replaced by transplant, kidney (Z94.0)  Date of Kidney Transplant: 2020 and 2017.   Hgb goal range: 9-10     Epo/Darbo: TBD  *GFR >90.   Iron regimen: TBD:  Needs IV iron- Venofer ordered [id number]     Labs : 2025  Epic &Letters  RX/TX plans : TBD     Labs: [facility]. Ph [phone]  Fax [phone]    [facility]: [phone]     Recent ENZO use, transfusion, IV iron: Retacrit , Venofer .   No history of stroke, MI, and blood clots or cancers     Contact: OK to speak with Apolinar Hagan () regarding Scheduling and Medical Information per consent to communicate dated 2013.         Latest Ref Rng & Units 2021 2021 2022 3/7/2022 6/3/2022 2022   Anemia   Hemoglobin 11.7 - 15.7 g/dL 11.2  11.3  10.8  10.8  11.5  10.7      BP Readings from Last 3 Encounters:   24 121/80   22 120/67   22 138/78     Wt Readings from Last 2 Encounters:   24 68.4 kg (150 lb 12.8 oz)   22 68 kg (150 lb)           ASSESSMENT:  Labs were drawn locally on [id number]:  Hgb 9.2  Ferritin 39  TSAT 6  Hgb:at goal - continue to monitor  TSat: not at goal of >30% Ferritin: Not at goal of (>100ng/mL)   Goals Addressed    None         PLAN:  IV iron ordered. Venofer 300mg x 3 doses.    Orders needed to be renewed (for next follow-up date) in LETTERS - for external labs: None    Iron labs due:  4 weeks after last iron infusion    Plan discussed with:  Call attempted to Jelly, no answer, no VM. Call disconnected after several rings.  Archy message sent with update.       NEXT FOLLOW-UP DATE:  106    Carrie Leopold, RN BSN  Anemia Services  [facility]  [email or web address]  Office: [phone]  Fax  [phone]

## 2025-01-02 NOTE — PROGRESS NOTES
Jelly called in. Reviewed plan for IV iron in [place]. Will fax orders once signed, then she should hear from infusion center once funding is secured. After iron infusions are completed, labs are recommending a month later then tx plan will be reviewed. She voiced understanding.    [facility] center fax: [phone].     Signed Venofer orders faxed and pt updated.    Carrie Leopold, RN BSN  Anemia Services  [facility]  [email or web address]  Office: [phone]  Fax [phone]

## 2025-01-06 ENCOUNTER — TELEPHONE (OUTPATIENT)
Dept: TRANSPLANT | Facility: CLINIC | Age: 40
End: 2025-01-06
Payer: COMMERCIAL

## 2025-01-06 ENCOUNTER — PATIENT OUTREACH (OUTPATIENT)
Dept: CARE COORDINATION | Facility: CLINIC | Age: 40
End: 2025-01-06
Payer: COMMERCIAL

## 2025-01-06 DIAGNOSIS — Z94.0 KIDNEY TRANSPLANTED: ICD-10-CM

## 2025-01-06 DIAGNOSIS — Z48.298 AFTERCARE FOLLOWING ORGAN TRANSPLANT: ICD-10-CM

## 2025-01-06 DIAGNOSIS — Z79.899 ENCOUNTER FOR LONG-TERM CURRENT USE OF MEDICATION: ICD-10-CM

## 2025-01-06 DIAGNOSIS — N17.9 ACUTE RENAL FAILURE, UNSPECIFIED ACUTE RENAL FAILURE TYPE: ICD-10-CM

## 2025-01-06 DIAGNOSIS — B34.8 BK VIREMIA: ICD-10-CM

## 2025-01-06 DIAGNOSIS — Z94.0 KIDNEY REPLACED BY TRANSPLANT: ICD-10-CM

## 2025-01-06 NOTE — TELEPHONE ENCOUNTER
Please call Jelly;  # [phone]   Needs refill on /tacrolimus but has a different dose she is taking and wanted to confirm the with care team.    OK to leave VM.,  May need a new script.  Needs refill on tacrolimus but mentioned she had a dose change.

## 2025-01-06 NOTE — PROGRESS NOTES
Fax received from [facility] in [place] requesting additional information and medical records:   Demo, ins, last OV note, labs, medications, and orders with OK to use facility protocols for anaphylaxis and flushing    Carrie Leopold, RN BSN  Anemia Services  [facility]  [email or web address]  Office: [phone]  Fax [phone]

## 2025-01-06 NOTE — LETTER
PHYSICIAN ORDERS      DATE & TIME ISSUED: 2025 12:17 PM  PATIENT NAME: Jelly Dietz   : 1985     [facility] MR# [if applicable]: [id number]     Primary Diagnosis: Anemia of Other Chronic Illness (D63.8)     Secondary Diagnosis: Organ or tissue replaced by transplant, kidney (Z94.0)  Date of Kidney Transplant: 2020    Re: attached Venofer 300mg orders:   Ok to use facility Hypersensitivity Reaction Protocol   OK to use facility Flushing Protocol      Any questions please call: [facility]emia [facility] [phone]          Bruce Whelan MD    NPI: [id number]

## 2025-01-07 RX ORDER — TACROLIMUS 1 MG/1
4 CAPSULE ORAL 2 TIMES DAILY
Qty: 240 CAPSULE | Refills: 11 | Status: CANCELLED | OUTPATIENT
Start: 2025-01-07

## 2025-01-07 RX ORDER — TACROLIMUS 0.5 MG/1
CAPSULE ORAL
Status: CANCELLED
Start: 2025-01-07

## 2025-01-07 NOTE — PROGRESS NOTES
Per Care Everywhere, Venofer is scheduled on [id number], [id number] and [id number]    Carrie Leopold, RN BSN  [facility]  [email or web address]  Office: [phone]  Fax [phone]

## 2025-01-09 ENCOUNTER — TELEPHONE (OUTPATIENT)
Dept: TRANSPLANT | Facility: CLINIC | Age: 40
End: 2025-01-09
Payer: COMMERCIAL

## 2025-01-09 DIAGNOSIS — Z94.0 KIDNEY TRANSPLANTED: ICD-10-CM

## 2025-01-09 DIAGNOSIS — B34.8 BK VIREMIA: ICD-10-CM

## 2025-01-09 DIAGNOSIS — Z94.0 KIDNEY REPLACED BY TRANSPLANT: ICD-10-CM

## 2025-01-09 DIAGNOSIS — Z48.298 AFTERCARE FOLLOWING ORGAN TRANSPLANT: ICD-10-CM

## 2025-01-09 DIAGNOSIS — N17.9 ACUTE RENAL FAILURE, UNSPECIFIED ACUTE RENAL FAILURE TYPE: ICD-10-CM

## 2025-01-09 DIAGNOSIS — Z79.899 ENCOUNTER FOR LONG-TERM CURRENT USE OF MEDICATION: ICD-10-CM

## 2025-01-09 RX ORDER — TACROLIMUS 0.5 MG/1
0.5 CAPSULE ORAL 2 TIMES DAILY
Qty: 60 CAPSULE | Refills: 11 | Status: SHIPPED | OUTPATIENT
Start: 2025-01-09

## 2025-01-09 RX ORDER — TACROLIMUS 0.5 MG/1
0.5 CAPSULE ORAL 2 TIMES DAILY
Qty: 60 CAPSULE | Refills: 11 | Status: SHIPPED | OUTPATIENT
Start: 2025-01-09 | End: 2025-01-09

## 2025-01-09 RX ORDER — TACROLIMUS 1 MG/1
4 CAPSULE ORAL 2 TIMES DAILY
Qty: 360 CAPSULE | Refills: 11 | Status: SHIPPED | OUTPATIENT
Start: 2025-01-09

## 2025-01-09 RX ORDER — TACROLIMUS 1 MG/1
6 CAPSULE ORAL 2 TIMES DAILY
Qty: 360 CAPSULE | Refills: 11 | Status: SHIPPED | OUTPATIENT
Start: 2025-01-09 | End: 2025-01-09

## 2025-01-13 DIAGNOSIS — B34.8 BK VIREMIA: ICD-10-CM

## 2025-01-13 DIAGNOSIS — Z94.0 KIDNEY TRANSPLANTED: Primary | ICD-10-CM

## 2025-01-20 ENCOUNTER — PATIENT OUTREACH (OUTPATIENT)
Dept: CARE COORDINATION | Facility: CLINIC | Age: 40
End: 2025-01-20
Payer: COMMERCIAL

## 2025-01-20 NOTE — PROGRESS NOTES
Anemia Management Note - Follow Up      SUBJECTIVE/OBJECTIVE:    Referred by Dr. Bruce Whelan on 2024  Primary Diagnosis: Anemia of Other Chronic Illness (D63.8)     Secondary Diagnosis: Organ or tissue replaced by transplant, kidney (Z94.0)  Date of Kidney Transplant: 2020 and 2017.   Hgb goal range: 9-10     Epo/Darbo: TBD  *GFR >90.   Iron regimen: TBD  Venofer completed on 117 at [facility]     Labs : 2025  Epic &Letters  RX/TX plans : TBD     Labs: [facility]. Ph [phone]  Fax [phone]     [facility]: ph: [phone]     Recent ENZO use, transfusion, IV iron: Retacrit , Venofer .   No history of stroke, MI, and blood clots or cancers     Contact: OK to speak with Apolinar Hagan () regarding Scheduling and Medical Information per consent to communicate dated 2013.         Latest Ref Rng & Units 2022 3/7/2022 6/3/2022 2022 2025 1/15/2025 2025   Anemia   ENZO Dose      300 mcg 300 mcg 300 mcg   Hemoglobin 11.7 - 15.7 g/dL 10.8  10.8  11.5  10.7         BP Readings from Last 3 Encounters:   24 121/80   22 120/67   22 138/78     Wt Readings from Last 2 Encounters:   24 68.4 kg (150 lb 12.8 oz)   22 68 kg (150 lb)         ASSESSMENT:    Hgb:Not at goal/Intentional hold  TSat: Due for iron studies 4 weeks after last IV iron infusion Ferritin: Due for iron studies 4 weeks after last IV iron infusion   Goals Addressed    None         PLAN:  RTC for anemia labs in 4 week(s).    Orders needed to be renewed (for next follow-up date) in LETTERS - for external labs: None    Iron labs due:  4 weeks, around 214    Plan discussed with:  Jelly via [employer]      NEXT FOLLOW-UP DATE:  217    Carrie Leopold, RN BSN  Anemia [facility] [email or web address]  Office: [phone]  Fax [phone]

## 2025-02-05 LAB
DONOR IDENTIFICATION: NORMAL
DR15: 1093
DSA COMMENTS: NORMAL
DSA PRESENT: YES
DSA TEST METHOD: NORMAL
ORGAN: NORMAL
SA 1  COMMENTS: NORMAL
SA 1 CELL: NORMAL
SA 1 TEST METHOD: NORMAL
SA 2 CELL: NORMAL
SA 2 COMMENTS: NORMAL
SA 2 TEST METHOD: NORMAL
SA1 HI RISK ABY: NORMAL
SA1 MOD RISK ABY: NORMAL
SA2 HI RISK ABY: NORMAL
SA2 MOD RISK ABY: NORMAL
UNACCEPTABLE ANTIGENS: NORMAL
UNOS CPRA: 98

## 2025-02-11 ENCOUNTER — OFFICE VISIT (OUTPATIENT)
Dept: TRANSPLANT | Facility: CLINIC | Age: 40
End: 2025-02-11
Attending: INTERNAL MEDICINE
Payer: COMMERCIAL

## 2025-02-11 ENCOUNTER — TELEPHONE (OUTPATIENT)
Dept: TRANSPLANT | Facility: CLINIC | Age: 40
End: 2025-02-11

## 2025-02-11 VITALS
HEART RATE: 91 BPM | DIASTOLIC BLOOD PRESSURE: 73 MMHG | BODY MASS INDEX: 24.56 KG/M2 | OXYGEN SATURATION: 99 % | TEMPERATURE: 98.5 F | SYSTOLIC BLOOD PRESSURE: 110 MMHG | WEIGHT: 156.8 LBS

## 2025-02-11 DIAGNOSIS — Z48.298 AFTERCARE FOLLOWING ORGAN TRANSPLANT: ICD-10-CM

## 2025-02-11 DIAGNOSIS — Z94.0 HTN, KIDNEY TRANSPLANT RELATED: ICD-10-CM

## 2025-02-11 DIAGNOSIS — E55.9 VITAMIN D DEFICIENCY: ICD-10-CM

## 2025-02-11 DIAGNOSIS — B34.8 BK VIREMIA: ICD-10-CM

## 2025-02-11 DIAGNOSIS — D63.1 ANEMIA IN STAGE 1 CHRONIC KIDNEY DISEASE: ICD-10-CM

## 2025-02-11 DIAGNOSIS — Z94.0 KIDNEY REPLACED BY TRANSPLANT: Primary | ICD-10-CM

## 2025-02-11 DIAGNOSIS — E53.8 VITAMIN B12 DEFICIENCY: ICD-10-CM

## 2025-02-11 DIAGNOSIS — Z98.890 OTHER SPECIFIED POSTPROCEDURAL STATES: ICD-10-CM

## 2025-02-11 DIAGNOSIS — Z20.828 CONTACT WITH AND (SUSPECTED) EXPOSURE TO OTHER VIRAL COMMUNICABLE DISEASES: ICD-10-CM

## 2025-02-11 DIAGNOSIS — N18.1 ANEMIA IN STAGE 1 CHRONIC KIDNEY DISEASE: ICD-10-CM

## 2025-02-11 DIAGNOSIS — Z79.899 ENCOUNTER FOR LONG-TERM CURRENT USE OF MEDICATION: ICD-10-CM

## 2025-02-11 DIAGNOSIS — I15.1 HTN, KIDNEY TRANSPLANT RELATED: ICD-10-CM

## 2025-02-11 DIAGNOSIS — D84.9 IMMUNOSUPPRESSED STATUS: ICD-10-CM

## 2025-02-11 DIAGNOSIS — Z86.59 H/O PSYCHOSIS: ICD-10-CM

## 2025-02-11 PROBLEM — E86.0 DEHYDRATION: Status: RESOLVED | Noted: 2020-07-02 | Resolved: 2025-02-11

## 2025-02-11 PROBLEM — E83.39 HYPOPHOSPHATEMIA: Status: RESOLVED | Noted: 2020-06-22 | Resolved: 2025-02-11

## 2025-02-11 PROBLEM — D64.9 ANEMIA: Status: RESOLVED | Noted: 2020-07-21 | Resolved: 2025-02-11

## 2025-02-11 PROBLEM — N17.9 ACUTE RENAL FAILURE (ARF): Status: RESOLVED | Noted: 2020-07-04 | Resolved: 2025-02-11

## 2025-02-11 PROBLEM — E83.42 HYPOMAGNESEMIA: Status: RESOLVED | Noted: 2020-06-29 | Resolved: 2025-02-11

## 2025-02-11 PROBLEM — E87.70 HYPERVOLEMIA: Status: RESOLVED | Noted: 2020-07-09 | Resolved: 2025-02-11

## 2025-02-11 PROBLEM — E87.20 METABOLIC ACIDOSIS: Status: RESOLVED | Noted: 2020-07-07 | Resolved: 2025-02-11

## 2025-02-11 PROCEDURE — G0463 HOSPITAL OUTPT CLINIC VISIT: HCPCS | Performed by: INTERNAL MEDICINE

## 2025-02-11 ASSESSMENT — PAIN SCALES - GENERAL: PAINLEVEL_OUTOF10: NO PAIN (0)

## 2025-02-11 NOTE — PATIENT INSTRUCTIONS
Patient Recommendations:  - Please call Transplant Coordinator with medications you are taking.  - Recommend discussing a referral from PCP to a local general Nephrologist to establish care for routine follow up in 4-6 months or so with a goal of ongoing co-management between your primary Nephrologist and the Transplant Team.   - Recommend checking blood pressure at home on a regular basis, such as once every week or two, and follow up with primary Nephrologist or PCP if above goal of less than 130/80.  - Recommend checking routine transplant labs every 2 months.     Transplant Patient Information  Your Post Transplant Coordinator is: Beti Billings  For non urgent items, we encourage you to contact your coordinator/care team online via [employer]  You and your care team can also contact your transplant coordinator Monday - Friday, 8am - 5pm at [phone] (Option 2 to reach the coordinator or Option 4 to schedule an appointment).  After hours for urgent matters, please call [facility] at [phone].

## 2025-02-11 NOTE — LETTER
2/11/2025      RE: Jelly Dietz  [address] [place] [address]       TRANSPLANT NEPHROLOGY CLINIC VISIT     Assessment & Plan  # DDKT: CKD Stage 1 - Stable creatinine and normal proteinuria, although recent h/o medication non-compliance and new DSA.  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    # IgA Nephropathy: No evidence of recurrent native kidney disease.    # Immunosuppression: Tacrolimus immediate release (goal 6-8), Mycophenolic acid (dose 540 mg every 12 hours), and Prednisone (dose 5 mg daily)   - Induction with Recent Transplant:  High Intensity Protocol   - Continue with intensive monitoring of immunosuppression for efficacy and toxicity.   - Historical Changes in Immunosuppression:  Slightly higher tacrolimus goal due to new DSA.   - Changes: Not at this time    # Infection Prevention:  Last CD4 Level: Not checked  - PJP: Sulfa/TMP (Bactrim)      - CMV IgG Ab High Risk Discordance (D+/R-): No  CMV Serostatus: Positive  - EBV IgG Ab High Risk Discordance (D+/R-): No  EBV Serostatus: Positive    # Hypertension: Controlled;  Goal BP: < 130/80   - Changes: Not at this time; Recommend patient check blood pressure at home on a regular basis, such as once every week or two, and follow up with primary Nephrologist or PCP if above goal.    # Anemia in Chronic Renal Disease: Hgb: Trend up      ENZO: Yes   - Iron studies: Replete after recent course of IV iron.   - Vitamin B12 deficient: Now started on oral vitamin B12 supplement.    # Mineral Bone Disorder:    - Vitamin D; level: Normal        On supplement:  Yes  - Calcium; level: Normal        On supplement: No    # Electrolytes:   - Potassium; level: Normal        On supplement: No  - Magnesium; level: Not checked recently        On supplement: No  - Bicarbonate; level: Normal        On supplement: No    # BK Viremia: Trend down, minimally elevated BK PCR at ~ 560 with last check Jun/2022.  IgG level is not checked recently.   - Will recheck BK PCR with next labs.    # Hepatitis B core Ab Positive: Patient with Hep B core Ab, but Hep B surface Ag negative, Hep B surface Ab very positive, and Hep B DNA Quant has not been checked.  Not on any antiviral prophylaxis.   - Will check Hep B DNA Quant.    # Other Significant PMH:   - H/o Psychosis/Depression/Anxiety: Patient with recent hospitalization for acute psychosis 11/2024.  Unclear etiology and some concern for schizophrenia diagnosis, but patient reports she doesn't have a diagnosis at this time.  No further issues since starting on antipsychotic medications.  Followed by Psychiatry.    - Recommend patient establish care with a psychologist or other mental health professional long-term.     # Skin Cancer Risk:    - Discussed sun protection and recommend regular follow up with Dermatology.    # Medical Compliance: No.  Evidence of labs less than recommended and missed medications.  - Discussed importance of checking labs regularly as recommended, taking medications as prescribed and attending scheduled medical appointments.    # Transplant History:  Etiology of Kidney Failure: IgA nephropathy  Tx: DDKT  Transplant: 6/19/2020 (Kidney), 12/1/2007 (Kidney)  Significant transplant-related complications: Acute cellular-mediated rejection, Acute antibody-mediated rejection, and DSA    Transplant Office Phone Number: [phone]    Assessment and plan was discussed with the patient and she voiced her understanding and agreement.    Return visit: Return in about 1 year (around 2/11/2026).    Kieran Anglin MD    The  longitudinal plan of care for the diagnosis(es)/condition(s) as documented were addressed during this visit. Due to the added complexity in care, I will continue to support Jelly in the subsequent management and with ongoing continuity of care.      Chief Complaint  Ms. Dietz is a 40 year old here for kidney transplant and immunosuppression management.     History of Present Illness   Ms. Dietz reports feeling good overall.  Since last clinic visit:   Hospitalizations: Yes; Patient was hospitalized in November for acute psychosis with hallucinations.  She was put on a mental health hold.  Of note, she hadn't been taking her immunosuppression for about a month due to mental health issues prior to this admission.  Patient was initially started on haldol, but developed significant side effects and then changed to Vraylar.  She is followed by a Psychiatrist only.  Patient denies any further hallucinations or psychosis symptoms.  Also denies any depression or anxiety symptoms.  Patient reports she has to continue to follow with the Psychiatrist until June.  She is not following with any other mental health professional, such as a therapist or psychologist.   New Medical Issues: No  Chest pain or shortness of breath: No  Lower extremity swelling: No  Weight change: No  Nausea and vomiting: No  Diarrhea: No  Heartburn symptoms: No  Fever, sweats or chills: No  Night sweats: No  Urinary complaints: No    Home BP: Not checked    Problem List  Patient Active Problem List   Diagnosis     Kidney replaced by transplant     HTN, kidney transplant related     ACS (acute coronary syndrome) (H)     Anemia in chronic renal disease     IgA nephropathy     Anxiety     Immunosuppressed status     Painful bladder spasm     Constipation due to pain medication     Aftercare following organ transplant     Acute rejection of kidney transplant     Steroid-induced hyperglycemia     Nausea vomiting and diarrhea     BK viremia     Vitamin B12  deficiency     Vitamin D deficiency     H/O psychosis       Allergies  No Known Allergies    Medications  Current Outpatient Medications   Medication Sig Dispense Refill     losartan (COZAAR) 25 MG tablet Take 0.5 tablets (12.5 mg) by mouth daily 45 tablet 0     mycophenolic acid (GENERIC EQUIVALENT) 180 MG EC tablet Take 3 tablets (540 mg) by mouth 2 times daily. 180 tablet 11     potassium aminobenzoate 500 MG CAPS capsule Take 250 mg by mouth or NG Tube 2 times daily.       predniSONE (DELTASONE) 5 MG tablet TAKE ONE TABLET BY MOUTH ONCE DAILY 30 tablet 11     tacrolimus (GENERIC EQUIVALENT) 0.5 MG capsule Take 1 capsule (0.5 mg) by mouth 2 times daily. HOLD 60 capsule 11     tacrolimus (GENERIC EQUIVALENT) 1 MG capsule Take 4 capsules (4 mg) by mouth 2 times daily. 360 capsule 11     VITAMIN D3 25 MCG (1000 UT) tablet TAKE TWO TABLETS BY MOUTH ONCE DAILY 180 tablet 0     No current facility-administered medications for this visit.     Medications Discontinued During This Encounter   Medication Reason     atorvastatin (LIPITOR) 10 MG tablet      ASPIRIN LOW DOSE 81 MG chewable tablet      PHOSPHORUS TABLET 250  MG per tablet      sulfamethoxazole-trimethoprim (BACTRIM) 400-80 MG tablet        Physical Exam  Vital Signs: /73   Pulse 91   Temp 98.5  F (36.9  C) (Oral)   Wt 71.1 kg (156 lb 12.8 oz)   SpO2 99%   BMI 24.56 kg/m      GENERAL APPEARANCE: alert and no distress  HENT: mouth without ulcers or lesions  RESP: lungs clear to auscultation - no rales, rhonchi or wheezes  CV: regular rhythm, normal rate, no rub, no murmur  EDEMA: no LE edema bilaterally  ABDOMEN: soft, nondistended, nontender, bowel sounds normal; Overweight  MS: extremities normal - no gross deformities noted, no evidence of inflammation in joints, no muscle tenderness  SKIN: no rash  TX KIDNEY: normal  DIALYSIS ACCESS:  LUE AV fistula with good thrill    Data        Latest Ref Rng & Units 2/10/2025    10:19 AM 12/18/2024      8:58 AM 9/25/2023    11:41 AM   Renal   Na (external) 135 - 145 mmol/L 138     137  137    K (external) 3.6 - 5.2 mmol/L 4.0     4.0  4.1    Cl 98 - 107 mmol/L 105     104  105    Cl (external) 98 - 107 mmol/L 105     104  105    CO2 (external) 22 - 29 mmol/L 22     24  22    BUN (external) 6 - 21 mg/dL 17     10  15    Cr (external) 0.59 - 1.04 mg/dL 0.79     0.82  0.59    Glucose (external) 70 - 140 mg/dL 77     96  97    Ca (external) 8.6 - 10.0 mg/dL 9.6     9.5  9.2        This result is from an external source.         Latest Ref Rng & Units 6/8/2022    12:18 PM 6/3/2022     1:23 PM 11/26/2021    12:49 PM   Bone Health   Phosphorus 2.5 - 4.5 mg/dL 2.4  2.3  2.0    Vit D Def 20 - 75 ug/L  16           Latest Ref Rng & Units 2/10/2025    10:19 AM 12/31/2024     1:44 PM 12/18/2024     8:58 AM   Heme   WBC (external) 3.4 - 9.6 x10(9)/L 6.7      6.1    Hgb (external) 11.6 - 15.0 g/dL 10.8     9.2  8.7    Plt (external) 157 - 371 x10(9)/L 295      284    ABSOLUTE NEUTROPHILS (EXTERNAL) 1.56 - 6.45 x10(9)/L 4.83      4.06    ABSOLUTE LYMPHOCYTES (EXTERNAL) 0.95 - 3.07 x10(9)/L 1.29      1.33    ABSOLUTE MONOCYTES (EXTERNAL) 0.26 - 0.81 x10(9)/L 0.47      0.53    ABSOLUTE EOSINOPHILS (EXTERNAL) 0.03 - 0.48 x10(9)/L 0.15      0.15    ABSOLUTE BASOPHILS (EXTERNAL) 0.01 - 0.08 x10(9)/L <0.04      0.05        This result is from an external source.         Latest Ref Rng & Units 12/18/2024     8:58 AM 1/9/2022     1:14 AM 11/26/2021    12:49 PM   Liver   AP 40 - 150 U/L  101     AP (external) 35 - 104 U/L 78         TBili 0.2 - 1.3 mg/dL  0.3     TBili (external) 0.0 - 1.2 mg/dL 0.3         ALT 0 - 50 U/L  17     ALT (external) 7 - 45 U/L 9         AST 0 - 45 U/L  15     AST (external) 8 - 43 U/L 16         Tot Protein 6.8 - 8.8 g/dL  7.2     Tot Protein (external) 6.3 - 7.9 g/dL 6.6         Albumin 3.4 - 5.0 g/dL  3.6  3.7    Albumin (external) 3.5 - 5.0 g/dL 4.1             This result is from an external source.          Latest Ref Rng & Units 7/27/2021    12:44 PM 12/18/2020    12:55 PM 6/19/2020     4:11 AM   Pancreas   A1C 0.0 - 5.6 % 5.6  5.4  5.1          Latest Ref Rng & Units 2/10/2025    10:19 AM 12/31/2024     1:44 PM 7/5/2020     5:53 AM   Iron studies   Iron 35 - 180 ug/dL   10    Iron Saturation Index 15 - 46 %   9    Ferritin (external) 6 - 175 mcg/L 294     39         This result is from an external source.         Latest Ref Rng & Units 6/3/2021    12:23 PM 5/11/2021     1:05 PM 4/20/2021    12:35 PM   [facility] Txp Virology   BK Spec  Plasma  Plasma  Plasma    BK Res BKNEG^BK Virus DNA Not Detected copies/mL 15,313  7,968  8,119    BK Log <2.7 Log copies/mL 4.2  3.9  3.9      Failed to redirect to the Timeline version of the [employer]VFS SmartLink.  Recent Labs   Lab Test 07/27/21  1245 11/26/21  1249 06/03/22  1323 06/08/22  1218   DOSTAC 7/26/2021  --  6/2/2022 6/7/2022   TACROL 5.6 4.5* 5.7 5.0     Recent Labs   Lab Test 06/03/21  1224 08/05/21  1249 06/08/22  1218   DOSMPA  LD 23:40  --  6/7/2022  12:00 AM   MPACID 1.70 1.54 2.50   MPAG 43.6 38.6 32.7        Kieran Anglin MD

## 2025-02-11 NOTE — PROGRESS NOTES
TRANSPLANT NEPHROLOGY CLINIC VISIT     Assessment & Plan   # DDKT: CKD Stage 1 - Stable creatinine and normal proteinuria, although recent h/o medication non-compliance and new DSA.  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    # IgA Nephropathy: No evidence of recurrent native kidney disease.    # Immunosuppression: Tacrolimus immediate release (goal 6-8), Mycophenolic acid (dose 540 mg every 12 hours), and Prednisone (dose 5 mg daily)   - Induction with Recent Transplant:  High Intensity Protocol   - Continue with intensive monitoring of immunosuppression for efficacy and toxicity.   - Historical Changes in Immunosuppression:  Slightly higher tacrolimus goal due to new DSA.   - Changes: Not at this time    # Infection Prevention:  Last CD4 Level: Not checked  - PJP: Sulfa/TMP (Bactrim)      - CMV IgG Ab High Risk Discordance (D+/R-): No  CMV Serostatus: Positive  - EBV IgG Ab High Risk Discordance (D+/R-): No  EBV Serostatus: Positive    # Hypertension: Controlled;  Goal BP: < 130/80   - Changes: Not at this time; Recommend patient check blood pressure at home on a regular basis, such as once every week or two, and follow up with primary Nephrologist or PCP if above goal.    # Anemia in Chronic Renal Disease: Hgb: Trend up      ENZO: Yes   - Iron studies: Replete after recent course of IV iron.   - Vitamin B12 deficient: Now started on oral vitamin B12 supplement.    # Mineral Bone Disorder:    - Vitamin D; level: Normal        On supplement: Yes  - Calcium; level: Normal        On supplement: No    # Electrolytes:   -  Potassium; level: Normal        On supplement: No  - Magnesium; level: Not checked recently        On supplement: No  - Bicarbonate; level: Normal        On supplement: No    # BK Viremia: Trend down, minimally elevated BK PCR at ~ 560 with last check Jun/2022.  IgG level is not checked recently.   - Will recheck BK PCR with next labs.    # Hepatitis B core Ab Positive: Patient with Hep B core Ab, but Hep B surface Ag negative, Hep B surface Ab very positive, and Hep B DNA Quant has not been checked.  Not on any antiviral prophylaxis.   - Will check Hep B DNA Quant.    # Other Significant PMH:   - H/o Psychosis/Depression/Anxiety: Patient with recent hospitalization for acute psychosis 11/2024.  Unclear etiology and some concern for schizophrenia diagnosis, but patient reports she doesn't have a diagnosis at this time.  No further issues since starting on antipsychotic medications.  Followed by Psychiatry.    - Recommend patient establish care with a psychologist or other mental health professional long-term.     # Skin Cancer Risk:    - Discussed sun protection and recommend regular follow up with Dermatology.    # Medical Compliance: No.  Evidence of labs less than recommended and missed medications.  - Discussed importance of checking labs regularly as recommended, taking medications as prescribed and attending scheduled medical appointments.    # Transplant History:  Etiology of Kidney Failure: IgA nephropathy  Tx: DDKT  Transplant: 6/19/2020 (Kidney), 12/1/2007 (Kidney)  Significant transplant-related complications: Acute cellular-mediated rejection, Acute antibody-mediated rejection, and DSA    Transplant Office Phone Number: [phone]    Assessment and plan was discussed with the patient and she voiced her understanding and agreement.    Return visit: Return in about 1 year (around 2/11/2026).    Kieran Anglin MD    The longitudinal plan of care for the diagnosis(es)/condition(s) as documented were  addressed during this visit. Due to the added complexity in care, I will continue to support Jelly in the subsequent management and with ongoing continuity of care.      Chief Complaint   Ms. Dietz is a 40 year old here for kidney transplant and immunosuppression management.     History of Present Illness    Ms. Dietz reports feeling good overall.  Since last clinic visit:   Hospitalizations: Yes; Patient was hospitalized in November for acute psychosis with hallucinations.  She was put on a mental health hold.  Of note, she hadn't been taking her immunosuppression for about a month due to mental health issues prior to this admission.  Patient was initially started on haldol, but developed significant side effects and then changed to Vraylar.  She is followed by a Psychiatrist only.  Patient denies any further hallucinations or psychosis symptoms.  Also denies any depression or anxiety symptoms.  Patient reports she has to continue to follow with the Psychiatrist until June.  She is not following with any other mental health professional, such as a therapist or psychologist.   New Medical Issues: No  Chest pain or shortness of breath: No  Lower extremity swelling: No  Weight change: No  Nausea and vomiting: No  Diarrhea: No  Heartburn symptoms: No  Fever, sweats or chills: No  Night sweats: No  Urinary complaints: No    Home BP: Not checked    Problem List   Patient Active Problem List   Diagnosis    Kidney replaced by transplant    HTN, kidney transplant related    ACS (acute coronary syndrome) (H)    Anemia in chronic renal disease    IgA nephropathy    Anxiety    Immunosuppressed status    Painful bladder spasm    Constipation due to pain medication    Aftercare following organ transplant    Acute rejection of kidney transplant    Steroid-induced hyperglycemia    Nausea vomiting and diarrhea    BK viremia    Vitamin B12 deficiency    Vitamin D deficiency    H/O psychosis       Allergies   No Known  Allergies    Medications   Current Outpatient Medications   Medication Sig Dispense Refill    losartan (COZAAR) 25 MG tablet Take 0.5 tablets (12.5 mg) by mouth daily 45 tablet 0    mycophenolic acid (GENERIC EQUIVALENT) 180 MG EC tablet Take 3 tablets (540 mg) by mouth 2 times daily. 180 tablet 11    potassium aminobenzoate 500 MG CAPS capsule Take 250 mg by mouth or NG Tube 2 times daily.      predniSONE (DELTASONE) 5 MG tablet TAKE ONE TABLET BY MOUTH ONCE DAILY 30 tablet 11    tacrolimus (GENERIC EQUIVALENT) 0.5 MG capsule Take 1 capsule (0.5 mg) by mouth 2 times daily. HOLD 60 capsule 11    tacrolimus (GENERIC EQUIVALENT) 1 MG capsule Take 4 capsules (4 mg) by mouth 2 times daily. 360 capsule 11    VITAMIN D3 25 MCG (1000 UT) tablet TAKE TWO TABLETS BY MOUTH ONCE DAILY 180 tablet 0     No current facility-administered medications for this visit.     Medications Discontinued During This Encounter   Medication Reason    atorvastatin (LIPITOR) 10 MG tablet     ASPIRIN LOW DOSE 81 MG chewable tablet     PHOSPHORUS TABLET 250  MG per tablet     sulfamethoxazole-trimethoprim (BACTRIM) 400-80 MG tablet        Physical Exam   Vital Signs: /73   Pulse 91   Temp 98.5  F (36.9  C) (Oral)   Wt 71.1 kg (156 lb 12.8 oz)   SpO2 99%   BMI 24.56 kg/m      GENERAL APPEARANCE: alert and no distress  HENT: mouth without ulcers or lesions  RESP: lungs clear to auscultation - no rales, rhonchi or wheezes  CV: regular rhythm, normal rate, no rub, no murmur  EDEMA: no LE edema bilaterally  ABDOMEN: soft, nondistended, nontender, bowel sounds normal; Overweight  MS: extremities normal - no gross deformities noted, no evidence of inflammation in joints, no muscle tenderness  SKIN: no rash  TX KIDNEY: normal  DIALYSIS ACCESS:  LUE AV fistula with good thrill    Data         Latest Ref Rng & Units 2/10/2025    10:19 AM 12/18/2024     8:58 AM 9/25/2023    11:41 AM   Renal   Na (external) 135 - 145 mmol/L 138     137  137     K (external) 3.6 - 5.2 mmol/L 4.0     4.0  4.1    Cl 98 - 107 mmol/L 105     104  105    Cl (external) 98 - 107 mmol/L 105     104  105    CO2 (external) 22 - 29 mmol/L 22     24  22    BUN (external) 6 - 21 mg/dL 17     10  15    Cr (external) 0.59 - 1.04 mg/dL 0.79     0.82  0.59    Glucose (external) 70 - 140 mg/dL 77     96  97    Ca (external) 8.6 - 10.0 mg/dL 9.6     9.5  9.2        This result is from an external source.         Latest Ref Rng & Units 6/8/2022    12:18 PM 6/3/2022     1:23 PM 11/26/2021    12:49 PM   Bone Health   Phosphorus 2.5 - 4.5 mg/dL 2.4  2.3  2.0    Vit D Def 20 - 75 ug/L  16           Latest Ref Rng & Units 2/10/2025    10:19 AM 12/31/2024     1:44 PM 12/18/2024     8:58 AM   Heme   WBC (external) 3.4 - 9.6 x10(9)/L 6.7      6.1    Hgb (external) 11.6 - 15.0 g/dL 10.8     9.2  8.7    Plt (external) 157 - 371 x10(9)/L 295      284    ABSOLUTE NEUTROPHILS (EXTERNAL) 1.56 - 6.45 x10(9)/L 4.83      4.06    ABSOLUTE LYMPHOCYTES (EXTERNAL) 0.95 - 3.07 x10(9)/L 1.29      1.33    ABSOLUTE MONOCYTES (EXTERNAL) 0.26 - 0.81 x10(9)/L 0.47      0.53    ABSOLUTE EOSINOPHILS (EXTERNAL) 0.03 - 0.48 x10(9)/L 0.15      0.15    ABSOLUTE BASOPHILS (EXTERNAL) 0.01 - 0.08 x10(9)/L <0.04      0.05        This result is from an external source.         Latest Ref Rng & Units 12/18/2024     8:58 AM 1/9/2022     1:14 AM 11/26/2021    12:49 PM   Liver   AP 40 - 150 U/L  101     AP (external) 35 - 104 U/L 78         TBili 0.2 - 1.3 mg/dL  0.3     TBili (external) 0.0 - 1.2 mg/dL 0.3         ALT 0 - 50 U/L  17     ALT (external) 7 - 45 U/L 9         AST 0 - 45 U/L  15     AST (external) 8 - 43 U/L 16         Tot Protein 6.8 - 8.8 g/dL  7.2     Tot Protein (external) 6.3 - 7.9 g/dL 6.6         Albumin 3.4 - 5.0 g/dL  3.6  3.7    Albumin (external) 3.5 - 5.0 g/dL 4.1             This result is from an external source.         Latest Ref Rng & Units 7/27/2021    12:44 PM 12/18/2020    12:55 PM 6/19/2020     4:11  AM   Pancreas   A1C 0.0 - 5.6 % 5.6  5.4  5.1          Latest Ref Rng & Units 2/10/2025    10:19 AM 12/31/2024     1:44 PM 7/5/2020     5:53 AM   Iron studies   Iron 35 - 180 ug/dL   10    Iron Saturation Index 15 - 46 %   9    Ferritin (external) 6 - 175 mcg/L 294     39         This result is from an external source.         Latest Ref Rng & Units 6/3/2021    12:23 PM 5/11/2021     1:05 PM 4/20/2021    12:35 PM   [facility] Txp Virology   BK Spec  Plasma  Plasma  Plasma    BK Res BKNEG^BK Virus DNA Not Detected copies/mL 15,313  7,968  8,119    BK Log <2.7 Log copies/mL 4.2  3.9  3.9      Failed to redirect to the Timeline version of the REVFS SmartLink.  Recent Labs   Lab Test 07/27/21  1245 11/26/21  1249 06/03/22  1323 06/08/22  1218   DOSTAC 7/26/2021  --  6/2/2022 6/7/2022   TACROL 5.6 4.5* 5.7 5.0     Recent Labs   Lab Test 06/03/21  1224 08/05/21  1249 06/08/22  1218   DOSMPA  LD 23:40  --  6/7/2022  12:00 AM   MPACID 1.70 1.54 2.50   MPAG 43.6 38.6 32.7

## 2025-02-11 NOTE — NURSING NOTE
Chief Complaint   Patient presents with    RECHECK       /73   Pulse 91   Temp 98.5  F (36.9  C) (Oral)   Wt 71.1 kg (156 lb 12.8 oz)   SpO2 99%   BMI 24.56 kg/m      Delon Thomas on 2/11/2025 at 3:23 PM

## 2025-02-11 NOTE — LETTER
2/11/2025      Jelly Dietz  [address] [place] [address]      Dear Colleague,    Thank you for referring your patient, Jelly Dietz, to the [facility]. Please see a copy of my visit note below.    TRANSPLANT NEPHROLOGY CLINIC VISIT     Assessment & Plan  # DDKT: CKD Stage 1 - Stable creatinine and normal proteinuria, although recent h/o medication non-compliance and new DSA.  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    # IgA Nephropathy: No evidence of recurrent native kidney disease.    # Immunosuppression: Tacrolimus immediate release (goal 6-8), Mycophenolic acid (dose 540 mg every 12 hours), and Prednisone (dose 5 mg daily)   - Induction with Recent Transplant:  High Intensity Protocol   - Continue with intensive monitoring of immunosuppression for efficacy and toxicity.   - Historical Changes in Immunosuppression:  Slightly higher tacrolimus goal due to new DSA.   - Changes: Not at this time    # Infection Prevention:  Last CD4 Level: Not checked  - PJP: Sulfa/TMP (Bactrim)      - CMV IgG Ab High Risk Discordance (D+/R-): No  CMV Serostatus: Positive  - EBV IgG Ab High Risk Discordance (D+/R-): No  EBV Serostatus: Positive    # Hypertension: Controlled;  Goal BP: < 130/80   - Changes: Not at this time; Recommend patient check blood pressure at home on a regular basis, such as once every week or two, and follow up with primary Nephrologist or PCP if above goal.    # Anemia in Chronic Renal Disease: Hgb: Trend up      ENZO: Yes   - Iron studies: Replete after recent course of IV  iron.   - Vitamin B12 deficient: Now started on oral vitamin B12 supplement.    # Mineral Bone Disorder:    - Vitamin D; level: Normal        On supplement: Yes  - Calcium; level: Normal        On supplement: No    # Electrolytes:   - Potassium; level: Normal        On supplement: No  - Magnesium; level: Not checked recently        On supplement: No  - Bicarbonate; level: Normal        On supplement: No    # BK Viremia: Trend down, minimally elevated BK PCR at ~ 560 with last check Jun/2022.  IgG level is not checked recently.   - Will recheck BK PCR with next labs.    # Hepatitis B core Ab Positive: Patient with Hep B core Ab, but Hep B surface Ag negative, Hep B surface Ab very positive, and Hep B DNA Quant has not been checked.  Not on any antiviral prophylaxis.   - Will check Hep B DNA Quant.    # Other Significant PMH:   - H/o Psychosis/Depression/Anxiety: Patient with recent hospitalization for acute psychosis 11/2024.  Unclear etiology and some concern for schizophrenia diagnosis, but patient reports she doesn't have a diagnosis at this time.  No further issues since starting on antipsychotic medications.  Followed by Psychiatry.    - Recommend patient establish care with a psychologist or other mental health professional long-term.     # Skin Cancer Risk:    - Discussed sun protection and recommend regular follow up with Dermatology.    # Medical Compliance: No.  Evidence of labs less than recommended and missed medications.  - Discussed importance of checking labs regularly as recommended, taking medications as prescribed and attending scheduled medical appointments.    # Transplant History:  Etiology of Kidney Failure: IgA nephropathy  Tx: DDKT  Transplant: 6/19/2020 (Kidney), 12/1/2007 (Kidney)  Significant transplant-related complications: Acute cellular-mediated rejection, Acute antibody-mediated rejection, and DSA    Transplant Office Phone Number: [phone]    Assessment and plan was discussed with  the patient and she voiced her understanding and agreement.    Return visit: Return in about 1 year (around 2/11/2026).    Kieran Anglin MD    The longitudinal plan of care for the diagnosis(es)/condition(s) as documented were addressed during this visit. Due to the added complexity in care, I will continue to support Jelly in the subsequent management and with ongoing continuity of care.      Chief Complaint  Ms. Dietz is a 40 year old here for kidney transplant and immunosuppression management.     History of Present Illness   Ms. Dietz reports feeling good overall.  Since last clinic visit:   Hospitalizations: Yes; Patient was hospitalized in November for acute psychosis with hallucinations.  She was put on a mental health hold.  Of note, she hadn't been taking her immunosuppression for about a month due to mental health issues prior to this admission.  Patient was initially started on haldol, but developed significant side effects and then changed to Vraylar.  She is followed by a Psychiatrist only.  Patient denies any further hallucinations or psychosis symptoms.  Also denies any depression or anxiety symptoms.  Patient reports she has to continue to follow with the Psychiatrist until June.  She is not following with any other mental health professional, such as a therapist or psychologist.   New Medical Issues: No  Chest pain or shortness of breath: No  Lower extremity swelling: No  Weight change: No  Nausea and vomiting: No  Diarrhea: No  Heartburn symptoms: No  Fever, sweats or chills: No  Night sweats: No  Urinary complaints: No    Home BP: Not checked    Problem List  Patient Active Problem List   Diagnosis     Kidney replaced by transplant     HTN, kidney transplant related     ACS (acute coronary syndrome) (H)     Anemia in chronic renal disease     IgA nephropathy     Anxiety     Immunosuppressed status     Painful bladder spasm     Constipation due to pain medication     Aftercare following  organ transplant     Acute rejection of kidney transplant     Steroid-induced hyperglycemia     Nausea vomiting and diarrhea     BK viremia     Vitamin B12 deficiency     Vitamin D deficiency     H/O psychosis       Allergies  No Known Allergies    Medications  Current Outpatient Medications   Medication Sig Dispense Refill     losartan (COZAAR) 25 MG tablet Take 0.5 tablets (12.5 mg) by mouth daily 45 tablet 0     mycophenolic acid (GENERIC EQUIVALENT) 180 MG EC tablet Take 3 tablets (540 mg) by mouth 2 times daily. 180 tablet 11     potassium aminobenzoate 500 MG CAPS capsule Take 250 mg by mouth or NG Tube 2 times daily.       predniSONE (DELTASONE) 5 MG tablet TAKE ONE TABLET BY MOUTH ONCE DAILY 30 tablet 11     tacrolimus (GENERIC EQUIVALENT) 0.5 MG capsule Take 1 capsule (0.5 mg) by mouth 2 times daily. HOLD 60 capsule 11     tacrolimus (GENERIC EQUIVALENT) 1 MG capsule Take 4 capsules (4 mg) by mouth 2 times daily. 360 capsule 11     VITAMIN D3 25 MCG (1000 UT) tablet TAKE TWO TABLETS BY MOUTH ONCE DAILY 180 tablet 0     No current facility-administered medications for this visit.     Medications Discontinued During This Encounter   Medication Reason     atorvastatin (LIPITOR) 10 MG tablet      ASPIRIN LOW DOSE 81 MG chewable tablet      PHOSPHORUS TABLET 250  MG per tablet      sulfamethoxazole-trimethoprim (BACTRIM) 400-80 MG tablet        Physical Exam  Vital Signs: /73   Pulse 91   Temp 98.5  F (36.9  C) (Oral)   Wt 71.1 kg (156 lb 12.8 oz)   SpO2 99%   BMI 24.56 kg/m      GENERAL APPEARANCE: alert and no distress  HENT: mouth without ulcers or lesions  RESP: lungs clear to auscultation - no rales, rhonchi or wheezes  CV: regular rhythm, normal rate, no rub, no murmur  EDEMA: no LE edema bilaterally  ABDOMEN: soft, nondistended, nontender, bowel sounds normal; Overweight  MS: extremities normal - no gross deformities noted, no evidence of inflammation in joints, no muscle  tenderness  SKIN: no rash  TX KIDNEY: normal  DIALYSIS ACCESS:  LUE AV fistula with good thrill    Data        Latest Ref Rng & Units 2/10/2025    10:19 AM 12/18/2024     8:58 AM 9/25/2023    11:41 AM   Renal   Na (external) 135 - 145 mmol/L 138     137  137    K (external) 3.6 - 5.2 mmol/L 4.0     4.0  4.1    Cl 98 - 107 mmol/L 105     104  105    Cl (external) 98 - 107 mmol/L 105     104  105    CO2 (external) 22 - 29 mmol/L 22     24  22    BUN (external) 6 - 21 mg/dL 17     10  15    Cr (external) 0.59 - 1.04 mg/dL 0.79     0.82  0.59    Glucose (external) 70 - 140 mg/dL 77     96  97    Ca (external) 8.6 - 10.0 mg/dL 9.6     9.5  9.2        This result is from an external source.         Latest Ref Rng & Units 6/8/2022    12:18 PM 6/3/2022     1:23 PM 11/26/2021    12:49 PM   Bone Health   Phosphorus 2.5 - 4.5 mg/dL 2.4  2.3  2.0    Vit D Def 20 - 75 ug/L  16           Latest Ref Rng & Units 2/10/2025    10:19 AM 12/31/2024     1:44 PM 12/18/2024     8:58 AM   Heme   WBC (external) 3.4 - 9.6 x10(9)/L 6.7      6.1    Hgb (external) 11.6 - 15.0 g/dL 10.8     9.2  8.7    Plt (external) 157 - 371 x10(9)/L 295      284    ABSOLUTE NEUTROPHILS (EXTERNAL) 1.56 - 6.45 x10(9)/L 4.83      4.06    ABSOLUTE LYMPHOCYTES (EXTERNAL) 0.95 - 3.07 x10(9)/L 1.29      1.33    ABSOLUTE MONOCYTES (EXTERNAL) 0.26 - 0.81 x10(9)/L 0.47      0.53    ABSOLUTE EOSINOPHILS (EXTERNAL) 0.03 - 0.48 x10(9)/L 0.15      0.15    ABSOLUTE BASOPHILS (EXTERNAL) 0.01 - 0.08 x10(9)/L <0.04      0.05        This result is from an external source.         Latest Ref Rng & Units 12/18/2024     8:58 AM 1/9/2022     1:14 AM 11/26/2021    12:49 PM   Liver   AP 40 - 150 U/L  101     AP (external) 35 - 104 U/L 78         TBili 0.2 - 1.3 mg/dL  0.3     TBili (external) 0.0 - 1.2 mg/dL 0.3         ALT 0 - 50 U/L  17     ALT (external) 7 - 45 U/L 9         AST 0 - 45 U/L  15     AST (external) 8 - 43 U/L 16         Tot Protein 6.8 - 8.8 g/dL  7.2     Tot Protein  (external) 6.3 - 7.9 g/dL 6.6         Albumin 3.4 - 5.0 g/dL  3.6  3.7    Albumin (external) 3.5 - 5.0 g/dL 4.1             This result is from an external source.         Latest Ref Rng & Units 7/27/2021    12:44 PM 12/18/2020    12:55 PM 6/19/2020     4:11 AM   Pancreas   A1C 0.0 - 5.6 % 5.6  5.4  5.1          Latest Ref Rng & Units 2/10/2025    10:19 AM 12/31/2024     1:44 PM 7/5/2020     5:53 AM   Iron studies   Iron 35 - 180 ug/dL   10    Iron Saturation Index 15 - 46 %   9    Ferritin (external) 6 - 175 mcg/L 294     39         This result is from an external source.         Latest Ref Rng & Units 6/3/2021    12:23 PM 5/11/2021     1:05 PM 4/20/2021    12:35 PM   [facility] Txp Virology   BK Spec  Plasma  Plasma  Plasma    BK Res BKNEG^BK Virus DNA Not Detected copies/mL 15,313  7,968  8,119    BK Log <2.7 Log copies/mL 4.2  3.9  3.9      Failed to redirect to the Timeline version of the REVFS SmartLink.  Recent Labs   Lab Test 07/27/21  1245 11/26/21  1249 06/03/22  1323 06/08/22  1218   DOSTAC 7/26/2021  --  6/2/2022 6/7/2022   TACROL 5.6 4.5* 5.7 5.0     Recent Labs   Lab Test 06/03/21  1224 08/05/21  1249 06/08/22  1218   DOSMPA  LD 23:40  --  6/7/2022  12:00 AM   MPACID 1.70 1.54 2.50   MPAG 43.6 38.6 32.7          Again, thank you for allowing me to participate in the care of your patient.        Sincerely,        Kieran Anglin MD    Electronically signed

## 2025-02-11 NOTE — TELEPHONE ENCOUNTER
Spoke with Macie at the [facility] lab. Patient was in for labs yesterday and is back already today. Wondering if we need something drawn. Looks like she is due for transplant labs.They are sending patient to her scheduled appointment with Dr. Anglin. She will need to come back another day as a 12-hour trough is needed for Tacrolimus level.    New transplant lab orders placed.    If we need anything else drawn, call them back at [phone].

## 2025-02-12 ENCOUNTER — TELEPHONE (OUTPATIENT)
Dept: TRANSPLANT | Facility: CLINIC | Age: 40
End: 2025-02-12
Payer: COMMERCIAL

## 2025-02-12 DIAGNOSIS — B34.8 BK VIREMIA: ICD-10-CM

## 2025-02-12 DIAGNOSIS — Z48.298 AFTERCARE FOLLOWING ORGAN TRANSPLANT: Primary | ICD-10-CM

## 2025-02-12 DIAGNOSIS — E55.9 VITAMIN D DEFICIENCY, UNSPECIFIED: ICD-10-CM

## 2025-02-12 NOTE — LETTER
OUTPATIENT LABORATORY TEST ORDER     Patient Name: Jelly Dietz   YOB: 1985     formerly [facility] MR# [if applicable]: [id number]   Date & Time: 2/13/2025  Expiration Date: 1 year after date issued      Diagnosis: Kidney Transplant (ICD-10 Z94.0)  Pancreas Transplant (ICD-10 Z94.83)    Aftercare following organ transplant (ICD-10 Z48.288)    Long term use of medications (ICD-10 Z79.899)    Other specified postprocedural states (Z98.890)     We ask your assistance in obtaining the following laboratory tests, which are part of our routine surveillance program for Solid Organ Transplant patients.     Please fax each result to [phone], same day as resulted/available    Critical lab results page [phone]  Monday - Friday 8 am to 5 pm  Evening/Weekend/Holiday communicate Critical labs results [phone]    Please obtain the following labs with next lab draw:  Vitamin D  Magnesium  Phosphorus  BK PCR  IgG  CD4 level   Hep B DNA Quant.     Every Other Month   CBC with platelets  Basic Metabolic Panel   Tacrolimus/Prograf/ drug level       Every 6 Months  Urine protein creatinine ratio    At 3 years, 4, years, and 5 years post-transplant   PRA/DSA (patient to supply )    If you have any questions please call the Transplant Center at [phone]. All lab results should be faxed to [phone]    .    .

## 2025-02-12 NOTE — TELEPHONE ENCOUNTER
Kieran Anglin MD Sveiven, Sara, RN  Please check the following labs: Vitamin D, Magnesium, Phosphorus, BK PCR, IgG, CD4 level, and Hep B DNA Quant.    I recommended she establish care with a local Nephrologist.    Labs q2 months.    Really would recommend she establish care with a psychologist.    Jose    OUTCOME:  Lab orders placed, patient aware.  She states she will establish care with nephrology and will consider psychology in the near future.  She does currently have a physiatrist who is prescribing medications.    Beti Billings RN   Transplant Coordinator  [phone]

## 2025-02-17 ENCOUNTER — PATIENT OUTREACH (OUTPATIENT)
Dept: CARE COORDINATION | Facility: CLINIC | Age: 40
End: 2025-02-17
Payer: COMMERCIAL

## 2025-02-17 NOTE — PROGRESS NOTES
Anemia Management Note - Follow Up      SUBJECTIVE/OBJECTIVE:    Referred by Dr. Bruce Whelan on 2024  Primary Diagnosis: Anemia of Other Chronic Illness (D63.8)     Secondary Diagnosis: Organ or tissue replaced by transplant, kidney (Z94.0)  Date of Kidney Transplant: 2020 and 2017.   Hgb goal range: 9-10     Epo/Darbo: TBD  *GFR >90.   Iron regimen: TBD  Venofer completed on 117 at [facility]     Labs : 2025  Epic &Letters  RX/TX plans : TBD     Labs: [facility]. Ph [phone]  Fax [phone]     [facility]: ph: [phone]     Recent ENZO use, transfusion, IV iron: Retacrit , Venofer .   No history of stroke, MI, and blood clots or cancers     Contact: OK to speak with Apolinar Hagan () regarding Scheduling and Medical Information per consent to communicate dated 2013.         Latest Ref Rng & Units 2022 3/7/2022 6/3/2022 2022 2025 1/15/2025 2025   Anemia   ENZO Dose      300 mcg 300 mcg 300 mcg   Hemoglobin 11.7 - 15.7 g/dL 10.8  10.8  11.5  10.7         BP Readings from Last 3 Encounters:   25 110/73   24 121/80   22 120/67     Wt Readings from Last 2 Encounters:   25 71.1 kg (156 lb 12.8 oz)   24 68.4 kg (150 lb 12.8 oz)       Labs were done at her local lab on 210  Hgb 10.8  Ferritin 294  IS 32    ASSESSMENT:    Hgb:at goal - continue to monitor  TSat: at goal >30% Ferritin: At goal (>100ng/mL)   Goals Addressed    None         PLAN:  RTC for anemia labs in 2-3 week(s).    Orders needed to be renewed (for next follow-up date) in LETTERS - for external labs: None    Iron labs due:  2-3 months    Plan discussed with:  Jelly via [employer]      NEXT FOLLOW-UP DATE:  417    Carrie Leopold, RN BSN  Anemia Services  [facility]  [email or web address]  Office: [phone]  Fax [phone]

## 2025-03-12 ENCOUNTER — TELEPHONE (OUTPATIENT)
Dept: TRANSPLANT | Facility: CLINIC | Age: 40
End: 2025-03-12
Payer: COMMERCIAL

## 2025-03-12 DIAGNOSIS — Z48.298 AFTERCARE FOLLOWING ORGAN TRANSPLANT: ICD-10-CM

## 2025-03-12 DIAGNOSIS — Z79.899 ENCOUNTER FOR LONG-TERM CURRENT USE OF MEDICATION: ICD-10-CM

## 2025-03-12 DIAGNOSIS — B34.8 BK VIREMIA: ICD-10-CM

## 2025-03-12 DIAGNOSIS — Z94.0 KIDNEY TRANSPLANTED: ICD-10-CM

## 2025-03-12 DIAGNOSIS — N17.9 ACUTE RENAL FAILURE, UNSPECIFIED ACUTE RENAL FAILURE TYPE: ICD-10-CM

## 2025-03-12 DIAGNOSIS — Z94.0 KIDNEY REPLACED BY TRANSPLANT: ICD-10-CM

## 2025-03-12 RX ORDER — SULFAMETHOXAZOLE AND TRIMETHOPRIM 400; 80 MG/1; MG/1
1 TABLET ORAL DAILY
Qty: 30 TABLET | Refills: 11 | Status: SHIPPED | OUTPATIENT
Start: 2025-03-12

## 2025-03-12 RX ORDER — LOSARTAN POTASSIUM 25 MG/1
12.5 TABLET ORAL DAILY
Qty: 45 TABLET | Refills: 0 | Status: SHIPPED | OUTPATIENT
Start: 2025-03-12

## 2025-03-12 RX ORDER — MYCOPHENOLIC ACID 180 MG/1
540 TABLET, DELAYED RELEASE ORAL 2 TIMES DAILY
Qty: 180 TABLET | Refills: 11 | Status: SHIPPED | OUTPATIENT
Start: 2025-03-12

## 2025-03-12 NOTE — TELEPHONE ENCOUNTER
Pt is also requesting new rx for    Bactrim    Did not see on active med list please verify and send new rx. Thank you!     [facility]  [phone]

## 2025-03-12 NOTE — TELEPHONE ENCOUNTER
Returned call to patient. Patient wondering if she should still be taking Bactrim. Patient advised this was discontinued.    She needs refills on mycophenolic acid and losartan.    She also needs her lab orders sent to [facility]. Advised these were faxed by Beti in February, but I will re-fax to [facility].

## 2025-03-12 NOTE — LETTER
OUTPATIENT LABORATORY TEST ORDER     Patient Name: Jelly Dietz   YOB: 1985     [facility] MR# [if applicable]: [id number]   Date & Time: 2/13/2025  Expiration Date: 1 year after date issued      Diagnosis: Kidney Transplant (ICD-10 Z94.0)  Pancreas Transplant (ICD-10 Z94.83)    Aftercare following organ transplant (ICD-10 Z48.288)    Long term use of medications (ICD-10 Z79.899)    Other specified postprocedural states (Z98.890)     We ask your assistance in obtaining the following laboratory tests, which are part of our routine surveillance program for Solid Organ Transplant patients.     Please fax each result to [phone], same day as resulted/available    Critical lab results page [phone]  Monday - Friday 8 am to 5 pm  Evening/Weekend/Holiday communicate Critical labs results [phone]    Please obtain the following labs with next lab draw:  Vitamin D  Magnesium  Phosphorus  BK PCR  IgG  CD4 level   Hep B DNA Quant.     Every Other Month   CBC with platelets  Basic Metabolic Panel   Tacrolimus/Prograf/ drug level       Every 6 Months  Urine protein creatinine ratio    At 3 years, 4, years, and 5 years post-transplant   PRA/DSA (patient to supply )    If you have any questions please call the Transplant Center at [phone]. All lab results should be faxed to [phone]    .    .

## 2025-03-12 NOTE — TELEPHONE ENCOUNTER
Please call Jelly,  She is questioning If she is still on Bactrim and Losartan?     Went to get a refill for her MPA 180mg and was told she needs new script.    Medication Refill  Route to [facility]    Pharmacy Name:   [facility] - [place], [place] - [address] Phone: [phone]   Fax: [phone]          Name of Medication: Losartan (COZAAR) 25 MG tablet         mycophenolic acid (GENERIC EQUIVALENT) 180 MG EC tablet          Bactrim 80-400MG   (not on medication list)      Uses [facility] mail order pharmacy

## 2025-03-12 NOTE — TELEPHONE ENCOUNTER
Per chart, patient should be taking bactrim.  Bactrim RX updated.  Will reassess when CD4 count results. Patient aware    Beti Billings RN   Transplant Coordinator  [phone]

## 2025-04-08 ENCOUNTER — TELEPHONE (OUTPATIENT)
Dept: TRANSPLANT | Facility: CLINIC | Age: 40
End: 2025-04-08
Payer: COMMERCIAL

## 2025-04-08 DIAGNOSIS — B34.8 BK VIREMIA: ICD-10-CM

## 2025-04-08 DIAGNOSIS — Z94.0 KIDNEY TRANSPLANTED: ICD-10-CM

## 2025-04-08 NOTE — TELEPHONE ENCOUNTER
ISSUE:  CD4: 310 on 4/2/2025    PLAN: discontinue bactrim per Dr Anglin     OUTCOME: bactrim discontinued.  Left message for with instruction to stop bactrim    Beti Billings RN   Transplant Coordinator  [phone]

## 2025-04-17 ENCOUNTER — PATIENT OUTREACH (OUTPATIENT)
Dept: CARE COORDINATION | Facility: CLINIC | Age: 40
End: 2025-04-17
Payer: COMMERCIAL

## 2025-04-17 DIAGNOSIS — Z94.0 KIDNEY REPLACED BY TRANSPLANT: ICD-10-CM

## 2025-04-17 DIAGNOSIS — D63.8 ANEMIA IN OTHER CHRONIC DISEASES CLASSIFIED ELSEWHERE: Primary | ICD-10-CM

## 2025-04-17 RX ORDER — HEPARIN SODIUM (PORCINE) LOCK FLUSH IV SOLN 100 UNIT/ML 100 UNIT/ML
5 SOLUTION INTRAVENOUS
OUTPATIENT
Start: 2025-04-17

## 2025-04-17 RX ORDER — DIPHENHYDRAMINE HYDROCHLORIDE 50 MG/ML
25 INJECTION, SOLUTION INTRAMUSCULAR; INTRAVENOUS
Start: 2025-04-17

## 2025-04-17 RX ORDER — ALBUTEROL SULFATE 0.83 MG/ML
2.5 SOLUTION RESPIRATORY (INHALATION)
OUTPATIENT
Start: 2025-04-17

## 2025-04-17 RX ORDER — METHYLPREDNISOLONE SODIUM SUCCINATE 40 MG/ML
40 INJECTION INTRAMUSCULAR; INTRAVENOUS
Start: 2025-04-17

## 2025-04-17 RX ORDER — DIPHENHYDRAMINE HYDROCHLORIDE 50 MG/ML
50 INJECTION, SOLUTION INTRAMUSCULAR; INTRAVENOUS
Start: 2025-04-17

## 2025-04-17 RX ORDER — EPINEPHRINE 1 MG/ML
0.3 INJECTION, SOLUTION, CONCENTRATE INTRAVENOUS EVERY 5 MIN PRN
OUTPATIENT
Start: 2025-04-17

## 2025-04-17 RX ORDER — HEPARIN SODIUM,PORCINE 10 UNIT/ML
5-20 VIAL (ML) INTRAVENOUS DAILY PRN
OUTPATIENT
Start: 2025-04-17

## 2025-04-17 RX ORDER — MEPERIDINE HYDROCHLORIDE 25 MG/ML
25 INJECTION INTRAMUSCULAR; INTRAVENOUS; SUBCUTANEOUS
OUTPATIENT
Start: 2025-04-17

## 2025-04-17 RX ORDER — ALBUTEROL SULFATE 90 UG/1
1-2 INHALANT RESPIRATORY (INHALATION)
Start: 2025-04-17

## 2025-04-17 NOTE — PROGRESS NOTES
Anemia Management Note - Follow Up      SUBJECTIVE/OBJECTIVE:      Referred by Dr. Bruce Whelan on 2024  Primary Diagnosis: Anemia of Other Chronic Illness (D63.8)     Secondary Diagnosis: Organ or tissue replaced by transplant, kidney (Z94.0)  Date of Kidney Transplant: 2020 and 2017.   Hgb goal range: 9-10     Epo/Darbo: TBD  *GFR >90.   Iron regimen: TBD  Venofer ordered 417  [facility]er completed on 117 at [facility]     Labs : 2025  Epic &Letters  RX/TX plans : TBD     Labs: [facility]. Ph [phone]  Fax [phone]     [facility]: ph: [phone]     Recent ENZO use, transfusion, IV iron: Retacrit , Venofer .   No history of stroke, MI, and blood clots or cancers     Contact: OK to speak with Apolinar Hagan () regarding Scheduling and Medical Information per consent to communicate dated 2013.         Latest Ref Rng & Units 2022 3/7/2022 6/3/2022 2022 2025 1/15/2025 2025   Anemia   ENZO Dose      300 mcg 300 mcg 300 mcg   Hemoglobin 11.7 - 15.7 g/dL 10.8  10.8  11.5  10.7         BP Readings from Last 3 Encounters:   25 110/73   24 121/80   22 120/67     Wt Readings from Last 2 Encounters:   25 71.1 kg (156 lb 12.8 oz)   24 68.4 kg (150 lb 12.8 oz)       Labs were drawn at outside facility on 402:  Hgb: 10.5  Ferritin 112  TSAT 17    ASSESSMENT:    Hgb:at goal - continue to monitor  TSat: not at goal of >30% Ferritin: At goal (>100ng/mL)   Goals Addressed    None         PLAN:  IV iron course warranted for low iron sat  She voiced preference for orders to go to [facility] in [place].    Orders needed to be renewed (for next follow-up date) in LETTERS - for external labs: None    Iron labs due:  TBD    Plan discussed with:  Jelly. She notes being tired, and wondered if her iron was low. She agrees to have iron infusions again. RN will update her once orders  are faxed to infusion center      NEXT FOLLOW-UP DATE:  141591    Carrie Leopold, RN BSN  Anemia Services  [facility]  [email or web address]  Office: [phone]  Fax [phone]

## 2025-04-24 ENCOUNTER — PATIENT OUTREACH (OUTPATIENT)
Dept: CARE COORDINATION | Facility: CLINIC | Age: 40
End: 2025-04-24
Payer: COMMERCIAL

## 2025-04-24 NOTE — PROGRESS NOTES
Anemia Management Note -    Follow-up with anemia management service:    Per Care Everywhere chart review, Jelly has iron infusions scheduled on 4/30, 5/2, and 5/5/25. Has lab appt 6/2/25        Latest Ref Rng & Units 1/9/2022 3/7/2022 6/3/2022 6/8/2022 1/13/2025 1/15/2025 1/17/2025   Anemia   ENZO Dose      300 mcg 300 mcg 300 mcg   Hemoglobin 11.7 - 15.7 g/dL 10.8  10.8  11.5  10.7           Follow-up call date: 050625    Carrie Leopold, RN BSN  Anemia Services  [facility]  [email or web address]  Office: [phone]  Fax [phone]

## 2025-05-06 ENCOUNTER — PATIENT OUTREACH (OUTPATIENT)
Dept: CARE COORDINATION | Facility: CLINIC | Age: 40
End: 2025-05-06
Payer: COMMERCIAL

## 2025-05-06 NOTE — PROGRESS NOTES
Anemia Management Note -    Follow-up with anemia management service:    Per Care Everywhere, Jelly received first of 3 Venofer infusions at [facility] infusion on 050525  She has appts scheduled on 5/7 and 5/9 to complete the course        Latest Ref Rng & Units 3/7/2022 6/3/2022 6/8/2022 1/13/2025 1/15/2025 1/17/2025 5/5/2025   Anemia   ENZO Dose     300 mcg 300 mcg 300 mcg    Hemoglobin 11.7 - 15.7 g/dL 10.8  11.5  10.7        IV Iron Dose        300mg         Follow-up call date: 051225    Carrie Leopold, RN BSN  Anemia Services  [facility]  [email or web address]  Office: [phone]  Fax [phone]

## 2025-05-12 ENCOUNTER — PATIENT OUTREACH (OUTPATIENT)
Dept: CARE COORDINATION | Facility: CLINIC | Age: 40
End: 2025-05-12
Payer: COMMERCIAL

## 2025-05-12 NOTE — PROGRESS NOTES
Anemia Management Note - Follow Up      SUBJECTIVE/OBJECTIVE:    Referred by Dr. Bruce Whelan on 2024  Primary Diagnosis: Anemia of Other Chronic Illness (D63.8)     Secondary Diagnosis: Organ or tissue replaced by transplant, kidney (Z94.0)  Date of Kidney Transplant: 2020 and 2017.   Hgb goal range: 9-10     Epo/Darbo: TBD  *GFR >90.   Iron regimen: TBD  Venofer ordered [id number] on 509  Venofer completed on 117 at [facility]     Labs : 2025  Epic &Letters  RX/TX plans : TBD     Labs: [facility]. Ph [phone]  Fax [phone]     [facility]: ph: [phone]     Recent ENZO use, transfusion, IV iron: Retacrit , Venofer .   No history of stroke, MI, and blood clots or cancers     Contact: OK to speak with Apolinar Hagan () regarding Scheduling and Medical Information per consent to communicate dated 2013.         Latest Ref Rng & Units 2022 2025 1/15/2025 2025 2025 2025 2025   Anemia   ENZO Dose   300 mcg 300 mcg 300 mcg      Hemoglobin 11.7 - 15.7 g/dL 10.7          IV Iron Dose      300mg 300mg 300mg     BP Readings from Last 3 Encounters:   25 110/73   24 121/80   22 120/67     Wt Readings from Last 2 Encounters:   25 71.1 kg (156 lb 12.8 oz)   24 68.4 kg (150 lb 12.8 oz)       Per Care Everywhere, Jelly received IV iron on 507 and 509    ASSESSMENT:    Hgb:at goal - continue to monitor  TSat: Due for iron studies 4 weeks after last IV iron infusion Ferritin: Due for iron studies 4 weeks after last IV iron infusion   Goals Addressed    None         PLAN:  RTC for anemia labs in 4 week(s).    Orders needed to be renewed (for next follow-up date) in Epic/LETTERS - for external labs: None    Iron labs due:  4 weeks, early 2025    Plan discussed with:  no call, chart reviewed      NEXT FOLLOW-UP DATE:  602    Carrie Leopold, RN  CAITY  [facility]  [email or web address]  Office: [phone]  Fax [phone]

## 2025-06-02 ENCOUNTER — PATIENT OUTREACH (OUTPATIENT)
Dept: CARE COORDINATION | Facility: CLINIC | Age: 40
End: 2025-06-02
Payer: COMMERCIAL

## 2025-06-02 NOTE — PROGRESS NOTES
Anemia Management Note - Reminder     Follow-up with anemia management service:    Per care everywhere, Jelly has lab appt in [place] on 060625 at 1010.  Lab orders should be current and available to lab staff.  Will review labs early the following week and follow up with Jelly re: next steps        Latest Ref Rng & Units 6/8/2022 1/13/2025 1/15/2025 1/17/2025 5/5/2025 5/7/2025 5/9/2025   Anemia   ENZO Dose   300 mcg 300 mcg 300 mcg      Hemoglobin 11.7 - 15.7 g/dL 10.7          IV Iron Dose      300mg 300mg 300mg       Follow-up call date: 061025    Carrie Leopold, RN BSN  Anemia Services  [facility]  [email or web address]  Office: [phone]  Fax [phone]

## 2025-06-10 ENCOUNTER — PATIENT OUTREACH (OUTPATIENT)
Dept: CARE COORDINATION | Facility: CLINIC | Age: 40
End: 2025-06-10
Payer: COMMERCIAL

## 2025-06-26 ENCOUNTER — TELEPHONE (OUTPATIENT)
Dept: INFUSION THERAPY | Facility: CLINIC | Age: 40
End: 2025-06-26
Payer: COMMERCIAL

## 2025-06-26 ENCOUNTER — TELEPHONE (OUTPATIENT)
Dept: PHARMACY | Facility: CLINIC | Age: 40
End: 2025-06-26
Payer: COMMERCIAL

## 2025-06-26 DIAGNOSIS — B34.8 BK VIREMIA: ICD-10-CM

## 2025-06-26 NOTE — TELEPHONE ENCOUNTER
PA Needed    Medication: Mycophenolic Acid 180mg- new insurance needs PA   QTY/DS: 180 per 30 days    NEW INS: Yes  Insurance Company: [employer] - Phone [phone] Fax [phone]  Pharmacy Filling the Rx: [facility] - [place], [place] - [address]  PA : 2025  Date of last fill: 2025  Started and saved in CMM

## 2025-06-30 NOTE — TELEPHONE ENCOUNTER
PA Initiation    Medication: MYCOPHENOLIC ACID 180 MG PO [facility]  Insurance Company: [facility] - Phone [phone] Fax [phone]  Pharmacy Filling the Rx: [facility] - [place], [place] - [facility] [address]  Filling Pharmacy Phone: [phone]  Filling Pharmacy Fax: [phone]  Start Date: 6/26/2025

## 2025-07-01 ENCOUNTER — PATIENT OUTREACH (OUTPATIENT)
Dept: CARE COORDINATION | Facility: CLINIC | Age: 40
End: 2025-07-01
Payer: COMMERCIAL

## 2025-07-01 NOTE — TELEPHONE ENCOUNTER
Medication Appeal Initiation    Medication: MYCOPHENOLIC ACID 180 MG PO [facility]  Appeal Start Date:  7/1/2025  Insurance Company: [employer]  Insurance Phone: [phone]  Insurance Fax: [phone]  Comments:

## 2025-07-01 NOTE — TELEPHONE ENCOUNTER
PRIOR AUTHORIZATION DENIED    Medication: MYCOPHENOLIC ACID 180 MG PO [facility]  Insurance Company: [employer] - Phone [phone] Fax [phone]  Denial Date: 6/30/2025  Denial Reason(s):         Appeal Information:           Patient Notified: no

## 2025-07-01 NOTE — PROGRESS NOTES
Anemia Management Note - Reminder     Follow-up with anemia management service:    Jelly has not responded to Mychart nor phone call outreaches re: need for anemia labs post-iron infusions.   Per Care Everywhere, she has a lab appt on 081125 at [facility] location in [place]. RN will review chart thereafter to check for anemia labs  Lab letter sent on [id number] is still active and should still be on file with [facility] Ref Rng & Units 6/8/2022 1/13/2025 1/15/2025 1/17/2025 5/5/2025 5/7/2025 5/9/2025   Anemia   ENZO Dose   300 mcg 300 mcg 300 mcg      Hemoglobin 11.7 - 15.7 g/dL 10.7          IV Iron Dose      300mg 300mg 300mg         Follow-up call date: 081325    Carrie Leopold, RN BSN  Anemia Services  [facility]  [email or web address]  Office: [phone]  Fax [phone]

## 2025-07-03 NOTE — TELEPHONE ENCOUNTER
MEDICATION APPEAL APPROVED    Medication: MYCOPHENOLIC ACID 180 MG PO [facility]  Authorization Effective Date: 7/1/2025  Authorization Expiration Date: 7/1/2026  Approved Dose/Quantity: UD  Reference #: [id number]   Appeal Insurance Company: [employer]   Expected CoPay: $       CoPay Card Available: No  Financial Assistance Needed: NO  Filling Pharmacy: [facility] - [place] [address]  Patient Notified: NO  Comments:   PHARMACY NOTIFIED

## 2025-07-12 ENCOUNTER — HEALTH MAINTENANCE LETTER (OUTPATIENT)
Age: 40
End: 2025-07-12

## 2025-07-27 NOTE — TELEPHONE ENCOUNTER
Labs drawn on 9/18/2020    Patient mobility status ambulates with no difficulty.     I have reviewed discharge instructions with the patient.  The patient verbalized understanding.    Patient left ED via Discharge Method: ambulatory to Home with self.    Opportunity for questions and clarification provided.     Patient given 2 scripts.

## 2025-08-11 ENCOUNTER — EXTERNAL ORDER RESULTS (OUTPATIENT)
Dept: LAB | Facility: CLINIC | Age: 40
End: 2025-08-11
Payer: COMMERCIAL

## 2025-08-13 ENCOUNTER — TELEPHONE (OUTPATIENT)
Dept: TRANSPLANT | Facility: CLINIC | Age: 40
End: 2025-08-13
Payer: COMMERCIAL

## 2025-08-13 DIAGNOSIS — Z94.0 KIDNEY TRANSPLANTED: ICD-10-CM

## 2025-08-13 DIAGNOSIS — Z94.0 KIDNEY REPLACED BY TRANSPLANT: ICD-10-CM

## 2025-08-13 DIAGNOSIS — N17.9 ACUTE RENAL FAILURE, UNSPECIFIED ACUTE RENAL FAILURE TYPE: ICD-10-CM

## 2025-08-13 DIAGNOSIS — B34.8 BK VIREMIA: ICD-10-CM

## 2025-08-13 DIAGNOSIS — Z79.899 ENCOUNTER FOR LONG-TERM CURRENT USE OF MEDICATION: ICD-10-CM

## 2025-08-13 DIAGNOSIS — Z48.298 AFTERCARE FOLLOWING ORGAN TRANSPLANT: ICD-10-CM

## 2025-08-13 LAB
ABSOLUTE BASOPHILS (EXTERNAL): 0.05 X10(9)/L (ref 0.01–0.08)
ABSOLUTE EOSINOPHILS (EXTERNAL): 0.45 X10(9)/L (ref 0.03–0.48)
ABSOLUTE LYMPHOCYTES (EXTERNAL): 1.41 X10(9)/L (ref 0.95–3.07)
ABSOLUTE MONOCYTES (EXTERNAL): 0.4 X10(9)/L (ref 0.26–0.81)
ABSOLUTE NEUTROPHILS (EXTERNAL): 2.33 X10(9)/L (ref 1.56–6.45)
ALBUMIN (EXTERNAL): 4.2 G/DL (ref 3.5–5)
ALK PHOSPHATASE (EXTERNAL): 67 U/L (ref 35–104)
ALT SERPL-CCNC: 13 U/L (ref 7–45)
AST SERPL-CCNC: 19 U/L (ref 8–43)
BILIRUB SERPL-MCNC: 0.5 MG/DL (ref 0–1.2)
PROTEIN TOTAL (EXTERNAL): 6.9 G/DL (ref 6.3–7.9)

## 2025-08-14 ENCOUNTER — PATIENT OUTREACH (OUTPATIENT)
Dept: CARE COORDINATION | Facility: CLINIC | Age: 40
End: 2025-08-14
Payer: COMMERCIAL

## 2025-08-14 RX ORDER — TACROLIMUS 1 MG/1
6 CAPSULE ORAL 2 TIMES DAILY
Qty: 360 CAPSULE | Refills: 11 | Status: SHIPPED | OUTPATIENT
Start: 2025-08-14

## 2025-08-14 RX ORDER — PREDNISONE 5 MG/1
10 TABLET ORAL DAILY
Qty: 60 TABLET | Refills: 11 | Status: SHIPPED | OUTPATIENT
Start: 2025-08-14

## 2025-08-20 DIAGNOSIS — Z48.298 AFTERCARE FOLLOWING ORGAN TRANSPLANT: ICD-10-CM

## 2025-08-20 DIAGNOSIS — N17.9 ACUTE RENAL FAILURE, UNSPECIFIED ACUTE RENAL FAILURE TYPE: ICD-10-CM

## 2025-08-20 DIAGNOSIS — B34.8 BK VIREMIA: ICD-10-CM

## 2025-08-20 DIAGNOSIS — Z94.0 KIDNEY REPLACED BY TRANSPLANT: ICD-10-CM

## 2025-08-20 DIAGNOSIS — Z94.0 KIDNEY TRANSPLANTED: ICD-10-CM

## 2025-08-20 DIAGNOSIS — Z79.899 ENCOUNTER FOR LONG-TERM CURRENT USE OF MEDICATION: ICD-10-CM

## 2025-08-20 RX ORDER — TACROLIMUS 1 MG/1
6 CAPSULE ORAL 2 TIMES DAILY
Qty: 360 CAPSULE | Refills: 11 | Status: SHIPPED | OUTPATIENT
Start: 2025-08-20

## 2025-08-22 ENCOUNTER — TELEPHONE (OUTPATIENT)
Dept: TRANSPLANT | Facility: CLINIC | Age: 40
End: 2025-08-22
Payer: COMMERCIAL

## 2025-08-22 DIAGNOSIS — B34.8 BK VIREMIA: ICD-10-CM

## 2025-09-04 ENCOUNTER — PATIENT OUTREACH (OUTPATIENT)
Dept: CARE COORDINATION | Facility: CLINIC | Age: 40
End: 2025-09-04
Payer: COMMERCIAL

## (undated) DEVICE — SU SILK 0 TIE 6X30" A306H

## (undated) DEVICE — ESU HANDPIECE ABC BEND-A-BEAM 6" 134006

## (undated) DEVICE — ADH SKIN CLOSURE PREMIERPRO EXOFIN 1.0ML 3470

## (undated) DEVICE — SU VICRYL 3-0 SH 27" UND J416H

## (undated) DEVICE — NDL BLUNT 18GA 1" W/O FILTER 305181

## (undated) DEVICE — Device

## (undated) DEVICE — CATH FOLEY 3WAY 16FR 30ML SIL 73016SI

## (undated) DEVICE — PREP SKIN SCRUB TRAY 4461A

## (undated) DEVICE — CLIP HORIZON SM RED WIDE SLOT 001201

## (undated) DEVICE — LINEN TOWEL PACK X30 5481

## (undated) DEVICE — CONNECTOR WATER VALVE PERFUSION PACK STR 020272801

## (undated) DEVICE — SOL NACL 0.9% IRRIG 1000ML BOTTLE 2F7124

## (undated) DEVICE — ESU PENCIL SMOKE EVAC W/ROCKER SWITCH 0703-047-000

## (undated) DEVICE — SU SILK 3-0 SH CR 8X18" C013D

## (undated) DEVICE — DRAPE IOBAN INCISE 23X17" 6650EZ

## (undated) DEVICE — PREP CHLORAPREP 26ML TINTED ORANGE  260815

## (undated) DEVICE — SUCTION TIP POOLE K770

## (undated) DEVICE — SEAL CYSTOSCOPE TIP 6-12FR CS-B612

## (undated) DEVICE — SYR 10ML LL W/O NDL 302995

## (undated) DEVICE — SU PDS II 5-0 RB-1 27" Z303H

## (undated) DEVICE — SU SILK 3-0 TIE 12X30" A304H

## (undated) DEVICE — DRAPE LAP W/ARMBOARD 29410

## (undated) DEVICE — INSERT FOGARTY 33MM TRACTION HYDRAJAW HYDRA33

## (undated) DEVICE — LINEN GOWN XLG 5407

## (undated) DEVICE — TUBING IRRIG CYSTO/BLADDER SET 81" LF 2C4040

## (undated) DEVICE — SU SILK 4-0 TIE 12X30" A303H

## (undated) DEVICE — SU PDS II 4-0 RB-1 27" Z304H

## (undated) DEVICE — SYR 03ML LL W/O NDL 309657

## (undated) DEVICE — SPECIMEN CONTAINER 5OZ STERILE 2600SA

## (undated) DEVICE — SU SILK 2-0 TIE 12X30" A305H

## (undated) DEVICE — SURGICEL HEMOSTAT 4X8" 1952

## (undated) DEVICE — NDL COUNTER 20CT 31142493

## (undated) DEVICE — SURGICEL ABSORBABLE HEMOSTAT SNOW 4"X4" 2083

## (undated) DEVICE — CATH PLUG W/CAP 000076

## (undated) DEVICE — SOL NACL 0.9% INJ 1000ML BAG 2B1324X

## (undated) DEVICE — SU PROLENE 1 CTX 30" 8455H

## (undated) DEVICE — SU PROLENE 5-0 RB-1DA 36"  8556H

## (undated) DEVICE — GLOVE PROTEXIS BLUE W/NEU-THERA 6.5  2D73EB65

## (undated) DEVICE — SU PROLENE 6-0 RB-2DA 30" 8711H

## (undated) DEVICE — DRAPE SLUSH/WARMER 66X44" ORS-320

## (undated) DEVICE — PAD CHUX UNDERPAD 23X24" 7136

## (undated) DEVICE — BLADE CLIPPER SGL USE 9680

## (undated) DEVICE — STPL LINEAR CUT 55MM VASC TVC55

## (undated) DEVICE — SYR 01ML 27GA 0.5" NDL TBC 309623

## (undated) DEVICE — SOL WATER IRRIG 1000ML BOTTLE 2F7114

## (undated) DEVICE — GLOVE PROTEXIS MICRO 7.5  2D73PM75

## (undated) DEVICE — ESU GROUND PAD THERMOGUARD PLUS ABC PEDS 7-382

## (undated) DEVICE — VESSEL LOOPS RED MINI 31145710

## (undated) DEVICE — GOWN IMPERVIOUS BREATHABLE SMART LG 89015

## (undated) DEVICE — PREP POVIDONE IODINE SOLUTION 10% 4OZ

## (undated) DEVICE — JELLY LUBRICATING SURGILUBE 2OZ TUBE

## (undated) DEVICE — DRAPE MAYO STAND 23X54 8337

## (undated) DEVICE — DRSG GAUZE 4X4" TRAY 6939

## (undated) DEVICE — WIPES FOLEY CARE SURESTEP PROVON DFC100

## (undated) DEVICE — LINEN TOWEL PACK X5 5464

## (undated) DEVICE — DEVICE CATH STABILIZATION STATLOCK FOLEY 3-WAY FOL0105

## (undated) DEVICE — SU PROLENE 7-0 BV-1DA 30" 8703H

## (undated) DEVICE — SU SILK 1 TIE 6X30" A307H

## (undated) DEVICE — SU MONOCRYL 4-0 PS-2 27" UND Y426H

## (undated) DEVICE — CLIP HORIZON MED BLUE 002200

## (undated) DEVICE — LINEN TOWEL PACK X6 WHITE 5487

## (undated) DEVICE — SU PDS II 6-0 RB-2DA 30" Z149H

## (undated) RX ORDER — FENTANYL CITRATE 50 UG/ML
INJECTION, SOLUTION INTRAMUSCULAR; INTRAVENOUS
Status: DISPENSED
Start: 2020-06-19

## (undated) RX ORDER — LIDOCAINE HYDROCHLORIDE 10 MG/ML
INJECTION, SOLUTION EPIDURAL; INFILTRATION; INTRACAUDAL; PERINEURAL
Status: DISPENSED
Start: 2020-07-10

## (undated) RX ORDER — ACETAMINOPHEN 325 MG/1
TABLET ORAL
Status: DISPENSED
Start: 2020-06-19

## (undated) RX ORDER — FENTANYL CITRATE 50 UG/ML
INJECTION, SOLUTION INTRAMUSCULAR; INTRAVENOUS
Status: DISPENSED
Start: 2020-07-05

## (undated) RX ORDER — ONDANSETRON 2 MG/ML
INJECTION INTRAMUSCULAR; INTRAVENOUS
Status: DISPENSED
Start: 2020-06-19

## (undated) RX ORDER — ESMOLOL HYDROCHLORIDE 10 MG/ML
INJECTION INTRAVENOUS
Status: DISPENSED
Start: 2020-06-19

## (undated) RX ORDER — ONDANSETRON 2 MG/ML
INJECTION INTRAMUSCULAR; INTRAVENOUS
Status: DISPENSED
Start: 2020-07-05

## (undated) RX ORDER — ONDANSETRON 2 MG/ML
INJECTION INTRAMUSCULAR; INTRAVENOUS
Status: DISPENSED
Start: 2020-07-08

## (undated) RX ORDER — MANNITOL 20 G/100ML
INJECTION, SOLUTION INTRAVENOUS
Status: DISPENSED
Start: 2020-06-19

## (undated) RX ORDER — PHENYLEPHRINE HCL IN 0.9% NACL 1 MG/10 ML
SYRINGE (ML) INTRAVENOUS
Status: DISPENSED
Start: 2020-06-19

## (undated) RX ORDER — LIDOCAINE HYDROCHLORIDE 20 MG/ML
INJECTION, SOLUTION EPIDURAL; INFILTRATION; INTRACAUDAL; PERINEURAL
Status: DISPENSED
Start: 2020-07-08

## (undated) RX ORDER — FENTANYL CITRATE 50 UG/ML
INJECTION, SOLUTION INTRAMUSCULAR; INTRAVENOUS
Status: DISPENSED
Start: 2020-07-10

## (undated) RX ORDER — PROPOFOL 10 MG/ML
INJECTION, EMULSION INTRAVENOUS
Status: DISPENSED
Start: 2020-06-19

## (undated) RX ORDER — SODIUM CHLORIDE 450 MG/100ML
INJECTION, SOLUTION INTRAVENOUS
Status: DISPENSED
Start: 2020-06-19

## (undated) RX ORDER — FUROSEMIDE 10 MG/ML
INJECTION INTRAMUSCULAR; INTRAVENOUS
Status: DISPENSED
Start: 2020-06-19

## (undated) RX ORDER — SODIUM CHLORIDE 9 MG/ML
INJECTION, SOLUTION INTRAVENOUS
Status: DISPENSED
Start: 2020-06-19

## (undated) RX ORDER — PROPOFOL 10 MG/ML
INJECTION, EMULSION INTRAVENOUS
Status: DISPENSED
Start: 2020-07-08

## (undated) RX ORDER — HEPARIN SODIUM 1000 [USP'U]/ML
INJECTION, SOLUTION INTRAVENOUS; SUBCUTANEOUS
Status: DISPENSED
Start: 2020-06-19

## (undated) RX ORDER — LIDOCAINE HYDROCHLORIDE 10 MG/ML
INJECTION, SOLUTION EPIDURAL; INFILTRATION; INTRACAUDAL; PERINEURAL
Status: DISPENSED
Start: 2020-07-05

## (undated) RX ORDER — CEFUROXIME SODIUM 1.5 G/16ML
INJECTION, POWDER, FOR SOLUTION INTRAVENOUS
Status: DISPENSED
Start: 2020-06-19

## (undated) RX ORDER — REGADENOSON 0.08 MG/ML
INJECTION, SOLUTION INTRAVENOUS
Status: DISPENSED
Start: 2019-03-28

## (undated) RX ORDER — DEXAMETHASONE SODIUM PHOSPHATE 4 MG/ML
INJECTION, SOLUTION INTRA-ARTICULAR; INTRALESIONAL; INTRAMUSCULAR; INTRAVENOUS; SOFT TISSUE
Status: DISPENSED
Start: 2020-06-19

## (undated) RX ORDER — HYDROMORPHONE HYDROCHLORIDE 1 MG/ML
INJECTION, SOLUTION INTRAMUSCULAR; INTRAVENOUS; SUBCUTANEOUS
Status: DISPENSED
Start: 2020-06-19

## (undated) RX ORDER — FENTANYL CITRATE 50 UG/ML
INJECTION, SOLUTION INTRAMUSCULAR; INTRAVENOUS
Status: DISPENSED
Start: 2020-07-08

## (undated) RX ORDER — HEPARIN SODIUM,PORCINE 10 UNIT/ML
VIAL (ML) INTRAVENOUS
Status: DISPENSED
Start: 2020-07-10

## (undated) RX ORDER — GLYCOPYRROLATE 0.2 MG/ML
INJECTION, SOLUTION INTRAMUSCULAR; INTRAVENOUS
Status: DISPENSED
Start: 2020-06-19

## (undated) RX ORDER — LIDOCAINE HYDROCHLORIDE 20 MG/ML
INJECTION, SOLUTION EPIDURAL; INFILTRATION; INTRACAUDAL; PERINEURAL
Status: DISPENSED
Start: 2020-06-19